# Patient Record
Sex: FEMALE | Race: NATIVE HAWAIIAN OR OTHER PACIFIC ISLANDER | NOT HISPANIC OR LATINO | Employment: UNEMPLOYED | URBAN - METROPOLITAN AREA
[De-identification: names, ages, dates, MRNs, and addresses within clinical notes are randomized per-mention and may not be internally consistent; named-entity substitution may affect disease eponyms.]

---
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
## 2021-01-04 ENCOUNTER — MYC MEDICAL ADVICE (OUTPATIENT)
Dept: RHEUMATOLOGY | Facility: CLINIC | Age: 21
End: 2021-01-04

## 2021-01-04 DIAGNOSIS — M32.19 SYSTEMIC LUPUS ERYTHEMATOSUS WITH OTHER ORGAN INVOLVEMENT, UNSPECIFIED SLE TYPE (H): Primary | ICD-10-CM

## 2021-01-05 RX ORDER — MYCOPHENOLATE MOFETIL 500 MG/1
1500 TABLET ORAL 2 TIMES DAILY
Qty: 180 TABLET | Refills: 11 | Status: SHIPPED | OUTPATIENT
Start: 2021-01-05 | End: 2021-08-11

## 2021-01-06 ENCOUNTER — TELEPHONE (OUTPATIENT)
Dept: RHEUMATOLOGY | Facility: CLINIC | Age: 21
End: 2021-01-06

## 2021-01-06 NOTE — TELEPHONE ENCOUNTER
PA Initiation    Medication: Benlysta- PENDING   Insurance Company: [employer] ([facility]) - Phone [phone] Fax [phone]  Pharmacy Filling the Rx:    Filling Pharmacy Phone:    Filling Pharmacy Fax:    Start Date: 1/6/2021

## 2021-01-07 NOTE — TELEPHONE ENCOUNTER
Prior Authorization Approval    Authorization Effective Date: 1/6/2021  Authorization Expiration Date: 7/6/2021  Medication: Benlysta- Approved  Approved Dose/Quantity: 200mg  Reference #: [id number]   Insurance Company: [employer] ([facility]) - Phone [phone] Fax [phone]  Expected CoPay:       CoPay Card Available: Yes    Foundation Assistance Needed: N/A  Which Pharmacy is filling the prescription (Not needed for infusion/clinic administered): [facility] - [facility] [address]   Pharmacy Notified: Yes  Patient Notified: No

## 2021-01-12 RX ORDER — ACETAMINOPHEN 325 MG/1
650 TABLET ORAL ONCE
Status: CANCELLED
Start: 2021-01-13

## 2021-01-12 RX ORDER — DIPHENHYDRAMINE HCL 50 MG
50 CAPSULE ORAL ONCE
Status: CANCELLED
Start: 2021-01-13

## 2021-01-12 RX ORDER — METHYLPREDNISOLONE SODIUM SUCCINATE 125 MG/2ML
1000 INJECTION, POWDER, LYOPHILIZED, FOR SOLUTION INTRAMUSCULAR; INTRAVENOUS ONCE
Status: CANCELLED | OUTPATIENT
Start: 2021-01-13

## 2021-01-13 ENCOUNTER — ANCILLARY PROCEDURE (OUTPATIENT)
Dept: ULTRASOUND IMAGING | Facility: CLINIC | Age: 21
End: 2021-01-13
Attending: PEDIATRICS
Payer: COMMERCIAL

## 2021-01-13 ENCOUNTER — HOSPITAL ENCOUNTER (INPATIENT)
Facility: CLINIC | Age: 21
LOS: 2 days | Discharge: HOME HEALTH | End: 2021-01-15
Attending: PEDIATRICS | Admitting: PEDIATRICS
Payer: COMMERCIAL

## 2021-01-13 ENCOUNTER — INFUSION THERAPY VISIT (OUTPATIENT)
Dept: INFUSION THERAPY | Facility: CLINIC | Age: 21
End: 2021-01-13
Attending: PEDIATRICS
Payer: COMMERCIAL

## 2021-01-13 ENCOUNTER — TELEPHONE (OUTPATIENT)
Dept: RHEUMATOLOGY | Facility: CLINIC | Age: 21
End: 2021-01-13

## 2021-01-13 DIAGNOSIS — L03.116 CELLULITIS OF LEFT LOWER EXTREMITY: ICD-10-CM

## 2021-01-13 DIAGNOSIS — Z53.9 ERRONEOUS ENCOUNTER--DISREGARD: ICD-10-CM

## 2021-01-13 DIAGNOSIS — U07.1 2019 NOVEL CORONAVIRUS DISEASE (COVID-19): ICD-10-CM

## 2021-01-13 DIAGNOSIS — M32.19 OTHER SYSTEMIC LUPUS ERYTHEMATOSUS WITH OTHER ORGAN INVOLVEMENT (H): Primary | ICD-10-CM

## 2021-01-13 DIAGNOSIS — M32.0 DRUG-INDUCED SYSTEMIC LUPUS ERYTHEMATOSUS, UNSPECIFIED ORGAN INVOLVEMENT STATUS (H): Chronic | ICD-10-CM

## 2021-01-13 LAB
ALBUMIN SERPL-MCNC: 3.1 G/DL (ref 3.4–5)
ALBUMIN UR-MCNC: 10 MG/DL
ALP SERPL-CCNC: 141 U/L (ref 40–150)
ALT SERPL W P-5'-P-CCNC: 17 U/L (ref 0–50)
ANION GAP SERPL CALCULATED.3IONS-SCNC: 7 MMOL/L (ref 3–14)
APPEARANCE UR: CLEAR
AST SERPL W P-5'-P-CCNC: 32 U/L (ref 0–35)
BACTERIA #/AREA URNS HPF: ABNORMAL /HPF
BASOPHILS # BLD AUTO: 0 10E9/L (ref 0–0.2)
BASOPHILS NFR BLD AUTO: 0 %
BILIRUB SERPL-MCNC: 0.4 MG/DL (ref 0.2–1.3)
BILIRUB UR QL STRIP: NEGATIVE
BUN SERPL-MCNC: 8 MG/DL (ref 7–30)
CA-I BLD-SCNC: 4.6 MG/DL (ref 4.4–5.2)
CALCIUM SERPL-MCNC: 8 MG/DL (ref 8.5–10.1)
CHLORIDE SERPL-SCNC: 110 MMOL/L (ref 96–110)
CO2 BLDCOV-SCNC: 19 MMOL/L (ref 21–28)
CO2 BLDCOV-SCNC: 20 MMOL/L (ref 21–28)
CO2 SERPL-SCNC: 22 MMOL/L (ref 20–32)
COLOR UR AUTO: YELLOW
CREAT SERPL-MCNC: 0.54 MG/DL (ref 0.5–1)
CRP SERPL-MCNC: 3.5 MG/L (ref 0–8)
DIFFERENTIAL METHOD BLD: ABNORMAL
EOSINOPHIL # BLD AUTO: 0 10E9/L (ref 0–0.7)
EOSINOPHIL NFR BLD AUTO: 0.3 %
ERYTHROCYTE [DISTWIDTH] IN BLOOD BY AUTOMATED COUNT: 17.5 % (ref 10–15)
FLUAV RNA RESP QL NAA+PROBE: NEGATIVE
FLUBV RNA RESP QL NAA+PROBE: NEGATIVE
GFR SERPL CREATININE-BSD FRML MDRD: >90 ML/MIN/{1.73_M2}
GLUCOSE BLD-MCNC: 83 MG/DL (ref 70–99)
GLUCOSE SERPL-MCNC: 77 MG/DL (ref 70–99)
GLUCOSE UR STRIP-MCNC: NEGATIVE MG/DL
GRAM STN SPEC: ABNORMAL
GRAM STN SPEC: ABNORMAL
HCT VFR BLD AUTO: 30.4 % (ref 35–47)
HCT VFR BLD CALC: 31 %PCV (ref 35–47)
HGB BLD CALC-MCNC: 10.5 G/DL (ref 11.7–15.7)
HGB BLD-MCNC: 9.1 G/DL (ref 11.7–15.7)
HGB UR QL STRIP: ABNORMAL
HYALINE CASTS #/AREA URNS LPF: 1 /LPF (ref 0–2)
IMM GRANULOCYTES # BLD: 0 10E9/L (ref 0–0.4)
IMM GRANULOCYTES NFR BLD: 0.5 %
KETONES UR STRIP-MCNC: NEGATIVE MG/DL
LABORATORY COMMENT REPORT: ABNORMAL
LACTATE BLD-SCNC: 0.6 MMOL/L (ref 0.7–2.1)
LEUKOCYTE ESTERASE UR QL STRIP: ABNORMAL
LIPASE SERPL-CCNC: 582 U/L (ref 73–393)
LYMPHOCYTES # BLD AUTO: 1.3 10E9/L (ref 0.8–5.3)
LYMPHOCYTES NFR BLD AUTO: 33.9 %
Lab: ABNORMAL
MCH RBC QN AUTO: 22.6 PG (ref 26.5–33)
MCHC RBC AUTO-ENTMCNC: 29.9 G/DL (ref 31.5–36.5)
MCV RBC AUTO: 75 FL (ref 78–100)
MONOCYTES # BLD AUTO: 0.4 10E9/L (ref 0–1.3)
MONOCYTES NFR BLD AUTO: 11.2 %
MUCOUS THREADS #/AREA URNS LPF: PRESENT /LPF
NEUTROPHILS # BLD AUTO: 2.1 10E9/L (ref 1.6–8.3)
NEUTROPHILS NFR BLD AUTO: 54.1 %
NITRATE UR QL: NEGATIVE
NRBC # BLD AUTO: 0 10*3/UL
NRBC BLD AUTO-RTO: 0 /100
PCO2 BLDV: 29 MM HG (ref 40–50)
PCO2 BLDV: 29 MM HG (ref 40–50)
PH BLDV: 7.43 PH (ref 7.32–7.43)
PH BLDV: 7.44 PH (ref 7.32–7.43)
PH UR STRIP: 6.5 PH (ref 5–7)
PLATELET # BLD AUTO: 149 10E9/L (ref 150–450)
PO2 BLDV: 44 MM HG (ref 25–47)
PO2 BLDV: 47 MM HG (ref 25–47)
POTASSIUM BLD-SCNC: 3.6 MMOL/L (ref 3.4–5.3)
POTASSIUM SERPL-SCNC: 3.6 MMOL/L (ref 3.4–5.3)
PROT SERPL-MCNC: 7.4 G/DL (ref 6.8–8.8)
RBC # BLD AUTO: 4.03 10E12/L (ref 3.8–5.2)
RBC #/AREA URNS AUTO: 5 /HPF (ref 0–2)
RSV RNA SPEC QL NAA+PROBE: ABNORMAL
SAO2 % BLDV FROM PO2: 82 %
SAO2 % BLDV FROM PO2: 85 %
SARS-COV-2 RNA RESP QL NAA+PROBE: POSITIVE
SODIUM BLD-SCNC: 140 MMOL/L (ref 133–144)
SODIUM SERPL-SCNC: 139 MMOL/L (ref 133–144)
SOURCE: ABNORMAL
SP GR UR STRIP: 1.01 (ref 1–1.03)
SPECIMEN SOURCE: ABNORMAL
SPECIMEN SOURCE: ABNORMAL
SQUAMOUS #/AREA URNS AUTO: 2 /HPF (ref 0–1)
UROBILINOGEN UR STRIP-MCNC: NORMAL MG/DL (ref 0–2)
WBC # BLD AUTO: 3.8 10E9/L (ref 4–11)
WBC #/AREA URNS AUTO: 7 /HPF (ref 0–5)

## 2021-01-13 PROCEDURE — 96365 THER/PROPH/DIAG IV INF INIT: CPT | Performed by: PEDIATRICS

## 2021-01-13 PROCEDURE — 250N000013 HC RX MED GY IP 250 OP 250 PS 637

## 2021-01-13 PROCEDURE — 82803 BLOOD GASES ANY COMBINATION: CPT

## 2021-01-13 PROCEDURE — 99285 EMERGENCY DEPT VISIT HI MDM: CPT | Mod: 25 | Performed by: PEDIATRICS

## 2021-01-13 PROCEDURE — 86140 C-REACTIVE PROTEIN: CPT | Performed by: PEDIATRICS

## 2021-01-13 PROCEDURE — 250N000009 HC RX 250: Performed by: PEDIATRICS

## 2021-01-13 PROCEDURE — 258N000003 HC RX IP 258 OP 636

## 2021-01-13 PROCEDURE — 87636 SARSCOV2 & INF A&B AMP PRB: CPT | Performed by: PEDIATRICS

## 2021-01-13 PROCEDURE — 250N000009 HC RX 250

## 2021-01-13 PROCEDURE — 76882 US LMTD JT/FCL EVL NVASC XTR: CPT | Mod: 26 | Performed by: PEDIATRICS

## 2021-01-13 PROCEDURE — 250N000012 HC RX MED GY IP 250 OP 636 PS 637

## 2021-01-13 PROCEDURE — 76882 US LMTD JT/FCL EVL NVASC XTR: CPT | Mod: LT | Performed by: PEDIATRICS

## 2021-01-13 PROCEDURE — 83690 ASSAY OF LIPASE: CPT | Performed by: PEDIATRICS

## 2021-01-13 PROCEDURE — 99223 1ST HOSP IP/OBS HIGH 75: CPT | Mod: AI | Performed by: PEDIATRICS

## 2021-01-13 PROCEDURE — 87070 CULTURE OTHR SPECIMN AEROBIC: CPT | Performed by: PEDIATRICS

## 2021-01-13 PROCEDURE — 82784 ASSAY IGA/IGD/IGG/IGM EACH: CPT | Performed by: PEDIATRICS

## 2021-01-13 PROCEDURE — 86160 COMPLEMENT ANTIGEN: CPT | Performed by: PEDIATRICS

## 2021-01-13 PROCEDURE — 999N001077 HC STATISTIC POTASSIUM ED POCT

## 2021-01-13 PROCEDURE — 999N001080 HC STATISTIC GLUCOSE ED POCT

## 2021-01-13 PROCEDURE — 87040 BLOOD CULTURE FOR BACTERIA: CPT | Performed by: PEDIATRICS

## 2021-01-13 PROCEDURE — 87186 SC STD MICRODIL/AGAR DIL: CPT | Performed by: PEDIATRICS

## 2021-01-13 PROCEDURE — 250N000013 HC RX MED GY IP 250 OP 250 PS 637: Performed by: PEDIATRICS

## 2021-01-13 PROCEDURE — 87205 SMEAR GRAM STAIN: CPT | Performed by: PEDIATRICS

## 2021-01-13 PROCEDURE — 83605 ASSAY OF LACTIC ACID: CPT

## 2021-01-13 PROCEDURE — C9803 HOPD COVID-19 SPEC COLLECT: HCPCS | Performed by: PEDIATRICS

## 2021-01-13 PROCEDURE — 120N000007 HC R&B PEDS UMMC

## 2021-01-13 PROCEDURE — 87077 CULTURE AEROBIC IDENTIFY: CPT | Performed by: PEDIATRICS

## 2021-01-13 PROCEDURE — 999N001079 HC STATISTIC HEMATOCRIT ED POCT

## 2021-01-13 PROCEDURE — 80053 COMPREHEN METABOLIC PANEL: CPT | Performed by: PEDIATRICS

## 2021-01-13 PROCEDURE — 82330 ASSAY OF CALCIUM: CPT

## 2021-01-13 PROCEDURE — 85025 COMPLETE CBC W/AUTO DIFF WBC: CPT | Performed by: PEDIATRICS

## 2021-01-13 PROCEDURE — 81001 URINALYSIS AUTO W/SCOPE: CPT | Performed by: PEDIATRICS

## 2021-01-13 PROCEDURE — 999N001076 HC STATISTIC SODIUM ED POCT

## 2021-01-13 RX ORDER — CEFTRIAXONE 2 G/1
2000 INJECTION, POWDER, FOR SOLUTION INTRAMUSCULAR; INTRAVENOUS ONCE
Status: DISCONTINUED | OUTPATIENT
Start: 2021-01-13 | End: 2021-01-13

## 2021-01-13 RX ORDER — CLINDAMYCIN PHOSPHATE 600 MG/50ML
600 INJECTION, SOLUTION INTRAVENOUS ONCE
Status: COMPLETED | OUTPATIENT
Start: 2021-01-13 | End: 2021-01-13

## 2021-01-13 RX ORDER — MYCOPHENOLATE MOFETIL 500 MG/1
1500 TABLET ORAL 2 TIMES DAILY
Status: DISCONTINUED | OUTPATIENT
Start: 2021-01-13 | End: 2021-01-15 | Stop reason: HOSPADM

## 2021-01-13 RX ORDER — IBUPROFEN 600 MG/1
600 TABLET, FILM COATED ORAL ONCE
Status: COMPLETED | OUTPATIENT
Start: 2021-01-13 | End: 2021-01-13

## 2021-01-13 RX ORDER — PREDNISONE 5 MG/1
5 TABLET ORAL DAILY
Status: DISCONTINUED | OUTPATIENT
Start: 2021-01-14 | End: 2021-01-15 | Stop reason: HOSPADM

## 2021-01-13 RX ORDER — IBUPROFEN 200 MG
200 TABLET ORAL EVERY 6 HOURS PRN
Status: DISCONTINUED | OUTPATIENT
Start: 2021-01-13 | End: 2021-01-15 | Stop reason: HOSPADM

## 2021-01-13 RX ORDER — HYDROXYCHLOROQUINE SULFATE 200 MG/1
400 TABLET, FILM COATED ORAL DAILY
Status: DISCONTINUED | OUTPATIENT
Start: 2021-01-14 | End: 2021-01-15 | Stop reason: HOSPADM

## 2021-01-13 RX ORDER — SODIUM CHLORIDE 9 MG/ML
INJECTION, SOLUTION INTRAVENOUS
Status: DISCONTINUED
Start: 2021-01-13 | End: 2021-01-13 | Stop reason: HOSPADM

## 2021-01-13 RX ORDER — CLINDAMYCIN PHOSPHATE 600 MG/50ML
600 INJECTION, SOLUTION INTRAVENOUS EVERY 8 HOURS
Status: DISCONTINUED | OUTPATIENT
Start: 2021-01-13 | End: 2021-01-14

## 2021-01-13 RX ORDER — SODIUM CHLORIDE 9 MG/ML
INJECTION, SOLUTION INTRAVENOUS
Status: COMPLETED
Start: 2021-01-13 | End: 2021-01-13

## 2021-01-13 RX ORDER — ACETAMINOPHEN 500 MG
500 TABLET ORAL EVERY 4 HOURS PRN
Status: DISCONTINUED | OUTPATIENT
Start: 2021-01-13 | End: 2021-01-15 | Stop reason: HOSPADM

## 2021-01-13 RX ADMIN — Medication 1000 ML: at 11:28

## 2021-01-13 RX ADMIN — CLINDAMYCIN IN 5 PERCENT DEXTROSE 600 MG: 12 INJECTION, SOLUTION INTRAVENOUS at 10:53

## 2021-01-13 RX ADMIN — MYCOPHENOLATE MOFETIL 1500 MG: 500 TABLET, FILM COATED ORAL at 20:16

## 2021-01-13 RX ADMIN — PANCRELIPASE 2 CAPSULE: 24000; 76000; 120000 CAPSULE, DELAYED RELEASE PELLETS ORAL at 16:08

## 2021-01-13 RX ADMIN — Medication: at 10:18

## 2021-01-13 RX ADMIN — SODIUM CHLORIDE 1000 ML: 9 INJECTION, SOLUTION INTRAVENOUS at 11:28

## 2021-01-13 RX ADMIN — IBUPROFEN 600 MG: 600 TABLET, FILM COATED ORAL at 09:46

## 2021-01-13 RX ADMIN — Medication: at 10:48

## 2021-01-13 RX ADMIN — SODIUM CHLORIDE 1000 ML: 9 INJECTION, SOLUTION INTRAVENOUS at 19:18

## 2021-01-13 RX ADMIN — CLINDAMYCIN IN 5 PERCENT DEXTROSE 600 MG: 12 INJECTION, SOLUTION INTRAVENOUS at 19:18

## 2021-01-13 ASSESSMENT — ENCOUNTER SYMPTOMS
COLOR CHANGE: 1
ABDOMINAL PAIN: 0
FEVER: 1

## 2021-01-13 NOTE — ED TRIAGE NOTES
Pt started with lower L leg pain 2 days ago. Calf is red, swollen and warm to the touch. Febrile in triage.

## 2021-01-13 NOTE — ED PROVIDER NOTES
History     Chief Complaint   Patient presents with     Leg Pain     HPI    History obtained from patient    Milly is a 20 year old female with SLE who presents at  9:33 AM for L leg pain.    Milly reports that she first noticed her left calf starting to hurt and turn red about 2 days ago. It has progressively gotten more painful and started to ooze/bleed from the center. Due to the pain, she has had a difficult time bearing weight on that leg. She was due for a belimumab infusion today; because of her pain, she came directly to the ED for evaluation.    Milly reports that she has not noticed any fever, nor has she had any cough, congestion, urinary symptoms, vomiting/diarrhea, or rash elsewhere.     PMHx:  Past Medical History:   Diagnosis Date     Hepatitis      Lupus (systemic lupus erythematosus) (H)      Lupus cerebritis (H)      Pancreatitis 08/2017     Pyelonephritis 08/2017     Seizures (H)      Status epilepticus (H)      Past Surgical History:   Procedure Laterality Date     NO HISTORY OF SURGERY       ORTHOPEDIC SURGERY       These were reviewed with the patient/family.    MEDICATIONS were reviewed and are as follows:   No current facility-administered medications for this encounter.        ALLERGIES:  Rituximab    IMMUNIZATIONS:  UTD per [facility], except for adolescent vaccinations    SOCIAL HISTORY: Milly lives with Mom and siblings.  She works at [employer]. She denies any alcohol/drug use or sexual activity.      I have reviewed the Medications, Allergies, Past Medical and Surgical History, and Social History in the Epic system.    Review of Systems  Please see HPI for pertinent positives and negatives.  All other systems reviewed and found to be negative.        Physical Exam   BP: 117/83  Pulse: 113  Temp: 103.6  F (39.8  C)  Resp: 20  Weight: 60.8 kg (134 lb 0.6 oz)  SpO2: 100 %      Physical Exam   Appearance: Alert and appropriate, well developed, nontoxic, with moist mucous membranes. Pleasant  and friendly. Answers questions appropriately.   HEENT: Head: Normocephalic and atraumatic. Eyes: PERRL, EOM grossly intact, conjunctivae and sclerae clear. Ears: Tympanic membranes clear bilaterally, without inflammation or effusion. Nose: Nares clear with no active discharge.  Mouth/Throat: No oral lesions, pharynx clear with no erythema or exudate.  Neck: Supple, no masses, no meningismus. No significant cervical lymphadenopathy.  Pulmonary: No grunting, flaring, retractions or stridor. Good air entry, clear to auscultation bilaterally, with no rales, rhonchi, or wheezing.  Cardiovascular: Regular rate and rhythm, normal S1 and S2, with no murmurs.  Normal symmetric peripheral pulses and brisk cap refill.  Abdominal: Normal bowel sounds, soft, nontender, nondistended, with no masses and no hepatosplenomegaly.  Neurologic: Alert and oriented, cranial nerves II-XII grossly intact, moving all extremities equally with grossly normal coordination and normal gait.  Extremities/Back: No deformity, no CVA tenderness.  Skin: ~15cm x ~20cm area of erythema on left calf with induration. Significant bloody/purulent drainage upon initial exam and removal of home bandage.      ED Course      Procedures    Results for orders placed or performed during the hospital encounter of 01/13/21 (from the past 24 hour(s))   POC US SOFT TISSUE    Impression    Limited Soft Tissue Ultrasound, performed and interpreted by me.    Indication:  Skin redness warmth pain. Evaluate for cellulitis vs abscess.     Body location: left lower extremity    Findings:  There is cobblestoning suggestive of cellulitis in the evaluated area. There is no fluid collection to suggest abscess. No foreign body identified    IMPRESSION: Cellulitis       ISTAT gases elec ica gluc justin POCT   Result Value Ref Range    Ph Venous 7.43 7.32 - 7.43 pH    PCO2 Venous 29 (L) 40 - 50 mm Hg    PO2 Venous 44 25 - 47 mm Hg    Bicarbonate Venous 19 (L) 21 - 28 mmol/L    O2 Sat  Venous 82 %    Sodium 140 133 - 144 mmol/L    Potassium 3.6 3.4 - 5.3 mmol/L    Glucose 83 70 - 99 mg/dL    Calcium Ionized 4.6 4.4 - 5.2 mg/dL    Hemoglobin 10.5 (L) 11.7 - 15.7 g/dL    Hematocrit - POCT 31 (L) 35.0 - 47.0 %PCV   CBC with platelets differential   Result Value Ref Range    WBC 3.8 (L) 4.0 - 11.0 10e9/L    RBC Count 4.03 3.8 - 5.2 10e12/L    Hemoglobin 9.1 (L) 11.7 - 15.7 g/dL    Hematocrit 30.4 (L) 35.0 - 47.0 %    MCV 75 (L) 78 - 100 fl    MCH 22.6 (L) 26.5 - 33.0 pg    MCHC 29.9 (L) 31.5 - 36.5 g/dL    RDW 17.5 (H) 10.0 - 15.0 %    Platelet Count 149 (L) 150 - 450 10e9/L    Diff Method Automated Method     % Neutrophils 54.1 %    % Lymphocytes 33.9 %    % Monocytes 11.2 %    % Eosinophils 0.3 %    % Basophils 0.0 %    % Immature Granulocytes 0.5 %    Nucleated RBCs 0 0 /100    Absolute Neutrophil 2.1 1.6 - 8.3 10e9/L    Absolute Lymphocytes 1.3 0.8 - 5.3 10e9/L    Absolute Monocytes 0.4 0.0 - 1.3 10e9/L    Absolute Eosinophils 0.0 0.0 - 0.7 10e9/L    Absolute Basophils 0.0 0.0 - 0.2 10e9/L    Abs Immature Granulocytes 0.0 0 - 0.4 10e9/L    Absolute Nucleated RBC 0.0    CRP inflammation   Result Value Ref Range    CRP Inflammation 3.5 0.0 - 8.0 mg/L   Comprehensive metabolic panel   Result Value Ref Range    Sodium 139 133 - 144 mmol/L    Potassium 3.6 3.4 - 5.3 mmol/L    Chloride 110 96 - 110 mmol/L    Carbon Dioxide 22 20 - 32 mmol/L    Anion Gap 7 3 - 14 mmol/L    Glucose 77 70 - 99 mg/dL    Urea Nitrogen 8 7 - 30 mg/dL    Creatinine 0.54 0.50 - 1.00 mg/dL    GFR Estimate >90 >60 mL/min/[1.73_m2]    GFR Estimate If Black >90 >60 mL/min/[1.73_m2]    Calcium 8.0 (L) 8.5 - 10.1 mg/dL    Bilirubin Total 0.4 0.2 - 1.3 mg/dL    Albumin 3.1 (L) 3.4 - 5.0 g/dL    Protein Total 7.4 6.8 - 8.8 g/dL    Alkaline Phosphatase 141 40 - 150 U/L    ALT 17 0 - 50 U/L    AST 32 0 - 35 U/L   Symptomatic Influenza A/B & SARS-CoV2 (COVID-19) Virus PCR Multiplex    Specimen: Nasopharyngeal   Result Value Ref Range     Flu A/B & SARS-COV-2 PCR Source Nasopharyngeal     SARS-CoV-2 PCR Result POSITIVE (AA)     Influenza A PCR Negative NEG^Negative    Influenza B PCR Negative NEG^Negative    Respiratory Syncytial Virus PCR (Note)     Flu A/B & SARS-CoV-2 PCR Comment (Note)    Lipase   Result Value Ref Range    Lipase 582 (H) 73 - 393 U/L   ISTAT gases lactate justin POCT   Result Value Ref Range    Ph Venous 7.44 (H) 7.32 - 7.43 pH    PCO2 Venous 29 (L) 40 - 50 mm Hg    PO2 Venous 47 25 - 47 mm Hg    Bicarbonate Venous 20 (L) 21 - 28 mmol/L    O2 Sat Venous 85 %    Lactic Acid 0.6 (L) 0.7 - 2.1 mmol/L       Medications   ibuprofen (ADVIL/MOTRIN) tablet 600 mg (600 mg Oral Given 1/13/21 0946)   lidocaine 1 % (  Given 1/13/21 1048)   clindamycin (CLEOCIN) infusion 600 mg (0 mg Intravenous Stopped 1/13/21 1128)   lidocaine 1 % (  Given 1/13/21 1018)   0.9% sodium chloride BOLUS (0 mLs Intravenous ED Infusing on Admission/transfer 1/13/21 1130)       Old [facility] from [facility] reviewed, supported history as above.  Imaging reviewed and revealed cobblestoning, consistent with cellulitis.  Patient was attended to immediately upon arrival and assessed for immediate life-threatening conditions.  Discussed with the admitting physician, Dr. Archana Cavanaugh.  A consult was requested and obtained from peds rheum, who will advise admitting team on need for belimumab infusion.  Patient signed out to [facility] (gen peds).    Culture of oozing drainage sent upon initial removal of home bandage.  Bedside US revealed cobblestoning, but no fluid collection to warrant drainage.  Patient received 1L NS and clindamycin x1.   Labwork revealed Covid+, leukopenia, anemia  Routine rheum labs (C3, C4, IgG, UA, timed protein) drawn in ED      Critical care time:  none       Assessments & Plan (with Medical Decision Making)     I have reviewed the nursing notes.  I have reviewed the findings, diagnosis, plan and need for follow up with the patient.  Milly is a 20yoF  "with SLE who presents with 2 days of worsening L calf redness, pain, and drainage. Her exam and ultrasound are consistent with cellulitis. There is no drainable collection at this time; it is likely that it was drained upon initial removal of home bandage. She meets criteria for sepsis, but is mentating well. She is Covid+, but she does not have any respiratory or GI symptoms. There are no other localizing symptoms/exam findings to suggest an alternate source of infection.  - Admit to gen peds  - Follow cultures  - Milly requests that Mom (Shari, listed under \"Mobile\" number) is kept up to date. Shari was informed of Milly's presence in the ED and need for admission by ED team.   Mustapha Carroll MD  PGY-3 Pediatrics  Current Discharge Medication List          Final diagnoses:   Cellulitis of left lower extremity   Drug-induced systemic lupus erythematosus, unspecified organ involvement status (H)   2019 novel coronavirus disease (COVID-19)       1/13/2021   [facility] EMERGENCY DEPARTMENT  This data collected with the Resident working in the Emergency Department.  Patient was seen and evaluated by myself and I repeated the history and physical exam with the patient.  The plan of care was discussed with them.  The key portions of the note including the entire assessment and plan reflect my documentation.           Peter Mai MD  01/13/21 1303    "

## 2021-01-13 NOTE — ED NOTES
Septic Shock Triggers were based on the following clinical parameters (see Table):    Hypothermia (< 96.8)  Febrile (>101.3) if older than 3 months; >100.3 if younger than 3 months    Temperature was above normal  Heart Rate was above normal  Respiratory Rate was within normal  Clinical appearance was a well patient    Based on findings, BRET Bloom RN, Did notify the Staff MD* - Dr. Mai       *Trigger to notify MD =  Any child who meets criteria for SIRS (High Risk Patient with 2 or more findings) and has presumptive infection meets definition of sepsis or any ill-appearing patient (Triage Level 1 or 2)      Septic Shock is Sepsis + Cardiovascular organ dysfunction   Decreased or altered mental status  Prolonged cap refill (cold shock)  Diminished pulses (cold shock)  Mottled skin (cold shock)  Flash capillary refill (warm shock)  Bounding pulses (warm shock)  Wide pulse pressure (warm shock)  Decreased urine output < 1 ml/kg/hour    Hypotension is not necessary for the clinical diagnosis of septic shock, however, its presence in a child with clinical suspicion of infection is confirmatory

## 2021-01-13 NOTE — H&P
[facility]     History and Physical - Pediatric Purple Service        Date of Admission:  1/13/2021    Assessment & Plan   Milly Carroll is a 20 year old female admitted on 1/13/2021. She has a history of SLE and is admitted for cellulitis of the left lower extremity. Additionally, she was found to be COVID positive. Although she does not report any subjective fevers, she was febrile to 103 in the ED- unclear whether this is secondary to COVID infection or cellulitis. She currently requires admission for IV antibiotics and monitoring of her fever curve.      #Cellulitis   #Sepsis   Ultrasound most consistent with cellulitis. Erythema appears to have lessened from border drawn in ED and appears to be responding to clindamycin.   - Continue Clindamycin 600mg IV q8h  - F/u wound and blood cultures (starting to grow GPCs)  - Tylenol/ibuprofen PRN for pain     #SLE  - Continue PTA plaquenil, mycophenolate.   - Unclear whether patient is taking prednisone at this time, as she did report taking it, however, note from primary rheumatologist in December reads that she discontinued use of prednisone in November.   - Per conversation with rheumatology, will hold off on weekly infusion for now and follow up with recs tomorrow     #EPI  - Continue Creon with meals and PRN with snacks     #COVID infection  - her fever may be a manifestation of COVID rather than the cellulitis.  - continue monitoring, especially given her immunosuppression  - Will need 10 days of quarantine         Diet: Peds Diet Age 9-18 yrs  Fluids: PO  DVT Prophylaxis: Encourage ambulation   Leung Catheter: not present  Code Status:  Full    Disposition Plan   Expected discharge: Tomorrow, recommended to prior living arrangement once antibiotic plan established.    Will continue to update mom, Shari Carroll, over the phone.     Entered: Blue Foster MD 01/13/2021, 5:07 PM       The patient's care was discussed  with the Fellow, Dr. Fregoso.    Blue Foster MD  PGY-1, Pediatrics  Pager: [phone]    Attestation:  This patient has been seen and evaluated by me today, and management was discussed with the resident physicians and nurses.  I have reviewed today's vital signs, medications, labs and imaging (as pertinent).  I agree with all the findings and plan in this note.    Attila Nicole MD, Pediatric Hospitalist, Pager: [phone]     ______________________________________________________________________    Chief Complaint   Cellulitis of L leg     History is obtained from the patient    History of Present Illness   Milly Carroll is a 20 year old female with a history of SLE and pancreatic insufficiency who presented to the ED on 1/13/21 with L leg pain and fever and is admitted for cellulitis and sepsis.     Milly first noticed symptoms on Monday.  She reports that she was walking when she noticed a pain in her leg.  Later that day, she was taking a shower and her mom noticed that her leg was bleeding and red.  Her mom told her to cover it up with a bandage and ask about it at her appointment for her infusion scheduled for Wednesday, 1/13.  Milly feels that the redness and swelling have not changed in severity since Monday.  The pain is approximately a 5 out of 10.  She denies any subjective fevers, chills, nausea, vomiting, diarrhea, constipation, shortness of breath, abdominal pain, or rash anywhere else on her body. She has not had any change in appetite and has been drinking a normal amount of fluid for her.     Of note, Milly does not know where she may have been exposed to Covid.  Nobody in her family has displayed any signs or symptoms of Covid.  At this time, Milly believes that she is asymptomatic.  She denies any cough, loss of taste or smell, or difficulty breathing.    She follows with pediatric rheumatology for her SLE and was due for her weekly Belimumab injection today.  Because of her symptoms,  she elected to come to the ED.     In the ED, she was febrile to 103F. Labs were remarkable for a WBC of 3.8, Hb of 9.1 (consistent with her baseline), and positive SARS CoV-2 PCR. Purulent drainage from the wound was sent for culture, now growing gram positive cocci in clusters. Ultrasound was consistent with cellulitis and did not demonstrate abscess. She was given IV clindamycin and admitted to the floor for further care.      Review of Systems    Review of Systems   Constitutional: Positive for fever.   Gastrointestinal: Negative for abdominal pain.   Skin: Positive for color change.         Past Medical History    I have reviewed this patient's medical history and updated it with pertinent information if needed.   Past Medical History:   Diagnosis Date     Hepatitis      Lupus (systemic lupus erythematosus) (H)      Lupus cerebritis (H)      Pancreatitis 08/2017     Pyelonephritis 08/2017     Seizures (H)      Status epilepticus (H)         Past Surgical History   I have reviewed this patient's surgical history and updated it with pertinent information if needed.  Past Surgical History:   Procedure Laterality Date     NO HISTORY OF SURGERY       ORTHOPEDIC SURGERY     Patient unsure of surgery on her R knee. Due to some kind of tear.     Social History   Works at [employer], although per mom, she has not worked recently. No tobacco or alcohol use. Not sexually active. Lives with mom, 3 siblings (2 younger, 1 older), no pets at home or recent travel.     Family History   I have reviewed this patient's family history and updated it with pertinent information if needed.  Family History   Problem Relation Age of Onset     Other - See Comments Father         Question of lupus in father and paternal grandfather     Lupus Sister         older sister     Gastrointestinal Disease No family hx of         No known history of IBD, hepatitis, pancreatitis, celiac disease, or GI cancers before age 50     Glaucoma No family hx  of      Macular Degeneration No family hx of        Prior to Admission Medications   Prior to Admission Medications   Prescriptions Last Dose Informant Patient Reported? Taking?   Belimumab 200 MG/ML SOAJ   No No   Sig: Inject 200 mg Subcutaneous every 7 days   acetaminophen (TYLENOL) 500 MG tablet   No No   Sig: Take 1 tablet (500 mg) by mouth every 4 hours as needed for mild pain   amylase-lipase-protease (CREON 24) 18122-98652 units CPEP per EC capsule   No No   Sig: Take 1 capsule (24,000 Units) by mouth Take with snacks or supplements (with snacks)   amylase-lipase-protease (CREON 24) 36731-71042 units CPEP per EC capsule   No No   Sig: Take 2 capsules (48,000 Units) by mouth 3 times daily (with meals)   calcium carbonate (TUMS) 500 MG chewable tablet  Self No No   Sig: Take 1 tablet (500 mg) by mouth daily   clobetasol (TEMOVATE) 0.05 % external ointment   No No   Sig: Apply topically 2 times daily   famotidine (PEPCID) 20 MG tablet   No No   Sig: Take 2 tablets (40 mg) by mouth daily   ferrous sulfate 325 (65 Fe) MG PO tablet   No No   Sig: Take 1 tablet (325 mg) by mouth daily (with breakfast)   hydroxychloroquine (PLAQUENIL) 200 MG tablet 1/13/2021 at 0730  No Yes   Sig: Take 2 tablets (400 mg) by mouth daily   ketoconazole (NIZORAL) 2 % external shampoo  Self No No   Sig: Apply topically three times a week ;Suds up and leave on scalp for 5 minutes before rinsing.   multivitamin, therapeutic (THERA-VIT) TABS tablet  Self No No   Sig: Take 1 tablet by mouth daily   mvw complete formulation (SOFTGELS) capsule   No No   Sig: Take 2 capsules by mouth daily   mycophenolate (GENERIC EQUIVALENT) 500 MG tablet 1/13/2021 at 0730  No Yes   Sig: Take 3 tablets (1,500 mg) by mouth 2 times daily   ondansetron (ZOFRAN ODT) 4 MG ODT tab   No No   Sig: Take 1 tablet (4 mg) by mouth every 8 hours as needed for nausea   oxyCODONE (ROXICODONE) 5 MG tablet   No No   Sig: Take 1 tablet (5 mg) by mouth every 6 hours as needed  for moderate to severe pain   polyethylene glycol (MIRALAX/GLYCOLAX) packet  Self No No   Sig: Take 17 g by mouth 2 times daily   predniSONE (DELTASONE) 5 MG tablet 1/13/2021 at 0730  Yes Yes   Sig: Take 1 tablet (5 mg) by mouth daily   sulfamethoxazole-trimethoprim (BACTRIM DS) 800-160 MG tablet   No No   Sig: Take 1 tablet by mouth three times a week   vitamin D3 (CHOLECALCIFEROL) 1.25 MG (52660 UT) capsule   No No   Sig: Take 1 capsule (50,000 Units) by mouth every 7 days   vitamin D3 (CHOLECALCIFEROL) 10 MCG (400 UNIT) capsule   No No   Sig: Take 1 capsule (400 Units) by mouth daily      Facility-Administered Medications: None     Allergies   Allergies   Allergen Reactions     Rituximab Anaphylaxis       Physical Exam   Vital Signs: Temp: 98.1  F (36.7  C) Temp src: Oral BP: 96/61 Pulse: 79   Resp: 14 SpO2: 100 % O2 Device: None (Room air)    Weight: 134 lbs .63 oz     GENERAL: Active, alert, in no acute distress.  SKIN: Scattered acne over face. Area of erythema and induration on left calf measuring approximately 7cm x 15cm. Scant bloody drainage upon removal of dressing.         HEAD: Normocephalic  EYES: Pupils equal, round, reactive, Extraocular muscles intact. Normal conjunctivae.  EARS: Normal canals.  NOSE: Normal without discharge.  MOUTH/THROAT: Clear. No oral lesions. Teeth without obvious abnormalities.  NECK: Supple, no masses.  No thyromegaly.  LYMPH NODES: No adenopathy  LUNGS: Clear. No rales, rhonchi, wheezing or retractions  HEART: Regular rhythm. Normal S1/S2. No murmurs. Normal pulses.  ABDOMEN: Soft, non-tender, not distended, no masses or hepatosplenomegaly. Bowel sounds normal.   NEUROLOGIC: No focal findings. Cranial nerves grossly intact. Sensation intact in both lower extremities.   EXTREMITIES: Full range of motion, no deformities.      Data   Data reviewed today: I reviewed all medications, new labs and imaging results over the last 24 hours.     Recent Labs   Lab 01/13/21  7179  01/13/21  1042   WBC 3.8*  --    HGB 9.1* 10.5*   MCV 75*  --    *  --     140   POTASSIUM 3.6 3.6   CHLORIDE 110  --    CO2 22  --    BUN 8  --    CR 0.54  --    ANIONGAP 7  --    BISI 8.0*  --    GLC 77 83   ALBUMIN 3.1*  --    PROTTOTAL 7.4  --    BILITOTAL 0.4  --    ALKPHOS 141  --    ALT 17  --    AST 32  --    LIPASE 582*  --      Recent Results (from the past 24 hour(s))   POC US SOFT TISSUE    Impression    Limited Soft Tissue Ultrasound, performed and interpreted by me.    Indication:  Skin redness warmth pain. Evaluate for cellulitis vs abscess.     Body location: left lower extremity    Findings:  There is cobblestoning suggestive of cellulitis in the evaluated area. There is no fluid collection to suggest abscess. No foreign body identified    IMPRESSION: Cellulitis

## 2021-01-13 NOTE — PROGRESS NOTES
This encounter was opened in error. Please disregard.    Pt did not come to appointment due to being admitted to unit 6 through the ED.

## 2021-01-13 NOTE — PLAN OF CARE
Admitted to 61 at 1200. Discovered to be covid + shortly after arrival to unit. Afebrile. Rates pain in left leg site 4-5. Declined tylenol. Dressing changed to left leg site. Erythema improving (decreased within outlined border). Continues on IV clindamycin. Home SLE meds restarted. Mom updated (ok with patient) over phone. Continuing to monitor.

## 2021-01-14 LAB
C3 SERPL-MCNC: 30 MG/DL (ref 81–157)
C4 SERPL-MCNC: 10 MG/DL (ref 13–39)
IGG SERPL-MCNC: 1711 MG/DL (ref 610–1616)

## 2021-01-14 PROCEDURE — 99233 SBSQ HOSP IP/OBS HIGH 50: CPT | Mod: GC | Performed by: PEDIATRICS

## 2021-01-14 PROCEDURE — 120N000007 HC R&B PEDS UMMC

## 2021-01-14 PROCEDURE — 250N000013 HC RX MED GY IP 250 OP 250 PS 637

## 2021-01-14 PROCEDURE — 250N000012 HC RX MED GY IP 250 OP 636 PS 637

## 2021-01-14 PROCEDURE — 250N000009 HC RX 250

## 2021-01-14 PROCEDURE — 250N000011 HC RX IP 250 OP 636: Performed by: PEDIATRICS

## 2021-01-14 RX ORDER — VANCOMYCIN HYDROCHLORIDE 1 G/200ML
1000 INJECTION, SOLUTION INTRAVENOUS EVERY 12 HOURS
Status: DISCONTINUED | OUTPATIENT
Start: 2021-01-14 | End: 2021-01-15

## 2021-01-14 RX ADMIN — PANCRELIPASE 2 CAPSULE: 24000; 76000; 120000 CAPSULE, DELAYED RELEASE PELLETS ORAL at 07:57

## 2021-01-14 RX ADMIN — HYDROXYCHLOROQUINE SULFATE 400 MG: 200 TABLET, FILM COATED ORAL at 07:57

## 2021-01-14 RX ADMIN — VANCOMYCIN HYDROCHLORIDE 1000 MG: 1 INJECTION, SOLUTION INTRAVENOUS at 23:58

## 2021-01-14 RX ADMIN — MYCOPHENOLATE MOFETIL 1500 MG: 500 TABLET, FILM COATED ORAL at 07:57

## 2021-01-14 RX ADMIN — VANCOMYCIN HYDROCHLORIDE 1000 MG: 1 INJECTION, SOLUTION INTRAVENOUS at 12:57

## 2021-01-14 RX ADMIN — PANCRELIPASE 2 CAPSULE: 24000; 76000; 120000 CAPSULE, DELAYED RELEASE PELLETS ORAL at 15:34

## 2021-01-14 RX ADMIN — MYCOPHENOLATE MOFETIL 1500 MG: 500 TABLET, FILM COATED ORAL at 19:40

## 2021-01-14 RX ADMIN — CLINDAMYCIN IN 5 PERCENT DEXTROSE 600 MG: 12 INJECTION, SOLUTION INTRAVENOUS at 04:28

## 2021-01-14 RX ADMIN — PREDNISONE 5 MG: 5 TABLET ORAL at 07:57

## 2021-01-14 NOTE — PLAN OF CARE
Tmax 99.4F. Pain 2-4. Denied pain medication. Slept well throughout the night. Breathing well on RA. VSS. Voiding well. No changes to cellulite wound on left calf. Continues on antibiotics. Updated on POC. Will continue to monitor and assess.

## 2021-01-14 NOTE — PHARMACY-VANCOMYCIN DOSING SERVICE
Pharmacy Vancomycin Initial Note  Date of Service 2021  Patient's  2000  20 year old, female    Indication: Skin and Soft Tissue Infection    Current estimated CrCl = Estimated Creatinine Clearance: 143.4 mL/min (based on SCr of 0.54 mg/dL).    Creatinine for last 3 days  2021: 10:43 AM Creatinine 0.54 mg/dL    Recent Vancomycin Level(s) for last 3 days  No results found for requested labs within last 72 hours.      Vancomycin IV Administrations (past 72 hours)      No vancomycin orders with administrations in past 72 hours.                Nephrotoxins and other renal medications (From now, onward)    Start     Dose/Rate Route Frequency Ordered Stop    21 1200  vancomycin (VANCOCIN) 1000 mg in dextrose 5% 200 mL PREMIX      1,000 mg  200 mL/hr over 1 Hours Intravenous EVERY 12 HOURS 21 1146      21 1702  ibuprofen (ADVIL/MOTRIN) tablet 200 mg      200 mg Oral EVERY 6 HOURS PRN 21 1702            Contrast Orders - past 72 hours (72h ago, onward)    None                Plan:  1.  Start vancomycin  1000 mg IV q12h.   2.  Goal Trough Level: 10-15 mg/L   3.  Pharmacy will check trough levels as appropriate in 1-3 Days.    4. Serum creatinine levels will be ordered a minimum of twice weekly.    5. [place] method utilized to dose vancomycin therapy: peds dosing chart    Mario Cardoso [facility]

## 2021-01-14 NOTE — PROGRESS NOTES
Attestation:  This patient has been seen and evaluated by me today, and management was discussed with the resident physicians and nurses.  I have reviewed today's vital signs, medications, labs and imaging (as pertinent).  I agree with all the findings and plan in this note.    Amira Fregoso MD  Pediatric Hospital Medicine Fellow  Pager: [phone]      Attestation:  This patient has been seen and evaluated by me today, and management was discussed with the resident physicians and nurses.  I have reviewed today's vital signs, medications, labs and imaging (as pertinent).  I agree with all the findings and plan in this note.    Attila Nicole MD, Pediatric Hospitalist, Pager: [phone]            [facility]     Progress Note - General Pediatrics Service        Date of Admission:  1/13/2021    Assessment & Plan    Milly Carroll is a 20 year old female admitted on 1/13/2021. She has a history of SLE and is admitted for cellulitis of the left lower extremity. Additionally, she was found to be COVID positive. Although she does not report any subjective fevers, she was febrile to 103 in the ED- unclear whether this is secondary to COVID infection or cellulitis. She currently requires admission for IV antibiotics and monitoring of her fever curve.      #Cellulitis   #Sepsis   Ultrasound most consistent with cellulitis. This morning, cellulitis was warmer, more indurated, duskier appearing, with less distinct borders. Milly does endorse clinical improvement and was afebrile overnight. However, given local susceptibility patterns in addition to extension and worsening of her cellulitis, elected to broaden her antibiotic coverage to Vancomycin for optimal coverage of MRSA  - Discontinue clindamycin, begin Vancomycin  - F/u wound and blood cultures. Gram stain positive for GPCs  - Tylenol/ibuprofen PRN for pain        #SLE  - Continue PTA plaquenil, mycophenolate, prednisone.  "  - Per rheumatology, she will switch to taking her Belimumab at home. She needs to call pharmacy personally in order to facilitate this. Per primary rheumatology, she could benefit from a short, weekly visit with a home nurse to ensure that she is getting and taking her medications appropriately. Will try to facilitate this while she is an inpatient      #EPI  - Continue PTA Creon with meals and PRN with snacks      #COVID infection  - Her fever may be a manifestation of COVID rather than the cellulitis.  - Continue monitoring, especially given her immunosuppression  - Per CDC guidelines, patients who are \"severely immunocompromised\" should isolate for up to 20 days after the infection has passed. A daily dose of >20mg of prednisone would qualify someone as \"severely immunocompromised.\" Per discussion with rheumatology, Milly's prednisone dose, Belimumab, and MMF do immunosuppress her to an equivalent degree, and she should self isolate for 20 days. She does not require a repeat negative test.   Source: https://www.cdc.gov/coronavirus/2019-ncov/hcp/disposition-hospitalized-patients.html       Diet: Peds Diet Age 9-18 yrs    Fluids: PO  Lines: PIV  DVT Prophylaxis: Encourage ambulation         Disposition Plan   Expected discharge: Tomorrow, recommended to prior living arrangement once antibiotic plan established.  Entered: Blue Foster MD 01/14/2021, 2:29 PM       The patient's care was discussed with the Fellow, Dr. Fregoso, and the Attending Physician, Dr. Nicole. .    Blue Foster MD  General Pediatrics Service  [facility]     ______________________________________________________________________    Interval History   NAEON. No cough, SOB, fevers, loss of taste/small. Did not use any PRNs. Believes the pain in her leg may be a little bit better. Slept well. POCUS did not demonstrate abscess.     Data reviewed today: I reviewed all medications, new labs and " imaging results over the last 24 hours. I personally reviewed no images or EKG's today.    Physical Exam   Vital Signs: Temp: 99.5  F (37.5  C) Temp src: Oral BP: 92/62 Pulse: 91   Resp: 16 SpO2: 100 % O2 Device: None (Room air)    Weight: 134 lbs .63 oz  GENERAL: Active, alert, in no acute distress.  SKIN: Scattered acne with PIH over face, malar rash. Area of erythema and induration on left calf measuring approximately 7cm x 15cm. Warmer, firmer, duskier than day prior. Borders less discrete, occasionally extending beyond outline drawn yesterday. Able to express purulent drainage.         HEAD: Normocephalic  EYES: Extraocular muscles intact. Normal conjunctivae.  EARS: Normal canals.  NOSE: Normal without discharge.  MOUTH/THROAT: Clear. No oral lesions.  NECK: Supple, no masses.  No thyromegaly.  LYMPH NODES: No adenopathy  LUNGS: Clear. No rales, rhonchi, wheezing or retractions  HEART: Regular rhythm. Normal S1/S2. No murmurs. Normal pulses.  ABDOMEN: Soft, non-tender, not distended, no masses or hepatosplenomegaly. Bowel sounds normal.   NEUROLOGIC: No focal findings. Sensation intact in both lower extremities.   EXTREMITIES: Full range of motion, no deformities.       Data   Recent Labs   Lab 01/13/21  1043 01/13/21  1042   WBC 3.8*  --    HGB 9.1* 10.5*   MCV 75*  --    *  --     140   POTASSIUM 3.6 3.6   CHLORIDE 110  --    CO2 22  --    BUN 8  --    CR 0.54  --    ANIONGAP 7  --    BISI 8.0*  --    GLC 77 83   ALBUMIN 3.1*  --    PROTTOTAL 7.4  --    BILITOTAL 0.4  --    ALKPHOS 141  --    ALT 17  --    AST 32  --    LIPASE 582*  --      No results found for this or any previous visit (from the past 24 hour(s)).

## 2021-01-14 NOTE — PROGRESS NOTES
Care Coordinator Progress Note    Admission Date/Time:  1/13/2021  Attending MD:  Attila Nicole MD    Data  Chart reviewed, discussed with interdisciplinary team.   Patient was admitted for:    Cellulitis of left lower extremity  Drug-induced systemic lupus erythematosus, unspecified organ involvement status (H)  2019 novel coronavirus disease (COVID-19).    Concerns with insurance coverage for discharge needs: None.  Current Living Situation: Patient lives with family.  Support System: Supportive  Services Involved: Skilled Nursing Visit  Transportation at Discharge: Family or friends to provide  Transportation to Medical Appointments:   - Family or Friend to Provide  Barriers to Discharge: None assessed at this time    Coordination of Care and Referrals: Provided patient/family with options for Skilled Nursing Visits.        Assessment  Contacted by [facility] team requesting referral for skilled nursing visits to help with patient's medication management at home. Care Coordination spoke with patient and offered choice to have skilled nursing visits in the home, as well as choice of agency. Patient agreeable to skilled nursing visits and chose to have referral made to [facility].    Referral sent to [facility] for weekly skilled nursing visits.      Plan  Anticipated Discharge Date:  TBD  Anticipated Discharge Plan:  TBD-awaiting referral acceptance from [facility] for weekly skilled nursing visits.    Rosa Henning RN

## 2021-01-14 NOTE — PHARMACY-ADMISSION MEDICATION HISTORY
Admission Medication History Completed by Pharmacy    See [facility] Admission Navigator for allergy information, preferred outpatient pharmacy, prior to admission medications and immunization status.     Medication History Sources:     Patient, [facility], [place]    Changes made to PTA medication list (reason):    Added: None    Deleted:   o Ketoconazole 2% shampoo: Apply topically three times a week (per patient)  o Polyethylene glycol powder: take 17 g PO BID (per patient)    Changed: None    Additional Information:    Prednisone: The patient reports taking 5 mg this morning and stated that never stopped taking it. The patient stated that she fills her prescription at [facility]. RPh at [facility] reports that the patient has not filled prednisone since January 2020.     Hydroxychloroquine: The patient reported to the RN that she took this medication this morning. When interviewing the patient, she reported that she does not take this medication anymore. The h at [facility] stated that this medication has not been filled since February 2020.    Prior to Admission medications    Medication Sig Last Dose Taking? Auth Provider   acetaminophen (TYLENOL) 500 MG tablet Take 1 tablet (500 mg) by mouth every 4 hours as needed for mild pain Past Month Yes Fanny Watts   amylase-lipase-protease (CREON 24) 07192-20734 units CPEP per EC capsule Take 1 capsule (24,000 Units) by mouth Take with snacks or supplements (with snacks) 1/13/2021 Yes Marcia Schmitt MD   amylase-lipase-protease (CREON 24) 26932-55564 units CPEP per EC capsule Take 2 capsules (48,000 Units) by mouth 3 times daily (with meals) 1/13/2021 Yes Marcia Schmitt MD   calcium carbonate (TUMS) 500 MG chewable tablet Take 1 tablet (500 mg) by mouth daily Past Week Yes Malachi Messina MD   famotidine (PEPCID) 20 MG tablet Take 2 tablets (40 mg) by mouth daily 1/13/2021 Yes Jonh Anders MD PhD   ferrous sulfate 325 (65 Fe) MG PO  tablet Take 1 tablet (325 mg) by mouth daily (with breakfast) 1/13/2021 Yes Jonh Anders MD PhD   hydroxychloroquine (PLAQUENIL) 200 MG tablet Take 2 tablets (400 mg) by mouth daily 1/13/2021 at 0730 Yes Jonh Anders MD PhD   multivitamin, therapeutic (THERA-VIT) TABS tablet Take 1 tablet by mouth daily 1/13/2021 Yes Jonh Anders MD PhD   mycophenolate (GENERIC EQUIVALENT) 500 MG tablet Take 3 tablets (1,500 mg) by mouth 2 times daily 1/13/2021 at 0730 Yes Jonh Anders MD PhD   oxyCODONE (ROXICODONE) 5 MG tablet Take 1 tablet (5 mg) by mouth every 6 hours as needed for moderate to severe pain 1/13/2021 Yes Kalpana Rice MD   predniSONE (DELTASONE) 5 MG tablet Take 1 tablet (5 mg) by mouth daily 1/13/2021 at 0730 Yes Jonh Anders MD PhD   vitamin D3 (CHOLECALCIFEROL) 10 MCG (400 UNIT) capsule Take 1 capsule (400 Units) by mouth daily 1/13/2021 Yes Edith Stokes MD   Belimumab 200 MG/ML SOAJ Inject 200 mg Subcutaneous every 7 days More than a month  Jonh Anders MD PhD   mvw complete formulation (SOFTGELS) capsule Take 2 capsules by mouth daily Unknown  Bertha Aggarwal MD   sulfamethoxazole-trimethoprim (BACTRIM DS) 800-160 MG tablet Take 1 tablet by mouth three times a week Unknown  Fanny Watts S   vitamin D3 (CHOLECALCIFEROL) 1.25 MG (72146 UT) capsule Take 1 capsule (50,000 Units) by mouth every 7 days 1/10/2021  Bertha Aggarwal MD       Date completed: 01/13/21    Medication history completed by: Neli Bob, Pharmacist Intern

## 2021-01-15 VITALS
OXYGEN SATURATION: 100 % | TEMPERATURE: 98.1 F | WEIGHT: 134.26 LBS | SYSTOLIC BLOOD PRESSURE: 98 MMHG | RESPIRATION RATE: 16 BRPM | BODY MASS INDEX: 24.64 KG/M2 | HEART RATE: 88 BPM | DIASTOLIC BLOOD PRESSURE: 67 MMHG

## 2021-01-15 LAB
BACTERIA SPEC CULT: ABNORMAL
Lab: ABNORMAL
SPECIMEN SOURCE: ABNORMAL

## 2021-01-15 PROCEDURE — 99239 HOSP IP/OBS DSCHRG MGMT >30: CPT | Mod: GC | Performed by: PEDIATRICS

## 2021-01-15 PROCEDURE — 250N000013 HC RX MED GY IP 250 OP 250 PS 637

## 2021-01-15 PROCEDURE — 250N000012 HC RX MED GY IP 250 OP 636 PS 637

## 2021-01-15 RX ORDER — CLINDAMYCIN HCL 150 MG
450 CAPSULE ORAL 3 TIMES DAILY
Qty: 63 CAPSULE | Refills: 0 | Status: SHIPPED | OUTPATIENT
Start: 2021-01-15 | End: 2021-01-22

## 2021-01-15 RX ADMIN — PREDNISONE 5 MG: 5 TABLET ORAL at 08:28

## 2021-01-15 RX ADMIN — MYCOPHENOLATE MOFETIL 1500 MG: 500 TABLET, FILM COATED ORAL at 08:28

## 2021-01-15 RX ADMIN — HYDROXYCHLOROQUINE SULFATE 400 MG: 200 TABLET, FILM COATED ORAL at 08:28

## 2021-01-15 RX ADMIN — PANCRELIPASE 2 CAPSULE: 24000; 76000; 120000 CAPSULE, DELAYED RELEASE PELLETS ORAL at 08:38

## 2021-01-15 NOTE — PROVIDER NOTIFICATION
This RN came onto shift at 1900 and was told by the previous RN that pt was previously doing 24hr urine collection but there was no order in Epic to refer back to when it started and ended. Pt was still collecting urine but it was not on ice. This RN called the Purple Team and MD stated that we no longer need to collect urine.

## 2021-01-15 NOTE — PHARMACY - DISCHARGE MEDICATION RECONCILIATION AND EDUCATION
Discharge medication review for this patient completed.  Pharmacist provided medication teaching for discharge with a focus on new medications/dose changes.  The discharge medication list was reviewed with Milly and the following points were discussed, as applicable: Name, description, purpose, dose/strength, duration of medications, strategies for giving medications to children, common side effects, action to be taken if dose is missed and when to call MD.    She was engaged during teaching and verbalized understanding.    Did not have medications in hand during teach due to filling in pharmacy.    The following medications were discussed:  Current Discharge Medication List      START taking these medications    Details   clindamycin (CLEOCIN) 150 MG capsule Take 3 capsules (450 mg) by mouth 3 times daily for 7 days  Qty: 63 capsule, Refills: 0    Associated Diagnoses: Cellulitis of left lower extremity         CONTINUE these medications which have NOT CHANGED    Details   acetaminophen (TYLENOL) 500 MG tablet Take 1 tablet (500 mg) by mouth every 4 hours as needed for mild pain  Qty: 1 Bottle, Refills: 1    Associated Diagnoses: Other acute pancreatitis, unspecified complication status      !! amylase-lipase-protease (CREON 24) 07906-63347 units CPEP per EC capsule Take 1 capsule (24,000 Units) by mouth Take with snacks or supplements (with snacks)  Qty: 112 capsule, Refills: 1    Associated Diagnoses: Pancreatic insufficiency      !! amylase-lipase-protease (CREON 24) 89906-18786 units CPEP per EC capsule Take 2 capsules (48,000 Units) by mouth 3 times daily (with meals)  Qty: 336 capsule, Refills: 1    Associated Diagnoses: Pancreatic insufficiency      calcium carbonate (TUMS) 500 MG chewable tablet Take 1 tablet (500 mg) by mouth daily  Qty: 30 tablet, Refills: 0    Associated Diagnoses: Systemic lupus erythematosus with other organ involvement, unspecified SLE type (H)      famotidine (PEPCID) 20 MG tablet  Take 2 tablets (40 mg) by mouth daily  Qty: 30 tablet, Refills: 11    Associated Diagnoses: Other forms of systemic lupus erythematosus, unspecified organ involvement status (H)      ferrous sulfate 325 (65 Fe) MG PO tablet Take 1 tablet (325 mg) by mouth daily (with breakfast)  Qty: 100 tablet, Refills: 3    Associated Diagnoses: Systemic lupus erythematosus with other organ involvement, unspecified SLE type (H)      hydroxychloroquine (PLAQUENIL) 200 MG tablet Take 2 tablets (400 mg) by mouth daily  Qty: 60 tablet, Refills: 11    Associated Diagnoses: Systemic lupus erythematosus with other organ involvement, unspecified SLE type (H)      multivitamin, therapeutic (THERA-VIT) TABS tablet Take 1 tablet by mouth daily  Qty: 30 tablet, Refills: 11    Associated Diagnoses: Other systemic lupus erythematosus with other organ involvement (H)      mycophenolate (GENERIC EQUIVALENT) 500 MG tablet Take 3 tablets (1,500 mg) by mouth 2 times daily  Qty: 180 tablet, Refills: 11    Associated Diagnoses: Systemic lupus erythematosus with other organ involvement, unspecified SLE type (H)      oxyCODONE (ROXICODONE) 5 MG tablet Take 1 tablet (5 mg) by mouth every 6 hours as needed for moderate to severe pain  Qty: 7 tablet, Refills: 0    Associated Diagnoses: Other acute pancreatitis, unspecified complication status      predniSONE (DELTASONE) 5 MG tablet Take 1 tablet (5 mg) by mouth daily  Qty: 150 tablet, Refills: 1    Associated Diagnoses: Systemic lupus erythematosus with other organ involvement, unspecified SLE type (H)      !! vitamin D3 (CHOLECALCIFEROL) 10 MCG (400 UNIT) capsule Take 1 capsule (400 Units) by mouth daily  Qty: 30 capsule, Refills: 3    Associated Diagnoses: Systemic lupus erythematosus with other organ involvement, unspecified SLE type (H); Systemic lupus erythematosus, unspecified SLE type, unspecified organ involvement status (H)      Belimumab 200 MG/ML SOAJ Inject 200 mg Subcutaneous every 7  days  Qty: 4 Syringe, Refills: 11    Associated Diagnoses: Systemic lupus erythematosus with other organ involvement, unspecified SLE type (H)      mvw complete formulation (SOFTGELS) capsule Take 2 capsules by mouth daily  Qty: 180 capsule, Refills: 1    Comments: NDC: [address]-76  Associated Diagnoses: Exocrine pancreatic insufficiency      sulfamethoxazole-trimethoprim (BACTRIM DS) 800-160 MG tablet Take 1 tablet by mouth three times a week  Qty: 36 tablet, Refills: 1    Associated Diagnoses: Other forms of systemic lupus erythematosus, unspecified organ involvement status (H)      !! vitamin D3 (CHOLECALCIFEROL) 1.25 MG (97957 UT) capsule Take 1 capsule (50,000 Units) by mouth every 7 days  Qty: 12 capsule, Refills: 0    Associated Diagnoses: Vitamin D deficiency; Exocrine pancreatic insufficiency       !! - Potential duplicate medications found. Please discuss with provider.

## 2021-01-15 NOTE — PLAN OF CARE
VSS. Tmax 99.9, no complaints of pain. Leg cellulitis site red/purple, warm, but had not extended the borders. Serosanguinous discharge minimal. Started vanco today, plan to switch to oral antibiotics once cultures result. Plan to discharge tomorrow. COVID+ quarantine for 10 days. Pt updated on POC.

## 2021-01-15 NOTE — PLAN OF CARE
AVSS. Lungs are clear. No c/o leg pain. Ambulation and fluid intake encouraged. Pt sleeping but complaint with cares. Hourly rounding complete. Update MD with changes.

## 2021-01-15 NOTE — PLAN OF CARE
Afebrile, VSS. HR 70-80s, BP 90-100s/50-60s, RR teens, Sats %. No pain noted, no prn's given. Lungs clear on RA, no COVID symptoms. Voiding, no stool, no PO intake. Pt slept well overnight, received a dose of IV vancomycin, both PIVs saline locked, plan to discharge today on oral antibiotics.

## 2021-01-15 NOTE — DISCHARGE SUMMARY
[facility]   Discharge Summary - Medicine & Pediatrics       Date of Admission:  1/13/2021  Date of Discharge:  1/15/2021  Discharging Provider: Dr. Nicole and Dr. Fregoso   Discharge Service: General Pediatrics    Discharge Diagnoses   MSSA cellulitis of the left lower extremity   SARS-CoV-2 Infection in an immunocompromised patient  Lupus  Fever, SIRS, Sepsis    Follow-ups Needed After Discharge   Follow up with primary care provider, Estefany Bell, within 7 days for hospital follow- up; if leg skin infection is not improving;     Follow up with Dr. Anders in Rheumatology clinic on 02/10 as previously scheduled           Discharge Disposition   Discharged to home  Condition at discharge: Stable    Hospital Course   Milly Carroll was admitted on 1/13/2021 for left leg cellulitis.  The following problems were addressed during her hospitalization:    1.Cellulitis  In the ED, Milly met SIRS criteria. Clinical findings of L leg were consistent with cellulitis (erythema, induration, drainage) and bedside US did not demonstrate abscess. She was given IV clindamycin and she was transferred to the floor for further care. On the floor, she was initially continued on IV clindamycin.  Wound culture demonstrated gram-positive cocci in clusters.  On the second day of admission, Milly appeared clinically improved, but the infection site appeared more indurated, duskier in appearance, and was warmer to palpation. An increased amount of purulent fluid was expressed.  The decision was made to switch her to IV vancomycin while awaiting susceptibilities, which subsequently returned on January 15 and demonstrated pansensitive MSSA.  At that point her leg cellulitis was also demonstrating clinical improvement with less erythema, warmth and induration. She was switched to oral clindamycin and discharged home in stable condition.    2.SLE  Milly was continued on her prior to admission  Plaquenil, mycophenolate, and prednisone.  She was due for her weekly belimumab infection on the day of admission, but this was held due to active infection.  Because Milly is planning to switch to subcutaneous injections, rheumatology believed it would be helpful to coordinate weekly home nursing.  This was facilitated while Milly was an inpatient.  Her belimumab will be held while she is undergoing treatment for active infection.Milly's C3 and C4 labs were low, but pretty consistent with her prior lab tests in November and December 2020.     3. COVID infection  Milly tested positive for SARS-CoV-2 while in the emergency department.  While she was febrile on presentation, she did not demonstrate any other signs or symptoms of COVID-19.  She remained stable from a respiratory standpoint.  Due to her degree of immunosuppression, per CDC guidelines, she should self isolate for 20 days since the date of the positive test (last of isolation would be 2/1/2021).  Milly demonstrated understanding of this prior to discharge.    4. Fever, SIRS, Sepsis   Milly was admitted to the hospital with a fever to 103.6F. Along with the fever, she was tachycardic up to 110. Her BPs remained stable within her own typical range. She was not ill appearing. Her lactate was normal at 0.6, her CRP normal at 3.5, and her WBC count was low at 3.8 (with 54% PMNs for an ANC of 2.1). Also of note, her Hgb was 9.1 and platelet count 149k. These CBC numbers were consistent with her lab values over the last few months. With the above, she met the definitions of SIRS and Sepsis on presentation. It's impossible to say whether her fever and tachycardia were initially related to her body fighting the cellulitis or COVID infection. With appropriate antibiotics, Milly defervesced, and her vital signs normalized within her first 24 hours. No additional blood tests were checked, as her cellulitis was monitored clinically.       Consultations This  Hospital Stay   MEDICATION HISTORY IP PHARMACY CONSULT  PHARMACY TO DOSE VANCO    Code Status   Full Code       The patient was discussed with the fellow, Dr. Fregoso, and the attending physician, Dr. Nicole.     Blue Foster MD  General Pediatrics Service  [facility] [address]  [address] [place] [address]  Phone: [phone]      Attestation:  This patient has been seen and evaluated by me today, and management was discussed with the resident physicians and nurses.  I have reviewed today's vital signs, medications, labs and imaging (as pertinent).  I agree with all the findings and plan in this note.    Total time: >30 minutes; More than 50% of my time was spent in direct, face-to-face counseling with this patient/parent on the issues listed in the assessment/plan section above.    Attila Nicole MD, Pediatric Hospitalist, Pager: [phone]       ______________________________________________________________________    Physical Exam   Vital Signs: Temp: 98.1  F (36.7  C) Temp src: Oral BP: 98/67 Pulse: 88   Resp: 16 SpO2: 100 % O2 Device: None (Room air)    Weight: 134 lbs 4.16 oz    GENERAL: Active, alert, in no acute distress.  SKIN: Scattered acne with PIH over face, malar rash. Area of erythema and induration on left calf, poorly demarcated. Decreased duskiness from the day prior. Scant bloody discharge.     HEAD: Normocephalic  EYES: Extraocular muscles intact. Normal conjunctivae.  EARS: Normal canals.  NOSE: Normal without discharge.  MOUTH/THROAT: Clear. No oral lesions.  NECK: Supple, no masses.  No thyromegaly.  LYMPH NODES: No adenopathy  LUNGS: Clear. No rales, rhonchi, wheezing or retractions  HEART: Regular rhythm. Normal S1/S2. No murmurs. Normal pulses.  ABDOMEN: Soft, non-tender, not distended, no masses or hepatosplenomegaly. Bowel sounds normal.   NEUROLOGIC: No focal findings. Sensation intact in both lower extremities.   EXTREMITIES: Full  range of motion, no deformities.      Primary Care Physician   Estefany Bell at [facility]    Discharge Orders      Home care nursing referral      Reason for your hospital stay    Left leg skin infection     Follow Up and recommended labs and tests    Follow up with primary care provider, Estefany Bell, within 7 days for hospital follow- up; if leg skin infection is not improving;     Follow up with Dr. Anders in Rheumatology clinic on 02/10 as previously scheduled     Activity    Your activity upon discharge: activity as tolerated      Instructions for Patients   Many people get a mild case and get better on their own. Some people can get very sick.     Not all patients are tested for COVID-19. If you need to be tested, your care team will let you know.    How can I protect others?    Without a test, we can t know for sure that you have COVID-19. For safety, it s very important to follow these rules.    Stay home and away from others (self-isolate) until:  You ve had no fever--and no medicine that reduces fever--for 1 full day (24 hours), And   Your other symptoms have resolved (gotten better). For example, your cough or breathing has improved, And   At least 20 days have passed since your symptoms started.    During this time:  Stay in your own room (and use your own bathroom), if you can.  Stay away from others in your home. No hugging, kissing or shaking hands.  No visitors.  Don t go to work, school or anywhere else.   Clean  high touch  surfaces often (doorknobs, counters, handles, etc.). Use a household cleaning spray or wipes.  Cover your mouth and nose with a mask, tissue or washcloth to avoid spreading germs.  Wash your hands and face often. Use soap and water.  For more tips, go to https://www.cdc.gov/coronavirus/2019-ncov/downloads/10Things.pdf.    How can I take care of myself?    Get lots of rest. Drink extra fluids (unless a doctor has told you not to).     Take Tylenol (acetaminophen) for  fever or pain. If you have liver or kidney problems, ask your family doctor if it's okay to take Tylenol.     Adults can take either:   650 mg (two 325 mg pills) every 4 to 6 hours, or   1,000 mg (two 500 mg pills) every 8 hours as needed.   Note: Don't take more than 3,000 mg in one day.   Acetaminophen is found in many medicines (both prescribed and over-the-counter medicines). Read all labels to be sure you don't take too much.   For children, check the Tylenol bottle for the right dose. The dose is based on  the child's age or weight.    If you have other health problems (like cancer, heart failure, an organ transplant or severe kidney disease): Call your specialty clinic if you don't feel better in the next 2 days.    Know when to call 911: If your breathing is so bad that it keeps you from doing normal activities, call 911 or go to the emergency room. Tell them that you've been staying home and may have COVID-19.    Thank you for taking steps to prevent the spread of this virus.  Limit your contact with others.  Wear a simple mask to cover your cough.  Wash your hands well and often.  If you need medical care, go to [email or web address] or contact your health care provider.      For more about COVID-19 and caring for yourself at home, visit the CDC website at https://www.cdc.gov/coronavirus/2019-ncov/about/steps-when-sick.html.     To learn about care at [facility], please go to [email or web address].     [facility] clinical trials (COVID-19 research studies): [email or web address].    Below are the COVID-19 hotlines at the [facility] ([facility]). Interpreters are available.   For health questions: Call 179-669-6579 or 1-269.233.4308 (7 a.m. to 7 p.m.)  For questions about schools and childcare: Call 710-182-7869 or 1-209.798.6160 (7 a.m. to 7 p.m.)     Discharge Instructions    Isolate away from as many contacts as possible for 20 days  from your positive COVID test (17 days from now).     Diet    Follow this diet upon discharge: Regular diet       Significant Results and Procedures   Most Recent 3 CBC's:  Recent Labs   Lab Test 01/13/21  1043 01/13/21  1042 12/16/20  1208 11/18/20  1056   WBC 3.8*  --  4.2 5.2   HGB 9.1* 10.5* 9.4* 9.9*   MCV 75*  --  77* 75*   *  --  240 195   ,   Results for orders placed or performed during the hospital encounter of 01/13/21   POC US SOFT TISSUE    Impression    Limited Soft Tissue Ultrasound, performed and interpreted by me.    Indication:  Skin redness warmth pain. Evaluate for cellulitis vs abscess.     Body location: left lower extremity    Findings:  There is cobblestoning suggestive of cellulitis in the evaluated area. There is no fluid collection to suggest abscess. No foreign body identified    IMPRESSION: Cellulitis             Discharge Medications   Current Discharge Medication List      START taking these medications    Details   clindamycin (CLEOCIN) 150 MG capsule Take 3 capsules (450 mg) by mouth 3 times daily for 7 days  Qty: 63 capsule, Refills: 0    Associated Diagnoses: Cellulitis of left lower extremity         CONTINUE these medications which have NOT CHANGED    Details   acetaminophen (TYLENOL) 500 MG tablet Take 1 tablet (500 mg) by mouth every 4 hours as needed for mild pain  Qty: 1 Bottle, Refills: 1    Associated Diagnoses: Other acute pancreatitis, unspecified complication status      !! amylase-lipase-protease (CREON 24) 32811-24685 units CPEP per EC capsule Take 1 capsule (24,000 Units) by mouth Take with snacks or supplements (with snacks)  Qty: 112 capsule, Refills: 1    Associated Diagnoses: Pancreatic insufficiency      !! amylase-lipase-protease (CREON 24) 47663-29240 units CPEP per EC capsule Take 2 capsules (48,000 Units) by mouth 3 times daily (with meals)  Qty: 336 capsule, Refills: 1    Associated Diagnoses: Pancreatic insufficiency      calcium carbonate (TUMS) 500  MG chewable tablet Take 1 tablet (500 mg) by mouth daily  Qty: 30 tablet, Refills: 0    Associated Diagnoses: Systemic lupus erythematosus with other organ involvement, unspecified SLE type (H)      famotidine (PEPCID) 20 MG tablet Take 2 tablets (40 mg) by mouth daily  Qty: 30 tablet, Refills: 11    Associated Diagnoses: Other forms of systemic lupus erythematosus, unspecified organ involvement status (H)      ferrous sulfate 325 (65 Fe) MG PO tablet Take 1 tablet (325 mg) by mouth daily (with breakfast)  Qty: 100 tablet, Refills: 3    Associated Diagnoses: Systemic lupus erythematosus with other organ involvement, unspecified SLE type (H)      hydroxychloroquine (PLAQUENIL) 200 MG tablet Take 2 tablets (400 mg) by mouth daily  Qty: 60 tablet, Refills: 11    Associated Diagnoses: Systemic lupus erythematosus with other organ involvement, unspecified SLE type (H)      multivitamin, therapeutic (THERA-VIT) TABS tablet Take 1 tablet by mouth daily  Qty: 30 tablet, Refills: 11    Associated Diagnoses: Other systemic lupus erythematosus with other organ involvement (H)      mycophenolate (GENERIC EQUIVALENT) 500 MG tablet Take 3 tablets (1,500 mg) by mouth 2 times daily  Qty: 180 tablet, Refills: 11    Associated Diagnoses: Systemic lupus erythematosus with other organ involvement, unspecified SLE type (H)      oxyCODONE (ROXICODONE) 5 MG tablet Take 1 tablet (5 mg) by mouth every 6 hours as needed for moderate to severe pain  Qty: 7 tablet, Refills: 0    Associated Diagnoses: Other acute pancreatitis, unspecified complication status      predniSONE (DELTASONE) 5 MG tablet Take 1 tablet (5 mg) by mouth daily  Qty: 150 tablet, Refills: 1    Associated Diagnoses: Systemic lupus erythematosus with other organ involvement, unspecified SLE type (H)      !! vitamin D3 (CHOLECALCIFEROL) 10 MCG (400 UNIT) capsule Take 1 capsule (400 Units) by mouth daily  Qty: 30 capsule, Refills: 3    Associated Diagnoses: Systemic lupus  erythematosus with other organ involvement, unspecified SLE type (H); Systemic lupus erythematosus, unspecified SLE type, unspecified organ involvement status (H)      Belimumab 200 MG/ML SOAJ Inject 200 mg Subcutaneous every 7 days  Qty: 4 Syringe, Refills: 11    Associated Diagnoses: Systemic lupus erythematosus with other organ involvement, unspecified SLE type (H)      mvw complete formulation (SOFTGELS) capsule Take 2 capsules by mouth daily  Qty: 180 capsule, Refills: 1    Comments: NDC: [address]-85  Associated Diagnoses: Exocrine pancreatic insufficiency      sulfamethoxazole-trimethoprim (BACTRIM DS) 800-160 MG tablet Take 1 tablet by mouth three times a week  Qty: 36 tablet, Refills: 1    Associated Diagnoses: Other forms of systemic lupus erythematosus, unspecified organ involvement status (H)      !! vitamin D3 (CHOLECALCIFEROL) 1.25 MG (76662 UT) capsule Take 1 capsule (50,000 Units) by mouth every 7 days  Qty: 12 capsule, Refills: 0    Associated Diagnoses: Vitamin D deficiency; Exocrine pancreatic insufficiency       !! - Potential duplicate medications found. Please discuss with provider.        Allergies   Allergies   Allergen Reactions     Rituximab Anaphylaxis     Attestation:  This patient has been seen and evaluated by me today, and management was discussed with the resident physicians and nurses.  I have reviewed today's vital signs, medications, labs and imaging (as pertinent).  I agree with all the findings and plan in this note.    Amira Fregoso MD  Pediatric Hospital Medicine Fellow  Pager: [phone]

## 2021-01-15 NOTE — PROGRESS NOTES
VSS, afebrile. Lungs clear and satting well on room air. Cellulitis site looking much better per pt and MD. Patient is ready for discharge, educated completed, all questions answered.

## 2021-01-15 NOTE — PROGRESS NOTES
Care Coordinator - Discharge Planning    Admission Date/Time:  1/13/2021  Attending MD:  Attila Nicole MD     Data  Date of initial CC assessment:  1/14/2021  Chart reviewed, discussed with interdisciplinary team.   Patient was admitted for:   1. Cellulitis of left lower extremity    2. Drug-induced systemic lupus erythematosus, unspecified organ involvement status (H)    3. 2019 novel coronavirus disease (COVID-19)         Assessment   Notified by Maryan NEUMANN from [facility] that patient's home address is out of [place]'s service area for home care. Care Coordination spoke with patient to verify patient would be agreeable to referrals to other agencies. Patient agreeable to referral to agency closer to her home.    Care Coordination contacted [facility] and spoke with Brenda, who said they could accept this referral. [facility] team notified.     Clinical information faxed to [facility] per request.    Coordination of Care and Referrals: Provided patient/family with options for Skilled Nursing Visits.       Plan  Anticipated Discharge Date:  01/15/21  Anticipated Discharge Plan:  [facility] will contact patient to set-up skilled nursing visits upon discharge.      Rosa Henning RN

## 2021-01-19 LAB
BACTERIA SPEC CULT: NO GROWTH
Lab: NORMAL
SPECIMEN SOURCE: NORMAL

## 2021-02-10 ENCOUNTER — OFFICE VISIT (OUTPATIENT)
Dept: RHEUMATOLOGY | Facility: CLINIC | Age: 21
End: 2021-02-10
Attending: PEDIATRICS
Payer: COMMERCIAL

## 2021-02-10 ENCOUNTER — HOSPITAL ENCOUNTER (OUTPATIENT)
Facility: CLINIC | Age: 21
Setting detail: OBSERVATION
Discharge: HOME | End: 2021-02-12
Attending: PEDIATRICS | Admitting: STUDENT IN AN ORGANIZED HEALTH CARE EDUCATION/TRAINING PROGRAM
Payer: COMMERCIAL

## 2021-02-10 ENCOUNTER — TELEPHONE (OUTPATIENT)
Dept: RHEUMATOLOGY | Facility: CLINIC | Age: 21
End: 2021-02-10

## 2021-02-10 VITALS
SYSTOLIC BLOOD PRESSURE: 106 MMHG | TEMPERATURE: 101.6 F | BODY MASS INDEX: 23.17 KG/M2 | WEIGHT: 125.88 LBS | HEART RATE: 117 BPM | HEIGHT: 62 IN | DIASTOLIC BLOOD PRESSURE: 80 MMHG

## 2021-02-10 DIAGNOSIS — R50.9 FEVER, UNSPECIFIED FEVER CAUSE: ICD-10-CM

## 2021-02-10 DIAGNOSIS — M32.19 OTHER SYSTEMIC LUPUS ERYTHEMATOSUS WITH OTHER ORGAN INVOLVEMENT (H): Primary | ICD-10-CM

## 2021-02-10 DIAGNOSIS — N10 ACUTE PYELONEPHRITIS: ICD-10-CM

## 2021-02-10 DIAGNOSIS — D84.9 IMMUNOSUPPRESSION (H): ICD-10-CM

## 2021-02-10 DIAGNOSIS — Z11.52 ENCOUNTER FOR SCREENING LABORATORY TESTING FOR SEVERE ACUTE RESPIRATORY SYNDROME CORONAVIRUS 2 (SARS-COV-2): ICD-10-CM

## 2021-02-10 DIAGNOSIS — M32.8 OTHER FORMS OF SYSTEMIC LUPUS ERYTHEMATOSUS, UNSPECIFIED ORGAN INVOLVEMENT STATUS (H): ICD-10-CM

## 2021-02-10 DIAGNOSIS — M32.19 SYSTEMIC LUPUS ERYTHEMATOSUS WITH OTHER ORGAN INVOLVEMENT, UNSPECIFIED SLE TYPE (H): Primary | ICD-10-CM

## 2021-02-10 LAB
ALBUMIN SERPL-MCNC: 3.3 G/DL (ref 3.4–5)
ALBUMIN UR-MCNC: 100 MG/DL
ALP SERPL-CCNC: 173 U/L (ref 40–150)
ALT SERPL W P-5'-P-CCNC: 70 U/L (ref 0–50)
ANION GAP SERPL CALCULATED.3IONS-SCNC: 8 MMOL/L (ref 3–14)
APPEARANCE UR: ABNORMAL
APTT PPP: 33 SEC (ref 22–37)
AST SERPL W P-5'-P-CCNC: ABNORMAL U/L (ref 0–35)
BACTERIA #/AREA URNS HPF: ABNORMAL /HPF
BILIRUB SERPL-MCNC: 0.7 MG/DL (ref 0.2–1.3)
BILIRUB UR QL STRIP: ABNORMAL
BUN SERPL-MCNC: 13 MG/DL (ref 7–30)
C PNEUM DNA SPEC QL NAA+PROBE: NOT DETECTED
CALCIUM SERPL-MCNC: 8.3 MG/DL (ref 8.5–10.1)
CHLORIDE SERPL-SCNC: 109 MMOL/L (ref 96–110)
CO2 SERPL-SCNC: 19 MMOL/L (ref 20–32)
COLOR UR AUTO: YELLOW
CREAT SERPL-MCNC: 0.48 MG/DL (ref 0.5–1)
DEPRECATED S PYO AG THROAT QL EIA: NEGATIVE
FLUAV H1 2009 PAND RNA SPEC QL NAA+PROBE: NOT DETECTED
FLUAV H1 RNA SPEC QL NAA+PROBE: NOT DETECTED
FLUAV H3 RNA SPEC QL NAA+PROBE: NOT DETECTED
FLUAV RNA SPEC QL NAA+PROBE: NOT DETECTED
FLUBV RNA SPEC QL NAA+PROBE: NOT DETECTED
GFR SERPL CREATININE-BSD FRML MDRD: >90 ML/MIN/{1.73_M2}
GLUCOSE SERPL-MCNC: 64 MG/DL (ref 70–99)
GLUCOSE UR STRIP-MCNC: NEGATIVE MG/DL
HADV DNA SPEC QL NAA+PROBE: NOT DETECTED
HCOV PNL SPEC NAA+PROBE: NOT DETECTED
HGB UR QL STRIP: ABNORMAL
HMPV RNA SPEC QL NAA+PROBE: NOT DETECTED
HPIV1 RNA SPEC QL NAA+PROBE: NOT DETECTED
HPIV2 RNA SPEC QL NAA+PROBE: NOT DETECTED
HPIV3 RNA SPEC QL NAA+PROBE: NOT DETECTED
HPIV4 RNA SPEC QL NAA+PROBE: NOT DETECTED
INR PPP: 1.08 (ref 0.86–1.14)
KETONES UR STRIP-MCNC: 10 MG/DL
LEUKOCYTE ESTERASE UR QL STRIP: ABNORMAL
LIPASE SERPL-CCNC: 670 U/L (ref 73–393)
M PNEUMO DNA SPEC QL NAA+PROBE: NOT DETECTED
MICROBIOLOGIST REVIEW: NORMAL
MUCOUS THREADS #/AREA URNS LPF: PRESENT /LPF
NITRATE UR QL: NEGATIVE
PH UR STRIP: 6.5 PH (ref 5–7)
POTASSIUM SERPL-SCNC: 5.3 MMOL/L (ref 3.4–5.3)
PROT SERPL-MCNC: 7.6 G/DL (ref 6.8–8.8)
RBC #/AREA URNS AUTO: 38 /HPF (ref 0–2)
RSV RNA SPEC QL NAA+PROBE: NOT DETECTED
RSV RNA SPEC QL NAA+PROBE: NOT DETECTED
RV+EV RNA SPEC QL NAA+PROBE: NOT DETECTED
SODIUM SERPL-SCNC: 136 MMOL/L (ref 133–144)
SOURCE: ABNORMAL
SP GR UR STRIP: 1.03 (ref 1–1.03)
SPECIMEN SOURCE: NORMAL
SPECIMEN SOURCE: NORMAL
SQUAMOUS #/AREA URNS AUTO: 23 /HPF (ref 0–1)
STREP GROUP A PCR: NOT DETECTED
TRANS CELLS #/AREA URNS HPF: 2 /HPF (ref 0–1)
UROBILINOGEN UR STRIP-MCNC: 2 MG/DL (ref 0–2)
WBC #/AREA URNS AUTO: 59 /HPF (ref 0–5)
YEAST #/AREA URNS HPF: ABNORMAL /HPF

## 2021-02-10 PROCEDURE — 99285 EMERGENCY DEPT VISIT HI MDM: CPT | Performed by: PEDIATRICS

## 2021-02-10 PROCEDURE — 999N001174 HC STATISTIC STREP A RAPID: Performed by: PEDIATRICS

## 2021-02-10 PROCEDURE — 87088 URINE BACTERIA CULTURE: CPT | Performed by: STUDENT IN AN ORGANIZED HEALTH CARE EDUCATION/TRAINING PROGRAM

## 2021-02-10 PROCEDURE — 96365 THER/PROPH/DIAG IV INF INIT: CPT | Performed by: PEDIATRICS

## 2021-02-10 PROCEDURE — 999N000044 HC STATISTIC CVC DRESSING CHANGE

## 2021-02-10 PROCEDURE — G0463 HOSPITAL OUTPT CLINIC VISIT: HCPCS | Mod: 25,27

## 2021-02-10 PROCEDURE — 85610 PROTHROMBIN TIME: CPT | Performed by: PEDIATRICS

## 2021-02-10 PROCEDURE — 96366 THER/PROPH/DIAG IV INF ADDON: CPT

## 2021-02-10 PROCEDURE — 87633 RESP VIRUS 12-25 TARGETS: CPT | Performed by: PEDIATRICS

## 2021-02-10 PROCEDURE — 999N000040 HC STATISTIC CONSULT NO CHARGE VASC ACCESS

## 2021-02-10 PROCEDURE — 999N000127 HC STATISTIC PERIPHERAL IV START W US GUIDANCE

## 2021-02-10 PROCEDURE — 87186 SC STD MICRODIL/AGAR DIL: CPT | Performed by: STUDENT IN AN ORGANIZED HEALTH CARE EDUCATION/TRAINING PROGRAM

## 2021-02-10 PROCEDURE — C9803 HOPD COVID-19 SPEC COLLECT: HCPCS | Performed by: PEDIATRICS

## 2021-02-10 PROCEDURE — 82784 ASSAY IGA/IGD/IGG/IGM EACH: CPT | Performed by: PEDIATRICS

## 2021-02-10 PROCEDURE — 99215 OFFICE O/P EST HI 40 MIN: CPT | Performed by: PEDIATRICS

## 2021-02-10 PROCEDURE — 80053 COMPREHEN METABOLIC PANEL: CPT | Performed by: PEDIATRICS

## 2021-02-10 PROCEDURE — 87486 CHLMYD PNEUM DNA AMP PROBE: CPT | Performed by: PEDIATRICS

## 2021-02-10 PROCEDURE — 85730 THROMBOPLASTIN TIME PARTIAL: CPT | Performed by: PEDIATRICS

## 2021-02-10 PROCEDURE — 87581 M.PNEUMON DNA AMP PROBE: CPT | Performed by: PEDIATRICS

## 2021-02-10 PROCEDURE — 87106 FUNGI IDENTIFICATION YEAST: CPT | Performed by: STUDENT IN AN ORGANIZED HEALTH CARE EDUCATION/TRAINING PROGRAM

## 2021-02-10 PROCEDURE — 250N000013 HC RX MED GY IP 250 OP 250 PS 637: Performed by: STUDENT IN AN ORGANIZED HEALTH CARE EDUCATION/TRAINING PROGRAM

## 2021-02-10 PROCEDURE — 250N000013 HC RX MED GY IP 250 OP 250 PS 637: Performed by: PEDIATRICS

## 2021-02-10 PROCEDURE — 250N000012 HC RX MED GY IP 250 OP 636 PS 637: Performed by: STUDENT IN AN ORGANIZED HEALTH CARE EDUCATION/TRAINING PROGRAM

## 2021-02-10 PROCEDURE — 87040 BLOOD CULTURE FOR BACTERIA: CPT | Performed by: PEDIATRICS

## 2021-02-10 PROCEDURE — 250N000009 HC RX 250

## 2021-02-10 PROCEDURE — 250N000011 HC RX IP 250 OP 636: Performed by: PEDIATRICS

## 2021-02-10 PROCEDURE — 81001 URINALYSIS AUTO W/SCOPE: CPT | Performed by: PEDIATRICS

## 2021-02-10 PROCEDURE — 87086 URINE CULTURE/COLONY COUNT: CPT | Performed by: STUDENT IN AN ORGANIZED HEALTH CARE EDUCATION/TRAINING PROGRAM

## 2021-02-10 PROCEDURE — 86140 C-REACTIVE PROTEIN: CPT | Performed by: PEDIATRICS

## 2021-02-10 PROCEDURE — 99285 EMERGENCY DEPT VISIT HI MDM: CPT | Mod: 25 | Performed by: PEDIATRICS

## 2021-02-10 PROCEDURE — 99220 PR INITIAL OBSERVATION CARE,LEVEL III: CPT | Mod: GC | Performed by: STUDENT IN AN ORGANIZED HEALTH CARE EDUCATION/TRAINING PROGRAM

## 2021-02-10 PROCEDURE — G0378 HOSPITAL OBSERVATION PER HR: HCPCS

## 2021-02-10 PROCEDURE — 83690 ASSAY OF LIPASE: CPT | Performed by: PEDIATRICS

## 2021-02-10 PROCEDURE — 258N000003 HC RX IP 258 OP 636: Performed by: PEDIATRICS

## 2021-02-10 PROCEDURE — 87651 STREP A DNA AMP PROBE: CPT | Performed by: PEDIATRICS

## 2021-02-10 RX ORDER — MYCOPHENOLATE MOFETIL 500 MG/1
1500 TABLET ORAL 2 TIMES DAILY
Status: DISCONTINUED | OUTPATIENT
Start: 2021-02-10 | End: 2021-02-12 | Stop reason: HOSPADM

## 2021-02-10 RX ORDER — PREDNISONE 5 MG/1
5 TABLET ORAL DAILY
Status: DISCONTINUED | OUTPATIENT
Start: 2021-02-11 | End: 2021-02-12 | Stop reason: HOSPADM

## 2021-02-10 RX ORDER — CEFTRIAXONE 2 G/1
2000 INJECTION, POWDER, FOR SOLUTION INTRAMUSCULAR; INTRAVENOUS EVERY 24 HOURS
Status: DISCONTINUED | OUTPATIENT
Start: 2021-02-11 | End: 2021-02-12 | Stop reason: HOSPADM

## 2021-02-10 RX ORDER — HYDROXYCHLOROQUINE SULFATE 200 MG/1
400 TABLET, FILM COATED ORAL DAILY
Status: DISCONTINUED | OUTPATIENT
Start: 2021-02-11 | End: 2021-02-12 | Stop reason: HOSPADM

## 2021-02-10 RX ORDER — LIDOCAINE 40 MG/G
CREAM TOPICAL
Status: DISCONTINUED | OUTPATIENT
Start: 2021-02-10 | End: 2021-02-12 | Stop reason: HOSPADM

## 2021-02-10 RX ORDER — CEFTRIAXONE 2 G/1
2000 INJECTION, POWDER, FOR SOLUTION INTRAMUSCULAR; INTRAVENOUS ONCE
Status: COMPLETED | OUTPATIENT
Start: 2021-02-10 | End: 2021-02-10

## 2021-02-10 RX ORDER — IBUPROFEN 600 MG/1
600 TABLET, FILM COATED ORAL ONCE
Status: COMPLETED | OUTPATIENT
Start: 2021-02-10 | End: 2021-02-10

## 2021-02-10 RX ORDER — FERROUS SULFATE 325(65) MG
325 TABLET ORAL
Status: DISCONTINUED | OUTPATIENT
Start: 2021-02-11 | End: 2021-02-12 | Stop reason: HOSPADM

## 2021-02-10 RX ADMIN — LIDOCAINE HYDROCHLORIDE 0.2 ML: 10 INJECTION, SOLUTION EPIDURAL; INFILTRATION; INTRACAUDAL; PERINEURAL at 12:14

## 2021-02-10 RX ADMIN — PANCRELIPASE 2 CAPSULE: 24000; 76000; 120000 CAPSULE, DELAYED RELEASE PELLETS ORAL at 17:25

## 2021-02-10 RX ADMIN — CEFTRIAXONE SODIUM 2000 MG: 2 INJECTION, POWDER, FOR SOLUTION INTRAMUSCULAR; INTRAVENOUS at 14:51

## 2021-02-10 RX ADMIN — SODIUM CHLORIDE 500 ML: 9 INJECTION, SOLUTION INTRAVENOUS at 14:51

## 2021-02-10 RX ADMIN — MYCOPHENOLATE MOFETIL 1500 MG: 500 TABLET, FILM COATED ORAL at 20:38

## 2021-02-10 RX ADMIN — IBUPROFEN 600 MG: 600 TABLET ORAL at 11:30

## 2021-02-10 ASSESSMENT — MIFFLIN-ST. JEOR
SCORE: 1302.5
SCORE: 1294.25

## 2021-02-10 ASSESSMENT — PAIN SCALES - GENERAL: PAINLEVEL: NO PAIN (0)

## 2021-02-10 NOTE — PATIENT INSTRUCTIONS
For Patient Education Materials:  z.[facility]/ryan       [facility] Pediatric Rheumatology    For Help:  The Pediatric Call Center at [phone] can help with scheduling of routine follow up visits.  Kaur Olson and Merline Butler are the Nurse Coordinators for the Division of Pediatric Rheumatology and can be reached by phone at [phone] or through REPP ([email or web address]). They can help with questions about your child s rheumatic condition, medications, and test results.  For emergencies after hours or on the weekends, please call the page  at [phone] and ask to speak to the physician on-call for Pediatric Rheumatology. Please do not use REPP for urgent requests.  Main  Services:  [phone]  o Hmong/Bulgarian/Leonel: [phone]  o New Zealander: [phone]  o Syriac: [phone]    Internal Referrals: If we refer your child to another physician/team within [facility], you should receive a call to set this up. If you do not hear anything within a week, please call the Call Center at [phone].    External Referrals: If we refer your child to a physician/team outside of [facility], our team will send the referral order and relevant records to them. We ask that you call the place where your child is being referred to ensure they received the needed information and notify our team coordinators if not.    Imaging: If your child needs an imaging study that is not being performed the day of your clinic appointment, please call to set this up. For xrays, ultrasounds, and echocardiogram call [phone]. For CT or MRI call [phone].     MyChart: We encourage you to sign up for [email or web address] at [email or web address]. For assistance or questions, call [phone]. If your child is 12 years or older, a consent for proxy/parent access needs to be signed so please discuss this with your physician at the next visit.

## 2021-02-10 NOTE — LETTER
2/10/2021      RE: Milly Carroll  [address] [place] [address]       Milly is a 20 year old woman who was seen in follow-up in Pediatric Rheumatology clinic today.    The primary encounter diagnosis was Systemic lupus erythematosus with other organ involvement, unspecified SLE type (H). A diagnosis of Immunosuppression (H) was also pertinent to this visit.    She is currently taking the following medications and the doses as documented.          Medications:     Current Outpatient Medications   Medication Sig Dispense Refill     acetaminophen (TYLENOL) 500 MG tablet Take 1 tablet (500 mg) by mouth every 4 hours as needed for mild pain 1 Bottle 1     amylase-lipase-protease (CREON 24) 28860-46093 units CPEP per EC capsule Take 1 capsule (24,000 Units) by mouth Take with snacks or supplements (with snacks) 112 capsule 1     amylase-lipase-protease (CREON 24) 59139-38767 units CPEP per EC capsule Take 2 capsules (48,000 Units) by mouth 3 times daily (with meals) 336 capsule 1     Belimumab 200 MG/ML SOAJ Inject 200 mg Subcutaneous every 7 days 4 Syringe 11     calcium carbonate (TUMS) 500 MG chewable tablet Take 1 tablet (500 mg) by mouth daily 30 tablet 0     famotidine (PEPCID) 20 MG tablet Take 2 tablets (40 mg) by mouth daily 30 tablet 11     ferrous sulfate 325 (65 Fe) MG PO tablet Take 1 tablet (325 mg) by mouth daily (with breakfast) 100 tablet 3     hydroxychloroquine (PLAQUENIL) 200 MG tablet Take 2 tablets (400 mg) by mouth daily 60 tablet 11     multivitamin, therapeutic (THERA-VIT) TABS tablet Take 1 tablet by mouth daily 30 tablet 11     mvw complete formulation (SOFTGELS) capsule Take 2 capsules by mouth daily 180 capsule 1     mycophenolate (GENERIC EQUIVALENT) 500 MG tablet Take 3 tablets (1,500 mg) by mouth 2 times daily 180 tablet 11     oxyCODONE (ROXICODONE) 5 MG tablet Take 1 tablet (5 mg) by mouth every 6 hours as needed for moderate to severe pain 7 tablet 0     predniSONE (DELTASONE) 5 MG  "tablet Take 1 tablet (5 mg) by mouth daily 150 tablet 1     sulfamethoxazole-trimethoprim (BACTRIM DS) 800-160 MG tablet Take 1 tablet by mouth three times a week 36 tablet 1     vitamin D3 (CHOLECALCIFEROL) 1.25 MG (27508 UT) capsule Take 1 capsule (50,000 Units) by mouth every 7 days 12 capsule 0     vitamin D3 (CHOLECALCIFEROL) 10 MCG (400 UNIT) capsule Take 1 capsule (400 Units) by mouth daily 30 capsule 3     She also get monthly Benlysta (belimumab) infusions.    Milly is tolerating the medication(s) well.          Interval History:     Milly returns for scheduled follow-up.  She had been scheduled to have an infusion of belimumab today.    She was hospitalized one month ago with cellulitis of the left calf as well as being COVID positive.  She was discharged and was doing relatively well until 2 days ago.  Over the past two days she has felt tired and nauseated.  She vomited once yesterday -- green, non-bloody.  She has not been febrile at home, but does have a fever here in clinic today to 101.6F.  She does not report nasal congestion, sore throat, cough, chest pain, abdominal pain, or diarrhea.  The lupus rash on her face and scalp has worsened a bit. She has also developed some small red spots on her skin.  She denied having dysuria.    Updated family & social history:  Her father passed away in early December, unexpectedly.    She is living at home with her mother and other family members.         Review of Systems:     See HPI.  A comprehensive review of systems was performed and was negative apart from that listed above.         Examination:     Blood pressure 106/80, pulse 117, temperature 101.6  F (38.7  C), temperature source Tympanic, height 1.58 m (5' 2.21\"), weight 57.1 kg (125 lb 14.1 oz), not currently breastfeeding.     47 %ile (Z= -0.08) based on CDC (Girls, 2-20 Years) weight-for-age data using vitals from 2/10/2021.    Blood pressure percentiles are not available for patients who are 18 " years or older.    In general Milly appeared tired and was soft spoken.  HEENT:  Pupils were equal, round and reactive to light.  Nose normal.  Oropharynx was tacky, with with mild erythema in the posterior pharynx, no obvious exudate. Right TM could not be visualized due to cerumen, left TM was normal.  NECK:  Supple, no lymphadenopathy.  CHEST:  Clear to auscultation.  HEART:  Tachycardic.  No murmur or rub.  ABDOMEN:  Soft, non-tender, no hepatosplenomegaly.  JOINTS:  Normal.  She has a well-healed scar on the left knee.  SKIN:  She has erythema and scaling on her face and scalp.  This is slightly more pronounced than usual.  The skin of her fingers is erythematous, but at baseline.  She has scattered petechiae on her forearms and legs.  The area of prior cellulitis of the posterior left calf is not erythematous or warm, but does feel firm/indurated.         Laboratory Investigations:   Will be done in ED.  See other records from this date.         Impression:     Milly is a 20 year old  with   1. Systemic lupus erythematosus with other organ involvement, unspecified SLE type (H)    2. Immunosuppression (H)      She has an acute febrile illness with no localizing symptoms apart from one episode of vomiting.  She has a new petechial rash.  Given her degree of immunosuppression and underlying disease, she is at risk for a host of infections.   She needs emergency evaluation and empiric treatment of potential serious bacterial infection, including possible sepsis.  She has also had pancreatitis in the past, which should be evaluated.         Plan:     1. I called our ED and notified them that I would transfer Zabrina to their care.  2. In addition to routine labs (CBC/diff, CMP, blood and urine cultures), I would recommend Strep swab, viral respiratory panel, lipase, complement C3 and C4, total IgG.    3. I would favor admission to the hospital and treatment with empiric antibiotics.  I will discuss this further  "with the ED attending physician.  4. Continue home SLE medications for now.  If she worsens clinically, she may need \"stress-dose\" corticosteroids.  5. I canceled her planned belimumab infusion. This will need to be rescheduled once this acute episode is managed.  6. Follow up in 1 month, in person.      Jonh Anders MD, PhD  , Pediatric Rheumatology      40 min spent on the date of the encounter in chart review, patient visit, review of tests, documentation and/or discussion with other providers about the issues documented above.     CC  Patient Care Team:  Estefany Bell MD as PCP - General (Pediatrics)  Domingo Thomas MD as MD (Ophthalmology)  Padmini Garza MD as MD (Pediatric Nephrology)  Marcia Schmitt MD as MD (Pediatric Gastroenterology)  Ernie Swift, PhD LP (Neuropsychology)  Seun Kent MD as Assigned PCP    Copy to patient  Sahri Carroll Ramsey  [address] [place] [id number]    "

## 2021-02-10 NOTE — ED PROVIDER NOTES
History     Chief Complaint   Patient presents with     Fever     HPI    History obtained from patient    Milly is a 20 year old female, pmh/o Lupus who presents at 11:30 AM with concern for fever.  She has been feeling ill for about 3 days with a fever and tiredness.  She also reports a new onset rash that has been all over her body with red dots.  Review of systems otherwise reveals no URI symptoms, sore throat, nausea or vomiting or changes in appetite.  She has been having normal bowel movements, no abdominal pain no changes in urination.    PMHx:  Past Medical History:   Diagnosis Date     Hepatitis      Lupus (systemic lupus erythematosus) (H)      Lupus cerebritis (H)      Pancreatitis 08/2017     Pyelonephritis 08/2017     Seizures (H)      Status epilepticus (H)      Past Surgical History:   Procedure Laterality Date     NO HISTORY OF SURGERY       ORTHOPEDIC SURGERY       These were reviewed with the patient/family.    MEDICATIONS were reviewed and are as follows:   No current facility-administered medications for this encounter.      Current Outpatient Medications   Medication     acetaminophen (TYLENOL) 500 MG tablet     amylase-lipase-protease (CREON 24) 01925-92003 units CPEP per EC capsule     amylase-lipase-protease (CREON 24) 60992-00822 units CPEP per EC capsule     Belimumab 200 MG/ML SOAJ     calcium carbonate (TUMS) 500 MG chewable tablet     famotidine (PEPCID) 20 MG tablet     ferrous sulfate 325 (65 Fe) MG PO tablet     hydroxychloroquine (PLAQUENIL) 200 MG tablet     multivitamin, therapeutic (THERA-VIT) TABS tablet     mvw complete formulation (SOFTGELS) capsule     mycophenolate (GENERIC EQUIVALENT) 500 MG tablet     oxyCODONE (ROXICODONE) 5 MG tablet     predniSONE (DELTASONE) 5 MG tablet     sulfamethoxazole-trimethoprim (BACTRIM DS) 800-160 MG tablet     vitamin D3 (CHOLECALCIFEROL) 1.25 MG (87821 UT) capsule     vitamin D3 (CHOLECALCIFEROL) 10 MCG (400 UNIT) capsule        ALLERGIES:  Rituximab    IMMUNIZATIONS:  UTD by report.    SOCIAL HISTORY: Milly lives with her mother.  She does work at a restaurant.      I have reviewed the Medications, Allergies, Past Medical and Surgical History, and Social History in the Epic system.    Review of Systems  Please see HPI for pertinent positives and negatives.  All other systems reviewed and found to be negative.        Physical Exam   BP: 104/73  Pulse: 110  Temp: 101.6  F (38.7  C)  Resp: 20  Weight: 57.2 kg (126 lb 1.7 oz)  SpO2: 100 %      Physical Exam  Appearance: Alert and appropriate, well developed, nontoxic, with moist mucous membranes.  HEENT: Head: Normocephalic and atraumatic. Eyes: PERRL, EOM grossly intact, conjunctivae and sclerae clear. Ears: Tympanic membranes clear bilaterally, without inflammation or effusion. Nose: Nares clear with no active discharge.  Mouth/Throat: No oral lesions, pharynx clear with no erythema or exudate.  Neck: Supple, no masses, no meningismus. No significant cervical lymphadenopathy.  Pulmonary: No grunting, flaring, retractions or stridor. Good air entry, clear to auscultation bilaterally, with no rales, rhonchi, or wheezing.  Cardiovascular: Regular rate and rhythm, normal S1 and S2, with no murmurs.  Normal symmetric peripheral pulses and brisk cap refill.  Abdominal: Normal bowel sounds, soft, nontender, nondistended, with no masses and no hepatosplenomegaly.  Neurologic: Alert and oriented, cranial nerves II-XII grossly intact, moving all extremities equally with grossly normal coordination and normal gait.  Extremities/Back: No deformity, no CVA tenderness.  Skin: Petechiae over both upper and lower extremities, red plaque-like lesions over arms, abdomen, legs and hands. Peeling of fingers, malar lupus rash on face, lupus alopecia.   Genitourinary:  Deferred   Rectal:  Deferred      ED Course      Procedures    No results found for this or any previous visit (from the past 24  hour(s)).    Medications   ibuprofen (ADVIL/MOTRIN) tablet 600 mg (600 mg Oral Given 2/10/21 1130)   lidocaine 1 % (0.2 mLs  Given 2/10/21 1214)   lidocaine 1 % (0.2 mLs  Given 2/10/21 1214)       Old chart from [facility] reviewed, supported history as above.  Labs reviewed and revealed low white count, Hb and platelets but normal CRP. Rapid strep negative, UA consistent with increased proteinuria and a urinary tract infection.  Patient was attended to immediately upon arrival and assessed for immediate life-threatening conditions.  The patient was rechecked before leaving the Emergency Department.  Her symptoms were unchanged after 2 hours of observation.  Discussed with the admitting physician, Dr. Brito.  A consult was requested and obtained from pediatric rheumatology, who agreed with the assessment and plan as documented.    Critical care time:  none       Assessments & Plan (with Medical Decision Making)   Milly is a 20-year-old female who presented emergency department with a previous medical history of active lupus systemic lupus and concern for fever for 3 days as well as a new onset rash.  Work-up revealed a urinary tract infection which certainly could explain her fever, however she did not have any significant increase in inflammatory markers on blood work.  At this point she is stable although still febrile and slightly tachycardic.  She will be given a normal saline bolus and admitted to general pediatrics for further management and observation.    I have reviewed the nursing notes.    I have reviewed the findings, diagnosis, plan and need for follow up with the patient.  New Prescriptions    No medications on file       Final diagnoses:   Other forms of systemic lupus erythematosus, unspecified organ involvement status (H)   Fever, unspecified fever cause       2/10/2021   [facility] EMERGENCY DEPARTMENT    Zuly Tyler MD  Pediatric Emergency Medicine Attending Physician        Zuly Tyler MD  02/10/21 7726

## 2021-02-10 NOTE — TELEPHONE ENCOUNTER
Pediatric Rheumatology Informal Telephone Consult    I was called by Dr. Tyler in the [facility] ED on 2/10/21 regarding Milly and was provided with the following information:   Milly was sent to the ED from Rheumatology clinic today for evaluation due to illness. In the ED, she continues to feel unwell. Her strep test is negative. Many of her lab values are consistent with prior values. Notably, her CRP is <2.9, WBC is 3.9, platelets are 118 and she has a petechial rash. She has a transaminitis and elevated lipase. Her coags are normal. Complements, IgG, RVP are drawn and pending. Blood and urine cultures are drawn and pending. Milly does not feel well enough to go home and will be admitted for observation and empiric antibiotics.     Dr. Tyler would like to know if there are additional lab tests to obtain at this time and whether or not to start empiric antibiotics.     Based on the limited information provided, I recommended the following:  -Since Milly is immunocompromised and at risk for decompensating quickly, admission is appropriate. If she starts showing signs/symptoms of sepsis or septic shock, consider stress dose steroids per Dr. Anders's clinic note.    -Ceftriaxone is a good initial empiric antibiotic choice and we defer to the primary hospital team if they think additional coverage is warranted     Dr. Tyler did not request that I personally see or examine the patient at this time.   My recommendations are not intended to take the place of Dr. Tyler's clinical judgment, which should always be utilized to provide the most appropriate care to meet the unique needs of each patient.    Patient and plan discussed with Dr. Anders, attending rheumatologist.     Nehal Chahal MD MPH  Rheumatology fellow, PGY-4  Pager: [phone]    Agree with above.  Please refer to my outpatient clinic note from today.  Jonh Anders MD, PhD  , Pediatric Rheumatology

## 2021-02-10 NOTE — PROGRESS NOTES
Milly is a 20 year old woman who was seen in follow-up in Pediatric Rheumatology clinic today.    The primary encounter diagnosis was Systemic lupus erythematosus with other organ involvement, unspecified SLE type (H). A diagnosis of Immunosuppression (H) was also pertinent to this visit.    She is currently taking the following medications and the doses as documented.          Medications:     Current Outpatient Medications   Medication Sig Dispense Refill     acetaminophen (TYLENOL) 500 MG tablet Take 1 tablet (500 mg) by mouth every 4 hours as needed for mild pain 1 Bottle 1     amylase-lipase-protease (CREON 24) 55690-11759 units CPEP per EC capsule Take 1 capsule (24,000 Units) by mouth Take with snacks or supplements (with snacks) 112 capsule 1     amylase-lipase-protease (CREON 24) 08615-23869 units CPEP per EC capsule Take 2 capsules (48,000 Units) by mouth 3 times daily (with meals) 336 capsule 1     Belimumab 200 MG/ML SOAJ Inject 200 mg Subcutaneous every 7 days 4 Syringe 11     calcium carbonate (TUMS) 500 MG chewable tablet Take 1 tablet (500 mg) by mouth daily 30 tablet 0     famotidine (PEPCID) 20 MG tablet Take 2 tablets (40 mg) by mouth daily 30 tablet 11     ferrous sulfate 325 (65 Fe) MG PO tablet Take 1 tablet (325 mg) by mouth daily (with breakfast) 100 tablet 3     hydroxychloroquine (PLAQUENIL) 200 MG tablet Take 2 tablets (400 mg) by mouth daily 60 tablet 11     multivitamin, therapeutic (THERA-VIT) TABS tablet Take 1 tablet by mouth daily 30 tablet 11     mvw complete formulation (SOFTGELS) capsule Take 2 capsules by mouth daily 180 capsule 1     mycophenolate (GENERIC EQUIVALENT) 500 MG tablet Take 3 tablets (1,500 mg) by mouth 2 times daily 180 tablet 11     oxyCODONE (ROXICODONE) 5 MG tablet Take 1 tablet (5 mg) by mouth every 6 hours as needed for moderate to severe pain 7 tablet 0     predniSONE (DELTASONE) 5 MG tablet Take 1 tablet (5 mg) by mouth daily 150 tablet 1      "sulfamethoxazole-trimethoprim (BACTRIM DS) 800-160 MG tablet Take 1 tablet by mouth three times a week 36 tablet 1     vitamin D3 (CHOLECALCIFEROL) 1.25 MG (88568 UT) capsule Take 1 capsule (50,000 Units) by mouth every 7 days 12 capsule 0     vitamin D3 (CHOLECALCIFEROL) 10 MCG (400 UNIT) capsule Take 1 capsule (400 Units) by mouth daily 30 capsule 3     She also get monthly Benlysta (belimumab) infusions.    Milly is tolerating the medication(s) well.          Interval History:     Milly returns for scheduled follow-up.  She had been scheduled to have an infusion of belimumab today.    She was hospitalized one month ago with cellulitis of the left calf as well as being COVID positive.  She was discharged and was doing relatively well until 2 days ago.  Over the past two days she has felt tired and nauseated.  She vomited once yesterday -- green, non-bloody.  She has not been febrile at home, but does have a fever here in clinic today to 101.6F.  She does not report nasal congestion, sore throat, cough, chest pain, abdominal pain, or diarrhea.  The lupus rash on her face and scalp has worsened a bit. She has also developed some small red spots on her skin.  She denied having dysuria.    Updated family & social history:  Her father passed away in early December, unexpectedly.    She is living at home with her mother and other family members.         Review of Systems:     See HPI.  A comprehensive review of systems was performed and was negative apart from that listed above.         Examination:     Blood pressure 106/80, pulse 117, temperature 101.6  F (38.7  C), temperature source Tympanic, height 1.58 m (5' 2.21\"), weight 57.1 kg (125 lb 14.1 oz), not currently breastfeeding.     47 %ile (Z= -0.08) based on CDC (Girls, 2-20 Years) weight-for-age data using vitals from 2/10/2021.    Blood pressure percentiles are not available for patients who are 18 years or older.    In general Milly appeared tired and was " soft spoken.  HEENT:  Pupils were equal, round and reactive to light.  Nose normal.  Oropharynx was tacky, with with mild erythema in the posterior pharynx, no obvious exudate. Right TM could not be visualized due to cerumen, left TM was normal.  NECK:  Supple, no lymphadenopathy.  CHEST:  Clear to auscultation.  HEART:  Tachycardic.  No murmur or rub.  ABDOMEN:  Soft, non-tender, no hepatosplenomegaly.  JOINTS:  Normal.  She has a well-healed scar on the left knee.  SKIN:  She has erythema and scaling on her face and scalp.  This is slightly more pronounced than usual.  The skin of her fingers is erythematous, but at baseline.  She has scattered petechiae on her forearms and legs.  The area of prior cellulitis of the posterior left calf is not erythematous or warm, but does feel firm/indurated.         Laboratory Investigations:   Will be done in ED.  See other records from this date.         Impression:     Milly is a 20 year old  with   1. Systemic lupus erythematosus with other organ involvement, unspecified SLE type (H)    2. Immunosuppression (H)      She has an acute febrile illness with no localizing symptoms apart from one episode of vomiting.  She has a new petechial rash.  Given her degree of immunosuppression and underlying disease, she is at risk for a host of infections.   She needs emergency evaluation and empiric treatment of potential serious bacterial infection, including possible sepsis.  She has also had pancreatitis in the past, which should be evaluated.         Plan:     1. I called our ED and notified them that I would transfer Zabrina to their care.  2. In addition to routine labs (CBC/diff, CMP, blood and urine cultures), I would recommend Strep swab, viral respiratory panel, lipase, complement C3 and C4, total IgG.    3. I would favor admission to the hospital and treatment with empiric antibiotics.  I will discuss this further with the ED attending physician.  4. Continue home SLE  "medications for now.  If she worsens clinically, she may need \"stress-dose\" corticosteroids.  5. I canceled her planned belimumab infusion. This will need to be rescheduled once this acute episode is managed.  6. Follow up in 1 month, in person.      Jonh Anders MD, PhD  , Pediatric Rheumatology      40 min spent on the date of the encounter in chart review, patient visit, review of tests, documentation and/or discussion with other providers about the issues documented above.         CC  Patient Care Team:  Estefany Bell MD as PCP - General (Pediatrics)  Jonh Anders MD PhD as MD (Pediatric Rheumatology)  Estefany Bell MD as MD (Pediatrics)  Domingo Thomas MD as MD (Ophthalmology)  Padmini Garza MD as MD (Pediatric Nephrology)  Marcia Schmitt MD as MD (Pediatric Gastroenterology)  Ernie Swift, PhD LP (Neuropsychology)  Seun Kent MD as Assigned PCP  Jonh Anders MD PhD as Assigned Pediatric Specialist Provider  SELF, REFERRED    Copy to patient  Shari Carroll Ramsey  [address] [place] [id number]      "

## 2021-02-10 NOTE — H&P
Resident/Fellow Attestation   I, Valentino Ray, was present with the medical/PUNE[employer] student who participated in the service and in the documentation of the note.  I have verified the history and personally performed the physical exam and medical decision making.  I agree with the assessment and plan of care as documented in the note.      Valentino Ray MD  PGY2  Date of Service (when I saw the patient): 02/10/21    [facility]    History and Physical - General Pediatrics Service        Date of Admission:  2/10/2021    Assessment & Plan   Milly Carroll is a 20 year old female admitted on 2/10/2021. She has a history of SLE and is currently receiving plaquenil, prednisone and mycophenolate for immunosuppression and follows regularly with Rheumatology. She presents with a two day history of fatigue and chills and found to have a fever when she presented for her Rheumatology clinic appointment today and was sent to the ED, where findings were significant for an abnormal UA, and pancytopenia. Suspect UTI with sepsis and will continue Ceftriaxone and follow cultures. Low threshold to broaden coverage if she clinically deteriorates, but at this time is hemodynamically stable.      ID  #Fever/Chills  #Sepsis, likely secondary to UTI   Assessment: Presenting with a two day hx of fevers/chills. Differential diagnosis for fever in this SLE patient receiving immunosuppression therapy includes UTI,  abdominal infection, pneumonia, meningitis, cellulitis, etc. Abnormal UA in the ED points towards a UTI. Patient does not have dysuria, but does endorse increased urinary frequency. There are no clinical signs of symptoms to suggest an infection from another source- she has no cough, SOB, abdominal pain, headache, neck pain. She states that she has a relatively new petechial rash on her legs and more recently her arms which is likely due to her chronic  pancytopenia/thrombocytopenia. She was recently positive for COVID on 1/13/2021 when she was treated for a lower extremity cellulitis. The petechial rash came on prior to COVID diagnosis. She was slightly tachycardic on arrival to the ED but this has since normalized with a bolus of IVF.   Plan:  - Continue Ceftriaxone, low threshold to broaden if becomes clinically unstable  - Follow blood and urine cultures  - RVP Pending  - Monitor fever curve  - Repeat labs in AM   - CBC   - CMP    # Prior COVID infection   Assessment: Patient diagnosed with COVID during recent hospitalization. She denies any SOB, chest pain or cough.  Inflammatory markers here are wnl, less likely MISC.  Plan:  - Continue to monitor symptoms, she is past her quarantine window and does not require precautions at this time      Rheum  #SLE  Assessment: Milly is currently receiving plaquenil, prednisone and mycophenolate. She is pancytopenic here. Malar rash slightly worse. We will continue her PTA medications. Rheumatology will see her in the morning.   Plan:   - Continue home med's   - Plaquenil 400 mg daily   - Mycophenolate 1500 mg BID   - Prednisone 5 mg daily  - Rheumatology consulting, appreciate recs   - Pending IgG, C3/C4 levels    HEME  # Pancytopenia  Most likely secondary to immunosuppression and infection.  - Repeat CBC in the am  - Normal coags on admission      FEN/GI  # Elevated transaminases  # Elevated Lipase  - Repeat CMP in the morning  - Lipase is less than 3 times the upper limit of normal, no abdominal tenderness, low concern for pancreatitis. Consider repeating lab in the future if there are clinical concerns for pancreatitis.   - Regular diet  - No IVF at this time as long as continues to take good PO. PIV saline locked.       Diet:   Regular diet  Fluids: None  DVT Prophylaxis: Low Risk/Ambulatory with no VTE prophylaxis indicated  Leung Catheter: not present  Code Status:   Full code         Disposition Plan   Expected  discharge: 1-2 days, recommended, to prior living arrangement, once SIRS/Sepsis treated.   Entered: Yunior Kendrick 02/10/2021, 3:05 PM       The patient's care was discussed with the Attending Physician, Dr. Jj Brito.    Yunior Kendrick  Medical Student  General Pediatrics Service  [facility]  Contact information available via [facility] Paging/Directory    ______________________________________________________________________    Chief Complaint   Fever    History is obtained from the patient    History of Present Illness   Milly Carroll is a 20 year old female with history of SLE currently receiving plaquenil, prednisone and mycophenolate who presents with a two day history of fever/chills and fatigue.      She was hospitalized one month ago with cellulitis of the left calf as well as being COVID positive. She was discharged and was doing relatively well until 2 days ago. Over the past two days she has felt increasingly tired with chills and a fever up to 101. She presented to her rheumatology appointment today where she was found to be febrile to 101.6F. She was sent to [facility] for further evaluation of a fever in context of her chronic immunosuppression 2/2 to SLE.    On further questioning, patient denies really any other symptoms except for urinary frequency in the past couple days. She has been urinating roughly 4x per day which is abnormal for her. No dysuria or foul smelling urine. No cough, SOB or chest pain. Denies any abdominal pain, diarrhea, constipation or bloody/tarry bowel movements.  No recent congestion, sore throat.  She denies any nausea or vomiting although Dr. Anders's note says that she has vomited in the past day- green, no blood. No headache, dizziness, double vision or blurry vision or neck pain. She does report that the lupus rash on her face and scalp has worsened a bit- unclear of timeline. She has also developed some small red spots on her  skin- on lower extremities which appeared about a month ago, and more recently on her hands, arms, chest and back.       In the ED, patient underwent laboratory workup which demonstrated pancytopenia and UA which was suggestive of urinary tract infection. Cultures sent and pending. Her Cr was nl. Mildly elevated alk phos and transaminases.  Normal coags.     Per chart review, patient has had UTIs in the past. Cultures grew pan-sensitive E coli (12/10/2019 most recent)    Review of Systems    The 10 point Review of Systems is negative other than noted in the HPI or here.     Past Medical History    I have reviewed this patient's medical history and updated it with pertinent information if needed.   Past Medical History:   Diagnosis Date     Hepatitis      Lupus (systemic lupus erythematosus) (H)      Lupus cerebritis (H)      Pancreatitis 08/2017     Pyelonephritis 08/2017     Seizures (H)      Status epilepticus (H)         Past Surgical History   I have reviewed this patient's surgical history and updated it with pertinent information if needed.  Past Surgical History:   Procedure Laterality Date     NO HISTORY OF SURGERY       ORTHOPEDIC SURGERY         Social History   I have reviewed this patient's social history and updated it with pertinent information if needed. Milly BRAN Glen  reports that she has never smoked. She has never used smokeless tobacco.    Family History   I have reviewed this patient's family history and updated it with pertinent information if needed.  Family History   Problem Relation Age of Onset     Other - See Comments Father         Question of lupus in father and paternal grandfather     Lupus Sister         older sister     Gastrointestinal Disease No family hx of         No known history of IBD, hepatitis, pancreatitis, celiac disease, or GI cancers before age 50     Glaucoma No family hx of      Macular Degeneration No family hx of      Patient lives in [place], [place]  Works at  [employer]  Lives at home with parag.     Prior to Admission Medications   Prior to Admission Medications   Prescriptions Last Dose Informant Patient Reported? Taking?   Belimumab 200 MG/ML SOAJ   No No   Sig: Inject 200 mg Subcutaneous every 7 days   acetaminophen (TYLENOL) 500 MG tablet   No No   Sig: Take 1 tablet (500 mg) by mouth every 4 hours as needed for mild pain   amylase-lipase-protease (CREON 24) 45834-46291 units CPEP per EC capsule   No No   Sig: Take 1 capsule (24,000 Units) by mouth Take with snacks or supplements (with snacks)   amylase-lipase-protease (CREON 24) 27290-43168 units CPEP per EC capsule   No No   Sig: Take 2 capsules (48,000 Units) by mouth 3 times daily (with meals)   calcium carbonate (TUMS) 500 MG chewable tablet  Self No No   Sig: Take 1 tablet (500 mg) by mouth daily   famotidine (PEPCID) 20 MG tablet   No No   Sig: Take 2 tablets (40 mg) by mouth daily   ferrous sulfate 325 (65 Fe) MG PO tablet   No No   Sig: Take 1 tablet (325 mg) by mouth daily (with breakfast)   hydroxychloroquine (PLAQUENIL) 200 MG tablet   No No   Sig: Take 2 tablets (400 mg) by mouth daily   multivitamin, therapeutic (THERA-VIT) TABS tablet  Self No No   Sig: Take 1 tablet by mouth daily   mvw complete formulation (SOFTGELS) capsule   No No   Sig: Take 2 capsules by mouth daily   mycophenolate (GENERIC EQUIVALENT) 500 MG tablet   No No   Sig: Take 3 tablets (1,500 mg) by mouth 2 times daily   oxyCODONE (ROXICODONE) 5 MG tablet   No No   Sig: Take 1 tablet (5 mg) by mouth every 6 hours as needed for moderate to severe pain   predniSONE (DELTASONE) 5 MG tablet   Yes No   Sig: Take 1 tablet (5 mg) by mouth daily   sulfamethoxazole-trimethoprim (BACTRIM DS) 800-160 MG tablet   No No   Sig: Take 1 tablet by mouth three times a week   vitamin D3 (CHOLECALCIFEROL) 1.25 MG (35433 UT) capsule   No No   Sig: Take 1 capsule (50,000 Units) by mouth every 7 days   vitamin D3 (CHOLECALCIFEROL) 10 MCG (400 UNIT) capsule   No  No   Sig: Take 1 capsule (400 Units) by mouth daily      Facility-Administered Medications: None     Allergies   Allergies   Allergen Reactions     Rituximab Anaphylaxis       Physical Exam   Vital Signs: Temp: 99  F (37.2  C) Temp src: Tympanic BP: 104/73 Pulse: 110   Resp: 20 SpO2: 100 % O2 Device: None (Room air)    Weight: 126 lbs 1.65 oz     Appearance: Alert and appropriate, well developed, nontoxic, with moist mucous membranes.  HEENT: Head: Normocephalic and atraumatic. Eyes: PERRL, EOM grossly intact, conjunctivae and sclerae clear. Ears: Tympanic membranes clear bilaterally, without inflammation or effusion. Nose: Nares clear with no active discharge.  Mouth/Throat: No oral lesions, pharynx clear with no erythema or exudate.  Neck: Supple, no masses, no meningismus. No significant cervical lymphadenopathy.  Pulmonary: Good air entry, clear to auscultation bilaterally, with no rales, rhonchi, or wheezing.  Cardiovascular: Regular rate and rhythm, normal S1 and S2, with no murmurs.  Normal symmetric peripheral pulses and brisk cap refill.  Abdominal: No suprapubic tenderness. Normal bowel sounds, soft, nontender, nondistended, with no masses and no hepatosplenomegaly.  Neurologic: Alert and oriented, cranial nerves II-XII grossly intact, moving all extremities equally with grossly normal coordination   Extremities/Back: No deformity, no CVA tenderness.  Skin: Petechiae over both upper and lower extremities, red plaque-like lesions over arms, abdomen, legs and hands. Peeling of fingers, malar lupus rash on face, lupus alopecia.     Data   Data reviewed today: I reviewed all medications, new labs and imaging results over the last 24 hours. I personally reviewed no images or EKG's today.    Recent Labs   Lab 02/10/21  1305 02/10/21  1216   WBC 3.9*  --    HGB 9.9*  --    MCV 76*  --    *  --    INR 1.08  --     136   POTASSIUM 3.6 5.3   CHLORIDE 110 109   CO2 20 19*   BUN 14 13   CR 0.62 0.48*    ANIONGAP 9 8   BISI 8.0* 8.3*   GLC 69* 64*   ALBUMIN 3.4 3.3*   PROTTOTAL 7.3 7.6   BILITOTAL 0.6 0.7   ALKPHOS 169* 173*   ALT 62* 70*   * Unsatisfactory specimen - hemolyzed   LIPASE 684* 670*

## 2021-02-10 NOTE — NURSING NOTE
"Chief Complaint   Patient presents with     Follow Up     sle     Vitals:    02/10/21 1030   BP: 106/80   BP Location: Right arm   Patient Position: Sitting   Cuff Size: Adult Regular   Pulse: 117   Temp: 101.6  F (38.7  C)   TempSrc: Tympanic   Weight: 125 lb 14.1 oz (57.1 kg)   Height: 5' 2.21\" (158 cm)     Peds Outpatient BP  1) Rested for 5 minutes, BP taken on bare arm, patient sitting (or supine for infants) w/ legs uncrossed?   Yes  2) Right arm used?  Right arm   Yes  3) Arm circumference of largest part of upper arm (in cm): 26  4) BP cuff sized used: Adult (25-32cm)   If used different size cuff then what was recommended why? N/A  5) First BP reading:machine   BP Readings from Last 1 Encounters:   02/10/21 106/80      Is reading >90%?No   (90% for <1 years is 90/50)  (90% for >18 years is 140/90)  *If a machine BP is at or above 90% take manual BP  6) Manual BP reading: N/A  7) Other comments: None      Wilma Ortiz LPN  February 10, 2021  "

## 2021-02-10 NOTE — ED TRIAGE NOTES
Pt here from clinic due to fever.  Pt rheumatology pt.  Was positive for CoVid a few weeks ago, asymptomatic.

## 2021-02-11 LAB
ALBUMIN SERPL-MCNC: 2.6 G/DL (ref 3.4–5)
ALP SERPL-CCNC: 140 U/L (ref 40–150)
ALT SERPL W P-5'-P-CCNC: 55 U/L (ref 0–50)
ANION GAP SERPL CALCULATED.3IONS-SCNC: 8 MMOL/L (ref 3–14)
AST SERPL W P-5'-P-CCNC: 117 U/L (ref 0–35)
BASOPHILS # BLD AUTO: 0 10E9/L (ref 0–0.2)
BASOPHILS NFR BLD AUTO: 0.2 %
BILIRUB SERPL-MCNC: 0.3 MG/DL (ref 0.2–1.3)
BUN SERPL-MCNC: 10 MG/DL (ref 7–30)
CALCIUM SERPL-MCNC: 7.4 MG/DL (ref 8.5–10.1)
CHLORIDE SERPL-SCNC: 113 MMOL/L (ref 96–110)
CO2 SERPL-SCNC: 18 MMOL/L (ref 20–32)
CREAT SERPL-MCNC: 0.58 MG/DL (ref 0.5–1)
DIFFERENTIAL METHOD BLD: ABNORMAL
EOSINOPHIL # BLD AUTO: 0.2 10E9/L (ref 0–0.7)
EOSINOPHIL NFR BLD AUTO: 4.2 %
ERYTHROCYTE [DISTWIDTH] IN BLOOD BY AUTOMATED COUNT: 20.4 % (ref 10–15)
GFR SERPL CREATININE-BSD FRML MDRD: >90 ML/MIN/{1.73_M2}
GLUCOSE SERPL-MCNC: 88 MG/DL (ref 70–99)
HCT VFR BLD AUTO: 29.6 % (ref 35–47)
HGB BLD-MCNC: 9.1 G/DL (ref 11.7–15.7)
IMM GRANULOCYTES # BLD: 0 10E9/L (ref 0–0.4)
IMM GRANULOCYTES NFR BLD: 0.9 %
LYMPHOCYTES # BLD AUTO: 0.7 10E9/L (ref 0.8–5.3)
LYMPHOCYTES NFR BLD AUTO: 14.9 %
MCH RBC QN AUTO: 23.2 PG (ref 26.5–33)
MCHC RBC AUTO-ENTMCNC: 30.7 G/DL (ref 31.5–36.5)
MCV RBC AUTO: 75 FL (ref 78–100)
MONOCYTES # BLD AUTO: 0.4 10E9/L (ref 0–1.3)
MONOCYTES NFR BLD AUTO: 8.2 %
NEUTROPHILS # BLD AUTO: 3.2 10E9/L (ref 1.6–8.3)
NEUTROPHILS NFR BLD AUTO: 71.6 %
NRBC # BLD AUTO: 0 10*3/UL
NRBC BLD AUTO-RTO: 0 /100
PLATELET # BLD AUTO: 104 10E9/L (ref 150–450)
POTASSIUM SERPL-SCNC: 3.7 MMOL/L (ref 3.4–5.3)
PROT SERPL-MCNC: 6.4 G/DL (ref 6.8–8.8)
RBC # BLD AUTO: 3.93 10E12/L (ref 3.8–5.2)
SODIUM SERPL-SCNC: 139 MMOL/L (ref 133–144)
WBC # BLD AUTO: 4.5 10E9/L (ref 4–11)

## 2021-02-11 PROCEDURE — 99215 OFFICE O/P EST HI 40 MIN: CPT | Mod: GC | Performed by: PEDIATRICS

## 2021-02-11 PROCEDURE — 250N000012 HC RX MED GY IP 250 OP 636 PS 637: Performed by: STUDENT IN AN ORGANIZED HEALTH CARE EDUCATION/TRAINING PROGRAM

## 2021-02-11 PROCEDURE — 250N000011 HC RX IP 250 OP 636: Performed by: STUDENT IN AN ORGANIZED HEALTH CARE EDUCATION/TRAINING PROGRAM

## 2021-02-11 PROCEDURE — 258N000003 HC RX IP 258 OP 636

## 2021-02-11 PROCEDURE — 80053 COMPREHEN METABOLIC PANEL: CPT | Performed by: STUDENT IN AN ORGANIZED HEALTH CARE EDUCATION/TRAINING PROGRAM

## 2021-02-11 PROCEDURE — 250N000013 HC RX MED GY IP 250 OP 250 PS 637: Performed by: STUDENT IN AN ORGANIZED HEALTH CARE EDUCATION/TRAINING PROGRAM

## 2021-02-11 PROCEDURE — G0378 HOSPITAL OBSERVATION PER HR: HCPCS

## 2021-02-11 PROCEDURE — 96376 TX/PRO/DX INJ SAME DRUG ADON: CPT

## 2021-02-11 PROCEDURE — 85025 COMPLETE CBC W/AUTO DIFF WBC: CPT | Performed by: STUDENT IN AN ORGANIZED HEALTH CARE EDUCATION/TRAINING PROGRAM

## 2021-02-11 PROCEDURE — 999N000007 HC SITE CHECK

## 2021-02-11 RX ORDER — SODIUM CHLORIDE 9 MG/ML
INJECTION, SOLUTION INTRAVENOUS
Status: DISPENSED
Start: 2021-02-11 | End: 2021-02-12

## 2021-02-11 RX ADMIN — CEFTRIAXONE SODIUM 2000 MG: 2 INJECTION, POWDER, FOR SOLUTION INTRAMUSCULAR; INTRAVENOUS at 16:05

## 2021-02-11 RX ADMIN — CHOLECALCIFEROL TAB 10 MCG (400 UNIT) 400 UNITS: 10 TAB at 08:59

## 2021-02-11 RX ADMIN — SODIUM CHLORIDE, POTASSIUM CHLORIDE, SODIUM LACTATE AND CALCIUM CHLORIDE 500 ML: 600; 310; 30; 20 INJECTION, SOLUTION INTRAVENOUS at 10:38

## 2021-02-11 RX ADMIN — MYCOPHENOLATE MOFETIL 1500 MG: 500 TABLET, FILM COATED ORAL at 09:00

## 2021-02-11 RX ADMIN — PREDNISONE 5 MG: 5 TABLET ORAL at 09:00

## 2021-02-11 RX ADMIN — PANCRELIPASE 2 CAPSULE: 24000; 76000; 120000 CAPSULE, DELAYED RELEASE PELLETS ORAL at 12:52

## 2021-02-11 RX ADMIN — FERROUS SULFATE TAB 325 MG (65 MG ELEMENTAL FE) 325 MG: 325 (65 FE) TAB at 09:00

## 2021-02-11 RX ADMIN — PANCRELIPASE 2 CAPSULE: 24000; 76000; 120000 CAPSULE, DELAYED RELEASE PELLETS ORAL at 18:46

## 2021-02-11 RX ADMIN — HYDROXYCHLOROQUINE SULFATE 400 MG: 200 TABLET, FILM COATED ORAL at 08:59

## 2021-02-11 RX ADMIN — MYCOPHENOLATE MOFETIL 1500 MG: 500 TABLET, FILM COATED ORAL at 20:02

## 2021-02-11 ASSESSMENT — MIFFLIN-ST. JEOR: SCORE: 1296.25

## 2021-02-11 NOTE — CONSULTS
[facility]    Pediatric Rheumatology Consultation     Date of Admission:  2/10/2021    Assessment & Plan   Milly Carroll is a 20 year old female with SLE on chronic immunosuppression who presents with an acute febrile episode likely secondary to a urinary tract infection. Her UA shows moderate bacteria, large leukocyte esterase, and 59 WBC; culture is pending. She is not endorsing pain with urination, but was endorsing some urinary frequency per the primary team. Due to her immunosuppression and her underlying SLE, Milly is at high risk of serious infections and empiric antibiotic treatment and close monitoring is warranted until blood and urine cultures result. Her condition has improved since receiving antibiotics with no further fevers and resolution of her tachycardia.     Her initial differential includes viral illnesses; her RVP is normal and she's not having signs/symptoms of viral infection aside from fever. She is at risk for atypical infections and if she doesn't continue to improve on antibiotics, further investigation should be undertaken. Her presentation could also be due to breakthrough SLE symptoms, however a SLE flare wouldn't be expected to improve with antibiotics. She has physical evidence of active SLE with malar rash, petechial rash, and alopecia. She is also pancytopenic, which can be seen in viral suppression as well as active SLE. Her SLE markers of activity are drawn and pending. Of note, a UA with WBCs and bacteria can reflect an infectious etiology besides UTI and we have not assessed Milly for potential STI exposure. This is also to be considered on the differential and should be investigated, particularly if she's at high risk. She has transaminitis and elevated lipase, but given the lack of abdominal symptoms, benign abdominal exam, and the chronic elevation in those enzymes, this is a less likely source of her fevers. Her development of  petechial rash is concerning given her thrombocytopenia, but she does not have additional symptoms of easy bleeding/bruising and her coags are normal. The rash is also already improving.     She was COVID-19 positive approximately a month ago, which places her at risk for MIS-C. Her current presentation does not fit with MIS-C with improvement after antibiotics and normal inflammatory markers.     Recommendations:  Evaluation- We will continue to follow the results of her labs drawn yesterday including her C3, C4, and IgG.   -We will await results of blood and urine cultures  -There is no need to continue to check her lipase as it's chronically elevated   -Primary team to inquire regarding potential STI exposure    Management- Continue all home SLE medications: Plaquenil 400mg daily, mycophenolate 1500mg BID, prednisone 5mg daily  -If unstable vital signs, consider stress dose steroids    Follow-up- Rheumatology will continue to follow while Milly is inpatient.   -We will work to reschedule her Benlysta infusion, which was due on 2/10/21  -She will follow-up in Rheumatology clinic in one month, in person    This patient was seen and discussed with my staff, Dr. Anders, attending rheumatologist.    Nehal Chahal MD, MPH  Rheumatology Fellow, PGY4  Pager number: [phone]    I supervised the Fellow's interaction with the patient.  I obtained a relevant history and performed a complete physical exam. I discussed my impression and recommendations with the patient and primary team.  I edited the above note, created originally by the Fellow.  I agree with the trainee's findings and plan of care as documented in the trainee s note.  We communicated in person with the primary team and appreciate their care of the patient.    Jonh Anders MD, PhD  , Pediatric Rheumatology        Reason for Consult   Reason for consult: I was asked by Dr. Ray to provide recommendations regarding workup  and management of Milly's SLE meds while she's in the hospital.     Primary Care Physician   Estefany Bell    Chief Complaint   Fever and not feeling well    History is obtained from the patient, electronic health record, patient's family, and the primary team.     History of Present Illness   Milly Carroll is a 20 year old female with SLE who presented for her scheduled Rheumatology appointment and Benlysta infusion on 2/10 and was found to be febrile to 101.6F. She was sent to the ED for further evaluation.     Of note, she was hospitalized one month ago with cellulitis of the left calf as well as being COVID positive. Over the past two days she has felt tired and nauseated. She vomited once two days ago -- green, non-bloody.  She was not been febrile at home. She does not report nasal congestion, sore throat, cough, chest pain, abdominal pain, or diarrhea. The lupus rash on her face and scalp has worsened a bit. She has also developed some small red spots on her skin. She denied having dysuria.    Milly was evaluated in the ED and was found to be febrile, tachycardic, w/ a petechial rash on her arms and legs. She was pancytopenic w/ elevated transaminases and a lipase. Her strep swab was negative. She had a UA concerning for a UTI and was admitted due to concerns for sepsis and potential for rapid decompensation. See below for specific lab results.     This morning, Milly feels improved after receiving fluids and antibiotics. She still reports no pain and the petechial rash has improved.     Past Medical History    I have reviewed this patient's medical history and updated it with pertinent information if needed.   Past Medical History:   Diagnosis Date     Hepatitis      Lupus (systemic lupus erythematosus) (H)      Lupus cerebritis (H)      Pancreatitis 08/2017     Pyelonephritis 08/2017     Seizures (H)      Status epilepticus (H)        Past Surgical History   I have reviewed this patient's surgical  history and updated it with pertinent information if needed.  Past Surgical History:   Procedure Laterality Date     NO HISTORY OF SURGERY       ORTHOPEDIC SURGERY         Prior to Admission Medications   Prior to Admission Medications   Prescriptions Last Dose Informant Patient Reported? Taking?   Belimumab 200 MG/ML SOAJ   No No   Sig: Inject 200 mg Subcutaneous every 7 days   acetaminophen (TYLENOL) 500 MG tablet   No No   Sig: Take 1 tablet (500 mg) by mouth every 4 hours as needed for mild pain   amylase-lipase-protease (CREON 24) 54070-18319 units CPEP per EC capsule   No No   Sig: Take 1 capsule (24,000 Units) by mouth Take with snacks or supplements (with snacks)   amylase-lipase-protease (CREON 24) 41949-95123 units CPEP per EC capsule   No No   Sig: Take 2 capsules (48,000 Units) by mouth 3 times daily (with meals)   calcium carbonate (TUMS) 500 MG chewable tablet  Self No No   Sig: Take 1 tablet (500 mg) by mouth daily   famotidine (PEPCID) 20 MG tablet   No No   Sig: Take 2 tablets (40 mg) by mouth daily   ferrous sulfate 325 (65 Fe) MG PO tablet   No No   Sig: Take 1 tablet (325 mg) by mouth daily (with breakfast)   hydroxychloroquine (PLAQUENIL) 200 MG tablet   No No   Sig: Take 2 tablets (400 mg) by mouth daily   multivitamin, therapeutic (THERA-VIT) TABS tablet  Self No No   Sig: Take 1 tablet by mouth daily   mvw complete formulation (SOFTGELS) capsule   No No   Sig: Take 2 capsules by mouth daily   mycophenolate (GENERIC EQUIVALENT) 500 MG tablet   No No   Sig: Take 3 tablets (1,500 mg) by mouth 2 times daily   oxyCODONE (ROXICODONE) 5 MG tablet   No No   Sig: Take 1 tablet (5 mg) by mouth every 6 hours as needed for moderate to severe pain   predniSONE (DELTASONE) 5 MG tablet   Yes No   Sig: Take 1 tablet (5 mg) by mouth daily   sulfamethoxazole-trimethoprim (BACTRIM DS) 800-160 MG tablet   No No   Sig: Take 1 tablet by mouth three times a week   vitamin D3 (CHOLECALCIFEROL) 1.25 MG (95703 UT)  capsule   No No   Sig: Take 1 capsule (50,000 Units) by mouth every 7 days   vitamin D3 (CHOLECALCIFEROL) 10 MCG (400 UNIT) capsule   No No   Sig: Take 1 capsule (400 Units) by mouth daily      Facility-Administered Medications: None     Allergies   Allergies   Allergen Reactions     Rituximab Anaphylaxis     Social History   I have updated and reviewed the following Social History Narrative:   Her father passed away in early December, unexpectedly.    She is living at home with her mother and other family members. She works at [employer]    Family History   I have reviewed this patient's family history and updated it with pertinent information if needed.   Family History   Problem Relation Age of Onset     Other - See Comments Father         Question of lupus in father and paternal grandfather     Lupus Sister         older sister     Gastrointestinal Disease No family hx of         No known history of IBD, hepatitis, pancreatitis, celiac disease, or GI cancers before age 50     Glaucoma No family hx of      Macular Degeneration No family hx of        Review of Systems   The 10 point Review of Systems is negative other than noted in the HPI or here.    Physical Exam   Temp: 99.8  F (37.7  C) Temp src: Oral BP: 113/84 Pulse: 90   Resp: 16 SpO2: 99 % O2 Device: None (Room air)    Vital Signs with Ranges  Temp:  [98.1  F (36.7  C)-101.6  F (38.7  C)] 99.8  F (37.7  C)  Pulse:  [] 90  Resp:  [16-20] 16  BP: ()/(56-84) 113/84  SpO2:  [99 %-100 %] 99 %  124 lbs 12.49 oz    Gen: Well appearing; cooperative. No acute distress. Asleep and arousable  Head: Alopecia noted  Eyes: No scleral injection, pupils normal.  Nose: No deformity, no rhinorrhea or congestion. No sores.  Mouth: Normal teeth and gums. Moist mucus membranes.   Neck: Normal, no lymphadenopathy.  Lungs: No increased work of breathing. Lungs clear to auscultation bilaterally.  Heart: Regular rate and rhythm. No murmurs. Normal S1/S2. Normal  peripheral perfusion.  Abdomen: Soft, non-tender, non-distended. No hepatosplenomegaly.  Skin: Malar rash on bilateral cheeks. Non-blanching petechial rash on right arm and right leg.   Neuro: Alert, interactive. Answers questions appropriately. Normal strength and tone.   MSK: No evidence of current synovitis/arthritis of the elbow, wrists, finger, sacroiliac, hip, knee, ankle, or toe joints.    Data   Results for orders placed or performed during the hospital encounter of 02/10/21 (from the past 24 hour(s))   Respiratory Panel PCR - NP Swab    Specimen: Nasopharyngeal swab   Result Value Ref Range    Adenovirus Not Detected NDET^Not Detected    Coronavirus Not Detected NDET^Not Detected    Human Metapneumovirus Not Detected NDET^Not Detected    Human Rhinovirus/Enterovirus Not Detected NDET^Not Detected    Influenza A Not Detected NDET^Not Detected    Influenza A, H1 Not Detected NDET^Not Detected    Influenza A 2009 H1N1 Not Detected NDET^Not Detected    Influenza A, H3 Not Detected NDET^Not Detected    Influenza B Not Detected NDET^Not Detected    Parainfluenza Virus 1 Not Detected NDET^Not Detected    Parainfluenza Virus 2 Not Detected NDET^Not Detected    Parainfluenza Virus 3 Not Detected NDET^Not Detected    Parainfluenza Virus 4 Not Detected NDET^Not Detected    Respiratory Syncytial Virus A Not Detected NDET^Not Detected    Respiratory Syncytial Virus B Not Detected NDET^Not Detected    Chlamydia pneumoniae Not Detected NDET^Not Detected    Mycoplasma pneumoniae Not Detected NDET^Not Detected    Respiratory Virus Comment See comment below    Comprehensive metabolic panel   Result Value Ref Range    Sodium 136 133 - 144 mmol/L    Potassium 5.3 3.4 - 5.3 mmol/L    Chloride 109 96 - 110 mmol/L    Carbon Dioxide 19 (L) 20 - 32 mmol/L    Anion Gap 8 3 - 14 mmol/L    Glucose 64 (L) 70 - 99 mg/dL    Urea Nitrogen 13 7 - 30 mg/dL    Creatinine 0.48 (L) 0.50 - 1.00 mg/dL    GFR Estimate >90 >60 mL/min/[1.73_m2]     GFR Estimate If Black >90 >60 mL/min/[1.73_m2]    Calcium 8.3 (L) 8.5 - 10.1 mg/dL    Bilirubin Total 0.7 0.2 - 1.3 mg/dL    Albumin 3.3 (L) 3.4 - 5.0 g/dL    Protein Total 7.6 6.8 - 8.8 g/dL    Alkaline Phosphatase 173 (H) 40 - 150 U/L    ALT 70 (H) 0 - 50 U/L    AST Unsatisfactory specimen - hemolyzed 0 - 35 U/L   Lipase   Result Value Ref Range    Lipase 670 (H) 73 - 393 U/L   Blood culture, one site    Specimen: Throat; Blood    Unspecified Site   Result Value Ref Range    Specimen Description Blood Unspecified Site     Special Requests Received in aerobic bottle only     Culture Micro No growth after 18 hours    Streptococcus A Rapid Scr w Reflx to PCR    Specimen: Throat   Result Value Ref Range    Strep Specimen Description Throat     Streptococcus Group A Rapid Screen Negative NEG^Negative   Group A Streptococcus PCR Throat Swab    Specimen: Throat   Result Value Ref Range    Specimen Description Throat     Strep Group A PCR Not Detected NDET^Not Detected   PTT   Result Value Ref Range    PTT 33 22 - 37 sec   INR   Result Value Ref Range    INR 1.08 0.86 - 1.14   UA reflex to Microscopic and Culture    Specimen: Urine clean catch; Midstream Urine   Result Value Ref Range    Color Urine Yellow     Appearance Urine Slightly Cloudy     Glucose Urine Negative NEG^Negative mg/dL    Bilirubin Urine Small (A) NEG^Negative    Ketones Urine 10 (A) NEG^Negative mg/dL    Specific Gravity Urine 1.026 1.003 - 1.035    Blood Urine Trace (A) NEG^Negative    pH Urine 6.5 5.0 - 7.0 pH    Protein Albumin Urine 100 (A) NEG^Negative mg/dL    Urobilinogen mg/dL 2.0 0.0 - 2.0 mg/dL    Nitrite Urine Negative NEG^Negative    Leukocyte Esterase Urine Large (A) NEG^Negative    Source Midstream Urine     RBC Urine 38 (H) 0 - 2 /HPF    WBC Urine 59 (H) 0 - 5 /HPF    Bacteria Urine Moderate (A) NEG^Negative /HPF    Yeast Urine Few (A) NEG^Negative /HPF    Squamous Epithelial /HPF Urine 23 (H) 0 - 1 /HPF    Transitional Epi 2 (H) 0 - 1  /HPF    Mucous Urine Present (A) NEG^Negative /LPF   Urine Culture Aerobic Bacterial    Specimen: Unspecified Urine   Result Value Ref Range    Specimen Description Unspecified Urine     Culture Micro PENDING    CBC with platelets differential   Result Value Ref Range    WBC 4.5 4.0 - 11.0 10e9/L    RBC Count 3.93 3.8 - 5.2 10e12/L    Hemoglobin 9.1 (L) 11.7 - 15.7 g/dL    Hematocrit 29.6 (L) 35.0 - 47.0 %    MCV 75 (L) 78 - 100 fl    MCH 23.2 (L) 26.5 - 33.0 pg    MCHC 30.7 (L) 31.5 - 36.5 g/dL    RDW 20.4 (H) 10.0 - 15.0 %    Platelet Count 104 (L) 150 - 450 10e9/L    Diff Method Automated Method     % Neutrophils 71.6 %    % Lymphocytes 14.9 %    % Monocytes 8.2 %    % Eosinophils 4.2 %    % Basophils 0.2 %    % Immature Granulocytes 0.9 %    Nucleated RBCs 0 0 /100    Absolute Neutrophil 3.2 1.6 - 8.3 10e9/L    Absolute Lymphocytes 0.7 (L) 0.8 - 5.3 10e9/L    Absolute Monocytes 0.4 0.0 - 1.3 10e9/L    Absolute Eosinophils 0.2 0.0 - 0.7 10e9/L    Absolute Basophils 0.0 0.0 - 0.2 10e9/L    Abs Immature Granulocytes 0.0 0 - 0.4 10e9/L    Absolute Nucleated RBC 0.0    Comprehensive metabolic panel   Result Value Ref Range    Sodium 139 133 - 144 mmol/L    Potassium 3.7 3.4 - 5.3 mmol/L    Chloride 113 (H) 96 - 110 mmol/L    Carbon Dioxide 18 (L) 20 - 32 mmol/L    Anion Gap 8 3 - 14 mmol/L    Glucose 88 70 - 99 mg/dL    Urea Nitrogen 10 7 - 30 mg/dL    Creatinine 0.58 0.50 - 1.00 mg/dL    GFR Estimate >90 >60 mL/min/[1.73_m2]    GFR Estimate If Black >90 >60 mL/min/[1.73_m2]    Calcium 7.4 (L) 8.5 - 10.1 mg/dL    Bilirubin Total 0.3 0.2 - 1.3 mg/dL    Albumin 2.6 (L) 3.4 - 5.0 g/dL    Protein Total 6.4 (L) 6.8 - 8.8 g/dL    Alkaline Phosphatase 140 40 - 150 U/L    ALT 55 (H) 0 - 50 U/L     (H) 0 - 35 U/L

## 2021-02-11 NOTE — PROGRESS NOTES
"   02/11/21 1525   Child Life   Location Med/Surg  (Unit 6)   Intervention Initial Assessment;Therapeutic Intervention;Supportive Check In   Preparation Comment CFL introduced self and services to pt. Pt was very quiet and did not seem eager to engage in conversation. Provided pt with more adult coloring as requested per RN. Pt stated feeling \"OK\" with admission and did not have any questions. Pt had a flat affect and did not appear to have any anxieties with medical condition or hospital environment.   Anxiety Low Anxiety   Techniques to [place] with Loss/Stress/Change diversional activity   Outcomes/Follow Up Continue to Follow/Support     "

## 2021-02-11 NOTE — PLAN OF CARE
VSS, afebrile. Denied any pain. Lung sounds clear and equal.  Sating well on RA. Slept throughout night. No family at bedside.

## 2021-02-11 NOTE — PLAN OF CARE
Admitted to Unit 6 for observation at 1517. Afebrile, VSS. First dose of abx completed. Fluid bolus stopped early after 500 mL per orders. Blotchy red/pink rash on back, chest, abdomen, face. Disperse petechiae on lower legs & lower arms. Providers visualized upon admission.     Discharge Criteria - Outpatient/Observation goals to be met before discharge home:   1. NO supplemental oxygen. Maintains sats on RA, complete.   2. PO intake to maintain hydration status. Eating & drinking well, complete.  3. Pain controlled on PO Pain medications. No pain, complete.  4. Negative cultures x48 hours. Awaiting results.

## 2021-02-11 NOTE — PLAN OF CARE
TMAX 100.7 this shift. Did not want any PRNs. Slightly tachy (HR 100s) with low UOP at beginning of shift, MD ordered 500cc bolus. Denies pain. LS clear on RA. Very pleasant and calm at bedside. UOP improving, 1 BM. Eating and drinking ok at bedside. Mother updated via phone. Petechiae and rashes present throughout arms/face at baseline. Midline extended dwell PIV SLD.

## 2021-02-11 NOTE — PROGRESS NOTES
Resident/Fellow Attestation   I, Coty Waddell, was present with the medical/PUNEET student who participated in the service and in the documentation of the note.  I have verified the history and personally performed the physical exam and medical decision making.  I agree with the assessment and plan of care as documented in the note.        Coty Waddell MD  PGY1  Date of Service (when I saw the patient): 02/11/21    [facility]    Progress Note - General Pediatrics Service        Date of Admission:  2/10/2021    Assessment & Plan          Milly Carroll is a 20 year old female admitted on 2/10/2021, with a two day history of fatigue, chills, fever, and bladder fullness. She has a history of SLE and is currently receiving plaquenil, prednisone and mycophenolate for immunosuppression and follows regularly with Rheumatology. In the ED findings were significant for an abnormal UA, and pancytopenia. Suspect UTI with sepsis and will continue Ceftriaxone and follow cultures. Low threshold to broaden coverage if she clinically deteriorates, but at this time is hemodynamically stable.      ID  #Fever/Chills  #Sepsis, likely secondary to UTI   Assessment: Presenting with a two day hx of fevers/chills. Differential diagnosis for fever in this SLE patient receiving immunosuppression therapy includes UTI,  abdominal infection, pneumonia, meningitis, cellulitis, etc. Abnormal UA in the ED points towards a UTI. Patient does not have dysuria, but does endorse increased urinary frequency. There are no clinical signs of symptoms to suggest an infection from another source- she has no cough, SOB, abdominal pain, headache, neck pain. She states that she has a  petechial rash on her legs and more recently her arms which is likely due to her chronic pancytopenia/thrombocytopenia. She was recently positive for COVID on 1/13/2021 when she was treated for a lower extremity cellulitis. The  petechial rash came on prior to COVID diagnosis. On admission, was tachycardic which resolved with IV bolus. Tachycardia has returned when sitting up.  C4, C3, IGg levels are low, however, they have historically been trending low in previous visits. Patient denies any history of sexual activity, making STI unlikely.    Plan:  - Continue Ceftriaxone, low threshold to broaden if becomes clinically unstable  - Follow blood and urine cultures from 2/10   - RVP Pending  - Monitor fever curve  -IV bolus added given orthostatic related tachycardia     # Prior COVID infection          Assessment: Patient diagnosed with COVID during recent hospitalization. She denies any SOB, chest pain or cough.  Inflammatory markers here are wnl, less likely MISC.   Plan:  - Continue to monitor symptoms, she is past her quarantine window and does not require precautions at this time      Rheum  #SLE  Assessment: Milly is currently receiving plaquenil, prednisone and mycophenolate. She is pancytopenic here. Malar rash slightly worse. We will continue her PTA medications. In past, has had difficulty with sub cutaneous injection, possibly follow-up with /pharm for medication education  Plan:   - Continue home med's              - Plaquenil 400 mg daily              - Mycophenolate 1500 mg BID              - Prednisone 5 mg daily    HEME  # Pancytopenia  Likely secondary to immunosuppression medication and likely infection  - Normal coags on admission     FEN/GI  # Elevated transaminases  # Elevated Lipase  - Lipase is less than 3 times the upper limit of normal, no abdominal tenderness, low concern for pancreatitis.   Per GI, Lipase level chronically elevated, no need to repeat labs unless indicated per.  - Regular diet  - No IVF at this time as long as continues to take good PO. PIV saline locked.     Diet:   Regular diet  Fluids: None  DVT Prophylaxis: Low Risk/Ambulatory with no VTE prophylaxis indicated  Leung Catheter:  not present  Code Status:   Full code        Disposition Plan   Expected discharge: Likely 1-2 days, recommended to prior living arrangement once antibiotic plan established, adequate PO and UO.  Entered: Wicho Valles 02/11/2021, 8:49 AM       The patient's care was discussed with the Patient and family via phone by request of patient.    Wicho Valles  Medical Student  General Pediatrics Service  [facility]    ______________________________________________________________________    Interval History   No acute events overnight, patient has adequate UO, is not in pain, sat on RA, clear lungs, and is feeling well overall.  Per nursing patient had an elevated temperature ~100.7 around 0800, possibly due to warm room and under the covers.     HEADSS-Not completed on admission  Home: Patient lives with mother and younger brother. Has 2 two older sisters and 3 older brothers.    Education and Employment: Completed High school, does not have a favorite subject and was not involved in any sports, activities, or work  Activities: Enjoys drawing  Drugs: Reports friends in high school utilizing drugs including alcohol, vaping, and marijuana. Has never tried any drugs or alcohol  Sexuality: Denies any history of sexual activity  Safety: Feels safe and happy at home no SI.    Data reviewed today: I reviewed all medications, new labs and imaging results over the last 24 hours. I personally reviewed no images or EKG's today.    Physical Exam   Vital Signs: Temp: 100.7  F (38.2  C) Temp src: Oral BP: 95/53 Pulse: 102   Resp: 18 SpO2: 99 % O2 Device: None (Room air)    Weight: 124 lbs 12.49 oz    Appearance: Alert and appropriate, well developed, nontoxic, with moist mucous membranes.  HEENT: Head: Normocephalic and atraumatic. Eyes:conjunctivae and sclerae clear.. Nose: Nares clear with no active discharge.  Pulmonary: Good air entry, clear to auscultation bilaterally, with no rales,  rhonchi, or wheezing.  Cardiovascular: Regular rate and rhythm, normal S1 and S2, with no murmurs.  Normal symmetric peripheral pulses and brisk cap refill.  Abdominal: No suprapubic tenderness. Normal bowel sounds, soft, nontender, nondistended, with no masses and no hepatosplenomegaly.  Neurologic: Alert and oriented, moving all extremities equally with grossly normal coordination   Extremities/Back: No deformity, no CVA tenderness.  Skin: Petechiae over both upper and lower extremities, red plaque-like lesions over arms, abdomen, legs and hands. Peeling of fingers, malar lupus rash on face, lupus alopecia.       Data   Recent Labs   Lab 02/11/21  0615 02/10/21  1305 02/10/21  1216   WBC 4.5 3.9*  --    HGB 9.1* 9.9*  --    MCV 75* 76*  --    * 118*  --    INR  --  1.08  --     139 136   POTASSIUM 3.7 3.6 5.3   CHLORIDE 113* 110 109   CO2 18* 20 19*   BUN 10 14 13   CR 0.58 0.62 0.48*   ANIONGAP 8 9 8   BISI 7.4* 8.0* 8.3*   GLC 88 69* 64*   ALBUMIN 2.6* 3.4 3.3*   PROTTOTAL 6.4* 7.3 7.6   BILITOTAL 0.3 0.6 0.7   ALKPHOS 140 169* 173*   ALT 55* 62* 70*   * 129* Unsatisfactory specimen - hemolyzed   LIPASE  --  684* 670*

## 2021-02-12 VITALS
SYSTOLIC BLOOD PRESSURE: 95 MMHG | TEMPERATURE: 97.7 F | WEIGHT: 125.22 LBS | RESPIRATION RATE: 18 BRPM | HEIGHT: 62 IN | OXYGEN SATURATION: 100 % | HEART RATE: 70 BPM | DIASTOLIC BLOOD PRESSURE: 68 MMHG | BODY MASS INDEX: 23.04 KG/M2

## 2021-02-12 PROCEDURE — 250N000012 HC RX MED GY IP 250 OP 636 PS 637: Performed by: STUDENT IN AN ORGANIZED HEALTH CARE EDUCATION/TRAINING PROGRAM

## 2021-02-12 PROCEDURE — 99217 PR OBSERVATION CARE DISCHARGE: CPT | Mod: GC | Performed by: STUDENT IN AN ORGANIZED HEALTH CARE EDUCATION/TRAINING PROGRAM

## 2021-02-12 PROCEDURE — 999N000007 HC SITE CHECK

## 2021-02-12 PROCEDURE — 99214 OFFICE O/P EST MOD 30 MIN: CPT | Mod: GC | Performed by: PEDIATRICS

## 2021-02-12 PROCEDURE — 258N000003 HC RX IP 258 OP 636

## 2021-02-12 PROCEDURE — G0378 HOSPITAL OBSERVATION PER HR: HCPCS

## 2021-02-12 PROCEDURE — 250N000013 HC RX MED GY IP 250 OP 250 PS 637: Performed by: STUDENT IN AN ORGANIZED HEALTH CARE EDUCATION/TRAINING PROGRAM

## 2021-02-12 RX ORDER — CEPHALEXIN 500 MG/1
500 CAPSULE ORAL 2 TIMES DAILY
Qty: 16 CAPSULE | Refills: 0 | Status: SHIPPED | OUTPATIENT
Start: 2021-02-12 | End: 2021-02-20

## 2021-02-12 RX ORDER — PREDNISONE 5 MG/1
5 TABLET ORAL 4 TIMES DAILY
Status: DISCONTINUED | OUTPATIENT
Start: 2021-02-12 | End: 2021-02-12

## 2021-02-12 RX ORDER — FLUCONAZOLE 150 MG/1
150 TABLET ORAL
Qty: 1 TABLET | Refills: 0 | Status: SHIPPED | OUTPATIENT
Start: 2021-02-12 | End: 2021-08-11

## 2021-02-12 RX ORDER — SULFAMETHOXAZOLE/TRIMETHOPRIM 800-160 MG
1 TABLET ORAL
Qty: 12 TABLET | Refills: 0 | Status: SHIPPED | OUTPATIENT
Start: 2021-02-13 | End: 2021-03-15

## 2021-02-12 RX ADMIN — PANCRELIPASE 2 CAPSULE: 24000; 76000; 120000 CAPSULE, DELAYED RELEASE PELLETS ORAL at 09:44

## 2021-02-12 RX ADMIN — PANCRELIPASE 2 CAPSULE: 24000; 76000; 120000 CAPSULE, DELAYED RELEASE PELLETS ORAL at 13:22

## 2021-02-12 RX ADMIN — CHOLECALCIFEROL TAB 10 MCG (400 UNIT) 400 UNITS: 10 TAB at 09:44

## 2021-02-12 RX ADMIN — SODIUM CHLORIDE, POTASSIUM CHLORIDE, SODIUM LACTATE AND CALCIUM CHLORIDE 500 ML: 600; 310; 30; 20 INJECTION, SOLUTION INTRAVENOUS at 08:03

## 2021-02-12 RX ADMIN — FERROUS SULFATE TAB 325 MG (65 MG ELEMENTAL FE) 325 MG: 325 (65 FE) TAB at 09:44

## 2021-02-12 RX ADMIN — HYDROXYCHLOROQUINE SULFATE 400 MG: 200 TABLET, FILM COATED ORAL at 09:43

## 2021-02-12 RX ADMIN — MYCOPHENOLATE MOFETIL 1500 MG: 500 TABLET, FILM COATED ORAL at 09:43

## 2021-02-12 RX ADMIN — PREDNISONE 5 MG: 5 TABLET ORAL at 09:43

## 2021-02-12 NOTE — PROVIDER NOTIFICATION
02/12/21 0900 02/12/21 0910   Vitals   BP (!) 81/55 (!) 89/58   MD Jami team called d/t low BPs post 500 ml LR bolus. Unable to reach via phone, page sent, awaiting plan from MD team.

## 2021-02-12 NOTE — PLAN OF CARE
Pt is afebrile, VSS. Denies pain. Low BP (80s/50s) this morning. No change with LR bolus. MDs notified throughout (see note). Pt BPs improving as more awake and alert. Intervention held off per Rheumatology. Pt eating and drinking. Voiding okay. Stable walking with nurses. Plan for discharge this afternoon. Discharge medications and instructions reviewed with Rn's and all questions answered. Plan to discharge with ride at 1500.

## 2021-02-12 NOTE — UTILIZATION REVIEW
"  Concurrent stay review; Secondary Review Determination       Under the authority of the Utilization Management Committee, the utilization review process indicated a secondary review on the above patient.  The review outcome is based on review of the medical records, discussions with staff, and applying clinical experience noted on the date of the review.          (x) Observation Status Appropriate - Concurrent stay review    RATIONALE FOR DETERMINATION   (x) Observation Status Appropriate - This patient does not meet hospital inpatient criteria and is placed in observation status. If this patient's primary payer is Medicare and was admitted as an inpatient, Condition Code 44 should be used and patient status changed to \"observation\".      RATIONALE FOR DETERMINATION   Milly Carroll is a 20 year old female admitted on 2/10/2021. She has a history of SLE and is currently receiving plaquenil, prednisone and mycophenolate for immunosuppression and follows regularly with Rheumatology. She presents with a two day history of fatigue and chills and found to have a fever when she presented for her Rheumatology clinic appointment today and was sent to the ED, where findings were significant for an abnormal UA, and pancytopenia. Suspect UTI with sepsis and will continue Ceftriaxone and follow cultures.    Pt is immunocompromised and with suspected sepsis but at the time of admission, was unclear about LOS and need for continued IV interventions. At the time of this review, patient already discharged.  Will leave status as observation given discharge to home.     The severity of illness, intensity of service provided, expected LOS and risk for adverse outcome make the care appropriate for observation, no change in status at this time.     The information on this document is developed by the utilization review team in order for the business office to ensure compliance.  This only denotes the appropriateness of proper admission " status and does not reflect the quality of care rendered.         The definitions of Inpatient Status and Observation Status used in making the determination above are those provided in the CMS Coverage Manual, Chapter 1 and Chapter 6, section 70.4.      Sincerely,     Kate Rivera MD  Utilization Review  Physician Advisor  [facility]

## 2021-02-12 NOTE — PROGRESS NOTES
[facility]    Pediatric Rheumatology Progress Note    Date of Service (when I saw the patient): 02/12/2021     Assessment & Plan   Milly Carroll is a 20 year old female with SLE on chronic immunosuppression who presents with an E. Coli UTI and is improving on antibiotics. There is concern this morning regarding her blood pressures being low, however she is sitting up in bed and is well-appearing on exam. Her vital signs are otherwise normal and blood pressures are improving upon waking, so we do not think she warrants stress-dose steroids at this time. Milly has been prescribed Bactrim for PJP prophylaxis, which would make an E. Coli UTI less likely. However, per the patient, she stopped taking the Bactrim because it is difficult to obtain.     Milly's C3 and C4 have resulted, and both are low. This is likely indicative of active SLE, and she will need to continue all her current medications. Rheumatology will coordinate to get Milly's belimumab infusion rescheduled and follow up with her outpatient.     Recommendations:  Evaluation: No further evaluation needed at this time    Management: Continue all current medications: Plaquenil 400mg daily, mycophenolate 1500mg BID, prednisone 5mg daily. Restart Bactrim prophylaxis.   Primary team and Rheumatology to continue to work towards understanding and eliminating barriers with regards to Milly obtaining Bactrim.     Follow-up: Milly's Benlysta infusion appointment has been rescheduled to 2/17/21 at 11am    Patient seen virtually and plan discussed with Dr. Anders, attending rheumatologist     Nehal Chahal MD, MPH  Rheumatology Fellow, PGY4  Pager: [phone]    Type of service:  Video Visit  Video Start Time (time video started): 9:50 AM  Video End Time (time video stopped): 10:00 AM    Agree with documentation provided by Dr. Chahal.  Thank you for consulting us regarding Zabrina's care.    Jonh Anders MD,  PhD  , Pediatric Rheumatology      Interval History     Milly feels well this morning. She has been afebrile and feels well enough to go home. She has not been taking her Bactrim because it's difficult to obtain. When asked exactly what is difficult, she is unsure, but plans to check with her mother who usually picks up her Bactrim.     Physical Exam   Temp: 96.6  F (35.9  C) Temp src: Axillary BP: (!) (P) 82/54(manual check done per MD request) Pulse: 67   Resp: 16 SpO2: 99 % O2 Device: None (Room air)    Vitals:    02/10/21 1127 02/10/21 1517 02/11/21 1417   Weight: 57.2 kg (126 lb 1.7 oz) 56.6 kg (124 lb 12.5 oz) 56.8 kg (125 lb 3.5 oz)     Vital Signs with Ranges  Temp:  [96.6  F (35.9  C)-99.9  F (37.7  C)] 96.6  F (35.9  C)  Pulse:  [66-90] 67  Resp:  [16-18] 16  BP: ()/(54-67) (P) 82/54  SpO2:  [98 %-100 %] 99 %  I/O last 3 completed shifts:  In: 2206 [P.O.:1700; I.V.:6; IV Piggyback:500]  Out: 1425 [Urine:1425]    Exam via iPad  Gen: Well appearing; cooperative. No acute distress.  Eyes: No scleral injection, pupils normal.  Nose/Mouth: Mask in place over nose and mouth  Lungs: No increased work of breathing.   Neuro: Interactive and answering questions appropriately    Medications       amylase-lipase-protease  2 capsule Oral TID w/meals     cefTRIAXone  2,000 mg Intravenous Q24H     cholecalciferol  400 Units Oral Daily     ferrous sulfate  325 mg Oral Daily with breakfast     hydroxychloroquine  400 mg Oral Daily     mycophenolate  1,500 mg Oral BID     predniSONE  5 mg Oral Daily     sodium chloride (PF)  3 mL Intravenous Q8H       LABS  Admission on 02/10/2021, Discharged on 02/12/2021   Component Date Value Ref Range Status     Sodium 02/10/2021 136  133 - 144 mmol/L Final     Potassium 02/10/2021 5.3  3.4 - 5.3 mmol/L Final    Comment: Specimen moderately hemolyzed, Potassium may be falsely elevated  NOTIFIED ANSON BENJAMIN AT 1342 ON 2.10.21        Chloride 02/10/2021 109   96 - 110 mmol/L Final     Carbon Dioxide 02/10/2021 19* 20 - 32 mmol/L Final     Anion Gap 02/10/2021 8  3 - 14 mmol/L Final     Glucose 02/10/2021 64* 70 - 99 mg/dL Final     Urea Nitrogen 02/10/2021 13  7 - 30 mg/dL Final     Creatinine 02/10/2021 0.48* 0.50 - 1.00 mg/dL Final     GFR Estimate 02/10/2021 >90  >60 mL/min/[1.73_m2] Final    Comment: Non  GFR Calc  Starting 12/18/2018, serum creatinine based estimated GFR (eGFR) will be   calculated using the Chronic Kidney Disease Epidemiology Collaboration   (CKD-EPI) equation.       GFR Estimate If Black 02/10/2021 >90  >60 mL/min/[1.73_m2] Final    Comment:  GFR Calc  Starting 12/18/2018, serum creatinine based estimated GFR (eGFR) will be   calculated using the Chronic Kidney Disease Epidemiology Collaboration   (CKD-EPI) equation.       Calcium 02/10/2021 8.3* 8.5 - 10.1 mg/dL Final     Bilirubin Total 02/10/2021 0.7  0.2 - 1.3 mg/dL Final     Albumin 02/10/2021 3.3* 3.4 - 5.0 g/dL Final     Protein Total 02/10/2021 7.6  6.8 - 8.8 g/dL Final     Alkaline Phosphatase 02/10/2021 173* 40 - 150 U/L Final     ALT 02/10/2021 70* 0 - 50 U/L Final     AST 02/10/2021 Unsatisfactory specimen - hemolyzed  0 - 35 U/L Final    Comment: Specimen is hemolyzed which can falsely elevate AST. Analysis of a   non-hemolyzed specimen may result in a lower value.  NOTIFIED ANSON BENJAMIN AT 1342 ON 2.10.21 MF       Lipase 02/10/2021 670* 73 - 393 U/L Final     Specimen Description 02/10/2021 Blood Unspecified Site   Final     Special Requests 02/10/2021 Received in aerobic bottle only   Preliminary     Culture Micro 02/10/2021 No growth after 2 days   Preliminary     Adenovirus 02/10/2021 Not Detected  NDET^Not Detected Final     Coronavirus 02/10/2021 Not Detected  NDET^Not Detected Final    Comment: This test detects Coronavirus 229E, HKU1, NL63, and OC43 but does not   distinguish between them. It does not detect MERS ( Respiratory    Syndrome), SARS (Severe Acute Respiratory Syndrome) or 2019-nCoV (2019 Novel)   Coronavirus.       Human Metapneumovirus 02/10/2021 Not Detected  NDET^Not Detected Final     Human Rhinovirus/Enterovirus 02/10/2021 Not Detected  NDET^Not Detected Final     Influenza A 02/10/2021 Not Detected  NDET^Not Detected Final     Influenza A, H1 02/10/2021 Not Detected  NDET^Not Detected Final     Influenza A 2009 H1N1 02/10/2021 Not Detected  NDET^Not Detected Final     Influenza A, H3 02/10/2021 Not Detected  NDET^Not Detected Final     Influenza B 02/10/2021 Not Detected  NDET^Not Detected Final     Parainfluenza Virus 1 02/10/2021 Not Detected  NDET^Not Detected Final     Parainfluenza Virus 2 02/10/2021 Not Detected  NDET^Not Detected Final     Parainfluenza Virus 3 02/10/2021 Not Detected  NDET^Not Detected Final     Parainfluenza Virus 4 02/10/2021 Not Detected  NDET^Not Detected Final     Respiratory Syncytial Virus A 02/10/2021 Not Detected  NDET^Not Detected Final     Respiratory Syncytial Virus B 02/10/2021 Not Detected  NDET^Not Detected Final     Chlamydia pneumoniae 02/10/2021 Not Detected  NDET^Not Detected Final     Mycoplasma pneumoniae 02/10/2021 Not Detected  NDET^Not Detected Final     Respiratory Virus Comment 02/10/2021 See comment below   Final    Comment:    The [facility] Respiratory Viral Panel is a qualitative nucleic acid, multiplex, in   vitro diagnostic test for the simultaneous detection and identification of   multiple respiratory viral and bacterial nucleic acids in nasopharyngeal swabs   collected in viral transport media from individuals exhibiting signs and   symptoms of respiratory infection.  The test detects Adenovirus, Coronavirus, Human Metapneumovirus, Human   Rhinovirus/Enterovirus, Influenza A (H1, H3, 2009 H1N1), Influenza B,   Respiratory Syncytial Virus A, Respiratory Syncytial Virus B, Parainfluenza   Virus (1, 2, 3, 4), Chlamydia pneumoniae and Mycoplasma pneumoniae.  The assay has  received FDA approval for the testing of nasopharyngeal (NP)   swabs only. This test has been verified and is performed by the [facility]   [facility]ise[facility] at [facility]. This test is used for   clinical purposes and should not be regarded as investigational or for   research.  This laboratory is certified under the C                           linical Laboratory Improvement   Amendments of 1988 (CLIA-88) as qualified to perform high complexity clinical   laboratory testing.       Strep Specimen Description 02/10/2021 Throat   Final     Streptococcus Group A Rapid Screen 02/10/2021 Negative  NEG^Negative Final    Comment: No Group A streptococcal antigen detected by immunoassay. Confirmatory testing   in progress.       Color Urine 02/10/2021 Yellow   Final     Appearance Urine 02/10/2021 Slightly Cloudy   Final     Glucose Urine 02/10/2021 Negative  NEG^Negative mg/dL Final     Bilirubin Urine 02/10/2021 Small* NEG^Negative Final    This is an unconfirmed screening test result. A positive result may be false.     Ketones Urine 02/10/2021 10* NEG^Negative mg/dL Final     Specific Gravity Urine 02/10/2021 1.026  1.003 - 1.035 Final     Blood Urine 02/10/2021 Trace* NEG^Negative Final     pH Urine 02/10/2021 6.5  5.0 - 7.0 pH Final     Protein Albumin Urine 02/10/2021 100* NEG^Negative mg/dL Final     Urobilinogen mg/dL 02/10/2021 2.0  0.0 - 2.0 mg/dL Final     Nitrite Urine 02/10/2021 Negative  NEG^Negative Final     Leukocyte Esterase Urine 02/10/2021 Large* NEG^Negative Final     Source 02/10/2021 Midstream Urine   Final     RBC Urine 02/10/2021 38* 0 - 2 /HPF Final     WBC Urine 02/10/2021 59* 0 - 5 /HPF Final     Bacteria Urine 02/10/2021 Moderate* NEG^Negative /HPF Final     Yeast Urine 02/10/2021 Few* NEG^Negative /HPF Final     Squamous Epithelial /HPF Urine 02/10/2021 23* 0 - 1 /HPF Final     Transitional Epi 02/10/2021 2* 0 - 1 /HPF Final     Mucous Urine 02/10/2021 Present* NEG^Negative  /LPF Final     PTT 02/10/2021 33  22 - 37 sec Final     INR 02/10/2021 1.08  0.86 - 1.14 Final     Specimen Description 02/10/2021 Throat   Final     Strep Group A PCR 02/10/2021 Not Detected  NDET^Not Detected Final    Comment: Group A Streptococcus DNA is not detected.  FDA approved assay performed using Hydrocapsule GeneXpert real-time PCR.       Specimen Description 02/10/2021 Unspecified Urine   Final     Culture Micro 02/10/2021 *  Preliminary                    Value:50,000 to 100,000 colonies/mL  Escherichia coli  Susceptibility testing in progress       Culture Micro 02/10/2021 *  Preliminary                    Value:10,000 to 50,000 colonies/mL  Candida albicans  Susceptibility testing not routinely done       WBC 02/11/2021 4.5  4.0 - 11.0 10e9/L Final     RBC Count 02/11/2021 3.93  3.8 - 5.2 10e12/L Final     Hemoglobin 02/11/2021 9.1* 11.7 - 15.7 g/dL Final     Hematocrit 02/11/2021 29.6* 35.0 - 47.0 % Final     MCV 02/11/2021 75* 78 - 100 fl Final     MCH 02/11/2021 23.2* 26.5 - 33.0 pg Final     MCHC 02/11/2021 30.7* 31.5 - 36.5 g/dL Final     RDW 02/11/2021 20.4* 10.0 - 15.0 % Final     Platelet Count 02/11/2021 104* 150 - 450 10e9/L Final     Diff Method 02/11/2021 Automated Method   Final     % Neutrophils 02/11/2021 71.6  % Final     % Lymphocytes 02/11/2021 14.9  % Final     % Monocytes 02/11/2021 8.2  % Final     % Eosinophils 02/11/2021 4.2  % Final     % Basophils 02/11/2021 0.2  % Final     % Immature Granulocytes 02/11/2021 0.9  % Final     Nucleated RBCs 02/11/2021 0  0 /100 Final     Absolute Neutrophil 02/11/2021 3.2  1.6 - 8.3 10e9/L Final     Absolute Lymphocytes 02/11/2021 0.7* 0.8 - 5.3 10e9/L Final     Absolute Monocytes 02/11/2021 0.4  0.0 - 1.3 10e9/L Final     Absolute Eosinophils 02/11/2021 0.2  0.0 - 0.7 10e9/L Final     Absolute Basophils 02/11/2021 0.0  0.0 - 0.2 10e9/L Final     Abs Immature Granulocytes 02/11/2021 0.0  0 - 0.4 10e9/L Final     Absolute Nucleated RBC 02/11/2021  0.0   Final     Sodium 02/11/2021 139  133 - 144 mmol/L Final     Potassium 02/11/2021 3.7  3.4 - 5.3 mmol/L Final     Chloride 02/11/2021 113* 96 - 110 mmol/L Final     Carbon Dioxide 02/11/2021 18* 20 - 32 mmol/L Final     Anion Gap 02/11/2021 8  3 - 14 mmol/L Final     Glucose 02/11/2021 88  70 - 99 mg/dL Final     Urea Nitrogen 02/11/2021 10  7 - 30 mg/dL Final     Creatinine 02/11/2021 0.58  0.50 - 1.00 mg/dL Final     GFR Estimate 02/11/2021 >90  >60 mL/min/[1.73_m2] Final    Comment: Non  GFR Calc  Starting 12/18/2018, serum creatinine based estimated GFR (eGFR) will be   calculated using the Chronic Kidney Disease Epidemiology Collaboration   (CKD-EPI) equation.       GFR Estimate If Black 02/11/2021 >90  >60 mL/min/[1.73_m2] Final    Comment:  GFR Calc  Starting 12/18/2018, serum creatinine based estimated GFR (eGFR) will be   calculated using the Chronic Kidney Disease Epidemiology Collaboration   (CKD-EPI) equation.       Calcium 02/11/2021 7.4* 8.5 - 10.1 mg/dL Final     Bilirubin Total 02/11/2021 0.3  0.2 - 1.3 mg/dL Final     Albumin 02/11/2021 2.6* 3.4 - 5.0 g/dL Final     Protein Total 02/11/2021 6.4* 6.8 - 8.8 g/dL Final     Alkaline Phosphatase 02/11/2021 140  40 - 150 U/L Final     ALT 02/11/2021 55* 0 - 50 U/L Final     AST 02/11/2021 117* 0 - 35 U/L Final

## 2021-02-12 NOTE — PROVIDER NOTIFICATION
02/12/21 0922   Vitals   BP (!) 82/54  (manual check done per MD request)   MD violet team notified of continued low BP with manual check. Awaiting plan from MD

## 2021-02-12 NOTE — PHARMACY - DISCHARGE MEDICATION RECONCILIATION AND EDUCATION
Discharge medication review for this patient completed.  Pharmacist provided medication teaching for discharge with a focus on new medications/dose changes.      The discharge medication list was reviewed with patient. The following points were discussed, as applicable: Name, description, purpose, dose/strength, duration of medications, special storage requirements, action to be taken if dose is missed and when to call MD.    Patient was engaged during teaching and verbalized understanding. Questions and concerns were addressed.    Did not have medications in hand during teach as medication was being filled in pharmacy.    The following medications were discussed:  Current Discharge Medication List      START taking these medications    Details   cephALEXin (KEFLEX) 500 MG capsule Take 1 capsule (500 mg) by mouth 2 times daily for 8 days  Qty: 16 capsule, Refills: 0    Associated Diagnoses: Fever, unspecified fever cause         CONTINUE these medications which have NOT CHANGED    Details   acetaminophen (TYLENOL) 500 MG tablet Take 1 tablet (500 mg) by mouth every 4 hours as needed for mild pain  Qty: 1 Bottle, Refills: 1    Associated Diagnoses: Other acute pancreatitis, unspecified complication status      !! amylase-lipase-protease (CREON 24) 44800-71191 units CPEP per EC capsule Take 1 capsule (24,000 Units) by mouth Take with snacks or supplements (with snacks)  Qty: 112 capsule, Refills: 1    Associated Diagnoses: Pancreatic insufficiency      !! amylase-lipase-protease (CREON 24) 36057-41005 units CPEP per EC capsule Take 2 capsules (48,000 Units) by mouth 3 times daily (with meals)  Qty: 336 capsule, Refills: 1    Associated Diagnoses: Pancreatic insufficiency      Belimumab 200 MG/ML SOAJ Inject 200 mg Subcutaneous every 7 days  Qty: 4 Syringe, Refills: 11    Associated Diagnoses: Systemic lupus erythematosus with other organ involvement, unspecified SLE type (H)      calcium carbonate (TUMS) 500 MG  chewable tablet Take 1 tablet (500 mg) by mouth daily  Qty: 30 tablet, Refills: 0    Associated Diagnoses: Systemic lupus erythematosus with other organ involvement, unspecified SLE type (H)      famotidine (PEPCID) 20 MG tablet Take 2 tablets (40 mg) by mouth daily  Qty: 30 tablet, Refills: 11    Associated Diagnoses: Other forms of systemic lupus erythematosus, unspecified organ involvement status (H)      ferrous sulfate 325 (65 Fe) MG PO tablet Take 1 tablet (325 mg) by mouth daily (with breakfast)  Qty: 100 tablet, Refills: 3    Associated Diagnoses: Systemic lupus erythematosus with other organ involvement, unspecified SLE type (H)      hydroxychloroquine (PLAQUENIL) 200 MG tablet Take 2 tablets (400 mg) by mouth daily  Qty: 60 tablet, Refills: 11    Associated Diagnoses: Systemic lupus erythematosus with other organ involvement, unspecified SLE type (H)      multivitamin, therapeutic (THERA-VIT) TABS tablet Take 1 tablet by mouth daily  Qty: 30 tablet, Refills: 11    Associated Diagnoses: Other systemic lupus erythematosus with other organ involvement (H)      mvw complete formulation (SOFTGELS) capsule Take 2 capsules by mouth daily  Qty: 180 capsule, Refills: 1    Comments: NDC: [address]-31  Associated Diagnoses: Exocrine pancreatic insufficiency      mycophenolate (GENERIC EQUIVALENT) 500 MG tablet Take 3 tablets (1,500 mg) by mouth 2 times daily  Qty: 180 tablet, Refills: 11    Associated Diagnoses: Systemic lupus erythematosus with other organ involvement, unspecified SLE type (H)      oxyCODONE (ROXICODONE) 5 MG tablet Take 1 tablet (5 mg) by mouth every 6 hours as needed for moderate to severe pain  Qty: 7 tablet, Refills: 0    Associated Diagnoses: Other acute pancreatitis, unspecified complication status      predniSONE (DELTASONE) 5 MG tablet Take 1 tablet (5 mg) by mouth daily  Qty: 150 tablet, Refills: 1    Associated Diagnoses: Systemic lupus erythematosus with other organ involvement,  unspecified SLE type (H)      !! vitamin D3 (CHOLECALCIFEROL) 1.25 MG (74592 UT) capsule Take 1 capsule (50,000 Units) by mouth every 7 days  Qty: 12 capsule, Refills: 0    Associated Diagnoses: Vitamin D deficiency; Exocrine pancreatic insufficiency      !! vitamin D3 (CHOLECALCIFEROL) 10 MCG (400 UNIT) capsule Take 1 capsule (400 Units) by mouth daily  Qty: 30 capsule, Refills: 3    Associated Diagnoses: Systemic lupus erythematosus with other organ involvement, unspecified SLE type (H); Systemic lupus erythematosus, unspecified SLE type, unspecified organ involvement status (H)       !! - Potential duplicate medications found. Please discuss with provider.      STOP taking these medications       sulfamethoxazole-trimethoprim (BACTRIM DS) 800-160 MG tablet Comments:   Reason for Stopping:             Macarena Wing, Pharmacy Intern

## 2021-02-12 NOTE — PROGRESS NOTES
Care Coordinator - Discharge Planning    Admission Date/Time:  2/10/2021  Attending MD:  Jj Brito,*     Data  Date of initial CC assessment:  2/11/2021  Chart reviewed, discussed with interdisciplinary team.   Patient was admitted for:   1. Other forms of systemic lupus erythematosus, unspecified organ involvement status (H)    2. Fever, unspecified fever cause    3. Acute pyelonephritis    4. Encounter for screening laboratory testing for severe acute respiratory syndrome coronavirus 2 (SARS-CoV-2)         Assessment   RNCC met with Milly on 2/11 to follow-up with patient on referral made to [facility] for skilled nursing visits from previous hospitalization. Milly confirmed she did not establish care for skilled nursing visits with [facility] and felt as though she has been managing her medications and care at home without concern. Milly noted her sister would be able to provide her with transportation upon discharge when she is medically ready and she had no other discharge concerns.     RNCC updated violet team upon rounds on today that patient is not established with [facility] and is independently managing her care at home. [facility] resident acknowledged this;  no further care coordination needs assessed at this time.       Plan  Anticipated Discharge Date:  TBD  Anticipated Discharge Plan:  Patient will discharge home when medically stable with family.    Rosa Henning RN

## 2021-02-12 NOTE — DISCHARGE SUMMARY
[facility]  Discharge Summary - Medicine & Pediatrics       Date of Admission:  2/10/2021  Date of Discharge:  2/12/2021  Discharging Provider: Dr. Brito   Discharge Service: General Pediatrics    Discharge Diagnoses   Febrile UTI    Follow-ups Needed After Discharge   -continue with routine follow-up with PCP and rheumatologist for SLE    Unresulted Labs Ordered in the Past 30 Days of this Admission     Date and Time Order Name Status Description    2/10/2021 1405 Urine Culture Aerobic Bacterial Preliminary     2/10/2021 1130 Blood culture, one site Preliminary           Discharge Disposition   Discharged to home  Condition at discharge: Good       Hospital Course   Milly Carroll was admitted on 2/10/2021 for febrile UTI, with a two day history of fatigue, chills, fever, and bladder fullness. She has a history of SLE and is currently receiving plaquenil, prednisone and mycophenolate for immunosuppression and follows regularly with Rheumatology. In the ED findings were significant for an abnormal UA, and pancytopenia. Patient was hypotensive and had tachycardia associated with orthostasis. Bolus LR 2x was given. Pressures historically within ~low 90s. Blood culture had no growth after 48 hours, urine culture grew yeast and echoli. IV ceftriaxone was given for 2 days, discharge with an additional 8 days PO keflex, and 1 dose PO fluconazole. Per rheumatology recommendations Milly should have prophylactic Bactrim for UTI, 3x weekly.     Consultations This Hospital Stay   PEDS RHEUMATOLOGY IP CONSULT    Code Status   Full Code       The patient was discussed with Dr. Jj Waddell MD  General Pediatrics Service  [facility] [address]  [address] [place] [id number]  Phone: [phone]    I was present with the medical student for physical exam and cares, and participated in the assessment and plan for the  patient.     Coty Waddell   PGY-1, pediatric resident  [facility] Team, General Pediatrics   ______________________________________________________________________    Physical Exam   Vital Signs: Temp: 96.6  F (35.9  C) Temp src: Axillary BP: (!) 82/54(manual check done per MD request) Pulse: 67   Resp: 16 SpO2: 99 % O2 Device: None (Room air)    Weight: 125 lbs 3.54 oz  Appearance: Alert and appropriate, well developed, nontoxic, with moist mucous membranes.  HEENT: Head: Normocephalic and atraumatic. Eyes:conjunctivae and sclerae clear.. Nose: Nares clear with no active discharge.  Pulmonary: Good air entry, clear to auscultation bilaterally, with no rales, rhonchi, or wheezing.  Cardiovascular: Regular rate and rhythm, normal S1 and S2, with no murmurs.  Normal symmetric peripheral pulses and brisk cap refill.  Abdominal: No suprapubic tenderness. Normal bowel sounds, soft, nontender, nondistended, with no masses and no hepatosplenomegaly.  Neurologic: Alert and oriented, moving all extremities equally with grossly normal coordination   Extremities/Back: No deformity, no CVA tenderness.  Skin: Petechiae over both upper and lower extremities, red plaque-like lesions over arms, abdomen, legs and hands. Peeling of fingers, malar lupus rash on face, lupus alopecia.     Primary Care Physician   Estefany Bell    Discharge Orders      Reason for your hospital stay    You were hospitalized with febrile Urinary Tract Infection. Urine culture grew E. Coli. You were treated with two doses of IV antibiotics, and sent home with oral Keflex (antibiotic).     Follow Up and recommended labs and tests    Follow up with primary care provider, Estefany Bell, within 7 days for hospital follow- up.  No follow up labs or test are needed.     Follow up with Rheumatology, Dr. Anders, as scheduled.     Activity    Your activity upon discharge: activity as tolerated     When to contact your care team    Call your primary doctor if  you have any of the following: temperature greater than 100.4, shortness of breath, blood in urine, new onset fatigue/malaise.     Discharge Instructions    Take Keflex (antibiotic) twice daily from 2/12 until 2/19.     On 2/20 resume Bactrim prophylaxis three times weekly. This will help prevent future urinary tract infections.     If starting to have any symptoms of vaginal itching, pain with urination, vaginal discharge, can take 1 dose of Fluconazole (150 mg tablet) for vaginal yeast infection.     Diet    Follow this diet upon discharge: Regular       Significant Results and Procedures   Most Recent Urinalysis:  Recent Labs   Lab Test 02/10/21  1405 08/23/13  1104 08/23/13  1104   COLOR Yellow   < > Yellow   APPEARANCE Slightly Cloudy   < > Slightly Cloudy   URINEGLC Negative   < > Negative   URINEBILI Small*   < > Negative   URINEKETONE 10*   < > Negative   SG 1.026   < > 1.017   UBLD Trace*   < > Small*   URINEPH 6.5   < > 6.0   PROTEIN 100*   < > 30*   UROBILINOGEN  --   --  Canceled, Test credited  Duplicate request   NITRITE Negative   < > Negative   LEUKEST Large*   < > Moderate*   RBCU 38*   < > 24*   WBCU 59*   < > 13*    < > = values in this interval not displayed.       Discharge Medications   Current Discharge Medication List      START taking these medications    Details   cephALEXin (KEFLEX) 500 MG capsule Take 1 capsule (500 mg) by mouth 2 times daily for 8 days  Qty: 16 capsule, Refills: 0    Associated Diagnoses: Fever, unspecified fever cause      fluconazole (DIFLUCAN) 150 MG tablet Take 1 tablet (150 mg) by mouth once as needed (Vaginal discharge, Vaginal itching, Pain with urination)  Qty: 1 tablet, Refills: 0    Associated Diagnoses: Other systemic lupus erythematosus with other organ involvement (H)         CONTINUE these medications which have CHANGED    Details   sulfamethoxazole-trimethoprim (BACTRIM DS) 800-160 MG tablet Take 1 tablet by mouth three times a week  Qty: 12 tablet,  Refills: 0    Associated Diagnoses: Other systemic lupus erythematosus with other organ involvement (H)         CONTINUE these medications which have NOT CHANGED    Details   acetaminophen (TYLENOL) 500 MG tablet Take 1 tablet (500 mg) by mouth every 4 hours as needed for mild pain  Qty: 1 Bottle, Refills: 1    Associated Diagnoses: Other acute pancreatitis, unspecified complication status      !! amylase-lipase-protease (CREON 24) 66979-29520 units CPEP per EC capsule Take 1 capsule (24,000 Units) by mouth Take with snacks or supplements (with snacks)  Qty: 112 capsule, Refills: 1    Associated Diagnoses: Pancreatic insufficiency      !! amylase-lipase-protease (CREON 24) 49880-47274 units CPEP per EC capsule Take 2 capsules (48,000 Units) by mouth 3 times daily (with meals)  Qty: 336 capsule, Refills: 1    Associated Diagnoses: Pancreatic insufficiency      Belimumab 200 MG/ML SOAJ Inject 200 mg Subcutaneous every 7 days  Qty: 4 Syringe, Refills: 11    Associated Diagnoses: Systemic lupus erythematosus with other organ involvement, unspecified SLE type (H)      calcium carbonate (TUMS) 500 MG chewable tablet Take 1 tablet (500 mg) by mouth daily  Qty: 30 tablet, Refills: 0    Associated Diagnoses: Systemic lupus erythematosus with other organ involvement, unspecified SLE type (H)      famotidine (PEPCID) 20 MG tablet Take 2 tablets (40 mg) by mouth daily  Qty: 30 tablet, Refills: 11    Associated Diagnoses: Other forms of systemic lupus erythematosus, unspecified organ involvement status (H)      ferrous sulfate 325 (65 Fe) MG PO tablet Take 1 tablet (325 mg) by mouth daily (with breakfast)  Qty: 100 tablet, Refills: 3    Associated Diagnoses: Systemic lupus erythematosus with other organ involvement, unspecified SLE type (H)      hydroxychloroquine (PLAQUENIL) 200 MG tablet Take 2 tablets (400 mg) by mouth daily  Qty: 60 tablet, Refills: 11    Associated Diagnoses: Systemic lupus erythematosus with other organ  involvement, unspecified SLE type (H)      multivitamin, therapeutic (THERA-VIT) TABS tablet Take 1 tablet by mouth daily  Qty: 30 tablet, Refills: 11    Associated Diagnoses: Other systemic lupus erythematosus with other organ involvement (H)      mvw complete formulation (SOFTGELS) capsule Take 2 capsules by mouth daily  Qty: 180 capsule, Refills: 1    Comments: NDC: [address]-53  Associated Diagnoses: Exocrine pancreatic insufficiency      mycophenolate (GENERIC EQUIVALENT) 500 MG tablet Take 3 tablets (1,500 mg) by mouth 2 times daily  Qty: 180 tablet, Refills: 11    Associated Diagnoses: Systemic lupus erythematosus with other organ involvement, unspecified SLE type (H)      oxyCODONE (ROXICODONE) 5 MG tablet Take 1 tablet (5 mg) by mouth every 6 hours as needed for moderate to severe pain  Qty: 7 tablet, Refills: 0    Associated Diagnoses: Other acute pancreatitis, unspecified complication status      predniSONE (DELTASONE) 5 MG tablet Take 1 tablet (5 mg) by mouth daily  Qty: 150 tablet, Refills: 1    Associated Diagnoses: Systemic lupus erythematosus with other organ involvement, unspecified SLE type (H)      !! vitamin D3 (CHOLECALCIFEROL) 1.25 MG (94948 UT) capsule Take 1 capsule (50,000 Units) by mouth every 7 days  Qty: 12 capsule, Refills: 0    Associated Diagnoses: Vitamin D deficiency; Exocrine pancreatic insufficiency      !! vitamin D3 (CHOLECALCIFEROL) 10 MCG (400 UNIT) capsule Take 1 capsule (400 Units) by mouth daily  Qty: 30 capsule, Refills: 3    Associated Diagnoses: Systemic lupus erythematosus with other organ involvement, unspecified SLE type (H); Systemic lupus erythematosus, unspecified SLE type, unspecified organ involvement status (H)       !! - Potential duplicate medications found. Please discuss with provider.        Allergies   Allergies   Allergen Reactions     Rituximab Anaphylaxis

## 2021-02-12 NOTE — PLAN OF CARE
Pt appeared to sleep well through the night.  No pain observed or reported.  BPs below parameter 80s/50s while asleep.  No other changes.  No family present at bedside.  Plan to continue monitoring.

## 2021-02-12 NOTE — PLAN OF CARE
Afebrile, VSS on RA. Denies pain. Good PO intake. UOP improving. Continue IV antibiotics. Will continue to monitor.

## 2021-02-14 LAB
BACTERIA SPEC CULT: ABNORMAL
BACTERIA SPEC CULT: ABNORMAL
SPECIMEN SOURCE: ABNORMAL

## 2021-02-16 LAB
BACTERIA SPEC CULT: NO GROWTH
Lab: NORMAL
SPECIMEN SOURCE: NORMAL

## 2021-02-22 ENCOUNTER — TELEPHONE (OUTPATIENT)
Dept: RHEUMATOLOGY | Facility: CLINIC | Age: 21
End: 2021-02-22

## 2021-02-22 NOTE — TELEPHONE ENCOUNTER
Left Zabrina a voicemail on her mobile. I also left a MyChart message. Requesting a call or Mychart back to discuss how she would like to proceed with infusions vs subcutaneous injections of Benlysta. Subcutaneous injections of Benlysta have approved by insurance but needs set up.

## 2021-02-24 NOTE — TELEPHONE ENCOUNTER
Left another message for Milly to call back to discuss. I will attempt another [employer] message also.

## 2021-04-29 ENCOUNTER — TELEPHONE (OUTPATIENT)
Dept: RHEUMATOLOGY | Facility: CLINIC | Age: 21
End: 2021-04-29

## 2021-04-29 NOTE — TELEPHONE ENCOUNTER
I left a message for Milly to please call our office or Mychart back on how she is doing. I left the phone number to call and schedule a delivery of Benlysta from [facility]. I also requested that she call to schedule a follow up appointment with .

## 2021-05-18 ENCOUNTER — TELEPHONE (OUTPATIENT)
Dept: RHEUMATOLOGY | Facility: CLINIC | Age: 21
End: 2021-05-18

## 2021-06-07 ENCOUNTER — TELEPHONE (OUTPATIENT)
Dept: RHEUMATOLOGY | Facility: CLINIC | Age: 21
End: 2021-06-07

## 2021-06-07 NOTE — TELEPHONE ENCOUNTER
Talked to mom briefly, she will get a message to Zabrina to give us a call and schedule a follow up appt.

## 2021-07-21 RX ORDER — METHYLPREDNISOLONE SODIUM SUCCINATE 125 MG/2ML
1000 INJECTION, POWDER, LYOPHILIZED, FOR SOLUTION INTRAMUSCULAR; INTRAVENOUS ONCE
Status: CANCELLED | OUTPATIENT
Start: 2021-07-21

## 2021-07-21 RX ORDER — ACETAMINOPHEN 325 MG/1
650 TABLET ORAL ONCE
Status: CANCELLED
Start: 2021-07-21

## 2021-07-21 RX ORDER — DIPHENHYDRAMINE HCL 25 MG
50 CAPSULE ORAL ONCE
Status: CANCELLED
Start: 2021-07-21

## 2021-08-11 ENCOUNTER — HOSPITAL ENCOUNTER (OUTPATIENT)
Dept: GENERAL RADIOLOGY | Facility: CLINIC | Age: 21
End: 2021-08-11
Attending: PEDIATRICS
Payer: COMMERCIAL

## 2021-08-11 ENCOUNTER — DOCUMENTATION ONLY (OUTPATIENT)
Dept: CARE COORDINATION | Facility: CLINIC | Age: 21
End: 2021-08-11

## 2021-08-11 ENCOUNTER — OFFICE VISIT (OUTPATIENT)
Dept: RHEUMATOLOGY | Facility: CLINIC | Age: 21
End: 2021-08-11
Attending: PEDIATRICS
Payer: COMMERCIAL

## 2021-08-11 ENCOUNTER — MYC MEDICAL ADVICE (OUTPATIENT)
Dept: PEDIATRICS | Age: 21
End: 2021-08-11

## 2021-08-11 VITALS
TEMPERATURE: 97.5 F | HEART RATE: 100 BPM | BODY MASS INDEX: 21.95 KG/M2 | DIASTOLIC BLOOD PRESSURE: 76 MMHG | WEIGHT: 119.27 LBS | HEIGHT: 62 IN | SYSTOLIC BLOOD PRESSURE: 111 MMHG

## 2021-08-11 DIAGNOSIS — K86.89 PANCREATIC INSUFFICIENCY: ICD-10-CM

## 2021-08-11 DIAGNOSIS — M32.19 SYSTEMIC LUPUS ERYTHEMATOSUS WITH OTHER ORGAN INVOLVEMENT, UNSPECIFIED SLE TYPE (H): ICD-10-CM

## 2021-08-11 DIAGNOSIS — M32.8 OTHER FORMS OF SYSTEMIC LUPUS ERYTHEMATOSUS, UNSPECIFIED ORGAN INVOLVEMENT STATUS (H): Chronic | ICD-10-CM

## 2021-08-11 DIAGNOSIS — M32.9 SYSTEMIC LUPUS ERYTHEMATOSUS, UNSPECIFIED SLE TYPE, UNSPECIFIED ORGAN INVOLVEMENT STATUS (H): Chronic | ICD-10-CM

## 2021-08-11 DIAGNOSIS — M32.19 SYSTEMIC LUPUS ERYTHEMATOSUS WITH OTHER ORGAN INVOLVEMENT, UNSPECIFIED SLE TYPE (H): Primary | ICD-10-CM

## 2021-08-11 DIAGNOSIS — K86.81 EXOCRINE PANCREATIC INSUFFICIENCY: ICD-10-CM

## 2021-08-11 DIAGNOSIS — M32.19 OTHER SYSTEMIC LUPUS ERYTHEMATOSUS WITH OTHER ORGAN INVOLVEMENT (H): ICD-10-CM

## 2021-08-11 DIAGNOSIS — E55.9 VITAMIN D DEFICIENCY: ICD-10-CM

## 2021-08-11 LAB
ALBUMIN SERPL-MCNC: 2.6 G/DL (ref 3.4–5)
ALP SERPL-CCNC: 244 U/L (ref 40–150)
ALT SERPL W P-5'-P-CCNC: 13 U/L (ref 0–50)
AST SERPL W P-5'-P-CCNC: 32 U/L (ref 0–45)
BASOPHILS # BLD AUTO: 0 10E3/UL (ref 0–0.2)
BASOPHILS NFR BLD AUTO: 0 %
BILIRUB DIRECT SERPL-MCNC: 0.1 MG/DL (ref 0–0.2)
BILIRUB SERPL-MCNC: 0.3 MG/DL (ref 0.2–1.3)
CREAT SERPL-MCNC: 0.51 MG/DL (ref 0.52–1.04)
DACRYOCYTES BLD QL SMEAR: SLIGHT
ELLIPTOCYTES BLD QL SMEAR: SLIGHT
EOSINOPHIL # BLD AUTO: 0.1 10E3/UL (ref 0–0.7)
EOSINOPHIL NFR BLD AUTO: 2 %
ERYTHROCYTE [DISTWIDTH] IN BLOOD BY AUTOMATED COUNT: 16.1 % (ref 10–15)
GFR SERPL CREATININE-BSD FRML MDRD: >90 ML/MIN/1.73M2
HCT VFR BLD AUTO: 29.1 % (ref 35–47)
HGB BLD-MCNC: 8.8 G/DL (ref 11.7–15.7)
IMM GRANULOCYTES # BLD: 0 10E3/UL
IMM GRANULOCYTES NFR BLD: 0 %
LIPASE SERPL-CCNC: 432 U/L (ref 73–393)
LYMPHOCYTES # BLD AUTO: 1.3 10E3/UL (ref 0.8–5.3)
LYMPHOCYTES NFR BLD AUTO: 25 %
MCH RBC QN AUTO: 24.3 PG (ref 26.5–33)
MCHC RBC AUTO-ENTMCNC: 30.2 G/DL (ref 31.5–36.5)
MCV RBC AUTO: 80 FL (ref 78–100)
MONOCYTES # BLD AUTO: 0.5 10E3/UL (ref 0–1.3)
MONOCYTES NFR BLD AUTO: 10 %
NEUTROPHILS # BLD AUTO: 3.2 10E3/UL (ref 1.6–8.3)
NEUTROPHILS NFR BLD AUTO: 63 %
NRBC # BLD AUTO: 0 10E3/UL
NRBC BLD AUTO-RTO: 0 /100
PLAT MORPH BLD: ABNORMAL
PLATELET # BLD AUTO: 251 10E3/UL (ref 150–450)
PROT SERPL-MCNC: 9.1 G/DL (ref 6.8–8.8)
RBC # BLD AUTO: 3.62 10E6/UL (ref 3.8–5.2)
RBC MORPH BLD: ABNORMAL
T4 FREE SERPL-MCNC: 1.16 NG/DL (ref 0.76–1.46)
TSH SERPL DL<=0.005 MIU/L-ACNC: 5.57 MU/L (ref 0.4–4)
WBC # BLD AUTO: 5.1 10E3/UL (ref 4–11)

## 2021-08-11 PROCEDURE — 36415 COLL VENOUS BLD VENIPUNCTURE: CPT | Performed by: PEDIATRICS

## 2021-08-11 PROCEDURE — 99215 OFFICE O/P EST HI 40 MIN: CPT | Performed by: PEDIATRICS

## 2021-08-11 PROCEDURE — 86160 COMPLEMENT ANTIGEN: CPT | Performed by: PEDIATRICS

## 2021-08-11 PROCEDURE — 80076 HEPATIC FUNCTION PANEL: CPT | Performed by: PEDIATRICS

## 2021-08-11 PROCEDURE — 83690 ASSAY OF LIPASE: CPT | Performed by: PEDIATRICS

## 2021-08-11 PROCEDURE — 84439 ASSAY OF FREE THYROXINE: CPT | Performed by: PEDIATRICS

## 2021-08-11 PROCEDURE — 85025 COMPLETE CBC W/AUTO DIFF WBC: CPT | Performed by: PEDIATRICS

## 2021-08-11 PROCEDURE — 73523 X-RAY EXAM HIPS BI 5/> VIEWS: CPT | Mod: 26 | Performed by: RADIOLOGY

## 2021-08-11 PROCEDURE — 86225 DNA ANTIBODY NATIVE: CPT | Performed by: PEDIATRICS

## 2021-08-11 PROCEDURE — 73523 X-RAY EXAM HIPS BI 5/> VIEWS: CPT

## 2021-08-11 PROCEDURE — 82565 ASSAY OF CREATININE: CPT | Performed by: PEDIATRICS

## 2021-08-11 PROCEDURE — 82784 ASSAY IGA/IGD/IGG/IGM EACH: CPT | Performed by: PEDIATRICS

## 2021-08-11 PROCEDURE — 84443 ASSAY THYROID STIM HORMONE: CPT | Performed by: PEDIATRICS

## 2021-08-11 PROCEDURE — 82040 ASSAY OF SERUM ALBUMIN: CPT | Performed by: PEDIATRICS

## 2021-08-11 PROCEDURE — G0463 HOSPITAL OUTPT CLINIC VISIT: HCPCS | Mod: 25

## 2021-08-11 RX ORDER — MYCOPHENOLATE MOFETIL 500 MG/1
1500 TABLET ORAL 2 TIMES DAILY
Qty: 180 TABLET | Refills: 11 | Status: ON HOLD | OUTPATIENT
Start: 2021-08-11 | End: 2022-06-13

## 2021-08-11 RX ORDER — SWAB
400 SWAB, NON-MEDICATED MISCELLANEOUS DAILY
Qty: 30 CAPSULE | Refills: 3 | Status: SHIPPED | OUTPATIENT
Start: 2021-08-11 | End: 2022-06-16

## 2021-08-11 RX ORDER — FERROUS SULFATE 325(65) MG
325 TABLET ORAL
Qty: 100 TABLET | Refills: 3 | Status: ON HOLD | OUTPATIENT
Start: 2021-08-11 | End: 2022-12-16

## 2021-08-11 RX ORDER — FAMOTIDINE 20 MG/1
40 TABLET, FILM COATED ORAL DAILY
Qty: 30 TABLET | Refills: 11 | Status: SHIPPED | OUTPATIENT
Start: 2021-08-11 | End: 2021-12-15

## 2021-08-11 RX ORDER — CALCIUM CARBONATE 500 MG/1
1 TABLET, CHEWABLE ORAL DAILY
Qty: 30 TABLET | Refills: 0 | Status: ON HOLD | OUTPATIENT
Start: 2021-08-11 | End: 2022-12-16

## 2021-08-11 RX ORDER — PEDIATRIC MULTIVIT 61/D3/VIT K 1500-800
2 CAPSULE ORAL DAILY
Qty: 180 CAPSULE | Refills: 1 | Status: SHIPPED | OUTPATIENT
Start: 2021-08-11 | End: 2022-06-16

## 2021-08-11 RX ORDER — HYDROXYCHLOROQUINE SULFATE 200 MG/1
400 TABLET, FILM COATED ORAL DAILY
Qty: 60 TABLET | Refills: 11 | Status: ON HOLD | OUTPATIENT
Start: 2021-08-11 | End: 2022-06-13

## 2021-08-11 RX ORDER — PREDNISONE 5 MG/1
5 TABLET ORAL DAILY
Qty: 150 TABLET | Refills: 1 | Status: ON HOLD | OUTPATIENT
Start: 2021-08-11 | End: 2022-06-13

## 2021-08-11 RX ORDER — FLUCONAZOLE 150 MG/1
150 TABLET ORAL
Qty: 1 TABLET | Refills: 0 | Status: SHIPPED | OUTPATIENT
Start: 2021-08-11 | End: 2021-12-15

## 2021-08-11 ASSESSMENT — PAIN SCALES - GENERAL: PAINLEVEL: SEVERE PAIN (6)

## 2021-08-11 ASSESSMENT — MIFFLIN-ST. JEOR: SCORE: 1265

## 2021-08-11 NOTE — PROGRESS NOTES
SOCIAL WORK PROGRESS NOTE      DATA:     CARMEL answered page by FERNANDEZ Austin in [facility]. Geovanna tried reaching both clinic SWs Sydney Maxwell and Tricia Joe, but both are currently out of the office. Geovanna requested assistance with patient, concerns regarding lapse in health insurance, patient unable to fill medications. CARMEL provided Geovanna with phone number to financial counseling [phone] for patient to connect with and receive help with re-establishing health insurance.     INTERVENTION:      1. Facilitate service linkage with hospital and community resources as needed.   2. Collaborate with healthcare team and professional in community to meet patient and family's needs as needed.     PLAN:     SW sent message to clinic SW Sydney Maxwell requesting that they follow-up with patient on connecting with financial counseling. Social work will continue to assess needs and provide ongoing psychosocial support and access to resources.     KEYLA Butler, [facility]  Pediatric Nephrology Social Worker  Phone: [phone]  Pager: [phone]     *No Letter

## 2021-08-11 NOTE — PROGRESS NOTES
Milly is a 21 year old woman who was seen in follow-up in Pediatric Rheumatology clinic today.    The encounter diagnosis was Systemic lupus erythematosus with other organ involvement, unspecified SLE type (H).    She is currently taking the following medications and the doses as documented.          Medications:     Current Outpatient Medications   Medication Sig Dispense Refill     acetaminophen (TYLENOL) 500 MG tablet Take 1 tablet (500 mg) by mouth every 4 hours as needed for mild pain 1 Bottle 1     amylase-lipase-protease (CREON 24) 53266-69712 units CPEP per EC capsule Take 1 capsule (24,000 Units) by mouth Take with snacks or supplements (with snacks) 112 capsule 1     amylase-lipase-protease (CREON 24) 25192-93701 units CPEP per EC capsule Take 2 capsules (48,000 Units) by mouth 3 times daily (with meals) 336 capsule 1     Belimumab 200 MG/ML SOAJ Inject 200 mg Subcutaneous every 7 days 4 Syringe 11     calcium carbonate (TUMS) 500 MG chewable tablet Take 1 tablet (500 mg) by mouth daily 30 tablet 0     famotidine (PEPCID) 20 MG tablet Take 2 tablets (40 mg) by mouth daily 30 tablet 11     ferrous sulfate 325 (65 Fe) MG PO tablet Take 1 tablet (325 mg) by mouth daily (with breakfast) 100 tablet 3     fluconazole (DIFLUCAN) 150 MG tablet Take 1 tablet (150 mg) by mouth once as needed (Vaginal discharge, Vaginal itching, Pain with urination) 1 tablet 0     hydroxychloroquine (PLAQUENIL) 200 MG tablet Take 2 tablets (400 mg) by mouth daily 60 tablet 11     multivitamin, therapeutic (THERA-VIT) TABS tablet Take 1 tablet by mouth daily 30 tablet 11     mycophenolate (GENERIC EQUIVALENT) 500 MG tablet Take 3 tablets (1,500 mg) by mouth 2 times daily 180 tablet 11     oxyCODONE (ROXICODONE) 5 MG tablet Take 1 tablet (5 mg) by mouth every 6 hours as needed for moderate to severe pain 7 tablet 0     predniSONE (DELTASONE) 5 MG tablet Take 1 tablet (5 mg) by mouth daily 150 tablet 1     vitamin D3 (CHOLECALCIFEROL)  "1.25 MG (88050 UT) capsule Take 1 capsule (50,000 Units) by mouth every 7 days 12 capsule 0     vitamin D3 (CHOLECALCIFEROL) 10 MCG (400 UNIT) capsule Take 1 capsule (400 Units) by mouth daily 30 capsule 3     mvw complete formulation (SOFTGELS) capsule Take 2 capsules by mouth daily 180 capsule 1       Unfortunately she has run out of all her medications, due to insurance problems.  She has been off of her medications for about one month.       Interval History:     Milly returns for scheduled follow-up.  I last saw her when she was hospitalized with UTI and fever 6 months ago.      She reports that despite running out of her medications, she is doing well. She has some mild back pain and left elbow pain, but this is recent and not severe.  She reports no problems with her skin, no oral ulcers, no dyspnea, no chest pain, no abdominal pain.  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    She is not working.  She is living at home.  She states she has enough food and money.  Her brother-in-law has been helping her figure out health insurance issues.           Review of Systems:     A comprehensive review of systems was performed and was negative apart from that listed above.    She has lost about 7 pounds over the past 6 months.       Examination:     Blood pressure 111/76, pulse 100, temperature 97.5  F (36.4  C), temperature source Tympanic, height 1.576 m (5' 2.05\"), weight 54.1 kg (119 lb 4.3 oz), not currently breastfeeding.     Facility age limit for growth percentiles is 20 years.    Growth percentile SmartLinks can only be used for patients less than 20 years old.    In general Milly was well appearing and in good spirits.   HEENT:  Pupils were equal, round and reactive to light.  Nose normal.  Oropharynx moist and pink with no intraoral lesions.  NECK:  Supple, no lymphadenopathy.  CHEST:  Clear to auscultation.  HEART:  Regular rate and rhythm. "  No murmur.  ABDOMEN:  Soft, non-tender, no hepatosplenomegaly.  JOINTS:  She has mild pain to palpation in the musculature about the right hip, but normal range of motion of the hip.  SKIN:  Normal.       Laboratory Investigations:     Office Visit on 08/11/2021   Component Date Value Ref Range Status     Lipase 08/11/2021 432* 73 - 393 U/L Final     C3 Complement 08/11/2021 50* 81 - 157 mg/dL Final     C4 Complement 08/11/2021 8* 13 - 39 mg/dL Final     Creatinine 08/11/2021 0.51* 0.52 - 1.04 mg/dL Final     GFR Estimate 08/11/2021 >90  >60 mL/min/1.73m2 Final    As of July 11, 2021, eGFR is calculated by the CKD-EPI creatinine equation, without race adjustment. eGFR can be influenced by muscle mass, exercise, and diet. The reported eGFR is an estimation only and is only applicable if the renal function is stable.     DNA (ds) Antibody 08/11/2021 88.0* <10.0 IU/mL Final    Positive     Immunoglobulin G 08/11/2021 3,178* 610-1,616 mg/dL Final     Bilirubin Total 08/11/2021 0.3  0.2 - 1.3 mg/dL Final     Bilirubin Direct 08/11/2021 0.1  0.0 - 0.2 mg/dL Final     Protein Total 08/11/2021 9.1* 6.8 - 8.8 g/dL Final     Albumin 08/11/2021 2.6* 3.4 - 5.0 g/dL Final     Alkaline Phosphatase 08/11/2021 244* 40 - 150 U/L Final     AST 08/11/2021 32  0 - 45 U/L Final     ALT 08/11/2021 13  0 - 50 U/L Final     TSH 08/11/2021 5.57* 0.40 - 4.00 mU/L Final     WBC Count 08/11/2021 5.1  4.0 - 11.0 10e3/uL Final     RBC Count 08/11/2021 3.62* 3.80 - 5.20 10e6/uL Final     Hemoglobin 08/11/2021 8.8* 11.7 - 15.7 g/dL Final     Hematocrit 08/11/2021 29.1* 35.0 - 47.0 % Final     MCV 08/11/2021 80  78 - 100 fL Final     MCH 08/11/2021 24.3* 26.5 - 33.0 pg Final     MCHC 08/11/2021 30.2* 31.5 - 36.5 g/dL Final     RDW 08/11/2021 16.1* 10.0 - 15.0 % Final     Platelet Count 08/11/2021 251  150 - 450 10e3/uL Final     % Neutrophils 08/11/2021 63  % Final     % Lymphocytes 08/11/2021 25  % Final     % Monocytes 08/11/2021 10  % Final      "% Eosinophils 08/11/2021 2  % Final     % Basophils 08/11/2021 0  % Final     % Immature Granulocytes 08/11/2021 0  % Final     NRBCs per 100 WBC 08/11/2021 0  <1 /100 Final     Absolute Neutrophils 08/11/2021 3.2  1.6 - 8.3 10e3/uL Final     Absolute Lymphocytes 08/11/2021 1.3  0.8 - 5.3 10e3/uL Final     Absolute Monocytes 08/11/2021 0.5  0.0 - 1.3 10e3/uL Final     Absolute Eosinophils 08/11/2021 0.1  0.0 - 0.7 10e3/uL Final     Absolute Basophils 08/11/2021 0.0  0.0 - 0.2 10e3/uL Final     Absolute Immature Granulocytes 08/11/2021 0.0  <=0.0 10e3/uL Final     Absolute NRBCs 08/11/2021 0.0  10e3/uL Final     Free T4 08/11/2021 1.16  0.76 - 1.46 ng/dL Final     Platelet Assessment 08/11/2021 Automated Count Confirmed. Platelet morphology is normal.  Automated Count Confirmed. Platelet morphology is normal. Final     Elliptocytes 08/11/2021 Slight* None Seen Final     Teardrop Cells 08/11/2021 Slight* None Seen Final     RBC Morphology 08/11/2021 Confirmed RBC Indices   Final                Imaging:       XR PELVIS AND HIP BILATERAL 2 VIEWS  8/11/2021 12:54 PM       HISTORY: SLE, prior AVN of right knee, now with right hip pain.;  Systemic lupus erythematosus with other organ involvement, unspecified  SLE type (H)     COMPARISON: 4/17/2019     FINDINGS:   AP and frog-leg views of the pelvis. There is a cup like radiodense  foreign body projecting at the low left pelvis. No fracture or other  osseous abnormality is visualized. Alignment is normal. The soft  tissues appear radiographically normal.                                                                      IMPRESSION:   Cup like radiodense foreign body projecting at the low left pelvis. No  osseous abnormality visualized.     QUINCY LUZ MD          Impression:     Milly is a 21 year old  with   1. Systemic lupus erythematosus with other organ involvement, unspecified SLE type (H)        Clinically she seems to be doing pretty well.  Her \"lupus labs\" are a " mixed picture today.  Since the last set of labs 6 months ago, her IgG has climbed, she is more anemic, and her anti-dsDNA antibody titer has increased, all suggesting increased lupus activity.  Surprisingly, her complement C3 has risen a bit and C4 remains low. Overall, I think her lupus is becoming more active because she has not been taking her medications.    Obviously we need to help get her back on her immunomodulatory medications.  I tried to have a  meet with her in clinic today to help with health insurance but this was not possible.  They did provide contact information to help with health insurance.    Her TSH is slightly elevated today, but T4 is normal.  I will plan to repeat these at the next visit.    I was a bit concerned about the right hip, so obtained xrays to be sure she does not have avascular necrosis (AVN).  She had AVN previously in the right knee.  The xrays did not reveal any abnormalities of the right hip (full report above -- I do not know if the foreign body is internal or external).            Plan:     1. We gave her the phone number for financial advisors to help with her health insurance coverage.  2. Repeat TSH and T4 at next visit, along with routine lupus monitoring labs.  3. She should refill and restart her medications.  4. Follow up in 3 months.      It is a pleasure to continue to participate in Milly's care.  Please feel free to contact me with any questions or concerns you have regarding Milly's care. If there are any new questions or concerns, I would be glad to help and can be reached through our main office at [phone] or our paging  at [phone].    Jonh Anders MD, PhD  , Pediatric Rheumatology    40 min spent on the date of the encounter in chart review, patient visit, review of tests, documentation and/or discussion with other providers about the issues documented above.         CC  Patient Care Team:  Ray  Estefany Tucker MD as PCP - General (Pediatrics)  Jonh Anders MD PhD as MD (Pediatric Rheumatology)  Estefany Bell MD as MD (Pediatrics)  Domingo Thomas MD as MD (Ophthalmology)  Padmini Garza MD as MD (Pediatric Nephrology)  Marcia Schmitt MD as MD (Pediatric Gastroenterology)  Ernie Swift, PhD  (Neuropsychology)  Seun Kent MD as Assigned PCP  Jonh Anders MD PhD as Assigned Pediatric Specialist Provider  SELF, REFERRED    Copy to patient  Shari Carroll   [address] [place] [address]

## 2021-08-11 NOTE — NURSING NOTE
"Chief Complaint   Patient presents with     Follow Up     SLE (Lupus)     /76 (BP Location: Right arm, Patient Position: Sitting, Cuff Size: Adult Regular)   Pulse 100   Temp 97.5  F (36.4  C) (Tympanic)   Ht 5' 2.05\" (157.6 cm)   Wt 119 lb 4.3 oz (54.1 kg)   BMI 21.78 kg/m    Geovanna Soler CMA    "

## 2021-08-11 NOTE — LETTER
8/11/2021      RE: Milly Carroll  [address] [place] [address]       Milly is a 21 year old woman who was seen in follow-up in Pediatric Rheumatology clinic today.    The encounter diagnosis was Systemic lupus erythematosus with other organ involvement, unspecified SLE type (H).    She is currently taking the following medications and the doses as documented.          Medications:     Current Outpatient Medications   Medication Sig Dispense Refill     acetaminophen (TYLENOL) 500 MG tablet Take 1 tablet (500 mg) by mouth every 4 hours as needed for mild pain 1 Bottle 1     amylase-lipase-protease (CREON 24) 28341-71100 units CPEP per EC capsule Take 1 capsule (24,000 Units) by mouth Take with snacks or supplements (with snacks) 112 capsule 1     amylase-lipase-protease (CREON 24) 18770-22657 units CPEP per EC capsule Take 2 capsules (48,000 Units) by mouth 3 times daily (with meals) 336 capsule 1     Belimumab 200 MG/ML SOAJ Inject 200 mg Subcutaneous every 7 days 4 Syringe 11     calcium carbonate (TUMS) 500 MG chewable tablet Take 1 tablet (500 mg) by mouth daily 30 tablet 0     famotidine (PEPCID) 20 MG tablet Take 2 tablets (40 mg) by mouth daily 30 tablet 11     ferrous sulfate 325 (65 Fe) MG PO tablet Take 1 tablet (325 mg) by mouth daily (with breakfast) 100 tablet 3     fluconazole (DIFLUCAN) 150 MG tablet Take 1 tablet (150 mg) by mouth once as needed (Vaginal discharge, Vaginal itching, Pain with urination) 1 tablet 0     hydroxychloroquine (PLAQUENIL) 200 MG tablet Take 2 tablets (400 mg) by mouth daily 60 tablet 11     multivitamin, therapeutic (THERA-VIT) TABS tablet Take 1 tablet by mouth daily 30 tablet 11     mycophenolate (GENERIC EQUIVALENT) 500 MG tablet Take 3 tablets (1,500 mg) by mouth 2 times daily 180 tablet 11     oxyCODONE (ROXICODONE) 5 MG tablet Take 1 tablet (5 mg) by mouth every 6 hours as needed for moderate to severe pain 7 tablet 0     predniSONE (DELTASONE) 5 MG tablet Take 1  "tablet (5 mg) by mouth daily 150 tablet 1     vitamin D3 (CHOLECALCIFEROL) 1.25 MG (41528 UT) capsule Take 1 capsule (50,000 Units) by mouth every 7 days 12 capsule 0     vitamin D3 (CHOLECALCIFEROL) 10 MCG (400 UNIT) capsule Take 1 capsule (400 Units) by mouth daily 30 capsule 3     mvw complete formulation (SOFTGELS) capsule Take 2 capsules by mouth daily 180 capsule 1       Unfortunately she has run out of all her medications, due to insurance problems.  She has been off of her medications for about one month.       Interval History:     Milly returns for scheduled follow-up.  I last saw her when she was hospitalized with UTI and fever 6 months ago.      She reports that despite running out of her medications, she is doing well. She has some mild back pain and left elbow pain, but this is recent and not severe.  She reports no problems with her skin, no oral ulcers, no dyspnea, no chest pain, no abdominal pain.  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    She is not working.  She is living at home.  She states she has enough food and money.  Her brother-in-law has been helping her figure out health insurance issues.           Review of Systems:     A comprehensive review of systems was performed and was negative apart from that listed above.    She has lost about 7 pounds over the past 6 months.       Examination:     Blood pressure 111/76, pulse 100, temperature 97.5  F (36.4  C), temperature source Tympanic, height 1.576 m (5' 2.05\"), weight 54.1 kg (119 lb 4.3 oz), not currently breastfeeding.     Facility age limit for growth percentiles is 20 years.    Growth percentile SmartLinks can only be used for patients less than 20 years old.    In general Milly was well appearing and in good spirits.   HEENT:  Pupils were equal, round and reactive to light.  Nose normal.  Oropharynx moist and pink with no intraoral lesions.  NECK:  Supple, no " lymphadenopathy.  CHEST:  Clear to auscultation.  HEART:  Regular rate and rhythm.  No murmur.  ABDOMEN:  Soft, non-tender, no hepatosplenomegaly.  JOINTS:  She has mild pain to palpation in the musculature about the right hip, but normal range of motion of the hip.  SKIN:  Normal.       Laboratory Investigations:     Office Visit on 08/11/2021   Component Date Value Ref Range Status     Lipase 08/11/2021 432* 73 - 393 U/L Final     C3 Complement 08/11/2021 50* 81 - 157 mg/dL Final     C4 Complement 08/11/2021 8* 13 - 39 mg/dL Final     Creatinine 08/11/2021 0.51* 0.52 - 1.04 mg/dL Final     GFR Estimate 08/11/2021 >90  >60 mL/min/1.73m2 Final    As of July 11, 2021, eGFR is calculated by the CKD-EPI creatinine equation, without race adjustment. eGFR can be influenced by muscle mass, exercise, and diet. The reported eGFR is an estimation only and is only applicable if the renal function is stable.     DNA (ds) Antibody 08/11/2021 88.0* <10.0 IU/mL Final    Positive     Immunoglobulin G 08/11/2021 3,178* 610-1,616 mg/dL Final     Bilirubin Total 08/11/2021 0.3  0.2 - 1.3 mg/dL Final     Bilirubin Direct 08/11/2021 0.1  0.0 - 0.2 mg/dL Final     Protein Total 08/11/2021 9.1* 6.8 - 8.8 g/dL Final     Albumin 08/11/2021 2.6* 3.4 - 5.0 g/dL Final     Alkaline Phosphatase 08/11/2021 244* 40 - 150 U/L Final     AST 08/11/2021 32  0 - 45 U/L Final     ALT 08/11/2021 13  0 - 50 U/L Final     TSH 08/11/2021 5.57* 0.40 - 4.00 mU/L Final     WBC Count 08/11/2021 5.1  4.0 - 11.0 10e3/uL Final     RBC Count 08/11/2021 3.62* 3.80 - 5.20 10e6/uL Final     Hemoglobin 08/11/2021 8.8* 11.7 - 15.7 g/dL Final     Hematocrit 08/11/2021 29.1* 35.0 - 47.0 % Final     MCV 08/11/2021 80  78 - 100 fL Final     MCH 08/11/2021 24.3* 26.5 - 33.0 pg Final     MCHC 08/11/2021 30.2* 31.5 - 36.5 g/dL Final     RDW 08/11/2021 16.1* 10.0 - 15.0 % Final     Platelet Count 08/11/2021 251  150 - 450 10e3/uL Final     % Neutrophils 08/11/2021 63  % Final      % Lymphocytes 08/11/2021 25  % Final     % Monocytes 08/11/2021 10  % Final     % Eosinophils 08/11/2021 2  % Final     % Basophils 08/11/2021 0  % Final     % Immature Granulocytes 08/11/2021 0  % Final     NRBCs per 100 WBC 08/11/2021 0  <1 /100 Final     Absolute Neutrophils 08/11/2021 3.2  1.6 - 8.3 10e3/uL Final     Absolute Lymphocytes 08/11/2021 1.3  0.8 - 5.3 10e3/uL Final     Absolute Monocytes 08/11/2021 0.5  0.0 - 1.3 10e3/uL Final     Absolute Eosinophils 08/11/2021 0.1  0.0 - 0.7 10e3/uL Final     Absolute Basophils 08/11/2021 0.0  0.0 - 0.2 10e3/uL Final     Absolute Immature Granulocytes 08/11/2021 0.0  <=0.0 10e3/uL Final     Absolute NRBCs 08/11/2021 0.0  10e3/uL Final     Free T4 08/11/2021 1.16  0.76 - 1.46 ng/dL Final     Platelet Assessment 08/11/2021 Automated Count Confirmed. Platelet morphology is normal.  Automated Count Confirmed. Platelet morphology is normal. Final     Elliptocytes 08/11/2021 Slight* None Seen Final     Teardrop Cells 08/11/2021 Slight* None Seen Final     RBC Morphology 08/11/2021 Confirmed RBC Indices   Final                Imaging:       XR PELVIS AND HIP BILATERAL 2 VIEWS  8/11/2021 12:54 PM       HISTORY: SLE, prior AVN of right knee, now with right hip pain.;  Systemic lupus erythematosus with other organ involvement, unspecified  SLE type (H)     COMPARISON: 4/17/2019     FINDINGS:   AP and frog-leg views of the pelvis. There is a cup like radiodense  foreign body projecting at the low left pelvis. No fracture or other  osseous abnormality is visualized. Alignment is normal. The soft  tissues appear radiographically normal.                                                                      IMPRESSION:   Cup like radiodense foreign body projecting at the low left pelvis. No  osseous abnormality visualized.     QUINCY LUZ MD          Impression:     Milly is a 21 year old  with   1. Systemic lupus erythematosus with other organ involvement, unspecified SLE  "type (H)        Clinically she seems to be doing pretty well.  Her \"lupus labs\" are a mixed picture today.  Since the last set of labs 6 months ago, her IgG has climbed, she is more anemic, and her anti-dsDNA antibody titer has increased, all suggesting increased lupus activity.  Surprisingly, her complement C3 has risen a bit and C4 remains low. Overall, I think her lupus is becoming more active because she has not been taking her medications.    Obviously we need to help get her back on her immunomodulatory medications.  I tried to have a  meet with her in clinic today to help with health insurance but this was not possible.  They did provide contact information to help with health insurance.    Her TSH is slightly elevated today, but T4 is normal.  I will plan to repeat these at the next visit.    I was a bit concerned about the right hip, so obtained xrays to be sure she does not have avascular necrosis (AVN).  She had AVN previously in the right knee.  The xrays did not reveal any abnormalities of the right hip (full report above -- I do not know if the foreign body is internal or external).            Plan:     1. We gave her the phone number for financial advisors to help with her health insurance coverage.  2. Repeat TSH and T4 at next visit, along with routine lupus monitoring labs.  3. She should refill and restart her medications.  4. Follow up in 3 months.      It is a pleasure to continue to participate in Milyl's care.  Please feel free to contact me with any questions or concerns you have regarding Milly's care. If there are any new questions or concerns, I would be glad to help and can be reached through our main office at [phone] or our paging  at [phone].    Jonh Anders MD, PhD  , Pediatric Rheumatology    40 min spent on the date of the encounter in chart review, patient visit, review of tests, documentation and/or discussion with other " providers about the issues documented above.         CC  Patient Care Team:  Estefany Bell MD as PCP - General (Pediatrics)  Domingo Thomas MD as MD (Ophthalmology)  Padmini Garza MD as MD (Pediatric Nephrology)  Marcia Schmitt MD as MD (Pediatric Gastroenterology)  Ernie Swift, PhD LP (Neuropsychology)  Seun Kent MD as Assigned PCP    Copy to patient  Milly Carroll  [address] [place] [address]

## 2021-08-11 NOTE — PATIENT INSTRUCTIONS
For Patient Education Materials:  z.[facility]/ryan       [facility] Pediatric Rheumatology    For Help:  The Pediatric Call Center at [phone] can help with scheduling of routine follow up visits.  Kaur Olson and Merline Butler are the Nurse Coordinators for the Division of Pediatric Rheumatology and can be reached by phone at [phone] or through [employer] ([email or web address]). They can help with questions about your child s rheumatic condition, medications, and test results.  For emergencies after hours or on the weekends, please call the page  at [phone] and ask to speak to the physician on-call for Pediatric Rheumatology. Please do not use [employer] for urgent requests.  Main  Services:  [phone]  o Hmong/Uzbek/Leonel: [phone]  o Russian: [phone]  o Frisian: [phone]    Internal Referrals: If we refer your child to another physician/team within [facility]/[place], you should receive a call to set this up. If you do not hear anything within a week, please call the Call Center at [phone].    External Referrals: If we refer your child to a physician/team outside of [facility]/[place], our team will send the referral order and relevant records to them. We ask that you call the place where your child is being referred to ensure they received the needed information and notify our team coordinators if not.    Imaging: If your child needs an imaging study that is not being performed the day of your clinic appointment, please call to set this up. For xrays, ultrasounds, and echocardiogram call [phone]. For CT or MRI call [phone].     MyChart: We encourage you to sign up for [employer] at [email or web address]. For assistance or questions, call [phone]. If your child is 12 years or older, a consent for proxy/parent access needs to be signed so please discuss this with your physician at the next visit.

## 2021-08-12 ENCOUNTER — TELEPHONE (OUTPATIENT)
Dept: RHEUMATOLOGY | Facility: CLINIC | Age: 21
End: 2021-08-12

## 2021-08-12 LAB
C3 SERPL-MCNC: 50 MG/DL (ref 81–157)
C4 SERPL-MCNC: 8 MG/DL (ref 13–39)
DSDNA AB SER-ACNC: 88 IU/ML
IGG SERPL-MCNC: 3178 MG/DL (ref 610–1616)

## 2021-08-12 NOTE — TELEPHONE ENCOUNTER
PA Initiation- I called Milly and left a message about her insurance coverage. When I try to submit her PA, it tells me to submit to her Primary first. Need to confirm if she has primary coverage. If not she may need to call her  as they may have something in her file wrong.    Medication: Benlysta -PA initiated  [facility] Phone [phone] Fax [phone]  Pharmacy Filling the Rx:    Filling Pharmacy Phone:    Filling Pharmacy Fax:    Start Date: 8/12/2021

## 2021-08-16 ENCOUNTER — PATIENT OUTREACH (OUTPATIENT)
Dept: CARE COORDINATION | Facility: CLINIC | Age: 21
End: 2021-08-16

## 2021-08-16 DIAGNOSIS — Z65.9 PSYCHOSOCIAL PROBLEM: ICD-10-CM

## 2021-08-16 DIAGNOSIS — Z65.8 PSYCHOSOCIAL DISTRESS: Primary | ICD-10-CM

## 2021-08-16 NOTE — PROGRESS NOTES
Clinic Care Coordination Contact    Situation: Patient chart reviewed by care coordinator.    Background: Milly was seen on 8/11/21 for follow-up in the Pediatric Rheumatology clinic ([facility]) with Dr. Anders. Milly reported not being able to gain her medication due to health insurance coverage issues/lapse in coverage. IP , Molly Rowan, was consulted. Patient was provided financial counseling contact information for support.     Assessment: Based on chart review, Milly had [employer] MA. [facility]EL CC to determine what issues are occurring with patient and provide support.    Plan/Recommendations: [facility]EL CC to outreach to patient.     EULALIA Lopez  , Care Coordination  [facility] Pediatric Specialty Clinics  [facility] Children's [facility] and [facility]  [facility]  [phone]

## 2021-08-16 NOTE — PROGRESS NOTES
Clinic Care Coordination Contact  [facility]/Voicemail     Clinical Data:  CC Outreach  Outreach attempted on 08/16/21; total outreach attempts x 1:   Left message on patient's voicemail with call back information and requested return call.   Status: Patient is on  CC panel, status as potential.   Plan: [facility] to continue outreach attempts.      EULALIA Lopez  , Care Coordination  [facility] Pediatric Specialty Clinics  [facility] Children's Eye and E[facility]  [facility] Women[facility]  [phone]

## 2021-08-16 NOTE — LETTER
[facility] CARE COORDINATION  [address] [address]    August 19, 2021    Milly Carroll  [address] [place] [id number]      Dear Milly,    I am a clinic care coordinator who works with the Dr. Jonh Anders  at [facility]. I have been trying to reach you recently to introduce Clinic Care Coordination and to see if there was anything I could assist you with.  Below is a description of clinic care coordination and how I can further assist you.      The goal of clinic care coordination is to help you manage your health and improve access to the health care system in the most efficient manner. The team can assist you in meeting your health care goals by providing education, coordinating services, strengthening the communication among your providers and supporting you with any resource needs.    Please feel free to contact me at [phone] with any questions or concerns. We are focused on providing you with the highest-quality healthcare experience possible and that all starts with you.     Sincerely,   EULALIA Lopez  , Care Coordination  [facility] Pediatric Specialty Clinics  [facility]  [facility]ist [facility]  [phone]

## 2021-08-19 NOTE — PROGRESS NOTES
Clinic Care Coordination Contact  [facility]/Voicemail     Clinical Data: SW CC Outreach  Outreach attempted on 08/19/21; total outreach attempts x 2:   Left message on patient's voicemail with call back information and requested return call.  Status: Patient is on SW CC panel, status as potential.   Plan:  CC to send an introduction letter identifying their role.  CC will wait for contact back.     EULALIA Lopez  , Care Coordination  [facility] Pediatric Specialty Clinics  [facility]  [facility]ist [facility]  [phone]

## 2021-08-24 NOTE — TELEPHONE ENCOUNTER
Called [facility] to see if they have Milly's primary billing information. They do not and are also looking for it. They have also made a couple attempts to get a hold of Milly. I left another message on Milly's phone.

## 2021-08-27 NOTE — TELEPHONE ENCOUNTER
Left message for patient to verify her her insurance coverage Mariola has on file a primary through [employer] employer but the coverage details on the express scripts site is showing this terminated in 2019.  Patient will need to notify her  if this coverage is no longer active.  If coverage should still be active they will need to contact their HR dept to find out what is going on with her eligibility.  Once this problem is resolved we will submit a new PA request to the appropriate plan.

## 2021-09-10 NOTE — TELEPHONE ENCOUNTER
I was able to obtain Primary insurance information from the family for Milly but unfortunately it is no longer active as of 3/26/2021. Milly needs to contact [facility] and update them that she no longer has primary coverage because when we try to submit the PA request [facility] blocks it saying that we must submit to primary.   Will message Milly and let her know. Submit PA once this has been taken care of.   69.4

## 2021-09-20 NOTE — TELEPHONE ENCOUNTER
Prior Authorization Approval    Authorization Effective Date: 8/21/2021  Authorization Expiration Date: 9/20/2022  Medication: Benlysta -PA approved  Approved Dose/Quantity: 4ml/28 days  Reference #: Key: [id number]   Insurance Company: [employer] - Phone [phone] Fax [phone]  Expected CoPay: zero     CoPay Card Available: No    Foundation Assistance Needed:    Which Pharmacy is filling the prescription (Not needed for infusion/clinic administered): [facility] - [facility] [address]  Pharmacy Notified: Yes  Patient Notified: Yes

## 2021-09-25 ENCOUNTER — HEALTH MAINTENANCE LETTER (OUTPATIENT)
Age: 21
End: 2021-09-25

## 2021-09-28 ENCOUNTER — PATIENT OUTREACH (OUTPATIENT)
Dept: CARE COORDINATION | Facility: CLINIC | Age: 21
End: 2021-09-28

## 2021-09-28 NOTE — LETTER
[facility] CARE COORDINATION  [address] [address]    September 28, 2021    Milly Carroll  [address] [place] [id number]      Dear Milly,    I am a {clinic role:[id number]}

## 2021-09-28 NOTE — PROGRESS NOTES
Clinic Care Coordination Contact    Situation: Patient chart reviewed by care coordinator.    Background: Patient status was potential for care coordination episode.     Assessment: [facility] CC attempted to reach patient on two occasions with no success. Patient is unable to reach.     Based on chart review, patient navigated insurance coverage issues and is able gain her medication (see notes on 9/10/21 and 9/20/21 by Abigail Mccarthy CP, Pediatric Specialty Pharmacy Clinic Liaison). Patient has active [email or web address] insurance. Patient was also provided [facility] cisimple Financial Counseling phone number for support at appointment on 8/11/21.     Plan/Recommendations: No further outreaches will be made at this time unless a new referral is made or a change in the pt's status occurs.    EULALIA Lopez  , Care Coordination  [facility] Pediatric Specialty Clinics  [facility] Children's Eye and ENT Clinic  [facility] Specialist [facility]  [phone]

## 2021-10-02 ENCOUNTER — MYC MEDICAL ADVICE (OUTPATIENT)
Dept: RHEUMATOLOGY | Facility: CLINIC | Age: 21
End: 2021-10-02

## 2021-10-02 DIAGNOSIS — M32.19 SYSTEMIC LUPUS ERYTHEMATOSUS WITH OTHER ORGAN INVOLVEMENT, UNSPECIFIED SLE TYPE (H): Primary | ICD-10-CM

## 2021-10-12 NOTE — TELEPHONE ENCOUNTER
Thanks.  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  It more commonly affects the hips.  Would be a good idea to get plain films of both hips.  I'll put in an order, in case she can come in for xrays.    Thanks,  Jonh

## 2021-10-26 NOTE — PROGRESS NOTES
Care Coordinator- Discharge Planning     Admission Date/Time:  8/17/2017  Attending MD:  Hansel Bañuelos MD     Data  Date of initial CC assessment:  08/30/17   Chart reviewed, discussed with interdisciplinary team.   Patient was admitted for:   1. Exacerbation of systemic lupus erythematosus    2. Pyelonephritis, acute    3. Other acute pancreatitis, unspecified complication status    4. Idiopathic acute pancreatitis without infection or necrosis    5. Systemic lupus erythematosus, unspecified SLE type, unspecified organ involvement status (H)         Assessment  Concerns with insurance coverage for discharge needs: None.  Current Living Situation: Patient lives with family.  Support System: Supportive  Services Involved: Skilled Nursing Visits   Transportation: Family or Friend will provide  Barriers to Discharge: none        Coordination of Care and Referrals: Provided patient/family with options for Skilled Nursing Visits .Plan for patient to go home with skilled nursing visits for weekly weights, blood pressure checks, and medication compliance checks. [facility] Orders entered and faxed. RNCC to continue to follow.       Plan  Anticipated Discharge Date:  8/31/17  Anticipated Discharge Plan:  Skilled Nursing Visits.     CTS Handoff completed:  NAMRATA Sheth RN   Care Coordinator Unit [address]  [phone]  *[phone]    Smoking even a single puff increases the likelihood of a full relapse, withdrawal symptoms peak within 1-2 weeks, but can persist for months

## 2021-11-10 ENCOUNTER — TELEPHONE (OUTPATIENT)
Dept: RHEUMATOLOGY | Facility: CLINIC | Age: 21
End: 2021-11-10
Payer: COMMERCIAL

## 2021-11-10 NOTE — TELEPHONE ENCOUNTER
Patient canceled her appointment this morning with Dr. Anders due to lack of transportation. I reached out to her to offer a virtual appointment but she says she has bad internet and cell services on her phone so she prefer to come in. We rescheduled this appointment to soonest late morning (she prefers the 11:45am time).     Appointment set for Wed 12/15 at 11:45am for in-person follow up. I provided patient with the [facility] Customer Service number and let her know that if she cannot find a ride to appointment, she can call [facility] and arrange for transportation. She took down the phone #. I asked her to call if she needs help arranging transportation through [facility] and I can help.

## 2021-11-19 ENCOUNTER — TELEPHONE (OUTPATIENT)
Dept: RHEUMATOLOGY | Facility: CLINIC | Age: 21
End: 2021-11-19
Payer: COMMERCIAL

## 2021-11-19 NOTE — TELEPHONE ENCOUNTER
Pediatric Rheumatology Phone Consult    Caller: DAVID Espinoza  Location: [facility] Department  Date: 11/19/21  Time: 5:13 PM  Callback number: [phone]; [phone]    Question/Discussion: concern for lupus flare - start steroids?     Milly is a 21 y.o. with history of systemic lupus erythematosus, followed by Dr. Anders. During their last appointment, he was concerned for her lupus becoming more active, and they were not on medications at the time due to insurance issues. Her hydroxychloroquine and Cellcept were restarted, and per chart review a 5 mg prednisone daily dose was prescribed as well; she has taken all of these. Belimumab weekly injections were also ordered and authorization approved, though Milly reports today that this medication was never started.    In recent days, she feels like she is having a lupus flare. Her joints are throbbing, even at rest, and worst with walking, and this affects a number of small and large joints. She is also fatigued, and these are typical symptoms of her lupus flares at the beginning. She has not had fevers, rashes, and today in the ED her vitals were normal, along with CBC showing WBC of 4.8, Hgb of 8.3 (was 8.8 last August), and platelets of 281; normal CMP with a notably normal creatinine of 0.47, AST of 43, and ALT of 23, slightly elevated CRP of 0.83, and normal urinalysis without evidence of infection, blood, or protein in the urine. Mynor does not observe any rashes, abdominal tenderness or symptoms, or other localizing symptoms. They are unable to run complements or dsDNA at their emergency room.     They initially discussed a short course of steroids with Mynor Crissgenevieve but family noted that they were told to avoid steroids by our Rheumatology team, so they are calling us for further recommendations.     She has scheduled follow-up with Dr. Anders on 12/15/21.     Recommendations: Based on the limited information provided on the phone we  advised the following recommendations:    We recommend symptomatic care for her joint symptoms over the weekend    We recommended against an additional steroid burst; continue the 5 mg prednisone that she's been on    I note that the belimumab was approved; since she has yet to start this, we can ask our team to follow-up on Monday to ensure that this can be filled by a pharmacy and started by Milly.     Discussed with Dr. Edith Stokes.    Darryn Delatorre MD/PhD  PGY4, Pediatric Rheumatology Fellow  [facility]

## 2021-11-20 ENCOUNTER — HEALTH MAINTENANCE LETTER (OUTPATIENT)
Age: 21
End: 2021-11-20

## 2021-12-02 NOTE — TELEPHONE ENCOUNTER
I spoke to dad and was inquiring about Milly. We are still in need of stool tests that were ordered by . Dad was unaware of the testing but would check with Zabrina on this. He is not sure if the diarrhea is continuing. He or Zabrina will call or Mychart with an update.  
details…

## 2021-12-15 ENCOUNTER — OFFICE VISIT (OUTPATIENT)
Dept: RHEUMATOLOGY | Facility: CLINIC | Age: 21
End: 2021-12-15
Attending: PEDIATRICS
Payer: COMMERCIAL

## 2021-12-15 VITALS
HEART RATE: 79 BPM | DIASTOLIC BLOOD PRESSURE: 74 MMHG | BODY MASS INDEX: 20.93 KG/M2 | TEMPERATURE: 98.8 F | SYSTOLIC BLOOD PRESSURE: 105 MMHG | WEIGHT: 113.76 LBS | HEIGHT: 62 IN

## 2021-12-15 DIAGNOSIS — M32.19 SYSTEMIC LUPUS ERYTHEMATOSUS WITH OTHER ORGAN INVOLVEMENT, UNSPECIFIED SLE TYPE (H): Primary | ICD-10-CM

## 2021-12-15 LAB
ALBUMIN SERPL-MCNC: 2.3 G/DL (ref 3.4–5)
ALBUMIN UR-MCNC: 20 MG/DL
ALP SERPL-CCNC: 262 U/L (ref 40–150)
ALT SERPL W P-5'-P-CCNC: 32 U/L (ref 0–50)
APPEARANCE UR: ABNORMAL
AST SERPL W P-5'-P-CCNC: 72 U/L (ref 0–45)
BASOPHILS # BLD AUTO: 0 10E3/UL (ref 0–0.2)
BASOPHILS NFR BLD AUTO: 0 %
BILIRUB DIRECT SERPL-MCNC: 0.1 MG/DL (ref 0–0.2)
BILIRUB SERPL-MCNC: 0.3 MG/DL (ref 0.2–1.3)
BILIRUB UR QL STRIP: NEGATIVE
COLOR UR AUTO: ABNORMAL
CREAT SERPL-MCNC: 0.47 MG/DL (ref 0.52–1.04)
CREAT UR-MCNC: 103 MG/DL
EOSINOPHIL # BLD AUTO: 0.1 10E3/UL (ref 0–0.7)
EOSINOPHIL NFR BLD AUTO: 2 %
ERYTHROCYTE [DISTWIDTH] IN BLOOD BY AUTOMATED COUNT: 16.4 % (ref 10–15)
GFR SERPL CREATININE-BSD FRML MDRD: >90 ML/MIN/1.73M2
GLUCOSE UR STRIP-MCNC: NEGATIVE MG/DL
HCT VFR BLD AUTO: 28.8 % (ref 35–47)
HGB BLD-MCNC: 8.8 G/DL (ref 11.7–15.7)
HGB UR QL STRIP: NEGATIVE
IMM GRANULOCYTES # BLD: 0 10E3/UL
IMM GRANULOCYTES NFR BLD: 0 %
KETONES UR STRIP-MCNC: NEGATIVE MG/DL
LEUKOCYTE ESTERASE UR QL STRIP: NEGATIVE
LIPASE SERPL-CCNC: 445 U/L (ref 73–393)
LYMPHOCYTES # BLD AUTO: 1.5 10E3/UL (ref 0.8–5.3)
LYMPHOCYTES NFR BLD AUTO: 30 %
MCH RBC QN AUTO: 26 PG (ref 26.5–33)
MCHC RBC AUTO-ENTMCNC: 30.6 G/DL (ref 31.5–36.5)
MCV RBC AUTO: 85 FL (ref 78–100)
MONOCYTES # BLD AUTO: 0.4 10E3/UL (ref 0–1.3)
MONOCYTES NFR BLD AUTO: 8 %
MUCOUS THREADS #/AREA URNS LPF: PRESENT /LPF
NEUTROPHILS # BLD AUTO: 3 10E3/UL (ref 1.6–8.3)
NEUTROPHILS NFR BLD AUTO: 60 %
NITRATE UR QL: NEGATIVE
NRBC # BLD AUTO: 0 10E3/UL
NRBC BLD AUTO-RTO: 0 /100
PH UR STRIP: 7 [PH] (ref 5–7)
PLAT MORPH BLD: NORMAL
PLATELET # BLD AUTO: 214 10E3/UL (ref 150–450)
PROT SERPL-MCNC: 9.4 G/DL (ref 6.8–8.8)
PROT UR-MCNC: 0.55 G/L
PROT/CREAT 24H UR: 0.53 G/G CR (ref 0–0.2)
RBC # BLD AUTO: 3.38 10E6/UL (ref 3.8–5.2)
RBC MORPH BLD: NORMAL
RBC URINE: 3 /HPF
SP GR UR STRIP: 1.01 (ref 1–1.03)
SQUAMOUS EPITHELIAL: 13 /HPF
UROBILINOGEN UR STRIP-MCNC: 2 MG/DL
WBC # BLD AUTO: 5.1 10E3/UL (ref 4–11)
WBC URINE: 2 /HPF

## 2021-12-15 PROCEDURE — 90686 IIV4 VACC NO PRSV 0.5 ML IM: CPT

## 2021-12-15 PROCEDURE — 86160 COMPLEMENT ANTIGEN: CPT | Performed by: PEDIATRICS

## 2021-12-15 PROCEDURE — 81001 URINALYSIS AUTO W/SCOPE: CPT | Performed by: PEDIATRICS

## 2021-12-15 PROCEDURE — 83690 ASSAY OF LIPASE: CPT | Performed by: PEDIATRICS

## 2021-12-15 PROCEDURE — 82784 ASSAY IGA/IGD/IGG/IGM EACH: CPT | Performed by: PEDIATRICS

## 2021-12-15 PROCEDURE — 85025 COMPLETE CBC W/AUTO DIFF WBC: CPT | Performed by: PEDIATRICS

## 2021-12-15 PROCEDURE — G0463 HOSPITAL OUTPT CLINIC VISIT: HCPCS | Mod: 25

## 2021-12-15 PROCEDURE — 250N000011 HC RX IP 250 OP 636

## 2021-12-15 PROCEDURE — 36415 COLL VENOUS BLD VENIPUNCTURE: CPT | Performed by: PEDIATRICS

## 2021-12-15 PROCEDURE — 86225 DNA ANTIBODY NATIVE: CPT | Performed by: PEDIATRICS

## 2021-12-15 PROCEDURE — 80076 HEPATIC FUNCTION PANEL: CPT | Performed by: PEDIATRICS

## 2021-12-15 PROCEDURE — 84156 ASSAY OF PROTEIN URINE: CPT | Performed by: PEDIATRICS

## 2021-12-15 PROCEDURE — 82565 ASSAY OF CREATININE: CPT | Performed by: PEDIATRICS

## 2021-12-15 PROCEDURE — G0008 ADMIN INFLUENZA VIRUS VAC: HCPCS

## 2021-12-15 PROCEDURE — 82040 ASSAY OF SERUM ALBUMIN: CPT | Performed by: PEDIATRICS

## 2021-12-15 PROCEDURE — 99215 OFFICE O/P EST HI 40 MIN: CPT | Performed by: PEDIATRICS

## 2021-12-15 ASSESSMENT — PAIN SCALES - GENERAL: PAINLEVEL: NO PAIN (0)

## 2021-12-15 ASSESSMENT — MIFFLIN-ST. JEOR: SCORE: 1240.63

## 2021-12-15 NOTE — PATIENT INSTRUCTIONS
For Patient Education Materials:  z.[facility]/ryan       [facility] Pediatric Rheumatology    For Help:  The Pediatric Call Center at [phone] can help with scheduling of routine follow up visits.  Kaur Olson and Merline Butler are the Nurse Coordinators for the Division of Pediatric Rheumatology and can be reached by phone at [phone] or through [employer] ([email or web address]). They can help with questions about your child s rheumatic condition, medications, and test results.  For emergencies after hours or on the weekends, please call the page  at [phone] and ask to speak to the physician on-call for Pediatric Rheumatology. Please do not use [employer] for urgent requests.  Main  Services:  [phone]  o Hmong/Israeli/Leonel: [phone]  o South Korean: [phone]  o Malay: [phone]    Internal Referrals: If we refer your child to another physician/team within [facility], you should receive a call to set this up. If you do not hear anything within a week, please call the Call Center at [phone].    External Referrals: If we refer your child to a physician/team outside of [facility], our team will send the referral order and relevant records to them. We ask that you call the place where your child is being referred to ensure they received the needed information and notify our team coordinators if not.    Imaging: If your child needs an imaging study that is not being performed the day of your clinic appointment, please call to set this up. For xrays, ultrasounds, and echocardiogram call [phone]. For CT or MRI call [phone].     MyChart: We encourage you to sign up for [email or web address] at [email or web address]. For assistance or questions, call [phone]. If your child is 12 years or older, a consent for proxy/parent access needs to be signed so please discuss this with your physician at the next visit.

## 2021-12-15 NOTE — NURSING NOTE
"Chief Complaint   Patient presents with     RECHECK     Follow up       /74 (BP Location: Right arm, Patient Position: Sitting, Cuff Size: Adult Regular)   Pulse 79   Temp 98.8  F (37.1  C) (Tympanic)   Ht 5' 2.09\" (157.7 cm)   Wt 113 lb 12.1 oz (51.6 kg)   BMI 20.75 kg/m      [place]  December 15, 2021  "

## 2021-12-15 NOTE — NURSING NOTE
"FLU VACCINE QUESTIONNAIRE:  Ask the following questions of all parties who want influenza vaccination:     CONTRAINDICATIONS  1.  Is the patient age less than 6 months?  NO  2.  Has the person to be vaccinated ever had Guillain-[place] syndrome? NO  3.  Has the person to be vaccinated had the vaccine this year? NO  4.  Is the person to be vaccinated sick today? NO  5.  Does the person to be vaccinated have an allergy to eggs or a component of the vaccine? NO  6.  Has the person to vaccinated ever had a serious reaction to an influenza vaccination in the past? NO    If the answer to ALL of the above questions is \"No\", then please administer the influenza vaccine per the standard protocol.  If the patient answered \"Yes\" to questions 1 or 2, do not administer the vaccine. If the patient answered \"Yes\" to question 3, do not administer the vaccine unless the patient is a child receiving the vaccine in two doses. If the patient answered \"Yes\" to questions 4, 5, and/or 6, get additional details on sickness and/or reaction and refer to provider. If you have any questions regarding contraindications, please refer to the provider.                                                         INFLUENZA VACCINATION NOTE      Information sheet given to patient and questions answered.     Patient or representative refused vaccination.   Reason:     ORDERS: Give influenza Vaccine   Ordered by Dr. Anders on December 15, 2021    [ Do not give Influenza Vaccine due to contraindication or refusal ]    Candidate for Pneumovax? No    INDICATION FOR VACCINATION:  Anyone from 6 months of age or older.        Christina Hardin M.A.    "

## 2021-12-15 NOTE — LETTER
12/15/2021      RE: Milly Carroll  [address] [place] [id number]       Milly is a 21 year old woman who was seen in follow-up in Pediatric Rheumatology clinic today.    The encounter diagnosis was Systemic lupus erythematosus with other organ involvement, unspecified SLE type (H).    She is currently taking the following medications and the doses as documented.          Medications:     Current Outpatient Medications   Medication Sig Dispense Refill     acetaminophen (TYLENOL) 500 MG tablet Take 1 tablet (500 mg) by mouth every 4 hours as needed for mild pain 1 Bottle 1     amylase-lipase-protease (CREON 24) 90595-70285 units CPEP per EC capsule Take 1 capsule by mouth Take with snacks or supplements (with snacks) 112 capsule 1     amylase-lipase-protease (CREON 24) 00411-41689 units CPEP per EC capsule Take 2 capsules by mouth 3 times daily (with meals) 336 capsule 1     Belimumab 200 MG/ML SOAJ Inject 200 mg Subcutaneous every 7 days 4 mL 11     calcium carbonate (TUMS) 500 MG chewable tablet Take 1 tablet (500 mg) by mouth daily 30 tablet 0     ferrous sulfate (FEROSUL) 325 (65 Fe) MG tablet Take 1 tablet (325 mg) by mouth daily (with breakfast) 100 tablet 3     hydroxychloroquine (PLAQUENIL) 200 MG tablet Take 2 tablets (400 mg) by mouth daily 60 tablet 11     multivitamin, therapeutic (THERA-VIT) TABS tablet Take 1 tablet by mouth daily 30 tablet 11     mvw complete formulation (SOFTGELS) capsule Take 2 capsules by mouth daily 180 capsule 1     mycophenolate (GENERIC EQUIVALENT) 500 MG tablet Take 3 tablets (1,500 mg) by mouth 2 times daily 180 tablet 11     predniSONE (DELTASONE) 5 MG tablet Take 1 tablet (5 mg) by mouth daily 150 tablet 1     vitamin D3 (CHOLECALCIFEROL) 1.25 MG (64134 UT) capsule Take 1 capsule (50,000 Units) by mouth every 7 days 12 capsule 0     vitamin D3 (CHOLECALCIFEROL) 10 MCG (400 UNIT) capsule Take 1 capsule (400 Units) by mouth daily 30 capsule 3       Milly is tolerating the  "medication(s) well.          Interval History:     Milly returns for scheduled follow-up.  I last saw her 4 months ago.  She has been doing relatively well in the interim.  She has been taking her medications more regularly and has figured out how to get them from the pharmacy more reliably.  She feels well.      She has some left TMJ pain when she yawns.  She has had a mild non-productive cough since getting her 2nd COVID vaccine.  No fever.  She has occasional night sweats, but relates this to the room being warm.    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  She is living with her sister, brother-in-law, niece, cousin, and younger brother.  She told me that she plans to move next month to [place] in [place], where her 30-year-old brother is in the [employer].  This was a surprise to me.           Review of Systems:     A comprehensive review of systems was performed and was negative apart from that listed above.  She has lost 2.5 kg over the past 4 months, per our documentation.         Examination:     Blood pressure 105/74, pulse 79, temperature 98.8  F (37.1  C), temperature source Tympanic, height 1.577 m (5' 2.09\"), weight 51.6 kg (113 lb 12.1 oz), not currently breastfeeding.       In general Milly was well appearing and in good spirits.   HEENT:  Pupils were equal, round and reactive to light.  Nose normal.  Oropharynx moist and pink with no intraoral lesions.  NECK:  Supple, no lymphadenopathy.  CHEST:  Clear to auscultation.  HEART:  Regular rate and rhythm.  No murmur.  ABDOMEN:  Soft, non-tender, no hepatosplenomegaly.  JOINTS:  Normal.  She has a well-healed scar on the right knee (prior site of surgery for avascular necrosis).  SKIN:  She has scarring alopecia on her scalp with some new hair growth.  She has post-inflammatory hyperpigmentation on her fingers, with some more prominent erythema on the pad of the right index finger.       Laboratory " Investigations:     Office Visit on 12/15/2021   Component Date Value Ref Range Status     Lipase 12/15/2021 445* 73 - 393 U/L Final     Bilirubin Total 12/15/2021 0.3  0.2 - 1.3 mg/dL Final     Bilirubin Direct 12/15/2021 0.1  0.0 - 0.2 mg/dL Final     Protein Total 12/15/2021 9.4* 6.8 - 8.8 g/dL Final     Albumin 12/15/2021 2.3* 3.4 - 5.0 g/dL Final     Alkaline Phosphatase 12/15/2021 262* 40 - 150 U/L Final     AST 12/15/2021 72* 0 - 45 U/L Final     ALT 12/15/2021 32  0 - 50 U/L Final     Immunoglobulin G 12/15/2021 3,918* 610-1,616 mg/dL Final     C3 Complement 12/15/2021 21* 81 - 157 mg/dL Final     C4 Complement 12/15/2021 5* 13 - 39 mg/dL Final     DNA (ds) Antibody 12/15/2021 97.0* <10.0 IU/mL Final    Positive     Creatinine 12/15/2021 0.47* 0.52 - 1.04 mg/dL Final     GFR Estimate 12/15/2021 >90  >60 mL/min/1.73m2 Final    As of July 11, 2021, eGFR is calculated by the CKD-EPI creatinine equation, without race adjustment. eGFR can be influenced by muscle mass, exercise, and diet. The reported eGFR is an estimation only and is only applicable if the renal function is stable.     WBC Count 12/15/2021 5.1  4.0 - 11.0 10e3/uL Final     RBC Count 12/15/2021 3.38* 3.80 - 5.20 10e6/uL Final     Hemoglobin 12/15/2021 8.8* 11.7 - 15.7 g/dL Final     Hematocrit 12/15/2021 28.8* 35.0 - 47.0 % Final     MCV 12/15/2021 85  78 - 100 fL Final     MCH 12/15/2021 26.0* 26.5 - 33.0 pg Final     MCHC 12/15/2021 30.6* 31.5 - 36.5 g/dL Final     RDW 12/15/2021 16.4* 10.0 - 15.0 % Final     Platelet Count 12/15/2021 214  150 - 450 10e3/uL Final     % Neutrophils 12/15/2021 60  % Final     % Lymphocytes 12/15/2021 30  % Final     % Monocytes 12/15/2021 8  % Final     % Eosinophils 12/15/2021 2  % Final     % Basophils 12/15/2021 0  % Final     % Immature Granulocytes 12/15/2021 0  % Final     NRBCs per 100 WBC 12/15/2021 0  <1 /100 Final     Absolute Neutrophils 12/15/2021 3.0  1.6 - 8.3 10e3/uL Final     Absolute Lymphocytes  12/15/2021 1.5  0.8 - 5.3 10e3/uL Final     Absolute Monocytes 12/15/2021 0.4  0.0 - 1.3 10e3/uL Final     Absolute Eosinophils 12/15/2021 0.1  0.0 - 0.7 10e3/uL Final     Absolute Basophils 12/15/2021 0.0  0.0 - 0.2 10e3/uL Final     Absolute Immature Granulocytes 12/15/2021 0.0  <=0.4 10e3/uL Final     Absolute NRBCs 12/15/2021 0.0  10e3/uL Final     Color Urine 12/15/2021 Light Yellow  Colorless, Straw, Light Yellow, Yellow Final     Appearance Urine 12/15/2021 Slightly Cloudy* Clear Final     Glucose Urine 12/15/2021 Negative  Negative mg/dL Final     Bilirubin Urine 12/15/2021 Negative  Negative Final     Ketones Urine 12/15/2021 Negative  Negative mg/dL Final     Specific Gravity Urine 12/15/2021 1.015  1.003 - 1.035 Final     Blood Urine 12/15/2021 Negative  Negative Final     pH Urine 12/15/2021 7.0  5.0 - 7.0 Final     Protein Albumin Urine 12/15/2021 20 * Negative mg/dL Final     Urobilinogen Urine 12/15/2021 2.0  Normal, 2.0 mg/dL Final     Nitrite Urine 12/15/2021 Negative  Negative Final     Leukocyte Esterase Urine 12/15/2021 Negative  Negative Final     Mucus Urine 12/15/2021 Present* None Seen /LPF Final     RBC Urine 12/15/2021 3* <=2 /HPF Final     WBC Urine 12/15/2021 2  <=5 /HPF Final     Squamous Epithelials Urine 12/15/2021 13* <=1 /HPF Final     Total Protein Random Urine g/L 12/15/2021 0.55  g/L Final    The reference range has not been established for total protein in random urine samples.  The result should be integrated into the clinical context for interpretation.     Total Protein Urine g/gr Creatinine 12/15/2021 0.53* 0.00 - 0.20 g/g Cr Final     Creatinine Urine mg/dL 12/15/2021 103  mg/dL Final     Platelet Assessment 12/15/2021 Automated Count Confirmed. Platelet morphology is normal.  Automated Count Confirmed. Platelet morphology is normal. Final     RBC Morphology 12/15/2021 Confirmed RBC Indices   Final              Impression:     Milly is a 21 year old  with   1. Systemic  lupus erythematosus with other organ involvement, unspecified SLE type (H)      Overall, she looks better today than I have seen her look in several years.  Her mood also seemed improved.  Her SLE obviously remains active with hypocomplementemia, hypergammaglobulinemia, and rising dsDNA antibody titers.  She also remains anemic and has persistent mild proteinuria.  Her lipase is typically elevated and does tend to track with her overall SLE activity. The value today is elevated, but on the low side for her and similar to the value 4 months ago.    Because she plans to move soon, I am reluctant to make any changes in her medication regimen - she is doing well clinically and I do not want to complicate her life by making medication changes.  I would like her to establish care with an adult rheumatology provider in [place].  I urged Zabrina to be sure she has enough medication with her when she moves, so that she will have time to establish care and get her prescriptions transferred.    I would note that she has had severe allergic responses to rituximab, otherwise this would be a reasonable option for her.  One could consider switching her from belimumab to anifrolumab (anti-IFNAR).           Plan:     1. Continue current medication regimen.  2. I provided the names of two adult rheumatologists near [place] in [place].  Once she has an appointment, I will send medical records to that new provider.  3. Continue annual eye exams while on hydroxychloroquine.      It is a pleasure to have participated in Milly's care.  If she ends up not moving to [place], I will help her establish care with an adult rheumatology provider here in the [facility].  Please feel free to contact me with any questions or concerns you have regarding Alessandros care. If there are any new questions or concerns, I would be glad to help and can be reached through our main office at [phone] or our paging  at [phone].    Jonh ZELAYA  MD Chago, PhD  , Pediatric Rheumatology    40 min spent on the date of the encounter in chart review, patient visit, review of tests, documentation and/or discussion with other providers about the issues documented above.     CC  Patient Care Team:  Estefany Bell MD as PCP - General (Pediatrics)  Domingo Thomas MD as MD (Ophthalmology)  Padmini Garza MD as MD (Pediatric Nephrology)  Marcia Schmitt MD as MD (Pediatric Gastroenterology)  Ernie Swift, PhD LP (Neuropsychology)  Seun Kent MD as Assigned PCP    Copy to patient  Shari Carroll   [address] [place] [id number]

## 2021-12-16 LAB
C3 SERPL-MCNC: 21 MG/DL (ref 81–157)
C4 SERPL-MCNC: 5 MG/DL (ref 13–39)
DSDNA AB SER-ACNC: 97 IU/ML
IGG SERPL-MCNC: 3918 MG/DL (ref 610–1616)

## 2021-12-16 NOTE — PROGRESS NOTES
Milly is a 21 year old woman who was seen in follow-up in Pediatric Rheumatology clinic today.    The encounter diagnosis was Systemic lupus erythematosus with other organ involvement, unspecified SLE type (H).    She is currently taking the following medications and the doses as documented.          Medications:     Current Outpatient Medications   Medication Sig Dispense Refill     acetaminophen (TYLENOL) 500 MG tablet Take 1 tablet (500 mg) by mouth every 4 hours as needed for mild pain 1 Bottle 1     amylase-lipase-protease (CREON 24) 82019-09617 units CPEP per EC capsule Take 1 capsule by mouth Take with snacks or supplements (with snacks) 112 capsule 1     amylase-lipase-protease (CREON 24) 92110-88479 units CPEP per EC capsule Take 2 capsules by mouth 3 times daily (with meals) 336 capsule 1     Belimumab 200 MG/ML SOAJ Inject 200 mg Subcutaneous every 7 days 4 mL 11     calcium carbonate (TUMS) 500 MG chewable tablet Take 1 tablet (500 mg) by mouth daily 30 tablet 0     ferrous sulfate (FEROSUL) 325 (65 Fe) MG tablet Take 1 tablet (325 mg) by mouth daily (with breakfast) 100 tablet 3     hydroxychloroquine (PLAQUENIL) 200 MG tablet Take 2 tablets (400 mg) by mouth daily 60 tablet 11     multivitamin, therapeutic (THERA-VIT) TABS tablet Take 1 tablet by mouth daily 30 tablet 11     mvw complete formulation (SOFTGELS) capsule Take 2 capsules by mouth daily 180 capsule 1     mycophenolate (GENERIC EQUIVALENT) 500 MG tablet Take 3 tablets (1,500 mg) by mouth 2 times daily 180 tablet 11     predniSONE (DELTASONE) 5 MG tablet Take 1 tablet (5 mg) by mouth daily 150 tablet 1     vitamin D3 (CHOLECALCIFEROL) 1.25 MG (29769 UT) capsule Take 1 capsule (50,000 Units) by mouth every 7 days 12 capsule 0     vitamin D3 (CHOLECALCIFEROL) 10 MCG (400 UNIT) capsule Take 1 capsule (400 Units) by mouth daily 30 capsule 3       Milly is tolerating the medication(s) well.          Interval History:     Milly returns for  "scheduled follow-up.  I last saw her 4 months ago.  She has been doing relatively well in the interim.  She has been taking her medications more regularly and has figured out how to get them from the pharmacy more reliably.  She feels well.      She has some left TMJ pain when she yawns.  She has had a mild non-productive cough since getting her 2nd COVID vaccine.  No fever.  She has occasional night sweats, but relates this to the room being warm.    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  She is living with her sister, brother-in-law, niece, cousin, and younger brother.  She told me that she plans to move next month to [place] in [place], where her 30-year-old brother is in the [employer].  This was a surprise to me.           Review of Systems:     A comprehensive review of systems was performed and was negative apart from that listed above.  She has lost 2.5 kg over the past 4 months, per our documentation.         Examination:     Blood pressure 105/74, pulse 79, temperature 98.8  F (37.1  C), temperature source Tympanic, height 1.577 m (5' 2.09\"), weight 51.6 kg (113 lb 12.1 oz), not currently breastfeeding.       In general Milly was well appearing and in good spirits.   HEENT:  Pupils were equal, round and reactive to light.  Nose normal.  Oropharynx moist and pink with no intraoral lesions.  NECK:  Supple, no lymphadenopathy.  CHEST:  Clear to auscultation.  HEART:  Regular rate and rhythm.  No murmur.  ABDOMEN:  Soft, non-tender, no hepatosplenomegaly.  JOINTS:  Normal.  She has a well-healed scar on the right knee (prior site of surgery for avascular necrosis).  SKIN:  She has scarring alopecia on her scalp with some new hair growth.  She has post-inflammatory hyperpigmentation on her fingers, with some more prominent erythema on the pad of the right index finger.       Laboratory Investigations:     Office Visit on 12/15/2021   Component Date Value Ref Range " Status     Lipase 12/15/2021 445* 73 - 393 U/L Final     Bilirubin Total 12/15/2021 0.3  0.2 - 1.3 mg/dL Final     Bilirubin Direct 12/15/2021 0.1  0.0 - 0.2 mg/dL Final     Protein Total 12/15/2021 9.4* 6.8 - 8.8 g/dL Final     Albumin 12/15/2021 2.3* 3.4 - 5.0 g/dL Final     Alkaline Phosphatase 12/15/2021 262* 40 - 150 U/L Final     AST 12/15/2021 72* 0 - 45 U/L Final     ALT 12/15/2021 32  0 - 50 U/L Final     Immunoglobulin G 12/15/2021 3,918* 610-1,616 mg/dL Final     C3 Complement 12/15/2021 21* 81 - 157 mg/dL Final     C4 Complement 12/15/2021 5* 13 - 39 mg/dL Final     DNA (ds) Antibody 12/15/2021 97.0* <10.0 IU/mL Final    Positive     Creatinine 12/15/2021 0.47* 0.52 - 1.04 mg/dL Final     GFR Estimate 12/15/2021 >90  >60 mL/min/1.73m2 Final    As of July 11, 2021, eGFR is calculated by the CKD-EPI creatinine equation, without race adjustment. eGFR can be influenced by muscle mass, exercise, and diet. The reported eGFR is an estimation only and is only applicable if the renal function is stable.     WBC Count 12/15/2021 5.1  4.0 - 11.0 10e3/uL Final     RBC Count 12/15/2021 3.38* 3.80 - 5.20 10e6/uL Final     Hemoglobin 12/15/2021 8.8* 11.7 - 15.7 g/dL Final     Hematocrit 12/15/2021 28.8* 35.0 - 47.0 % Final     MCV 12/15/2021 85  78 - 100 fL Final     MCH 12/15/2021 26.0* 26.5 - 33.0 pg Final     MCHC 12/15/2021 30.6* 31.5 - 36.5 g/dL Final     RDW 12/15/2021 16.4* 10.0 - 15.0 % Final     Platelet Count 12/15/2021 214  150 - 450 10e3/uL Final     % Neutrophils 12/15/2021 60  % Final     % Lymphocytes 12/15/2021 30  % Final     % Monocytes 12/15/2021 8  % Final     % Eosinophils 12/15/2021 2  % Final     % Basophils 12/15/2021 0  % Final     % Immature Granulocytes 12/15/2021 0  % Final     NRBCs per 100 WBC 12/15/2021 0  <1 /100 Final     Absolute Neutrophils 12/15/2021 3.0  1.6 - 8.3 10e3/uL Final     Absolute Lymphocytes 12/15/2021 1.5  0.8 - 5.3 10e3/uL Final     Absolute Monocytes 12/15/2021 0.4   0.0 - 1.3 10e3/uL Final     Absolute Eosinophils 12/15/2021 0.1  0.0 - 0.7 10e3/uL Final     Absolute Basophils 12/15/2021 0.0  0.0 - 0.2 10e3/uL Final     Absolute Immature Granulocytes 12/15/2021 0.0  <=0.4 10e3/uL Final     Absolute NRBCs 12/15/2021 0.0  10e3/uL Final     Color Urine 12/15/2021 Light Yellow  Colorless, Straw, Light Yellow, Yellow Final     Appearance Urine 12/15/2021 Slightly Cloudy* Clear Final     Glucose Urine 12/15/2021 Negative  Negative mg/dL Final     Bilirubin Urine 12/15/2021 Negative  Negative Final     Ketones Urine 12/15/2021 Negative  Negative mg/dL Final     Specific Gravity Urine 12/15/2021 1.015  1.003 - 1.035 Final     Blood Urine 12/15/2021 Negative  Negative Final     pH Urine 12/15/2021 7.0  5.0 - 7.0 Final     Protein Albumin Urine 12/15/2021 20 * Negative mg/dL Final     Urobilinogen Urine 12/15/2021 2.0  Normal, 2.0 mg/dL Final     Nitrite Urine 12/15/2021 Negative  Negative Final     Leukocyte Esterase Urine 12/15/2021 Negative  Negative Final     Mucus Urine 12/15/2021 Present* None Seen /LPF Final     RBC Urine 12/15/2021 3* <=2 /HPF Final     WBC Urine 12/15/2021 2  <=5 /HPF Final     Squamous Epithelials Urine 12/15/2021 13* <=1 /HPF Final     Total Protein Random Urine g/L 12/15/2021 0.55  g/L Final    The reference range has not been established for total protein in random urine samples.  The result should be integrated into the clinical context for interpretation.     Total Protein Urine g/gr Creatinine 12/15/2021 0.53* 0.00 - 0.20 g/g Cr Final     Creatinine Urine mg/dL 12/15/2021 103  mg/dL Final     Platelet Assessment 12/15/2021 Automated Count Confirmed. Platelet morphology is normal.  Automated Count Confirmed. Platelet morphology is normal. Final     RBC Morphology 12/15/2021 Confirmed RBC Indices   Final              Impression:     Milly is a 21 year old  with   1. Systemic lupus erythematosus with other organ involvement, unspecified SLE type (H)       Overall, she looks better today than I have seen her look in several years.  Her mood also seemed improved.  Her SLE obviously remains active with hypocomplementemia, hypergammaglobulinemia, and rising dsDNA antibody titers.  She also remains anemic and has persistent mild proteinuria.  Her lipase is typically elevated and does tend to track with her overall SLE activity. The value today is elevated, but on the low side for her and similar to the value 4 months ago.    Because she plans to move soon, I am reluctant to make any changes in her medication regimen - she is doing well clinically and I do not want to complicate her life by making medication changes.  I would like her to establish care with an adult rheumatology provider in [place].  I urged Zabrina to be sure she has enough medication with her when she moves, so that she will have time to establish care and get her prescriptions transferred.    I would note that she has had severe allergic responses to rituximab, otherwise this would be a reasonable option for her.  One could consider switching her from belimumab to anifrolumab (anti-IFNAR).           Plan:     1. Continue current medication regimen.  2. I provided the names of two adult rheumatologists near [place] in [place].  Once she has an appointment, I will send medical records to that new provider.  3. Continue annual eye exams while on hydroxychloroquine.      It is a pleasure to have participated in Milly's care.  If she ends up not moving to [place], I will help her establish care with an adult rheumatology provider here in the [facility].  Please feel free to contact me with any questions or concerns you have regarding Alessandros care. If there are any new questions or concerns, I would be glad to help and can be reached through our main office at [phone] or our paging  at [phone].    Jonh Anders MD, PhD  , Pediatric Rheumatology    40 min spent on  the date of the encounter in chart review, patient visit, review of tests, documentation and/or discussion with other providers about the issues documented above.         CC  Patient Care Team:  Estefany Bell MD as PCP - General (Pediatrics)  Jonh Anders MD PhD as MD (Pediatric Rheumatology)  Estefany Bell MD as MD (Pediatrics)  Domingo Thomas MD as MD (Ophthalmology)  Padmini Garza MD as MD (Pediatric Nephrology)  Marcia Schmitt MD as MD (Pediatric Gastroenterology)  Ernie Swift, PhD LP (Neuropsychology)  Seun Kent MD as Assigned PCP  Jonh Anders MD PhD as Assigned Pediatric Specialist Provider  SELF, REFERRED    Copy to patient  Shari Carroll   [address] [place] [id number]

## 2022-01-15 ENCOUNTER — HEALTH MAINTENANCE LETTER (OUTPATIENT)
Age: 22
End: 2022-01-15

## 2022-03-08 DIAGNOSIS — M32.19 SYSTEMIC LUPUS ERYTHEMATOSUS WITH OTHER ORGAN INVOLVEMENT, UNSPECIFIED SLE TYPE (H): Primary | ICD-10-CM

## 2022-04-24 ENCOUNTER — HOSPITAL ENCOUNTER (EMERGENCY)
Facility: CLINIC | Age: 22
End: 2022-04-24

## 2022-04-24 ENCOUNTER — TELEPHONE (OUTPATIENT)
Dept: RHEUMATOLOGY | Facility: CLINIC | Age: 22
End: 2022-04-24
Payer: COMMERCIAL

## 2022-04-24 NOTE — TELEPHONE ENCOUNTER
"Pediatric Rheumatology Phone Consult    Caller: Dr. Franklin  [facility]  Date: 04/24/22  Time: 2:09 PM  Callback number: [phone]    Question/Discussion: Plan for admission. Wondering where patient can be admitted and if unable to admit what recommendations we would have for her \"SLE flare\"    Milly is a 22 yo F who is well known to pediatric rheumatology. Milly has systemic lupus erythematosus with a challenging course related to medication compliance issues and infections. Milly was last seen by Dr. Anders on 12/15/2021 at which time she reported a plan to move to [place]. She was on Belimumab 200 mg weekly, hydroxychloroquine 400 mg daily, MMF 1,500 mg twice daily, and prednisone 5 mg daily. Milly's SLE was active at her last visit with notably low C3 21, low C4 5, elevated dsDNA 97, elevated urine pr/Cr (0.5 g/g), but minimal urine RBC 3. She was also anemic to 8.8.     Yesterday Milly developed a fever (102), generalized malaise,and  joint pains. Today, her fever persisted and she was feeling lightheaded. Milly presented to the ED with concerns for an SLE flare. She reported to the ED that she yvan as if she were going to pass out upon arrival. Milly said she has been medication compliant. She was noted to be mildly hypotensive and febrile. She was given tylenol, ibuprofen and an IV saline bolus. Hypotension resolved. Labs were obtained as follows:    Complete blood count (WBC 5.9 with ANC 4.4 and ALC 1.3, Hgb 9.3, Plt 196)    CMP with low K 2.3, low Ca 7.5, elevated Alk Phos 189, low albumin 2.5, elevated total protein 9.2, normal AST 37, ALT 12, and Cr 0.79    Troponin negative,     Lipase was normal    UA with +1 protein, 0-5 WBC, 0-2 RBC, no nitrites or LE    Elevated CRP 2.65 mg/dL (nml < 0.3)    Lactate normal    Negative Influenza/COVID    Urine and blood culture     Chest xray showing right mid-lung opacification.    Dr. Franklin would like to admit Milly and had planned to " send her to [facility] for admission. However, there are limited beds available, so he was wondering if Milly should go to [facility] or an adult facility. I recommended either here or adult  Hospital so that Dr. Anders could have access to review her evaluation. Additionally, Dr. Franklin plans to give broad spectrum antibiotics.     Assessment:  Milly sound ill, so we agree with admission and empiric antibiotics. Her symptoms may be related to an infection, SLE flare, or a combination of both. Typical and atypical infections should be considered given her immunosuppressed state. Specifically bacteremia, pneumonia, endocarditis, and PID should be considered. In the past, Milly has had UTI/pyelonephritis and cellulitis. Urine and blood cultures are pending.     To further evaluate for an SLE flare, we would like labs obtained as shown in the plan.     Milly has been on chronic steroids and was last on 5 mg daily or oral prednisone. At this low dose, it is not likely that she will need stress dosing for an infection; however, she may need steroids to treat a SLE flare.     Recommendations: Based on the limited information provided on the phone we advised the following recommendations:     Dr. Franklin is coordinating a transfer to [facility]'s ED which appears to be accepted.    Obtain the following labs at [facility]:  o ESR  o C3 and C4  o dsDNA    Consider addition evaluations while inpatient as follows:  o Chest CT to further assess the opacity   o STI testing  o ECHO to assess for endocarditis (would especially recommend this if she is bacteremic)    May consider stress dosing prednisone.    Discussed with Dr. Edith Stokes.    Lalitha Roberts DO   Pediatric Rheumatology Fellow, PGY6  Pager [phone]

## 2022-04-24 NOTE — PROGRESS NOTES
[facility]  Transfer Triage Note    Date of call: 22  Time of call: 3:39 PM    Is pandemic COVID-19 a concern? NO    Reason for transfer: Further diagnostic work up, management, and consultation for specialized care   Diagnosis: Acute SLE flare with hypokalemia, hypocalcemia    Outside Records: Not available. Additional records requested to be faxed to [phone].    Stability of Patient: Patient is vitally stable, with no critical labs, and will likely remain stable throughout the transfer process  ICU: No    We received a phone call through our Physician Access line from DAVID Espinoza at [facility] Emergency Department.  My understanding from this phone call is that Milly Carroll with  2000 is a 21 year old female with h/o SLE- follows with Peds Rheumatology still. She presented with significant malaise, weakness, felt like she was going to pass out. Temp of 102 at home. Presented to [facility] ED and BP was 70s systolic. Febrile to 102. Got 1 L of fluids and systolics better in the 100s. Her labs reviewed mildly elevated CRP, normal UA, Hgb near baseline (was 8, now 9.3). Of not her potassium was low at 2.3 and calcium was low at 2.5. Mg normal. ALT up. Peds rheum was called and recommended transfer up here but to hold off on steroids. They requested multiple labs be drawn and those are pending. Her potassium was replaced with 40 mEq of PO and 10 mEq of IV. Patient did not want broad spectrum antibiotics at this time as she felt it was more consistent with lupus flare.  Potassium to be rechecked prior to transfer. No beds currently available so will await bed availability. Recommended tele bed due to electrolyte derangements/needing replacement.    Transfer Accepted? Yes    Recommendations for Management and Stabilization: Not needed  Expected Time of Arrival for Transfer: TBD  Arrival Location:  [facility]. Unit TBD       Sumi Munoz  MD

## 2022-04-25 ENCOUNTER — HOSPITAL ENCOUNTER (EMERGENCY)
Facility: CLINIC | Age: 22
Discharge: STILL ADMITTED OR ADMINISTRATIVE | End: 2022-04-25
Attending: PEDIATRICS | Admitting: PEDIATRICS
Payer: COMMERCIAL

## 2022-04-25 ENCOUNTER — HOSPITAL ENCOUNTER (INPATIENT)
Facility: CLINIC | Age: 22
LOS: 1 days | Discharge: HOME | End: 2022-04-26
Attending: PEDIATRICS | Admitting: INTERNAL MEDICINE
Payer: COMMERCIAL

## 2022-04-25 DIAGNOSIS — D84.9 IMMUNOSUPPRESSION (H): Primary | ICD-10-CM

## 2022-04-25 PROBLEM — M32.9 LUPUS (SYSTEMIC LUPUS ERYTHEMATOSUS) (H): Status: ACTIVE | Noted: 2022-04-25

## 2022-04-25 LAB
ALBUMIN SERPL-MCNC: 2 G/DL (ref 3.4–5)
ALP SERPL-CCNC: 150 U/L (ref 40–150)
ALT SERPL W P-5'-P-CCNC: 10 U/L (ref 0–50)
ANION GAP SERPL CALCULATED.3IONS-SCNC: 4 MMOL/L (ref 3–14)
AST SERPL W P-5'-P-CCNC: 33 U/L (ref 0–45)
BILIRUB SERPL-MCNC: 0.4 MG/DL (ref 0.2–1.3)
BUN SERPL-MCNC: 7 MG/DL (ref 7–30)
C3 SERPL-MCNC: 11 MG/DL (ref 81–157)
C4 SERPL-MCNC: 3 MG/DL (ref 13–39)
CALCIUM SERPL-MCNC: 7.8 MG/DL (ref 8.5–10.1)
CHLORIDE BLD-SCNC: 119 MMOL/L (ref 94–109)
CO2 SERPL-SCNC: 20 MMOL/L (ref 20–32)
CREAT SERPL-MCNC: 0.66 MG/DL (ref 0.52–1.04)
CREAT UR-MCNC: 46 MG/DL
ERYTHROCYTE [SEDIMENTATION RATE] IN BLOOD BY WESTERGREN METHOD: 128 MM/HR (ref 0–20)
GFR SERPL CREATININE-BSD FRML MDRD: >90 ML/MIN/1.73M2
GLUCOSE BLD-MCNC: 80 MG/DL (ref 70–99)
POTASSIUM BLD-SCNC: 3.5 MMOL/L (ref 3.4–5.3)
PROT SERPL-MCNC: 7.5 G/DL (ref 6.8–8.8)
PROT UR-MCNC: 0.38 G/L
PROT/CREAT 24H UR: 0.83 G/G CR (ref 0–0.2)
SODIUM SERPL-SCNC: 143 MMOL/L (ref 133–144)

## 2022-04-25 PROCEDURE — 999N000104 HC STATISTIC NO CHARGE

## 2022-04-25 PROCEDURE — 86160 COMPLEMENT ANTIGEN: CPT

## 2022-04-25 PROCEDURE — 250N000012 HC RX MED GY IP 250 OP 636 PS 637

## 2022-04-25 PROCEDURE — 85652 RBC SED RATE AUTOMATED: CPT

## 2022-04-25 PROCEDURE — 99255 IP/OBS CONSLTJ NEW/EST HI 80: CPT | Mod: GC | Performed by: PEDIATRICS

## 2022-04-25 PROCEDURE — 250N000011 HC RX IP 250 OP 636: Performed by: INTERNAL MEDICINE

## 2022-04-25 PROCEDURE — 87086 URINE CULTURE/COLONY COUNT: CPT | Performed by: INTERNAL MEDICINE

## 2022-04-25 PROCEDURE — 82040 ASSAY OF SERUM ALBUMIN: CPT

## 2022-04-25 PROCEDURE — 99223 1ST HOSP IP/OBS HIGH 75: CPT | Mod: AI | Performed by: INTERNAL MEDICINE

## 2022-04-25 PROCEDURE — 87040 BLOOD CULTURE FOR BACTERIA: CPT | Performed by: INTERNAL MEDICINE

## 2022-04-25 PROCEDURE — 84156 ASSAY OF PROTEIN URINE: CPT

## 2022-04-25 PROCEDURE — 120N000007 HC R&B PEDS UMMC

## 2022-04-25 PROCEDURE — 80053 COMPREHEN METABOLIC PANEL: CPT

## 2022-04-25 PROCEDURE — 36415 COLL VENOUS BLD VENIPUNCTURE: CPT

## 2022-04-25 PROCEDURE — 36415 COLL VENOUS BLD VENIPUNCTURE: CPT | Performed by: INTERNAL MEDICINE

## 2022-04-25 PROCEDURE — 86225 DNA ANTIBODY NATIVE: CPT

## 2022-04-25 PROCEDURE — 258N000003 HC RX IP 258 OP 636

## 2022-04-25 PROCEDURE — 250N000013 HC RX MED GY IP 250 OP 250 PS 637

## 2022-04-25 RX ORDER — CALCIUM CARBONATE 500 MG/1
500 TABLET, CHEWABLE ORAL DAILY
Status: DISCONTINUED | OUTPATIENT
Start: 2022-04-25 | End: 2022-04-26 | Stop reason: HOSPADM

## 2022-04-25 RX ORDER — PEDIATRIC MULTIVIT 61/D3/VIT K 1500-800
2 CAPSULE ORAL DAILY
Status: DISCONTINUED | OUTPATIENT
Start: 2022-04-25 | End: 2022-04-26 | Stop reason: HOSPADM

## 2022-04-25 RX ORDER — MYCOPHENOLATE MOFETIL 500 MG/1
1500 TABLET ORAL 2 TIMES DAILY
Status: DISCONTINUED | OUTPATIENT
Start: 2022-04-25 | End: 2022-04-26 | Stop reason: HOSPADM

## 2022-04-25 RX ORDER — PREDNISONE 5 MG/1
5 TABLET ORAL DAILY
Status: DISCONTINUED | OUTPATIENT
Start: 2022-04-25 | End: 2022-04-26 | Stop reason: HOSPADM

## 2022-04-25 RX ORDER — LIDOCAINE 40 MG/G
CREAM TOPICAL
Status: DISCONTINUED | OUTPATIENT
Start: 2022-04-25 | End: 2022-04-26 | Stop reason: HOSPADM

## 2022-04-25 RX ORDER — ACETAMINOPHEN 325 MG/1
650 TABLET ORAL EVERY 6 HOURS PRN
Status: CANCELLED | OUTPATIENT
Start: 2022-04-25

## 2022-04-25 RX ORDER — CEFTRIAXONE 1 G/1
1 INJECTION, POWDER, FOR SOLUTION INTRAMUSCULAR; INTRAVENOUS EVERY 24 HOURS
Status: DISCONTINUED | OUTPATIENT
Start: 2022-04-25 | End: 2022-04-26

## 2022-04-25 RX ORDER — FERROUS SULFATE 325(65) MG
325 TABLET ORAL
Status: DISCONTINUED | OUTPATIENT
Start: 2022-04-25 | End: 2022-04-26 | Stop reason: HOSPADM

## 2022-04-25 RX ORDER — ACETAMINOPHEN 325 MG/1
650 TABLET ORAL EVERY 6 HOURS PRN
Status: DISCONTINUED | OUTPATIENT
Start: 2022-04-25 | End: 2022-04-26 | Stop reason: HOSPADM

## 2022-04-25 RX ORDER — SODIUM CHLORIDE, SODIUM LACTATE, POTASSIUM CHLORIDE, CALCIUM CHLORIDE 600; 310; 30; 20 MG/100ML; MG/100ML; MG/100ML; MG/100ML
INJECTION, SOLUTION INTRAVENOUS CONTINUOUS
Status: DISCONTINUED | OUTPATIENT
Start: 2022-04-25 | End: 2022-04-26 | Stop reason: HOSPADM

## 2022-04-25 RX ORDER — HYDROXYCHLOROQUINE SULFATE 200 MG/1
400 TABLET, FILM COATED ORAL DAILY
Status: DISCONTINUED | OUTPATIENT
Start: 2022-04-25 | End: 2022-04-26 | Stop reason: HOSPADM

## 2022-04-25 RX ADMIN — MYCOPHENOLATE MOFETIL 1500 MG: 500 TABLET, FILM COATED ORAL at 20:18

## 2022-04-25 RX ADMIN — PANCRELIPASE 2 CAPSULE: 24000; 76000; 120000 CAPSULE, DELAYED RELEASE PELLETS ORAL at 09:51

## 2022-04-25 RX ADMIN — CHOLECALCIFEROL TAB 10 MCG (400 UNIT) 400 UNITS: 10 TAB at 09:57

## 2022-04-25 RX ADMIN — MYCOPHENOLATE MOFETIL 1500 MG: 500 TABLET, FILM COATED ORAL at 09:48

## 2022-04-25 RX ADMIN — SODIUM CHLORIDE, POTASSIUM CHLORIDE, SODIUM LACTATE AND CALCIUM CHLORIDE: 600; 310; 30; 20 INJECTION, SOLUTION INTRAVENOUS at 05:26

## 2022-04-25 RX ADMIN — PREDNISONE 5 MG: 5 TABLET ORAL at 09:49

## 2022-04-25 RX ADMIN — Medication 2 CAPSULE: at 09:48

## 2022-04-25 RX ADMIN — CALCIUM CARBONATE 500 MG: 500 TABLET, CHEWABLE ORAL at 09:49

## 2022-04-25 RX ADMIN — HYDROXYCHLOROQUINE SULFATE 400 MG: 200 TABLET, FILM COATED ORAL at 09:48

## 2022-04-25 RX ADMIN — CEFTRIAXONE SODIUM 1 G: 1 INJECTION, POWDER, FOR SOLUTION INTRAMUSCULAR; INTRAVENOUS at 11:50

## 2022-04-25 RX ADMIN — SODIUM CHLORIDE, POTASSIUM CHLORIDE, SODIUM LACTATE AND CALCIUM CHLORIDE: 600; 310; 30; 20 INJECTION, SOLUTION INTRAVENOUS at 16:38

## 2022-04-25 RX ADMIN — PANCRELIPASE 2 CAPSULE: 24000; 76000; 120000 CAPSULE, DELAYED RELEASE PELLETS ORAL at 13:44

## 2022-04-25 RX ADMIN — FERROUS SULFATE TAB 325 MG (65 MG ELEMENTAL FE) 325 MG: 325 (65 FE) TAB at 09:49

## 2022-04-25 ASSESSMENT — ACTIVITIES OF DAILY LIVING (ADL)
TRANSFERRING: 0-->INDEPENDENT
DOING_ERRANDS_INDEPENDENTLY_DIFFICULTY: NO
ADLS_ACUITY_SCORE: 4
CHANGE_IN_FUNCTIONAL_STATUS_SINCE_ONSET_OF_CURRENT_ILLNESS/INJURY: NO
ADLS_ACUITY_SCORE: 4
VISION_MANAGEMENT: GLASSES
WEAR_GLASSES_OR_BLIND: YES
ADLS_ACUITY_SCORE: 4
DIFFICULTY_EATING/SWALLOWING: NO
CONCENTRATING,_REMEMBERING_OR_MAKING_DECISIONS_DIFFICULTY: NO
ADLS_ACUITY_SCORE: 4
TRANSFERRING: 0-->INDEPENDENT
FALL_HISTORY_WITHIN_LAST_SIX_MONTHS: NO
DRESSING/BATHING_DIFFICULTY: NO
ADLS_ACUITY_SCORE: 4
ADLS_ACUITY_SCORE: 4
TOILETING_ISSUES: NO
ADLS_ACUITY_SCORE: 4
WALKING_OR_CLIMBING_STAIRS_DIFFICULTY: NO

## 2022-04-25 NOTE — PROGRESS NOTES
04/25/22 1532   Child Life   Location Med/Surg  (Unit 6, Admission for Lupus Flare)   Intervention Supportive Check In  (This writer introduced self and services to patient. Patient familiar with [facility] and unit. Engaged in conversation to assess patient's coping and comfort needs. Introduced hospital resources patient can utilize throughout admission. Patient declined having any questions regarding admission. In addition, patient declined having and coping/comfort needs. )   Outcomes/Follow Up Continue to Follow/Support

## 2022-04-25 NOTE — PLAN OF CARE
Goal Outcome Evaluation:    Plan of Care Reviewed With: patient     Overall Patient Progress: improving     VSS, afebrile. Denies pain. Adequate PO intake and UOP. Fair appetite. Started IV antibiotics for positive blood culture. Hourly rounding completed. Continue to monitor.

## 2022-04-25 NOTE — H&P
Resident/Fellow Attestation   I, Rach Celaya, was present with the medical/PUNE[employer] student who participated in the service and in the documentation of the note.  I have verified the history and personally performed the physical exam and medical decision making.  I agree with the assessment and plan of care as documented in the note.        Rach Celaya MD  PGY1  Date of Service (when I saw the patient): 04/25/22    [facility]    History and Physical - Hospitalist Service     Date of Admission:  4/25/2022    Assessment & Plan      Milly Carroll is a 21 year old female admitted on 4/25/2022 due to Lupus flare and electrolyte derangements.      Malaise, weakness, and fever suspected systemic lupus erythematous flare up  Patient presents with malaise, weakness, and fever since Saturday consistent with previous flare up of SLE. CRP 2.65. Hgb 9.3 (baseline 8-9), no leukocytosis. Hypotensive in outside ED with systolic BP in 70s, improved s/p fluid resuscitation. Differential also includes infection, especially given baseline immunosuppression, but there is no obvious source and no sick contacts. Follows with Peds Rheum, Dr. Anders. Primary manifestation at baseline is skin involvement. PTA regimen is belimumab, hydroxychloroquine, mycophenolate, prednisone, which she has been taking regularly. Patient currently comfortable with reassuring vitals and benign physical exam.   - Rheumatology consult  - S/p fluid bolus in outside ED  - On maintenance fluids with LR  - Tylenol for pain, would avoid NSAID given CHRISTA  - PTA belimumab SOAJ 200mg QFriday  - PTA hydroxychloroquine 400mg QD  - PTA Mycophenolate 1500mg BID  - PTA prednisone 5mg QD  - AM labs: C3, C4, ESR, anti-dsDNA Ab, urine protein/Cr  - Deferring initiation of additional steroids pending lab draw, Rheum input     CHRISTA  Cr 0.79, baseline ~0.5. BUN:Cr ~10:1, most consistent with intrinsic renal etiology. Likely secondary to  underlying SLE.    - Continue to monitor  - Fluids as above     Hypokalemia  Hypocalcemia  K 2.3, Ca 7.5 on CMP at outside ED.   - S/p K repletion with 40 mEq of PO and 10 mEq of IV  - F/u CMP re-check  - PTA calcium carbonate  - Further replacement as indicated pending re-check     Diet: Peds Diet Age 9-18 yrs    DVT Prophylaxis: Low Risk/Ambulatory with no VTE prophylaxis indicated  Leung Catheter: Not present  Fluids: LR  Central Lines: None  Cardiac Monitoring: ACTIVE order. Indication: electrolyte derangements  Code Status:   Full    Disposition Plan   Expected Discharge: >2 days  Anticipated discharge location:  Awaiting care coordination huddle   Delay: Potential for IV steroids       The patient's care was discussed with the Bedside Nurse, Patient and Primary team.    Ximena Matute, MS3  Medical Student  Pediatric Service   [facility]    ______________________________________________________________________    Chief Complaint      SLE flare up       History is obtained from the patient and the electronic medical record    History of Present Illness   Milly Carroll is a 21 year old female with SLE who presented to an outside ED with suspected lupus flare up.      Presents as a direct transfer from [facility] ED on 4/25/22. Pt has been experiencing significant malaise, weakness, and feeling like she was going to pass out. Had a temp of 102 at home. Per patients, symptoms started on Saturday. Has had decreased appetite and intake of fluids since symptoms started. No rash. No joint pain. Denies chest pain. Denies sore throat, cough, runny nose, nausea/vomiting, diarrhea. No sick contacts. Has been taking PTA meds regularly, as prescribed. Has had 1 lupus flare up before, about 3 years ago. Feels like this episode is the same as that flare up.. Her primary manifestation of lupus at baseline is skin involvement. .      In [facility], BP was 70s systolic. Febrile to 102. Got 1  L of fluids and systolics better in the 100s. Her labs reviewed mildly elevated CRP, normal UA, Hgb near baseline (was 8, now 9.3). Of note, her potassium was low at 2.3 and calcium was low at 2.5. Mg normal. Alk phos up. Potassium was replaced with 40 mEq of PO and 10 mEq of IV.     Peds rheum was called and recommended transfer to [facility] but to hold off on steroids. They requested multiple labs be drawn which are pending. Patient did not want broad spectrum antibiotics at this time as she felt it was more consistent with lupus flare.    Review of Systems    The 10 point Review of Systems is negative other than noted in the HPI or here.     Past Medical History    I have reviewed this patient's medical history and updated it with pertinent information if needed.   Past Medical History:   Diagnosis Date     Hepatitis      Lupus (systemic lupus erythematosus) (H)      Lupus cerebritis (H)      Pancreatitis 08/2017     Pyelonephritis 08/2017     Seizures (H)      Status epilepticus (H)        Past Surgical History   I have reviewed this patient's surgical history and updated it with pertinent information if needed.  Past Surgical History:   Procedure Laterality Date     NO HISTORY OF SURGERY       ORTHOPEDIC SURGERY         Social History   Lives in [place].     Family History   I have reviewed this patient's family history and updated it with pertinent information if needed.  Family History   Problem Relation Age of Onset     Other - See Comments Father         Question of lupus in father and paternal grandfather     Lupus Sister         older sister     Gastrointestinal Disease No family hx of         No known history of IBD, hepatitis, pancreatitis, celiac disease, or GI cancers before age 50     Glaucoma No family hx of      Macular Degeneration No family hx of        Prior to Admission Medications   Prior to Admission Medications   Prescriptions Last Dose Informant Patient Reported? Taking?   Belimumab 200  MG/ML SOAJ   No No   Sig: Inject 200 mg Subcutaneous every 7 days   acetaminophen (TYLENOL) 500 MG tablet   No No   Sig: Take 1 tablet (500 mg) by mouth every 4 hours as needed for mild pain   amylase-lipase-protease (CREON 24) 71476-03625 units CPEP per EC capsule   No No   Sig: Take 1 capsule by mouth Take with snacks or supplements (with snacks)   amylase-lipase-protease (CREON 24) 63357-70968 units CPEP per EC capsule   No No   Sig: Take 2 capsules by mouth 3 times daily (with meals)   calcium carbonate (TUMS) 500 MG chewable tablet   No No   Sig: Take 1 tablet (500 mg) by mouth daily   ferrous sulfate (FEROSUL) 325 (65 Fe) MG tablet   No No   Sig: Take 1 tablet (325 mg) by mouth daily (with breakfast)   hydroxychloroquine (PLAQUENIL) 200 MG tablet   No No   Sig: Take 2 tablets (400 mg) by mouth daily   multivitamin, therapeutic (THERA-VIT) TABS tablet  Self No No   Sig: Take 1 tablet by mouth daily   mvw complete formulation (SOFTGELS) capsule   No No   Sig: Take 2 capsules by mouth daily   mycophenolate (GENERIC EQUIVALENT) 500 MG tablet   No No   Sig: Take 3 tablets (1,500 mg) by mouth 2 times daily   predniSONE (DELTASONE) 5 MG tablet   No No   Sig: Take 1 tablet (5 mg) by mouth daily   vitamin D3 (CHOLECALCIFEROL) 1.25 MG (49711 UT) capsule   No No   Sig: Take 1 capsule (50,000 Units) by mouth every 7 days   vitamin D3 (CHOLECALCIFEROL) 10 MCG (400 UNIT) capsule   No No   Sig: Take 1 capsule (400 Units) by mouth daily      Facility-Administered Medications: None     Allergies   Allergies   Allergen Reactions     Rituximab Anaphylaxis       Physical Exam   Vital Signs: Temp: 98.7  F (37.1  C) Temp src: Axillary BP: 104/74 Pulse: 79   Resp: 10 SpO2: 100 % O2 Device: None (Room air)    Weight: 111 lbs 1.79 oz    Constitutional: Awake. Tired appearing, but comfortable in bed and pleasant.   Eyes: Lids and lashes normal, extra ocular muscles intact, sclera clear, conjunctiva normal  ENT: Normocephalic, without  obvious abnormality, atraumatic, external ears without lesions, oral pharynx with moist mucous membranes, gums normal and good dentition.  Respiratory: No increased work of breathing, good air exchange, clear to auscultation bilaterally, no crackles or wheezing  Cardiovascular: Normal apical impulse, regular rate and rhythm, normal S1 and S2, no S3 or S4, and no murmur noted. No rubs.   GI: Normal bowel sounds, soft, non-distended, non-tender, no masses palpated, no hepatosplenomegally  Genitounirinary: Deferred  Skin: Normal skin color, texture, turgor, no redness, warmth, or swelling, no rashes and no lesions. Thinning hair.   Musculoskeletal: There is no redness, warmth, or swelling of the joints.  Full range of motion noted.  Motor strength is 5 out of 5 all extremities bilaterally.  Tone is normal.  Neurologic: Awake, alert, oriented to name, place and time. Cranial nerves II-XII are grossly intact.   Neuropsychiatric: mentation appropriate for age, calm and normal eye contact    Data   Data reviewed today: I reviewed all medications, new labs and imaging results over the last 24 hours. I personally reviewed no images or EKG's today.    Yellow  Result   Urine Appearance Clear    Urine Specific Gravity 1.015    Urine pH 7.0    Urine Glucose Negative    Urine Ketones Negative    Urine Protein 1+ Abnormal     Urine Bilirubin Negative    Urine Urobilinogen 4.0 Abnormal     Urine Blood 1+ Abnormal     Urine WBC's 0-5    Urine RBC's 0-2    Urine Epithelial Cells Negative    Urine Bacteria Negative    Urine Nitrates Negative    Urine Leukocyte Esterase Negative      Not Detected     Influenza B Not Detected    Comment: EUA/IVD   SARS-CoV-2 RNA (COVID-19) Not Detected    Comment: EUA/IVD     Lactic Acid, Venous 0.4 - 2.0 mmol/L 1.8      5.9  Result   RBC 3.59 Low     HGB 9.3 Low     HCT 28.8 Low     MCV 80    MCH 25.9 Low     MCHC 32.3        MPV 8.5    RDW-CV 17.2    Granulocytes Absolute 4.40    Lymphocytes  Absolute  1.30    Monocytes Absolute 0.20      C-Reactive Protein <=0.33 mg/dL 2.65 High       136  Result   Potassium 2.3 Low Panic     Chloride 100    Carbon Dioxide 23    Anion Gap 13    Calcium 7.5 Low     Alkaline Phosphatase 189 High     Aspartate Aminotransferase 37    Alanine Aminotransferase 12 Low     Glucose 97    Blood Urea nitrogen 9    Creatinine 0.79    Protein, Total 9.2 High     Albumin 2.5 Low     Bilirubin, Total 0.6    Globulin 6.7 High     Glomerular Filtration Rate >=60.0      High Sensitivity Troponin I 0.00 - 51.40 pg/mL 7.50      Lipase 73 - 393 U/L 286      Magnesium 1.8 - 2.4 mg/dL 2.3

## 2022-04-25 NOTE — PLAN OF CARE
Goal Outcome Evaluation:  Pt admitted this am.  No complaints of pain.  IVF started.  Labs this am.  Plan to continue to monitor.       Plan of Care Reviewed With: patient

## 2022-04-25 NOTE — CONSULTS
[facility]    Pediatric Rheumatology Consultation     Date of Admission:  4/25/2022    Assessment & Plan   Milly Carroll is a 21 year old female with systemic lupus erythematosus (SLE) initially diagnosed with discoid lupus in 2005 at age 5 and systemic lupus in 2013 (malar rash, vasculitic extremity rash, alopecia, cerebritis with seizures, pancreatitis, hepatitis; serologies showing cytopenias, hypocomplementemia, AIMEE+, dsDNA+, ribosomal P+, RNP+) who presents as a transfer for further management of fever, mild hypotension, lightheadedness, and electrolyte derangements.     Problem List:   Fever  Hypotension, resolved  Anemia  Elevated inflammatory markers  Proteinuria  Hypoalbuminemia  Electrolyte derangements (hypokalemia, hypocalcemia)  Muscle twitching, resolved  Right middle lobe opacification on CXR    Her presentation is concerning for SLE flare versus infection versus a combination of those two things. Blood cultures from the outside hospital now growing Gram positive rods, which may be a contaminant, but given her immunosuppressed status empiric antibiotics are recommended until speciation is available. No clear signs of urinary tract infection on outside urinalysis. She is currently afebrile and otherwise vitally stable.    Clinically she appears dehydrated, likely due to poor PO intake related to nausea over recent days. Electrolyte derangements noted at outside hospital are also likely as a result of this. Unsure if nausea is related to lupus activity or to infection or other issue. She has had pancreatitis in association with her SLE in the past but her lipase is currently normal.    Milly reports good compliance with her SLE medication regimen of mycophenolate, hydroxychloroquine, low dose prednisone, and weekly Benlysta. More specific markers of SLE disease activity are pending at this time. Her anemia is stable. She does have proteinuria which is  slightly more than last check during 12/2021, however no signs of nephritis.    A right middle lung opacity was noted on the outside chest x-ray. This has not been noted on prior imaging. Pending clinical course, advanced imaging of this region may be needed for further clarification.    Recommendations:   - Would recommend empiric antibiotics given febrile presentation, positive blood culture, and immunosuppressed status  - Continue outpatient SLE medications as written for now   - Agree with repeat blood cultures  - Please obtain repeat urinalysis and urine culture if not pending at outside hospital  - Ok to restart Bactrim prophylaxis at this time given immunosuppressive regimen.  - Will consider if additional chest imaging is needed this admission    We have asked our Rheumatology , Rosie Kate, to visit with Milly.    Patient seen and discussed with Dr. Anders.     Recommendations conveyed to the patient and the primary team.     Alison M. Lerman, MD  Adult and Pediatric Rheumatology Fellow    I supervised the Fellow's interaction with the family.  I obtained a relevant history and performed a complete physical exam.  I reviewed the patient's outside records.  I discussed my impression and recommendations with the patient and family.  I edited the above note, created originally by the Fellow.  I agree with the trainee's findings and plan of care as documented in the trainee s note.  We discussed our impression and recommendations in person with the primary team resident.     Jonh Anders MD, PhD  , Pediatric Rheumatology      Reason for Consult   Reason for consult: I was asked by Dr. Celaya to evaluate this patient for concern for SLE flare.    Primary Care Physician   Estefany Bell    Chief Complaint   Fever, lightheadedness    History is obtained from the patient    History of Present Illness   Milly Carroll is a 21 year old female who presented to an outside ER  "on 4/24 with fever for three days, lightheadedness, and muscle jerking. Milly had been feeling feverish since 4/21 with nausea and poor PO intake for a few days. She decided to come to the ER on 4/24 due to feeling like \"her body wasn't hers to control\" because her muscles were twitching and jerking without her meaning to.    She thinks her lupus has overall been doing ok lately. She thinks the rash on her face and head are stable. She has not had worsening hair loss. She has not had any new rashes on her hands or feet. She does not have any sores in her mouth or any joint pain. She reports that she is doing well with being able to fill her medications and is taking them regularly.      She has not had any cough or difficulty breathing. She has not had any sensation of heart racing or chest pain. She denies dysuria, urinary frequency, or back pain. She denies any sexual activity. She has not had any diarrhea.     She was last seen by Dr. Anders in 12/2021. At that time there were plans to perhaps move out of [facility], but she is currently still in [place] without concrete plans for a move at this time.       Past Medical History    I have reviewed this patient's medical history and updated it with pertinent information if needed.   Past Medical History:   Diagnosis Date     Hepatitis      Lupus (systemic lupus erythematosus) (H)      Lupus cerebritis (H)      Pancreatitis 08/2017     Pyelonephritis 08/2017     Seizures (H)      Status epilepticus (H)        Past Surgical History   I have reviewed this patient's surgical history and updated it with pertinent information if needed.  Past Surgical History:   Procedure Laterality Date     NO HISTORY OF SURGERY       ORTHOPEDIC SURGERY         Immunization History   Immunization Status: Per [facility] is missing COVID, HPV, Men B, and MenACWY vaccines.     Prior to Admission Medications   Prior to Admission Medications   Prescriptions Last Dose Informant Patient Reported? " Taking?   Belimumab 200 MG/ML SOAJ   No No   Sig: Inject 200 mg Subcutaneous every 7 days   acetaminophen (TYLENOL) 500 MG tablet   No No   Sig: Take 1 tablet (500 mg) by mouth every 4 hours as needed for mild pain   amylase-lipase-protease (CREON 24) 84306-18948 units CPEP per EC capsule   No No   Sig: Take 1 capsule by mouth Take with snacks or supplements (with snacks)   amylase-lipase-protease (CREON 24) 80343-10018 units CPEP per EC capsule   No No   Sig: Take 2 capsules by mouth 3 times daily (with meals)   calcium carbonate (TUMS) 500 MG chewable tablet   No No   Sig: Take 1 tablet (500 mg) by mouth daily   ferrous sulfate (FEROSUL) 325 (65 Fe) MG tablet   No No   Sig: Take 1 tablet (325 mg) by mouth daily (with breakfast)   hydroxychloroquine (PLAQUENIL) 200 MG tablet   No No   Sig: Take 2 tablets (400 mg) by mouth daily   multivitamin, therapeutic (THERA-VIT) TABS tablet  Self No No   Sig: Take 1 tablet by mouth daily   mvw complete formulation (SOFTGELS) capsule   No No   Sig: Take 2 capsules by mouth daily   mycophenolate (GENERIC EQUIVALENT) 500 MG tablet   No No   Sig: Take 3 tablets (1,500 mg) by mouth 2 times daily   predniSONE (DELTASONE) 5 MG tablet   No No   Sig: Take 1 tablet (5 mg) by mouth daily   vitamin D3 (CHOLECALCIFEROL) 1.25 MG (85705 UT) capsule   No No   Sig: Take 1 capsule (50,000 Units) by mouth every 7 days   vitamin D3 (CHOLECALCIFEROL) 10 MCG (400 UNIT) capsule   No No   Sig: Take 1 capsule (400 Units) by mouth daily      Facility-Administered Medications: None     Allergies   Allergies   Allergen Reactions     Rituximab Anaphylaxis       Social History    She is currently living with her sister and other family members in [facility].      Family History   I have reviewed this patient's family history and updated it with pertinent information if needed.   Family History   Problem Relation Age of Onset     Other - See Comments Father         Question of lupus in father and paternal  grandfather     Lupus Sister         older sister     Gastrointestinal Disease No family hx of         No known history of IBD, hepatitis, pancreatitis, celiac disease, or GI cancers before age 50     Glaucoma No family hx of      Macular Degeneration No family hx of        Review of Systems   The 10 point Review of Systems is negative other than noted in the HPI or here.     Physical Exam   Temp: 99.3  F (37.4  C) Temp src: Oral BP: 96/71 Pulse: 77   Resp: 18 SpO2: 100 % O2 Device: None (Room air)    Vital Signs with Ranges  Temp:  [98  F (36.7  C)-99.3  F (37.4  C)] 99.3  F (37.4  C)  Pulse:  [75-79] 77  Resp:  [10-18] 18  BP: ()/(71-74) 96/71  SpO2:  [100 %] 100 %  111 lbs 1.79 oz    Constitutional: awake, alert, cooperative, no apparent distress, and appears stated age.  Head: Hair thinning noted with mildly raised erythematous plaques on anterior scalp  Eyes: Lids and lashes normal, extra ocular muscles intact, sclera clear, conjunctiva normal.  Ears: External ears without lesions  ENT: Oroharynx with moist mucous membranes, no sores, tonsils without erythema or exudates, gums normal and good dentition, normal salivary pool.  Hematologic / Lymphatic: no cervical or supraclavicular lymphadenopathy.  Respiratory: No increased work of breathing, good air exchange, clear to auscultation bilaterally without crackles or wheezing.  Cardiovascular: Regular rate and rhythm, normal S1 and S2, no S3 or S4, and no murmur noted.  GI: Normal bowel sounds, soft, non-distended, non-tender, no masses palpated, no hepatosplenomegaly.  Genitounirinary: Deferred  Skin: Erythematous plaques overlying skin above eyebrows bilaterally, some more mild erythema overlying nose/nasal bridge and cheeks and chin, no bruising or bleeding, normal skin color, texture, turgor, no other rashes and no lesions.  Musculoskeletal: No evidence of current synovitis/arthritis of the TMJ, sternoclavicular, acromioclavicular, glenohumeral, elbow,  wrists, finger, knee, ankle, or toe joints. No tendonitis or bursitis. No enthesitis.  Neurologic: Awake, alert, oriented to name, place and time. Cranial nerves II-XII are grossly intact.  Motor and sensation is grossly intact. Tone appears normal.  Neuropsychiatric: General: normal, calm and normal eye contact.    Data   Results for orders placed or performed during the hospital encounter of 04/25/22 (from the past 24 hour(s))   Protein  random urine   Result Value Ref Range    Total Protein Random Urine g/L 0.38 g/L    Total Protein Urine g/gr Creatinine 0.83 (H) 0.00 - 0.20 g/g Cr    Creatinine Urine mg/dL 46 mg/dL   Erythrocyte sedimentation rate auto   Result Value Ref Range    Erythrocyte Sedimentation Rate 128 (H) 0 - 20 mm/hr   Comprehensive metabolic panel   Result Value Ref Range    Sodium 143 133 - 144 mmol/L    Potassium 3.5 3.4 - 5.3 mmol/L    Chloride 119 (H) 94 - 109 mmol/L    Carbon Dioxide (CO2) 20 20 - 32 mmol/L    Anion Gap 4 3 - 14 mmol/L    Urea Nitrogen 7 7 - 30 mg/dL    Creatinine 0.66 0.52 - 1.04 mg/dL    Calcium 7.8 (L) 8.5 - 10.1 mg/dL    Glucose 80 70 - 99 mg/dL    Alkaline Phosphatase 150 40 - 150 U/L    AST 33 0 - 45 U/L    ALT 10 0 - 50 U/L    Protein Total 7.5 6.8 - 8.8 g/dL    Albumin 2.0 (L) 3.4 - 5.0 g/dL    Bilirubin Total 0.4 0.2 - 1.3 mg/dL    GFR Estimate >90 >60 mL/min/1.73m2

## 2022-04-25 NOTE — ED TRIAGE NOTES
Emergency Department    BP 97/74   Pulse 75   Temp 98  F (36.7  C) (Tympanic)   Resp 16   SpO2 100%     Milly Carroll presents to the [facility] esp[facility] as a direct admission through the Emergency Department. Refer to vital signs flow sheet. Based upon a brief MD clinical assessment, Milly is stable and will be admitted to the inpatient floor.  Myrna Santacruz RN  April 25, 2022  4:38 AM

## 2022-04-26 ENCOUNTER — DOCUMENTATION ONLY (OUTPATIENT)
Dept: RHEUMATOLOGY | Facility: CLINIC | Age: 22
End: 2022-04-26
Payer: COMMERCIAL

## 2022-04-26 VITALS
WEIGHT: 111.77 LBS | OXYGEN SATURATION: 100 % | BODY MASS INDEX: 19.8 KG/M2 | DIASTOLIC BLOOD PRESSURE: 63 MMHG | SYSTOLIC BLOOD PRESSURE: 97 MMHG | HEIGHT: 63 IN | RESPIRATION RATE: 16 BRPM | TEMPERATURE: 98.1 F | HEART RATE: 57 BPM

## 2022-04-26 LAB
ANION GAP SERPL CALCULATED.3IONS-SCNC: 6 MMOL/L (ref 3–14)
BACTERIA UR CULT: NORMAL
BUN SERPL-MCNC: 8 MG/DL (ref 7–30)
CALCIUM SERPL-MCNC: 7.5 MG/DL (ref 8.5–10.1)
CHLORIDE BLD-SCNC: 116 MMOL/L (ref 94–109)
CO2 SERPL-SCNC: 21 MMOL/L (ref 20–32)
CREAT SERPL-MCNC: 0.6 MG/DL (ref 0.52–1.04)
DSDNA AB SER-ACNC: 890 IU/ML
GFR SERPL CREATININE-BSD FRML MDRD: >90 ML/MIN/1.73M2
GLUCOSE BLD-MCNC: 93 MG/DL (ref 70–99)
HOLD SPECIMEN: NORMAL
POTASSIUM BLD-SCNC: 3 MMOL/L (ref 3.4–5.3)
SODIUM SERPL-SCNC: 143 MMOL/L (ref 133–144)

## 2022-04-26 PROCEDURE — 258N000003 HC RX IP 258 OP 636

## 2022-04-26 PROCEDURE — 36415 COLL VENOUS BLD VENIPUNCTURE: CPT | Performed by: STUDENT IN AN ORGANIZED HEALTH CARE EDUCATION/TRAINING PROGRAM

## 2022-04-26 PROCEDURE — 99232 SBSQ HOSP IP/OBS MODERATE 35: CPT | Performed by: PEDIATRICS

## 2022-04-26 PROCEDURE — 99239 HOSP IP/OBS DSCHRG MGMT >30: CPT | Mod: GC | Performed by: STUDENT IN AN ORGANIZED HEALTH CARE EDUCATION/TRAINING PROGRAM

## 2022-04-26 PROCEDURE — 250N000012 HC RX MED GY IP 250 OP 636 PS 637

## 2022-04-26 PROCEDURE — 250N000013 HC RX MED GY IP 250 OP 250 PS 637

## 2022-04-26 PROCEDURE — 250N000011 HC RX IP 250 OP 636: Performed by: INTERNAL MEDICINE

## 2022-04-26 PROCEDURE — 80048 BASIC METABOLIC PNL TOTAL CA: CPT | Performed by: STUDENT IN AN ORGANIZED HEALTH CARE EDUCATION/TRAINING PROGRAM

## 2022-04-26 RX ORDER — SULFAMETHOXAZOLE/TRIMETHOPRIM 800-160 MG
1 TABLET ORAL
Qty: 18 TABLET | Refills: 3 | Status: SHIPPED | OUTPATIENT
Start: 2022-04-27 | End: 2022-09-07

## 2022-04-26 RX ORDER — SULFAMETHOXAZOLE/TRIMETHOPRIM 800-160 MG
1.5 TABLET ORAL
Qty: 18 TABLET | Refills: 3 | Status: SHIPPED | OUTPATIENT
Start: 2022-04-27 | End: 2022-04-26

## 2022-04-26 RX ADMIN — CALCIUM CARBONATE 500 MG: 500 TABLET, CHEWABLE ORAL at 09:15

## 2022-04-26 RX ADMIN — FERROUS SULFATE TAB 325 MG (65 MG ELEMENTAL FE) 325 MG: 325 (65 FE) TAB at 09:15

## 2022-04-26 RX ADMIN — Medication 2 CAPSULE: at 09:15

## 2022-04-26 RX ADMIN — PANCRELIPASE 2 CAPSULE: 24000; 76000; 120000 CAPSULE, DELAYED RELEASE PELLETS ORAL at 14:41

## 2022-04-26 RX ADMIN — HYDROXYCHLOROQUINE SULFATE 400 MG: 200 TABLET, FILM COATED ORAL at 09:15

## 2022-04-26 RX ADMIN — PANCRELIPASE 2 CAPSULE: 24000; 76000; 120000 CAPSULE, DELAYED RELEASE PELLETS ORAL at 09:14

## 2022-04-26 RX ADMIN — MYCOPHENOLATE MOFETIL 1500 MG: 500 TABLET, FILM COATED ORAL at 09:15

## 2022-04-26 RX ADMIN — PREDNISONE 5 MG: 5 TABLET ORAL at 09:15

## 2022-04-26 RX ADMIN — SODIUM CHLORIDE, POTASSIUM CHLORIDE, SODIUM LACTATE AND CALCIUM CHLORIDE: 600; 310; 30; 20 INJECTION, SOLUTION INTRAVENOUS at 03:06

## 2022-04-26 RX ADMIN — CEFTRIAXONE SODIUM 1 G: 1 INJECTION, POWDER, FOR SOLUTION INTRAMUSCULAR; INTRAVENOUS at 11:11

## 2022-04-26 RX ADMIN — CHOLECALCIFEROL TAB 10 MCG (400 UNIT) 400 UNITS: 10 TAB at 11:11

## 2022-04-26 ASSESSMENT — ACTIVITIES OF DAILY LIVING (ADL)
ADLS_ACUITY_SCORE: 4

## 2022-04-26 NOTE — DISCHARGE SUMMARY
[facility]  Discharge Summary - Medicine & Pediatrics       Date of Admission:  4/25/2022  Date of Discharge:  4/26/2022  Discharging Provider: Dr. Clemente  Discharge Service: Pediatric Service VIOLET Team    Discharge Diagnoses   Malaise, weakness, fever, improving  Systemic lupus erythematous flare, improving    Follow-ups Needed After Discharge   Follow-up Appointments     Follow Up and recommended labs and tests      Follow up with primary care provider, Estefany Bell, within 7 days for   hospital follow- up.  No follow up labs or test are needed.    Work on scheduling an adult rheumatology follow up through [facility].           Unresulted Labs Ordered in the Past 30 Days of this Admission     Date and Time Order Name Status Description    4/25/2022  8:36 AM Blood Culture Peripheral Blood Preliminary         Discharge Disposition   Discharged to home  Condition at discharge: Stable      Hospital Course   Milly Carroll was admitted on 4/25/2022 for presumed acute lupus flare and infection rule out.  The following problems were addressed during her hospitalization:    Malaise, weakness, fever, improving  Systemic lupus erythematous flare, improving  Infectious rule out  Patient presented with malaise, weakness, and fever since 04/23 consistent with previous flare up of SLE. CRP 2.65. Hgb 9.3 (baseline 8-9), no leukocytosis. Hypotensive in outside ED with systolic BP in 70s, improved s/p fluid resuscitation. Differential also includes infection, especially given baseline immunosuppression, but there is no obvious source and no sick contacts. Blood culture form [facility] hospital returned positive showing gram positive rods. Patient was started on IV ceftriaxone however remained afebrile and vitally stable during this time. On 04/26, bacterial speciation resulted in likely contaminant so ceftriaxone was discontinued.     Labs showed C3 low at 11, C4 low at 3, ESR  elevated at 128, DsDNA elevated at 890, urine culture unremarkable, and repeat blood culture collected here with no growth after 1 day.     Rheumatology was involved in her care. She continued her PTA medication regimen of belimumab, hydroxychloroquine, mycophenolate, and prednisone while inpatient and was discharged with these medications. She was started on bactrim for PJP prophylaxis.      On 04/26 patient was feeling subjectively better, remained vitally stable, and after speciation of blood culture which resulted in likely contaminant, rheumatology cleared patient for discharge. Patient plans on following up with adult rheumatology and is working on setting this up through [facility].     Consultations This Hospital Stay   PEDS RHEUMATOLOGY IP CONSULT    Code Status   Full Code       I have seen and discussed the patient and medical management with Dr. Chyna Antonio MD  Pediatric Resident, PGY-1  [facility]  Date of Service: 04/26/2022  ______________________________________________________________________    Physical Exam   Vital Signs: Temp: 98.1  F (36.7  C) Temp src: Oral BP: 97/63 Pulse: 57   Resp: 16 SpO2: 100 % O2 Device: None (Room air)    Weight: 111 lbs 12.37 oz  Constitutional: awake, alert, cooperative, no apparent distress, and appears stated age  Respiratory: No increased work of breathing, good air exchange, clear to auscultation bilaterally, no crackles or wheezing  Cardiovascular: Normal apical impulse, regular rate and rhythm, normal S1 and S2, no S3 or S4, and no murmur noted  GI: No scars, normal bowel sounds, soft, non-distended, non-tender, no masses palpated, no hepatosplenomegally  Skin: no bruising or bleeding, normal skin color, texture, turgor and no redness, warmth, or swelling  Musculoskeletal: full range of motion noted  motor strength is 5 out of 5 all extremities bilaterally  tone is normal  Neurologic: Awake, alert, oriented to name, place and time.          Primary Care Physician   Estefany Bell    Discharge Orders      Reason for your hospital stay    Milly was hospitalized  for fever and weakness which was found to be secondary to acute lupus flare. She as put in IV antibiotics following a positive blood culture from an outside hospital, however these were discontinued on 04/26 due to the positive culture likely being a contaminant. She was seen by rheumatology during her stay who recommended going home on her regular medication regimen and adding in Bactrim, an oral antibiotics in order to prevent further infections due to immunosuppression. Milly will work on getting follow up at [facility] for adult rheumatology. On 04/26, Milly's symptoms had improved, she was vitally stable, and rheumatology cleared her for discharge.     Activity    Your activity upon discharge: activity as tolerated     Follow Up and recommended labs and tests    Follow up with primary care provider, Estefany Bell, within 7 days for hospital follow- up.  No follow up labs or test are needed.    Work on scheduling an adult rheumatology follow up through [facility].     Diet    Follow this diet upon discharge: Regular       Significant Results and Procedures    Results for orders placed or performed during the hospital encounter of 08/11/21   XR Pelvis and Hip Bilateral 2 Views    Narrative    XR PELVIS AND HIP BILATERAL 2 VIEWS  8/11/2021 12:54 PM      HISTORY: SLE, prior AVN of right knee, now with right hip pain.;  Systemic lupus erythematosus with other organ involvement, unspecified  SLE type (H)    COMPARISON: 4/17/2019    FINDINGS:   AP and frog-leg views of the pelvis. There is a cup like radiodense  foreign body projecting at the low left pelvis. No fracture or other  osseous abnormality is visualized. Alignment is normal. The soft  tissues appear radiographically normal.      Impression    IMPRESSION:   Cup like radiodense foreign body projecting at the low left pelvis.  No  osseous abnormality visualized.    QUINCY LUZ MD         SYSTEM ID:  [id number]       Discharge Medications   Current Discharge Medication List      START taking these medications    Details   sulfamethoxazole-trimethoprim (BACTRIM DS) 800-160 MG tablet Take 1 tablet by mouth Every Mon, Wed, Fri Morning  Qty: 18 tablet, Refills: 3    Associated Diagnoses: Immunosuppression (H)         CONTINUE these medications which have NOT CHANGED    Details   acetaminophen (TYLENOL) 500 MG tablet Take 1 tablet (500 mg) by mouth every 4 hours as needed for mild pain  Qty: 1 Bottle, Refills: 1    Associated Diagnoses: Other acute pancreatitis, unspecified complication status      !! amylase-lipase-protease (CREON 24) 17880-49563 units CPEP per EC capsule Take 1 capsule by mouth Take with snacks or supplements (with snacks)  Qty: 112 capsule, Refills: 1    Associated Diagnoses: Pancreatic insufficiency      !! amylase-lipase-protease (CREON 24) 61689-78689 units CPEP per EC capsule Take 2 capsules by mouth 3 times daily (with meals)  Qty: 336 capsule, Refills: 1    Associated Diagnoses: Pancreatic insufficiency      Belimumab 200 MG/ML SOAJ Inject 200 mg Subcutaneous every 7 days  Qty: 4 mL, Refills: 11    Associated Diagnoses: Systemic lupus erythematosus with other organ involvement, unspecified SLE type (H)      calcium carbonate (TUMS) 500 MG chewable tablet Take 1 tablet (500 mg) by mouth daily  Qty: 30 tablet, Refills: 0    Associated Diagnoses: Systemic lupus erythematosus with other organ involvement, unspecified SLE type (H)      ferrous sulfate (FEROSUL) 325 (65 Fe) MG tablet Take 1 tablet (325 mg) by mouth daily (with breakfast)  Qty: 100 tablet, Refills: 3    Associated Diagnoses: Systemic lupus erythematosus with other organ involvement, unspecified SLE type (H)      hydroxychloroquine (PLAQUENIL) 200 MG tablet Take 2 tablets (400 mg) by mouth daily  Qty: 60 tablet, Refills: 11    Associated Diagnoses: Systemic  lupus erythematosus with other organ involvement, unspecified SLE type (H)      multivitamin, therapeutic (THERA-VIT) TABS tablet Take 1 tablet by mouth daily  Qty: 30 tablet, Refills: 11    Associated Diagnoses: Other systemic lupus erythematosus with other organ involvement (H)      mvw complete formulation (SOFTGELS) capsule Take 2 capsules by mouth daily  Qty: 180 capsule, Refills: 1    Comments: NDC: [address]-63  Associated Diagnoses: Exocrine pancreatic insufficiency      mycophenolate (GENERIC EQUIVALENT) 500 MG tablet Take 3 tablets (1,500 mg) by mouth 2 times daily  Qty: 180 tablet, Refills: 11    Associated Diagnoses: Systemic lupus erythematosus with other organ involvement, unspecified SLE type (H)      predniSONE (DELTASONE) 5 MG tablet Take 1 tablet (5 mg) by mouth daily  Qty: 150 tablet, Refills: 1    Associated Diagnoses: Systemic lupus erythematosus with other organ involvement, unspecified SLE type (H)      !! vitamin D3 (CHOLECALCIFEROL) 1.25 MG (61979 UT) capsule Take 1 capsule (50,000 Units) by mouth every 7 days  Qty: 12 capsule, Refills: 0    Associated Diagnoses: Vitamin D deficiency; Exocrine pancreatic insufficiency      !! vitamin D3 (CHOLECALCIFEROL) 10 MCG (400 UNIT) capsule Take 1 capsule (400 Units) by mouth daily  Qty: 30 capsule, Refills: 3    Associated Diagnoses: Systemic lupus erythematosus with other organ involvement, unspecified SLE type (H); Systemic lupus erythematosus, unspecified SLE type, unspecified organ involvement status (H)       !! - Potential duplicate medications found. Please discuss with provider.        Allergies   Allergies   Allergen Reactions     Rituximab Anaphylaxis

## 2022-04-26 NOTE — PROGRESS NOTES
04/26/22 1050   [facility]   Location Med/Surg   Intervention Supportive Check In  No family present  ([facility] provided a supportive check in.  Pt was laying in bed upon arrival.  Writer made introduction and provided [employer] flyers about events/shows happening today.  Writer offered to provide activities for pt.  Pt declined having any needs at this time.     Outcomes/Follow Up Continue to Follow/Support

## 2022-04-26 NOTE — PHARMACY - DISCHARGE MEDICATION RECONCILIATION AND EDUCATION
Discharge medication review for this patient completed.  Pharmacist provided medication teaching for discharge with a focus on new medications/dose changes.  The discharge medication list was reviewed with Milly and the following points were discussed, as applicable: Name, description, purpose, dose/strength, strategies for giving medications to children, common side effects and when to call MD.    She was engaged during teaching and verbalized understanding.    Did not have medications in hand during teach due to filling in pharmacy.    The following medications were discussed:  Current Discharge Medication List      START taking these medications    Details   sulfamethoxazole-trimethoprim (BACTRIM DS) 800-160 MG tablet Take 1 tablet by mouth Every Mon, Wed, Fri Morning  Qty: 18 tablet, Refills: 3    Associated Diagnoses: Immunosuppression (H)         CONTINUE these medications which have NOT CHANGED    Details   acetaminophen (TYLENOL) 500 MG tablet Take 1 tablet (500 mg) by mouth every 4 hours as needed for mild pain  Qty: 1 Bottle, Refills: 1    Associated Diagnoses: Other acute pancreatitis, unspecified complication status      !! amylase-lipase-protease (CREON 24) 21896-17896 units CPEP per EC capsule Take 1 capsule by mouth Take with snacks or supplements (with snacks)  Qty: 112 capsule, Refills: 1    Associated Diagnoses: Pancreatic insufficiency      !! amylase-lipase-protease (CREON 24) 55082-79724 units CPEP per EC capsule Take 2 capsules by mouth 3 times daily (with meals)  Qty: 336 capsule, Refills: 1    Associated Diagnoses: Pancreatic insufficiency      Belimumab 200 MG/ML SOAJ Inject 200 mg Subcutaneous every 7 days  Qty: 4 mL, Refills: 11    Associated Diagnoses: Systemic lupus erythematosus with other organ involvement, unspecified SLE type (H)      calcium carbonate (TUMS) 500 MG chewable tablet Take 1 tablet (500 mg) by mouth daily  Qty: 30 tablet, Refills: 0    Associated Diagnoses: Systemic  lupus erythematosus with other organ involvement, unspecified SLE type (H)      ferrous sulfate (FEROSUL) 325 (65 Fe) MG tablet Take 1 tablet (325 mg) by mouth daily (with breakfast)  Qty: 100 tablet, Refills: 3    Associated Diagnoses: Systemic lupus erythematosus with other organ involvement, unspecified SLE type (H)      hydroxychloroquine (PLAQUENIL) 200 MG tablet Take 2 tablets (400 mg) by mouth daily  Qty: 60 tablet, Refills: 11    Associated Diagnoses: Systemic lupus erythematosus with other organ involvement, unspecified SLE type (H)      multivitamin, therapeutic (THERA-VIT) TABS tablet Take 1 tablet by mouth daily  Qty: 30 tablet, Refills: 11    Associated Diagnoses: Other systemic lupus erythematosus with other organ involvement (H)      mvw complete formulation (SOFTGELS) capsule Take 2 capsules by mouth daily  Qty: 180 capsule, Refills: 1    Comments: NDC: [address]-89  Associated Diagnoses: Exocrine pancreatic insufficiency      mycophenolate (GENERIC EQUIVALENT) 500 MG tablet Take 3 tablets (1,500 mg) by mouth 2 times daily  Qty: 180 tablet, Refills: 11    Associated Diagnoses: Systemic lupus erythematosus with other organ involvement, unspecified SLE type (H)      predniSONE (DELTASONE) 5 MG tablet Take 1 tablet (5 mg) by mouth daily  Qty: 150 tablet, Refills: 1    Associated Diagnoses: Systemic lupus erythematosus with other organ involvement, unspecified SLE type (H)      !! vitamin D3 (CHOLECALCIFEROL) 1.25 MG (31442 UT) capsule Take 1 capsule (50,000 Units) by mouth every 7 days  Qty: 12 capsule, Refills: 0    Associated Diagnoses: Vitamin D deficiency; Exocrine pancreatic insufficiency      !! vitamin D3 (CHOLECALCIFEROL) 10 MCG (400 UNIT) capsule Take 1 capsule (400 Units) by mouth daily  Qty: 30 capsule, Refills: 3    Associated Diagnoses: Systemic lupus erythematosus with other organ involvement, unspecified SLE type (H); Systemic lupus erythematosus, unspecified SLE type, unspecified organ  involvement status (H)       !! - Potential duplicate medications found. Please discuss with provider.

## 2022-04-26 NOTE — PLAN OF CARE
VSS and afebrile. Denying pain. Pt awake most of the night watching movies. Ate some mac and cheese. No further concerns at this time.

## 2022-04-26 NOTE — PLAN OF CARE
Goal Outcome Evaluation:    Plan of Care Reviewed With: patient     Overall Patient Progress: improving     VSS, afebrile. Denies pain. Adequate PO intake and UOP. Will discharge this evening once discharge medications arrive from pharmacy. Hourly rounding completed. Continue to monitor.

## 2022-04-26 NOTE — PROGRESS NOTES
"PEDIATRIC RHEUMATOLOGY CONSULT DAILY PROGRESS NOTE    Milly is a 20 yo woman with systemic lupus erythematosus (SLE) admitted yesterday early AM with fever, hypotension, dehydration.      Impression:  20 yo woman with longstanding SLE.  She is clinically stable now.  Need to determine what the species of bacteria is that grew from outside hospital blood culture.  If it is a likely contaminant, she could be discharged home.    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  She attributes the SLE flare to a walk in the sun. She states she has been taking her medications as prescribed.    Recommendations:  1. Await speciation of blood culture from outside hospital.  If likely contaminant, and cultures here remain sterile, it would be fine from my perspective to discharge her home.  2. Continue home medications for SLE.  3. She has arrangements to see an adult rheumatologist at [facility], and is waiting for confirmation of an appointment date.  If this could be clarified while she is in house, that would be helpful to her.  4. If the visit with [facility] cannot occur in the next 2-3 months, she should see me in outpatient follow-up.    Discussed in person with resident on primary team.    Jonh Anders MD, PhD  , Pediatric Rheumatology      Interim history:  Zabrina has been afebrile with normal vitals signs.  She states she feels fine and is ready to go home.    Blood culture from outside hospital preliminarily grew Gram+ rods, awaiting speciation.  She has blood and urine cultures from yesterday which remain sterile in our lab.  She received ceftriaxone here.    Exam:  BP 97/63   Pulse 57   Temp 98.1  F (36.7  C) (Oral)   Resp 16   Ht 1.6 m (5' 2.99\")   Wt 50.7 kg (111 lb 12.4 oz)   SpO2 100%   BMI 19.80 kg/m    Alert, awake, ordering breakfast.  Rash on scalp and face is unchanged.  Hyperpigmented lesions on palms are stable (from " old vasculitic lesions).  Mucous membranes are moist.  Neck supple, no lymph node enlargement  Chest clear.  Cor: S1, S2, no murmur or rub  Abd: Soft.  Ext: warm, well-perfused.  No edema.    Labs:  Admission on 04/25/2022   Component Date Value Ref Range Status     C3 Complement 04/25/2022 11 (A) 81 - 157 mg/dL Final     C4 Complement 04/25/2022 3 (A) 13 - 39 mg/dL Final     Erythrocyte Sedimentation Rate 04/25/2022 128 (A) 0 - 20 mm/hr Final     Total Protein Random Urine g/L 04/25/2022 0.38  g/L Final    The reference range has not been established for total protein in random urine samples.  The result should be integrated into the clinical context for interpretation.     Total Protein Urine g/gr Creatinine 04/25/2022 0.83 (A) 0.00 - 0.20 g/g Cr Final     Creatinine Urine mg/dL 04/25/2022 46  mg/dL Final     Sodium 04/25/2022 143  133 - 144 mmol/L Final     Potassium 04/25/2022 3.5  3.4 - 5.3 mmol/L Final     Chloride 04/25/2022 119 (A) 94 - 109 mmol/L Final     Carbon Dioxide (CO2) 04/25/2022 20  20 - 32 mmol/L Final     Anion Gap 04/25/2022 4  3 - 14 mmol/L Final     Urea Nitrogen 04/25/2022 7  7 - 30 mg/dL Final     Creatinine 04/25/2022 0.66  0.52 - 1.04 mg/dL Final     Calcium 04/25/2022 7.8 (A) 8.5 - 10.1 mg/dL Final     Glucose 04/25/2022 80  70 - 99 mg/dL Final     Alkaline Phosphatase 04/25/2022 150  40 - 150 U/L Final     AST 04/25/2022 33  0 - 45 U/L Final     ALT 04/25/2022 10  0 - 50 U/L Final     Protein Total 04/25/2022 7.5  6.8 - 8.8 g/dL Final     Albumin 04/25/2022 2.0 (A) 3.4 - 5.0 g/dL Final     Bilirubin Total 04/25/2022 0.4  0.2 - 1.3 mg/dL Final     GFR Estimate 04/25/2022 >90  >60 mL/min/1.73m2 Final    Effective December 21, 2021 eGFRcr in adults is calculated using the 2021 CKD-EPI creatinine equation which includes age and gender (Franchesca et al., NEJ, DOI: 10.1056/MPPRfb4284004)     Culture 04/25/2022 No growth after 12 hours   Preliminary     Sodium 04/26/2022 143  133 - 144 mmol/L  Final     Potassium 04/26/2022 3.0 (A) 3.4 - 5.3 mmol/L Final     Chloride 04/26/2022 116 (A) 94 - 109 mmol/L Final     Carbon Dioxide (CO2) 04/26/2022 21  20 - 32 mmol/L Final     Anion Gap 04/26/2022 6  3 - 14 mmol/L Final     Urea Nitrogen 04/26/2022 8  7 - 30 mg/dL Final     Creatinine 04/26/2022 0.60  0.52 - 1.04 mg/dL Final     Calcium 04/26/2022 7.5 (A) 8.5 - 10.1 mg/dL Final     Glucose 04/26/2022 93  70 - 99 mg/dL Final     GFR Estimate 04/26/2022 >90  >60 mL/min/1.73m2 Final    Effective December 21, 2021 eGFRcr in adults is calculated using the 2021 CKD-EPI creatinine equation which includes age and gender (Franchesca et al., NEJM, DOI: 10.1056/QIPZfz6688166)     Hold Specimen 04/26/2022 [facility]   Final

## 2022-04-26 NOTE — PROGRESS NOTES
Data:  Pt was admitted to the hospital recently related to a flare of her SLE.  The team requested that I provide a supportive check in in regards to social situation and mental health.  They also requested that I connect pt with intake for Adult Rheumatology at [facility], as she hasn't followed through on this referral to date.  This impacts the trajectory of pt's disease post-hospitalization, and is an important step.      Assessment:  SW met with pt bedside.  Pt appears tired, but is relaxed and demonstrates a bright affect.  Pt is agreeable to meet with SW.      Pt reports feeling safe at home and in all relationships.  Pt denies any homicidal or suicidal ideation.  Pt notes having a strong support system within her 6 siblings and their spouses, though none have been able to be present during this hospitalization.  It appears that they live in [place], not nearby.  Pt is currently residing in [place] with her older sister.  We were previously under the impression that pt was moving to [place] and would transfer care there.  Pt denies this, stating that it was the plan, but then it didn't work out.  Since pt is not moving and is at the age of transition into adult care, the team has provided a direct referral to [facility] in her area.  In order to ensure that pt will follow-through on the referral, SW provided the contact information and had pt call from her cell phone in the room, emphasizing that this is vital to pt's follow-up care post-hospitalization.  Pt was agreeable to this, as we noted that she will be busier upon discharge home, and it would be worthwhile to take care of this intake in advance.      Plan/Recommendations:  Pt denies a need for mental health services or other resources at this time.  Pt was given SW card/ contact information should needs arise.  Pt directly called [facility] intake at the end of our time together.        Rosie Kate, MSW/LICSW  Clinical Social  Worker/Lupus Mental Health Navigator   Pediatric Rheumatology

## 2022-04-26 NOTE — PLAN OF CARE
VSS on room air, denies pain. Sleeping majority of shift. No appetite, continuous MIVF running at 90mL/hr. Voiding. Plan for labs in AM. Continue to monitor and follow POC.

## 2022-04-27 ENCOUNTER — PATIENT OUTREACH (OUTPATIENT)
Dept: CARE COORDINATION | Facility: CLINIC | Age: 22
End: 2022-04-27
Payer: COMMERCIAL

## 2022-04-27 DIAGNOSIS — Z71.89 OTHER SPECIFIED COUNSELING: ICD-10-CM

## 2022-04-27 NOTE — PROGRESS NOTES
Clinic Care Coordination Contact  [facility]: Post-Discharge Note  SITUATION                                                      Admission:    Admission Date: 04/25/22   Reason for Admission: Malaise, weakness, fever, improving  Systemic lupus erythematous flare, improving  Discharge:   Discharge Date: 04/26/22  Discharge Diagnosis: Malaise, weakness, fever, improving  Systemic lupus erythematous flare, improving    BACKGROUND                                                      Per hospital discharge summary and inpatient provider notes:    Milly Carroll was admitted on 4/25/2022 for presumed acute lupus flare and infection rule out.  The following problems were addressed during her hospitalization:     Malaise, weakness, fever, improving  Systemic lupus erythematous flare, improving  Infectious rule out  Patient presented with malaise, weakness, and fever since 04/23 consistent with previous flare up of SLE. CRP 2.65. Hgb 9.3 (baseline 8-9), no leukocytosis. Hypotensive in outside ED with systolic BP in 70s, improved s/p fluid resuscitation. Differential also includes infection, especially given baseline immunosuppression, but there is no obvious source and no sick contacts. Blood culture form [facility] returned positive showing gram positive rods. Patient was started on IV ceftriaxone however remained afebrile and vitally stable during this time. On 04/26, bacterial speciation resulted in likely contaminant so ceftriaxone was discontinued.      Labs showed C3 low at 11, C4 low at 3, ESR elevated at 128, DsDNA elevated at 890, urine culture unremarkable, and repeat blood culture collected here with no growth after 1 day.      Rheumatology was involved in her care. She continued her PTA medication regimen of belimumab, hydroxychloroquine, mycophenolate, and prednisone while inpatient and was discharged with these medications. She was started on bactrim for PJP prophylaxis.       On 04/26 patient was  feeling subjectively better, remained vitally stable, and after speciation of blood culture which resulted in likely contaminant, rheumatology cleared patient for discharge. Patient plans on following up with adult rheumatology and is working on setting this up through [facility].        ASSESSMENT      Enrollment  Primary Care Care Coordination Status: Not a Candidate    Discharge Assessment  How are you doing now that you are home?: Patient reports she is doing good, no questions  How are your symptoms? (Red Flag symptoms escalate to triage hotline per guidelines): Improved  Do you feel your condition is stable enough to be safe at home until your provider visit?: Yes  Does the patient have their discharge instructions? : Yes  Does the patient have questions regarding their discharge instructions? : No  Were you started on any new medications or were there changes to any of your previous medications? : Yes  Does the patient have all of their medications?: Yes  Do you have questions regarding any of your medications? : No  Discharge follow-up appointment scheduled within 14 calendar days? : No  Is patient agreeable to assistance with scheduling? : No (Patient will make f/u appointments)                  PLAN                                                      Outpatient Plan:      Follow-up Appointments     Follow Up and recommended labs and tests      Follow up with primary care provider, Estefany Bell, within 7 days for   hospital follow- up.  No follow up labs or test are needed.     Work on scheduling an adult rheumatology follow up through [facility].            No future appointments.      For any urgent concerns, please contact our 24 hour nurse triage line: [phone] ([phone])         THAIS Shrestha  [phone]  [facility]

## 2022-04-30 LAB — BACTERIA BLD CULT: NO GROWTH

## 2022-06-11 ENCOUNTER — HOSPITAL ENCOUNTER (INPATIENT)
Facility: CLINIC | Age: 22
LOS: 1 days | Discharge: HOME | End: 2022-06-14
Attending: EMERGENCY MEDICINE | Admitting: PEDIATRICS
Payer: COMMERCIAL

## 2022-06-11 ENCOUNTER — APPOINTMENT (OUTPATIENT)
Dept: GENERAL RADIOLOGY | Facility: CLINIC | Age: 22
End: 2022-06-11
Attending: STUDENT IN AN ORGANIZED HEALTH CARE EDUCATION/TRAINING PROGRAM
Payer: COMMERCIAL

## 2022-06-11 ENCOUNTER — APPOINTMENT (OUTPATIENT)
Dept: ULTRASOUND IMAGING | Facility: CLINIC | Age: 22
End: 2022-06-11
Attending: STUDENT IN AN ORGANIZED HEALTH CARE EDUCATION/TRAINING PROGRAM
Payer: COMMERCIAL

## 2022-06-11 ENCOUNTER — TELEPHONE (OUTPATIENT)
Dept: RHEUMATOLOGY | Facility: CLINIC | Age: 22
End: 2022-06-11

## 2022-06-11 DIAGNOSIS — R50.9 FEVER, UNSPECIFIED: ICD-10-CM

## 2022-06-11 DIAGNOSIS — D69.6 THROMBOCYTOPENIA, UNSPECIFIED (H): ICD-10-CM

## 2022-06-11 DIAGNOSIS — D69.6 THROMBOCYTOPENIA (H): ICD-10-CM

## 2022-06-11 DIAGNOSIS — Z20.822 CONTACT WITH AND (SUSPECTED) EXPOSURE TO COVID-19: ICD-10-CM

## 2022-06-11 DIAGNOSIS — D64.9 ANEMIA, UNSPECIFIED TYPE: ICD-10-CM

## 2022-06-11 DIAGNOSIS — R16.1 SPLENOMEGALY: ICD-10-CM

## 2022-06-11 DIAGNOSIS — R50.9 FEVER IN PEDIATRIC PATIENT: ICD-10-CM

## 2022-06-11 DIAGNOSIS — E87.6 HYPOKALEMIA: ICD-10-CM

## 2022-06-11 DIAGNOSIS — M32.19 OTHER SYSTEMIC LUPUS ERYTHEMATOSUS WITH OTHER ORGAN INVOLVEMENT (H): Primary | ICD-10-CM

## 2022-06-11 DIAGNOSIS — M32.9 SYSTEMIC LUPUS ERYTHEMATOSUS, UNSPECIFIED SLE TYPE, UNSPECIFIED ORGAN INVOLVEMENT STATUS (H): ICD-10-CM

## 2022-06-11 LAB
ALBUMIN SERPL-MCNC: 2 G/DL (ref 3.4–5)
ALBUMIN SERPL-MCNC: 2.2 G/DL (ref 3.4–5)
ALBUMIN UR-MCNC: 50 MG/DL
ALP SERPL-CCNC: 164 U/L (ref 40–150)
ALT SERPL W P-5'-P-CCNC: 12 U/L (ref 0–50)
AMORPH CRY #/AREA URNS HPF: ABNORMAL /HPF
AMYLASE SERPL-CCNC: 119 U/L (ref 30–110)
ANION GAP SERPL CALCULATED.3IONS-SCNC: 8 MMOL/L (ref 3–14)
ANION GAP SERPL CALCULATED.3IONS-SCNC: 9 MMOL/L (ref 3–14)
APPEARANCE UR: ABNORMAL
APPEARANCE UR: CLEAR
AST SERPL W P-5'-P-CCNC: 34 U/L (ref 0–45)
BACTERIA #/AREA URNS HPF: ABNORMAL /HPF
BASOPHILS # BLD AUTO: 0 10E3/UL (ref 0–0.2)
BASOPHILS NFR BLD AUTO: 0 %
BILIRUB SERPL-MCNC: 0.7 MG/DL (ref 0.2–1.3)
BILIRUB UR QL STRIP: NEGATIVE
BILIRUB UR QL STRIP: NEGATIVE
BUN SERPL-MCNC: 18 MG/DL (ref 7–30)
BUN SERPL-MCNC: 21 MG/DL (ref 7–30)
BURR CELLS BLD QL SMEAR: SLIGHT
CALCIUM SERPL-MCNC: 7.7 MG/DL (ref 8.5–10.1)
CALCIUM SERPL-MCNC: 8.1 MG/DL (ref 8.5–10.1)
CHLORIDE BLD-SCNC: 109 MMOL/L (ref 94–109)
CHLORIDE BLD-SCNC: 113 MMOL/L (ref 94–109)
CK SERPL-CCNC: 24 U/L (ref 30–225)
CO2 SERPL-SCNC: 19 MMOL/L (ref 20–32)
CO2 SERPL-SCNC: 21 MMOL/L (ref 20–32)
COLOR UR AUTO: YELLOW
COLOR UR AUTO: YELLOW
CREAT SERPL-MCNC: 0.92 MG/DL (ref 0.52–1.04)
CREAT SERPL-MCNC: 0.98 MG/DL (ref 0.52–1.04)
CRP SERPL-MCNC: 99 MG/L (ref 0–8)
D DIMER PPP FEU-MCNC: 1.58 UG/ML FEU (ref 0–0.5)
DACRYOCYTES BLD QL SMEAR: SLIGHT
DAT POLY: NORMAL
EOSINOPHIL # BLD AUTO: 0.1 10E3/UL (ref 0–0.7)
EOSINOPHIL NFR BLD AUTO: 1 %
ERYTHROCYTE [DISTWIDTH] IN BLOOD BY AUTOMATED COUNT: 22 % (ref 10–15)
ERYTHROCYTE [SEDIMENTATION RATE] IN BLOOD BY WESTERGREN METHOD: >140 MM/HR (ref 0–20)
FASTING STATUS PATIENT QL REPORTED: NORMAL
FERRITIN SERPL-MCNC: 123 NG/ML (ref 12–150)
FIBRINOGEN PPP-MCNC: 331 MG/DL (ref 170–490)
FLUAV RNA SPEC QL NAA+PROBE: NEGATIVE
FLUBV RNA RESP QL NAA+PROBE: NEGATIVE
GFR SERPL CREATININE-BSD FRML MDRD: 84 ML/MIN/1.73M2
GFR SERPL CREATININE-BSD FRML MDRD: >90 ML/MIN/1.73M2
GLUCOSE BLD-MCNC: 75 MG/DL (ref 70–99)
GLUCOSE BLD-MCNC: 85 MG/DL (ref 70–99)
GLUCOSE UR STRIP-MCNC: NEGATIVE MG/DL
GLUCOSE UR STRIP-MCNC: NEGATIVE MG/DL
HCG UR QL: NEGATIVE
HCT VFR BLD AUTO: 21.7 % (ref 35–47)
HGB BLD-MCNC: 7.8 G/DL (ref 11.7–15.7)
HGB UR QL STRIP: ABNORMAL
HGB UR QL STRIP: ABNORMAL
HOLD SPECIMEN: NORMAL
HYALINE CASTS: ABNORMAL
IMM GRANULOCYTES # BLD: 0.1 10E3/UL
IMM GRANULOCYTES NFR BLD: 1 %
INTERNAL QC OK POCT: NORMAL
KETONES UR STRIP-MCNC: NEGATIVE MG/DL
KETONES UR STRIP-MCNC: NEGATIVE MG/DL
LDH SERPL L TO P-CCNC: 294 U/L (ref 81–234)
LEUKOCYTE ESTERASE UR QL STRIP: ABNORMAL
LEUKOCYTE ESTERASE UR QL STRIP: ABNORMAL
LIPASE SERPL-CCNC: 363 U/L (ref 73–393)
LYMPHOCYTES # BLD AUTO: 1.3 10E3/UL (ref 0.8–5.3)
LYMPHOCYTES NFR BLD AUTO: 12 %
MCH RBC QN AUTO: 32.8 PG (ref 26.5–33)
MCHC RBC AUTO-ENTMCNC: 35.9 G/DL (ref 31.5–36.5)
MCV RBC AUTO: 91 FL (ref 78–100)
MONOCYTES # BLD AUTO: 0.3 10E3/UL (ref 0–1.3)
MONOCYTES NFR BLD AUTO: 3 %
MUCOUS THREADS #/AREA URNS LPF: PRESENT /LPF
NEUTROPHILS # BLD AUTO: 8.9 10E3/UL (ref 1.6–8.3)
NEUTROPHILS NFR BLD AUTO: 83 %
NITRATE UR QL: NEGATIVE
NITRATE UR QL: NEGATIVE
NRBC # BLD AUTO: 0 10E3/UL
NRBC BLD AUTO-RTO: 0 /100
PH UR STRIP: 6.5 [PH] (ref 5–7)
PH UR STRIP: 6.5 [PH] (ref 5–7)
PHOSPHATE SERPL-MCNC: 3.8 MG/DL (ref 2.5–4.5)
PLAT MORPH BLD: ABNORMAL
PLATELET # BLD AUTO: 107 10E3/UL (ref 150–450)
POCT KIT EXPIRATION DATE: 0
POCT KIT LOT NUMBER: 0
POTASSIUM BLD-SCNC: 2.8 MMOL/L (ref 3.4–5.3)
POTASSIUM BLD-SCNC: 3.5 MMOL/L (ref 3.4–5.3)
PROT SERPL-MCNC: 8.2 G/DL (ref 6.8–8.8)
RBC # BLD AUTO: 2.38 10E6/UL (ref 3.8–5.2)
RBC MORPH BLD: ABNORMAL
RBC URINE: 44 /HPF
ROULEAUX BLD QL SMEAR: PRESENT
SARS-COV-2 RNA RESP QL NAA+PROBE: NEGATIVE
SODIUM SERPL-SCNC: 138 MMOL/L (ref 133–144)
SODIUM SERPL-SCNC: 141 MMOL/L (ref 133–144)
SP GR UR STRIP: 1.01 (ref 1–1.03)
SP GR UR STRIP: 1.01 (ref 1–1.03)
SPECIMEN EXPIRATION DATE: NORMAL
SQUAMOUS EPITHELIAL: 2 /HPF
TRANSITIONAL EPI: 1 /HPF
TRIGL SERPL-MCNC: 100 MG/DL
UROBILINOGEN UR STRIP-MCNC: 2 MG/DL
UROBILINOGEN UR STRIP-MCNC: 2 MG/DL
WBC # BLD AUTO: 10.7 10E3/UL (ref 4–11)
WBC CLUMPS #/AREA URNS HPF: PRESENT /HPF
WBC URINE: 27 /HPF

## 2022-06-11 PROCEDURE — 250N000013 HC RX MED GY IP 250 OP 250 PS 637: Performed by: STUDENT IN AN ORGANIZED HEALTH CARE EDUCATION/TRAINING PROGRAM

## 2022-06-11 PROCEDURE — 82150 ASSAY OF AMYLASE: CPT | Performed by: EMERGENCY MEDICINE

## 2022-06-11 PROCEDURE — G0378 HOSPITAL OBSERVATION PER HR: HCPCS

## 2022-06-11 PROCEDURE — 83615 LACTATE (LD) (LDH) ENZYME: CPT | Performed by: EMERGENCY MEDICINE

## 2022-06-11 PROCEDURE — 36415 COLL VENOUS BLD VENIPUNCTURE: CPT | Performed by: EMERGENCY MEDICINE

## 2022-06-11 PROCEDURE — 87799 DETECT AGENT NOS DNA QUANT: CPT | Performed by: STUDENT IN AN ORGANIZED HEALTH CARE EDUCATION/TRAINING PROGRAM

## 2022-06-11 PROCEDURE — 99223 1ST HOSP IP/OBS HIGH 75: CPT | Mod: AI | Performed by: PEDIATRICS

## 2022-06-11 PROCEDURE — 99285 EMERGENCY DEPT VISIT HI MDM: CPT | Mod: GC | Performed by: EMERGENCY MEDICINE

## 2022-06-11 PROCEDURE — 87040 BLOOD CULTURE FOR BACTERIA: CPT | Performed by: EMERGENCY MEDICINE

## 2022-06-11 PROCEDURE — 250N000012 HC RX MED GY IP 250 OP 636 PS 637: Performed by: STUDENT IN AN ORGANIZED HEALTH CARE EDUCATION/TRAINING PROGRAM

## 2022-06-11 PROCEDURE — 71046 X-RAY EXAM CHEST 2 VIEWS: CPT | Mod: 26 | Performed by: RADIOLOGY

## 2022-06-11 PROCEDURE — 87506 IADNA-DNA/RNA PROBE TQ 6-11: CPT | Performed by: STUDENT IN AN ORGANIZED HEALTH CARE EDUCATION/TRAINING PROGRAM

## 2022-06-11 PROCEDURE — 99253 IP/OBS CNSLTJ NEW/EST LOW 45: CPT | Mod: GC | Performed by: PEDIATRICS

## 2022-06-11 PROCEDURE — 76700 US EXAM ABDOM COMPLETE: CPT | Mod: 26 | Performed by: RADIOLOGY

## 2022-06-11 PROCEDURE — 84156 ASSAY OF PROTEIN URINE: CPT | Performed by: STUDENT IN AN ORGANIZED HEALTH CARE EDUCATION/TRAINING PROGRAM

## 2022-06-11 PROCEDURE — 86645 CMV ANTIBODY IGM: CPT | Performed by: STUDENT IN AN ORGANIZED HEALTH CARE EDUCATION/TRAINING PROGRAM

## 2022-06-11 PROCEDURE — 84478 ASSAY OF TRIGLYCERIDES: CPT | Performed by: EMERGENCY MEDICINE

## 2022-06-11 PROCEDURE — 86160 COMPLEMENT ANTIGEN: CPT | Performed by: EMERGENCY MEDICINE

## 2022-06-11 PROCEDURE — 71046 X-RAY EXAM CHEST 2 VIEWS: CPT

## 2022-06-11 PROCEDURE — 87328 CRYPTOSPORIDIUM AG IA: CPT | Performed by: STUDENT IN AN ORGANIZED HEALTH CARE EDUCATION/TRAINING PROGRAM

## 2022-06-11 PROCEDURE — 85384 FIBRINOGEN ACTIVITY: CPT | Performed by: EMERGENCY MEDICINE

## 2022-06-11 PROCEDURE — 83690 ASSAY OF LIPASE: CPT | Performed by: EMERGENCY MEDICINE

## 2022-06-11 PROCEDURE — 86664 EPSTEIN-BARR NUCLEAR ANTIGEN: CPT | Performed by: STUDENT IN AN ORGANIZED HEALTH CARE EDUCATION/TRAINING PROGRAM

## 2022-06-11 PROCEDURE — 36415 COLL VENOUS BLD VENIPUNCTURE: CPT | Performed by: STUDENT IN AN ORGANIZED HEALTH CARE EDUCATION/TRAINING PROGRAM

## 2022-06-11 PROCEDURE — 85025 COMPLETE CBC W/AUTO DIFF WBC: CPT | Performed by: EMERGENCY MEDICINE

## 2022-06-11 PROCEDURE — 96361 HYDRATE IV INFUSION ADD-ON: CPT

## 2022-06-11 PROCEDURE — 96374 THER/PROPH/DIAG INJ IV PUSH: CPT

## 2022-06-11 PROCEDURE — 81003 URINALYSIS AUTO W/O SCOPE: CPT | Performed by: EMERGENCY MEDICINE

## 2022-06-11 PROCEDURE — 85652 RBC SED RATE AUTOMATED: CPT | Performed by: EMERGENCY MEDICINE

## 2022-06-11 PROCEDURE — 76700 US EXAM ABDOM COMPLETE: CPT

## 2022-06-11 PROCEDURE — 86140 C-REACTIVE PROTEIN: CPT | Performed by: EMERGENCY MEDICINE

## 2022-06-11 PROCEDURE — 250N000013 HC RX MED GY IP 250 OP 250 PS 637: Performed by: PEDIATRICS

## 2022-06-11 PROCEDURE — 87636 SARSCOV2 & INF A&B AMP PRB: CPT | Performed by: EMERGENCY MEDICINE

## 2022-06-11 PROCEDURE — 82550 ASSAY OF CK (CPK): CPT | Performed by: EMERGENCY MEDICINE

## 2022-06-11 PROCEDURE — 86644 CMV ANTIBODY: CPT | Performed by: STUDENT IN AN ORGANIZED HEALTH CARE EDUCATION/TRAINING PROGRAM

## 2022-06-11 PROCEDURE — 82784 ASSAY IGA/IGD/IGG/IGM EACH: CPT | Performed by: EMERGENCY MEDICINE

## 2022-06-11 PROCEDURE — 87086 URINE CULTURE/COLONY COUNT: CPT | Performed by: EMERGENCY MEDICINE

## 2022-06-11 PROCEDURE — 86162 COMPLEMENT TOTAL (CH50): CPT | Performed by: EMERGENCY MEDICINE

## 2022-06-11 PROCEDURE — 82728 ASSAY OF FERRITIN: CPT | Performed by: EMERGENCY MEDICINE

## 2022-06-11 PROCEDURE — 258N000003 HC RX IP 258 OP 636: Performed by: STUDENT IN AN ORGANIZED HEALTH CARE EDUCATION/TRAINING PROGRAM

## 2022-06-11 PROCEDURE — 86225 DNA ANTIBODY NATIVE: CPT | Performed by: EMERGENCY MEDICINE

## 2022-06-11 PROCEDURE — 96375 TX/PRO/DX INJ NEW DRUG ADDON: CPT

## 2022-06-11 PROCEDURE — 81025 URINE PREGNANCY TEST: CPT | Performed by: EMERGENCY MEDICINE

## 2022-06-11 PROCEDURE — 80053 COMPREHEN METABOLIC PANEL: CPT | Performed by: EMERGENCY MEDICINE

## 2022-06-11 PROCEDURE — 250N000011 HC RX IP 250 OP 636: Performed by: STUDENT IN AN ORGANIZED HEALTH CARE EDUCATION/TRAINING PROGRAM

## 2022-06-11 PROCEDURE — 84100 ASSAY OF PHOSPHORUS: CPT | Performed by: STUDENT IN AN ORGANIZED HEALTH CARE EDUCATION/TRAINING PROGRAM

## 2022-06-11 PROCEDURE — 87329 GIARDIA AG IA: CPT | Performed by: STUDENT IN AN ORGANIZED HEALTH CARE EDUCATION/TRAINING PROGRAM

## 2022-06-11 PROCEDURE — 85379 FIBRIN DEGRADATION QUANT: CPT | Performed by: EMERGENCY MEDICINE

## 2022-06-11 PROCEDURE — 99285 EMERGENCY DEPT VISIT HI MDM: CPT | Mod: 25 | Performed by: EMERGENCY MEDICINE

## 2022-06-11 PROCEDURE — 81001 URINALYSIS AUTO W/SCOPE: CPT | Performed by: STUDENT IN AN ORGANIZED HEALTH CARE EDUCATION/TRAINING PROGRAM

## 2022-06-11 PROCEDURE — C9803 HOPD COVID-19 SPEC COLLECT: HCPCS | Performed by: EMERGENCY MEDICINE

## 2022-06-11 PROCEDURE — 86665 EPSTEIN-BARR CAPSID VCA: CPT | Performed by: STUDENT IN AN ORGANIZED HEALTH CARE EDUCATION/TRAINING PROGRAM

## 2022-06-11 PROCEDURE — 86880 COOMBS TEST DIRECT: CPT | Performed by: STUDENT IN AN ORGANIZED HEALTH CARE EDUCATION/TRAINING PROGRAM

## 2022-06-11 RX ORDER — HYDROXYCHLOROQUINE SULFATE 200 MG/1
200 TABLET, FILM COATED ORAL DAILY
Status: DISCONTINUED | OUTPATIENT
Start: 2022-06-12 | End: 2022-06-14 | Stop reason: HOSPADM

## 2022-06-11 RX ORDER — MULTIVITAMIN,THERAPEUTIC
1 TABLET ORAL DAILY
Status: DISCONTINUED | OUTPATIENT
Start: 2022-06-11 | End: 2022-06-14 | Stop reason: HOSPADM

## 2022-06-11 RX ORDER — FAMOTIDINE 10 MG
10 TABLET ORAL 2 TIMES DAILY
Status: DISCONTINUED | OUTPATIENT
Start: 2022-06-11 | End: 2022-06-14 | Stop reason: HOSPADM

## 2022-06-11 RX ORDER — PREDNISONE 5 MG/1
5 TABLET ORAL DAILY
Status: DISCONTINUED | OUTPATIENT
Start: 2022-06-11 | End: 2022-06-11

## 2022-06-11 RX ORDER — SODIUM CHLORIDE AND POTASSIUM CHLORIDE 150; 900 MG/100ML; MG/100ML
INJECTION, SOLUTION INTRAVENOUS CONTINUOUS
Status: DISCONTINUED | OUTPATIENT
Start: 2022-06-11 | End: 2022-06-12

## 2022-06-11 RX ORDER — FERROUS SULFATE 325(65) MG
325 TABLET ORAL
Status: DISCONTINUED | OUTPATIENT
Start: 2022-06-11 | End: 2022-06-14 | Stop reason: HOSPADM

## 2022-06-11 RX ORDER — CALCIUM CARBONATE 500 MG/1
500 TABLET, CHEWABLE ORAL DAILY
Status: DISCONTINUED | OUTPATIENT
Start: 2022-06-11 | End: 2022-06-14 | Stop reason: HOSPADM

## 2022-06-11 RX ORDER — DEXTROSE MONOHYDRATE, SODIUM CHLORIDE, AND POTASSIUM CHLORIDE 50; 1.49; 9 G/1000ML; G/1000ML; G/1000ML
INJECTION, SOLUTION INTRAVENOUS CONTINUOUS
Status: DISCONTINUED | OUTPATIENT
Start: 2022-06-11 | End: 2022-06-11

## 2022-06-11 RX ORDER — IBUPROFEN 400 MG/1
400 TABLET, FILM COATED ORAL ONCE
Status: COMPLETED | OUTPATIENT
Start: 2022-06-11 | End: 2022-06-11

## 2022-06-11 RX ORDER — SODIUM CHLORIDE 9 MG/ML
INJECTION, SOLUTION INTRAVENOUS ONCE
Status: DISCONTINUED | OUTPATIENT
Start: 2022-06-11 | End: 2022-06-11

## 2022-06-11 RX ORDER — CHOLECALCIFEROL (VITAMIN D3) 1250 MCG
50000 CAPSULE ORAL
Status: DISCONTINUED | OUTPATIENT
Start: 2022-06-17 | End: 2022-06-14 | Stop reason: HOSPADM

## 2022-06-11 RX ORDER — SULFAMETHOXAZOLE/TRIMETHOPRIM 800-160 MG
1 TABLET ORAL
Status: DISCONTINUED | OUTPATIENT
Start: 2022-06-13 | End: 2022-06-14 | Stop reason: HOSPADM

## 2022-06-11 RX ORDER — SODIUM CHLORIDE 9 MG/ML
INJECTION, SOLUTION INTRAVENOUS CONTINUOUS
Status: DISCONTINUED | OUTPATIENT
Start: 2022-06-11 | End: 2022-06-11

## 2022-06-11 RX ORDER — HYDROXYCHLOROQUINE SULFATE 200 MG/1
400 TABLET, FILM COATED ORAL DAILY
Status: DISCONTINUED | OUTPATIENT
Start: 2022-06-11 | End: 2022-06-11

## 2022-06-11 RX ORDER — POTASSIUM CHLORIDE 1500 MG/1
40 TABLET, EXTENDED RELEASE ORAL ONCE
Status: COMPLETED | OUTPATIENT
Start: 2022-06-11 | End: 2022-06-11

## 2022-06-11 RX ORDER — CEFTRIAXONE 2 G/1
2000 INJECTION, POWDER, FOR SOLUTION INTRAMUSCULAR; INTRAVENOUS EVERY 24 HOURS
Status: DISCONTINUED | OUTPATIENT
Start: 2022-06-11 | End: 2022-06-12

## 2022-06-11 RX ORDER — ONDANSETRON 4 MG/1
0.1 TABLET, ORALLY DISINTEGRATING ORAL EVERY 6 HOURS PRN
Status: DISCONTINUED | OUTPATIENT
Start: 2022-06-11 | End: 2022-06-14 | Stop reason: HOSPADM

## 2022-06-11 RX ORDER — MYCOPHENOLATE MOFETIL 500 MG/1
1500 TABLET ORAL 2 TIMES DAILY
Status: DISCONTINUED | OUTPATIENT
Start: 2022-06-11 | End: 2022-06-11

## 2022-06-11 RX ORDER — POTASSIUM CHLORIDE 1500 MG/1
20 TABLET, EXTENDED RELEASE ORAL ONCE
Status: DISCONTINUED | OUTPATIENT
Start: 2022-06-11 | End: 2022-06-11

## 2022-06-11 RX ORDER — MYCOPHENOLATE MOFETIL 500 MG/1
1000 TABLET ORAL 2 TIMES DAILY
Status: DISCONTINUED | OUTPATIENT
Start: 2022-06-11 | End: 2022-06-14 | Stop reason: HOSPADM

## 2022-06-11 RX ORDER — ACETAMINOPHEN 325 MG/1
650 TABLET ORAL EVERY 6 HOURS PRN
Status: DISCONTINUED | OUTPATIENT
Start: 2022-06-11 | End: 2022-06-14 | Stop reason: HOSPADM

## 2022-06-11 RX ADMIN — PREDNISONE 5 MG: 5 TABLET ORAL at 08:55

## 2022-06-11 RX ADMIN — HYDROXYCHLOROQUINE SULFATE 400 MG: 200 TABLET, FILM COATED ORAL at 08:55

## 2022-06-11 RX ADMIN — IBUPROFEN 400 MG: 400 TABLET, FILM COATED ORAL at 00:22

## 2022-06-11 RX ADMIN — CEFTRIAXONE SODIUM 2000 MG: 2 INJECTION, POWDER, FOR SOLUTION INTRAMUSCULAR; INTRAVENOUS at 17:40

## 2022-06-11 RX ADMIN — POTASSIUM CHLORIDE AND SODIUM CHLORIDE: 900; 150 INJECTION, SOLUTION INTRAVENOUS at 13:54

## 2022-06-11 RX ADMIN — METHYLPREDNISOLONE SODIUM SUCCINATE 1000 MG: 1 INJECTION, POWDER, LYOPHILIZED, FOR SOLUTION INTRAMUSCULAR; INTRAVENOUS at 18:16

## 2022-06-11 RX ADMIN — POTASSIUM CHLORIDE AND SODIUM CHLORIDE: 900; 150 INJECTION, SOLUTION INTRAVENOUS at 04:31

## 2022-06-11 RX ADMIN — FERROUS SULFATE TAB 325 MG (65 MG ELEMENTAL FE) 325 MG: 325 (65 FE) TAB at 08:07

## 2022-06-11 RX ADMIN — THERA TABS 1 TABLET: TAB at 08:07

## 2022-06-11 RX ADMIN — FAMOTIDINE 10 MG: 10 TABLET ORAL at 20:30

## 2022-06-11 RX ADMIN — MYCOPHENOLATE MOFETIL 1000 MG: 500 TABLET, FILM COATED ORAL at 20:30

## 2022-06-11 RX ADMIN — CALCIUM CARBONATE (ANTACID) CHEW TAB 500 MG 500 MG: 500 CHEW TAB at 08:07

## 2022-06-11 RX ADMIN — MYCOPHENOLATE MOFETIL 1500 MG: 500 TABLET, FILM COATED ORAL at 08:07

## 2022-06-11 RX ADMIN — FAMOTIDINE 10 MG: 10 TABLET ORAL at 12:38

## 2022-06-11 RX ADMIN — POTASSIUM CHLORIDE 40 MEQ: 1500 TABLET, EXTENDED RELEASE ORAL at 04:30

## 2022-06-11 ASSESSMENT — ACTIVITIES OF DAILY LIVING (ADL)
HEARING_DIFFICULTY_OR_DEAF: NO
DOING_ERRANDS_INDEPENDENTLY_DIFFICULTY: NO
FALL_HISTORY_WITHIN_LAST_SIX_MONTHS: NO
WEAR_GLASSES_OR_BLIND: YES
TOILETING_ISSUES: NO
WALKING_OR_CLIMBING_STAIRS_DIFFICULTY: NO
CHANGE_IN_FUNCTIONAL_STATUS_SINCE_ONSET_OF_CURRENT_ILLNESS/INJURY: YES
VISION_MANAGEMENT: GLASSES
DIFFICULTY_COMMUNICATING: NO
DIFFICULTY_EATING/SWALLOWING: NO
DRESSING/BATHING_DIFFICULTY: NO
CONCENTRATING,_REMEMBERING_OR_MAKING_DECISIONS_DIFFICULTY: NO

## 2022-06-11 NOTE — PLAN OF CARE
Afebrile, HR 60s-70s, B/p's 80s-90s/50s. MD notified of lower b/ps. Sating well on RA. LS clear. Denies pain. Emesis x1. Stooling and voiding. Continues on IV fluids at 100 ml/hr. No family present at bedside. Hourly rounding completed. Continue to monitor and notify MD of changes.

## 2022-06-11 NOTE — ED NOTES
ED PEDS HANDOFF      PATIENT NAME: Milly Carroll   MRN: [id number]   YOB: 2000   AGE: 21 year old       S (Situation)     ED Chief Complaint: Generalized Weakness and Fever     ED Final Diagnosis: Final diagnoses:   None      Isolation Precautions: None   Suspected Infection: Not Applicable   Patient tested for COVID 19 prior to admission: YES    Needed?: No     B (Background)    Pertinent Past Medical History: Past Medical History:   Diagnosis Date     Hepatitis      Lupus (systemic lupus erythematosus) (H)      Lupus cerebritis (H)      Pancreatitis 08/2017     Pyelonephritis 08/2017     Seizures (H)      Status epilepticus (H)       Allergies: Allergies   Allergen Reactions     Rituximab Anaphylaxis        A (Assessment)    Vital Signs: Vitals:    06/11/22 0018 06/11/22 0207   BP: 118/75    Pulse: 119 91   Resp: 16 16   Temp: (!) 100.6  F (38.1  C) 98.5  F (36.9  C)   TempSrc: Tympanic Oral   SpO2: 99% 100%   Weight: 49.2 kg (108 lb 7.5 oz)        Current Pain Level:     Medication Administration: ED Medication Administration from 06/11/2022 0013 to 06/11/2022 0245     Date/Time Order Dose Route Action Action by    06/11/2022 0022 ibuprofen (ADVIL/MOTRIN) tablet 400 mg 400 mg Oral Given Aracelis Bloom RN         Interventions:        PIV:  R PIV       Drains:  none       Oxygen Needs: none             Respiratory Settings: O2 Device: None (Room air)   Falls risk: No   Skin Integrity: Rash on head   Tasks Pending: Signed and Held Orders     None               R (Recommendations)    Family Present:  No   Other Considerations:   none   Questions Please Call: Treatment Team: Registered Nurse: Rosa Boateng   Ready for Conference Call:   Yes

## 2022-06-11 NOTE — TELEPHONE ENCOUNTER
Pediatric Rheumatology Phone Consult    Caller: Dr. Abigail Shah  Location: [facility] emergency department  Date: 06/11/22  Time: 1:06 AM    Question/Discussion: discuss work-up and management for a patient with a suspected lupus flare    Milly is a 21 y.o. with history of systemic lupus erythematosus (SLE), followed by Dr. Anders but will be soon transitioning to adult care. For her disease, she is on daily mycophenolate, prednisone 5 mg, and hydroxychloroquine. She was previously on a weekly Belimumab infusion but has not received this since 2020 (weekly injections were re-authorized last year, though Milly reports today that this medication was never started).    She was hospitalized ~6 weeks ago (on 04/25/22) for suspected lupus flare with symptoms of fever, weakness, and general malaise. She was also hypotensive on admission but responsive to fluid bolus, and outside blood culture prior to admission at our hospital was positive for gram positive rods, suspected to be contamination. Repeat blood cultures did not grow any organisms. Additional labs showed C3 low at 11, C4 low at 3, ESR elevated at 128, and dsDNA elevated at 890.    Since discharge, she has had a progressing weakness/fatigue of 1 month and 2 weeks fever as high as 105 F, daily. She feels like her body is very sore. She has joint pain in her hands, tingling in hands and feet. She has a flaky rash on her scalp. She also has general abdominal pain of 2 days duration with decreased appetite and nonbilious non bloody vomiting. This feels like a lupus flare to Milly.     She was febrile to 100.6 F on arrival, with stable vitals. There was no arthritis observed by Dr. Shah on exam, nor were other rashes except scalp location visualized. Her described weakness seemed more likely to be pain related than true muscle weakness, per Dr. Shah.     They have not ordered labs yet, but planned on at least the following: CBC w/diff, CMP, urinalysis  microscopic, CRP, ESR, COVID19 swab, lipase, amylase, urine pregnancy test. Dr. Shah also plans to go back into the room for a brief HEADSS assessment.     Recommendations: Based on the limited information provided on the phone we advised the following recommendations:    Based on these progressing symptoms over the last month, and her significantly elevated dsDNA and low complements 6 weeks ago, we are also suspicious of a lupus flare. In this context, we would recommend additional labs to help delineate her lupus activity (complements and dsDNA), along with additional testing to clarify end-organ involvement. She has a history of pancreatitis, hepatitis, and cytopenias associated with her lupus, which we agree should be screened for again.     Given her high fevers of multiple weeks duration, we would also recommend basic screening for a secondary hyperinflammatory state (macrophage activation syndrome / secondary HLH) that can sometimes occur from uncontrolled, active SLE. Dramatically elevated CRP, ferritin, transaminases, LDH, and triglycerides are suggestive of this, and cytopenias (which occur in active lupus) can be worsened along with false improvement of ESR due to drops/consumption of fibrinogen.     Infectious etiology should also be explored in the context of her high fever, other symptoms, and immunosuppressed status that may explain some of her symptoms and may be an environmental trigger of her lupus flare; specific work-up including bacterial and viral studies and/or empiric antibiotic initiation would be deferred to the emergency department and/or hospitalist team based on their ability to directly evaluate Milly.       In addition to the labs that Dr. Shah suggested above (which we agree with obtaining), we would also obtain the following studies:     C3, C4, dsDNA, IgG, CK, urine protein/creatinine ratio    Ferritin, fibrinogen, LDH, D-dimer, triglycerides    blood and urine  cultures    Consider advanced imaging intra-abdominally given localizing symptoms and high fever for concern of infectious etiology    Per ED's discretion, consider additional infectious work-up to evaluate for infection that either may be attributable to her current symptoms or concomitantly present & flaring her underlying SLE    Should she be admitted, please continue her home SLE medications (MMF, prednisone, and hydroxychloroquine)    If she is admitted, we will follow up with the hospital team in the morning    Once discharged (from either ED or hospital), we should make sure to have an adult Rheumatology visit scheduled for Milly to facilitate transfer of care on an outpatient setting.     Discussed with Dr. Elin Lind.    Darryn Delatorre MD/PhD  PGY4, Pediatric Rheumatology Fellow  [facility]    Agree.    Edna Lind M.D.   of Pediatrics  Pediatric Rheumatology

## 2022-06-11 NOTE — PLAN OF CARE
Goal Outcome Evaluation:    Plan of Care Reviewed With: patient     Overall Patient Progress: no change    Outcome Evaluation: VSS, soft BP but fluids runnig. K+ 2.8 in ED, replaced on unit, MIVF w/ KCl. Covid negative. Observation paperwork given. Advanced Directives consult placed per pt request to learn more. Pt able to sleep after interventions. Will continue to monitor and update team as necessary. Rheumatology to round today.

## 2022-06-11 NOTE — PROGRESS NOTES
06/11/22 1154   Vitals   BP (!) 86/54   MD notified of B/p. Pt denies any symptoms. AOVSS. Will continue to monitor.

## 2022-06-11 NOTE — CONSULTS
[facility]    Pediatric Rheumatology Consultation     Date of Admission:  6/11/2022    Assessment & Plan   Milly Carroll is a 21 year old female with systemic lupus erythematosus (SLE) who presents with multiple worsening systemic symptoms concerning for SLE flare and potentially underlying infection. Her problem list:   1. 1+ month of progressive fatigue and weakness, without abnormalities in muscle strength or enzymes or deep tendon reflexes  2. 2 weeks of daily fevers, reportedly as high as 105 F, 100.6 F T max since admission; with exceptionally high CRP & ESR  3. Recent history of dramatically elevated dsDNA and low complement levels (last checked 4/2022)  4. 1 week of progressive diarrhea, non-bloody, non-bilious vomiting, and LUQ abdominal pain  5. Splenomegaly and left-sided CVA tenderness  6. Hematuria and proteinuria, concerning for emerging lupus nephritis and/or infection  7. Lupus-associated vasculopathic rash of the hard palate and couple of finger tips  8. 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 Notably, she has not had lupus nephritis to date.  There is clear evidence that her lupus is flaring again, as evidenced by her cytopenias (while we would expect lymphopenia, this may be falsely normal in the setting of an active response to concomitant infection), classic vasculopathic rash, hair loss with discoid lupus scalp lesions, and important, he doubling of Cr (without obvious pre-renal azotemia), hematuria and proteinuria that is concerning for an emerging/new-onset lupus nephritis. While the high fevers, uncontrolled lupus, and high CRP was concerning for  possible emerging MAS (macrophage activation syndrome) importantly, her labs are very reassuring against this, given the normal ferritin and fibrinogen, normal transaminases, and near-normal LDH.     Notably, the magnitude and duration of her reported fevers, significant CRP elevation, and neutrophilia are all atypical/less typical of even uncontrolled, active SLE. For these reasons, as well as her clear localizing signs (intra-abdominal and retroperitoneal) does make us concerned for a simultaneous infection that is stimulating her lupus flare. Her CVA tenderness is unilateral, concerning for a bacterial pyelonephritis, and she also has left-sided abdominal pain with splenomegaly as well as acute onset diarrhea and vomiting. These symptoms could simply be due to her active lupus (lupus can cause an immune-mediated nephritis, hepatitis, splenic enlargement, and intestinal vasculopathy), but other etiologies including abdominal abscess, an infectious enteritis, or active gamma herpes virus family (EBV or CMV, for which she has a distant history of CMV activation) should also be explicitly evaluated, especially in the context of her immune suppressed status.     Milly will need additional immunosuppressive treatments to help rapidly gain control of her underlying disease. For this, we expect to give pulse-dose steroid infusions, though we first need to urgently exclude significant bacterial infections as above. We will also determine her best long-term immunomodulatory plan, though this will be contingent on whether there is a confirmed emerging lupus nephritis, which we suspect is likely, in which case we would likely pivot to cyclophosphamide infusion for induction therapy per the Euro-Lupus Nephritis Trials. While not relevant to her lupus flare, she is on a higher dose of Cellcept that may be to a degree contributing to chronic cytopenias, and her hydroxychloroquine dose is ~8 mg/kg, which increases the risk of  retinal toxicity. Given that we will be adding additional layers of immunosuppressive therapy, we recommend decreasing both her Cellcept and hydroxychloroquine now to minimize toxicity risk associated with these medications.     Recommendations    Additional evaluation  1. Agree with repeating the renal panel today, but also include hepatic panel to trend total bili and AST/ALT. We would additionally request a urinalysis with microscopic, and a urine pr/cr ratio. If these continue being abnormal, Nephrology will need to be consulted for evaluation and for question of biopsy.  2. Please obtain an ultrasound of the abdomen given focality of symptoms and a CXR as basic lung/chest screen and history of not normal chest Xray and CT in 4/2022 (in [facility]).  3. Please obtain EBV and CMV PCRs; can consider titers as well  4. Please obtain stool studies including stool O&P, crypto/giardia, and stool enteric pathogen NAAT panel.   5. Please obtain a haptoglobin and MELISSA  6. Can trend CBC w/diff, CMP, CRP daily    Treatments  1. Increase her prednisone to 7.5 mg twice daily for stress-dose considerations (we refer to this pathway for helpful reference: [email or web address]-stress-dosing-and-weaning-clinical-pathway)  2. We will not do pulse-dose (high dose) steroid infusions until we have the ultrasound returned and are reassured against a systemic bacterial infection.    3. Please decrease her hydroxychloroquine to 200 mg daily  4. Please decrease her Cellcept dose (mycophenolate) to 1000 mg twice daily  5. Please add an acid suppressing medication    Once closer to discharge, we will help facilitate scheduling adult Rheumatology outpatient consult and should review and home and social factors that may be addressed to help support best support Milly.     -----------------------------     Addendum, ~4pm: we reviewed her labs. Given the numerous WBC's and bacteria in the microscopic urine  specimen today, along with the reported dramatic fever, CRP, neutrophilia, and left CVA tenderness and left echogenicities on renal ultrasound, we are concerned about an evolving pyleonephritis in addition to her current lupus flare. For these reasons, we recommended initiation of IV ceftriaxone (total course length to be determined), and would also now begin a high dose steroid regimen (1 g IV methylprednisolone this afternooon) for her worsening lupus. Tomorrow morning she can resume her twice daily oral steroid, and we will reassess her for likely need of another pulse steroid infusion.     We discussed this plan with Dr. Nehal Bell and Dr. Carlitos Joy of the Nephrology and primary hospitalist teams, respectively, and both their teams agreed with this plan.     -----------------------------     Thank you for this interesting consultation. If there are any new questions or concerns, I would be glad to help and can be reached through our main office at [phone] or our paging  at [phone]. We will continue to follow while she is admitted. Starting Monday, it will be Dr. Anders and Dr. Delatorre on call.       This plan of care was discussed with attending, Dr. Elin Lind.       Darryn Delatorre MD/PhD  [id number], Pediatric Rheumatology Fellow  [facility]    Physician Attestation   I, Edna Lind MD, saw this patient with the resident and agree with the resident/fellow's findings and plan of care as documented in the note.      I personally reviewed vital signs, medications, labs, imaging and history and physical exam..    Key findings: 21 year old female with longstanding, poorly controlled SLE much in part to poor adherence who has clear active flare of her systemic lupus and potential concomitant infection.  Particularly worrisome that she has findings suggestive of lupus nephritis.  NO pancreatitis by labs today (has history of this).  Acute on chronic anemia that needs further  investigation.  Does not have neurologic findings today on exam nor clear myositis.      Edna Lind MD  Date of Service (when I saw the patient): 06/11/22  Edna Lind M.D.   of Pediatrics  Pediatric Rheumatology  Time Spent on this Encounter   I spent 60 minutes on the unit/floor managing the care of Milly Carroll. Over 50% of my time was spent on the following:   - Counseling the patient and/or family regarding: diagnostic results and medical compliance  - Coordination of care with the: [facility] team senior resident and attending          Reason for Consult   Reason for consult: I was asked by Dr. Sekou Liu to evaluate this patient for concern of SLE flare.    Primary Care Physician   Estefany Bell    Chief Complaint   Fever, fatigue    History is obtained from the patient and EMR    History of Present Illness   Milly Carroll is a 21 year old female who presents with the following symptoms:       progressive weakness/fatigue of 1 month. This does associated with joint or muscle pain, and she does not think her strength is less, rather the feeling of weakness is due to her fatigue and general sick feeling. She has tingling in hands and feet but without loss of sensation and no soft tissue swelling notable.     She has had 2 weeks of daily measured fevers as high as 105 F. She does have days where her max temperatures are still fevers but lower (~100-102 F).     She has a flaky rash on her scalp and hair loss that is worsening but she doesn't think much more so than before in the past 6 weeks. There is no rash anywhere else on her body that she is aware of.    For the last week, she has had diarrhea that has been worsening in recent days, which is non-bloody. No stool accidents.  She also has abdominal pain of 2 days duration, located on her left upper quadrant without radiation, with decreased appetite and nonbilious non-bloody vomiting. It is uncomfortable to eat  foods, mostly due to the abdominal pain but with some stated trouble swallowing foods and sore throat, but without choking, coughing, or congestion noted. She is breathing fine.      She was hospitalized ~6 weeks ago (on 04/25/22) for suspected lupus flare with symptoms of fever, weakness, and general malaise. She was also hypotensive on admission but responsive to fluid bolus, and outside blood culture prior to admission at our hospital was positive for gram positive rods, suspected to be contamination. Repeat blood cultures did not grow any organisms. Additional labs showed C3 low at 11, C4 low at 3, ESR elevated at 128, and dsDNA elevated at 890.    Following discharge, she was going to follow-up with adult Rheumatology to transfer care, but this has not been scheduled yet. She continues to take her daily medications (hydroxychloroquine, cellcept, and steroids) as recommended and has tolerated these medications without missed doses. She has not taken belimumuab since 2020.     Past Medical History    I have reviewed this patient's medical history and updated it with pertinent information if needed.   Past Medical History:   Diagnosis Date     Hepatitis      Lupus (systemic lupus erythematosus) (H)      Lupus cerebritis (H)      Pancreatitis 08/2017     Pyelonephritis 08/2017     Seizures (H)      Status epilepticus (H)        Past Surgical History   I have reviewed this patient's surgical history and updated it with pertinent information if needed.  Past Surgical History:   Procedure Laterality Date     NO HISTORY OF SURGERY       ORTHOPEDIC SURGERY         Immunization History   Immunization Status:  up to date and documented except for HPV and meningococcal per [facility]    Prior to Admission Medications   Prior to Admission Medications   Prescriptions Last Dose Informant Patient Reported? Taking?   Belimumab 200 MG/ML SOAJ More than a month at Unknown time  No Yes   Sig: Inject 200 mg Subcutaneous every 7 days    acetaminophen (TYLENOL) 500 MG tablet 6/11/2022 at Unknown time  No Yes   Sig: Take 1 tablet (500 mg) by mouth every 4 hours as needed for mild pain   amylase-lipase-protease (CREON 24) 09293-50627 units CPEP per EC capsule   No No   Sig: Take 1 capsule by mouth Take with snacks or supplements (with snacks)   amylase-lipase-protease (CREON 24) 51967-73654 units CPEP per EC capsule   No No   Sig: Take 2 capsules by mouth 3 times daily (with meals)   calcium carbonate (TUMS) 500 MG chewable tablet 6/10/2022 at 0800  No Yes   Sig: Take 1 tablet (500 mg) by mouth daily   ferrous sulfate (FEROSUL) 325 (65 Fe) MG tablet 6/10/2022 at 1200  No Yes   Sig: Take 1 tablet (325 mg) by mouth daily (with breakfast)   hydroxychloroquine (PLAQUENIL) 200 MG tablet 6/10/2022 at 0800  No Yes   Sig: Take 2 tablets (400 mg) by mouth daily   multivitamin, therapeutic (THERA-VIT) TABS tablet 6/10/2022 at 0800 Self No Yes   Sig: Take 1 tablet by mouth daily   mvw complete formulation (SOFTGELS) capsule   No No   Sig: Take 2 capsules by mouth daily   mycophenolate (GENERIC EQUIVALENT) 500 MG tablet   No No   Sig: Take 3 tablets (1,500 mg) by mouth 2 times daily   predniSONE (DELTASONE) 5 MG tablet 6/10/2022 at 1900  No Yes   Sig: Take 1 tablet (5 mg) by mouth daily   sulfamethoxazole-trimethoprim (BACTRIM DS) 800-160 MG tablet 6/10/2022 at 0800  No Yes   Sig: Take 1 tablet by mouth Every Mon, Wed, Fri Morning   vitamin D3 (CHOLECALCIFEROL) 1.25 MG (95292 UT) capsule 6/10/2022 at 0800  No Yes   Sig: Take 1 capsule (50,000 Units) by mouth every 7 days   vitamin D3 (CHOLECALCIFEROL) 10 MCG (400 UNIT) capsule   No No   Sig: Take 1 capsule (400 Units) by mouth daily      Facility-Administered Medications: None     Allergies   Allergies   Allergen Reactions     Rituximab Anaphylaxis     Social History   I have updated and reviewed the following Social History Narrative:   Pediatric History   Patient Parents     Not on file     Other Topics  Concern     Not on file   Social History Narrative    6/20/2013     Milly is the 2nd youngest of 7 children.  She is in the 10th grade and lives with her parents and siblings.  Her family is originally from [place] [place], but Milly was born in the .        06/2022: lives in [place] with older sister, brother-in-law, niece, and brother.         Family History   I have reviewed this patient's family history and updated it with pertinent information if needed.   Family History   Problem Relation Age of Onset     Other - See Comments Father         Question of lupus in father and paternal grandfather     Lupus Sister         older sister     Gastrointestinal Disease No family hx of         No known history of IBD, hepatitis, pancreatitis, celiac disease, or GI cancers before age 50     Glaucoma No family hx of      Macular Degeneration No family hx of      Review of Systems   The 10 point Review of Systems is negative other than noted in the HPI or here.     Physical Exam   Temp: 97.6  F (36.4  C) Temp src: Oral BP: 92/59 Pulse: 69   Resp: 14 SpO2: 100 % O2 Device: None (Room air)    Vital Signs with Ranges  Temp:  [97.6  F (36.4  C)-100.6  F (38.1  C)] 97.6  F (36.4  C)  Pulse:  [] 69  Resp:  [14-16] 14  BP: ()/(57-75) 92/59  SpO2:  [99 %-100 %] 100 %  108 lbs 7.46 oz    Gen: Well appearing, cooperative. No acute distress.  Head: short hair, with hair shafts friable and broken. 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   Eyes: No scleral injection, pupils normal.  Nose: No deformity, no rhinorrhea or congestion. No sores.  Ears: normal external canals  Mouth: Normal teeth and gums. Moist mucus membranes. Nonpainful erythematous circular erosions on the hard palate  Neck: no thyromegaly  Lymph: no cervical, supraclavicular lymphadenopathy.  Lungs: No increased work of breathing or retractions noted. CTAB.  No wheezing, rales, rhonchi.  CV: RRR. No m/r/g. Normal S1/S2. Normal peripheral perfusion, pulses 2+, brisk cap refill <2 sec. Lower extremity pulses with 3 vessel run-off palpable  Abdomen: Soft, non-distended. Mild tenderness to left side. No hepatomegaly per palpation or percussion. Splenomegaly palpable ~2 cm below the inferior edge of the ribs per percussion. prominent left (but not right) sided CVA tenderness  Neuro: Alert, interactive. Answers questions appropriately. CN intact. Grossly normal tone. Lower extremities with full intact sensation  Skin/Nails: erythematous petechiae on the distal fingertips of the 2nd and 3rd digits bilaterally. No other rashes or lesions on the face, neck, chest or trunk, arms or legs.   MSK: Symmetric extremities, no deformities. extremities warm, well perfused. active and passive range of motion without limitations. Limited joint evaluation including glenohumeral, elbow, wrist, knees, ankles, fingers, and toes, and all were normal without swelling, warmth, or erythema along any joints. No point tenderness over muscles or typical sites of enthesitis.   Muscle strength with hip flexion, knee flexion/extension, and hand  all 5/5. Able to sit up from laying without difficulty or assistance.       Data     SLE (systemic lupus erythematosus) disease monitoring:  Recent Labs   Lab Test 04/25/22  0736 12/15/21  1238 08/11/21  1227 02/10/21  1305 01/13/21  1043 12/16/20  1208 11/18/20  1056 10/21/20  1225 10/09/20  1749 07/29/20  1325 07/01/20  1300 06/26/20  0632 06/23/20  1826 05/27/20  1218 04/16/20  1020 12/13/19  0543 12/10/19  1524 05/01/19  0955 04/24/19  1040 04/10/19  1123   .0* 97.0* 88.0*  --   --   --   --   --  46*  --   --  114* 220*  --  57*  --  18*  --  152* 277*   [id number] 11* 21* 50* 38* 30* 39* 44* 28* 31* 42*   < > 10* 18*   < > 47*   < > 13*   < > 39* 12*   [id number] 3* 5* 8* 9* 10* 7* 8* 4* 5* 10*   < > 2* 7*   < > 10*   < > 4*   < > 4* <2*    < > =  values in this interval not displayed.       Hematology:  Recent Labs   Lab Test 06/11/22  0146 12/15/21  1238 08/11/21  1227 02/11/21  0615 02/10/21  1305 01/13/21  1043 01/13/21  1042 12/16/20  1208 11/18/20  1056 10/21/20  1225 10/09/20  1749 07/29/20  1325 07/15/20  1340 07/08/20  1340   WBC 10.7 5.1 5.1 4.5 3.9* 3.8*  --  4.2 5.2 3.8* 5.2 3.4* 4.1 4.8   ANEU  --   --   --  3.2 2.6 2.1  --  2.3 3.9 2.2 2.9 2.0 2.7 3.5   ALYM  --   --   --  0.7* 0.7* 1.3  --  1.1 0.9 1.1 1.5 0.8 0.7* 0.5*   AEOS  --   --   --  0.2 0.2 0.0  --  0.3 0.1 0.1 0.0 0.1 0.1 0.1   HGB 7.8* 8.8* 8.8* 9.1* 9.9* 9.1* 10.5* 9.4* 9.9* 9.1* 9.7* 9.3* 8.6* 7.8*   MCV 91 85 80 75* 76* 75*  --  77* 75* 80 81 93 93 91   * 214 251 104* 118* 149*  --  240 195 188 213 243 285 266       Inflammatory Markers:  Recent Labs   Lab Test 06/11/22  0146 04/25/22  0736 02/10/21  1305 01/13/21  1043 10/09/20  1749 06/27/20  0651 06/26/20  0632 06/25/20  0656 06/23/20  1826 12/13/19  0543 12/12/19  0453 12/11/19  0536 12/10/19  1524 04/11/19  0601 08/25/17  0500 08/24/17  0530 08/22/17  0520 08/16/17  1024 12/04/15  0908 11/20/15  0950   CRP 99.0*  --  <2.9 3.5 <2.9 <2.9 <2.9 <2.9 <2.9 3.7 10.1*   < > 30.1* <2.9  --    < > 22.0*   < >  --   --    SED >140* 128*  --   --  24*  --   --   --  59*  --   --   --  113* 90* 111*  --  139*  --  113* 113*   PCAL  --   --   --   --   --   --   --   --   --   --   --   --  1.81  --   --   --   --   --   --   --    GABRIEL 123  --   --   --   --   --   --  318*  --   --   --   --   --   --   --   --  135  --   --   --     < > = values in this interval not displayed.       Renal studies:  Recent Labs   Lab Test 06/11/22  0151 06/11/22  0146 04/26/22  0621 04/25/22  0736 12/15/21  1241 12/15/21  1238 08/11/21  1227 02/11/21  0615 02/10/21  1405 02/10/21  1305 02/10/21  1216 01/13/21  1400 01/13/21  1043 12/16/20  1426 12/16/20  1208 11/18/20  1343 11/18/20  1056 10/21/20  1455 10/21/20  1225 10/09/20  1751 10/09/20  1749  07/29/20  1430 07/15/20  1340 07/15/20  1313 07/08/20  1340 07/01/20  1315   BUN  --  18 8 7  --   --   --  10  --  14 13  --  8  --  12  --  12  --  11  --    < >  --    < >  --    < >  --    CR  --  0.92 0.60 0.66  --  0.47* 0.51* 0.58  --  0.62 0.48*  --  0.54  --  0.52  --  0.71  --  0.61  --    < >  --    < >  --    < >  --    UBLD Moderate*  --   --   --  Negative  --   --   --  Trace*  --   --  Moderate*  --  Negative  --  Negative  --  Negative  --  Large*  --  Negative  --  Negative  --  Moderate*   PROTEIN 50 *  50 *  --   --   --  20 *  --   --   --  100*  --   --  10*  --  10*  --  10*  --  Negative  --  30*  --  30*  --  10*  --  10*   RBCU  --   --   --   --  3*  --   --   --  38*  --   --  5*  --  2  --  2  --  2  --  >182*  --  1  --  1  --  132*   WBCU  --   --   --   --  2  --   --   --  59*  --   --  7*  --  1  --  3  --  1  --  2  --  3  --  <1  --  3    < > = values in this interval not displayed.       Liver studies:  Recent Labs   Lab Test 06/11/22  0146 04/25/22  0736 12/15/21  1238 08/11/21  1227 02/11/21  0615 02/10/21  1305 02/10/21  1216 01/13/21  1043 12/16/20  1208 11/18/20  1056   AST 34 33 72* 32 117* 129* Unsatisfactory specimen - hemolyzed 32 25 30   ALT 12 10 32 13 55* 62* 70* 17 20 23   BILITOTAL 0.7 0.4 0.3 0.3 0.3 0.6 0.7 0.4 0.3 0.7   ALKPHOS 164* 150 262* 244* 140 169* 173* 141 148 154*   ALBUMIN 2.2* 2.0* 2.3* 2.6* 2.6* 3.4 3.3* 3.1* 3.3* 3.1*       Muscle enzyme studies:   Recent Labs   Lab Test 06/11/22  0146 04/25/22  0736 12/15/21  1238 08/11/21  1227 02/11/21  0615 02/10/21  1305 02/10/21  1216 01/13/21  1043 12/16/20  1208 11/18/20  1056 06/27/20  0651 06/26/20  0632 06/25/20  0656 06/24/20  1131 06/23/20  1826 12/20/19  1035 12/13/19  0543 12/12/19  0453 12/11/19  0536 04/11/19  0601 04/10/19  1123 08/28/17  0540 08/25/17  0500   CKT 24*  --   --   --   --   --   --   --   --   --   --  52  --   --  75  --  103  --  332*  --   --   --   --    *  --   --   --    --   --   --   --   --   --   --   --  349*  --   --   --   --   --   --   --  414*  --  256   AST 34 33 72* 32 117* 129* Unsatisfactory specimen - hemolyzed 32 25 30   < > 34 50*   < > 115*   < >  --    < > 46*   < > 123*   < > 57*   ALT 12 10 32 13 55* 62* 70* 17 20 23   < > 19 24   < > 37   < >  --    < > 12   < > 54*   < > 82*    < > = values in this interval not displayed.

## 2022-06-11 NOTE — ED PROVIDER NOTES
History     Chief Complaint   Patient presents with     Generalized Weakness     Fever     HPI    History obtained from patient    Milly is a 21 year old female with history of SLE with recent admission for lupus flare (4/25-4/26/22) who presents at 12:23 AM with fever, abdominal pain, weakness/fatigue, and decreased appetite.    Milly says she has had progressive weakness for 1 month. Started having fevers as high as 105F about 2 weeks ago. Daily fevers have continued. No sick contacts. Not in school and has not been working; just staying at home.    She feels like her entire body is sore and that she can't do anything. She notices tingling and numbness in both legs when she stands up. Has joint pain in her fingers that worsens by the end of the day. She has also noticed a rash on her scalp. It is not painful or pruritic. No one sided weakness or difficulty speaking.    She has had LUQ abdominal pain for 2 days and NBNB vomiting yesterday and today. Decreased appetite but has been drinking water and voiding adequately. She endorsed constipation last week and looser stools this week. Last BM was this morning and soft. No home bowel regimen. She denies dysuria. No STI concerns or recent sexual activity. She has monthly menses (LMP early May). Patient denies any bleeding complaints.    She has been taking her lupus medications as prescribed: mycophenolate, prednisone 5 mg daily, hydroxychloroquine, and Vitamin D. She was previously on a weekly Belimumab injection but has not received this since March 2020 due to insurance coverage issues. Was told that she would likely transition to a  different monoclonal antibody that was covered, but this has not happened yet. Milly previously followed with Dr. Anders, but she is currently in the process of transitioning to care with adult rheumatology.       PMHx:  Past Medical History:   Diagnosis Date     Hepatitis      Lupus (systemic lupus erythematosus) (H)      Lupus  cerebritis (H)      Pancreatitis 08/2017     Pyelonephritis 08/2017     Seizures (H)      Status epilepticus (H)      Past Surgical History:   Procedure Laterality Date     NO HISTORY OF SURGERY       ORTHOPEDIC SURGERY       These were reviewed with the patient/family.    MEDICATIONS were reviewed and are as follows:   Current Facility-Administered Medications   Medication     0.9% sodium chloride + KCl 20 mEq/L infusion     acetaminophen (TYLENOL) tablet 650 mg     ondansetron (ZOFRAN ODT) ODT tab 4 mg     potassium chloride ER (KLOR-CON M) CR tablet 40 mEq       ALLERGIES:  Rituximab    IMMUNIZATIONS:  Up to date except incomplete HPV and Menveo by report.    SOCIAL HISTORY: Milly lives with brother, sister, brother-in-law, and niece.  She has completed high school and is not currently working.     I have reviewed the Medications, Allergies, Past Medical and Surgical History, and Social History in the Epic system.    Review of Systems  Please see HPI for pertinent positives and negatives.  All other systems reviewed and found to be negative.        Physical Exam   BP: 118/75  Pulse: 119  Temp: (!) 100.6  F (38.1  C)  Resp: 16  Weight: 49.2 kg (108 lb 7.5 oz)  SpO2: 99 %    GENERAL: Awake, laying in bed, interactive, no distress  SKIN: Dry and patchy erythema along central and left scalp. No other rashes, lesions, or abnormal pigmentation.  HEAD: Normocephalic and atraumatic  EYES: Pupils equally round and reactive to light, EOM grossly intact, sclera clear, conjunctivae normal  EARS: Normal TMs bilaterally  NOSE: Nares patent  MOUTH: Moist mucosa, clear oropharynx, normal dentition, no gingivitis, no oral lesions  NECK: Supple, no adenopathy  LUNGS: Breathing comfortably on room air, lung sounds clear bilaterally, symmetric air entry, no wheezing or crackles  HEART: Regular rate and rhythm. Questionable split S1/S2 vs murmur. Full radial pulses.  ABDOMEN: Soft, non-distended, tenderness in LUQ over spleen, no  lower abdominal pain with palpation. palpable spleen tip, bowel sounds present.    : deferred  EXT: No edema. WWP. Tenderness to palpation of left ankle. Decreased strength in ankle/feet. No appreciable arthritis of hands. Full strength of upper extremities.  NEURO: No focal deficits. Alert and oriented x4.       ED Course   Patient was evaluated and history obtained.  Ibuprofen given for fever, with improvement (100.6 --> 98.5)  Consulted Rheumatology and obtained labs:  - CBC, CMP, CRP, ESR, BCx, Amylase, Lipase, LDH, CK, Triglyc, IgG, DS DNA, C3, C4, CH50, D Dimer, Ferritin, Fibrinogen, UA, UCx, UPT, Urine Protein, Flu/Covid  Fluids started  Patient signed out to Gen Peds team        Procedures    Results for orders placed or performed during the hospital encounter of 06/11/22 (from the past 24 hour(s))   CBC with platelets differential    Narrative    The following orders were created for panel order CBC with platelets differential.  Procedure                               Abnormality         Status                     ---------                               -----------         ------                     CBC with platelets and d...[[id number]]  Abnormal            Final result               RBC and Platelet Morphology[[id number]]  Abnormal            Final result                 Please view results for these tests on the individual orders.   Comprehensive metabolic panel   Result Value Ref Range    Sodium 138 133 - 144 mmol/L    Potassium 2.8 (L) 3.4 - 5.3 mmol/L    Chloride 109 94 - 109 mmol/L    Carbon Dioxide (CO2) 21 20 - 32 mmol/L    Anion Gap 8 3 - 14 mmol/L    Urea Nitrogen 18 7 - 30 mg/dL    Creatinine 0.92 0.52 - 1.04 mg/dL    Calcium 7.7 (L) 8.5 - 10.1 mg/dL    Glucose 85 70 - 99 mg/dL    Alkaline Phosphatase 164 (H) 40 - 150 U/L    AST 34 0 - 45 U/L    ALT 12 0 - 50 U/L    Protein Total 8.2 6.8 - 8.8 g/dL    Albumin 2.2 (L) 3.4 - 5.0 g/dL    Bilirubin Total 0.7 0.2 - 1.3 mg/dL    GFR Estimate >90 >60  mL/min/1.73m2   Lipase   Result Value Ref Range    Lipase 363 73 - 393 U/L   Amylase   Result Value Ref Range    Amylase 119 (H) 30 - 110 U/L   CRP inflammation   Result Value Ref Range    CRP Inflammation 99.0 (H) 0.0 - 8.0 mg/L   Erythrocyte sedimentation rate auto   Result Value Ref Range    Erythrocyte Sedimentation Rate >140 (H) 0 - 20 mm/hr   Ferritin   Result Value Ref Range    Ferritin 123 12 - 150 ng/mL   Lactate Dehydrogenase   Result Value Ref Range    Lactate Dehydrogenase 294 (H) 81 - 234 U/L   CBC with platelets and differential   Result Value Ref Range    WBC Count 10.7 4.0 - 11.0 10e3/uL    RBC Count 2.38 (L) 3.80 - 5.20 10e6/uL    Hemoglobin 7.8 (L) 11.7 - 15.7 g/dL    Hematocrit 21.7 (L) 35.0 - 47.0 %    MCV 91 78 - 100 fL    MCH 32.8 26.5 - 33.0 pg    MCHC 35.9 31.5 - 36.5 g/dL    RDW 22.0 (H) 10.0 - 15.0 %    Platelet Count 107 (L) 150 - 450 10e3/uL    % Neutrophils 83 %    % Lymphocytes 12 %    % Monocytes 3 %    % Eosinophils 1 %    % Basophils 0 %    % Immature Granulocytes 1 %    NRBCs per 100 WBC 0 <1 /100    Absolute Neutrophils 8.9 (H) 1.6 - 8.3 10e3/uL    Absolute Lymphocytes 1.3 0.8 - 5.3 10e3/uL    Absolute Monocytes 0.3 0.0 - 1.3 10e3/uL    Absolute Eosinophils 0.1 0.0 - 0.7 10e3/uL    Absolute Basophils 0.0 0.0 - 0.2 10e3/uL    Absolute Immature Granulocytes 0.1 <=0.4 10e3/uL    Absolute NRBCs 0.0 10e3/uL   Triglycerides   Result Value Ref Range    Triglycerides 100 <150 mg/dL    Patient Fasting > 8hrs? Unknown    CK total   Result Value Ref Range    CK 24 (L) 30 - 225 U/L   RBC and Platelet Morphology   Result Value Ref Range    Platelet Assessment  Automated Count Confirmed. Platelet morphology is normal.     Automated Count Confirmed. Platelet morphology is normal.    Parker Cells Slight (A) None Seen    Rouleaux Present (A) None Seen    Teardrop Cells Slight (A) None Seen    RBC Morphology Confirmed RBC Indices    Fibrinogen activity   Result Value Ref Range    Fibrinogen Activity  331 170 - 490 mg/dL   D dimer quantitative   Result Value Ref Range    D-Dimer Quantitative 1.58 (H) 0.00 - 0.50 ug/mL FEU    Narrative    This D-dimer assay is intended for use in conjunction with a clinical pretest probability assessment model to exclude pulmonary embolism (PE) and deep venous thrombosis (DVT) in outpatients suspected of PE or DVT. The cut-off value is 0.50 ug/mL FEU.   [place] Draw    Narrative    The following orders were created for panel order [place] Draw.  Procedure                               Abnormality         Status                     ---------                               -----------         ------                     Extra Green Top (Lithium...[[id number]]                      Final result                 Please view results for these tests on the individual orders.   Extra Green Top (Lithium Heparin) ON ICE   Result Value Ref Range    Hold Specimen JIC    UA without Microscopic   Result Value Ref Range    Color Urine Yellow Colorless, Straw, Light Yellow, Yellow    Appearance Urine Clear Clear    Glucose Urine Negative Negative mg/dL    Bilirubin Urine Negative Negative    Ketones Urine Negative Negative mg/dL    Specific Gravity Urine 1.013 1.003 - 1.035    Blood Urine Moderate (A) Negative    pH Urine 6.5 5.0 - 7.0    Protein Albumin Urine 50  (A) Negative mg/dL    Urobilinogen Urine 2.0 Normal, 2.0 mg/dL    Nitrite Urine Negative Negative    Leukocyte Esterase Urine Trace (A) Negative   Protein qualitative urine   Result Value Ref Range    Protein Albumin Urine 50  (A) Negative mg/dL   hCG qual urine POCT   Result Value Ref Range    HCG Qual Urine Negative Negative    Internal QC Check POCT Valid Valid    POCT Kit Lot Number 0     POCT Kit Expiration Date 0        Medications   acetaminophen (TYLENOL) tablet 650 mg (has no administration in time range)   ondansetron (ZOFRAN ODT) ODT tab 4 mg (has no administration in time range)   0.9% sodium chloride + KCl 20 mEq/L infusion  (has no administration in time range)   potassium chloride ER (KLOR-CON M) CR tablet 40 mEq (has no administration in time range)   ibuprofen (ADVIL/MOTRIN) tablet 400 mg (400 mg Oral Given 6/11/22 0022)       Old chart from [facility] Epic reviewed, supported history as above.  Patient was attended to immediately upon arrival and assessed for immediate life-threatening conditions.    Critical care time:  none    Assessments & Plan (with Medical Decision Making)   Milly is a 21 year old female with history of SLE and recurrent flares (most recently 4/25/22-4/26/22) who presents to ED with fever, malaise, weakness, fatigue, and abdominal pain. Exam notable for splenomegaly, abnormal heart sounds, and rash on scalp. Differential includes lupus flare, pancreatitis, cholecystitis, macrophage activation syndrome, UTI, Covid, EBV, or other viral illness, bacterial etiology (abdominal infection, endocarditis). At this point patient appears non-toxic with age appropriate hemodynamics. Cultures pending. Will admit and hold off on antibiotics in ER.     Given her immunosuppression and duration of symptoms, ruling out infectious causes was pertinent. Labs notable for elevated CRP, ESR, Lipase, and Amylase, along with decreased Hgb and Plt. No lower abdominal tenderness to suggest appendicitis or ovarian torsion.    She warranted admission for rheumatologic evaluation and close monitoring while obtaining further work-up. Sign out completed via conference call with accepting attending Dr. Espinal. Patient stable at time of transfer to medical floor.     I have reviewed the nursing notes.    I have reviewed the findings, diagnosis, plan and need for follow up with the patient.  Current Discharge Medication List          Final diagnoses:   Splenomegaly   Systemic lupus erythematosus, unspecified SLE type, unspecified organ involvement status (H)   Fever in pediatric patient   Anemia, unspecified type   Thrombocytopenia (H)    Hypokalemia     This patient was discussed with Dr. Araceli Joy.    Caroline Shah MD   PGY-1 [facility]    Patient was seen and discussed with resident Dr. Shah. I supervised all aspects of this patient's evaluation, treatment and care plan.  I confirmed key components of the history and physical exam myself. I agree with the history, physical exam, assessment and plan as noted above.     MD Rufino Fiore Callie R, MD  06/11/22 0608

## 2022-06-11 NOTE — H&P
[facility]    History and Physical - Pediatric Service        Date of Admission:  6/11/2022    Assessment & Plan      Milly Carroll is a 21 year old female admitted on 6/11/2022. She has a history of systemic lupus erythematosus, and is admitted for weeks of fever and progressive weakness concerning for acute flare of her lupus.  Given that she is on multiple immunosuppressive medications, also concern for bacterial infection, however she has no localizing symptoms at this time and is overall nontoxic in appearance.  Laboratory evaluation showed significantly elevated inflammatory markers as well as an CHRISTA, hypokalemia, hypoalbuminemia, anemia, and thrombocytopenia.  Exam is notable for splenomegaly, rash on her left scalp, no visible arthritis on exam.  Patient was discussed with rheumatology who recommended admission for ongoing work-up and evaluation.  She is admitted for observation with formal rheumatology consult in the morning.  Per rheumatology recommendations, will defer any additional steroid treatment at this time as infectious work-up is still being completed.    Fever  Weakness  Systemic lupus erythematosus, acute flare  - Rheumatology consulted, will see in AM.  No additional steroids at this time  - Blood and urine cultures pending. No focal sign of infection at this time, will defer antibiotics  - PTA hydroxychloroquine 400mg QD  - PTA Mycophenolate 1500mg BID  - PTA prednisone 5mg every day  - C3, C4, CH50, IgG, anti-dsDNA Ab pending    Acute kidney injury  Baseline creatinine around 0.5, creatinine on admission of 0.92. Concern for lupus nephritis vs dehydration  - Repeat renal panel at noon, then daily  - NS + 20 KCl @ 100 mL/hr    Hypokalemia  Potassium of 2.8 on admission.  - 40 mEq Potassium Chloride PO   - Repeat K with renal panel at noon    Anemia  Thrombocytopenia  Hemoglobin of 7.8, platelets of 107 on admission. Likely related to acute  lupus flare.  Ferritin was normal, which is unexpected given the degree of inflammation of her other inflammatory markers, potentially representing some degree of iron deficiency as well.  - Daily CBC    Splenomegaly  Possibly related to lupus flare.   - May consider abdominal imaging (US), Rheumatology to see in AM         Diet: Peds Diet Age 9-18 yrs  DVT Prophylaxis: Low Risk/Ambulatory with no VTE prophylaxis indicated  Leung Catheter: Not present  Fluids: NS + 20 KCl @ 100 mL/hr  Central Lines: None  Cardiac Monitoring: None  Code Status:   Full      Disposition Plan   Recommended to home once treatment plan established with rheumatology, infectious workup complete.      Plan was briefly discussed with overnight attending, Dr. Espinal. Patient will be formally staffed in the AM.     Jolie Deng MD  Pediatric Service   [facility]  Securely message with the Vocera Web Console (learn more here)  Text page via [facility] Paging/Directory     Attestation:  This patient has been seen and evaluated by me today, and management was discussed with the patient, the resident physicians and nurses.  I have reviewed today's vital signs, medications, labs and imaging (as pertinent).  I agree with the findings and plan in this note with the following additions: On my exam:  Patient has hyperpigmented irregular lesions (purpuric?) on hard palate, Left> right.  She has no cardiac rub, no pleural rub, no joint changes in elbows, hands, knees, ankles (I did not examine hips). On abdominal exam, she has mild tenderness to palpation in LUQ without rebound, ND, normal BS and for me spleen tip palpable about 3 cm bcm.    This most likely represents SLE exacerbation +/- an intercurrent infection. Most likely source would be Urine (as we now have a negative abdominal U/S and a U/A with large bacteria (though less impressive nitrite and LE). Viral gastroenteritis also possible given  symptoms.      Cecilio Liu MD MPH  Pediatric Hospitalist  Pager: [phone]             ______________________________________________________________________    Chief Complaint   Weakness, fever    History is obtained from the patient    History of Present Illness   Milly Carroll is a 21 year old female who has a history of lupus and is admitted for fever and weakness    Said for the past 2 weeks she has been having daily fevers up to 105  F.  Fevers do improve with Tylenol and ibuprofen.  Over the past 2 weeks she has also had worsening weakness and fatigue.  She recently noticed a rash on the left side of her scalp, after looking at it in the mirror, the rash has not been itchy or painful.  She notices the weakness mostly in her lower extremities.  She is still able to ambulate independently, but feels more weak when doing so.  She denies any joint pain, joint swelling or redness.  2 days ago she developed left upper quadrant abdominal pain and diarrhea, with a couple loose stools per day.  Yesterday she also had 2-3 episodes of nonbloody, nonbilious emesis.  She has also had some lower back pain.  She has been voiding normally, no dysuria or blood in her urine.  She has been taking mycophenolate, prednisone, and hydroxychloroquine as prescribed.  She previously has received belimumab injections, however she reports that she has not received these in at least 2 years.  She currently follows with pediatric rheumatology, but is in the process of transitioning to an adult rheumatologist.    She denies any headaches, changes to her hearing or vision, chest pain or shortness of breath, heart palpitations, no sores, sore throat.  She does report an intermittent runny nose and cough for the past several weeks.  She has no known sick contacts. Menses are regular, last 2 weeks PTA. Does not have a sexual partner and does not endorse a history of sexual activity. No ill contacts.    Review of Systems    The 10  point Review of Systems is negative other than noted in the HPI or here.     Past Medical History    I have reviewed this patient's medical history and updated it with pertinent information if needed.   Past Medical History:   Diagnosis Date     Hepatitis      Lupus (systemic lupus erythematosus) (H)      Lupus cerebritis (H)      Pancreatitis 08/2017     Pyelonephritis 08/2017     Seizures (H)      Status epilepticus (H)         Past Surgical History   I have reviewed this patient's surgical history and updated it with pertinent information if needed.  Past Surgical History:   Procedure Laterality Date     NO HISTORY OF SURGERY       ORTHOPEDIC SURGERY          Social History   Currently lives with sister. She is not currently employed or in school. She completed [employer]. Enjoys playing video games. Does not use tobacco, ethanol, or recreational drugs. Has note negaged in sexual activity. States mood is good and denies depressive symptoms or suicidal thoughts.  Family History   I have reviewed this patient's family history and updated it with pertinent information if needed.  Family History   Problem Relation Age of Onset     Other - See Comments Father         Question of lupus in father and paternal grandfather     Lupus Sister         older sister     Gastrointestinal Disease No family hx of         No known history of IBD, hepatitis, pancreatitis, celiac disease, or GI cancers before age 50     Glaucoma No family hx of      Macular Degeneration No family hx of        Prior to Admission Medications   Prior to Admission Medications   Prescriptions Last Dose Informant Patient Reported? Taking?   Belimumab 200 MG/ML SOAJ More than a month at Unknown time  No Yes   Sig: Inject 200 mg Subcutaneous every 7 days   acetaminophen (TYLENOL) 500 MG tablet 6/11/2022 at Unknown time  No Yes   Sig: Take 1 tablet (500 mg) by mouth every 4 hours as needed for mild pain   amylase-lipase-protease (CREON 24) 16371-08489 units CPEP per  EC capsule   No No   Sig: Take 1 capsule by mouth Take with snacks or supplements (with snacks)   amylase-lipase-protease (CREON 24) 38939-82388 units CPEP per EC capsule   No No   Sig: Take 2 capsules by mouth 3 times daily (with meals)   calcium carbonate (TUMS) 500 MG chewable tablet 6/10/2022 at 0800  No Yes   Sig: Take 1 tablet (500 mg) by mouth daily   ferrous sulfate (FEROSUL) 325 (65 Fe) MG tablet 6/10/2022 at 1200  No Yes   Sig: Take 1 tablet (325 mg) by mouth daily (with breakfast)   hydroxychloroquine (PLAQUENIL) 200 MG tablet 6/10/2022 at 0800  No Yes   Sig: Take 2 tablets (400 mg) by mouth daily   multivitamin, therapeutic (THERA-VIT) TABS tablet 6/10/2022 at 0800 Self No Yes   Sig: Take 1 tablet by mouth daily   mvw complete formulation (SOFTGELS) capsule   No No   Sig: Take 2 capsules by mouth daily   mycophenolate (GENERIC EQUIVALENT) 500 MG tablet   No No   Sig: Take 3 tablets (1,500 mg) by mouth 2 times daily   predniSONE (DELTASONE) 5 MG tablet 6/10/2022 at 1900  No Yes   Sig: Take 1 tablet (5 mg) by mouth daily   sulfamethoxazole-trimethoprim (BACTRIM DS) 800-160 MG tablet 6/10/2022 at 0800  No Yes   Sig: Take 1 tablet by mouth Every Mon, Wed, Fri Morning   vitamin D3 (CHOLECALCIFEROL) 1.25 MG (72968 UT) capsule 6/10/2022 at 0800  No Yes   Sig: Take 1 capsule (50,000 Units) by mouth every 7 days   vitamin D3 (CHOLECALCIFEROL) 10 MCG (400 UNIT) capsule   No No   Sig: Take 1 capsule (400 Units) by mouth daily      Facility-Administered Medications: None     Allergies   Allergies   Allergen Reactions     Rituximab Anaphylaxis       Physical Exam   Vital Signs: Temp: 98.6  F (37  C) Temp src: Oral BP: 92/57 Pulse: 88   Resp: 15 SpO2: 100 % O2 Device: None (Room air)    Weight: 108 lbs 7.46 oz    GENERAL: Alert, awake, appears tired, but in no acute distress  SKIN: Area of scaly patches of erythema on left scalp  HEAD: Normocephalic  EYES: PERRL, Extraocular muscles intact. Normal conjunctivae.  NOSE:  Normal without discharge.  MOUTH: Moist mucous membranes, no oral lesions, normal pharynx  LUNGS: Clear. No rales, rhonchi, wheezing or retractions  HEART: Regular rhythm. Fixed split S2. No murmurs. Normal pulses.  ABDOMEN: LUQ tenderness with palpation. Palpable spleen just below the left ribs. Abdomen is soft and not distended. Bowel sounds normal.   NEUROLOGIC: No focal findings. Cranial nerves grossly intact. Normal gait  EXTREMITIES No deformities, erythema, or swelling. Full active ROM of wrist, elbows, ankles, knees, and hips. No tenderness to palpation.       Data   Data reviewed today: I reviewed all medications, new labs and imaging results over the last 24 hours. I personally reviewed no images or EKG's today.    Recent Labs   Lab 06/11/22  0146   WBC 10.7   HGB 7.8*   MCV 91   *      POTASSIUM 2.8*   CHLORIDE 109   CO2 21   BUN 18   CR 0.92   ANIONGAP 8   BISI 7.7*   GLC 85   ALBUMIN 2.2*   PROTTOTAL 8.2   BILITOTAL 0.7   ALKPHOS 164*   ALT 12   AST 34   LIPASE 363

## 2022-06-11 NOTE — PROGRESS NOTES
[facility]    Progress Note - Pediatric Service PURPLE Team       Date of Admission:  6/11/2022    Assessment & Plan        Milly Carroll is a 21 year old female admitted on 6/11/2022. She has a history of systemic lupus erythematosus, and is admitted for weeks of fever and progressive weakness consistent with acute SLE flare and concern for concomitant infection, with highest concern for pyelonephritis given CRP elevation out of proportion to SLE flare (per rheumatology), elevated ANC & increased echogenicity in unilateral renal parenchyma. CMV/EBV remain on the differential as well.  Additionally, it appears that Milly may be developing lupus nephritis, which has not been a previous problem for her. She is hemodynamically stable but may also be at increased risk for fluid overload should her kidney disease progress rapidly.  She remains admitted for treatment with IV steroids and IV antibiotics.    Fever, Weakness  Systemic lupus erythematosus, acute flare  - Rheumatology consulted  - 1g methylprednisolone once tonight, discontinue PTA steroids  - PTA hydroxychloroquine decreased to 200 mg QDay  - PTA Mycophenolate decreased to 1000mg BID  - C3, C4, CH50, IgG, anti-dsDNA Ab pending, likely return on Monday  - Daily CMP, CRP  - Follow EBV/CMV titers & PCR    Concern for UTI  -CTX 2g Q24h, first dose 6/11 PM    Acute kidney injury  Hypokalemia, Improved  Concern for lupuus nephritis  CHRISTA has not improved with fluid resuscitation. UA with both blood and protein.  Likely will need kidney biopsy  - Repeat renal panel at noon, then daily  - NS + 20 KCl @ 100 mL/hr  -Will continue K-containing fluids overnight and reassess on AM labs  -Nephrology consulted, appreciate recs.    Diarrhea, vomiting  Possibly secondary to UTI vs enteric infection  -Follow enteric panel, crypto & giardia antigens  -Ova & parasite test had lab error so unable to run - will not re-order due to  low suspicion of O&P infection given mild diarrheal symptoms (1 day), high fevers - can reassess if ongoing suspicion for parasitic infection     Normocytic Anemia, Thrombocytopenia  Hemoglobin of 7.8, platelets of 107 on admission. Likely related to acute lupus flare vs anemia of chronic disease (or both).  Ferritin was normal, which is unexpected given the degree of inflammation of her other inflammatory markers, potentially representing some degree of iron deficiency as well although already taking iron and anemia is normocytic.  Initially was some concern for hemolytic anemia but MELISSA negative and LDH not terribly elevated.  - Daily CBC     Splenomegaly  Possibly related to lupus flare vs sequestration       Diet: Peds Diet Age 9-18 yrs    DVT Prophylaxis: Low Risk/Ambulatory with no VTE prophylaxis indicated  Leung Catheter: Not present  Fluids: as above  Central Lines: None  Cardiac Monitoring: None  Code Status: Full Code      Disposition Plan   Expected Discharge:  Once no longer requiring IV steroids or IV antibiotics, safe home & follow up plans in place, likely 3-7 days  Anticipated discharge location:  Awaiting care coordination huddle  Delays:    home        The patient's care was discussed with the Attending Physician, Dr. Liu, Rheumatology fellow & attending, Nephrology fellow.    Cecilio Joy MD  Pediatric Service   [facility]  Securely message with the Vocera Web Console (learn more here)  Text page via [facility] Paging/Directory   Please see signed in provider for up to date coverage information      Clinically Significant Risk Factors Present on Admission             # Hypoalbuminemia: Albumin = 2.0 g/dL (Ref range: 3.4 - 5.0 g/dL) on admission, will monitor as appropriate    # Thrombocytopenia: Plts = 107 10e3/uL (Ref range: 150 - 450 10e3/uL) on admission, will monitor for bleeding       Attestation:  This patient has been seen and  evaluated by me today, and management was discussed with the resident physicians, peds Rheumatology, patient and her nurses. Please see my attestation on the admission H&P. Since admission, we have met with Pediatric Rheumatology and seen repeat labs noted below. K+ improved, U/A moderately suspicious, Cr remains significantly elevated compared to baseline.  Additional plans discussed with rheumatology include:  1) Methylprednisolone  I have reviewed today's vital signs, medications, labs and imaging (as pertinent).   2) Begin empiric therapy with Ceftriaxone pending Urine and Blood cultures  3) Consult nephrology to evaluate if this may be onset of lupus nephritis and guide further evaluation of the kidneys (eg biopsy)      Cecilio Liu MD MPH  Pediatric Hospitalist  Pager: [phone]           ______________________________________________________________________    Interval History   Symptoms include continued fatigue, mild diarrhea. No new concerns.    Data reviewed today: I reviewed all medications, new labs and imaging results over the last 24 hours.     Physical Exam   Vital Signs: Temp: 97.8  F (36.6  C) Temp src: Oral BP: 93/57 Pulse: 76   Resp: 18 SpO2: 100 % O2 Device: None (Room air)    Weight: 108 lbs 7.46 oz  GENERAL: Alert, awake, appears tired, but in no acute distress  SKIN: Various patches of hypopigmentation on face as well as hyperpigmented spots on palmar aspect of fingers  HEAD: Normocephalic  EYES: Extraocular muscles intact. Normal conjunctivae.  NOSE: Normal without discharge.  MOUTH: Moist mucous membranes, purple-red spots on hard palate bilaterally  LUNGS: Clear. No rales, rhonchi, wheezing or retractions  HEART: Regular rhythm. Split S2, exaggerated but varies with inspiration. No murmurs. Normal pulses.  ABDOMEN: LUQ tenderness with palpation. Palpable spleen just below the left ribs. Abdomen is soft and not distended. Bowel sounds normal. No hepatomegaly  NEUROLOGIC: No focal  findings. Cranial nerves grossly intact. Normal gait  EXTREMITIES No deformities, erythema, or swelling. Full active ROM of wrist, elbows, ankles, knees, and hips. No tenderness to palpation.    Data   Results for orders placed or performed during the hospital encounter of 06/11/22 (from the past 24 hour(s))   CBC with platelets differential    Narrative    The following orders were created for panel order CBC with platelets differential.  Procedure                               Abnormality         Status                     ---------                               -----------         ------                     CBC with platelets and d...[[id number]]  Abnormal            Final result               RBC and Platelet Morphology[[id number]]  Abnormal            Final result                 Please view results for these tests on the individual orders.   Comprehensive metabolic panel   Result Value Ref Range    Sodium 138 133 - 144 mmol/L    Potassium 2.8 (L) 3.4 - 5.3 mmol/L    Chloride 109 94 - 109 mmol/L    Carbon Dioxide (CO2) 21 20 - 32 mmol/L    Anion Gap 8 3 - 14 mmol/L    Urea Nitrogen 18 7 - 30 mg/dL    Creatinine 0.92 0.52 - 1.04 mg/dL    Calcium 7.7 (L) 8.5 - 10.1 mg/dL    Glucose 85 70 - 99 mg/dL    Alkaline Phosphatase 164 (H) 40 - 150 U/L    AST 34 0 - 45 U/L    ALT 12 0 - 50 U/L    Protein Total 8.2 6.8 - 8.8 g/dL    Albumin 2.2 (L) 3.4 - 5.0 g/dL    Bilirubin Total 0.7 0.2 - 1.3 mg/dL    GFR Estimate >90 >60 mL/min/1.73m2   Lipase   Result Value Ref Range    Lipase 363 73 - 393 U/L   Amylase   Result Value Ref Range    Amylase 119 (H) 30 - 110 U/L   Blood Culture Peripheral Blood    Specimen: Peripheral Blood   Result Value Ref Range    Culture No growth after 12 hours     Narrative    Only an Aerobic Blood Culture Bottle was collected, interpret results with caution.     Blood Culture Peripheral Blood    Specimen: Peripheral Blood   Result Value Ref Range    Culture No growth after 12 hours     Narrative     Only an Aerobic Blood Culture Bottle was collected, interpret results with caution.     CRP inflammation   Result Value Ref Range    CRP Inflammation 99.0 (H) 0.0 - 8.0 mg/L   Erythrocyte sedimentation rate auto   Result Value Ref Range    Erythrocyte Sedimentation Rate >140 (H) 0 - 20 mm/hr   Ferritin   Result Value Ref Range    Ferritin 123 12 - 150 ng/mL   Lactate Dehydrogenase   Result Value Ref Range    Lactate Dehydrogenase 294 (H) 81 - 234 U/L   CBC with platelets and differential   Result Value Ref Range    WBC Count 10.7 4.0 - 11.0 10e3/uL    RBC Count 2.38 (L) 3.80 - 5.20 10e6/uL    Hemoglobin 7.8 (L) 11.7 - 15.7 g/dL    Hematocrit 21.7 (L) 35.0 - 47.0 %    MCV 91 78 - 100 fL    MCH 32.8 26.5 - 33.0 pg    MCHC 35.9 31.5 - 36.5 g/dL    RDW 22.0 (H) 10.0 - 15.0 %    Platelet Count 107 (L) 150 - 450 10e3/uL    % Neutrophils 83 %    % Lymphocytes 12 %    % Monocytes 3 %    % Eosinophils 1 %    % Basophils 0 %    % Immature Granulocytes 1 %    NRBCs per 100 WBC 0 <1 /100    Absolute Neutrophils 8.9 (H) 1.6 - 8.3 10e3/uL    Absolute Lymphocytes 1.3 0.8 - 5.3 10e3/uL    Absolute Monocytes 0.3 0.0 - 1.3 10e3/uL    Absolute Eosinophils 0.1 0.0 - 0.7 10e3/uL    Absolute Basophils 0.0 0.0 - 0.2 10e3/uL    Absolute Immature Granulocytes 0.1 <=0.4 10e3/uL    Absolute NRBCs 0.0 10e3/uL   Triglycerides   Result Value Ref Range    Triglycerides 100 <150 mg/dL    Patient Fasting > 8hrs? Unknown    CK total   Result Value Ref Range    CK 24 (L) 30 - 225 U/L   RBC and Platelet Morphology   Result Value Ref Range    Platelet Assessment  Automated Count Confirmed. Platelet morphology is normal.     Automated Count Confirmed. Platelet morphology is normal.    Schroeder Cells Slight (A) None Seen    Rouleaux Present (A) None Seen    Teardrop Cells Slight (A) None Seen    RBC Morphology Confirmed RBC Indices    Direct antiglobulin test *Canceled*    Narrative    The following orders were created for panel order Direct antiglobulin  test.  Procedure                               Abnormality         Status                     ---------                               -----------         ------                     Direct antiglobulin test...[[id number]]                                                   Please view results for these tests on the individual orders.   Fibrinogen activity   Result Value Ref Range    Fibrinogen Activity 331 170 - 490 mg/dL   D dimer quantitative   Result Value Ref Range    D-Dimer Quantitative 1.58 (H) 0.00 - 0.50 ug/mL FEU    Narrative    This D-dimer assay is intended for use in conjunction with a clinical pretest probability assessment model to exclude pulmonary embolism (PE) and deep venous thrombosis (DVT) in outpatients suspected of PE or DVT. The cut-off value is 0.50 ug/mL FEU.   Arnold Draw    Narrative    The following orders were created for panel order Arnold Draw.  Procedure                               Abnormality         Status                     ---------                               -----------         ------                     Extra Green Top (Lithium...[[id number]]                      Final result                 Please view results for these tests on the individual orders.   Extra Green Top (Lithium Heparin) ON ICE   Result Value Ref Range    Hold Specimen JIC    UA without Microscopic   Result Value Ref Range    Color Urine Yellow Colorless, Straw, Light Yellow, Yellow    Appearance Urine Clear Clear    Glucose Urine Negative Negative mg/dL    Bilirubin Urine Negative Negative    Ketones Urine Negative Negative mg/dL    Specific Gravity Urine 1.013 1.003 - 1.035    Blood Urine Moderate (A) Negative    pH Urine 6.5 5.0 - 7.0    Protein Albumin Urine 50  (A) Negative mg/dL    Urobilinogen Urine 2.0 Normal, 2.0 mg/dL    Nitrite Urine Negative Negative    Leukocyte Esterase Urine Trace (A) Negative   Protein qualitative urine   Result Value Ref Range    Protein Albumin Urine 50  (A) Negative mg/dL    hCG qual urine POCT   Result Value Ref Range    HCG Qual Urine Negative Negative    Internal QC Check POCT Valid Valid    POCT Kit Lot Number 0     POCT Kit Expiration Date 0    Symptomatic; Unknown Influenza A/B & SARS-CoV2 (COVID-19) Virus PCR Multiplex Nasopharyngeal    Specimen: Nasopharyngeal; Swab   Result Value Ref Range    Influenza A PCR Negative Negative    Influenza B PCR Negative Negative    SARS CoV2 PCR Negative Negative    Narrative    Testing was performed using the maxime SARS-CoV-2 & Influenza A/B Assay on the maxime Na System. This test should be ordered for the detection of SARS-CoV-2 and influenza viruses in individuals who meet clinical and/or epidemiological criteria. Test performance is unknown in asymptomatic patients. This test is for in vitro diagnostic use under the FDA EUA for laboratories certified under CLIA to perform moderate and/or high complexity testing. This test has not been FDA cleared or approved. A negative result does not rule out the presence of PCR inhibitors in the specimen or target RNA in concentration below the limit of detection for the assay. If only one viral target is positive but coinfection with multiple targets is suspected, the sample should be re-tested with another FDA cleared, approved or authorized test, if coinfection would change clinical management. [facility] are certified under the Clinical Laboratory Improvement Amendments of 1988 (CLIA-88) as  qualified to perform moderate and/or high complexity laboratory testing.   Direct antiglobulin test *Canceled*    Narrative    The following orders were created for panel order Direct antiglobulin test.  Procedure                               Abnormality         Status                     ---------                               -----------         ------                       Please view results for these tests on the individual orders.   XR Chest 2 Views    Narrative    HISTORY: Fever  immunosuppression    COMPARISON: 12/10/2018    FINDINGS: 2 views of the chest at 11:37 AM. Heart size is within  normal limits. Central pulmonary vasculature is mildly prominent.  There are mild interstitial markings similar to prior. There are faint  curly B lines present. No region of consolidation. [place] density  projecting over the breast tissues at the midline in the frontal view,  uncertain etiology and may be within clothing.      Impression    IMPRESSION:  1. No focal pneumonia.  2. Interstitial markings and question of curly B lines, may represent  interstitial edema.  3. [place] density projecting over the breast tissue on the lateral  view is seen near the midline on the frontal view, etiology uncertain.    ASHISH JAMES MD         SYSTEM ID:  [id number]   Renal panel   Result Value Ref Range    Sodium 141 133 - 144 mmol/L    Potassium 3.5 3.4 - 5.3 mmol/L    Chloride 113 (H) 94 - 109 mmol/L    Carbon Dioxide (CO2) 19 (L) 20 - 32 mmol/L    Anion Gap 9 3 - 14 mmol/L    Urea Nitrogen 21 7 - 30 mg/dL    Creatinine 0.98 0.52 - 1.04 mg/dL    Calcium 8.1 (L) 8.5 - 10.1 mg/dL    Glucose 75 70 - 99 mg/dL    Albumin 2.0 (L) 3.4 - 5.0 g/dL    Phosphorus 3.8 2.5 - 4.5 mg/dL    GFR Estimate 84 >60 mL/min/1.73m2   Direct antiglobulin test    Narrative    The following orders were created for panel order Direct antiglobulin test.  Procedure                               Abnormality         Status                     ---------                               -----------         ------                     Direct antiglobulin test...[[id number]]                                                 MELISSA Poly Tube[[id number]]                                    Final result                 Please view results for these tests on the individual orders.   MELISSA Poly Tube   Result Value Ref Range    MELISSA Anti-IgG,-C3d, Tube NEG     SPECIMEN EXPIRATION DATE [id number]    US Abdomen Complete    Narrative    EXAMINATION: US ABDOMEN COMPLETE   6/11/2022 1:12 PM      CLINICAL HISTORY: fevers, immunosuppression, SLE - c/f vasculopathy,  indolent infection, peritonitis    COMPARISON: Ultrasound 6/27/2020        FINDINGS:  The liver is normal in contour and echogenicity. There is no  intrahepatic or extrahepatic biliary ductal dilatation. The common  bile duct measures 5 mm. The gallbladder is normal, without  gallstones, wall thickening, or pericholecystic fluid.    The spleen measures maximally 12.6 cm and is normal in appearance. The  visualized portions of the pancreas are normal in echogenicity.  Multiple peripancreatic lymph nodes. The largest measures 2.5 x 1.9 x  1.4 cm.    The visualized upper abdominal aorta and inferior vena cava are  normal.      The kidneys are normal in position and echogenicity. The right kidney  measures 11.2 cm and the left kidney measures 12.3 cm. Questionable  mildly increased echogenicity of the renal parenchyma on the left.  There is no significant urinary tract dilation. The urinary bladder is  well distended and normal in morphology. The bladder wall is normal.      Impression    IMPRESSION:     1. Multiple peripancreatic lymph nodes measuring up to 2.5 x 1.9 x 1.4  cm, nonspecific.  2. Mildly increased echogenicity of the renal parenchyma left kidney.  3. Borderline splenomegaly    I have personally reviewed the examination and initial interpretation  and I agree with the findings.    ASHISH JAMES MD         SYSTEM ID:  [id number]   UA with Microscopic   Result Value Ref Range    Color Urine Yellow Colorless, Straw, Light Yellow, Yellow    Appearance Urine Cloudy (A) Clear    Glucose Urine Negative Negative mg/dL    Bilirubin Urine Negative Negative    Ketones Urine Negative Negative mg/dL    Specific Gravity Urine 1.014 1.003 - 1.035    Blood Urine Moderate (A) Negative    pH Urine 6.5 5.0 - 7.0    Protein Albumin Urine 50  (A) Negative mg/dL    Urobilinogen Urine 2.0 Normal, 2.0 mg/dL    Nitrite Urine Negative Negative     Leukocyte Esterase Urine Small (A) Negative    Bacteria Urine Many (A) None Seen /HPF    WBC Clumps Urine Present (A) None Seen /HPF    Mucus Urine Present (A) None Seen /LPF    Amorphous Crystals Urine Few (A) None Seen /HPF    RBC Urine 44 (H) <=2 /HPF    WBC Urine 27 (H) <=5 /HPF    Squamous Epithelials Urine 2 (H) <=1 /HPF    Transitional Epithelials Urine 1 <=1 /HPF    Hyaline Casts Urine

## 2022-06-11 NOTE — ED TRIAGE NOTES
Pt states she has had generalized weakness for the past month that is worsening. Fevers also for the past 2 weeks on a daily basis. Generalized pain in back/abdominal area.      Triage Assessment     Row Name 06/11/22 0019       Triage Assessment (Adult)    Airway WDL WDL       Respiratory WDL    Respiratory WDL WDL       Skin Circulation/Temperature WDL    Skin Circulation/Temperature WDL WDL       Cardiac WDL    Cardiac WDL WDL       Peripheral/Neurovascular WDL    Peripheral Neurovascular WDL WDL       Cognitive/Neuro/Behavioral WDL    Cognitive/Neuro/Behavioral WDL WDL

## 2022-06-12 LAB
ALBUMIN SERPL-MCNC: 1.8 G/DL (ref 3.4–5)
ALP SERPL-CCNC: 156 U/L (ref 40–150)
ALT SERPL W P-5'-P-CCNC: 11 U/L (ref 0–50)
ANION GAP SERPL CALCULATED.3IONS-SCNC: 6 MMOL/L (ref 3–14)
AST SERPL W P-5'-P-CCNC: 27 U/L (ref 0–45)
BACTERIA UR CULT: NO GROWTH
BASOPHILS # BLD AUTO: 0 10E3/UL (ref 0–0.2)
BASOPHILS NFR BLD AUTO: 0 %
BILIRUB SERPL-MCNC: 0.1 MG/DL (ref 0.2–1.3)
BUN SERPL-MCNC: 18 MG/DL (ref 7–30)
BURR CELLS BLD QL SMEAR: ABNORMAL
C COLI+JEJUNI+LARI FUSA STL QL NAA+PROBE: NOT DETECTED
CALCIUM SERPL-MCNC: 8.2 MG/DL (ref 8.5–10.1)
CH50 SERPL-ACNC: <12.8 U/ML
CHLORIDE BLD-SCNC: 118 MMOL/L (ref 94–109)
CMV DNA SPEC NAA+PROBE-ACNC: NOT DETECTED IU/ML
CO2 SERPL-SCNC: 16 MMOL/L (ref 20–32)
CREAT SERPL-MCNC: 0.72 MG/DL (ref 0.52–1.04)
CREAT UR-MCNC: 97 MG/DL
CRP SERPL-MCNC: 91 MG/L (ref 0–8)
EC STX1 GENE STL QL NAA+PROBE: NOT DETECTED
EC STX2 GENE STL QL NAA+PROBE: NOT DETECTED
EOSINOPHIL # BLD AUTO: 0 10E3/UL (ref 0–0.7)
EOSINOPHIL NFR BLD AUTO: 0 %
ERYTHROCYTE [DISTWIDTH] IN BLOOD BY AUTOMATED COUNT: 21.3 % (ref 10–15)
GFR SERPL CREATININE-BSD FRML MDRD: >90 ML/MIN/1.73M2
GLUCOSE BLD-MCNC: 159 MG/DL (ref 70–99)
HCT VFR BLD AUTO: 24.6 % (ref 35–47)
HGB BLD-MCNC: 8.3 G/DL (ref 11.7–15.7)
IMM GRANULOCYTES # BLD: 0.1 10E3/UL
IMM GRANULOCYTES NFR BLD: 1 %
LYMPHOCYTES # BLD AUTO: 1.4 10E3/UL (ref 0.8–5.3)
LYMPHOCYTES NFR BLD AUTO: 35 %
MCH RBC QN AUTO: 33.5 PG (ref 26.5–33)
MCHC RBC AUTO-ENTMCNC: 36.5 G/DL (ref 31.5–36.5)
MCV RBC AUTO: 92 FL (ref 78–100)
MONOCYTES # BLD AUTO: 0.3 10E3/UL (ref 0–1.3)
MONOCYTES NFR BLD AUTO: 8 %
NEUTROPHILS # BLD AUTO: 2.3 10E3/UL (ref 1.6–8.3)
NEUTROPHILS NFR BLD AUTO: 56 %
NOROV GI+II ORF1-ORF2 JNC STL QL NAA+PR: NOT DETECTED
NRBC # BLD AUTO: 0.1 10E3/UL
NRBC BLD AUTO-RTO: 1 /100
PLAT MORPH BLD: ABNORMAL
PLATELET # BLD AUTO: 74 10E3/UL (ref 150–450)
POTASSIUM BLD-SCNC: 4.7 MMOL/L (ref 3.4–5.3)
PROT SERPL-MCNC: 7.3 G/DL (ref 6.8–8.8)
PROT UR-MCNC: 1.26 G/L
PROT/CREAT 24H UR: 1.3 G/G CR (ref 0–0.2)
RBC # BLD AUTO: 2.74 10E6/UL (ref 3.8–5.2)
RBC MORPH BLD: ABNORMAL
RVA NSP5 STL QL NAA+PROBE: NOT DETECTED
SALMONELLA SP RPOD STL QL NAA+PROBE: NOT DETECTED
SHIGELLA SP+EIEC IPAH STL QL NAA+PROBE: NOT DETECTED
SODIUM SERPL-SCNC: 140 MMOL/L (ref 133–144)
V CHOL+PARA RFBL+TRKH+TNAA STL QL NAA+PR: NOT DETECTED
WBC # BLD AUTO: 4.5 10E3/UL (ref 4–11)
Y ENTERO RECN STL QL NAA+PROBE: NOT DETECTED

## 2022-06-12 PROCEDURE — 36415 COLL VENOUS BLD VENIPUNCTURE: CPT | Performed by: STUDENT IN AN ORGANIZED HEALTH CARE EDUCATION/TRAINING PROGRAM

## 2022-06-12 PROCEDURE — 258N000003 HC RX IP 258 OP 636: Performed by: STUDENT IN AN ORGANIZED HEALTH CARE EDUCATION/TRAINING PROGRAM

## 2022-06-12 PROCEDURE — 250N000012 HC RX MED GY IP 250 OP 636 PS 637: Performed by: STUDENT IN AN ORGANIZED HEALTH CARE EDUCATION/TRAINING PROGRAM

## 2022-06-12 PROCEDURE — 96361 HYDRATE IV INFUSION ADD-ON: CPT

## 2022-06-12 PROCEDURE — 99255 IP/OBS CONSLTJ NEW/EST HI 80: CPT | Mod: GC | Performed by: PEDIATRICS

## 2022-06-12 PROCEDURE — 80053 COMPREHEN METABOLIC PANEL: CPT | Performed by: STUDENT IN AN ORGANIZED HEALTH CARE EDUCATION/TRAINING PROGRAM

## 2022-06-12 PROCEDURE — 250N000013 HC RX MED GY IP 250 OP 250 PS 637: Performed by: STUDENT IN AN ORGANIZED HEALTH CARE EDUCATION/TRAINING PROGRAM

## 2022-06-12 PROCEDURE — 86140 C-REACTIVE PROTEIN: CPT | Performed by: STUDENT IN AN ORGANIZED HEALTH CARE EDUCATION/TRAINING PROGRAM

## 2022-06-12 PROCEDURE — 99233 SBSQ HOSP IP/OBS HIGH 50: CPT | Mod: GC | Performed by: PEDIATRICS

## 2022-06-12 PROCEDURE — 250N000011 HC RX IP 250 OP 636: Performed by: STUDENT IN AN ORGANIZED HEALTH CARE EDUCATION/TRAINING PROGRAM

## 2022-06-12 PROCEDURE — 85025 COMPLETE CBC W/AUTO DIFF WBC: CPT | Performed by: STUDENT IN AN ORGANIZED HEALTH CARE EDUCATION/TRAINING PROGRAM

## 2022-06-12 PROCEDURE — 86481 TB AG RESPONSE T-CELL SUSP: CPT | Performed by: STUDENT IN AN ORGANIZED HEALTH CARE EDUCATION/TRAINING PROGRAM

## 2022-06-12 PROCEDURE — G0378 HOSPITAL OBSERVATION PER HR: HCPCS

## 2022-06-12 PROCEDURE — 99207 PR NON-BILLABLE SERV PER CHARTING: CPT | Mod: GC | Performed by: PEDIATRICS

## 2022-06-12 RX ORDER — SODIUM CHLORIDE 9 MG/ML
INJECTION, SOLUTION INTRAVENOUS CONTINUOUS
Status: DISCONTINUED | OUTPATIENT
Start: 2022-06-12 | End: 2022-06-14 | Stop reason: HOSPADM

## 2022-06-12 RX ADMIN — PREDNISONE 7.5 MG: 5 TABLET ORAL at 09:03

## 2022-06-12 RX ADMIN — MYCOPHENOLATE MOFETIL 1000 MG: 500 TABLET, FILM COATED ORAL at 19:46

## 2022-06-12 RX ADMIN — THERA TABS 1 TABLET: TAB at 07:43

## 2022-06-12 RX ADMIN — POTASSIUM CHLORIDE AND SODIUM CHLORIDE: 900; 150 INJECTION, SOLUTION INTRAVENOUS at 01:10

## 2022-06-12 RX ADMIN — FAMOTIDINE 10 MG: 10 TABLET ORAL at 07:44

## 2022-06-12 RX ADMIN — PREDNISONE 7.5 MG: 5 TABLET ORAL at 19:46

## 2022-06-12 RX ADMIN — HYDROXYCHLOROQUINE SULFATE 200 MG: 200 TABLET, FILM COATED ORAL at 07:43

## 2022-06-12 RX ADMIN — CALCIUM CARBONATE (ANTACID) CHEW TAB 500 MG 500 MG: 500 CHEW TAB at 07:43

## 2022-06-12 RX ADMIN — SODIUM CHLORIDE 1000 ML: 9 INJECTION, SOLUTION INTRAVENOUS at 10:01

## 2022-06-12 RX ADMIN — FERROUS SULFATE TAB 325 MG (65 MG ELEMENTAL FE) 325 MG: 325 (65 FE) TAB at 07:43

## 2022-06-12 RX ADMIN — FAMOTIDINE 10 MG: 10 TABLET ORAL at 19:46

## 2022-06-12 RX ADMIN — MYCOPHENOLATE MOFETIL 1000 MG: 500 TABLET, FILM COATED ORAL at 07:43

## 2022-06-12 NOTE — PLAN OF CARE
[id number]: Afebrile. VSS. Denies pain, but still using heating pad for possible abdominal discomfort. Lungs clear. No s/s of N/V. Eating & drinking well. MIVF running at 10mL/hr, titrating throughout shift with PO intake. Good UOP. No stool. Added oral prednisone today. Stopped ceftriaxone. No family at bedside. Hourly rounding completed.

## 2022-06-12 NOTE — PROGRESS NOTES
[facility]    Progress Note - Pediatric Service PURPLE Team       Date of Admission:  6/11/2022    Assessment & Plan        Milly Carroll is a 21 year old female admitted on 6/11/2022. She has a history of systemic lupus erythematosus, and is admitted for weeks of fever and progressive weakness consistent with acute SLE flare and concern for concomitant infection, with highest concern for pyelonephritis given CRP elevation out of proportion to SLE flare (per rheumatology), elevated ANC & increased echogenicity in unilateral renal parenchyma. CMV/EBV remain on the differential as well, currently pending.    Additionally, it appears that Milly may be developing lupus nephritis, which has not been a previous problem for her. She is hemodynamically stable but may also be at increased risk for fluid overload should her kidney disease progress rapidly. Her Cr is improving today. She remains admitted for treatment with IV antibiotics.    Changes Today  - Stress dose oral prednisone 7.5mg BID  - Urine culture negative, will further discuss with Nephrology regarding pyelonephritis vs lupus nephritis  - Labs tomorrow: CBC, CMP, renal panel, CRP. Add haptoglobin and EBV capsid IgG since these could not be added on previously    Fever, Weakness  Systemic lupus erythematosus, acute flare  - Rheumatology consulted  - 1g methylprednisolone given 6/11   - Stress dose prednisone 7.5mg BID (6/12 - present)  - PTA hydroxychloroquine decreased to 200 mg QDay  - PTA Mycophenolate decreased to 1000mg BID  - C3, C4, CH50, IgG, anti-dsDNA Ab pending, likely return on Monday  - Follow EBV/CMV titers & PCR  - Daily CMP, CRP, CBC. Renal panel tomorrow morning    Concern for Pyelonephritis  - Ceftriaxone 2g Q24h, first dose 6/11 PM  - Urine culture returned negative, which is unusual for pyelonephritis given that she did not receive any antibiotics prior to the culture    Acute kidney  injury  Hypokalemia, Improved  Concern for lupuus nephritis  Likely will need kidney biopsy for formal diagnosis   Cr downtrending  - Repeat renal panel daily  - NS @ IV/PO titrate to 100 mL/hr  - Nephrology consulted, appreciate recs.    Diarrhea, vomiting   Possibly secondary to UTI vs enteric infection  -Follow enteric panel, crypto & giardia antigens  -Ova & parasite test had lab error so unable to run - will not re-order due to low suspicion of O&P infection given mild diarrheal symptoms (1 day), high fevers - can reassess if ongoing suspicion for parasitic infection     Normocytic Anemia, Thrombocytopenia  Hemoglobin of 7.8, platelets of 107 on admission and now 74. Likely related to acute lupus flare vs anemia of chronic disease (or both).  Ferritin was normal, which is unexpected given the degree of inflammation of her other inflammatory markers, potentially representing some degree of iron deficiency as well although already taking iron and anemia is normocytic.  Initially was some concern for hemolytic anemia but MELISSA negative and LDH not terribly elevated.  - Daily CBC     Splenomegaly  Possibly related to lupus flare vs sequestration       Diet: Peds Diet Age 9-18 yrs    DVT Prophylaxis: Low Risk/Ambulatory with no VTE prophylaxis indicated  Leung Catheter: Not present  Fluids: as above  Central Lines: None  Cardiac Monitoring: None  Code Status: Full Code      Disposition Plan   Expected Discharge to home: Once no longer requiring IV steroids or IV antibiotics, safe home & follow up plans in place, likely 2-3 days  Anticipated discharge location:  Awaiting care coordination huddle       The patient's care was discussed with the Attending Physician, Dr. Liu.    Virginia Recinos MD  Pediatric Service   [facility]  Securely message with the Vocera Web Console (learn more here)  Text page via [employer] Paging/Directory   Please see signed in provider  for up to date coverage information      Attestation:  This patient has been seen and evaluated by me today, and management was discussed with the resident physicians and nurses.  I have reviewed today's vital signs, medications, labs and imaging (as pertinent).  I agree with the findings and plan in this note.    Cecilio Liu MD MPH  Pediatric Hospitalist  Pager: [phone]             ______________________________________________________________________    Interval History   VSS, nursing notes reviewed. Afebrile and stable on room air. Feeling well today. Tolerating some PO.     Data reviewed today: I reviewed all medications, new labs and imaging results over the last 24 hours.     Physical Exam   Vital Signs: Temp: 97.9  F (36.6  C) Temp src: Oral BP: 102/75 Pulse: 58   Resp: 20 SpO2: 99 % O2 Device: None (Room air)    Weight: 108 lbs 3.93 oz     Exam per attending:  GENERAL: Alert, awake, appears tired, but in no acute distress  SKIN: Various patches of hypopigmentation on face as well as hyperpigmented spots on palmar aspect of fingers  HEAD: Normocephalic  EYES: Extraocular muscles intact. Normal conjunctivae.  NOSE: Normal without discharge.  MOUTH: Moist mucous membranes, purple-red spots on hard palate bilaterally, but improved since yesterday with resolution on the right palate and fading on the left  LUNGS: Clear. No rales, rhonchi, wheezing or retractions  HEART: Regular rhythm. Split S2, exaggerated but varies with inspiration. No murmurs. Normal pulses.  ABDOMEN: LUQ tenderness with palpation, no rebound or guarding. Palpable spleen 2-3 cm below the left ribs. Abdomen is soft and not distended. Bowel sounds normal. No hepatomegaly  NEUROLOGIC: No focal findings. Cranial nerves grossly intact. Normal gait  EXTREMITIES No deformities, erythema, or swelling. Full active ROM of wrist, elbows, ankles, knees, and hips. No tenderness to palpation.    Data   Results for orders placed or performed  during the hospital encounter of 06/11/22 (from the past 24 hour(s))   Enteric Bacteria and Virus Panel by [facility] Stool    Specimen: Per Rectum; Stool   Result Value Ref Range    Campylobacter group Not Detected Not Detected    Salmonella species Not Detected Not Detected    Shigella species Not Detected Not Detected    Vibrio group Not Detected Not Detected    Rotavirus Not Detected Not Detected    Shiga toxin 1 gene Not Detected Not Detected    Shiga toxin 2 gene Not Detected Not Detected    Norovirus I and II Not Detected Not Detected    Yersinia enterocolitica Not Detected Not Detected    Narrative    Testing performed by multiplexed, qualitative PCR using the [employer] Enteric Pathogens Nucleic Acid Test. Results should not be used as the sole basis for diagnosis, treatment or other patient management decisions. Positive results do not rule out co-infection with other organisms that are not detected by this test and may not be the sole or definitive cause of patient illness. Negative results in the setting of clinical illness compatible with gastroenteritis may be due to infection by pathogens that are not detected by this test or non-infectious causes such as ulcerative colitis, irritable bowel syndrome or Crohn's disease. Note: Shiga toxin producing E. coli (STEC) typically harbor one or both genes that encode for Shiga toxins 1 and 2.   UA with Microscopic   Result Value Ref Range    Color Urine Yellow Colorless, Straw, Light Yellow, Yellow    Appearance Urine Cloudy (A) Clear    Glucose Urine Negative Negative mg/dL    Bilirubin Urine Negative Negative    Ketones Urine Negative Negative mg/dL    Specific Gravity Urine 1.014 1.003 - 1.035    Blood Urine Moderate (A) Negative    pH Urine 6.5 5.0 - 7.0    Protein Albumin Urine 50  (A) Negative mg/dL    Urobilinogen Urine 2.0 Normal, 2.0 mg/dL    Nitrite Urine Negative Negative    Leukocyte Esterase Urine Small (A) Negative    Bacteria Urine Many (A) None  Seen /HPF    WBC Clumps Urine Present (A) None Seen /HPF    Mucus Urine Present (A) None Seen /LPF    Amorphous Crystals Urine Few (A) None Seen /HPF    RBC Urine 44 (H) <=2 /HPF    WBC Urine 27 (H) <=5 /HPF    Squamous Epithelials Urine 2 (H) <=1 /HPF    Transitional Epithelials Urine 1 <=1 /HPF    Hyaline Casts Urine     Protein  random urine   Result Value Ref Range    Total Protein Random Urine g/L 1.26 g/L    Total Protein Urine g/gr Creatinine 1.30 (H) 0.00 - 0.20 g/g Cr    Creatinine Urine mg/dL 97 mg/dL   CBC with Platelets & Differential    Narrative    The following orders were created for panel order CBC with Platelets & Differential.  Procedure                               Abnormality         Status                     ---------                               -----------         ------                     CBC with platelets and d...[[id number]]  Abnormal            Final result               RBC and Platelet Morphology[[id number]]  Abnormal            Final result                 Please view results for these tests on the individual orders.   Quantiferon-TB Gold Plus    Specimen: Peripheral Blood    Narrative    The following orders were created for panel order Quantiferon-TB Gold Plus.  Procedure                               Abnormality         Status                     ---------                               -----------         ------                     Quantiferon TB Gold Plus...[[id number]]                      In process                 Quantiferon TB Gold Plus...[[id number]]                      In process                 Quantiferon TB Gold Plus...[[id number]]                      In process                 Quantiferon TB Gold Plus...[[id number]]                      In process                   Please view results for these tests on the individual orders.   Comprehensive metabolic panel   Result Value Ref Range    Sodium 140 133 - 144 mmol/L    Potassium 4.7 3.4 - 5.3 mmol/L    Chloride 118 (H) 94 -  109 mmol/L    Carbon Dioxide (CO2) 16 (L) 20 - 32 mmol/L    Anion Gap 6 3 - 14 mmol/L    Urea Nitrogen 18 7 - 30 mg/dL    Creatinine 0.72 0.52 - 1.04 mg/dL    Calcium 8.2 (L) 8.5 - 10.1 mg/dL    Glucose 159 (H) 70 - 99 mg/dL    Alkaline Phosphatase 156 (H) 40 - 150 U/L    AST 27 0 - 45 U/L    ALT 11 0 - 50 U/L    Protein Total 7.3 6.8 - 8.8 g/dL    Albumin 1.8 (L) 3.4 - 5.0 g/dL    Bilirubin Total 0.1 (L) 0.2 - 1.3 mg/dL    GFR Estimate >90 >60 mL/min/1.73m2   CRP inflammation   Result Value Ref Range    CRP Inflammation 91.0 (H) 0.0 - 8.0 mg/L   CBC with platelets and differential   Result Value Ref Range    WBC Count 4.5 4.0 - 11.0 10e3/uL    RBC Count 2.74 (L) 3.80 - 5.20 10e6/uL    Hemoglobin 8.3 (L) 11.7 - 15.7 g/dL    Hematocrit 24.6 (L) 35.0 - 47.0 %    MCV 92 78 - 100 fL    MCH 33.5 (H) 26.5 - 33.0 pg    MCHC 36.5 31.5 - 36.5 g/dL    RDW 21.3 (H) 10.0 - 15.0 %    Platelet Count 74 (L) 150 - 450 10e3/uL    % Neutrophils 56 %    % Lymphocytes 35 %    % Monocytes 8 %    % Eosinophils 0 %    % Basophils 0 %    % Immature Granulocytes 1 %    NRBCs per 100 WBC 1 (H) <1 /100    Absolute Neutrophils 2.3 1.6 - 8.3 10e3/uL    Absolute Lymphocytes 1.4 0.8 - 5.3 10e3/uL    Absolute Monocytes 0.3 0.0 - 1.3 10e3/uL    Absolute Eosinophils 0.0 0.0 - 0.7 10e3/uL    Absolute Basophils 0.0 0.0 - 0.2 10e3/uL    Absolute Immature Granulocytes 0.1 <=0.4 10e3/uL    Absolute NRBCs 0.1 10e3/uL   RBC and Platelet Morphology   Result Value Ref Range    Platelet Assessment  Automated Count Confirmed. Platelet morphology is normal.     Automated Count Confirmed. Platelet morphology is normal.    [place] Cells Moderate (A) None Seen    RBC Morphology Confirmed RBC Indices

## 2022-06-12 NOTE — PLAN OF CARE
Goal Outcome Evaluation:    Plan of Care Reviewed With: patient     Overall Patient Progress: improving    Outcome Evaluation: VSS. Pt states she is beginning to feel better and stronger. Awake most of the night watching TV/movies. MIVF w/ KCL running continuously overnight at 100 mL/hr. Pt continues to promote abdominal pain, however declined medication. Heating pad utilized with moderate relief. Pt stated no concerns at this time. Will continue to monitor and update team as necessary.    [id number]  Observation goals  PRIOR TO DISCHARGE        Comments: Discharge Criteria - Outpatient/Observation goals to be met before discharge home:   1. NO supplemental oxygen. - Goal met. [facility] on RA.   2. PO intake to maintain hydration status. - Goal ongoing. Patient denied any nausea during this shift. IVF running as ordered.  3. Pain controlled on PO Pain medications. Goal met. Patient c/o abdominal pain, but denied medical interventions offered. Aqua-K heating pad helped  4. Cleared by rheumatology - Goal not met. Patient to be reassessed by Rheumatology today (6/12/22).                                      [id number]  Observation goals  PRIOR TO DISCHARGE        Comments: Discharge Criteria - Outpatient/Observation goals to be met before discharge home:   1. NO supplemental oxygen. - Goal met. [facility] on RA.   2. PO intake to maintain hydration status. - Goal ongoing. Patient denied any nausea during this shift. IVF running as ordered.  3. Pain controlled on PO Pain medications. Goal met. Patient c/o abdominal pain, but denied medical interventions offered. Aqua-K heating pad helped  4. Cleared by rheumatology - Goal not met. Patient to be reassessed by Rheumatology today (6/12/22).

## 2022-06-12 NOTE — PLAN OF CARE
Observation goals  PRIOR TO DISCHARGE        Comments: Discharge Criteria - Outpatient/Observation goals to be met before discharge home:   1. NO supplemental oxygen. - Goal met. [facility] on RA.   2. PO intake to maintain hydration status. - Goal not met. Patient declined dinner. Patient did eat some chips, oranges, and goldfish. Fluids encouraged. IVF running as ordered.  3. Pain controlled on PO Pain medications. Goal met. Patient c/o abdominal pain 6/10. Hot pack given, but patient declined any further interventions at this time.   4. Cleared by rheumatology - Goal not met. Patient received IV abx and IV steriod dose this evening. Patient to be reassessed by Rheumatology tomorrow.

## 2022-06-12 NOTE — PLAN OF CARE
Observation goals  PRIOR TO DISCHARGE        Comments: Discharge Criteria - Outpatient/Observation goals to be met before discharge home:   1. NO supplemental oxygen. - Goal met. C on RA.   2. PO intake to maintain hydration status. - Goal ongoing. Patient denied any nausea during this shift. Some po intake noted. IVF running as ordered.  3. Pain controlled on PO Pain medications. Goal met. Patient c/o abdominal pain 4/10. Patient declined any further interventions at this time.   4. Cleared by rheumatology - Goal not met. Patient to be reassessed by Rheumatology tomorrow.

## 2022-06-13 ENCOUNTER — TELEPHONE (OUTPATIENT)
Dept: NEPHROLOGY | Facility: CLINIC | Age: 22
End: 2022-06-13

## 2022-06-13 PROBLEM — D69.6 THROMBOCYTOPENIA (H): Status: ACTIVE | Noted: 2022-06-13

## 2022-06-13 PROBLEM — R50.9 FEVER IN PEDIATRIC PATIENT: Status: ACTIVE | Noted: 2022-06-13

## 2022-06-13 PROBLEM — M32.9 SYSTEMIC LUPUS ERYTHEMATOSUS, UNSPECIFIED SLE TYPE, UNSPECIFIED ORGAN INVOLVEMENT STATUS (H): Status: ACTIVE | Noted: 2022-06-13

## 2022-06-13 PROBLEM — D69.6 THROMBOCYTOPENIA, UNSPECIFIED (H): Status: ACTIVE | Noted: 2022-06-13

## 2022-06-13 PROBLEM — M32.19 OTHER SYSTEMIC LUPUS ERYTHEMATOSUS WITH OTHER ORGAN INVOLVEMENT (H): Status: ACTIVE | Noted: 2022-06-13

## 2022-06-13 PROBLEM — Z20.822 CONTACT WITH AND (SUSPECTED) EXPOSURE TO COVID-19: Status: ACTIVE | Noted: 2022-06-13

## 2022-06-13 PROBLEM — D64.9 ANEMIA, UNSPECIFIED TYPE: Status: ACTIVE | Noted: 2022-06-13

## 2022-06-13 PROBLEM — R16.1 SPLENOMEGALY: Status: ACTIVE | Noted: 2022-06-13

## 2022-06-13 PROBLEM — R50.9 FEVER, UNSPECIFIED: Status: ACTIVE | Noted: 2022-06-13

## 2022-06-13 LAB
ALBUMIN SERPL-MCNC: 1.8 G/DL (ref 3.4–5)
ALP SERPL-CCNC: 136 U/L (ref 40–150)
ALT SERPL W P-5'-P-CCNC: 10 U/L (ref 0–50)
ANION GAP SERPL CALCULATED.3IONS-SCNC: 5 MMOL/L (ref 3–14)
AST SERPL W P-5'-P-CCNC: 19 U/L (ref 0–45)
BASOPHILS # BLD AUTO: 0 10E3/UL (ref 0–0.2)
BASOPHILS NFR BLD AUTO: 0 %
BILIRUB SERPL-MCNC: 0.1 MG/DL (ref 0.2–1.3)
BUN SERPL-MCNC: 16 MG/DL (ref 7–30)
C PARVUM AG STL QL IA: NEGATIVE
C3 SERPL-MCNC: 11 MG/DL (ref 81–157)
C4 SERPL-MCNC: 3 MG/DL (ref 13–39)
CALCIUM SERPL-MCNC: 8.2 MG/DL (ref 8.5–10.1)
CHLORIDE BLD-SCNC: 119 MMOL/L (ref 94–109)
CMV IGG SERPL IA-ACNC: >10 U/ML
CMV IGG SERPL IA-ACNC: ABNORMAL
CMV IGM SERPL IA-ACNC: 170 AU/ML
CMV IGM SERPL IA-ACNC: POSITIVE
CO2 SERPL-SCNC: 19 MMOL/L (ref 20–32)
CREAT SERPL-MCNC: 0.7 MG/DL (ref 0.52–1.04)
CRP SERPL-MCNC: 40 MG/L (ref 0–8)
DSDNA AB SER-ACNC: 1527 IU/ML
EBV NA IGG SER IA-ACNC: 110 U/ML
EBV NA IGG SER IA-ACNC: POSITIVE [IU]/ML
EBV VCA IGG SER IA-ACNC: >750 U/ML
EBV VCA IGG SER IA-ACNC: POSITIVE
EBV VCA IGM SER IA-ACNC: 29.7 U/ML
EBV VCA IGM SER IA-ACNC: NORMAL
ELLIPTOCYTES BLD QL SMEAR: SLIGHT
EOSINOPHIL # BLD AUTO: 0 10E3/UL (ref 0–0.7)
EOSINOPHIL NFR BLD AUTO: 0 %
ERYTHROCYTE [DISTWIDTH] IN BLOOD BY AUTOMATED COUNT: 18.8 % (ref 10–15)
FRAGMENTS BLD QL SMEAR: SLIGHT
G LAMBLIA AG STL QL IA: NEGATIVE
GAMMA INTERFERON BACKGROUND BLD IA-ACNC: 3.82 IU/ML
GFR SERPL CREATININE-BSD FRML MDRD: >90 ML/MIN/1.73M2
GLUCOSE BLD-MCNC: 162 MG/DL (ref 70–99)
HAPTOGLOB SERPL-MCNC: 156 MG/DL (ref 32–197)
HCT VFR BLD AUTO: 22.6 % (ref 35–47)
HGB BLD-MCNC: 7.2 G/DL (ref 11.7–15.7)
IGG SERPL-MCNC: 3311 MG/DL (ref 610–1616)
IMM GRANULOCYTES # BLD: 0.1 10E3/UL
IMM GRANULOCYTES NFR BLD: 1 %
LYMPHOCYTES # BLD AUTO: 0.7 10E3/UL (ref 0.8–5.3)
LYMPHOCYTES NFR BLD AUTO: 8 %
M TB IFN-G BLD-IMP: ABNORMAL
M TB IFN-G CD4+ BCKGRND COR BLD-ACNC: -1.23 IU/ML
MCH RBC QN AUTO: 28.6 PG (ref 26.5–33)
MCHC RBC AUTO-ENTMCNC: 31.9 G/DL (ref 31.5–36.5)
MCV RBC AUTO: 90 FL (ref 78–100)
MITOGEN IGNF BCKGRD COR BLD-ACNC: 0.17 IU/ML
MITOGEN IGNF BCKGRD COR BLD-ACNC: 0.91 IU/ML
MONOCYTES # BLD AUTO: 0.4 10E3/UL (ref 0–1.3)
MONOCYTES NFR BLD AUTO: 4 %
NEUTROPHILS # BLD AUTO: 7.7 10E3/UL (ref 1.6–8.3)
NEUTROPHILS NFR BLD AUTO: 87 %
NRBC # BLD AUTO: 0 10E3/UL
NRBC BLD AUTO-RTO: 0 /100
PHOSPHATE SERPL-MCNC: 2.8 MG/DL (ref 2.5–4.5)
PLAT MORPH BLD: ABNORMAL
PLATELET # BLD AUTO: 92 10E3/UL (ref 150–450)
POTASSIUM BLD-SCNC: 3.6 MMOL/L (ref 3.4–5.3)
PROT SERPL-MCNC: 6.9 G/DL (ref 6.8–8.8)
QUANTIFERON MITOGEN: 2.59 IU/ML
QUANTIFERON NIL TUBE: 3.82 IU/ML
QUANTIFERON TB1 TUBE: 3.99 IU/ML
QUANTIFERON TB2 TUBE: 4.73
RBC # BLD AUTO: 2.52 10E6/UL (ref 3.8–5.2)
RBC MORPH BLD: ABNORMAL
SODIUM SERPL-SCNC: 143 MMOL/L (ref 133–144)
WBC # BLD AUTO: 8.8 10E3/UL (ref 4–11)

## 2022-06-13 PROCEDURE — 120N000007 HC R&B PEDS UMMC

## 2022-06-13 PROCEDURE — 80053 COMPREHEN METABOLIC PANEL: CPT | Performed by: STUDENT IN AN ORGANIZED HEALTH CARE EDUCATION/TRAINING PROGRAM

## 2022-06-13 PROCEDURE — 86140 C-REACTIVE PROTEIN: CPT | Performed by: STUDENT IN AN ORGANIZED HEALTH CARE EDUCATION/TRAINING PROGRAM

## 2022-06-13 PROCEDURE — 250N000013 HC RX MED GY IP 250 OP 250 PS 637: Performed by: STUDENT IN AN ORGANIZED HEALTH CARE EDUCATION/TRAINING PROGRAM

## 2022-06-13 PROCEDURE — 85025 COMPLETE CBC W/AUTO DIFF WBC: CPT | Performed by: STUDENT IN AN ORGANIZED HEALTH CARE EDUCATION/TRAINING PROGRAM

## 2022-06-13 PROCEDURE — 99233 SBSQ HOSP IP/OBS HIGH 50: CPT | Mod: GC | Performed by: PEDIATRICS

## 2022-06-13 PROCEDURE — G0378 HOSPITAL OBSERVATION PER HR: HCPCS

## 2022-06-13 PROCEDURE — 96376 TX/PRO/DX INJ SAME DRUG ADON: CPT

## 2022-06-13 PROCEDURE — 84100 ASSAY OF PHOSPHORUS: CPT | Performed by: STUDENT IN AN ORGANIZED HEALTH CARE EDUCATION/TRAINING PROGRAM

## 2022-06-13 PROCEDURE — 250N000012 HC RX MED GY IP 250 OP 636 PS 637: Performed by: STUDENT IN AN ORGANIZED HEALTH CARE EDUCATION/TRAINING PROGRAM

## 2022-06-13 PROCEDURE — 36415 COLL VENOUS BLD VENIPUNCTURE: CPT | Performed by: STUDENT IN AN ORGANIZED HEALTH CARE EDUCATION/TRAINING PROGRAM

## 2022-06-13 PROCEDURE — 83010 ASSAY OF HAPTOGLOBIN QUANT: CPT | Performed by: STUDENT IN AN ORGANIZED HEALTH CARE EDUCATION/TRAINING PROGRAM

## 2022-06-13 PROCEDURE — 86665 EPSTEIN-BARR CAPSID VCA: CPT | Performed by: STUDENT IN AN ORGANIZED HEALTH CARE EDUCATION/TRAINING PROGRAM

## 2022-06-13 PROCEDURE — 96361 HYDRATE IV INFUSION ADD-ON: CPT

## 2022-06-13 PROCEDURE — 250N000011 HC RX IP 250 OP 636: Performed by: STUDENT IN AN ORGANIZED HEALTH CARE EDUCATION/TRAINING PROGRAM

## 2022-06-13 PROCEDURE — 99232 SBSQ HOSP IP/OBS MODERATE 35: CPT | Mod: GC | Performed by: PEDIATRICS

## 2022-06-13 RX ORDER — HYDROXYCHLOROQUINE SULFATE 200 MG/1
200 TABLET, FILM COATED ORAL DAILY
Qty: 21 TABLET | Refills: 0
Start: 2022-06-14 | End: 2022-07-05

## 2022-06-13 RX ORDER — MYCOPHENOLATE MOFETIL 500 MG/1
1000 TABLET ORAL 2 TIMES DAILY
Qty: 84 TABLET | Refills: 0
Start: 2022-06-13 | End: 2022-09-07

## 2022-06-13 RX ORDER — PREDNISONE 20 MG/1
60 TABLET ORAL DAILY
Qty: 63 TABLET | Refills: 0 | Status: SHIPPED | OUTPATIENT
Start: 2022-06-13 | End: 2022-06-13

## 2022-06-13 RX ORDER — PREDNISONE 20 MG/1
60 TABLET ORAL DAILY
Qty: 63 TABLET | Refills: 0 | Status: SHIPPED | OUTPATIENT
Start: 2022-06-13 | End: 2022-07-04

## 2022-06-13 RX ORDER — FAMOTIDINE 10 MG
10 TABLET ORAL 2 TIMES DAILY
Qty: 42 TABLET | Refills: 0 | Status: SHIPPED | OUTPATIENT
Start: 2022-06-13 | End: 2022-07-04

## 2022-06-13 RX ADMIN — FAMOTIDINE 10 MG: 10 TABLET ORAL at 19:52

## 2022-06-13 RX ADMIN — MYCOPHENOLATE MOFETIL 1000 MG: 500 TABLET, FILM COATED ORAL at 19:52

## 2022-06-13 RX ADMIN — FAMOTIDINE 10 MG: 10 TABLET ORAL at 08:23

## 2022-06-13 RX ADMIN — FERROUS SULFATE TAB 325 MG (65 MG ELEMENTAL FE) 325 MG: 325 (65 FE) TAB at 08:23

## 2022-06-13 RX ADMIN — PREDNISONE 7.5 MG: 5 TABLET ORAL at 08:23

## 2022-06-13 RX ADMIN — MYCOPHENOLATE MOFETIL 1000 MG: 500 TABLET, FILM COATED ORAL at 08:24

## 2022-06-13 RX ADMIN — CALCIUM CARBONATE (ANTACID) CHEW TAB 500 MG 500 MG: 500 CHEW TAB at 08:23

## 2022-06-13 RX ADMIN — THERA TABS 1 TABLET: TAB at 08:24

## 2022-06-13 RX ADMIN — HYDROXYCHLOROQUINE SULFATE 200 MG: 200 TABLET, FILM COATED ORAL at 08:23

## 2022-06-13 RX ADMIN — METHYLPREDNISOLONE SODIUM SUCCINATE 1000 MG: 40 INJECTION, POWDER, LYOPHILIZED, FOR SOLUTION INTRAMUSCULAR; INTRAVENOUS at 13:08

## 2022-06-13 RX ADMIN — SULFAMETHOXAZOLE AND TRIMETHOPRIM 1 TABLET: 800; 160 TABLET ORAL at 08:24

## 2022-06-13 ASSESSMENT — ACTIVITIES OF DAILY LIVING (ADL)
ADLS_ACUITY_SCORE: 22

## 2022-06-13 NOTE — PLAN OF CARE
Afebrile. VSS. Denies pain and nausea. IV steroid given. Good appetite. Drinking fluids well. Good UOP. No stool. Hourly rounding completed.

## 2022-06-13 NOTE — PLAN OF CARE
Goal Outcome Evaluation:    Plan of Care Reviewed With: mother, patient     Overall Patient Progress: improving    Outcome Evaluation: VSS. Pt sleeping well overnight. Ate 3 mac n cheeses and drank a fair amount of water overnight. Heating pad utilized with complete relief. Pt states she has no concerns at this time. Will conitnue to monitor and update team as necessary.

## 2022-06-13 NOTE — TELEPHONE ENCOUNTER
[facility] Call Center    Phone Message    May a detailed message be left on voicemail: yes     Reason for Call: Other: Rosa, nurse care coordinator called to schedule ED follow up appt for pt.  Pt currently at [facility] and will need a follow up within 2 weeks.   Please give Milly a call on cell [phone]. If unable to reach pt please give Rosa a call.  Thank you    Action Taken: Message routed to:  [facility] NEPH    Travel Screening: Not Applicable

## 2022-06-13 NOTE — PROGRESS NOTES
[facility]    Progress Note - Pediatric Service PURPLE Team       Date of Admission:  6/11/2022    Assessment & Plan        Milly Carroll is a 21 year old female admitted on 6/11/2022. She has a history of systemic lupus erythematosus, and is admitted for weeks of fever and progressive weakness consistent with acute SLE flare and concern for concomitant infection, with highest initial concern for pyelonephritis given CRP elevation out of proportion to SLE flare (per rheumatology),  elevated ANC & increased echogenicity in unilateral renal parenchyma, although currently clinically stable and has not had costo-vertebral angle tenderness/pain, UCx came back negative.Additionally, it appears that Milly may be developing lupus nephritis, which has not been a previous problem for her, priority for Follow-up upon discharge will be for adult Nephrology for biopsy. She is hemodynamically stable but may also be at increased risk for fluid overload should her kidney disease progress rapidly. Her Cr is improving today. She remains admitted for treatment with IV methylprednisolone.    Changes Today  - Cont Stress dose oral prednisone 7.5mg BID and increase upon discharge to 60 mg PO once daily for 2-4 weeks    - Urine culture negative, will further discuss with Nephrology regarding pyelonephritis vs lupus nephritis   - Labs reviewed today: CBC, CMP, renal panel, CRP. Add haptoglobin and EBV capsid IgG    Systemic lupus erythematosus, acute flare  - Rheumatology consulted, appreciate recommendations, including:   While inpatient  1. Continue her home medications at doses of 200 mg hydroxychloroquine daily and mycophenolate 1,000 mg twice daily; we expect these to be her doses at discharge, too  2. She will receive a second dose of IV methylprednisolone today and her third dose tomorrow morning; 1,000 mg each.   3. We will ask our , Rosie Kate, if she may be able to  meet with Milly while inpatient.      Discharge coordination:  1. She should have both adult Nephrology and Rheumatology consultations scheduled prior to discharge (priority for first visit should be Nephrology). If the primary team is able to work with their coordinators to schedule these; we will reach out to the adult Rheumatology team this AM to discuss expedited clinic evaluation.     Adult Rheumatology appointment available this Wednesday with Dr. Jaden Meredith.   2. Nephrology is determining optimal timing of renal biopsy, which will be helpful to guide long-term lupus management presuming that there is likely lupus nephritis.  3. Her last documented eye examination was on 03/14/18; she should have this scheduled annually as well  4. Please increase her daily home steroid dose to 60 mg prednisone upon discharge. This should not exceed one month in length (just ensure there is enough to get her to her Nephrology and Rheumatology appointments)  5. While waiting for kidney biopsy, we would consider weekly pulse dose IV methylprednisolone (1,000 mg) as bridging therapy; our adult Rheumatology colleagues can coordinate this or alternative plans at their Wednesday appointment.    Concern for Pyelonephritis  Less likely given clinically stable and urine culture returned negative   - Ceftriaxone 2g Q24h, first dose 6/11 PM ad discontinued on 6/12.     Acute kidney injury  Hypokalemia, Improved  Concern for lupus nephritis  Likely will need kidney biopsy for formal diagnosis   Cr downtrending  - Repeat renal panel daily  - NS @ IV/PO titrate to 100 mL/hr  - Nephrology consulted, appreciate recs.    Diarrhea, vomiting   Possibly secondary to UTI vs enteric infection  -Follow enteric panel, crypto & giardia antigens  -Ova & parasite test had lab error so unable to run - will not re-order due to low suspicion of O&P infection given mild diarrheal symptoms (1 day), high fevers - can reassess if ongoing suspicion for  parasitic infection     Normocytic Anemia, Thrombocytopenia  Hemoglobin of 7.8, platelets of 107 on admission and dropped to 74 and now at 92. Likely related to acute lupus flare vs anemia of chronic disease (or both).  Ferritin was normal, which is unexpected given the degree of inflammation of her other inflammatory markers, potentially representing some degree of iron deficiency as well although already taking iron and anemia is normocytic.  - Daily CBC     Splenomegaly  Possibly related to lupus flare vs sequestration       Diet: Peds Diet Age 9-18 yrs    DVT Prophylaxis: Low Risk/Ambulatory with no VTE prophylaxis indicated  Leung Catheter: Not present  Fluids: as above  Central Lines: None  Cardiac Monitoring: None  Code Status: Full Code      Disposition Plan   Expected Discharge to home: Once no longer requiring IV steroids or IV antibiotics, safe home & follow up plans in place, likely 2-3 days  Anticipated discharge location:  Awaiting care coordination huddle       The patient's care was discussed with the Attending Physician, Dr. Liu.    Justo Young MD  Pediatric Service   [facility]  Securely message with the Vocera Web Console (learn more here)  Text page via [facility] Paging/Directory   Please see signed in provider for up to date coverage information    Attestation:  This patient has been seen and evaluated by me today, and management was discussed with the resident physicians, patient, pediatric rheumatology, and her nurses.  I have reviewed today's vital signs, medications, labs and imaging (as pertinent).  I agree with the findings and plan in this note.      Cecilio Liu MD MPH  Pediatric Hospitalist  Pager: [phone]             ______________________________________________________________________    Interval History   VSS, nursing notes reviewed. Afebrile and stable on room air. Feeling well today, somewhat tired but better compared to  yesterday. Tolerating PO. Alert and oriented in no apparent acute distress, denies tachypnea, abdominal pain, flank pain or lower back pain. NO N/V, diarrhea.  Patient updated regarding further plan including cont IV methylprednisolone and referrals for adult Nephrology upon discharge.   Patient has no further questions or concerns today.      Data reviewed today: I reviewed all medications, new labs and imaging results over the last 24 hours.     Physical Exam   Vital Signs: Temp: 97.6  F (36.4  C) Temp src: Axillary BP: 109/76 Pulse: 56   Resp: 16 SpO2: 99 % O2 Device: None (Room air)    Weight: 113 lbs 5.06 oz     Exam per attending:  GENERAL: Alert, awake in no apparent acute distress, feeling better today   SKIN: Various patches of hypopigmentation and scaling on face as well as hyperpigmented spots on palmar aspect of fingers, improving, healed ulcers/calluses on fingertips.   HEAD: Normocephalic  EYES: Extraocular muscles intact. Normal conjunctivae.  NOSE: Normal without discharge.  MOUTH: Moist mucous membranes, fading purple-red spots on hard palate   LUNGS: Clear. No rales, rhonchi, wheezing or retractions  HEART: Regular rhythm. appreciate normal S1/S2, no murmurs. Normal pulses.  ABDOMEN: Normal peristaltic sounds, abdomen soft and not distended, no abdominal tenderness on palpation, no rebound or guarding. Palpable spleen below the left ribs  NEUROLOGIC: No focal findings. Gait deferred   EXTREMITIES No deformities, erythema, or swelling.    Data   Results for orders placed or performed during the hospital encounter of 06/11/22 (from the past 24 hour(s))   CBC with Platelets & Differential    Narrative    The following orders were created for panel order CBC with Platelets & Differential.  Procedure                               Abnormality         Status                     ---------                               -----------         ------                     CBC with platelets and d...[[id number]]   Abnormal            Final result               RBC and Platelet Morphology[[id number]]  Abnormal            Final result                 Please view results for these tests on the individual orders.   Comprehensive metabolic panel   Result Value Ref Range    Sodium 143 133 - 144 mmol/L    Potassium 3.6 3.4 - 5.3 mmol/L    Chloride 119 (H) 94 - 109 mmol/L    Carbon Dioxide (CO2) 19 (L) 20 - 32 mmol/L    Anion Gap 5 3 - 14 mmol/L    Urea Nitrogen 16 7 - 30 mg/dL    Creatinine 0.70 0.52 - 1.04 mg/dL    Calcium 8.2 (L) 8.5 - 10.1 mg/dL    Glucose 162 (H) 70 - 99 mg/dL    Alkaline Phosphatase 136 40 - 150 U/L    AST 19 0 - 45 U/L    ALT 10 0 - 50 U/L    Protein Total 6.9 6.8 - 8.8 g/dL    Albumin 1.8 (L) 3.4 - 5.0 g/dL    Bilirubin Total 0.1 (L) 0.2 - 1.3 mg/dL    GFR Estimate >90 >60 mL/min/1.73m2   CRP inflammation   Result Value Ref Range    CRP Inflammation 40.0 (H) 0.0 - 8.0 mg/L   EBV Capsid Antibody IgM   Result Value Ref Range    EBV Capsid Heidi IgM Instrument Value 29.7 <36.0 U/mL    EBV Capsid Antibody IgM No detectable antibody. No detectable antibody.   Haptoglobin   Result Value Ref Range    Haptoglobin 156 32 - 197 mg/dL   Phosphorus   Result Value Ref Range    Phosphorus 2.8 2.5 - 4.5 mg/dL   CBC with platelets and differential   Result Value Ref Range    WBC Count 8.8 4.0 - 11.0 10e3/uL    RBC Count 2.52 (L) 3.80 - 5.20 10e6/uL    Hemoglobin 7.2 (L) 11.7 - 15.7 g/dL    Hematocrit 22.6 (L) 35.0 - 47.0 %    MCV 90 78 - 100 fL    MCH 28.6 26.5 - 33.0 pg    MCHC 31.9 31.5 - 36.5 g/dL    RDW 18.8 (H) 10.0 - 15.0 %    Platelet Count 92 (L) 150 - 450 10e3/uL    % Neutrophils 87 %    % Lymphocytes 8 %    % Monocytes 4 %    % Eosinophils 0 %    % Basophils 0 %    % Immature Granulocytes 1 %    NRBCs per 100 WBC 0 <1 /100    Absolute Neutrophils 7.7 1.6 - 8.3 10e3/uL    Absolute Lymphocytes 0.7 (L) 0.8 - 5.3 10e3/uL    Absolute Monocytes 0.4 0.0 - 1.3 10e3/uL    Absolute Eosinophils 0.0 0.0 - 0.7 10e3/uL    Absolute  Basophils 0.0 0.0 - 0.2 10e3/uL    Absolute Immature Granulocytes 0.1 <=0.4 10e3/uL    Absolute NRBCs 0.0 10e3/uL   RBC and Platelet Morphology   Result Value Ref Range    Platelet Assessment  Automated Count Confirmed. Platelet morphology is normal.     Automated Count Confirmed. Platelet morphology is normal.    Elliptocytes Slight (A) None Seen    RBC Fragments Slight (A) None Seen    RBC Morphology Confirmed RBC Indices

## 2022-06-13 NOTE — PHARMACY - DISCHARGE MEDICATION RECONCILIATION
Pharmacy discharge medication teaching offered but denied.  Milly has no questions    The following medications were reviewed for discharge:  Current Discharge Medication List      START taking these medications    Details   famotidine (PEPCID) 10 MG tablet Take 1 tablet (10 mg) by mouth 2 times daily for 21 days  Qty: 42 tablet, Refills: 0    Associated Diagnoses: Other systemic lupus erythematosus with other organ involvement (H)         CONTINUE these medications which have CHANGED    Details   hydroxychloroquine (PLAQUENIL) 200 MG tablet Take 1 tablet (200 mg) by mouth daily for 21 days  Qty: 21 tablet, Refills: 0    Associated Diagnoses: Other systemic lupus erythematosus with other organ involvement (H)      mycophenolate (GENERIC EQUIVALENT) 500 MG tablet Take 2 tablets (1,000 mg) by mouth 2 times daily for 21 days  Qty: 84 tablet, Refills: 0    Associated Diagnoses: Other systemic lupus erythematosus with other organ involvement (H)      predniSONE (DELTASONE) 20 MG tablet Take 3 tablets (60 mg) by mouth daily for 21 days  Qty: 63 tablet, Refills: 0    Associated Diagnoses: Other systemic lupus erythematosus with other organ involvement (H)         CONTINUE these medications which have NOT CHANGED    Details   acetaminophen (TYLENOL) 500 MG tablet Take 1 tablet (500 mg) by mouth every 4 hours as needed for mild pain  Qty: 1 Bottle, Refills: 1    Associated Diagnoses: Other acute pancreatitis, unspecified complication status      Belimumab 200 MG/ML SOAJ Inject 200 mg Subcutaneous every 7 days  Qty: 4 mL, Refills: 11    Associated Diagnoses: Systemic lupus erythematosus with other organ involvement, unspecified SLE type (H)      calcium carbonate (TUMS) 500 MG chewable tablet Take 1 tablet (500 mg) by mouth daily  Qty: 30 tablet, Refills: 0    Associated Diagnoses: Systemic lupus erythematosus with other organ involvement, unspecified SLE type (H)      ferrous sulfate (FEROSUL) 325 (65 Fe) MG tablet Take 1  tablet (325 mg) by mouth daily (with breakfast)  Qty: 100 tablet, Refills: 3    Associated Diagnoses: Systemic lupus erythematosus with other organ involvement, unspecified SLE type (H)      multivitamin, therapeutic (THERA-VIT) TABS tablet Take 1 tablet by mouth daily  Qty: 30 tablet, Refills: 11    Associated Diagnoses: Other systemic lupus erythematosus with other organ involvement (H)      sulfamethoxazole-trimethoprim (BACTRIM DS) 800-160 MG tablet Take 1 tablet by mouth Every Mon, Wed, Fri Morning  Qty: 18 tablet, Refills: 3    Associated Diagnoses: Immunosuppression (H)      !! vitamin D3 (CHOLECALCIFEROL) 1.25 MG (96452 UT) capsule Take 1 capsule (50,000 Units) by mouth every 7 days  Qty: 12 capsule, Refills: 0    Associated Diagnoses: Vitamin D deficiency; Exocrine pancreatic insufficiency      !! amylase-lipase-protease (CREON 24) 47400-86774 units CPEP per EC capsule Take 1 capsule by mouth Take with snacks or supplements (with snacks)  Qty: 112 capsule, Refills: 1    Associated Diagnoses: Pancreatic insufficiency      !! amylase-lipase-protease (CREON 24) 65005-01829 units CPEP per EC capsule Take 2 capsules by mouth 3 times daily (with meals)  Qty: 336 capsule, Refills: 1    Associated Diagnoses: Pancreatic insufficiency      mvw complete formulation (SOFTGELS) capsule Take 2 capsules by mouth daily  Qty: 180 capsule, Refills: 1    Comments: NDC: [address]-41  Associated Diagnoses: Exocrine pancreatic insufficiency      !! vitamin D3 (CHOLECALCIFEROL) 10 MCG (400 UNIT) capsule Take 1 capsule (400 Units) by mouth daily  Qty: 30 capsule, Refills: 3    Associated Diagnoses: Systemic lupus erythematosus with other organ involvement, unspecified SLE type (H); Systemic lupus erythematosus, unspecified SLE type, unspecified organ involvement status (H)       !! - Potential duplicate medications found. Please discuss with provider.

## 2022-06-13 NOTE — PROGRESS NOTES
[facility]    Pediatric Rheumatology Progress Note    Date of Service (when I saw the patient): 06/13/2022     Assessment & Plan   Milly Carroll is a 21 year old female with history of systemic lupus erythematosus (SLE) who was admitted on 6/11/2022 with multiple worsening systemic symptoms concerning for an SLE flare and potentially underlying infection, characterized by fevers, fatigue, diarrhea and vomiting, with persistent cytopenias, elevated inflammatory markers, and hematuria and proteinuria.     Since admission, she is symptomatically doing better on all accounts, afebrile and with downtrending CRP, and this trajectory seems to have started prior to the single ceftriaxone and methylprednisolone pulse given thus fare. Given that she has remained afebrile, with urine cultures remaining negative, and the CVA tenderness we observed last morning not recurring for any other team since, we agree that pyelonephritis seems less likely, though we remain concerned that there was some other unidentified infection that was contributing to the severity of her recent symptoms and worsening her lupus flare. While source remains unclear, we are reassured against intra-abdominal etiology or systemic infection based on normal urine, blood, and stool cultures and otherwise normal abdominal ultrasound.     While she is still inpatient, we will use this opportunity to make adjustments to her immunosuppressive regimen, including decreasing doses of her prior medications (to minimize toxicity) and adding a steroid bridge therapy, while working to facilitate an expedited transfer to both outpatient Rheumatology and Nephrology. She likely has lupus nephritis as evidenced by chronic history hematuria and proteinuria, with acutely worsened creatinine, and we support Nephrology's pursuit of a biopsy to help best guide long-term, steroid sparing immune suppression agents to add.      Recommendations:     While inpatient  1. Continue her home medications at doses of 200 mg hydroxychloroquine daily and mycophenolate 1,000 mg twice daily; we expect these to be her doses at discharge, too  2. She may receive a second dose of IV methylprednisolone today and her third dose tomorrow morning; 1,000 mg each.   3. We will ask our , Rosieronel Maloneughn, if she may be able to meet with Milly while inpatient.     Discharge coordination:  1. She should have both adult Nephrology and Rheumatology consultations scheduled prior to discharge (priority for first visit should be Nephrology). If the primary team is able to work with their coordinators to schedule these; we will reach out to the adult Rheumatology team this AM to discuss expedited clinic evaluation.     Adult Rheumatology appointment available this Wednesday with Dr. Jaden Meredith.   2. Nephrology is determining optimal timing of renal biopsy, which will be helpful to guide long-term lupus management presuming that there is likely lupus nephritis.  3. Her last documented eye examination was on 03/14/18; she should have this scheduled annually as well  4. Please increase her daily home steroid dose to 60 mg prednisone upon discharge. This should not exceed one month in length (just ensure there is enough to get her to her Nephrology and Rheumatology appointments)  5. While waiting for kidney biopsy, we would consider weekly pulse dose IV methylprednisolone (1,000 mg) as bridging therapy; our adult Rheumatology colleagues can coordinate this or alternative plans at their Wednesday appointment.    If there are any new questions or concerns, I would be glad to help and can be reached through our main office at [phone] or our paging  at [phone].     This plan of care was discussed with attending, Dr. Jonh Anders.       Darryn Delatorre MD/PhD  PGY4, Pediatric Rheumatology Fellow  [facility]    Agree with above.   I supervised Dr. Delatorre's interaction with the patient and performed a complete physical exam.  I reviewed new data in the electronic medical record.  I discussed our recommendations with the patient and was present when Dr. Delatorre discussed them with the primary team.    Jonh Anders MD, PhD  , Pediatric Rheumatology      Interval History   Milly has been afebrile since arrival <1 am on 06/11. No acute events, and she has remained vitally stable without hypertension. She has not had diarrhea, and only 1 episode of emesis since admission on 06/11. She is feeling much better, without any abdominal pain and improved energy.     She received one dose of ceftriaxone on 06/11; cultures returned negative yesterday and she did not have any re-demonstrated CVA tenderness.     Physical Exam   Temp: 96.9  F (36.1  C) Temp src: Axillary BP: 108/75 Pulse: 52   Resp: 14 SpO2: 100 % O2 Device: None (Room air)    Vitals:    06/11/22 0018 06/11/22 2022   Weight: 49.2 kg (108 lb 7.5 oz) 49.1 kg (108 lb 3.9 oz)     Vital Signs with Ranges  Temp:  [96.9  F (36.1  C)-98.6  F (37  C)] 96.9  F (36.1  C)  Pulse:  [52-64] 52  Resp:  [14-20] 14  BP: ()/(74-79) 108/75  Cuff Mean (mmHg):  [88] 88  SpO2:  [99 %-100 %] 100 %  I/O last 3 completed shifts:  In: 2561.67 [P.O.:2180; I.V.:381.67]  Out: 1750 [Urine:1750]    Gen: Well appearing, cooperative. No acute distress.  Head: short hair, with hair shafts friable and broken. 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   Eyes: No scleral injection, pupils normal.  Nose: No deformity, no rhinorrhea or congestion. No sores.  Ears: normal external canals  Mouth: Normal teeth and gums. Moist mucus membranes. Nonpainful erythematous circular erosions on the hard palate  Lungs: No increased work of breathing or retractions noted. CTAB. No wheezing, rales, rhonchi.  CV:  RRR. No m/r/g. Normal S1/S2. Normal peripheral perfusion, pulses 2+, brisk cap refill <2 sec.   Abdomen: Soft, non-distended. no longer tender and no CVA tenderness. No hepatomegaly per palpation or percussion. Splenomegaly palpable ~2 cm below the inferior edge of the ribs per percussion.   Neuro: Alert, interactive. Answers questions appropriately. CN intact. Grossly normal tone. Lower extremities with full intact sensation  Skin/Nails: erythematous petechiae on the distal fingertips of the 2nd and 3rd digits bilaterally. No other rashes or lesions on the face, neck, chest or trunk, arms or legs.   MSK: Symmetric extremities, no deformities. extremities warm, well perfused. active and passive range of motion without limitations. Limited joint evaluation including glenohumeral, elbow, wrist, knees, ankles, fingers, and toes, and all were normal without swelling, warmth, or erythema along any joints. No point tenderness over muscles or typical sites of enthesitis.        Medications      1. Hydroxychloroquine 200 mg daily (was 400 mg on admission  - decreased to minimize retinal toxicity risk)  2. Mycophenolate 1,000 mg twice daily (was 1,500 mg twice daily on admission - decreased to minimize cytopenia toxicity risk)  3. Prednisone 7.5 mg twice daily (was 5 mg daily on admission - increased for stress dosing)  4. 1,000 mg IV methyprednisolone given on 06/11  5. Vitamin D  6. bactrim MWF    Data     SLE (systemic lupus erythematosus) disease monitoring:  Recent Labs   Lab Test 06/11/22  0146 04/25/22  0736 12/15/21  1238 08/11/21  1227 02/10/21  1305 01/13/21  1043 12/16/20  1208 11/18/20  1056 10/21/20  1225 10/09/20  1749 07/01/20  1300 06/26/20  0632 06/23/20  1826 05/27/20  1218 04/16/20  1020 12/13/19  0543 12/10/19  1524 05/01/19  0955 04/24/19  1040 04/10/19  1123   DNA  --  890.0* 97.0* 88.0*  --   --   --   --   --  46*  --  114* 220*  --  57*  --  18*  --  152* 277*   [id number] 11* 11* 21* 50* 38* 30* 39*  44* 28* 31*   < > 10* 18*   < > 47*   < > 13*   < > 39* 12*   [id number] 3* 3* 5* 8* 9* 10* 7* 8* 4* 5*   < > 2* 7*   < > 10*   < > 4*   < > 4* <2*    < > = values in this interval not displayed.       Hematology:  Recent Labs   Lab Test 06/13/22  0558 06/12/22  0641 06/11/22  0146 12/15/21  1238 08/11/21  1227 02/11/21  0615 02/10/21  1305 01/13/21  1043 01/13/21  1042 12/16/20  1208 11/18/20  1056 10/21/20  1225 10/09/20  1749 07/29/20  1325 07/15/20  1340 07/08/20  1340   WBC 8.8 4.5 10.7 5.1 5.1 4.5 3.9* 3.8*  --  4.2 5.2 3.8* 5.2 3.4* 4.1 4.8   ANEU  --   --   --   --   --  3.2 2.6 2.1  --  2.3 3.9 2.2 2.9 2.0 2.7 3.5   ALYM  --   --   --   --   --  0.7* 0.7* 1.3  --  1.1 0.9 1.1 1.5 0.8 0.7* 0.5*   AEOS  --   --   --   --   --  0.2 0.2 0.0  --  0.3 0.1 0.1 0.0 0.1 0.1 0.1   HGB 7.2* 8.3* 7.8* 8.8* 8.8* 9.1* 9.9* 9.1* 10.5* 9.4* 9.9* 9.1* 9.7* 9.3* 8.6* 7.8*   MCV 90 92 91 85 80 75* 76* 75*  --  77* 75* 80 81 93 93 91   PLT 92* 74* 107* 214 251 104* 118* 149*  --  240 195 188 213 243 285 266       Inflammatory Markers:  Recent Labs   Lab Test 06/13/22  0558 06/12/22  0641 06/11/22  0146 04/25/22  0736 02/10/21  1305 01/13/21  1043 10/09/20  1749 06/27/20  0651 06/26/20  0632 06/25/20  0656 06/23/20  1826 12/11/19  0536 12/10/19  1524 04/11/19  0601 08/25/17  0500 08/24/17  0530 08/22/17  0520 08/16/17  1024 12/04/15  0908 11/20/15  0950   CRP 40.0* 91.0* 99.0*  --  <2.9 3.5 <2.9 <2.9 <2.9 <2.9 <2.9   < > 30.1* <2.9  --    < > 22.0*   < >  --   --    SED  --   --  >140* 128*  --   --  24*  --   --   --  59*  --  113* 90* 111*  --  139*  --  113* 113*   PCAL  --   --   --   --   --   --   --   --   --   --   --   --  1.81  --   --   --   --   --   --   --    GABRIEL  --   --  123  --   --   --   --   --   --  318*  --   --   --   --   --   --  135  --   --   --     < > = values in this interval not displayed.       Renal studies:  Recent Labs   Lab Test 06/13/22  0558 06/12/22  0641 06/11/22  1527 06/11/22  1229  06/11/22  0151 06/11/22  0146 04/26/22  0621 04/25/22  0736 12/15/21  1241 12/15/21  1238 08/11/21  1227 02/11/21  0615 02/10/21  1405 02/10/21  1305 02/10/21  1216 01/13/21  1400 01/13/21  1043 12/16/20  1426 12/16/20  1208 11/18/20  1343 11/18/20  1056 10/21/20  1455 10/21/20  1225 10/09/20  1751 10/09/20  1749 07/29/20  1430 07/15/20  1340 07/15/20  1313   [facility] 16 18  --  21  --  18 8 7  --   --   --  10  --  14 13  --  8  --    < >  --    < >  --    < >  --    < >  --    < >  --    CR 0.70 0.72  --  0.98  --  0.92 0.60 0.66  --  0.47* 0.51* 0.58  --  0.62 0.48*  --  0.54  --    < >  --    < >  --    < >  --    < >  --    < >  --    UBLD  --   --  Moderate*  --  Moderate*  --   --   --  Negative  --   --   --  Trace*  --   --  Moderate*  --  Negative  --  Negative  --  Negative  --  Large*  --  Negative  --  Negative   PROTEIN  --   --  50 *  --  50 *  50 *  --   --   --  20 *  --   --   --  100*  --   --  10*  --  10*  --  10*  --  Negative  --  30*  --  30*  --  10*   RBCU  --   --  44*  --   --   --   --   --  3*  --   --   --  38*  --   --  5*  --  2  --  2  --  2  --  >182*  --  1  --  1   WBCU  --   --  27*  --   --   --   --   --  2  --   --   --  59*  --   --  7*  --  1  --  3  --  1  --  2  --  3  --  <1    < > = values in this interval not displayed.       Liver studies:  Recent Labs   Lab Test 06/13/22  0558 06/12/22  0641 06/11/22  1229 06/11/22  0146 04/25/22  0736 12/15/21  1238 08/11/21  1227 02/11/21  0615 02/10/21  1305 02/10/21  1216 01/13/21  1043   AST 19 27  --  34 33 72* 32 117* 129* Unsatisfactory specimen - hemolyzed 32   ALT 10 11  --  12 10 32 13 55* 62* 70* 17   BILITOTAL 0.1* 0.1*  --  0.7 0.4 0.3 0.3 0.3 0.6 0.7 0.4   ALKPHOS 136 156*  --  164* 150 262* 244* 140 169* 173* 141   ALBUMIN 1.8* 1.8* 2.0* 2.2* 2.0* 2.3* 2.6* 2.6* 3.4 3.3* 3.1*       Muscle enzyme studies:   Recent Labs   Lab Test 06/13/22  0558 06/12/22  0641 06/11/22  0146 04/25/22  0736 12/15/21  1238 08/11/21  1227  02/11/21  0615 02/10/21  1305 02/10/21  1216 01/13/21  1043 06/27/20  0651 06/26/20  0632 06/25/20  0656 06/24/20  1131 06/23/20  1826 12/20/19  1035 12/13/19  0543 12/12/19  0453 12/11/19  0536 04/11/19  0601 04/10/19  1123 08/28/17  0540 08/25/17  0500   CKT  --   --  24*  --   --   --   --   --   --   --   --  52  --   --  75  --  103  --  332*  --   --   --   --    LDH  --   --  294*  --   --   --   --   --   --   --   --   --  349*  --   --   --   --   --   --   --  414*  --  256   AST 19 27 34 33 72* 32 117* 129* Unsatisfactory specimen - hemolyzed 32   < > 34 50*   < > 115*   < >  --    < > 46*   < > 123*   < > 57*   ALT 10 11 12 10 32 13 55* 62* 70* 17   < > 19 24   < > 37   < >  --    < > 12   < > 54*   < > 82*    < > = values in this interval not displayed.

## 2022-06-13 NOTE — PROGRESS NOTES
We reviewed all interval notes, vitals, and laboratory testing over the last day. We did not round in person today, but did discuss Milly's care with both Nephrology and with the general hospital team.     Given that she has remained afebrile, with urine cultures remaining negative, and the CVA tenderness we observed last morning not recurring for any other team since, we agree that pyelonephritis seems less likely. Primary team plans on stopping the ceftriaxone, which we support. We will also be able to monitor her closely over the next days, in the event she develops new symptoms or fever recurs. We still suspect that there is some acute infection in addition to her lupus flare (see discussion from our consult note yesterday), though source remains unclear (importantly, intra-abdominal etiology or severe systemic infection seems far less likely now).    We will plan on a 3 day course of 1,000 mg IV methylprednisolone each day. She received the first dose last night, and resumed her twice daily prednisone today, which should be continued. Our team will round in person tomorrow morning (Dr. Delatorre and Dr. Anders are on service through this next week) and discuss with the primary team then about next IV methylprednisolone dosing.     We will also use the time that Milly remains inpatient tomorrow and Tuesday to help coordinate further outpatient diagnostic evaluation, referral, and treatment:   1. She should have both adult Nephrology and Rheumatology outpatient consultations scheduled prior to discharge.   2. Nephrology is determining optimal timing of renal biopsy, which will be helpful to guide long-term lupus management presuming that there is likely lupus nephritis.   3. Prior to discharge, we will determine whether additional layers of immunomodulatory medications (such as weekly pulse-dose steroids) will be added to her current regimen in the interim.      This plan of care was discussed with attending,  Dr. Elin Lind.       aDrryn Delatorre MD/PhD  PGY4, Pediatric Rheumatology Fellow  [facility]    Physician Attestation   I, Edna Lind MD, reviewed this patient and agree with the findings and plan of care as documented in the note.      Items personally reviewed/procedural attestation: Labs, vitals, imaging results, interval notes, discussions with pediatric renal and gen peds team and agree with the interpretation documented in the note.    Edna Lind M.D.   of Pediatrics  Pediatric Rheumatology

## 2022-06-13 NOTE — CONSULTS
Pediatric Nephrology Consultation    Milly Carroll MRN# [id number]   YOB: 2000 Age: 21 year old   Date of Admission: 6/11/2022     Reason for consult: Hematuria, pyuria           Assessment and Plan:   Milly is a 21 year old with poorly controlled SLE diagnosed in 2013 with a history of intermittent proteinuria and hematuria since diagnosis, in addition to 4 bacterial UTIs presenting with fevers and fatigue for 2 weeks with emesis and LLQ abdominal pain, now resolved, in a lupus flare with an CHRISTA, hematuria, proteinuria and sterile pyuria, with renal U/S showing an echogenic and enlarged left kidney.     Cause of CHRISTA is likely from pre-renal injury given volume depleted status on admission (weight down 1.5kg from April in context of emesis) and is now improving to her baseline of 0.4-0.6mg/dL but also from lupus flare causing nephritis. Given her history of proteinuria and hematuria in the past even when urine cultures were negative, she probably has chronic nephritis. Pyuria could also be from the lupus nephritis or could be from urethritis/STIs, appendicitis (unlikely given no pain today on my exam) or gall bladder pathology (again unlikely given no pain and no findings on U/S). She does not have a bacterial UTI because culture was negative and has no dysuria or hesitancy or urgency, as she says occurred with prior culture positive UTIs. The kidney U/S findings are non specific- could be from CHRISTA or from nephritis. It is reassuring that she has no hypertension or oliguria or azotemia, which are classic acute glomerulonephritis signs. The only way to know the extent and classification of nephritis is via renal biopsy.     However, given that her inflammatory markers are very elevated, she has thrombocytopenia and she was febrile on admission with unclear etiology for acute infection at this time and the fact that nephritic symptoms have been ongoing intermittently since diagnosis,  pursuing a biopsy at this time could be more harmful than beneficial. She definitely deserves a biopsy when her systemic inflammation is improved in order to determine best course of chronic therapy for her nephritis.      Avoid nephrotoxins and keep her fluid status balanced to allow for renal recovery of CHRISTA    Ensure no PID/STI's given her abdominal pain on admission and her pyuria    Defer immunosuppression to rheumatology as she has extensive organ involvement with her lupus and getting her overall disease under control will improve her lupus nephritis    Please have her schedule adult nephrology appointment as an outpatient before discharge. Biopsy can be pursued at that time.     Patient discussed and examined with attending, Dr. Lobo. I discussed my recommendations with the pediatric and rheumatology teams. We will sign off at this time but feel free to reach out if further questions arise.    Nehal Bell DO  Pediatric Nephrology Fellow    Attending Note: I have seen and examined the patient, reviewed the EMR, medications, laboratory and imaging results. I have discussed the assessment and plan with the resident. I agree with the note, assessment and plan as outlined above. 21 year old female with long standing SLE that has been poorly controlled. I reviewed her labs going back to 2013 along with some of the notes in her chart. She has had intermittent hematuria and an elevated urine protein:creatinine essentially since she was diagnosed with SLE. She was seen by our service in August 2017 while she was in the hospital with a severe SLE flare. She was scheduled for outpatient follow up with Peds Nephrology but unfortunately, only came to one appointment in Oct 2017 and did not keep a follow up appointment nor did she reschedule. While she should have close renal follow up and needs a biopsy, given her age (she will be 22 years in 2 months) she should be scheduled with an Adult Nephrology service [facility] who  will be able to perform a renal biopsy and follow her long term.   Nicole Lobo MD         Chief Complaint:   Milly is a 21 year old who was diagnosed with lupus in 2013. She has had poorly controlled lupus with high DNA-ds, ESR since diagnosis, low C3 and C4 with high ESR. She has been admitted multiple times for flares and non-compliance, last admitted in April for infectious rule out. She was on belimumab, hydroxychloroquine, MMF and prednisone 5mg before admission. For the past 2 weeks she has been complaining of night time fevers, fatigue, and emesis. She says left-sided abdominal pain occurred while admitted but is now gone. Heat packs have helped. Says she is not sure about a rash. Denies diarrhea, dysuria or hematuria. She has lost 1.5kg from her last admission in May. No new medications. No periorbital or leg edema.    She has had 4 UTI's before and says she knows when she has a UTI- dysuria is her main symptoms and she has not had that in the past 2 weeks.     Since diagnosis, she has had intermittent hematuria and proteinuria on UA's, even when urine cultures were negative. Her last abdominal U/S before this admission was in 2020 and showed normal sized kidneys (11.3cm right and 11.4cm left) with normal echogenicity, no stones or hydronephrosis with a normal bladder. She does not have a history of hypertension.     Her brother has proteinuria found in 2010 without a known cause and has not followed up with a nephrologist. No other known family history of kidney disease. She lives with her sister and does not work or go to school right now.    Evaluation during this admission- LDH elevated to ~260, platelets low ~70, anemic to 7.8, normal LFTs/lipase, bicarb 19, albumin 2, abdominal U/S with left kidney echogenicity and increased to 12.4cm with left kidney stable at 11.3cm. UA with WBCs, RBCs, negative nitrites and urine protein/cr 1.3. Blood and urine culture negative. Pending C3, C4, haptoglobin,  DNA-ds, peripheral blood smear          Past Medical History:     Past Medical History:   Diagnosis Date     Hepatitis      Lupus (systemic lupus erythematosus) (H)      Lupus cerebritis (H)      Pancreatitis 08/2017     Pyelonephritis 08/2017     Seizures (H)      Status epilepticus (H)              Past Surgical History:     Past Surgical History:   Procedure Laterality Date     NO HISTORY OF SURGERY       ORTHOPEDIC SURGERY                 Social History:     Social History     Tobacco Use     Smoking status: Never Smoker     Smokeless tobacco: Never Used   Substance Use Topics     Alcohol use: Not on file             Family History:     Family History   Problem Relation Age of Onset     Other - See Comments Father         Question of lupus in father and paternal grandfather     Lupus Sister         older sister     Gastrointestinal Disease No family hx of         No known history of IBD, hepatitis, pancreatitis, celiac disease, or GI cancers before age 50     Glaucoma No family hx of      Macular Degeneration No family hx of              Immunizations:     Immunization History   Administered Date(s) Administered     COVID-19,PF,Pfizer (12+ Yrs) 10/08/2021, 10/30/2021     Comvax (HIB/HepB) 02/22/2002     DTAP (<7y) 02/22/2002, 03/12/2008     DTaP / Hep B / IPV 04/20/2007, 09/07/2007     HPV9 01/27/2017     HepA-ped 2 Dose 04/20/2007, 11/02/2007     Influenza (IIV3) PF 10/03/2014     Influenza Vaccine IM > 6 months Valent IIV4 (Alfuria,Fluzone) 11/01/2013, 10/23/2015, 10/03/2017, 10/02/2019, 10/21/2020, 12/15/2021     MMR 09/07/2007     MMR/V 04/20/2007     Meningococcal (Menactra ) 05/03/2014     Pneumococcal (PCV 7) 02/22/2002     Pneumococcal 23 valent 09/19/2017     Polio, Unspecified  02/22/2002     Poliovirus, inactivated (IPV) 11/02/2007     TDAP Vaccine (Boostrix) 05/03/2014     Varicella 11/02/2007             Allergies:     Allergies   Allergen Reactions     Rituximab Anaphylaxis             Medications:      Current Facility-Administered Medications   Medication     acetaminophen (TYLENOL) tablet 650 mg     calcium carbonate (TUMS) chewable tablet 500 mg     [START ON 6/17/2022] cholecalciferol (VITAMIN D3) 1250 mcg (57867 units) capsule 50,000 Units     famotidine (PEPCID) tablet 10 mg     ferrous sulfate (FEROSUL) tablet 325 mg     hydroxychloroquine (PLAQUENIL) tablet 200 mg     multivitamin, therapeutic (THERA-VIT) tablet 1 tablet     mycophenolate (GENERIC EQUIVALENT) tablet 1,000 mg     ondansetron (ZOFRAN ODT) ODT tab 4 mg     predniSONE (DELTASONE) tablet 7.5 mg     sodium chloride 0.9% infusion     [START ON 6/13/2022] sulfamethoxazole-trimethoprim (BACTRIM DS) 800-160 MG per tablet 1 tablet             Review of Systems:   The comprehensive 10 point Review of Systems is negative other than noted in the HPI    Temp: 97.8  F (36.6  C) Temp src: Oral BP: 97/74 Pulse: 64   Resp: 20 SpO2: 100 % O2 Device: None (Room air)     General: Awake, alert, non-toxic appearing  HEENT: EOM in tact, nares patent without secretions, moist mucosa  Neck: No lymphadenopathy  Cardiac: Regular rate and rhythm, no murmur  Pulm: Lungs clear to auscultation bilaterally  Abdomen: Nondistended, nontender on palpation in all 4 quadrants, no hepatomegaly appreciated   Extremities: Warm, non-edematous, cap refill <2 seconds  Neuro: Interactive, moving extremities appropriately  Skin: Erythematous patches and xerosis on the face and scalp          Data:     Last Renal Panel:  Sodium   Date Value Ref Range Status   06/12/2022 140 133 - 144 mmol/L Final   02/11/2021 139 133 - 144 mmol/L Final     Potassium   Date Value Ref Range Status   06/12/2022 4.7 3.4 - 5.3 mmol/L Final   02/11/2021 3.7 3.4 - 5.3 mmol/L Final     Chloride   Date Value Ref Range Status   06/12/2022 118 (H) 94 - 109 mmol/L Final   02/11/2021 113 (H) 96 - 110 mmol/L Final     Carbon Dioxide   Date Value Ref Range Status   02/11/2021 18 (L) 20 - 32 mmol/L Final     Carbon  Dioxide (CO2)   Date Value Ref Range Status   06/12/2022 16 (L) 20 - 32 mmol/L Final     Anion Gap   Date Value Ref Range Status   06/12/2022 6 3 - 14 mmol/L Final   02/11/2021 8 3 - 14 mmol/L Final     Glucose   Date Value Ref Range Status   06/12/2022 159 (H) 70 - 99 mg/dL Final   02/11/2021 88 70 - 99 mg/dL Final     Urea Nitrogen   Date Value Ref Range Status   06/12/2022 18 7 - 30 mg/dL Final   02/11/2021 10 7 - 30 mg/dL Final     Creatinine   Date Value Ref Range Status   06/12/2022 0.72 0.52 - 1.04 mg/dL Final   02/11/2021 0.58 0.50 - 1.00 mg/dL Final     GFR Estimate   Date Value Ref Range Status   06/12/2022 >90 >60 mL/min/1.73m2 Final     Comment:     Effective December 21, 2021 eGFRcr in adults is calculated using the 2021 CKD-EPI creatinine equation which includes age and gender (Franchesca et al., NEJ, DOI: 10.1056/AWUZhc2645350)   02/11/2021 >90 >60 mL/min/[1.73_m2] Final     Comment:     Non  GFR Calc  Starting 12/18/2018, serum creatinine based estimated GFR (eGFR) will be   calculated using the Chronic Kidney Disease Epidemiology Collaboration   (CKD-EPI) equation.       Calcium   Date Value Ref Range Status   06/12/2022 8.2 (L) 8.5 - 10.1 mg/dL Final   02/11/2021 7.4 (L) 8.5 - 10.1 mg/dL Final     Phosphorus   Date Value Ref Range Status   06/11/2022 3.8 2.5 - 4.5 mg/dL Final   06/28/2020 3.3 2.8 - 4.6 mg/dL Final     Albumin   Date Value Ref Range Status   06/12/2022 1.8 (L) 3.4 - 5.0 g/dL Final   02/11/2021 2.6 (L) 3.4 - 5.0 g/dL Final

## 2022-06-13 NOTE — UTILIZATION REVIEW
"Admission Status; Secondary Review Determination     Under the authority of the Utilization Management Committee, the utilization review process indicated a secondary review on the above patient.  The review outcome is based on review of the medical records, discussions with staff, and applying clinical experience noted on the date of the review.        (X)      Inpatient Status Appropriate - This patient's medical care is consistent with medical management for inpatient care and reasonable inpatient medical practice.      () Observation Status Appropriate - This patient does not meet hospital inpatient criteria and is placed in observation status. If this patient's primary payer is Medicare and was admitted as an inpatient, Condition Code 44 should be used and patient status changed to \"observation\".   () Admission Status Not Appropriate - This patient's medical care is not consistent with medical management for Inpatient or Observation Status.          RATIONALE FOR DETERMINATION ???  ?? Milly Carroll is a 21 year old female admitted on 6/11/2022. She has a history of systemic lupus erythematosus, and is admitted for weeks of fever and progressive weakness concerning for acute flare of her lupus.  Given that she is on multiple immunosuppressive medications, also concern for bacterial infection, however she has no localizing symptoms at this time and is overall nontoxic in appearance.  Laboratory evaluation showed significantly elevated inflammatory markers as well as an CHRISTA, hypokalemia, hypoalbuminemia, anemia, and thrombocytopenia.  Exam is notable for splenomegaly, rash on her left scalp, no visible arthritis on exam.  Patient was discussed with rheumatology who recommended admission for ongoing work-up and evaluation.  She is admitted for observation with formal rheumatology consult in the morning.  Per rheumatology recommendations, will defer any additional steroid treatment at this time as infectious work-up " is still being completed.        Patient with complicated PMH, immunosuppression and now with SLE flare versus UTI versus +/1 CHRISTA. Given combination of issues requiring IVF, IV antibiotics and IV steroids in a patient who is significantly medically complex  requiring multiple days of care, severity of illness and planned level of care consistent with inpatient status         The information on this document is developed by the utilization review team in order for the business office to ensure compliance.  This only denotes the appropriateness of proper admission status and does not reflect the quality of care rendered.         The definitions of Inpatient Status and Observation Status used in making the determination above are those provided in the CMS Coverage Manual, Chapter 1 and Chapter 6, section 70.4.      Sincerely,     Kate Rivera MD  Utilization Review  Physician Advisor  [facility]

## 2022-06-13 NOTE — PROGRESS NOTES
CLINICAL NUTRITION SERVICES - PEDIATRIC ASSESSMENT NOTE    REASON FOR ASSESSMENT  Milly Carroll is a 21 year old female seen by the dietitian for positive nutrition screen (poor PO intake and weight loss).     ANTHROPOMETRICS (no growth charts due to age)  Admission (6/11/22)  Height/Length: 160 cm -- from 4/25   Weight: 49.1 kg  BMI for Age: 19.2 kg/m2 (Classification: WNL)  Ideal Body Weight (using [employer]): 52.3 kg +/- 10%    Current Assessment (6/13/22)  Weight: 51.4 kg    Weight History  Wt Readings from Last 10 Encounters:   06/11/22 49.1 kg (108 lb 3.9 oz)   04/26/22 50.7 kg (111 lb 12.4 oz)   12/15/21 51.6 kg (113 lb 12.1 oz)   08/11/21 54.1 kg (119 lb 4.3 oz)   02/11/21 56.8 kg (125 lb 3.5 oz) (45 %, Z= -0.11)*   02/10/21 57.1 kg (125 lb 14.1 oz) (47 %, Z= -0.08)*   01/14/21 60.9 kg (134 lb 4.2 oz) (62 %, Z= 0.30)*   12/16/20 59.6 kg (131 lb 6.3 oz) (58 %, Z= 0.19)*   11/18/20 58.4 kg (128 lb 12 oz) (53 %, Z= 0.08)*   10/21/20 59.8 kg (131 lb 13.4 oz) (59 %, Z= 0.23)*     * Growth percentiles are based on CDC (Girls, 2-20 Years) data.     Dosing Weight: 51 kg (~6/13/22)    Growth Comments: Admission weight suggestive of dehydration based on labs and CHRISTA on admission. Weight up 0.7 kg since previous admission on 4/26. Noted, overall, loss of 9.5 kg (15.5%) since 1/14/21. Difficult to assess weight changes given weight fluctuations with fluid shifts and steroid adjustments.      NUTRITION HISTORY  Intake: Milly reports a regular diet at home. She reports her appetite is intermittent -- some days she does feel hungry, other days she doesn't. She endorses abdominal pain and fatigue as factors hindering PO. Prior to admission, had some emesis. Conversation kept brief as patient was tired. Per I/O's eating 100% of meals.     Food Allergies: NKFA    Factors affecting nutrition intake prior to admission include: poor PO intake, weight loss     CURRENT NUTRITION ORDERS  Diet Order: Pediatric Diet Age 9-18 years      IVF: Sodium Chloride 0.9% at 10 mL/hr continuous provides 120 mL/day     CURRENT NUTRITION SUPPORT   No nutrition support at this time    PHYSICAL FINDINGS  Observed  Not physically assessed on encounter     Obtained from Chart/Interdisciplinary Team  Milly Carroll is a 21 year old female with past medical history of systemic lupus and recurrent UTIs who was admitted on 6/11/22 for fever and progressive weaknesses 2/2 acute SLE flare and possible infection. Pending workup for possible lupus nephritis.     LABS  Labs reviewed (6/13/2022)     MEDICATIONS  Reviewed and significant for calcium carbonate (500 mg once daily = 200 mg elemental calcium), famotidine, ferrous sulfate (325 mg daily), prednisolone and multivitamin with minerals (1 tablet daily)     ASSESSED NUTRITION NEEDS: based on 51 kg   Energy: 2180-5065 kcal/day (30-40 kcal/kg/day)   Protein: 0.8-1.2 g/kg/day  Fluid: 1 mL/kcal/day (maintenance via Holiday-Segar)   Micronutrient: RDA for age    ADULT NUTRITION STATUS VALIDATION  This patient is at risk for malnutrition due to weight loss. However, difficult to assess accuracy of degree of loss.     NUTRITION DIAGNOSIS  Predicted suboptimal energy intake related to current medical status as evidenced by reliance on PO with reports poor PO prior to admission likely not meeting nutrition needs.     INTERVENTIONS  Nutrition Prescription  To meet 100% estimated nutrition needs via oral intake    Nutrition Education  Provided education on RDN role. Briefly discussed higher calorie foods.     Implementation  Meals/Snacks   Collaboration and Referral of Nutrition care via team rounds     Goals  1. Weight maintenance or gain during hospital admission  2. Patient to eat > 75% of meals and snacks    FOLLOW UP/MONITORING  Food and Beverage intake   Anthropometric measurements     RECOMMENDATIONS  1. Continue regular diet as tolerated    Encourage higher calorie foods as able   2. Monitor PO intake. As needed, start  oral nutrition supplement such as [employer] Standard 1.4 prn for supplemental nutrition provision.   3. Weights twice weekly to trend.     Radha Mercedes MS, RDN, LDN, [facility]  Pediatric Clinical Dietitian  Pager: [phone]

## 2022-06-14 ENCOUNTER — MYC MEDICAL ADVICE (OUTPATIENT)
Dept: RHEUMATOLOGY | Facility: CLINIC | Age: 22
End: 2022-06-14

## 2022-06-14 ENCOUNTER — DOCUMENTATION ONLY (OUTPATIENT)
Dept: RHEUMATOLOGY | Facility: CLINIC | Age: 22
End: 2022-06-14

## 2022-06-14 VITALS
WEIGHT: 113.32 LBS | OXYGEN SATURATION: 100 % | HEART RATE: 55 BPM | TEMPERATURE: 97.6 F | SYSTOLIC BLOOD PRESSURE: 129 MMHG | DIASTOLIC BLOOD PRESSURE: 86 MMHG | RESPIRATION RATE: 18 BRPM | BODY MASS INDEX: 20.08 KG/M2

## 2022-06-14 LAB
EBV DNA # SPEC NAA+PROBE: <500 COPIES/ML
EBV DNA SPEC NAA+PROBE-LOG#: <2.7 {LOG_COPIES}/ML

## 2022-06-14 PROCEDURE — 250N000013 HC RX MED GY IP 250 OP 250 PS 637: Performed by: STUDENT IN AN ORGANIZED HEALTH CARE EDUCATION/TRAINING PROGRAM

## 2022-06-14 PROCEDURE — 250N000012 HC RX MED GY IP 250 OP 636 PS 637: Performed by: STUDENT IN AN ORGANIZED HEALTH CARE EDUCATION/TRAINING PROGRAM

## 2022-06-14 PROCEDURE — 99232 SBSQ HOSP IP/OBS MODERATE 35: CPT | Mod: GC | Performed by: PEDIATRICS

## 2022-06-14 PROCEDURE — 250N000011 HC RX IP 250 OP 636: Performed by: STUDENT IN AN ORGANIZED HEALTH CARE EDUCATION/TRAINING PROGRAM

## 2022-06-14 RX ADMIN — FAMOTIDINE 10 MG: 10 TABLET ORAL at 08:06

## 2022-06-14 RX ADMIN — CALCIUM CARBONATE (ANTACID) CHEW TAB 500 MG 500 MG: 500 CHEW TAB at 08:06

## 2022-06-14 RX ADMIN — FERROUS SULFATE TAB 325 MG (65 MG ELEMENTAL FE) 325 MG: 325 (65 FE) TAB at 08:06

## 2022-06-14 RX ADMIN — HYDROXYCHLOROQUINE SULFATE 200 MG: 200 TABLET, FILM COATED ORAL at 08:06

## 2022-06-14 RX ADMIN — METHYLPREDNISOLONE SODIUM SUCCINATE 1000 MG: 40 INJECTION, POWDER, LYOPHILIZED, FOR SOLUTION INTRAMUSCULAR; INTRAVENOUS at 11:55

## 2022-06-14 RX ADMIN — MYCOPHENOLATE MOFETIL 1000 MG: 500 TABLET, FILM COATED ORAL at 08:06

## 2022-06-14 RX ADMIN — THERA TABS 1 TABLET: TAB at 08:06

## 2022-06-14 ASSESSMENT — ACTIVITIES OF DAILY LIVING (ADL)
ADLS_ACUITY_SCORE: 22

## 2022-06-14 NOTE — PLAN OF CARE
VSS, afebrile. Sleeping throughout the night, appears comfortable.  Mild abdominal pain, hot pack helpful.  No family at bedside overnight.  Will continue plan of care.

## 2022-06-14 NOTE — PROGRESS NOTES
Care Coordinator Progress Note    Admission Date/Time:  6/11/2022  Attending MD:  Cecilio Liu MD    Data  Chart reviewed, discussed with interdisciplinary team.   Patient was admitted for:    Splenomegaly  Systemic lupus erythematosus, unspecified SLE type, unspecified organ involvement status (H)  Fever in pediatric patient  Anemia, unspecified type  Thrombocytopenia (H)  Hypokalemia  Fever, unspecified  Thrombocytopenia, unspecified (H)  Contact with and (suspected) exposure to covid-19  Other systemic lupus erythematosus with other organ involvement (H).    Concerns with insurance coverage for discharge needs: None.  Current Living Situation: Patient not assessed in this visit.  Support System: Involved  Services Involved: outpatient specialty follow-up  Transportation at Discharge: patient to coordinate  Transportation to Medical Appointments:   - patient to coordinate  Barriers to Discharge: none assessed at this time    Assessment  RNCC was updated by Dr. Recinos that Milly will need adult nephrology follow-up within 2 weeks of discharge and requested this RNCC assist Milly in scheduling appointment.     Coordination of Care and Referrals: Provided patient/family with options for outpatient specialty follow-up.   RNCC met with Milly at bedside to introduce RNCC role and discuss her follow-up care. RNCC provided Adult Nephrology Clinic Phone Number (ph: [phone]). Milly called and spoke with the  who noted that due to the nature of needing to be seen within 1-2 weeks of discharge, a clinic nurse would call her directly to schedule later today. Milly was agreeable. No other care coordination needs identified at this time. Milly's AVS to be routed to her PCP upon discharge.     Plan  Anticipated Discharge Date:  06/14/22  Anticipated Discharge Plan:  Milly will discharge home and follow-up with Rheumatology and Nephrology as outlined on her AVS.    Rosa Mills RN  Care Coordination    Pager: [phone]  Ascom: [phone]

## 2022-06-14 NOTE — PROGRESS NOTES
[facility]    Pediatric Rheumatology Progress Note    Date of Service (when I saw the patient): 2022     Assessment & Plan   Milly Carroll is a 21 year old female with history of systemic lupus erythematosus (SLE) who was admitted on 2022 with multiple worsening systemic symptoms concerning for an SLE flare and potentially underlying infection, characterized by fevers, fatigue, diarrhea and vomiting, with persistent cytopenias, elevated inflammatory markers, dramatically elevated dsDNA, and hematuria and proteinuria.     Since admission, she is symptomatically doing better on all accounts, afebrile and with downtrending CRP, and this trajectory seems to have started prior to the single ceftriaxone and methylprednisolone pulse. Given that she has remained afebrile, with urine cultures remaining negative, and the CVA tenderness we observed once not recurring for any other team since, we agree that pyelonephritis seems less likely, though we remain concerned that there was some other unidentified infection that was contributing to the severity of her recent symptoms and worsening her lupus flare. While source remains unclear, we are reassured against intra-abdominal etiology or systemic infection based on normal urine, blood, and stool cultures and otherwise normal abdominal ultrasound.     From our standpoint, she is fine to discharge per the primary team's discretion. We recommend the followin. She may receive a third dose of IV methylprednisolone today prior to discharge; 1,000 mg.   2. Discharge her on the reduced doses of her prior home medications: 200 mg hydroxychloroquine daily and mycophenolate 1,000 mg twice daily; these adjustments are meant to minimize toxicity risk  3. You may discharge her on 60 mg of daily prednisone; prescribe a short ~2 week course. Adult Rheumatology will determine the optimal length of daily steroid & taper.   4. She is  scheduled to see Dr. Jaden Meredith tomorrow (Wednesday) at 10 am with adult Rheumatology  5. If an adult Nephrology appointment and/or renal biopsy can be scheduled prior to discharge, this would be helpful to guide optimal long-term, steroid sparing immune suppression agents to add.   6. Her last documented eye examination was on 03/14/18; she should have this scheduled annually as well (to be done by the outpatient adult Rheumatology team)    If there are any new questions or concerns, I would be glad to help and can be reached through our main office at [phone] or our paging  at [phone].     This plan of care was discussed with attending, Dr. Jonh Anders.       Darryn Delatorre MD/PhD  PGY4, Pediatric Rheumatology Fellow  [facility]    Agree with above.  I spoke with the patient and examined her personally.  Recommendations discussed by Dr. Delatorre with primary team.  Appreciate primary team's care of Milly.    Jonh Anders MD, PhD  , Pediatric Rheumatology      Interval History   Milly has been afebrile since arrival <1 am on 06/11. No acute events, and she has remained vitally stable without hypertension. She has not had diarrhea, and only 1 episode of emesis since admission on 06/11. She had mild abdominal pain overnight.    Physical Exam   Temp: 98.7  F (37.1  C) Temp src: Oral BP: 108/73 Pulse: 52   Resp: 16 SpO2: 100 % O2 Device: None (Room air)    Vitals:    06/11/22 0018 06/11/22 2022 06/13/22 1110   Weight: 49.2 kg (108 lb 7.5 oz) 49.1 kg (108 lb 3.9 oz) 51.4 kg (113 lb 5.1 oz)     Vital Signs with Ranges  Temp:  [96.9  F (36.1  C)-98.7  F (37.1  C)] 98.7  F (37.1  C)  Pulse:  [52-58] 52  Resp:  [14-18] 16  BP: (101-118)/(73-82) 108/73  Cuff Mean (mmHg):  [87-88] 88  SpO2:  [99 %-100 %] 100 %  I/O last 3 completed shifts:  In: 962.67 [P.O.:900; I.V.:62.67]  Out: 800 [Urine:800]    Milly's chart was reviewed remotely by Dr. Delatorre today. Exam  per Dr. Anders.     Alert, awake.  Talkative today.  Palate has numerous small red macules, no ulceration.  No other intraoral lesions.  Neck supple.  Chest clear.  Bradycardic, regular, no murmur or rub.  Abdomen soft.  Did not appreciate hepatosplenomegaly today.  Extremities with no arthritis or edema.  Hyperpigmentation of palmar aspect of fingers and fingertips, no active ulceration.    Medications      Hydroxychloroquine 200 mg daily (was 400 mg on admission  - decreased to minimize retinal toxicity risk)    Mycophenolate 1,000 mg twice daily (was 1,500 mg twice daily on admission - decreased to minimize cytopenia toxicity risk)    Prednisone 7.5 mg twice daily (was 5 mg daily on admission - increased for stress dosing)    1,000 mg IV methyprednisolone given on 06/11 and 06/13    Vitamin D    bactrim [facility]    Data   SLE (systemic lupus erythematosus) disease monitoring:  Recent Labs   Lab Test 06/11/22  0146 04/25/22  0736 12/15/21  1238 08/11/21  1227 02/10/21  1305 01/13/21  1043 12/16/20  1208 11/18/20  1056 10/21/20  1225 10/09/20  1749 07/01/20  1300 06/26/20  0632 06/23/20  1826 05/27/20  1218 04/16/20  1020 12/13/19  0543 12/10/19  1524 05/01/19  0955 04/24/19  1040   DNA 1,527.0* 890.0* 97.0* 88.0*  --   --   --   --   --  46*  --  114* 220*  --  57*  --  18*  --  152*   Q7JPYNC 11* 11* 21* 50* 38* 30* 39* 44* 28* 31*   < > 10* 18*   < > 47*   < > 13*   < > 39*   Q6XRYDA 3* 3* 5* 8* 9* 10* 7* 8* 4* 5*   < > 2* 7*   < > 10*   < > 4*   < > 4*    < > = values in this interval not displayed.       Hematology:  Recent Labs   Lab Test 06/13/22  0558 06/12/22  0641 06/11/22  0146 12/15/21  1238 08/11/21  1227 02/11/21  0615 02/10/21  1305 01/13/21  1043 01/13/21  1042 12/16/20  1208 11/18/20  1056 10/21/20  1225 10/09/20  1749 07/29/20  1325 07/15/20  1340 07/08/20  1340   WBC 8.8 4.5 10.7 5.1 5.1 4.5 3.9* 3.8*  --  4.2 5.2 3.8* 5.2 3.4* 4.1 4.8   ANEU  --   --   --   --   --  3.2 2.6 2.1  --  2.3 3.9 2.2 2.9  2.0 2.7 3.5   ALYM  --   --   --   --   --  0.7* 0.7* 1.3  --  1.1 0.9 1.1 1.5 0.8 0.7* 0.5*   AEOS  --   --   --   --   --  0.2 0.2 0.0  --  0.3 0.1 0.1 0.0 0.1 0.1 0.1   HGB 7.2* 8.3* 7.8* 8.8* 8.8* 9.1* 9.9* 9.1* 10.5* 9.4* 9.9* 9.1* 9.7* 9.3* 8.6* 7.8*   MCV 90 92 91 85 80 75* 76* 75*  --  77* 75* 80 81 93 93 91   PLT 92* 74* 107* 214 251 104* 118* 149*  --  240 195 188 213 243 285 266       Inflammatory Markers:  Recent Labs   Lab Test 06/13/22  0558 06/12/22  0641 06/11/22  0146 04/25/22  0736 02/10/21  1305 01/13/21  1043 10/09/20  1749 06/27/20  0651 06/26/20  0632 06/25/20  0656 06/23/20  1826 12/11/19  0536 12/10/19  1524 04/11/19  0601 08/25/17  0500 08/24/17  0530 08/22/17  0520 08/16/17  1024 12/04/15  0908 11/20/15  0950   CRP 40.0* 91.0* 99.0*  --  <2.9 3.5 <2.9 <2.9 <2.9 <2.9 <2.9   < > 30.1* <2.9  --    < > 22.0*   < >  --   --    SED  --   --  >140* 128*  --   --  24*  --   --   --  59*  --  113* 90* 111*  --  139*  --  113* 113*   PCAL  --   --   --   --   --   --   --   --   --   --   --   --  1.81  --   --   --   --   --   --   --    GABRIEL  --   --  123  --   --   --   --   --   --  318*  --   --   --   --   --   --  135  --   --   --     < > = values in this interval not displayed.       Renal studies:  Recent Labs   Lab Test 06/13/22  0558 06/12/22  0641 06/11/22  1527 06/11/22  1229 06/11/22  0151 06/11/22  0146 04/26/22  0621 04/25/22  0736 12/15/21  1241 12/15/21  1238 08/11/21  1227 02/11/21  0615 02/10/21  1405 02/10/21  1305 02/10/21  1216 01/13/21  1400 01/13/21  1043 12/16/20  1426 12/16/20  1208 11/18/20  1343 11/18/20  1056 10/21/20  1455 10/21/20  1225 10/09/20  1751 10/09/20  1749 07/29/20  1430 07/15/20  1340 07/15/20  1313   BUN 16 18  --  21  --  18 8 7  --   --   --  10  --  14 13  --  8  --    < >  --    < >  --    < >  --    < >  --    < >  --    CR 0.70 0.72  --  0.98  --  0.92 0.60 0.66  --  0.47* 0.51* 0.58  --  0.62 0.48*  --  0.54  --    < >  --    < >  --    < >  --    <  >  --    < >  --    UBLD  --   --  Moderate*  --  Moderate*  --   --   --  Negative  --   --   --  Trace*  --   --  Moderate*  --  Negative  --  Negative  --  Negative  --  Large*  --  Negative  --  Negative   PROTEIN  --   --  50 *  --  50 *  50 *  --   --   --  20 *  --   --   --  100*  --   --  10*  --  10*  --  10*  --  Negative  --  30*  --  30*  --  10*   RBCU  --   --  44*  --   --   --   --   --  3*  --   --   --  38*  --   --  5*  --  2  --  2  --  2  --  >182*  --  1  --  1   WBCU  --   --  27*  --   --   --   --   --  2  --   --   --  59*  --   --  7*  --  1  --  3  --  1  --  2  --  3  --  <1    < > = values in this interval not displayed.       Liver studies:  Recent Labs   Lab Test 06/13/22 0558 06/12/22 0641 06/11/22  1229 06/11/22 0146 04/25/22  0736 12/15/21  1238 08/11/21  1227 02/11/21  0615 02/10/21  1305 02/10/21  1216 01/13/21  1043   AST 19 27  --  34 33 72* 32 117* 129* Unsatisfactory specimen - hemolyzed 32   ALT 10 11  --  12 10 32 13 55* 62* 70* 17   BILITOTAL 0.1* 0.1*  --  0.7 0.4 0.3 0.3 0.3 0.6 0.7 0.4   ALKPHOS 136 156*  --  164* 150 262* 244* 140 169* 173* 141   ALBUMIN 1.8* 1.8* 2.0* 2.2* 2.0* 2.3* 2.6* 2.6* 3.4 3.3* 3.1*       Muscle enzyme studies:   Recent Labs   Lab Test 06/13/22 0558 06/12/22  0641 06/11/22  0146 04/25/22  0736 12/15/21  1238 08/11/21  1227 02/11/21  0615 02/10/21  1305 02/10/21  1216 01/13/21  1043 06/27/20  0651 06/26/20  0632 06/25/20  0656 06/24/20  1131 06/23/20  1826 12/20/19  1035 12/13/19  0543 12/12/19  0453 12/11/19  0536 04/11/19  0601 04/10/19  1123 08/28/17  0540 08/25/17  0500   CKT  --   --  24*  --   --   --   --   --   --   --   --  52  --   --  75  --  103  --  332*  --   --   --   --    LDH  --   --  294*  --   --   --   --   --   --   --   --   --  349*  --   --   --   --   --   --   --  414*  --  256   AST 19 27 34 33 72* 32 117* 129* Unsatisfactory specimen - hemolyzed 32   < > 34 50*   < > 115*   < >  --    < > 46*   < > 123*   < >  57*   ALT 10 11 12 10 32 13 55* 62* 70* 17   < > 19 24   < > 37   < >  --    < > 12   < > 54*   < > 82*    < > = values in this interval not displayed.

## 2022-06-14 NOTE — DISCHARGE SUMMARY
"[facility]  Discharge Summary - Medicine & Pediatrics       Date of Admission:  6/11/2022  Date of Discharge:  6/14/2022  2:30 PM  Discharging Provider: Dr. Primo Mendieta M.D  Discharge Service: Pediatric Service PURPLE Team    Discharge Diagnoses   #Systemic Lupus Erythematosus, acute flare     Follow-ups Needed After Discharge   Follow-up Appointments     Adult [facility] Follow-up and recommended labs and tests      Follow up with Adult Rheumatology and Adult Nephrology within 2 weeks.     Appointments on [place] and/or [facility] (with [facility] or [facility]   provider or service). Call [phone] if you haven't heard regarding   these appointments within 7 days of discharge.         Follow Up ([facility])      Follow up with the Ophthalmology clinic within one month to evaluate your   retinas (the back of your eyes). This is because one of your medications,   hydroxychloroquine, can cause toxic effects to the retinas.     Follow up with the Adult Rheumatology clinic as currently scheduled on   6/15/22 at 10am.    You will be called by the Adult Nephrology Clinic (Kidney doctors) for a   follow up appointment. It is very important to go to this appointment to   discuss the need for a kidney biopsy.    Appointments on [place] and/or [facility] (with [facility] or [facility]   provider or service). Call [phone] if you haven't heard regarding   these appointments within 7 days of discharge.         Primary Care Follow Up      Please follow up with your primary care provider, Estefany Bell, in   one month for post hospital follow up.          Coordination of Care and Referrals, Per RNCC:   \"Provided patient/family with options for outpatient specialty follow-up.   RNCC met with Milly at bedside to introduce RNCC role and discuss her follow-up care. RNCC provided Adult Nephrology Clinic Phone Number (ph: [phone]). Milly called and spoke with the  who noted " "that due to the nature of needing to be seen within 1-2 weeks of discharge, a clinic nurse would call her directly to schedule (appointment).\"    Unresulted Labs Ordered in the Past 30 Days of this Admission     Date and Time Order Name Status Description    6/11/2022 10:40 AM EBV DNA PCR Quantitative Whole Blood In process     6/11/2022  1:18 AM Blood Culture Peripheral Blood Preliminary     6/11/2022  1:18 AM Blood Culture Peripheral Blood Preliminary       These results will be followed up by PCP/Rheumatology/Nephrology/PCP     Discharge Disposition   Discharged to home  Condition at discharge: Stable    Hospital Course   Milly Carroll was admitted on 6/11/2022 for weeks of fever and progressive weakness consistent with acute SLE flare and concern for concomitant infection, with highest initial concern for pyelonephritis given CRP elevation out of proportion to SLE flare (per rheumatology),  elevated ANC & increased echogenicity in unilateral renal parenchyma, although currently clinically stable and had not had costo-vertebral angle tenderness/pain, UCx and blood culture came back negative. Additionally, it appears that Milly may be developing lupus nephritis, which has not been a previous problem for her, priority for Follow-up will be for adult Nephrology for biopsy. She is hemodynamically stable  Her Cr is improved. She required treatment with IV methylprednisolone and     The following problems were addressed during her hospitalization:     Systemic lupus erythematosus, acute flare  - Rheumatology consulted and followed patient during hospitalization, recs included the following:    While inpatient  1. Her home medications were continued at decreased doses of 200 mg hydroxychloroquine daily and mycophenolate 1,000 mg twice daily; she was discharged on these doses  2. She may received 3 doses of IV methylprednisolone  1,000 mg each. On 6/11, 6/14 and 6/15  3. Daily Prednisone at discharge was increased to 60 " "mg PO daily.      Discharge coordination:  1. She should have both adult Nephrology and Rheumatology consultations scheduled prior to discharge (priority for first visit should be Nephrology). If the primary team is able to work with their coordinators to schedule these; we will reach out to the adult Rheumatology team this AM to discuss expedited clinic evaluation.     Adult Rheumatology appointment scheduled with Dr. Jaden Meredith. on 6/15 at 10 AM  2. Nephrology is determining optimal timing of renal biopsy, which will be helpful to guide long-term lupus management presuming that there is likely lupus nephritis.  3. Her last documented eye examination was on 03/14/18; she should have this scheduled annually as well, will be coordinated through Rheumatology outpatient.  4. Her daily home steroid dose was increased to 60 mg prednisone on discharge. This should not exceed one month in length (just ensure there is enough to get her to her Nephrology and Rheumatology appointments)  5. While waiting for kidney biopsy, we would consider weekly pulse dose IV methylprednisolone (1,000 mg) as bridging therapy; this will be deferred to adult Rheumatology outpatient team.    Concern for Pyelonephritis  Less likely given clinically stable and urine culture returned negative   - Ceftriaxone 2g Q24h, first dose 6/11 PM ad discontinued on 6/12.      Acute kidney injury  Hypokalemia, Improved  Concern for lupus nephritis  Likely will need kidney biopsy for formal diagnosis   Cr downtrending  Coordination of Care and Referrals, Per RNCC:   \"Provided patient/family with options for outpatient specialty follow-up.   RNCC met with Milly at bedside to introduce RNCC role and discuss her follow-up care. RNCC provided Adult Nephrology Clinic Phone Number (ph: [phone]). Milly called and spoke with the  who noted that due to the nature of needing to be seen within 1-2 weeks of discharge, a clinic nurse would call her " "directly to schedule (appointment).\"     Normocytic Anemia, Thrombocytopenia  Hemoglobin of 7.8, platelets of 107 on admission and dropped to 74 and now at 92. Likely related to acute lupus flare vs anemia of chronic disease (or both).  Ferritin was normal, which is unexpected given the degree of inflammation of her other inflammatory markers, potentially representing some degree of iron deficiency as well although already taking iron and anemia is normocytic.  -F/u CBC as needed      Splenomegaly  Possibly related to lupus flare vs sequestration    Consultations This Hospital Stay   ADVANCE DIRECTIVE IP CONSULT  PEDS RHEUMATOLOGY IP CONSULT  PEDS NEPHROLOGY IP CONSULT    Code Status   Full Code       The patient was discussed with Dr. Primo Mendieta M.D.    Justo Young MD  [facility] Team Service  [facility] PEDIATRIC MEDICAL SURGICAL UNIT [address]  [facility] [address]  Phone: [phone]  ______________________________________________________________________    Physical Exam   Vital Signs: Temp: 98.7  F (37.1  C) Temp src: Oral BP: 108/73 Pulse: 52   Resp: 16 SpO2: 100 %      Weight: 113 lbs 5.06 oz     GENERAL: Alert, awake in no apparent acute distress, overall feeling better   SKIN: Various patches of hypopigmentation and scaling on face as well as hyperpigmented spots on palmar aspect of fingers, improving, healed ulcers/calluses on fingertips.   HEAD: Normocephalic  EYES: Extraocular muscles intact. Normal conjunctivae.  NOSE: Normal without discharge.  MOUTH: Moist mucous membranes, fading purple-red spots on hard palate   LUNGS: Clear. No rales, rhonchi, wheezing or retractions  HEART: Regular rhythm. appreciate normal S1/S2, no murmurs. Normal pulses.  ABDOMEN: Normal peristaltic sounds, abdomen soft and not distended, no abdominal tenderness on palpation, no rebound or guarding. Palpable spleen below the left ribs  NEUROLOGIC: No focal findings. Gait deferred   EXTREMITIES No " deformities, erythema, or swelling.      Primary Care Physician   Estefany Bell    Discharge Orders      Adult [facility] Follow-up and recommended labs and tests    Follow up with Adult Rheumatology and Adult Nephrology within 2 weeks.     Appointments on [place] and/or [facility] (with [facility] or [facility] provider or service). Call [phone] if you haven't heard regarding these appointments within 7 days of discharge.     Reason for your hospital stay    You were hospitalized for a Lupus flare. You did receive some antibiotics at the beginning of admission due to concern for infection. The antibiotics were stopped prior to discharge.     Activity    Your activity upon discharge: activity as tolerated     Follow Up ([facility])    Follow up with the Ophthalmology clinic within one month to evaluate your retinas (the back of your eyes). This is because one of your medications, hydroxychloroquine, can cause toxic effects to the retinas.     Follow up with the Adult Rheumatology clinic as currently scheduled on 6/15/22 at 10am.    You will be called by the Adult Nephrology Clinic (Kidney doctors) for a follow up appointment. It is very important to go to this appointment to discuss the need for a kidney biopsy.    Appointments on [place] and/or [facility] (with [facility] or [facility] provider or service). Call [phone] if you haven't heard regarding these appointments within 7 days of discharge.     Primary Care Follow Up    Please follow up with your primary care provider, Estefany Bell, in one month for post hospital follow up.     Diet    Follow this diet upon discharge: Regular diet       Significant Results and Procedures   Results for orders placed or performed during the hospital encounter of 06/11/22   XR Chest 2 Views    Narrative    HISTORY: Fever immunosuppression    COMPARISON: 12/10/2018    FINDINGS: 2 views of the chest at 11:37 AM. Heart size is within  normal limits. Central pulmonary  vasculature is mildly prominent.  There are mild interstitial markings similar to prior. There are faint  curly B lines present. No region of consolidation. [place] density  projecting over the breast tissues at the midline in the frontal view,  uncertain etiology and may be within clothing.      Impression    IMPRESSION:  1. No focal pneumonia.  2. Interstitial markings and question of curly B lines, may represent  interstitial edema.  3. [place] density projecting over the breast tissue on the lateral  view is seen near the midline on the frontal view, etiology uncertain.    ASHISH JAMES MD         SYSTEM ID:  [id number]   US Abdomen Complete    Narrative    EXAMINATION: US ABDOMEN COMPLETE  6/11/2022 1:12 PM      CLINICAL HISTORY: fevers, immunosuppression, SLE - c/f vasculopathy,  indolent infection, peritonitis    COMPARISON: Ultrasound 6/27/2020        FINDINGS:  The liver is normal in contour and echogenicity. There is no  intrahepatic or extrahepatic biliary ductal dilatation. The common  bile duct measures 5 mm. The gallbladder is normal, without  gallstones, wall thickening, or pericholecystic fluid.    The spleen measures maximally 12.6 cm and is normal in appearance. The  visualized portions of the pancreas are normal in echogenicity.  Multiple peripancreatic lymph nodes. The largest measures 2.5 x 1.9 x  1.4 cm.    The visualized upper abdominal aorta and inferior vena cava are  normal.      The kidneys are normal in position and echogenicity. The right kidney  measures 11.2 cm and the left kidney measures 12.3 cm. Questionable  mildly increased echogenicity of the renal parenchyma on the left.  There is no significant urinary tract dilation. The urinary bladder is  well distended and normal in morphology. The bladder wall is normal.      Impression    IMPRESSION:     1. Multiple peripancreatic lymph nodes measuring up to 2.5 x 1.9 x 1.4  cm, nonspecific.  2. Mildly increased echogenicity of the renal  isrrael galindo left kidney.  3. Borderline splenomegaly    I have personally reviewed the examination and initial interpretation  and I agree with the findings.    ASHISH JAMES MD         SYSTEM ID:  [id number]     Most Recent 3 CBC's:Recent Labs   Lab Test 06/13/22  0558 06/12/22  0641 06/11/22  0146   WBC 8.8 4.5 10.7   HGB 7.2* 8.3* 7.8*   MCV 90 92 91   PLT 92* 74* 107*      Most Recent 3 BMP's:  Recent Labs   Lab Test 06/13/22  0558 06/12/22  0641 06/11/22  1229    140 141   POTASSIUM 3.6 4.7 3.5   CHLORIDE 119* 118* 113*   CO2 19* 16* 19*   BUN 16 18 21   CR 0.70 0.72 0.98   ANIONGAP 5 6 9   BISI 8.2* 8.2* 8.1*   * 159* 75     Most Recent 2 LFT's:  Recent Labs   Lab Test 06/13/22  0558 06/12/22  0641   AST 19 27   ALT 10 11   ALKPHOS 136 156*   BILITOTAL 0.1* 0.1*     Most Recent INR's and Anticoagulation Dosing History:  Anticoagulation Dose History     Recent Dosing and Labs Latest Ref Rng & Units 12/12/2019 12/13/2019 6/24/2020 6/25/2020 6/26/2020 6/27/2020 2/10/2021    INR 0.86 - 1.14 1.06 0.97 1.13 1.25(H) 1.30(H) 1.29(H) 1.08        Most Recent 3 Troponin's:  Recent Labs   Lab Test 08/20/17  1159 10/15/15  0236   TROPI <0.015 <0.015  The 99th percentile for upper reference range is 0.045 ug/L.  Troponin values in   the range of 0.045 - 0.120 ug/L may be associated with risks of adverse   clinical events.       Most Recent Cholesterol Panel:  Recent Labs   Lab Test 06/11/22  0146 08/19/17  0652   CHOL  --  102   LDL  --  39   HDL  --  17*   TRIG 100 231*     Most Recent 6 Bacteria Isolates From Any Culture (See EPIC Reports for Culture Details):  Recent Labs   Lab Test 02/10/21  1405 02/10/21  1216 01/13/21  1043 10/09/20  1751 06/25/20  1835 12/10/19  1729   CULT 50,000 to 100,000 colonies/mL  Escherichia coli  *  10,000 to 50,000 colonies/mL  Candida albicans  Susceptibility testing not routinely done  * No growth No growth  Moderate growth  Staphylococcus aureus  * <10,000 colonies/mL  mixed  urogenital fred   <10,000 colonies/mL  mixed urogenital fred   50,000 to 100,000 colonies/mL  Escherichia coli  *  <10,000 colonies/mL  mixed urogenital fred  Susceptibility testing not routinely done       Most Recent TSH, T4 and A1c Labs:  Recent Labs   Lab Test 08/11/21  1227 06/26/20  0632   TSH 5.57*  --    T4 1.16  --    A1C  --  5.3         Discharge Medications   Current Discharge Medication List      START taking these medications    Details   famotidine (PEPCID) 10 MG tablet Take 1 tablet (10 mg) by mouth 2 times daily for 21 days  Qty: 42 tablet, Refills: 0    Associated Diagnoses: Other systemic lupus erythematosus with other organ involvement (H)         CONTINUE these medications which have CHANGED    Details   hydroxychloroquine (PLAQUENIL) 200 MG tablet Take 1 tablet (200 mg) by mouth daily for 21 days  Qty: 21 tablet, Refills: 0    Associated Diagnoses: Other systemic lupus erythematosus with other organ involvement (H)      mycophenolate (GENERIC EQUIVALENT) 500 MG tablet Take 2 tablets (1,000 mg) by mouth 2 times daily for 21 days  Qty: 84 tablet, Refills: 0    Associated Diagnoses: Other systemic lupus erythematosus with other organ involvement (H)      predniSONE (DELTASONE) 20 MG tablet Take 3 tablets (60 mg) by mouth daily for 21 days  Qty: 63 tablet, Refills: 0    Associated Diagnoses: Other systemic lupus erythematosus with other organ involvement (H)         CONTINUE these medications which have NOT CHANGED    Details   acetaminophen (TYLENOL) 500 MG tablet Take 1 tablet (500 mg) by mouth every 4 hours as needed for mild pain  Qty: 1 Bottle, Refills: 1    Associated Diagnoses: Other acute pancreatitis, unspecified complication status      Belimumab 200 MG/ML SOAJ Inject 200 mg Subcutaneous every 7 days  Qty: 4 mL, Refills: 11    Associated Diagnoses: Systemic lupus erythematosus with other organ involvement, unspecified SLE type (H)      calcium carbonate (TUMS) 500 MG chewable tablet  Take 1 tablet (500 mg) by mouth daily  Qty: 30 tablet, Refills: 0    Associated Diagnoses: Systemic lupus erythematosus with other organ involvement, unspecified SLE type (H)      ferrous sulfate (FEROSUL) 325 (65 Fe) MG tablet Take 1 tablet (325 mg) by mouth daily (with breakfast)  Qty: 100 tablet, Refills: 3    Associated Diagnoses: Systemic lupus erythematosus with other organ involvement, unspecified SLE type (H)      multivitamin, therapeutic (THERA-VIT) TABS tablet Take 1 tablet by mouth daily  Qty: 30 tablet, Refills: 11    Associated Diagnoses: Other systemic lupus erythematosus with other organ involvement (H)      sulfamethoxazole-trimethoprim (BACTRIM DS) 800-160 MG tablet Take 1 tablet by mouth Every Mon, Wed, Fri Morning  Qty: 18 tablet, Refills: 3    Associated Diagnoses: Immunosuppression (H)      !! vitamin D3 (CHOLECALCIFEROL) 1.25 MG (56461 UT) capsule Take 1 capsule (50,000 Units) by mouth every 7 days  Qty: 12 capsule, Refills: 0    Associated Diagnoses: Vitamin D deficiency; Exocrine pancreatic insufficiency      !! amylase-lipase-protease (CREON 24) 47525-22696 units CPEP per EC capsule Take 1 capsule by mouth Take with snacks or supplements (with snacks)  Qty: 112 capsule, Refills: 1    Associated Diagnoses: Pancreatic insufficiency      !! amylase-lipase-protease (CREON 24) 52002-35693 units CPEP per EC capsule Take 2 capsules by mouth 3 times daily (with meals)  Qty: 336 capsule, Refills: 1    Associated Diagnoses: Pancreatic insufficiency      mvw complete formulation (SOFTGELS) capsule Take 2 capsules by mouth daily  Qty: 180 capsule, Refills: 1    Comments: NDC: [address]-74  Associated Diagnoses: Exocrine pancreatic insufficiency      !! vitamin D3 (CHOLECALCIFEROL) 10 MCG (400 UNIT) capsule Take 1 capsule (400 Units) by mouth daily  Qty: 30 capsule, Refills: 3    Associated Diagnoses: Systemic lupus erythematosus with other organ involvement, unspecified SLE type (H); Systemic lupus  erythematosus, unspecified SLE type, unspecified organ involvement status (H)       !! - Potential duplicate medications found. Please discuss with provider.        Allergies   Allergies   Allergen Reactions     Rituximab Anaphylaxis       Attending Physician Attestation:    The patient was discharged from the hospitalist service at the [facility] with the final diagnosis of: Lupus flare.    I've examined Milly today and she is ready for discharge. I've reviewed the resident note and agree with it. Please do not hesitate to contact me directly with any questions.    I've spent 40min coordinating for Milly discharge.    Primo Mendieta MD    Pager: [phone]

## 2022-06-14 NOTE — PLAN OF CARE
Goal Outcome Evaluation:  Avss. No c/o pain, nausea or vomiting noted. Tolerated Steroid infusion. Discharge instructions reviewed with pt. She stated that she understood the plan and had no further questions. PIV removed. Discharge meds given to pt. Pt plans  to leave shortly when mom arrives to take her home.PT ready for discharge.

## 2022-06-14 NOTE — PROGRESS NOTES
Data:  Pt has been inpatient due to a Lupus flare, and Dr Delatorre requested that I visit the pt to briefly assess needs and barriers prior to discharge.  Pt will be transitioned to adult Rheumatology and Nephrology's service as of tomorrow.     Assessment:  SW met with pt briefly.  Pt denies any current mental health or safety needs, and does not need any resources.  Pt states that she continues to live with her older sister.  As noted in previous documentation, pt's 6 siblings and their spouses are her primary support system.  Pt does not work, and heavily relies on family for financial, social, and emotional support.      Pt had previously considered transitioning to [place] for adult Rheumatology care, but as of this admission, it appears that pt will be following with adult Rheumatology and Nephrology at the [facility].  Pt has a preset appointment with adult Rheumatology tomorrow, and as I was leaving the room, the hospital nurse coordinator was actively working with Milly on calling adult Nephrology to make arrangements.      Pt notes that she is able to access her pill medications via her local pharmacy, and endorses regular adherence.  However, the pt notes difficulty in receiving the mailed injections, and is hence having adherence difficulty with those medications.  Milly requests assistance in figuring this issue out.     Plan/Recommendations:  Pt is transitioning to adult care and may have her medications adjusted outpatient because of new symptomology.  Due to this, SW messaged the Nurse Care Coordinator that covers both adult Rheumatology and Nephrology (Barbara Ontiveros, FERNANDEZ), making her aware of these concerns, in order to provide continuity of care and encourage future adherence.      No other needs at this time.        Rosie Kate, KELYA/TYLER  Clinical /Lupus Mental Health Navigator   Pediatric Rheumatology     *no letter

## 2022-06-15 ENCOUNTER — PRE VISIT (OUTPATIENT)
Dept: RHEUMATOLOGY | Facility: CLINIC | Age: 22
End: 2022-06-15

## 2022-06-15 ENCOUNTER — LAB (OUTPATIENT)
Dept: LAB | Facility: CLINIC | Age: 22
End: 2022-06-15
Payer: COMMERCIAL

## 2022-06-15 ENCOUNTER — PATIENT OUTREACH (OUTPATIENT)
Dept: CARE COORDINATION | Facility: CLINIC | Age: 22
End: 2022-06-15

## 2022-06-15 ENCOUNTER — OFFICE VISIT (OUTPATIENT)
Dept: NEPHROLOGY | Facility: CLINIC | Age: 22
End: 2022-06-15
Attending: INTERNAL MEDICINE
Payer: COMMERCIAL

## 2022-06-15 ENCOUNTER — OFFICE VISIT (OUTPATIENT)
Dept: RHEUMATOLOGY | Facility: CLINIC | Age: 22
End: 2022-06-15
Attending: INTERNAL MEDICINE
Payer: COMMERCIAL

## 2022-06-15 VITALS
TEMPERATURE: 97.5 F | DIASTOLIC BLOOD PRESSURE: 69 MMHG | WEIGHT: 117.5 LBS | OXYGEN SATURATION: 100 % | BODY MASS INDEX: 20.82 KG/M2 | HEART RATE: 54 BPM | SYSTOLIC BLOOD PRESSURE: 113 MMHG

## 2022-06-15 DIAGNOSIS — M32.19 SYSTEMIC LUPUS ERYTHEMATOSUS WITH OTHER ORGAN INVOLVEMENT, UNSPECIFIED SLE TYPE (H): Primary | ICD-10-CM

## 2022-06-15 DIAGNOSIS — Z92.25 PERSONAL HISTORY OF IMMUNOSUPPRESSIVE THERAPY: ICD-10-CM

## 2022-06-15 DIAGNOSIS — M32.8 OTHER FORMS OF SYSTEMIC LUPUS ERYTHEMATOSUS, UNSPECIFIED ORGAN INVOLVEMENT STATUS (H): Primary | ICD-10-CM

## 2022-06-15 DIAGNOSIS — M32.19 SYSTEMIC LUPUS ERYTHEMATOSUS WITH OTHER ORGAN INVOLVEMENT, UNSPECIFIED SLE TYPE (H): ICD-10-CM

## 2022-06-15 DIAGNOSIS — Z71.89 OTHER SPECIFIED COUNSELING: ICD-10-CM

## 2022-06-15 LAB
ALBUMIN SERPL-MCNC: 2.2 G/DL (ref 3.4–5)
ALBUMIN UR-MCNC: NEGATIVE MG/DL
ALT SERPL W P-5'-P-CCNC: 86 U/L (ref 0–50)
APPEARANCE UR: ABNORMAL
AST SERPL W P-5'-P-CCNC: 136 U/L (ref 0–45)
BACTERIA #/AREA URNS HPF: ABNORMAL /HPF
BASOPHILS # BLD AUTO: 0 10E3/UL (ref 0–0.2)
BASOPHILS NFR BLD AUTO: 0 %
BILIRUB UR QL STRIP: NEGATIVE
COLOR UR AUTO: YELLOW
CREAT SERPL-MCNC: 0.64 MG/DL (ref 0.52–1.04)
CREAT UR-MCNC: 81 MG/DL
CRP SERPL-MCNC: 7.9 MG/L (ref 0–8)
EOSINOPHIL # BLD AUTO: 0 10E3/UL (ref 0–0.7)
EOSINOPHIL NFR BLD AUTO: 0 %
ERYTHROCYTE [DISTWIDTH] IN BLOOD BY AUTOMATED COUNT: 18.3 % (ref 10–15)
ERYTHROCYTE [SEDIMENTATION RATE] IN BLOOD BY WESTERGREN METHOD: 118 MM/HR (ref 0–20)
GFR SERPL CREATININE-BSD FRML MDRD: >90 ML/MIN/1.73M2
GLUCOSE UR STRIP-MCNC: NEGATIVE MG/DL
HBV CORE AB SERPL QL IA: NONREACTIVE
HBV SURFACE AG SERPL QL IA: NONREACTIVE
HCT VFR BLD AUTO: 25.6 % (ref 35–47)
HCV AB SERPL QL IA: NONREACTIVE
HGB BLD-MCNC: 7.6 G/DL (ref 11.7–15.7)
HGB UR QL STRIP: ABNORMAL
HIV 1+2 AB+HIV1 P24 AG SERPL QL IA: NONREACTIVE
IMM GRANULOCYTES # BLD: 0.1 10E3/UL
IMM GRANULOCYTES NFR BLD: 1 %
KETONES UR STRIP-MCNC: NEGATIVE MG/DL
LEUKOCYTE ESTERASE UR QL STRIP: ABNORMAL
LYMPHOCYTES # BLD AUTO: 0.7 10E3/UL (ref 0.8–5.3)
LYMPHOCYTES NFR BLD AUTO: 4 %
MCH RBC QN AUTO: 25 PG (ref 26.5–33)
MCHC RBC AUTO-ENTMCNC: 29.7 G/DL (ref 31.5–36.5)
MCV RBC AUTO: 84 FL (ref 78–100)
MONOCYTES # BLD AUTO: 0.9 10E3/UL (ref 0–1.3)
MONOCYTES NFR BLD AUTO: 6 %
MUCOUS THREADS #/AREA URNS LPF: PRESENT /LPF
NEUTROPHILS # BLD AUTO: 14.7 10E3/UL (ref 1.6–8.3)
NEUTROPHILS NFR BLD AUTO: 89 %
NITRATE UR QL: NEGATIVE
NRBC # BLD AUTO: 0 10E3/UL
NRBC BLD AUTO-RTO: 0 /100
PH UR STRIP: 6 [PH] (ref 5–7)
PLATELET # BLD AUTO: 142 10E3/UL (ref 150–450)
RBC # BLD AUTO: 3.04 10E6/UL (ref 3.8–5.2)
RBC URINE: 22 /HPF
SP GR UR STRIP: 1.01 (ref 1–1.03)
SQUAMOUS EPITHELIAL: 3 /HPF
UROBILINOGEN UR STRIP-MCNC: NORMAL MG/DL
WBC # BLD AUTO: 16.4 10E3/UL (ref 4–11)
WBC URINE: 11 /HPF

## 2022-06-15 PROCEDURE — 99000 SPECIMEN HANDLING OFFICE-LAB: CPT | Performed by: PATHOLOGY

## 2022-06-15 PROCEDURE — 99204 OFFICE O/P NEW MOD 45 MIN: CPT | Performed by: INTERNAL MEDICINE

## 2022-06-15 PROCEDURE — 81001 URINALYSIS AUTO W/SCOPE: CPT | Performed by: PATHOLOGY

## 2022-06-15 PROCEDURE — G0463 HOSPITAL OUTPT CLINIC VISIT: HCPCS

## 2022-06-15 PROCEDURE — 87340 HEPATITIS B SURFACE AG IA: CPT | Mod: 90 | Performed by: PATHOLOGY

## 2022-06-15 PROCEDURE — 84450 TRANSFERASE (AST) (SGOT): CPT | Performed by: PATHOLOGY

## 2022-06-15 PROCEDURE — 86803 HEPATITIS C AB TEST: CPT | Mod: 90 | Performed by: PATHOLOGY

## 2022-06-15 PROCEDURE — 86704 HEP B CORE ANTIBODY TOTAL: CPT | Mod: 90 | Performed by: PATHOLOGY

## 2022-06-15 PROCEDURE — 86140 C-REACTIVE PROTEIN: CPT | Performed by: PATHOLOGY

## 2022-06-15 PROCEDURE — 86160 COMPLEMENT ANTIGEN: CPT | Mod: 90 | Performed by: PATHOLOGY

## 2022-06-15 PROCEDURE — 84460 ALANINE AMINO (ALT) (SGPT): CPT | Performed by: PATHOLOGY

## 2022-06-15 PROCEDURE — 85652 RBC SED RATE AUTOMATED: CPT | Performed by: PATHOLOGY

## 2022-06-15 PROCEDURE — 82570 ASSAY OF URINE CREATININE: CPT | Performed by: PATHOLOGY

## 2022-06-15 PROCEDURE — 85025 COMPLETE CBC W/AUTO DIFF WBC: CPT | Performed by: PATHOLOGY

## 2022-06-15 PROCEDURE — 87086 URINE CULTURE/COLONY COUNT: CPT | Mod: 90 | Performed by: PATHOLOGY

## 2022-06-15 PROCEDURE — 82040 ASSAY OF SERUM ALBUMIN: CPT | Performed by: PATHOLOGY

## 2022-06-15 PROCEDURE — 86225 DNA ANTIBODY NATIVE: CPT | Mod: 90 | Performed by: PATHOLOGY

## 2022-06-15 PROCEDURE — 82565 ASSAY OF CREATININE: CPT | Performed by: PATHOLOGY

## 2022-06-15 PROCEDURE — 87389 HIV-1 AG W/HIV-1&-2 AB AG IA: CPT | Mod: 90 | Performed by: PATHOLOGY

## 2022-06-15 PROCEDURE — 36415 COLL VENOUS BLD VENIPUNCTURE: CPT | Performed by: PATHOLOGY

## 2022-06-15 PROCEDURE — 99215 OFFICE O/P EST HI 40 MIN: CPT | Mod: GC | Performed by: STUDENT IN AN ORGANIZED HEALTH CARE EDUCATION/TRAINING PROGRAM

## 2022-06-15 ASSESSMENT — PAIN SCALES - GENERAL: PAINLEVEL: NO PAIN (0)

## 2022-06-15 NOTE — TELEPHONE ENCOUNTER
NOTES Status Details   OFFICE NOTE from referring provider Internal 05.24.2022 Jonh Anders MD PhD   OFFICE NOTE from other specialist Internal  12.15.2021 Jonh Anders MD PhD     DISCHARGE SUMMARY from hospital Internal  06.11.2022 Cecilio Liu MD   DISCHARGE REPORT from the ER Care Everywhere 04.24.2022 [facility]    MEDICATION LIST Internal    LABS (Any and all labs)      Internal    Biopsy/pathology (Anything related to diagnoses I.e. fluid aspirations, lip biopsy, muscle biopsy)               Imaging (All imaging related to diagnoses)     Echo     HRCT     CXR     EMG                    Scleroderma/Dermatomyositis diagnoses     Previous Cardiology notes      Previous Pulmonary notes     Previous Dermatology notes     Previous GI notes     Lupus diagnoses     Previous Nephrology notes     Previous Dermatology notes     Previous Cardiology notes

## 2022-06-15 NOTE — PROGRESS NOTES
Clinic Care Coordination Contact    Background: Care Coordination referral placed from [facility] discharge report for reason of patient meeting criteria for a TCM outreach call by [facility] team.    Assessment: Upon chart review, [facility] Team member will cancel/close the referral for TCM outreach due to reason below:    Patient has a follow up appointment with an appropriate provider today for hospital discharge    Plan: Care Coordination referral for TCM outreach canceled.        CLARISSE Mendoza  [facility] Care Resource [facility]

## 2022-06-15 NOTE — LETTER
6/15/2022       RE: Milly Carroll  [address] [place] [id number]     Dear Colleague,    Thank you for referring your patient, Milly Carroll, to the [facility] NEPHROLOGY CLINIC [place] at [facility]. Please see a copy of my visit note below.      Nephrology Initial Consult  6/15/22    Milly Carroll MRN:[id number] YOB: 2000  Date of Admission:(Not on file)  Primary care provider: Estefany Bell  Requesting physician: April Purvis, *      REASON FOR CONSULT:       HISTORY OF PRESENT ILLNESS:    Milly was initially diagnosed with lupus when she was 7 years old. 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 She has had varying course of disease since her diagnosis. She has most recently being treated with Cellcept 1500 mg BID, Plaquenil 200 mg BID and prednisone 5 mg daily. She was on Benlysta infusion in 2019 that was later switched to Benlysta subcutaneous in 2021. She is not receiving Benlysta for last many months. She was treated with Rituximab since 2013 but she developed anaphylactic reaction during Rituximab infusion in 12/2019.    She has had persistent hematuria and proteinuria since 2021 however a lot of these samples were contaminated. She has had on and off documented proteinuria which is 0.3-0.7 g/g since 2013. She has always maintained her creatinine at baseline of 0.5-0.6 mg/dl however during most recent admission in 6/22, she had an elevation in her creatinine to 0.9 mg/dl. At the time, she was treated with IV solumedrol 1 gm x 3 and discharged home on prednisone 60 mg daily. Her cellcept was decreased down to 1000 mg BID during that admission for concerns of toxicity.   Today she denies any particular symptoms but continues to have  persistently elevated dsDNA and significantly low complements.     Patient denies any LE edema, exertional dyspnea/orthopnea/PND, dizziness, lightheadedness, nausea, vomiting or diarrhea.   Denies use of OTC NSAIDS or other non prescribed meds, recent exposure to abx or contrast, obstructive urinary symptoms, skin rashes, joint pains, fevers, passing kidney stones, recurrent sinus infections or UTI's       PAST MEDICAL HISTORY:  Reviewed with patient on 07/14/2022   As per HPI    MEDICATIONS:  Reviewed with the patient in detail    ALLERGIES:    Reviewed with the patient in detail    REVIEW OF SYSTEMS:  A comprehensive of systems was negative except as noted above.    SOCIAL HISTORY:   Reviewed with patient, no smoking and no alcohol use     FAMILY MEDICAL HISTORY:   Reviewed, no family history of need for dialysis, transplant or CKD    PHYSICAL EXAM:   Vital signs:There were no vitals taken for this visit.    Physical Exam       Gen: Appears well  Neck: No JVD  Lungs: CTA  CVS: S1S2 normal, no murmurs heard  LE: no edema  Skin: no rashes  CNS: Grossly normal       ASSESSMENT AND RECOMMENDATIONS:     # Hematuria and proteinuria   # SLE (+DsDNA, +RNP, +ribosomal P ab)  # h/o lupus cerebritis, seizures   # h/o rt knee osteonecrosis     Her hematuria and proteinuria along with elevated creatinine during most recent hospitalization is an indication of active LN. She has never had a kidney biopsy and was on Cellcept 1500 mg BID prior to this admission. She has now received pulse dose steroids. Her creatinine has since improved from 0.9 mg/dl --> 0.6 mg/dl. Her UPCR is most recently at 1.3 g/g.    She was seen by Rheum post hospital admission, today and her cellcept was increased again to 1500 mg BID and she is now on prednisone 60 mg daily with a taper planned per rheumatology.   Her blood pressures are currently at goal of < 140/90 mm of Hg and she is euvolemic on exam.   Given that she has never had a kidney bx, it  would be prudent to perform one to understand the extent of her nephritis which could aid in intensifying her treatment. Addition of B cell depleting agent would be reasonable if she has had persistently active nephritis despite being on Cellcept 1500 mg BID although the adherence is in question.     Will schedule a kidney bx and f/up after. She has agreed to be contacted by our  to be a part of clinic trial.     April Purvis MD

## 2022-06-15 NOTE — TELEPHONE ENCOUNTER
Pt is here in clinic today.  Discussed with Dr. Purvis who is on call, she is coming to clinic to see pt.  Pt is aware.    Barbara Otniveros RN  Rheumatology Clinic

## 2022-06-15 NOTE — LETTER
6/15/2022       RE: Milly Carroll  [address] [place] [id number]     Dear Colleague,    Thank you for referring your patient, Milly Carroll, to the [facility] RHEUMATOLOGY CLINIC [place] at [facility]. Please see a copy of my visit note below.    Rheumatology Clinic Initial Consult  06/15/22    Reason for visit:  SLE (+DsDNA, +RNP, +ribosomal P Ab and low complements)    HPI:  Milly Carroll is a 21 year old female with a history of of lupus, seizures, lupus cerebritis, and R knee osteonecrosis who is transition of care from [facility] rheumatology. 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 She later developed symptoms consistent with systemic lupus (Rash, photosensitivity, alopecia, Arthritis, cytopenias, hypocomplementemia and positive serology (+DsDNA, +RNP, +ribosomal P ab). She has had varying course of disease since her diagnosis. She has most recently being treated with Cellcept 1500 mg BID, Plaquenil 200 mg BID and prednisone 5 mg daily. She was on Benlysta infusion in 2019 that was later switched to Benlysta subcutaneous in 2021. She is not receiving Benlysta for last many month for unclear reasons. I couldn't find why it was stopped. She was treated with Rituximab since 2013 but she developed anaphylactic reaction during Rituximab infusion in 12/2019.     Patient was recently hospitalized for ongoing fevers x 2 weeks, fatigue and loss appetite. Her sx were thought to be related to lupus flares given pancytopenias, elevated Dsdna and hypocomplementemia. Blood and urine Cx came back negative. She was noted to have hematuria and proteinuria on her urine sediment that raised the concern of lupus nephritis.  She required IV solumedrol 1 gm x 3 and discharged home on prednisone 60 mg oral daily. Currently she is taking Cellcept 1000 mg BID and Plaquenil 200 mg daily. Cellcept was decreased to 1000 mg BID during  hospitalization to minimize the toxicity risk. She is Bactrim double strength x 3/week for PCP prophylaxis.     Today she is feeling better, denies pain in her joints, fatigue has improved. She denies any further episode of fever since discharge from hospital. She feels tired on exposure to sunlight. She denies recent hx of malar rash. She gets raynaud's on cold exposure. It usually gets better in 5-10 minutes. She usually get pain in her MCP joints of hands during the flare. She denies any hx of blood clots or pleurisy. She has patchy hair loss with some discoid lesions. She has ulcer scars on hard palate but no new active oral ulcers. She reports being compliant with her medications. She has no smoking or drinking history. Her older sister has hx of lupus. She is not working currently, just helps her sister in taking care of her nephew at home.       She has received 2 doses of COVID vaccine 10/8/2021 and 10/30/2021    Rest of ROS is negative except mentioned in HPI    Past Medical History  Past Medical History:   Diagnosis Date     Hepatitis      Lupus (systemic lupus erythematosus) (H)      Lupus cerebritis (H)      Pancreatitis 08/2017     Pyelonephritis 08/2017     Seizures (H)      Status epilepticus (H)      Past Surgical History:   Procedure Laterality Date     NO HISTORY OF SURGERY       ORTHOPEDIC SURGERY       Social History     Socioeconomic History     Marital status: Single     Spouse name: Not on file     Number of children: Not on file     Years of education: Not on file     Highest education level: Not on file   Occupational History     Not on file   Tobacco Use     Smoking status: Never Smoker     Smokeless tobacco: Never Used   Substance and Sexual Activity     Alcohol use: Not on file     Drug use: Not on file     Sexual activity: Not on file   Other Topics Concern     Not on file   Social History Narrative    6/20/2013     Milly is the 2nd youngest of 7 children.  She is in the 10th grade and  lives with her parents and siblings.  Her family is originally from [facility], but Milly was born in the [place].        06/2022: lives in [place] with older sister, brother-in-law, niece, and brother.         Social Determinants of Health     Financial Resource Strain: Not on file   Food Insecurity: Not on file   Transportation Needs: Not on file   Physical Activity: Not on file   Stress: Not on file   Social Connections: Not on file   Intimate Partner Violence: Not on file   Housing Stability: Not on file     Family History   Problem Relation Age of Onset     Other - See Comments Father         Question of lupus in father and paternal grandfather     Lupus Sister         older sister     Gastrointestinal Disease No family hx of         No known history of IBD, hepatitis, pancreatitis, celiac disease, or GI cancers before age 50     Glaucoma No family hx of      Macular Degeneration No family hx of        Medications:  Current Outpatient Medications   Medication     acetaminophen (TYLENOL) 500 MG tablet     Belimumab 200 MG/ML SOAJ     calcium carbonate (TUMS) 500 MG chewable tablet     famotidine (PEPCID) 10 MG tablet     ferrous sulfate (FEROSUL) 325 (65 Fe) MG tablet     hydroxychloroquine (PLAQUENIL) 200 MG tablet     multivitamin, therapeutic (THERA-VIT) TABS tablet     mycophenolate (GENERIC EQUIVALENT) 500 MG tablet     predniSONE (DELTASONE) 20 MG tablet     sulfamethoxazole-trimethoprim (BACTRIM DS) 800-160 MG tablet     vitamin D3 (CHOLECALCIFEROL) 1.25 MG (60255 UT) capsule     amylase-lipase-protease (CREON 24) 24431-41714 units CPEP per EC capsule     amylase-lipase-protease (CREON 24) 49307-51843 units CPEP per EC capsule     mvw complete formulation (SOFTGELS) capsule     vitamin D3 (CHOLECALCIFEROL) 10 MCG (400 UNIT) capsule     No current facility-administered medications for this visit.       Allergies:     Allergies   Allergen Reactions     Rituximab Anaphylaxis       /69   Pulse 54   Temp  97.5  F (36.4  C)   Wt 53.3 kg (117 lb 8 oz)   SpO2 100%   BMI 20.82 kg/m       Physical Exam:  Constitutional: well-developed, appearing stated age; cooperative,  Skin: Healing scalp lesions with patchy hair loss, no nail pitting, alopecia, rash, nodules or lesions, tiny healing ulcerations at finger tips, fingers with loss of muscle mass   Eyes: nl EOM, vision, conjunctiva, sclera,   Mouth: numerous small red macules on palate, no active ulceration, normal salivary pool  Neck: no mass, non-tender thyroid  Resp: lungs clear to auscultation, breathing comfortably on room air  CV: RRR, no murmurs, rubs or gallops, no edema  GI: soft, non-tender, non-distended  Lymph: no cervical, supraclavicular, or epitrochlear nodes  Neuro: nl cranial nerves, strength, sensation,   Psych: nl judgement, orientation, memory, affect.  MS: The TMJ, neck, shoulder, elbow, wrist, MCP/PIP/DIP, spine, hip, knee, ankle, and MTP/IP joints were examined and found normal. No active synovitis or altered joint anatomy. Full joint ROM. Normal  strength. No dactylitis,  tenosynovitis, enthesopathy.    Lab/Imaging: Reviewed recent labs and imaging.     Assessment/Plan:     #. Severe lupus (+DsDNA, +RNP, +ribosomal P Ab and hypocomplementemia)  #. Lupus cerebritis ?  #. R knee osteonecrosis  #. Seizures    Milly Carroll is a 21 year old female with long standing hx of lupus (+DsDNA, +RNP, +ribosomal P Ab and hypocomplementemia).Her disease has remained suboptimal control since her diagnosis. She had recent lupus flare given pancytopenias, markedly elevated Dsdna and hypocomplementemia found on work up during her hospitalization last week. Hematuria and proteinuria on urine sediment has raised the concern of lupus nephritis. She received Solumedrol 1 gm x 3 days and currently on Prednisone 60 mg daily. Given her serology and urine analysis, we agree with plan to consider renal biopsy to better assess the severity of her disease. If she is  found to have lupus nephritis class lll/IV then we may need to treat aggressively. At this time, We agree with plan to continue prednisone 60 mg oral daily until biopsy result is available. We recommend to increase her Cellcept to 1500 mg BID and continue Plaquenil 200 mg daily. I encourage her to continue Bactrim and Ca/Vit D while on high dose of steroids she will need follow up with ophthalmology to evaluate for plaquenil related retinal toxicity. She will follow up back in 3 months.     Plan:  Increase Cellcept 1500 mg BID  Continue Plaquenil 200 mg daily   Continue Prednisone 60 mg oral daily   Bactrim DS 3 times/week  Continue Ca and Vit D supplement   Follow up with nephrology to discuss renal biopsy   Labs every 3 months to monitor for disease activity and medication side effects   Repeat Hep B, Hep C, TB gold and HIV     Follow up: 3 months       The patient was seen and staffed with Dr. Peewee MD.       Jaden Meredith MD  Rheumatology Fellow  Pager [phone]    I saw this patient with the Rheumatology Fellow. I agree with the findings and recommendations. Patient has longstanding systemic lupus erythematosus with mucocutaneous, musculoskeletal, and neurologic involvement, now has findings suggestive of nephritis with proteinuria, and urinary sediment containing WBC and RBC, hypocomplementemia and rising anti-DNA titers. Agree with treatment for presumed nephritis with pulse methylprednisolone followed by prednisone 1 mg/kg/day while awaiting Nephrology input regarding timing of renal biopsy. If a proliferative renal lesion is present, long-term steroid sparing therapy (mycophenolate vs cyclophosphamide, or these medications given in series) will be warranted. Hydroxychloroquine should continue, and long-delayed retinal monitoring exam is recommended as soon as possible for patient.    I spent >50% of this 66 minute encounter in chart review, and in counseling and coordination of care regarding the  patient's complex medical problem of systemic lupus erythematosus on the day of the encounter.    Thee Vides MD  Staff RheumatologistJESSE

## 2022-06-15 NOTE — NURSING NOTE
Chief Complaint   Patient presents with     Consult     Lupus new pt.     Blood pressure 113/69, pulse 54, temperature 97.5  F (36.4  C), weight 53.3 kg (117 lb 8 oz), SpO2 100 %, not currently breastfeeding.    LELE GARCIA

## 2022-06-15 NOTE — PROGRESS NOTES
Rheumatology Clinic Initial Consult  06/15/22    Reason for visit:  SLE (+DsDNA, +RNP, +ribosomal P Ab and low complements)    HPI:  Milly Carroll is a 21 year old female with a history of of lupus, seizures, lupus cerebritis, and R knee osteonecrosis who is transition of care from [facility] rheumatology. 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 She later developed symptoms consistent with systemic lupus (Rash, photosensitivity, alopecia, Arthritis, cytopenias, hypocomplementemia and positive serology (+DsDNA, +RNP, +ribosomal P ab). She has had varying course of disease since her diagnosis. She has most recently being treated with Cellcept 1500 mg BID, Plaquenil 200 mg BID and prednisone 5 mg daily. She was on Benlysta infusion in 2019 that was later switched to Benlysta subcutaneous in 2021. She is not receiving Benlysta for last many month for unclear reasons. I couldn't find why it was stopped. She was treated with Rituximab since 2013 but she developed anaphylactic reaction during Rituximab infusion in 12/2019.     Patient was recently hospitalized for ongoing fevers x 2 weeks, fatigue and loss appetite. Her sx were thought to be related to lupus flares given pancytopenias, elevated Dsdna and hypocomplementemia. Blood and urine Cx came back negative. She was noted to have hematuria and proteinuria on her urine sediment that raised the concern of lupus nephritis.  She required IV solumedrol 1 gm x 3 and discharged home on prednisone 60 mg oral daily. Currently she is taking Cellcept 1000 mg BID and Plaquenil 200 mg daily. Cellcept was decreased to 1000 mg BID during hospitalization to minimize the toxicity risk. She is Bactrim double strength x 3/week for PCP prophylaxis.     Today she is feeling better, denies pain in her joints, fatigue has improved. She denies any further episode of fever since discharge from hospital. She feels tired on exposure to sunlight. She denies recent  hx of malar rash. She gets raynaud's on cold exposure. It usually gets better in 5-10 minutes. She usually get pain in her MCP joints of hands during the flare. She denies any hx of blood clots or pleurisy. She has patchy hair loss with some discoid lesions. She has ulcer scars on hard palate but no new active oral ulcers. She reports being compliant with her medications. She has no smoking or drinking history. Her older sister has hx of lupus. She is not working currently, just helps her sister in taking care of her nephew at home.       She has received 2 doses of COVID vaccine 10/8/2021 and 10/30/2021    Rest of ROS is negative except mentioned in HPI    Past Medical History  Past Medical History:   Diagnosis Date     Hepatitis      Lupus (systemic lupus erythematosus) (H)      Lupus cerebritis (H)      Pancreatitis 08/2017     Pyelonephritis 08/2017     Seizures (H)      Status epilepticus (H)      Past Surgical History:   Procedure Laterality Date     NO HISTORY OF SURGERY       ORTHOPEDIC SURGERY       Social History     Socioeconomic History     Marital status: Single     Spouse name: Not on file     Number of children: Not on file     Years of education: Not on file     Highest education level: Not on file   Occupational History     Not on file   Tobacco Use     Smoking status: Never Smoker     Smokeless tobacco: Never Used   Substance and Sexual Activity     Alcohol use: Not on file     Drug use: Not on file     Sexual activity: Not on file   Other Topics Concern     Not on file   Social History Narrative    6/20/2013     Milly is the 2nd youngest of 7 children.  She is in the 10th grade and lives with her parents and siblings.  Her family is originally from [place] [facility], but Milly was born in the .        06/2022: lives in [place] with older sister, brother-in-law, niece, and brother.         Social Determinants of Health     Financial Resource Strain: Not on file   Food Insecurity: Not on file    Transportation Needs: Not on file   Physical Activity: Not on file   Stress: Not on file   Social Connections: Not on file   Intimate Partner Violence: Not on file   Housing Stability: Not on file     Family History   Problem Relation Age of Onset     Other - See Comments Father         Question of lupus in father and paternal grandfather     Lupus Sister         older sister     Gastrointestinal Disease No family hx of         No known history of IBD, hepatitis, pancreatitis, celiac disease, or GI cancers before age 50     Glaucoma No family hx of      Macular Degeneration No family hx of        Medications:  Current Outpatient Medications   Medication     acetaminophen (TYLENOL) 500 MG tablet     Belimumab 200 MG/ML SOAJ     calcium carbonate (TUMS) 500 MG chewable tablet     famotidine (PEPCID) 10 MG tablet     ferrous sulfate (FEROSUL) 325 (65 Fe) MG tablet     hydroxychloroquine (PLAQUENIL) 200 MG tablet     multivitamin, therapeutic (THERA-VIT) TABS tablet     mycophenolate (GENERIC EQUIVALENT) 500 MG tablet     predniSONE (DELTASONE) 20 MG tablet     sulfamethoxazole-trimethoprim (BACTRIM DS) 800-160 MG tablet     vitamin D3 (CHOLECALCIFEROL) 1.25 MG (19564 UT) capsule     amylase-lipase-protease (CREON 24) 31168-14872 units CPEP per EC capsule     amylase-lipase-protease (CREON 24) 49349-17395 units CPEP per EC capsule     mvw complete formulation (SOFTGELS) capsule     vitamin D3 (CHOLECALCIFEROL) 10 MCG (400 UNIT) capsule     No current facility-administered medications for this visit.       Allergies:     Allergies   Allergen Reactions     Rituximab Anaphylaxis       /69   Pulse 54   Temp 97.5  F (36.4  C)   Wt 53.3 kg (117 lb 8 oz)   SpO2 100%   BMI 20.82 kg/m       Physical Exam:  Constitutional: well-developed, appearing stated age; cooperative,  Skin: Healing scalp lesions with patchy hair loss, no nail pitting, alopecia, rash, nodules or lesions, tiny healing ulcerations at finger tips,  fingers with loss of muscle mass   Eyes: nl EOM, vision, conjunctiva, sclera,   Mouth: numerous small red macules on palate, no active ulceration, normal salivary pool  Neck: no mass, non-tender thyroid  Resp: lungs clear to auscultation, breathing comfortably on room air  CV: RRR, no murmurs, rubs or gallops, no edema  GI: soft, non-tender, non-distended  Lymph: no cervical, supraclavicular, or epitrochlear nodes  Neuro: nl cranial nerves, strength, sensation,   Psych: nl judgement, orientation, memory, affect.  MS: The TMJ, neck, shoulder, elbow, wrist, MCP/PIP/DIP, spine, hip, knee, ankle, and MTP/IP joints were examined and found normal. No active synovitis or altered joint anatomy. Full joint ROM. Normal  strength. No dactylitis,  tenosynovitis, enthesopathy.    Lab/Imaging: Reviewed recent labs and imaging.     Assessment/Plan:     #. Severe lupus (+DsDNA, +RNP, +ribosomal P Ab and hypocomplementemia)  #. Lupus cerebritis ?  #. R knee osteonecrosis  #. Seizures    Milly Carroll is a 21 year old female with long standing hx of lupus (+DsDNA, +RNP, +ribosomal P Ab and hypocomplementemia).Her disease has remained suboptimal control since her diagnosis. She had recent lupus flare given pancytopenias, markedly elevated Dsdna and hypocomplementemia found on work up during her hospitalization last week. Hematuria and proteinuria on urine sediment has raised the concern of lupus nephritis. She received Solumedrol 1 gm x 3 days and currently on Prednisone 60 mg daily. Given her serology and urine analysis, we agree with plan to consider renal biopsy to better assess the severity of her disease. If she is found to have lupus nephritis class lll/IV then we may need to treat aggressively. At this time, We agree with plan to continue prednisone 60 mg oral daily until biopsy result is available. We recommend to increase her Cellcept to 1500 mg BID and continue Plaquenil 200 mg daily. I encourage her to continue Bactrim  and Ca/Vit D while on high dose of steroids she will need follow up with ophthalmology to evaluate for plaquenil related retinal toxicity. She will follow up back in 3 months.     Plan:  Increase Cellcept 1500 mg BID  Continue Plaquenil 200 mg daily   Continue Prednisone 60 mg oral daily   Bactrim DS 3 times/week  Continue Ca and Vit D supplement   Follow up with nephrology to discuss renal biopsy   Labs every 3 months to monitor for disease activity and medication side effects   Repeat Hep B, Hep C, TB gold and HIV     Follow up: 3 months       The patient was seen and staffed with Dr. Peewee MD.       Jaden Meredith MD  Rheumatology Fellow  Pager [phone]    I saw this patient with the Rheumatology Fellow. I agree with the findings and recommendations. Patient has longstanding systemic lupus erythematosus with mucocutaneous, musculoskeletal, and neurologic involvement, now has findings suggestive of nephritis with proteinuria, and urinary sediment containing WBC and RBC, hypocomplementemia and rising anti-DNA titers. Agree with treatment for presumed nephritis with pulse methylprednisolone followed by prednisone 1 mg/kg/day while awaiting Nephrology input regarding timing of renal biopsy. If a proliferative renal lesion is present, long-term steroid sparing therapy (mycophenolate vs cyclophosphamide, or these medications given in series) will be warranted. Hydroxychloroquine should continue, and long-delayed retinal monitoring exam is recommended as soon as possible for patient.    I spent >50% of this 66 minute encounter in chart review, and in counseling and coordination of care regarding the patient's complex medical problem of systemic lupus erythematosus on the day of the encounter.    Thee Vides MD  Staff Rheumatologist, [facility]

## 2022-06-16 DIAGNOSIS — R80.9 PROTEINURIA, UNSPECIFIED TYPE: Primary | ICD-10-CM

## 2022-06-16 DIAGNOSIS — Z11.52 ENCOUNTER FOR PREPROCEDURE SCREENING LABORATORY TESTING FOR COVID-19: ICD-10-CM

## 2022-06-16 DIAGNOSIS — Z01.812 ENCOUNTER FOR PREPROCEDURE SCREENING LABORATORY TESTING FOR COVID-19: ICD-10-CM

## 2022-06-16 DIAGNOSIS — M32.19 OTHER SYSTEMIC LUPUS ERYTHEMATOSUS WITH OTHER ORGAN INVOLVEMENT (H): ICD-10-CM

## 2022-06-16 LAB
BACTERIA BLD CULT: NO GROWTH
BACTERIA BLD CULT: NO GROWTH
C3 SERPL-MCNC: 26 MG/DL (ref 81–157)
C4 SERPL-MCNC: 4 MG/DL (ref 13–39)
DSDNA AB SER-ACNC: 637 IU/ML

## 2022-06-17 LAB — BACTERIA UR CULT: NO GROWTH

## 2022-06-20 ENCOUNTER — TELEPHONE (OUTPATIENT)
Dept: RHEUMATOLOGY | Facility: CLINIC | Age: 22
End: 2022-06-20

## 2022-06-20 DIAGNOSIS — Z92.25 PERSONAL HISTORY OF IMMUNOSUPPRESSIVE THERAPY: Primary | ICD-10-CM

## 2022-06-20 NOTE — TELEPHONE ENCOUNTER
Received Smart Balloon message from patient regarding new onset left ankle pain and swelling. I reached out patient on phone. She has developed acute onset of left ankle pain associated with swelling and redness. She is unable to bear the weight on left foot. She has intermittent chest pain on lying on her left side. She denies fever, chills or any other significant sx.   Recent labs showed leukocytosis with neutrophilic predominance, and elevated AST and ALT  DsDNA level is trending down and there is slight improvement in C3.   Given fairly acute pain, redness and swelling in her left ankle over the last few days, I am concerned about possibility of septic arthritis in setting of immunosuppression. I recommended her to visit to ER where her joint can be tapped for synovial analysis. I would suggest to send for gram stain, culture, cell count and crystal. I would be reasonable to get imaging of her joint to rule out other possibilities at the same time.    I think transaminitis is likely related to Cellcept, I have suggested her to come down to 1000 mg BID and plan to repeat AST/ALT within 2 weeks.    I encouraged her to update me with the  hospital name so that I can communicate with ER physician directly. However, she depends on her brother in law to take her to ER so she is not sure which nearby hospital they would be able to go.    Jaden Meredith,  rheumatology fellow

## 2022-06-23 ENCOUNTER — TELEPHONE (OUTPATIENT)
Dept: RHEUMATOLOGY | Facility: CLINIC | Age: 22
End: 2022-06-23

## 2022-06-23 NOTE — TELEPHONE ENCOUNTER
Health Call Center    Phone Message    May a detailed message be left on voicemail: yes     Reason for Call: Other: The RN Care coordinator from [facility] is calling to set up a visit for the pt with Dr Meredith as pt was just discharged from the hospital. Pt does have an appointment on 9/7. Does Dr Meredith want to see pt sooner?     Action Taken: Message routed to:  [facility]    Travel Screening: Not Applicable

## 2022-06-28 ENCOUNTER — APPOINTMENT (OUTPATIENT)
Dept: MEDSURG UNIT | Facility: CLINIC | Age: 22
End: 2022-06-28
Attending: INTERNAL MEDICINE
Payer: COMMERCIAL

## 2022-06-28 ENCOUNTER — APPOINTMENT (OUTPATIENT)
Dept: INTERVENTIONAL RADIOLOGY/VASCULAR | Facility: CLINIC | Age: 22
End: 2022-06-28
Attending: INTERNAL MEDICINE
Payer: COMMERCIAL

## 2022-06-28 ENCOUNTER — HOSPITAL ENCOUNTER (OUTPATIENT)
Facility: CLINIC | Age: 22
Discharge: HOME | End: 2022-06-28
Attending: INTERNAL MEDICINE | Admitting: PHYSICIAN ASSISTANT
Payer: COMMERCIAL

## 2022-06-28 VITALS
DIASTOLIC BLOOD PRESSURE: 79 MMHG | SYSTOLIC BLOOD PRESSURE: 115 MMHG | TEMPERATURE: 98.2 F | RESPIRATION RATE: 18 BRPM | OXYGEN SATURATION: 100 % | HEIGHT: 62 IN | HEART RATE: 77 BPM | BODY MASS INDEX: 22.08 KG/M2 | WEIGHT: 120 LBS

## 2022-06-28 DIAGNOSIS — M32.19 OTHER SYSTEMIC LUPUS ERYTHEMATOSUS WITH OTHER ORGAN INVOLVEMENT (H): ICD-10-CM

## 2022-06-28 DIAGNOSIS — R80.9 PROTEINURIA, UNSPECIFIED TYPE: ICD-10-CM

## 2022-06-28 LAB
CREAT SERPL-MCNC: 0.56 MG/DL (ref 0.51–0.95)
GFR SERPL CREATININE-BSD FRML MDRD: >90 ML/MIN/1.73M2
INR PPP: 1.02 (ref 0.85–1.15)
PLATELET # BLD AUTO: 465 10E3/UL (ref 150–450)

## 2022-06-28 PROCEDURE — 88346 IMFLUOR 1ST 1ANTB STAIN PX: CPT | Performed by: INTERNAL MEDICINE

## 2022-06-28 PROCEDURE — 258N000003 HC RX IP 258 OP 636: Performed by: NURSE PRACTITIONER

## 2022-06-28 PROCEDURE — 76942 ECHO GUIDE FOR BIOPSY: CPT | Mod: 26 | Performed by: PHYSICIAN ASSISTANT

## 2022-06-28 PROCEDURE — 85610 PROTHROMBIN TIME: CPT | Performed by: NURSE PRACTITIONER

## 2022-06-28 PROCEDURE — 999N000248 HC STATISTIC IV INSERT WITH US BY RN

## 2022-06-28 PROCEDURE — 999N000142 HC STATISTIC PROCEDURE PREP ONLY

## 2022-06-28 PROCEDURE — 99153 MOD SED SAME PHYS/QHP EA: CPT

## 2022-06-28 PROCEDURE — 250N000011 HC RX IP 250 OP 636: Performed by: STUDENT IN AN ORGANIZED HEALTH CARE EDUCATION/TRAINING PROGRAM

## 2022-06-28 PROCEDURE — 88348 ELECTRON MICROSCOPY DX: CPT | Performed by: INTERNAL MEDICINE

## 2022-06-28 PROCEDURE — 50200 RENAL BIOPSY PERQ: CPT | Mod: LT | Performed by: PHYSICIAN ASSISTANT

## 2022-06-28 PROCEDURE — 250N000009 HC RX 250: Performed by: STUDENT IN AN ORGANIZED HEALTH CARE EDUCATION/TRAINING PROGRAM

## 2022-06-28 PROCEDURE — 36415 COLL VENOUS BLD VENIPUNCTURE: CPT | Performed by: NURSE PRACTITIONER

## 2022-06-28 PROCEDURE — 999N000285 HC STATISTIC VASC ACCESS LAB DRAW WITH PIV START

## 2022-06-28 PROCEDURE — 999N000134 HC STATISTIC PP CARE STAGE 3

## 2022-06-28 PROCEDURE — 88313 SPECIAL STAINS GROUP 2: CPT | Performed by: INTERNAL MEDICINE

## 2022-06-28 PROCEDURE — 99152 MOD SED SAME PHYS/QHP 5/>YRS: CPT | Performed by: PHYSICIAN ASSISTANT

## 2022-06-28 PROCEDURE — 84999 UNLISTED CHEMISTRY PROCEDURE: CPT | Performed by: INTERNAL MEDICINE

## 2022-06-28 PROCEDURE — 88350 IMFLUOR EA ADDL 1ANTB STN PX: CPT | Performed by: INTERNAL MEDICINE

## 2022-06-28 PROCEDURE — 99152 MOD SED SAME PHYS/QHP 5/>YRS: CPT

## 2022-06-28 PROCEDURE — 85049 AUTOMATED PLATELET COUNT: CPT | Performed by: NURSE PRACTITIONER

## 2022-06-28 PROCEDURE — 88305 TISSUE EXAM BY PATHOLOGIST: CPT | Performed by: INTERNAL MEDICINE

## 2022-06-28 PROCEDURE — 272N000505 IR RENAL BIOPSY LEFT

## 2022-06-28 PROCEDURE — 82565 ASSAY OF CREATININE: CPT | Performed by: NURSE PRACTITIONER

## 2022-06-28 RX ORDER — FLUMAZENIL 0.1 MG/ML
0.2 INJECTION, SOLUTION INTRAVENOUS
Status: DISCONTINUED | OUTPATIENT
Start: 2022-06-28 | End: 2022-06-28 | Stop reason: HOSPADM

## 2022-06-28 RX ORDER — NALOXONE HYDROCHLORIDE 0.4 MG/ML
0.2 INJECTION, SOLUTION INTRAMUSCULAR; INTRAVENOUS; SUBCUTANEOUS
Status: DISCONTINUED | OUTPATIENT
Start: 2022-06-28 | End: 2022-06-28 | Stop reason: HOSPADM

## 2022-06-28 RX ORDER — FENTANYL CITRATE 50 UG/ML
25-50 INJECTION, SOLUTION INTRAMUSCULAR; INTRAVENOUS EVERY 5 MIN PRN
Status: DISCONTINUED | OUTPATIENT
Start: 2022-06-28 | End: 2022-06-28 | Stop reason: HOSPADM

## 2022-06-28 RX ORDER — NALOXONE HYDROCHLORIDE 0.4 MG/ML
0.4 INJECTION, SOLUTION INTRAMUSCULAR; INTRAVENOUS; SUBCUTANEOUS
Status: DISCONTINUED | OUTPATIENT
Start: 2022-06-28 | End: 2022-06-28 | Stop reason: HOSPADM

## 2022-06-28 RX ORDER — LIDOCAINE 40 MG/G
CREAM TOPICAL
Status: DISCONTINUED | OUTPATIENT
Start: 2022-06-28 | End: 2022-06-28 | Stop reason: HOSPADM

## 2022-06-28 RX ORDER — SODIUM CHLORIDE 9 MG/ML
INJECTION, SOLUTION INTRAVENOUS CONTINUOUS
Status: DISCONTINUED | OUTPATIENT
Start: 2022-06-28 | End: 2022-06-28 | Stop reason: HOSPADM

## 2022-06-28 RX ADMIN — LIDOCAINE HYDROCHLORIDE 8 ML: 10 INJECTION, SOLUTION EPIDURAL; INFILTRATION; INTRACAUDAL; PERINEURAL at 10:16

## 2022-06-28 RX ADMIN — MIDAZOLAM HYDROCHLORIDE 0.5 MG: 1 INJECTION, SOLUTION INTRAMUSCULAR; INTRAVENOUS at 09:54

## 2022-06-28 RX ADMIN — SODIUM CHLORIDE: 9 INJECTION, SOLUTION INTRAVENOUS at 08:48

## 2022-06-28 RX ADMIN — FENTANYL CITRATE 25 MCG: 50 INJECTION, SOLUTION INTRAMUSCULAR; INTRAVENOUS at 09:54

## 2022-06-28 NOTE — PROCEDURES
[facility]    Procedure: IR Procedure Note    Date/Time: 6/28/2022 10:18 AM  Performed by: Ernie Warren PA-C  Authorized by: Ernie Warren PA-C       UNIVERSAL PROTOCOL   Site Marked: NA  Prior Images Obtained and Reviewed:  Yes  Required items: Required blood products, implants, devices and special equipment available    Patient identity confirmed:  Verbally with patient, arm band, provided demographic data and hospital-assigned identification number  Patient was reevaluated immediately before administering moderate or deep sedation or anesthesia  Confirmation Checklist:  Patient's identity using two indicators, relevant allergies, procedure was appropriate and matched the consent or emergent situation and correct equipment/implants were available  Time out: Immediately prior to the procedure a time out was called    Universal Protocol: the Joint Commission Universal Protocol was followed    Preparation: Patient was prepped and draped in usual sterile fashion       ANESTHESIA    Anesthesia: Local infiltration  Local Anesthetic:  Lidocaine 1% without epinephrine      SEDATION  Patient Sedated: Yes    Vital signs: Vital signs monitored during sedation    See dictated procedure note for full details.  Findings: Sedation medications administered: Fentanyl 25mcg and Versed 0.5mg   Sedation time: 25 minutes     Specimens: core needle biopsy specimens sent for pathological analysis    Complications: None    Condition: Stable      PROCEDURE  Describe Procedure: Milly Carroll  [id number]    Completed ultrasound guided native LEFT kidney biopsy.  A total of five passes yielded tissue cores collected for further pathological evaluation.  Microscopy support present.  No immediate complications.  Dx: proteinuria.  Briana.    Sedation medications administered: Fentanyl 25mcg and Versed 0.5mg   Sedation time: 25 minutes   Patient Tolerance:  Patient  tolerated the procedure well with no immediate complications  Length of time physician/provider present for 1:1 monitoring during sedation: 25

## 2022-06-28 NOTE — PROGRESS NOTES
Pt ended bed rest.  Ambulated. Tolerated diet and urinated. Kidney bx site cdi with old bloody drainage.  Reviewed discharge instruction with pt. Pt verbalized understanding.  Awaiting ride to drive pt to home.

## 2022-06-28 NOTE — PROGRESS NOTES
Patient Name: Milly Carroll  Medical Record Number: [id number]  Today's Date: 6/28/2022    Procedure: Left Renal Biopsy  Proceduralist: RUBINA Warren  Pathology present: Yes, 4 cores obtained. Collected by Pathology and sent to lab.     Procedure Start: 0955  Procedure end: 1017  Sedation medications administered: Fentanyl 25mcg and Versed 0.5mg     Report given to:  RN  : NA    Other Notes: Pt arrived to IR room 06 from . Consent reviewed. Pt denies any questions or concerns regarding procedure. Pt positioned Prone and monitored per protocol. Pt tolerated procedure without any noted complications. Pt transferred back to .    Marcia Brambila RN

## 2022-06-28 NOTE — PRE-PROCEDURE
GENERAL PRE-PROCEDURE:   Procedure:  Image guided renal biopsy    Verbal consent obtained?: Yes    Written consent obtained?: Yes    Risks and benefits: Risks, benefits and alternatives were discussed    Consent given by:  Patient  Patient states understanding of procedure being performed: Yes    Patient's understanding of procedure matches consent: Yes    Procedure consent matches procedure scheduled: Yes    Expected level of sedation:  Moderate  Appropriately NPO:  Yes  ASA Class:  1  Mallampati  :  Grade 2- soft palate, base of uvula, tonsillar pillars, and portion of posterior pharyngeal wall visible  Lungs:  Lungs clear with good breath sounds bilaterally  Heart:  Normal heart sounds and rate  History & Physical reviewed:  History and physical reviewed and no updates needed  Statement of review:  I have reviewed the lab findings, diagnostic data, medications, and the plan for sedation

## 2022-06-28 NOTE — PROGRESS NOTES
Pt is here for renal bipsy.  Consent signed. IV placed per vascular access and IVF infusing. All lab values are resulted (ulen=285, Creat=0.56, INR=1.02). SB (HR=47-50's)/Dr. Childs was notified.

## 2022-06-28 NOTE — DISCHARGE INSTRUCTIONS
[facility]    Interventional Radiology  Patient Instructions Following Kidney Biopsy    AFTER YOU GO HOME  If you were given sedation DO NOT drive or operate machinery at home or at work for at least 24 hours  DO relax and take it easy for 48 hours, no strenuous activity for 24 hours  DO drink plenty of fluids  DO resume your regular diet, unless otherwise instructed by your Primary Physician  Keep the dressing dry and in place for 24 hours.  DO NOT SMOKE FOR AT LEAST 24 HOURS, if you have been given any medications that were to help you relax or sedate you during your procedure  DO NOT drink alcoholic beverages the day of your procedure  DO NOT do any strenuous exercise or lifting (> 10 lbs) for at least 7 days following your procedure  DO NOT take a bath or shower for at least 12 hours following your procedure  Remove dressing after shower the next day. Replace with Band aid for 2 days.  Never leave a wet dressing in place.  DO NOT make any important or legal decisions for 24 hours following your procedure  There should be minimum drainage from the biopsy site    CALL THE PHYSICIAN IF:  You start bleeding from the procedure site.  If you do start to bleed from that site, lie down flat and hold pressure on the site for a minimum of 10 minutes.  Your physician will tell you if you need to return to the hospital  You develop nausea or vomiting  You have excessive swelling, redness, or tenderness at the site  You have drainage that looks like it is infected.  You experience severe pain  You develop hives or a rash or unexplained itching  You develop shortness of breath  You develop a temperature of 101 degrees F or greater  You develop bloody clots or red urine after you are discharged      ADDITIONAL INSTRUCTIONS  If you are taking Coumadin, restart tonight.  Follow up with your Coumadin Clinic or Primary Care MD to have your INR rechecked.    [facility] INTERVENTIONAL RADIOLOGY DEPARTMENT  Procedure  Physician: DAVID Chand                                 Date of procedure: June 28, 2022  Telephone Numbers: [phone] Monday-Friday 8:00 am to 4:30 pm  [phone] After 4:30 pm Monday-Friday, Weekends & Holidays.   Ask for the Interventional Radiologist on call.  Someone is on call 24 hrs/day  [facility] toll free number: [phone] Monday-Friday 8:00 am to 4:30 pm  [facility] Emergency Dept: [phone]

## 2022-06-28 NOTE — PROVIDER NOTIFICATION
Spoke to Jaylene ELAM, regarding needing HCG test pre procedure.    Per Jaylene pt's does not need HCG test for US guided kidney biopsy.

## 2022-06-28 NOTE — PROGRESS NOTES
Pt received back from IR.  S/p L kirstie bx.  Up on arrival to 2A: pt Alox4, pain free,and aVSS.  L flank site/bx site covered with drsg.  It has a scant bloody drainage (marked)  noted with out hematoma.  On bed rest/Left side for an 1 hen bedrest x1 with BRP.

## 2022-06-30 LAB — MAYO MISC RESULT: NORMAL

## 2022-07-05 LAB
PATH REPORT.COMMENTS IMP SPEC: NORMAL
PATH REPORT.COMMENTS IMP SPEC: NORMAL
PATH REPORT.FINAL DX SPEC: NORMAL
PATH REPORT.RELEVANT HX SPEC: NORMAL
PHOTO IMAGE: NORMAL

## 2022-07-07 ENCOUNTER — TELEPHONE (OUTPATIENT)
Dept: RHEUMATOLOGY | Facility: CLINIC | Age: 22
End: 2022-07-07

## 2022-07-08 NOTE — TELEPHONE ENCOUNTER
Left message requesting pt return call, also sent my chart message asking pt return call to discuss appt for 7/13 with Dr. Meredith.    Barbara Ontiveros RN  Rheumatology Clinic

## 2022-07-11 NOTE — TELEPHONE ENCOUNTER
Left message requesting pt call me back by tomorrow to discuss an appt with Dr. Meredith.    Barbara Ontiveros RN  Rheumatology Clinic

## 2022-07-14 NOTE — PROGRESS NOTES
Nephrology Initial Consult  6/15/22    Milly Carroll MRN:[id number] YOB: 2000  Date of Admission:(Not on file)  Primary care provider: Estefany Bell  Requesting physician: April Purvis, *      REASON FOR CONSULT:       HISTORY OF PRESENT ILLNESS:    Milly was initially diagnosed with lupus when she was 7 years old. 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 She has had varying course of disease since her diagnosis. She has most recently being treated with Cellcept 1500 mg BID, Plaquenil 200 mg BID and prednisone 5 mg daily. She was on Benlysta infusion in 2019 that was later switched to Benlysta subcutaneous in 2021. She is not receiving Benlysta for last many months. She was treated with Rituximab since 2013 but she developed anaphylactic reaction during Rituximab infusion in 12/2019.    She has had persistent hematuria and proteinuria since 2021 however a lot of these samples were contaminated. She has had on and off documented proteinuria which is 0.3-0.7 g/g since 2013. She has always maintained her creatinine at baseline of 0.5-0.6 mg/dl however during most recent admission in 6/22, she had an elevation in her creatinine to 0.9 mg/dl. At the time, she was treated with IV solumedrol 1 gm x 3 and discharged home on prednisone 60 mg daily. Her cellcept was decreased down to 1000 mg BID during that admission for concerns of toxicity.   Today she denies any particular symptoms but continues to have persistently elevated dsDNA and significantly low complements.     Patient denies any LE edema, exertional dyspnea/orthopnea/PND, dizziness, lightheadedness, nausea, vomiting or diarrhea.   Denies use of OTC NSAIDS or other non prescribed meds, recent exposure to abx or contrast, obstructive urinary  symptoms, skin rashes, joint pains, fevers, passing kidney stones, recurrent sinus infections or UTI's       PAST MEDICAL HISTORY:  Reviewed with patient on 07/14/2022   As per HPI    MEDICATIONS:  Reviewed with the patient in detail    ALLERGIES:    Reviewed with the patient in detail    REVIEW OF SYSTEMS:  A comprehensive of systems was negative except as noted above.    SOCIAL HISTORY:   Reviewed with patient, no smoking and no alcohol use     FAMILY MEDICAL HISTORY:   Reviewed, no family history of need for dialysis, transplant or CKD    PHYSICAL EXAM:   Vital signs:There were no vitals taken for this visit.    Physical Exam       Gen: Appears well  Neck: No JVD  Lungs: CTA  CVS: S1S2 normal, no murmurs heard  LE: no edema  Skin: no rashes  CNS: Grossly normal       ASSESSMENT AND RECOMMENDATIONS:     # Hematuria and proteinuria   # SLE (+DsDNA, +RNP, +ribosomal P ab)  # h/o lupus cerebritis, seizures   # h/o rt knee osteonecrosis     Her hematuria and proteinuria along with elevated creatinine during most recent hospitalization is an indication of active LN. She has never had a kidney biopsy and was on Cellcept 1500 mg BID prior to this admission. She has now received pulse dose steroids. Her creatinine has since improved from 0.9 mg/dl --> 0.6 mg/dl. Her UPCR is most recently at 1.3 g/g.    She was seen by Rheum post hospital admission, today and her cellcept was increased again to 1500 mg BID and she is now on prednisone 60 mg daily with a taper planned per rheumatology.   Her blood pressures are currently at goal of < 140/90 mm of Hg and she is euvolemic on exam.   Given that she has never had a kidney bx, it would be prudent to perform one to understand the extent of her nephritis which could aid in intensifying her treatment. Addition of B cell depleting agent would be reasonable if she has had persistently active nephritis despite being on Cellcept 1500 mg BID although the adherence is in question.      Will schedule a kidney bx and f/up after. She has agreed to be contacted by our  to be a part of clinic trial.     April Purvis MD

## 2022-08-03 NOTE — TELEPHONE ENCOUNTER
Left message requesting return call.    Have left 4 phone messages and sent a my chart message asking her to call me.  Will close encounter for now.    Barbara Ontiveros RN  Rheumatology Clinic

## 2022-09-04 ENCOUNTER — MYC MEDICAL ADVICE (OUTPATIENT)
Dept: RHEUMATOLOGY | Facility: CLINIC | Age: 22
End: 2022-09-04

## 2022-09-07 ENCOUNTER — VIRTUAL VISIT (OUTPATIENT)
Dept: RHEUMATOLOGY | Facility: CLINIC | Age: 22
End: 2022-09-07
Attending: INTERNAL MEDICINE
Payer: COMMERCIAL

## 2022-09-07 DIAGNOSIS — D84.9 IMMUNOSUPPRESSION (H): ICD-10-CM

## 2022-09-07 DIAGNOSIS — M32.19 OTHER SYSTEMIC LUPUS ERYTHEMATOSUS WITH OTHER ORGAN INVOLVEMENT (H): ICD-10-CM

## 2022-09-07 PROCEDURE — 99214 OFFICE O/P EST MOD 30 MIN: CPT | Mod: 95 | Performed by: STUDENT IN AN ORGANIZED HEALTH CARE EDUCATION/TRAINING PROGRAM

## 2022-09-07 PROCEDURE — G0463 HOSPITAL OUTPT CLINIC VISIT: HCPCS | Mod: PN,RTG | Performed by: STUDENT IN AN ORGANIZED HEALTH CARE EDUCATION/TRAINING PROGRAM

## 2022-09-07 RX ORDER — SULFAMETHOXAZOLE/TRIMETHOPRIM 800-160 MG
1 TABLET ORAL
Qty: 18 TABLET | Refills: 3 | Status: ON HOLD | OUTPATIENT
Start: 2022-09-07 | End: 2022-12-16

## 2022-09-07 RX ORDER — PREDNISONE 10 MG/1
TABLET ORAL
Qty: 260 TABLET | Refills: 1 | Status: ON HOLD | OUTPATIENT
Start: 2022-09-07 | End: 2022-12-16

## 2022-09-07 RX ORDER — MYCOPHENOLATE MOFETIL 500 MG/1
1500 TABLET ORAL 2 TIMES DAILY
Qty: 84 TABLET | Refills: 0 | Status: ON HOLD | OUTPATIENT
Start: 2022-09-07 | End: 2022-12-16

## 2022-09-07 RX ORDER — HYDROXYCHLOROQUINE SULFATE 200 MG/1
200 TABLET, FILM COATED ORAL DAILY
Qty: 90 TABLET | Refills: 0 | Status: SHIPPED | OUTPATIENT
Start: 2022-09-07 | End: 2022-12-06

## 2022-09-07 NOTE — PATIENT INSTRUCTIONS
Prednisone taper by 10 mg every 2 weeks then stay on prednisone 20 mg once daily until follow up  Continue Cellcept 1000 mg twice daily- plan to increase at 1500 mg BID if liver enzymes have improved  Continue plaquenil 200 mg once daily  Continue Bactrim DS for prophylaxis 3 times/week  Labs today   Follow up with nephrology and ophthalmology

## 2022-09-07 NOTE — TELEPHONE ENCOUNTER
Call out to reach patient at [phone], using  services #[id number].  Call placed, no answer.  Voice Mail was not available.  MyC message sent requesting confirmation of pt ability to keep the 10 am appt w/ Dr. Meredith, if not in person then virtual for this time and in person next time.    Also, to see if patient would be open to exploring additional transportation support options with referral to clinic .    Oralia Johnson RN  Rheumatology Clinic

## 2022-09-07 NOTE — LETTER
"9/7/2022       RE: Milly Carroll  [address] [address]     Dear Colleague,    Thank you for referring your patient, Milly Carroll, to the [facility] RHEUMATOLOGY CLINIC [place] at [facility]. Please see a copy of my visit note below.      22 year old Milly Carroll who is being evaluated via a billable video visit.    How would you like to obtain your AVS? MyChart  Will anyone else be joining your video visit? No        Video-Visit Details    Video Start Time: 10:15 AM    Type of service:  Video Visit    Video End Time:11:59 AM    Originating Location (pt. Location): Home    Distant Location (provider location):  [facility] RHEUMATOLOGY CLINIC [place]     Platform used for Video Visit: {Virtual Visit Platforms:[id number]::\"Discretix\"    Rheumatology Clinic    Visit date: 9/7/2022  Last visit: 06/15/22    Reason for visit:  SLE (+DsDNA, +RNP, +ribosomal P Ab and low complements)    Assessment/Plan:     #. Severe lupus (+DsDNA, +RNP, +ribosomal P Ab and hypocomplementemia)  #. Lupus cerebritis ?  #. R knee osteonecrosis  #. Seizures    Milly Carroll is a 22 year old female with long standing hx of lupus (+DsDNA, +RNP, +ribosomal P Ab and hypocomplementemia). Her disease overall has remained suboptimally controlled due to compliance with her care most probably.  She was found to have lupus flare with proteinuria during her last hospitalization in 06/2022.  Significant proteinuria and hematuria on urine sediment raised the concern of lupus nephritis. She underwent renal biopsy (6/28/222) which showed focal proliferative lupus nephritis, ISN/RPS class III with minimal activity and no significant chronicity. She received Solumedrol 1 gm x 3 days and currently on Prednisone 60 mg daily. Cellcept was decreased form 1500 mg BID  To 1gm BID due to elevation in liver enzymes during her previous visit. Unfortunately, she hasn't followed up closely with " rheumatology or nephrology. I don't have her recent lab work up available. I think it would be reasonable to taper prednisone as outlined below. I will repeat lab work up today - If transaminitis has resolved then Cellcept can be increased back to 1500 mg BID. She will continue plaquenil 200 mg daily, Bactrim DS for PJP prophylaxis, Ca and vitamin D supplements while on high dose steroids. I have encouraged her to establish care with Nephrology for management of lupus nephritis. She will need follow up with ophthalmology for HCQ related screening. We discussed risks and benefits of medications- agrees with our current plan.       Plan:  Increase Cellcept 1000 mg  mg BID - plan to increase to 1500 mg BID once transaminitis resolves  Prednisone taper by 10 mg every 2 weeks then stay on prednisone 20 mg once daily until follow up  Continue Plaquenil 200 mg daily   Bactrim DS 3 times/week for prophylaxis   Continue Ca  and Vit D supplement   Follow up with nephrology   Follow up with ophthalmology   Labs today and every 3 months to monitor for disease activity and medication side effects     Follow up: 3 months     The patient was seen and staffed with Dr. Peewee MD.     Jaden Meredith MD  Rheumatology Fellow  Pager [phone]    I saw this patient with the Rheumatology Fellow. I agree with the findings and recommendations.    Thee Vides M.D.  Staff Rheumatologist, [facility]  Pager [phone]        Interim history: 9/7/2022  Patient reports feeling well. She couldn't show up in person on follow today due to transportation issues.  Apparently, she has missed previous appointments and lab visit for similar reasons. She denies any joint pain, stiffness or skin rash. Fatigue has improved. No fever, chills or weight changes. She is tolerating medications well.  She has gained some weight. She hasn't seen Nephrology after her renal biopsy in 06/2022.      HPI:  Milly Carroll is a 21 year old female with a history of  of lupus, seizures, lupus cerebritis, and R knee osteonecrosis who is transition of care from [facility] rheumatology. 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 She later developed symptoms consistent with systemic lupus (Rash, photosensitivity, alopecia, Arthritis, cytopenias, hypocomplementemia and positive serology (+DsDNA, +RNP, +ribosomal P ab). She has had varying course of disease since her diagnosis. She has most recently being treated with Cellcept 1500 mg BID, Plaquenil 200 mg BID and prednisone 5 mg daily. She was on Benlysta infusion in 2019 that was later switched to Benlysta subcutaneous in 2021. She is not receiving Benlysta for last many month for unclear reasons. I couldn't find why it was stopped. She was treated with Rituximab since 2013 but she developed anaphylactic reaction during Rituximab infusion in 12/2019.     Patient was recently hospitalized for ongoing fevers x 2 weeks, fatigue and loss appetite. Her sx were thought to be related to lupus flares given pancytopenias, elevated Dsdna and hypocomplementemia. Blood and urine Cx came back negative. She was noted to have hematuria and proteinuria on her urine sediment that raised the concern of lupus nephritis.  She required IV solumedrol 1 gm x 3 and discharged home on prednisone 60 mg oral daily. Currently she is taking Cellcept 1000 mg BID and Plaquenil 200 mg daily. Cellcept was decreased to 1000 mg BID during hospitalization to minimize the toxicity risk. She is Bactrim double strength x 3/week for PCP prophylaxis.     Today she is feeling better, denies pain in her joints, fatigue has improved. She denies any further episode of fever since discharge from hospital. She feels tired on exposure to sunlight. She denies recent hx of malar rash. She gets raynaud's on cold exposure. It usually gets better in 5-10 minutes. She usually get pain in her MCP joints of hands during the flare. She denies any hx of blood  clots or pleurisy. She has patchy hair loss with some discoid lesions. She has ulcer scars on hard palate but no new active oral ulcers. She reports being compliant with her medications. She has no smoking or drinking history. Her older sister has hx of lupus. She is not working currently, just helps her sister in taking care of her nephew at home.     She has received 2 doses of COVID vaccine 10/8/2021 and 10/30/2021    Rest of ROS is negative except mentioned in HPI       Past Medical History  Past Medical History:   Diagnosis Date     Hepatitis      Lupus (systemic lupus erythematosus) (H)      Lupus cerebritis (H)      Pancreatitis 08/2017     Pyelonephritis 08/2017     Seizures (H)      Status epilepticus (H)      Past Surgical History:   Procedure Laterality Date     IR RENAL BIOPSY LEFT  6/28/2022     NO HISTORY OF SURGERY       ORTHOPEDIC SURGERY       Social History     Socioeconomic History     Marital status: Single     Spouse name: Not on file     Number of children: Not on file     Years of education: Not on file     Highest education level: Not on file   Occupational History     Not on file   Tobacco Use     Smoking status: Never Smoker     Smokeless tobacco: Never Used   Substance and Sexual Activity     Alcohol use: Not on file     Drug use: Not on file     Sexual activity: Not on file   Other Topics Concern     Not on file   Social History Narrative    6/20/2013     Milly is the 2nd youngest of 7 children.  She is in the 10th grade and lives with her parents and siblings.  Her family is originally from [place], but Milly was born in the .        06/2022: lives in [place] with older sister, brother-in-law, niece, and brother.         Social Determinants of Health     Financial Resource Strain: Not on file   Food Insecurity: Not on file   Transportation Needs: Not on file   Physical Activity: Not on file   Stress: Not on file   Social Connections: Not on file   Intimate Partner Violence: Not  on file   Housing Stability: Not on file     Family History   Problem Relation Age of Onset     Other - See Comments Father         Question of lupus in father and paternal grandfather     Lupus Sister         older sister     Gastrointestinal Disease No family hx of         No known history of IBD, hepatitis, pancreatitis, celiac disease, or GI cancers before age 50     Glaucoma No family hx of      Macular Degeneration No family hx of        Medications:  Current Outpatient Medications   Medication     acetaminophen (TYLENOL) 500 MG tablet     Belimumab 200 MG/ML SOAJ     calcium carbonate (TUMS) 500 MG chewable tablet     ferrous sulfate (FEROSUL) 325 (65 Fe) MG tablet     multivitamin, therapeutic (THERA-VIT) TABS tablet     mycophenolate (GENERIC EQUIVALENT) 500 MG tablet     sulfamethoxazole-trimethoprim (BACTRIM DS) 800-160 MG tablet     vitamin D3 (CHOLECALCIFEROL) 1.25 MG (42030 UT) capsule     No current facility-administered medications for this visit.       Allergies:     Allergies   Allergen Reactions     Rituximab Anaphylaxis       There were no vitals taken for this visit.  Video visit was done     Physical assessment during video encounter.     GENERAL: Healthy, alert and no distress,   EYES: Eyes grossly normal to inspection.  No discharge or erythema, or obvious scleral/conjunctival abnormalities.  RESP: No audible wheeze, cough, or visible cyanosis.  No visible retractions or increased work of breathing.    SKIN: Visible skin clear. No significant rash, abnormal pigmentation or lesions.  NEURO: Cranial nerves grossly intact.  Mentation and speech appropriate for age.  PSYCH: Mentation appears normal, affect normal/bright, judgement and insight intact, normal speech and appearance well-groomed.  MSK: joints visibly look normal without any obvious redness       Lab/Imaging: Reviewed recent labs and imaging.       22 year old Milly Carroll who is being evaluated via a billable video visit.    How would  you like to obtain your AVS? MyChart  Will anyone else be joining your video visit? No      Video-Visit Details    Video Start Time: 10:15 AM    Type of service:  Video Visit    Video End Time:11:59 AM    Originating Location (pt. Location): Home    Distant Location (provider location):  [facility] RHEUMATOLOGY CLINIC [place]     Platform used for Video Visit:

## 2022-09-07 NOTE — PROGRESS NOTES
"  22 year old Milly Carroll who is being evaluated via a billable video visit.    How would you like to obtain your AVS? MyChart  Will anyone else be joining your video visit? No        Video-Visit Details    Video Start Time: 10:15 AM    Type of service:  Video Visit    Video End Time:11:59 AM    Originating Location (pt. Location): Home    Distant Location (provider location):  [facility] RHEUMATOLOGY CLINIC [place]     Platform used for Video Visit: {Virtual Visit Platforms:[id number]::\"AmWell\"    Rheumatology Clinic    Visit date: 9/7/2022  Last visit: 06/15/22    Reason for visit:  SLE (+DsDNA, +RNP, +ribosomal P Ab and low complements)    Assessment/Plan:     #. Severe lupus (+DsDNA, +RNP, +ribosomal P Ab and hypocomplementemia)  #. Lupus cerebritis ?  #. R knee osteonecrosis  #. Seizures    Milly Carroll is a 22 year old female with long standing hx of lupus (+DsDNA, +RNP, +ribosomal P Ab and hypocomplementemia). Her disease overall has remained suboptimally controlled due to compliance with her care most probably.  She was found to have lupus flare with proteinuria during her last hospitalization in 06/2022.  Significant proteinuria and hematuria on urine sediment raised the concern of lupus nephritis. She underwent renal biopsy (6/28/222) which showed focal proliferative lupus nephritis, ISN/RPS class III with minimal activity and no significant chronicity. She received Solumedrol 1 gm x 3 days and currently on Prednisone 60 mg daily. Cellcept was decreased form 1500 mg BID  To 1gm BID due to elevation in liver enzymes during her previous visit. Unfortunately, she hasn't followed up closely with rheumatology or nephrology. I don't have her recent lab work up available. I think it would be reasonable to taper prednisone as outlined below. I will repeat lab work up today - If transaminitis has resolved then Cellcept can be increased back to 1500 mg BID. She will continue plaquenil 200 mg daily, Bactrim " DS for PJP prophylaxis, Ca and vitamin D supplements while on high dose steroids. I have encouraged her to establish care with Nephrology for management of lupus nephritis. She will need follow up with ophthalmology for HCQ related screening. We discussed risks and benefits of medications- agrees with our current plan.       Plan:  Increase Cellcept 1000 mg  mg BID - plan to increase to 1500 mg BID once transaminitis resolves  Prednisone taper by 10 mg every 2 weeks then stay on prednisone 20 mg once daily until follow up  Continue Plaquenil 200 mg daily   Bactrim DS 3 times/week for prophylaxis   Continue Ca  and Vit D supplement   Follow up with nephrology   Follow up with ophthalmology   Labs today and every 3 months to monitor for disease activity and medication side effects     Follow up: 3 months     The patient was seen and staffed with Dr. Peewee MD.     Jaden Meredith MD  Rheumatology Fellow  Pager [phone]    I saw this patient with the Rheumatology Fellow. I agree with the findings and recommendations.    Thee Vides M.D.  Staff Rheumatologist, [facility]  Pager [phone]        Interim history: 9/7/2022  Patient reports feeling well. She couldn't show up in person on follow today due to transportation issues.  Apparently, she has missed previous appointments and lab visit for similar reasons. She denies any joint pain, stiffness or skin rash. Fatigue has improved. No fever, chills or weight changes. She is tolerating medications well.  She has gained some weight. She hasn't seen Nephrology after her renal biopsy in 06/2022.      HPI:  Milly Carroll is a 21 year old female with a history of of lupus, seizures, lupus cerebritis, and R knee osteonecrosis who is transition of care from [facility] rheumatology. 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 She later developed symptoms consistent with systemic lupus (Rash, photosensitivity, alopecia, Arthritis, cytopenias,  hypocomplementemia and positive serology (+DsDNA, +RNP, +ribosomal P ab). She has had varying course of disease since her diagnosis. She has most recently being treated with Cellcept 1500 mg BID, Plaquenil 200 mg BID and prednisone 5 mg daily. She was on Benlysta infusion in 2019 that was later switched to Benlysta subcutaneous in 2021. She is not receiving Benlysta for last many month for unclear reasons. I couldn't find why it was stopped. She was treated with Rituximab since 2013 but she developed anaphylactic reaction during Rituximab infusion in 12/2019.     Patient was recently hospitalized for ongoing fevers x 2 weeks, fatigue and loss appetite. Her sx were thought to be related to lupus flares given pancytopenias, elevated Dsdna and hypocomplementemia. Blood and urine Cx came back negative. She was noted to have hematuria and proteinuria on her urine sediment that raised the concern of lupus nephritis.  She required IV solumedrol 1 gm x 3 and discharged home on prednisone 60 mg oral daily. Currently she is taking Cellcept 1000 mg BID and Plaquenil 200 mg daily. Cellcept was decreased to 1000 mg BID during hospitalization to minimize the toxicity risk. She is Bactrim double strength x 3/week for PCP prophylaxis.     Today she is feeling better, denies pain in her joints, fatigue has improved. She denies any further episode of fever since discharge from hospital. She feels tired on exposure to sunlight. She denies recent hx of malar rash. She gets raynaud's on cold exposure. It usually gets better in 5-10 minutes. She usually get pain in her MCP joints of hands during the flare. She denies any hx of blood clots or pleurisy. She has patchy hair loss with some discoid lesions. She has ulcer scars on hard palate but no new active oral ulcers. She reports being compliant with her medications. She has no smoking or drinking history. Her older sister has hx of lupus. She is not working currently, just helps her  sister in taking care of her nephew at home.     She has received 2 doses of COVID vaccine 10/8/2021 and 10/30/2021    Rest of ROS is negative except mentioned in HPI       Past Medical History  Past Medical History:   Diagnosis Date     Hepatitis      Lupus (systemic lupus erythematosus) (H)      Lupus cerebritis (H)      Pancreatitis 08/2017     Pyelonephritis 08/2017     Seizures (H)      Status epilepticus (H)      Past Surgical History:   Procedure Laterality Date     IR RENAL BIOPSY LEFT  6/28/2022     NO HISTORY OF SURGERY       ORTHOPEDIC SURGERY       Social History     Socioeconomic History     Marital status: Single     Spouse name: Not on file     Number of children: Not on file     Years of education: Not on file     Highest education level: Not on file   Occupational History     Not on file   Tobacco Use     Smoking status: Never Smoker     Smokeless tobacco: Never Used   Substance and Sexual Activity     Alcohol use: Not on file     Drug use: Not on file     Sexual activity: Not on file   Other Topics Concern     Not on file   Social History Narrative    6/20/2013     Milly is the 2nd youngest of 7 children.  She is in the 10th grade and lives with her parents and siblings.  Her family is originally from [place], but Milly was born in the .        06/2022: lives in [place] with older sister, brother-in-law, niece, and brother.         Social Determinants of Health     Financial Resource Strain: Not on file   Food Insecurity: Not on file   Transportation Needs: Not on file   Physical Activity: Not on file   Stress: Not on file   Social Connections: Not on file   Intimate Partner Violence: Not on file   Housing Stability: Not on file     Family History   Problem Relation Age of Onset     Other - See Comments Father         Question of lupus in father and paternal grandfather     Lupus Sister         older sister     Gastrointestinal Disease No family hx of         No known history of IBD,  hepatitis, pancreatitis, celiac disease, or GI cancers before age 50     Glaucoma No family hx of      Macular Degeneration No family hx of        Medications:  Current Outpatient Medications   Medication     acetaminophen (TYLENOL) 500 MG tablet     Belimumab 200 MG/ML SOAJ     calcium carbonate (TUMS) 500 MG chewable tablet     ferrous sulfate (FEROSUL) 325 (65 Fe) MG tablet     multivitamin, therapeutic (THERA-VIT) TABS tablet     mycophenolate (GENERIC EQUIVALENT) 500 MG tablet     sulfamethoxazole-trimethoprim (BACTRIM DS) 800-160 MG tablet     vitamin D3 (CHOLECALCIFEROL) 1.25 MG (04064 UT) capsule     No current facility-administered medications for this visit.       Allergies:     Allergies   Allergen Reactions     Rituximab Anaphylaxis       There were no vitals taken for this visit.  Video visit was done     Physical assessment during video encounter.     GENERAL: Healthy, alert and no distress,   EYES: Eyes grossly normal to inspection.  No discharge or erythema, or obvious scleral/conjunctival abnormalities.  RESP: No audible wheeze, cough, or visible cyanosis.  No visible retractions or increased work of breathing.    SKIN: Visible skin clear. No significant rash, abnormal pigmentation or lesions.  NEURO: Cranial nerves grossly intact.  Mentation and speech appropriate for age.  PSYCH: Mentation appears normal, affect normal/bright, judgement and insight intact, normal speech and appearance well-groomed.  MSK: joints visibly look normal without any obvious redness       Lab/Imaging: Reviewed recent labs and imaging.

## 2022-09-07 NOTE — TELEPHONE ENCOUNTER
Patient has responded via MyC and accepted the a virtual visit w/ Dr. Meredith at 10a today.  She has declined the support from our clinic  to look at alternative transportation options if/when needed.    Messaging to Ivanna for template changes to today's clinic schedule.    Oralia Johnson RN  Rheumatology Clinic

## 2022-09-07 NOTE — PROGRESS NOTES
22 year old Milly Carroll who is being evaluated via a billable video visit.    How would you like to obtain your AVS? MyChart  Will anyone else be joining your video visit? No        Video-Visit Details    Video Start Time: 10:15 AM    Type of service:  Video Visit    Video End Time:11:59 AM    Originating Location (pt. Location): Home    Distant Location (provider location):  [facility] RHEUMATOLOGY [facility]     Platform used for Video Visit:

## 2022-09-08 ENCOUNTER — TELEPHONE (OUTPATIENT)
Dept: RHEUMATOLOGY | Facility: CLINIC | Age: 22
End: 2022-09-08

## 2022-09-08 NOTE — TELEPHONE ENCOUNTER
Left message for patient to schedule follow up per check out notes below. If patient calls back, please assist with scheduling follow up per note below.    Provider: Jaden Meredith MD Department:  ADULT RHEUMATOLOGY       Visit Disposition    Dispositions   0 Return in about 3 months (around 12/7/2022).         Mare SWARTZ Virtual Visit Facilitator 8:51 AM September 8, 2022

## 2022-09-13 ENCOUNTER — LAB (OUTPATIENT)
Dept: LAB | Facility: CLINIC | Age: 22
End: 2022-09-13
Payer: COMMERCIAL

## 2022-09-13 DIAGNOSIS — Z92.25 PERSONAL HISTORY OF IMMUNOSUPPRESSIVE THERAPY: ICD-10-CM

## 2022-09-13 PROCEDURE — 36415 COLL VENOUS BLD VENIPUNCTURE: CPT

## 2022-09-13 PROCEDURE — 84460 ALANINE AMINO (ALT) (SGPT): CPT

## 2022-09-13 PROCEDURE — 84450 TRANSFERASE (AST) (SGOT): CPT

## 2022-09-14 LAB
ALT SERPL W P-5'-P-CCNC: 13 U/L (ref 0–50)
AST SERPL W P-5'-P-CCNC: 26 U/L (ref 0–45)

## 2022-10-01 ENCOUNTER — MYC MEDICAL ADVICE (OUTPATIENT)
Dept: RHEUMATOLOGY | Facility: CLINIC | Age: 22
End: 2022-10-01

## 2022-10-03 ENCOUNTER — MYC MEDICAL ADVICE (OUTPATIENT)
Dept: RHEUMATOLOGY | Facility: CLINIC | Age: 22
End: 2022-10-03

## 2022-10-03 ENCOUNTER — TELEPHONE (OUTPATIENT)
Dept: RHEUMATOLOGY | Facility: CLINIC | Age: 22
End: 2022-10-03

## 2022-10-03 NOTE — TELEPHONE ENCOUNTER
New message received from patient via [employer] on Saturday.    Milly Carroll Suresh, MD 2 days ago   So everytime I use the bathroom I wipe and there s blood. I thought I was on my period but when I wiped again the blood was so little the second time. I check the toilet bowl and my urine was a regular color but I saw little chunks of blood. I don t know what to do or which ER to go to cause I know my ride won t make it to [place].     Call out to patient, voice mail left. [employer] message sent requesting further information about patient's current symptom.    Oralia Johnson RN  Rheumatology Clinic

## 2022-10-04 NOTE — CONFIDENTIAL NOTE
Left another message requesting patient to call back about the message left over the weekend about the blood in urine.  Patient has not returned the call or read the MyC message sent yesterday.    Oralia Johnson RN  Rheumatology Clinic

## 2022-10-11 ENCOUNTER — TELEPHONE (OUTPATIENT)
Dept: RHEUMATOLOGY | Facility: CLINIC | Age: 22
End: 2022-10-11

## 2022-10-11 NOTE — TELEPHONE ENCOUNTER
Patient has not responded to Poll Everywhere messages, or calls regarding her initial report to us of noting blood in her urine.    Patient has read her MyC messages; Dr. Meredith noting to patient needs labs as well. He had  recommend some follow up labs on last video visit that hasn't been done and noting she has appointment with nephrology as well in next few days that she should attend for further recommendation.     Closing this encounter d/t no response from patient after several calls and messages sent.    Oralia Johnson RN  Rheumatology Clinic

## 2022-11-10 ENCOUNTER — MYC MEDICAL ADVICE (OUTPATIENT)
Dept: RHEUMATOLOGY | Facility: CLINIC | Age: 22
End: 2022-11-10

## 2022-11-11 NOTE — TELEPHONE ENCOUNTER
Call out to patient regarding the MyC message received and reports of blood in her stool.  Left voice mail requesting she call my direct extension and also recommending she contact her PCP clinic to report these symptoms as well.  MyC message response sent to patient with same information.    Oralia Johnson RN  Rheumatology Clinic

## 2022-12-07 DIAGNOSIS — M25.551 ACUTE HIP PAIN, BILATERAL: Primary | ICD-10-CM

## 2022-12-07 DIAGNOSIS — M25.552 ACUTE HIP PAIN, BILATERAL: Primary | ICD-10-CM

## 2022-12-07 NOTE — PROGRESS NOTES
Patient on high dose steroids for lupus nephritis. Non compliant with follow ups  Acute onset of hip pain -plan to get xray both hips    Arrange follow up as outpatient    Jaden hernandez MD  Rheumatology fellow

## 2022-12-13 ENCOUNTER — TELEPHONE (OUTPATIENT)
Dept: RHEUMATOLOGY | Facility: CLINIC | Age: 22
End: 2022-12-13

## 2022-12-13 NOTE — TELEPHONE ENCOUNTER
Called to remind patient of appointment. Patient asked for a different time if possible, but accepted original appointment time when there were no alternatives available.

## 2022-12-14 ENCOUNTER — APPOINTMENT (OUTPATIENT)
Dept: GENERAL RADIOLOGY | Facility: CLINIC | Age: 22
End: 2022-12-14
Attending: PEDIATRICS
Payer: COMMERCIAL

## 2022-12-14 ENCOUNTER — HOSPITAL ENCOUNTER (INPATIENT)
Facility: CLINIC | Age: 22
LOS: 3 days | Discharge: HOME | End: 2022-12-17
Attending: EMERGENCY MEDICINE | Admitting: INTERNAL MEDICINE
Payer: COMMERCIAL

## 2022-12-14 ENCOUNTER — OFFICE VISIT (OUTPATIENT)
Dept: RHEUMATOLOGY | Facility: CLINIC | Age: 22
End: 2022-12-14
Attending: PEDIATRICS
Payer: COMMERCIAL

## 2022-12-14 VITALS
OXYGEN SATURATION: 100 % | HEART RATE: 123 BPM | SYSTOLIC BLOOD PRESSURE: 108 MMHG | WEIGHT: 108 LBS | DIASTOLIC BLOOD PRESSURE: 72 MMHG | TEMPERATURE: 100.5 F | BODY MASS INDEX: 19.75 KG/M2

## 2022-12-14 DIAGNOSIS — K85.80 OTHER ACUTE PANCREATITIS, UNSPECIFIED COMPLICATION STATUS: ICD-10-CM

## 2022-12-14 DIAGNOSIS — M25.562 LEFT KNEE PAIN, UNSPECIFIED CHRONICITY: ICD-10-CM

## 2022-12-14 DIAGNOSIS — Z20.822 CONTACT WITH AND (SUSPECTED) EXPOSURE TO COVID-19: ICD-10-CM

## 2022-12-14 DIAGNOSIS — M32.9 SYSTEMIC LUPUS ERYTHEMATOSUS, UNSPECIFIED SLE TYPE, UNSPECIFIED ORGAN INVOLVEMENT STATUS (H): ICD-10-CM

## 2022-12-14 DIAGNOSIS — M32.14 LUPUS NEPHRITIS (H): ICD-10-CM

## 2022-12-14 DIAGNOSIS — M32.19 SYSTEMIC LUPUS ERYTHEMATOSUS WITH OTHER ORGAN INVOLVEMENT, UNSPECIFIED SLE TYPE (H): Primary | ICD-10-CM

## 2022-12-14 DIAGNOSIS — D64.9 ANEMIA, UNSPECIFIED TYPE: ICD-10-CM

## 2022-12-14 DIAGNOSIS — R06.00 DYSPNEA, UNSPECIFIED TYPE: ICD-10-CM

## 2022-12-14 DIAGNOSIS — M70.62 TROCHANTERIC BURSITIS OF LEFT HIP: ICD-10-CM

## 2022-12-14 DIAGNOSIS — M32.14 OTHER SYSTEMIC LUPUS ERYTHEMATOSUS WITH GLOMERULAR DISEASE (H): ICD-10-CM

## 2022-12-14 DIAGNOSIS — K86.81 EXOCRINE PANCREATIC INSUFFICIENCY: ICD-10-CM

## 2022-12-14 DIAGNOSIS — D84.9 IMMUNOSUPPRESSION (H): ICD-10-CM

## 2022-12-14 DIAGNOSIS — E55.9 VITAMIN D DEFICIENCY: ICD-10-CM

## 2022-12-14 DIAGNOSIS — M32.19 LUPUS CEREBRITIS (H): ICD-10-CM

## 2022-12-14 DIAGNOSIS — E88.09 HYPOALBUMINEMIA: ICD-10-CM

## 2022-12-14 DIAGNOSIS — M32.19 OTHER SYSTEMIC LUPUS ERYTHEMATOSUS WITH OTHER ORGAN INVOLVEMENT (H): ICD-10-CM

## 2022-12-14 DIAGNOSIS — M32.19 SYSTEMIC LUPUS ERYTHEMATOSUS WITH OTHER ORGAN INVOLVEMENT, UNSPECIFIED SLE TYPE (H): ICD-10-CM

## 2022-12-14 DIAGNOSIS — G05.3 LUPUS CEREBRITIS (H): ICD-10-CM

## 2022-12-14 DIAGNOSIS — D69.6 THROMBOCYTOPENIA, UNSPECIFIED (H): ICD-10-CM

## 2022-12-14 DIAGNOSIS — E87.6 HYPOKALEMIA: Primary | ICD-10-CM

## 2022-12-14 LAB
ABO/RH(D): NORMAL
ALBUMIN SERPL BCG-MCNC: 1.8 G/DL (ref 3.5–5.2)
ALBUMIN UR-MCNC: 300 MG/DL
ALBUMIN UR-MCNC: 300 MG/DL
ALP SERPL-CCNC: 183 U/L (ref 35–104)
ALT SERPL W P-5'-P-CCNC: 15 U/L (ref 10–35)
ANION GAP SERPL CALCULATED.3IONS-SCNC: 8 MMOL/L (ref 7–15)
ANION GAP SERPL CALCULATED.3IONS-SCNC: 9 MMOL/L (ref 7–15)
ANTIBODY SCREEN: NEGATIVE
APPEARANCE UR: ABNORMAL
APPEARANCE UR: ABNORMAL
AST SERPL W P-5'-P-CCNC: ABNORMAL U/L
ATRIAL RATE - MUSE: 102 BPM
BASOPHILS # BLD MANUAL: 0 10E3/UL (ref 0–0.2)
BASOPHILS NFR BLD MANUAL: 0 %
BILIRUB DIRECT SERPL-MCNC: <0.2 MG/DL (ref 0–0.3)
BILIRUB SERPL-MCNC: 0.3 MG/DL
BILIRUB UR QL STRIP: NEGATIVE
BILIRUB UR QL STRIP: NEGATIVE
BLD PROD TYP BPU: NORMAL
BLD PROD TYP BPU: NORMAL
BLOOD COMPONENT TYPE: NORMAL
BLOOD COMPONENT TYPE: NORMAL
BUN SERPL-MCNC: 17.2 MG/DL (ref 6–20)
BUN SERPL-MCNC: 18.8 MG/DL (ref 6–20)
CALCIUM SERPL-MCNC: 7 MG/DL (ref 8.6–10)
CALCIUM SERPL-MCNC: 7.4 MG/DL (ref 8.6–10)
CHLORIDE SERPL-SCNC: 105 MMOL/L (ref 98–107)
CHLORIDE SERPL-SCNC: 107 MMOL/L (ref 98–107)
CK SERPL-CCNC: 28 U/L (ref 26–192)
CODING SYSTEM: NORMAL
CODING SYSTEM: NORMAL
COLOR UR AUTO: YELLOW
COLOR UR AUTO: YELLOW
CREAT SERPL-MCNC: 0.73 MG/DL (ref 0.51–0.95)
CREAT SERPL-MCNC: 0.83 MG/DL (ref 0.51–0.95)
CROSSMATCH: NORMAL
CROSSMATCH: NORMAL
CRP SERPL-MCNC: 37.5 MG/L
DACRYOCYTES BLD QL SMEAR: SLIGHT
DEPRECATED HCO3 PLAS-SCNC: 19 MMOL/L (ref 22–29)
DEPRECATED HCO3 PLAS-SCNC: 21 MMOL/L (ref 22–29)
DIASTOLIC BLOOD PRESSURE - MUSE: NORMAL MMHG
ELLIPTOCYTES BLD QL SMEAR: SLIGHT
EOSINOPHIL # BLD MANUAL: 0 10E3/UL (ref 0–0.7)
EOSINOPHIL NFR BLD MANUAL: 0 %
ERYTHROCYTE [DISTWIDTH] IN BLOOD BY AUTOMATED COUNT: 19.9 % (ref 10–15)
FLUAV RNA SPEC QL NAA+PROBE: NEGATIVE
FLUBV RNA RESP QL NAA+PROBE: NEGATIVE
GFR SERPL CREATININE-BSD FRML MDRD: >90 ML/MIN/1.73M2
GFR SERPL CREATININE-BSD FRML MDRD: >90 ML/MIN/1.73M2
GLUCOSE SERPL-MCNC: 85 MG/DL (ref 70–99)
GLUCOSE SERPL-MCNC: 94 MG/DL (ref 70–99)
GLUCOSE UR STRIP-MCNC: NEGATIVE MG/DL
GLUCOSE UR STRIP-MCNC: NEGATIVE MG/DL
HCG UR QL: NEGATIVE
HCT VFR BLD AUTO: 18.2 % (ref 35–47)
HGB BLD-MCNC: 5.6 G/DL (ref 11.7–15.7)
HGB UR QL STRIP: ABNORMAL
HGB UR QL STRIP: ABNORMAL
INTERPRETATION ECG - MUSE: NORMAL
ISSUE DATE AND TIME: NORMAL
ISSUE DATE AND TIME: NORMAL
KETONES UR STRIP-MCNC: NEGATIVE MG/DL
KETONES UR STRIP-MCNC: NEGATIVE MG/DL
LACTATE SERPL-SCNC: 1.3 MMOL/L (ref 0.7–2)
LDH SERPL L TO P-CCNC: 351 U/L (ref 0–250)
LEUKOCYTE ESTERASE UR QL STRIP: ABNORMAL
LEUKOCYTE ESTERASE UR QL STRIP: ABNORMAL
LYMPHOCYTES # BLD MANUAL: 0.2 10E3/UL (ref 0.8–5.3)
LYMPHOCYTES NFR BLD MANUAL: 3 %
MAGNESIUM SERPL-MCNC: 1.7 MG/DL (ref 1.7–2.3)
MCH RBC QN AUTO: 26.3 PG (ref 26.5–33)
MCHC RBC AUTO-ENTMCNC: 30.8 G/DL (ref 31.5–36.5)
MCV RBC AUTO: 85 FL (ref 78–100)
MONOCYTES # BLD MANUAL: 0.2 10E3/UL (ref 0–1.3)
MONOCYTES NFR BLD MANUAL: 3 %
MRSA DNA SPEC QL NAA+PROBE: NEGATIVE
NEUTROPHILS # BLD MANUAL: 4.7 10E3/UL (ref 1.6–8.3)
NEUTROPHILS NFR BLD MANUAL: 94 %
NITRATE UR QL: NEGATIVE
NITRATE UR QL: NEGATIVE
P AXIS - MUSE: 18 DEGREES
PH UR STRIP: 6.5 [PH] (ref 5–7)
PH UR STRIP: 6.5 [PH] (ref 5–7)
PLAT MORPH BLD: ABNORMAL
PLATELET # BLD AUTO: 147 10E3/UL (ref 150–450)
POTASSIUM SERPL-SCNC: 3.2 MMOL/L (ref 3.4–5.3)
POTASSIUM SERPL-SCNC: 3.7 MMOL/L (ref 3.4–5.3)
PR INTERVAL - MUSE: 130 MS
PROT SERPL-MCNC: 7.1 G/DL (ref 6.4–8.3)
QRS DURATION - MUSE: 72 MS
QT - MUSE: 394 MS
QTC - MUSE: 513 MS
R AXIS - MUSE: -34 DEGREES
RBC # BLD AUTO: 2.13 10E6/UL (ref 3.8–5.2)
RBC MORPH BLD: ABNORMAL
RBC URINE: 18 /HPF
RBC URINE: 25 /HPF
RETICS # AUTO: 0.01 10E6/UL (ref 0.03–0.1)
RETICS/RBC NFR AUTO: 0.6 % (ref 0.5–2)
RSV RNA SPEC NAA+PROBE: NEGATIVE
SA TARGET DNA: POSITIVE
SARS-COV-2 RNA RESP QL NAA+PROBE: NEGATIVE
SODIUM SERPL-SCNC: 134 MMOL/L (ref 136–145)
SODIUM SERPL-SCNC: 135 MMOL/L (ref 136–145)
SP GR UR STRIP: 1.02 (ref 1–1.03)
SP GR UR STRIP: 1.02 (ref 1–1.03)
SPECIMEN EXPIRATION DATE: NORMAL
SQUAMOUS EPITHELIAL: 11 /HPF
SQUAMOUS EPITHELIAL: 4 /HPF
SYSTOLIC BLOOD PRESSURE - MUSE: NORMAL MMHG
T AXIS - MUSE: 13 DEGREES
TRANSITIONAL EPI: 1 /HPF
TRANSITIONAL EPI: 1 /HPF
UNIT ABO/RH: NORMAL
UNIT ABO/RH: NORMAL
UNIT NUMBER: NORMAL
UNIT NUMBER: NORMAL
UNIT STATUS: NORMAL
UNIT STATUS: NORMAL
UNIT TYPE ISBT: 5100
UNIT TYPE ISBT: 5100
UROBILINOGEN UR STRIP-MCNC: NORMAL MG/DL
UROBILINOGEN UR STRIP-MCNC: NORMAL MG/DL
VENTRICULAR RATE- MUSE: 102 BPM
WBC # BLD AUTO: 5 10E3/UL (ref 4–11)
WBC URINE: 60 /HPF
WBC URINE: 76 /HPF

## 2022-12-14 PROCEDURE — 250N000011 HC RX IP 250 OP 636: Performed by: STUDENT IN AN ORGANIZED HEALTH CARE EDUCATION/TRAINING PROGRAM

## 2022-12-14 PROCEDURE — 36415 COLL VENOUS BLD VENIPUNCTURE: CPT | Performed by: STUDENT IN AN ORGANIZED HEALTH CARE EDUCATION/TRAINING PROGRAM

## 2022-12-14 PROCEDURE — 86923 COMPATIBILITY TEST ELECTRIC: CPT

## 2022-12-14 PROCEDURE — 250N000011 HC RX IP 250 OP 636

## 2022-12-14 PROCEDURE — 82310 ASSAY OF CALCIUM: CPT

## 2022-12-14 PROCEDURE — 250N000013 HC RX MED GY IP 250 OP 250 PS 637

## 2022-12-14 PROCEDURE — 83010 ASSAY OF HAPTOGLOBIN QUANT: CPT

## 2022-12-14 PROCEDURE — 86140 C-REACTIVE PROTEIN: CPT

## 2022-12-14 PROCEDURE — 80048 BASIC METABOLIC PNL TOTAL CA: CPT

## 2022-12-14 PROCEDURE — 99223 1ST HOSP IP/OBS HIGH 75: CPT | Mod: AI | Performed by: INTERNAL MEDICINE

## 2022-12-14 PROCEDURE — 80180 DRUG SCRN QUAN MYCOPHENOLATE: CPT | Performed by: STUDENT IN AN ORGANIZED HEALTH CARE EDUCATION/TRAINING PROGRAM

## 2022-12-14 PROCEDURE — 84156 ASSAY OF PROTEIN URINE: CPT | Performed by: STUDENT IN AN ORGANIZED HEALTH CARE EDUCATION/TRAINING PROGRAM

## 2022-12-14 PROCEDURE — 85007 BL SMEAR W/DIFF WBC COUNT: CPT

## 2022-12-14 PROCEDURE — 87641 MR-STAPH DNA AMP PROBE: CPT

## 2022-12-14 PROCEDURE — 86850 RBC ANTIBODY SCREEN: CPT

## 2022-12-14 PROCEDURE — 73502 X-RAY EXAM HIP UNI 2-3 VIEWS: CPT

## 2022-12-14 PROCEDURE — P9016 RBC LEUKOCYTES REDUCED: HCPCS

## 2022-12-14 PROCEDURE — 71046 X-RAY EXAM CHEST 2 VIEWS: CPT | Mod: 26 | Performed by: RADIOLOGY

## 2022-12-14 PROCEDURE — 93005 ELECTROCARDIOGRAM TRACING: CPT | Performed by: EMERGENCY MEDICINE

## 2022-12-14 PROCEDURE — 84155 ASSAY OF PROTEIN SERUM: CPT

## 2022-12-14 PROCEDURE — 85018 HEMOGLOBIN: CPT

## 2022-12-14 PROCEDURE — 73502 X-RAY EXAM HIP UNI 2-3 VIEWS: CPT | Mod: 26 | Performed by: RADIOLOGY

## 2022-12-14 PROCEDURE — 83735 ASSAY OF MAGNESIUM: CPT

## 2022-12-14 PROCEDURE — 99285 EMERGENCY DEPT VISIT HI MDM: CPT | Performed by: EMERGENCY MEDICINE

## 2022-12-14 PROCEDURE — 73560 X-RAY EXAM OF KNEE 1 OR 2: CPT | Mod: LT

## 2022-12-14 PROCEDURE — 83615 LACTATE (LD) (LDH) ENZYME: CPT

## 2022-12-14 PROCEDURE — 120N000002 HC R&B MED SURG/OB UMMC

## 2022-12-14 PROCEDURE — 82550 ASSAY OF CK (CPK): CPT

## 2022-12-14 PROCEDURE — 73560 X-RAY EXAM OF KNEE 1 OR 2: CPT | Mod: 26 | Performed by: RADIOLOGY

## 2022-12-14 PROCEDURE — 82248 BILIRUBIN DIRECT: CPT

## 2022-12-14 PROCEDURE — G0463 HOSPITAL OUTPT CLINIC VISIT: HCPCS | Performed by: STUDENT IN AN ORGANIZED HEALTH CARE EDUCATION/TRAINING PROGRAM

## 2022-12-14 PROCEDURE — 86901 BLOOD TYPING SEROLOGIC RH(D): CPT

## 2022-12-14 PROCEDURE — 73610 X-RAY EXAM OF ANKLE: CPT | Mod: LT

## 2022-12-14 PROCEDURE — G0463 HOSPITAL OUTPT CLINIC VISIT: HCPCS

## 2022-12-14 PROCEDURE — 36415 COLL VENOUS BLD VENIPUNCTURE: CPT

## 2022-12-14 PROCEDURE — 83605 ASSAY OF LACTIC ACID: CPT

## 2022-12-14 PROCEDURE — 71046 X-RAY EXAM CHEST 2 VIEWS: CPT

## 2022-12-14 PROCEDURE — 99223 1ST HOSP IP/OBS HIGH 75: CPT | Mod: GC | Performed by: INTERNAL MEDICINE

## 2022-12-14 PROCEDURE — 81001 URINALYSIS AUTO W/SCOPE: CPT

## 2022-12-14 PROCEDURE — 87040 BLOOD CULTURE FOR BACTERIA: CPT | Performed by: EMERGENCY MEDICINE

## 2022-12-14 PROCEDURE — 85027 COMPLETE CBC AUTOMATED: CPT

## 2022-12-14 PROCEDURE — 87086 URINE CULTURE/COLONY COUNT: CPT

## 2022-12-14 PROCEDURE — 99207 PR NO BILLABLE SERVICE THIS VISIT: CPT | Mod: GC | Performed by: STUDENT IN AN ORGANIZED HEALTH CARE EDUCATION/TRAINING PROGRAM

## 2022-12-14 PROCEDURE — 81025 URINE PREGNANCY TEST: CPT

## 2022-12-14 PROCEDURE — 99285 EMERGENCY DEPT VISIT HI MDM: CPT | Mod: GC | Performed by: EMERGENCY MEDICINE

## 2022-12-14 PROCEDURE — 73610 X-RAY EXAM OF ANKLE: CPT | Mod: 26 | Performed by: RADIOLOGY

## 2022-12-14 PROCEDURE — 85045 AUTOMATED RETICULOCYTE COUNT: CPT

## 2022-12-14 PROCEDURE — 87637 SARSCOV2&INF A&B&RSV AMP PRB: CPT

## 2022-12-14 PROCEDURE — C9803 HOPD COVID-19 SPEC COLLECT: HCPCS | Performed by: EMERGENCY MEDICINE

## 2022-12-14 RX ORDER — CHOLECALCIFEROL (VITAMIN D3) 1250 MCG
50000 CAPSULE ORAL
Status: DISCONTINUED | OUTPATIENT
Start: 2022-12-16 | End: 2022-12-17 | Stop reason: HOSPADM

## 2022-12-14 RX ORDER — FERROUS SULFATE 325(65) MG
325 TABLET ORAL
Status: DISCONTINUED | OUTPATIENT
Start: 2022-12-15 | End: 2022-12-17 | Stop reason: HOSPADM

## 2022-12-14 RX ORDER — ENOXAPARIN SODIUM 100 MG/ML
40 INJECTION SUBCUTANEOUS EVERY 24 HOURS
Status: DISCONTINUED | OUTPATIENT
Start: 2022-12-14 | End: 2022-12-17 | Stop reason: HOSPADM

## 2022-12-14 RX ORDER — ACETAMINOPHEN 325 MG/1
650 TABLET ORAL EVERY 4 HOURS PRN
Status: DISCONTINUED | OUTPATIENT
Start: 2022-12-14 | End: 2022-12-17 | Stop reason: HOSPADM

## 2022-12-14 RX ORDER — PIPERACILLIN SODIUM, TAZOBACTAM SODIUM 3; .375 G/15ML; G/15ML
3.38 INJECTION, POWDER, LYOPHILIZED, FOR SOLUTION INTRAVENOUS EVERY 6 HOURS
Status: CANCELLED | OUTPATIENT
Start: 2022-12-14

## 2022-12-14 RX ORDER — CALCIUM CARBONATE 500 MG/1
500 TABLET, CHEWABLE ORAL DAILY
Status: DISCONTINUED | OUTPATIENT
Start: 2022-12-15 | End: 2022-12-17 | Stop reason: HOSPADM

## 2022-12-14 RX ORDER — POTASSIUM CHLORIDE 20MEQ/15ML
40 LIQUID (ML) ORAL ONCE
Status: COMPLETED | OUTPATIENT
Start: 2022-12-14 | End: 2022-12-14

## 2022-12-14 RX ORDER — HYDROXYCHLOROQUINE SULFATE 200 MG/1
200 TABLET, FILM COATED ORAL DAILY
Qty: 90 TABLET | Refills: 3 | Status: ON HOLD | OUTPATIENT
Start: 2022-12-14 | End: 2022-12-16

## 2022-12-14 RX ORDER — PREDNISONE 5 MG/1
10 TABLET ORAL DAILY
Status: DISCONTINUED | OUTPATIENT
Start: 2022-12-15 | End: 2022-12-15

## 2022-12-14 RX ORDER — METHYLPREDNISOLONE SODIUM SUCCINATE 125 MG/2ML
125 INJECTION, POWDER, LYOPHILIZED, FOR SOLUTION INTRAMUSCULAR; INTRAVENOUS ONCE
Status: COMPLETED | OUTPATIENT
Start: 2022-12-14 | End: 2022-12-14

## 2022-12-14 RX ORDER — HYDROXYCHLOROQUINE SULFATE 200 MG/1
200 TABLET, FILM COATED ORAL DAILY
Status: DISCONTINUED | OUTPATIENT
Start: 2022-12-14 | End: 2022-12-17 | Stop reason: HOSPADM

## 2022-12-14 RX ORDER — LIDOCAINE 40 MG/G
CREAM TOPICAL
Status: DISCONTINUED | OUTPATIENT
Start: 2022-12-14 | End: 2022-12-17 | Stop reason: HOSPADM

## 2022-12-14 RX ORDER — CEFTRIAXONE 1 G/1
1 INJECTION, POWDER, FOR SOLUTION INTRAMUSCULAR; INTRAVENOUS EVERY 24 HOURS
Status: DISCONTINUED | OUTPATIENT
Start: 2022-12-14 | End: 2022-12-16

## 2022-12-14 RX ORDER — SULFAMETHOXAZOLE/TRIMETHOPRIM 800-160 MG
1 TABLET ORAL
Status: DISCONTINUED | OUTPATIENT
Start: 2022-12-16 | End: 2022-12-17 | Stop reason: HOSPADM

## 2022-12-14 RX ORDER — POTASSIUM CHLORIDE 20MEQ/15ML
20 LIQUID (ML) ORAL ONCE
Status: COMPLETED | OUTPATIENT
Start: 2022-12-14 | End: 2022-12-14

## 2022-12-14 RX ADMIN — ENOXAPARIN SODIUM 40 MG: 40 INJECTION SUBCUTANEOUS at 20:21

## 2022-12-14 RX ADMIN — ACETAMINOPHEN 650 MG: 325 TABLET, FILM COATED ORAL at 20:32

## 2022-12-14 RX ADMIN — ACETAMINOPHEN 650 MG: 325 TABLET, FILM COATED ORAL at 14:25

## 2022-12-14 RX ADMIN — POTASSIUM CHLORIDE 20 MEQ: 20 SOLUTION ORAL at 14:19

## 2022-12-14 RX ADMIN — METHYLPREDNISOLONE SODIUM SUCCINATE 125 MG: 125 INJECTION, POWDER, FOR SOLUTION INTRAMUSCULAR; INTRAVENOUS at 14:19

## 2022-12-14 RX ADMIN — CEFTRIAXONE SODIUM 1 G: 1 INJECTION, POWDER, FOR SOLUTION INTRAMUSCULAR; INTRAVENOUS at 17:52

## 2022-12-14 RX ADMIN — POTASSIUM CHLORIDE 40 MEQ: 20 SOLUTION ORAL at 16:28

## 2022-12-14 RX ADMIN — HYDROXYCHLOROQUINE SULFATE 200 MG: 200 TABLET, FILM COATED ORAL at 20:21

## 2022-12-14 ASSESSMENT — ENCOUNTER SYMPTOMS
CONSTIPATION: 0
FEVER: 1
DIARRHEA: 1
NAUSEA: 0
COUGH: 1
SHORTNESS OF BREATH: 1
ABDOMINAL PAIN: 0
FATIGUE: 1
VOMITING: 0
DYSURIA: 0
CHILLS: 1

## 2022-12-14 ASSESSMENT — ACTIVITIES OF DAILY LIVING (ADL)
ADLS_ACUITY_SCORE: 35
ADLS_ACUITY_SCORE: 33
ADLS_ACUITY_SCORE: 35
ADLS_ACUITY_SCORE: 35

## 2022-12-14 NOTE — CONSULTS
[facility] Rheumatology IP Consult    Consult for: SLE flare vs AVN vs Septic arthritis  Consult by: Dr. Mary    ASSESSMENT AND RECOMMENDATIONS:  Milly Carroll is a 22 year old female admitted on 12/14/2022. 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    DIAGNOSIS:  SLE (+dsDNA, +RNP, +Ribosomal P, low C3/C4, lupus nephritis), active disease  Lupus nephritis (class III), with nephrotic range proteinuria  Hx of AVN, right knee   Acute L hip, L knee arthralgias  Normocytic anemia, thrombocytopenia, severe leukopenia    Mrs Carroll has an exceptionally complicated lupus history, which is unfortunately characterized by suboptimal control and medication compliance concerns.  She presented from rheumatology clinic to [facility] ED in the context of newly developed left hip and knee pain alongside worrisome labs from her recent hospitalization related to lupus flare.  Altogether her clinical picture was worrying for lupus  flare and the additional possibilities of septic arthritis or avascular necrosis (which she has had before in the right knee).  Her immunosuppressants were stopped aside from hydroxychloroquine while she could be evaluated for infection.  Her blood cultures have not grown any pathogen, and overall she has improved significantly after transfusion and one-time methylprednisolone 125 mg.      Given her significant improvement on serial exams following yesterday's corticosteroid burst, concerns for sepsis/septic arthritis or AVN are sufficiently abrogated to change the previous recommendation for MRI L knee and hip. Instead, the leading diagnosis is now presumed lupus flare, with particular concern for medication non-adherence: her MPA level, the active metabolite of MMF, was  "undetectable on her blood sample from admission.  This is particularly important, as it underscores she is not failing to control her lupus on MMF, rather she is failing to take MMF.  Furthermore, it implies her cytopenias are not a consequence of MMF toxicity.  My interaction with her in the emergency department where she could not name any of her medications she takes for lupus, nor recall that her dosages when prompted with their names is entirely concerning and unexpected.  I do wonder if she may require a switch to Benlysta infusions in the future, as she cannot reliably report taking this medication subcutaneously at home.      Inpatient, she should resume her MMF and increase her prednisone into the 0.5mg/kg range (both ordered) for the time being.  This is especially important in the context of likely active, worsening lupus nephritis, evidenced by rising creatinine and recent urinalysis demonstrating hematuria and pyuria.          RECOMMENDATIONS:  -- Prednisone 20mg qday for now (extra 10mg this afternoon, daily order changed)  -- Resumed MMF 1500mg BID  -- Repeat UPCR (ordered)  -- Continue to monitor inflammatory markers  -- We will continue to follow    I discussed the findings and recommendations with the patient.  I communicated the assessment and plan to the consulting team.    Case seen and discussed with Dr. Thee Vides.     Main \"BJ\" MD Hardeep, PhD  PGY-3 Rheumatology Fellow  [phone] [text page]    I saw this patient with the Rheumatology Fellow. I agree with the findings and recommendations.    Thee Vides M.D.  Staff Rheumatologist, [facility]  Pager [phone]      History of Present Illness   Milly Carroll is a 22 year old female admitted on 12/14/2022. She has a history of SLE and is admitted for concern for lupus flare, septic arthritis, or AVN    From Dr. Jaden Meredith's Rheumatology clinic note 12/14: \"  Milly Carroll is a 22 year old female with long standing hx of lupus " "(+DsDNA, +RNP, +ribosomal P Ab and hypocomplementemia). Her disease overall has remained suboptimally controlled due to compliance issues.  She was found to have lupus flare with proteinuria during her last hospitalization in 06/2022.  Significant proteinuria and hematuria on urine sediment raised the concern of lupus nephritis. She underwent renal biopsy (6/28/222) which showed focal proliferative lupus nephritis, ISN/RPS class III with minimal activity and no significant chronicity.  She received Solumedrol 1 gm x 3 days and discharged on prednisone 60 mg. Unfortunately, [atient hasn't followed up with nephrology yet.  She has successfully tapered prednisone down to 10 mg daily. Tolerating Cellcept 1500 mg BID, plaquenil 200 mg once daily and Belimumab sub q weekly. Tolerating medications well.      Presented with subacute onset of myalgia, severe fatigue, night sweats, low grade fevers and new onset of mouth sores. Recent onset of left hip pain over last 2 weeks with limited weight bearing and ambulation. Tender on palpation with limited range of motion on assessment - raise the concern of osteonecrosis vs septic joint given hx of long term steroid use and immunosuppression.  Looks ill with ongoing low grade fever (Temp 100.5 in clinic) and tachycardiac.  Lab from her recent ED visit 12/12 show drop in complements and and marked elevation in DsDNA, Hgb drop and proteinuria with low serum albumin. Her presentation raises concern for viral/bacterial infection vs lupus flare. I think patient will need hospitalization for urgent evaluation and further work up.  We would suggest to hold Cellcept and Benlysta until infectious etiology is ruled out. Meantime consider solumedrol 125 mg x 1 for lupus flare. We will follow up during her inpatient stay and provide further recommendations based on her further work up. We discussed our plan- she agree to visit ER and potential hospitalization for further work up.\"    On " "arrival to [facility] ED, she was found to be tachycardic into the 120s.  Initial lab results were notable for: Hgb 5.6, , lymphocytes 0.2, K3.2, albumin 1.8, alk phos 183.  She was transfused 2 units PRBCs with correction to Hgb 10.6 on recheck.  She was given one-time dose methylprednisolone 125 mg IV.  Blood cultures were obtained prior to initiation of ceftriaxone.  Initial x-rays of the left hip, knee, and ankle were unremarkable.  To the admitting team, in addition to the narrative above, also described intermittent \"spells \"where she would lose consciousness.  MRI brain was obtained 12/15 with no concerning acute findings.    By the afternoon of 12/15 she had noticed significant improvement to the pain in her left hip and knee.  She was able to bear weight, and had gone for a walk.  Her vital signs had stabilized.  She had little appetite, and admits that her appetite has been poor for the past month.  She and her sister, who also has lupus, both developed a diarrheal illness around that time, which in the case of our patient, has not completely resolved.    Review of Systems    Associated symptoms:alopecia, arthralgia, fatigue, fevers, joint pain, morning stiffness, nausea, oral ulcers, palpitations, Raynaud's and seizures.   Denies: bleeding/clotting problems, depression, memory loss, polydypsia and polyuria.      Unless stated above, a 14-point review of systems was otherwise normal.     HISTORY REVIEW:  Past Medical History   Past Medical History:   Diagnosis Date    Hepatitis     Lupus (systemic lupus erythematosus) (H)     Lupus cerebritis (H)     Pancreatitis 08/2017    Pyelonephritis 08/2017    Seizures (H)     Status epilepticus (H)      Past Surgical History:   Procedure Laterality Date    IR RENAL BIOPSY LEFT  6/28/2022    NO HISTORY OF SURGERY      ORTHOPEDIC SURGERY       Family History   Problem Relation Age of Onset    Other - See Comments Father         Question of lupus in father and " paternal grandfather    Lupus Sister         older sister    Gastrointestinal Disease No family hx of         No known history of IBD, hepatitis, pancreatitis, celiac disease, or GI cancers before age 50    Glaucoma No family hx of     Macular Degeneration No family hx of      Social History     Socioeconomic History    Marital status: Single     Spouse name: Not on file    Number of children: Not on file    Years of education: Not on file    Highest education level: Not on file   Occupational History    Not on file   Tobacco Use    Smoking status: Never    Smokeless tobacco: Never   Substance and Sexual Activity    Alcohol use: Not Currently    Drug use: Never    Sexual activity: Not on file   Other Topics Concern    Not on file   Social History Narrative    6/20/2013     Milly is the 2nd youngest of 7 children.  She is in the 10th grade and lives with her parents and siblings.  Her family is originally from [facility] [place] [facility], but Milly was born in the .        06/2022: lives in [place] with older sister, brother-in-law, niece, and brother.         Social Determinants of Health     Financial Resource Strain: Not on file   Food Insecurity: Not on file   Transportation Needs: Not on file   Physical Activity: Not on file   Stress: Not on file   Social Connections: Not on file   Intimate Partner Violence: Not on file   Housing Stability: Not on file     Patient Active Problem List   Diagnosis    Systemic lupus erythematosus (H)    Rash    Acute hepatitis    Exacerbation of systemic lupus erythematosus    Generalized weakness    Hypocomplementemia (H)    Hypoalbuminemia    Seizure (H)    Coagulopathy (H)    Hypokalemia    Functional visual loss    Posterior subcapsular cataract, both eyes    Encounter for therapeutic drug monitoring    Knee osteonecrosis, right (H)    Pyelonephritis    Proteinuria    Other forms of systemic lupus erythematosus (H)    Abnormal echocardiogram - repeat in 2021    Immunosuppression (H)     "Anaphylaxis    Sepsis (H)    Pancreatitis, acute    Cellulitis of left lower extremity    Drug-induced systemic lupus erythematosus, unspecified organ involvement status (H)    Fever, unspecified fever cause    Lupus (systemic lupus erythematosus) (H)    Thrombocytopenia, unspecified (H)    Splenomegaly    Thrombocytopenia (H)    Fever, unspecified    Fever in pediatric patient    Anemia, unspecified type    Other systemic lupus erythematosus with other organ involvement (H)    Systemic lupus erythematosus, unspecified SLE type, unspecified organ involvement status (H)    Contact with and (suspected) exposure to covid-19     Allergies   Allergen Reactions    Rituximab Anaphylaxis       OBJECTIVE:  Physical Exam   PHYSICAL EXAM  BP 92/59   Pulse 98   Temp 99.4  F (37.4  C) (Oral)   Resp 16   Ht 1.575 m (5' 2\")   Wt 49 kg (108 lb)   SpO2 99%   BMI 19.75 kg/m    Wt Readings from Last 4 Encounters:   12/14/22 49 kg (108 lb)   12/14/22 49 kg (108 lb)   06/28/22 54.4 kg (120 lb)   06/15/22 53.3 kg (117 lb 8 oz)     Physical Exam  Constitutional:       Comments: Chronically ill appearing young woman   HENT:      Head: Normocephalic.      Nose: Nose normal.      Mouth/Throat:      Pharynx: Oropharynx is clear.      Comments: Ulcers along the inferior lips, none in the oral cavity  Eyes:      Extraocular Movements: Extraocular movements intact.      Conjunctiva/sclera: Conjunctivae normal.   Cardiovascular:      Rate and Rhythm: Normal rate and regular rhythm.      Pulses: Normal pulses.      Heart sounds: Normal heart sounds. No murmur heard.  Pulmonary:      Effort: Pulmonary effort is normal.      Breath sounds: Normal breath sounds.   Abdominal:      General: Abdomen is flat.      Palpations: Abdomen is soft.   Musculoskeletal:         General: Tenderness (Mild, left knee) present. No swelling. Normal range of motion.      Cervical back: Neck supple.   Lymphadenopathy:      Cervical: No cervical adenopathy. "   Skin:     General: Skin is warm and dry.      Findings: No rash.      Comments: Postinflammatory hyperpigmentation on the face.   Neurological:      Mental Status: She is alert.      Motor: No weakness.   Psychiatric:      Comments: Withdrawn, answers in single word responses.  Cannot name her medications.  Cannot say the dose of her medications when prompted with their names.       Data   Outside Records: NO  Outside Xrays: NO  CBC:  Recent Labs   Lab Test 12/14/22  1257 06/28/22  0840 06/15/22  1347 06/13/22  0558   WBC 5.0  --  16.4* 8.8   RBC 2.13*  --  3.04* 2.52*   HGB 5.6*  --  7.6* 7.2*   HCT 18.2*  --  25.6* 22.6*   MCV 85  --  84 90   MCH 26.3*  --  25.0* 28.6   MCHC 30.8*  --  29.7* 31.9   RDW 19.9*  --  18.3* 18.8*   * 465* 142* 92*       BMP:  Recent Labs   Lab Test 12/14/22 1257 06/28/22  0840 06/15/22  1347 06/13/22  0558 06/12/22  0641   *  --   --  143 140   POTASSIUM 3.2*  --   --  3.6 4.7   CHLORIDE 107  --   --  119* 118*   CO2 19*  --   --  19* 16*   ANIONGAP 9  --   --  5 6   GLC 85  --   --  162* 159*   BUN 17.2  --   --  16 18   CR 0.73 0.56 0.64 0.70 0.72   GFRESTIMATED >90 >90 >90 >90 >90   BISI 7.0*  --   --  8.2* 8.2*       LFT:  Recent Labs   Lab Test 09/13/22  1429 06/15/22  1347 06/13/22  0558 06/12/22  0641 06/11/22  1229 06/11/22  0146   PROTTOTAL  --   --  6.9 7.3  --  8.2   ALBUMIN  --  2.2* 1.8* 1.8*   < > 2.2*   BILITOTAL  --   --  0.1* 0.1*  --  0.7   ALKPHOS  --   --  136 156*  --  164*   AST 26 136* 19 27  --  34   ALT 13 86* 10 11  --  12    < > = values in this interval not displayed.       No results found for: CKTOTAL  TSH   Date Value Ref Range Status   08/11/2021 5.57 (H) 0.40 - 4.00 mU/L Final   04/10/2019 6.74 (H) 0.40 - 4.00 mU/L Final       Inflammatory markers  Lab Results   Component Value Date    CRP 37.50 12/14/2022    CRP 7.9 06/15/2022    CRP 40.0 06/13/2022    CRP 91.0 06/12/2022    CRP <2.9 02/10/2021    CRP 3.5 01/13/2021    CRP <2.9  10/09/2020     Lab Results   Component Value Date     06/15/2022    SED >140 06/11/2022     04/25/2022    SED 24 10/09/2020    SED 59 06/23/2020     12/10/2019     Ferritin   Date Value Ref Range Status   06/11/2022 123 12 - 150 ng/mL Final   06/25/2020 318 (H) 12 - 150 ng/mL Final   08/22/2017 135 12 - 150 ng/mL Final   05/21/2013 264 (H) 7 - 142 ng/mL Final       UA RESULTS:  Recent Labs   Lab Test 12/14/22  1456 12/14/22  1308 06/15/22  1353   COLOR Yellow Yellow Yellow   APPEARANCE Slightly Cloudy* Slightly Cloudy* Slightly Cloudy*   URINEGLC Negative Negative Negative   URINEBILI Negative Negative Negative   URINEKETONE Negative Negative Negative   SG 1.018 1.018 1.015   UBLD Large* Moderate* Moderate*   URINEPH 6.5 6.5 6.0   PROTEIN 300* 300* Negative   NITRITE Negative Negative Negative   LEUKEST Moderate* Moderate* Trace*   RBCU 18* 25* 22*   WBCU 60* 76* 11*       Autoimmunity labs:  Lab Results   Component Value Date    RHF 13 05/10/2013     No results found for: CCPIGG  Lab Results   Component Value Date    ANCA  05/11/2013     <1:20  Reference range: <1:20  (Note)  The ANCA IFA is <1:20; therefore, no further testing will  be performed.  INTERPRETIVE INFORMATION: Anti-Neutrophil Cyto Ab, IgG    Neutrophil Cytoplasmic Antibodies (C-ANCA = granular  cytoplasmic staining, P-ANCA = perinuclear staining) are  found in the serum of over 90 percent of patients with  certain necrotizing systemic vasculitides, and usually in  less than 5 percent of patients with collagen vascular  disease or arthritis.  Performed by [facility],  [address] [place] [id number] [phone]  [email or web address], Gabriella Quesada MD, Lab. Director     Lab Results   Component Value Date    D9SFJTB 26 (L) 06/15/2022    L3TTGSH 11 (L) 06/11/2022    I5EZGBG 11 (L) 04/25/2022     Lab Results   Component Value Date    K8MFIOT 4 (L) 06/15/2022    J7YJGPC 3 (L) 06/11/2022    S7TJHCP 3 (L) 04/25/2022     Lab Results    Component Value Date    SUDHA 11.9 (H) 05/10/2013     Lab Results   Component Value Date    .0 (H) 06/15/2022    DNA 1,527.0 (H) 06/11/2022    .0 (H) 04/25/2022     Lab Results   Component Value Date    RNPIGG 2.4 (H) 08/25/2017    SMIGG 0.3 08/25/2017    SSAIGG 0.2 08/25/2017    SSBIGG <0.2 08/25/2017    SCLIGG <0.2 08/25/2017     Neutrophil Cytoplasmic IgG Antibody   Date Value Ref Range Status   05/11/2013   Final    <1:20  Reference range: <1:20  (Note)  The ANCA IFA is <1:20; therefore, no further testing will  be performed.  INTERPRETIVE INFORMATION: Anti-Neutrophil Cyto Ab, IgG    Neutrophil Cytoplasmic Antibodies (C-ANCA = granular  cytoplasmic staining, P-ANCA = perinuclear staining) are  found in the serum of over 90 percent of patients with  certain necrotizing systemic vasculitides, and usually in  less than 5 percent of patients with collagen vascular  disease or arthritis.  Performed by [facility],  [address] [place] [id number] [phone]  [email or web address], Gabriella Quesada MD, Lab. Director           IMAGING:    XR Chest 2 Views  Narrative: XR CHEST 2 VIEWS  12/14/2022 2:13 PM     HISTORY:  dyspnea w/ cough       COMPARISON:  Chest radiograph 6/11/2022, chest CT 5/12/2013    FINDINGS:   Frontal and lateral views of the chest. Trachea is midline. Cardiac  silhouette is within normal limits. Nodular opacity over left lower  lung field, which may correspond with the nodular density on chest CT  dated 5/12/2013. Minimal bibasilar interstitial opacities, likely  atelectasis. No pleural effusion or appreciable pneumothorax. No acute  osseous abnormality. Visualized upper abdomen is unremarkable.    Impression: IMPRESSION: Nodular opacity over left lower lung field, which may  correspond with the nodular density on chest CT dated 5/12/2013.  Minimal bibasilar opacities, likely atelectasis.    I have personally reviewed the examination and initial interpretation  and I agree with  the findings.    ELAINE FIORE MD         SYSTEM ID:  [id number]

## 2022-12-14 NOTE — LETTER
12/14/2022       RE: Milly Carroll  [address] [facility] [address]     Dear Colleague,    Thank you for referring your patient, Milly Carroll, to the [facility] RHEUMATOLOGY [facility] [place] at [facility]. Please see a copy of my visit note below.        Rheumatology Clinic    Visit date: 12/14/2022  Last visit: 9/7/2022    Reason for visit:  SLE (+DsDNA, +RNP, +ribosomal P Ab and low complements)    Assessment/Plan:     #. Severe lupus (+DsDNA, +RNP, +ribosomal P Ab and hypocomplementemia)  # Lupus nephritis class lll  #. Lupus cerebritis ?  #. R knee osteonecrosis  #. Seizures  # left hip and knee pain   # fever   # fatigue   # Tachycardiac     Milly Carroll is a 22 year old female with long standing hx of lupus (+DsDNA, +RNP, +ribosomal P Ab and hypocomplementemia). Her disease overall has remained suboptimally controlled due to compliance issues.  She was found to have lupus flare with proteinuria during her last hospitalization in 06/2022.  Significant proteinuria and hematuria on urine sediment raised the concern of lupus nephritis. She underwent renal biopsy (6/28/222) which showed focal proliferative lupus nephritis, ISN/RPS class III with minimal activity and no significant chronicity.  She received Solumedrol 1 gm x 3 days and discharged on prednisone 60 mg. Unfortunately, [atient hasn't followed up with nephrology yet.  She has successfully tapered prednisone down to 10 mg daily. Tolerating Cellcept 1500 mg BID, plaquenil 200 mg once daily and Belimumab sub q weekly. Tolerating medications well.     Presented with subacute onset of myalgia, severe fatigue, night sweats, low grade fevers and new onset of mouth sores. Recent onset of left hip pain over last 2 weeks with limited weight bearing and ambulation. Tender on palpation with limited range of motion on assessment - raise the concern of osteonecrosis vs septic joint given hx of long term steroid  use and immunosuppression.  Looks ill with ongoing low grade fever (Temp 100.5 in clinic) and tachycardiac.  Lab from her recent ED visit 12/12 show drop in complements and and marked elevation in DsDNA, Hgb drop and proteinuria with low serum albumin. Her presentation raises concern for viral/bacterial infection vs lupus flare. I think patient will need hospitalization for urgent evaluation and further work up.  We would suggest to hold Cellcept and Benlysta until infectious etiology is ruled out. Meantime consider solumedrol 125 mg x 1 for lupus flare. We will follow up during her inpatient stay and provide further recommendations based on her further work up. We discussed our plan- she agree to visit ER and potential hospitalization for further work up.     Plan:  --  Patient will need work up for infection/sepsis during hospital stay  -- Hold Cellcept and Benlysta until infectious etiology is ruled out  -- MRI/Xray to evaluate left hip pain issues  -- Xray left knee  -- Solumedrol 125 mg IV for SLE flare  -- Continue Plaquenil 200 mg once daily   -- Prednisone 10 mg once daily   -- Continue Ca and Vit D  -- Ophthalmology follow up as outpatient to evaluate for HCQ related toxicity monitoring  -- Encouraged for follow up with nephrology for lupus nephritis   -- repeat labs on follow up     Follow up: 3 months     The patient was seen and staffed with Dr. Peewee MD.     Jaden Meredith MD  Rheumatology Fellow  Pager [phone]    I saw this patient with the Rheumatology Fellow. I agree with the findings and recommendations. Patient with SLE and Class III nephritis presents with subacute onset myalgia/arthralgia, worsening L hip and knee pain, fevers/chills, and mouth sores. She reports recent compliance with MMF 1500 bid, Hydroxychloroquine 400 bid, prednisone 10 mg daily, and benlysta weekly. Exam shows febrile (38.1) ill-appearing woman with tachycardia. There is painful L hip ROM with limited  weight-bearing/walking, several lip ulcers, L knee effusion. Labwork from 12-12 includes depressed complement C3 and C4, marked elevation in anti-dsDNA, worse anemia, and proteinuria with albumin 1.4.  Impression:    1. Febrile illness with vital instability in woman with SLE and nephritis, immunosuppressed and at risk for infection.  2. Subacute L hip pain in a woman with known avascular necrosis.    Concern is for infection vs avascular necrosis, L hip and SLE flare vs viral or bacterial septic illness. Patient is failing to thrive at home. She needs urgent evaluation with a view toward hospitalization, and treatment for SLE flare. [facility] admitting hospitalist service was contacted; no beds in the hospital are available now, and none are expected for at least 24 hours. Recommend ER evaluation.    Suggested plan:  1. Hold MMF, benlysta  2. Work up for sepsis/infection  3. Plain xray/MRI L hip  4. Consider solumedrol 125 mg IV for flaring SLE.    Thee Vides M.D.  Staff Rheumatologist,  Health  Pager [phone]      Interval history  12/14/2022    Patient was seen in ER for fever like illness, body aches, night sweats and left hip pain.    Left pain has been there for last few weeks -getting progressively worse and having difficulty in ambulation and weight bearing,    Left and ankle pain also started in same time duration but less severe compared to left hip pain     Low grade fevers at home - 100.5 her in clinic today. Remains tachycardiac 126    Mouth sores for last 1-2 weeks    Recent labs from emergency visit 12/12 - consistent with lupus flare. (drop in complements, elevated Dsdna, urine proteins +ve and hypoalbuminemia)    Reports staying compliant with her lupus meds. Cellcept 1500 mg BID, Plaquenil once daily and Balimumab weekly       Interim history: 9/7/2022  Patient reports feeling well. She couldn't show up in person on follow today due to transportation issues.  Apparently, she has missed previous  appointments and lab visit for similar reasons. She denies any joint pain, stiffness or skin rash. Fatigue has improved. No fever, chills or weight changes. She is tolerating medications well.  She has gained some weight. She hasn't seen Nephrology after her renal biopsy in 06/2022.      HPI:  Milly Carroll is a 21 year old female with a history of of lupus, seizures, lupus cerebritis, and R knee osteonecrosis who is transition of care from [facility] rheumatology. 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 She later developed symptoms consistent with systemic lupus (Rash, photosensitivity, alopecia, Arthritis, cytopenias, hypocomplementemia and positive serology (+DsDNA, +RNP, +ribosomal P ab). She has had varying course of disease since her diagnosis. She has most recently being treated with Cellcept 1500 mg BID, Plaquenil 200 mg BID and prednisone 5 mg daily. She was on Benlysta infusion in 2019 that was later switched to Benlysta subcutaneous in 2021. She is not receiving Benlysta for last many month for unclear reasons. I couldn't find why it was stopped. She was treated with Rituximab since 2013 but she developed anaphylactic reaction during Rituximab infusion in 12/2019.     Patient was recently hospitalized for ongoing fevers x 2 weeks, fatigue and loss appetite. Her sx were thought to be related to lupus flares given pancytopenias, elevated Dsdna and hypocomplementemia. Blood and urine Cx came back negative. She was noted to have hematuria and proteinuria on her urine sediment that raised the concern of lupus nephritis.  She required IV solumedrol 1 gm x 3 and discharged home on prednisone 60 mg oral daily. Currently she is taking Cellcept 1000 mg BID and Plaquenil 200 mg daily. Cellcept was decreased to 1000 mg BID during hospitalization to minimize the toxicity risk. She is Bactrim double strength x 3/week for PCP prophylaxis.     Today she is feeling better, denies pain in  her joints, fatigue has improved. She denies any further episode of fever since discharge from hospital. She feels tired on exposure to sunlight. She denies recent hx of malar rash. She gets raynaud's on cold exposure. It usually gets better in 5-10 minutes. She usually get pain in her MCP joints of hands during the flare. She denies any hx of blood clots or pleurisy. She has patchy hair loss with some discoid lesions. She has ulcer scars on hard palate but no new active oral ulcers. She reports being compliant with her medications. She has no smoking or drinking history. Her older sister has hx of lupus. She is not working currently, just helps her sister in taking care of her nephew at home.     She has received 2 doses of COVID vaccine 10/8/2021 and 10/30/2021    Rest of ROS is negative except mentioned in HPI    =     Past Medical History  Past Medical History:   Diagnosis Date     Hepatitis      Lupus (systemic lupus erythematosus) (H)      Lupus cerebritis (H)      Pancreatitis 08/2017     Pyelonephritis 08/2017     Seizures (H)      Status epilepticus (H)      Past Surgical History:   Procedure Laterality Date     IR RENAL BIOPSY LEFT  6/28/2022     NO HISTORY OF SURGERY       ORTHOPEDIC SURGERY       Social History     Socioeconomic History     Marital status: Single     Spouse name: Not on file     Number of children: Not on file     Years of education: Not on file     Highest education level: Not on file   Occupational History     Not on file   Tobacco Use     Smoking status: Never     Smokeless tobacco: Never   Substance and Sexual Activity     Alcohol use: Not on file     Drug use: Not on file     Sexual activity: Not on file   Other Topics Concern     Not on file   Social History Narrative    6/20/2013     Milly is the 2nd youngest of 7 children.  She is in the 10th grade and lives with her parents and siblings.  Her family is originally from [place] [facility], but Milly was born in the .        06/2022:  lives in [place] with older sister, brother-in-law, niece, and brother.         Social Determinants of Health     Financial Resource Strain: Not on file   Food Insecurity: Not on file   Transportation Needs: Not on file   Physical Activity: Not on file   Stress: Not on file   Social Connections: Not on file   Intimate Partner Violence: Not on file   Housing Stability: Not on file     Family History   Problem Relation Age of Onset     Other - See Comments Father         Question of lupus in father and paternal grandfather     Lupus Sister         older sister     Gastrointestinal Disease No family hx of         No known history of IBD, hepatitis, pancreatitis, celiac disease, or GI cancers before age 50     Glaucoma No family hx of      Macular Degeneration No family hx of        Medications:  Current Outpatient Medications   Medication     acetaminophen (TYLENOL) 500 MG tablet     Belimumab 200 MG/ML SOAJ     calcium carbonate (TUMS) 500 MG chewable tablet     ferrous sulfate (FEROSUL) 325 (65 Fe) MG tablet     multivitamin, therapeutic (THERA-VIT) TABS tablet     mycophenolate (GENERIC EQUIVALENT) 500 MG tablet     predniSONE (DELTASONE) 10 MG tablet     sulfamethoxazole-trimethoprim (BACTRIM DS) 800-160 MG tablet     vitamin D3 (CHOLECALCIFEROL) 1.25 MG (45044 UT) capsule     No current facility-administered medications for this visit.       Allergies:     Allergies   Allergen Reactions     Rituximab Anaphylaxis       /72 (BP Location: Right arm, Patient Position: Sitting, Cuff Size: Adult Small)   Pulse (!) 123   Temp (!) 100.5  F (38.1  C) (Oral)   Wt 49 kg (108 lb)   SpO2 100%   BMI 19.75 kg/m         Physical assessment during video encounter.     GENERAL: Healthy, alert and no distress, febrile- ill appearing with tachycardia   EYES: Eyes grossly normal to inspection.  No discharge or erythema, or obvious scleral/conjunctival abnormalities.  Mouth: mouth sores on inner lip and left angular  cheilosis   RESP: No audible wheeze, cough, or visible cyanosis.  No visible retractions or increased work of breathing.    SKIN: Visible skin clear. No significant rash, abnormal pigmentation or lesions.   NEURO: Cranial nerves grossly intact.  Mentation and speech appropriate for age.  PSYCH: Mentation appears normal, affect normal/bright, judgement and insight intact, normal speech and appearance well-groomed.  MSK: Painful L hip ROM with limited weight bearing/walking, left knee mild tenderness along medial joint line with some effusion,  Pain on palpation at left tibiotalar joint without any obvious swelling. Rest of joints look normal with good range of motion. No signs of tenosynovitis or dactyltis      Lab/Imaging: Reviewed recent labs and imaging.

## 2022-12-14 NOTE — H&P
[facility]    History and Physical - Medicine Service, MARO[facility] TEAM 3       Date of Admission:  12/14/2022    Assessment & Plan      Milly Carroll is a 22 yoF w/ PMH of severe lupus, lupus nephritis, and seizure disorder who presented with myalgias, fatigue, night sweats, low-grade fevers, mouth sores, and L hip pain to rheumatology clinic. Referred for admission given concern for infection vs lupus flare.     # Unilateral polyarthritis  # Fevers, fatigue  # Severe lupus  Patient presented with 2 week onset of general malaise, fevers, fatigue, and left-sided hip, knee, and ankle pain. Initially concerning for a possible lupus flare. Labs from 2 days prior to admission at outside ED notable for significantly elevated dsDNA ab and low complements. However, upon further chart review and discussion with the patient, her clinical picture is less consistent with a lupus flare. Her ds-DNA ab has been very high for the past year, and patient endorsed that she usually had finger joint pains bilaterally during her past lupus flares, which she does not currently have. Her abnormal labs are likely due to her chronic poorly controlled lupus rather than an acute flare. She was given 1 dose of IV solumedrol in ED per rheum recs. Septic arthritis is on the differential. She does not have any joint swelling, erythema, or warmth on exam, and the polyarticular nature of her pain would be somewhat unusual for a bacterial arthritis. Nonetheless, given her high risk with immunosuppression, we have initiated empiric coverage with ceftriaxone. Blood cx and ucx are pending. We have also obtained x-rays of the left hip, knee, and ankle to rule out anatomical abnormalities, fracture, and also osteonecrosis (which she is at risk for given her chronic steroid therapy). Viral infection is also a possible etiology of her symptoms. She is Influenza negative and COVID negative, but we will check for  parvovirus as well, which could present with poly-arthralgias.   - bcx, ucx pending  - XR hip, knee, ankle pending  - parvovirus screen pending  - MRSA nares pending  - Ceftriaxone (12/14-TBD)  - tylenol for pain  - Rheumatology following, appreciate recs  - Holding PTA cellcept and benlysta  - s/p solumedrol 125 mg IV x1 in ED (per rheum), additional doses per rheum recs  - continue PTA laquenil 200 mg daily  - continue prednisone 10 mg daily  - bactrim held while holding immunosuppression    # Acute on chronic anemia  Hemoglobin on arrival at 5.6, with baseline close to 7-8. No overt bleeding observed on exam. She is at risk of hemolysis given her lupus history. Will further work up for hemolysis; 2U pRBC ordered in ED. Since her lupus history also puts her at risk of clotting (though lupus anticoagulant and anti-cardiolipin were negative in the past), we will keep patient on lovenox while inpatient.   - LDH, hapto, retic count, liver panel pending  - s/p 2U pRBC in ED, will re-check Hgb 12/14 PM  - continue PTA ferrous sulfate  - Lovenox while inpatient    # Concern for seizures  # History of lupus cerebritis  Has a documented history of seizures, but not currently on any anti-seizure medication. Per discussion with patient, she has monthly episodes of shaking and losing consciousness where she occasionally bites her tongue. Suspect these are seizures secondary to her history of lupus cerebritis. Neurology consulted to discuss whether patient should be on anti-seizure medication or if additional work-up should be pursued.   - Neurology consulted, appreciate recs    # Hypokalemia  Potassium of 3.2 on arrival, but sample hemolyzed so suspect true value may be lower. Repeat has been ordered and is pending.   - replace prn  - trend BMP    # Hypocalcemia, chronic  Calcium at 7.0 on admit. Albumin pending.   - Continue PTA calcium carbonate 500mg daily  - Follow up liver panel    # Lupus nephritis  Diagnosed in June  2022 per renal biopsy. Creatinine currently at baseline. UA showing blood and WBC, largely unchanged from prior. No acute interventions at this time.         Diet: Regular Diet Adult  DVT Prophylaxis: Enoxaparin (Lovenox) SQ  Leung Catheter: Not present  Fluids: none  Central Lines: None  Cardiac Monitoring: None  Code Status: Full Code    Clinically Significant Risk Factors Present on Admission        # Hypokalemia: Lowest K = 3.2 mmol/L in last 2 days, will replace as needed  # Hyponatremia: Lowest Na = 134 mmol/L in last 2 days, will monitor as appropriate                       Disposition Plan      Expected Discharge Date: 12/16/2022                The patient's care was discussed with the Attending Physician, Dr. Mary.    Clint Gu MD   PGY1, Internal Medicine  Medicine Service, [address]  Securely message with the Vocera Web Console (learn more here)  Text page via [facility] Paging/Directory   Please see signed in provider for up to date coverage information    ______________________________________________________________________    Chief Complaint   Hip pain, fevers    History is obtained from the patient    History of Present Illness   Milly Carroll is a 22 yoF w/ PMH of severe lupus, lupus nephritis, and seizure disorder who presented with myalgias, fatigue, night sweats, low-grade fevers, mouth sores, and L hip pain to rheumatology clinic.     Patient reported her symptoms began about 2 weeks ago. She was seen at an outside ED on 12/12, where work-up showed significantly elevated ds-DNA antibodies and low complement. Strep and flu were negative. ED discussed with patient's outpatient rheumatologist, with the recommendation for patient to follow up in clinic on 12/14. When seen in clinic on morning of admission, patient was continued to be febrile and tachycardic. She was referred for admission given concern for sepsis vs lupus flare.      Upon further discussion with patient, it appears she also has had monthly episodes of passing out where she will notice her body shaking with occasional tongue biting. She is unaware of any history of seizure disorder, but she has not discussed this issue with any of her doctors recently. Denied any chest pain, SOB, urinary symptoms, changes in BMs, or changes in diet/activity. She denies any tobacco, alcohol, or other drug use.     Review of Systems    The 10 point Review of Systems is negative other than noted in the HPI or here.     Past Medical History    I have reviewed this patient's medical history and updated it with pertinent information if needed.   Past Medical History:   Diagnosis Date     Hepatitis      Lupus (systemic lupus erythematosus) (H)      Lupus cerebritis (H)      Pancreatitis 08/2017     Pyelonephritis 08/2017     Seizures (H)      Status epilepticus (H)        Past Surgical History   I have reviewed this patient's surgical history and updated it with pertinent information if needed.  Past Surgical History:   Procedure Laterality Date     IR RENAL BIOPSY LEFT  6/28/2022     NO HISTORY OF SURGERY       ORTHOPEDIC SURGERY          Social History   I have reviewed this patient's social history and updated it with pertinent information if needed. Milly Carroll  reports that she has never smoked. She has never used smokeless tobacco. She reports that she does not currently use alcohol. She reports that she does not use drugs.    Family History   I have reviewed this patient's family history and updated it with pertinent information if needed.  Family History   Problem Relation Age of Onset     Other - See Comments Father         Question of lupus in father and paternal grandfather     Lupus Sister         older sister     Gastrointestinal Disease No family hx of         No known history of IBD, hepatitis, pancreatitis, celiac disease, or GI cancers before age 50     Glaucoma No family hx of       Macular Degeneration No family hx of        Prior to Admission Medications   Prior to Admission Medications   Prescriptions Last Dose Informant Patient Reported? Taking?   Belimumab 200 MG/ML SOAJ   No No   Sig: Inject 200 mg Subcutaneous every 7 days   acetaminophen (TYLENOL) 500 MG tablet   No No   Sig: Take 1 tablet (500 mg) by mouth every 4 hours as needed for mild pain   calcium carbonate (TUMS) 500 MG chewable tablet   No No   Sig: Take 1 tablet (500 mg) by mouth daily   ferrous sulfate (FEROSUL) 325 (65 Fe) MG tablet   No No   Sig: Take 1 tablet (325 mg) by mouth daily (with breakfast)   hydroxychloroquine (PLAQUENIL) 200 MG tablet   No No   Sig: Take 1 tablet (200 mg) by mouth daily Annual Plaquenil toxicity eye screening required.   multivitamin, therapeutic (THERA-VIT) TABS tablet  Self No No   Sig: Take 1 tablet by mouth daily   mycophenolate (GENERIC EQUIVALENT) 500 MG tablet   No No   Sig: Take 3 tablets (1,500 mg) by mouth 2 times daily   predniSONE (DELTASONE) 10 MG tablet   No No   Sig: Prednisone 50 mg once daily x 2 weeks then 40 mg once daily x 2 weeks then 30 mg once daily x 2 weeks then stay on 20 mg once daily   sulfamethoxazole-trimethoprim (BACTRIM DS) 800-160 MG tablet   No No   Sig: Take 1 tablet by mouth Every Mon, Wed, Fri Morning   vitamin D3 (CHOLECALCIFEROL) 1.25 MG (80328 UT) capsule   No No   Sig: Take 1 capsule (50,000 Units) by mouth every 7 days      Facility-Administered Medications: None     Allergies   Allergies   Allergen Reactions     Rituximab Anaphylaxis       Physical Exam   Vital Signs: Temp: 98.2  F (36.8  C) Temp src: Oral BP: 95/66 Pulse: 80   Resp: 16 SpO2: 100 % O2 Device: None (Room air)    Weight: 108 lbs 0 oz    Gen: No acute distress, appears tired.   HEENT: Normocephalic, atraumatic. EOMI, PERRL. Neck ROM normal. Slight tenderness to palpation of left neck. No palpable lymph nodes.   CV: Borderline tachycardia with regular rhythm. No extra heart sounds.    Pulm: CTAB, non-labored breathing.   Abd: Soft, non-tender. Normoactive bowel sounds.  MSK: Pain with L shoulder flexion. ROM normal in bilateral upper extremities without gross joint abnormalities, erythema, or swelling. L hip tender to palpation over anterior joint aspect. L hip pain with passive movement. L knee tender to palpation and pain with active movement. L ankle tender to palpation. ROM normal in bilateral lower extremities without gross joint abnormalities, erythema, or swelling. Scar overlying R knee.   Skin: Warm and dry. No apparent rash.   Neuro: A&O x3. No focal neurological deficits. Cranial nerves II-XII intact.   Psych: Mood is normal.     Data   Data reviewed today: I reviewed all medications, new labs and imaging results over the last 24 hours. I personally reviewed the chest x-ray image(s) showing nodular opacity over left lower lung, otherwise no acute abnormalities.    Recent Labs   Lab 12/14/22  1556 12/14/22  1257   WBC  --  5.0   HGB  --  5.6*   MCV  --  85   PLT  --  147*   * 135*   POTASSIUM 3.7 3.2*   CHLORIDE 105 107   CO2 21* 19*   BUN 18.8 17.2   CR 0.83 0.73   ANIONGAP 8 9   BISI 7.4* 7.0*   GLC 94 85

## 2022-12-14 NOTE — ED TRIAGE NOTES
Pt comes in as a referral from doctor for lupus flare and possible septic arthritis. Pt is having left hip pain. Pt stated doctor wanted MRI and x-ray.     Triage Assessment     Row Name 12/14/22 1216       Triage Assessment (Adult)    Airway WDL WDL       Respiratory WDL    Respiratory WDL X;all    Rhythm/Pattern, Respiratory shortness of breath       Skin Circulation/Temperature WDL    Skin Circulation/Temperature WDL WDL       Cardiac WDL    Cardiac WDL X;all    Pulse Rate & Regularity tachycardic       Cognitive/Neuro/Behavioral WDL    Cognitive/Neuro/Behavioral WDL WDL

## 2022-12-14 NOTE — ED PROVIDER NOTES
ED Provider Note  [facility]      History     Chief Complaint   Patient presents with     Hip Pain     HPI  Milly Carroll is a 22 year old female w/ PMH significant for severe lupus, lupus nephritis, seizures, hypokalemia, anemia, thrombocytopenia, and hypocalcemia who presents to the ED for concern for septic arthritis in setting of lupus flare.     1 week of flu like symptoms that includes dyspnea, cough, myalgias and subjective fevers. Seen at [facility] ED on 12/12/22. Febrile to 100.7 and tachycardic to 140. Met SIRS criteria but was not presumed to have sepsis and blood cultures not obtained. COVID and influenza were negative. CXR, UA, and labs unremarkable for infection. Presumed to have viral infection. Additionally, noted to have protein and RBCs in urine with Hgb of 6.6. Rheum and Nephrology (from the ) were consulted who recommended she follow up w/ her Rheumatologist on 12/14 for outpatient anemia workup. Autoimmune labs sent out (which have now resulted w/ low C3 and C4 and >1000 dsDNA). Pt's sx improved w/ fluids and tylenol and she was discharged home.     At her Rheumatology appointment today, febrile to 100.5. In addition to her flu like symptoms, she also reported L hip and knee pain that has been there a week. Labs were forgone and pt was sent to ED to be admitted for concern of septic arthritis in the setting of lupus flare.     On discussion w/ patient, she reports 1 week of fatigue, shortness of breath, dry cough, subjective fevers, chills, night sweats, watery diarrhea, and left hip and knee pain. Pain located on the lateral aspect of hip and knee. No pain in her anterior hip or groin. Pain is worse w/ weight bearing, getting in and out of a chair, and better w/ lying flat. Denies any trauma or injury. Denies any swelling or redness of her joints. No new rashes. Denies any hematemesis, hemoptysis, bloody stools or melena.     Says that everyone in her house  has been coughing for a couple of weeks and her niece (who lives with her) tested positive for influenza last week. No recent travel.     Past Medical History  Past Medical History:   Diagnosis Date     Hepatitis      Lupus (systemic lupus erythematosus) (H)      Lupus cerebritis (H)      Pancreatitis 08/2017     Pyelonephritis 08/2017     Seizures (H)      Status epilepticus (H)      Past Surgical History:   Procedure Laterality Date     IR RENAL BIOPSY LEFT  6/28/2022     NO HISTORY OF SURGERY       ORTHOPEDIC SURGERY       acetaminophen (TYLENOL) 500 MG tablet  Belimumab 200 MG/ML SOAJ  calcium carbonate (TUMS) 500 MG chewable tablet  ferrous sulfate (FEROSUL) 325 (65 Fe) MG tablet  hydroxychloroquine (PLAQUENIL) 200 MG tablet  multivitamin, therapeutic (THERA-VIT) TABS tablet  mycophenolate (GENERIC EQUIVALENT) 500 MG tablet  [START ON 12/18/2022] predniSONE (DELTASONE) 5 MG tablet  sulfamethoxazole-trimethoprim (BACTRIM DS) 800-160 MG tablet  vitamin D3 (CHOLECALCIFEROL) 1.25 MG (11760 UT) capsule      Allergies   Allergen Reactions     Rituximab Anaphylaxis     Family History  Family History   Problem Relation Age of Onset     Other - See Comments Father         Question of lupus in father and paternal grandfather     Lupus Sister         older sister     Gastrointestinal Disease No family hx of         No known history of IBD, hepatitis, pancreatitis, celiac disease, or GI cancers before age 50     Glaucoma No family hx of      Macular Degeneration No family hx of      Social History   Social History     Tobacco Use     Smoking status: Never     Smokeless tobacco: Never   Substance Use Topics     Alcohol use: Not Currently     Drug use: Never      Past medical history, past surgical history, medications, allergies, family history, and social history were reviewed with the patient. No additional pertinent items.       Review of Systems   Constitutional: Positive for chills, fatigue and fever.   Respiratory:  "Positive for cough and shortness of breath.    Cardiovascular: Negative for chest pain.   Gastrointestinal: Positive for diarrhea. Negative for abdominal pain, constipation, nausea and vomiting.   Genitourinary: Negative for dysuria.   All other systems reviewed and are negative.    A complete review of systems was performed with pertinent positives and negatives noted in the HPI, and all other systems negative.    Physical Exam   BP: 112/76  Pulse: (!) 122  Temp: 99.3  F (37.4  C)  Resp: 17  Height: 157.5 cm (5' 2\")  Weight: 49 kg (108 lb)  SpO2: 100 %  Physical Exam  Vitals and nursing note reviewed.   Constitutional:       General: She is not in acute distress.  HENT:      Head: Normocephalic and atraumatic.   Eyes:      Conjunctiva/sclera: Conjunctivae normal.   Cardiovascular:      Rate and Rhythm: Regular rhythm. Tachycardia present.      Heart sounds: Normal heart sounds. No murmur heard.  Pulmonary:      Effort: Pulmonary effort is normal. No respiratory distress.      Breath sounds: Normal breath sounds.   Abdominal:      General: Abdomen is flat. There is no distension.      Palpations: Abdomen is soft.   Musculoskeletal:      Left hip: Tenderness (over lateral hip) present. No deformity (no erythema ). Normal range of motion. Normal strength.      Left knee: No swelling, deformity, effusion, erythema, ecchymosis or lacerations. Normal range of motion. Tenderness present over the lateral joint line, LCL and patellar tendon. No LCL laxity or MCL laxity.  Skin:     General: Skin is warm and dry.   Neurological:      Mental Status: She is alert.   Psychiatric:         Mood and Affect: Mood normal.       ED Course     ED Course as of 12/17/22 1741   Wed Dec 14, 2022   1329 Pulse(!): 122   1330 Hemoglobin(!!): 5.6   1330 Platelet Count(!): 147   1514 XR Chest 2 Views  Unremarkable for acute infection    1515 Squamous Epithelial /HPF Urine(!): 11   1516 WBC Urine(!): 76   1516 Leukocyte Esterase Urine(!): " Moderate  Possible UTI but dirty catch. Will repeat UA   1516 HCG Qual Urine: Negative     Procedures               Results for orders placed or performed during the hospital encounter of 12/14/22   XR Chest 2 Views     Status: None    Narrative    XR CHEST 2 VIEWS  12/14/2022 2:13 PM     HISTORY:  dyspnea w/ cough       COMPARISON:  Chest radiograph 6/11/2022, chest CT 5/12/2013    FINDINGS:   Frontal and lateral views of the chest. Trachea is midline. Cardiac  silhouette is within normal limits. Nodular opacity over left lower  lung field, which may correspond with the nodular density on chest CT  dated 5/12/2013. Minimal bibasilar interstitial opacities, likely  atelectasis. No pleural effusion or appreciable pneumothorax. No acute  osseous abnormality. Visualized upper abdomen is unremarkable.        Impression    IMPRESSION: Nodular opacity over left lower lung field, which may  correspond with the nodular density on chest CT dated 5/12/2013.  Minimal bibasilar opacities, likely atelectasis.    I have personally reviewed the examination and initial interpretation  and I agree with the findings.    ELAINE FIORE MD         SYSTEM ID:  [id number]   XR Knee Left 1/2 Views     Status: None    Narrative    EXAM: XR KNEE LEFT 1/2 VIEWS  LOCATION: [facility]  DATE/TIME: 12/14/2022 5:15 PM    INDICATION: Left knee pain.  COMPARISON: None.      Impression    IMPRESSION: Anatomic alignment left knee. No acute displaced left knee fracture. Normal joint spaces. No left knee joint effusion.   XR Pelvis w Hip Left 1 View     Status: None    Narrative    EXAM: XR PELVIS AND HIP LEFT 1 VIEW  LOCATION: [facility]  DATE/TIME: 12/14/2022 5:14 PM    INDICATION: Hip pain.  COMPARISON: None.      Impression    IMPRESSION: Anatomic alignment left hip. No acute displaced left hip fracture. Normal and symmetric hip joint spaces. No acute  displaced pelvic fracture. Both obturator rings are intact. Symmetric SI joints.   XR Ankle Left G/E 3 Views     Status: None    Narrative    EXAM: XR ANKLE LEFT G/E 3 VIEWS  LOCATION: [facility]  DATE/TIME: 12/14/2022 5:15 PM    INDICATION: Ankle pain.  COMPARISON: None.      Impression    IMPRESSION: Normal joint spaces and alignment. No fracture. 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 Medial greater than lateral hindfoot soft tissue swelling.   MR Brain w/o & w Contrast     Status: None    Narrative    EXAM: MR BRAIN W/O & W CONTRAST  12/15/2022 1:15 PM     HISTORY:  Lupus, history of seizures once/month. Evaluate for evidence  of lupus-related brain changes       COMPARISON:  5/15/2013 MRI, 5/15/2013 CT    TECHNIQUE: Axial FLAIR,  T1-weighted, and susceptibility images were  obtained without intravenous contrast. Following intravenous  gadolinium-based contrast administration, axial T2-weighted,  diffusion, FLAIR, and axial and coronal T1-weighted images were  obtained    CONTRAST: 5mL Gadavist.    FINDINGS:    There is no mass effect, midline shift, or acute intracranial  hemorrhage. Chronic T1 hyperintensity and calcification in the basal  ganglia bilaterally which was seen on prior MRI, although has  progressed in the interval. Calcifications are also seen on prior CT.  In addition, there is T1 hyperintense susceptibility creating the  appearance in the dentate nuclei, could be secondary to either are in  the position or calcium deposition. No acute intracranial hemorrhage  is suspected. No abnormal restricted diffusion. Major intracranial  vascular flow voids appear patent.  No abnormal intracranial enhancement.    Paranasal sinuses and mastoid air cells are clear.      Impression    IMPRESSION:    1. No acute intracranial pathology suspected.  2. Bilateral calcifications in the basal ganglia which has increased  since 2013. In addition,  there is increased mineral deposition in the  basal ganglia either secondary to calcium or iron. These be seen as a  consequence of lupus (either from medication or renal issues or  calcium metabolism related).   3. No abnormal intracranial enhancement.    I have personally reviewed the examination and initial interpretation  and I agree with the findings.    KE PERALTA MD         SYSTEM ID:  [id number]   Basic metabolic panel     Status: Abnormal   Result Value Ref Range    Sodium 135 (L) 136 - 145 mmol/L    Potassium 3.2 (L) 3.4 - 5.3 mmol/L    Chloride 107 98 - 107 mmol/L    Carbon Dioxide (CO2) 19 (L) 22 - 29 mmol/L    Anion Gap 9 7 - 15 mmol/L    Urea Nitrogen 17.2 6.0 - 20.0 mg/dL    Creatinine 0.73 0.51 - 0.95 mg/dL    Calcium 7.0 (L) 8.6 - 10.0 mg/dL    Glucose 85 70 - 99 mg/dL    GFR Estimate >90 >60 mL/min/1.73m2   Lactic acid whole blood     Status: Normal   Result Value Ref Range    Lactic Acid 1.3 0.7 - 2.0 mmol/L   CRP inflammation     Status: Abnormal   Result Value Ref Range    CRP Inflammation 37.50 (H) <5.00 mg/L   Symptomatic Influenza A/B & SARS-CoV2 (COVID-19) Virus PCR Multiplex Nasopharyngeal     Status: Normal    Specimen: Nasopharyngeal; Swab   Result Value Ref Range    Influenza A PCR Negative Negative    Influenza B PCR Negative Negative    RSV PCR Negative Negative    SARS CoV2 PCR Negative Negative    Narrative    Testing was performed using the Xpert Xpress CoV2/Flu/RSV Assay on the [employer] GeneXpert Instrument. This test should be ordered for the detection of SARS-CoV-2 and influenza viruses in individuals who meet clinical and/or epidemiological criteria. Test performance is unknown in asymptomatic patients. This test is for in vitro diagnostic use under the FDA EUA for laboratories certified under CLIA to perform high or moderate complexity testing. This test has not been FDA cleared or approved. A negative result does not rule out the presence of PCR inhibitors in the specimen or  target RNA in concentration below the limit of detection for the assay. If only one viral target is positive but coinfection with multiple targets is suspected, the sample should be re-tested with another FDA cleared, approved, or authorized test, if coinfection would change clinical management. This test was validated by the [facility] [address]. These laboratories are certified under the Clinical Laboratory Improvement Amendments of 1988 (CLIA-88) as qualified to perform high complexity laboratory testing.   UA with Microscopic reflex to Culture     Status: Abnormal    Specimen: Urine, Clean Catch   Result Value Ref Range    Color Urine Yellow Colorless, Straw, Light Yellow, Yellow    Appearance Urine Slightly Cloudy (A) Clear    Glucose Urine Negative Negative mg/dL    Bilirubin Urine Negative Negative    Ketones Urine Negative Negative mg/dL    Specific Gravity Urine 1.018 1.003 - 1.035    Blood Urine Moderate (A) Negative    pH Urine 6.5 5.0 - 7.0    Protein Albumin Urine 300 (A) Negative mg/dL    Urobilinogen Urine Normal Normal, 2.0 mg/dL    Nitrite Urine Negative Negative    Leukocyte Esterase Urine Moderate (A) Negative    RBC Urine 25 (H) <=2 /HPF    WBC Urine 76 (H) <=5 /HPF    Squamous Epithelials Urine 11 (H) <=1 /HPF    Transitional Epithelials Urine 1 <=1 /HPF    Narrative    Urine Culture ordered based on laboratory criteria   HCG qualitative urine (UPT)     Status: Normal   Result Value Ref Range    hCG Urine Qualitative Negative Negative   CBC with platelets and differential     Status: Abnormal   Result Value Ref Range    WBC Count 5.0 4.0 - 11.0 10e3/uL    RBC Count 2.13 (L) 3.80 - 5.20 10e6/uL    Hemoglobin 5.6 (LL) 11.7 - 15.7 g/dL    Hematocrit 18.2 (L) 35.0 - 47.0 %    MCV 85 78 - 100 fL    MCH 26.3 (L) 26.5 - 33.0 pg    MCHC 30.8 (L) 31.5 - 36.5 g/dL    RDW 19.9 (H) 10.0 - 15.0 %    Platelet Count 147 (L) 150 - 450 10e3/uL    Narrative    Previously reported component [ NRBCs ] is  "no longer reported.\"  Previously reported component [ NRBCs Absolute ] is no longer reported.\"   Manual Differential     Status: Abnormal   Result Value Ref Range    % Neutrophils 94 %    % Lymphocytes 3 %    % Monocytes 3 %    % Eosinophils 0 %    % Basophils 0 %    Absolute Neutrophils 4.7 1.6 - 8.3 10e3/uL    Absolute Lymphocytes 0.2 (L) 0.8 - 5.3 10e3/uL    Absolute Monocytes 0.2 0.0 - 1.3 10e3/uL    Absolute Eosinophils 0.0 0.0 - 0.7 10e3/uL    Absolute Basophils 0.0 0.0 - 0.2 10e3/uL    RBC Morphology Confirmed RBC Indices     Platelet Assessment  Automated Count Confirmed. Platelet morphology is normal.     Automated Count Confirmed. Platelet morphology is normal.    Elliptocytes Slight (A) None Seen    Teardrop Cells Slight (A) None Seen   UA reflex to Microscopic and Culture     Status: Abnormal    Specimen: Urine, Clean Catch   Result Value Ref Range    Color Urine Yellow Colorless, Straw, Light Yellow, Yellow    Appearance Urine Slightly Cloudy (A) Clear    Glucose Urine Negative Negative mg/dL    Bilirubin Urine Negative Negative    Ketones Urine Negative Negative mg/dL    Specific Gravity Urine 1.018 1.003 - 1.035    Blood Urine Large (A) Negative    pH Urine 6.5 5.0 - 7.0    Protein Albumin Urine 300 (A) Negative mg/dL    Urobilinogen Urine Normal Normal, 2.0 mg/dL    Nitrite Urine Negative Negative    Leukocyte Esterase Urine Moderate (A) Negative    RBC Urine 18 (H) <=2 /HPF    WBC Urine 60 (H) <=5 /HPF    Squamous Epithelials Urine 4 (H) <=1 /HPF    Transitional Epithelials Urine 1 <=1 /HPF    Narrative    Urine Culture ordered based on laboratory criteria   Basic metabolic panel     Status: Abnormal   Result Value Ref Range    Sodium 134 (L) 136 - 145 mmol/L    Potassium 3.7 3.4 - 5.3 mmol/L    Chloride 105 98 - 107 mmol/L    Carbon Dioxide (CO2) 21 (L) 22 - 29 mmol/L    Anion Gap 8 7 - 15 mmol/L    Urea Nitrogen 18.8 6.0 - 20.0 mg/dL    Creatinine 0.83 0.51 - 0.95 mg/dL    Calcium 7.4 (L) 8.6 - " 10.0 mg/dL    Glucose 94 70 - 99 mg/dL    GFR Estimate >90 >60 mL/min/1.73m2   Mycophenolic Acid by Tandem Mass Spectrometry     Status: Abnormal   Result Value Ref Range    Mycophenolic Acid by Tandem Mass Spectrometry <0.25 (L) 1.00 - 3.50 mg/L    MPA Glucuronide by Tandem Mass Spectrometry <6.5 (L) 30.0 - 95.0 mg/L    Mycophenolic Acid Last Dose Date 12/14/2022     Mycopheolic Acid Last Dose Time  7:00 AM     Narrative    This test was developed and its performance characteristics determined by the [facility]  [facility]. It has not been cleared or approved by the FDA. The laboratory is regulated under CLIA as qualified to perform high-complexity testing. This test is used for clinical purposes. It should not be regarded as investigational or for research.   Haptoglobin     Status: Normal   Result Value Ref Range    Haptoglobin 197 32 - 197 mg/dL   Lactate Dehydrogenase     Status: Abnormal   Result Value Ref Range    Lactate Dehydrogenase 351 (H) 0 - 250 U/L   Hepatic panel     Status: Abnormal   Result Value Ref Range    Protein Total 7.1 6.4 - 8.3 g/dL    Albumin 1.8 (L) 3.5 - 5.2 g/dL    Bilirubin Total 0.3 <=1.2 mg/dL    Alkaline Phosphatase 183 (H) 35 - 104 U/L    AST      ALT 15 10 - 35 U/L    Bilirubin Direct <0.20 0.00 - 0.30 mg/dL   Reticulocyte count     Status: Abnormal   Result Value Ref Range    % Reticulocyte 0.6 0.5 - 2.0 %    Absolute Reticulocyte 0.014 (L) 0.025 - 0.095 10e6/uL   Magnesium     Status: Normal   Result Value Ref Range    Magnesium 1.7 1.7 - 2.3 mg/dL   CK total     Status: Normal   Result Value Ref Range    CK 28 26 - 192 U/L   Hemoglobin     Status: Abnormal   Result Value Ref Range    Hemoglobin 10.6 (L) 11.7 - 15.7 g/dL   Basic metabolic panel     Status: Abnormal   Result Value Ref Range    Sodium 138 136 - 145 mmol/L    Potassium 4.8 3.4 - 5.3 mmol/L    Chloride 110 (H) 98 - 107 mmol/L    Carbon Dioxide (CO2) 17 (L) 22 - 29  mmol/L    Anion Gap 11 7 - 15 mmol/L    Urea Nitrogen 24.6 (H) 6.0 - 20.0 mg/dL    Creatinine 0.99 (H) 0.51 - 0.95 mg/dL    Calcium 7.9 (L) 8.6 - 10.0 mg/dL    Glucose 143 (H) 70 - 99 mg/dL    GFR Estimate 83 >60 mL/min/1.73m2   CRP inflammation     Status: Abnormal   Result Value Ref Range    CRP Inflammation 28.20 (H) <5.00 mg/L   CBC with platelets and differential     Status: Abnormal   Result Value Ref Range    WBC Count 4.0 4.0 - 11.0 10e3/uL    RBC Count 4.10 3.80 - 5.20 10e6/uL    Hemoglobin 11.2 (L) 11.7 - 15.7 g/dL    Hematocrit 35.6 35.0 - 47.0 %    MCV 87 78 - 100 fL    MCH 27.3 26.5 - 33.0 pg    MCHC 31.5 31.5 - 36.5 g/dL    RDW 17.2 (H) 10.0 - 15.0 %    Platelet Count 127 (L) 150 - 450 10e3/uL   Reticulocyte count     Status: Normal   Result Value Ref Range    % Reticulocyte 0.7 0.5 - 2.0 %    Absolute Reticulocyte 0.029 0.025 - 0.095 10e6/uL   Manual Differential     Status: Abnormal   Result Value Ref Range    % Neutrophils 61 %    % Lymphocytes 35 %    % Monocytes 4 %    % Eosinophils 0 %    % Basophils 0 %    Absolute Neutrophils 2.4 1.6 - 8.3 10e3/uL    Absolute Lymphocytes 1.4 0.8 - 5.3 10e3/uL    Absolute Monocytes 0.2 0.0 - 1.3 10e3/uL    Absolute Eosinophils 0.0 0.0 - 0.7 10e3/uL    Absolute Basophils 0.0 0.0 - 0.2 10e3/uL    RBC Morphology Confirmed RBC Indices     Platelet Assessment  Automated Count Confirmed. Platelet morphology is normal.     Automated Count Confirmed. Platelet morphology is normal.    [place] Cells Moderate (A) None Seen    Elliptocytes Slight (A) None Seen   Protein  random urine     Status: Abnormal   Result Value Ref Range    Total Protein Urine mg/dL 507.0 mg/dL    Total Protein UR MG/MG CR 2.26 (H) 0.00 - 0.20 mg/mg Cr    Creatinine Urine mg/dL 224.0 mg/dL   CBC with platelets     Status: Abnormal   Result Value Ref Range    WBC Count 6.9 4.0 - 11.0 10e3/uL    RBC Count 3.79 (L) 3.80 - 5.20 10e6/uL    Hemoglobin 10.4 (L) 11.7 - 15.7 g/dL    Hematocrit 33.9 (L) 35.0 -  47.0 %    MCV 89 78 - 100 fL    MCH 27.4 26.5 - 33.0 pg    MCHC 30.7 (L) 31.5 - 36.5 g/dL    RDW 17.6 (H) 10.0 - 15.0 %    Platelet Count 139 (L) 150 - 450 10e3/uL   Basic metabolic panel     Status: Abnormal   Result Value Ref Range    Sodium 141 136 - 145 mmol/L    Potassium 4.4 3.4 - 5.3 mmol/L    Chloride 113 (H) 98 - 107 mmol/L    Carbon Dioxide (CO2) 20 (L) 22 - 29 mmol/L    Anion Gap 8 7 - 15 mmol/L    Urea Nitrogen 33.6 (H) 6.0 - 20.0 mg/dL    Creatinine 0.80 0.51 - 0.95 mg/dL    Calcium 7.6 (L) 8.6 - 10.0 mg/dL    Glucose 111 (H) 70 - 99 mg/dL    GFR Estimate >90 >60 mL/min/1.73m2   CRP inflammation     Status: Abnormal   Result Value Ref Range    CRP Inflammation 14.10 (H) <5.00 mg/L   EKG 12-lead, tracing only     Status: None   Result Value Ref Range    Systolic Blood Pressure  mmHg    Diastolic Blood Pressure  mmHg    Ventricular Rate 102 BPM    Atrial Rate 102 BPM    OK Interval 130 ms    QRS Duration 72 ms     ms    QTc 513 ms    P Axis 18 degrees    R AXIS -34 degrees    T Axis 13 degrees    Interpretation ECG       Sinus tachycardia with Premature atrial complexes with Aberrant conduction  Left axis deviation  Abnormal ECG     EKG 12-lead, complete     Status: None   Result Value Ref Range    Systolic Blood Pressure  mmHg    Diastolic Blood Pressure  mmHg    Ventricular Rate 53 BPM    Atrial Rate 53 BPM    OK Interval 148 ms    QRS Duration 70 ms     ms    QTc 471 ms    P Axis 47 degrees    R AXIS -30 degrees    T Axis -1 degrees    Interpretation ECG       Sinus bradycardia  Left axis deviation  Abnormal ECG     Adult Type and Screen     Status: None   Result Value Ref Range    ABO/RH(D) O POS     Antibody Screen Negative Negative    SPECIMEN EXPIRATION DATE [id number]    Prepare red blood cells (unit)     Status: None   Result Value Ref Range    Blood Component Type Red Blood Cells     Product Code [id number]     Unit Status Transfused     Unit Number [id number]     CROSSMATCH  Compatible     CODING SYSTEM [id number]     ISSUE DATE AND TIME [id number]     UNIT ABO/RH O+     UNIT TYPE ISBT 5100    Prepare red blood cells (unit)     Status: None   Result Value Ref Range    Blood Component Type Red Blood Cells     Product Code [id number]     Unit Status Transfused     Unit Number [id number]     CROSSMATCH Compatible     CODING SYSTEM [id number]     ISSUE DATE AND TIME [id number]     UNIT ABO/RH O+     UNIT TYPE ISBT 5100    Bld morphology pathology review     Status: None   Result Value Ref Range    Final Diagnosis       Peripheral Blood Smear:  -Slight normochromic, normocytic anemia; rouleaux formation appears to be slightly increased; occasional echinocytes (see comment)  -Slight thrombocytopenia  -No morphologic evidence of hemolysis      Comment       The red blood cell morphology may not be representative for this patient due to recent red blood cell transfusion on 12/14/2022.    The overall findings are insufficient for a morphologic diagnosis of hemolysis.   If clinical suspicion is high for hemolysis, serial determinations of D-dimer, fibrinogen, LDH, and haptoglobin may be helpful.      Clinical Information       From Epic electronic medical record; 22-year-old female has a history of systemic lupus erythematosus, pancreatitis, pyelonephritis and seizures. Peripheral smear review requested for anemia, concern for hemolysis.      Peripheral Smear       The red blood cells appear normochromic.  Poikilocytosis includes occasional echinocytes, rare elliptocytes, and very rare fragments. Polychromasia is not increased.  Rouleaux formation appears to be slightly increased.   The morphology of the platelets is normal.        Peripheral Hematologic Data       CBC WITH MANUAL DIFFERENTIAL (12/15/2022 09:00 AM CST):     RESULT VALUE REF. RANGE UNITS   WBC Count   Hemoglobin   Hematocrit   Platelet Count   RBC Count  MCV  MCH  MCHC   RDW  4.0 (NORMAL)     11.2  ( L )     35.6 (NORMAL)   127   ( L )  4.10 (NORMAL)       87 (NORMAL)     27.3 (NORMAL)     31.5 (NORMAL)      17.2  ( H ) 4.0-11.0  11.7-15.7  35.0-47.0  150-450  3.80-5.20    26.5-33.0  31.5-36.5  10.0-15.0 10e3/uL  g/dL  %  10e3/uL  10e6/uL  fL  pg  g/dL  %   % Neutrophils  % Lymphocytes  % Monocytes  % Eosinophils  % Basophils  % Metamyelocytes  % Myelocytes  % Promyelocytes  % Blasts  % Plasma Cells  % Other Cells  Absolute Neutrophils  Absolute Lymphocytes  Absolute Monocytes  Absolute Eosinophils  Absolute Basophils  Absolute Metamyelocytes  Absolute Myelocytes  Absolute Promyelocytes  Absolute Blasts  Absolute Plasma Cells  Absolute Other Cells  NRBCs per 100 WBC  Absolute NRBCs 61  36  4  0  0               2.4 (NORMAL)     1.4 (NORMAL)     0.2 (NORMAL)     0.0 (NORMAL)     0.0 (NORMAL)   ()   ()   ()       ()   ()   ()   ()      () N/A  N/A  N/A  N/A  N/A  N/A  N/A  N/A  N/A  N/A  N/A  1.6-8.3  0.8-5.3  0.0-1.3  0.0-0.7  0.0-0.2  <=0.0  <=0.0  <=0.0  <=0.0  <=0.0  <=0.0  <=0  <=0.0 %  %  %  %  %  %  %  %  %  %  %  10e3/uL  10e3/uL  10e3/uL  10e3/uL  10e3/uL  10e3/uL  10e3/uL  10e3/uL  10e3/uL  10e3/uL  10e3/uL  %  10e3/uL     RETICULOCYTE COUNT (12/15/2022 09:05 AM CST):  RESULT VALUE REF. RANGE UNITS   % Reticulocyte  Absolute Reticulocyte 0.7 (NORMAL)  0.029 (NORMAL) 0.5-2.0  0.025-0.095 %  10e6/uL          Performing Labs       The technical component of this testing was completed at [facility]     Urine Culture     Status: None    Specimen: Urine, Clean Catch   Result Value Ref Range    Culture <10,000 CFU/mL Urogenital fred    MRSA MSSA PCR, Nasal Swab     Status: None    Specimen: Nares, Bilateral; Swab   Result Value Ref Range    MRSA Target DNA Negative Negative    SA Target DNA Positive     Narrative    The V[employer]imax  Xpert SA Nasal Complete assay performed in the [employer]  Dx System is a qualitative in vitro diagnostic test designed for rapid  detection of Staphylococcus aureus (SA) and methicillin-resistant Staphylococcus aureus (MRSA) from nasal swabs in patients at risk for nasal colonization. The test utilizes automated real-time polymerase chain reaction (PCR) to detect MRSA/SA DNA. The Xpert SA Nasal Complete assay is intended to aid in the prevention and control of MRSA/SA infections in healthcare settings. The assay is not intended to diagnose, guide or monitor treatment for MRSA/SA infections, or provide results of susceptibility to methicillin. A negative result does not preclude MRSA/SA nasal colonization.    Blood Culture Hand, Right     Status: Normal (Preliminary result)    Specimen: Hand, Right; Blood   Result Value Ref Range    Culture No growth after 2 days    Blood Culture Arm, Left     Status: Normal (Preliminary result)    Specimen: Arm, Left; Blood   Result Value Ref Range    Culture No growth after 2 days    Urine Culture     Status: None    Specimen: Urine, Clean Catch   Result Value Ref Range    Culture <10,000 CFU/mL Urogenital fred    CBC with platelets differential     Status: Abnormal    Narrative    The following orders were created for panel order CBC with platelets differential.  Procedure                               Abnormality         Status                     ---------                               -----------         ------                     CBC with platelets and d...[[id number]]  Abnormal            Final result               Manual Differential[[id number]]          Abnormal            Final result                 Please view results for these tests on the individual orders.   ABO/Rh type and screen     Status: None    Narrative    The following orders were created for panel order ABO/Rh type and screen.  Procedure                               Abnormality         Status                     ---------                               -----------         ------                     Adult Type and Screen[[id number]]                             Final result                 Please view results for these tests on the individual orders.   Lab Blood Morphology Pathologist Review     Status: Abnormal    Narrative    The following orders were created for panel order Lab Blood Morphology Pathologist Review.  Procedure                               Abnormality         Status                     ---------                               -----------         ------                     Bld morphology pathology...[[id number]]                      Final result               CBC with platelets and d...[[id number]]  Abnormal            Final result               Manual Differential[[id number]]          Abnormal            Final result               Reticulocyte count[[id number]]           Normal              Final result               Morphology Tracking[[id number]]                              Final result                 Please view results for these tests on the individual orders.   WBC and differential *Canceled*     Status: None ()    Narrative    The following orders were created for panel order WBC and differential.  Procedure                               Abnormality         Status                     ---------                               -----------         ------                       Please view results for these tests on the individual orders.     Medications   methylPREDNISolone sodium succinate (solu-MEDROL) injection 125 mg (125 mg Intravenous Given 12/14/22 1419)   potassium chloride (KAYCIEL) solution 20 mEq (20 mEq Oral Given 12/14/22 1419)   potassium chloride (KAYCIEL) solution 40 mEq (40 mEq Oral Given 12/14/22 1628)   gadobutrol (GADAVIST) injection 7.5 mL (5 mLs Intravenous Given 12/15/22 1316)   predniSONE (DELTASONE) tablet 10 mg (10 mg Oral Given 12/15/22 1708)        Assessments & Plan (with Medical Decision Making)   22 year old female w/ PMH significant for severe lupus, lupus nephritis, seizures, hypokalemia, anemia,  thrombocytopenia, and hypocalcemia who presents to the ED for concern for septic arthritis in setting of lupus flare.     ? Sepsis   Fevers, myalgias, flu like symptoms   1 week of subjective fevers, myalgias, cough, dyspnea, and diarrhea. Tachycardic in ED but otherwise hemodynamically stable and afebrile, although on review it appears she was febrile to 100.5 in clinic this AM. Normal white count. Lactate normal. COVID/influenza/RSV negative. CXR negative. UA w/ possible UTI however was dirty catch. Suspect viral illness vs sxs related to lupus flare.   - repeat UA   - blood cultures   - MRSA nares    Severe SLE w/ concern for active flare   Lupus nephritis   Follows w/ Dr. Vides in clinic and on Cellcept, Plaquenil, and prednisone 20 mg daily. Sent to ED from Rheumatology clinic today for concern of lupus flare. Low C3 and C4 levels w/ elevated dsDNA on recent workup 12/12/22. CRP here elevated to 37.50. UA revealed 300 protein and 25 RBCs concerning for nephritic syndrome. Cr normal.   - IV solumedrol 125 mg once   - Admit to medicine for further workup    Acute on chronic anemia   Baseline Hgb ~7.1. Here w/ Hgb of 5.6, down from 6.6. Per pt, never been told she has anemia before and never had blood transfusions before. Hemodynamically stable w/o sxs concerning for active bleed. Concern this is related to her lupus.   - Type and screen  - 2 unit pRBCs    Acute on chronic thrombocytopenia   Platelets 147 here. Appears to have had thrombocytopenia in the past as well. Unclear etiology, perhaps lupus-induced.      Hypokalemia  On supplementation at home. K 3.2 here.   - Given 20 meq PO potassium in ED    Trochanteric Bursitis  Sent here for 1 week of L hip pain concerning for septic arthritis. No pain to palpation of joint or w/ full passive ROM of hip to indicate intraarticular pain that would indicate septic arthritis. Pain more indicative of trochanteric bursitis.  - Tylenol in ED     L knee pain  Tenderness  to palpation over patellar tendon and lateral joint line and LCL. No joint effusion, laxity, erythema. Suspect musculoskeletal in nature.       Admitted to medicine for further evaluation and treatment.     I have reviewed the nursing notes. I have reviewed the findings, diagnosis, plan and need for follow up with the patient.  --    ED Attending Physician Attestation    I Cecilio Woodruff MD, cared for this patient with the Resident. I have performed a history and physical examination of the patient and discussed management with the resident. I reviewed the resident's documentation above and agree with the documented findings and plan of care.    Summary of HPI, PE, ED Course   Patient is a 22 year old female evaluated in the emergency department for complications of severe lupus identified during rheumatology visit today.  It was felt that she required hospitalization but he was unable to do a direct admit.  She has multiple issues including anemia requiring transfusion and complaint of nontraumatic left hip pain.. Exam notable for she is alert in no distress she has tenderness to palpation over the greater trochanter on the left no pain with hip flexion extension or internal and external rotation.. ED course notable for confirmation of anemia and ordering of 2 units of packed cells.. After the completion of care in the emergency department, the patient was admitted to inpatient.    Critical Care & Procedures  Not applicable.    Medical Decision Making  The medical record was reviewed and interpreted.  Current labs reviewed and interpreted.  Previous labs reviewed and interpreted.      Cecilio Woodruff MD  Emergency Medicine       Discharge Medication List as of 12/17/2022  9:26 AM          Final diagnoses:   Anemia, unspecified type   Systemic lupus erythematosus, unspecified SLE type, unspecified organ involvement status (H)       Maria Luisa Clinton DO  Internal Medicine PGY-1  [facility]  EMERGENCY DEPARTMENT  12/14/2022    --       Maria Luisa Clinton MD  12/14/22 1519       Maria Luisa Clinton MD  12/14/22 1520       Cecilio Woodruff MD  12/17/22 0026

## 2022-12-14 NOTE — PROGRESS NOTES
Rheumatology Clinic    Visit date: 12/14/2022  Last visit: 9/7/2022    Reason for visit:  SLE (+DsDNA, +RNP, +ribosomal P Ab and low complements)    Assessment/Plan:     #. Severe lupus (+DsDNA, +RNP, +ribosomal P Ab and hypocomplementemia)  # Lupus nephritis class lll  #. Lupus cerebritis ?  #. R knee osteonecrosis  #. Seizures  # left hip and knee pain   # fever   # fatigue   # Tachycardiac     Milly Carroll is a 22 year old female with long standing hx of lupus (+DsDNA, +RNP, +ribosomal P Ab and hypocomplementemia). Her disease overall has remained suboptimally controlled due to compliance issues.  She was found to have lupus flare with proteinuria during her last hospitalization in 06/2022.  Significant proteinuria and hematuria on urine sediment raised the concern of lupus nephritis. She underwent renal biopsy (6/28/222) which showed focal proliferative lupus nephritis, ISN/RPS class III with minimal activity and no significant chronicity.  She received Solumedrol 1 gm x 3 days and discharged on prednisone 60 mg. Unfortunately, [atient hasn't followed up with nephrology yet.  She has successfully tapered prednisone down to 10 mg daily. Tolerating Cellcept 1500 mg BID, plaquenil 200 mg once daily and Belimumab sub q weekly. Tolerating medications well.     Presented with subacute onset of myalgia, severe fatigue, night sweats, low grade fevers and new onset of mouth sores. Recent onset of left hip pain over last 2 weeks with limited weight bearing and ambulation. Tender on palpation with limited range of motion on assessment - raise the concern of osteonecrosis vs septic joint given hx of long term steroid use and immunosuppression.  Looks ill with ongoing low grade fever (Temp 100.5 in clinic) and tachycardiac.  Lab from her recent ED visit 12/12 show drop in complements and and marked elevation in DsDNA, Hgb drop and proteinuria with low serum albumin. Her presentation raises concern for viral/bacterial  infection vs lupus flare. I think patient will need hospitalization for urgent evaluation and further work up.  We would suggest to hold Cellcept and Benlysta until infectious etiology is ruled out. Meantime consider solumedrol 125 mg x 1 for lupus flare. We will follow up during her inpatient stay and provide further recommendations based on her further work up. We discussed our plan- she agree to visit ER and potential hospitalization for further work up.     Plan:  --  Patient will need work up for infection/sepsis during hospital stay  -- Hold Cellcept and Benlysta until infectious etiology is ruled out  -- MRI/Xray to evaluate left hip pain issues  -- Xray left knee  -- Solumedrol 125 mg IV for SLE flare  -- Continue Plaquenil 200 mg once daily   -- Prednisone 10 mg once daily   -- Continue Ca and Vit D  -- Ophthalmology follow up as outpatient to evaluate for HCQ related toxicity monitoring  -- Encouraged for follow up with nephrology for lupus nephritis   -- repeat labs on follow up     Follow up: 3 months     The patient was seen and staffed with Dr. Peewee MD.     Jaden Meredith MD  Rheumatology Fellow  Pager [phone]    I saw this patient with the Rheumatology Fellow. I agree with the findings and recommendations. Patient with SLE and Class III nephritis presents with subacute onset myalgia/arthralgia, worsening L hip and knee pain, fevers/chills, and mouth sores. She reports recent compliance with MMF 1500 bid, Hydroxychloroquine 400 bid, prednisone 10 mg daily, and benlysta weekly. Exam shows febrile (38.1) ill-appearing woman with tachycardia. There is painful L hip ROM with limited weight-bearing/walking, several lip ulcers, L knee effusion. Labwork from 12-12 includes depressed complement C3 and C4, marked elevation in anti-dsDNA, worse anemia, and proteinuria with albumin 1.4.  Impression:    1. Febrile illness with vital instability in woman with SLE and nephritis, immunosuppressed and at risk  for infection.  2. Subacute L hip pain in a woman with known avascular necrosis.    Concern is for infection vs avascular necrosis, L hip and SLE flare vs viral or bacterial septic illness. Patient is failing to thrive at home. She needs urgent evaluation with a view toward hospitalization, and treatment for SLE flare. [facility] admitting hospitalist service was contacted; no beds in the hospital are available now, and none are expected for at least 24 hours. Recommend ER evaluation.    Suggested plan:  1. Hold MMF, benlysta  2. Work up for sepsis/infection  3. Plain xray/MRI L hip  4. Consider solumedrol 125 mg IV for flaring SLE.    Thee Vides M.D.  Staff Rheumatologist, [facility]  Pager [phone]      Interval history  12/14/2022    Patient was seen in ER for fever like illness, body aches, night sweats and left hip pain.    Left pain has been there for last few weeks -getting progressively worse and having difficulty in ambulation and weight bearing,    Left and ankle pain also started in same time duration but less severe compared to left hip pain     Low grade fevers at home - 100.5 her in clinic today. Remains tachycardiac 126    Mouth sores for last 1-2 weeks    Recent labs from emergency visit 12/12 - consistent with lupus flare. (drop in complements, elevated Dsdna, urine proteins +ve and hypoalbuminemia)    Reports staying compliant with her lupus meds. Cellcept 1500 mg BID, Plaquenil once daily and Balimumab weekly       Interim history: 9/7/2022  Patient reports feeling well. She couldn't show up in person on follow today due to transportation issues.  Apparently, she has missed previous appointments and lab visit for similar reasons. She denies any joint pain, stiffness or skin rash. Fatigue has improved. No fever, chills or weight changes. She is tolerating medications well.  She has gained some weight. She hasn't seen Nephrology after her renal biopsy in 06/2022.      HPI:  Milly Carroll is a 21  year old female with a history of of lupus, seizures, lupus cerebritis, and R knee osteonecrosis who is transition of care from [facility] rheumatology. 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 She later developed symptoms consistent with systemic lupus (Rash, photosensitivity, alopecia, Arthritis, cytopenias, hypocomplementemia and positive serology (+DsDNA, +RNP, +ribosomal P ab). She has had varying course of disease since her diagnosis. She has most recently being treated with Cellcept 1500 mg BID, Plaquenil 200 mg BID and prednisone 5 mg daily. She was on Benlysta infusion in 2019 that was later switched to Benlysta subcutaneous in 2021. She is not receiving Benlysta for last many month for unclear reasons. I couldn't find why it was stopped. She was treated with Rituximab since 2013 but she developed anaphylactic reaction during Rituximab infusion in 12/2019.     Patient was recently hospitalized for ongoing fevers x 2 weeks, fatigue and loss appetite. Her sx were thought to be related to lupus flares given pancytopenias, elevated Dsdna and hypocomplementemia. Blood and urine Cx came back negative. She was noted to have hematuria and proteinuria on her urine sediment that raised the concern of lupus nephritis.  She required IV solumedrol 1 gm x 3 and discharged home on prednisone 60 mg oral daily. Currently she is taking Cellcept 1000 mg BID and Plaquenil 200 mg daily. Cellcept was decreased to 1000 mg BID during hospitalization to minimize the toxicity risk. She is Bactrim double strength x 3/week for PCP prophylaxis.     Today she is feeling better, denies pain in her joints, fatigue has improved. She denies any further episode of fever since discharge from hospital. She feels tired on exposure to sunlight. She denies recent hx of malar rash. She gets raynaud's on cold exposure. It usually gets better in 5-10 minutes. She usually get pain in her MCP joints of hands during the  flare. She denies any hx of blood clots or pleurisy. She has patchy hair loss with some discoid lesions. She has ulcer scars on hard palate but no new active oral ulcers. She reports being compliant with her medications. She has no smoking or drinking history. Her older sister has hx of lupus. She is not working currently, just helps her sister in taking care of her nephew at home.     She has received 2 doses of COVID vaccine 10/8/2021 and 10/30/2021    Rest of ROS is negative except mentioned in HPI    =     Past Medical History  Past Medical History:   Diagnosis Date     Hepatitis      Lupus (systemic lupus erythematosus) (H)      Lupus cerebritis (H)      Pancreatitis 08/2017     Pyelonephritis 08/2017     Seizures (H)      Status epilepticus (H)      Past Surgical History:   Procedure Laterality Date     IR RENAL BIOPSY LEFT  6/28/2022     NO HISTORY OF SURGERY       ORTHOPEDIC SURGERY       Social History     Socioeconomic History     Marital status: Single     Spouse name: Not on file     Number of children: Not on file     Years of education: Not on file     Highest education level: Not on file   Occupational History     Not on file   Tobacco Use     Smoking status: Never     Smokeless tobacco: Never   Substance and Sexual Activity     Alcohol use: Not on file     Drug use: Not on file     Sexual activity: Not on file   Other Topics Concern     Not on file   Social History Narrative    6/20/2013     Milly is the 2nd youngest of 7 children.  She is in the 10th grade and lives with her parents and siblings.  Her family is originally from [facility] [place] [facility], but Milly was born in the .        06/2022: lives in [place] with older sister, brother-in-law, niece, and brother.         Social Determinants of Health     Financial Resource Strain: Not on file   Food Insecurity: Not on file   Transportation Needs: Not on file   Physical Activity: Not on file   Stress: Not on file   Social Connections: Not on file    Intimate Partner Violence: Not on file   Housing Stability: Not on file     Family History   Problem Relation Age of Onset     Other - See Comments Father         Question of lupus in father and paternal grandfather     Lupus Sister         older sister     Gastrointestinal Disease No family hx of         No known history of IBD, hepatitis, pancreatitis, celiac disease, or GI cancers before age 50     Glaucoma No family hx of      Macular Degeneration No family hx of        Medications:  Current Outpatient Medications   Medication     acetaminophen (TYLENOL) 500 MG tablet     Belimumab 200 MG/ML SOAJ     calcium carbonate (TUMS) 500 MG chewable tablet     ferrous sulfate (FEROSUL) 325 (65 Fe) MG tablet     multivitamin, therapeutic (THERA-VIT) TABS tablet     mycophenolate (GENERIC EQUIVALENT) 500 MG tablet     predniSONE (DELTASONE) 10 MG tablet     sulfamethoxazole-trimethoprim (BACTRIM DS) 800-160 MG tablet     vitamin D3 (CHOLECALCIFEROL) 1.25 MG (03951 UT) capsule     No current facility-administered medications for this visit.       Allergies:     Allergies   Allergen Reactions     Rituximab Anaphylaxis       /72 (BP Location: Right arm, Patient Position: Sitting, Cuff Size: Adult Small)   Pulse (!) 123   Temp (!) 100.5  F (38.1  C) (Oral)   Wt 49 kg (108 lb)   SpO2 100%   BMI 19.75 kg/m         Physical assessment during video encounter.     GENERAL: Healthy, alert and no distress, febrile- ill appearing with tachycardia   EYES: Eyes grossly normal to inspection.  No discharge or erythema, or obvious scleral/conjunctival abnormalities.  Mouth: mouth sores on inner lip and left angular cheilosis   RESP: No audible wheeze, cough, or visible cyanosis.  No visible retractions or increased work of breathing.    SKIN: Visible skin clear. No significant rash, abnormal pigmentation or lesions.   NEURO: Cranial nerves grossly intact.  Mentation and speech appropriate for age.  PSYCH: Mentation appears  normal, affect normal/bright, judgement and insight intact, normal speech and appearance well-groomed.  MSK: Painful L hip ROM with limited weight bearing/walking, left knee mild tenderness along medial joint line with some effusion,  Pain on palpation at left tibiotalar joint without any obvious swelling. Rest of joints look normal with good range of motion. No signs of tenosynovitis or dactyltis      Lab/Imaging: Reviewed recent labs and imaging.

## 2022-12-14 NOTE — NURSING NOTE
Chief Complaint   Patient presents with     RECHECK     Lupus     Blood pressure 103/71, pulse (!) 126, weight 49 kg (108 lb), SpO2 100 %, not currently breastfeeding.    Glen Rainey on 12/14/2022 at 9:03 AM

## 2022-12-15 ENCOUNTER — APPOINTMENT (OUTPATIENT)
Dept: MRI IMAGING | Facility: CLINIC | Age: 22
End: 2022-12-15
Attending: INTERNAL MEDICINE
Payer: COMMERCIAL

## 2022-12-15 LAB
ANION GAP SERPL CALCULATED.3IONS-SCNC: 11 MMOL/L (ref 7–15)
ATRIAL RATE - MUSE: 53 BPM
BACTERIA UR CULT: NORMAL
BASOPHILS # BLD MANUAL: 0 10E3/UL (ref 0–0.2)
BASOPHILS NFR BLD MANUAL: 0 %
BUN SERPL-MCNC: 24.6 MG/DL (ref 6–20)
BURR CELLS BLD QL SMEAR: ABNORMAL
CALCIUM SERPL-MCNC: 7.9 MG/DL (ref 8.6–10)
CHLORIDE SERPL-SCNC: 110 MMOL/L (ref 98–107)
CREAT SERPL-MCNC: 0.99 MG/DL (ref 0.51–0.95)
CRP SERPL-MCNC: 28.2 MG/L
DEPRECATED HCO3 PLAS-SCNC: 17 MMOL/L (ref 22–29)
DIASTOLIC BLOOD PRESSURE - MUSE: NORMAL MMHG
ELLIPTOCYTES BLD QL SMEAR: SLIGHT
EOSINOPHIL # BLD MANUAL: 0 10E3/UL (ref 0–0.7)
EOSINOPHIL NFR BLD MANUAL: 0 %
ERYTHROCYTE [DISTWIDTH] IN BLOOD BY AUTOMATED COUNT: 17.2 % (ref 10–15)
GFR SERPL CREATININE-BSD FRML MDRD: 83 ML/MIN/1.73M2
GLUCOSE SERPL-MCNC: 143 MG/DL (ref 70–99)
HAPTOGLOB SERPL-MCNC: 197 MG/DL (ref 32–197)
HCT VFR BLD AUTO: 35.6 % (ref 35–47)
HGB BLD-MCNC: 10.6 G/DL (ref 11.7–15.7)
HGB BLD-MCNC: 11.2 G/DL (ref 11.7–15.7)
INTERPRETATION ECG - MUSE: NORMAL
LYMPHOCYTES # BLD MANUAL: 1.4 10E3/UL (ref 0.8–5.3)
LYMPHOCYTES NFR BLD MANUAL: 35 %
MCH RBC QN AUTO: 27.3 PG (ref 26.5–33)
MCHC RBC AUTO-ENTMCNC: 31.5 G/DL (ref 31.5–36.5)
MCV RBC AUTO: 87 FL (ref 78–100)
MONOCYTES # BLD MANUAL: 0.2 10E3/UL (ref 0–1.3)
MONOCYTES NFR BLD MANUAL: 4 %
MYCOPHENOLATE SERPL LC/MS/MS-MCNC: <0.25 MG/L (ref 1–3.5)
MYCOPHENOLATE-G SERPL LC/MS/MS-MCNC: <6.5 MG/L (ref 30–95)
NEUTROPHILS # BLD MANUAL: 2.4 10E3/UL (ref 1.6–8.3)
NEUTROPHILS NFR BLD MANUAL: 61 %
P AXIS - MUSE: 47 DEGREES
PATH REPORT.COMMENTS IMP SPEC: NORMAL
PATH REPORT.COMMENTS IMP SPEC: NORMAL
PATH REPORT.FINAL DX SPEC: NORMAL
PATH REPORT.MICROSCOPIC SPEC OTHER STN: NORMAL
PATH REPORT.MICROSCOPIC SPEC OTHER STN: NORMAL
PATH REPORT.RELEVANT HX SPEC: NORMAL
PLAT MORPH BLD: ABNORMAL
PLATELET # BLD AUTO: 127 10E3/UL (ref 150–450)
POTASSIUM SERPL-SCNC: 4.8 MMOL/L (ref 3.4–5.3)
PR INTERVAL - MUSE: 148 MS
QRS DURATION - MUSE: 70 MS
QT - MUSE: 502 MS
QTC - MUSE: 471 MS
R AXIS - MUSE: -30 DEGREES
RBC # BLD AUTO: 4.1 10E6/UL (ref 3.8–5.2)
RBC MORPH BLD: ABNORMAL
RETICS # AUTO: 0.03 10E6/UL (ref 0.03–0.1)
RETICS/RBC NFR AUTO: 0.7 % (ref 0.5–2)
SODIUM SERPL-SCNC: 138 MMOL/L (ref 136–145)
SYSTOLIC BLOOD PRESSURE - MUSE: NORMAL MMHG
T AXIS - MUSE: -1 DEGREES
TME LAST DOSE: ABNORMAL H
TME LAST DOSE: ABNORMAL H
VENTRICULAR RATE- MUSE: 53 BPM
WBC # BLD AUTO: 4 10E3/UL (ref 4–11)

## 2022-12-15 PROCEDURE — 85007 BL SMEAR W/DIFF WBC COUNT: CPT

## 2022-12-15 PROCEDURE — 255N000002 HC RX 255 OP 636: Performed by: INTERNAL MEDICINE

## 2022-12-15 PROCEDURE — 250N000013 HC RX MED GY IP 250 OP 250 PS 637

## 2022-12-15 PROCEDURE — 99233 SBSQ HOSP IP/OBS HIGH 50: CPT | Mod: GC | Performed by: INTERNAL MEDICINE

## 2022-12-15 PROCEDURE — 99207 PR SC NO CHARGE VISIT/PATIENT NOT SEEN: CPT | Mod: GC | Performed by: INTERNAL MEDICINE

## 2022-12-15 PROCEDURE — 85045 AUTOMATED RETICULOCYTE COUNT: CPT

## 2022-12-15 PROCEDURE — 85041 AUTOMATED RBC COUNT: CPT

## 2022-12-15 PROCEDURE — 36415 COLL VENOUS BLD VENIPUNCTURE: CPT

## 2022-12-15 PROCEDURE — 250N000012 HC RX MED GY IP 250 OP 636 PS 637

## 2022-12-15 PROCEDURE — 250N000012 HC RX MED GY IP 250 OP 636 PS 637: Performed by: STUDENT IN AN ORGANIZED HEALTH CARE EDUCATION/TRAINING PROGRAM

## 2022-12-15 PROCEDURE — 85060 BLOOD SMEAR INTERPRETATION: CPT | Performed by: PATHOLOGY

## 2022-12-15 PROCEDURE — 87798 DETECT AGENT NOS DNA AMP: CPT

## 2022-12-15 PROCEDURE — 70553 MRI BRAIN STEM W/O & W/DYE: CPT

## 2022-12-15 PROCEDURE — 82310 ASSAY OF CALCIUM: CPT

## 2022-12-15 PROCEDURE — 85018 HEMOGLOBIN: CPT

## 2022-12-15 PROCEDURE — 250N000011 HC RX IP 250 OP 636: Performed by: STUDENT IN AN ORGANIZED HEALTH CARE EDUCATION/TRAINING PROGRAM

## 2022-12-15 PROCEDURE — 70553 MRI BRAIN STEM W/O & W/DYE: CPT | Mod: 26 | Performed by: STUDENT IN AN ORGANIZED HEALTH CARE EDUCATION/TRAINING PROGRAM

## 2022-12-15 PROCEDURE — A9585 GADOBUTROL INJECTION: HCPCS | Performed by: INTERNAL MEDICINE

## 2022-12-15 PROCEDURE — 99222 1ST HOSP IP/OBS MODERATE 55: CPT | Mod: GC | Performed by: STUDENT IN AN ORGANIZED HEALTH CARE EDUCATION/TRAINING PROGRAM

## 2022-12-15 PROCEDURE — 86140 C-REACTIVE PROTEIN: CPT | Performed by: STUDENT IN AN ORGANIZED HEALTH CARE EDUCATION/TRAINING PROGRAM

## 2022-12-15 PROCEDURE — 120N000002 HC R&B MED SURG/OB UMMC

## 2022-12-15 RX ORDER — MYCOPHENOLATE MOFETIL 500 MG/1
1500 TABLET ORAL 2 TIMES DAILY
Status: DISCONTINUED | OUTPATIENT
Start: 2022-12-15 | End: 2022-12-17 | Stop reason: HOSPADM

## 2022-12-15 RX ORDER — PREDNISONE 20 MG/1
20 TABLET ORAL DAILY
Status: DISCONTINUED | OUTPATIENT
Start: 2022-12-16 | End: 2022-12-17 | Stop reason: HOSPADM

## 2022-12-15 RX ORDER — GADOBUTROL 604.72 MG/ML
7.5 INJECTION INTRAVENOUS ONCE
Status: COMPLETED | OUTPATIENT
Start: 2022-12-15 | End: 2022-12-15

## 2022-12-15 RX ORDER — PREDNISONE 5 MG/1
10 TABLET ORAL ONCE
Status: COMPLETED | OUTPATIENT
Start: 2022-12-15 | End: 2022-12-15

## 2022-12-15 RX ADMIN — CEFTRIAXONE SODIUM 1 G: 1 INJECTION, POWDER, FOR SOLUTION INTRAMUSCULAR; INTRAVENOUS at 16:03

## 2022-12-15 RX ADMIN — FERROUS SULFATE TAB 325 MG (65 MG ELEMENTAL FE) 325 MG: 325 (65 FE) TAB at 08:28

## 2022-12-15 RX ADMIN — GADOBUTROL 5 ML: 604.72 INJECTION INTRAVENOUS at 13:16

## 2022-12-15 RX ADMIN — CALCIUM CARBONATE (ANTACID) CHEW TAB 500 MG 500 MG: 500 CHEW TAB at 08:37

## 2022-12-15 RX ADMIN — PREDNISONE 10 MG: 5 TABLET ORAL at 08:27

## 2022-12-15 RX ADMIN — HYDROXYCHLOROQUINE SULFATE 200 MG: 200 TABLET, FILM COATED ORAL at 08:27

## 2022-12-15 RX ADMIN — MYCOPHENOLATE MOFETIL 1500 MG: 500 TABLET, FILM COATED ORAL at 20:07

## 2022-12-15 RX ADMIN — PREDNISONE 10 MG: 5 TABLET ORAL at 17:08

## 2022-12-15 ASSESSMENT — ACTIVITIES OF DAILY LIVING (ADL)
ADLS_ACUITY_SCORE: 44
ADLS_ACUITY_SCORE: 44
ADLS_ACUITY_SCORE: 36
ADLS_ACUITY_SCORE: 36
ADLS_ACUITY_SCORE: 44
ADLS_ACUITY_SCORE: 36
ADLS_ACUITY_SCORE: 36
ADLS_ACUITY_SCORE: 44
ADLS_ACUITY_SCORE: 29
ADLS_ACUITY_SCORE: 44
ADLS_ACUITY_SCORE: 36
ADLS_ACUITY_SCORE: 44

## 2022-12-15 NOTE — ED NOTES
Patient diaphoretic, see VS flowsheet for full set of VS. Patient alert and interactive, drowsy. Second unit of blood initiated as ordered. Patient tolerating well. States she has been experiencing fatigue and diaphoresis at night at home as well.

## 2022-12-15 NOTE — CONSULTS
[facility]  Neurology Consultation  Patient Name:  Milly Carroll  MRN:  [id number]    :  2000  Date of Service:  December 15, 2022  Primary care provider:  Estefany Bell    Neurology consultation service was asked to see Milly Carroll by Dr. 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     She is currently admitted for 2 weeks of left knee and left hip pain, myalgia/arthralgia, fever/chills, night sweats, and mouth sores. Initial temperature of 100.5, HR 120s. Labs notable for: Hgb (5.6), proteinuria/hematuria, dsDNA >1000; Complement C3 17 (low); Complement C4 4 (low)    Additionally, she has had 1 month of bilateral upper and lower extremity movement which would be characterized as tremors. Episodes occur about twice/daily and last up to one minute. She has no aura prior to episodes. She is fully awake and alert during these episodes. She does not have decreased sensation or decreased strength. No paresthesias. She has had no witnesses of these episodes.     Per chart review:    She has had multiple episodes of encephalopathy and concern for seizures. She was admitted in 2013 for acute encephalopathy and seizures. She had an MRI with increased T2 leptomeningeal enhancement compatible with lupus or also suggestive of possible PRES. She was started on Keppra at that time but was tapered off in  and has not been on antiepileptics since then.     She had subsequent EEGs in  and  which were without epileptiform changes.     ROS  A comprehensive ROS was performed and pertinent findings were included in HPI.     PMH  Past Medical History:   Diagnosis Date  "   Hepatitis     Lupus (systemic lupus erythematosus) (H)     Lupus cerebritis (H)     Pancreatitis 08/2017    Pyelonephritis 08/2017    Seizures (H)     Status epilepticus (H)      Past Surgical History:   Procedure Laterality Date    IR RENAL BIOPSY LEFT  6/28/2022    NO HISTORY OF SURGERY      ORTHOPEDIC SURGERY       Medications   I have personally reviewed the patient's medication list.     Allergies  I have personally reviewed the patient's allergy list.     Social History  Lives with sister, does not smoke cigarettes, does not drink alcohol, no substance use     Family History    Lupus     Physical Examination   Vitals: /86   Pulse (!) 49   Temp 97  F (36.1  C) (Temporal)   Resp 13   Ht 1.575 m (5' 2\")   Wt 49 kg (108 lb)   SpO2 100%   BMI 19.75 kg/m      Mental status: Awake, alert, attentive, oriented to self, time, place, and circumstance. Language is fluent and coherent with intact comprehension of complex commands, naming and repetition  Cranial nerves: VFF, PERRL, conjugate gaze, EOMI, facial sensation intact, face symmetric, shoulder shrug strong, tongue/uvula midline, no dysarthria, mouth with sores on inner lip and bleeding   Motor: left knee with tenderness to palpation and effusion, pain with left knee flexion/extension with limited ROM . Left lateral hip with tenderness to palpation and pain with flexion/extension, limited ROM   All extremities are at least antigravity, no weakness proximally or distally, even with some musculoskeletal pain taken into account.  Sensory: Intact to light touch, pin, vibration, and proprioception in proximal and distal aspects of all 4 extremities   Coordination: FNF without dysmetria    Imaging :  EEG 05/13/2013:  IMPRESSION:This is an abnormal EEG due to the presence of diffuse moderate nonspecific slowing consistent with diffuse encephalopathy. There was a single generalized epileptiform discharge with needs to be evaluated further by longer " monitoring.     EEG 05/16/2013:   IMPRESSION:Normal sleep EEG.  No  epileptiform discharges were present.  No electrographic on clinical seizures were recorded.  Clinical correlation is recommended    EEG 08/24/2017:  IMPRESSION:This is a mildly abnormal EEG during awake and drowsiness due to mild diffuse nonspecific encephalopathy.  No epileptiform discharges were present.  No electrographic or clinical seizures were recorded.    EEG 04/11/2019:  IMPRESSION:Normal awake, drowsy, sleep video EEG recording.  No epileptiform discharges or electrographic seizure activity was present.  No asymmetries or slowing was present.  No signs of encephalopathy were present.  The diagnosis of epilepsy is a clinical diagnosis.  Please correlate clinical findings.     EEG 04/12/2019:  IMPRESSION:Normal video EEG recording.  No epileptiform discharges were present.  No asymmetries or slowing were present.  The diagnosis of epilepsy is a clinical diagnosis.  There is no evidence of focal slowing or diffuse slowing that would indicate encephalopathy at this point in time. Please correlate with clinical findings.     MR Brain w/o & w contrast (05/16/2013)  Impression:   1. Diffuse T2 hyperintensity and leptomeningeal enhancement involving   the frontoparietal lobes bilaterally. The differential diagnosis   includes posterior reversible encephalopathy syndrome, secondary to   seizure activity or aseptic meningitis or meningeal inflammation from   the patient's known lupus.   2. No intracranial aneurysm or stenosis.     MR Brain w/o & w contrast (12/15/2022)  IMPRESSION:   1. No acute intracranial pathology suspected.  2. Bilateral calcifications in the basal ganglia which has increased  since 2013. In addition, there is increased mineral deposition in the  basal ganglia either secondary to calcium or iron. These be seen as a  consequence of lupus (either from medication or renal issues or  calcium metabolism related).   3. No abnormal  intracranial enhancement.    Impression  Bilateral Upper and Lower Extremity Movement   Tremors  Movement of bilateral upper and lower extremities as described and examined are more consistent with tremors with low suspicion for seizures.  She demonstrated the targeted movements she had experience, with her arms both outstretched showing a slight trembling in both hands, and the same can happen with the feet.  This can happen when she is exerting any limb, or if they are at rest.  It quickly resolves and she maintains consciousness throughout these events.  Thankfully, this semiology is inconsistent with a seizure disorder.    MRI in 2013 with leptomeningeal enhancement which could have been related to her lupus. MRI during this admission with increased mineral deposition in the basal ganglia which can be seen in lupus but no leptomeningeal enhancement seen. No acute intracranial pathology suspected at this time, specifically, there is no radiographic evidence of lupus cerebritis.    Recommendations  -Low suspicion for seizures, no antiepileptics indicated  -No further neurological follow-up indicated at this time  -Neurology will sign off at this time, please call if any new questions arise.     Thank you for involving Neurology in the care of Milly Carroll.  Please do not hesitate to call with questions/concerns (consult pager [phone]).      Patient was seen and discussed with the attending, Dr. Annabella Dotson MD  Internal Medicine-Pediatrics, PGY-2

## 2022-12-15 NOTE — PROGRESS NOTES
[facility]    Progress Note - Medicine Service, MARO[facility] TEAM 3       Date of Admission:  12/14/2022    Assessment & Plan    Milly Carroll is a 22 yoF w/ PMH of severe lupus, lupus nephritis, and seizure disorder who presented with myalgias, fatigue, night sweats, low-grade fevers, mouth sores, and L hip pain to rheumatology clinic. Referred for admission given concern for infection vs lupus flare.     Updates today (12/15):  - continue ceftriaxone (12/14-TBD)  - steroids per rheumatology  - MRI brain per neurology  - parvovirus pending    # Unilateral polyarthritis  # Fevers, fatigue   # Concern for septic arthritis  # Severe lupus  Patient presented with 2 week onset of general malaise, fevers, fatigue, and left-sided hip, knee, and ankle pain. Initially concerning for a possible lupus flare. Labs from 2 days prior to admission at outside ED notable for significantly elevated dsDNA ab and low complements. However, upon further chart review and discussion with the patient, her clinical picture is less consistent with a lupus flare. Her ds-DNA ab has been very high for the past year, and patient endorsed that she usually had finger joint pains bilaterally during her past lupus flares, which she does not currently have. Her abnormal labs are likely due to her chronic poorly controlled lupus rather than an acute flare. She was given 1 dose of IV solumedrol in ED per rheum recs. Septic arthritis is on the differential. Met SIRS criteria on admission with tachycardia and fever. She does not have any joint swelling, erythema, or warmth on exam, and the polyarticular nature of her pain would be somewhat unusual for a bacterial arthritis. Nonetheless, given her high risk with immunosuppression, we have initiated empiric coverage with ceftriaxone. Blood cx NGTD, and ucx showing urogenital fred. X-ray of left hip, knee, and ankle were obtained which did not show any gross  abnormalities. Viral infection is also a possible etiology of her symptoms. She is Influenza negative and COVID negative, but we will check for parvovirus as well, which could present with poly-arthralgias. Hip and ankle pain mostly resolved as of 12/15 AM, but patient persistently with L knee pain. Per discussion with rheumatology, she is at risk of avascular necrosis of the knee given her chronic steroid therapy, and she has had it previously in her R knee (now s/p surgery). Since her symptom onset is only 2 weeks ago, x-ray may not show any findings this early in the process. Will consider MRI of L knee pending rheumatology recs.   - f/u bcx, ucx  - parvovirus screen pending  - Ceftriaxone (12/14-TBD)  - tylenol for pain  - Rheumatology following, appreciate recs  - Holding PTA cellcept and benlysta  - s/p solumedrol 125 mg IV x1 in ED (per rheum),  additional doses per rheum recs   - continue PTA laquenil 200 mg daily  - continue prednisone 10 mg daily  - bactrim held while holding immunosuppression    # Acute on chronic anemia  Hemoglobin on arrival at 5.6, with baseline close to 7-8. No overt bleeding observed on exam. She is at risk of hemolysis given her lupus history, but labs not suggestive of hemolysis. Iron panel most consistent with iron deficiency. 2U pRBC ordered in ED. Since her lupus history also puts her at risk of clotting (though lupus anticoagulant and anti-cardiolipin were negative in the past), we will keep patient on lovenox while inpatient.   - s/p 2U pRBC in ED, will continue to trend CBC  - continue PTA ferrous sulfate  - Lovenox while inpatient    # Concern for seizures  # History of lupus cerebritis  Has a documented history of seizures, but not currently on any anti-seizure medication. Per discussion with patient, she has monthly episodes of shaking and losing consciousness where she occasionally bites her tongue. Suspect these are seizures secondary to her history of lupus cerebritis.  Neurology consulted, will obtain MRI brain as patient has not had any brain imaging visible in chart.   - Neurology consulted, appreciate recs  - f/u MRI brain    # Lupus nephritis  Diagnosed in June 2022 per renal biopsy. Creatinine currently at baseline. UA showing blood and WBC, largely unchanged from prior. No acute interventions at this time. Uptrending on 12/15 AM.  - Will continue to trend BMP    # Metabolic acidosis  BMP on 12/15 showing slightly worsening bicarb overall suggestive of non-anion frank metabolic acidosis. Unclear etiology at this time, but may be related to increasing creatinine.   - Continue to trend BMP    # Hypokalemia  Potassium of 3.2 on arrival, but sample hemolyzed so suspect true value may be lower. Normalized to 4.8 on 12/15 AM s/p replacement.   - trend BMP, replace PRN    # Hypocalcemia, chronic  Calcium at 7.0 on admit. Corrected calcium in setting of hypoalbuminemia is 8.8, within normal range.   - Continue PTA calcium carbonate 500mg daily       Diet: Regular Diet Adult    DVT Prophylaxis: Enoxaparin (Lovenox) SQ  Leung Catheter: Not present  Fluids: none  Central Lines: None  Cardiac Monitoring: None  Code Status: Full Code      Disposition Plan   Pending further evaluation        The patient's care was discussed with the Attending Physician, Dr. Mary.    Clint Gu MD   PGY1, Internal Medicine  Medicine Service, [address]  Securely message with the Vocera Web Console (learn more here)  Text page via [facility] Paging/Directory   Please see signed in provider for up to date coverage information      Clinically Significant Risk Factors Present on Admission        # Hypokalemia: Lowest K = 3.2 mmol/L in last 2 days, will replace as needed  # Hyponatremia: Lowest Na = 134 mmol/L in last 2 days, will monitor as appropriate      # Hypoalbuminemia: Lowest albumin = 1.8 g/dL at 12/14/2022 12:57 PM, will monitor as  appropriate                  ______________________________________________________________________    Interval History   No acute events overnight. Patient feeling improved this AM. States her left hip and left ankle pain are essentially resolved, but her left knee pain continues to bother her. Denies any chest pain, shortness of breath, urinary symptoms. Feels some chills.     Data reviewed today: I reviewed all medications, new labs and imaging results over the last 24 hours. I personally reviewed no images or EKG's today.    Physical Exam   Vital Signs: Temp: 97  F (36.1  C) Temp src: Temporal BP: 115/86 Pulse: (!) 49   Resp: 13 SpO2: 100 % O2 Device: None (Room air)    Weight: 108 lbs 0 oz    Physical Exam  Constitutional:       General: She is not in acute distress.     Appearance: Normal appearance.   HENT:      Head: Normocephalic and atraumatic.   Eyes:      Conjunctiva/sclera: Conjunctivae normal.   Cardiovascular:      Rate and Rhythm: Normal rate and regular rhythm.      Heart sounds: Normal heart sounds.   Pulmonary:      Effort: Pulmonary effort is normal. No respiratory distress.      Breath sounds: Normal breath sounds.   Abdominal:      Palpations: Abdomen is soft.      Tenderness: There is no abdominal tenderness.   Musculoskeletal:      Comments: Left knee with significant tenderness to palpation over anterior, lateral and medial aspects. Pain worsened with motion. ROM and strength also limited by pain. RLE with normal ROM and strength. No tenderness at left hip or ankle joints.    Skin:     General: Skin is warm and dry.      Capillary Refill: Capillary refill takes less than 2 seconds.   Neurological:      Mental Status: She is alert and oriented to person, place, and time.   Psychiatric:         Mood and Affect: Mood normal.           Data   Recent Labs   Lab 12/15/22  0808 12/14/22  2330 12/14/22  1556 12/14/22  1257   WBC 4.0  --   --  5.0   HGB 11.2* 10.6*  --  5.6*   MCV 87  --   --  85    *  --   --  147*     --  134* 135*   POTASSIUM 4.8  --  3.7 3.2*   CHLORIDE 110*  --  105 107   CO2 17*  --  21* 19*   BUN 24.6*  --  18.8 17.2   CR 0.99*  --  0.83 0.73   ANIONGAP 11  --  8 9   BISI 7.9*  --  7.4* 7.0*   *  --  94 85   ALBUMIN  --   --   --  1.8*   PROTTOTAL  --   --   --  7.1   BILITOTAL  --   --   --  0.3   ALKPHOS  --   --   --  183*   ALT  --   --   --  15     Recent Results (from the past 24 hour(s))   XR Pelvis w Hip Left 1 View    Narrative    EXAM: XR PELVIS AND HIP LEFT 1 VIEW  LOCATION: [facility]  DATE/TIME: 12/14/2022 5:14 PM    INDICATION: Hip pain.  COMPARISON: None.      Impression    IMPRESSION: Anatomic alignment left hip. No acute displaced left hip fracture. Normal and symmetric hip joint spaces. No acute displaced pelvic fracture. Both obturator rings are intact. Symmetric SI joints.   XR Knee Left 1/2 Views    Narrative    EXAM: XR KNEE LEFT 1/2 VIEWS  LOCATION: [facility]  DATE/TIME: 12/14/2022 5:15 PM    INDICATION: Left knee pain.  COMPARISON: None.      Impression    IMPRESSION: Anatomic alignment left knee. No acute displaced left knee fracture. Normal joint spaces. No left knee joint effusion.   XR Ankle Left G/E 3 Views    Narrative    EXAM: XR ANKLE LEFT G/E 3 VIEWS  LOCATION: [facility]  DATE/TIME: 12/14/2022 5:15 PM    INDICATION: Ankle pain.  COMPARISON: None.      Impression    IMPRESSION: Normal joint spaces and alignment. No fracture. 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 Medial greater than lateral hindfoot soft tissue swelling.   MR Brain w/o & w Contrast    Narrative    EXAM: MR BRAIN W/O & W CONTRAST  12/15/2022 1:15 PM     HISTORY:  Lupus, history of seizures once/month. Evaluate for evidence  of lupus-related brain changes       COMPARISON:  5/15/2013 MRI, 5/15/2013  CT    TECHNIQUE: Axial FLAIR,  T1-weighted, and susceptibility images were  obtained without intravenous contrast. Following intravenous  gadolinium-based contrast administration, axial T2-weighted,  diffusion, FLAIR, and axial and coronal T1-weighted images were  obtained    CONTRAST: 5mL Gadavist.    FINDINGS:    There is no mass effect, midline shift, or acute intracranial  hemorrhage. Chronic T1 hyperintensity and calcification in the basal  ganglia bilaterally which was seen on prior MRI, although has  progressed in the interval. Calcifications are also seen on prior CT.  In addition, there is T1 hyperintense susceptibility creating the  appearance in the dentate nuclei, could be secondary to either are in  the position or calcium deposition. No acute intracranial hemorrhage  is suspected. No abnormal restricted diffusion. Major intracranial  vascular flow voids appear patent.  No abnormal intracranial enhancement.    Paranasal sinuses and mastoid air cells are clear.      Impression    IMPRESSION:    1. No acute intracranial pathology suspected.  2. Bilateral calcifications in the basal ganglia which has increased  since 2013. In addition, there is increased mineral deposition in the  basal ganglia either secondary to calcium or iron. These be seen as a  consequence of lupus (either from medication or renal issues or  calcium metabolism related).   3. No abnormal intracranial enhancement.    I have personally reviewed the examination and initial interpretation  and I agree with the findings.    KE PERALTA MD         SYSTEM ID:  [id number]

## 2022-12-15 NOTE — PROGRESS NOTES
AVSS on R.A.A&O x4 denies numbness and tingling.C/o pain 4/10 on left knee declines tylenol.Patient is SBA to pivot.+bs +flatus no BM,voiding spont.      Plan of clan; MRI continue with ABX.

## 2022-12-15 NOTE — PROGRESS NOTES
Neuro: A&Ox4.   Cardiac: SR. VSS.   Respiratory: Sating 98% on RA.  GI/: Adequate urine output, no BM noted.  Diet/appetite: no appetite  Activity:  Up indepenently  Pain: At acceptable level on current regimen, pt educated on pain medication and to inform if pain increases.   Skin: No new deficits noted.  LDA's: 2 PIV infusing.     Plan: 1 out of 2 units PRBC given.   Continue with POC. Notify primary team with changes.

## 2022-12-15 NOTE — ED NOTES
Briana CASTILLO returned call. Verbalized to continue blood transfusion, as symptoms are likely related to sepsis. Caity azul applied and warm blankets. Will notify MD with change in VS and/or other new and concerning symptoms.

## 2022-12-15 NOTE — UTILIZATION REVIEW
Admission Status; Secondary Review Determination     Under the authority of the Utilization Management Committee, the utilization review process indicated a secondary review on the above patient. The review outcome is based on review of the medical records, discussions with staff, and applying clinical experience noted on the date of the review.     (x) Inpatient Status Appropriate - This patient's medical care is consistent with medical management for inpatient care and reasonable inpatient medical practice.     RATIONALE FOR DETERMINATION      Patient requires inpatient admission versus short stay observation or outpatient treatment for the following reasons:    22 yoF w/ PMH of severe lupus, lupus nephritis, and seizure disorder who presented with myalgias, fatigue, night sweats, low-grade fevers, mouth sores, and L hip pain to rheumatology clinic was sent to the hospital with concern for sepsis and lupus flareup.   The patient was found with anemia with hemoglobin of 5.6 and she required 2 units of red blood cells transfusion.  Patient has left hip pain with decreased range of motion and tenderness on palpation raising the concern for septic joint and osteonecrosis.  Patient was started on IV Rocephin.     22-year-old woman with severe systemic lupus erythematosus complicated by lupus nephritis and history of lupus cerebritis, immunosuppressed secondary lupus medications, is admitted with left hip pain, myalgia fatigue night sweats low-grade fever concerns for infection or/and lupus flareup.  The immunosuppressive medications: CellCept and Benlysta were held because of the infection concern. The patient requires close monitoring of the hemoglobin, kidney function/lupus nephritis, vital signs, lupus, infection, while she is receiving IV antibiotics and SLE treatment      The expected length of stay at the time of admission was more than 2 nights because of the severity of illness, intensity of service provided,  and risk for adverse outcome. Inpatient admission is appropriate.         This document was produced using voice recognition software       The information on this document is developed by the utilization review team in order for the business office to ensure compliance. This only denotes the appropriateness of proper admission status and does not reflect the quality of care rendered.   The definitions of Inpatient Status and Observation Status used in making the determination above are those provided in the CMS Coverage Manual, Chapter 1 and Chapter 6, section 70.4.   Sincerely,   ALLISON RIDER MD   Utilization Review  Physician Advisor  [facility]

## 2022-12-15 NOTE — ED NOTES
Writer entered patient's room to obtain VS and reassess s/s and blood transfusion. Patient found to be diaphoretic. Writer unable to obtain oral, temporal, or axillary temp. Rectal temp obtained, 94F. Patient drowsy, but oriented. Interacts appropriately, awakens to voice. Denies shortness of breath, hives, itching. Remainder of vital signs stable. Sinus bradycardia on telemetry. Briana CASTILLO paged. Awaiting response.

## 2022-12-16 LAB
ALBUMIN MFR UR ELPH: 507 MG/DL
ANION GAP SERPL CALCULATED.3IONS-SCNC: 8 MMOL/L (ref 7–15)
BACTERIA UR CULT: NORMAL
BUN SERPL-MCNC: 33.6 MG/DL (ref 6–20)
CALCIUM SERPL-MCNC: 7.6 MG/DL (ref 8.6–10)
CHLORIDE SERPL-SCNC: 113 MMOL/L (ref 98–107)
CREAT SERPL-MCNC: 0.8 MG/DL (ref 0.51–0.95)
CREAT UR-MCNC: 224 MG/DL
CRP SERPL-MCNC: 14.1 MG/L
DEPRECATED HCO3 PLAS-SCNC: 20 MMOL/L (ref 22–29)
ERYTHROCYTE [DISTWIDTH] IN BLOOD BY AUTOMATED COUNT: 17.6 % (ref 10–15)
GFR SERPL CREATININE-BSD FRML MDRD: >90 ML/MIN/1.73M2
GLUCOSE SERPL-MCNC: 111 MG/DL (ref 70–99)
HCT VFR BLD AUTO: 33.9 % (ref 35–47)
HGB BLD-MCNC: 10.4 G/DL (ref 11.7–15.7)
MCH RBC QN AUTO: 27.4 PG (ref 26.5–33)
MCHC RBC AUTO-ENTMCNC: 30.7 G/DL (ref 31.5–36.5)
MCV RBC AUTO: 89 FL (ref 78–100)
PLATELET # BLD AUTO: 139 10E3/UL (ref 150–450)
POTASSIUM SERPL-SCNC: 4.4 MMOL/L (ref 3.4–5.3)
PROT/CREAT 24H UR: 2.26 MG/MG CR (ref 0–0.2)
RBC # BLD AUTO: 3.79 10E6/UL (ref 3.8–5.2)
SODIUM SERPL-SCNC: 141 MMOL/L (ref 136–145)
WBC # BLD AUTO: 6.9 10E3/UL (ref 4–11)

## 2022-12-16 PROCEDURE — 99232 SBSQ HOSP IP/OBS MODERATE 35: CPT | Mod: GC | Performed by: INTERNAL MEDICINE

## 2022-12-16 PROCEDURE — 86140 C-REACTIVE PROTEIN: CPT | Performed by: STUDENT IN AN ORGANIZED HEALTH CARE EDUCATION/TRAINING PROGRAM

## 2022-12-16 PROCEDURE — 85027 COMPLETE CBC AUTOMATED: CPT

## 2022-12-16 PROCEDURE — 80048 BASIC METABOLIC PNL TOTAL CA: CPT

## 2022-12-16 PROCEDURE — 120N000002 HC R&B MED SURG/OB UMMC

## 2022-12-16 PROCEDURE — 250N000012 HC RX MED GY IP 250 OP 636 PS 637: Performed by: STUDENT IN AN ORGANIZED HEALTH CARE EDUCATION/TRAINING PROGRAM

## 2022-12-16 PROCEDURE — 250N000013 HC RX MED GY IP 250 OP 250 PS 637

## 2022-12-16 PROCEDURE — 36415 COLL VENOUS BLD VENIPUNCTURE: CPT

## 2022-12-16 RX ORDER — SULFAMETHOXAZOLE/TRIMETHOPRIM 800-160 MG
1 TABLET ORAL
Qty: 30 TABLET | Refills: 1 | Status: SHIPPED | OUTPATIENT
Start: 2022-12-16 | End: 2023-01-01

## 2022-12-16 RX ORDER — ACETAMINOPHEN 500 MG
500 TABLET ORAL EVERY 4 HOURS PRN
Qty: 30 TABLET | Refills: 1 | Status: ON HOLD | OUTPATIENT
Start: 2022-12-16 | End: 2023-01-01

## 2022-12-16 RX ORDER — FERROUS SULFATE 325(65) MG
325 TABLET ORAL
Qty: 100 TABLET | Refills: 3 | Status: ON HOLD | OUTPATIENT
Start: 2022-12-16 | End: 2023-01-01

## 2022-12-16 RX ORDER — CALCIUM CARBONATE 500 MG/1
1 TABLET, CHEWABLE ORAL DAILY
Qty: 30 TABLET | Refills: 1 | Status: ON HOLD | OUTPATIENT
Start: 2022-12-16 | End: 2023-01-01

## 2022-12-16 RX ORDER — HYDROXYCHLOROQUINE SULFATE 200 MG/1
200 TABLET, FILM COATED ORAL DAILY
Qty: 30 TABLET | Refills: 1 | Status: ON HOLD | OUTPATIENT
Start: 2022-12-17 | End: 2023-01-01

## 2022-12-16 RX ORDER — PREDNISONE 5 MG/1
TABLET ORAL
Qty: 101 TABLET | Refills: 0 | Status: ON HOLD | OUTPATIENT
Start: 2022-12-18 | End: 2023-01-29

## 2022-12-16 RX ORDER — MYCOPHENOLATE MOFETIL 500 MG/1
1500 TABLET ORAL 2 TIMES DAILY
Qty: 180 TABLET | Refills: 0 | Status: ON HOLD | OUTPATIENT
Start: 2022-12-16 | End: 2023-01-29

## 2022-12-16 RX ADMIN — PREDNISONE 20 MG: 20 TABLET ORAL at 10:13

## 2022-12-16 RX ADMIN — CALCIUM CARBONATE (ANTACID) CHEW TAB 500 MG 500 MG: 500 CHEW TAB at 10:13

## 2022-12-16 RX ADMIN — HYDROXYCHLOROQUINE SULFATE 200 MG: 200 TABLET, FILM COATED ORAL at 10:12

## 2022-12-16 RX ADMIN — MYCOPHENOLATE MOFETIL 1500 MG: 500 TABLET, FILM COATED ORAL at 19:51

## 2022-12-16 RX ADMIN — MYCOPHENOLATE MOFETIL 1500 MG: 500 TABLET, FILM COATED ORAL at 10:13

## 2022-12-16 RX ADMIN — Medication 50000 UNITS: at 10:12

## 2022-12-16 RX ADMIN — FERROUS SULFATE TAB 325 MG (65 MG ELEMENTAL FE) 325 MG: 325 (65 FE) TAB at 10:13

## 2022-12-16 ASSESSMENT — ACTIVITIES OF DAILY LIVING (ADL)
ADLS_ACUITY_SCORE: 29
ADLS_ACUITY_SCORE: 22
ADLS_ACUITY_SCORE: 29
ADLS_ACUITY_SCORE: 22
ADLS_ACUITY_SCORE: 29
ADLS_ACUITY_SCORE: 22

## 2022-12-16 NOTE — PROGRESS NOTES
Text paged team: MRI of knee and hip cannot be done tonight d/t MRI with contrast being done today. Will plan on getting the MRI tomorrow.

## 2022-12-16 NOTE — PROGRESS NOTES
[facility]    Progress Note - Medicine Service, MAROON TEAM 3       Date of Admission:  12/14/2022    Assessment & Plan    Milly Carroll is a 22 yoF w/ PMH of SLE with lupus nephritis and cerebritis presented with myalgias, fatigue, night sweats, low-grade fevers, mouth sores, and L hip pain with admission for SLE flare. Inpatient work-up to date including LLE radiographs and infectious work-up has been unremarkable. Because of previous history of seizures associated with cerebritis, patient did complete MR Brain with no acute pathology but increased mineral deposition possibly from SLE.    Clinically and vitally stable when seen today. Patient appears to be near symptom baseline and appropriate for discharge home tomorrow.     Updates  - Restarted PTA bactrim  - Plan for discharge tomorrow AM    # Acute on Chronic SLE  Patient presented with 2 week onset of general malaise, fevers, fatigue, and left-sided hip, knee, and ankle pain. Recent outpatient labs consistent with poorly controlled lupus with current symptoms consistent with lupus flare likely due to medication non-compliance. Imaging and infectious work-up to date have been otherwise unremarkable for acute issues. Patient is s/p solumedrol 125 mg IV x1 on admission. No concern for septic joint or polyarticular infection per exam but patient was given IV ceftriaxone empirically.   - Rheumatology following, plan for close outpatient follow-up  - Continue PTA Cellcept, Hydroxychloroquine and MMF  - Continue prednisone 20 daily  - Continue PTA bactrim for PJP prophylaxis  - Holding PTA Benlysta    # Lupus Nephritis Class III  Patient with UA consistent with lupus nephritis. Because of pyuria, patient was also covered with empiric IV ceftriaxone (12/14-12/16)  - SLE management as above    # History of Lupus Cerebritis  Neurology was consulted regarding lupus cerebritis and recent seizures with SLE flares and  advised against anti-epileptics - only SLE management.   - SLE management as above    # Acute on chronic anemia  # Iron Deficiency Anemia  Hemoglobin on arrival at 5.6, with baseline close to 7-8. S/p 2u pRBC in ED with improvement to 10. No overt bleeding observed on exam, and labs not suggestive of acute hemolysis. Anemia may have been lab error or from marrow suppression for poorly controlled lupus. Negative lupus anticoagulant labs previously; however, will plan to continue DVT prophylaxis due to SLE hypercoagulability.   - Continue PTA ferrous sulfate, repeat iron labs as outpatient  - Lovenox DVT prophylaxis    # Hypokalemia  Potassium of 3.2 on arrival, but sample hemolyzed so suspect true value may be lower. Normalized while inpatient.     # Hypocalcemia, chronic  Calcium at 7.0 on admit. Corrected calcium in setting of hypoalbuminemia is 8.8, within normal range.   - Continue PTA calcium carbonate 500mg daily     Diet: Regular Diet Adult    DVT Prophylaxis: Enoxaparin (Lovenox) SQ  Leung Catheter: Not present  Fluids: none  Central Lines: None  Cardiac Monitoring: None  Code Status: Full Code      Disposition Plan   Discharge home on 12/17    The patient's care was discussed with the Attending Physician, Dr. Mary .    Wilmer Baumann MD   PGY2, Internal Medicine  Medicine Service, [address]  Securely message with the Vocera Web Console (learn more here)  Text page via [employer] Paging/Directory   Please see signed in provider for up to date coverage information    ______________________________________________________________________    Interval History   NAEON. NNR. Patient without acute complaints when seen this morning. Discussed plan of care, and patient questions answered at bedside with staff present.     4 point review of systems negative.     Data reviewed today: I reviewed all medications, new labs and imaging results over the last 24  hours.     Physical Exam   Vital Signs: Temp: (!) 96.7  F (35.9  C) Temp src: Oral BP: 130/87 Pulse: 52   Resp: 14 SpO2: 100 % O2 Device: None (Room air)    Weight: 108 lbs 0 oz    Constitutional: cooperative, no apparent distress  HENT: anicteric sclera, conjugate gaze   Respiratory: non-labored respirations on room air, CTAB, no crackles or wheezing, no cough  Cardiovascular: RRR, no murmur, no edema noted  GI: soft, non-distended, non-tender  Joint: No limitations in ROM of bilateral lower extremities, no pain on palpation of bilateral knee or ankles  Skin: warm and dry, no rashes or lesions noted  Neurologic: Cranial nerves II-XII are grossly intact, moving all extremities equally and independently    Data   Recent Labs   Lab 12/16/22  0908 12/15/22  0808 12/14/22  2330 12/14/22  1556 12/14/22  1257   WBC 6.9 4.0  --   --  5.0   HGB 10.4* 11.2* 10.6*  --  5.6*   MCV 89 87  --   --  85   * 127*  --   --  147*    138  --  134* 135*   POTASSIUM 4.4 4.8  --  3.7 3.2*   CHLORIDE 113* 110*  --  105 107   CO2 20* 17*  --  21* 19*   BUN 33.6* 24.6*  --  18.8 17.2   CR 0.80 0.99*  --  0.83 0.73   ANIONGAP 8 11  --  8 9   BISI 7.6* 7.9*  --  7.4* 7.0*   * 143*  --  94 85   ALBUMIN  --   --   --   --  1.8*   PROTTOTAL  --   --   --   --  7.1   BILITOTAL  --   --   --   --  0.3   ALKPHOS  --   --   --   --  183*   ALT  --   --   --   --  15     Recent Results (from the past 24 hour(s))   MR Brain w/o & w Contrast    Narrative    EXAM: MR BRAIN W/O & W CONTRAST  12/15/2022 1:15 PM     HISTORY:  Lupus, history of seizures once/month. Evaluate for evidence  of lupus-related brain changes       COMPARISON:  5/15/2013 MRI, 5/15/2013 CT    TECHNIQUE: Axial FLAIR,  T1-weighted, and susceptibility images were  obtained without intravenous contrast. Following intravenous  gadolinium-based contrast administration, axial T2-weighted,  diffusion, FLAIR, and axial and coronal T1-weighted images  were  obtained    CONTRAST: 5mL Gadavist.    FINDINGS:    There is no mass effect, midline shift, or acute intracranial  hemorrhage. Chronic T1 hyperintensity and calcification in the basal  ganglia bilaterally which was seen on prior MRI, although has  progressed in the interval. Calcifications are also seen on prior CT.  In addition, there is T1 hyperintense susceptibility creating the  appearance in the dentate nuclei, could be secondary to either are in  the position or calcium deposition. No acute intracranial hemorrhage  is suspected. No abnormal restricted diffusion. Major intracranial  vascular flow voids appear patent.  No abnormal intracranial enhancement.    Paranasal sinuses and mastoid air cells are clear.      Impression    IMPRESSION:    1. No acute intracranial pathology suspected.  2. Bilateral calcifications in the basal ganglia which has increased  since 2013. In addition, there is increased mineral deposition in the  basal ganglia either secondary to calcium or iron. These be seen as a  consequence of lupus (either from medication or renal issues or  calcium metabolism related).   3. No abnormal intracranial enhancement.    I have personally reviewed the examination and initial interpretation  and I agree with the findings.    KE PERALTA MD         SYSTEM ID:  [id number]

## 2022-12-16 NOTE — PLAN OF CARE
"Blood pressure 130/87, pulse 52, temperature (!) 96.7  F (35.9  C), temperature source Oral, resp. rate 14, height 1.575 m (5' 2\"), weight 49 kg (108 lb), SpO2 100 %, not currently breastfeeding.    ORIENTATION: Alert and oriented by 4, able to make needs known    PAIN: No current complaints of pain    SKIN: WDL    LINES/DRAINS: R PIV SL    TRANSFERS: up ad [employer]od[employer]    CARDIAC: WDL    RESPIRATORY: WDL, RA    LABS/IMAGING: MRI cancelled for today, no longer seen as necessary.      SUMMARY OF SHIFT: Pt extremely flat and withdrawn. Does not give answers more than one word. Pt medically ready to go home today, but pt stated she would rather stay in hospital for another day. Slept majority of the day-- writer voiced concerns to team that pt may be withholding information regarding home situation/medication regimen.     Pt confided in writer that she did not want to go home because \"I am left alone a lot\" and \"I stay in my room a lot because there are always a lot of people in the living room coughing\"-- Team notified of information.       "

## 2022-12-16 NOTE — PROGRESS NOTES
"Brief Rheumatology Progress Note    ASSESSMENT AND RECOMMENDATIONS:  Milly Carroll is a 22 year old female admitted on 12/14/2022. 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     DIAGNOSIS:  SLE (+dsDNA, +RNP, +Ribosomal P, low C3/C4, lupus nephritis), active disease  Lupus nephritis (class III), with proteinuria    Seen today, she has complete resolution of her initial hip and knee pains.  Her cytopenias have improved.  However, her kidney function remains particularly concerning for active, smoldering lupus nephritis.  I am glad she will be meeting with nephrology outpatient 12/29.  Her current dose of prednisone, 20 mg, is not the dose level we would imagine for active nephritis, but hopefully resumption of MMF, Benlysta, and hydroxychloroquine can be effective as well.  In addition to nephrology follow-up, she will require rheumatology posthospitalization follow-up.  I will arrange for this to happen in proximately 2 to 3 weeks with Dr. Vides.    RECOMMENDATIONS:  -- Prednisone 20mg qday x7d, 15mg x7d, return to PTA 10mg qday  -- Resumed MMF 1500mg BID  -- She may resume Benlysta on discharge  --Outpatient rheumatology follow-up in 2 to 3 weeks, timing TBD.      I discussed the findings and recommendations with the patient.  I communicated the assessment and plan to the consulting team.     Case seen and discussed with Dr. Thee Vides.      Main \"BJ\" MD Hardeep, PhD  PGY-3 Rheumatology Fellow  [phone] [text page]    I saw this patient with the Rheumatology Fellow. I agree with the findings and recommendations.    Thee Vides M.D.  Staff Rheumatologist, [facility]  Pager [phone]    "

## 2022-12-16 NOTE — PLAN OF CARE
Pt transferred from ED at about 2100.  VSS on RA. Denies pain or nausea. Up with SBA, Voiding spontaneously. No BM overnight. PIV SL. Seizure precautions in place. Plan for MRI today.

## 2022-12-16 NOTE — PROGRESS NOTES
Admitted/transferred from: ED  2 RN  skin assessment completed by Nafisa DONOVAN RN and Tara DIETZ RN  Skin assessment finding: skin intact   Interventions/actions: none needed at this time.     Will continue to monitor.

## 2022-12-17 VITALS
OXYGEN SATURATION: 100 % | BODY MASS INDEX: 19.88 KG/M2 | DIASTOLIC BLOOD PRESSURE: 87 MMHG | HEIGHT: 62 IN | TEMPERATURE: 95.7 F | HEART RATE: 50 BPM | WEIGHT: 108 LBS | RESPIRATION RATE: 16 BRPM | SYSTOLIC BLOOD PRESSURE: 120 MMHG

## 2022-12-17 PROCEDURE — 250N000012 HC RX MED GY IP 250 OP 636 PS 637: Performed by: STUDENT IN AN ORGANIZED HEALTH CARE EDUCATION/TRAINING PROGRAM

## 2022-12-17 PROCEDURE — 99239 HOSP IP/OBS DSCHRG MGMT >30: CPT | Mod: GC | Performed by: INTERNAL MEDICINE

## 2022-12-17 PROCEDURE — 250N000013 HC RX MED GY IP 250 OP 250 PS 637

## 2022-12-17 RX ADMIN — PREDNISONE 20 MG: 20 TABLET ORAL at 08:34

## 2022-12-17 RX ADMIN — HYDROXYCHLOROQUINE SULFATE 200 MG: 200 TABLET, FILM COATED ORAL at 08:34

## 2022-12-17 RX ADMIN — FERROUS SULFATE TAB 325 MG (65 MG ELEMENTAL FE) 325 MG: 325 (65 FE) TAB at 08:34

## 2022-12-17 RX ADMIN — CALCIUM CARBONATE (ANTACID) CHEW TAB 500 MG 500 MG: 500 CHEW TAB at 08:34

## 2022-12-17 RX ADMIN — MYCOPHENOLATE MOFETIL 1500 MG: 500 TABLET, FILM COATED ORAL at 08:34

## 2022-12-17 ASSESSMENT — ACTIVITIES OF DAILY LIVING (ADL)
ADLS_ACUITY_SCORE: 22

## 2022-12-17 NOTE — DISCHARGE SUMMARY
[facility]  Discharge Summary - Medicine & Pediatrics       Date of Admission:  12/14/2022  Date of Discharge:  12/17/2022  Discharging Provider: Wilmer Baumann  Discharge Service: Medicine Service, KORE[place] TEAM 3    Discharge Diagnoses   # Acute on Chronic SLE  # Lupus Nephritis Class III  # History of Lupus Cerebritis  # History of Seizure  # Physiologic Tremor  # Acute on chronic anemia  # Iron Deficiency Anemia  # Hypokalemia  # Hypocalcemia  # Vitamin D Deficiency  # Hypoalbuminemia    Follow-ups Needed After Discharge   Follow-up Appointments     Adult [facility] Follow-up and recommended labs and tests      Follow up with rheumatology in clinic as scheduled.     Appointments on [place] and/or [facility] (with [facility] or [facility]   provider or service). Call [phone] if you haven't heard regarding   these appointments within 7 days of discharge.             Unresulted Labs Ordered in the Past 30 Days of this Admission     Date and Time Order Name Status Description    12/15/2022  2:53 PM Parvovirus B19 DNA PCR In process     12/14/2022  2:57 PM Blood Culture Arm, Left Preliminary     12/14/2022  2:57 PM Blood Culture Hand, Right Preliminary       These results will be followed up by Rheumatology and Primary Care    Discharge Disposition   Discharged to home  Condition at discharge: Stable    Hospital Course     Milly Carroll is a 21 yo F w/ PMH of SLE with lupus nephritis and cerebritis presented with myalgias, fatigue, night sweats, low-grade fevers, mouth sores, and L hip pain with admission for SLE flare secondary to medication non-compliance. Inpatient work-up to date including LLE radiographs and infectious work-up has been unremarkable. Because of previous history of seizures associated with cerebritis, patient did complete MR Brain with no acute pathology but increased mineral deposition possibly from SLE. Patient symptoms improved following initiation of  prescribed home SLE therapies.     Of note, patient home environment was discussed at length, and patient confirms that she is safe with family at home though does not feel well-supported regarding her lupus management.     # Acute on Chronic SLE  Patient presented with 2 week onset of general malaise, fevers, fatigue, and left-sided hip, knee, and ankle pain. Recent outpatient labs consistent with poorly controlled lupus with current symptoms consistent with lupus flare due to medication non-compliance. Medicine team confirmed with all listed pharmacies in patient chart that patient has not filled HCQ and MMF since 2021. Imaging and infectious work-up to date have been otherwise unremarkable for acute issues. Patient is s/p solumedrol 125 mg IV x1 on admission. No concern for septic joint or polyarticular infection per exam but patient was given IV ceftriaxone empirically.   - Rheumatology follow-up on discharge  - Continue PTA Hydroxychloroquine and MMF  - Continue prednisone taper: 20mg qday x7d, 15mg x7d, return to PTA 10mg qday  - Continue PTA bactrim for PJP prophylaxis  - Continue PTA Benlysta     # Lupus Nephritis Class III  Patient with UA consistent with lupus nephritis. Because of pyuria, patient was also covered with empiric IV ceftriaxone (12/14-12/16) for possible UTI. Of note, patient has had multiple admission for similar UA and has no charted history of UTI in past 3 years.   - SLE management as above     # History of Lupus Cerebritis  # Physiologic Tremor  Neurology was consulted regarding history of lupus cerebritis and reported monthly seizures with SLE flares. Of note, patient was previously on Keppra but tapered off in 2014 and has not been on antiepileptics since. Inpatient neurology completed EEG and ultimately advised against anti-epileptics - only SLE management.   - SLE management as above  - No outpatient neurology follow-up needed     # Acute on chronic anemia  # Iron Deficiency  Anemia  Hemoglobin on arrival at 5.6, with baseline close to 7-8. S/p 2u pRBC in ED with improvement to 10. No overt bleeding observed on exam, and labs not suggestive of acute hemolysis. Anemia may have been lab error or from marrow suppression for poorly controlled lupus. Negative lupus anticoagulant labs previously; however, continued DVT prophylaxis due to SLE hypercoagulability.   - Continue PTA ferrous sulfate, repeat iron labs as outpatient     # Hypokalemia  Potassium of 3.2 on arrival, but sample hemolyzed so suspect true value may be lower. Normalized while inpatient.      # Hypocalcemia, chronic  Calcium at 7.0 on admit. Corrected calcium in setting of hypoalbuminemia is 8.8, within normal range.   - Continue PTA calcium carbonate 500mg daily    Consultations This Hospital Stay   RHEUMATOLOGY IP CONSULT  NEUROLOGY GENERAL ADULT IP CONSULT    Code Status   Prior     Patient care discussed with Briana Dixon Staff.     MD Briana Cox [address] [place] [address] [address] [address]  Phone: [phone]  ______________________________________________________________________    Physical Exam   Vital Signs: Temp: (!) 95.7  F (35.4  C) Temp src: Oral BP: 120/87 Pulse: 50   Resp: 16 SpO2: 100 % O2 Device: None (Room air)    Weight: 108 lbs 0 oz     Constitutional: awake, alert, cooperative, no apparent distress, and appears stated age  Eyes: Lids and lashes normal, pupils equal, round and reactive to light, extra ocular muscles intact, sclera clear, conjunctiva normal   ENT: Normocephalic, without obvious abnormality, atraumatic, oral pharynx with moist mucous membranes and without ulceration, tonsils without erythema or exudates, gums normal and good dentition.  Hematologic / Lymphatic: no cervical lymphadenopathy  Respiratory: No increased work of breathing, good air exchange, clear to auscultation bilaterally, no crackles or wheezing  Cardiovascular: Normal apical  impulse, regular rate and rhythm, normal S1 and S2, no murmur noted  GI: No scars, normal bowel sounds, soft, non-distended, non-tender, no masses palpated, no hepatosplenomegally  Skin: no bruising or bleeding and normal skin color, texture, turgor  Musculoskeletal: no lower extremity pitting edema present  there is no redness, warmth, or swelling of the joints,  motor strength is 5 out of 5 all extremities bilaterally, full ROM of bilateral lower extremities  Neurologic: Awake, alert, oriented to name, place and time.  Cranial nerves II-XII are grossly intact.  Motor is 5 out of 5 bilaterally. Neuropsychiatric: Affect: flat      Primary Care Physician   Estefany Bell    Discharge Orders      Med Therapy Management Referral      Nutrition Referral      Reason for your hospital stay    You were admitted for an acute flare of your lupus. You have improved and are safe to discharge home on your home lupus medications. Please follow up with rheumatology in clinic.     Activity    Your activity upon discharge: activity as tolerated     Adult [facility] Follow-up and recommended labs and tests    Follow up with rheumatology in clinic as scheduled.     Appointments on [place] and/or [facility] (with [facility] or [facility] provider or service). Call [phone] if you haven't heard regarding these appointments within 7 days of discharge.     Diet    Follow this diet upon discharge: Orders Placed This Encounter      Regular Diet Adult       Significant Results and Procedures   Results for orders placed or performed during the hospital encounter of 12/14/22   XR Chest 2 Views    Narrative    XR CHEST 2 VIEWS  12/14/2022 2:13 PM     HISTORY:  dyspnea w/ cough       COMPARISON:  Chest radiograph 6/11/2022, chest CT 5/12/2013    FINDINGS:   Frontal and lateral views of the chest. Trachea is midline. Cardiac  silhouette is within normal limits. Nodular opacity over left lower  lung field, which may correspond with the nodular  density on chest CT  dated 5/12/2013. Minimal bibasilar interstitial opacities, likely  atelectasis. No pleural effusion or appreciable pneumothorax. No acute  osseous abnormality. Visualized upper abdomen is unremarkable.        Impression    IMPRESSION: Nodular opacity over left lower lung field, which may  correspond with the nodular density on chest CT dated 5/12/2013.  Minimal bibasilar opacities, likely atelectasis.    I have personally reviewed the examination and initial interpretation  and I agree with the findings.    ELAINE FIORE MD         SYSTEM ID:  [id number]   XR Knee Left 1/2 Views    Narrative    EXAM: XR KNEE LEFT 1/2 VIEWS  LOCATION: [facility]  DATE/TIME: 12/14/2022 5:15 PM    INDICATION: Left knee pain.  COMPARISON: None.      Impression    IMPRESSION: Anatomic alignment left knee. No acute displaced left knee fracture. Normal joint spaces. No left knee joint effusion.   XR Pelvis w Hip Left 1 View    Narrative    EXAM: XR PELVIS AND HIP LEFT 1 VIEW  LOCATION: [facility]  DATE/TIME: 12/14/2022 5:14 PM    INDICATION: Hip pain.  COMPARISON: None.      Impression    IMPRESSION: Anatomic alignment left hip. No acute displaced left hip fracture. Normal and symmetric hip joint spaces. No acute displaced pelvic fracture. Both obturator rings are intact. Symmetric SI joints.   XR Ankle Left G/E 3 Views    Narrative    EXAM: XR ANKLE LEFT G/E 3 VIEWS  LOCATION: [facility]  DATE/TIME: 12/14/2022 5:15 PM    INDICATION: Ankle pain.  COMPARISON: None.      Impression    IMPRESSION: Normal joint spaces and alignment. No fracture. 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 Medial greater than lateral hindfoot soft tissue swelling.   MR Brain w/o & w Contrast    Narrative    EXAM: MR BRAIN W/O & W CONTRAST  12/15/2022 1:15 PM     HISTORY:  Lupus,  history of seizures once/month. Evaluate for evidence  of lupus-related brain changes       COMPARISON:  5/15/2013 MRI, 5/15/2013 CT    TECHNIQUE: Axial FLAIR,  T1-weighted, and susceptibility images were  obtained without intravenous contrast. Following intravenous  gadolinium-based contrast administration, axial T2-weighted,  diffusion, FLAIR, and axial and coronal T1-weighted images were  obtained    CONTRAST: 5mL Gadavist.    FINDINGS:    There is no mass effect, midline shift, or acute intracranial  hemorrhage. Chronic T1 hyperintensity and calcification in the basal  ganglia bilaterally which was seen on prior MRI, although has  progressed in the interval. Calcifications are also seen on prior CT.  In addition, there is T1 hyperintense susceptibility creating the  appearance in the dentate nuclei, could be secondary to either are in  the position or calcium deposition. No acute intracranial hemorrhage  is suspected. No abnormal restricted diffusion. Major intracranial  vascular flow voids appear patent.  No abnormal intracranial enhancement.    Paranasal sinuses and mastoid air cells are clear.      Impression    IMPRESSION:    1. No acute intracranial pathology suspected.  2. Bilateral calcifications in the basal ganglia which has increased  since 2013. In addition, there is increased mineral deposition in the  basal ganglia either secondary to calcium or iron. These be seen as a  consequence of lupus (either from medication or renal issues or  calcium metabolism related).   3. No abnormal intracranial enhancement.    I have personally reviewed the examination and initial interpretation  and I agree with the findings.    KE PERALTA MD         SYSTEM ID:  [id number]       Discharge Medications   Discharge Medication List as of 12/17/2022  9:26 AM      CONTINUE these medications which have CHANGED    Details   acetaminophen (TYLENOL) 500 MG tablet Take 1 tablet (500 mg) by mouth every 4 hours as needed for  mild pain, Disp-30 tablet, R-1, E-PrescribePlease fill here      calcium carbonate (TUMS) 500 MG chewable tablet Take 1 tablet (500 mg) by mouth daily, Disp-30 tablet, R-1, E-PrescribePlease fill here      ferrous sulfate (FEROSUL) 325 (65 Fe) MG tablet Take 1 tablet (325 mg) by mouth daily (with breakfast), Disp-100 tablet, R-3, E-PrescribePlease fill here      hydroxychloroquine (PLAQUENIL) 200 MG tablet Take 1 tablet (200 mg) by mouth daily, Disp-30 tablet, R-1, E-Prescribe      mycophenolate (GENERIC EQUIVALENT) 500 MG tablet Take 3 tablets (1,500 mg) by mouth 2 times daily, Disp-180 tablet, R-0, E-Prescribe      predniSONE (DELTASONE) 5 MG tablet Take 4 tablets (20 mg) by mouth daily for 5 days, THEN 3 tablets (15 mg) daily for 7 days, THEN 2 tablets (10 mg) daily for 30 days., Disp-101 tablet, R-0, E-Prescribe      sulfamethoxazole-trimethoprim (BACTRIM DS) 800-160 MG tablet Take 1 tablet by mouth Every Mon, Wed, Fri Morning, Disp-30 tablet, R-1, E-Prescribe      vitamin D3 (CHOLECALCIFEROL) 1.25 MG (38174 UT) capsule Take 1 capsule (50,000 Units) by mouth every 7 days, Disp-12 capsule, R-0, E-PrescribePlease fillhere         CONTINUE these medications which have NOT CHANGED    Details   Belimumab 200 MG/ML SOAJ Inject 200 mg Subcutaneous every 7 days, Disp-4 mL, R-11, E-Prescribe      multivitamin, therapeutic (THERA-VIT) TABS tablet Take 1 tablet by mouth daily, Disp-30 tablet, R-11, E-Prescribe           Allergies   Allergies   Allergen Reactions     Rituximab Anaphylaxis

## 2022-12-17 NOTE — PLAN OF CARE
Cared for pt from [id number].  Pt had no complaints overnight.  She likes to keep a light on in her room at night.  Slept well w/o issue.  Continue POC

## 2022-12-17 NOTE — PROGRESS NOTES
"Blood pressure 120/87, pulse 50, temperature (!) 95.7  F (35.4  C), temperature source Oral, resp. rate 16, height 1.575 m (5' 2\"), weight 49 kg (108 lb), SpO2 100 %, not currently breastfeeding.    Discharge time/Plan: Pt leaving hospital around 1100 with ride from brother    NANDO discussion: Discussed discharge education-- verbalized understanding and had no further questions    Acess/Lines: L PIV removed    Supplies: No supplies required    Medication: All medications given to pt at the bedside and writer clarified that she understands the instructions on how take all medications    Pt Transfers: [facility] No medical changes. Verbalized she had no further questions regarding care and the plan. Adequate for discharge.          "

## 2022-12-17 NOTE — PLAN OF CARE
15:00-23:30    Goal Outcome Evaluation:    Plan of Care Reviewed With: patient    Overall Patient Progress: improving    Temp: 96.9  F (36.1  C) Temp src: Oral BP: (!) 136/94 Pulse: 50   Resp: 16 SpO2: 100 % O2 Device: None (Room air)        Status:  admitted on 12/14 for concerns related to possible lupus flare in the context of marked dsDNA elevations, anemia, leukopenia, and fevers ( per MD note )    -denies pain  -Regular diet, ate late lunch with fair appetite, did not order supper but had 2 cups of ice-cream  -denies nausea  -voiding good amount in urine hat  -stated that she had a BM today  -mood flat and withdrawn  -up ad kodak in room  -PIV saline locked  -CRP trending down    Plan possible discharge to home tomorrow. Continue with plan of care.

## 2022-12-19 ENCOUNTER — PATIENT OUTREACH (OUTPATIENT)
Dept: CARE COORDINATION | Facility: CLINIC | Age: 22
End: 2022-12-19

## 2022-12-19 LAB
BACTERIA BLD CULT: NO GROWTH
BACTERIA BLD CULT: NO GROWTH

## 2022-12-19 NOTE — PROGRESS NOTES
[facility]    Background: Transitional Care Management program identified per system criteria and reviewed by [facility] team for possible outreach.    Assessment: Upon chart review, [facility] Team member will not proceed with patient outreach related to this episode of Transitional Care Management program due to reason below:    Patient has active communication with a nurse, provider or care team for reason of post-hospital follow up plan.  Outreach call by [facility] team not indicated to minimize duplicative efforts.     Plan: Transitional Care Management episode addressed appropriately per reason noted above.      CLARISSE Ramos  [phone]  [facility]    *Connected Care Resource Team does NOT follow patient ongoing. Referrals are identified based on internal discharge reports and the outreach is to ensure patient has an understanding of their discharge instructions.

## 2022-12-20 ENCOUNTER — TELEPHONE (OUTPATIENT)
Dept: PHARMACY | Facility: OTHER | Age: 22
End: 2022-12-20

## 2022-12-20 LAB — B19V DNA SER QL NAA+PROBE: NOT DETECTED

## 2022-12-20 NOTE — TELEPHONE ENCOUNTER
MTM referral from: Transitions of Care (recent hospital discharge or ED visit)    MTM referral outreach attempt #2 on December 20, 2022 at 9:06 AM      Outcome: Patient not reachable after several attempts, will route to MTM Pharmacist/Provider as an FYI.  [facility] scheduling number is [phone].  Thank you for the referral.    Patrick Mendoza, MT coordinator

## 2022-12-22 DIAGNOSIS — R80.9 PROTEINURIA, UNSPECIFIED TYPE: Primary | ICD-10-CM

## 2022-12-26 ENCOUNTER — HEALTH MAINTENANCE LETTER (OUTPATIENT)
Age: 22
End: 2022-12-26

## 2022-12-29 ENCOUNTER — VIRTUAL VISIT (OUTPATIENT)
Dept: NEPHROLOGY | Facility: CLINIC | Age: 22
End: 2022-12-29
Attending: INTERNAL MEDICINE
Payer: COMMERCIAL

## 2022-12-29 DIAGNOSIS — M32.14 LUPUS NEPHRITIS (H): Primary | ICD-10-CM

## 2022-12-29 PROCEDURE — 99214 OFFICE O/P EST MOD 30 MIN: CPT | Mod: 95 | Performed by: INTERNAL MEDICINE

## 2022-12-29 PROCEDURE — G0463 HOSPITAL OUTPT CLINIC VISIT: HCPCS | Mod: PN,GT | Performed by: INTERNAL MEDICINE

## 2022-12-29 NOTE — PROGRESS NOTES
Milly is a 22 year old who is being evaluated via a billable video visit.      How would you like to obtain your AVS? MyChart  If the video visit is dropped, the invitation should be resent by: Text to cell phone: [phone]  Will anyone else be joining your video visit? No        Video-Visit Details    Type of service:  Video Visit   Video Start Time:11.34 am  Video End Time:11.40 am    Originating Location (pt. Location): Home    Distant Location (provider location):  On-site  Platform used for Video Visit: [facility]       Nephrology clinic follow up  12/29/22    Milly Carroll MRN:[id number] YOB: 2000  Date of Admission:(Not on file)  Primary care provider: Estefany Bell  Requesting physician: April Purvis, *      REASON FOR CONSULT:       HISTORY OF PRESENT ILLNESS:    Milly was initially diagnosed with lupus when she was 7 years old. 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 She has had varying course of disease since her diagnosis. She has most recently being treated with Cellcept 1500 mg BID, Plaquenil 200 mg BID and prednisone 5 mg daily. She was on Benlysta infusion in 2019 that was later switched to Benlysta subcutaneous in 2021. She is not receiving Benlysta for last many months. She was treated with Rituximab since 2013 but she developed anaphylactic reaction during Rituximab infusion in 12/2019.    She has had persistent hematuria and proteinuria since 2021 however a lot of these samples were contaminated. She has had on and off documented proteinuria which is 0.3-0.7 g/g since 2013. She has always maintained her creatinine at baseline of 0.5-0.6 mg/dl however during most recent admission in 6/22, she had an elevation in her creatinine to 0.9 mg/dl. At the time, she was treated with IV  solumedrol 1 gm x 3 and discharged home on prednisone 60 mg daily. Her cellcept was decreased down to 1000 mg BID during that admission for concerns of toxicity.   Today she denies any particular symptoms but continues to have persistently elevated dsDNA and significantly low complements.     Patient denies any LE edema, exertional dyspnea/orthopnea/PND, dizziness, lightheadedness, nausea, vomiting or diarrhea.   Denies use of OTC NSAIDS or other non prescribed meds, recent exposure to abx or contrast, obstructive urinary symptoms, skin rashes, joint pains, fevers, passing kidney stones, recurrent sinus infections or UTI's       PAST MEDICAL HISTORY:  Reviewed with patient on 12/29/2022   As per HPI    MEDICATIONS:  Reviewed with the patient in detail    ALLERGIES:    Reviewed with the patient in detail    REVIEW OF SYSTEMS:  A comprehensive of systems was negative except as noted above.    SOCIAL HISTORY:   Reviewed with patient, no smoking and no alcohol use     FAMILY MEDICAL HISTORY:   Reviewed, no family history of need for dialysis, transplant or CKD    PHYSICAL EXAM:   Vital signs:There were no vitals taken for this visit.    Physical Exam       Gen: Appears well  Neck: No JVD  Lungs: CTA  CVS: S1S2 normal, no murmurs heard  LE: no edema  Skin: no rashes  CNS: Grossly normal     Interval history: Since we last spoke, she did have a kidney bx which went well. Unfortunately, it was hard to get in touch with her after that. Currently she reports of taking her cellcept 1500 mg BID as prescribed. She was admitted to the hospital 12/22 with cytopenias and undetectable level of MMF, concern for lupus flare and medication non adherence. Her dsDNA was >1000 and she was started back on cellcept 1500 mg BID along with a prednisone taper. Her creatinine peaked at 0.99 mg/dl from baseline of ~ 0.6 and her urine protein was at 2.26 g/g along with an active urinary sediment. She reports she is doing well after the  hospitalization and she denies any symptoms at this time. Her aches and pains are better and she only has back pain at this time. She remains on prednisone taper.     ASSESSMENT AND RECOMMENDATIONS:     # Lupus nephritis - class III  # SLE (+DsDNA, +RNP, +ribosomal P ab)  # h/o lupus cerebritis, seizures   # h/o rt knee osteonecrosis     Her hematuria and proteinuria along with elevated creatinine during most recent hospitalization 6/22 is an indication of active LN. She has never had a kidney biopsy and was on Cellcept 1500 mg BID prior to this admission. She has now received pulse dose steroids. Her creatinine has since improved from 0.9 mg/dl --> 0.6 mg/dl. Her UPCR was at 1.3 g/g.   I did perform a kidney bx which showed class III lupus nephritis with minimal activity, 1/10 gloms showing endocapillary proliferation while others have mesangial hypercellularity. There was some concern of her being adherent to cellcept. It was difficult to get in touch with her since scott had another admission 12/22 with a lupus flare and undetectable cellcept levels with peak in creatinine to 0.99 and UPCR increased to 2.2 g.g. She was restarted on cellcept 1500 mg BID and prednisone taper which she reports she is taking consistently but has not had repeat labs done.     Her blood pressures are currently at goal of < 140/90 mm of Hg and she reports of being euvolemic on exam.   I discussed with her being a part of clinical trial for Obinutuzumab which she is somewhat reluctant to but would like more information about it. In the meantime, I will start her on lisinopril 5 mg daily and repeat her labs in 2 weeks. Ideally, if she takes her cellcept right, her lupus activity in the kidney should be contained. At the luís of 2 weeks labs from now, I will Craig back to if she would need any additional treatment, would consider voclosporine or benlysta depending on her preference so that she can be adherent with the  treatment.    Check labs in 2 weeks  F/up in clinic in 4 weeks     April Purvis MD

## 2022-12-29 NOTE — LETTER
12/29/2022       RE: Milly Carroll  [address] [place] [id number]     Dear Colleague,    Thank you for referring your patient, Milly Carroll, to the [facility] [place] [facility]. Please see a copy of my visit note below.    Milly is a 22 year old who is being evaluated via a billable video visit.      How would you like to obtain your AVS? MyChart  If the video visit is dropped, the invitation should be resent by: Text to cell phone: [phone]  Will anyone else be joining your video visit? No        Video-Visit Details    Type of service:  Video Visit   Video Start Time:11.34 am  Video End Time:11.40 am    Originating Location (pt. Location): Home    Distant Location (provider location):  On-site  Platform used for Video Visit: [facility]       Nephrology clinic follow up  12/29/22    Milly Carroll MRN:[id number] YOB: 2000  Date of Admission:(Not on file)  Primary care provider: Estefany Bell  Requesting physician: Aprli Purvis, *      REASON FOR CONSULT:       HISTORY OF PRESENT ILLNESS:    Milly was initially diagnosed with lupus when she was 7 years old. 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 She has had varying course of disease since her diagnosis. She has most recently being treated with Cellcept 1500 mg BID, Plaquenil 200 mg BID and prednisone 5 mg daily. She was on Benlysta infusion in 2019 that was later switched to Benlysta subcutaneous in 2021. She is not receiving Benlysta for last many months. She was treated with Rituximab since 2013 but she developed anaphylactic reaction during Rituximab infusion in 12/2019.    She has had persistent hematuria and proteinuria since 2021 however a lot of these samples  were contaminated. She has had on and off documented proteinuria which is 0.3-0.7 g/g since 2013. She has always maintained her creatinine at baseline of 0.5-0.6 mg/dl however during most recent admission in 6/22, she had an elevation in her creatinine to 0.9 mg/dl. At the time, she was treated with IV solumedrol 1 gm x 3 and discharged home on prednisone 60 mg daily. Her cellcept was decreased down to 1000 mg BID during that admission for concerns of toxicity.   Today she denies any particular symptoms but continues to have persistently elevated dsDNA and significantly low complements.     Patient denies any LE edema, exertional dyspnea/orthopnea/PND, dizziness, lightheadedness, nausea, vomiting or diarrhea.   Denies use of OTC NSAIDS or other non prescribed meds, recent exposure to abx or contrast, obstructive urinary symptoms, skin rashes, joint pains, fevers, passing kidney stones, recurrent sinus infections or UTI's       PAST MEDICAL HISTORY:  Reviewed with patient on 12/29/2022   As per HPI    MEDICATIONS:  Reviewed with the patient in detail    ALLERGIES:    Reviewed with the patient in detail    REVIEW OF SYSTEMS:  A comprehensive of systems was negative except as noted above.    SOCIAL HISTORY:   Reviewed with patient, no smoking and no alcohol use     FAMILY MEDICAL HISTORY:   Reviewed, no family history of need for dialysis, transplant or CKD    PHYSICAL EXAM:   Vital signs:There were no vitals taken for this visit.    Physical Exam       Gen: Appears well  Neck: No JVD  Lungs: CTA  CVS: S1S2 normal, no murmurs heard  LE: no edema  Skin: no rashes  CNS: Grossly normal     Interval history: Since we last spoke, she did have a kidney bx which went well. Unfortunately, it was hard to get in touch with her after that. Currently she reports of taking her cellcept 1500 mg BID as prescribed. She was admitted to the hospital 12/22 with cytopenias and undetectable level of MMF, concern for lupus flare and  medication non adherence. Her dsDNA was >1000 and she was started back on cellcept 1500 mg BID along with a prednisone taper. Her creatinine peaked at 0.99 mg/dl from baseline of ~ 0.6 and her urine protein was at 2.26 g/g along with an active urinary sediment. She reports she is doing well after the hospitalization and she denies any symptoms at this time. Her aches and pains are better and she only has back pain at this time. She remains on prednisone taper.     ASSESSMENT AND RECOMMENDATIONS:     # Lupus nephritis - class III  # SLE (+DsDNA, +RNP, +ribosomal P ab)  # h/o lupus cerebritis, seizures   # h/o rt knee osteonecrosis     Her hematuria and proteinuria along with elevated creatinine during most recent hospitalization 6/22 is an indication of active LN. She has never had a kidney biopsy and was on Cellcept 1500 mg BID prior to this admission. She has now received pulse dose steroids. Her creatinine has since improved from 0.9 mg/dl --> 0.6 mg/dl. Her UPCR was at 1.3 g/g.   I did perform a kidney bx which showed class III lupus nephritis with minimal activity, 1/10 gloms showing endocapillary proliferation while others have mesangial hypercellularity. There was some concern of her being adherent to cellcept. It was difficult to get in touch with her since scott had another admission 12/22 with a lupus flare and undetectable cellcept levels with peak in creatinine to 0.99 and UPCR increased to 2.2 g.g. She was restarted on cellcept 1500 mg BID and prednisone taper which she reports she is taking consistently but has not had repeat labs done.     Her blood pressures are currently at goal of < 140/90 mm of Hg and she reports of being euvolemic on exam.   I discussed with her being a part of clinical trial for Obinutuzumab which she is somewhat reluctant to but would like more information about it. In the meantime, I will start her on lisinopril 5 mg daily and repeat her labs in 2 weeks. Ideally, if she  takes her cellcept right, her lupus activity in the kidney should be contained. At the luís of 2 weeks labs from now, I will Eek back to if she would need any additional treatment, would consider voclosporine or benlysta depending on her preference so that she can be adherent with the treatment.    Check labs in 2 weeks  F/up in clinic in 4 weeks         Again, thank you for allowing me to participate in the care of your patient.      Sincerely,    April Purvis MD

## 2023-01-01 ENCOUNTER — APPOINTMENT (OUTPATIENT)
Dept: MRI IMAGING | Facility: CLINIC | Age: 23
End: 2023-01-01
Attending: STUDENT IN AN ORGANIZED HEALTH CARE EDUCATION/TRAINING PROGRAM
Payer: COMMERCIAL

## 2023-01-01 ENCOUNTER — APPOINTMENT (OUTPATIENT)
Dept: CARDIOLOGY | Facility: CLINIC | Age: 23
End: 2023-01-01
Attending: INTERNAL MEDICINE
Payer: COMMERCIAL

## 2023-01-01 ENCOUNTER — ANESTHESIA (OUTPATIENT)
Dept: MEDSURG UNIT | Facility: CLINIC | Age: 23
End: 2023-01-01
Payer: COMMERCIAL

## 2023-01-01 ENCOUNTER — HOSPITAL ENCOUNTER (INPATIENT)
Facility: CLINIC | Age: 23
LOS: 8 days | Discharge: HOME | End: 2023-08-25
Attending: INTERNAL MEDICINE | Admitting: INTERNAL MEDICINE
Payer: COMMERCIAL

## 2023-01-01 ENCOUNTER — TELEPHONE (OUTPATIENT)
Dept: RHEUMATOLOGY | Facility: CLINIC | Age: 23
End: 2023-01-01
Payer: COMMERCIAL

## 2023-01-01 ENCOUNTER — APPOINTMENT (OUTPATIENT)
Dept: INTERVENTIONAL RADIOLOGY/VASCULAR | Facility: CLINIC | Age: 23
End: 2023-01-01
Attending: INTERNAL MEDICINE
Payer: COMMERCIAL

## 2023-01-01 ENCOUNTER — TELEPHONE (OUTPATIENT)
Dept: RHEUMATOLOGY | Facility: CLINIC | Age: 23
End: 2023-01-01

## 2023-01-01 ENCOUNTER — APPOINTMENT (OUTPATIENT)
Dept: NUCLEAR MEDICINE | Facility: CLINIC | Age: 23
End: 2023-01-01
Attending: STUDENT IN AN ORGANIZED HEALTH CARE EDUCATION/TRAINING PROGRAM
Payer: COMMERCIAL

## 2023-01-01 ENCOUNTER — PATIENT OUTREACH (OUTPATIENT)
Dept: NEPHROLOGY | Facility: CLINIC | Age: 23
End: 2023-01-01
Payer: COMMERCIAL

## 2023-01-01 ENCOUNTER — VIRTUAL VISIT (OUTPATIENT)
Dept: FAMILY MEDICINE | Facility: CLINIC | Age: 23
End: 2023-01-01
Payer: COMMERCIAL

## 2023-01-01 ENCOUNTER — HEALTH MAINTENANCE LETTER (OUTPATIENT)
Age: 23
End: 2023-01-01

## 2023-01-01 ENCOUNTER — TELEPHONE (OUTPATIENT)
Dept: NEPHROLOGY | Facility: CLINIC | Age: 23
End: 2023-01-01

## 2023-01-01 ENCOUNTER — ANESTHESIA EVENT (OUTPATIENT)
Dept: MEDSURG UNIT | Facility: CLINIC | Age: 23
End: 2023-01-01
Payer: COMMERCIAL

## 2023-01-01 ENCOUNTER — APPOINTMENT (OUTPATIENT)
Dept: GENERAL RADIOLOGY | Facility: OTHER | Age: 23
End: 2023-01-01
Attending: STUDENT IN AN ORGANIZED HEALTH CARE EDUCATION/TRAINING PROGRAM
Payer: COMMERCIAL

## 2023-01-01 ENCOUNTER — TELEPHONE (OUTPATIENT)
Dept: NEPHROLOGY | Facility: CLINIC | Age: 23
End: 2023-01-01
Payer: COMMERCIAL

## 2023-01-01 ENCOUNTER — ANESTHESIA EVENT (OUTPATIENT)
Dept: EMERGENCY MEDICINE | Facility: OTHER | Age: 23
End: 2023-01-01
Payer: COMMERCIAL

## 2023-01-01 ENCOUNTER — OFFICE VISIT (OUTPATIENT)
Dept: NEPHROLOGY | Facility: CLINIC | Age: 23
End: 2023-01-01
Attending: INTERNAL MEDICINE
Payer: COMMERCIAL

## 2023-01-01 ENCOUNTER — PATIENT OUTREACH (OUTPATIENT)
Dept: NEPHROLOGY | Facility: CLINIC | Age: 23
End: 2023-01-01

## 2023-01-01 ENCOUNTER — VIRTUAL VISIT (OUTPATIENT)
Dept: RHEUMATOLOGY | Facility: CLINIC | Age: 23
End: 2023-01-01
Attending: STUDENT IN AN ORGANIZED HEALTH CARE EDUCATION/TRAINING PROGRAM
Payer: COMMERCIAL

## 2023-01-01 ENCOUNTER — APPOINTMENT (OUTPATIENT)
Dept: CARDIOLOGY | Facility: CLINIC | Age: 23
End: 2023-01-01
Attending: STUDENT IN AN ORGANIZED HEALTH CARE EDUCATION/TRAINING PROGRAM
Payer: COMMERCIAL

## 2023-01-01 ENCOUNTER — HOSPITAL ENCOUNTER (EMERGENCY)
Facility: OTHER | Age: 23
Discharge: ANOTHER HOSPITAL | End: 2023-12-17
Attending: EMERGENCY MEDICINE | Admitting: EMERGENCY MEDICINE
Payer: COMMERCIAL

## 2023-01-01 ENCOUNTER — HOSPITAL ENCOUNTER (OUTPATIENT)
Age: 23
End: 2023-01-01
Attending: HOSPITALIST
Payer: COMMERCIAL

## 2023-01-01 ENCOUNTER — APPOINTMENT (OUTPATIENT)
Dept: CT IMAGING | Facility: OTHER | Age: 23
End: 2023-01-01
Attending: EMERGENCY MEDICINE
Payer: COMMERCIAL

## 2023-01-01 ENCOUNTER — HOSPITAL ENCOUNTER (INPATIENT)
Facility: CLINIC | Age: 23
LOS: 6 days | Discharge: HOME | End: 2023-12-25
Attending: STUDENT IN AN ORGANIZED HEALTH CARE EDUCATION/TRAINING PROGRAM | Admitting: INTERNAL MEDICINE
Payer: COMMERCIAL

## 2023-01-01 ENCOUNTER — APPOINTMENT (OUTPATIENT)
Dept: GENERAL RADIOLOGY | Facility: CLINIC | Age: 23
End: 2023-01-01
Attending: PHYSICIAN ASSISTANT
Payer: COMMERCIAL

## 2023-01-01 ENCOUNTER — MYC MEDICAL ADVICE (OUTPATIENT)
Dept: RHEUMATOLOGY | Facility: CLINIC | Age: 23
End: 2023-01-01
Payer: COMMERCIAL

## 2023-01-01 ENCOUNTER — APPOINTMENT (OUTPATIENT)
Dept: ULTRASOUND IMAGING | Facility: CLINIC | Age: 23
End: 2023-01-01
Attending: PHYSICIAN ASSISTANT
Payer: COMMERCIAL

## 2023-01-01 ENCOUNTER — INFUSION THERAPY VISIT (OUTPATIENT)
Dept: INFUSION THERAPY | Facility: CLINIC | Age: 23
End: 2023-01-01
Attending: INTERNAL MEDICINE
Payer: COMMERCIAL

## 2023-01-01 ENCOUNTER — VIRTUAL VISIT (OUTPATIENT)
Dept: RHEUMATOLOGY | Facility: CLINIC | Age: 23
End: 2023-01-01
Attending: INTERNAL MEDICINE
Payer: COMMERCIAL

## 2023-01-01 ENCOUNTER — APPOINTMENT (OUTPATIENT)
Dept: CT IMAGING | Facility: CLINIC | Age: 23
End: 2023-01-01
Attending: STUDENT IN AN ORGANIZED HEALTH CARE EDUCATION/TRAINING PROGRAM
Payer: COMMERCIAL

## 2023-01-01 ENCOUNTER — OFFICE VISIT (OUTPATIENT)
Dept: FAMILY MEDICINE | Facility: OTHER | Age: 23
End: 2023-01-01
Attending: FAMILY MEDICINE
Payer: COMMERCIAL

## 2023-01-01 ENCOUNTER — APPOINTMENT (OUTPATIENT)
Dept: ULTRASOUND IMAGING | Facility: CLINIC | Age: 23
End: 2023-01-01
Attending: RADIOLOGY
Payer: COMMERCIAL

## 2023-01-01 ENCOUNTER — HOSPITAL ENCOUNTER (EMERGENCY)
Facility: OTHER | Age: 23
Discharge: LEFT BEFORE DISCHARGE | End: 2023-12-30
Attending: STUDENT IN AN ORGANIZED HEALTH CARE EDUCATION/TRAINING PROGRAM | Admitting: STUDENT IN AN ORGANIZED HEALTH CARE EDUCATION/TRAINING PROGRAM
Payer: COMMERCIAL

## 2023-01-01 ENCOUNTER — PATIENT OUTREACH (OUTPATIENT)
Dept: CARE COORDINATION | Facility: CLINIC | Age: 23
End: 2023-01-01
Payer: COMMERCIAL

## 2023-01-01 ENCOUNTER — LAB (OUTPATIENT)
Dept: LAB | Facility: CLINIC | Age: 23
End: 2023-01-01
Payer: COMMERCIAL

## 2023-01-01 ENCOUNTER — TELEPHONE (OUTPATIENT)
Dept: INTERVENTIONAL RADIOLOGY/VASCULAR | Facility: CLINIC | Age: 23
End: 2023-01-01
Payer: COMMERCIAL

## 2023-01-01 ENCOUNTER — HOSPITAL ENCOUNTER (OUTPATIENT)
Age: 23
End: 2023-01-01
Attending: STUDENT IN AN ORGANIZED HEALTH CARE EDUCATION/TRAINING PROGRAM | Admitting: STUDENT IN AN ORGANIZED HEALTH CARE EDUCATION/TRAINING PROGRAM
Payer: COMMERCIAL

## 2023-01-01 ENCOUNTER — TELEPHONE (OUTPATIENT)
Dept: MULTI SPECIALTY CLINIC | Facility: CLINIC | Age: 23
End: 2023-01-01
Payer: COMMERCIAL

## 2023-01-01 ENCOUNTER — APPOINTMENT (OUTPATIENT)
Dept: CARDIOLOGY | Facility: OTHER | Age: 23
End: 2023-01-01
Attending: STUDENT IN AN ORGANIZED HEALTH CARE EDUCATION/TRAINING PROGRAM
Payer: COMMERCIAL

## 2023-01-01 ENCOUNTER — HOSPITAL ENCOUNTER (INPATIENT)
Facility: CLINIC | Age: 23
LOS: 6 days | Discharge: ANOTHER HOSPITAL | End: 2023-08-17
Attending: HOSPITALIST | Admitting: INTERNAL MEDICINE
Payer: COMMERCIAL

## 2023-01-01 ENCOUNTER — HOSPITAL ENCOUNTER (EMERGENCY)
Facility: CLINIC | Age: 23
Discharge: ANOTHER HOSPITAL | End: 2023-08-11
Attending: STUDENT IN AN ORGANIZED HEALTH CARE EDUCATION/TRAINING PROGRAM | Admitting: STUDENT IN AN ORGANIZED HEALTH CARE EDUCATION/TRAINING PROGRAM
Payer: COMMERCIAL

## 2023-01-01 ENCOUNTER — CARE COORDINATION (OUTPATIENT)
Dept: CARDIOLOGY | Facility: CLINIC | Age: 23
End: 2023-01-01

## 2023-01-01 ENCOUNTER — TELEPHONE (OUTPATIENT)
Dept: NEUROPSYCHOLOGY | Facility: CLINIC | Age: 23
End: 2023-01-01
Payer: COMMERCIAL

## 2023-01-01 ENCOUNTER — HOSPITAL ENCOUNTER (OUTPATIENT)
Age: 23
End: 2023-01-01
Attending: INTERNAL MEDICINE | Admitting: INTERNAL MEDICINE

## 2023-01-01 ENCOUNTER — ANESTHESIA (OUTPATIENT)
Dept: EMERGENCY MEDICINE | Facility: OTHER | Age: 23
End: 2023-01-01
Payer: COMMERCIAL

## 2023-01-01 ENCOUNTER — APPOINTMENT (OUTPATIENT)
Dept: GENERAL RADIOLOGY | Facility: CLINIC | Age: 23
End: 2023-01-01
Attending: INTERNAL MEDICINE
Payer: COMMERCIAL

## 2023-01-01 VITALS
BODY MASS INDEX: 18.85 KG/M2 | HEART RATE: 78 BPM | DIASTOLIC BLOOD PRESSURE: 121 MMHG | OXYGEN SATURATION: 100 % | TEMPERATURE: 98.3 F | SYSTOLIC BLOOD PRESSURE: 171 MMHG | WEIGHT: 101.4 LBS

## 2023-01-01 VITALS
RESPIRATION RATE: 16 BRPM | OXYGEN SATURATION: 99 % | HEART RATE: 83 BPM | SYSTOLIC BLOOD PRESSURE: 125 MMHG | TEMPERATURE: 98.6 F | WEIGHT: 124.78 LBS | BODY MASS INDEX: 22.96 KG/M2 | HEIGHT: 62 IN | DIASTOLIC BLOOD PRESSURE: 90 MMHG

## 2023-01-01 VITALS
HEIGHT: 63 IN | TEMPERATURE: 97.2 F | HEART RATE: 96 BPM | WEIGHT: 132 LBS | SYSTOLIC BLOOD PRESSURE: 160 MMHG | RESPIRATION RATE: 21 BRPM | OXYGEN SATURATION: 96 % | BODY MASS INDEX: 23.39 KG/M2 | DIASTOLIC BLOOD PRESSURE: 110 MMHG

## 2023-01-01 VITALS
DIASTOLIC BLOOD PRESSURE: 129 MMHG | TEMPERATURE: 98.1 F | WEIGHT: 105 LBS | BODY MASS INDEX: 19.2 KG/M2 | HEART RATE: 85 BPM | SYSTOLIC BLOOD PRESSURE: 168 MMHG | OXYGEN SATURATION: 100 %

## 2023-01-01 VITALS
DIASTOLIC BLOOD PRESSURE: 105 MMHG | HEART RATE: 99 BPM | BODY MASS INDEX: 19.88 KG/M2 | WEIGHT: 108 LBS | OXYGEN SATURATION: 100 % | SYSTOLIC BLOOD PRESSURE: 146 MMHG | HEIGHT: 62 IN

## 2023-01-01 VITALS
SYSTOLIC BLOOD PRESSURE: 147 MMHG | DIASTOLIC BLOOD PRESSURE: 118 MMHG | HEIGHT: 62 IN | BODY MASS INDEX: 18.97 KG/M2 | HEART RATE: 94 BPM | OXYGEN SATURATION: 100 % | WEIGHT: 103.1 LBS | TEMPERATURE: 98.2 F | RESPIRATION RATE: 16 BRPM

## 2023-01-01 VITALS
HEIGHT: 62 IN | OXYGEN SATURATION: 100 % | WEIGHT: 111.6 LBS | BODY MASS INDEX: 20.54 KG/M2 | SYSTOLIC BLOOD PRESSURE: 136 MMHG | DIASTOLIC BLOOD PRESSURE: 96 MMHG | HEART RATE: 97 BPM

## 2023-01-01 VITALS
BODY MASS INDEX: 19.02 KG/M2 | WEIGHT: 104.7 LBS | TEMPERATURE: 97.6 F | DIASTOLIC BLOOD PRESSURE: 96 MMHG | RESPIRATION RATE: 16 BRPM | OXYGEN SATURATION: 99 % | HEART RATE: 87 BPM | SYSTOLIC BLOOD PRESSURE: 132 MMHG

## 2023-01-01 VITALS
DIASTOLIC BLOOD PRESSURE: 109 MMHG | OXYGEN SATURATION: 99 % | SYSTOLIC BLOOD PRESSURE: 154 MMHG | HEART RATE: 68 BPM | HEIGHT: 63 IN | BODY MASS INDEX: 23.39 KG/M2 | WEIGHT: 132 LBS | RESPIRATION RATE: 9 BRPM | TEMPERATURE: 99.2 F

## 2023-01-01 VITALS
OXYGEN SATURATION: 95 % | DIASTOLIC BLOOD PRESSURE: 99 MMHG | WEIGHT: 108 LBS | SYSTOLIC BLOOD PRESSURE: 137 MMHG | BODY MASS INDEX: 19.75 KG/M2 | HEART RATE: 96 BPM | RESPIRATION RATE: 12 BRPM

## 2023-01-01 VITALS
DIASTOLIC BLOOD PRESSURE: 126 MMHG | HEART RATE: 104 BPM | WEIGHT: 106.7 LBS | TEMPERATURE: 98.6 F | SYSTOLIC BLOOD PRESSURE: 172 MMHG | BODY MASS INDEX: 19.84 KG/M2 | OXYGEN SATURATION: 100 %

## 2023-01-01 VITALS
DIASTOLIC BLOOD PRESSURE: 114 MMHG | HEIGHT: 62 IN | RESPIRATION RATE: 18 BRPM | HEART RATE: 102 BPM | WEIGHT: 125.7 LBS | TEMPERATURE: 98.4 F | OXYGEN SATURATION: 100 % | SYSTOLIC BLOOD PRESSURE: 141 MMHG | BODY MASS INDEX: 23.13 KG/M2

## 2023-01-01 VITALS
TEMPERATURE: 97.9 F | HEART RATE: 98 BPM | OXYGEN SATURATION: 99 % | SYSTOLIC BLOOD PRESSURE: 190 MMHG | DIASTOLIC BLOOD PRESSURE: 135 MMHG | RESPIRATION RATE: 24 BRPM

## 2023-01-01 VITALS
BODY MASS INDEX: 19.12 KG/M2 | SYSTOLIC BLOOD PRESSURE: 132 MMHG | TEMPERATURE: 98 F | OXYGEN SATURATION: 99 % | RESPIRATION RATE: 17 BRPM | HEIGHT: 62 IN | DIASTOLIC BLOOD PRESSURE: 81 MMHG | HEART RATE: 90 BPM | WEIGHT: 103.9 LBS

## 2023-01-01 VITALS
OXYGEN SATURATION: 100 % | SYSTOLIC BLOOD PRESSURE: 196 MMHG | HEART RATE: 101 BPM | TEMPERATURE: 99 F | WEIGHT: 115.2 LBS | BODY MASS INDEX: 21.42 KG/M2 | DIASTOLIC BLOOD PRESSURE: 130 MMHG

## 2023-01-01 VITALS
WEIGHT: 110.5 LBS | OXYGEN SATURATION: 100 % | SYSTOLIC BLOOD PRESSURE: 139 MMHG | HEART RATE: 98 BPM | RESPIRATION RATE: 16 BRPM | TEMPERATURE: 98.7 F | BODY MASS INDEX: 20.21 KG/M2 | DIASTOLIC BLOOD PRESSURE: 107 MMHG

## 2023-01-01 VITALS
OXYGEN SATURATION: 99 % | RESPIRATION RATE: 18 BRPM | HEART RATE: 72 BPM | SYSTOLIC BLOOD PRESSURE: 174 MMHG | DIASTOLIC BLOOD PRESSURE: 124 MMHG | TEMPERATURE: 97.8 F

## 2023-01-01 DIAGNOSIS — M32.14 LUPUS NEPHRITIS (H): ICD-10-CM

## 2023-01-01 DIAGNOSIS — M32.19 SYSTEMIC LUPUS ERYTHEMATOSUS WITH OTHER ORGAN INVOLVEMENT, UNSPECIFIED SLE TYPE (H): ICD-10-CM

## 2023-01-01 DIAGNOSIS — I10 ESSENTIAL HYPERTENSION: ICD-10-CM

## 2023-01-01 DIAGNOSIS — E83.42 HYPOMAGNESEMIA: ICD-10-CM

## 2023-01-01 DIAGNOSIS — D69.6 THROMBOCYTOPENIA, UNSPECIFIED (H): ICD-10-CM

## 2023-01-01 DIAGNOSIS — S37.019A PERINEPHRIC HEMATOMA: ICD-10-CM

## 2023-01-01 DIAGNOSIS — E87.6 HYPOKALEMIA: ICD-10-CM

## 2023-01-01 DIAGNOSIS — M32.14 OTHER SYSTEMIC LUPUS ERYTHEMATOSUS WITH GLOMERULAR DISEASE (H): Primary | ICD-10-CM

## 2023-01-01 DIAGNOSIS — D64.9 ANEMIA, UNSPECIFIED TYPE: ICD-10-CM

## 2023-01-01 DIAGNOSIS — M32.9 EXACERBATION OF SYSTEMIC LUPUS ERYTHEMATOSUS (H): ICD-10-CM

## 2023-01-01 DIAGNOSIS — M32.14 OTHER SYSTEMIC LUPUS ERYTHEMATOSUS WITH GLOMERULAR DISEASE (H): ICD-10-CM

## 2023-01-01 DIAGNOSIS — M32.19 OTHER SYSTEMIC LUPUS ERYTHEMATOSUS WITH OTHER ORGAN INVOLVEMENT (H): ICD-10-CM

## 2023-01-01 DIAGNOSIS — N18.5 CKD (CHRONIC KIDNEY DISEASE) STAGE 5, GFR LESS THAN 15 ML/MIN (H): ICD-10-CM

## 2023-01-01 DIAGNOSIS — I10 HYPERTENSION, UNSPECIFIED TYPE: ICD-10-CM

## 2023-01-01 DIAGNOSIS — M32.9 HX OF SYSTEMIC LUPUS ERYTHEMATOSUS (SLE) (H): Primary | ICD-10-CM

## 2023-01-01 DIAGNOSIS — E87.6 HYPOKALEMIA: Primary | ICD-10-CM

## 2023-01-01 DIAGNOSIS — Z79.899 HIGH RISK MEDICATION USE: ICD-10-CM

## 2023-01-01 DIAGNOSIS — I21.4 NSTEMI (NON-ST ELEVATED MYOCARDIAL INFARCTION) (H): Primary | ICD-10-CM

## 2023-01-01 DIAGNOSIS — M32.14 LUPUS NEPHRITIS (H): Primary | ICD-10-CM

## 2023-01-01 DIAGNOSIS — M32.9 HX OF SYSTEMIC LUPUS ERYTHEMATOSUS (SLE) (H): ICD-10-CM

## 2023-01-01 DIAGNOSIS — J18.9 PNEUMONIA: ICD-10-CM

## 2023-01-01 DIAGNOSIS — I51.81 TAKOTSUBO CARDIOMYOPATHY: ICD-10-CM

## 2023-01-01 DIAGNOSIS — I51.81 TAKOTSUBO CARDIOMYOPATHY: Primary | ICD-10-CM

## 2023-01-01 DIAGNOSIS — K21.9 GERD (GASTROESOPHAGEAL REFLUX DISEASE): ICD-10-CM

## 2023-01-01 DIAGNOSIS — N17.9 AKI (ACUTE KIDNEY INJURY) (H): ICD-10-CM

## 2023-01-01 DIAGNOSIS — M32.9 SYSTEMIC LUPUS ERYTHEMATOSUS, UNSPECIFIED SLE TYPE, UNSPECIFIED ORGAN INVOLVEMENT STATUS (H): ICD-10-CM

## 2023-01-01 DIAGNOSIS — M32.19 SYSTEMIC LUPUS ERYTHEMATOSUS WITH OTHER ORGAN INVOLVEMENT, UNSPECIFIED SLE TYPE (H): Primary | ICD-10-CM

## 2023-01-01 DIAGNOSIS — J18.9 PNEUMONIA OF LEFT LOWER LOBE DUE TO INFECTIOUS ORGANISM: ICD-10-CM

## 2023-01-01 DIAGNOSIS — M32.9 EXACERBATION OF SYSTEMIC LUPUS ERYTHEMATOSUS (H): Primary | ICD-10-CM

## 2023-01-01 DIAGNOSIS — I12.9 HYPERTENSION, RENAL: Primary | ICD-10-CM

## 2023-01-01 DIAGNOSIS — R11.0 NAUSEA: ICD-10-CM

## 2023-01-01 DIAGNOSIS — R07.9 LEFT-SIDED CHEST PAIN: ICD-10-CM

## 2023-01-01 DIAGNOSIS — S37.019A PERINEPHRIC HEMATOMA: Primary | ICD-10-CM

## 2023-01-01 DIAGNOSIS — R10.84 GENERALIZED ABDOMINAL PAIN: ICD-10-CM

## 2023-01-01 DIAGNOSIS — Z78.9 TAKES DIETARY SUPPLEMENTS: ICD-10-CM

## 2023-01-01 DIAGNOSIS — R53.1 GENERALIZED WEAKNESS: ICD-10-CM

## 2023-01-01 DIAGNOSIS — U07.1 COVID-19: ICD-10-CM

## 2023-01-01 DIAGNOSIS — M32.15 OTHER SYSTEMIC LUPUS ERYTHEMATOSUS WITH TUBULO-INTERSTITIAL NEPHROPATHY (H): Primary | ICD-10-CM

## 2023-01-01 DIAGNOSIS — M32.19 LUPUS CEREBRITIS (H): ICD-10-CM

## 2023-01-01 DIAGNOSIS — M32.9 SYSTEMIC LUPUS ERYTHEMATOSUS (H): Chronic | ICD-10-CM

## 2023-01-01 DIAGNOSIS — R80.9 PROTEINURIA, UNSPECIFIED TYPE: ICD-10-CM

## 2023-01-01 DIAGNOSIS — D69.6 THROMBOCYTOPENIA, UNSPECIFIED (H): Primary | ICD-10-CM

## 2023-01-01 DIAGNOSIS — I50.23 ACUTE ON CHRONIC SYSTOLIC HEART FAILURE (H): ICD-10-CM

## 2023-01-01 DIAGNOSIS — Z09 HOSPITAL DISCHARGE FOLLOW-UP: ICD-10-CM

## 2023-01-01 DIAGNOSIS — I10 HYPERTENSION, ESSENTIAL: ICD-10-CM

## 2023-01-01 DIAGNOSIS — I12.9 HYPERTENSION, RENAL: ICD-10-CM

## 2023-01-01 DIAGNOSIS — M32.19 OTHER SYSTEMIC LUPUS ERYTHEMATOSUS WITH OTHER ORGAN INVOLVEMENT (H): Primary | ICD-10-CM

## 2023-01-01 DIAGNOSIS — K62.5 BRIGHT RED BLOOD PER RECTUM: ICD-10-CM

## 2023-01-01 DIAGNOSIS — I50.814 RIGHT-SIDED CONGESTIVE HEART FAILURE SECONDARY TO LEFT-SIDED CONGESTIVE HEART FAILURE (H): ICD-10-CM

## 2023-01-01 DIAGNOSIS — R16.1 SPLENOMEGALY: ICD-10-CM

## 2023-01-01 DIAGNOSIS — I51.81 STRESS-INDUCED CARDIOMYOPATHY: ICD-10-CM

## 2023-01-01 DIAGNOSIS — R56.9 SEIZURE (H): ICD-10-CM

## 2023-01-01 DIAGNOSIS — R06.02 SHORTNESS OF BREATH: ICD-10-CM

## 2023-01-01 DIAGNOSIS — G05.3 LUPUS CEREBRITIS (H): ICD-10-CM

## 2023-01-01 DIAGNOSIS — I07.1 TRICUSPID VALVE INSUFFICIENCY, UNSPECIFIED ETIOLOGY: ICD-10-CM

## 2023-01-01 DIAGNOSIS — A04.72 C. DIFFICILE ENTERITIS: ICD-10-CM

## 2023-01-01 DIAGNOSIS — D69.6 THROMBOCYTOPENIA (H): ICD-10-CM

## 2023-01-01 DIAGNOSIS — R10.84 ABDOMINAL PAIN, GENERALIZED: ICD-10-CM

## 2023-01-01 DIAGNOSIS — M32.8 OTHER FORMS OF SYSTEMIC LUPUS ERYTHEMATOSUS, UNSPECIFIED ORGAN INVOLVEMENT STATUS (H): Chronic | ICD-10-CM

## 2023-01-01 DIAGNOSIS — S37.012A RENAL HEMATOMA, LEFT, INITIAL ENCOUNTER: ICD-10-CM

## 2023-01-01 LAB
ABO/RH(D): NORMAL
ABO/RH(D): NORMAL
ACANTHOCYTES BLD QL SMEAR: ABNORMAL
ACANTHOCYTES BLD QL SMEAR: SLIGHT
ALBUMIN MFR UR ELPH: 1166 MG/DL
ALBUMIN MFR UR ELPH: 2034 MG/DL
ALBUMIN MFR UR ELPH: 233.2 MG/DL
ALBUMIN MFR UR ELPH: 42.3 MG/DL
ALBUMIN MFR UR ELPH: 547.4 MG/DL
ALBUMIN MFR UR ELPH: 62.9 MG/DL
ALBUMIN MFR UR ELPH: 754 MG/DL
ALBUMIN MFR UR ELPH: 765 MG/DL
ALBUMIN SERPL BCG-MCNC: 2.1 G/DL (ref 3.5–5.2)
ALBUMIN SERPL BCG-MCNC: 2.5 G/DL (ref 3.5–5.2)
ALBUMIN SERPL BCG-MCNC: 2.7 G/DL (ref 3.5–5.2)
ALBUMIN SERPL BCG-MCNC: 2.8 G/DL (ref 3.5–5.2)
ALBUMIN SERPL BCG-MCNC: 2.9 G/DL (ref 3.5–5.2)
ALBUMIN SERPL BCG-MCNC: 2.9 G/DL (ref 3.5–5.2)
ALBUMIN SERPL BCG-MCNC: 3 G/DL (ref 3.5–5.2)
ALBUMIN SERPL BCG-MCNC: 3.3 G/DL (ref 3.5–5.2)
ALBUMIN SERPL BCG-MCNC: 3.4 G/DL (ref 3.5–5.2)
ALBUMIN SERPL BCG-MCNC: 3.6 G/DL (ref 3.5–5.2)
ALBUMIN SERPL BCG-MCNC: 3.9 G/DL (ref 3.5–5.2)
ALBUMIN UR-MCNC: 100 MG/DL
ALBUMIN UR-MCNC: 200 MG/DL
ALBUMIN UR-MCNC: 200 MG/DL
ALBUMIN UR-MCNC: 30 MG/DL
ALBUMIN UR-MCNC: 300 MG/DL
ALBUMIN UR-MCNC: 50 MG/DL
ALBUMIN UR-MCNC: 600 MG/DL
ALP SERPL-CCNC: 116 U/L (ref 35–104)
ALP SERPL-CCNC: 122 U/L (ref 35–104)
ALP SERPL-CCNC: 122 U/L (ref 35–104)
ALP SERPL-CCNC: 132 U/L (ref 40–150)
ALP SERPL-CCNC: 68 U/L (ref 40–150)
ALP SERPL-CCNC: 71 U/L (ref 40–150)
ALP SERPL-CCNC: 93 U/L (ref 40–150)
ALP SERPL-CCNC: 94 U/L (ref 35–104)
ALT SERPL W P-5'-P-CCNC: 11 U/L (ref 0–50)
ALT SERPL W P-5'-P-CCNC: 12 U/L (ref 0–50)
ALT SERPL W P-5'-P-CCNC: 12 U/L (ref 0–50)
ALT SERPL W P-5'-P-CCNC: 15 U/L (ref 0–50)
ALT SERPL W P-5'-P-CCNC: 17 U/L (ref 0–50)
ALT SERPL W P-5'-P-CCNC: 23 U/L (ref 0–50)
ALT SERPL W P-5'-P-CCNC: 25 U/L (ref 0–50)
ALT SERPL W P-5'-P-CCNC: 25 U/L (ref 0–50)
ALT SERPL W P-5'-P-CCNC: 65 U/L (ref 0–50)
ALT SERPL W P-5'-P-CCNC: <5 U/L (ref 0–50)
ANION GAP SERPL CALCULATED.3IONS-SCNC: 10 MMOL/L (ref 7–15)
ANION GAP SERPL CALCULATED.3IONS-SCNC: 11 MMOL/L (ref 7–15)
ANION GAP SERPL CALCULATED.3IONS-SCNC: 12 MMOL/L (ref 7–15)
ANION GAP SERPL CALCULATED.3IONS-SCNC: 13 MMOL/L (ref 7–15)
ANION GAP SERPL CALCULATED.3IONS-SCNC: 13 MMOL/L (ref 7–15)
ANION GAP SERPL CALCULATED.3IONS-SCNC: 14 MMOL/L (ref 7–15)
ANION GAP SERPL CALCULATED.3IONS-SCNC: 15 MMOL/L (ref 7–15)
ANION GAP SERPL CALCULATED.3IONS-SCNC: 15 MMOL/L (ref 7–15)
ANION GAP SERPL CALCULATED.3IONS-SCNC: 16 MMOL/L (ref 7–15)
ANION GAP SERPL CALCULATED.3IONS-SCNC: 18 MMOL/L (ref 7–15)
ANION GAP SERPL CALCULATED.3IONS-SCNC: 19 MMOL/L (ref 7–15)
ANION GAP SERPL CALCULATED.3IONS-SCNC: 8 MMOL/L (ref 7–15)
ANION GAP SERPL CALCULATED.3IONS-SCNC: 8 MMOL/L (ref 7–15)
ANION GAP SERPL CALCULATED.3IONS-SCNC: 9 MMOL/L (ref 7–15)
ANTIBODY SCREEN: NEGATIVE
ANTIBODY SCREEN: NEGATIVE
APPEARANCE UR: ABNORMAL
APPEARANCE UR: CLEAR
APTT PPP: 30 SECONDS (ref 22–38)
APTT PPP: 35 SECONDS (ref 22–38)
APTT PPP: 42 SECONDS (ref 22–38)
AST SERPL W P-5'-P-CCNC: 24 U/L (ref 0–45)
AST SERPL W P-5'-P-CCNC: 26 U/L (ref 0–45)
AST SERPL W P-5'-P-CCNC: 27 U/L (ref 0–45)
AST SERPL W P-5'-P-CCNC: 276 U/L (ref 0–45)
AST SERPL W P-5'-P-CCNC: 28 U/L (ref 0–45)
AST SERPL W P-5'-P-CCNC: 33 U/L (ref 0–45)
AST SERPL W P-5'-P-CCNC: 35 U/L (ref 0–45)
AST SERPL W P-5'-P-CCNC: 37 U/L (ref 0–45)
AST SERPL W P-5'-P-CCNC: 40 U/L (ref 0–45)
AST SERPL W P-5'-P-CCNC: 79 U/L (ref 0–45)
ATRIAL RATE - MUSE: 106 BPM
ATRIAL RATE - MUSE: 108 BPM
ATRIAL RATE - MUSE: 115 BPM
ATRIAL RATE - MUSE: 53 BPM
ATRIAL RATE - MUSE: 68 BPM
ATRIAL RATE - MUSE: 69 BPM
ATRIAL RATE - MUSE: 71 BPM
ATRIAL RATE - MUSE: 88 BPM
ATRIAL RATE - MUSE: 89 BPM
ATRIAL RATE - MUSE: 91 BPM
ATRIAL RATE - MUSE: 91 BPM
AUER BODIES BLD QL SMEAR: ABNORMAL
BACTERIA #/AREA URNS HPF: ABNORMAL /HPF
BACTERIA BLD CULT: NO GROWTH
BACTERIA BLD CULT: NO GROWTH
BACTERIA SPEC CULT: NORMAL
BACTERIA UR CULT: ABNORMAL
BASE EXCESS BLDV CALC-SCNC: -2.7 MMOL/L (ref -7.7–1.9)
BASO STIPL BLD QL SMEAR: ABNORMAL
BASO+EOS+MONOS # BLD AUTO: ABNORMAL 10*3/UL
BASO+EOS+MONOS # BLD AUTO: ABNORMAL 10*3/UL
BASO+EOS+MONOS NFR BLD AUTO: ABNORMAL %
BASO+EOS+MONOS NFR BLD AUTO: ABNORMAL %
BASOPHILS # BLD AUTO: 0 10E3/UL (ref 0–0.2)
BASOPHILS # BLD AUTO: ABNORMAL 10*3/UL
BASOPHILS # BLD MANUAL: 0 10E3/UL (ref 0–0.2)
BASOPHILS NFR BLD AUTO: 0 %
BASOPHILS NFR BLD AUTO: ABNORMAL %
BASOPHILS NFR BLD MANUAL: 1 %
BI-PLANE LVEF ECHO: NORMAL
BI-PLANE LVEF ECHO: NORMAL
BILIRUB DIRECT SERPL-MCNC: 0.23 MG/DL (ref 0–0.3)
BILIRUB DIRECT SERPL-MCNC: <0.2 MG/DL (ref 0–0.3)
BILIRUB SERPL-MCNC: 0.3 MG/DL
BILIRUB SERPL-MCNC: 0.3 MG/DL
BILIRUB SERPL-MCNC: 0.4 MG/DL
BILIRUB SERPL-MCNC: 0.5 MG/DL
BILIRUB SERPL-MCNC: 0.7 MG/DL
BILIRUB UR QL STRIP: NEGATIVE
BITE CELLS BLD QL SMEAR: ABNORMAL
BLD PROD TYP BPU: NORMAL
BLD PROD TYP BPU: NORMAL
BLISTER CELLS BLD QL SMEAR: ABNORMAL
BLOOD COMPONENT TYPE: NORMAL
BLOOD COMPONENT TYPE: NORMAL
BUN SERPL-MCNC: 11.5 MG/DL (ref 6–20)
BUN SERPL-MCNC: 11.5 MG/DL (ref 6–20)
BUN SERPL-MCNC: 12.9 MG/DL (ref 6–20)
BUN SERPL-MCNC: 17.5 MG/DL (ref 6–20)
BUN SERPL-MCNC: 19.1 MG/DL (ref 6–20)
BUN SERPL-MCNC: 19.4 MG/DL (ref 6–20)
BUN SERPL-MCNC: 20.7 MG/DL (ref 6–20)
BUN SERPL-MCNC: 23.2 MG/DL (ref 6–20)
BUN SERPL-MCNC: 25.6 MG/DL (ref 6–20)
BUN SERPL-MCNC: 26.8 MG/DL (ref 6–20)
BUN SERPL-MCNC: 27 MG/DL (ref 6–20)
BUN SERPL-MCNC: 29.5 MG/DL (ref 6–20)
BUN SERPL-MCNC: 30.9 MG/DL (ref 6–20)
BUN SERPL-MCNC: 31.6 MG/DL (ref 6–20)
BUN SERPL-MCNC: 32.3 MG/DL (ref 6–20)
BUN SERPL-MCNC: 32.8 MG/DL (ref 6–20)
BUN SERPL-MCNC: 33 MG/DL (ref 6–20)
BUN SERPL-MCNC: 34 MG/DL (ref 6–20)
BUN SERPL-MCNC: 35.2 MG/DL (ref 6–20)
BUN SERPL-MCNC: 35.9 MG/DL (ref 6–20)
BUN SERPL-MCNC: 36.7 MG/DL (ref 6–20)
BUN SERPL-MCNC: 37 MG/DL (ref 6–20)
BUN SERPL-MCNC: 37.3 MG/DL (ref 6–20)
BUN SERPL-MCNC: 52.1 MG/DL (ref 6–20)
BUN SERPL-MCNC: 54.1 MG/DL (ref 6–20)
BUN SERPL-MCNC: 58.9 MG/DL (ref 6–20)
BUN SERPL-MCNC: 60.5 MG/DL (ref 6–20)
BUN SERPL-MCNC: 62 MG/DL (ref 6–20)
BUN SERPL-MCNC: 63.6 MG/DL (ref 6–20)
BUN SERPL-MCNC: 64.4 MG/DL (ref 6–20)
BUN SERPL-MCNC: 75.1 MG/DL (ref 6–20)
BUN SERPL-MCNC: 84.8 MG/DL (ref 6–20)
BUN SERPL-MCNC: 87.2 MG/DL (ref 6–20)
BURR CELLS BLD QL SMEAR: ABNORMAL
BURR CELLS BLD QL SMEAR: SLIGHT
C DIFF TOX B STL QL: NEGATIVE
C3 SERPL-MCNC: 15 MG/DL (ref 81–157)
C3 SERPL-MCNC: 29 MG/DL (ref 81–157)
C3 SERPL-MCNC: 32 MG/DL (ref 81–157)
C3 SERPL-MCNC: 38 MG/DL (ref 81–157)
C3 SERPL-MCNC: 41 MG/DL (ref 81–157)
C3 SERPL-MCNC: 44 MG/DL (ref 81–157)
C3 SERPL-MCNC: 54 MG/DL (ref 81–157)
C3 SERPL-MCNC: 55 MG/DL (ref 81–157)
C4 SERPL-MCNC: 10 MG/DL (ref 13–39)
C4 SERPL-MCNC: 2 MG/DL (ref 13–39)
C4 SERPL-MCNC: 3 MG/DL (ref 13–39)
C4 SERPL-MCNC: 4 MG/DL (ref 13–39)
C4 SERPL-MCNC: 7 MG/DL (ref 13–39)
C4 SERPL-MCNC: 9 MG/DL (ref 13–39)
CALCIUM SERPL-MCNC: 7.4 MG/DL (ref 8.6–10)
CALCIUM SERPL-MCNC: 7.6 MG/DL (ref 8.6–10)
CALCIUM SERPL-MCNC: 7.7 MG/DL (ref 8.6–10)
CALCIUM SERPL-MCNC: 7.8 MG/DL (ref 8.6–10)
CALCIUM SERPL-MCNC: 8 MG/DL (ref 8.6–10)
CALCIUM SERPL-MCNC: 8.1 MG/DL (ref 8.6–10)
CALCIUM SERPL-MCNC: 8.2 MG/DL (ref 8.6–10)
CALCIUM SERPL-MCNC: 8.2 MG/DL (ref 8.6–10)
CALCIUM SERPL-MCNC: 8.3 MG/DL (ref 8.6–10)
CALCIUM SERPL-MCNC: 8.5 MG/DL (ref 8.6–10)
CALCIUM SERPL-MCNC: 8.5 MG/DL (ref 8.6–10)
CALCIUM SERPL-MCNC: 8.6 MG/DL (ref 8.6–10)
CALCIUM SERPL-MCNC: 8.7 MG/DL (ref 8.6–10)
CALCIUM SERPL-MCNC: 8.8 MG/DL (ref 8.6–10)
CALCIUM SERPL-MCNC: 8.8 MG/DL (ref 8.6–10)
CALCIUM SERPL-MCNC: 9.1 MG/DL (ref 8.6–10)
CALCIUM SERPL-MCNC: 9.2 MG/DL (ref 8.6–10)
CALCIUM SERPL-MCNC: 9.4 MG/DL (ref 8.6–10)
CAOX CRY #/AREA URNS HPF: ABNORMAL /HPF
CHLORIDE SERPL-SCNC: 101 MMOL/L (ref 98–107)
CHLORIDE SERPL-SCNC: 103 MMOL/L (ref 98–107)
CHLORIDE SERPL-SCNC: 104 MMOL/L (ref 98–107)
CHLORIDE SERPL-SCNC: 105 MMOL/L (ref 98–107)
CHLORIDE SERPL-SCNC: 106 MMOL/L (ref 98–107)
CHLORIDE SERPL-SCNC: 106 MMOL/L (ref 98–107)
CHLORIDE SERPL-SCNC: 108 MMOL/L (ref 98–107)
CHLORIDE SERPL-SCNC: 109 MMOL/L (ref 98–107)
CHLORIDE SERPL-SCNC: 110 MMOL/L (ref 98–107)
CHLORIDE SERPL-SCNC: 111 MMOL/L (ref 98–107)
CHLORIDE SERPL-SCNC: 112 MMOL/L (ref 98–107)
CHLORIDE SERPL-SCNC: 113 MMOL/L (ref 98–107)
CHLORIDE SERPL-SCNC: 113 MMOL/L (ref 98–107)
CHLORIDE SERPL-SCNC: 114 MMOL/L (ref 98–107)
CHLORIDE SERPL-SCNC: 115 MMOL/L (ref 98–107)
CHLORIDE SERPL-SCNC: 116 MMOL/L (ref 98–107)
CHLORIDE SERPL-SCNC: 117 MMOL/L (ref 98–107)
CK SERPL-CCNC: 27 U/L (ref 26–192)
CODING SYSTEM: NORMAL
CODING SYSTEM: NORMAL
COLOR UR AUTO: ABNORMAL
COLOR UR AUTO: YELLOW
CREAT BLD-MCNC: 0.5 MG/DL (ref 0.5–1)
CREAT SERPL-MCNC: 0.74 MG/DL (ref 0.51–0.95)
CREAT SERPL-MCNC: 0.76 MG/DL (ref 0.51–0.95)
CREAT SERPL-MCNC: 0.86 MG/DL (ref 0.51–0.95)
CREAT SERPL-MCNC: 0.88 MG/DL (ref 0.51–0.95)
CREAT SERPL-MCNC: 0.88 MG/DL (ref 0.51–0.95)
CREAT SERPL-MCNC: 0.94 MG/DL (ref 0.51–0.95)
CREAT SERPL-MCNC: 0.95 MG/DL (ref 0.51–0.95)
CREAT SERPL-MCNC: 0.99 MG/DL (ref 0.51–0.95)
CREAT SERPL-MCNC: 1.03 MG/DL (ref 0.51–0.95)
CREAT SERPL-MCNC: 1.04 MG/DL (ref 0.51–0.95)
CREAT SERPL-MCNC: 1.08 MG/DL (ref 0.51–0.95)
CREAT SERPL-MCNC: 1.09 MG/DL (ref 0.51–0.95)
CREAT SERPL-MCNC: 1.17 MG/DL (ref 0.51–0.95)
CREAT SERPL-MCNC: 1.19 MG/DL (ref 0.51–0.95)
CREAT SERPL-MCNC: 1.22 MG/DL (ref 0.51–0.95)
CREAT SERPL-MCNC: 1.22 MG/DL (ref 0.51–0.95)
CREAT SERPL-MCNC: 1.28 MG/DL (ref 0.51–0.95)
CREAT SERPL-MCNC: 1.29 MG/DL (ref 0.51–0.95)
CREAT SERPL-MCNC: 1.35 MG/DL (ref 0.51–0.95)
CREAT SERPL-MCNC: 1.57 MG/DL (ref 0.51–0.95)
CREAT SERPL-MCNC: 1.69 MG/DL (ref 0.51–0.95)
CREAT SERPL-MCNC: 1.76 MG/DL (ref 0.51–0.95)
CREAT SERPL-MCNC: 2.19 MG/DL (ref 0.51–0.95)
CREAT SERPL-MCNC: 2.42 MG/DL (ref 0.51–0.95)
CREAT SERPL-MCNC: 2.57 MG/DL (ref 0.51–0.95)
CREAT SERPL-MCNC: 2.62 MG/DL (ref 0.51–0.95)
CREAT SERPL-MCNC: 2.86 MG/DL (ref 0.51–0.95)
CREAT SERPL-MCNC: 2.87 MG/DL (ref 0.51–0.95)
CREAT SERPL-MCNC: 2.98 MG/DL (ref 0.51–0.95)
CREAT SERPL-MCNC: 3 MG/DL (ref 0.51–0.95)
CREAT SERPL-MCNC: 3.06 MG/DL (ref 0.51–0.95)
CREAT SERPL-MCNC: 3.07 MG/DL (ref 0.51–0.95)
CREAT SERPL-MCNC: 3.11 MG/DL (ref 0.51–0.95)
CREAT SERPL-MCNC: 3.36 MG/DL (ref 0.51–0.95)
CREAT SERPL-MCNC: 3.48 MG/DL (ref 0.51–0.95)
CREAT UR-MCNC: 110.2 MG/DL
CREAT UR-MCNC: 130.9 MG/DL
CREAT UR-MCNC: 133.4 MG/DL
CREAT UR-MCNC: 47.1 MG/DL
CREAT UR-MCNC: 57.1 MG/DL
CREAT UR-MCNC: 58.4 MG/DL
CREAT UR-MCNC: 6.1 MG/DL
CREAT UR-MCNC: 76.9 MG/DL
CROSSMATCH: NORMAL
CROSSMATCH: NORMAL
CRP SERPL-MCNC: 10.6 MG/L
CRP SERPL-MCNC: 13.97 MG/L
CRP SERPL-MCNC: 18.53 MG/L
CRP SERPL-MCNC: <3 MG/L
CRP SERPL-MCNC: <3 MG/L
CYSTATIN C (ROCHE): 3.2 MG/L (ref 0.6–1)
CYSTATIN C (ROCHE): 5.1 MG/L (ref 0.6–1)
CYSTATIN C (ROCHE): 5.6 MG/L (ref 0.6–1)
D DIMER PPP FEU-MCNC: 1.85 UG/ML FEU (ref 0–0.5)
DACRYOCYTES BLD QL SMEAR: SLIGHT
DAT POLY: NEGATIVE
DEPRECATED CALCIDIOL+CALCIFEROL SERPL-MC: 18 UG/L (ref 20–75)
DEPRECATED HCO3 PLAS-SCNC: 14 MMOL/L (ref 22–29)
DEPRECATED HCO3 PLAS-SCNC: 14 MMOL/L (ref 22–29)
DEPRECATED HCO3 PLAS-SCNC: 15 MMOL/L (ref 22–29)
DEPRECATED HCO3 PLAS-SCNC: 16 MMOL/L (ref 22–29)
DEPRECATED HCO3 PLAS-SCNC: 17 MMOL/L (ref 22–29)
DEPRECATED HCO3 PLAS-SCNC: 18 MMOL/L (ref 22–29)
DEPRECATED HCO3 PLAS-SCNC: 19 MMOL/L (ref 22–29)
DEPRECATED HCO3 PLAS-SCNC: 20 MMOL/L (ref 22–29)
DEPRECATED HCO3 PLAS-SCNC: 21 MMOL/L (ref 22–29)
DEPRECATED HCO3 PLAS-SCNC: 22 MMOL/L (ref 22–29)
DEPRECATED HCO3 PLAS-SCNC: 23 MMOL/L (ref 22–29)
DIASTOLIC BLOOD PRESSURE - MUSE: NORMAL MMHG
DSDNA AB SER-ACNC: 104 IU/ML
DSDNA AB SER-ACNC: 203 IU/ML
DSDNA AB SER-ACNC: 30 IU/ML
DSDNA AB SER-ACNC: 32 IU/ML
DSDNA AB SER-ACNC: 34 IU/ML
DSDNA AB SER-ACNC: 38 IU/ML
DSDNA AB SER-ACNC: 43 IU/ML
DSDNA AB SER-ACNC: >365 IU/ML
EGFRCR SERPLBLD CKD-EPI 2021: 18 ML/MIN/1.73M2
EGFRCR SERPLBLD CKD-EPI 2021: 19 ML/MIN/1.73M2
EGFRCR SERPLBLD CKD-EPI 2021: 21 ML/MIN/1.73M2
EGFRCR SERPLBLD CKD-EPI 2021: 22 ML/MIN/1.73M2
EGFRCR SERPLBLD CKD-EPI 2021: 22 ML/MIN/1.73M2
EGFRCR SERPLBLD CKD-EPI 2021: 23 ML/MIN/1.73M2
EGFRCR SERPLBLD CKD-EPI 2021: 23 ML/MIN/1.73M2
EGFRCR SERPLBLD CKD-EPI 2021: 26 ML/MIN/1.73M2
EGFRCR SERPLBLD CKD-EPI 2021: 26 ML/MIN/1.73M2
EGFRCR SERPLBLD CKD-EPI 2021: 28 ML/MIN/1.73M2
EGFRCR SERPLBLD CKD-EPI 2021: 32 ML/MIN/1.73M2
EGFRCR SERPLBLD CKD-EPI 2021: 64 ML/MIN/1.73M2
EGFRCR SERPLBLD CKD-EPI 2021: 78 ML/MIN/1.73M2
EGFRCR SERPLBLD CKD-EPI 2021: 86 ML/MIN/1.73M2
EGFRCR SERPLBLD CKD-EPI 2021: >90 ML/MIN/1.73M2
ELLIPTOCYTES BLD QL SMEAR: SLIGHT
EOSINOPHIL # BLD AUTO: 0 10E3/UL (ref 0–0.7)
EOSINOPHIL # BLD AUTO: 0.1 10E3/UL (ref 0–0.7)
EOSINOPHIL # BLD AUTO: ABNORMAL 10*3/UL
EOSINOPHIL # BLD MANUAL: 0.1 10E3/UL (ref 0–0.7)
EOSINOPHIL NFR BLD AUTO: 0 %
EOSINOPHIL NFR BLD AUTO: 1 %
EOSINOPHIL NFR BLD AUTO: 2 %
EOSINOPHIL NFR BLD AUTO: 3 %
EOSINOPHIL NFR BLD AUTO: ABNORMAL %
EOSINOPHIL NFR BLD MANUAL: 2 %
ERYTHROCYTE [DISTWIDTH] IN BLOOD BY AUTOMATED COUNT: 14.9 % (ref 10–15)
ERYTHROCYTE [DISTWIDTH] IN BLOOD BY AUTOMATED COUNT: 15.5 % (ref 10–15)
ERYTHROCYTE [DISTWIDTH] IN BLOOD BY AUTOMATED COUNT: 16.2 % (ref 10–15)
ERYTHROCYTE [DISTWIDTH] IN BLOOD BY AUTOMATED COUNT: 16.3 % (ref 10–15)
ERYTHROCYTE [DISTWIDTH] IN BLOOD BY AUTOMATED COUNT: 16.6 % (ref 10–15)
ERYTHROCYTE [DISTWIDTH] IN BLOOD BY AUTOMATED COUNT: 16.8 % (ref 10–15)
ERYTHROCYTE [DISTWIDTH] IN BLOOD BY AUTOMATED COUNT: 17.5 % (ref 10–15)
ERYTHROCYTE [DISTWIDTH] IN BLOOD BY AUTOMATED COUNT: 17.6 % (ref 10–15)
ERYTHROCYTE [DISTWIDTH] IN BLOOD BY AUTOMATED COUNT: 17.8 % (ref 10–15)
ERYTHROCYTE [DISTWIDTH] IN BLOOD BY AUTOMATED COUNT: 17.8 % (ref 10–15)
ERYTHROCYTE [DISTWIDTH] IN BLOOD BY AUTOMATED COUNT: 17.9 % (ref 10–15)
ERYTHROCYTE [DISTWIDTH] IN BLOOD BY AUTOMATED COUNT: 18 % (ref 10–15)
ERYTHROCYTE [DISTWIDTH] IN BLOOD BY AUTOMATED COUNT: 18.1 % (ref 10–15)
ERYTHROCYTE [DISTWIDTH] IN BLOOD BY AUTOMATED COUNT: 18.2 % (ref 10–15)
ERYTHROCYTE [DISTWIDTH] IN BLOOD BY AUTOMATED COUNT: 18.5 % (ref 10–15)
ERYTHROCYTE [DISTWIDTH] IN BLOOD BY AUTOMATED COUNT: 18.6 % (ref 10–15)
ERYTHROCYTE [DISTWIDTH] IN BLOOD BY AUTOMATED COUNT: 18.7 % (ref 10–15)
ERYTHROCYTE [DISTWIDTH] IN BLOOD BY AUTOMATED COUNT: 18.8 % (ref 10–15)
ERYTHROCYTE [DISTWIDTH] IN BLOOD BY AUTOMATED COUNT: 18.9 % (ref 10–15)
ERYTHROCYTE [DISTWIDTH] IN BLOOD BY AUTOMATED COUNT: 19 % (ref 10–15)
ERYTHROCYTE [DISTWIDTH] IN BLOOD BY AUTOMATED COUNT: 19.1 % (ref 10–15)
ERYTHROCYTE [DISTWIDTH] IN BLOOD BY AUTOMATED COUNT: 19.2 % (ref 10–15)
ERYTHROCYTE [DISTWIDTH] IN BLOOD BY AUTOMATED COUNT: 19.4 % (ref 10–15)
ERYTHROCYTE [DISTWIDTH] IN BLOOD BY AUTOMATED COUNT: 19.7 % (ref 10–15)
ERYTHROCYTE [SEDIMENTATION RATE] IN BLOOD BY WESTERGREN METHOD: 1 MM/HR (ref 0–20)
ERYTHROCYTE [SEDIMENTATION RATE] IN BLOOD BY WESTERGREN METHOD: 27 MM/HR (ref 0–20)
ERYTHROCYTE [SEDIMENTATION RATE] IN BLOOD BY WESTERGREN METHOD: 38 MM/HR (ref 0–20)
ERYTHROCYTE [SEDIMENTATION RATE] IN BLOOD BY WESTERGREN METHOD: 54 MM/HR (ref 0–20)
ERYTHROCYTE [SEDIMENTATION RATE] IN BLOOD BY WESTERGREN METHOD: 84 MM/HR (ref 0–20)
FERRITIN SERPL-MCNC: 1079 NG/ML (ref 6–175)
FERRITIN SERPL-MCNC: 1267 NG/ML (ref 6–175)
FERRITIN SERPL-MCNC: ABNORMAL NG/ML (ref 6–175)
FLUAV RNA SPEC QL NAA+PROBE: NEGATIVE
FLUBV RNA RESP QL NAA+PROBE: NEGATIVE
FOLATE SERPL-MCNC: 12.6 NG/ML (ref 4.6–34.8)
FRAGMENTS BLD QL SMEAR: SLIGHT
FRAGMENTS BLD QL SMEAR: SLIGHT
GAMMA INTERFERON BACKGROUND BLD IA-ACNC: 0.11 IU/ML
GFR SERPL CREATININE-BSD FRML MDRD: 10 ML/MIN/1.73M2
GFR SERPL CREATININE-BSD FRML MDRD: 18 ML/MIN/1.73M2
GFR SERPL CREATININE-BSD FRML MDRD: 41 ML/MIN/1.73M2
GFR SERPL CREATININE-BSD FRML MDRD: 43 ML/MIN/1.73M2
GFR SERPL CREATININE-BSD FRML MDRD: 47 ML/MIN/1.73M2
GFR SERPL CREATININE-BSD FRML MDRD: 57 ML/MIN/1.73M2
GFR SERPL CREATININE-BSD FRML MDRD: 60 ML/MIN/1.73M2
GFR SERPL CREATININE-BSD FRML MDRD: 60 ML/MIN/1.73M2
GFR SERPL CREATININE-BSD FRML MDRD: 64 ML/MIN/1.73M2
GFR SERPL CREATININE-BSD FRML MDRD: 66 ML/MIN/1.73M2
GFR SERPL CREATININE-BSD FRML MDRD: 67 ML/MIN/1.73M2
GFR SERPL CREATININE-BSD FRML MDRD: 73 ML/MIN/1.73M2
GFR SERPL CREATININE-BSD FRML MDRD: 74 ML/MIN/1.73M2
GFR SERPL CREATININE-BSD FRML MDRD: 78 ML/MIN/1.73M2
GFR SERPL CREATININE-BSD FRML MDRD: 82 ML/MIN/1.73M2
GFR SERPL CREATININE-BSD FRML MDRD: 88 ML/MIN/1.73M2
GFR SERPL CREATININE-BSD FRML MDRD: 9 ML/MIN/1.73M2
GFR SERPL CREATININE-BSD FRML MDRD: >60 ML/MIN/1.73M2
GFR SERPL CREATININE-BSD FRML MDRD: >90 ML/MIN/1.73M2
GLUCOSE BLDC GLUCOMTR-MCNC: 83 MG/DL (ref 70–99)
GLUCOSE SERPL-MCNC: 100 MG/DL (ref 70–99)
GLUCOSE SERPL-MCNC: 105 MG/DL (ref 70–99)
GLUCOSE SERPL-MCNC: 108 MG/DL (ref 70–99)
GLUCOSE SERPL-MCNC: 120 MG/DL (ref 70–99)
GLUCOSE SERPL-MCNC: 123 MG/DL (ref 70–99)
GLUCOSE SERPL-MCNC: 126 MG/DL (ref 70–99)
GLUCOSE SERPL-MCNC: 127 MG/DL (ref 70–99)
GLUCOSE SERPL-MCNC: 130 MG/DL (ref 70–99)
GLUCOSE SERPL-MCNC: 133 MG/DL (ref 70–99)
GLUCOSE SERPL-MCNC: 138 MG/DL (ref 70–99)
GLUCOSE SERPL-MCNC: 141 MG/DL (ref 70–99)
GLUCOSE SERPL-MCNC: 144 MG/DL (ref 70–99)
GLUCOSE SERPL-MCNC: 147 MG/DL (ref 70–99)
GLUCOSE SERPL-MCNC: 150 MG/DL (ref 70–99)
GLUCOSE SERPL-MCNC: 161 MG/DL (ref 70–99)
GLUCOSE SERPL-MCNC: 163 MG/DL (ref 70–99)
GLUCOSE SERPL-MCNC: 179 MG/DL (ref 70–99)
GLUCOSE SERPL-MCNC: 199 MG/DL (ref 70–99)
GLUCOSE SERPL-MCNC: 77 MG/DL (ref 70–99)
GLUCOSE SERPL-MCNC: 79 MG/DL (ref 70–99)
GLUCOSE SERPL-MCNC: 84 MG/DL (ref 70–99)
GLUCOSE SERPL-MCNC: 86 MG/DL (ref 70–99)
GLUCOSE SERPL-MCNC: 89 MG/DL (ref 70–99)
GLUCOSE SERPL-MCNC: 90 MG/DL (ref 70–99)
GLUCOSE SERPL-MCNC: 92 MG/DL (ref 70–99)
GLUCOSE SERPL-MCNC: 92 MG/DL (ref 70–99)
GLUCOSE SERPL-MCNC: 93 MG/DL (ref 70–99)
GLUCOSE SERPL-MCNC: 95 MG/DL (ref 70–99)
GLUCOSE SERPL-MCNC: 96 MG/DL (ref 70–99)
GLUCOSE SERPL-MCNC: 96 MG/DL (ref 70–99)
GLUCOSE SERPL-MCNC: 98 MG/DL (ref 70–99)
GLUCOSE SERPL-MCNC: 98 MG/DL (ref 70–99)
GLUCOSE SERPL-MCNC: 99 MG/DL (ref 70–99)
GLUCOSE UR STRIP-MCNC: 100 MG/DL
GLUCOSE UR STRIP-MCNC: 50 MG/DL
GLUCOSE UR STRIP-MCNC: 70 MG/DL
GLUCOSE UR STRIP-MCNC: NEGATIVE MG/DL
HAPTOGLOB SERPL-MCNC: 47 MG/DL (ref 30–200)
HAPTOGLOB SERPL-MCNC: <10 MG/DL (ref 30–200)
HCG SER QL IA.RAPID: NEGATIVE
HCG SERPL QL: NEGATIVE
HCG SERPL QL: NEGATIVE
HCG UR QL: NEGATIVE
HCO3 BLDV-SCNC: 21 MMOL/L (ref 21–28)
HCT VFR BLD AUTO: 20.2 % (ref 35–47)
HCT VFR BLD AUTO: 21.1 % (ref 35–47)
HCT VFR BLD AUTO: 21.2 % (ref 35–47)
HCT VFR BLD AUTO: 22.8 % (ref 35–47)
HCT VFR BLD AUTO: 22.8 % (ref 35–47)
HCT VFR BLD AUTO: 23.1 % (ref 35–47)
HCT VFR BLD AUTO: 23.3 % (ref 35–47)
HCT VFR BLD AUTO: 24.6 % (ref 35–47)
HCT VFR BLD AUTO: 24.8 % (ref 35–47)
HCT VFR BLD AUTO: 24.9 % (ref 35–47)
HCT VFR BLD AUTO: 25 % (ref 35–47)
HCT VFR BLD AUTO: 25.1 % (ref 35–47)
HCT VFR BLD AUTO: 26.2 % (ref 35–47)
HCT VFR BLD AUTO: 26.5 % (ref 35–47)
HCT VFR BLD AUTO: 26.5 % (ref 35–47)
HCT VFR BLD AUTO: 26.6 % (ref 35–47)
HCT VFR BLD AUTO: 26.6 % (ref 35–47)
HCT VFR BLD AUTO: 26.7 % (ref 35–47)
HCT VFR BLD AUTO: 26.8 % (ref 35–47)
HCT VFR BLD AUTO: 27 % (ref 35–47)
HCT VFR BLD AUTO: 28.1 % (ref 35–47)
HCT VFR BLD AUTO: 28.5 % (ref 35–47)
HCT VFR BLD AUTO: 28.6 % (ref 35–47)
HCT VFR BLD AUTO: 28.6 % (ref 35–47)
HCT VFR BLD AUTO: 29.1 % (ref 35–47)
HCT VFR BLD AUTO: 29.4 % (ref 35–47)
HCT VFR BLD AUTO: 30.1 % (ref 35–47)
HCT VFR BLD AUTO: 30.3 % (ref 35–47)
HCT VFR BLD AUTO: 30.8 % (ref 35–47)
HCT VFR BLD AUTO: 31.2 % (ref 35–47)
HCT VFR BLD AUTO: 31.4 % (ref 35–47)
HCT VFR BLD AUTO: 33.1 % (ref 35–47)
HCT VFR BLD AUTO: 33.2 % (ref 35–47)
HCT VFR BLD AUTO: 34.1 % (ref 35–47)
HCT VFR BLD AUTO: 35.1 % (ref 35–47)
HGB BLD-MCNC: 10.2 G/DL (ref 11.7–15.7)
HGB BLD-MCNC: 10.4 G/DL (ref 11.7–15.7)
HGB BLD-MCNC: 10.8 G/DL (ref 11.7–15.7)
HGB BLD-MCNC: 11.1 G/DL (ref 11.7–15.7)
HGB BLD-MCNC: 6.1 G/DL (ref 11.7–15.7)
HGB BLD-MCNC: 6.2 G/DL (ref 11.7–15.7)
HGB BLD-MCNC: 6.3 G/DL (ref 11.7–15.7)
HGB BLD-MCNC: 7 G/DL (ref 11.7–15.7)
HGB BLD-MCNC: 7 G/DL (ref 11.7–15.7)
HGB BLD-MCNC: 7.1 G/DL (ref 11.7–15.7)
HGB BLD-MCNC: 7.3 G/DL (ref 11.7–15.7)
HGB BLD-MCNC: 7.6 G/DL (ref 11.7–15.7)
HGB BLD-MCNC: 7.6 G/DL (ref 11.7–15.7)
HGB BLD-MCNC: 7.7 G/DL (ref 11.7–15.7)
HGB BLD-MCNC: 7.8 G/DL (ref 11.7–15.7)
HGB BLD-MCNC: 8 G/DL (ref 11.7–15.7)
HGB BLD-MCNC: 8 G/DL (ref 11.7–15.7)
HGB BLD-MCNC: 8.1 G/DL (ref 11.7–15.7)
HGB BLD-MCNC: 8.1 G/DL (ref 11.7–15.7)
HGB BLD-MCNC: 8.2 G/DL (ref 11.7–15.7)
HGB BLD-MCNC: 8.6 G/DL (ref 11.7–15.7)
HGB BLD-MCNC: 8.7 G/DL (ref 11.7–15.7)
HGB BLD-MCNC: 8.7 G/DL (ref 11.7–15.7)
HGB BLD-MCNC: 8.8 G/DL (ref 11.7–15.7)
HGB BLD-MCNC: 8.8 G/DL (ref 11.7–15.7)
HGB BLD-MCNC: 8.9 G/DL (ref 11.7–15.7)
HGB BLD-MCNC: 9 G/DL (ref 11.7–15.7)
HGB BLD-MCNC: 9.3 G/DL (ref 11.7–15.7)
HGB BLD-MCNC: 9.5 G/DL (ref 11.7–15.7)
HGB BLD-MCNC: 9.6 G/DL (ref 11.7–15.7)
HGB BLD-MCNC: 9.8 G/DL (ref 11.7–15.7)
HGB BLD-MCNC: 9.9 G/DL (ref 11.7–15.7)
HGB C CRYSTALS: ABNORMAL
HGB UR QL STRIP: ABNORMAL
HOLD SPECIMEN: NORMAL
HOWELL-JOLLY BOD BLD QL SMEAR: ABNORMAL
HYALINE CASTS: 1 /LPF
HYALINE CASTS: 1 /LPF
HYALINE CASTS: 22 /LPF
HYALINE CASTS: 23 /LPF
HYALINE CASTS: 27 /LPF
HYALINE CASTS: 3 /LPF
HYALINE CASTS: 34 /LPF
IMM GRANULOCYTES # BLD: 0 10E3/UL
IMM GRANULOCYTES # BLD: 0.1 10E3/UL
IMM GRANULOCYTES # BLD: 0.2 10E3/UL
IMM GRANULOCYTES # BLD: ABNORMAL 10*3/UL
IMM GRANULOCYTES NFR BLD: 0 %
IMM GRANULOCYTES NFR BLD: 1 %
IMM GRANULOCYTES NFR BLD: ABNORMAL %
INR PPP: 1.1 (ref 0.85–1.15)
INR PPP: 1.2 (ref 0.85–1.15)
INTERPRETATION ECG - MUSE: NORMAL
IRON BINDING CAPACITY (ROCHE): 164 UG/DL (ref 240–430)
IRON BINDING CAPACITY (ROCHE): 175 UG/DL (ref 240–430)
IRON SATN MFR SERPL: 74 % (ref 15–46)
IRON SATN MFR SERPL: 9 % (ref 15–46)
IRON SERPL-MCNC: 130 UG/DL (ref 37–145)
IRON SERPL-MCNC: 15 UG/DL (ref 37–145)
ISSUE DATE AND TIME: NORMAL
ISSUE DATE AND TIME: NORMAL
KETONES UR STRIP-MCNC: NEGATIVE MG/DL
LACTATE SERPL-SCNC: 1.5 MMOL/L (ref 0.7–2)
LACTATE SERPL-SCNC: 2.3 MMOL/L (ref 0.7–2)
LACTATE SERPL-SCNC: 2.4 MMOL/L (ref 0.7–2)
LACTATE SERPL-SCNC: 2.6 MMOL/L (ref 0.7–2)
LACTATE SERPL-SCNC: 6.6 MMOL/L (ref 0.7–2)
LDH SERPL L TO P-CCNC: 320 U/L (ref 0–250)
LDH SERPL L TO P-CCNC: NORMAL U/L
LEUKOCYTE ESTERASE UR QL STRIP: ABNORMAL
LEUKOCYTE ESTERASE UR QL STRIP: NEGATIVE
LIPASE SERPL-CCNC: 52 U/L (ref 13–60)
LVEF ECHO: NORMAL
LVEF ECHO: NORMAL
LYMPHOCYTES # BLD AUTO: 0.2 10E3/UL (ref 0.8–5.3)
LYMPHOCYTES # BLD AUTO: 0.4 10E3/UL (ref 0.8–5.3)
LYMPHOCYTES # BLD AUTO: 0.5 10E3/UL (ref 0.8–5.3)
LYMPHOCYTES # BLD AUTO: 0.9 10E3/UL (ref 0.8–5.3)
LYMPHOCYTES # BLD AUTO: 1 10E3/UL (ref 0.8–5.3)
LYMPHOCYTES # BLD AUTO: 1.2 10E3/UL (ref 0.8–5.3)
LYMPHOCYTES # BLD AUTO: 1.3 10E3/UL (ref 0.8–5.3)
LYMPHOCYTES # BLD AUTO: 1.4 10E3/UL (ref 0.8–5.3)
LYMPHOCYTES # BLD AUTO: 1.4 10E3/UL (ref 0.8–5.3)
LYMPHOCYTES # BLD AUTO: 1.7 10E3/UL (ref 0.8–5.3)
LYMPHOCYTES # BLD AUTO: ABNORMAL 10*3/UL
LYMPHOCYTES # BLD MANUAL: 0.6 10E3/UL (ref 0.8–5.3)
LYMPHOCYTES NFR BLD AUTO: 1 %
LYMPHOCYTES NFR BLD AUTO: 11 %
LYMPHOCYTES NFR BLD AUTO: 17 %
LYMPHOCYTES NFR BLD AUTO: 23 %
LYMPHOCYTES NFR BLD AUTO: 25 %
LYMPHOCYTES NFR BLD AUTO: 28 %
LYMPHOCYTES NFR BLD AUTO: 31 %
LYMPHOCYTES NFR BLD AUTO: 42 %
LYMPHOCYTES NFR BLD AUTO: 46 %
LYMPHOCYTES NFR BLD AUTO: 5 %
LYMPHOCYTES NFR BLD AUTO: 6 %
LYMPHOCYTES NFR BLD AUTO: 8 %
LYMPHOCYTES NFR BLD AUTO: 8 %
LYMPHOCYTES NFR BLD AUTO: ABNORMAL %
LYMPHOCYTES NFR BLD MANUAL: 21 %
M TB IFN-G BLD-IMP: NEGATIVE
M TB IFN-G CD4+ BCKGRND COR BLD-ACNC: 5.22 IU/ML
MAGNESIUM SERPL-MCNC: 1.6 MG/DL (ref 1.7–2.3)
MAGNESIUM SERPL-MCNC: 1.7 MG/DL (ref 1.7–2.3)
MAGNESIUM SERPL-MCNC: 1.8 MG/DL (ref 1.7–2.3)
MAGNESIUM SERPL-MCNC: 1.9 MG/DL (ref 1.7–2.3)
MAGNESIUM SERPL-MCNC: 2 MG/DL (ref 1.7–2.3)
MAGNESIUM SERPL-MCNC: 2.1 MG/DL (ref 1.7–2.3)
MAGNESIUM SERPL-MCNC: 2.3 MG/DL (ref 1.7–2.3)
MCH RBC QN AUTO: 26.4 PG (ref 26.5–33)
MCH RBC QN AUTO: 27.1 PG (ref 26.5–33)
MCH RBC QN AUTO: 27.2 PG (ref 26.5–33)
MCH RBC QN AUTO: 27.3 PG (ref 26.5–33)
MCH RBC QN AUTO: 27.4 PG (ref 26.5–33)
MCH RBC QN AUTO: 27.4 PG (ref 26.5–33)
MCH RBC QN AUTO: 27.5 PG (ref 26.5–33)
MCH RBC QN AUTO: 27.5 PG (ref 26.5–33)
MCH RBC QN AUTO: 27.8 PG (ref 26.5–33)
MCH RBC QN AUTO: 27.9 PG (ref 26.5–33)
MCH RBC QN AUTO: 27.9 PG (ref 26.5–33)
MCH RBC QN AUTO: 28 PG (ref 26.5–33)
MCH RBC QN AUTO: 28.1 PG (ref 26.5–33)
MCH RBC QN AUTO: 28.1 PG (ref 26.5–33)
MCH RBC QN AUTO: 28.4 PG (ref 26.5–33)
MCH RBC QN AUTO: 28.4 PG (ref 26.5–33)
MCH RBC QN AUTO: 28.5 PG (ref 26.5–33)
MCH RBC QN AUTO: 28.6 PG (ref 26.5–33)
MCH RBC QN AUTO: 29.2 PG (ref 26.5–33)
MCH RBC QN AUTO: 29.3 PG (ref 26.5–33)
MCH RBC QN AUTO: 29.3 PG (ref 26.5–33)
MCH RBC QN AUTO: 30.2 PG (ref 26.5–33)
MCH RBC QN AUTO: 30.5 PG (ref 26.5–33)
MCH RBC QN AUTO: 30.6 PG (ref 26.5–33)
MCH RBC QN AUTO: 30.7 PG (ref 26.5–33)
MCH RBC QN AUTO: 30.7 PG (ref 26.5–33)
MCH RBC QN AUTO: 30.9 PG (ref 26.5–33)
MCH RBC QN AUTO: 31.1 PG (ref 26.5–33)
MCH RBC QN AUTO: 31.2 PG (ref 26.5–33)
MCH RBC QN AUTO: 31.3 PG (ref 26.5–33)
MCH RBC QN AUTO: 31.3 PG (ref 26.5–33)
MCH RBC QN AUTO: 31.5 PG (ref 26.5–33)
MCH RBC QN AUTO: 31.5 PG (ref 26.5–33)
MCH RBC QN AUTO: 32 PG (ref 26.5–33)
MCH RBC QN AUTO: 32.2 PG (ref 26.5–33)
MCHC RBC AUTO-ENTMCNC: 29.2 G/DL (ref 31.5–36.5)
MCHC RBC AUTO-ENTMCNC: 29.9 G/DL (ref 31.5–36.5)
MCHC RBC AUTO-ENTMCNC: 29.9 G/DL (ref 31.5–36.5)
MCHC RBC AUTO-ENTMCNC: 30.2 G/DL (ref 31.5–36.5)
MCHC RBC AUTO-ENTMCNC: 30.4 G/DL (ref 31.5–36.5)
MCHC RBC AUTO-ENTMCNC: 30.4 G/DL (ref 31.5–36.5)
MCHC RBC AUTO-ENTMCNC: 30.5 G/DL (ref 31.5–36.5)
MCHC RBC AUTO-ENTMCNC: 30.6 G/DL (ref 31.5–36.5)
MCHC RBC AUTO-ENTMCNC: 30.7 G/DL (ref 31.5–36.5)
MCHC RBC AUTO-ENTMCNC: 30.8 G/DL (ref 31.5–36.5)
MCHC RBC AUTO-ENTMCNC: 30.8 G/DL (ref 31.5–36.5)
MCHC RBC AUTO-ENTMCNC: 30.9 G/DL (ref 31.5–36.5)
MCHC RBC AUTO-ENTMCNC: 31.1 G/DL (ref 31.5–36.5)
MCHC RBC AUTO-ENTMCNC: 31.3 G/DL (ref 31.5–36.5)
MCHC RBC AUTO-ENTMCNC: 31.5 G/DL (ref 31.5–36.5)
MCHC RBC AUTO-ENTMCNC: 31.6 G/DL (ref 31.5–36.5)
MCHC RBC AUTO-ENTMCNC: 31.7 G/DL (ref 31.5–36.5)
MCHC RBC AUTO-ENTMCNC: 31.7 G/DL (ref 31.5–36.5)
MCHC RBC AUTO-ENTMCNC: 32 G/DL (ref 31.5–36.5)
MCHC RBC AUTO-ENTMCNC: 32.5 G/DL (ref 31.5–36.5)
MCHC RBC AUTO-ENTMCNC: 32.6 G/DL (ref 31.5–36.5)
MCHC RBC AUTO-ENTMCNC: 32.6 G/DL (ref 31.5–36.5)
MCV RBC AUTO: 100 FL (ref 78–100)
MCV RBC AUTO: 101 FL (ref 78–100)
MCV RBC AUTO: 102 FL (ref 78–100)
MCV RBC AUTO: 103 FL (ref 78–100)
MCV RBC AUTO: 103 FL (ref 78–100)
MCV RBC AUTO: 86 FL (ref 78–100)
MCV RBC AUTO: 88 FL (ref 78–100)
MCV RBC AUTO: 89 FL (ref 78–100)
MCV RBC AUTO: 90 FL (ref 78–100)
MCV RBC AUTO: 90 FL (ref 78–100)
MCV RBC AUTO: 91 FL (ref 78–100)
MCV RBC AUTO: 92 FL (ref 78–100)
MCV RBC AUTO: 93 FL (ref 78–100)
MCV RBC AUTO: 93 FL (ref 78–100)
MCV RBC AUTO: 94 FL (ref 78–100)
MCV RBC AUTO: 95 FL (ref 78–100)
MCV RBC AUTO: 96 FL (ref 78–100)
MCV RBC AUTO: 99 FL (ref 78–100)
METAMYELOCYTES # BLD MANUAL: 0 10E3/UL
METAMYELOCYTES NFR BLD MANUAL: 1 %
MITOGEN IGNF BCKGRD COR BLD-ACNC: -0.03 IU/ML
MITOGEN IGNF BCKGRD COR BLD-ACNC: -0.03 IU/ML
MONOCYTES # BLD AUTO: 0.1 10E3/UL (ref 0–1.3)
MONOCYTES # BLD AUTO: 0.1 10E3/UL (ref 0–1.3)
MONOCYTES # BLD AUTO: 0.2 10E3/UL (ref 0–1.3)
MONOCYTES # BLD AUTO: 0.3 10E3/UL (ref 0–1.3)
MONOCYTES # BLD AUTO: 0.4 10E3/UL (ref 0–1.3)
MONOCYTES # BLD AUTO: 0.4 10E3/UL (ref 0–1.3)
MONOCYTES # BLD AUTO: 0.5 10E3/UL (ref 0–1.3)
MONOCYTES # BLD AUTO: 0.5 10E3/UL (ref 0–1.3)
MONOCYTES # BLD AUTO: ABNORMAL 10*3/UL
MONOCYTES # BLD MANUAL: 0.2 10E3/UL (ref 0–1.3)
MONOCYTES NFR BLD AUTO: 1 %
MONOCYTES NFR BLD AUTO: 10 %
MONOCYTES NFR BLD AUTO: 10 %
MONOCYTES NFR BLD AUTO: 12 %
MONOCYTES NFR BLD AUTO: 15 %
MONOCYTES NFR BLD AUTO: 2 %
MONOCYTES NFR BLD AUTO: 3 %
MONOCYTES NFR BLD AUTO: 4 %
MONOCYTES NFR BLD AUTO: 5 %
MONOCYTES NFR BLD AUTO: 5 %
MONOCYTES NFR BLD AUTO: 6 %
MONOCYTES NFR BLD AUTO: 6 %
MONOCYTES NFR BLD AUTO: 9 %
MONOCYTES NFR BLD AUTO: ABNORMAL %
MONOCYTES NFR BLD MANUAL: 7 %
MUCOUS THREADS #/AREA URNS LPF: PRESENT /LPF
NEUTROPHILS # BLD AUTO: 1.2 10E3/UL (ref 1.6–8.3)
NEUTROPHILS # BLD AUTO: 1.8 10E3/UL (ref 1.6–8.3)
NEUTROPHILS # BLD AUTO: 1.8 10E3/UL (ref 1.6–8.3)
NEUTROPHILS # BLD AUTO: 19.5 10E3/UL (ref 1.6–8.3)
NEUTROPHILS # BLD AUTO: 2.3 10E3/UL (ref 1.6–8.3)
NEUTROPHILS # BLD AUTO: 2.3 10E3/UL (ref 1.6–8.3)
NEUTROPHILS # BLD AUTO: 3.9 10E3/UL (ref 1.6–8.3)
NEUTROPHILS # BLD AUTO: 4.2 10E3/UL (ref 1.6–8.3)
NEUTROPHILS # BLD AUTO: 4.2 10E3/UL (ref 1.6–8.3)
NEUTROPHILS # BLD AUTO: 5 10E3/UL (ref 1.6–8.3)
NEUTROPHILS # BLD AUTO: 5.2 10E3/UL (ref 1.6–8.3)
NEUTROPHILS # BLD AUTO: 7.4 10E3/UL (ref 1.6–8.3)
NEUTROPHILS # BLD AUTO: 7.5 10E3/UL (ref 1.6–8.3)
NEUTROPHILS # BLD AUTO: ABNORMAL 10*3/UL
NEUTROPHILS # BLD MANUAL: 2 10E3/UL (ref 1.6–8.3)
NEUTROPHILS NFR BLD AUTO: 37 %
NEUTROPHILS NFR BLD AUTO: 44 %
NEUTROPHILS NFR BLD AUTO: 57 %
NEUTROPHILS NFR BLD AUTO: 59 %
NEUTROPHILS NFR BLD AUTO: 68 %
NEUTROPHILS NFR BLD AUTO: 69 %
NEUTROPHILS NFR BLD AUTO: 70 %
NEUTROPHILS NFR BLD AUTO: 86 %
NEUTROPHILS NFR BLD AUTO: 86 %
NEUTROPHILS NFR BLD AUTO: 88 %
NEUTROPHILS NFR BLD AUTO: 89 %
NEUTROPHILS NFR BLD AUTO: 89 %
NEUTROPHILS NFR BLD AUTO: 97 %
NEUTROPHILS NFR BLD AUTO: ABNORMAL %
NEUTROPHILS NFR BLD MANUAL: 68 %
NEUTS HYPERSEG BLD QL SMEAR: ABNORMAL
NITRATE UR QL: NEGATIVE
NRBC # BLD AUTO: 0 10E3/UL
NRBC # BLD AUTO: 0.1 10E3/UL
NRBC BLD AUTO-RTO: 0 /100
NRBC BLD AUTO-RTO: 1 /100
NRBC BLD AUTO-RTO: 2 /100
NT-PROBNP SERPL-MCNC: ABNORMAL PG/ML (ref 0–450)
O2/TOTAL GAS SETTING VFR VENT: 21 %
P AXIS - MUSE: 27 DEGREES
P AXIS - MUSE: 28 DEGREES
P AXIS - MUSE: 31 DEGREES
P AXIS - MUSE: 33 DEGREES
P AXIS - MUSE: 34 DEGREES
P AXIS - MUSE: 37 DEGREES
P AXIS - MUSE: 38 DEGREES
P AXIS - MUSE: 39 DEGREES
P AXIS - MUSE: 41 DEGREES
P AXIS - MUSE: 46 DEGREES
P AXIS - MUSE: 58 DEGREES
PATH REPORT.COMMENTS IMP SPEC: NORMAL
PATH REPORT.COMMENTS IMP SPEC: NORMAL
PATH REPORT.FINAL DX SPEC: NORMAL
PATH REPORT.FINAL DX SPEC: NORMAL
PATH REPORT.MICROSCOPIC SPEC OTHER STN: NORMAL
PATH REPORT.MICROSCOPIC SPEC OTHER STN: NORMAL
PATH REPORT.RELEVANT HX SPEC: NORMAL
PCO2 BLDV: 30 MM HG (ref 40–50)
PH BLDV: 7.45 [PH] (ref 7.32–7.43)
PH UR STRIP: 5 [PH] (ref 5–7)
PH UR STRIP: 5.5 [PH] (ref 5–7)
PH UR STRIP: 6 [PH] (ref 5–7)
PH UR STRIP: 6.5 [PH] (ref 5–7)
PH UR STRIP: 6.5 [PH] (ref 5–9)
PH UR STRIP: 7 [PH] (ref 5–7)
PH UR STRIP: 7.5 [PH] (ref 5–7)
PHOSPHATE SERPL-MCNC: 3.1 MG/DL (ref 2.5–4.5)
PHOSPHATE SERPL-MCNC: 3.1 MG/DL (ref 2.5–4.5)
PHOSPHATE SERPL-MCNC: 3.3 MG/DL (ref 2.5–4.5)
PHOSPHATE SERPL-MCNC: 3.4 MG/DL (ref 2.5–4.5)
PHOSPHATE SERPL-MCNC: 3.5 MG/DL (ref 2.5–4.5)
PHOSPHATE SERPL-MCNC: 3.5 MG/DL (ref 2.5–4.5)
PHOSPHATE SERPL-MCNC: 3.7 MG/DL (ref 2.5–4.5)
PHOSPHATE SERPL-MCNC: 3.7 MG/DL (ref 2.5–4.5)
PHOSPHATE SERPL-MCNC: 3.8 MG/DL (ref 2.5–4.5)
PHOSPHATE SERPL-MCNC: 4 MG/DL (ref 2.5–4.5)
PHOSPHATE SERPL-MCNC: 4.6 MG/DL (ref 2.5–4.5)
PHOSPHATE SERPL-MCNC: 5 MG/DL (ref 2.5–4.5)
PHOSPHATE SERPL-MCNC: 5.1 MG/DL (ref 2.5–4.5)
PHOSPHATE SERPL-MCNC: 5.2 MG/DL (ref 2.5–4.5)
PHOSPHATE SERPL-MCNC: 5.8 MG/DL (ref 2.5–4.5)
PHOSPHATE SERPL-MCNC: 6.3 MG/DL (ref 2.5–4.5)
PLAT MORPH BLD: ABNORMAL
PLATELET # BLD AUTO: 101 10E3/UL (ref 150–450)
PLATELET # BLD AUTO: 105 10E3/UL (ref 150–450)
PLATELET # BLD AUTO: 107 10E3/UL (ref 150–450)
PLATELET # BLD AUTO: 108 10E3/UL (ref 150–450)
PLATELET # BLD AUTO: 110 10E3/UL (ref 150–450)
PLATELET # BLD AUTO: 113 10E3/UL (ref 150–450)
PLATELET # BLD AUTO: 115 10E3/UL (ref 150–450)
PLATELET # BLD AUTO: 116 10E3/UL (ref 150–450)
PLATELET # BLD AUTO: 124 10E3/UL (ref 150–450)
PLATELET # BLD AUTO: 125 10E3/UL (ref 150–450)
PLATELET # BLD AUTO: 127 10E3/UL (ref 150–450)
PLATELET # BLD AUTO: 127 10E3/UL (ref 150–450)
PLATELET # BLD AUTO: 129 10E3/UL (ref 150–450)
PLATELET # BLD AUTO: 140 10E3/UL (ref 150–450)
PLATELET # BLD AUTO: 143 10E3/UL (ref 150–450)
PLATELET # BLD AUTO: 147 10E3/UL (ref 150–450)
PLATELET # BLD AUTO: 149 10E3/UL (ref 150–450)
PLATELET # BLD AUTO: 182 10E3/UL (ref 150–450)
PLATELET # BLD AUTO: 40 10E3/UL (ref 150–450)
PLATELET # BLD AUTO: 49 10E3/UL (ref 150–450)
PLATELET # BLD AUTO: 51 10E3/UL (ref 150–450)
PLATELET # BLD AUTO: 55 10E3/UL (ref 150–450)
PLATELET # BLD AUTO: 59 10E3/UL (ref 150–450)
PLATELET # BLD AUTO: 63 10E3/UL (ref 150–450)
PLATELET # BLD AUTO: 67 10E3/UL (ref 150–450)
PLATELET # BLD AUTO: 68 10E3/UL (ref 150–450)
PLATELET # BLD AUTO: 68 10E3/UL (ref 150–450)
PLATELET # BLD AUTO: 69 10E3/UL (ref 150–450)
PLATELET # BLD AUTO: 71 10E3/UL (ref 150–450)
PLATELET # BLD AUTO: 73 10E3/UL (ref 150–450)
PLATELET # BLD AUTO: 91 10E3/UL (ref 150–450)
PLATELET # BLD AUTO: 93 10E3/UL (ref 150–450)
PLATELET # BLD AUTO: 98 10E3/UL (ref 150–450)
PO2 BLDV: 36 MM HG (ref 25–47)
POLYCHROMASIA BLD QL SMEAR: SLIGHT
POTASSIUM SERPL-SCNC: 2.6 MMOL/L (ref 3.4–5.3)
POTASSIUM SERPL-SCNC: 2.8 MMOL/L (ref 3.4–5.3)
POTASSIUM SERPL-SCNC: 3 MMOL/L (ref 3.4–5.3)
POTASSIUM SERPL-SCNC: 3.1 MMOL/L (ref 3.4–5.3)
POTASSIUM SERPL-SCNC: 3.2 MMOL/L (ref 3.4–5.3)
POTASSIUM SERPL-SCNC: 3.3 MMOL/L (ref 3.4–5.3)
POTASSIUM SERPL-SCNC: 3.3 MMOL/L (ref 3.4–5.3)
POTASSIUM SERPL-SCNC: 3.4 MMOL/L (ref 3.4–5.3)
POTASSIUM SERPL-SCNC: 3.5 MMOL/L (ref 3.4–5.3)
POTASSIUM SERPL-SCNC: 3.5 MMOL/L (ref 3.4–5.3)
POTASSIUM SERPL-SCNC: 3.6 MMOL/L (ref 3.4–5.3)
POTASSIUM SERPL-SCNC: 3.7 MMOL/L (ref 3.4–5.3)
POTASSIUM SERPL-SCNC: 3.8 MMOL/L (ref 3.4–5.3)
POTASSIUM SERPL-SCNC: 3.9 MMOL/L (ref 3.4–5.3)
POTASSIUM SERPL-SCNC: 4 MMOL/L (ref 3.4–5.3)
POTASSIUM SERPL-SCNC: 4.1 MMOL/L (ref 3.4–5.3)
POTASSIUM SERPL-SCNC: 4.1 MMOL/L (ref 3.4–5.3)
POTASSIUM SERPL-SCNC: 4.2 MMOL/L (ref 3.4–5.3)
POTASSIUM SERPL-SCNC: 4.4 MMOL/L (ref 3.4–5.3)
POTASSIUM SERPL-SCNC: 4.5 MMOL/L (ref 3.4–5.3)
POTASSIUM SERPL-SCNC: 4.6 MMOL/L (ref 3.4–5.3)
POTASSIUM SERPL-SCNC: 4.6 MMOL/L (ref 3.4–5.3)
POTASSIUM SERPL-SCNC: 4.7 MMOL/L (ref 3.4–5.3)
POTASSIUM SERPL-SCNC: 4.9 MMOL/L (ref 3.4–5.3)
POTASSIUM SERPL-SCNC: 4.9 MMOL/L (ref 3.4–5.3)
POTASSIUM SERPL-SCNC: 5.1 MMOL/L (ref 3.4–5.3)
POTASSIUM SERPL-SCNC: 5.2 MMOL/L (ref 3.4–5.3)
POTASSIUM SERPL-SCNC: 5.3 MMOL/L (ref 3.4–5.3)
POTASSIUM SERPL-SCNC: 5.6 MMOL/L (ref 3.4–5.3)
POTASSIUM SERPL-SCNC: 6.3 MMOL/L (ref 3.4–5.3)
POTASSIUM SERPL-SCNC: 6.3 MMOL/L (ref 3.4–5.3)
PR INTERVAL - MUSE: 126 MS
PR INTERVAL - MUSE: 130 MS
PR INTERVAL - MUSE: 136 MS
PR INTERVAL - MUSE: 136 MS
PR INTERVAL - MUSE: 146 MS
PR INTERVAL - MUSE: 148 MS
PR INTERVAL - MUSE: 150 MS
PR INTERVAL - MUSE: 154 MS
PR INTERVAL - MUSE: 160 MS
PR INTERVAL - MUSE: 162 MS
PR INTERVAL - MUSE: 164 MS
PROCALCITONIN SERPL IA-MCNC: 0.15 NG/ML
PROCALCITONIN SERPL IA-MCNC: 0.3 NG/ML
PROT SERPL-MCNC: 5.3 G/DL (ref 6.4–8.3)
PROT SERPL-MCNC: 5.4 G/DL (ref 6.4–8.3)
PROT SERPL-MCNC: 5.5 G/DL (ref 6.4–8.3)
PROT SERPL-MCNC: 5.7 G/DL (ref 6.4–8.3)
PROT SERPL-MCNC: 6.3 G/DL (ref 6.4–8.3)
PROT SERPL-MCNC: 6.7 G/DL (ref 6.4–8.3)
PROT SERPL-MCNC: 7 G/DL (ref 6.4–8.3)
PROT SERPL-MCNC: 7.2 G/DL (ref 6.4–8.3)
PROT/CREAT 24H UR: 0.55 MG/MG CR (ref 0–0.2)
PROT/CREAT 24H UR: 10.31 MG/MG CR (ref 0–0.2)
PROT/CREAT 24H UR: 12.91 MG/MG CR (ref 0–0.2)
PROT/CREAT 24H UR: 13.4 MG/MG CR (ref 0–0.2)
PROT/CREAT 24H UR: 15.54 MG/MG CR (ref 0–0.2)
PROT/CREAT 24H UR: 4.95 MG/MG CR (ref 0–0.2)
PROT/CREAT 24H UR: 4.97 MG/MG CR (ref 0–0.2)
PROT/CREAT 24H UR: 8.74 MG/MG CR (ref 0–0.2)
PTH-INTACT SERPL-MCNC: 129 PG/ML (ref 15–65)
QRS DURATION - MUSE: 66 MS
QRS DURATION - MUSE: 70 MS
QRS DURATION - MUSE: 70 MS
QRS DURATION - MUSE: 72 MS
QRS DURATION - MUSE: 74 MS
QRS DURATION - MUSE: 74 MS
QRS DURATION - MUSE: 78 MS
QRS DURATION - MUSE: 80 MS
QRS DURATION - MUSE: 80 MS
QT - MUSE: 358 MS
QT - MUSE: 360 MS
QT - MUSE: 360 MS
QT - MUSE: 366 MS
QT - MUSE: 370 MS
QT - MUSE: 372 MS
QT - MUSE: 404 MS
QT - MUSE: 478 MS
QT - MUSE: 482 MS
QT - MUSE: 528 MS
QT - MUSE: 612 MS
QTC - MUSE: 442 MS
QTC - MUSE: 450 MS
QTC - MUSE: 457 MS
QTC - MUSE: 482 MS
QTC - MUSE: 486 MS
QTC - MUSE: 488 MS
QTC - MUSE: 495 MS
QTC - MUSE: 508 MS
QTC - MUSE: 516 MS
QTC - MUSE: 573 MS
QTC - MUSE: 574 MS
QUANTIFERON MITOGEN: 5.33 IU/ML
QUANTIFERON NIL TUBE: 0.11 IU/ML
QUANTIFERON TB1 TUBE: 0.08 IU/ML
QUANTIFERON TB2 TUBE: 0.08
R AXIS - MUSE: -11 DEGREES
R AXIS - MUSE: -20 DEGREES
R AXIS - MUSE: -35 DEGREES
R AXIS - MUSE: -46 DEGREES
R AXIS - MUSE: -49 DEGREES
R AXIS - MUSE: -52 DEGREES
R AXIS - MUSE: -53 DEGREES
R AXIS - MUSE: -54 DEGREES
R AXIS - MUSE: -60 DEGREES
R AXIS - MUSE: -60 DEGREES
R AXIS - MUSE: -66 DEGREES
RADIOLOGIST FLAGS: ABNORMAL
RBC # BLD AUTO: 2.05 10E6/UL (ref 3.8–5.2)
RBC # BLD AUTO: 2.17 10E6/UL (ref 3.8–5.2)
RBC # BLD AUTO: 2.21 10E6/UL (ref 3.8–5.2)
RBC # BLD AUTO: 2.22 10E6/UL (ref 3.8–5.2)
RBC # BLD AUTO: 2.33 10E6/UL (ref 3.8–5.2)
RBC # BLD AUTO: 2.33 10E6/UL (ref 3.8–5.2)
RBC # BLD AUTO: 2.39 10E6/UL (ref 3.8–5.2)
RBC # BLD AUTO: 2.44 10E6/UL (ref 3.8–5.2)
RBC # BLD AUTO: 2.46 10E6/UL (ref 3.8–5.2)
RBC # BLD AUTO: 2.47 10E6/UL (ref 3.8–5.2)
RBC # BLD AUTO: 2.48 10E6/UL (ref 3.8–5.2)
RBC # BLD AUTO: 2.61 10E6/UL (ref 3.8–5.2)
RBC # BLD AUTO: 2.62 10E6/UL (ref 3.8–5.2)
RBC # BLD AUTO: 2.64 10E6/UL (ref 3.8–5.2)
RBC # BLD AUTO: 2.68 10E6/UL (ref 3.8–5.2)
RBC # BLD AUTO: 2.74 10E6/UL (ref 3.8–5.2)
RBC # BLD AUTO: 2.96 10E6/UL (ref 3.8–5.2)
RBC # BLD AUTO: 2.98 10E6/UL (ref 3.8–5.2)
RBC # BLD AUTO: 3 10E6/UL (ref 3.8–5.2)
RBC # BLD AUTO: 3.02 10E6/UL (ref 3.8–5.2)
RBC # BLD AUTO: 3.04 10E6/UL (ref 3.8–5.2)
RBC # BLD AUTO: 3.12 10E6/UL (ref 3.8–5.2)
RBC # BLD AUTO: 3.13 10E6/UL (ref 3.8–5.2)
RBC # BLD AUTO: 3.13 10E6/UL (ref 3.8–5.2)
RBC # BLD AUTO: 3.21 10E6/UL (ref 3.8–5.2)
RBC # BLD AUTO: 3.26 10E6/UL (ref 3.8–5.2)
RBC # BLD AUTO: 3.27 10E6/UL (ref 3.8–5.2)
RBC # BLD AUTO: 3.29 10E6/UL (ref 3.8–5.2)
RBC # BLD AUTO: 3.32 10E6/UL (ref 3.8–5.2)
RBC # BLD AUTO: 3.4 10E6/UL (ref 3.8–5.2)
RBC # BLD AUTO: 3.46 10E6/UL (ref 3.8–5.2)
RBC # BLD AUTO: 3.66 10E6/UL (ref 3.8–5.2)
RBC # BLD AUTO: 3.67 10E6/UL (ref 3.8–5.2)
RBC # BLD AUTO: 3.68 10E6/UL (ref 3.8–5.2)
RBC # BLD AUTO: 3.97 10E6/UL (ref 3.8–5.2)
RBC AGGLUT BLD QL: ABNORMAL
RBC MORPH BLD: ABNORMAL
RBC URINE: 121 /HPF
RBC URINE: 15 /HPF
RBC URINE: 23 /HPF
RBC URINE: 29 /HPF
RBC URINE: 33 /HPF
RBC URINE: 4 /HPF
RBC URINE: 4 /HPF
RBC URINE: 54 /HPF
RBC URINE: 68 /HPF
RBC URINE: 93 /HPF
RBC URINE: 97 /HPF
RETICS # AUTO: 0.11 10E6/UL (ref 0.03–0.1)
RETICS # AUTO: 0.16 10E6/UL (ref 0.03–0.1)
RETICS/RBC NFR AUTO: 4.6 % (ref 0.5–2)
RETICS/RBC NFR AUTO: 6.1 % (ref 0.5–2)
ROULEAUX BLD QL SMEAR: ABNORMAL
RSV RNA SPEC NAA+PROBE: NEGATIVE
SARS-COV-2 RNA RESP QL NAA+PROBE: POSITIVE
SICKLE CELLS BLD QL SMEAR: ABNORMAL
SMUDGE CELLS BLD QL SMEAR: ABNORMAL
SODIUM SERPL-SCNC: 136 MMOL/L (ref 135–145)
SODIUM SERPL-SCNC: 136 MMOL/L (ref 136–145)
SODIUM SERPL-SCNC: 136 MMOL/L (ref 136–145)
SODIUM SERPL-SCNC: 137 MMOL/L (ref 136–145)
SODIUM SERPL-SCNC: 138 MMOL/L (ref 135–145)
SODIUM SERPL-SCNC: 138 MMOL/L (ref 135–145)
SODIUM SERPL-SCNC: 138 MMOL/L (ref 136–145)
SODIUM SERPL-SCNC: 139 MMOL/L (ref 135–145)
SODIUM SERPL-SCNC: 139 MMOL/L (ref 135–145)
SODIUM SERPL-SCNC: 139 MMOL/L (ref 136–145)
SODIUM SERPL-SCNC: 140 MMOL/L (ref 135–145)
SODIUM SERPL-SCNC: 140 MMOL/L (ref 136–145)
SODIUM SERPL-SCNC: 141 MMOL/L (ref 135–145)
SODIUM SERPL-SCNC: 141 MMOL/L (ref 136–145)
SODIUM SERPL-SCNC: 142 MMOL/L (ref 136–145)
SODIUM SERPL-SCNC: 143 MMOL/L (ref 135–145)
SODIUM SERPL-SCNC: 143 MMOL/L (ref 136–145)
SODIUM SERPL-SCNC: 144 MMOL/L (ref 135–145)
SODIUM SERPL-SCNC: 144 MMOL/L (ref 135–145)
SODIUM SERPL-SCNC: 146 MMOL/L (ref 135–145)
SP GR UR STRIP: 1.01 (ref 1–1.03)
SP GR UR STRIP: 1.02 (ref 1–1.03)
SPECIMEN EXPIRATION DATE: NORMAL
SPHEROCYTES BLD QL SMEAR: ABNORMAL
SQUAMOUS EPITHELIAL: 1 /HPF
SQUAMOUS EPITHELIAL: 15 /HPF
SQUAMOUS EPITHELIAL: 18 /HPF
SQUAMOUS EPITHELIAL: 2 /HPF
SQUAMOUS EPITHELIAL: 3 /HPF
SQUAMOUS EPITHELIAL: 3 /HPF
SQUAMOUS EPITHELIAL: 4 /HPF
SQUAMOUS EPITHELIAL: 7 /HPF
SQUAMOUS EPITHELIAL: 8 /HPF
SQUAMOUS EPITHELIAL: <1 /HPF
STOMATOCYTES BLD QL SMEAR: ABNORMAL
STRESS ECHO TARGET HR: 198
SYSTOLIC BLOOD PRESSURE - MUSE: NORMAL MMHG
T AXIS - MUSE: -33 DEGREES
T AXIS - MUSE: -40 DEGREES
T AXIS - MUSE: 204 DEGREES
T AXIS - MUSE: 244 DEGREES
T AXIS - MUSE: 248 DEGREES
T AXIS - MUSE: 39 DEGREES
T AXIS - MUSE: 40 DEGREES
T AXIS - MUSE: 42 DEGREES
T AXIS - MUSE: 55 DEGREES
T AXIS - MUSE: 58 DEGREES
T AXIS - MUSE: 81 DEGREES
T4 FREE SERPL-MCNC: 1.06 NG/DL (ref 0.9–1.7)
TARGETS BLD QL SMEAR: ABNORMAL
TOXIC GRANULES BLD QL SMEAR: ABNORMAL
TRANSITIONAL EPI: <1 /HPF
TRANSITIONAL EPI: <1 /HPF
TRIGL SERPL-MCNC: 171 MG/DL
TROPONIN T SERPL HS-MCNC: 118 NG/L
TROPONIN T SERPL HS-MCNC: 143 NG/L
TROPONIN T SERPL HS-MCNC: 26 NG/L
TROPONIN T SERPL HS-MCNC: 290 NG/L
TROPONIN T SERPL HS-MCNC: 322 NG/L
TROPONIN T SERPL HS-MCNC: 348 NG/L
TROPONIN T SERPL HS-MCNC: 35 NG/L
TROPONIN T SERPL HS-MCNC: 494 NG/L
TROPONIN T SERPL HS-MCNC: 735 NG/L
TSH SERPL DL<=0.005 MIU/L-ACNC: 9.9 UIU/ML (ref 0.3–4.2)
UNIT ABO/RH: NORMAL
UNIT ABO/RH: NORMAL
UNIT NUMBER: NORMAL
UNIT NUMBER: NORMAL
UNIT STATUS: NORMAL
UNIT STATUS: NORMAL
UNIT TYPE ISBT: 5100
UNIT TYPE ISBT: 5100
UROBILINOGEN UR STRIP-MCNC: NORMAL MG/DL
VANCOMYCIN SERPL-MCNC: 21.8 UG/ML
VANCOMYCIN SERPL-MCNC: 27.9 UG/ML
VANCOMYCIN SERPL-MCNC: 36.4 UG/ML
VARIANT LYMPHS BLD QL SMEAR: ABNORMAL
VENTRICULAR RATE- MUSE: 106 BPM
VENTRICULAR RATE- MUSE: 108 BPM
VENTRICULAR RATE- MUSE: 115 BPM
VENTRICULAR RATE- MUSE: 53 BPM
VENTRICULAR RATE- MUSE: 68 BPM
VENTRICULAR RATE- MUSE: 69 BPM
VENTRICULAR RATE- MUSE: 71 BPM
VENTRICULAR RATE- MUSE: 88 BPM
VENTRICULAR RATE- MUSE: 89 BPM
VENTRICULAR RATE- MUSE: 91 BPM
VENTRICULAR RATE- MUSE: 91 BPM
VIT B12 SERPL-MCNC: 338 PG/ML (ref 232–1245)
WBC # BLD AUTO: 10.1 10E3/UL (ref 4–11)
WBC # BLD AUTO: 11.3 10E3/UL (ref 4–11)
WBC # BLD AUTO: 12.5 10E3/UL (ref 4–11)
WBC # BLD AUTO: 19.9 10E3/UL (ref 4–11)
WBC # BLD AUTO: 21.2 10E3/UL (ref 4–11)
WBC # BLD AUTO: 3 10E3/UL (ref 4–11)
WBC # BLD AUTO: 3.1 10E3/UL (ref 4–11)
WBC # BLD AUTO: 3.2 10E3/UL (ref 4–11)
WBC # BLD AUTO: 3.3 10E3/UL (ref 4–11)
WBC # BLD AUTO: 4 10E3/UL (ref 4–11)
WBC # BLD AUTO: 4 10E3/UL (ref 4–11)
WBC # BLD AUTO: 4.1 10E3/UL (ref 4–11)
WBC # BLD AUTO: 4.1 10E3/UL (ref 4–11)
WBC # BLD AUTO: 4.3 10E3/UL (ref 4–11)
WBC # BLD AUTO: 4.4 10E3/UL (ref 4–11)
WBC # BLD AUTO: 4.4 10E3/UL (ref 4–11)
WBC # BLD AUTO: 4.6 10E3/UL (ref 4–11)
WBC # BLD AUTO: 4.7 10E3/UL (ref 4–11)
WBC # BLD AUTO: 4.9 10E3/UL (ref 4–11)
WBC # BLD AUTO: 5 10E3/UL (ref 4–11)
WBC # BLD AUTO: 5.3 10E3/UL (ref 4–11)
WBC # BLD AUTO: 5.5 10E3/UL (ref 4–11)
WBC # BLD AUTO: 5.7 10E3/UL (ref 4–11)
WBC # BLD AUTO: 5.8 10E3/UL (ref 4–11)
WBC # BLD AUTO: 5.9 10E3/UL (ref 4–11)
WBC # BLD AUTO: 6.3 10E3/UL (ref 4–11)
WBC # BLD AUTO: 6.4 10E3/UL (ref 4–11)
WBC # BLD AUTO: 7.5 10E3/UL (ref 4–11)
WBC # BLD AUTO: 7.5 10E3/UL (ref 4–11)
WBC # BLD AUTO: 8.4 10E3/UL (ref 4–11)
WBC # BLD AUTO: 8.6 10E3/UL (ref 4–11)
WBC CAST: 4 /LPF
WBC URINE: 11 /HPF
WBC URINE: 2 /HPF
WBC URINE: 23 /HPF
WBC URINE: 3 /HPF
WBC URINE: 3 /HPF
WBC URINE: 4 /HPF
WBC URINE: 4 /HPF
WBC URINE: 40 /HPF
WBC URINE: 47 /HPF
WBC URINE: 5 /HPF
WBC URINE: <1 /HPF

## 2023-01-01 PROCEDURE — 250N000011 HC RX IP 250 OP 636: Performed by: INTERNAL MEDICINE

## 2023-01-01 PROCEDURE — 82310 ASSAY OF CALCIUM: CPT | Performed by: PHYSICIAN ASSISTANT

## 2023-01-01 PROCEDURE — 84100 ASSAY OF PHOSPHORUS: CPT | Performed by: PHYSICIAN ASSISTANT

## 2023-01-01 PROCEDURE — 250N000013 HC RX MED GY IP 250 OP 250 PS 637: Performed by: STUDENT IN AN ORGANIZED HEALTH CARE EDUCATION/TRAINING PROGRAM

## 2023-01-01 PROCEDURE — 250N000011 HC RX IP 250 OP 636: Mod: JZ | Performed by: INTERNAL MEDICINE

## 2023-01-01 PROCEDURE — 99233 SBSQ HOSP IP/OBS HIGH 50: CPT | Performed by: STUDENT IN AN ORGANIZED HEALTH CARE EDUCATION/TRAINING PROGRAM

## 2023-01-01 PROCEDURE — 85027 COMPLETE CBC AUTOMATED: CPT | Performed by: INTERNAL MEDICINE

## 2023-01-01 PROCEDURE — C9803 HOPD COVID-19 SPEC COLLECT: HCPCS | Performed by: STUDENT IN AN ORGANIZED HEALTH CARE EDUCATION/TRAINING PROGRAM

## 2023-01-01 PROCEDURE — 250N000013 HC RX MED GY IP 250 OP 250 PS 637: Performed by: INTERNAL MEDICINE

## 2023-01-01 PROCEDURE — 250N000013 HC RX MED GY IP 250 OP 250 PS 637: Performed by: PHYSICIAN ASSISTANT

## 2023-01-01 PROCEDURE — 258N000003 HC RX IP 258 OP 636: Performed by: INTERNAL MEDICINE

## 2023-01-01 PROCEDURE — 250N000012 HC RX MED GY IP 250 OP 636 PS 637: Performed by: PHYSICIAN ASSISTANT

## 2023-01-01 PROCEDURE — 99239 HOSP IP/OBS DSCHRG MGMT >30: CPT | Performed by: INTERNAL MEDICINE

## 2023-01-01 PROCEDURE — 83735 ASSAY OF MAGNESIUM: CPT | Performed by: INTERNAL MEDICINE

## 2023-01-01 PROCEDURE — 84484 ASSAY OF TROPONIN QUANT: CPT | Performed by: STUDENT IN AN ORGANIZED HEALTH CARE EDUCATION/TRAINING PROGRAM

## 2023-01-01 PROCEDURE — 120N000002 HC R&B MED SURG/OB UMMC

## 2023-01-01 PROCEDURE — 258N000003 HC RX IP 258 OP 636: Performed by: EMERGENCY MEDICINE

## 2023-01-01 PROCEDURE — 258N000003 HC RX IP 258 OP 636: Performed by: STUDENT IN AN ORGANIZED HEALTH CARE EDUCATION/TRAINING PROGRAM

## 2023-01-01 PROCEDURE — 120N000005 HC R&B MS OVERFLOW UMMC

## 2023-01-01 PROCEDURE — 85004 AUTOMATED DIFF WBC COUNT: CPT | Performed by: STUDENT IN AN ORGANIZED HEALTH CARE EDUCATION/TRAINING PROGRAM

## 2023-01-01 PROCEDURE — 85027 COMPLETE CBC AUTOMATED: CPT | Performed by: PHYSICIAN ASSISTANT

## 2023-01-01 PROCEDURE — 86160 COMPLEMENT ANTIGEN: CPT | Performed by: INTERNAL MEDICINE

## 2023-01-01 PROCEDURE — 120N000001 HC R&B MED SURG/OB

## 2023-01-01 PROCEDURE — 96417 CHEMO IV INFUS EACH ADDL SEQ: CPT

## 2023-01-01 PROCEDURE — 250N000013 HC RX MED GY IP 250 OP 250 PS 637: Performed by: HOSPITALIST

## 2023-01-01 PROCEDURE — 272N000127 HC CATH CR16

## 2023-01-01 PROCEDURE — 99214 OFFICE O/P EST MOD 30 MIN: CPT | Performed by: STUDENT IN AN ORGANIZED HEALTH CARE EDUCATION/TRAINING PROGRAM

## 2023-01-01 PROCEDURE — 36415 COLL VENOUS BLD VENIPUNCTURE: CPT | Performed by: PHYSICIAN ASSISTANT

## 2023-01-01 PROCEDURE — 86160 COMPLEMENT ANTIGEN: CPT | Performed by: STUDENT IN AN ORGANIZED HEALTH CARE EDUCATION/TRAINING PROGRAM

## 2023-01-01 PROCEDURE — 82306 VITAMIN D 25 HYDROXY: CPT | Performed by: INTERNAL MEDICINE

## 2023-01-01 PROCEDURE — C1760 CLOSURE DEV, VASC: HCPCS

## 2023-01-01 PROCEDURE — 250N000011 HC RX IP 250 OP 636: Mod: JZ | Performed by: STUDENT IN AN ORGANIZED HEALTH CARE EDUCATION/TRAINING PROGRAM

## 2023-01-01 PROCEDURE — 85045 AUTOMATED RETICULOCYTE COUNT: CPT | Performed by: INTERNAL MEDICINE

## 2023-01-01 PROCEDURE — 96375 TX/PRO/DX INJ NEW DRUG ADDON: CPT

## 2023-01-01 PROCEDURE — 87637 SARSCOV2&INF A&B&RSV AMP PRB: CPT | Performed by: FAMILY MEDICINE

## 2023-01-01 PROCEDURE — 96361 HYDRATE IV INFUSION ADD-ON: CPT | Performed by: STUDENT IN AN ORGANIZED HEALTH CARE EDUCATION/TRAINING PROGRAM

## 2023-01-01 PROCEDURE — 999N000128 HC STATISTIC PERIPHERAL IV START W/O US GUIDANCE

## 2023-01-01 PROCEDURE — 83735 ASSAY OF MAGNESIUM: CPT | Performed by: PHYSICIAN ASSISTANT

## 2023-01-01 PROCEDURE — 99233 SBSQ HOSP IP/OBS HIGH 50: CPT | Performed by: INTERNAL MEDICINE

## 2023-01-01 PROCEDURE — 86923 COMPATIBILITY TEST ELECTRIC: CPT | Performed by: STUDENT IN AN ORGANIZED HEALTH CARE EDUCATION/TRAINING PROGRAM

## 2023-01-01 PROCEDURE — 99215 OFFICE O/P EST HI 40 MIN: CPT | Mod: GC | Performed by: STUDENT IN AN ORGANIZED HEALTH CARE EDUCATION/TRAINING PROGRAM

## 2023-01-01 PROCEDURE — 99223 1ST HOSP IP/OBS HIGH 75: CPT | Mod: GC | Performed by: INTERNAL MEDICINE

## 2023-01-01 PROCEDURE — 250N000012 HC RX MED GY IP 250 OP 636 PS 637: Performed by: HOSPITALIST

## 2023-01-01 PROCEDURE — 82728 ASSAY OF FERRITIN: CPT | Performed by: PATHOLOGY

## 2023-01-01 PROCEDURE — G0463 HOSPITAL OUTPT CLINIC VISIT: HCPCS | Performed by: INTERNAL MEDICINE

## 2023-01-01 PROCEDURE — 93005 ELECTROCARDIOGRAM TRACING: CPT | Performed by: STUDENT IN AN ORGANIZED HEALTH CARE EDUCATION/TRAINING PROGRAM

## 2023-01-01 PROCEDURE — 99233 SBSQ HOSP IP/OBS HIGH 50: CPT | Mod: GC | Performed by: INTERNAL MEDICINE

## 2023-01-01 PROCEDURE — 93005 ELECTROCARDIOGRAM TRACING: CPT

## 2023-01-01 PROCEDURE — 81001 URINALYSIS AUTO W/SCOPE: CPT | Performed by: INTERNAL MEDICINE

## 2023-01-01 PROCEDURE — 99207 PR APP CREDIT; MD BILLING SHARED VISIT: CPT | Performed by: INTERNAL MEDICINE

## 2023-01-01 PROCEDURE — 250N000011 HC RX IP 250 OP 636: Mod: JZ | Performed by: PHYSICIAN ASSISTANT

## 2023-01-01 PROCEDURE — 85027 COMPLETE CBC AUTOMATED: CPT | Performed by: STUDENT IN AN ORGANIZED HEALTH CARE EDUCATION/TRAINING PROGRAM

## 2023-01-01 PROCEDURE — 99418 PROLNG IP/OBS E/M EA 15 MIN: CPT | Performed by: STUDENT IN AN ORGANIZED HEALTH CARE EDUCATION/TRAINING PROGRAM

## 2023-01-01 PROCEDURE — 36415 COLL VENOUS BLD VENIPUNCTURE: CPT | Performed by: INTERNAL MEDICINE

## 2023-01-01 PROCEDURE — 96413 CHEMO IV INFUSION 1 HR: CPT

## 2023-01-01 PROCEDURE — 96376 TX/PRO/DX INJ SAME DRUG ADON: CPT | Performed by: STUDENT IN AN ORGANIZED HEALTH CARE EDUCATION/TRAINING PROGRAM

## 2023-01-01 PROCEDURE — 250N000011 HC RX IP 250 OP 636: Performed by: STUDENT IN AN ORGANIZED HEALTH CARE EDUCATION/TRAINING PROGRAM

## 2023-01-01 PROCEDURE — 86225 DNA ANTIBODY NATIVE: CPT | Performed by: INTERNAL MEDICINE

## 2023-01-01 PROCEDURE — 99232 SBSQ HOSP IP/OBS MODERATE 35: CPT | Performed by: STUDENT IN AN ORGANIZED HEALTH CARE EDUCATION/TRAINING PROGRAM

## 2023-01-01 PROCEDURE — 84156 ASSAY OF PROTEIN URINE: CPT | Performed by: PATHOLOGY

## 2023-01-01 PROCEDURE — 74174 CTA ABD&PLVS W/CONTRAST: CPT

## 2023-01-01 PROCEDURE — 99000 SPECIMEN HANDLING OFFICE-LAB: CPT | Performed by: PATHOLOGY

## 2023-01-01 PROCEDURE — 84156 ASSAY OF PROTEIN URINE: CPT

## 2023-01-01 PROCEDURE — 83605 ASSAY OF LACTIC ACID: CPT | Performed by: FAMILY MEDICINE

## 2023-01-01 PROCEDURE — 250N000013 HC RX MED GY IP 250 OP 250 PS 637: Performed by: NURSE PRACTITIONER

## 2023-01-01 PROCEDURE — 93017 CV STRESS TEST TRACING ONLY: CPT

## 2023-01-01 PROCEDURE — 36416 COLLJ CAPILLARY BLOOD SPEC: CPT | Performed by: STUDENT IN AN ORGANIZED HEALTH CARE EDUCATION/TRAINING PROGRAM

## 2023-01-01 PROCEDURE — 71046 X-RAY EXAM CHEST 2 VIEWS: CPT | Mod: 26 | Performed by: RADIOLOGY

## 2023-01-01 PROCEDURE — 85652 RBC SED RATE AUTOMATED: CPT | Performed by: PHYSICIAN ASSISTANT

## 2023-01-01 PROCEDURE — 36415 COLL VENOUS BLD VENIPUNCTURE: CPT | Performed by: STUDENT IN AN ORGANIZED HEALTH CARE EDUCATION/TRAINING PROGRAM

## 2023-01-01 PROCEDURE — P9040 RBC LEUKOREDUCED IRRADIATED: HCPCS | Performed by: STUDENT IN AN ORGANIZED HEALTH CARE EDUCATION/TRAINING PROGRAM

## 2023-01-01 PROCEDURE — 250N000011 HC RX IP 250 OP 636: Performed by: PHYSICIAN ASSISTANT

## 2023-01-01 PROCEDURE — 99204 OFFICE O/P NEW MOD 45 MIN: CPT | Mod: VID | Performed by: FAMILY MEDICINE

## 2023-01-01 PROCEDURE — 250N000011 HC RX IP 250 OP 636: Performed by: HOSPITALIST

## 2023-01-01 PROCEDURE — 84156 ASSAY OF PROTEIN URINE: CPT | Performed by: INTERNAL MEDICINE

## 2023-01-01 PROCEDURE — 36415 COLL VENOUS BLD VENIPUNCTURE: CPT | Performed by: PATHOLOGY

## 2023-01-01 PROCEDURE — 85025 COMPLETE CBC W/AUTO DIFF WBC: CPT | Performed by: STUDENT IN AN ORGANIZED HEALTH CARE EDUCATION/TRAINING PROGRAM

## 2023-01-01 PROCEDURE — 999N000157 HC STATISTIC RCP TIME EA 10 MIN

## 2023-01-01 PROCEDURE — 85652 RBC SED RATE AUTOMATED: CPT | Performed by: STUDENT IN AN ORGANIZED HEALTH CARE EDUCATION/TRAINING PROGRAM

## 2023-01-01 PROCEDURE — 81025 URINE PREGNANCY TEST: CPT | Performed by: INTERNAL MEDICINE

## 2023-01-01 PROCEDURE — 83735 ASSAY OF MAGNESIUM: CPT | Performed by: HOSPITALIST

## 2023-01-01 PROCEDURE — 99232 SBSQ HOSP IP/OBS MODERATE 35: CPT | Mod: FS | Performed by: INTERNAL MEDICINE

## 2023-01-01 PROCEDURE — 75561 CARDIAC MRI FOR MORPH W/DYE: CPT | Mod: 26 | Performed by: INTERNAL MEDICINE

## 2023-01-01 PROCEDURE — 93010 ELECTROCARDIOGRAM REPORT: CPT | Performed by: INTERNAL MEDICINE

## 2023-01-01 PROCEDURE — 343N000001 HC RX 343: Performed by: STUDENT IN AN ORGANIZED HEALTH CARE EDUCATION/TRAINING PROGRAM

## 2023-01-01 PROCEDURE — 93018 CV STRESS TEST I&R ONLY: CPT | Performed by: INTERNAL MEDICINE

## 2023-01-01 PROCEDURE — 80053 COMPREHEN METABOLIC PANEL: CPT | Performed by: INTERNAL MEDICINE

## 2023-01-01 PROCEDURE — 81001 URINALYSIS AUTO W/SCOPE: CPT | Performed by: PATHOLOGY

## 2023-01-01 PROCEDURE — 86923 COMPATIBILITY TEST ELECTRIC: CPT | Performed by: INTERNAL MEDICINE

## 2023-01-01 PROCEDURE — 84703 CHORIONIC GONADOTROPIN ASSAY: CPT | Performed by: STUDENT IN AN ORGANIZED HEALTH CARE EDUCATION/TRAINING PROGRAM

## 2023-01-01 PROCEDURE — 85060 BLOOD SMEAR INTERPRETATION: CPT | Performed by: PATHOLOGY

## 2023-01-01 PROCEDURE — 80202 ASSAY OF VANCOMYCIN: CPT | Performed by: FAMILY MEDICINE

## 2023-01-01 PROCEDURE — 258N000003 HC RX IP 258 OP 636: Performed by: FAMILY MEDICINE

## 2023-01-01 PROCEDURE — 82550 ASSAY OF CK (CPK): CPT | Performed by: PHYSICIAN ASSISTANT

## 2023-01-01 PROCEDURE — 999N000208 ECHOCARDIOGRAM COMPLETE

## 2023-01-01 PROCEDURE — 82565 ASSAY OF CREATININE: CPT

## 2023-01-01 PROCEDURE — 99291 CRITICAL CARE FIRST HOUR: CPT | Performed by: INTERNAL MEDICINE

## 2023-01-01 PROCEDURE — 78452 HT MUSCLE IMAGE SPECT MULT: CPT | Mod: 26 | Performed by: INTERNAL MEDICINE

## 2023-01-01 PROCEDURE — 80053 COMPREHEN METABOLIC PANEL: CPT | Performed by: EMERGENCY MEDICINE

## 2023-01-01 PROCEDURE — 99207 PR NO BILLABLE SERVICE THIS VISIT: CPT | Performed by: PHYSICIAN ASSISTANT

## 2023-01-01 PROCEDURE — 84132 ASSAY OF SERUM POTASSIUM: CPT

## 2023-01-01 PROCEDURE — 84484 ASSAY OF TROPONIN QUANT: CPT | Mod: 91 | Performed by: STUDENT IN AN ORGANIZED HEALTH CARE EDUCATION/TRAINING PROGRAM

## 2023-01-01 PROCEDURE — 93325 DOPPLER ECHO COLOR FLOW MAPG: CPT | Mod: 26 | Performed by: INTERNAL MEDICINE

## 2023-01-01 PROCEDURE — C1889 IMPLANT/INSERT DEVICE, NOC: HCPCS

## 2023-01-01 PROCEDURE — 36415 COLL VENOUS BLD VENIPUNCTURE: CPT

## 2023-01-01 PROCEDURE — 99291 CRITICAL CARE FIRST HOUR: CPT | Mod: 25 | Performed by: STUDENT IN AN ORGANIZED HEALTH CARE EDUCATION/TRAINING PROGRAM

## 2023-01-01 PROCEDURE — 250N000009 HC RX 250: Performed by: PHYSICIAN ASSISTANT

## 2023-01-01 PROCEDURE — 94640 AIRWAY INHALATION TREATMENT: CPT

## 2023-01-01 PROCEDURE — 87493 C DIFF AMPLIFIED PROBE: CPT | Performed by: INTERNAL MEDICINE

## 2023-01-01 PROCEDURE — 86225 DNA ANTIBODY NATIVE: CPT | Performed by: STUDENT IN AN ORGANIZED HEALTH CARE EDUCATION/TRAINING PROGRAM

## 2023-01-01 PROCEDURE — 99232 SBSQ HOSP IP/OBS MODERATE 35: CPT | Mod: GC | Performed by: INTERNAL MEDICINE

## 2023-01-01 PROCEDURE — 96365 THER/PROPH/DIAG IV INF INIT: CPT | Mod: XU | Performed by: STUDENT IN AN ORGANIZED HEALTH CARE EDUCATION/TRAINING PROGRAM

## 2023-01-01 PROCEDURE — 99231 SBSQ HOSP IP/OBS SF/LOW 25: CPT | Mod: GC | Performed by: INTERNAL MEDICINE

## 2023-01-01 PROCEDURE — 96375 TX/PRO/DX INJ NEW DRUG ADDON: CPT | Mod: XU | Performed by: STUDENT IN AN ORGANIZED HEALTH CARE EDUCATION/TRAINING PROGRAM

## 2023-01-01 PROCEDURE — 85730 THROMBOPLASTIN TIME PARTIAL: CPT | Performed by: INTERNAL MEDICINE

## 2023-01-01 PROCEDURE — 85027 COMPLETE CBC AUTOMATED: CPT | Performed by: HOSPITALIST

## 2023-01-01 PROCEDURE — C1769 GUIDE WIRE: HCPCS

## 2023-01-01 PROCEDURE — 85730 THROMBOPLASTIN TIME PARTIAL: CPT | Performed by: EMERGENCY MEDICINE

## 2023-01-01 PROCEDURE — 86160 COMPLEMENT ANTIGEN: CPT | Performed by: EMERGENCY MEDICINE

## 2023-01-01 PROCEDURE — 99233 SBSQ HOSP IP/OBS HIGH 50: CPT | Mod: FS | Performed by: CLINICAL NURSE SPECIALIST

## 2023-01-01 PROCEDURE — 99232 SBSQ HOSP IP/OBS MODERATE 35: CPT | Performed by: INTERNAL MEDICINE

## 2023-01-01 PROCEDURE — 99207 PR NO BILLABLE SERVICE THIS VISIT: CPT | Performed by: STUDENT IN AN ORGANIZED HEALTH CARE EDUCATION/TRAINING PROGRAM

## 2023-01-01 PROCEDURE — 250N000012 HC RX MED GY IP 250 OP 636 PS 637: Performed by: NURSE PRACTITIONER

## 2023-01-01 PROCEDURE — 250N000011 HC RX IP 250 OP 636: Mod: JZ | Performed by: FAMILY MEDICINE

## 2023-01-01 PROCEDURE — 84439 ASSAY OF FREE THYROXINE: CPT | Performed by: STUDENT IN AN ORGANIZED HEALTH CARE EDUCATION/TRAINING PROGRAM

## 2023-01-01 PROCEDURE — A9585 GADOBUTROL INJECTION: HCPCS | Performed by: HOSPITALIST

## 2023-01-01 PROCEDURE — A9502 TC99M TETROFOSMIN: HCPCS | Performed by: STUDENT IN AN ORGANIZED HEALTH CARE EDUCATION/TRAINING PROGRAM

## 2023-01-01 PROCEDURE — 85379 FIBRIN DEGRADATION QUANT: CPT | Performed by: EMERGENCY MEDICINE

## 2023-01-01 PROCEDURE — 80069 RENAL FUNCTION PANEL: CPT | Performed by: PHYSICIAN ASSISTANT

## 2023-01-01 PROCEDURE — 250N000012 HC RX MED GY IP 250 OP 636 PS 637: Performed by: STUDENT IN AN ORGANIZED HEALTH CARE EDUCATION/TRAINING PROGRAM

## 2023-01-01 PROCEDURE — 83010 ASSAY OF HAPTOGLOBIN QUANT: CPT | Performed by: INTERNAL MEDICINE

## 2023-01-01 PROCEDURE — 85610 PROTHROMBIN TIME: CPT | Performed by: EMERGENCY MEDICINE

## 2023-01-01 PROCEDURE — 99418 PROLNG IP/OBS E/M EA 15 MIN: CPT | Mod: FS | Performed by: PHYSICIAN ASSISTANT

## 2023-01-01 PROCEDURE — 80202 ASSAY OF VANCOMYCIN: CPT | Performed by: STUDENT IN AN ORGANIZED HEALTH CARE EDUCATION/TRAINING PROGRAM

## 2023-01-01 PROCEDURE — 84132 ASSAY OF SERUM POTASSIUM: CPT | Performed by: INTERNAL MEDICINE

## 2023-01-01 PROCEDURE — 99214 OFFICE O/P EST MOD 30 MIN: CPT | Mod: GC | Performed by: STUDENT IN AN ORGANIZED HEALTH CARE EDUCATION/TRAINING PROGRAM

## 2023-01-01 PROCEDURE — 71275 CT ANGIOGRAPHY CHEST: CPT | Mod: TC

## 2023-01-01 PROCEDURE — 96366 THER/PROPH/DIAG IV INF ADDON: CPT | Performed by: STUDENT IN AN ORGANIZED HEALTH CARE EDUCATION/TRAINING PROGRAM

## 2023-01-01 PROCEDURE — 04LA3DZ OCCLUSION OF LEFT RENAL ARTERY WITH INTRALUMINAL DEVICE, PERCUTANEOUS APPROACH: ICD-10-PCS | Performed by: PHYSICIAN ASSISTANT

## 2023-01-01 PROCEDURE — 36415 COLL VENOUS BLD VENIPUNCTURE: CPT | Performed by: EMERGENCY MEDICINE

## 2023-01-01 PROCEDURE — 82728 ASSAY OF FERRITIN: CPT | Performed by: EMERGENCY MEDICINE

## 2023-01-01 PROCEDURE — 250N000009 HC RX 250: Performed by: INTERNAL MEDICINE

## 2023-01-01 PROCEDURE — 87040 BLOOD CULTURE FOR BACTERIA: CPT | Performed by: INTERNAL MEDICINE

## 2023-01-01 PROCEDURE — G0463 HOSPITAL OUTPT CLINIC VISIT: HCPCS | Performed by: STUDENT IN AN ORGANIZED HEALTH CARE EDUCATION/TRAINING PROGRAM

## 2023-01-01 PROCEDURE — 83550 IRON BINDING TEST: CPT | Performed by: PATHOLOGY

## 2023-01-01 PROCEDURE — 96411 CHEMO IV PUSH ADDL DRUG: CPT

## 2023-01-01 PROCEDURE — 99292 CRITICAL CARE ADDL 30 MIN: CPT

## 2023-01-01 PROCEDURE — 84484 ASSAY OF TROPONIN QUANT: CPT | Performed by: FAMILY MEDICINE

## 2023-01-01 PROCEDURE — 82728 ASSAY OF FERRITIN: CPT | Performed by: STUDENT IN AN ORGANIZED HEALTH CARE EDUCATION/TRAINING PROGRAM

## 2023-01-01 PROCEDURE — 73030 X-RAY EXAM OF SHOULDER: CPT | Mod: LT

## 2023-01-01 PROCEDURE — 85652 RBC SED RATE AUTOMATED: CPT | Performed by: INTERNAL MEDICINE

## 2023-01-01 PROCEDURE — 99285 EMERGENCY DEPT VISIT HI MDM: CPT | Mod: 25 | Performed by: STUDENT IN AN ORGANIZED HEALTH CARE EDUCATION/TRAINING PROGRAM

## 2023-01-01 PROCEDURE — C9113 INJ PANTOPRAZOLE SODIUM, VIA: HCPCS | Mod: JZ | Performed by: INTERNAL MEDICINE

## 2023-01-01 PROCEDURE — 85027 COMPLETE CBC AUTOMATED: CPT | Performed by: PATHOLOGY

## 2023-01-01 PROCEDURE — 99285 EMERGENCY DEPT VISIT HI MDM: CPT | Performed by: STUDENT IN AN ORGANIZED HEALTH CARE EDUCATION/TRAINING PROGRAM

## 2023-01-01 PROCEDURE — 99223 1ST HOSP IP/OBS HIGH 75: CPT | Mod: FS | Performed by: PHYSICIAN ASSISTANT

## 2023-01-01 PROCEDURE — 71046 X-RAY EXAM CHEST 2 VIEWS: CPT

## 2023-01-01 PROCEDURE — A9585 GADOBUTROL INJECTION: HCPCS | Mod: JZ | Performed by: STUDENT IN AN ORGANIZED HEALTH CARE EDUCATION/TRAINING PROGRAM

## 2023-01-01 PROCEDURE — 84460 ALANINE AMINO (ALT) (SGPT): CPT | Performed by: INTERNAL MEDICINE

## 2023-01-01 PROCEDURE — 86481 TB AG RESPONSE T-CELL SUSP: CPT | Performed by: INTERNAL MEDICINE

## 2023-01-01 PROCEDURE — 82962 GLUCOSE BLOOD TEST: CPT

## 2023-01-01 PROCEDURE — 80069 RENAL FUNCTION PANEL: CPT | Performed by: INTERNAL MEDICINE

## 2023-01-01 PROCEDURE — 82607 VITAMIN B-12: CPT | Performed by: STUDENT IN AN ORGANIZED HEALTH CARE EDUCATION/TRAINING PROGRAM

## 2023-01-01 PROCEDURE — 99255 IP/OBS CONSLTJ NEW/EST HI 80: CPT | Mod: GC | Performed by: STUDENT IN AN ORGANIZED HEALTH CARE EDUCATION/TRAINING PROGRAM

## 2023-01-01 PROCEDURE — 80048 BASIC METABOLIC PNL TOTAL CA: CPT | Performed by: PHYSICIAN ASSISTANT

## 2023-01-01 PROCEDURE — 93321 DOPPLER ECHO F-UP/LMTD STD: CPT | Mod: 26 | Performed by: INTERNAL MEDICINE

## 2023-01-01 PROCEDURE — 83735 ASSAY OF MAGNESIUM: CPT | Performed by: STUDENT IN AN ORGANIZED HEALTH CARE EDUCATION/TRAINING PROGRAM

## 2023-01-01 PROCEDURE — 93971 EXTREMITY STUDY: CPT | Mod: 26 | Performed by: STUDENT IN AN ORGANIZED HEALTH CARE EDUCATION/TRAINING PROGRAM

## 2023-01-01 PROCEDURE — 83615 LACTATE (LD) (LDH) ENZYME: CPT | Performed by: INTERNAL MEDICINE

## 2023-01-01 PROCEDURE — 84450 TRANSFERASE (AST) (SGOT): CPT | Performed by: INTERNAL MEDICINE

## 2023-01-01 PROCEDURE — 82746 ASSAY OF FOLIC ACID SERUM: CPT | Performed by: STUDENT IN AN ORGANIZED HEALTH CARE EDUCATION/TRAINING PROGRAM

## 2023-01-01 PROCEDURE — 96375 TX/PRO/DX INJ NEW DRUG ADDON: CPT | Mod: 59

## 2023-01-01 PROCEDURE — 85025 COMPLETE CBC W/AUTO DIFF WBC: CPT | Performed by: INTERNAL MEDICINE

## 2023-01-01 PROCEDURE — 99232 SBSQ HOSP IP/OBS MODERATE 35: CPT | Mod: 25 | Performed by: INTERNAL MEDICINE

## 2023-01-01 PROCEDURE — 93926 LOWER EXTREMITY STUDY: CPT

## 2023-01-01 PROCEDURE — 99233 SBSQ HOSP IP/OBS HIGH 50: CPT | Mod: FS | Performed by: PHYSICIAN ASSISTANT

## 2023-01-01 PROCEDURE — 71045 X-RAY EXAM CHEST 1 VIEW: CPT | Mod: TC

## 2023-01-01 PROCEDURE — 84703 CHORIONIC GONADOTROPIN ASSAY: CPT | Performed by: INTERNAL MEDICINE

## 2023-01-01 PROCEDURE — 99255 IP/OBS CONSLTJ NEW/EST HI 80: CPT | Mod: GC | Performed by: INTERNAL MEDICINE

## 2023-01-01 PROCEDURE — 82610 CYSTATIN C: CPT | Performed by: PHYSICIAN ASSISTANT

## 2023-01-01 PROCEDURE — 75561 CARDIAC MRI FOR MORPH W/DYE: CPT

## 2023-01-01 PROCEDURE — 85610 PROTHROMBIN TIME: CPT | Performed by: INTERNAL MEDICINE

## 2023-01-01 PROCEDURE — 82248 BILIRUBIN DIRECT: CPT | Performed by: EMERGENCY MEDICINE

## 2023-01-01 PROCEDURE — 93016 CV STRESS TEST SUPVJ ONLY: CPT | Performed by: INTERNAL MEDICINE

## 2023-01-01 PROCEDURE — 99291 CRITICAL CARE FIRST HOUR: CPT | Performed by: STUDENT IN AN ORGANIZED HEALTH CARE EDUCATION/TRAINING PROGRAM

## 2023-01-01 PROCEDURE — 258N000002 HC RX IP 258 OP 250: Performed by: INTERNAL MEDICINE

## 2023-01-01 PROCEDURE — 86140 C-REACTIVE PROTEIN: CPT | Performed by: PHYSICIAN ASSISTANT

## 2023-01-01 PROCEDURE — 250N000009 HC RX 250: Performed by: NURSE ANESTHETIST, CERTIFIED REGISTERED

## 2023-01-01 PROCEDURE — 96375 TX/PRO/DX INJ NEW DRUG ADDON: CPT | Performed by: STUDENT IN AN ORGANIZED HEALTH CARE EDUCATION/TRAINING PROGRAM

## 2023-01-01 PROCEDURE — 83605 ASSAY OF LACTIC ACID: CPT | Performed by: STUDENT IN AN ORGANIZED HEALTH CARE EDUCATION/TRAINING PROGRAM

## 2023-01-01 PROCEDURE — 85014 HEMATOCRIT: CPT | Performed by: EMERGENCY MEDICINE

## 2023-01-01 PROCEDURE — G0463 HOSPITAL OUTPT CLINIC VISIT: HCPCS

## 2023-01-01 PROCEDURE — 85025 COMPLETE CBC W/AUTO DIFF WBC: CPT | Performed by: NURSE PRACTITIONER

## 2023-01-01 PROCEDURE — 84145 PROCALCITONIN (PCT): CPT | Performed by: STUDENT IN AN ORGANIZED HEALTH CARE EDUCATION/TRAINING PROGRAM

## 2023-01-01 PROCEDURE — 99255 IP/OBS CONSLTJ NEW/EST HI 80: CPT | Mod: FS | Performed by: PHYSICIAN ASSISTANT

## 2023-01-01 PROCEDURE — 370N000003 HC ANESTHESIA WARD SERVICE: Performed by: NURSE ANESTHETIST, CERTIFIED REGISTERED

## 2023-01-01 PROCEDURE — 999N000067 HC STATISTIC FAILED PERIPHERAL IV START

## 2023-01-01 PROCEDURE — 84478 ASSAY OF TRIGLYCERIDES: CPT | Performed by: STUDENT IN AN ORGANIZED HEALTH CARE EDUCATION/TRAINING PROGRAM

## 2023-01-01 PROCEDURE — 84460 ALANINE AMINO (ALT) (SGPT): CPT

## 2023-01-01 PROCEDURE — 250N000009 HC RX 250: Performed by: STUDENT IN AN ORGANIZED HEALTH CARE EDUCATION/TRAINING PROGRAM

## 2023-01-01 PROCEDURE — 99223 1ST HOSP IP/OBS HIGH 75: CPT | Performed by: INTERNAL MEDICINE

## 2023-01-01 PROCEDURE — 96366 THER/PROPH/DIAG IV INF ADDON: CPT

## 2023-01-01 PROCEDURE — 83540 ASSAY OF IRON: CPT | Performed by: PATHOLOGY

## 2023-01-01 PROCEDURE — 99222 1ST HOSP IP/OBS MODERATE 55: CPT | Mod: FS | Performed by: INTERNAL MEDICINE

## 2023-01-01 PROCEDURE — 255N000002 HC RX 255 OP 636: Mod: JZ | Performed by: RADIOLOGY

## 2023-01-01 PROCEDURE — 80069 RENAL FUNCTION PANEL: CPT | Performed by: PATHOLOGY

## 2023-01-01 PROCEDURE — 96367 TX/PROPH/DG ADDL SEQ IV INF: CPT | Performed by: STUDENT IN AN ORGANIZED HEALTH CARE EDUCATION/TRAINING PROGRAM

## 2023-01-01 PROCEDURE — 78452 HT MUSCLE IMAGE SPECT MULT: CPT

## 2023-01-01 PROCEDURE — 84132 ASSAY OF SERUM POTASSIUM: CPT | Performed by: STUDENT IN AN ORGANIZED HEALTH CARE EDUCATION/TRAINING PROGRAM

## 2023-01-01 PROCEDURE — 93306 TTE W/DOPPLER COMPLETE: CPT | Mod: 26 | Performed by: INTERNAL MEDICINE

## 2023-01-01 PROCEDURE — 99207 PR NO BILLABLE SERVICE THIS VISIT: CPT | Performed by: INTERNAL MEDICINE

## 2023-01-01 PROCEDURE — 99253 IP/OBS CNSLTJ NEW/EST LOW 45: CPT | Mod: GC | Performed by: INTERNAL MEDICINE

## 2023-01-01 PROCEDURE — 84132 ASSAY OF SERUM POTASSIUM: CPT | Performed by: HOSPITALIST

## 2023-01-01 PROCEDURE — 84145 PROCALCITONIN (PCT): CPT | Performed by: INTERNAL MEDICINE

## 2023-01-01 PROCEDURE — 93325 DOPPLER ECHO COLOR FLOW MAPG: CPT

## 2023-01-01 PROCEDURE — 85730 THROMBOPLASTIN TIME PARTIAL: CPT | Performed by: STUDENT IN AN ORGANIZED HEALTH CARE EDUCATION/TRAINING PROGRAM

## 2023-01-01 PROCEDURE — 84100 ASSAY OF PHOSPHORUS: CPT | Performed by: INTERNAL MEDICINE

## 2023-01-01 PROCEDURE — 36415 COLL VENOUS BLD VENIPUNCTURE: CPT | Performed by: HOSPITALIST

## 2023-01-01 PROCEDURE — 99214 OFFICE O/P EST MOD 30 MIN: CPT | Performed by: FAMILY MEDICINE

## 2023-01-01 PROCEDURE — 99233 SBSQ HOSP IP/OBS HIGH 50: CPT | Mod: GC | Performed by: STUDENT IN AN ORGANIZED HEALTH CARE EDUCATION/TRAINING PROGRAM

## 2023-01-01 PROCEDURE — 86140 C-REACTIVE PROTEIN: CPT | Performed by: INTERNAL MEDICINE

## 2023-01-01 PROCEDURE — 84443 ASSAY THYROID STIM HORMONE: CPT | Performed by: STUDENT IN AN ORGANIZED HEALTH CARE EDUCATION/TRAINING PROGRAM

## 2023-01-01 PROCEDURE — 99222 1ST HOSP IP/OBS MODERATE 55: CPT | Performed by: INTERNAL MEDICINE

## 2023-01-01 PROCEDURE — 85007 BL SMEAR W/DIFF WBC COUNT: CPT | Performed by: INTERNAL MEDICINE

## 2023-01-01 PROCEDURE — 83880 ASSAY OF NATRIURETIC PEPTIDE: CPT | Performed by: STUDENT IN AN ORGANIZED HEALTH CARE EDUCATION/TRAINING PROGRAM

## 2023-01-01 PROCEDURE — 99254 IP/OBS CNSLTJ NEW/EST MOD 60: CPT | Performed by: INTERNAL MEDICINE

## 2023-01-01 PROCEDURE — 86225 DNA ANTIBODY NATIVE: CPT | Performed by: EMERGENCY MEDICINE

## 2023-01-01 PROCEDURE — 255N000002 HC RX 255 OP 636: Performed by: HOSPITALIST

## 2023-01-01 PROCEDURE — 84450 TRANSFERASE (AST) (SGOT): CPT

## 2023-01-01 PROCEDURE — 99223 1ST HOSP IP/OBS HIGH 75: CPT | Performed by: STUDENT IN AN ORGANIZED HEALTH CARE EDUCATION/TRAINING PROGRAM

## 2023-01-01 PROCEDURE — 999N000147 HC STATISTIC PT IP EVAL DEFER

## 2023-01-01 PROCEDURE — 85027 COMPLETE CBC AUTOMATED: CPT | Performed by: FAMILY MEDICINE

## 2023-01-01 PROCEDURE — 82610 CYSTATIN C: CPT | Performed by: INTERNAL MEDICINE

## 2023-01-01 PROCEDURE — 86140 C-REACTIVE PROTEIN: CPT | Performed by: STUDENT IN AN ORGANIZED HEALTH CARE EDUCATION/TRAINING PROGRAM

## 2023-01-01 PROCEDURE — 93356 MYOCRD STRAIN IMG SPCKL TRCK: CPT | Performed by: INTERNAL MEDICINE

## 2023-01-01 PROCEDURE — 81001 URINALYSIS AUTO W/SCOPE: CPT | Performed by: EMERGENCY MEDICINE

## 2023-01-01 PROCEDURE — 96368 THER/DIAG CONCURRENT INF: CPT | Performed by: STUDENT IN AN ORGANIZED HEALTH CARE EDUCATION/TRAINING PROGRAM

## 2023-01-01 PROCEDURE — 83970 ASSAY OF PARATHORMONE: CPT | Performed by: PATHOLOGY

## 2023-01-01 PROCEDURE — 82565 ASSAY OF CREATININE: CPT | Performed by: STUDENT IN AN ORGANIZED HEALTH CARE EDUCATION/TRAINING PROGRAM

## 2023-01-01 PROCEDURE — 272N000116 HC CATH CR1

## 2023-01-01 PROCEDURE — 83605 ASSAY OF LACTIC ACID: CPT | Performed by: HOSPITALIST

## 2023-01-01 PROCEDURE — 70553 MRI BRAIN STEM W/O & W/DYE: CPT

## 2023-01-01 PROCEDURE — 96374 THER/PROPH/DIAG INJ IV PUSH: CPT

## 2023-01-01 PROCEDURE — 87040 BLOOD CULTURE FOR BACTERIA: CPT | Performed by: STUDENT IN AN ORGANIZED HEALTH CARE EDUCATION/TRAINING PROGRAM

## 2023-01-01 PROCEDURE — 99214 OFFICE O/P EST MOD 30 MIN: CPT | Mod: 95 | Performed by: INTERNAL MEDICINE

## 2023-01-01 PROCEDURE — 82803 BLOOD GASES ANY COMBINATION: CPT | Performed by: STUDENT IN AN ORGANIZED HEALTH CARE EDUCATION/TRAINING PROGRAM

## 2023-01-01 PROCEDURE — 99291 CRITICAL CARE FIRST HOUR: CPT | Mod: 25

## 2023-01-01 PROCEDURE — 272N000196 HC ACCESSORY CR5

## 2023-01-01 PROCEDURE — 93971 EXTREMITY STUDY: CPT | Mod: RT,XS

## 2023-01-01 PROCEDURE — G0463 HOSPITAL OUTPT CLINIC VISIT: HCPCS | Mod: 25,27

## 2023-01-01 PROCEDURE — 80048 BASIC METABOLIC PNL TOTAL CA: CPT | Mod: 91 | Performed by: FAMILY MEDICINE

## 2023-01-01 PROCEDURE — 82310 ASSAY OF CALCIUM: CPT | Performed by: INTERNAL MEDICINE

## 2023-01-01 PROCEDURE — 86850 RBC ANTIBODY SCREEN: CPT | Performed by: INTERNAL MEDICINE

## 2023-01-01 PROCEDURE — 36253 INS CATH REN ART 2ND+ UNILAT: CPT

## 2023-01-01 PROCEDURE — 96365 THER/PROPH/DIAG IV INF INIT: CPT | Mod: 59

## 2023-01-01 PROCEDURE — 86880 COOMBS TEST DIRECT: CPT | Performed by: STUDENT IN AN ORGANIZED HEALTH CARE EDUCATION/TRAINING PROGRAM

## 2023-01-01 PROCEDURE — 36410 VNPNXR 3YR/> PHY/QHP DX/THER: CPT | Performed by: NURSE ANESTHETIST, CERTIFIED REGISTERED

## 2023-01-01 PROCEDURE — 80053 COMPREHEN METABOLIC PANEL: CPT | Performed by: STUDENT IN AN ORGANIZED HEALTH CARE EDUCATION/TRAINING PROGRAM

## 2023-01-01 PROCEDURE — 99204 OFFICE O/P NEW MOD 45 MIN: CPT | Performed by: INTERNAL MEDICINE

## 2023-01-01 PROCEDURE — 87086 URINE CULTURE/COLONY COUNT: CPT | Performed by: EMERGENCY MEDICINE

## 2023-01-01 PROCEDURE — 250N000011 HC RX IP 250 OP 636: Performed by: EMERGENCY MEDICINE

## 2023-01-01 PROCEDURE — 93308 TTE F-UP OR LMTD: CPT | Mod: 26 | Performed by: INTERNAL MEDICINE

## 2023-01-01 PROCEDURE — 86140 C-REACTIVE PROTEIN: CPT | Performed by: EMERGENCY MEDICINE

## 2023-01-01 PROCEDURE — 272N000567 HC SHEATH CR4

## 2023-01-01 PROCEDURE — 99214 OFFICE O/P EST MOD 30 MIN: CPT | Performed by: INTERNAL MEDICINE

## 2023-01-01 PROCEDURE — 99152 MOD SED SAME PHYS/QHP 5/>YRS: CPT

## 2023-01-01 PROCEDURE — 83690 ASSAY OF LIPASE: CPT | Performed by: EMERGENCY MEDICINE

## 2023-01-01 PROCEDURE — 3E03305 INTRODUCTION OF OTHER ANTINEOPLASTIC INTO PERIPHERAL VEIN, PERCUTANEOUS APPROACH: ICD-10-PCS | Performed by: STUDENT IN AN ORGANIZED HEALTH CARE EDUCATION/TRAINING PROGRAM

## 2023-01-01 PROCEDURE — 85652 RBC SED RATE AUTOMATED: CPT | Performed by: EMERGENCY MEDICINE

## 2023-01-01 PROCEDURE — 81001 URINALYSIS AUTO W/SCOPE: CPT

## 2023-01-01 PROCEDURE — 80048 BASIC METABOLIC PNL TOTAL CA: CPT | Performed by: STUDENT IN AN ORGANIZED HEALTH CARE EDUCATION/TRAINING PROGRAM

## 2023-01-01 PROCEDURE — 85025 COMPLETE CBC W/AUTO DIFF WBC: CPT | Performed by: EMERGENCY MEDICINE

## 2023-01-01 PROCEDURE — 80053 COMPREHEN METABOLIC PANEL: CPT | Performed by: NURSE PRACTITIONER

## 2023-01-01 PROCEDURE — 85041 AUTOMATED RBC COUNT: CPT | Performed by: INTERNAL MEDICINE

## 2023-01-01 PROCEDURE — 83550 IRON BINDING TEST: CPT | Performed by: EMERGENCY MEDICINE

## 2023-01-01 PROCEDURE — 93971 EXTREMITY STUDY: CPT | Mod: 26 | Performed by: RADIOLOGY

## 2023-01-01 PROCEDURE — 85610 PROTHROMBIN TIME: CPT | Performed by: STUDENT IN AN ORGANIZED HEALTH CARE EDUCATION/TRAINING PROGRAM

## 2023-01-01 PROCEDURE — 87081 CULTURE SCREEN ONLY: CPT | Performed by: INTERNAL MEDICINE

## 2023-01-01 PROCEDURE — 80048 BASIC METABOLIC PNL TOTAL CA: CPT | Performed by: INTERNAL MEDICINE

## 2023-01-01 PROCEDURE — 85045 AUTOMATED RETICULOCYTE COUNT: CPT | Performed by: EMERGENCY MEDICINE

## 2023-01-01 PROCEDURE — 250N000013 HC RX MED GY IP 250 OP 250 PS 637: Performed by: FAMILY MEDICINE

## 2023-01-01 PROCEDURE — 99207 PR APP CREDIT; MD BILLING SHARED VISIT: CPT | Performed by: HOSPITALIST

## 2023-01-01 PROCEDURE — 99222 1ST HOSP IP/OBS MODERATE 55: CPT | Mod: GC | Performed by: INTERNAL MEDICINE

## 2023-01-01 PROCEDURE — 93971 EXTREMITY STUDY: CPT | Mod: RT

## 2023-01-01 PROCEDURE — 74177 CT ABD & PELVIS W/CONTRAST: CPT | Mod: TC

## 2023-01-01 PROCEDURE — 36415 COLL VENOUS BLD VENIPUNCTURE: CPT | Performed by: FAMILY MEDICINE

## 2023-01-01 PROCEDURE — 85025 COMPLETE CBC W/AUTO DIFF WBC: CPT

## 2023-01-01 PROCEDURE — 250N000011 HC RX IP 250 OP 636: Mod: JZ | Performed by: CLINICAL NURSE SPECIALIST

## 2023-01-01 PROCEDURE — 84703 CHORIONIC GONADOTROPIN ASSAY: CPT

## 2023-01-01 PROCEDURE — 250N000011 HC RX IP 250 OP 636: Performed by: FAMILY MEDICINE

## 2023-01-01 PROCEDURE — L1930 AFO PLASTIC: HCPCS

## 2023-01-01 PROCEDURE — 84156 ASSAY OF PROTEIN URINE: CPT | Performed by: EMERGENCY MEDICINE

## 2023-01-01 PROCEDURE — 80202 ASSAY OF VANCOMYCIN: CPT | Performed by: INTERNAL MEDICINE

## 2023-01-01 PROCEDURE — 83615 LACTATE (LD) (LDH) ENZYME: CPT | Performed by: STUDENT IN AN ORGANIZED HEALTH CARE EDUCATION/TRAINING PROGRAM

## 2023-01-01 PROCEDURE — 80069 RENAL FUNCTION PANEL: CPT

## 2023-01-01 PROCEDURE — 70553 MRI BRAIN STEM W/O & W/DYE: CPT | Mod: 26 | Performed by: RADIOLOGY

## 2023-01-01 PROCEDURE — 94640 AIRWAY INHALATION TREATMENT: CPT | Mod: 76

## 2023-01-01 PROCEDURE — 36430 TRANSFUSION BLD/BLD COMPNT: CPT

## 2023-01-01 PROCEDURE — 999N000285 HC STATISTIC VASC ACCESS LAB DRAW WITH PIV START

## 2023-01-01 PROCEDURE — 255N000002 HC RX 255 OP 636: Mod: JZ | Performed by: STUDENT IN AN ORGANIZED HEALTH CARE EDUCATION/TRAINING PROGRAM

## 2023-01-01 PROCEDURE — 82248 BILIRUBIN DIRECT: CPT | Performed by: INTERNAL MEDICINE

## 2023-01-01 PROCEDURE — 85025 COMPLETE CBC W/AUTO DIFF WBC: CPT | Performed by: PATHOLOGY

## 2023-01-01 PROCEDURE — 250N000011 HC RX IP 250 OP 636: Mod: JZ | Performed by: EMERGENCY MEDICINE

## 2023-01-01 PROCEDURE — 93356 MYOCRD STRAIN IMG SPCKL TRCK: CPT

## 2023-01-01 RX ORDER — POTASSIUM CHLORIDE 750 MG/1
40 TABLET, EXTENDED RELEASE ORAL ONCE
Status: COMPLETED | OUTPATIENT
Start: 2023-01-01 | End: 2023-01-01

## 2023-01-01 RX ORDER — FUROSEMIDE 10 MG/ML
40 INJECTION INTRAMUSCULAR; INTRAVENOUS DAILY
Status: DISCONTINUED | OUTPATIENT
Start: 2023-01-01 | End: 2023-01-01

## 2023-01-01 RX ORDER — HYDROMORPHONE HYDROCHLORIDE 1 MG/ML
0.3 INJECTION, SOLUTION INTRAMUSCULAR; INTRAVENOUS; SUBCUTANEOUS EVERY 4 HOURS PRN
Status: DISCONTINUED | OUTPATIENT
Start: 2023-01-01 | End: 2023-01-01 | Stop reason: HOSPADM

## 2023-01-01 RX ORDER — MORPHINE SULFATE 2 MG/ML
2 INJECTION, SOLUTION INTRAMUSCULAR; INTRAVENOUS ONCE
Status: COMPLETED | OUTPATIENT
Start: 2023-01-01 | End: 2023-01-01

## 2023-01-01 RX ORDER — ONDANSETRON 4 MG/1
4 TABLET, ORALLY DISINTEGRATING ORAL EVERY 6 HOURS PRN
Status: DISCONTINUED | OUTPATIENT
Start: 2023-01-01 | End: 2023-01-01

## 2023-01-01 RX ORDER — NALOXONE HYDROCHLORIDE 0.4 MG/ML
0.2 INJECTION, SOLUTION INTRAMUSCULAR; INTRAVENOUS; SUBCUTANEOUS
Status: DISCONTINUED | OUTPATIENT
Start: 2023-01-01 | End: 2023-01-01 | Stop reason: HOSPADM

## 2023-01-01 RX ORDER — PROCHLORPERAZINE MALEATE 10 MG
10 TABLET ORAL EVERY 6 HOURS PRN
Status: DISCONTINUED | OUTPATIENT
Start: 2023-01-01 | End: 2023-01-01 | Stop reason: HOSPADM

## 2023-01-01 RX ORDER — MYCOPHENOLATE MOFETIL 500 MG/1
1500 TABLET ORAL 2 TIMES DAILY
Status: DISCONTINUED | OUTPATIENT
Start: 2023-01-01 | End: 2023-01-01

## 2023-01-01 RX ORDER — VANCOMYCIN/0.9 % SOD CHLORIDE 750MG/.15L
750 PLASTIC BAG, INJECTION (ML) INTRAVENOUS EVERY 24 HOURS
Status: DISCONTINUED | OUTPATIENT
Start: 2023-01-01 | End: 2023-01-01

## 2023-01-01 RX ORDER — REGADENOSON 0.08 MG/ML
0.4 INJECTION, SOLUTION INTRAVENOUS ONCE
Status: DISCONTINUED | OUTPATIENT
Start: 2023-01-01 | End: 2023-01-01

## 2023-01-01 RX ORDER — FOLIC ACID 1 MG/1
1 TABLET ORAL DAILY
Status: DISCONTINUED | OUTPATIENT
Start: 2023-01-01 | End: 2023-01-01 | Stop reason: HOSPADM

## 2023-01-01 RX ORDER — LISINOPRIL 20 MG/1
20 TABLET ORAL DAILY
Status: ON HOLD | COMMUNITY
End: 2023-01-01

## 2023-01-01 RX ORDER — SULFAMETHOXAZOLE AND TRIMETHOPRIM 400; 80 MG/1; MG/1
1 TABLET ORAL DAILY
Status: ON HOLD | DISCHARGE
Start: 2023-01-01 | End: 2023-01-01

## 2023-01-01 RX ORDER — AMOXICILLIN 250 MG
1 CAPSULE ORAL 2 TIMES DAILY PRN
Status: DISCONTINUED | OUTPATIENT
Start: 2023-01-01 | End: 2023-01-01 | Stop reason: HOSPADM

## 2023-01-01 RX ORDER — ONDANSETRON 2 MG/ML
8 INJECTION INTRAMUSCULAR; INTRAVENOUS ONCE
Status: COMPLETED | OUTPATIENT
Start: 2023-01-01 | End: 2023-01-01

## 2023-01-01 RX ORDER — SALIVA STIMULANT COMB. NO.3
2 SPRAY, NON-AEROSOL (ML) MUCOUS MEMBRANE 4 TIMES DAILY
Status: ON HOLD | DISCHARGE
Start: 2023-01-01 | End: 2023-01-01

## 2023-01-01 RX ORDER — NALOXONE HYDROCHLORIDE 0.4 MG/ML
0.4 INJECTION, SOLUTION INTRAMUSCULAR; INTRAVENOUS; SUBCUTANEOUS
Status: DISCONTINUED | OUTPATIENT
Start: 2023-01-01 | End: 2023-01-01 | Stop reason: HOSPADM

## 2023-01-01 RX ORDER — SPIRONOLACTONE 25 MG/1
25 TABLET ORAL DAILY
Status: DISCONTINUED | OUTPATIENT
Start: 2023-01-01 | End: 2023-01-01 | Stop reason: HOSPADM

## 2023-01-01 RX ORDER — POTASSIUM CHLORIDE 1500 MG/1
20 TABLET, EXTENDED RELEASE ORAL 3 TIMES DAILY
Status: DISCONTINUED | OUTPATIENT
Start: 2023-01-01 | End: 2023-01-01

## 2023-01-01 RX ORDER — HEPARIN SODIUM (PORCINE) LOCK FLUSH IV SOLN 100 UNIT/ML 100 UNIT/ML
5 SOLUTION INTRAVENOUS
Status: CANCELLED | OUTPATIENT
Start: 2023-01-01

## 2023-01-01 RX ORDER — EPINEPHRINE 1 MG/ML
0.3 INJECTION, SOLUTION INTRAMUSCULAR; SUBCUTANEOUS EVERY 5 MIN PRN
Status: CANCELLED | OUTPATIENT
Start: 2023-01-01

## 2023-01-01 RX ORDER — POTASSIUM CHLORIDE 750 MG/1
20 TABLET, EXTENDED RELEASE ORAL 2 TIMES DAILY
Status: DISCONTINUED | OUTPATIENT
Start: 2023-01-01 | End: 2023-01-01 | Stop reason: HOSPADM

## 2023-01-01 RX ORDER — ISOSORBIDE MONONITRATE 60 MG/1
60 TABLET, EXTENDED RELEASE ORAL DAILY
Status: DISCONTINUED | OUTPATIENT
Start: 2023-01-01 | End: 2023-01-01 | Stop reason: HOSPADM

## 2023-01-01 RX ORDER — BISACODYL 10 MG
10 SUPPOSITORY, RECTAL RECTAL DAILY PRN
Status: DISCONTINUED | OUTPATIENT
Start: 2023-01-01 | End: 2023-01-01 | Stop reason: HOSPADM

## 2023-01-01 RX ORDER — METOPROLOL SUCCINATE 50 MG/1
50 TABLET, EXTENDED RELEASE ORAL 2 TIMES DAILY
Status: DISCONTINUED | OUTPATIENT
Start: 2023-01-01 | End: 2023-01-01

## 2023-01-01 RX ORDER — ALBUTEROL SULFATE 0.83 MG/ML
2.5 SOLUTION RESPIRATORY (INHALATION)
Status: CANCELLED | OUTPATIENT
Start: 2023-01-01

## 2023-01-01 RX ORDER — FENTANYL CITRATE 50 UG/ML
25-50 INJECTION, SOLUTION INTRAMUSCULAR; INTRAVENOUS EVERY 5 MIN PRN
Status: DISCONTINUED | OUTPATIENT
Start: 2023-01-01 | End: 2023-01-01 | Stop reason: HOSPADM

## 2023-01-01 RX ORDER — GADOBUTROL 604.72 MG/ML
7.5 INJECTION INTRAVENOUS ONCE
Status: COMPLETED | OUTPATIENT
Start: 2023-01-01 | End: 2023-01-01

## 2023-01-01 RX ORDER — MEPERIDINE HYDROCHLORIDE 25 MG/ML
25 INJECTION INTRAMUSCULAR; INTRAVENOUS; SUBCUTANEOUS EVERY 30 MIN PRN
Status: CANCELLED | OUTPATIENT
Start: 2023-01-01

## 2023-01-01 RX ORDER — SALIVA STIMULANT COMB. NO.3
2 SPRAY, NON-AEROSOL (ML) MUCOUS MEMBRANE 4 TIMES DAILY
Status: DISCONTINUED | OUTPATIENT
Start: 2023-01-01 | End: 2023-01-01 | Stop reason: HOSPADM

## 2023-01-01 RX ORDER — AMLODIPINE BESYLATE 10 MG/1
10 TABLET ORAL DAILY
Status: DISCONTINUED | OUTPATIENT
Start: 2023-01-01 | End: 2023-01-01 | Stop reason: HOSPADM

## 2023-01-01 RX ORDER — SPIRONOLACTONE 25 MG/1
25 TABLET ORAL DAILY
Status: ON HOLD | DISCHARGE
Start: 2023-01-01 | End: 2023-01-01

## 2023-01-01 RX ORDER — TORSEMIDE 20 MG/1
20 TABLET ORAL DAILY
Qty: 30 TABLET | Refills: 0 | Status: CANCELLED | OUTPATIENT
Start: 2023-01-01

## 2023-01-01 RX ORDER — PANTOPRAZOLE SODIUM 40 MG/1
40 TABLET, DELAYED RELEASE ORAL
Qty: 30 TABLET | Refills: 3 | Status: ON HOLD | OUTPATIENT
Start: 2023-01-01 | End: 2024-01-01

## 2023-01-01 RX ORDER — POLYETHYLENE GLYCOL 3350 17 G/17G
17 POWDER, FOR SOLUTION ORAL DAILY PRN
Status: DISCONTINUED | OUTPATIENT
Start: 2023-01-01 | End: 2023-01-01 | Stop reason: HOSPADM

## 2023-01-01 RX ORDER — ISOSORBIDE MONONITRATE 60 MG/1
60 TABLET, EXTENDED RELEASE ORAL DAILY
Status: ON HOLD | DISCHARGE
Start: 2023-01-01 | End: 2023-01-01

## 2023-01-01 RX ORDER — SULFAMETHOXAZOLE AND TRIMETHOPRIM 400; 80 MG/1; MG/1
1 TABLET ORAL
Status: DISCONTINUED | OUTPATIENT
Start: 2023-01-01 | End: 2023-01-01 | Stop reason: HOSPADM

## 2023-01-01 RX ORDER — DIPHENHYDRAMINE HYDROCHLORIDE 50 MG/ML
50 INJECTION INTRAMUSCULAR; INTRAVENOUS
Status: CANCELLED
Start: 2023-01-01

## 2023-01-01 RX ORDER — SODIUM BICARBONATE 650 MG/1
650 TABLET ORAL 3 TIMES DAILY
Qty: 90 TABLET | Refills: 3 | Status: ON HOLD | OUTPATIENT
Start: 2023-01-01 | End: 2024-01-01

## 2023-01-01 RX ORDER — ONDANSETRON 2 MG/ML
8 INJECTION INTRAMUSCULAR; INTRAVENOUS ONCE
Status: CANCELLED | OUTPATIENT
Start: 2023-01-01

## 2023-01-01 RX ORDER — ALBUTEROL SULFATE 90 UG/1
1-2 AEROSOL, METERED RESPIRATORY (INHALATION)
Status: CANCELLED
Start: 2023-01-01

## 2023-01-01 RX ORDER — HYDROMORPHONE HYDROCHLORIDE 1 MG/ML
0.5 INJECTION, SOLUTION INTRAMUSCULAR; INTRAVENOUS; SUBCUTANEOUS ONCE
Status: COMPLETED | OUTPATIENT
Start: 2023-01-01 | End: 2023-01-01

## 2023-01-01 RX ORDER — PROCHLORPERAZINE 25 MG
25 SUPPOSITORY, RECTAL RECTAL EVERY 12 HOURS PRN
Status: DISCONTINUED | OUTPATIENT
Start: 2023-01-01 | End: 2023-01-01 | Stop reason: HOSPADM

## 2023-01-01 RX ORDER — FENTANYL CITRATE 50 UG/ML
50 INJECTION, SOLUTION INTRAMUSCULAR; INTRAVENOUS ONCE
Status: COMPLETED | OUTPATIENT
Start: 2023-01-01 | End: 2023-01-01

## 2023-01-01 RX ORDER — FLUMAZENIL 0.1 MG/ML
0.2 INJECTION, SOLUTION INTRAVENOUS
Status: DISCONTINUED | OUTPATIENT
Start: 2023-01-01 | End: 2023-01-01 | Stop reason: HOSPADM

## 2023-01-01 RX ORDER — HEPARIN SODIUM,PORCINE 10 UNIT/ML
5-20 VIAL (ML) INTRAVENOUS DAILY PRN
Status: CANCELLED | OUTPATIENT
Start: 2023-01-01

## 2023-01-01 RX ORDER — IOPAMIDOL 755 MG/ML
500 INJECTION, SOLUTION INTRAVASCULAR ONCE
Status: COMPLETED | OUTPATIENT
Start: 2023-01-01 | End: 2023-01-01

## 2023-01-01 RX ORDER — MYCOPHENOLATE MOFETIL 500 MG/1
1500 TABLET ORAL 2 TIMES DAILY
Status: ON HOLD | DISCHARGE
Start: 2023-01-01 | End: 2023-01-01

## 2023-01-01 RX ORDER — POTASSIUM CHLORIDE 1500 MG/1
20 TABLET, EXTENDED RELEASE ORAL 2 TIMES DAILY
Status: ON HOLD | COMMUNITY
End: 2023-01-01

## 2023-01-01 RX ORDER — FOLIC ACID 1 MG/1
1 TABLET ORAL DAILY
Qty: 30 TABLET | Refills: 3 | Status: ON HOLD | OUTPATIENT
Start: 2023-01-01 | End: 2024-01-01

## 2023-01-01 RX ORDER — HYDROXYCHLOROQUINE SULFATE 200 MG/1
200 TABLET, FILM COATED ORAL DAILY
Qty: 90 TABLET | Refills: 3 | Status: ON HOLD | OUTPATIENT
Start: 2023-01-01 | End: 2023-01-01

## 2023-01-01 RX ORDER — HYDRALAZINE HYDROCHLORIDE 50 MG/1
50 TABLET, FILM COATED ORAL EVERY 8 HOURS
Status: ON HOLD | DISCHARGE
Start: 2023-01-01 | End: 2023-01-01

## 2023-01-01 RX ORDER — DEXTROSE MONOHYDRATE 25 G/50ML
25-50 INJECTION, SOLUTION INTRAVENOUS
Status: DISCONTINUED | OUTPATIENT
Start: 2023-01-01 | End: 2023-01-01 | Stop reason: HOSPADM

## 2023-01-01 RX ORDER — SODIUM BICARBONATE 650 MG/1
650 TABLET ORAL 2 TIMES DAILY
Status: DISCONTINUED | OUTPATIENT
Start: 2023-01-01 | End: 2023-01-01 | Stop reason: HOSPADM

## 2023-01-01 RX ORDER — FUROSEMIDE 10 MG/ML
20 INJECTION INTRAMUSCULAR; INTRAVENOUS ONCE
Status: DISCONTINUED | OUTPATIENT
Start: 2023-01-01 | End: 2023-01-01

## 2023-01-01 RX ORDER — PANTOPRAZOLE SODIUM 40 MG/1
40 TABLET, DELAYED RELEASE ORAL
Status: ON HOLD | DISCHARGE
Start: 2023-01-01 | End: 2023-01-01

## 2023-01-01 RX ORDER — ACETAMINOPHEN 325 MG/1
650 TABLET ORAL EVERY 4 HOURS PRN
DISCHARGE
Start: 2023-01-01

## 2023-01-01 RX ORDER — MYCOPHENOLATE MOFETIL 500 MG/1
1500 TABLET ORAL 2 TIMES DAILY
Qty: 180 TABLET | Refills: 0 | Status: ON HOLD | OUTPATIENT
Start: 2023-01-01 | End: 2023-01-01

## 2023-01-01 RX ORDER — POTASSIUM CHLORIDE 1500 MG/1
20 TABLET, EXTENDED RELEASE ORAL 3 TIMES DAILY
Qty: 90 TABLET | Refills: 1 | Status: ON HOLD | OUTPATIENT
Start: 2023-01-01 | End: 2023-01-01

## 2023-01-01 RX ORDER — PIPERACILLIN SODIUM, TAZOBACTAM SODIUM 3; .375 G/15ML; G/15ML
3.38 INJECTION, POWDER, LYOPHILIZED, FOR SOLUTION INTRAVENOUS EVERY 6 HOURS
Status: DISCONTINUED | OUTPATIENT
Start: 2023-01-01 | End: 2023-01-01 | Stop reason: HOSPADM

## 2023-01-01 RX ORDER — LIDOCAINE HYDROCHLORIDE 10 MG/ML
INJECTION, SOLUTION EPIDURAL; INFILTRATION; INTRACAUDAL; PERINEURAL PRN
Status: DISCONTINUED | OUTPATIENT
Start: 2023-01-01 | End: 2023-01-01

## 2023-01-01 RX ORDER — FERROUS SULFATE 325(65) MG
325 TABLET ORAL
Status: DISCONTINUED | OUTPATIENT
Start: 2023-01-01 | End: 2023-01-01 | Stop reason: HOSPADM

## 2023-01-01 RX ORDER — POTASSIUM CHLORIDE 1500 MG/1
40 TABLET, EXTENDED RELEASE ORAL ONCE
Status: COMPLETED | OUTPATIENT
Start: 2023-01-01 | End: 2023-01-01

## 2023-01-01 RX ORDER — ISOSORBIDE MONONITRATE 60 MG/1
60 TABLET, EXTENDED RELEASE ORAL DAILY
Status: DISCONTINUED | OUTPATIENT
Start: 2023-01-01 | End: 2023-01-01

## 2023-01-01 RX ORDER — HEPARIN SODIUM 200 [USP'U]/100ML
1 INJECTION, SOLUTION INTRAVENOUS CONTINUOUS PRN
Status: DISCONTINUED | OUTPATIENT
Start: 2023-01-01 | End: 2023-01-01 | Stop reason: HOSPADM

## 2023-01-01 RX ORDER — ONDANSETRON 2 MG/ML
4 INJECTION INTRAMUSCULAR; INTRAVENOUS EVERY 30 MIN PRN
Status: DISCONTINUED | OUTPATIENT
Start: 2023-01-01 | End: 2023-01-01 | Stop reason: HOSPADM

## 2023-01-01 RX ORDER — CARVEDILOL 25 MG/1
25 TABLET ORAL 2 TIMES DAILY WITH MEALS
Status: DISCONTINUED | OUTPATIENT
Start: 2023-01-01 | End: 2023-01-01

## 2023-01-01 RX ORDER — HYDRALAZINE HYDROCHLORIDE 25 MG/1
50 TABLET, FILM COATED ORAL EVERY 8 HOURS
Status: DISCONTINUED | OUTPATIENT
Start: 2023-01-01 | End: 2023-01-01 | Stop reason: HOSPADM

## 2023-01-01 RX ORDER — CLONIDINE HYDROCHLORIDE 0.1 MG/1
0.1 TABLET ORAL 2 TIMES DAILY
Qty: 60 TABLET | Refills: 0 | Status: ON HOLD | OUTPATIENT
Start: 2023-01-01 | End: 2023-01-01

## 2023-01-01 RX ORDER — PREDNISONE 10 MG/1
30 TABLET ORAL DAILY
Status: DISCONTINUED | OUTPATIENT
Start: 2023-01-01 | End: 2023-01-01 | Stop reason: HOSPADM

## 2023-01-01 RX ORDER — ONDANSETRON 2 MG/ML
4 INJECTION INTRAMUSCULAR; INTRAVENOUS ONCE
Status: COMPLETED | OUTPATIENT
Start: 2023-01-01 | End: 2023-01-01

## 2023-01-01 RX ORDER — CLONIDINE HYDROCHLORIDE 0.1 MG/1
0.1 TABLET ORAL ONCE
Status: COMPLETED | OUTPATIENT
Start: 2023-01-01 | End: 2023-01-01

## 2023-01-01 RX ORDER — PANTOPRAZOLE SODIUM 40 MG/1
40 TABLET, DELAYED RELEASE ORAL
Status: DISCONTINUED | OUTPATIENT
Start: 2023-01-01 | End: 2023-01-01 | Stop reason: HOSPADM

## 2023-01-01 RX ORDER — NITROGLYCERIN 20 MG/100ML
10-200 INJECTION INTRAVENOUS CONTINUOUS
Status: DISCONTINUED | OUTPATIENT
Start: 2023-01-01 | End: 2023-01-01 | Stop reason: HOSPADM

## 2023-01-01 RX ORDER — VANCOMYCIN HYDROCHLORIDE 125 MG/1
125 CAPSULE ORAL 2 TIMES DAILY
Qty: 60 CAPSULE | Refills: 0 | Status: SHIPPED | OUTPATIENT
Start: 2023-01-01 | End: 2023-01-01

## 2023-01-01 RX ORDER — MYCOPHENOLATE MOFETIL 500 MG/1
1500 TABLET ORAL 2 TIMES DAILY
Status: DISCONTINUED | OUTPATIENT
Start: 2023-01-01 | End: 2023-01-01 | Stop reason: HOSPADM

## 2023-01-01 RX ORDER — CLONIDINE HYDROCHLORIDE 0.1 MG/1
0.1 TABLET ORAL EVERY 4 HOURS PRN
Status: DISCONTINUED | OUTPATIENT
Start: 2023-01-01 | End: 2023-01-01

## 2023-01-01 RX ORDER — OXYCODONE HYDROCHLORIDE 5 MG/1
5 TABLET ORAL EVERY 4 HOURS PRN
Status: DISCONTINUED | OUTPATIENT
Start: 2023-01-01 | End: 2023-01-01 | Stop reason: HOSPADM

## 2023-01-01 RX ORDER — METHYLPREDNISOLONE SODIUM SUCCINATE 125 MG/2ML
125 INJECTION, POWDER, LYOPHILIZED, FOR SOLUTION INTRAMUSCULAR; INTRAVENOUS
Status: CANCELLED
Start: 2023-01-01

## 2023-01-01 RX ORDER — AMINOPHYLLINE 25 MG/ML
50-100 INJECTION, SOLUTION INTRAVENOUS
Status: DISCONTINUED | OUTPATIENT
Start: 2023-01-01 | End: 2023-01-01

## 2023-01-01 RX ORDER — HYDROMORPHONE HCL IN WATER/PF 6 MG/30 ML
0.2 PATIENT CONTROLLED ANALGESIA SYRINGE INTRAVENOUS
Status: DISCONTINUED | OUTPATIENT
Start: 2023-01-01 | End: 2023-01-01 | Stop reason: HOSPADM

## 2023-01-01 RX ORDER — IOPAMIDOL 612 MG/ML
100 INJECTION, SOLUTION INTRAVASCULAR ONCE
Status: COMPLETED | OUTPATIENT
Start: 2023-01-01 | End: 2023-01-01

## 2023-01-01 RX ORDER — ONDANSETRON 4 MG/1
4 TABLET, ORALLY DISINTEGRATING ORAL EVERY 8 HOURS PRN
Status: DISCONTINUED | OUTPATIENT
Start: 2023-01-01 | End: 2023-01-01

## 2023-01-01 RX ORDER — IOPAMIDOL 755 MG/ML
62 INJECTION, SOLUTION INTRAVASCULAR ONCE
Status: COMPLETED | OUTPATIENT
Start: 2023-01-01 | End: 2023-01-01

## 2023-01-01 RX ORDER — PIPERACILLIN SODIUM, TAZOBACTAM SODIUM 3; .375 G/15ML; G/15ML
3.38 INJECTION, POWDER, LYOPHILIZED, FOR SOLUTION INTRAVENOUS EVERY 6 HOURS
Status: DISCONTINUED | OUTPATIENT
Start: 2023-01-01 | End: 2023-01-01

## 2023-01-01 RX ORDER — CARVEDILOL 25 MG/1
25 TABLET ORAL 2 TIMES DAILY WITH MEALS
Status: ON HOLD | DISCHARGE
Start: 2023-01-01 | End: 2023-01-01

## 2023-01-01 RX ORDER — HYDRALAZINE HYDROCHLORIDE 25 MG/1
25 TABLET, FILM COATED ORAL EVERY 8 HOURS SCHEDULED
Status: DISCONTINUED | OUTPATIENT
Start: 2023-01-01 | End: 2023-01-01

## 2023-01-01 RX ORDER — ALBUTEROL SULFATE 90 UG/1
2 AEROSOL, METERED RESPIRATORY (INHALATION) EVERY 5 MIN PRN
Status: DISCONTINUED | OUTPATIENT
Start: 2023-01-01 | End: 2023-01-01

## 2023-01-01 RX ORDER — CEFEPIME HYDROCHLORIDE 2 G/1
2 INJECTION, POWDER, FOR SOLUTION INTRAVENOUS ONCE
Status: COMPLETED | OUTPATIENT
Start: 2023-01-01 | End: 2023-01-01

## 2023-01-01 RX ORDER — FUROSEMIDE 10 MG/ML
40 INJECTION INTRAMUSCULAR; INTRAVENOUS ONCE
Qty: 4 ML | Refills: 0 | Status: COMPLETED | OUTPATIENT
Start: 2023-01-01 | End: 2023-01-01

## 2023-01-01 RX ORDER — HEPARIN SODIUM 5000 [USP'U]/.5ML
5000 INJECTION, SOLUTION INTRAVENOUS; SUBCUTANEOUS EVERY 8 HOURS
Status: DISCONTINUED | OUTPATIENT
Start: 2023-01-01 | End: 2023-01-01 | Stop reason: HOSPADM

## 2023-01-01 RX ORDER — LISINOPRIL 10 MG/1
10 TABLET ORAL ONCE
Status: COMPLETED | OUTPATIENT
Start: 2023-01-01 | End: 2023-01-01

## 2023-01-01 RX ORDER — METOPROLOL SUCCINATE 50 MG/1
50 TABLET, EXTENDED RELEASE ORAL DAILY
Status: DISCONTINUED | OUTPATIENT
Start: 2023-01-01 | End: 2023-01-01

## 2023-01-01 RX ORDER — VANCOMYCIN HYDROCHLORIDE 125 MG/1
125 CAPSULE ORAL 2 TIMES DAILY
Qty: 6 CAPSULE | Refills: 0 | Status: SHIPPED | OUTPATIENT
Start: 2023-01-01 | End: 2023-01-01

## 2023-01-01 RX ORDER — FUROSEMIDE 10 MG/ML
40 INJECTION INTRAMUSCULAR; INTRAVENOUS ONCE
Status: COMPLETED | OUTPATIENT
Start: 2023-01-01 | End: 2023-01-01

## 2023-01-01 RX ORDER — LISINOPRIL 10 MG/1
10 TABLET ORAL DAILY
Qty: 30 TABLET | Refills: 0 | Status: SHIPPED | OUTPATIENT
Start: 2023-01-01 | End: 2023-01-01

## 2023-01-01 RX ORDER — LISINOPRIL 40 MG/1
40 TABLET ORAL DAILY
Status: ON HOLD | DISCHARGE
Start: 2023-01-01 | End: 2023-01-01

## 2023-01-01 RX ORDER — FUROSEMIDE 10 MG/ML
160 INJECTION INTRAMUSCULAR; INTRAVENOUS ONCE
Status: DISCONTINUED | OUTPATIENT
Start: 2023-01-01 | End: 2023-01-01

## 2023-01-01 RX ORDER — SULFAMETHOXAZOLE AND TRIMETHOPRIM 400; 80 MG/1; MG/1
1 TABLET ORAL DAILY
Qty: 30 TABLET | Refills: 0 | Status: ON HOLD | OUTPATIENT
Start: 2023-01-01 | End: 2023-01-01

## 2023-01-01 RX ORDER — SULFAMETHOXAZOLE AND TRIMETHOPRIM 400; 80 MG/1; MG/1
1 TABLET ORAL DAILY
Status: DISCONTINUED | OUTPATIENT
Start: 2023-01-01 | End: 2023-01-01 | Stop reason: HOSPADM

## 2023-01-01 RX ORDER — ACETAMINOPHEN 325 MG/1
650 TABLET ORAL EVERY 4 HOURS PRN
Status: DISCONTINUED | OUTPATIENT
Start: 2023-01-01 | End: 2023-01-01

## 2023-01-01 RX ORDER — CAFFEINE CITRATE 20 MG/ML
60 SOLUTION INTRAVENOUS
Status: DISCONTINUED | OUTPATIENT
Start: 2023-01-01 | End: 2023-01-01

## 2023-01-01 RX ORDER — EPINEPHRINE 1 MG/ML
0.3 INJECTION, SOLUTION, CONCENTRATE INTRAVENOUS EVERY 5 MIN PRN
Status: CANCELLED | OUTPATIENT
Start: 2023-01-01

## 2023-01-01 RX ORDER — AMOXICILLIN 250 MG
2 CAPSULE ORAL 2 TIMES DAILY PRN
Status: DISCONTINUED | OUTPATIENT
Start: 2023-01-01 | End: 2023-01-01 | Stop reason: HOSPADM

## 2023-01-01 RX ORDER — FERROUS SULFATE 325(65) MG
325 TABLET ORAL
Qty: 100 TABLET | Refills: 3 | Status: ON HOLD | OUTPATIENT
Start: 2023-01-01 | End: 2024-01-01

## 2023-01-01 RX ORDER — PIPERACILLIN SODIUM, TAZOBACTAM SODIUM 3; .375 G/15ML; G/15ML
3.38 INJECTION, POWDER, LYOPHILIZED, FOR SOLUTION INTRAVENOUS EVERY 6 HOURS
Status: ON HOLD | DISCHARGE
Start: 2023-01-01 | End: 2023-01-01

## 2023-01-01 RX ORDER — TORSEMIDE 20 MG/1
20 TABLET ORAL DAILY
Status: DISCONTINUED | OUTPATIENT
Start: 2023-01-01 | End: 2023-01-01

## 2023-01-01 RX ORDER — IPRATROPIUM BROMIDE AND ALBUTEROL SULFATE 2.5; .5 MG/3ML; MG/3ML
3 SOLUTION RESPIRATORY (INHALATION) ONCE
Status: COMPLETED | OUTPATIENT
Start: 2023-01-01 | End: 2023-01-01

## 2023-01-01 RX ORDER — VANCOMYCIN HYDROCHLORIDE 125 MG/1
125 CAPSULE ORAL 2 TIMES DAILY
Status: DISCONTINUED | OUTPATIENT
Start: 2023-01-01 | End: 2023-01-01 | Stop reason: HOSPADM

## 2023-01-01 RX ORDER — MAGNESIUM SULFATE HEPTAHYDRATE 40 MG/ML
2 INJECTION, SOLUTION INTRAVENOUS ONCE
Status: COMPLETED | OUTPATIENT
Start: 2023-01-01 | End: 2023-01-01

## 2023-01-01 RX ORDER — CEFEPIME HYDROCHLORIDE 2 G/1
2 INJECTION, POWDER, FOR SOLUTION INTRAVENOUS ONCE
Status: DISCONTINUED | OUTPATIENT
Start: 2023-01-01 | End: 2023-01-01

## 2023-01-01 RX ORDER — HYDRALAZINE HYDROCHLORIDE 25 MG/1
50 TABLET, FILM COATED ORAL EVERY 8 HOURS SCHEDULED
Status: DISCONTINUED | OUTPATIENT
Start: 2023-01-01 | End: 2023-01-01 | Stop reason: HOSPADM

## 2023-01-01 RX ORDER — CARVEDILOL 25 MG/1
25 TABLET ORAL 2 TIMES DAILY WITH MEALS
Status: DISCONTINUED | OUTPATIENT
Start: 2023-01-01 | End: 2023-01-01 | Stop reason: HOSPADM

## 2023-01-01 RX ORDER — POTASSIUM CHLORIDE 1500 MG/1
20 TABLET, EXTENDED RELEASE ORAL 3 TIMES DAILY
Status: COMPLETED | OUTPATIENT
Start: 2023-01-01 | End: 2023-01-01

## 2023-01-01 RX ORDER — LABETALOL HYDROCHLORIDE 5 MG/ML
10 INJECTION, SOLUTION INTRAVENOUS
Status: DISCONTINUED | OUTPATIENT
Start: 2023-01-01 | End: 2023-01-01

## 2023-01-01 RX ORDER — UBIDECARENONE 75 MG
100 CAPSULE ORAL DAILY
Status: DISCONTINUED | OUTPATIENT
Start: 2023-01-01 | End: 2023-01-01 | Stop reason: HOSPADM

## 2023-01-01 RX ORDER — CARVEDILOL 12.5 MG/1
12.5 TABLET ORAL 2 TIMES DAILY WITH MEALS
Status: DISCONTINUED | OUTPATIENT
Start: 2023-01-01 | End: 2023-01-01

## 2023-01-01 RX ORDER — ERGOCALCIFEROL (VITAMIN D2) 10 MCG
10 TABLET ORAL DAILY
Status: DISCONTINUED | OUTPATIENT
Start: 2023-01-01 | End: 2023-01-01

## 2023-01-01 RX ORDER — SODIUM BICARBONATE 650 MG/1
1300 TABLET ORAL 3 TIMES DAILY
Qty: 180 TABLET | Refills: 0 | Status: ON HOLD | OUTPATIENT
Start: 2023-01-01 | End: 2023-01-01

## 2023-01-01 RX ORDER — HYDRALAZINE HYDROCHLORIDE 10 MG/1
10 TABLET, FILM COATED ORAL EVERY 8 HOURS SCHEDULED
Status: DISCONTINUED | OUTPATIENT
Start: 2023-01-01 | End: 2023-01-01

## 2023-01-01 RX ORDER — ACETAMINOPHEN 325 MG/1
650 TABLET ORAL EVERY 6 HOURS PRN
Status: DISCONTINUED | OUTPATIENT
Start: 2023-01-01 | End: 2023-01-01 | Stop reason: HOSPADM

## 2023-01-01 RX ORDER — ACETAMINOPHEN 325 MG/1
975 TABLET ORAL EVERY 6 HOURS PRN
Status: DISCONTINUED | OUTPATIENT
Start: 2023-01-01 | End: 2023-01-01 | Stop reason: HOSPADM

## 2023-01-01 RX ORDER — LIDOCAINE 40 MG/G
CREAM TOPICAL
Status: DISCONTINUED | OUTPATIENT
Start: 2023-01-01 | End: 2023-01-01 | Stop reason: HOSPADM

## 2023-01-01 RX ORDER — NITROGLYCERIN 0.4 MG/1
0.4 TABLET SUBLINGUAL ONCE
Status: COMPLETED | OUTPATIENT
Start: 2023-01-01 | End: 2023-01-01

## 2023-01-01 RX ORDER — CARVEDILOL 12.5 MG/1
12.5 TABLET ORAL ONCE
Status: COMPLETED | OUTPATIENT
Start: 2023-01-01 | End: 2023-01-01

## 2023-01-01 RX ORDER — VANCOMYCIN HYDROCHLORIDE 125 MG/1
125 CAPSULE ORAL 2 TIMES DAILY
Status: ON HOLD | DISCHARGE
Start: 2023-01-01 | End: 2023-01-01

## 2023-01-01 RX ORDER — ONDANSETRON 4 MG/1
4 TABLET, ORALLY DISINTEGRATING ORAL EVERY 8 HOURS PRN
Qty: 20 TABLET | Refills: 3 | Status: ON HOLD | OUTPATIENT
Start: 2023-01-01 | End: 2024-01-01

## 2023-01-01 RX ORDER — LIDOCAINE HYDROCHLORIDE 20 MG/ML
5 SOLUTION OROPHARYNGEAL
Status: DISCONTINUED | OUTPATIENT
Start: 2023-01-01 | End: 2023-01-01 | Stop reason: HOSPADM

## 2023-01-01 RX ORDER — FOLIC ACID 1 MG/1
1 TABLET ORAL DAILY
Status: ON HOLD | COMMUNITY
End: 2023-01-01

## 2023-01-01 RX ORDER — TORSEMIDE 20 MG/1
20 TABLET ORAL
Status: DISCONTINUED | OUTPATIENT
Start: 2023-01-01 | End: 2023-01-01 | Stop reason: HOSPADM

## 2023-01-01 RX ORDER — ASPIRIN 81 MG/1
324 TABLET, CHEWABLE ORAL ONCE
Status: COMPLETED | OUTPATIENT
Start: 2023-01-01 | End: 2023-01-01

## 2023-01-01 RX ORDER — PIPERACILLIN SODIUM, TAZOBACTAM SODIUM 2; .25 G/10ML; G/10ML
2.25 INJECTION, POWDER, LYOPHILIZED, FOR SOLUTION INTRAVENOUS EVERY 6 HOURS
Status: DISCONTINUED | OUTPATIENT
Start: 2023-01-01 | End: 2023-01-01

## 2023-01-01 RX ORDER — POTASSIUM CHLORIDE 1.5 G/1.58G
40 POWDER, FOR SOLUTION ORAL ONCE
Status: COMPLETED | OUTPATIENT
Start: 2023-01-01 | End: 2023-01-01

## 2023-01-01 RX ORDER — CARVEDILOL 25 MG/1
25 TABLET ORAL 2 TIMES DAILY WITH MEALS
Qty: 60 TABLET | Refills: 0 | Status: ON HOLD | OUTPATIENT
Start: 2023-01-01 | End: 2023-01-01

## 2023-01-01 RX ORDER — SALIVA STIMULANT COMB. NO.3
2 SPRAY, NON-AEROSOL (ML) MUCOUS MEMBRANE 4 TIMES DAILY
Status: DISCONTINUED | OUTPATIENT
Start: 2023-01-01 | End: 2023-01-01

## 2023-01-01 RX ORDER — FUROSEMIDE 10 MG/ML
20 INJECTION INTRAMUSCULAR; INTRAVENOUS 3 TIMES DAILY
Status: DISCONTINUED | OUTPATIENT
Start: 2023-01-01 | End: 2023-01-01

## 2023-01-01 RX ORDER — ACETAMINOPHEN 325 MG/1
650 TABLET ORAL EVERY 4 HOURS PRN
Status: DISCONTINUED | OUTPATIENT
Start: 2023-01-01 | End: 2023-01-01 | Stop reason: HOSPADM

## 2023-01-01 RX ORDER — HYDROMORPHONE HYDROCHLORIDE 2 MG/1
1 TABLET ORAL EVERY 4 HOURS PRN
Refills: 0 | Status: ON HOLD | DISCHARGE
Start: 2023-01-01 | End: 2023-01-01

## 2023-01-01 RX ORDER — ACETAMINOPHEN 500 MG
1000 TABLET ORAL EVERY 6 HOURS PRN
Status: DISCONTINUED | OUTPATIENT
Start: 2023-01-01 | End: 2023-01-01 | Stop reason: HOSPADM

## 2023-01-01 RX ORDER — SODIUM CHLORIDE 9 MG/ML
INJECTION, SOLUTION INTRAVENOUS CONTINUOUS
Status: DISCONTINUED | OUTPATIENT
Start: 2023-01-01 | End: 2023-01-01 | Stop reason: HOSPADM

## 2023-01-01 RX ORDER — ONDANSETRON 2 MG/ML
4 INJECTION INTRAMUSCULAR; INTRAVENOUS EVERY 6 HOURS PRN
Status: DISCONTINUED | OUTPATIENT
Start: 2023-01-01 | End: 2023-01-01 | Stop reason: HOSPADM

## 2023-01-01 RX ORDER — LABETALOL HYDROCHLORIDE 5 MG/ML
20 INJECTION, SOLUTION INTRAVENOUS
Status: DISCONTINUED | OUTPATIENT
Start: 2023-01-01 | End: 2023-01-01 | Stop reason: HOSPADM

## 2023-01-01 RX ORDER — HYDRALAZINE HYDROCHLORIDE 50 MG/1
50 TABLET, FILM COATED ORAL 3 TIMES DAILY
Qty: 90 TABLET | Refills: 0 | Status: SHIPPED | OUTPATIENT
Start: 2023-01-01 | End: 2023-01-01

## 2023-01-01 RX ORDER — LISINOPRIL 5 MG/1
TABLET ORAL
COMMUNITY
Start: 2023-01-01 | End: 2023-01-01

## 2023-01-01 RX ORDER — CEFEPIME HYDROCHLORIDE 2 G/1
2 INJECTION, POWDER, FOR SOLUTION INTRAVENOUS EVERY 24 HOURS
Status: DISCONTINUED | OUTPATIENT
Start: 2023-01-01 | End: 2023-01-01 | Stop reason: HOSPADM

## 2023-01-01 RX ORDER — PREDNISONE 20 MG/1
TABLET ORAL
Qty: 140 TABLET | Refills: 0 | Status: ON HOLD | OUTPATIENT
Start: 2023-01-01 | End: 2024-01-01

## 2023-01-01 RX ORDER — SULFAMETHOXAZOLE AND TRIMETHOPRIM 400; 80 MG/1; MG/1
1 TABLET ORAL DAILY
Qty: 30 TABLET | Refills: 3 | Status: ON HOLD | OUTPATIENT
Start: 2023-01-01 | End: 2024-01-01

## 2023-01-01 RX ORDER — CALCIUM GLUCONATE 20 MG/ML
1 INJECTION, SOLUTION INTRAVENOUS ONCE
Status: COMPLETED | OUTPATIENT
Start: 2023-01-01 | End: 2023-01-01

## 2023-01-01 RX ORDER — HYDROMORPHONE HCL IN WATER/PF 6 MG/30 ML
0.2 PATIENT CONTROLLED ANALGESIA SYRINGE INTRAVENOUS
Status: DISCONTINUED | OUTPATIENT
Start: 2023-01-01 | End: 2023-01-01

## 2023-01-01 RX ORDER — METHYLPREDNISOLONE SODIUM SUCCINATE 125 MG/2ML
60 INJECTION, POWDER, LYOPHILIZED, FOR SOLUTION INTRAMUSCULAR; INTRAVENOUS EVERY 24 HOURS
Status: DISCONTINUED | OUTPATIENT
Start: 2023-01-01 | End: 2023-01-01 | Stop reason: HOSPADM

## 2023-01-01 RX ORDER — HYDROXYCHLOROQUINE SULFATE 200 MG/1
200 TABLET, FILM COATED ORAL DAILY
Status: DISCONTINUED | OUTPATIENT
Start: 2023-01-01 | End: 2023-01-01 | Stop reason: HOSPADM

## 2023-01-01 RX ORDER — PREDNISONE 10 MG/1
30 TABLET ORAL DAILY
Status: ON HOLD | DISCHARGE
Start: 2023-01-01 | End: 2023-01-01

## 2023-01-01 RX ORDER — ONDANSETRON 2 MG/ML
8 INJECTION INTRAMUSCULAR; INTRAVENOUS ONCE
Qty: 4 ML | Refills: 0 | Status: COMPLETED | OUTPATIENT
Start: 2023-01-01 | End: 2023-01-01

## 2023-01-01 RX ORDER — SODIUM BICARBONATE 650 MG/1
650 TABLET ORAL 3 TIMES DAILY
Status: DISCONTINUED | OUTPATIENT
Start: 2023-01-01 | End: 2023-01-01 | Stop reason: HOSPADM

## 2023-01-01 RX ORDER — LISINOPRIL 20 MG/1
20 TABLET ORAL DAILY
Status: DISCONTINUED | OUTPATIENT
Start: 2023-01-01 | End: 2023-01-01

## 2023-01-01 RX ORDER — CALCIUM GLUCONATE 94 MG/ML
1 INJECTION, SOLUTION INTRAVENOUS ONCE
Status: DISCONTINUED | OUTPATIENT
Start: 2023-01-01 | End: 2023-01-01

## 2023-01-01 RX ORDER — ERGOCALCIFEROL (VITAMIN D2) 10 MCG
10 TABLET ORAL DAILY
Qty: 30 TABLET | Refills: 3 | Status: ON HOLD | OUTPATIENT
Start: 2023-01-01 | End: 2024-01-01

## 2023-01-01 RX ORDER — AMOXICILLIN AND CLAVULANATE POTASSIUM 500; 125 MG/1; MG/1
1 TABLET, FILM COATED ORAL EVERY 12 HOURS
Qty: 2 TABLET | Refills: 0 | Status: SHIPPED | OUTPATIENT
Start: 2023-01-01 | End: 2023-01-01

## 2023-01-01 RX ORDER — ONDANSETRON 4 MG/1
4 TABLET, ORALLY DISINTEGRATING ORAL EVERY 8 HOURS PRN
Status: ON HOLD | COMMUNITY
End: 2023-01-01

## 2023-01-01 RX ORDER — ONDANSETRON 4 MG/1
4 TABLET, ORALLY DISINTEGRATING ORAL EVERY 8 HOURS PRN
Qty: 12 TABLET | Refills: 0 | Status: ON HOLD | OUTPATIENT
Start: 2023-01-01 | End: 2023-01-01

## 2023-01-01 RX ORDER — LABETALOL HYDROCHLORIDE 5 MG/ML
20 INJECTION, SOLUTION INTRAVENOUS
Status: ON HOLD | DISCHARGE
Start: 2023-01-01 | End: 2023-01-01

## 2023-01-01 RX ORDER — ISOSORBIDE MONONITRATE 30 MG/1
60 TABLET, EXTENDED RELEASE ORAL DAILY
Status: DISCONTINUED | OUTPATIENT
Start: 2023-01-01 | End: 2023-01-01 | Stop reason: HOSPADM

## 2023-01-01 RX ORDER — ACETAMINOPHEN 650 MG/1
650 SUPPOSITORY RECTAL EVERY 4 HOURS PRN
Status: DISCONTINUED | OUTPATIENT
Start: 2023-01-01 | End: 2023-01-01 | Stop reason: HOSPADM

## 2023-01-01 RX ORDER — HYDRALAZINE HYDROCHLORIDE 100 MG/1
100 TABLET, FILM COATED ORAL 3 TIMES DAILY
Qty: 90 TABLET | Refills: 1 | Status: SHIPPED | OUTPATIENT
Start: 2023-01-01 | End: 2023-01-01 | Stop reason: ALTCHOICE

## 2023-01-01 RX ORDER — PREDNISONE 20 MG/1
60 TABLET ORAL DAILY
Status: DISCONTINUED | OUTPATIENT
Start: 2023-01-01 | End: 2023-01-01

## 2023-01-01 RX ORDER — NITROGLYCERIN 0.4 MG/1
0.4 TABLET SUBLINGUAL EVERY 5 MIN PRN
Status: DISCONTINUED | OUTPATIENT
Start: 2023-01-01 | End: 2023-01-01 | Stop reason: HOSPADM

## 2023-01-01 RX ORDER — VANCOMYCIN HYDROCHLORIDE 125 MG/1
125 CAPSULE ORAL 2 TIMES DAILY
Status: ON HOLD | COMMUNITY
End: 2023-01-01

## 2023-01-01 RX ORDER — ERGOCALCIFEROL (VITAMIN D2) 10 MCG
10 TABLET ORAL DAILY
Status: ON HOLD | COMMUNITY
End: 2023-01-01

## 2023-01-01 RX ORDER — ONDANSETRON 4 MG/1
4 TABLET, ORALLY DISINTEGRATING ORAL EVERY 6 HOURS PRN
Status: DISCONTINUED | OUTPATIENT
Start: 2023-01-01 | End: 2023-01-01 | Stop reason: HOSPADM

## 2023-01-01 RX ORDER — AMLODIPINE BESYLATE 10 MG/1
10 TABLET ORAL DAILY
Status: ON HOLD | DISCHARGE
Start: 2023-01-01 | End: 2023-01-01

## 2023-01-01 RX ORDER — HYDROMORPHONE HCL IN WATER/PF 6 MG/30 ML
0.2 PATIENT CONTROLLED ANALGESIA SYRINGE INTRAVENOUS
Refills: 0 | Status: ON HOLD | DISCHARGE
Start: 2023-01-01 | End: 2023-01-01

## 2023-01-01 RX ORDER — LISINOPRIL 40 MG/1
40 TABLET ORAL DAILY
Status: DISCONTINUED | OUTPATIENT
Start: 2023-01-01 | End: 2023-01-01 | Stop reason: HOSPADM

## 2023-01-01 RX ORDER — CARVEDILOL 25 MG/1
25 TABLET ORAL 2 TIMES DAILY WITH MEALS
Qty: 60 TABLET | Refills: 3 | Status: ON HOLD | OUTPATIENT
Start: 2023-01-01 | End: 2024-01-01

## 2023-01-01 RX ORDER — POTASSIUM CHLORIDE 750 MG/1
20 TABLET, EXTENDED RELEASE ORAL ONCE
Status: COMPLETED | OUTPATIENT
Start: 2023-01-01 | End: 2023-01-01

## 2023-01-01 RX ORDER — LISINOPRIL 20 MG/1
40 TABLET ORAL DAILY
Status: DISCONTINUED | OUTPATIENT
Start: 2023-01-01 | End: 2023-01-01

## 2023-01-01 RX ORDER — CALCIUM GLUCONATE 94 MG/ML
1 INJECTION, SOLUTION INTRAVENOUS ONCE
Status: COMPLETED | OUTPATIENT
Start: 2023-01-01 | End: 2023-01-01

## 2023-01-01 RX ORDER — HEPARIN SODIUM 10000 [USP'U]/100ML
0-5000 INJECTION, SOLUTION INTRAVENOUS CONTINUOUS
Status: DISCONTINUED | OUTPATIENT
Start: 2023-01-01 | End: 2023-01-01 | Stop reason: HOSPADM

## 2023-01-01 RX ORDER — CARVEDILOL 12.5 MG/1
25 TABLET ORAL 2 TIMES DAILY WITH MEALS
Status: DISCONTINUED | OUTPATIENT
Start: 2023-01-01 | End: 2023-01-01 | Stop reason: HOSPADM

## 2023-01-01 RX ORDER — POTASSIUM CHLORIDE 1500 MG/1
20 TABLET, EXTENDED RELEASE ORAL 2 TIMES DAILY
Qty: 30 TABLET | Refills: 3 | Status: ON HOLD | OUTPATIENT
Start: 2023-01-01 | End: 2024-01-01

## 2023-01-01 RX ORDER — AMOXICILLIN AND CLAVULANATE POTASSIUM 500; 125 MG/1; MG/1
1 TABLET, FILM COATED ORAL EVERY 12 HOURS SCHEDULED
Status: DISCONTINUED | OUTPATIENT
Start: 2023-01-01 | End: 2023-01-01 | Stop reason: HOSPADM

## 2023-01-01 RX ORDER — METOCLOPRAMIDE HYDROCHLORIDE 5 MG/ML
5 INJECTION INTRAMUSCULAR; INTRAVENOUS ONCE
Status: COMPLETED | OUTPATIENT
Start: 2023-01-01 | End: 2023-01-01

## 2023-01-01 RX ORDER — AMOXICILLIN AND CLAVULANATE POTASSIUM 500; 125 MG/1; MG/1
1 TABLET, FILM COATED ORAL EVERY 12 HOURS SCHEDULED
Status: DISCONTINUED | OUTPATIENT
Start: 2023-01-01 | End: 2023-01-01

## 2023-01-01 RX ORDER — AMLODIPINE BESYLATE 10 MG/1
10 TABLET ORAL DAILY
Status: DISCONTINUED | OUTPATIENT
Start: 2023-01-01 | End: 2023-01-01

## 2023-01-01 RX ORDER — METOPROLOL SUCCINATE 50 MG/1
50 TABLET, EXTENDED RELEASE ORAL DAILY
Status: ON HOLD | COMMUNITY
End: 2023-01-01

## 2023-01-01 RX ORDER — PREDNISONE 20 MG/1
40 TABLET ORAL DAILY
Status: DISCONTINUED | OUTPATIENT
Start: 2023-01-01 | End: 2023-01-01

## 2023-01-01 RX ORDER — PREDNISONE 10 MG/1
20 TABLET ORAL DAILY
Qty: 60 TABLET | Refills: 0 | Status: ON HOLD | OUTPATIENT
Start: 2023-01-01 | End: 2023-01-01

## 2023-01-01 RX ORDER — ACETAMINOPHEN 10 MG/ML
1000 INJECTION, SOLUTION INTRAVENOUS EVERY 6 HOURS PRN
Status: DISCONTINUED | OUTPATIENT
Start: 2023-01-01 | End: 2023-01-01 | Stop reason: HOSPADM

## 2023-01-01 RX ORDER — ONDANSETRON 2 MG/ML
4 INJECTION INTRAMUSCULAR; INTRAVENOUS EVERY 6 HOURS PRN
Status: DISCONTINUED | OUTPATIENT
Start: 2023-01-01 | End: 2023-01-01

## 2023-01-01 RX ORDER — FOLIC ACID 1 MG/1
1 TABLET ORAL DAILY
Status: DISCONTINUED | OUTPATIENT
Start: 2023-01-01 | End: 2023-01-01

## 2023-01-01 RX ORDER — CLONIDINE HYDROCHLORIDE 0.1 MG/1
0.1 TABLET ORAL 2 TIMES DAILY
Status: DISCONTINUED | OUTPATIENT
Start: 2023-01-01 | End: 2023-01-01

## 2023-01-01 RX ORDER — CALCIUM CARBONATE 500 MG/1
1000 TABLET, CHEWABLE ORAL 4 TIMES DAILY PRN
Status: DISCONTINUED | OUTPATIENT
Start: 2023-01-01 | End: 2023-01-01 | Stop reason: HOSPADM

## 2023-01-01 RX ORDER — MORPHINE SULFATE 2 MG/ML
2 INJECTION, SOLUTION INTRAMUSCULAR; INTRAVENOUS
Status: DISCONTINUED | OUTPATIENT
Start: 2023-01-01 | End: 2023-01-01

## 2023-01-01 RX ORDER — NICOTINE POLACRILEX 4 MG
15-30 LOZENGE BUCCAL
Status: DISCONTINUED | OUTPATIENT
Start: 2023-01-01 | End: 2023-01-01 | Stop reason: HOSPADM

## 2023-01-01 RX ORDER — ACETAMINOPHEN 325 MG/1
650 TABLET ORAL ONCE
Status: COMPLETED | OUTPATIENT
Start: 2023-01-01 | End: 2023-01-01

## 2023-01-01 RX ORDER — LOSARTAN POTASSIUM 50 MG/1
50 TABLET ORAL DAILY
Status: DISCONTINUED | OUTPATIENT
Start: 2023-01-01 | End: 2023-01-01

## 2023-01-01 RX ORDER — POTASSIUM CHLORIDE 1500 MG/1
20 TABLET, EXTENDED RELEASE ORAL ONCE
Status: COMPLETED | OUTPATIENT
Start: 2023-01-01 | End: 2023-01-01

## 2023-01-01 RX ORDER — ACYCLOVIR 200 MG/1
0-1 CAPSULE ORAL
Status: DISCONTINUED | OUTPATIENT
Start: 2023-01-01 | End: 2023-01-01 | Stop reason: HOSPADM

## 2023-01-01 RX ORDER — ACETAMINOPHEN 650 MG/1
650 SUPPOSITORY RECTAL EVERY 6 HOURS PRN
Status: DISCONTINUED | OUTPATIENT
Start: 2023-01-01 | End: 2023-01-01 | Stop reason: HOSPADM

## 2023-01-01 RX ORDER — ISOSORBIDE MONONITRATE 60 MG/1
60 TABLET, EXTENDED RELEASE ORAL DAILY
Qty: 30 TABLET | Refills: 0 | Status: ON HOLD | OUTPATIENT
Start: 2023-01-01 | End: 2023-01-01

## 2023-01-01 RX ORDER — SODIUM BICARBONATE 650 MG/1
1300 TABLET ORAL 3 TIMES DAILY
Status: DISCONTINUED | OUTPATIENT
Start: 2023-01-01 | End: 2023-01-01

## 2023-01-01 RX ORDER — SODIUM BICARBONATE 650 MG/1
1300 TABLET ORAL 3 TIMES DAILY
Status: DISCONTINUED | OUTPATIENT
Start: 2023-01-01 | End: 2023-01-01 | Stop reason: HOSPADM

## 2023-01-01 RX ADMIN — VANCOMYCIN HYDROCHLORIDE 1500 MG: 10 INJECTION, POWDER, LYOPHILIZED, FOR SOLUTION INTRAVENOUS at 08:17

## 2023-01-01 RX ADMIN — ISOSORBIDE MONONITRATE 60 MG: 60 TABLET, EXTENDED RELEASE ORAL at 10:14

## 2023-01-01 RX ADMIN — AMOXICILLIN AND CLAVULANATE POTASSIUM 1 TABLET: 500; 125 TABLET, FILM COATED ORAL at 20:23

## 2023-01-01 RX ADMIN — HYDROMORPHONE HYDROCHLORIDE 0.3 MG: 1 INJECTION, SOLUTION INTRAMUSCULAR; INTRAVENOUS; SUBCUTANEOUS at 04:39

## 2023-01-01 RX ADMIN — MORPHINE SULFATE 2 MG: 2 INJECTION, SOLUTION INTRAMUSCULAR; INTRAVENOUS at 11:32

## 2023-01-01 RX ADMIN — FUROSEMIDE 40 MG: 10 INJECTION, SOLUTION INTRAVENOUS at 15:35

## 2023-01-01 RX ADMIN — SACUBITRIL AND VALSARTAN 1 TABLET: 24; 26 TABLET, FILM COATED ORAL at 10:15

## 2023-01-01 RX ADMIN — AMLODIPINE BESYLATE 10 MG: 10 TABLET ORAL at 09:52

## 2023-01-01 RX ADMIN — POTASSIUM CHLORIDE 20 MEQ: 750 TABLET, EXTENDED RELEASE ORAL at 20:26

## 2023-01-01 RX ADMIN — ACETAMINOPHEN 650 MG: 325 TABLET, FILM COATED ORAL at 22:29

## 2023-01-01 RX ADMIN — HYDROMORPHONE HYDROCHLORIDE 0.2 MG: 0.2 INJECTION, SOLUTION INTRAMUSCULAR; INTRAVENOUS; SUBCUTANEOUS at 04:35

## 2023-01-01 RX ADMIN — PIPERACILLIN AND TAZOBACTAM 3.38 G: 3; .375 INJECTION, POWDER, FOR SOLUTION INTRAVENOUS at 09:32

## 2023-01-01 RX ADMIN — REMDESIVIR 100 MG: 100 INJECTION, POWDER, LYOPHILIZED, FOR SOLUTION INTRAVENOUS at 10:13

## 2023-01-01 RX ADMIN — FOLIC ACID 1 MG: 1 TABLET ORAL at 10:04

## 2023-01-01 RX ADMIN — CARVEDILOL 25 MG: 25 TABLET, FILM COATED ORAL at 17:18

## 2023-01-01 RX ADMIN — PANTOPRAZOLE SODIUM 40 MG: 40 TABLET, DELAYED RELEASE ORAL at 10:12

## 2023-01-01 RX ADMIN — CARVEDILOL 12.5 MG: 12.5 TABLET, FILM COATED ORAL at 13:42

## 2023-01-01 RX ADMIN — FERROUS SULFATE TAB 325 MG (65 MG ELEMENTAL FE) 325 MG: 325 (65 FE) TAB at 10:31

## 2023-01-01 RX ADMIN — FOLIC ACID 1 MG: 1 TABLET ORAL at 09:02

## 2023-01-01 RX ADMIN — VANCOMYCIN HYDROCHLORIDE 125 MG: 125 CAPSULE ORAL at 10:15

## 2023-01-01 RX ADMIN — CLONIDINE HYDROCHLORIDE 0.1 MG: 0.1 TABLET ORAL at 14:22

## 2023-01-01 RX ADMIN — FUROSEMIDE 40 MG: 10 INJECTION, SOLUTION INTRAMUSCULAR; INTRAVENOUS at 19:29

## 2023-01-01 RX ADMIN — HYDRALAZINE HYDROCHLORIDE 10 MG: 10 TABLET ORAL at 22:31

## 2023-01-01 RX ADMIN — FERROUS SULFATE TAB 325 MG (65 MG ELEMENTAL FE) 325 MG: 325 (65 FE) TAB at 09:52

## 2023-01-01 RX ADMIN — SODIUM CHLORIDE: 9 INJECTION, SOLUTION INTRAVENOUS at 07:26

## 2023-01-01 RX ADMIN — POTASSIUM CHLORIDE 40 MEQ: 1500 TABLET, EXTENDED RELEASE ORAL at 08:50

## 2023-01-01 RX ADMIN — SPIRONOLACTONE 25 MG: 25 TABLET ORAL at 08:58

## 2023-01-01 RX ADMIN — SODIUM CHLORIDE 250 ML: 9 INJECTION, SOLUTION INTRAVENOUS at 19:45

## 2023-01-01 RX ADMIN — MESNA 290 MG: 100 INJECTION, SOLUTION INTRAVENOUS at 09:52

## 2023-01-01 RX ADMIN — SODIUM BICARBONATE 650 MG TABLET 1300 MG: at 08:21

## 2023-01-01 RX ADMIN — HYDRALAZINE HYDROCHLORIDE 10 MG: 10 TABLET ORAL at 05:02

## 2023-01-01 RX ADMIN — SODIUM BICARBONATE 650 MG TABLET 1300 MG: at 10:04

## 2023-01-01 RX ADMIN — SODIUM BICARBONATE 650 MG TABLET 1300 MG: at 08:59

## 2023-01-01 RX ADMIN — SULFAMETHOXAZOLE AND TRIMETHOPRIM 1 TABLET: 400; 80 TABLET ORAL at 10:49

## 2023-01-01 RX ADMIN — SODIUM BICARBONATE 650 MG TABLET 1300 MG: at 12:31

## 2023-01-01 RX ADMIN — MYCOPHENOLATE MOFETIL 1500 MG: 500 TABLET ORAL at 08:58

## 2023-01-01 RX ADMIN — POTASSIUM CHLORIDE 20 MEQ: 1500 TABLET, EXTENDED RELEASE ORAL at 16:27

## 2023-01-01 RX ADMIN — VANCOMYCIN HYDROCHLORIDE 125 MG: 125 CAPSULE ORAL at 16:27

## 2023-01-01 RX ADMIN — PANTOPRAZOLE SODIUM 40 MG: 40 TABLET, DELAYED RELEASE ORAL at 06:04

## 2023-01-01 RX ADMIN — IOPAMIDOL 42 ML: 612 INJECTION, SOLUTION INTRAVENOUS at 11:52

## 2023-01-01 RX ADMIN — PIPERACILLIN AND TAZOBACTAM 3.38 G: 3; .375 INJECTION, POWDER, FOR SOLUTION INTRAVENOUS at 02:03

## 2023-01-01 RX ADMIN — EMPAGLIFLOZIN 10 MG: 10 TABLET, FILM COATED ORAL at 08:28

## 2023-01-01 RX ADMIN — NIFEDIPINE 60 MG: 60 TABLET, EXTENDED RELEASE ORAL at 09:02

## 2023-01-01 RX ADMIN — PIPERACILLIN AND TAZOBACTAM 3.38 G: 3; .375 INJECTION, POWDER, FOR SOLUTION INTRAVENOUS at 13:23

## 2023-01-01 RX ADMIN — ONDANSETRON 4 MG: 2 INJECTION INTRAMUSCULAR; INTRAVENOUS at 15:50

## 2023-01-01 RX ADMIN — MESNA 290 MG: 100 INJECTION, SOLUTION INTRAVENOUS at 14:51

## 2023-01-01 RX ADMIN — PANTOPRAZOLE SODIUM 40 MG: 40 TABLET, DELAYED RELEASE ORAL at 10:43

## 2023-01-01 RX ADMIN — NITROGLYCERIN 0.4 MG: 0.4 TABLET SUBLINGUAL at 11:57

## 2023-01-01 RX ADMIN — PIPERACILLIN AND TAZOBACTAM 3.38 G: 3; .375 INJECTION, POWDER, LYOPHILIZED, FOR SOLUTION INTRAVENOUS at 22:07

## 2023-01-01 RX ADMIN — AMOXICILLIN AND CLAVULANATE POTASSIUM 1 TABLET: 500; 125 TABLET, FILM COATED ORAL at 10:33

## 2023-01-01 RX ADMIN — SODIUM CHLORIDE 500 MG: 9 INJECTION, SOLUTION INTRAVENOUS at 13:52

## 2023-01-01 RX ADMIN — IPRATROPIUM BROMIDE AND ALBUTEROL SULFATE 3 ML: .5; 3 SOLUTION RESPIRATORY (INHALATION) at 00:28

## 2023-01-01 RX ADMIN — FERROUS SULFATE TAB 325 MG (65 MG ELEMENTAL FE) 325 MG: 325 (65 FE) TAB at 14:22

## 2023-01-01 RX ADMIN — HYDRALAZINE HYDROCHLORIDE 50 MG: 25 TABLET, FILM COATED ORAL at 05:03

## 2023-01-01 RX ADMIN — FENTANYL CITRATE 25 MCG: 50 INJECTION, SOLUTION INTRAMUSCULAR; INTRAVENOUS at 11:34

## 2023-01-01 RX ADMIN — SULFAMETHOXAZOLE AND TRIMETHOPRIM 1 TABLET: 400; 80 TABLET ORAL at 08:58

## 2023-01-01 RX ADMIN — PANTOPRAZOLE SODIUM 40 MG: 40 TABLET, DELAYED RELEASE ORAL at 09:52

## 2023-01-01 RX ADMIN — POTASSIUM CHLORIDE 20 MEQ: 750 TABLET, EXTENDED RELEASE ORAL at 20:23

## 2023-01-01 RX ADMIN — SULFAMETHOXAZOLE AND TRIMETHOPRIM 1 TABLET: 400; 80 TABLET ORAL at 10:14

## 2023-01-01 RX ADMIN — FUROSEMIDE 20 MG: 10 INJECTION, SOLUTION INTRAMUSCULAR; INTRAVENOUS at 05:52

## 2023-01-01 RX ADMIN — CLONIDINE HYDROCHLORIDE 0.1 MG: 0.1 TABLET ORAL at 06:12

## 2023-01-01 RX ADMIN — CHOLECALCIFEROL (VITAMIN D3) 10 MCG (400 UNIT) TABLET 10 MCG: at 09:52

## 2023-01-01 RX ADMIN — ACETAMINOPHEN 650 MG: 325 TABLET, FILM COATED ORAL at 04:03

## 2023-01-01 RX ADMIN — PREDNISONE 30 MG: 20 TABLET ORAL at 10:04

## 2023-01-01 RX ADMIN — CARVEDILOL 25 MG: 25 TABLET, FILM COATED ORAL at 18:55

## 2023-01-01 RX ADMIN — FERROUS SULFATE TAB 325 MG (65 MG ELEMENTAL FE) 325 MG: 325 (65 FE) TAB at 10:04

## 2023-01-01 RX ADMIN — AMLODIPINE BESYLATE 10 MG: 10 TABLET ORAL at 08:28

## 2023-01-01 RX ADMIN — MESNA 290 MG: 100 INJECTION, SOLUTION INTRAVENOUS at 10:58

## 2023-01-01 RX ADMIN — PANTOPRAZOLE SODIUM 40 MG: 40 TABLET, DELAYED RELEASE ORAL at 09:14

## 2023-01-01 RX ADMIN — SODIUM BICARBONATE 650 MG TABLET 650 MG: at 10:48

## 2023-01-01 RX ADMIN — HYDROMORPHONE HYDROCHLORIDE 1 MG: 2 TABLET ORAL at 05:39

## 2023-01-01 RX ADMIN — GADOBUTROL 7.5 ML: 604.72 INJECTION INTRAVENOUS at 18:48

## 2023-01-01 RX ADMIN — CARVEDILOL 25 MG: 25 TABLET, FILM COATED ORAL at 18:44

## 2023-01-01 RX ADMIN — REGADENOSON 0.4 MG: 0.08 INJECTION, SOLUTION INTRAVENOUS at 11:59

## 2023-01-01 RX ADMIN — AMOXICILLIN AND CLAVULANATE POTASSIUM 1 TABLET: 500; 125 TABLET, FILM COATED ORAL at 20:46

## 2023-01-01 RX ADMIN — VANCOMYCIN HYDROCHLORIDE 125 MG: 125 CAPSULE ORAL at 08:58

## 2023-01-01 RX ADMIN — CYCLOPHOSPHAMIDE 500 MG: 1 INJECTION, POWDER, FOR SOLUTION INTRAVENOUS; ORAL at 17:10

## 2023-01-01 RX ADMIN — POTASSIUM CHLORIDE 20 MEQ: 750 TABLET, EXTENDED RELEASE ORAL at 08:27

## 2023-01-01 RX ADMIN — MAGNESIUM SULFATE HEPTAHYDRATE 2 G: 40 INJECTION, SOLUTION INTRAVENOUS at 17:11

## 2023-01-01 RX ADMIN — VANCOMYCIN HYDROCHLORIDE 125 MG: 125 CAPSULE ORAL at 20:55

## 2023-01-01 RX ADMIN — CARVEDILOL 25 MG: 25 TABLET, FILM COATED ORAL at 09:15

## 2023-01-01 RX ADMIN — FOLIC ACID 1 MG: 1 TABLET ORAL at 10:43

## 2023-01-01 RX ADMIN — ONDANSETRON 4 MG: 2 INJECTION INTRAMUSCULAR; INTRAVENOUS at 04:26

## 2023-01-01 RX ADMIN — HYDROMORPHONE HYDROCHLORIDE 0.3 MG: 1 INJECTION, SOLUTION INTRAMUSCULAR; INTRAVENOUS; SUBCUTANEOUS at 06:35

## 2023-01-01 RX ADMIN — ISOSORBIDE MONONITRATE 60 MG: 60 TABLET, EXTENDED RELEASE ORAL at 08:28

## 2023-01-01 RX ADMIN — CHOLECALCIFEROL (VITAMIN D3) 10 MCG (400 UNIT) TABLET 10 MCG: at 08:50

## 2023-01-01 RX ADMIN — FENTANYL CITRATE 25 MCG: 50 INJECTION, SOLUTION INTRAMUSCULAR; INTRAVENOUS at 11:47

## 2023-01-01 RX ADMIN — Medication 2 SPRAY: at 17:11

## 2023-01-01 RX ADMIN — SODIUM BICARBONATE 650 MG TABLET 1300 MG: at 19:50

## 2023-01-01 RX ADMIN — ACETAMINOPHEN 975 MG: 325 TABLET, FILM COATED ORAL at 05:39

## 2023-01-01 RX ADMIN — HYDRALAZINE HYDROCHLORIDE 50 MG: 25 TABLET, FILM COATED ORAL at 13:25

## 2023-01-01 RX ADMIN — CLONIDINE HYDROCHLORIDE 0.1 MG: 0.1 TABLET ORAL at 20:12

## 2023-01-01 RX ADMIN — SODIUM CHLORIDE 500 MG: 9 INJECTION, SOLUTION INTRAVENOUS at 14:05

## 2023-01-01 RX ADMIN — NITROGLYCERIN 0.4 MG: 0.4 TABLET SUBLINGUAL at 10:28

## 2023-01-01 RX ADMIN — AMLODIPINE BESYLATE 10 MG: 10 TABLET ORAL at 10:14

## 2023-01-01 RX ADMIN — PIPERACILLIN AND TAZOBACTAM 3.38 G: 3; .375 INJECTION, POWDER, FOR SOLUTION INTRAVENOUS at 02:55

## 2023-01-01 RX ADMIN — ISOSORBIDE MONONITRATE 60 MG: 60 TABLET, EXTENDED RELEASE ORAL at 08:59

## 2023-01-01 RX ADMIN — AMOXICILLIN AND CLAVULANATE POTASSIUM 1 TABLET: 875; 125 TABLET, FILM COATED ORAL at 08:28

## 2023-01-01 RX ADMIN — PIPERACILLIN AND TAZOBACTAM 3.38 G: 3; .375 INJECTION, POWDER, FOR SOLUTION INTRAVENOUS at 21:05

## 2023-01-01 RX ADMIN — PIPERACILLIN AND TAZOBACTAM 2.25 G: 2; .25 INJECTION, POWDER, FOR SOLUTION INTRAVENOUS at 09:43

## 2023-01-01 RX ADMIN — SODIUM CHLORIDE 500 ML: 9 INJECTION, SOLUTION INTRAVENOUS at 09:33

## 2023-01-01 RX ADMIN — CARVEDILOL 25 MG: 25 TABLET, FILM COATED ORAL at 18:03

## 2023-01-01 RX ADMIN — HYDRALAZINE HYDROCHLORIDE 50 MG: 25 TABLET ORAL at 13:23

## 2023-01-01 RX ADMIN — LIDOCAINE HYDROCHLORIDE 9 ML: 10 INJECTION, SOLUTION INFILTRATION; PERINEURAL at 11:53

## 2023-01-01 RX ADMIN — PIPERACILLIN AND TAZOBACTAM 3.38 G: 3; .375 INJECTION, POWDER, FOR SOLUTION INTRAVENOUS at 14:05

## 2023-01-01 RX ADMIN — HYDRALAZINE HYDROCHLORIDE 50 MG: 25 TABLET, FILM COATED ORAL at 20:55

## 2023-01-01 RX ADMIN — VANCOMYCIN HYDROCHLORIDE 125 MG: 125 CAPSULE ORAL at 19:47

## 2023-01-01 RX ADMIN — PANTOPRAZOLE SODIUM 40 MG: 40 TABLET, DELAYED RELEASE ORAL at 10:04

## 2023-01-01 RX ADMIN — HYDRALAZINE HYDROCHLORIDE 25 MG: 25 TABLET ORAL at 14:14

## 2023-01-01 RX ADMIN — MYCOPHENOLATE MOFETIL 1500 MG: 500 TABLET ORAL at 20:12

## 2023-01-01 RX ADMIN — POTASSIUM CHLORIDE 20 MEQ: 750 TABLET, EXTENDED RELEASE ORAL at 08:58

## 2023-01-01 RX ADMIN — TORSEMIDE 20 MG: 20 TABLET ORAL at 08:58

## 2023-01-01 RX ADMIN — FERROUS SULFATE TAB 325 MG (65 MG ELEMENTAL FE) 325 MG: 325 (65 FE) TAB at 08:50

## 2023-01-01 RX ADMIN — MORPHINE SULFATE 2 MG: 2 INJECTION, SOLUTION INTRAMUSCULAR; INTRAVENOUS at 13:07

## 2023-01-01 RX ADMIN — HYDROMORPHONE HYDROCHLORIDE 1 MG: 2 TABLET ORAL at 03:48

## 2023-01-01 RX ADMIN — HYDRALAZINE HYDROCHLORIDE 25 MG: 25 TABLET ORAL at 06:03

## 2023-01-01 RX ADMIN — VANCOMYCIN HYDROCHLORIDE 125 MG: 125 CAPSULE ORAL at 10:32

## 2023-01-01 RX ADMIN — HYDROMORPHONE HYDROCHLORIDE 0.2 MG: 0.2 INJECTION, SOLUTION INTRAMUSCULAR; INTRAVENOUS; SUBCUTANEOUS at 15:39

## 2023-01-01 RX ADMIN — SULFAMETHOXAZOLE AND TRIMETHOPRIM 1 TABLET: 400; 80 TABLET ORAL at 08:55

## 2023-01-01 RX ADMIN — SULFAMETHOXAZOLE AND TRIMETHOPRIM 1 TABLET: 400; 80 TABLET ORAL at 08:52

## 2023-01-01 RX ADMIN — MIDAZOLAM 0.5 MG: 1 INJECTION INTRAMUSCULAR; INTRAVENOUS at 11:30

## 2023-01-01 RX ADMIN — LABETALOL HYDROCHLORIDE 20 MG: 5 INJECTION, SOLUTION INTRAVENOUS at 11:34

## 2023-01-01 RX ADMIN — POTASSIUM CHLORIDE 20 MEQ: 750 TABLET, EXTENDED RELEASE ORAL at 10:43

## 2023-01-01 RX ADMIN — ACETAMINOPHEN 650 MG: 325 TABLET, FILM COATED ORAL at 08:40

## 2023-01-01 RX ADMIN — POTASSIUM CHLORIDE 20 MEQ: 1500 TABLET, EXTENDED RELEASE ORAL at 16:37

## 2023-01-01 RX ADMIN — HYDRALAZINE HYDROCHLORIDE 50 MG: 25 TABLET, FILM COATED ORAL at 20:46

## 2023-01-01 RX ADMIN — MYCOPHENOLATE MOFETIL 1500 MG: 500 TABLET, FILM COATED ORAL at 20:55

## 2023-01-01 RX ADMIN — SODIUM CHLORIDE 500 ML: 9 INJECTION, SOLUTION INTRAVENOUS at 04:11

## 2023-01-01 RX ADMIN — ONDANSETRON 4 MG: 2 INJECTION INTRAMUSCULAR; INTRAVENOUS at 08:16

## 2023-01-01 RX ADMIN — TETROFOSMIN 39 MILLICURIE: 1.38 INJECTION, POWDER, LYOPHILIZED, FOR SOLUTION INTRAVENOUS at 12:02

## 2023-01-01 RX ADMIN — VITAM B12 100 MCG: 100 TAB at 09:02

## 2023-01-01 RX ADMIN — PREDNISONE 60 MG: 10 TABLET ORAL at 09:15

## 2023-01-01 RX ADMIN — FENTANYL CITRATE 50 MCG: 50 INJECTION, SOLUTION INTRAMUSCULAR; INTRAVENOUS at 21:05

## 2023-01-01 RX ADMIN — MYCOPHENOLATE MOFETIL 1500 MG: 500 TABLET ORAL at 10:04

## 2023-01-01 RX ADMIN — PANTOPRAZOLE SODIUM 40 MG: 40 TABLET, DELAYED RELEASE ORAL at 06:03

## 2023-01-01 RX ADMIN — PIPERACILLIN AND TAZOBACTAM 3.38 G: 3; .375 INJECTION, POWDER, LYOPHILIZED, FOR SOLUTION INTRAVENOUS at 10:44

## 2023-01-01 RX ADMIN — VANCOMYCIN HYDROCHLORIDE 125 MG: 125 CAPSULE ORAL at 08:28

## 2023-01-01 RX ADMIN — ONDANSETRON 8 MG: 2 INJECTION INTRAMUSCULAR; INTRAVENOUS at 15:39

## 2023-01-01 RX ADMIN — MESNA 290 MG: 100 INJECTION, SOLUTION INTRAVENOUS at 15:43

## 2023-01-01 RX ADMIN — SULFAMETHOXAZOLE AND TRIMETHOPRIM 1 TABLET: 400; 80 TABLET ORAL at 09:04

## 2023-01-01 RX ADMIN — Medication 2 SPRAY: at 10:04

## 2023-01-01 RX ADMIN — CARVEDILOL 25 MG: 25 TABLET, FILM COATED ORAL at 09:02

## 2023-01-01 RX ADMIN — FOLIC ACID 1 MG: 1 TABLET ORAL at 08:58

## 2023-01-01 RX ADMIN — MYCOPHENOLATE MOFETIL 1500 MG: 500 TABLET, FILM COATED ORAL at 08:27

## 2023-01-01 RX ADMIN — METOCLOPRAMIDE 5 MG: 5 INJECTION, SOLUTION INTRAMUSCULAR; INTRAVENOUS at 19:47

## 2023-01-01 RX ADMIN — ONDANSETRON 4 MG: 2 INJECTION INTRAMUSCULAR; INTRAVENOUS at 20:22

## 2023-01-01 RX ADMIN — PREDNISONE 30 MG: 10 TABLET ORAL at 08:55

## 2023-01-01 RX ADMIN — FUROSEMIDE 40 MG: 10 INJECTION, SOLUTION INTRAMUSCULAR; INTRAVENOUS at 08:21

## 2023-01-01 RX ADMIN — HUMAN ALBUMIN MICROSPHERES AND PERFLUTREN 3 ML: 10; .22 INJECTION, SOLUTION INTRAVENOUS at 12:10

## 2023-01-01 RX ADMIN — POTASSIUM CHLORIDE 40 MEQ: 750 TABLET, EXTENDED RELEASE ORAL at 12:23

## 2023-01-01 RX ADMIN — PANTOPRAZOLE SODIUM 40 MG: 40 TABLET, DELAYED RELEASE ORAL at 06:46

## 2023-01-01 RX ADMIN — MYCOPHENOLATE MOFETIL 1500 MG: 500 TABLET ORAL at 20:26

## 2023-01-01 RX ADMIN — CARVEDILOL 25 MG: 25 TABLET, FILM COATED ORAL at 18:04

## 2023-01-01 RX ADMIN — FERROUS SULFATE TAB 325 MG (65 MG ELEMENTAL FE) 325 MG: 325 (65 FE) TAB at 08:49

## 2023-01-01 RX ADMIN — CARVEDILOL 25 MG: 25 TABLET, FILM COATED ORAL at 18:22

## 2023-01-01 RX ADMIN — LISINOPRIL 20 MG: 20 TABLET ORAL at 09:19

## 2023-01-01 RX ADMIN — SACUBITRIL AND VALSARTAN 1 TABLET: 24; 26 TABLET, FILM COATED ORAL at 19:46

## 2023-01-01 RX ADMIN — VITAM B12 100 MCG: 100 TAB at 08:03

## 2023-01-01 RX ADMIN — PROCHLORPERAZINE EDISYLATE 10 MG: 5 INJECTION INTRAMUSCULAR; INTRAVENOUS at 16:18

## 2023-01-01 RX ADMIN — PANTOPRAZOLE SODIUM 40 MG: 40 TABLET, DELAYED RELEASE ORAL at 08:04

## 2023-01-01 RX ADMIN — TORSEMIDE 20 MG: 20 TABLET ORAL at 17:18

## 2023-01-01 RX ADMIN — CALCIUM GLUCONATE 1 G: 20 INJECTION, SOLUTION INTRAVENOUS at 10:30

## 2023-01-01 RX ADMIN — CARVEDILOL 25 MG: 25 TABLET, FILM COATED ORAL at 08:45

## 2023-01-01 RX ADMIN — VANCOMYCIN HYDROCHLORIDE 750 MG: 750 INJECTION, SOLUTION INTRAVENOUS at 08:39

## 2023-01-01 RX ADMIN — PIPERACILLIN AND TAZOBACTAM 3.38 G: 3; .375 INJECTION, POWDER, FOR SOLUTION INTRAVENOUS at 20:26

## 2023-01-01 RX ADMIN — CALCIUM GLUCONATE 1 G: 98 INJECTION, SOLUTION INTRAVENOUS at 17:43

## 2023-01-01 RX ADMIN — ONDANSETRON 4 MG: 2 INJECTION INTRAMUSCULAR; INTRAVENOUS at 08:28

## 2023-01-01 RX ADMIN — PANTOPRAZOLE SODIUM 40 MG: 40 TABLET, DELAYED RELEASE ORAL at 10:14

## 2023-01-01 RX ADMIN — ACETAMINOPHEN 650 MG: 325 TABLET, FILM COATED ORAL at 09:56

## 2023-01-01 RX ADMIN — HYDRALAZINE HYDROCHLORIDE 50 MG: 25 TABLET, FILM COATED ORAL at 13:22

## 2023-01-01 RX ADMIN — ACETAMINOPHEN 650 MG: 325 TABLET, FILM COATED ORAL at 17:53

## 2023-01-01 RX ADMIN — SODIUM CHLORIDE 50 ML: 900 INJECTION INTRAVENOUS at 10:13

## 2023-01-01 RX ADMIN — FOLIC ACID 1 MG: 1 TABLET ORAL at 08:04

## 2023-01-01 RX ADMIN — VANCOMYCIN HYDROCHLORIDE 125 MG: 125 CAPSULE ORAL at 21:57

## 2023-01-01 RX ADMIN — TRANEXAMIC ACID 500 MG: 100 INJECTION INTRAVENOUS at 17:47

## 2023-01-01 RX ADMIN — LISINOPRIL 30 MG: 20 TABLET ORAL at 08:57

## 2023-01-01 RX ADMIN — HYDRALAZINE HYDROCHLORIDE 25 MG: 25 TABLET ORAL at 06:46

## 2023-01-01 RX ADMIN — METOPROLOL SUCCINATE 50 MG: 50 TABLET, EXTENDED RELEASE ORAL at 21:57

## 2023-01-01 RX ADMIN — VANCOMYCIN HYDROCHLORIDE 125 MG: 125 CAPSULE ORAL at 20:35

## 2023-01-01 RX ADMIN — VANCOMYCIN HYDROCHLORIDE 125 MG: 125 CAPSULE ORAL at 08:44

## 2023-01-01 RX ADMIN — CARVEDILOL 12.5 MG: 12.5 TABLET, FILM COATED ORAL at 18:18

## 2023-01-01 RX ADMIN — ACETAMINOPHEN 650 MG: 325 TABLET, FILM COATED ORAL at 20:07

## 2023-01-01 RX ADMIN — FERROUS SULFATE TAB 325 MG (65 MG ELEMENTAL FE) 325 MG: 325 (65 FE) TAB at 09:19

## 2023-01-01 RX ADMIN — PIPERACILLIN AND TAZOBACTAM 3.38 G: 3; .375 INJECTION, POWDER, FOR SOLUTION INTRAVENOUS at 09:03

## 2023-01-01 RX ADMIN — MYCOPHENOLATE MOFETIL 1500 MG: 500 TABLET, FILM COATED ORAL at 19:50

## 2023-01-01 RX ADMIN — HYDRALAZINE HYDROCHLORIDE 50 MG: 25 TABLET, FILM COATED ORAL at 05:21

## 2023-01-01 RX ADMIN — CARVEDILOL 25 MG: 25 TABLET, FILM COATED ORAL at 09:52

## 2023-01-01 RX ADMIN — PANTOPRAZOLE SODIUM 40 MG: 40 TABLET, DELAYED RELEASE ORAL at 08:49

## 2023-01-01 RX ADMIN — HYDRALAZINE HYDROCHLORIDE 50 MG: 25 TABLET, FILM COATED ORAL at 05:29

## 2023-01-01 RX ADMIN — SULFAMETHOXAZOLE AND TRIMETHOPRIM 1 TABLET: 400; 80 TABLET ORAL at 09:19

## 2023-01-01 RX ADMIN — HEPARIN SODIUM 5000 UNITS: 5000 INJECTION, SOLUTION INTRAVENOUS; SUBCUTANEOUS at 15:34

## 2023-01-01 RX ADMIN — VANCOMYCIN HYDROCHLORIDE 125 MG: 125 CAPSULE ORAL at 10:04

## 2023-01-01 RX ADMIN — MESNA 620 MG: 100 INJECTION, SOLUTION INTRAVENOUS at 21:02

## 2023-01-01 RX ADMIN — MESNA 620 MG: 100 INJECTION, SOLUTION INTRAVENOUS at 16:46

## 2023-01-01 RX ADMIN — CEFEPIME 2 G: 2 INJECTION, POWDER, FOR SOLUTION INTRAVENOUS at 08:29

## 2023-01-01 RX ADMIN — PANTOPRAZOLE SODIUM 40 MG: 40 TABLET, DELAYED RELEASE ORAL at 09:00

## 2023-01-01 RX ADMIN — LABETALOL HYDROCHLORIDE 10 MG: 5 INJECTION INTRAVENOUS at 03:06

## 2023-01-01 RX ADMIN — HYDRALAZINE HYDROCHLORIDE 50 MG: 25 TABLET, FILM COATED ORAL at 04:43

## 2023-01-01 RX ADMIN — ISOSORBIDE MONONITRATE 60 MG: 30 TABLET, EXTENDED RELEASE ORAL at 08:56

## 2023-01-01 RX ADMIN — CARVEDILOL 12.5 MG: 12.5 TABLET, FILM COATED ORAL at 10:21

## 2023-01-01 RX ADMIN — HYDRALAZINE HYDROCHLORIDE 50 MG: 25 TABLET, FILM COATED ORAL at 04:57

## 2023-01-01 RX ADMIN — AMLODIPINE BESYLATE 10 MG: 10 TABLET ORAL at 08:55

## 2023-01-01 RX ADMIN — HYDROMORPHONE HYDROCHLORIDE 0.2 MG: 0.2 INJECTION, SOLUTION INTRAMUSCULAR; INTRAVENOUS; SUBCUTANEOUS at 06:57

## 2023-01-01 RX ADMIN — PIPERACILLIN AND TAZOBACTAM 3.38 G: 3; .375 INJECTION, POWDER, FOR SOLUTION INTRAVENOUS at 14:19

## 2023-01-01 RX ADMIN — SODIUM BICARBONATE 650 MG TABLET 1300 MG: at 15:34

## 2023-01-01 RX ADMIN — MORPHINE SULFATE 2 MG: 2 INJECTION, SOLUTION INTRAMUSCULAR; INTRAVENOUS at 14:33

## 2023-01-01 RX ADMIN — FOLIC ACID 1 MG: 1 TABLET ORAL at 08:55

## 2023-01-01 RX ADMIN — PANTOPRAZOLE SODIUM 40 MG: 40 TABLET, DELAYED RELEASE ORAL at 08:27

## 2023-01-01 RX ADMIN — METHYLPREDNISOLONE SODIUM SUCCINATE 62.5 MG: 125 INJECTION, POWDER, FOR SOLUTION INTRAMUSCULAR; INTRAVENOUS at 08:27

## 2023-01-01 RX ADMIN — PIPERACILLIN AND TAZOBACTAM 3.38 G: 3; .375 INJECTION, POWDER, FOR SOLUTION INTRAVENOUS at 20:00

## 2023-01-01 RX ADMIN — CEFEPIME 2 G: 2 INJECTION, POWDER, FOR SOLUTION INTRAVENOUS at 07:32

## 2023-01-01 RX ADMIN — ONDANSETRON 4 MG: 4 TABLET, ORALLY DISINTEGRATING ORAL at 10:04

## 2023-01-01 RX ADMIN — FUROSEMIDE 40 MG: 10 INJECTION, SOLUTION INTRAMUSCULAR; INTRAVENOUS at 08:03

## 2023-01-01 RX ADMIN — HYDROMORPHONE HYDROCHLORIDE 1 MG: 2 TABLET ORAL at 22:43

## 2023-01-01 RX ADMIN — SULFAMETHOXAZOLE AND TRIMETHOPRIM 1 TABLET: 400; 80 TABLET ORAL at 10:04

## 2023-01-01 RX ADMIN — ONDANSETRON 4 MG: 2 INJECTION INTRAMUSCULAR; INTRAVENOUS at 00:52

## 2023-01-01 RX ADMIN — PIPERACILLIN AND TAZOBACTAM 3.38 G: 3; .375 INJECTION, POWDER, FOR SOLUTION INTRAVENOUS at 14:40

## 2023-01-01 RX ADMIN — SODIUM BICARBONATE 650 MG TABLET 1300 MG: at 08:03

## 2023-01-01 RX ADMIN — HYDROXYCHLOROQUINE SULFATE 200 MG: 200 TABLET ORAL at 09:19

## 2023-01-01 RX ADMIN — HYDROMORPHONE HYDROCHLORIDE 0.3 MG: 1 INJECTION, SOLUTION INTRAMUSCULAR; INTRAVENOUS; SUBCUTANEOUS at 19:29

## 2023-01-01 RX ADMIN — POTASSIUM CHLORIDE 20 MEQ: 750 TABLET, EXTENDED RELEASE ORAL at 19:51

## 2023-01-01 RX ADMIN — ONDANSETRON 8 MG: 2 INJECTION INTRAMUSCULAR; INTRAVENOUS at 14:35

## 2023-01-01 RX ADMIN — PANTOPRAZOLE SODIUM 40 MG: 40 TABLET, DELAYED RELEASE ORAL at 10:33

## 2023-01-01 RX ADMIN — POTASSIUM CHLORIDE 20 MEQ: 750 TABLET, EXTENDED RELEASE ORAL at 20:46

## 2023-01-01 RX ADMIN — PREDNISONE 30 MG: 20 TABLET ORAL at 10:43

## 2023-01-01 RX ADMIN — SODIUM CHLORIDE 1000 ML: 9 INJECTION, SOLUTION INTRAVENOUS at 06:28

## 2023-01-01 RX ADMIN — SODIUM BICARBONATE 650 MG TABLET 1300 MG: at 08:49

## 2023-01-01 RX ADMIN — ISOSORBIDE MONONITRATE 60 MG: 30 TABLET, EXTENDED RELEASE ORAL at 10:04

## 2023-01-01 RX ADMIN — POTASSIUM CHLORIDE 20 MEQ: 750 TABLET, EXTENDED RELEASE ORAL at 10:04

## 2023-01-01 RX ADMIN — LISINOPRIL 30 MG: 20 TABLET ORAL at 11:26

## 2023-01-01 RX ADMIN — METHYLPREDNISOLONE SODIUM SUCCINATE 62.5 MG: 125 INJECTION, POWDER, FOR SOLUTION INTRAMUSCULAR; INTRAVENOUS at 09:05

## 2023-01-01 RX ADMIN — PIPERACILLIN AND TAZOBACTAM 3.38 G: 3; .375 INJECTION, POWDER, FOR SOLUTION INTRAVENOUS at 03:05

## 2023-01-01 RX ADMIN — LISINOPRIL 10 MG: 10 TABLET ORAL at 10:21

## 2023-01-01 RX ADMIN — ACETAMINOPHEN 650 MG: 325 TABLET, FILM COATED ORAL at 12:49

## 2023-01-01 RX ADMIN — AMLODIPINE BESYLATE 10 MG: 10 TABLET ORAL at 10:04

## 2023-01-01 RX ADMIN — POTASSIUM CHLORIDE 40 MEQ: 1500 TABLET, EXTENDED RELEASE ORAL at 08:57

## 2023-01-01 RX ADMIN — CARVEDILOL 25 MG: 25 TABLET, FILM COATED ORAL at 09:27

## 2023-01-01 RX ADMIN — IOPAMIDOL 62 ML: 755 INJECTION, SOLUTION INTRAVENOUS at 09:38

## 2023-01-01 RX ADMIN — SODIUM BICARBONATE 650 MG TABLET 1300 MG: at 19:47

## 2023-01-01 RX ADMIN — SODIUM BICARBONATE 650 MG TABLET 1300 MG: at 15:47

## 2023-01-01 RX ADMIN — SODIUM BICARBONATE 650 MG TABLET 1300 MG: at 20:44

## 2023-01-01 RX ADMIN — FOLIC ACID 1 MG: 1 TABLET ORAL at 08:45

## 2023-01-01 RX ADMIN — HYDROMORPHONE HYDROCHLORIDE 0.2 MG: 0.2 INJECTION, SOLUTION INTRAMUSCULAR; INTRAVENOUS; SUBCUTANEOUS at 23:31

## 2023-01-01 RX ADMIN — PIPERACILLIN AND TAZOBACTAM 3.38 G: 3; .375 INJECTION, POWDER, LYOPHILIZED, FOR SOLUTION INTRAVENOUS at 15:49

## 2023-01-01 RX ADMIN — LOSARTAN POTASSIUM 50 MG: 50 TABLET, FILM COATED ORAL at 08:58

## 2023-01-01 RX ADMIN — SODIUM BICARBONATE 650 MG TABLET 650 MG: at 08:45

## 2023-01-01 RX ADMIN — HYDROMORPHONE HYDROCHLORIDE 0.2 MG: 0.2 INJECTION, SOLUTION INTRAMUSCULAR; INTRAVENOUS; SUBCUTANEOUS at 02:07

## 2023-01-01 RX ADMIN — ISOSORBIDE MONONITRATE 60 MG: 30 TABLET, EXTENDED RELEASE ORAL at 10:25

## 2023-01-01 RX ADMIN — AMLODIPINE BESYLATE 10 MG: 10 TABLET ORAL at 08:58

## 2023-01-01 RX ADMIN — LOSARTAN POTASSIUM 50 MG: 50 TABLET, FILM COATED ORAL at 08:28

## 2023-01-01 RX ADMIN — ACETAMINOPHEN 650 MG: 325 TABLET, FILM COATED ORAL at 01:51

## 2023-01-01 RX ADMIN — SODIUM BICARBONATE 650 MG TABLET 1300 MG: at 08:58

## 2023-01-01 RX ADMIN — PIPERACILLIN AND TAZOBACTAM 3.38 G: 3; .375 INJECTION, POWDER, LYOPHILIZED, FOR SOLUTION INTRAVENOUS at 03:05

## 2023-01-01 RX ADMIN — HYDRALAZINE HYDROCHLORIDE 25 MG: 25 TABLET ORAL at 05:17

## 2023-01-01 RX ADMIN — HYDRALAZINE HYDROCHLORIDE 50 MG: 25 TABLET, FILM COATED ORAL at 20:51

## 2023-01-01 RX ADMIN — PREDNISONE 30 MG: 10 TABLET ORAL at 08:49

## 2023-01-01 RX ADMIN — EMPAGLIFLOZIN 10 MG: 10 TABLET, FILM COATED ORAL at 08:49

## 2023-01-01 RX ADMIN — PREDNISONE 60 MG: 10 TABLET ORAL at 09:02

## 2023-01-01 RX ADMIN — ISOSORBIDE MONONITRATE 60 MG: 60 TABLET, EXTENDED RELEASE ORAL at 09:52

## 2023-01-01 RX ADMIN — CARVEDILOL 25 MG: 25 TABLET, FILM COATED ORAL at 08:57

## 2023-01-01 RX ADMIN — CARVEDILOL 25 MG: 25 TABLET, FILM COATED ORAL at 08:21

## 2023-01-01 RX ADMIN — CARVEDILOL 25 MG: 25 TABLET, FILM COATED ORAL at 17:05

## 2023-01-01 RX ADMIN — HEPARIN SODIUM 600 UNITS/HR: 10000 INJECTION, SOLUTION INTRAVENOUS at 10:48

## 2023-01-01 RX ADMIN — Medication 2 SPRAY: at 13:37

## 2023-01-01 RX ADMIN — PIPERACILLIN AND TAZOBACTAM 3.38 G: 3; .375 INJECTION, POWDER, FOR SOLUTION INTRAVENOUS at 09:01

## 2023-01-01 RX ADMIN — HYDRALAZINE HYDROCHLORIDE 50 MG: 25 TABLET, FILM COATED ORAL at 20:45

## 2023-01-01 RX ADMIN — ASPIRIN 81 MG CHEWABLE TABLET 324 MG: 81 TABLET CHEWABLE at 10:36

## 2023-01-01 RX ADMIN — FOLIC ACID 1 MG: 1 TABLET ORAL at 09:15

## 2023-01-01 RX ADMIN — PIPERACILLIN AND TAZOBACTAM 3.38 G: 3; .375 INJECTION, POWDER, FOR SOLUTION INTRAVENOUS at 08:40

## 2023-01-01 RX ADMIN — AMLODIPINE BESYLATE 10 MG: 10 TABLET ORAL at 08:47

## 2023-01-01 RX ADMIN — CARVEDILOL 25 MG: 12.5 TABLET, FILM COATED ORAL at 19:30

## 2023-01-01 RX ADMIN — TRANEXAMIC ACID 500 MG: 100 INJECTION INTRAVENOUS at 21:29

## 2023-01-01 RX ADMIN — POTASSIUM CHLORIDE 20 MEQ: 750 TABLET, EXTENDED RELEASE ORAL at 23:06

## 2023-01-01 RX ADMIN — CHOLECALCIFEROL (VITAMIN D3) 10 MCG (400 UNIT) TABLET 10 MCG: at 09:04

## 2023-01-01 RX ADMIN — CARVEDILOL 12.5 MG: 12.5 TABLET, FILM COATED ORAL at 17:53

## 2023-01-01 RX ADMIN — ISOSORBIDE MONONITRATE 60 MG: 60 TABLET, EXTENDED RELEASE ORAL at 08:49

## 2023-01-01 RX ADMIN — MESNA 620 MG: 100 INJECTION, SOLUTION INTRAVENOUS at 00:35

## 2023-01-01 RX ADMIN — ACETAMINOPHEN 650 MG: 325 TABLET, FILM COATED ORAL at 06:03

## 2023-01-01 RX ADMIN — PANTOPRAZOLE SODIUM 40 MG: 40 INJECTION, POWDER, FOR SOLUTION INTRAVENOUS at 09:03

## 2023-01-01 RX ADMIN — CARVEDILOL 12.5 MG: 12.5 TABLET, FILM COATED ORAL at 17:15

## 2023-01-01 RX ADMIN — ACETAMINOPHEN 650 MG: 325 TABLET, FILM COATED ORAL at 05:17

## 2023-01-01 RX ADMIN — HUMAN ALBUMIN MICROSPHERES AND PERFLUTREN 3 ML: 10; .22 INJECTION, SOLUTION INTRAVENOUS at 15:35

## 2023-01-01 RX ADMIN — FERROUS SULFATE TAB 325 MG (65 MG ELEMENTAL FE) 325 MG: 325 (65 FE) TAB at 09:04

## 2023-01-01 RX ADMIN — VANCOMYCIN HYDROCHLORIDE 125 MG: 125 CAPSULE ORAL at 20:26

## 2023-01-01 RX ADMIN — FUROSEMIDE 40 MG: 10 INJECTION, SOLUTION INTRAMUSCULAR; INTRAVENOUS at 16:16

## 2023-01-01 RX ADMIN — HYDRALAZINE HYDROCHLORIDE 25 MG: 25 TABLET ORAL at 21:00

## 2023-01-01 RX ADMIN — PIPERACILLIN AND TAZOBACTAM 3.38 G: 3; .375 INJECTION, POWDER, FOR SOLUTION INTRAVENOUS at 02:14

## 2023-01-01 RX ADMIN — GADOBUTROL 7.5 ML: 604.72 INJECTION INTRAVENOUS at 14:47

## 2023-01-01 RX ADMIN — PIPERACILLIN AND TAZOBACTAM 2.25 G: 2; .25 INJECTION, POWDER, FOR SOLUTION INTRAVENOUS at 16:55

## 2023-01-01 RX ADMIN — CARVEDILOL 25 MG: 25 TABLET, FILM COATED ORAL at 08:49

## 2023-01-01 RX ADMIN — HYDRALAZINE HYDROCHLORIDE 50 MG: 25 TABLET, FILM COATED ORAL at 05:16

## 2023-01-01 RX ADMIN — PIPERACILLIN AND TAZOBACTAM 2.25 G: 2; .25 INJECTION, POWDER, FOR SOLUTION INTRAVENOUS at 04:40

## 2023-01-01 RX ADMIN — PIPERACILLIN AND TAZOBACTAM 3.38 G: 3; .375 INJECTION, POWDER, FOR SOLUTION INTRAVENOUS at 05:20

## 2023-01-01 RX ADMIN — MORPHINE SULFATE 2 MG: 2 INJECTION, SOLUTION INTRAMUSCULAR; INTRAVENOUS at 07:29

## 2023-01-01 RX ADMIN — SODIUM BICARBONATE 650 MG TABLET 1300 MG: at 13:49

## 2023-01-01 RX ADMIN — PIPERACILLIN AND TAZOBACTAM 3.38 G: 3; .375 INJECTION, POWDER, FOR SOLUTION INTRAVENOUS at 20:13

## 2023-01-01 RX ADMIN — PREDNISONE 30 MG: 20 TABLET ORAL at 10:15

## 2023-01-01 RX ADMIN — AMOXICILLIN AND CLAVULANATE POTASSIUM 1 TABLET: 500; 125 TABLET, FILM COATED ORAL at 08:49

## 2023-01-01 RX ADMIN — METOPROLOL SUCCINATE 50 MG: 50 TABLET, EXTENDED RELEASE ORAL at 16:26

## 2023-01-01 RX ADMIN — HYDRALAZINE HYDROCHLORIDE 50 MG: 25 TABLET, FILM COATED ORAL at 05:27

## 2023-01-01 RX ADMIN — SODIUM BICARBONATE 650 MG TABLET 650 MG: at 13:54

## 2023-01-01 RX ADMIN — VANCOMYCIN HYDROCHLORIDE 125 MG: 125 CAPSULE ORAL at 09:52

## 2023-01-01 RX ADMIN — ISOSORBIDE MONONITRATE 60 MG: 60 TABLET, EXTENDED RELEASE ORAL at 10:43

## 2023-01-01 RX ADMIN — SODIUM BICARBONATE 650 MG TABLET 1300 MG: at 21:03

## 2023-01-01 RX ADMIN — MESNA 100 MG: 100 INJECTION, SOLUTION INTRAVENOUS at 20:39

## 2023-01-01 RX ADMIN — CHOLECALCIFEROL (VITAMIN D3) 10 MCG (400 UNIT) TABLET 10 MCG: at 08:27

## 2023-01-01 RX ADMIN — EMPAGLIFLOZIN 10 MG: 10 TABLET, FILM COATED ORAL at 10:16

## 2023-01-01 RX ADMIN — HYDROMORPHONE HYDROCHLORIDE 0.3 MG: 1 INJECTION, SOLUTION INTRAMUSCULAR; INTRAVENOUS; SUBCUTANEOUS at 19:26

## 2023-01-01 RX ADMIN — SODIUM BICARBONATE 650 MG TABLET 1300 MG: at 10:31

## 2023-01-01 RX ADMIN — HYDROMORPHONE HYDROCHLORIDE 0.3 MG: 1 INJECTION, SOLUTION INTRAMUSCULAR; INTRAVENOUS; SUBCUTANEOUS at 10:37

## 2023-01-01 RX ADMIN — PANTOPRAZOLE SODIUM 40 MG: 40 INJECTION, POWDER, FOR SOLUTION INTRAVENOUS at 10:25

## 2023-01-01 RX ADMIN — CHOLECALCIFEROL (VITAMIN D3) 10 MCG (400 UNIT) TABLET 10 MCG: at 10:43

## 2023-01-01 RX ADMIN — SULFAMETHOXAZOLE AND TRIMETHOPRIM 1 TABLET: 400; 80 TABLET ORAL at 14:21

## 2023-01-01 RX ADMIN — SODIUM BICARBONATE 650 MG TABLET 1300 MG: at 13:20

## 2023-01-01 RX ADMIN — PREDNISONE 60 MG: 10 TABLET ORAL at 08:21

## 2023-01-01 RX ADMIN — ONDANSETRON 4 MG: 2 INJECTION INTRAMUSCULAR; INTRAVENOUS at 05:27

## 2023-01-01 RX ADMIN — MYCOPHENOLATE MOFETIL 1500 MG: 500 TABLET ORAL at 20:34

## 2023-01-01 RX ADMIN — SULFAMETHOXAZOLE AND TRIMETHOPRIM 1 TABLET: 400; 80 TABLET ORAL at 08:28

## 2023-01-01 RX ADMIN — PIPERACILLIN AND TAZOBACTAM 3.38 G: 3; .375 INJECTION, POWDER, LYOPHILIZED, FOR SOLUTION INTRAVENOUS at 04:13

## 2023-01-01 RX ADMIN — ISOSORBIDE MONONITRATE 60 MG: 60 TABLET, EXTENDED RELEASE ORAL at 10:35

## 2023-01-01 RX ADMIN — HYDROMORPHONE HYDROCHLORIDE 0.2 MG: 0.2 INJECTION, SOLUTION INTRAMUSCULAR; INTRAVENOUS; SUBCUTANEOUS at 15:57

## 2023-01-01 RX ADMIN — FUROSEMIDE 40 MG: 10 INJECTION, SOLUTION INTRAMUSCULAR; INTRAVENOUS at 08:15

## 2023-01-01 RX ADMIN — SODIUM CHLORIDE 500 ML: 9 INJECTION, SOLUTION INTRAVENOUS at 14:35

## 2023-01-01 RX ADMIN — MYCOPHENOLATE MOFETIL 1500 MG: 500 TABLET, FILM COATED ORAL at 08:57

## 2023-01-01 RX ADMIN — HYDROMORPHONE HYDROCHLORIDE 0.2 MG: 0.2 INJECTION, SOLUTION INTRAMUSCULAR; INTRAVENOUS; SUBCUTANEOUS at 04:49

## 2023-01-01 RX ADMIN — SODIUM BICARBONATE 650 MG TABLET 1300 MG: at 13:21

## 2023-01-01 RX ADMIN — HYDROMORPHONE HYDROCHLORIDE 0.3 MG: 1 INJECTION, SOLUTION INTRAMUSCULAR; INTRAVENOUS; SUBCUTANEOUS at 23:32

## 2023-01-01 RX ADMIN — FOLIC ACID 1 MG: 1 TABLET ORAL at 09:52

## 2023-01-01 RX ADMIN — CARVEDILOL 25 MG: 25 TABLET, FILM COATED ORAL at 19:18

## 2023-01-01 RX ADMIN — HYDROMORPHONE HYDROCHLORIDE 1 MG: 2 TABLET ORAL at 05:17

## 2023-01-01 RX ADMIN — LISINOPRIL 40 MG: 20 TABLET ORAL at 09:27

## 2023-01-01 RX ADMIN — SODIUM CHLORIDE 500 MG: 9 INJECTION, SOLUTION INTRAVENOUS at 15:08

## 2023-01-01 RX ADMIN — HYDROMORPHONE HYDROCHLORIDE 0.2 MG: 0.2 INJECTION, SOLUTION INTRAMUSCULAR; INTRAVENOUS; SUBCUTANEOUS at 06:39

## 2023-01-01 RX ADMIN — CARVEDILOL 25 MG: 25 TABLET, FILM COATED ORAL at 19:50

## 2023-01-01 RX ADMIN — VANCOMYCIN HYDROCHLORIDE 125 MG: 125 CAPSULE ORAL at 08:55

## 2023-01-01 RX ADMIN — MYCOPHENOLATE MOFETIL 1500 MG: 500 TABLET ORAL at 09:03

## 2023-01-01 RX ADMIN — CYCLOPHOSPHAMIDE 500 MG: 500 INJECTION, POWDER, FOR SOLUTION INTRAVENOUS; ORAL at 15:08

## 2023-01-01 RX ADMIN — CARVEDILOL 25 MG: 25 TABLET, FILM COATED ORAL at 18:17

## 2023-01-01 RX ADMIN — TORSEMIDE 20 MG: 20 TABLET ORAL at 10:15

## 2023-01-01 RX ADMIN — PREDNISONE 30 MG: 20 TABLET ORAL at 09:52

## 2023-01-01 RX ADMIN — PREDNISONE 30 MG: 20 TABLET ORAL at 10:34

## 2023-01-01 RX ADMIN — HYDROMORPHONE HYDROCHLORIDE 0.2 MG: 0.2 INJECTION, SOLUTION INTRAMUSCULAR; INTRAVENOUS; SUBCUTANEOUS at 22:02

## 2023-01-01 RX ADMIN — ACETAMINOPHEN 650 MG: 325 TABLET, FILM COATED ORAL at 17:11

## 2023-01-01 RX ADMIN — Medication 2 SPRAY: at 21:06

## 2023-01-01 RX ADMIN — MESNA 100 MG: 100 INJECTION, SOLUTION INTRAVENOUS at 00:19

## 2023-01-01 RX ADMIN — SPIRONOLACTONE 25 MG: 25 TABLET ORAL at 10:35

## 2023-01-01 RX ADMIN — SODIUM BICARBONATE 650 MG TABLET 1300 MG: at 19:18

## 2023-01-01 RX ADMIN — MAGNESIUM SULFATE HEPTAHYDRATE 2 G: 2 INJECTION, SOLUTION INTRAVENOUS at 21:04

## 2023-01-01 RX ADMIN — POTASSIUM CHLORIDE 20 MEQ: 750 TABLET, EXTENDED RELEASE ORAL at 10:34

## 2023-01-01 RX ADMIN — IOPAMIDOL 80 ML: 755 INJECTION, SOLUTION INTRAVENOUS at 21:54

## 2023-01-01 RX ADMIN — HYDRALAZINE HYDROCHLORIDE 50 MG: 25 TABLET, FILM COATED ORAL at 13:06

## 2023-01-01 RX ADMIN — FENTANYL CITRATE 25 MCG: 50 INJECTION, SOLUTION INTRAMUSCULAR; INTRAVENOUS at 11:12

## 2023-01-01 RX ADMIN — CARVEDILOL 12.5 MG: 12.5 TABLET, FILM COATED ORAL at 08:58

## 2023-01-01 RX ADMIN — PIPERACILLIN AND TAZOBACTAM 3.38 G: 3; .375 INJECTION, POWDER, FOR SOLUTION INTRAVENOUS at 14:09

## 2023-01-01 RX ADMIN — HYDROMORPHONE HYDROCHLORIDE 0.5 MG: 1 INJECTION, SOLUTION INTRAMUSCULAR; INTRAVENOUS; SUBCUTANEOUS at 07:29

## 2023-01-01 RX ADMIN — FUROSEMIDE 40 MG: 10 INJECTION, SOLUTION INTRAMUSCULAR; INTRAVENOUS at 09:19

## 2023-01-01 RX ADMIN — ACETAMINOPHEN 650 MG: 325 TABLET, FILM COATED ORAL at 02:00

## 2023-01-01 RX ADMIN — VANCOMYCIN HYDROCHLORIDE 125 MG: 125 CAPSULE ORAL at 08:49

## 2023-01-01 RX ADMIN — CYCLOPHOSPHAMIDE 500 MG: 1 INJECTION, POWDER, FOR SOLUTION INTRAVENOUS; ORAL at 20:42

## 2023-01-01 RX ADMIN — HYDROMORPHONE HYDROCHLORIDE 0.3 MG: 1 INJECTION, SOLUTION INTRAMUSCULAR; INTRAVENOUS; SUBCUTANEOUS at 02:37

## 2023-01-01 RX ADMIN — NITROGLYCERIN 80 MCG/MIN: 20 INJECTION INTRAVENOUS at 01:05

## 2023-01-01 RX ADMIN — LIDOCAINE HYDROCHLORIDE 0.1 ML: 10 INJECTION, SOLUTION EPIDURAL; INFILTRATION; INTRACAUDAL; PERINEURAL at 23:50

## 2023-01-01 RX ADMIN — CARVEDILOL 25 MG: 25 TABLET, FILM COATED ORAL at 10:14

## 2023-01-01 RX ADMIN — SODIUM BICARBONATE 650 MG TABLET 650 MG: at 09:02

## 2023-01-01 RX ADMIN — VANCOMYCIN HYDROCHLORIDE 125 MG: 125 CAPSULE ORAL at 19:50

## 2023-01-01 RX ADMIN — VANCOMYCIN HYDROCHLORIDE 125 MG: 125 CAPSULE ORAL at 21:07

## 2023-01-01 RX ADMIN — LISINOPRIL 40 MG: 20 TABLET ORAL at 09:52

## 2023-01-01 RX ADMIN — REMDESIVIR 200 MG: 100 INJECTION, POWDER, LYOPHILIZED, FOR SOLUTION INTRAVENOUS at 10:39

## 2023-01-01 RX ADMIN — HYDRALAZINE HYDROCHLORIDE 10 MG: 10 TABLET ORAL at 14:02

## 2023-01-01 RX ADMIN — SULFAMETHOXAZOLE AND TRIMETHOPRIM 1 TABLET: 400; 80 TABLET ORAL at 09:06

## 2023-01-01 RX ADMIN — CLONIDINE HYDROCHLORIDE 0.1 MG: 0.1 TABLET ORAL at 13:00

## 2023-01-01 RX ADMIN — SODIUM BICARBONATE 650 MG TABLET 1300 MG: at 09:14

## 2023-01-01 RX ADMIN — POTASSIUM CHLORIDE 40 MEQ: 750 TABLET, EXTENDED RELEASE ORAL at 15:34

## 2023-01-01 RX ADMIN — VANCOMYCIN HYDROCHLORIDE 125 MG: 125 CAPSULE ORAL at 21:05

## 2023-01-01 RX ADMIN — Medication 2 SPRAY: at 14:20

## 2023-01-01 RX ADMIN — CEFEPIME 2 G: 2 INJECTION, POWDER, FOR SOLUTION INTRAVENOUS at 07:58

## 2023-01-01 RX ADMIN — MYCOPHENOLATE MOFETIL 1500 MG: 500 TABLET, FILM COATED ORAL at 09:54

## 2023-01-01 RX ADMIN — SACUBITRIL AND VALSARTAN 1 TABLET: 24; 26 TABLET, FILM COATED ORAL at 20:45

## 2023-01-01 RX ADMIN — VANCOMYCIN HYDROCHLORIDE 125 MG: 125 CAPSULE ORAL at 20:44

## 2023-01-01 RX ADMIN — POTASSIUM CHLORIDE 20 MEQ: 1500 TABLET, EXTENDED RELEASE ORAL at 09:18

## 2023-01-01 RX ADMIN — MYCOPHENOLATE MOFETIL 1500 MG: 500 TABLET ORAL at 21:05

## 2023-01-01 RX ADMIN — MORPHINE SULFATE 2 MG: 2 INJECTION, SOLUTION INTRAMUSCULAR; INTRAVENOUS at 00:43

## 2023-01-01 RX ADMIN — PREDNISONE 30 MG: 20 TABLET ORAL at 08:57

## 2023-01-01 RX ADMIN — SODIUM CHLORIDE 50 ML: 900 INJECTION INTRAVENOUS at 11:35

## 2023-01-01 RX ADMIN — HYDROMORPHONE HYDROCHLORIDE 0.5 MG: 1 INJECTION, SOLUTION INTRAMUSCULAR; INTRAVENOUS; SUBCUTANEOUS at 09:55

## 2023-01-01 RX ADMIN — SULFAMETHOXAZOLE AND TRIMETHOPRIM 1 TABLET: 400; 80 TABLET ORAL at 10:34

## 2023-01-01 RX ADMIN — CARVEDILOL 25 MG: 25 TABLET, FILM COATED ORAL at 10:12

## 2023-01-01 RX ADMIN — FUROSEMIDE 40 MG: 10 INJECTION, SOLUTION INTRAMUSCULAR; INTRAVENOUS at 01:09

## 2023-01-01 RX ADMIN — CARVEDILOL 25 MG: 25 TABLET, FILM COATED ORAL at 08:59

## 2023-01-01 RX ADMIN — FERROUS SULFATE TAB 325 MG (65 MG ELEMENTAL FE) 325 MG: 325 (65 FE) TAB at 08:55

## 2023-01-01 RX ADMIN — MESNA 290 MG: 100 INJECTION, SOLUTION INTRAVENOUS at 14:17

## 2023-01-01 RX ADMIN — SODIUM CHLORIDE 500 ML: 9 INJECTION, SOLUTION INTRAVENOUS at 15:30

## 2023-01-01 RX ADMIN — PIPERACILLIN AND TAZOBACTAM 3.38 G: 3; .375 INJECTION, POWDER, LYOPHILIZED, FOR SOLUTION INTRAVENOUS at 09:51

## 2023-01-01 RX ADMIN — ISOSORBIDE MONONITRATE 60 MG: 60 TABLET, EXTENDED RELEASE ORAL at 10:12

## 2023-01-01 RX ADMIN — FOLIC ACID 1 MG: 1 TABLET ORAL at 14:21

## 2023-01-01 RX ADMIN — PIPERACILLIN AND TAZOBACTAM 3.38 G: 3; .375 INJECTION, POWDER, FOR SOLUTION INTRAVENOUS at 02:07

## 2023-01-01 RX ADMIN — ONDANSETRON 4 MG: 4 TABLET, ORALLY DISINTEGRATING ORAL at 14:19

## 2023-01-01 RX ADMIN — CHOLECALCIFEROL (VITAMIN D3) 10 MCG (400 UNIT) TABLET 10 MCG: at 10:16

## 2023-01-01 RX ADMIN — MESNA 100 MG: 100 INJECTION, SOLUTION INTRAVENOUS at 04:38

## 2023-01-01 RX ADMIN — VANCOMYCIN HYDROCHLORIDE 125 MG: 125 CAPSULE ORAL at 09:19

## 2023-01-01 RX ADMIN — CEFEPIME 2 G: 2 INJECTION, POWDER, FOR SOLUTION INTRAVENOUS at 09:05

## 2023-01-01 RX ADMIN — PREDNISONE 30 MG: 20 TABLET ORAL at 08:28

## 2023-01-01 RX ADMIN — CARVEDILOL 25 MG: 12.5 TABLET, FILM COATED ORAL at 17:41

## 2023-01-01 RX ADMIN — VANCOMYCIN HYDROCHLORIDE 125 MG: 125 CAPSULE ORAL at 20:02

## 2023-01-01 RX ADMIN — SPIRONOLACTONE 25 MG: 25 TABLET ORAL at 10:15

## 2023-01-01 RX ADMIN — FUROSEMIDE: 10 INJECTION, SOLUTION INTRAVENOUS at 06:21

## 2023-01-01 RX ADMIN — HYDROMORPHONE HYDROCHLORIDE 1 MG: 2 TABLET ORAL at 04:22

## 2023-01-01 RX ADMIN — TRANEXAMIC ACID 500 MG: 100 INJECTION INTRAVENOUS at 08:31

## 2023-01-01 RX ADMIN — AMLODIPINE BESYLATE 10 MG: 10 TABLET ORAL at 08:57

## 2023-01-01 RX ADMIN — LABETALOL HYDROCHLORIDE 20 MG: 5 INJECTION, SOLUTION INTRAVENOUS at 01:47

## 2023-01-01 RX ADMIN — POTASSIUM CHLORIDE 20 MEQ: 750 TABLET, EXTENDED RELEASE ORAL at 20:55

## 2023-01-01 RX ADMIN — SPIRONOLACTONE 25 MG: 25 TABLET ORAL at 10:44

## 2023-01-01 RX ADMIN — CHOLECALCIFEROL (VITAMIN D3) 10 MCG (400 UNIT) TABLET 10 MCG: at 10:33

## 2023-01-01 RX ADMIN — PIPERACILLIN AND TAZOBACTAM 3.38 G: 3; .375 INJECTION, POWDER, FOR SOLUTION INTRAVENOUS at 20:17

## 2023-01-01 RX ADMIN — SODIUM BICARBONATE: 84 INJECTION, SOLUTION INTRAVENOUS at 03:15

## 2023-01-01 RX ADMIN — HYDROMORPHONE HYDROCHLORIDE 0.3 MG: 1 INJECTION, SOLUTION INTRAMUSCULAR; INTRAVENOUS; SUBCUTANEOUS at 15:15

## 2023-01-01 RX ADMIN — MORPHINE SULFATE 2 MG: 2 INJECTION, SOLUTION INTRAMUSCULAR; INTRAVENOUS at 18:45

## 2023-01-01 RX ADMIN — HYDRALAZINE HYDROCHLORIDE 25 MG: 25 TABLET ORAL at 23:20

## 2023-01-01 RX ADMIN — MORPHINE SULFATE 2 MG: 2 INJECTION, SOLUTION INTRAMUSCULAR; INTRAVENOUS at 11:15

## 2023-01-01 RX ADMIN — ACETAMINOPHEN 650 MG: 325 TABLET, FILM COATED ORAL at 06:25

## 2023-01-01 RX ADMIN — CARVEDILOL 12.5 MG: 12.5 TABLET, FILM COATED ORAL at 08:55

## 2023-01-01 RX ADMIN — IPRATROPIUM BROMIDE AND ALBUTEROL SULFATE 3 ML: .5; 3 SOLUTION RESPIRATORY (INHALATION) at 00:26

## 2023-01-01 RX ADMIN — TORSEMIDE 20 MG: 20 TABLET ORAL at 16:29

## 2023-01-01 RX ADMIN — HYDROMORPHONE HYDROCHLORIDE 1 MG: 2 TABLET ORAL at 20:07

## 2023-01-01 RX ADMIN — SODIUM BICARBONATE: 84 INJECTION, SOLUTION INTRAVENOUS at 15:50

## 2023-01-01 RX ADMIN — ISOSORBIDE MONONITRATE 60 MG: 30 TABLET, EXTENDED RELEASE ORAL at 08:55

## 2023-01-01 RX ADMIN — FOLIC ACID 1 MG: 1 TABLET ORAL at 09:03

## 2023-01-01 RX ADMIN — SODIUM BICARBONATE 650 MG TABLET 1300 MG: at 14:10

## 2023-01-01 RX ADMIN — NITROGLYCERIN 10 MCG/MIN: 20 INJECTION INTRAVENOUS at 12:08

## 2023-01-01 RX ADMIN — FOLIC ACID 1 MG: 1 TABLET ORAL at 08:28

## 2023-01-01 RX ADMIN — MYCOPHENOLATE MOFETIL 1500 MG: 500 TABLET, FILM COATED ORAL at 20:45

## 2023-01-01 RX ADMIN — ISOSORBIDE MONONITRATE 60 MG: 60 TABLET, EXTENDED RELEASE ORAL at 08:58

## 2023-01-01 RX ADMIN — PIPERACILLIN AND TAZOBACTAM 3.38 G: 3; .375 INJECTION, POWDER, FOR SOLUTION INTRAVENOUS at 20:33

## 2023-01-01 RX ADMIN — POTASSIUM CHLORIDE 20 MEQ: 1500 TABLET, EXTENDED RELEASE ORAL at 13:42

## 2023-01-01 RX ADMIN — ACETAMINOPHEN 650 MG: 325 TABLET, FILM COATED ORAL at 18:55

## 2023-01-01 RX ADMIN — ISOSORBIDE MONONITRATE 60 MG: 60 TABLET, EXTENDED RELEASE ORAL at 08:21

## 2023-01-01 RX ADMIN — SODIUM BICARBONATE 650 MG TABLET 1300 MG: at 10:15

## 2023-01-01 RX ADMIN — MYCOPHENOLATE MOFETIL 1500 MG: 500 TABLET, FILM COATED ORAL at 10:44

## 2023-01-01 RX ADMIN — HYDRALAZINE HYDROCHLORIDE 50 MG: 25 TABLET, FILM COATED ORAL at 12:31

## 2023-01-01 RX ADMIN — FOLIC ACID 1 MG: 1 TABLET ORAL at 08:21

## 2023-01-01 RX ADMIN — POTASSIUM CHLORIDE 20 MEQ: 750 TABLET, EXTENDED RELEASE ORAL at 10:15

## 2023-01-01 RX ADMIN — ACETAMINOPHEN 650 MG: 325 TABLET, FILM COATED ORAL at 04:11

## 2023-01-01 RX ADMIN — HYDRALAZINE HYDROCHLORIDE 25 MG: 25 TABLET ORAL at 15:07

## 2023-01-01 RX ADMIN — MYCOPHENOLATE MOFETIL 1500 MG: 500 TABLET ORAL at 08:47

## 2023-01-01 RX ADMIN — POTASSIUM CHLORIDE 40 MEQ: 750 TABLET, EXTENDED RELEASE ORAL at 09:06

## 2023-01-01 RX ADMIN — HEPARIN SODIUM 4 BAG: 200 INJECTION, SOLUTION INTRAVENOUS at 11:54

## 2023-01-01 RX ADMIN — AMLODIPINE BESYLATE 10 MG: 10 TABLET ORAL at 09:27

## 2023-01-01 RX ADMIN — FERROUS SULFATE TAB 325 MG (65 MG ELEMENTAL FE) 325 MG: 325 (65 FE) TAB at 08:58

## 2023-01-01 RX ADMIN — HYDROMORPHONE HYDROCHLORIDE 0.2 MG: 0.2 INJECTION, SOLUTION INTRAMUSCULAR; INTRAVENOUS; SUBCUTANEOUS at 09:22

## 2023-01-01 RX ADMIN — FOLIC ACID 1 MG: 1 TABLET ORAL at 09:00

## 2023-01-01 RX ADMIN — FOLIC ACID 1 MG: 1 TABLET ORAL at 08:50

## 2023-01-01 RX ADMIN — HYDRALAZINE HYDROCHLORIDE 50 MG: 25 TABLET, FILM COATED ORAL at 20:23

## 2023-01-01 RX ADMIN — VITAM B12 100 MCG: 100 TAB at 11:47

## 2023-01-01 RX ADMIN — FOLIC ACID 1 MG: 1 TABLET ORAL at 10:12

## 2023-01-01 RX ADMIN — SODIUM CHLORIDE 1000 ML: 9 INJECTION, SOLUTION INTRAVENOUS at 04:39

## 2023-01-01 RX ADMIN — MYCOPHENOLATE MOFETIL 1500 MG: 500 TABLET, FILM COATED ORAL at 20:26

## 2023-01-01 RX ADMIN — PIPERACILLIN AND TAZOBACTAM 3.38 G: 3; .375 INJECTION, POWDER, FOR SOLUTION INTRAVENOUS at 08:41

## 2023-01-01 RX ADMIN — MYCOPHENOLATE MOFETIL 1500 MG: 500 TABLET, FILM COATED ORAL at 20:50

## 2023-01-01 RX ADMIN — PREDNISONE 30 MG: 20 TABLET ORAL at 08:50

## 2023-01-01 RX ADMIN — SODIUM BICARBONATE 650 MG TABLET 650 MG: at 19:46

## 2023-01-01 RX ADMIN — LISINOPRIL 40 MG: 40 TABLET ORAL at 10:35

## 2023-01-01 RX ADMIN — TETROFOSMIN 12 MILLICURIE: 1.38 INJECTION, POWDER, LYOPHILIZED, FOR SOLUTION INTRAVENOUS at 10:55

## 2023-01-01 RX ADMIN — SODIUM BICARBONATE 650 MG TABLET 1300 MG: at 14:32

## 2023-01-01 RX ADMIN — VITAM B12 100 MCG: 100 TAB at 08:59

## 2023-01-01 RX ADMIN — AMOXICILLIN AND CLAVULANATE POTASSIUM 1 TABLET: 500; 125 TABLET, FILM COATED ORAL at 09:04

## 2023-01-01 RX ADMIN — SPIRONOLACTONE 25 MG: 25 TABLET ORAL at 08:28

## 2023-01-01 RX ADMIN — ACETAMINOPHEN 1000 MG: 500 TABLET, FILM COATED ORAL at 17:40

## 2023-01-01 RX ADMIN — ONDANSETRON 8 MG: 2 INJECTION INTRAMUSCULAR; INTRAVENOUS at 12:48

## 2023-01-01 RX ADMIN — VITAM B12 100 MCG: 100 TAB at 16:47

## 2023-01-01 RX ADMIN — PREDNISONE 60 MG: 10 TABLET ORAL at 10:12

## 2023-01-01 RX ADMIN — FOLIC ACID 1 MG: 1 TABLET ORAL at 09:19

## 2023-01-01 RX ADMIN — ISOSORBIDE MONONITRATE 60 MG: 30 TABLET, EXTENDED RELEASE ORAL at 09:04

## 2023-01-01 RX ADMIN — TORSEMIDE 20 MG: 20 TABLET ORAL at 08:28

## 2023-01-01 RX ADMIN — SACUBITRIL AND VALSARTAN 1 TABLET: 24; 26 TABLET, FILM COATED ORAL at 13:20

## 2023-01-01 RX ADMIN — MYCOPHENOLATE MOFETIL 1500 MG: 500 TABLET ORAL at 21:07

## 2023-01-01 RX ADMIN — SODIUM BICARBONATE 650 MG TABLET 650 MG: at 21:07

## 2023-01-01 RX ADMIN — CARVEDILOL 25 MG: 25 TABLET, FILM COATED ORAL at 08:28

## 2023-01-01 RX ADMIN — CARVEDILOL 25 MG: 25 TABLET, FILM COATED ORAL at 18:18

## 2023-01-01 RX ADMIN — PIPERACILLIN AND TAZOBACTAM 3.38 G: 3; .375 INJECTION, POWDER, LYOPHILIZED, FOR SOLUTION INTRAVENOUS at 17:07

## 2023-01-01 RX ADMIN — POTASSIUM CHLORIDE 40 MEQ: 750 TABLET, EXTENDED RELEASE ORAL at 10:01

## 2023-01-01 RX ADMIN — HYDRALAZINE HYDROCHLORIDE 25 MG: 25 TABLET ORAL at 13:37

## 2023-01-01 RX ADMIN — LABETALOL HYDROCHLORIDE 20 MG: 5 INJECTION, SOLUTION INTRAVENOUS at 06:46

## 2023-01-01 RX ADMIN — PANTOPRAZOLE SODIUM 40 MG: 40 TABLET, DELAYED RELEASE ORAL at 08:50

## 2023-01-01 RX ADMIN — CARVEDILOL 12.5 MG: 12.5 TABLET, FILM COATED ORAL at 09:04

## 2023-01-01 RX ADMIN — MYCOPHENOLATE MOFETIL 1500 MG: 500 TABLET, FILM COATED ORAL at 10:15

## 2023-01-01 RX ADMIN — HYDRALAZINE HYDROCHLORIDE 50 MG: 25 TABLET, FILM COATED ORAL at 13:20

## 2023-01-01 RX ADMIN — FOLIC ACID 1 MG: 1 TABLET ORAL at 10:15

## 2023-01-01 RX ADMIN — POTASSIUM CHLORIDE 20 MEQ: 750 TABLET, EXTENDED RELEASE ORAL at 09:52

## 2023-01-01 RX ADMIN — SODIUM BICARBONATE 650 MG TABLET 1300 MG: at 20:22

## 2023-01-01 RX ADMIN — PREDNISONE 60 MG: 10 TABLET ORAL at 08:59

## 2023-01-01 RX ADMIN — FERROUS SULFATE TAB 325 MG (65 MG ELEMENTAL FE) 325 MG: 325 (65 FE) TAB at 10:15

## 2023-01-01 RX ADMIN — FENTANYL CITRATE 25 MCG: 50 INJECTION, SOLUTION INTRAMUSCULAR; INTRAVENOUS at 11:18

## 2023-01-01 RX ADMIN — AMOXICILLIN AND CLAVULANATE POTASSIUM 1 TABLET: 875; 125 TABLET, FILM COATED ORAL at 20:50

## 2023-01-01 RX ADMIN — SULFAMETHOXAZOLE AND TRIMETHOPRIM 1 TABLET: 400; 80 TABLET ORAL at 08:50

## 2023-01-01 RX ADMIN — AMOXICILLIN AND CLAVULANATE POTASSIUM 1 TABLET: 500; 125 TABLET, FILM COATED ORAL at 10:14

## 2023-01-01 RX ADMIN — POTASSIUM CHLORIDE 20 MEQ: 1500 TABLET, EXTENDED RELEASE ORAL at 22:02

## 2023-01-01 RX ADMIN — ACETAMINOPHEN 650 MG: 325 TABLET, FILM COATED ORAL at 02:27

## 2023-01-01 RX ADMIN — SODIUM CHLORIDE 500 ML: 9 INJECTION, SOLUTION INTRAVENOUS at 15:43

## 2023-01-01 RX ADMIN — HYDROMORPHONE HYDROCHLORIDE 1 MG: 2 TABLET ORAL at 18:55

## 2023-01-01 RX ADMIN — HYDRALAZINE HYDROCHLORIDE 25 MG: 25 TABLET ORAL at 21:07

## 2023-01-01 RX ADMIN — NITROGLYCERIN 80 MCG/MIN: 20 INJECTION INTRAVENOUS at 14:27

## 2023-01-01 RX ADMIN — EMPAGLIFLOZIN 10 MG: 10 TABLET, FILM COATED ORAL at 09:04

## 2023-01-01 RX ADMIN — FOLIC ACID 1 MG: 1 TABLET ORAL at 10:34

## 2023-01-01 RX ADMIN — PIPERACILLIN AND TAZOBACTAM 3.38 G: 3; .375 INJECTION, POWDER, FOR SOLUTION INTRAVENOUS at 01:58

## 2023-01-01 RX ADMIN — SODIUM BICARBONATE 650 MG TABLET 1300 MG: at 10:12

## 2023-01-01 RX ADMIN — SODIUM BICARBONATE 650 MG TABLET 1300 MG: at 20:23

## 2023-01-01 RX ADMIN — AMOXICILLIN AND CLAVULANATE POTASSIUM 1 TABLET: 500; 125 TABLET, FILM COATED ORAL at 19:47

## 2023-01-01 RX ADMIN — PREDNISONE 30 MG: 10 TABLET ORAL at 08:58

## 2023-01-01 RX ADMIN — CHOLECALCIFEROL (VITAMIN D3) 10 MCG (400 UNIT) TABLET 10 MCG: at 10:04

## 2023-01-01 RX ADMIN — ACETAMINOPHEN 650 MG: 325 TABLET, FILM COATED ORAL at 23:45

## 2023-01-01 RX ADMIN — SODIUM CHLORIDE 500 MG: 9 INJECTION, SOLUTION INTRAVENOUS at 13:36

## 2023-01-01 RX ADMIN — ONDANSETRON 4 MG: 2 INJECTION INTRAMUSCULAR; INTRAVENOUS at 21:20

## 2023-01-01 RX ADMIN — HYDRALAZINE HYDROCHLORIDE 50 MG: 25 TABLET, FILM COATED ORAL at 05:17

## 2023-01-01 RX ADMIN — PIPERACILLIN AND TAZOBACTAM 3.38 G: 3; .375 INJECTION, POWDER, FOR SOLUTION INTRAVENOUS at 15:07

## 2023-01-01 RX ADMIN — ISOSORBIDE MONONITRATE 60 MG: 60 TABLET, EXTENDED RELEASE ORAL at 10:04

## 2023-01-01 RX ADMIN — HYDROMORPHONE HYDROCHLORIDE 1 MG: 2 TABLET ORAL at 23:45

## 2023-01-01 RX ADMIN — SPIRONOLACTONE 25 MG: 25 TABLET ORAL at 09:53

## 2023-01-01 RX ADMIN — MYCOPHENOLATE MOFETIL 1500 MG: 500 TABLET ORAL at 09:18

## 2023-01-01 RX ADMIN — AMLODIPINE BESYLATE 10 MG: 10 TABLET ORAL at 14:02

## 2023-01-01 RX ADMIN — PIPERACILLIN AND TAZOBACTAM 3.38 G: 3; .375 INJECTION, POWDER, FOR SOLUTION INTRAVENOUS at 08:55

## 2023-01-01 RX ADMIN — MYCOPHENOLATE MOFETIL 1500 MG: 500 TABLET ORAL at 14:22

## 2023-01-01 RX ADMIN — HYDRALAZINE HYDROCHLORIDE 10 MG: 10 TABLET ORAL at 10:05

## 2023-01-01 RX ADMIN — VANCOMYCIN HYDROCHLORIDE 125 MG: 125 CAPSULE ORAL at 20:51

## 2023-01-01 RX ADMIN — HYDRALAZINE HYDROCHLORIDE 50 MG: 25 TABLET, FILM COATED ORAL at 20:04

## 2023-01-01 RX ADMIN — PANTOPRAZOLE SODIUM 40 MG: 40 TABLET, DELAYED RELEASE ORAL at 08:57

## 2023-01-01 RX ADMIN — CARVEDILOL 12.5 MG: 12.5 TABLET, FILM COATED ORAL at 18:16

## 2023-01-01 RX ADMIN — VANCOMYCIN HYDROCHLORIDE 125 MG: 125 CAPSULE ORAL at 09:04

## 2023-01-01 RX ADMIN — SULFAMETHOXAZOLE AND TRIMETHOPRIM 1 TABLET: 400; 80 TABLET ORAL at 10:45

## 2023-01-01 RX ADMIN — VANCOMYCIN HYDROCHLORIDE 125 MG: 125 CAPSULE ORAL at 20:23

## 2023-01-01 RX ADMIN — SULFAMETHOXAZOLE AND TRIMETHOPRIM 1 TABLET: 400; 80 TABLET ORAL at 09:52

## 2023-01-01 RX ADMIN — MORPHINE SULFATE 2 MG: 2 INJECTION, SOLUTION INTRAMUSCULAR; INTRAVENOUS at 06:14

## 2023-01-01 RX ADMIN — MESNA 290 MG: 100 INJECTION, SOLUTION INTRAVENOUS at 12:50

## 2023-01-01 RX ADMIN — POTASSIUM CHLORIDE 20 MEQ: 750 TABLET, EXTENDED RELEASE ORAL at 20:44

## 2023-01-01 RX ADMIN — ISOSORBIDE MONONITRATE 60 MG: 60 TABLET, EXTENDED RELEASE ORAL at 08:04

## 2023-01-01 RX ADMIN — CARVEDILOL 25 MG: 25 TABLET, FILM COATED ORAL at 19:01

## 2023-01-01 RX ADMIN — CARVEDILOL 25 MG: 12.5 TABLET, FILM COATED ORAL at 09:05

## 2023-01-01 RX ADMIN — LABETALOL HYDROCHLORIDE 10 MG: 5 INJECTION INTRAVENOUS at 09:22

## 2023-01-01 RX ADMIN — VANCOMYCIN HYDROCHLORIDE 125 MG: 125 CAPSULE ORAL at 20:46

## 2023-01-01 RX ADMIN — AMOXICILLIN AND CLAVULANATE POTASSIUM 1 TABLET: 500; 125 TABLET, FILM COATED ORAL at 20:44

## 2023-01-01 RX ADMIN — MYCOPHENOLATE MOFETIL 1500 MG: 500 TABLET ORAL at 08:55

## 2023-01-01 RX ADMIN — HYDRALAZINE HYDROCHLORIDE 50 MG: 25 TABLET, FILM COATED ORAL at 21:20

## 2023-01-01 RX ADMIN — PANTOPRAZOLE SODIUM 40 MG: 40 TABLET, DELAYED RELEASE ORAL at 09:02

## 2023-01-01 RX ADMIN — CARVEDILOL 25 MG: 25 TABLET, FILM COATED ORAL at 17:11

## 2023-01-01 RX ADMIN — ONDANSETRON 4 MG: 4 TABLET, ORALLY DISINTEGRATING ORAL at 12:28

## 2023-01-01 RX ADMIN — CARVEDILOL 25 MG: 25 TABLET, FILM COATED ORAL at 10:33

## 2023-01-01 RX ADMIN — CARVEDILOL 25 MG: 12.5 TABLET, FILM COATED ORAL at 09:09

## 2023-01-01 RX ADMIN — SODIUM CHLORIDE: 9 INJECTION, SOLUTION INTRAVENOUS at 05:41

## 2023-01-01 RX ADMIN — TORSEMIDE 20 MG: 20 TABLET ORAL at 10:43

## 2023-01-01 RX ADMIN — EMPAGLIFLOZIN 10 MG: 10 TABLET, FILM COATED ORAL at 10:43

## 2023-01-01 RX ADMIN — NIFEDIPINE 60 MG: 60 TABLET, EXTENDED RELEASE ORAL at 13:54

## 2023-01-01 RX ADMIN — IBUPROFEN 600 MG: 200 TABLET, FILM COATED ORAL at 17:19

## 2023-01-01 RX ADMIN — CYCLOPHOSPHAMIDE 500 MG: 500 INJECTION, POWDER, FOR SOLUTION INTRAVENOUS; ORAL at 13:36

## 2023-01-01 RX ADMIN — CARVEDILOL 25 MG: 25 TABLET, FILM COATED ORAL at 08:04

## 2023-01-01 RX ADMIN — POTASSIUM CHLORIDE 20 MEQ: 750 TABLET, EXTENDED RELEASE ORAL at 19:46

## 2023-01-01 RX ADMIN — EMPAGLIFLOZIN 10 MG: 10 TABLET, FILM COATED ORAL at 10:04

## 2023-01-01 RX ADMIN — LISINOPRIL 20 MG: 20 TABLET ORAL at 14:22

## 2023-01-01 RX ADMIN — FERROUS SULFATE TAB 325 MG (65 MG ELEMENTAL FE) 325 MG: 325 (65 FE) TAB at 10:43

## 2023-01-01 RX ADMIN — EMPAGLIFLOZIN 10 MG: 10 TABLET, FILM COATED ORAL at 10:33

## 2023-01-01 RX ADMIN — CYCLOPHOSPHAMIDE 500 MG: 500 INJECTION, POWDER, FOR SOLUTION INTRAVENOUS; ORAL at 10:22

## 2023-01-01 RX ADMIN — POTASSIUM CHLORIDE 40 MEQ: 750 TABLET, EXTENDED RELEASE ORAL at 06:03

## 2023-01-01 RX ADMIN — ISOSORBIDE MONONITRATE 60 MG: 60 TABLET, EXTENDED RELEASE ORAL at 09:14

## 2023-01-01 RX ADMIN — HYDROMORPHONE HYDROCHLORIDE 1 MG: 2 TABLET ORAL at 20:59

## 2023-01-01 RX ADMIN — CARVEDILOL 25 MG: 25 TABLET, FILM COATED ORAL at 10:03

## 2023-01-01 RX ADMIN — PIPERACILLIN AND TAZOBACTAM 3.38 G: 3; .375 INJECTION, POWDER, FOR SOLUTION INTRAVENOUS at 13:52

## 2023-01-01 RX ADMIN — PREDNISONE 60 MG: 10 TABLET ORAL at 08:03

## 2023-01-01 RX ADMIN — NITROGLYCERIN 0.4 MG: 0.4 TABLET SUBLINGUAL at 10:49

## 2023-01-01 RX ADMIN — ONDANSETRON 8 MG: 2 INJECTION INTRAMUSCULAR; INTRAVENOUS at 09:32

## 2023-01-01 RX ADMIN — SODIUM BICARBONATE 650 MG TABLET 1300 MG: at 20:30

## 2023-01-01 RX ADMIN — HYDROMORPHONE HYDROCHLORIDE 0.3 MG: 1 INJECTION, SOLUTION INTRAMUSCULAR; INTRAVENOUS; SUBCUTANEOUS at 09:50

## 2023-01-01 RX ADMIN — AMOXICILLIN AND CLAVULANATE POTASSIUM 1 TABLET: 500; 125 TABLET, FILM COATED ORAL at 10:04

## 2023-01-01 RX ADMIN — SODIUM BICARBONATE 650 MG TABLET 1300 MG: at 13:32

## 2023-01-01 RX ADMIN — AMOXICILLIN AND CLAVULANATE POTASSIUM 1 TABLET: 500; 125 TABLET, FILM COATED ORAL at 19:55

## 2023-01-01 RX ADMIN — MYCOPHENOLATE MOFETIL 1500 MG: 500 TABLET ORAL at 20:02

## 2023-01-01 RX ADMIN — MIDAZOLAM 1 MG: 1 INJECTION INTRAMUSCULAR; INTRAVENOUS at 11:02

## 2023-01-01 RX ADMIN — ISOSORBIDE MONONITRATE 60 MG: 30 TABLET, EXTENDED RELEASE ORAL at 08:43

## 2023-01-01 RX ADMIN — FENTANYL CITRATE 50 MCG: 50 INJECTION INTRAMUSCULAR; INTRAVENOUS at 04:26

## 2023-01-01 RX ADMIN — SODIUM CHLORIDE 500 ML: 9 INJECTION, SOLUTION INTRAVENOUS at 11:17

## 2023-01-01 RX ADMIN — SODIUM BICARBONATE 650 MG TABLET 1300 MG: at 20:46

## 2023-01-01 RX ADMIN — ONDANSETRON 4 MG: 2 INJECTION INTRAMUSCULAR; INTRAVENOUS at 21:54

## 2023-01-01 RX ADMIN — TORSEMIDE 20 MG: 20 TABLET ORAL at 16:08

## 2023-01-01 RX ADMIN — PIPERACILLIN AND TAZOBACTAM 3.38 G: 3; .375 INJECTION, POWDER, LYOPHILIZED, FOR SOLUTION INTRAVENOUS at 21:50

## 2023-01-01 RX ADMIN — VANCOMYCIN HYDROCHLORIDE 125 MG: 125 CAPSULE ORAL at 10:43

## 2023-01-01 RX ADMIN — POTASSIUM CHLORIDE 20 MEQ: 750 TABLET, EXTENDED RELEASE ORAL at 20:50

## 2023-01-01 RX ADMIN — HYDROXYCHLOROQUINE SULFATE 200 MG: 200 TABLET ORAL at 14:22

## 2023-01-01 RX ADMIN — FERROUS SULFATE TAB 325 MG (65 MG ELEMENTAL FE) 325 MG: 325 (65 FE) TAB at 08:27

## 2023-01-01 ASSESSMENT — ACTIVITIES OF DAILY LIVING (ADL)
ADLS_ACUITY_SCORE: 43
ADLS_ACUITY_SCORE: 17
ADLS_ACUITY_SCORE: 17
ADLS_ACUITY_SCORE: 27
ADLS_ACUITY_SCORE: 43
ADLS_ACUITY_SCORE: 27
ADLS_ACUITY_SCORE: 17
ADLS_ACUITY_SCORE: 29
ADLS_ACUITY_SCORE: 17
ADLS_ACUITY_SCORE: 25
ADLS_ACUITY_SCORE: 43
ADLS_ACUITY_SCORE: 35
ADLS_ACUITY_SCORE: 33
ADLS_ACUITY_SCORE: 17
ADLS_ACUITY_SCORE: 35
ADLS_ACUITY_SCORE: 35
ADLS_ACUITY_SCORE: 17
ADLS_ACUITY_SCORE: 43
ADLS_ACUITY_SCORE: 17
ADLS_ACUITY_SCORE: 37
ADLS_ACUITY_SCORE: 17
ADLS_ACUITY_SCORE: 37
ADLS_ACUITY_SCORE: 17
ADLS_ACUITY_SCORE: 43
ADLS_ACUITY_SCORE: 43
ADLS_ACUITY_SCORE: 41
ADLS_ACUITY_SCORE: 17
ADLS_ACUITY_SCORE: 35
ADLS_ACUITY_SCORE: 30
ADLS_ACUITY_SCORE: 32
ADLS_ACUITY_SCORE: 35
ADLS_ACUITY_SCORE: 39
ADLS_ACUITY_SCORE: 27
ADLS_ACUITY_SCORE: 22
ADLS_ACUITY_SCORE: 23
ADLS_ACUITY_SCORE: 43
ADLS_ACUITY_SCORE: 39
ADLS_ACUITY_SCORE: 43
ADLS_ACUITY_SCORE: 17
ADLS_ACUITY_SCORE: 17
ADLS_ACUITY_SCORE: 20
DEPENDENT_IADLS:: INDEPENDENT
ADLS_ACUITY_SCORE: 35
CHANGE_IN_FUNCTIONAL_STATUS_SINCE_ONSET_OF_CURRENT_ILLNESS/INJURY: NO
ADLS_ACUITY_SCORE: 23
ADLS_ACUITY_SCORE: 17
ADLS_ACUITY_SCORE: 35
ADLS_ACUITY_SCORE: 17
ADLS_ACUITY_SCORE: 17
ADLS_ACUITY_SCORE: 29
ADLS_ACUITY_SCORE: 35
ADLS_ACUITY_SCORE: 37
ADLS_ACUITY_SCORE: 29
ADLS_ACUITY_SCORE: 35
ADLS_ACUITY_SCORE: 17
ADLS_ACUITY_SCORE: 39
ADLS_ACUITY_SCORE: 43
ADLS_ACUITY_SCORE: 17
ADLS_ACUITY_SCORE: 17
ADLS_ACUITY_SCORE: 29
ADLS_ACUITY_SCORE: 17
ADLS_ACUITY_SCORE: 27
ADLS_ACUITY_SCORE: 29
ADLS_ACUITY_SCORE: 17
DIFFICULTY_EATING/SWALLOWING: NO
ADLS_ACUITY_SCORE: 25
ADLS_ACUITY_SCORE: 35
ADLS_ACUITY_SCORE: 17
ADLS_ACUITY_SCORE: 29
ADLS_ACUITY_SCORE: 35
ADLS_ACUITY_SCORE: 37
ADLS_ACUITY_SCORE: 17
ADLS_ACUITY_SCORE: 41
ADLS_ACUITY_SCORE: 43
ADLS_ACUITY_SCORE: 17
ADLS_ACUITY_SCORE: 35
ADLS_ACUITY_SCORE: 43
ADLS_ACUITY_SCORE: 37
ADLS_ACUITY_SCORE: 17
ADLS_ACUITY_SCORE: 35
ADLS_ACUITY_SCORE: 43
ADLS_ACUITY_SCORE: 17
ADLS_ACUITY_SCORE: 35
DEPENDENT_IADLS:: INDEPENDENT
ADLS_ACUITY_SCORE: 43
ADLS_ACUITY_SCORE: 17
ADLS_ACUITY_SCORE: 43
ADLS_ACUITY_SCORE: 17
ADLS_ACUITY_SCORE: 43
ADLS_ACUITY_SCORE: 37
ADLS_ACUITY_SCORE: 29
ADLS_ACUITY_SCORE: 37
ADLS_ACUITY_SCORE: 25
ADLS_ACUITY_SCORE: 29
ADLS_ACUITY_SCORE: 27
ADLS_ACUITY_SCORE: 29
ADLS_ACUITY_SCORE: 17
ADLS_ACUITY_SCORE: 17
ADLS_ACUITY_SCORE: 43
ADLS_ACUITY_SCORE: 37
ADLS_ACUITY_SCORE: 32
ADLS_ACUITY_SCORE: 35
ADLS_ACUITY_SCORE: 35
ADLS_ACUITY_SCORE: 29
ADLS_ACUITY_SCORE: 29
ADLS_ACUITY_SCORE: 35
ADLS_ACUITY_SCORE: 23
ADLS_ACUITY_SCORE: 17
ADLS_ACUITY_SCORE: 37
CONCENTRATING,_REMEMBERING_OR_MAKING_DECISIONS_DIFFICULTY: NO
ADLS_ACUITY_SCORE: 41
ADLS_ACUITY_SCORE: 27
ADLS_ACUITY_SCORE: 39
ADLS_ACUITY_SCORE: 17
DEPENDENT_IADLS:: INDEPENDENT
ADLS_ACUITY_SCORE: 17
ADLS_ACUITY_SCORE: 17
FALL_HISTORY_WITHIN_LAST_SIX_MONTHS: NO
ADLS_ACUITY_SCORE: 43
ADLS_ACUITY_SCORE: 22
ADLS_ACUITY_SCORE: 17
ADLS_ACUITY_SCORE: 43
ADLS_ACUITY_SCORE: 23
ADLS_ACUITY_SCORE: 35
ADLS_ACUITY_SCORE: 25
ADLS_ACUITY_SCORE: 41
ADLS_ACUITY_SCORE: 30
ADLS_ACUITY_SCORE: 43
DRESS: 0-->INDEPENDENT
ADLS_ACUITY_SCORE: 17
ADLS_ACUITY_SCORE: 37
ADLS_ACUITY_SCORE: 17
ADLS_ACUITY_SCORE: 29
ADLS_ACUITY_SCORE: 37
ADLS_ACUITY_SCORE: 33
ADLS_ACUITY_SCORE: 39
ADLS_ACUITY_SCORE: 39
ADLS_ACUITY_SCORE: 17
ADLS_ACUITY_SCORE: 30
WEAR_GLASSES_OR_BLIND: NO
ADLS_ACUITY_SCORE: 27
ADLS_ACUITY_SCORE: 17
ADLS_ACUITY_SCORE: 43
ADLS_ACUITY_SCORE: 17
ADLS_ACUITY_SCORE: 22
ADLS_ACUITY_SCORE: 43
ADLS_ACUITY_SCORE: 35
ADLS_ACUITY_SCORE: 43
ADLS_ACUITY_SCORE: 35
ADLS_ACUITY_SCORE: 37
ADLS_ACUITY_SCORE: 25
ADLS_ACUITY_SCORE: 17
ADLS_ACUITY_SCORE: 17
ADLS_ACUITY_SCORE: 37
ADLS_ACUITY_SCORE: 35
ADLS_ACUITY_SCORE: 39
ADLS_ACUITY_SCORE: 35
ADLS_ACUITY_SCORE: 30
ADLS_ACUITY_SCORE: 17
ADLS_ACUITY_SCORE: 43
ADLS_ACUITY_SCORE: 17
ADLS_ACUITY_SCORE: 37
ADLS_ACUITY_SCORE: 35
ADLS_ACUITY_SCORE: 17
ADLS_ACUITY_SCORE: 27
ADLS_ACUITY_SCORE: 22
ADLS_ACUITY_SCORE: 43
ADLS_ACUITY_SCORE: 17
ADLS_ACUITY_SCORE: 22
ADLS_ACUITY_SCORE: 22
ADLS_ACUITY_SCORE: 43
ADLS_ACUITY_SCORE: 35
ADLS_ACUITY_SCORE: 30
ADLS_ACUITY_SCORE: 35
ADLS_ACUITY_SCORE: 17
ADLS_ACUITY_SCORE: 17
DOING_ERRANDS_INDEPENDENTLY_DIFFICULTY: NO
ADLS_ACUITY_SCORE: 17
ADLS_ACUITY_SCORE: 27
ADLS_ACUITY_SCORE: 43
ADLS_ACUITY_SCORE: 17
ADLS_ACUITY_SCORE: 37
ADLS_ACUITY_SCORE: 43
ADLS_ACUITY_SCORE: 25
ADLS_ACUITY_SCORE: 37
ADLS_ACUITY_SCORE: 43
ADLS_ACUITY_SCORE: 43
ADLS_ACUITY_SCORE: 31
DRESSING/BATHING_DIFFICULTY: NO
ADLS_ACUITY_SCORE: 27
ADLS_ACUITY_SCORE: 17
ADLS_ACUITY_SCORE: 43
ADLS_ACUITY_SCORE: 29
ADLS_ACUITY_SCORE: 35
TOILETING_ISSUES: NO
ADLS_ACUITY_SCORE: 17
ADLS_ACUITY_SCORE: 29
ADLS_ACUITY_SCORE: 35
ADLS_ACUITY_SCORE: 17
ADLS_ACUITY_SCORE: 17
ADLS_ACUITY_SCORE: 43
ADLS_ACUITY_SCORE: 35
ADLS_ACUITY_SCORE: 29
ADLS_ACUITY_SCORE: 29
ADLS_ACUITY_SCORE: 43
ADLS_ACUITY_SCORE: 37
ADLS_ACUITY_SCORE: 39
ADLS_ACUITY_SCORE: 17
ADLS_ACUITY_SCORE: 17
ADLS_ACUITY_SCORE: 29
ADLS_ACUITY_SCORE: 17
ADLS_ACUITY_SCORE: 27
ADLS_ACUITY_SCORE: 17
ADLS_ACUITY_SCORE: 17
ADLS_ACUITY_SCORE: 25
WALKING_OR_CLIMBING_STAIRS_DIFFICULTY: NO
ADLS_ACUITY_SCORE: 17
ADLS_ACUITY_SCORE: 29
ADLS_ACUITY_SCORE: 30
ADLS_ACUITY_SCORE: 17
ADLS_ACUITY_SCORE: 17
ADLS_ACUITY_SCORE: 35
ADLS_ACUITY_SCORE: 25
ADLS_ACUITY_SCORE: 43
ADLS_ACUITY_SCORE: 35
ADLS_ACUITY_SCORE: 35
ADLS_ACUITY_SCORE: 25
ADLS_ACUITY_SCORE: 35
ADLS_ACUITY_SCORE: 17
ADLS_ACUITY_SCORE: 39
ADLS_ACUITY_SCORE: 37
ADLS_ACUITY_SCORE: 27
BATHING: 0-->INDEPENDENT
ADLS_ACUITY_SCORE: 43
ADLS_ACUITY_SCORE: 43
ADLS_ACUITY_SCORE: 30
ADLS_ACUITY_SCORE: 37
ADLS_ACUITY_SCORE: 17
ADLS_ACUITY_SCORE: 43
ADLS_ACUITY_SCORE: 17
ADLS_ACUITY_SCORE: 39
ADLS_ACUITY_SCORE: 43
ADLS_ACUITY_SCORE: 17
ADLS_ACUITY_SCORE: 29
ADLS_ACUITY_SCORE: 17
ADLS_ACUITY_SCORE: 17
DRESS: 0-->INDEPENDENT
ADLS_ACUITY_SCORE: 17
ADLS_ACUITY_SCORE: 17
ADLS_ACUITY_SCORE: 43
ADLS_ACUITY_SCORE: 17
ADLS_ACUITY_SCORE: 37
ADLS_ACUITY_SCORE: 43
ADLS_ACUITY_SCORE: 35
ADLS_ACUITY_SCORE: 37
ADLS_ACUITY_SCORE: 22
ADLS_ACUITY_SCORE: 35
ADLS_ACUITY_SCORE: 27
ADLS_ACUITY_SCORE: 41
ADLS_ACUITY_SCORE: 43
ADLS_ACUITY_SCORE: 37
ADLS_ACUITY_SCORE: 35
ADLS_ACUITY_SCORE: 17
ADLS_ACUITY_SCORE: 35
ADLS_ACUITY_SCORE: 30
ADLS_ACUITY_SCORE: 17
ADLS_ACUITY_SCORE: 41
ADLS_ACUITY_SCORE: 39
ADLS_ACUITY_SCORE: 22
ADLS_ACUITY_SCORE: 27
ADLS_ACUITY_SCORE: 35
ADLS_ACUITY_SCORE: 43
ADLS_ACUITY_SCORE: 17
ADLS_ACUITY_SCORE: 39
ADLS_ACUITY_SCORE: 29
ADLS_ACUITY_SCORE: 43
ADLS_ACUITY_SCORE: 43
ADLS_ACUITY_SCORE: 35
ADLS_ACUITY_SCORE: 37
ADLS_ACUITY_SCORE: 43
ADLS_ACUITY_SCORE: 43
ADLS_ACUITY_SCORE: 17
ADLS_ACUITY_SCORE: 27
ADLS_ACUITY_SCORE: 17
ADLS_ACUITY_SCORE: 25
ADLS_ACUITY_SCORE: 35
ADLS_ACUITY_SCORE: 17
ADLS_ACUITY_SCORE: 17
ADLS_ACUITY_SCORE: 43
ADLS_ACUITY_SCORE: 35
ADLS_ACUITY_SCORE: 43
ADLS_ACUITY_SCORE: 17

## 2023-01-01 ASSESSMENT — ENCOUNTER SYMPTOMS
DIAPHORESIS: 1
CHEST TIGHTNESS: 0
NAUSEA: 1
ARTHRALGIAS: 0
ABDOMINAL PAIN: 1
VOMITING: 1
EYE REDNESS: 0
COUGH: 0
CHILLS: 1
SHORTNESS OF BREATH: 1
BLOOD IN STOOL: 0
DYSURIA: 0
AGITATION: 0
LIGHT-HEADEDNESS: 0
SHORTNESS OF BREATH: 1
DIARRHEA: 1

## 2023-01-01 ASSESSMENT — PAIN SCALES - GENERAL
PAINLEVEL: NO PAIN (0)

## 2023-01-01 ASSESSMENT — COLUMBIA-SUICIDE SEVERITY RATING SCALE - C-SSRS
4. HAVE YOU HAD THESE THOUGHTS AND HAD SOME INTENTION OF ACTING ON THEM?: NO
5. HAVE YOU STARTED TO WORK OUT OR WORKED OUT THE DETAILS OF HOW TO KILL YOURSELF? DO YOU INTEND TO CARRY OUT THIS PLAN?: NO
2. HAVE YOU ACTUALLY HAD ANY THOUGHTS OF KILLING YOURSELF IN THE PAST MONTH?: NO
6. HAVE YOU EVER DONE ANYTHING, STARTED TO DO ANYTHING, OR PREPARED TO DO ANYTHING TO END YOUR LIFE?: NO
1. IN THE PAST MONTH, HAVE YOU WISHED YOU WERE DEAD OR WISHED YOU COULD GO TO SLEEP AND NOT WAKE UP?: NO
3. HAVE YOU BEEN THINKING ABOUT HOW YOU MIGHT KILL YOURSELF?: NO

## 2023-01-22 ENCOUNTER — HOSPITAL ENCOUNTER (INPATIENT)
Facility: CLINIC | Age: 23
LOS: 6 days | Discharge: HOME | End: 2023-01-29
Attending: EMERGENCY MEDICINE | Admitting: STUDENT IN AN ORGANIZED HEALTH CARE EDUCATION/TRAINING PROGRAM
Payer: COMMERCIAL

## 2023-01-22 DIAGNOSIS — M32.14 OTHER SYSTEMIC LUPUS ERYTHEMATOSUS WITH GLOMERULAR DISEASE (H): ICD-10-CM

## 2023-01-22 DIAGNOSIS — R59.1 GENERALIZED LYMPHADENOPATHY: ICD-10-CM

## 2023-01-22 DIAGNOSIS — M32.10 DRUG-INDUCED SYSTEMIC LUPUS ERYTHEMATOSUS WITH OTHER ORGAN INVOLVEMENT (H): Primary | ICD-10-CM

## 2023-01-22 DIAGNOSIS — R00.0 TACHYCARDIA, UNSPECIFIED: ICD-10-CM

## 2023-01-22 DIAGNOSIS — R00.0 TACHYCARDIA: ICD-10-CM

## 2023-01-22 DIAGNOSIS — M32.9 SYSTEMIC LUPUS ERYTHEMATOSUS, UNSPECIFIED SLE TYPE, UNSPECIFIED ORGAN INVOLVEMENT STATUS (H): ICD-10-CM

## 2023-01-22 DIAGNOSIS — M79.652 LEFT THIGH PAIN: ICD-10-CM

## 2023-01-22 DIAGNOSIS — M79.605 PAIN OF LEFT LOWER EXTREMITY: ICD-10-CM

## 2023-01-22 DIAGNOSIS — R20.2 PARESTHESIA: ICD-10-CM

## 2023-01-22 DIAGNOSIS — M79.662 PAIN OF LEFT LOWER LEG: ICD-10-CM

## 2023-01-22 DIAGNOSIS — M54.50 LOW BACK PAIN, UNSPECIFIED BACK PAIN LATERALITY, UNSPECIFIED CHRONICITY, UNSPECIFIED WHETHER SCIATICA PRESENT: ICD-10-CM

## 2023-01-22 DIAGNOSIS — M32.9 HX OF SYSTEMIC LUPUS ERYTHEMATOSUS (SLE) (H): ICD-10-CM

## 2023-01-22 DIAGNOSIS — M32.0 DRUG-INDUCED SYSTEMIC LUPUS ERYTHEMATOSUS WITH OTHER ORGAN INVOLVEMENT (H): Primary | ICD-10-CM

## 2023-01-22 PROCEDURE — 85025 COMPLETE CBC W/AUTO DIFF WBC: CPT | Performed by: EMERGENCY MEDICINE

## 2023-01-22 PROCEDURE — 85652 RBC SED RATE AUTOMATED: CPT | Performed by: EMERGENCY MEDICINE

## 2023-01-22 PROCEDURE — 84484 ASSAY OF TROPONIN QUANT: CPT | Performed by: EMERGENCY MEDICINE

## 2023-01-22 PROCEDURE — 83880 ASSAY OF NATRIURETIC PEPTIDE: CPT | Performed by: EMERGENCY MEDICINE

## 2023-01-22 PROCEDURE — 99285 EMERGENCY DEPT VISIT HI MDM: CPT | Mod: 25 | Performed by: EMERGENCY MEDICINE

## 2023-01-22 PROCEDURE — 250N000013 HC RX MED GY IP 250 OP 250 PS 637: Performed by: EMERGENCY MEDICINE

## 2023-01-22 PROCEDURE — 93010 ELECTROCARDIOGRAM REPORT: CPT | Performed by: EMERGENCY MEDICINE

## 2023-01-22 PROCEDURE — 80053 COMPREHEN METABOLIC PANEL: CPT | Performed by: EMERGENCY MEDICINE

## 2023-01-22 PROCEDURE — 84703 CHORIONIC GONADOTROPIN ASSAY: CPT | Performed by: EMERGENCY MEDICINE

## 2023-01-22 PROCEDURE — 258N000003 HC RX IP 258 OP 636: Performed by: EMERGENCY MEDICINE

## 2023-01-22 PROCEDURE — 99284 EMERGENCY DEPT VISIT MOD MDM: CPT | Mod: 25 | Performed by: EMERGENCY MEDICINE

## 2023-01-22 PROCEDURE — 36415 COLL VENOUS BLD VENIPUNCTURE: CPT | Performed by: EMERGENCY MEDICINE

## 2023-01-22 PROCEDURE — 86140 C-REACTIVE PROTEIN: CPT | Performed by: EMERGENCY MEDICINE

## 2023-01-22 PROCEDURE — 96360 HYDRATION IV INFUSION INIT: CPT | Mod: 59 | Performed by: EMERGENCY MEDICINE

## 2023-01-22 PROCEDURE — 93005 ELECTROCARDIOGRAM TRACING: CPT | Performed by: EMERGENCY MEDICINE

## 2023-01-22 RX ORDER — ACETAMINOPHEN 500 MG
1000 TABLET ORAL ONCE
Status: COMPLETED | OUTPATIENT
Start: 2023-01-22 | End: 2023-01-22

## 2023-01-22 RX ADMIN — SODIUM CHLORIDE 1000 ML: 9 INJECTION, SOLUTION INTRAVENOUS at 23:52

## 2023-01-22 RX ADMIN — ACETAMINOPHEN 1000 MG: 500 TABLET ORAL at 23:49

## 2023-01-23 ENCOUNTER — APPOINTMENT (OUTPATIENT)
Dept: CT IMAGING | Facility: CLINIC | Age: 23
End: 2023-01-23
Attending: EMERGENCY MEDICINE
Payer: COMMERCIAL

## 2023-01-23 ENCOUNTER — APPOINTMENT (OUTPATIENT)
Dept: GENERAL RADIOLOGY | Facility: CLINIC | Age: 23
End: 2023-01-23
Payer: COMMERCIAL

## 2023-01-23 ENCOUNTER — APPOINTMENT (OUTPATIENT)
Dept: ULTRASOUND IMAGING | Facility: CLINIC | Age: 23
End: 2023-01-23
Attending: EMERGENCY MEDICINE
Payer: COMMERCIAL

## 2023-01-23 ENCOUNTER — APPOINTMENT (OUTPATIENT)
Dept: MRI IMAGING | Facility: CLINIC | Age: 23
End: 2023-01-23
Attending: EMERGENCY MEDICINE
Payer: COMMERCIAL

## 2023-01-23 PROBLEM — R00.0 TACHYCARDIA: Status: ACTIVE | Noted: 2023-01-23

## 2023-01-23 PROBLEM — M79.605 PAIN OF LEFT LOWER EXTREMITY: Status: ACTIVE | Noted: 2023-01-23

## 2023-01-23 PROBLEM — M32.9: Status: ACTIVE | Noted: 2023-01-23

## 2023-01-23 LAB
ALBUMIN SERPL BCG-MCNC: 2 G/DL (ref 3.5–5.2)
ALP SERPL-CCNC: 331 U/L (ref 35–104)
ALT SERPL W P-5'-P-CCNC: 9 U/L (ref 10–35)
ANION GAP SERPL CALCULATED.3IONS-SCNC: 10 MMOL/L (ref 7–15)
AST SERPL W P-5'-P-CCNC: 48 U/L (ref 10–35)
ATRIAL RATE - MUSE: 111 BPM
BASOPHILS # BLD AUTO: 0 10E3/UL (ref 0–0.2)
BASOPHILS NFR BLD AUTO: 0 %
BILIRUB SERPL-MCNC: 0.4 MG/DL
BUN SERPL-MCNC: 13.5 MG/DL (ref 6–20)
CALCIUM SERPL-MCNC: 7.4 MG/DL (ref 8.6–10)
CHLORIDE SERPL-SCNC: 107 MMOL/L (ref 98–107)
CREAT SERPL-MCNC: 0.55 MG/DL (ref 0.51–0.95)
CRP SERPL-MCNC: 72.5 MG/L
D DIMER PPP FEU-MCNC: 5.1 UG/ML FEU (ref 0–0.5)
DEPRECATED HCO3 PLAS-SCNC: 16 MMOL/L (ref 22–29)
DIASTOLIC BLOOD PRESSURE - MUSE: NORMAL MMHG
ELLIPTOCYTES BLD QL SMEAR: SLIGHT
EOSINOPHIL # BLD AUTO: 0.1 10E3/UL (ref 0–0.7)
EOSINOPHIL NFR BLD AUTO: 1 %
ERYTHROCYTE [DISTWIDTH] IN BLOOD BY AUTOMATED COUNT: 18.5 % (ref 10–15)
ERYTHROCYTE [SEDIMENTATION RATE] IN BLOOD BY WESTERGREN METHOD: 110 MM/HR (ref 0–20)
FRAGMENTS BLD QL SMEAR: ABNORMAL
GFR SERPL CREATININE-BSD FRML MDRD: >90 ML/MIN/1.73M2
GLUCOSE SERPL-MCNC: 91 MG/DL (ref 70–99)
HCG SERPL QL: NEGATIVE
HCT VFR BLD AUTO: 27.1 % (ref 35–47)
HGB BLD-MCNC: 8.2 G/DL (ref 11.7–15.7)
HOLD SPECIMEN: NORMAL
HOLD SPECIMEN: NORMAL
IMM GRANULOCYTES # BLD: 0 10E3/UL
IMM GRANULOCYTES NFR BLD: 1 %
INR PPP: 1.1 (ref 0.85–1.15)
INTERPRETATION ECG - MUSE: NORMAL
LYMPHOCYTES # BLD AUTO: 1.9 10E3/UL (ref 0.8–5.3)
LYMPHOCYTES NFR BLD AUTO: 30 %
MCH RBC QN AUTO: 27.3 PG (ref 26.5–33)
MCHC RBC AUTO-ENTMCNC: 30.3 G/DL (ref 31.5–36.5)
MCV RBC AUTO: 90 FL (ref 78–100)
MONOCYTES # BLD AUTO: 0.3 10E3/UL (ref 0–1.3)
MONOCYTES NFR BLD AUTO: 5 %
NEUTROPHILS # BLD AUTO: 3.9 10E3/UL (ref 1.6–8.3)
NEUTROPHILS NFR BLD AUTO: 63 %
NRBC # BLD AUTO: 0 10E3/UL
NRBC BLD AUTO-RTO: 0 /100
NT-PROBNP SERPL-MCNC: 912 PG/ML (ref 0–450)
P AXIS - MUSE: 32 DEGREES
PLAT MORPH BLD: ABNORMAL
PLATELET # BLD AUTO: 120 10E3/UL (ref 150–450)
POTASSIUM SERPL-SCNC: 3.6 MMOL/L (ref 3.4–5.3)
PR INTERVAL - MUSE: 138 MS
PROT SERPL-MCNC: 7.1 G/DL (ref 6.4–8.3)
QRS DURATION - MUSE: 72 MS
QT - MUSE: 354 MS
QTC - MUSE: 481 MS
R AXIS - MUSE: -47 DEGREES
RADIOLOGIST FLAGS: NORMAL
RBC # BLD AUTO: 3 10E6/UL (ref 3.8–5.2)
RBC MORPH BLD: ABNORMAL
SARS-COV-2 RNA RESP QL NAA+PROBE: POSITIVE
SODIUM SERPL-SCNC: 133 MMOL/L (ref 136–145)
SYSTOLIC BLOOD PRESSURE - MUSE: NORMAL MMHG
T AXIS - MUSE: 10 DEGREES
TROPONIN T SERPL HS-MCNC: 26 NG/L
TROPONIN T SERPL HS-MCNC: 27 NG/L
TROPONIN T SERPL HS-MCNC: 31 NG/L
VENTRICULAR RATE- MUSE: 111 BPM
WBC # BLD AUTO: 6.2 10E3/UL (ref 4–11)

## 2023-01-23 PROCEDURE — 93971 EXTREMITY STUDY: CPT | Mod: 26 | Performed by: RADIOLOGY

## 2023-01-23 PROCEDURE — 250N000011 HC RX IP 250 OP 636: Performed by: EMERGENCY MEDICINE

## 2023-01-23 PROCEDURE — 86160 COMPLEMENT ANTIGEN: CPT | Performed by: STUDENT IN AN ORGANIZED HEALTH CARE EDUCATION/TRAINING PROGRAM

## 2023-01-23 PROCEDURE — 99222 1ST HOSP IP/OBS MODERATE 55: CPT | Mod: GC | Performed by: STUDENT IN AN ORGANIZED HEALTH CARE EDUCATION/TRAINING PROGRAM

## 2023-01-23 PROCEDURE — 84484 ASSAY OF TROPONIN QUANT: CPT | Performed by: EMERGENCY MEDICINE

## 2023-01-23 PROCEDURE — 96361 HYDRATE IV INFUSION ADD-ON: CPT | Performed by: EMERGENCY MEDICINE

## 2023-01-23 PROCEDURE — U0003 INFECTIOUS AGENT DETECTION BY NUCLEIC ACID (DNA OR RNA); SEVERE ACUTE RESPIRATORY SYNDROME CORONAVIRUS 2 (SARS-COV-2) (CORONAVIRUS DISEASE [COVID-19]), AMPLIFIED PROBE TECHNIQUE, MAKING USE OF HIGH THROUGHPUT TECHNOLOGIES AS DESCRIBED BY CMS-2020-01-R: HCPCS

## 2023-01-23 PROCEDURE — 80180 DRUG SCRN QUAN MYCOPHENOLATE: CPT

## 2023-01-23 PROCEDURE — 86225 DNA ANTIBODY NATIVE: CPT | Performed by: STUDENT IN AN ORGANIZED HEALTH CARE EDUCATION/TRAINING PROGRAM

## 2023-01-23 PROCEDURE — 36415 COLL VENOUS BLD VENIPUNCTURE: CPT | Performed by: STUDENT IN AN ORGANIZED HEALTH CARE EDUCATION/TRAINING PROGRAM

## 2023-01-23 PROCEDURE — 36415 COLL VENOUS BLD VENIPUNCTURE: CPT | Performed by: EMERGENCY MEDICINE

## 2023-01-23 PROCEDURE — G0378 HOSPITAL OBSERVATION PER HR: HCPCS

## 2023-01-23 PROCEDURE — 36415 COLL VENOUS BLD VENIPUNCTURE: CPT

## 2023-01-23 PROCEDURE — A9585 GADOBUTROL INJECTION: HCPCS | Performed by: EMERGENCY MEDICINE

## 2023-01-23 PROCEDURE — 120N000002 HC R&B MED SURG/OB UMMC

## 2023-01-23 PROCEDURE — 85379 FIBRIN DEGRADATION QUANT: CPT

## 2023-01-23 PROCEDURE — 93971 EXTREMITY STUDY: CPT | Mod: LT

## 2023-01-23 PROCEDURE — 85610 PROTHROMBIN TIME: CPT

## 2023-01-23 PROCEDURE — XW033E5 INTRODUCTION OF REMDESIVIR ANTI-INFECTIVE INTO PERIPHERAL VEIN, PERCUTANEOUS APPROACH, NEW TECHNOLOGY GROUP 5: ICD-10-PCS | Performed by: STUDENT IN AN ORGANIZED HEALTH CARE EDUCATION/TRAINING PROGRAM

## 2023-01-23 PROCEDURE — 74177 CT ABD & PELVIS W/CONTRAST: CPT | Mod: 26 | Performed by: STUDENT IN AN ORGANIZED HEALTH CARE EDUCATION/TRAINING PROGRAM

## 2023-01-23 PROCEDURE — 73560 X-RAY EXAM OF KNEE 1 OR 2: CPT | Mod: LT

## 2023-01-23 PROCEDURE — 72158 MRI LUMBAR SPINE W/O & W/DYE: CPT | Mod: 26 | Performed by: RADIOLOGY

## 2023-01-23 PROCEDURE — 83010 ASSAY OF HAPTOGLOBIN QUANT: CPT

## 2023-01-23 PROCEDURE — 250N000013 HC RX MED GY IP 250 OP 250 PS 637

## 2023-01-23 PROCEDURE — 72158 MRI LUMBAR SPINE W/O & W/DYE: CPT

## 2023-01-23 PROCEDURE — 250N000012 HC RX MED GY IP 250 OP 636 PS 637

## 2023-01-23 PROCEDURE — 84484 ASSAY OF TROPONIN QUANT: CPT

## 2023-01-23 PROCEDURE — 258N000003 HC RX IP 258 OP 636

## 2023-01-23 PROCEDURE — 74177 CT ABD & PELVIS W/CONTRAST: CPT

## 2023-01-23 PROCEDURE — 73560 X-RAY EXAM OF KNEE 1 OR 2: CPT | Mod: 26 | Performed by: RADIOLOGY

## 2023-01-23 PROCEDURE — 87040 BLOOD CULTURE FOR BACTERIA: CPT

## 2023-01-23 PROCEDURE — 255N000002 HC RX 255 OP 636: Performed by: EMERGENCY MEDICINE

## 2023-01-23 PROCEDURE — 250N000011 HC RX IP 250 OP 636

## 2023-01-23 RX ORDER — AMOXICILLIN 250 MG
1 CAPSULE ORAL 2 TIMES DAILY PRN
Status: DISCONTINUED | OUTPATIENT
Start: 2023-01-23 | End: 2023-01-29 | Stop reason: HOSPADM

## 2023-01-23 RX ORDER — ENOXAPARIN SODIUM 100 MG/ML
40 INJECTION SUBCUTANEOUS EVERY 24 HOURS
Status: DISCONTINUED | OUTPATIENT
Start: 2023-01-23 | End: 2023-01-23

## 2023-01-23 RX ORDER — PREDNISONE 20 MG/1
20 TABLET ORAL DAILY
Status: DISCONTINUED | OUTPATIENT
Start: 2023-01-23 | End: 2023-01-29 | Stop reason: HOSPADM

## 2023-01-23 RX ORDER — SODIUM CHLORIDE, SODIUM LACTATE, POTASSIUM CHLORIDE, CALCIUM CHLORIDE 600; 310; 30; 20 MG/100ML; MG/100ML; MG/100ML; MG/100ML
INJECTION, SOLUTION INTRAVENOUS CONTINUOUS
Status: DISCONTINUED | OUTPATIENT
Start: 2023-01-23 | End: 2023-01-23

## 2023-01-23 RX ORDER — CALCIUM CARBONATE 500 MG/1
500 TABLET, CHEWABLE ORAL DAILY
Status: DISCONTINUED | OUTPATIENT
Start: 2023-01-23 | End: 2023-01-23

## 2023-01-23 RX ORDER — SULFAMETHOXAZOLE/TRIMETHOPRIM 800-160 MG
1 TABLET ORAL
Status: DISCONTINUED | OUTPATIENT
Start: 2023-01-25 | End: 2023-01-29 | Stop reason: HOSPADM

## 2023-01-23 RX ORDER — MYCOPHENOLATE MOFETIL 500 MG/1
1500 TABLET ORAL 2 TIMES DAILY
Status: DISCONTINUED | OUTPATIENT
Start: 2023-01-23 | End: 2023-01-29 | Stop reason: HOSPADM

## 2023-01-23 RX ORDER — ENOXAPARIN SODIUM 300 MG/3ML
0.5 INJECTION INTRAVENOUS; SUBCUTANEOUS EVERY 12 HOURS
Status: DISCONTINUED | OUTPATIENT
Start: 2023-01-24 | End: 2023-01-23

## 2023-01-23 RX ORDER — ENOXAPARIN SODIUM 100 MG/ML
40 INJECTION SUBCUTANEOUS EVERY 24 HOURS
Status: DISCONTINUED | OUTPATIENT
Start: 2023-01-24 | End: 2023-01-29 | Stop reason: HOSPADM

## 2023-01-23 RX ORDER — IOPAMIDOL 755 MG/ML
66 INJECTION, SOLUTION INTRAVASCULAR ONCE
Status: COMPLETED | OUTPATIENT
Start: 2023-01-23 | End: 2023-01-23

## 2023-01-23 RX ORDER — ACETAMINOPHEN 325 MG/1
650 TABLET ORAL EVERY 4 HOURS PRN
Status: DISCONTINUED | OUTPATIENT
Start: 2023-01-23 | End: 2023-01-29 | Stop reason: HOSPADM

## 2023-01-23 RX ORDER — GADOBUTROL 604.72 MG/ML
7.5 INJECTION INTRAVENOUS ONCE
Status: COMPLETED | OUTPATIENT
Start: 2023-01-23 | End: 2023-01-23

## 2023-01-23 RX ORDER — FERROUS SULFATE 325(65) MG
325 TABLET ORAL
Status: DISCONTINUED | OUTPATIENT
Start: 2023-01-24 | End: 2023-01-29 | Stop reason: HOSPADM

## 2023-01-23 RX ORDER — SODIUM CHLORIDE, SODIUM LACTATE, POTASSIUM CHLORIDE, CALCIUM CHLORIDE 600; 310; 30; 20 MG/100ML; MG/100ML; MG/100ML; MG/100ML
INJECTION, SOLUTION INTRAVENOUS ONCE
Status: DISCONTINUED | OUTPATIENT
Start: 2023-01-23 | End: 2023-01-23

## 2023-01-23 RX ORDER — SODIUM CHLORIDE, SODIUM LACTATE, POTASSIUM CHLORIDE, CALCIUM CHLORIDE 600; 310; 30; 20 MG/100ML; MG/100ML; MG/100ML; MG/100ML
INJECTION, SOLUTION INTRAVENOUS CONTINUOUS
Status: ACTIVE | OUTPATIENT
Start: 2023-01-23 | End: 2023-01-24

## 2023-01-23 RX ORDER — HYDROXYCHLOROQUINE SULFATE 200 MG/1
200 TABLET, FILM COATED ORAL DAILY
Status: DISCONTINUED | OUTPATIENT
Start: 2023-01-23 | End: 2023-01-29 | Stop reason: HOSPADM

## 2023-01-23 RX ORDER — LIDOCAINE 40 MG/G
CREAM TOPICAL
Status: DISCONTINUED | OUTPATIENT
Start: 2023-01-23 | End: 2023-01-27

## 2023-01-23 RX ORDER — AMOXICILLIN 250 MG
2 CAPSULE ORAL 2 TIMES DAILY PRN
Status: DISCONTINUED | OUTPATIENT
Start: 2023-01-23 | End: 2023-01-29 | Stop reason: HOSPADM

## 2023-01-23 RX ORDER — MULTIVITAMIN,THERAPEUTIC
1 TABLET ORAL DAILY
Status: DISCONTINUED | OUTPATIENT
Start: 2023-01-23 | End: 2023-01-29 | Stop reason: HOSPADM

## 2023-01-23 RX ADMIN — SODIUM CHLORIDE 50 ML: 9 INJECTION, SOLUTION INTRAVENOUS at 21:51

## 2023-01-23 RX ADMIN — HYDROXYCHLOROQUINE SULFATE 200 MG: 200 TABLET, FILM COATED ORAL at 15:24

## 2023-01-23 RX ADMIN — SODIUM CHLORIDE, POTASSIUM CHLORIDE, SODIUM LACTATE AND CALCIUM CHLORIDE: 600; 310; 30; 20 INJECTION, SOLUTION INTRAVENOUS at 21:50

## 2023-01-23 RX ADMIN — SODIUM CHLORIDE, POTASSIUM CHLORIDE, SODIUM LACTATE AND CALCIUM CHLORIDE 250 ML: 600; 310; 30; 20 INJECTION, SOLUTION INTRAVENOUS at 20:33

## 2023-01-23 RX ADMIN — IOPAMIDOL 66 ML: 755 INJECTION, SOLUTION INTRAVENOUS at 06:54

## 2023-01-23 RX ADMIN — REMDESIVIR 200 MG: 100 INJECTION, POWDER, LYOPHILIZED, FOR SOLUTION INTRAVENOUS at 17:48

## 2023-01-23 RX ADMIN — SODIUM CHLORIDE, POTASSIUM CHLORIDE, SODIUM LACTATE AND CALCIUM CHLORIDE: 600; 310; 30; 20 INJECTION, SOLUTION INTRAVENOUS at 14:37

## 2023-01-23 RX ADMIN — GADOBUTROL 5 ML: 604.72 INJECTION INTRAVENOUS at 02:47

## 2023-01-23 RX ADMIN — PREDNISONE 20 MG: 20 TABLET ORAL at 17:39

## 2023-01-23 RX ADMIN — THERA TABS 1 TABLET: TAB at 15:24

## 2023-01-23 ASSESSMENT — ACTIVITIES OF DAILY LIVING (ADL)
ADLS_ACUITY_SCORE: 35

## 2023-01-23 NOTE — ED PROVIDER NOTES
"     Emergency Department Patient Sign-out       Brief HPI:  This is a 22 year old female signed out to me by Dr. Lott .  See initial ED Provider note for details of the presentation.            Significant Events prior to my assuming care: Patient had extensive work up for lower extremity pain. Incidentally had retroperitoneal stranding on MRI of L spine. CT abd/pelv ordered for further eval.       Exam:   Patient Vitals for the past 24 hrs:   BP Temp Temp src Pulse Resp SpO2 Height   01/23/23 1530 -- -- -- -- -- 100 % --   01/23/23 1515 -- -- -- -- -- 100 % --   01/23/23 1215 -- -- -- -- -- 100 % --   01/23/23 1154 -- -- -- -- -- 100 % --   01/23/23 1145 -- -- -- -- -- 100 % --   01/23/23 0736 108/75 -- -- 89 -- -- --   01/23/23 0608 -- -- -- -- -- 100 % --   01/23/23 0557 110/71 -- -- -- -- 100 % --   01/22/23 2302 133/85 98.4  F (36.9  C) Oral (!) 129 18 100 % 1.562 m (5' 1.5\")           ED RESULTS:   Results for orders placed or performed during the hospital encounter of 01/22/23 (from the past 24 hour(s))   CBC with platelets differential     Status: Abnormal    Collection Time: 01/22/23 11:34 PM    Narrative    The following orders were created for panel order CBC with platelets differential.  Procedure                               Abnormality         Status                     ---------                               -----------         ------                     CBC with platelets and d...[[id number]]  Abnormal            Final result               RBC and Platelet Morphology[[id number]]  Abnormal            Final result                 Please view results for these tests on the individual orders.   Comprehensive metabolic panel     Status: Abnormal    Collection Time: 01/22/23 11:34 PM   Result Value Ref Range    Sodium 133 (L) 136 - 145 mmol/L    Potassium 3.6 3.4 - 5.3 mmol/L    Chloride 107 98 - 107 mmol/L    Carbon Dioxide (CO2) 16 (L) 22 - 29 mmol/L    Anion Gap 10 7 - 15 mmol/L    Urea Nitrogen 13.5 " 6.0 - 20.0 mg/dL    Creatinine 0.55 0.51 - 0.95 mg/dL    Calcium 7.4 (L) 8.6 - 10.0 mg/dL    Glucose 91 70 - 99 mg/dL    Alkaline Phosphatase 331 (H) 35 - 104 U/L    AST 48 (H) 10 - 35 U/L    ALT 9 (L) 10 - 35 U/L    Protein Total 7.1 6.4 - 8.3 g/dL    Albumin 2.0 (L) 3.5 - 5.2 g/dL    Bilirubin Total 0.4 <=1.2 mg/dL    GFR Estimate >90 >60 mL/min/1.73m2   CBC with platelets and differential     Status: Abnormal    Collection Time: 01/22/23 11:34 PM   Result Value Ref Range    WBC Count 6.2 4.0 - 11.0 10e3/uL    RBC Count 3.00 (L) 3.80 - 5.20 10e6/uL    Hemoglobin 8.2 (L) 11.7 - 15.7 g/dL    Hematocrit 27.1 (L) 35.0 - 47.0 %    MCV 90 78 - 100 fL    MCH 27.3 26.5 - 33.0 pg    MCHC 30.3 (L) 31.5 - 36.5 g/dL    RDW 18.5 (H) 10.0 - 15.0 %    Platelet Count 120 (L) 150 - 450 10e3/uL    % Neutrophils 63 %    % Lymphocytes 30 %    % Monocytes 5 %    % Eosinophils 1 %    % Basophils 0 %    % Immature Granulocytes 1 %    NRBCs per 100 WBC 0 <1 /100    Absolute Neutrophils 3.9 1.6 - 8.3 10e3/uL    Absolute Lymphocytes 1.9 0.8 - 5.3 10e3/uL    Absolute Monocytes 0.3 0.0 - 1.3 10e3/uL    Absolute Eosinophils 0.1 0.0 - 0.7 10e3/uL    Absolute Basophils 0.0 0.0 - 0.2 10e3/uL    Absolute Immature Granulocytes 0.0 <=0.4 10e3/uL    Absolute NRBCs 0.0 10e3/uL   RBC and Platelet Morphology     Status: Abnormal    Collection Time: 01/22/23 11:34 PM   Result Value Ref Range    Platelet Assessment  Automated Count Confirmed. Platelet morphology is normal.     Automated Count Confirmed. Platelet morphology is normal.    Elliptocytes Slight (A) None Seen    RBC Fragments Moderate (A) None Seen    RBC Morphology Confirmed RBC Indices    Erythrocyte sedimentation rate auto     Status: Abnormal    Collection Time: 01/22/23 11:41 PM   Result Value Ref Range    Erythrocyte Sedimentation Rate 110 (H) 0 - 20 mm/hr   CRP inflammation     Status: Abnormal    Collection Time: 01/22/23 11:41 PM   Result Value Ref Range    CRP Inflammation 72.50 (H)  <5.00 mg/L   [place] Draw     Status: None    Collection Time: 01/22/23 11:41 PM    Narrative    The following orders were created for panel order [place] Draw.  Procedure                               Abnormality         Status                     ---------                               -----------         ------                     Extra Blue Top Tube[[id number]]                              Final result                 Please view results for these tests on the individual orders.   Extra Tube ([place] Draw)     Status: None    Collection Time: 01/22/23 11:41 PM    Narrative    The following orders were created for panel order Extra Tube ([place] Draw).  Procedure                               Abnormality         Status                     ---------                               -----------         ------                     Extra Red Top Tube[[id number]]                               Final result                 Please view results for these tests on the individual orders.   Extra Blue Top Tube     Status: None    Collection Time: 01/22/23 11:41 PM   Result Value Ref Range    Hold Specimen JIC    Extra Red Top Tube     Status: None    Collection Time: 01/22/23 11:41 PM   Result Value Ref Range    Hold Specimen JIC    Troponin T, High Sensitivity     Status: Abnormal    Collection Time: 01/22/23 11:41 PM   Result Value Ref Range    Troponin T, High Sensitivity 27 (H) <=14 ng/L   BNP     Status: Abnormal    Collection Time: 01/22/23 11:41 PM   Result Value Ref Range    N terminal Pro BNP Inpatient 912 (H) 0 - 450 pg/mL   HCG qualitative pregnancy (blood)     Status: Normal    Collection Time: 01/22/23 11:41 PM   Result Value Ref Range    hCG Serum Qualitative Negative Negative   EKG 12-lead, tracing only     Status: None    Collection Time: 01/22/23 11:53 PM   Result Value Ref Range    Systolic Blood Pressure  mmHg    Diastolic Blood Pressure  mmHg    Ventricular Rate 111 BPM    Atrial Rate 111 BPM    OH Interval 138  ms    QRS Duration 72 ms     ms    QTc 481 ms    P Axis 32 degrees    R AXIS -47 degrees    T Axis 10 degrees    Interpretation ECG       Sinus tachycardia  Possible Left atrial enlargement  Left axis deviation  Low voltage QRS  Abnormal ECG    Unconfirmed report - interpretation of this ECG is computer generated - see medical record for final interpretation  Confirmed by - EMERGENCY ROOM, PHYSICIAN ([id number]),  EUSEBIO RIZZO ([id number]) on 1/23/2023 6:44:09 AM     US Lower Extremity Venous Duplex Left     Status: None    Collection Time: 01/23/23  1:44 AM    Narrative    EXAMINATION: US LOWER EXTREMITY VENOUS DUPLEX LEFT  1/23/2023 1:44 AM       CLINICAL HISTORY:   left posterior thigh radiating to calf pain     COMPARISON: None available        PROCEDURE COMMENTS: Ultrasound was performed of the deep venous system  of the left lower extremity using grayscale, color, and spectral  Doppler.    FINDINGS:  The common femoral, greater saphenous origin, femoral, popliteal, and  deep calf veins are visualized and are patent. Venous waveforms are  normal. There is normal response to compression.      Impression    IMPRESSION:.  No deep vein thrombosis in the left lower extremity.    I have personally reviewed the examination and initial interpretation  and I agree with the findings.    LIZZY LUZ MD         SYSTEM ID:  [id number]   MR Lumbar Spine w/o & w Contrast     Status: None    Collection Time: 01/23/23  2:45 AM    [facility]  MR LUMBAR SPINE W/O AND W CONTRAST  1/23/2023 2:45 AM     INDICATION: Immunocompromised, lupus on chronic prednisone, pain and tender from L1-L5 and left musculature radiating down to left calf with new paresthesias.  TECHNIQUE: Routine.  CONTRAST: 5 mL Gadavist.  COMPARISON: None.    FINDINGS: Nomenclature is based on 5 lumbar type vertebral bodies. Alignment is normal. No marrow edema or abnormal enhancement within the  spine. Normal distal spinal cord and cauda equina with conus medullaris at L1. Mild diffuse retroperitoneal   stranding is partially evaluated. Unremarkable visualized bony pelvis.    T12-L1: Normal disc height and signal. No herniation. No facet arthropathy. No spinal canal stenosis. No right neural foraminal stenosis. No left neural foraminal stenosis.    L1-L2: Normal disc height and signal. No herniation. No facet arthropathy. No spinal canal stenosis. No right neural foraminal stenosis. No left neural foraminal stenosis.    L2-L3: Minimal loss of disc space height and signal. Tiny left paracentral protrusion without neural impingement. No facet arthropathy. No spinal canal stenosis. No right neural foraminal stenosis. No left neural foraminal stenosis.    L3-L4: Normal disc height and signal. No herniation. No facet arthropathy. No spinal canal stenosis. No right neural foraminal stenosis. No left neural foraminal stenosis.    L4-L5: Normal disc height and signal. No herniation. No facet arthropathy. No spinal canal stenosis. No right neural foraminal stenosis. No left neural foraminal stenosis.    L5-S1: Normal disc height and signal. No herniation. No facet arthropathy. No spinal canal stenosis. No right neural foraminal stenosis. No left neural foraminal stenosis.      Impression    CONCLUSION:  1.  Minimal lumbar spondylosis. No canal or foraminal narrowing.    2.  No abnormal enhancement within the spine.    3.  Mild diffuse retroperitoneal stranding is partially evaluated. Consider abdominal imaging.   Troponin T, High Sensitivity     Status: Abnormal    Collection Time: 01/23/23  3:18 AM   Result Value Ref Range    Troponin T, High Sensitivity 31 (H) <=14 ng/L   CT Abdomen Pelvis w Contrast     Status: None    Collection Time: 01/23/23  7:18 AM   Result Value Ref Range    Radiologist flags Lymphadenopathy     Narrative    EXAMINATION: CT ABDOMEN PELVIS W CONTRAST, 1/23/2023 7:18 AM    INDICATION:  Retroperitoneal stranding seen on MRI.  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    COMPARISON STUDY: MR abdomen 6/24/2020, MR L-spine 1/23/2023    TECHNIQUE: CT scan of the abdomen and pelvis was performed on  multidetector CT scanner using volumetric acquisition technique and  images were reconstructed in multiple planes with variable thickness  and reviewed on dedicated workstations.     CONTRAST: iopamidol (ISOVUE-370) solution 66 mL injected IV with oral  contrast    CT scan radiation dose is optimized to minimum requisite dose using  automated dose modulation techniques.    FINDINGS:    Lower thorax: Mild bibasilar atelectasis    Liver: No mass. No intrahepatic biliary ductal dilation.    Biliary System: Distended gallbladder similar to prior MRI exam from  2020.  Trace pericholecystic fluid.  No abnormal gallbladder wall  thickening.  No extrahepatic biliary ductal dilation.    Pancreas: No mass or pancreatic ductal dilation.    Adrenal glands: No mass or nodules    Spleen: Enlarged measuring up to 13.5 cm.    Kidneys: No suspicious mass, obstructing calculus or hydronephrosis.   4 mm left midpole nonobstructing calculus.    Gastrointestinal tract:  Appendix is not definitively visualized.  No  abnormal secondary signs to suggest acute appendicitis.  Normal  caliber small and large bowel.    Mesentery/peritoneum/retroperitoneum: No air.    Lymph nodes: Numerous pelvic, retroperitoneal, mesenteric, and portal  caval enlarged lymph nodes. There is a 1.7 x 2.3 cm splenic lymph node  is seen on series 3 image 46. There is a 2.3 x 1.5 cystic node is seen  on series 3 image 70. There is a precaval node measuring up to 11 mm  in the short axis is seen on series 3 image 82, there is a left para  aortic node measuring up to 13 mm in short axis seen on series 3 image  82.    Vasculature: Patent major abdominal vasculature.    Pelvis: High density  material within the bladder favored to represent  gadolinium contrast from prior study.  Small amount of pelvic free  fluid, likely physiologic.  Nonspecific presacral fat stranding.   Uterus and adnexa within normal limits.    Osseous structures: Bilateral femoral head avascular necrosis without  subchondral collapse.  Bilateral sacral iliac joint bony erosion  changes noted as well.      Soft tissues: Within normal limits.      Impression    IMPRESSION:   1. Numerous enlarged pelvic, retroperitoneal, mesenteric, splenic, and  portal caval lymph nodes and mild retroperitoneal stranding which are  nonspecific but may represent sequela of infection/inflammation or  underlying clinical history of known systemic lupus erythematosus.   Clinical follow-up is advised to guide further management.  Short  interval 3-6 month contrast enhanced CT could be considered for  follow-up.  2. Mild splenomegaly  3. 4 mm nonobstructing left renal stone.  4. Incidental bilateral femoral head avascular necrosis without  subchondral collapse.  Bilateral sacral iliac joint bony erosion  changes noted as well.  5. Distended bladder with heterogeneous material, favor excreted  contrast from MRI exam earlier on same date.  5. Large gallbladder with trace pericholecystic fluid.  No abnormal  gallbladder wall thickening.    [Recommend Follow Up: Lymphadenopathy]    This report will be copied to the [facility] to ensure a  provider acknowledges the finding. Access Center is available Monday  through Friday 8am-3:30 pm.    I have personally reviewed the examination and initial interpretation  and I agree with the findings.    WHIT IVEY MD         SYSTEM ID:  [id number]       ED MEDICATIONS:   Medications   lidocaine 1 % 0.1-1 mL (has no administration in time range)   lidocaine (LMX4) cream (has no administration in time range)   sodium chloride (PF) 0.9% PF flush 3 mL (3 mLs Intracatheter Given 1/23/23 6816)   sodium chloride (PF)  0.9% PF flush 3 mL (has no administration in time range)   melatonin tablet 1 mg (has no administration in time range)   enoxaparin ANTICOAGULANT (LOVENOX) injection 40 mg (40 mg Subcutaneous Not Given 1/23/23 1520)   senna-docusate (SENOKOT-S/PERICOLACE) 8.6-50 MG per tablet 1 tablet (has no administration in time range)     Or   senna-docusate (SENOKOT-S/PERICOLACE) 8.6-50 MG per tablet 2 tablet (has no administration in time range)   lactated ringers infusion ( Intravenous New Bag 1/23/23 1437)   ferrous sulfate (FEROSUL) tablet 325 mg (has no administration in time range)   hydroxychloroquine (PLAQUENIL) tablet 200 mg (200 mg Oral Given 1/23/23 1524)   multivitamin, therapeutic (THERA-VIT) tablet 1 tablet (1 tablet Oral Given 1/23/23 1524)   mycophenolate (GENERIC EQUIVALENT) tablet 1,500 mg (has no administration in time range)   acetaminophen (TYLENOL) tablet 1,000 mg (1,000 mg Oral Given 1/22/23 2349)   0.9% sodium chloride BOLUS (0 mLs Intravenous Stopped 1/23/23 0316)   gadobutrol (GADAVIST) injection 7.5 mL (5 mLs Intravenous Given 1/23/23 0247)   iopamidol (ISOVUE-370) solution 66 mL (66 mLs Intravenous Given 1/23/23 0654)   sodium chloride (PF) 0.9% PF flush 67 mL (67 mLs Intravenous Given 1/23/23 0653)         Impression:    ICD-10-CM    1. Pain of left lower extremity  M79.605       2. Hx of systemic lupus erythematosus (SLE) (H)  M32.9       3. Tachycardia  R00.0           Plan:    Pending studies include CT abd.    CT with significant retroperitoneal, mesenteric, and pelvic lymphadenopathydiffuse lymphadenopathy possibly sequla of infection/inflammation or SLE. Pericholecystic fluid w/o GB wall thickening.     Patient reassessed and denies abd pain. No RUQ tenderness. C/o ongoing leg pain and does not feel safe discharging home. States she does not have anyone to help her at home. Has trouble walking due to pain. States she has not been in contact with her Rheumatologist but she is currently on  steroids for her Lupus.     Admitted to medicine obs for pain management and PT/OT, Rheum consult for likely SLE flare.            MD Toi Hayes Jill C, MD  01/23/23 0571

## 2023-01-23 NOTE — DISCHARGE INSTRUCTIONS
TODAY'S VISIT:  You were seen today for leg pain   -   - If you had any labs or imaging/radiology tests performed today, you should also discuss these tests with your usual provider.     FOLLOW-UP:  Please make an appointment to follow up with:  - Your Primary Care Provider. If you do not have a PCP, please call the Primary Care Center (phone: [phone] for an appointment  - your rheumatologist    - Have your provider review the results from today's visit with you again to make sure no further follow-up or additional testing is needed based on those results.     RETURN TO THE EMERGENCY DEPARTMENT  Return to the Emergency Department at any time for any new or worsening symptoms or any concerns.

## 2023-01-23 NOTE — ED PROVIDER NOTES
[place] EMERGENCY DEPARTMENT ([facility])  ED PROVIDER NOTE  January 22, 2023  History   No chief complaint on file.    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  She noticed that she was experiencing left leg pain for the past 2 days.  It began gradually.  She first noticed it when she was walking up the stairs and she felt pain in her lower back and radiates down across the lateral left hip, back of thigh down to the back of the calf.  The pain felt heavy, numb and painful to walk.  She says she has decreased sensation of the left side of her thigh.  She called her rheumatologist, who prescribed her Voltaren cream which did not help.  She has been massaging it which also does not help.  She states this is similar to pain in her left hip that she experienced during her recent admission, however this is different because the numbness is new.  She denies any bowel or bladder incontinence, perineal paresthesias.  She is able to walk however it is painful.    Patient notes that she takes prednisone 10 mg chronically for lupus, she has not missed any doses recently.  She has never had a history of rheumatic arthritis, there has been no trauma, rash, bruising, lumps.  She has never had surgery to the back hip or leg.  She denies any fevers, chest pain, shortness of breath, abdominal pain, vomiting, diarrhea, dysuria.  She denies any lightheadedness, syncope, palpitations.  She denies any anticoagulation use, history of IV drug use.  She does not know why her heart is racing so fast today.    Patient had recent hospitalization for evaluation of possible lupus flare in setting of medication nonadherence and concern for septic arthritis. She had presented to the Emergency Department on 12/14/22 complaining of fatigue, shortness of breath, cough, fevers, chills, sweats, left hip and knee pain. Her  workup was unremarkable. She was instructed to continue her outpatient medications including HCQ, MMF, prednisone, Bactrim and Benlysta.       EXAM: XR KNEE LEFT 1/2 VIEWS  LOCATION: [facility]  DATE/TIME: 12/14/2022 5:15 PM     INDICATION: Left knee pain.  COMPARISON: None.                                                                      IMPRESSION: Anatomic alignment left knee. No acute displaced left knee fracture. Normal joint spaces. No left knee joint effusion.    EXAM: XR PELVIS AND HIP LEFT 1 VIEW  LOCATION: [facility]  DATE/TIME: 12/14/2022 5:14 PM     INDICATION: Hip pain.  COMPARISON: None.                                                                      IMPRESSION: Anatomic alignment left hip. No acute displaced left hip fracture. Normal and symmetric hip joint spaces. No acute displaced pelvic fracture. Both obturator rings are intact. Symmetric SI joints.        Past Medical History  Past Medical History:   Diagnosis Date     Hepatitis      Lupus (systemic lupus erythematosus) (H)      Lupus cerebritis (H)      Pancreatitis 08/2017     Pyelonephritis 08/2017     Seizures (H)      Status epilepticus (H)      Past Surgical History:   Procedure Laterality Date     IR RENAL BIOPSY LEFT  6/28/2022     NO HISTORY OF SURGERY       ORTHOPEDIC SURGERY       acetaminophen (TYLENOL) 500 MG tablet  Belimumab 200 MG/ML SOAJ  calcium carbonate (TUMS) 500 MG chewable tablet  ferrous sulfate (FEROSUL) 325 (65 Fe) MG tablet  hydroxychloroquine (PLAQUENIL) 200 MG tablet  multivitamin, therapeutic (THERA-VIT) TABS tablet  mycophenolate (GENERIC EQUIVALENT) 500 MG tablet  predniSONE (DELTASONE) 5 MG tablet  sulfamethoxazole-trimethoprim (BACTRIM DS) 800-160 MG tablet  vitamin D3 (CHOLECALCIFEROL) 1.25 MG (75950 UT) capsule      Allergies   Allergen Reactions     Rituximab Anaphylaxis     Family History  Family History    Problem Relation Age of Onset     Other - See Comments Father         Question of lupus in father and paternal grandfather     Lupus Sister         older sister     Gastrointestinal Disease No family hx of         No known history of IBD, hepatitis, pancreatitis, celiac disease, or GI cancers before age 50     Glaucoma No family hx of      Macular Degeneration No family hx of      Social History   Social History     Tobacco Use     Smoking status: Never     Smokeless tobacco: Never   Substance Use Topics     Alcohol use: Not Currently     Drug use: Never         A medically appropriate review of systems was performed with pertinent positives and negatives noted in the HPI, and all other systems negative.     Physical Exam          Physical Exam  General: no acute distress.  HENT: Normocephalic and atraumatic.  No oropharyngeal exudate.   Eyes: EOMI, conjunctivae normal.   Cardiovascular: Tachycardic, regular rhythm.   No murmur heard.  Pulmonary:  No respiratory distress. Normal breath sounds.   Abdominal: no distension.  Abdomen is soft. There is no mass. There is no abdominal tenderness.   Musculoskeletal:    No swelling.  2+ DP and PT pulses.  Tender to palpation along midline spine L1-L5 without any bony step-off or deformity.  Tender to left paraspinal lumbar musculature, posterior super iliac spine, left lateral hip and femur, posterior knee, left calf.  Subjective decreased sensation along L4-S1.  Grossly intact strength.  Positive Homans' sign.  Normal gluteal squeeze  Skin: warm and dry  Neurological:  No focal deficit present.    Psychiatric:    normal affect      ED Course        Procedures            EKG Interpretation:      Interpreted by Ca Hardin MD  Time reviewed: 12 AM  Symptoms at time of EKG: Tachycardic  Rhythm: Sinus tachycardia  Rate: 111  Axis: Left axis  Ectopy: none  Conduction: normal  ST Segments/ T Waves: No ST-T wave changes  Q Waves: none  Comparison to prior: When compared to EKG  from 12/15/2022, sinus tachycardia is new    Clinical Impression: Sinus tachycardia    No results found for this or any previous visit (from the past 24 hour(s)).  Medications - No data to display          Medical Decision Making  The patient presented with a problem that is a chronic illness mild to moderate exacerbation, progression, or side effect of treatment.    The patient's evaluation involved:  review of 3+ prior external note(s) (see separate area of note for details)  ordering and review of 3+ test(s) (see separate area of note for details)  review of 3+ test result(s) ordered prior to this encounter (see separate area of note for details)    The patient's management involved a decision regarding hospitalization.     Assessments & Plan (with Medical Decision Making)   Patient arrives to the emergency department for chief complaint of 2 days of worsening left lower back radiating to left thigh and lower leg pain.  Of note she was recently hospitalized and also noted left hip pain at that time, however has worsening pain radiating down from her low back down to her left calf as well as new paresthesias.     On exam, patient is able to walk however difficult due to pain.  she does have tenderness along the midline spine along L1-L5 and subjective decrease sensation along L4-S1 dermatome.  She does not have any red flag signs of cauda equina.  Patient was initially tachycardic to 129 on arrival however notes that she is just in pain.  Differential diagnosis includes but is not limited to discitis, epidural abscess, sliipped disc, lumbar stenosis, rheumatologic back pain.  I do not have concern for septic joint based on diffuse pain that is not localized to one joint.      Since she is immunocompromise, with risk for epidural abscess, MRI with and without contrast was ordered.  Previous imaging of left knee and left hip and pelvis reviewed.  I provided 1 g of Tylenol and 1 L normal saline bolus, as tachycardia  may be due to dehydration.  Hemoglobin 8.2. However on chart review, on her most recent discharge, they note that her baseline is close to 7-8.  Admission during that time her hemoglobin was 5.6.  Which improved to 10 After 2 units RBC.  She is mildly acidotic however this is unchanged from previous lab work.  Initial troponin is 27.  This is potentially demand ischemia from tachycardia or due to pain.   CRP elevated at 72.50.  BNP ordered initially in case LLE ultrasound was positive for DVT.  US is negative for DVT.    Currently pending second troponin, ESR.  If MRI negative, patient can follow up with your rheumatologist.  They had recommended increasing her prednisone to 20 mg for 5 days then 50 mg for 5 days then going back to 10 mg in the last phone call.  Patient care was signed out to Dr. Humphreys physician pending imaging and rest of labs.      I have reviewed the nursing notes.    I have reviewed the findings, diagnosis, plan and need for follow up with the patient.    New Prescriptions    No medications on file       Final diagnoses:   None       Ca Hardin DO  1/22/2023   [facility] EMERGENCY DEPARTMENT     Ca Hardin MD  01/23/23 0258

## 2023-01-23 NOTE — ED TRIAGE NOTES
Pt ambulatory to triage with c/o left leg pain. Pt states that she has muscle tightness in her thigh that is painful and that in her calf it feels numb and burning. VSS on RA x heart rate (120-130bpm). Pt states this has been going on for 2 days and that there was no obvious cause.     Triage Assessment     Row Name 01/22/23 3604       Triage Assessment (Adult)    Airway WDL WDL       Respiratory WDL    Respiratory WDL WDL       Skin Circulation/Temperature WDL    Skin Circulation/Temperature WDL WDL       Cardiac WDL    Cardiac WDL WDL;X;rhythm    Pulse Rate & Regularity tachycardic       Peripheral/Neurovascular WDL    Peripheral Neurovascular WDL WDL       Cognitive/Neuro/Behavioral WDL    Cognitive/Neuro/Behavioral WDL WDL

## 2023-01-23 NOTE — ED NOTES
"     Emergency Department Patient Sign-out       Brief HPI:  This is a 22 year old female signed out to me by Dr. Hardin .  See initial ED Provider note for details of the presentation.            Significant Events prior to my assuming care: The patient is a 22-year-old female with a past medical history of lupus who presents to the emergency department left leg pain.  MRI of the lumbar spine and repeat troponin are pending at this time.  If no concerning acute findings, patient to be discharged home to follow-up with her rheumatologist.      Exam:   Patient Vitals for the past 24 hrs:   BP Temp Temp src Pulse Resp SpO2 Height   01/22/23 2302 133/85 98.4  F (36.9  C) Oral (!) 129 18 100 % 1.562 m (5' 1.5\")           ED RESULTS:   Results for orders placed or performed during the hospital encounter of 01/22/23 (from the past 24 hour(s))   CBC with platelets differential     Status: Abnormal    Collection Time: 01/22/23 11:34 PM    Narrative    The following orders were created for panel order CBC with platelets differential.  Procedure                               Abnormality         Status                     ---------                               -----------         ------                     CBC with platelets and d...[[id number]]  Abnormal            Final result               RBC and Platelet Morphology[[id number]]  Abnormal            Final result                 Please view results for these tests on the individual orders.   Comprehensive metabolic panel     Status: Abnormal    Collection Time: 01/22/23 11:34 PM   Result Value Ref Range    Sodium 133 (L) 136 - 145 mmol/L    Potassium 3.6 3.4 - 5.3 mmol/L    Chloride 107 98 - 107 mmol/L    Carbon Dioxide (CO2) 16 (L) 22 - 29 mmol/L    Anion Gap 10 7 - 15 mmol/L    Urea Nitrogen 13.5 6.0 - 20.0 mg/dL    Creatinine 0.55 0.51 - 0.95 mg/dL    Calcium 7.4 (L) 8.6 - 10.0 mg/dL    Glucose 91 70 - 99 mg/dL    Alkaline Phosphatase 331 (H) 35 - 104 U/L    AST 48 (H) 10 - " 35 U/L    ALT 9 (L) 10 - 35 U/L    Protein Total 7.1 6.4 - 8.3 g/dL    Albumin 2.0 (L) 3.5 - 5.2 g/dL    Bilirubin Total 0.4 <=1.2 mg/dL    GFR Estimate >90 >60 mL/min/1.73m2   CBC with platelets and differential     Status: Abnormal    Collection Time: 01/22/23 11:34 PM   Result Value Ref Range    WBC Count 6.2 4.0 - 11.0 10e3/uL    RBC Count 3.00 (L) 3.80 - 5.20 10e6/uL    Hemoglobin 8.2 (L) 11.7 - 15.7 g/dL    Hematocrit 27.1 (L) 35.0 - 47.0 %    MCV 90 78 - 100 fL    MCH 27.3 26.5 - 33.0 pg    MCHC 30.3 (L) 31.5 - 36.5 g/dL    RDW 18.5 (H) 10.0 - 15.0 %    Platelet Count 120 (L) 150 - 450 10e3/uL    % Neutrophils 63 %    % Lymphocytes 30 %    % Monocytes 5 %    % Eosinophils 1 %    % Basophils 0 %    % Immature Granulocytes 1 %    NRBCs per 100 WBC 0 <1 /100    Absolute Neutrophils 3.9 1.6 - 8.3 10e3/uL    Absolute Lymphocytes 1.9 0.8 - 5.3 10e3/uL    Absolute Monocytes 0.3 0.0 - 1.3 10e3/uL    Absolute Eosinophils 0.1 0.0 - 0.7 10e3/uL    Absolute Basophils 0.0 0.0 - 0.2 10e3/uL    Absolute Immature Granulocytes 0.0 <=0.4 10e3/uL    Absolute NRBCs 0.0 10e3/uL   RBC and Platelet Morphology     Status: Abnormal    Collection Time: 01/22/23 11:34 PM   Result Value Ref Range    Platelet Assessment  Automated Count Confirmed. Platelet morphology is normal.     Automated Count Confirmed. Platelet morphology is normal.    Elliptocytes Slight (A) None Seen    RBC Fragments Moderate (A) None Seen    RBC Morphology Confirmed RBC Indices    Erythrocyte sedimentation rate auto     Status: Abnormal    Collection Time: 01/22/23 11:41 PM   Result Value Ref Range    Erythrocyte Sedimentation Rate 110 (H) 0 - 20 mm/hr   CRP inflammation     Status: Abnormal    Collection Time: 01/22/23 11:41 PM   Result Value Ref Range    CRP Inflammation 72.50 (H) <5.00 mg/L   [place] Draw     Status: None    Collection Time: 01/22/23 11:41 PM    Narrative    The following orders were created for panel order [place] Draw.  Procedure                                Abnormality         Status                     ---------                               -----------         ------                     Extra Blue Top Tube[[id number]]                              Final result                 Please view results for these tests on the individual orders.   Extra Tube ([place] Draw)     Status: None ()    Collection Time: 01/22/23 11:41 PM    Narrative    The following orders were created for panel order Extra Tube ([place] Draw).  Procedure                               Abnormality         Status                     ---------                               -----------         ------                     Extra Red Top Tube[[id number]]                                                            Please view results for these tests on the individual orders.   Extra Blue Top Tube     Status: None    Collection Time: 01/22/23 11:41 PM   Result Value Ref Range    Hold Specimen JIC    Troponin T, High Sensitivity     Status: Abnormal    Collection Time: 01/22/23 11:41 PM   Result Value Ref Range    Troponin T, High Sensitivity 27 (H) <=14 ng/L   BNP     Status: Abnormal    Collection Time: 01/22/23 11:41 PM   Result Value Ref Range    N terminal Pro BNP Inpatient 912 (H) 0 - 450 pg/mL   US Lower Extremity Venous Duplex Left     Status: None (Preliminary result)    Collection Time: 01/23/23  1:44 AM    Impression    RESIDENT PRELIMINARY INTERPRETATION  IMPRESSION:.  No deep vein thrombosis in the left lower extremity.   MR Lumbar Spine w/o & w Contrast     Status: None    Collection Time: 01/23/23  2:45 AM    [facility]  MR LUMBAR SPINE W/O AND W CONTRAST  1/23/2023 2:45 AM     INDICATION: Immunocompromised, lupus on chronic prednisone, pain and tender from L1-L5 and left musculature radiating down to left calf with new paresthesias.  TECHNIQUE: Routine.  CONTRAST: 5 mL Gadavist.  COMPARISON: None.    FINDINGS:  Nomenclature is based on 5 lumbar type vertebral bodies. Alignment is normal. No marrow edema or abnormal enhancement within the spine. Normal distal spinal cord and cauda equina with conus medullaris at L1. Mild diffuse retroperitoneal   stranding is partially evaluated. Unremarkable visualized bony pelvis.    T12-L1: Normal disc height and signal. No herniation. No facet arthropathy. No spinal canal stenosis. No right neural foraminal stenosis. No left neural foraminal stenosis.    L1-L2: Normal disc height and signal. No herniation. No facet arthropathy. No spinal canal stenosis. No right neural foraminal stenosis. No left neural foraminal stenosis.    L2-L3: Minimal loss of disc space height and signal. Tiny left paracentral protrusion without neural impingement. No facet arthropathy. No spinal canal stenosis. No right neural foraminal stenosis. No left neural foraminal stenosis.    L3-L4: Normal disc height and signal. No herniation. No facet arthropathy. No spinal canal stenosis. No right neural foraminal stenosis. No left neural foraminal stenosis.    L4-L5: Normal disc height and signal. No herniation. No facet arthropathy. No spinal canal stenosis. No right neural foraminal stenosis. No left neural foraminal stenosis.    L5-S1: Normal disc height and signal. No herniation. No facet arthropathy. No spinal canal stenosis. No right neural foraminal stenosis. No left neural foraminal stenosis.      Impression    CONCLUSION:  1.  Minimal lumbar spondylosis. No canal or foraminal narrowing.    2.  No abnormal enhancement within the spine.    3.  Mild diffuse retroperitoneal stranding is partially evaluated. Consider abdominal imaging.   Troponin T, High Sensitivity     Status: Abnormal    Collection Time: 01/23/23  3:18 AM   Result Value Ref Range    Troponin T, High Sensitivity 31 (H) <=14 ng/L       ED MEDICATIONS:   Medications   gadobutrol (GADAVIST) injection 7.5 mL (has no administration in time range)    acetaminophen (TYLENOL) tablet 1,000 mg (1,000 mg Oral Given 1/22/23 7888)   0.9% sodium chloride BOLUS (1,000 mLs Intravenous New Bag 1/22/23 1626)         Impression:    ICD-10-CM    1. Pain of left lower extremity  M79.605       2. Hx of systemic lupus erythematosus (SLE) (H)  M32.9       3. Tachycardia  R00.0           Plan:    Pending studies include MRI and second troponin.      MRI shows minimal lumbar spondylosis without canal or foraminal narrowing, no abnormal enhancement within the spine.  However, there is mild diffuse retroperitoneal stranding.  Abdominal imaging is recommended..    Second troponin stable.    Patient be signed out to oncoming provider.  CT abdomen/pelvis is pending at this time.  If negative, plan for patient to be discharged home and advised to follow-up with her PCP and rheumatologist with instructions to return to the emergency department immediately with any new or worsening symptoms.    MD Oren Jimenez Michelle C, MD  01/23/23 7697

## 2023-01-23 NOTE — PHARMACY-ADMISSION MEDICATION HISTORY
Admission Medication History Completed by Pharmacy    See [facility] Admission Navigator for allergy information, preferred outpatient pharmacy, prior to admission medications and immunization status.     Medication History Sources:     Patient, 12/16 discharge med list, [facility]    Changes made to PTA medication list (reason):    Added: None    Deleted: None    Changed: None    Additional Information:    Patient reports that her most recent dose of prednisone has been 5 mg daily and that it was adjusted by her outpt provider.     Patient reports that she took her hydroxychloroquine and bactrim today (1/23/23), but not her mycophenolate.    Prior to Admission medications    Medication Sig Last Dose Taking? Auth Provider Long Term End Date   acetaminophen (TYLENOL) 500 MG tablet Take 1 tablet (500 mg) by mouth every 4 hours as needed for mild pain Past Month Yes Clint Gu MD     Belimumab 200 MG/ML SOAJ Inject 200 mg Subcutaneous every 7 days 12/30/2022 Yes Jonh Anders MD PhD Yes    calcium carbonate (TUMS) 500 MG chewable tablet Take 1 tablet (500 mg) by mouth daily 1/22/2023 Yes Clint Gu MD     ferrous sulfate (FEROSUL) 325 (65 Fe) MG tablet Take 1 tablet (325 mg) by mouth daily (with breakfast) 1/22/2023 Yes Clint Gu MD     hydroxychloroquine (PLAQUENIL) 200 MG tablet Take 1 tablet (200 mg) by mouth daily 1/23/2023 Yes Clint Gu MD     multivitamin, therapeutic (THERA-VIT) TABS tablet Take 1 tablet by mouth daily 1/22/2023 Yes Jonh Anders MD PhD     mycophenolate (GENERIC EQUIVALENT) 500 MG tablet Take 3 tablets (1,500 mg) by mouth 2 times daily 1/22/2023 at HS Yes Clint Gu MD Yes    predniSONE (DELTASONE) 5 MG tablet Take 4 tablets (20 mg) by mouth daily for 5 days, THEN 3 tablets (15 mg) daily for 7 days, THEN 2 tablets (10 mg) daily for 30 days.  Patient taking differently: Take 5 mg by mouth daily. 1/22/2023 at HS Yes Clint Gu MD  1/29/23    sulfamethoxazole-trimethoprim (BACTRIM DS) 800-160 MG tablet Take 1 tablet by mouth Every Mon, Wed, Fri Morning 1/23/2023 Yes Clint Gu MD No    vitamin D3 (CHOLECALCIFEROL) 1.25 MG (20134 UT) capsule Take 1 capsule (50,000 Units) by mouth every 7 days 1/22/2023 Yes Clint Gu MD No        Date completed: 01/23/23    Medication history completed by: Nury Nowak [facility]

## 2023-01-23 NOTE — PHARMACY
"The following home medications were NOT continued on inpatient admission per \"Discontinuation of nonessential home medications during hospitalization\" policy: TUMS (can order if requested PRN), vitamin D3 weekly    If a therapeutic holiday is deemed inappropriate per the prescriber, please notify the pharmacist regarding the medication order.    The pharmacist is available to answer any questions and/or concerns the patient may have regarding discontinuation of non-essential medications.    Please ensure that these medications are restarted as needed upon discharge via the medication reconciliation discharge process and included on the discharge medication reconciliation report.    Thank you,  Elham Smith, [facility]    "

## 2023-01-23 NOTE — UTILIZATION REVIEW
"    Admission Status; Secondary Review Determination         Under the authority of the Utilization Management Committee, the utilization review process indicated a secondary review on the above patient.  The review outcome is based on review of the medical records, discussions with staff, and applying clinical experience noted on the date of the review.          (x) Observation Status Appropriate - This patient does not meet hospital inpatient criteria and is placed in observation status. If this patient's primary payer is Medicare and was admitted as an inpatient, Condition Code 44 should be used and patient status changed to \"observation\".     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  She noticed that she was experiencing left leg pain for the past 2 days.   She called her rheumatologist, who prescribed her Voltaren cream which did not help.  She denies any bowel or bladder incontinence, perineal paresthesias.  She is able to walk however it is painful.    She has never had surgery to the back hip or leg.  She denies any fevers, chest pain, shortness of breath, abdominal pain, vomiting, diarrhea, dysuria.  She denies any lightheadedness, syncope, palpitations.      Ultrasound was negative for DVT.  Abdomen CT showed enlarged lymph nodes, consistent with SLE, bilateral femoral head vascular necrosis without subchondral collapse.  No acute pathology though.  Lumbar spine MRI without acute pathology.       The severity of illness, intensity of service provided, expected LOS and risk for adverse outcome make the care appropriate for further observation; however, doesn't meet criteria for hospital inpatient admission. Kirsten Bailey MD ( resident)  And Pati Ceron MD (intern) notified of this determination.    This document was produced using " voice recognition software.      The information on this document is developed by the utilization review team in order for the business office to ensure compliance.  This only denotes the appropriateness of proper admission status and does not reflect the quality of care rendered.         The definitions of Inpatient Status and Observation Status used in making the determination above are those provided in the CMS Coverage Manual, Chapter 1 and Chapter 6, section 70.4.      Sincerely,     ALLISON RIDER MD   Utilization Review  Physician Advisor  [facility]

## 2023-01-24 ENCOUNTER — APPOINTMENT (OUTPATIENT)
Dept: PHYSICAL THERAPY | Facility: CLINIC | Age: 23
End: 2023-01-24
Payer: COMMERCIAL

## 2023-01-24 ENCOUNTER — APPOINTMENT (OUTPATIENT)
Dept: CARDIOLOGY | Facility: CLINIC | Age: 23
End: 2023-01-24
Payer: COMMERCIAL

## 2023-01-24 LAB
ABO/RH(D): NORMAL
ALBUMIN SERPL BCG-MCNC: 1.7 G/DL (ref 3.5–5.2)
ALP SERPL-CCNC: 262 U/L (ref 35–104)
ALT SERPL W P-5'-P-CCNC: 6 U/L (ref 10–35)
ANION GAP SERPL CALCULATED.3IONS-SCNC: 10 MMOL/L (ref 7–15)
ANTIBODY SCREEN: NEGATIVE
AST SERPL W P-5'-P-CCNC: 41 U/L (ref 10–35)
BILIRUB DIRECT SERPL-MCNC: <0.2 MG/DL (ref 0–0.3)
BILIRUB DIRECT SERPL-MCNC: <0.2 MG/DL (ref 0–0.3)
BILIRUB SERPL-MCNC: 0.2 MG/DL
BILIRUB SERPL-MCNC: 0.3 MG/DL
BUN SERPL-MCNC: 13.9 MG/DL (ref 6–20)
C3 SERPL-MCNC: 17 MG/DL (ref 81–157)
C4 SERPL-MCNC: 7 MG/DL (ref 13–39)
CALCIUM SERPL-MCNC: 7.5 MG/DL (ref 8.6–10)
CHLORIDE SERPL-SCNC: 114 MMOL/L (ref 98–107)
CK SERPL-CCNC: 27 U/L (ref 26–192)
CREAT SERPL-MCNC: 0.52 MG/DL (ref 0.51–0.95)
DAT POLY: NEGATIVE
DEPRECATED HCO3 PLAS-SCNC: 14 MMOL/L (ref 22–29)
DSDNA AB SER-ACNC: >365 IU/ML
ERYTHROCYTE [DISTWIDTH] IN BLOOD BY AUTOMATED COUNT: 18.3 % (ref 10–15)
ERYTHROCYTE [DISTWIDTH] IN BLOOD BY AUTOMATED COUNT: 18.4 % (ref 10–15)
GFR SERPL CREATININE-BSD FRML MDRD: >90 ML/MIN/1.73M2
GGT SERPL-CCNC: 82 U/L (ref 5–36)
GLUCOSE SERPL-MCNC: 117 MG/DL (ref 70–99)
HAPTOGLOB SERPL-MCNC: 23 MG/DL (ref 32–197)
HCT VFR BLD AUTO: 24.3 % (ref 35–47)
HCT VFR BLD AUTO: 26.3 % (ref 35–47)
HGB BLD-MCNC: 7 G/DL (ref 11.7–15.7)
HGB BLD-MCNC: 7.6 G/DL (ref 11.7–15.7)
HOLD SPECIMEN: NORMAL
LDH SERPL L TO P-CCNC: 423 U/L (ref 0–250)
LVEF ECHO: NORMAL
MCH RBC QN AUTO: 26.3 PG (ref 26.5–33)
MCH RBC QN AUTO: 26.9 PG (ref 26.5–33)
MCHC RBC AUTO-ENTMCNC: 28.8 G/DL (ref 31.5–36.5)
MCHC RBC AUTO-ENTMCNC: 28.9 G/DL (ref 31.5–36.5)
MCV RBC AUTO: 91 FL (ref 78–100)
MCV RBC AUTO: 93 FL (ref 78–100)
MYCOPHENOLATE SERPL LC/MS/MS-MCNC: <0.25 MG/L (ref 1–3.5)
MYCOPHENOLATE-G SERPL LC/MS/MS-MCNC: <6.5 MG/L (ref 30–95)
PLATELET # BLD AUTO: 85 10E3/UL (ref 150–450)
PLATELET # BLD AUTO: 96 10E3/UL (ref 150–450)
POTASSIUM SERPL-SCNC: 3.6 MMOL/L (ref 3.4–5.3)
PROT SERPL-MCNC: 6.2 G/DL (ref 6.4–8.3)
RBC # BLD AUTO: 2.66 10E6/UL (ref 3.8–5.2)
RBC # BLD AUTO: 2.83 10E6/UL (ref 3.8–5.2)
RETICS # AUTO: 0.05 10E6/UL (ref 0.03–0.1)
RETICS/RBC NFR AUTO: 2 % (ref 0.5–2)
SODIUM SERPL-SCNC: 138 MMOL/L (ref 136–145)
SPECIMEN EXPIRATION DATE: NORMAL
SPECIMEN EXPIRATION DATE: NORMAL
TME LAST DOSE: ABNORMAL H
TME LAST DOSE: ABNORMAL H
WBC # BLD AUTO: 3.7 10E3/UL (ref 4–11)
WBC # BLD AUTO: 4.3 10E3/UL (ref 4–11)

## 2023-01-24 PROCEDURE — 85060 BLOOD SMEAR INTERPRETATION: CPT | Performed by: PATHOLOGY

## 2023-01-24 PROCEDURE — 82248 BILIRUBIN DIRECT: CPT

## 2023-01-24 PROCEDURE — 86880 COOMBS TEST DIRECT: CPT | Performed by: STUDENT IN AN ORGANIZED HEALTH CARE EDUCATION/TRAINING PROGRAM

## 2023-01-24 PROCEDURE — 250N000012 HC RX MED GY IP 250 OP 636 PS 637

## 2023-01-24 PROCEDURE — 85045 AUTOMATED RETICULOCYTE COUNT: CPT

## 2023-01-24 PROCEDURE — 83615 LACTATE (LD) (LDH) ENZYME: CPT

## 2023-01-24 PROCEDURE — 250N000011 HC RX IP 250 OP 636

## 2023-01-24 PROCEDURE — 82550 ASSAY OF CK (CPK): CPT

## 2023-01-24 PROCEDURE — 97161 PT EVAL LOW COMPLEX 20 MIN: CPT | Mod: GP

## 2023-01-24 PROCEDURE — 255N000002 HC RX 255 OP 636: Performed by: INTERNAL MEDICINE

## 2023-01-24 PROCEDURE — 93306 TTE W/DOPPLER COMPLETE: CPT | Mod: 26 | Performed by: INTERNAL MEDICINE

## 2023-01-24 PROCEDURE — 999N000111 HC STATISTIC OT IP EVAL DEFER: Performed by: OCCUPATIONAL THERAPIST

## 2023-01-24 PROCEDURE — 999N000208 ECHOCARDIOGRAM COMPLETE

## 2023-01-24 PROCEDURE — 36415 COLL VENOUS BLD VENIPUNCTURE: CPT

## 2023-01-24 PROCEDURE — G0378 HOSPITAL OBSERVATION PER HR: HCPCS

## 2023-01-24 PROCEDURE — 258N000003 HC RX IP 258 OP 636

## 2023-01-24 PROCEDURE — 85027 COMPLETE CBC AUTOMATED: CPT

## 2023-01-24 PROCEDURE — 82977 ASSAY OF GGT: CPT

## 2023-01-24 PROCEDURE — 86901 BLOOD TYPING SEROLOGIC RH(D): CPT

## 2023-01-24 PROCEDURE — 85007 BL SMEAR W/DIFF WBC COUNT: CPT

## 2023-01-24 PROCEDURE — 99233 SBSQ HOSP IP/OBS HIGH 50: CPT | Mod: GC | Performed by: INTERNAL MEDICINE

## 2023-01-24 PROCEDURE — 97530 THERAPEUTIC ACTIVITIES: CPT | Mod: GP

## 2023-01-24 PROCEDURE — 120N000002 HC R&B MED SURG/OB UMMC

## 2023-01-24 PROCEDURE — 99207 PR NO CHARGE LOS: CPT | Performed by: PHYSICIAN ASSISTANT

## 2023-01-24 PROCEDURE — 97110 THERAPEUTIC EXERCISES: CPT | Mod: GP

## 2023-01-24 PROCEDURE — 99223 1ST HOSP IP/OBS HIGH 75: CPT | Mod: GC | Performed by: STUDENT IN AN ORGANIZED HEALTH CARE EDUCATION/TRAINING PROGRAM

## 2023-01-24 PROCEDURE — 250N000013 HC RX MED GY IP 250 OP 250 PS 637

## 2023-01-24 PROCEDURE — 80053 COMPREHEN METABOLIC PANEL: CPT

## 2023-01-24 PROCEDURE — 86923 COMPATIBILITY TEST ELECTRIC: CPT

## 2023-01-24 RX ADMIN — PREDNISONE 20 MG: 20 TABLET ORAL at 09:08

## 2023-01-24 RX ADMIN — HUMAN ALBUMIN MICROSPHERES AND PERFLUTREN 5 ML: 10; .22 INJECTION, SOLUTION INTRAVENOUS at 07:20

## 2023-01-24 RX ADMIN — REMDESIVIR 100 MG: 100 INJECTION, POWDER, LYOPHILIZED, FOR SOLUTION INTRAVENOUS at 18:09

## 2023-01-24 RX ADMIN — HYDROXYCHLOROQUINE SULFATE 200 MG: 200 TABLET, FILM COATED ORAL at 09:08

## 2023-01-24 RX ADMIN — THERA TABS 1 TABLET: TAB at 09:08

## 2023-01-24 RX ADMIN — SODIUM CHLORIDE 50 ML: 9 INJECTION, SOLUTION INTRAVENOUS at 18:11

## 2023-01-24 RX ADMIN — FERROUS SULFATE TAB 325 MG (65 MG ELEMENTAL FE) 325 MG: 325 (65 FE) TAB at 09:08

## 2023-01-24 ASSESSMENT — ACTIVITIES OF DAILY LIVING (ADL)
ADLS_ACUITY_SCORE: 35

## 2023-01-24 NOTE — PROGRESS NOTES
Resident/Fellow Attestation   I, German Velez Reyes, MD, PhD, was present with the medical/PUNEET student who participated in the service and in the documentation of the note.  I have verified the history and personally performed the physical exam and medical decision making.  I agree with the assessment and plan of care as documented in the note.      Overall improving today. Notable changes today with Hgb down to 7.0. No concerns, signs, or symptoms for acute bleeding. Non-surgical plan as per Ortho. No changes from Rheumatology until follow up, while continuing to hold MMF. Will continue to monitor and transfuse PRBCs as needed. Recommend continuation of physical therapy for lower leg pain.     German Velez Reyes, MD, PhD  PGY1  Date of Service (when I saw the patient): 01/24/23    [facility]    Progress Note - Medicine Service, [facility] TEAM 1       Date of Admission:  1/22/2023    Assessment & Plan   Milly Carroll is a 22 year old female with a 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 She requires hospitalization for further workup and pain management.      Today:  -Recheck Hgb this evening  -Continue remdesivir (day 2/3)  -Pain control   -Good oral intake   -Blood smear     #SLE with hx of lupus nephritis & lupus cerebritis, stable  #Multiple Intraabdominal Lymphadenopathies, stable  #Mild Splenomegaly, stable  On outpatient regimen of Mycofenolate, hydroxychloroquine, Belimumab, and prednisone. Plans for dose escalation of prednisone for concern for lupus flare from outpatient rheumatology notes. Multiple intraabdominal LAPs are not noted on previous imaging. This could be reactive for  the COVID infection or less likely lymphoma.  dsDNA antibody level elevated at >365.   -Rheumatology consult   -Prednisone 20 mg po/day  -MMF 1500mg BID, hold for noow  -Hydroxychloroquine 200 mg /day    #Left leg pain, improving   Presented with two days history of progressive, burning left calf pain. No evidence of gross swelling to suggest DVT, ultrasound also did not show thrombosis in the veins. No evidence of cellulitis or evidence of infection on overlying skin, joint movement was good and no evidence of acute arthritis. Possible component of neuropathic pain with single dermatomal involvement, however MRI of lumbar spine was negative. Single nerve neuropathy with dominant sensory presentation with lupus is also uncommon.  Also presented with left sided hip, knee and ankle pain in December for which she was managed as a lupus flare. Normal xray rules out avascular necrosis of the knee considering the history of avascular necrosis in the R knee; normal CK rules out myositis. Potentially an effect of COVID-19 infection or a lupus flare; lupus labs pending.   -Pain control- Tylenol PRN, diclofenac gel  -Rheumatology consult  -Follow-up on lupus labs      #COVID-19 infection, stable  Not requiring oxygen, asymptomatic. Tested positive on admission workup. Given coexisting comorbidity, plan to treat with remdesivir for 3 days as prevention of severe disease.    -Remdesivir IV (day 2/3 today)  -Monitor for symptoms, oxygen need  -Enoxaparin for DVT prophylaxis if repeat platelets and Hgb have normalized  -COVID precautions  -Remdesivir labs  -hold mycophenelate mofetil for now     #Anemia (Normocytic), declining  #Thrombocytopenia  Baseline Hgb was around 10, admission Hgb 8.2, no evidence of overt bleeding at presentation. Differential includes hemolytic process from lupus. HGb down to 7. LDH elevated at 423. C3 at 17 (near her baseline), C4 at 7 but near her baseline. Haptoglobin down to 23 from 197 one month  ago. Lab results concerning for hemolysis so will get peripheral smear and hepatic panel.   -recheck Hgb this afternoon  -Continue PTA Ferrous sulfate  -Management of lupus as above  -Blood smear  -Transfuse if Hgb further declines  -Hepatic panel    #Lymphopenia, declining  WBC decreased from 6.2 yesterday to 3.7 this morning. Further concerning for hemolysis. Will assess further with differential. Likely in the setting of acute SLE flare.   -CBC with Diff     #Bilateral Femoral head avascular necrosis, stable  #Hip pain, improving  Noted on admission CT imaging. 5/10 hip pain this morning. Orthopedics does not believe surgery is indicated at this time; she will follow-up with them outpatient. They also recommended discontinuing prednisone as soon as possible, but that likely will not be during hospitalization.  - Pain control as needed  - Follow up as outpatient     #R lower back pain, improving   Likely a MSK etiology due to the R sided nature and lack of relevant findings on spine MRI.   -Pain control- Tylenol PRN, ibuprofen PRN, robaxin PRN     #Elevated Troponin, stable  #Elevated BNP, stable   No clear evidence of ischemic process, slight troponin elevation . Differential includes a myocarditis component from lupus. Of note, patient has tachycardia, ECG was sinus with no concern for ST-T change. Echo demonstrated decreased LV fxn (EF=40-45%). Global RV fxn borderline reduced. Likely due to COVID-19 infection as well as potential lupus flare. Will stop trending troponin and BNP but will continue telemetry.  -Continue telemetry    #Hypoalbuminemia, stable  Likely due to malnutrition and inflammatory process. No albumin results this morn.    -Nutrition evaluation   -Continue to monitor    #NAGMA, stable  Most likely to dehydration and decreased PO intake (has not eaten in at least ~20 hours since admission). CO2 down to 14 this morn from 16.  -s/p 1L NS at the ED  -s/p LRs overnight   -Continue to monitor via  BMP     #Elevated Alk Phos, stable  No dilated billiary tree on imaging, except for the large gallbladder on CT. GGT near patient's baseline (82) implies extrahepatic cause (bone diease).   -Continue to monitor      ----------resolved medical problems-------------    #Hyponatremia, resolved    ----------chronic problems---------  #Hx of Pancreatitis   Was admitted in 2020 for acute pancreatitis 2/2 PD stones  -No symptoms of pancreatitis at present     ---------follow ups at discharge----------------  -rheumatology for lupus management   -ortho for bilateral femoral avascular necrosis potentially       Diet: Combination Diet Regular Diet Adult    DVT Prophylaxis: enoxaparin subcutaneously after platelets normalize and decrease risk of bleeding  Leung Catheter: Not present  Fluids: none  Lines: None     Cardiac Monitoring: ACTIVE order. Indication: Chest pain/ ACS rule out (24 hours)  Code Status: Full Code      Clinically Significant Risk Factors Present on Admission              # Hypoalbuminemia: Lowest albumin = 2 g/dL at 1/22/2023 11:34 PM, will monitor as appropriate   # Thrombocytopenia: Lowest platelets = 85 in last 2 days, will monitor for bleeding                Disposition Plan   Expected Discharge Date: 01/26/2023                The patient's care was discussed with the Attending Physician, Dr. Parrish.  Brenda Cyr, MS3      Medicine Service, [facility] TEAM [address] [place] [address]  Securely message with TV Volume Wizard App (more info)  Text page via [facility] Paging/Directory   See signed in provider for up to date coverage information  ______________________________________________________________________    Interval History   No acute events overnight. Milly was feeling tired this morning and was quiet. Her pain improved overnight and she has not noticed any fevers or chills. She also has not felt lightheaded at all in bed or while walking around. She is able to walk to the  bathroom on her own. She expressed understanding of the plan and did not have any questions.     Physical Exam   Vital Signs: Temp: 97.6  F (36.4  C) Temp src: Oral BP: (!) 120/91 Pulse: 73   Resp: 16 SpO2: 100 % O2 Device: None (Room air)    Weight: 0 lbs 0 oz    General: no acute distress.  HENT: Atraumatic.    Eyes: Mild proptosis.   Cardiovascular: Tachycardic, regular rhythm.  Normal S1 and S2. No murmur appreciated.  Pulmonary:  Breathing comfortably on room air. No respiratory distress. Normal breath sounds, no wheezing, rhonchi, or rales.  Abdominal: no distension.  Abdomen is soft. No guarding or rebound. There is abdominal tenderness in the R lower quadrants on palpation   Musculoskeletal: Mild pedal edema. Strength equal in bilateral ankles and knees. Sensation decreased to light touch on the back of the L calf up to the back of the knee. She can feel pressure on the calf.  Skin: warm and dry  Neurological:  No focal deficit present.    Psychiatric: quiet, normal affect      Medical Decision Making       Please see A&P for additional details of medical decision making.      Data      Echo 1/24/2023  Impression:  Left ventricular function is decreased. The ejection fraction is 40-45%  (mildly reduced).  Global right ventricular function is borderline reduced.  No significant valvular abnormalities present.  Pulmonary artery systolic pressure is normal.  The inferior vena cava is normal.  Trivial pericardial effusion is present.      Most Recent 3 CBC's:Recent Labs   Lab Test 01/24/23  0623 01/22/23  2334 12/16/22  0908   WBC 3.7* 6.2 6.9   HGB 7.0* 8.2* 10.4*   MCV 91 90 89   PLT 85* 120* 139*     Most Recent 3 BMP's:Recent Labs   Lab Test 01/24/23  0623 01/22/23  2334 12/16/22  0908    133* 141   POTASSIUM 3.6 3.6 4.4   CHLORIDE 114* 107 113*   CO2 14* 16* 20*   BUN 13.9 13.5 33.6*   CR 0.52 0.55 0.80   ANIONGAP 10 10 8   BISI 7.5* 7.4* 7.6*   * 91 111*     Most Recent 3 Hemoglobins:Recent  Labs   Lab Test 01/24/23  0623 01/22/23  2334 12/16/22  0908   HGB 7.0* 8.2* 10.4*

## 2023-01-24 NOTE — PROGRESS NOTES
Pt has been alert and oriented x4, independent on ft, VSS on RA, afebrile. Hgb 7.0 this am, dropped from 8.2 yesterday. Pt reported a normal BM yesterday. Medicine team put in order to recheck Hgb just now. Awaiting result.

## 2023-01-24 NOTE — PROGRESS NOTES
"ED PT Eval    Patient greeted supine in bed reporting sensory impairment/pain at L lateral lower leg and bilateral hip/groin pain (L worse than R). First, screened lumbar spine without any symptom provocation with all passive and active mobility assessments. Then, assessed L hip with noted symptom reproduction with all passive adduction and internal rotation. Some mild dorsiflexion and ankle eversion weakness also noted on the L side. At this time suspect that pain is stemming from the L hip. While less common, there is some literature to indicate that in some cases the common fibular nerve can penetrate a bifid piriformis muscle and in this case would help explain LE weakness and peripheral nerve pain noted on exam with passive hip testing. Provided in room exercises for proximal strengthening and recommend OP PT follow-up after discharge.     Donald Mckinley PT, DPT  Pager #[phone]       01/24/23 1100   Appointment Info   Signing Clinician's Name / Credentials (PT) Donald Mckinley PT, DPT   Quick Adds   Quick Adds Certification   Living Environment   People in Home sibling(s)   Current Living Arrangements house   Home Accessibility stairs to enter home;stairs within home   Number of Stairs, Main Entrance other (see comments)   Number of Stairs, Within Home, Primary other (see comments)   Transportation Anticipated family or friend will provide   Living Environment Comments Patient quite vague about home set-up but states that she lives with her sister in a 3 story house with many stairs. Patient also endorses \"many\" stairs to get into house.   Self-Care   Usual Activity Tolerance moderate   Current Activity Tolerance moderate   Activity/Exercise/Self-Care Comment Patient IND at baseline and enjoys spending time with her cousin that lives nearby.   General Information   Onset of Illness/Injury or Date of Surgery 01/22/23   Referring Physician Pati Ceron MD   Patient/Family Therapy Goals Statement (PT) To return " "home   Pertinent History of Current Problem (include personal factors and/or comorbidities that impact the POC) Per EMR \"Milly Carroll is a 22 year old female with a 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 She requires hospitalization for further workup and pain management.\"   Existing Precautions/Restrictions fall   General Observations activity: ambualte in room   Cognition   Affect/Mental Status (Cognition) flat/blunted affect   Cognitive Status Comments patient somewhat withdrawn   Posture    Posture Protracted shoulders;Forward head position   Range of Motion (ROM)   Range of Motion ROM is WFL   ROM Comment negative slump, negative SLR, positive L FABIR, negative L FADER, pain with extended passiv hip IR   Strength (Manual Muscle Testing)   Strength Comments L dorsiflexion 4/5, L ankle eversion 4/5, all else LE 5/5 bilaterally   Bed Mobility   Bed Mobility supine-sit;sit-supine   Supine-Sit [place] (Bed Mobility) independent   Sit-Supine [place] (Bed Mobility) independent   Transfers   Transfers sit-stand transfer   Sit-Stand Transfer   Sit-Stand [place] (Transfers) independent   Gait/Stairs (Locomotion)   [place] Level (Gait) independent   Comment, (Gait/Stairs) overall good stability, limited assessment in room   Balance   Balance Comments IND sitting, IND standing   Sensory Examination   Sensory Perception Comments report of sensory impairment over L lateral lower leg, from knee to mid calf area   Clinical Impression   Criteria for Skilled Therapeutic Intervention Yes, treatment indicated   PT Diagnosis (PT) impaired functional mobility   Influenced by the following impairments decreased activity tolerance   Functional limitations due to impairments bed mobility, transfers, gait, stairs   Clinical Presentation (PT Evaluation Complexity) Stable/Uncomplicated   Clinical Presentation Rationale clinical " judgement   Clinical Decision Making (Complexity) low complexity   Planned Therapy Interventions (PT) balance training;gait training;ROM (range of motion);stair training;strengthening;transfer training   Risk & Benefits of therapy have been explained evaluation/treatment results reviewed;care plan/treatment goals reviewed;risks/benefits reviewed;current/potential barriers reviewed;participants voiced agreement with care plan;participants included;patient   PT Total Evaluation Time   PT Eval, Low Complexity Minutes (08803) 15   Therapy Certification   Start of care date 01/24/23   Certification date from 01/24/23   Certification date to 02/07/23   Medical Diagnosis L LE pain   Physical Therapy Goals   PT Frequency 3x/week   PT Predicted Duration/Target Date for Goal Attainment 02/07/23   PT Goals Bed Mobility;Transfers;Gait;Stairs   PT: Bed Mobility Independent;Goal Met   PT: Transfers Independent;Goal Met   PT: Gait Independent;Greater than 200 feet   PT: Stairs Independent;Greater than 10 stairs   Interventions   Interventions Quick Adds Therapeutic Activity;Therapeutic Procedure   PT Discharge Planning   PT Plan proximal hip strengthening, stair simulation in room   PT Discharge Recommendation (DC Rec) home with assist;home with outpatient physical therapy   PT Rationale for DC Rec Patient with MSK pain limiting and will benefit from OP PT after d/c to further address symptoms. Do anticiapte patient will be safe to return home when medically stable with report from sister as needed.   PT Brief overview of current status IND   Total Session Time   Total Session Time (sum of timed and untimed services) 15           [facility]  OUTPATIENT PHYSICAL THERAPY EVALUATION  PLAN OF TREATMENT FOR OUTPATIENT REHABILITATION  (COMPLETE FOR INITIAL CLAIMS ONLY)  Patient's Last Name, First Name, M.I.  YOB: 2000  Milly Carroll                        Provider's Name  [facility] Medical Record No.  [id number]                             Onset Date:  01/22/23   Start of Care Date:  01/24/23   Type:     _X_PT   ___OT   ___SLP Medical Diagnosis:  L LE pain              PT Diagnosis:  impaired functional mobility Visits from SOC:  1     See note for plan of treatment, functional goals and certification details    I CERTIFY THE NEED FOR THESE SERVICES FURNISHED UNDER        THIS PLAN OF TREATMENT AND WHILE UNDER MY CARE     (Physician co-signature of this document indicates review and certification of the therapy plan).

## 2023-01-24 NOTE — PROGRESS NOTES
Pt A&Ox4. VSS. Independent, ambulatory. Denies pain. LR bolus completed. Continuous LR infusing @ 125mL/hr in PIV discontinued. PIV WDL saline locked. Regular diet. Telemetry. Continue with plan of care.

## 2023-01-24 NOTE — CONSULTS
Inpatient Rheumatology Consultation  Milly Carroll MRN# [id number]   Age: 22 year old YOB: 2000     Date of Admission: 01/22/2023  Reason for consult: ?SLE flare    Assessment and Plan:   Ms. Milly Carroll is a 22 year old female with a pertinent past medical history of suboptimally controlled SLE (rash, alopecia, photosensitivity, +dsDNA, +RNP, +ribosomal P, low C3/C4, lupus nephritis ISN/RPS class III with proteinuria) in s/o medication in-adherence and c/b ?cerebritis/seizures off antiepileptics since 2014, and h/o R knee osteonecrosis in 2015 admitted for progressive left leg pain, found to have minimally symptomatic COVID-19+ infection, new intraabdominal lymphadenopathy, and bilateral femoral head avascular necrosis without subchondral collapse on abdominal CT imaging. Rheumatology consulted for evaluation and mgmt of potential SLE flare.     The etiology of her left leg pain may be in the setting of an active SLE flare given her knee effusion present on exam, elevated inflammatory markers, persistent hypocomplementemia, and worsened cytopenias. She has had subjective improvement with the increased prednisone dose, and we would expect a corollary improvement in her blood counts. We will add on a MELISSA test today given that autoimmune hemolytic anemia may be contributory (elevated LDH, low haptoglobin, moderate RBC frags on smear). Referred pain from her femoral avascular necrosis may also be in the differential for her leg pain. The specific etiology of her lymphadenopathy is unclear at this time, and may be reactive in the setting of her COVID infection and/or SLE flare.     Recommendations:    #SLE flare  #Bilateral femoral avascular necrosis w/o subchondral collapse  #COVID-19 infection  #normocytic anemia with evidence of hemolysis  #abdominal LAD  -- Continue Prednisone 20 mg until scheduled 2/1/23 outpatient rheumatology visit with Dr. Diaz.   -- Continue PTA Hydroxychloroquine  --  Continue to hold PTA MMF at discharge given COVID infection and current Remdesivir treatment  -- Add-on MELISSA test (ordered for you)  -- Repeat CBC this evening  -- Agree with radiology to repeat imaging for evaluation of abdominal lymphadenopathy  -- Agree with orthopedics involvement for evaluation of bilateral femoral avascular necrosis  -- Outpatient rheumatology appt scheduled for 2/1/23, please encourage attendance and continued medication adherence.    Patient seen and staffed with attending physician, Dr. Steve.     Mekhi Villarreal MD  IM Resident, PGY-1  P:[phone]         Reason for Consultation   Evaluation and mgmt of presumptive SLE flare.         History of Present Illness   Ms. Milly Carroll is a 22 year old female with a pertinent past medical history of suboptimally controlled SLE (rash, alopecia, photosensitivity, +dsDNA, +RNP, +ribosomal P, low C3/C4, lupus nephritis ISN/RPS class III with proteinuria) in s/o medication in-adherence and c/b ?cerebritis/seizures off antiepileptics since 2014, and h/o R knee osteonecrosis (2015) who is admitted for progressive left leg pain, found to have asymptomatic COVID-19+ infection, new intraabdominal lymphadenopathy and bilateral femoral head avascular necrosis without subchondral collapse on abdominal CT imaging. Rheumatology consulted for evaluation and mgmt of potential SLE flare.     ED vitals notable for sinus tachycardia but otherwise hemodynamically stable, afebrile, and satting 100% on RA. Initial course notable for elevated CRP, BNP, troponin (plateau'd), d-dimer, continued hypocomplementemia, elevated LDH and low haptoglobin. LLE US negative for DVT. MRI lumbar spine showed minimal lumbar spondylosis without canal or foraminal narrowing, no abnormal enhancement of the spine, but mild diffuse retroperitoneal stranding was noted. Follow-up CT abd/pelvis remarkable for numerous pelvic, retroperitoneal, mesenteric, and portal caval enlarged lymph  nodes; as well as bilateral femoral head avascular necrosis without subchondral collapse. Echocardiogram was also performed given concerns for myocarditis in s/o elevated trop, which showed mildly reduced LVEF without wall motion abnormalities or significant valvular disease. She was started on remdesivir x3 days for COVID-19 in s/o of immunocompromised state and prednisone 20 mg for presumed SLE flare. PTA plaquenil was continued. MMF was held, level undetectable.     Recent Rheum hx:  1/20/23 Dr. Steve: Was recommended to increase prednisone to 20mg for 5 days, 15mg for 5 days, and back to 10 mg for worsening L knee warmth, pain and soreness, and the addition of Voltaren gel for symptom control, on 1/20/23 by Dr. Steve. Was taking her MMF, HCQ, and prednisone 10mg daily at that time. Has a visit scheduled with Dr. Diaz on 2/1/23.     12/29/22: Nephrology visit with Dr. Purvis. Considering joining clinical trial of Obinutuzumab at that time.     12/2022: ED visit 12/12 showed drop in complements and elevation in dsDNA, Hgb drop, and proteinuria with low serum albumin. Was ultimately admitted from clinic for SLE flare in s/o medication in-adherence (undetectable MMF levels) and concern for overlaying infection. Discharged on HQC, MMF, prednisone taper and plan to restart Benlysta weekly.     06/2022: Hospitalized for SLE flare. Renal Bx --> focal proliferative lupus nephritis, ISN/RPS class III with minimal activity and no significant chronicity. Treated w/ solumedrol 1gm x3 days, d/c'd on pred 60 mg.     Med trials:  Rituximab [id number] -->Anaphylactic rxn 12/2019  Benlysta Infusion [id number]  Benlysta SubQ 2021-?2022 (missed several months per chart review)    Reports:    Feeling better today after getting some sleep overnight and this morning, but still feeling tired. Left leg pain is significantly improved, and she has been moving around the room without much difficulty. No new swelling or pain. No  other joint pain. She states that she is feeling close to baseline.    She has a productive cough but no difficulty breathing.  No hemoptysis. No fevers or chills. She has also not had any chest pain or palpitations. She has been eating a little bit without nausea/vomiting. No issues with urination or bowel movements. No blood in her urine. No incontinence.  No new rashes.     Since her last hospitalization, she has been taking her medications: Cellcept, prednisone, hydroxychloroquine, and Vitamin D consistently. She says that joint swelling and leg pain has not usually been a part of her lupus flares, that this pain felt worse than before. She usually gets a rash on her face with her flares, but did have an issue with her R knee years ago.            Past Medical History   Reviewed          Past Surgical History   Reviewed         Social History   Reviewed          Family History   Reviewed          Immunizations   Reviewed          Allergies   Reviewed          Medications   (Not in a hospital admission)      Current Facility-Administered Medications   Medication     remdesivir 100 mg in sodium chloride 0.9 % 250 mL intermittent infusion    And     0.9% sodium chloride BOLUS     acetaminophen (TYLENOL) tablet 650 mg     diclofenac (VOLTAREN) 1 % topical gel 2 g     [Held by provider] enoxaparin ANTICOAGULANT (LOVENOX) injection 40 mg     ferrous sulfate (FEROSUL) tablet 325 mg     hydroxychloroquine (PLAQUENIL) tablet 200 mg     lidocaine (LMX4) cream     lidocaine 1 % 0.1-1 mL     Medication instructions: Do NOT use nebulized medications     melatonin tablet 1 mg     multivitamin, therapeutic (THERA-VIT) tablet 1 tablet     [Held by provider] mycophenolate (GENERIC EQUIVALENT) tablet 1,500 mg     predniSONE (DELTASONE) tablet 20 mg     senna-docusate (SENOKOT-S/PERICOLACE) 8.6-50 MG per tablet 1 tablet    Or     senna-docusate (SENOKOT-S/PERICOLACE) 8.6-50 MG per tablet 2 tablet     sodium chloride (PF) 0.9% PF  "flush 3 mL     sodium chloride (PF) 0.9% PF flush 3 mL     [START ON 1/25/2023] sulfamethoxazole-trimethoprim (BACTRIM DS) 800-160 MG per tablet 1 tablet     Current Outpatient Medications   Medication     acetaminophen (TYLENOL) 500 MG tablet     Belimumab 200 MG/ML SOAJ     calcium carbonate (TUMS) 500 MG chewable tablet     ferrous sulfate (FEROSUL) 325 (65 Fe) MG tablet     hydroxychloroquine (PLAQUENIL) 200 MG tablet     multivitamin, therapeutic (THERA-VIT) TABS tablet     mycophenolate (GENERIC EQUIVALENT) 500 MG tablet     predniSONE (DELTASONE) 5 MG tablet     sulfamethoxazole-trimethoprim (BACTRIM DS) 800-160 MG tablet     vitamin D3 (CHOLECALCIFEROL) 1.25 MG (09621 UT) capsule            Review of Systems   Complete 12 point ROS completed and negative unless mentioned in HPI.          Physical Exam   BP (!) 120/91 (BP Location: Right arm)   Pulse 73   Temp 97.6  F (36.4  C) (Oral)   Resp 16   Ht 1.562 m (5' 1.5\")   SpO2 100%   BMI 20.08 kg/m      General: no acute distress, awakened from sleep to voice, speaks very quietly in short sentences.  HEENT: MMM, no oral ulcers noted, no scleral injection, EOMI, no oropharyngeal erythema noted.  CV: RRR, S1/S2 without M/R/G, no JVP elevation appreciated. No pericardial rub.  Lung: No increased work of breathing on room air. Lungs clear to auscultation bilaterally.   Abdomen: Bowel sounds present. Soft, non-tender to palpation in all quadrants. No masses or obvious hepatosplenomegaly.  Neuro: AOx4, fluent speech, moves all extremities independently. Sensation intact to light touch in feet and hands.   Skin/Hair: warm, dry.   Ext: bilateral dorsalis pedis and posterior tibial pulses present.   Msk: Limited upper and lower extremity joint exam performed per patient preference: no edema or erythema and normal range of motion of BUE MCP, DIP, wrist, elbows. Moderate effusion near the L anterior patella bilaterally, slight effusion R anterior patella, both warm " but w/o tenderness to palpation. Slight tenderness to palpation over the left hip. Deferred ambulation and complete back exam at this time, but stated no difficulties standing up and walking around the room.          Data   Labs and imaging reviewed. Significant as below:    WBC 3.7 (6.2)  Hgb 7.0 (8.2), normocytic  Plt 85 (120)  Haptoglobin 23 (low)    Cr 0.52, CO2 14  Ca2+ 7.5  GGT 82 (82)  CK 27   (351 1 month ago)  Trop 26 (31)  D-dimer 5.10  proBNP 912 (2 days ago)    MMF level undetectable  dsDNA >365  C3 17 (26)  C4 7 (4)    BCx NGTD    CRP 72.5

## 2023-01-24 NOTE — PROGRESS NOTES
Hgb 7.0 this afternoon, no change from this am. Medicine team was notified. Awaiting order. Pt remained very flat, quiet, withdraw. Pt mostly had one-word response to questions. Still denied any pain thru out the day. No difference in appearance btwn L and R legs. No redness, welling, or warmth in L leg. Pt had no problem ambulating to the BR. Although withdrawn, pt has been stable, alert and oriented, able to make needs known.

## 2023-01-24 NOTE — CARE PLAN
Occupational Therapy: Orders received. Chart reviewed and discussed with care team.? Occupational Therapy not indicated due to pt at/near ADL baseline. Pt's mobility limited by MSK pain. PT will follow while IP for strengthening and gait.? Defer discharge recommendations to PT.? Will complete orders.

## 2023-01-25 ENCOUNTER — APPOINTMENT (OUTPATIENT)
Dept: PHYSICAL THERAPY | Facility: CLINIC | Age: 23
End: 2023-01-25
Payer: COMMERCIAL

## 2023-01-25 ENCOUNTER — APPOINTMENT (OUTPATIENT)
Dept: MRI IMAGING | Facility: CLINIC | Age: 23
End: 2023-01-25
Payer: COMMERCIAL

## 2023-01-25 LAB
ALBUMIN SERPL BCG-MCNC: 1.7 G/DL (ref 3.5–5.2)
ALP SERPL-CCNC: 222 U/L (ref 35–104)
ALT SERPL W P-5'-P-CCNC: <5 U/L (ref 10–35)
ANION GAP SERPL CALCULATED.3IONS-SCNC: 12 MMOL/L (ref 7–15)
APTT PPP: 36 SECONDS (ref 22–38)
AST SERPL W P-5'-P-CCNC: 34 U/L (ref 10–35)
BASOPHILS # BLD AUTO: 0 10E3/UL (ref 0–0.2)
BASOPHILS NFR BLD AUTO: 0 %
BILIRUB SERPL-MCNC: 0.2 MG/DL
BLD PROD TYP BPU: NORMAL
BLOOD COMPONENT TYPE: NORMAL
BUN SERPL-MCNC: 18.6 MG/DL (ref 6–20)
CA-I BLD-MCNC: 4.8 MG/DL (ref 4.4–5.2)
CALCIUM SERPL-MCNC: 8 MG/DL (ref 8.6–10)
CHLORIDE SERPL-SCNC: 116 MMOL/L (ref 98–107)
CODING SYSTEM: NORMAL
CREAT SERPL-MCNC: 0.55 MG/DL (ref 0.51–0.95)
CROSSMATCH: NORMAL
D DIMER PPP FEU-MCNC: 3.35 UG/ML FEU (ref 0–0.5)
DACRYOCYTES BLD QL SMEAR: SLIGHT
DEPRECATED HCO3 PLAS-SCNC: 14 MMOL/L (ref 22–29)
ELLIPTOCYTES BLD QL SMEAR: SLIGHT
EOSINOPHIL # BLD AUTO: 0 10E3/UL (ref 0–0.7)
EOSINOPHIL NFR BLD AUTO: 0 %
ERYTHROCYTE [DISTWIDTH] IN BLOOD BY AUTOMATED COUNT: 18.3 % (ref 10–15)
ERYTHROCYTE [DISTWIDTH] IN BLOOD BY AUTOMATED COUNT: 18.6 % (ref 10–15)
FIBRINOGEN PPP-MCNC: 166 MG/DL (ref 170–490)
FRAGMENTS BLD QL SMEAR: SLIGHT
GFR SERPL CREATININE-BSD FRML MDRD: >90 ML/MIN/1.73M2
GLUCOSE SERPL-MCNC: 117 MG/DL (ref 70–99)
HCT VFR BLD AUTO: 23.8 % (ref 35–47)
HCT VFR BLD AUTO: 23.9 % (ref 35–47)
HGB BLD-MCNC: 6.5 G/DL (ref 11.7–15.7)
HGB BLD-MCNC: 7 G/DL (ref 11.7–15.7)
HGB BLD-MCNC: 7 G/DL (ref 11.7–15.7)
HGB BLD-MCNC: 9 G/DL (ref 11.7–15.7)
IMM GRANULOCYTES # BLD: 0.1 10E3/UL
IMM GRANULOCYTES NFR BLD: 1 %
INR PPP: 1.38 (ref 0.85–1.15)
ISSUE DATE AND TIME: NORMAL
LDH SERPL L TO P-CCNC: 371 U/L (ref 0–250)
LYMPHOCYTES # BLD AUTO: 1.2 10E3/UL (ref 0.8–5.3)
LYMPHOCYTES NFR BLD AUTO: 22 %
MCH RBC QN AUTO: 26.6 PG (ref 26.5–33)
MCH RBC QN AUTO: 27.5 PG (ref 26.5–33)
MCHC RBC AUTO-ENTMCNC: 29.3 G/DL (ref 31.5–36.5)
MCHC RBC AUTO-ENTMCNC: 29.4 G/DL (ref 31.5–36.5)
MCV RBC AUTO: 91 FL (ref 78–100)
MCV RBC AUTO: 93 FL (ref 78–100)
MONOCYTES # BLD AUTO: 0.3 10E3/UL (ref 0–1.3)
MONOCYTES NFR BLD AUTO: 6 %
NEUTROPHILS # BLD AUTO: 3.6 10E3/UL (ref 1.6–8.3)
NEUTROPHILS NFR BLD AUTO: 71 %
NRBC # BLD AUTO: 0 10E3/UL
NRBC BLD AUTO-RTO: 0 /100
PATH REPORT.COMMENTS IMP SPEC: NORMAL
PATH REPORT.COMMENTS IMP SPEC: NORMAL
PATH REPORT.FINAL DX SPEC: NORMAL
PATH REPORT.MICROSCOPIC SPEC OTHER STN: NORMAL
PATH REPORT.MICROSCOPIC SPEC OTHER STN: NORMAL
PATH REPORT.RELEVANT HX SPEC: NORMAL
PATH REV: ABNORMAL
PLAT MORPH BLD: ABNORMAL
PLATELET # BLD AUTO: 107 10E3/UL (ref 150–450)
PLATELET # BLD AUTO: 88 10E3/UL (ref 150–450)
POLYCHROMASIA BLD QL SMEAR: SLIGHT
POTASSIUM SERPL-SCNC: 3 MMOL/L (ref 3.4–5.3)
POTASSIUM SERPL-SCNC: 4 MMOL/L (ref 3.4–5.3)
PROT SERPL-MCNC: 6 G/DL (ref 6.4–8.3)
RBC # BLD AUTO: 2.55 10E6/UL (ref 3.8–5.2)
RBC # BLD AUTO: 2.63 10E6/UL (ref 3.8–5.2)
RBC MORPH BLD: ABNORMAL
SODIUM SERPL-SCNC: 142 MMOL/L (ref 136–145)
UNIT ABO/RH: NORMAL
UNIT NUMBER: NORMAL
UNIT STATUS: NORMAL
UNIT TYPE ISBT: 5100
WBC # BLD AUTO: 4.7 10E3/UL (ref 4–11)
WBC # BLD AUTO: 5.2 10E3/UL (ref 4–11)

## 2023-01-25 PROCEDURE — 85018 HEMOGLOBIN: CPT | Performed by: INTERNAL MEDICINE

## 2023-01-25 PROCEDURE — 36415 COLL VENOUS BLD VENIPUNCTURE: CPT

## 2023-01-25 PROCEDURE — 250N000013 HC RX MED GY IP 250 OP 250 PS 637: Performed by: INTERNAL MEDICINE

## 2023-01-25 PROCEDURE — 99233 SBSQ HOSP IP/OBS HIGH 50: CPT | Mod: GC | Performed by: INTERNAL MEDICINE

## 2023-01-25 PROCEDURE — P9016 RBC LEUKOCYTES REDUCED: HCPCS

## 2023-01-25 PROCEDURE — 85025 COMPLETE CBC W/AUTO DIFF WBC: CPT

## 2023-01-25 PROCEDURE — 250N000013 HC RX MED GY IP 250 OP 250 PS 637

## 2023-01-25 PROCEDURE — 85384 FIBRINOGEN ACTIVITY: CPT

## 2023-01-25 PROCEDURE — 97110 THERAPEUTIC EXERCISES: CPT | Mod: GP

## 2023-01-25 PROCEDURE — 82330 ASSAY OF CALCIUM: CPT

## 2023-01-25 PROCEDURE — 73721 MRI JNT OF LWR EXTRE W/O DYE: CPT | Mod: 26 | Performed by: RADIOLOGY

## 2023-01-25 PROCEDURE — 80053 COMPREHEN METABOLIC PANEL: CPT

## 2023-01-25 PROCEDURE — 250N000012 HC RX MED GY IP 250 OP 636 PS 637

## 2023-01-25 PROCEDURE — 83010 ASSAY OF HAPTOGLOBIN QUANT: CPT

## 2023-01-25 PROCEDURE — 258N000003 HC RX IP 258 OP 636

## 2023-01-25 PROCEDURE — 84132 ASSAY OF SERUM POTASSIUM: CPT | Performed by: INTERNAL MEDICINE

## 2023-01-25 PROCEDURE — 36415 COLL VENOUS BLD VENIPUNCTURE: CPT | Performed by: INTERNAL MEDICINE

## 2023-01-25 PROCEDURE — 73721 MRI JNT OF LWR EXTRE W/O DYE: CPT | Mod: 50

## 2023-01-25 PROCEDURE — 120N000002 HC R&B MED SURG/OB UMMC

## 2023-01-25 PROCEDURE — 83615 LACTATE (LD) (LDH) ENZYME: CPT

## 2023-01-25 PROCEDURE — 85610 PROTHROMBIN TIME: CPT

## 2023-01-25 PROCEDURE — 250N000011 HC RX IP 250 OP 636

## 2023-01-25 PROCEDURE — 85730 THROMBOPLASTIN TIME PARTIAL: CPT

## 2023-01-25 PROCEDURE — 85379 FIBRIN DEGRADATION QUANT: CPT

## 2023-01-25 PROCEDURE — 85397 CLOTTING FUNCT ACTIVITY: CPT

## 2023-01-25 PROCEDURE — 85004 AUTOMATED DIFF WBC COUNT: CPT

## 2023-01-25 RX ORDER — NALOXONE HYDROCHLORIDE 0.4 MG/ML
0.2 INJECTION, SOLUTION INTRAMUSCULAR; INTRAVENOUS; SUBCUTANEOUS
Status: DISCONTINUED | OUTPATIENT
Start: 2023-01-25 | End: 2023-01-29 | Stop reason: HOSPADM

## 2023-01-25 RX ORDER — METHOCARBAMOL 750 MG/1
750 TABLET, FILM COATED ORAL 3 TIMES DAILY PRN
Status: DISCONTINUED | OUTPATIENT
Start: 2023-01-25 | End: 2023-01-26

## 2023-01-25 RX ORDER — NALOXONE HYDROCHLORIDE 0.4 MG/ML
0.4 INJECTION, SOLUTION INTRAMUSCULAR; INTRAVENOUS; SUBCUTANEOUS
Status: DISCONTINUED | OUTPATIENT
Start: 2023-01-25 | End: 2023-01-29 | Stop reason: HOSPADM

## 2023-01-25 RX ORDER — POTASSIUM CHLORIDE 750 MG/1
40 TABLET, EXTENDED RELEASE ORAL ONCE
Status: COMPLETED | OUTPATIENT
Start: 2023-01-25 | End: 2023-01-25

## 2023-01-25 RX ORDER — POTASSIUM CHLORIDE 750 MG/1
20 TABLET, EXTENDED RELEASE ORAL ONCE
Status: COMPLETED | OUTPATIENT
Start: 2023-01-25 | End: 2023-01-25

## 2023-01-25 RX ORDER — MYCOPHENOLATE MOFETIL 500 MG/1
1500 TABLET ORAL 2 TIMES DAILY
Qty: 180 TABLET | Refills: 0 | Status: CANCELLED | OUTPATIENT
Start: 2023-01-25

## 2023-01-25 RX ORDER — ONDANSETRON 2 MG/ML
4 INJECTION INTRAMUSCULAR; INTRAVENOUS ONCE
Status: COMPLETED | OUTPATIENT
Start: 2023-01-25 | End: 2023-01-25

## 2023-01-25 RX ORDER — OXYCODONE HYDROCHLORIDE 5 MG/1
5 TABLET ORAL EVERY 4 HOURS PRN
Status: DISCONTINUED | OUTPATIENT
Start: 2023-01-25 | End: 2023-01-27

## 2023-01-25 RX ADMIN — PREDNISONE 20 MG: 20 TABLET ORAL at 08:00

## 2023-01-25 RX ADMIN — OXYCODONE HYDROCHLORIDE 5 MG: 5 TABLET ORAL at 21:37

## 2023-01-25 RX ADMIN — HYDROXYCHLOROQUINE SULFATE 200 MG: 200 TABLET, FILM COATED ORAL at 08:00

## 2023-01-25 RX ADMIN — FERROUS SULFATE TAB 325 MG (65 MG ELEMENTAL FE) 325 MG: 325 (65 FE) TAB at 07:59

## 2023-01-25 RX ADMIN — ACETAMINOPHEN 650 MG: 325 TABLET ORAL at 14:24

## 2023-01-25 RX ADMIN — ONDANSETRON 4 MG: 2 INJECTION INTRAMUSCULAR; INTRAVENOUS at 18:33

## 2023-01-25 RX ADMIN — OXYCODONE HYDROCHLORIDE 5 MG: 5 TABLET ORAL at 10:58

## 2023-01-25 RX ADMIN — THERA TABS 1 TABLET: TAB at 07:59

## 2023-01-25 RX ADMIN — POTASSIUM CHLORIDE 40 MEQ: 750 TABLET, EXTENDED RELEASE ORAL at 12:31

## 2023-01-25 RX ADMIN — REMDESIVIR 100 MG: 100 INJECTION, POWDER, LYOPHILIZED, FOR SOLUTION INTRAVENOUS at 12:32

## 2023-01-25 RX ADMIN — METHOCARBAMOL 750 MG: 750 TABLET, FILM COATED ORAL at 17:51

## 2023-01-25 RX ADMIN — ACETAMINOPHEN 650 MG: 325 TABLET ORAL at 10:17

## 2023-01-25 RX ADMIN — SULFAMETHOXAZOLE AND TRIMETHOPRIM 1 TABLET: 800; 160 TABLET ORAL at 08:00

## 2023-01-25 RX ADMIN — OXYCODONE HYDROCHLORIDE 5 MG: 5 TABLET ORAL at 16:19

## 2023-01-25 RX ADMIN — POTASSIUM CHLORIDE 20 MEQ: 750 TABLET, EXTENDED RELEASE ORAL at 14:23

## 2023-01-25 RX ADMIN — SODIUM CHLORIDE 50 ML: 9 INJECTION, SOLUTION INTRAVENOUS at 18:22

## 2023-01-25 ASSESSMENT — ACTIVITIES OF DAILY LIVING (ADL)
ADLS_ACUITY_SCORE: 31

## 2023-01-25 NOTE — UTILIZATION REVIEW
Admission Status; Secondary Review Determination     Admission Date: 1/22/2023 11:46 PM       Under the authority of the Utilization Management Committee, the utilization review process indicated a secondary review on the above patient.  The review outcome is based on review of the medical records, discussions with staff, and applying clinical experience noted on the date of the review.        (x)      Inpatient Status Appropriate - This patient's medical care is consistent with medical management for inpatient care and reasonable inpatient medical practice.       RATIONALE FOR DETERMINATION      Brief clinical presentation, information copied from the chart, abbreviated and edited for relevant content:     Discussed with attending. Patient continues to have pain of unknown origin, significant drop in Hgb, down to 6.5, with concerns about internal bleeding. Advancing back to IP.       Milly Carroll is a 22 year old female with a 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 However, She developed acute, 8/10 intensity lower back/hip/pelvic pain today around 11:00AM. She requires hospitalization for further workup and pain management.  Hgb drop. Getting MRI and other diagnostics to evaluate her pain and new acute anemia.       At the time of admission with the information available to the attending physician, more than 2 nights hospital complex care was anticipated. Also, there was a risk of adverse outcome if patient was treated outpatient or observation. High intensity of services anticipated. Inpatient admission appropriate based on Medicare guidelines.       The information on this document is developed by the utilization review team in  order for the business office to ensure compliance.  This only denotes the appropriateness of proper admission status and does not reflect the quality of care rendered.         The definitions of Inpatient Status and Observation Status used in making the determination above are those provided in the CMS Coverage Manual, Chapter 1 and Chapter 6, section 70.4.      Sincerely,      Ailin Goyal MD   Utilization Review/ Case Management  [facility].

## 2023-01-25 NOTE — PROGRESS NOTES
Brief SW Note:  Due to pts COVID + status, SW attempted to complete CMA by phone @1004AM, @1022AM, and @1248PM.  Pt did not answer phone.  @1248PM SW attempted to call pts sister but unable to LVM.     SW placed pt on the evening list for evening staff to follow up with CMA.     will continue to follow for discharge planning, support, and resources.    KEYLA Lackey, [facility]  [address]   Office: [phone]   Pager: [phone]  [email or web address]

## 2023-01-25 NOTE — PLAN OF CARE
Goal Outcome Evaluation:      Plan of Care Reviewed With: patient    Overall Patient Progress: declining    Outcome Evaluation: A&Ox4, flat and hypoactive in am, more restless as pain increased as shift progressed. Pt reporting sudden onset of severe low back and bilat hip pain, provider assessed at bedside. At this time, no additionally imaging ordered. Tylenol, oxy, and heat packs to minimal relief, writer discussed additional pain control w/ team. HTN, all other VSS on RA. Tele NSR. Critical Hbg 6.5, 1 unit of blood ordered, transfusion started. Lovenox re-held. Very infrequent dry cough. LS clear. K+ 3.0, pt not on RN replacement, provider paged, protocol added, PO replce x2, recheck ordered for 1842. Poor appetite.

## 2023-01-25 NOTE — CONSULTS
Brief Orthopedic Surgery Note    Orthopedics was asked to review radiographs and provide treatment recommendations for incidental findings of bilateral femoral head osteonecrosis without subchondral collapse.    Briefly, Milly is a 22-year-old female with past medical history significant for SLE complicated by lupus nephritis and cerebritis on chronic steroids who presented to the emergency department for 2 days of left lower leg pain and numbness.  She was admitted to observation for further work-up.  During work-up CT abdomen pelvis was obtained which demonstrated incidental finding of bilateral femoral head osteonecrosis without subchondral collapse.  Per primary team her main complaint has been left lower leg pain and numbness.  Her bilateral hips have not been the main source of her pain.  Rheumatology is following for SLE flare.    No inpatient surgical management of bilateral femoral head AVN indicated.  Recommend outpatient orthopedic surgery follow-up with one of our hip surgeons, Dr. Reynaga, Dr. Rodrigues or Dr. Handley to discuss possible surgical invention on an outpatient basis.    Pain control per primary.  Agree with rheumatology consult.  Orthopedics will sign off at this time.  Please page with any change in exam or further concerns.    Of note, this patient was not evaluated by this provider.      Please page Orthopedic Surgery if further assistance is needed.     Discussed with Dr. Divya Franz, orthopedic surgery attending.    MERVAT PRINCE PA-C  1/24/2023 7:56 PM  Orthopaedic Surgery     Thank you for allowing me to participate in this patient's care. Please page me directly any questions/concerns.   Securely message with the Vocera Web Console (learn more here)  Text page via [employer] Paging/[employer]    If there is no response, if it is a weekend, or if it is during evening hours, please page the orthopaedic surgery resident on call via [employer] Paging/Directory

## 2023-01-25 NOTE — PLAN OF CARE
Goal Outcome Evaluation:      Plan of Care Reviewed With: patient    Overall Patient Progress: improvingOverall Patient Progress: improving    Outcome Evaluation: AxOx4. VSS on RA. Denied pain all night. Tele in place reading NSR. IND in room steady gait to the bathroom. Patient is calm/withdrawn, short responses but able to make needs known. -  Observation goals:  -Improvement of leg pain: Improving, denied pain overnight.   -COVID19 symptom monitoring: Met. Cont pulse ox in place, reports mild dry cough, Afebrile.    -Troponin check: Met, downtrending.

## 2023-01-25 NOTE — PLAN OF CARE
Brief Orthopedic Surgery Note    Orthopedics was re-paged today to discuss patient's continued and increased bilateral hip pain. Currently, patient is vitally stable and Medicine team does not have concern for infection at this time. She is being treated with 20mg prednisone daily, hydroxychloroquine for SLE and remdesivir for COVID.     Discussed patient with Dr. Divya Franz who again reviewed abdomen/pelvis CT as well as previous AP pelvis/left hip x-rays from December 2020 as well as recent labs. No evident collapse of the femoral head appreciated, but to rule this out, recommend MRI without contrast of both hips. CRP on 1/22/23 elevated but this could be in relation to her SLE. No leukocytosis.     Orthopedics will plan to see the patient upon completion of MRIs and will provide further recommendations at that time. If there is no collapse seen on MRI and no concern for infection, plan will be to continue managing pain and treatment of Lupus flare with outpatient follow-up with Orthopedics.     Please page Orthopedics with any changes in the patient's conditions or more urgent concerns in the meantime.     I did discuss this via telephone with  Brenda Cyr MS3 and Dr. German Valez Reyez.       Estefany Banuelos PA-C  1/25/2023 2:44 PM  [facility]  Orthopaedic Surgery

## 2023-01-25 NOTE — PROGRESS NOTES
Resident/Fellow Attestation   I, German Velez Reyes, MD, PhD was present with the medical/PUNEET student who participated in the service and in the documentation of the note.  I have verified the history and personally performed the physical exam and medical decision making.  I agree with the assessment and plan of care as documented in the note.      Significant bilateral hip pain today with worsening anemia. Given 1U of PRBCs given today MRI hip ordered for further evaluation of AVN and hip pain.     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 She developed acute, 8/10 intensity lower back/hip/pelvic pain today around 11:00AM. She requires hospitalization for further workup and pain management.       Today:  -Recheck Hgb this afternoon  -Oxycodone, robaxin, and topical diclofenac for acute back/pelvic pain  -Continue remdesivir (day 3/3)  -Good oral intake      #SLE with hx of lupus nephritis & lupus cerebritis, stable  #Multiple Intraabdominal Lymphadenopathies, stable  #Mild Splenomegaly, stable  On outpatient regimen of Mycofenolate, hydroxychloroquine, Belimumab, and prednisone. Plans for dose escalation of prednisone for concern for lupus flare from outpatient rheumatology notes. Multiple intraabdominal LAPs are not noted on previous imaging. This could be reactive for the COVID infection or less likely lymphoma.  dsDNA antibody  level elevated at >365.   -Prednisone 20 mg po/day  -MMF 1500mg BID, hold for now  -Hydroxychloroquine 200 mg /day  -Encourage medical adherence      #Acute lower back/pelvic pain  Developed acutely. Intense pain in the lower back/pelvis/hips. Most likely due to avascular necrosis of the femoral heads considering bony palpation elicits the most pain. Less likely kidney etiology as pounding on the back did not elicit pain. Also less likely retroperitoneal bleed. Started oxycodone and robaxin PRN for pain control. Will also try topical diclofenac gel. If intense pain persists, will consider pelvic X-ray.  -Oxycodone PRN for pain control  -Robaxin for pain control  -topical diclofenac  -monitor closely      #Left leg pain, improving   Presented with two days history of progressive, burning left calf pain. No evidence of gross swelling to suggest DVT, ultrasound also did not show thrombosis in the veins. No evidence of cellulitis or evidence of infection on overlying skin, joint movement was good and no evidence of acute arthritis. Possible component of neuropathic pain with single dermatomal involvement, however MRI of lumbar spine was negative. Single nerve neuropathy with dominant sensory presentation with lupus is also uncommon.  Also presented with left sided hip, knee and ankle pain in December for which she was managed as a lupus flare. Normal xray rules out avascular necrosis of the knee considering the history of avascular necrosis in the R knee; normal CK rules out myositis. Potentially an effect of COVID-19 infection or a lupus flare; lupus labs and rheumatology evaluation indicate likely flare.  -Pain control- Tylenol PRN, diclofenac gel     #COVID-19 infection, stable  Not requiring oxygen, asymptomatic. Tested positive on admission workup. Given coexisting comorbidity, plan to treat with remdesivir for 3 days as prevention of severe disease.  -Remdesivir IV (day 3/3 today)  -Monitor for  symptoms  -Enoxaparin for DVT prophylaxis if repeat platelets and Hgb have normalized  -COVID precautions  -Remdesivir labs  -hold mycophenelate mofetil for now      #Hypokalemia  Potassium found to be low this morning (3) down from 3.6 today. Potentially due to low oral intake.     #Anemia (Normocytic), declining  #Thrombocytopenia  Baseline Hgb was around 10, admission Hgb 8.2, no evidence of overt bleeding at presentation. Differential includes hemolytic process from lupus. HGb at 7. LDH elevated at 423. C3 at 17 (near her baseline), C4 at 7 but near her baseline. Haptoglobin down to 23 from 197 one month ago. Waiting on results of peripheral smear and hepatic panel.   -recheck Hgb this afternoon  -Continue PTA Ferrous sulfate  -Management of lupus as above  -Blood smear  -Transfuse if Hgb further declines    #Bilateral Femoral head avascular necrosis, increased pain  Noted on admission CT imaging. 5/10 hip pain this morning. Orthopedics does not believe surgery is indicated at this time; she will follow-up with them outpatient. They also recommended discontinuing prednisone as soon as possible, but that likely will not be during hospitalization.  - Pain control as needed  - Follow up as outpatient     #Elevated Troponin, stable  #Elevated BNP, stable   No clear evidence of ischemic process, slight troponin elevation . Differential includes a myocarditis component from lupus. Of note, patient has tachycardia, ECG was sinus with no concern for ST-T change. Echo demonstrated decreased LV fxn (EF=40-45%). Global RV fxn borderline reduced. Likely due to COVID-19 infection as well as potential lupus flare. Will stop trending troponin and BNP but will continue telemetry.  -Continue telemetry     #Hypoalbuminemia, stable  Likely due to malnutrition and inflammatory process. 1.7 this morning.    -Nutrition evaluation   -Continue to monitor     #NAGMA, stable  Most likely to dehydration and decreased PO intake. CO2 at 14  today.  -Continue to monitor via BMP     #Elevated Alk Phos, improving  No dilated billiary tree on imaging, except for the large gallbladder on CT. GGT near patient's baseline (82) implies extrahepatic cause (bone diease).   -Continue to monitor       ----------resolved medical problems-------------     #Hyponatremia, resolved    #Lymphopenia, resolved  WBC decreased acutely from 6.2 yesterday to 3.7. Now resolved. Likely in the setting of acute SLE flare.      ----------chronic problems---------  #Hx of Pancreatitis   Was admitted in 2020 for acute pancreatitis 2/2 PD stones  -No symptoms of pancreatitis at present     ---------follow ups at discharge----------------  -rheumatology for lupus management   -ortho for bilateral femoral avascular necrosis potentially     Diet: Combination Diet Regular Diet Adult    DVT Prophylaxis: Holding in the setting of anemia  Leung Catheter: Not present  Fluids: PO  Lines: None     Cardiac Monitoring: ACTIVE order. Indication: Electrolyte Imbalance (24 hours)- Magnesium <1.3 mg/ml; Potassium < =2.8 or > 5.5 mg/ml  Code Status: Full Code      Clinically Significant Risk Factors Present on Admission        # Hypokalemia: Lowest K = 3 mmol/L in last 2 days, will replace as needed       # Hypoalbuminemia: Lowest albumin = 1.7 g/dL at 1/25/2023  6:42 AM, will monitor as appropriate   # Thrombocytopenia: Lowest platelets = 85 in last 2 days, will monitor for bleeding                Disposition Plan      Expected Discharge Date: 01/26/2023                The patient's care was discussed with the Attending Physician, Dr. Parrish.  Brenda Cyr, MS3      Medicine Service, MAROON TEAM [address] [place] [address]  Securely message with [employer] (more info)  Text page via [employer] Paging/Directory   See signed in provider for up to date coverage information  ______________________________________________________________________    Interval History   No acute  overnight events. Pain that brought Milly into the hospital has improved, however, she has developed acute lower back/pelvic pain. Will continue to watch her closely.     Physical Exam   Vital Signs: Temp: 98.8  F (37.1  C) Temp src: Oral BP: (!) 146/91 Pulse: 105   Resp: 16 SpO2: 100 % O2 Device: None (Room air)    Weight: 119 lbs 12.8 oz    General: Acute distress, crying in bed.   HENT: Atraumatic.    Eyes: Mild proptosis.   Cardiovascular: Tachycardic, regular rhythm.  Normal S1 and S2. No murmur appreciated.  Pulmonary:  Breathing comfortably on room air. No respiratory distress. Normal breath sounds, no wheezing, rhonchi, or rales.  Abdominal: no distension.  Abdomen is soft. No guarding or rebound. Previous tenderness has improved.  Musculoskeletal: Severe pain in the lower back/pelvis rated 8/10. Most intense when palpating the bony prominences in the pelvis. Mild pedal edema. Sensation decreased to light touch on the back of the L calf up to the back of the knee.   Skin: warm and dry  Neurological:  No focal deficit present.    Psychiatric: quiet, normal affect      Medical Decision Making             Data     Most Recent 3 CBC's:Recent Labs   Lab Test 01/25/23  0642 01/24/23  1731 01/24/23  1206   WBC 5.2 4.3 4.7   HGB 7.0* 7.6* 7.0*   MCV 91 93 93   * 96* 88*     Most Recent 3 BMP's:Recent Labs   Lab Test 01/25/23  0642 01/24/23  0623 01/22/23  2334    138 133*   POTASSIUM 3.0* 3.6 3.6   CHLORIDE 116* 114* 107   CO2 14* 14* 16*   BUN 18.6 13.9 13.5   CR 0.55 0.52 0.55   ANIONGAP 12 10 10   BISI 8.0* 7.5* 7.4*   * 117* 91     Most Recent 3 Hemoglobins:Recent Labs   Lab Test 01/25/23  0642 01/24/23  1731 01/24/23  1206   HGB 7.0* 7.6* 7.0*

## 2023-01-26 LAB
ALBUMIN SERPL BCG-MCNC: 1.7 G/DL (ref 3.5–5.2)
ALP SERPL-CCNC: 232 U/L (ref 35–104)
ALT SERPL W P-5'-P-CCNC: 10 U/L (ref 10–35)
ANION GAP SERPL CALCULATED.3IONS-SCNC: 10 MMOL/L (ref 7–15)
AST SERPL W P-5'-P-CCNC: 49 U/L (ref 10–35)
BILIRUB SERPL-MCNC: 0.3 MG/DL
BUN SERPL-MCNC: 22.7 MG/DL (ref 6–20)
CALCIUM SERPL-MCNC: 7.9 MG/DL (ref 8.6–10)
CHLORIDE SERPL-SCNC: 113 MMOL/L (ref 98–107)
CK SERPL-CCNC: 16 U/L (ref 26–192)
CREAT SERPL-MCNC: 0.6 MG/DL (ref 0.51–0.95)
DEPRECATED HCO3 PLAS-SCNC: 15 MMOL/L (ref 22–29)
ERYTHROCYTE [DISTWIDTH] IN BLOOD BY AUTOMATED COUNT: 18.3 % (ref 10–15)
GFR SERPL CREATININE-BSD FRML MDRD: >90 ML/MIN/1.73M2
GLUCOSE SERPL-MCNC: 80 MG/DL (ref 70–99)
HAPTOGLOB SERPL-MCNC: <3 MG/DL (ref 32–197)
HCT VFR BLD AUTO: 32.9 % (ref 35–47)
HGB BLD-MCNC: 10 G/DL (ref 11.7–15.7)
MCH RBC QN AUTO: 27.5 PG (ref 26.5–33)
MCHC RBC AUTO-ENTMCNC: 30.4 G/DL (ref 31.5–36.5)
MCV RBC AUTO: 91 FL (ref 78–100)
PLATELET # BLD AUTO: 93 10E3/UL (ref 150–450)
POTASSIUM SERPL-SCNC: 4.1 MMOL/L (ref 3.4–5.3)
POTASSIUM SERPL-SCNC: 4.1 MMOL/L (ref 3.4–5.3)
PROT SERPL-MCNC: 7.7 G/DL (ref 6.4–8.3)
RBC # BLD AUTO: 3.63 10E6/UL (ref 3.8–5.2)
SODIUM SERPL-SCNC: 138 MMOL/L (ref 136–145)
WBC # BLD AUTO: 8.4 10E3/UL (ref 4–11)

## 2023-01-26 PROCEDURE — 250N000011 HC RX IP 250 OP 636

## 2023-01-26 PROCEDURE — 80053 COMPREHEN METABOLIC PANEL: CPT

## 2023-01-26 PROCEDURE — 36415 COLL VENOUS BLD VENIPUNCTURE: CPT | Performed by: INTERNAL MEDICINE

## 2023-01-26 PROCEDURE — 250N000013 HC RX MED GY IP 250 OP 250 PS 637

## 2023-01-26 PROCEDURE — 85027 COMPLETE CBC AUTOMATED: CPT

## 2023-01-26 PROCEDURE — 82550 ASSAY OF CK (CPK): CPT

## 2023-01-26 PROCEDURE — 84132 ASSAY OF SERUM POTASSIUM: CPT | Performed by: INTERNAL MEDICINE

## 2023-01-26 PROCEDURE — 99233 SBSQ HOSP IP/OBS HIGH 50: CPT | Mod: GC | Performed by: INTERNAL MEDICINE

## 2023-01-26 PROCEDURE — 99221 1ST HOSP IP/OBS SF/LOW 40: CPT | Performed by: PHYSICIAN ASSISTANT

## 2023-01-26 PROCEDURE — 120N000002 HC R&B MED SURG/OB UMMC

## 2023-01-26 PROCEDURE — 250N000012 HC RX MED GY IP 250 OP 636 PS 637

## 2023-01-26 PROCEDURE — 999N000128 HC STATISTIC PERIPHERAL IV START W/O US GUIDANCE

## 2023-01-26 PROCEDURE — 99232 SBSQ HOSP IP/OBS MODERATE 35: CPT | Mod: GC | Performed by: INTERNAL MEDICINE

## 2023-01-26 RX ORDER — LIDOCAINE 4 G/G
2 PATCH TOPICAL
Status: DISCONTINUED | OUTPATIENT
Start: 2023-01-26 | End: 2023-01-27

## 2023-01-26 RX ORDER — KETOROLAC TROMETHAMINE 15 MG/ML
15 INJECTION, SOLUTION INTRAMUSCULAR; INTRAVENOUS EVERY 6 HOURS PRN
Status: DISCONTINUED | OUTPATIENT
Start: 2023-01-26 | End: 2023-01-29

## 2023-01-26 RX ORDER — LIDOCAINE 4 G/G
1 PATCH TOPICAL
Status: DISCONTINUED | OUTPATIENT
Start: 2023-01-26 | End: 2023-01-26

## 2023-01-26 RX ORDER — CYCLOBENZAPRINE HCL 5 MG
10 TABLET ORAL EVERY 8 HOURS PRN
Status: DISCONTINUED | OUTPATIENT
Start: 2023-01-26 | End: 2023-01-27

## 2023-01-26 RX ORDER — LIDOCAINE 40 MG/G
CREAM TOPICAL
Status: DISCONTINUED | OUTPATIENT
Start: 2023-01-26 | End: 2023-01-27

## 2023-01-26 RX ADMIN — KETOROLAC TROMETHAMINE 15 MG: 15 INJECTION, SOLUTION INTRAMUSCULAR; INTRAVENOUS at 16:29

## 2023-01-26 RX ADMIN — KETOROLAC TROMETHAMINE 15 MG: 15 INJECTION, SOLUTION INTRAMUSCULAR; INTRAVENOUS at 08:58

## 2023-01-26 RX ADMIN — CYCLOBENZAPRINE HYDROCHLORIDE 10 MG: 5 TABLET, FILM COATED ORAL at 09:51

## 2023-01-26 RX ADMIN — OXYCODONE HYDROCHLORIDE 5 MG: 5 TABLET ORAL at 05:39

## 2023-01-26 RX ADMIN — ACETAMINOPHEN 650 MG: 325 TABLET ORAL at 05:39

## 2023-01-26 RX ADMIN — THERA TABS 1 TABLET: TAB at 08:21

## 2023-01-26 RX ADMIN — LIDOCAINE PATCH 4% 2 PATCH: 40 PATCH TOPICAL at 14:37

## 2023-01-26 RX ADMIN — OXYCODONE HYDROCHLORIDE 5 MG: 5 TABLET ORAL at 14:37

## 2023-01-26 RX ADMIN — ENOXAPARIN SODIUM 40 MG: 40 INJECTION SUBCUTANEOUS at 14:37

## 2023-01-26 RX ADMIN — DICLOFENAC SODIUM 2 G: 10 GEL TOPICAL at 08:21

## 2023-01-26 RX ADMIN — FERROUS SULFATE TAB 325 MG (65 MG ELEMENTAL FE) 325 MG: 325 (65 FE) TAB at 08:21

## 2023-01-26 RX ADMIN — HYDROXYCHLOROQUINE SULFATE 200 MG: 200 TABLET, FILM COATED ORAL at 08:21

## 2023-01-26 RX ADMIN — ACETAMINOPHEN 650 MG: 325 TABLET ORAL at 09:51

## 2023-01-26 RX ADMIN — PREDNISONE 20 MG: 20 TABLET ORAL at 08:21

## 2023-01-26 RX ADMIN — DICLOFENAC SODIUM 2 G: 10 GEL TOPICAL at 14:37

## 2023-01-26 ASSESSMENT — ACTIVITIES OF DAILY LIVING (ADL)
FALL_HISTORY_WITHIN_LAST_SIX_MONTHS: NO
DOING_ERRANDS_INDEPENDENTLY_DIFFICULTY: NO
ADLS_ACUITY_SCORE: 20
ADLS_ACUITY_SCORE: 31
DEPENDENT_IADLS:: INDEPENDENT
ADLS_ACUITY_SCORE: 31
CONCENTRATING,_REMEMBERING_OR_MAKING_DECISIONS_DIFFICULTY: NO
DIFFICULTY_EATING/SWALLOWING: NO
ADLS_ACUITY_SCORE: 31
HEARING_DIFFICULTY_OR_DEAF: NO
ADLS_ACUITY_SCORE: 31
DIFFICULTY_COMMUNICATING: NO
ADLS_ACUITY_SCORE: 31
TOILETING_ISSUES: NO
ADLS_ACUITY_SCORE: 20
WEAR_GLASSES_OR_BLIND: YES
ADLS_ACUITY_SCORE: 31
WALKING_OR_CLIMBING_STAIRS_DIFFICULTY: NO
ADLS_ACUITY_SCORE: 31
ADLS_ACUITY_SCORE: 20
CHANGE_IN_FUNCTIONAL_STATUS_SINCE_ONSET_OF_CURRENT_ILLNESS/INJURY: NO
DRESSING/BATHING_DIFFICULTY: NO

## 2023-01-26 NOTE — PLAN OF CARE
Goal Outcome Evaluation:      Plan of Care Reviewed With: patient    Overall Patient Progress: no changeOverall Patient Progress: no change    Outcome Evaluation: A&Ox4. Flat affect. VSS on RA. Pt c/o hip/back pain. Administered oxycodone and robaxin. Pt vomited x3 after administration of robaxin. Administered 1x dose of IV zofran. Finished 1U RBC. Hgb & K recheck at 2000. MRI safety checklist completed & sent to MRI. Plan for ortho reconsult when MRI results are in.

## 2023-01-26 NOTE — CONSULTS
[facility] Orthopedic Consultation    Milly Carroll MRN# [id number]   Age: 22 year old YOB: 2000   Date of Admission:  1/22/2023    Reason for consult: AVN bilateral femoral heads.    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 Orthopedics consulted for management of bilateral hip pain and incidental finding of bilateral femoral head AVN without subchondral collapse. MRI obtained on 1/26/23 of bilateral hips shows small chronic appearing bony infarcts bilaterally without associated bone marrow edema in addition to muscular edema throughout the hips and pelvis, question myositis. Unclear etiology of muscular edema, appreciate rheumatology input.  No leukocytosis.  Afebrile.  CRP elevated to 72, though question if this could be due to SLE flare.    Low concern for acute septic joint.  On exam patient has a excellent range of motion to the bilateral hips.  She is able to ambulate without assistance and denies pain with weightbearing.  Tenderness on exam is more consistent with muscular pain.     Recomendations:  - No acute orthopedic surgery intervention this hospitalization for bilateral femoral head AVN. Recommend outpatient follow up with one of our hip surgeons, Dr. Reynaga, Dr. Rodrigues or Dr. Handley.  -  Could consider pain consult.  Could trial scheduling of Toradol rather than as needed.     Orthopedics will sign off at this time. Please page with any questions or concerns.     Assessment and Plan discussed with Dr. Divya Franz, Orthopedic Surgery attending.          Chief Complaint:   Bilateral hip pain, incidental finding of AVN bilateral femoral heads.          History of Present Illness:   Milly Carroll is a 22 year old female with significant for SLE  complicated by lupus nephritis and cerebritis who was admitted on 1/22/23 for progressive left leg pain and numbness. Orthopedics consulted for management of bilateral hip pain and incidental finding of bilateral femoral head AVN without subchondral collapse. Patient initially presented with 2 day history of left lower leg burning pain.  This pain has worsened to include the bilateral hips and pelvis. MRI obtained on 1/25 which demonstrated bilateral tiny bony infarcts of the femoral heads as well as marked edema throughout the musculature of the pelvis, hips and intraabdominal space. On 1/26 developed significant increase in bilateral hip and pelvis pain.  Per bedside nurse, poor pain control on Toradol, Tylenol, ibuprofen, oxycodone.  Patient states that ice and heat does not help.  Patient was able to stand and ambulate unassisted to the bathroom this morning per RN.  Patient refused to get out of bed for evaluation this afternoon.     Patient unwilling to provide additional history, most obtained from chart review.       Past Medical History:     Past Medical History:   Diagnosis Date     Hepatitis      Lupus (systemic lupus erythematosus) (H)      Lupus cerebritis (H)      Pancreatitis 08/2017     Pyelonephritis 08/2017     Seizures (H)      Status epilepticus (H)              Past Surgical History:     Past Surgical History:   Procedure Laterality Date     IR RENAL BIOPSY LEFT  6/28/2022     NO HISTORY OF SURGERY       ORTHOPEDIC SURGERY               Social History:   Reviewed in the chart.           Family History:   Reviewed in the chart.            Allergies:     Allergies   Allergen Reactions     Rituximab Anaphylaxis             Medications:   Medication reviewed with patient and in chart.         Physical Exam:     Vitals:    01/25/23 2135 01/26/23 0119 01/26/23 0532 01/26/23 0957   BP: 124/86 (!) 131/90 (!) 152/81 135/83   BP Location:  Right arm Right arm Left arm   Patient Position:       Pulse: 63  66 93 105   Resp: 14 15 18 16   Temp: 97.5  F (36.4  C) 97.4  F (36.3  C) 98.4  F (36.9  C) 98.7  F (37.1  C)   TempSrc: Oral Oral Oral Oral   SpO2: 100% 100% 100% 99%   Weight:       Height:         General: Awake.  Follows commands. Only responds in 1-2 words.  Appears sleepy, eyes closed for most of exam.  Neuro: EOM grossly intact  Skin: No rashes,  skin color normal.  HEENT: Normal.   Lungs: Breathing comfortably and nonlabored, no wheezes or stridor noted.    Pelvis: Stable to AP and Lateral compression.    Right Lower Extremity:   - No gross deformity, skin intact.  -Full range of motion of the hip, knee and ankle.  Reports pain with active flexion extension of the hip, but no pain passively.  Mild pain with internal and external rotation.  - Diffuse tenderness to palpation from the mid thigh proximal including anterior thigh, lateral thigh,  anterior pelvis, and lower abdomen.  -Hip flexors, quads and hamstrings 4+/5 strength with pain.  - TA, GSC, EHL, FHL with 5/5 strength.  - SILT sp, dp, sa, willett, ti n. Distributions.   - DP/PT pulses palpable, toes warm and well perfused.  -  Able to straight leg raise.    Left Lower Extremity:   - No gross deformity, skin intact.  -Full range of motion of the hip, knee and ankle.  Reports pain with active flexion extension of the hip, but no pain passively.  Mild pain with internal and external rotation.  - Diffuse tenderness to palpation from the mid thigh proximal including anterior thigh, lateral thigh,  anterior pelvis, and lower abdomen.  -Hip flexors, quads and hamstrings 4+/5 strength with pain.  - TA, GSC, EHL, FHL with 5/5 strength.  - SILT sp, dp, sa, willett, ti n. Distributions.   - DP/PT pulses palpable, toes warm and well perfused.  -  Able to straight leg raise.          Imaging:   All imaging independently reviewed.  Results for orders placed or performed during the hospital encounter of 01/22/23   US Lower Extremity Venous Duplex Left    Impression     IMPRESSION:.  No deep vein thrombosis in the left lower extremity.    I have personally reviewed the examination and initial interpretation  and I agree with the findings.    LIZZY LUZ MD         SYSTEM ID:  [id number]   MR Lumbar Spine w/o & w Contrast    Impression    CONCLUSION:  1.  Minimal lumbar spondylosis. No canal or foraminal narrowing.    2.  No abnormal enhancement within the spine.    3.  Mild diffuse retroperitoneal stranding is partially evaluated. Consider abdominal imaging.   CT Abdomen Pelvis w Contrast   Result Value Ref Range    Radiologist flags Lymphadenopathy     Impression    IMPRESSION:   1. Numerous enlarged pelvic, retroperitoneal, mesenteric, splenic, and  portal caval lymph nodes and mild retroperitoneal stranding which are  nonspecific but may represent sequela of infection/inflammation or  underlying clinical history of known systemic lupus erythematosus.   Clinical follow-up is advised to guide further management.  Short  interval 3-6 month contrast enhanced CT could be considered for  follow-up.  2. Mild splenomegaly  3. 4 mm nonobstructing left renal stone.  4. Incidental bilateral femoral head avascular necrosis without  subchondral collapse.  Bilateral sacral iliac joint bony erosion  changes noted as well.  5. Distended bladder with heterogeneous material, favor excreted  contrast from MRI exam earlier on same date.  5. Large gallbladder with trace pericholecystic fluid.  No abnormal  gallbladder wall thickening.    [Recommend Follow Up: Lymphadenopathy]    This report will be copied to the [facility] to ensure a  provider acknowledges the finding. [facility] Center is available Monday  through Friday 8am-3:30 pm.    I have personally reviewed the examination and initial interpretation  and I agree with the findings.    WHIT IVEY MD         SYSTEM ID:  [id number]   XR Knee Left 1/2 Views    Impression    IMPRESSION: Normal joint spaces and alignment. No fracture or  definitive joint effusion. Mild prepatellar soft tissue reticulation/edema.   MR Hip Bilateral    Impression    IMPRESSION:  1.  Marked edema throughout the musculature of the hips and pelvis. The appearance suggests a myositis, recommend correlation with creatinine kinase (CK) levels. The other differential consideration would be that this is due to severe transudative   lymphedema, as there is edema also present within the subcutaneous tissues and in the intra-abdominal spaces.    2.  No evidence of acute myotendinous or other structural injury. No muscle or tendon tear. No fracture or contusion.    3.  No acute abnormality in the bones or bone marrow. Bone marrow signal is diffusely heterogeneous, probably related to red marrow/hematopoietic marrow. There are tiny there are tiny bone infarcts in the femoral heads, bilaterally, which appear chronic   and without associated bone marrow edema.   Echo Complete   Result Value Ref Range    LVEF  40-45% (mildly reduced)                Laboratory date:   CBC:  Lab Results   Component Value Date    WBC 8.4 01/26/2023    HGB 10.0 (L) 01/26/2023    PLT 93 (L) 01/26/2023       BMP:  Lab Results   Component Value Date     01/26/2023    POTASSIUM 4.1 01/26/2023    POTASSIUM 4.1 01/26/2023    CHLORIDE 113 (H) 01/26/2023    CO2 15 (L) 01/26/2023    BUN 22.7 (H) 01/26/2023    CR 0.60 01/26/2023    ANIONGAP 10 01/26/2023    BISI 7.9 (L) 01/26/2023    GLC 80 01/26/2023       Inflammatory Markers:  Lab Results   Component Value Date    WBC 8.4 01/26/2023    CRP 7.9 06/15/2022     (H) 01/22/2023       Cultures:  No results for input(s): CULT in the last 168 hours.    Total time spent on date of encounter: 45 minutes.     MERVAT PRINCE PA-C  1/26/2023 1:51 PM  Orthopaedic Surgery     Thank you for allowing me to participate in this patient's care. Please page me directly any questions/concerns.   Securely message with the [employer] (learn more here)  Text page  via [facility] Paging/Directory    If there is no response, if it is a weekend, or if it is during evening hours, please page the orthopaedic surgery resident on call via [facility] Paging/Directory

## 2023-01-26 NOTE — CONSULTS
Care Management Initial Consult    General Information  Assessment completed with: Family (Sister Krystal), Krystal  Type of CM/SW Visit: Initial Assessment  Primary Care Provider verified and updated as needed: No (sister reports that she is not sure who pts PCP is.)   Readmission within the last 30 days: no previous admission in last 30 days    Reason for Consult: discharge planning, utilization management concerns  Advance Care Planning: Advance Care Planning Reviewed: no concerns identified          Communication Assessment  Patient's communication style: spoken language (English or Bilingual)    Hearing Difficulty or Deaf: no        Cognitive  Cognitive/Neuro/Behavioral: .WDL except, mood/behavior  Level of Consciousness: alert  Arousal Level: opens eyes spontaneously  Orientation: oriented x 4  Mood/Behavior: anxious, flat affect  Best Language: 0 - No aphasia  Speech: clear, spontaneous, logical    Living Environment:   People in home: sibling(s)     Current living Arrangements: house      Able to return to prior arrangements: yes  Living Arrangement Comments: Pt lives with sister and brother in law    Family/Social Support:  Care provided by: self, other (see comments) (Sister and Brother in Law)  Provides care for: no one  Marital Status: Single  Sibling(s)          Description of Support System: Supportive, Involved    Support Assessment: Adequate family and caregiver support    Current Resources:   Patient receiving home care services: No  Community Resources: None  Equipment currently used at home: none  Supplies currently used at home: None    Employment/Financial:  Employment Status: unemployed        Financial Concerns: No concerns identified           Lifestyle & Psychosocial Needs:  Social Determinants of Health     Tobacco Use: Low Risk      Smoking Tobacco Use: Never     Smokeless Tobacco Use: Never     Passive Exposure: Not on file   Alcohol Use: Not on file   Financial Resource Strain: Not on  file   Food Insecurity: Not on file   Transportation Needs: Not on file   Physical Activity: Not on file   Stress: Not on file   Social Connections: Not on file   Intimate Partner Violence: Not on file   Depression: Not at risk     PHQ-2 Score: 2   Housing Stability: Not on file       Functional Status:  Prior to admission patient needed assistance:   Dependent ADLs:: Independent (Sister provides assistance when needed.)  Dependent IADLs:: Independent (Sister provides assistance when needed.)       Mental Health Status:  Mental Health Status: No Current Concerns       Chemical Dependency Status:  Chemical Dependency Status: No Current Concerns             Values/Beliefs:  Spiritual, Cultural Beliefs, Roman [employer] Practices, Values that affect care: no               Additional Information:  Milly Carroll is a 22 year old female with a 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     SW attempted multiple times to call pt by bedside phone yesterday.  SW also attempted to call pts sister Krystal but was unable to LVM.  SW delegated task to evening staff and evening staff attempted 2 times and was unable to reach pt by bedside phone.  Due to COVID+ status CMA to be conducted via phone.      @0841AM SW called pts sister Krystal and was able to complete CMA with sister.  SW introduced self and explained role in pt's care.  Krystal reports that pt lives with her and her  in a house.  She reports that they are assisting pt with disability applications.  She reports that pts mother used to be the primary care giver for pt but now lives with another sibling.  Krystal reports that either she or her  will transport pt when medically ready for discharge.       will continue to follow for discharge planning, support, and resources.    KEYLA Lackey, [facility]  [address]   Office: [phone]   Pager:  [phone]  [email or web address]

## 2023-01-26 NOTE — PLAN OF CARE
Goal Outcome Evaluation:      Plan of Care Reviewed With: sibling    Overall Patient Progress: no changeOverall Patient Progress: no change

## 2023-01-26 NOTE — PLAN OF CARE
Goal Outcome Evaluation:      Plan of Care Reviewed With: patient    Overall Patient Progress: no changeOverall Patient Progress: no change    Outcome Evaluation: [id number] AxOx4, Flat/withdrawn. C/o 7/10 BL hip pain and lower back pain. MRI completed this shift. K and HGB rechecks drawn and pending. Denied nausea. Left calf without any pain or deformity. Up to BR ad kodak. Cont. POC.

## 2023-01-26 NOTE — PROGRESS NOTES
Resident/Fellow Attestation   I, German Velez Reyes, MD, PhD was present with the medical/PUNEET student who participated in the service and in the documentation of the note.  I have verified the history and personally performed the physical exam and medical decision making.  I agree with the assessment and plan of care as documented in the note.      Pain stable today. Improved on scheduled Toradol. Rheumatology following and completing myositis panel for follow up of MRI findings.     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 She developed acute, 8/10 intensity lower back/hip/pelvic pain ib 1/26/23. This was not well controlled by oxycodone, tylenol, robaxin, or diclofenac gel. She requires hospitalization for further pain management.       Today:  -Add toradol for pain control  -try lidocaine patch for acute back pain  -Start enoxaparin subcutaneously for DVT prevention  -Encourage oral intake     #Acute lower back/pelvic pain, stable  Developed acutely on 1/26/23. Intense pain in the lower back/pelvis/hips. Most likely due to myositis considering lupus + COVID inflammation. A component of the pain is also likely avascular necrosis of the femoral heads considering bony palpation elicits intense pain. Less likely kidney etiology as pounding on the back did not elicit pain. Also less likely retroperitoneal bleed. MRI ruled out acute  trauma/subchondral collapse of the femoral heads.  -Add toradol PRN for pain control  -try topical lidocaine cream  -Switch from robaxin to flexaril due to nausea  -Oxycodone, tylenol, PRN for pain control  -monitor closely       #SLE with hx of lupus nephritis & lupus cerebritis, stable  #Multiple Intraabdominal Lymphadenopathies, stable  #Mild Splenomegaly, stable  On outpatient regimen of Mycofenolate, hydroxychloroquine, Belimumab, and prednisone. Plans for dose escalation of prednisone for concern for lupus flare from outpatient rheumatology notes. Multiple intraabdominal LAPs are not noted on previous imaging. This could be reactive for the COVID infection or less likely lymphoma.  dsDNA antibody level elevated at >365.   -Prednisone 20 mg po/day  -MMF 1500mg BID, held until outpt rheum appt 2/1.  -Hydroxychloroquine 200 mg /day  -Encourage medical adherence   -Enoxaparin subcutaneously for DVT prevention     #Left leg pain/numbness, improving  Presented with two days history of progressive, burning left calf pain. No evidence of gross swelling to suggest DVT, ultrasound also did not show thrombosis in the veins. No evidence of cellulitis or evidence of infection on overlying skin, joint movement was good and no evidence of acute arthritis. Possible component of neuropathic pain with single dermatomal involvement, however MRI of lumbar spine was negative. Single nerve neuropathy with dominant sensory presentation with lupus is also uncommon. Normal xray rules out avascular necrosis of the knee considering the history of avascular necrosis in the R knee; normal CK rules out myositis. Potentially an effect of COVID-19 infection or a lupus flare; lupus labs and rheumatology evaluation indicate likely flare.  -Pain control w/ Tylenol PRN, diclofenac gel    #Anemia (Normocytic), improving  #Thrombocytopenia  Baseline Hgb was around 10, admission Hgb 8.2, no evidence of overt bleeding. Differential includes  hemolytic process from lupus.Transfused yesterday (1/25/23) after Hgb was 6.5; has improved to 10 this morning. Hemolysis labs are reassuring and concern for hemolysis is lower.   -Continue PTA Ferrous sulfate  -Management of lupus as above  -Continue monitoring Hgb     #COVID-19 infection, improved  Not requiring oxygen, asymptomatic. Tested positive on admission workup. Given coexisting comorbidity, plan to treat with remdesivir for 3 days as prevention of severe disease.  -Monitor for symptoms  -Enoxaparin for DVT prophylaxis as platelets and Hgb stabilize  -COVID precautions  -hold mycophenelate mofetil for now      #Bilateral Femoral head avascular necrosis, increased pain  Noted on admission CT imaging. Intense hip pain this morning. Orthopedics does not believe surgery is indicated at this time; she will follow-up with them outpatient. They also recommended discontinuing prednisone as soon as possible, but that likely will not be during hospitalization.  - Pain control as needed  - Follow up as outpatient     #Elevated Troponin, stable  #Elevated BNP, stable   No clear evidence of ischemic process, slight troponin elevation . Differential includes a myocarditis component from lupus. Of note, patient has tachycardia, ECG was sinus with no concern for ST-T change. Echo demonstrated decreased LV fxn (EF=40-45%). Global RV fxn borderline reduced. Likely due to COVID-19 infection as well as potential lupus flare. Will stop trending troponin and BNP; can discontinue telemetry.  -discontinue telemetry     #Hypoalbuminemia, stable  Likely due to malnutrition and inflammatory process. 1.7 this morning.    -Nutrition evaluation   -Continue to monitor     #NAGMA, stable  Most likely to dehydration and decreased PO intake. CO2 at 15 today.  -Continue to monitor via BMP     #Elevated Alk Phos, stable  No dilated billiary tree on imaging, except for the large gallbladder on CT. GGT near patient's baseline (82) implies  extrahepatic cause (bone diease).   -Continue to monitor       ----------resolved medical problems-------------     #Hyponatremia, resolved     #Lymphopenia, resolved  WBC decreased acutely from 6.2 yesterday to 3.7. Now resolved. Likely in the setting of acute SLE flare.     #Hypokalemia, resolved.  Potassium found to be low; potassium replacement protocol initiated and levels improved.     ----------chronic problems---------  #Hx of Pancreatitis   Was admitted in 2020 for acute pancreatitis 2/2 PD stones  -No symptoms of pancreatitis at present     ---------follow ups at discharge----------------  -rheumatology for lupus management   -ortho for bilateral femoral avascular necrosis potentially  -PT and OT for pain control   -PCP  -psychology     Diet: Combination Diet Regular Diet Adult    DVT Prophylaxis: starting subcutaneous enoxaparin  Leung Catheter: Not present  Fluids: none  Lines: None     Cardiac Monitoring: ACTIVE order. Indication: Electrolyte Imbalance (24 hours)- Magnesium <1.3 mg/ml; Potassium < =2.8 or > 5.5 mg/ml  Code Status: Full Code      Clinically Significant Risk Factors        # Hypokalemia: Lowest K = 3 mmol/L in last 2 days, will replace as needed       # Hypoalbuminemia: Lowest albumin = 1.7 g/dL at 1/25/2023  6:42 AM, will monitor as appropriate   # Thrombocytopenia: Lowest platelets = 88 in last 2 days, will monitor for bleeding                 Disposition Plan      Expected Discharge Date: 01/27/2023                The patient's care was discussed with the Attending Physician, Dr. Parrish.  Brenda Cyr, MS3      Medicine Service, MARPOLLO TEAM 1  [facility]  Securely message with [employer] (more info)  Text page via [facility] Paging/Directory   See signed in provider for up to date coverage information  ______________________________________________________________________    Interval History    Got sick after taking robaxin. Still in significant  pain overnight. Sleep and appetite both poor due to pain.       Physical Exam   Vital Signs: Temp: 98.4  F (36.9  C) Temp src: Oral BP: (!) 152/81 Pulse: 93   Resp: 18 SpO2: 100 % O2 Device: None (Room air)    Weight: 119 lbs 12.8 oz     General: In mild distress; lying still in bed.   HENT: Atraumatic.    Cardiovascular: Tachycardic, regular rhythm.  Normal S1 and S2. No murmur appreciated.  Pulmonary:  Breathing comfortably on room air. No respiratory distress. Normal breath sounds, no wheezing, rhonchi, or rales.  Abdominal: no distension.  Abdomen is soft. No guarding or rebound.Mild LLQ tenderness on palpation.  Musculoskeletal: Severe pain in the lower back/pelvis rated 8/10. Most intense when palpating the bony prominences in the pelvis. Mild pedal edema. Sensation decreased to light touch on the back of the L calf up to the back of the knee.   Skin: warm and dry  Neurological:  No focal deficit present.    Psychiatric: quiet, normal affect    Medical Decision Making       Please see A&P for additional details of medical decision making.      Data   Imaging results reviewed over the past 24 hrs:   Recent Results (from the past 24 hour(s))   MR Hip Bilateral    Narrative    EXAM: MR HIP BILATERAL W/O CONTRAST  LOCATION: [facility]  DATE/TIME: 1/25/2023 8:55 PM    INDICATION: Low back and pelvic pain.  COMPARISON: None.  TECHNIQUE: Unenhanced.    FINDINGS:    Severe edema throughout the musculature of the hips and pelvis (including the iliopsoas, gluteal, adductor, and proximal quadriceps). There is also moderate soft tissue edema within the low abdomen and pelvis, and in the subcutaneous tissues is well,   suggesting that fluid may be related to volume overload/edema.    Tendons are intact without tearing.    Heterogeneous signal within the bone marrow of the pelvis and hips, is nonspecific, but favored to be related to underlying hematopoietic red marrow. There  are tiny chronic bone infarcts in the bilateral femoral heads; there is no bone marrow edema to   indicate acute osteonecrosis.     No acute fracture.    The hip and SI joints are normally aligned. Normal joint fluid without acute arthritis.    Intrapelvic viscera are within normal limits.      Impression    IMPRESSION:  1.  Marked edema throughout the musculature of the hips and pelvis. The appearance suggests a myositis, recommend correlation with creatinine kinase (CK) levels. The other differential consideration would be that this is due to severe transudative   lymphedema, as there is edema also present within the subcutaneous tissues and in the intra-abdominal spaces.    2.  No evidence of acute myotendinous or other structural injury. No muscle or tendon tear. No fracture or contusion.    3.  No acute abnormality in the bones or bone marrow. Bone marrow signal is diffusely heterogeneous, probably related to red marrow/hematopoietic marrow. There are tiny there are tiny bone infarcts in the femoral heads, bilaterally, which appear chronic   and without associated bone marrow edema.     Most Recent 3 CBC's:  Recent Labs   Lab Test 01/26/23  0656 01/25/23 2118 01/25/23  1327 01/25/23  0642 01/24/23  1731   WBC 8.4  --   --  5.2 4.3   HGB 10.0* 9.0* 6.5* 7.0* 7.6*   MCV 91  --   --  91 93   PLT 93*  --   --  107* 96*     Most Recent 3 BMP's:  Recent Labs   Lab Test 01/26/23  0656 01/25/23 2118 01/25/23  0642 01/24/23  0623     --  142 138   POTASSIUM 4.1  4.1 4.0 3.0* 3.6   CHLORIDE 113*  --  116* 114*   CO2 15*  --  14* 14*   BUN 22.7*  --  18.6 13.9   CR 0.60  --  0.55 0.52   ANIONGAP 10  --  12 10   BISI 7.9*  --  8.0* 7.5*   GLC 80  --  117* 117*     Most Recent 3 INR's:Recent Labs   Lab Test 01/25/23  1451 01/23/23  2031 06/28/22  0840   INR 1.38* 1.10 1.02     Most Recent 3 Hemoglobins:Recent Labs   Lab Test 01/25/23 2118 01/25/23  1327 01/25/23  0642   HGB 9.0* 6.5* 7.0*

## 2023-01-26 NOTE — PLAN OF CARE
Goal Outcome Evaluation:      Plan of Care Reviewed With: patient    Overall Patient Progress: no changeOverall Patient Progress: no change    Outcome Evaluation: A&Ox4; VSS, except HTN and intermittent tachycardia, on RA. Flat affect/withdrawn. Continuous pulse ox monitoring in place satting >97% all night. Tele monitoring in place showing NSR. Up ind to BR, voiding spontaneously. MRI prelim results showed marked edema throughout the musculature of the hips and pelvis suggestive of myositis. Hgb recheck 9.0; K recheck WDL, AM check ordered. Bilat hip pain managed with PRN tylenol and oxy x1. Slept well overnight.

## 2023-01-26 NOTE — PROGRESS NOTES
Inpatient Rheumatology Progress Note    Milly Carroll MRN# [id number]   Age: 22 year old YOB: 2000     Date of Admission:1/22/2023  Encounter Date: January 26, 2023  Reason for consult: SLE flare    Impression/Plan:  Ms. Milly Carroll is a 22 year old female with a pertinent past medical history of suboptimally controlled SLE (rash, alopecia, photosensitivity, +dsDNA, +RNP, +ribosomal P, low C3/C4, lupus nephritis ISN/RPS class III with proteinuria) in s/o medication in-adherence and c/b ?cerebritis/seizures off antiepileptics since 2014, and h/o R knee osteonecrosis in 2015 admitted for progressive left leg pain, found to have minimally symptomatic COVID-19+ infection, new intraabdominal lymphadenopathy, and bilateral femoral head avascular necrosis without subchondral collapse on abdominal CT imaging. Rheumatology consulted for evaluation and mgmt of potential SLE flare.     Hospital course notable for continued anemia requiring blood transfusions on 1/26, and recent hemolysis work-up notable for persistently low haptoglobin, low fibrinogen, and elevated d-dimer, pending [id number] activity. She has had interval worsening of hip pain over the last 24 hours, orthopedics re-consulted and recommended MR which revealed marked edema throughout the musculature of the hips and pelvis potentially c/w myositis. No evidence of other acute myotendinous or structural injury and no femoral head collapse.     Given that her CK is not elevated and normal LE/UE muscle strength on exam, myositis is not likely at this time, although we will add on a myomarker panel to better rule this out given the MRI findings. Differential for her back and hip pain also includes myalgias in the setting of her COVID infection, as well as worsening of her AVN referred pain given increased steroid doses. Less likely is worsening SLE flare given lack of skin changes and active joint erythema, therefore no clear indication for an  increase in steroid dosage at this time, given that this may paradoxically worsen her hip and back pain. Although, if a steroid increase were necessary to treat another clinical entity (e.g. hemolytic anemia), this would not be a contraindication.     Recommendations:    #SLE flare  #Bilateral femoral avascular necrosis w/o subchondral collapse  #COVID-19 infection  #normocytic anemia with evidence of hemolysis  #abdominal LAD, new  -- Myomarker 3 panel (done for you)  -- Continue Prednisone 20 mg until scheduled 2/1/23 outpatient rheumatology visit with Dr. Diaz.   -- Continue PTA Hydroxychloroquine  -- Continue to hold PTA MMF at discharge given COVID infection and current Remdesivir treatment.  -- Outpatient rheumatology appt scheduled for 2/1/23 with Dr. Diaz, will likely get a ride to this appointment with brother in law.     Patient was seen and staffed with attending physician, Dr. Boone. We will continue to follow.    Mekhi Villarreal MD  IM Resident, PGY-1  P:[phone]    Staff addendum  I performed the history and physical examination of the patient and discussed the management with the resident. I reviewed the available lab and imaging studies. I reviewed the resident s note and agree with the documented findings and plan of care.    Alfonso Boone MD  Rheumatology      -----------------------------------------------------    History of Present Illness:  Ms. Milly Carroll is a 22 year old female with a pertinent past medical history of suboptimally controlled SLE (rash, alopecia, photosensitivity, +dsDNA, +RNP, +ribosomal P, low C3/C4, lupus nephritis ISN/RPS class III with proteinuria) in s/o medication in-adherence and c/b ?cerebritis/seizures off antiepileptics since 2014, and h/o R knee osteonecrosis in 2015 admitted for progressive left leg pain, found to have minimally symptomatic COVID-19+ infection, new intraabdominal lymphadenopathy, and bilateral femoral head avascular necrosis without  "subchondral collapse on abdominal CT imaging. Rheumatology consulted for evaluation and mgmt of potential SLE flare.     Since last note, patient reports:  - Worsened back pain, although improved left hip pain. No other joint pain.  - Can still ambulate without assistance around the room.   - Feels warm all over, the ice packs help. Denies new rashes. No night sweats.  - Did not eat much yesterday, but was able to keep down some food today. No abdominal pain. Having bowel movements and urinating normally.     Notes that she often has difficulty getting to her appointments. Her brother in law usually takes her but sometimes he cannot give her a ride. She thinks that he can probably give her a ride to her appointment next week.     Past Medical History:   Reviewed.    Social History:  Patient reports that she has never smoked. She has never used smokeless tobacco. She reports that she does not currently use alcohol. She reports that she does not use drugs.    Family History:  Reviewed.    Medications:  Reviewed.     Allergies   Allergen Reactions    Rituximab Anaphylaxis     Review of Systems:  - A 14 point review of systems was performed and negative if not documented above in the HPI    Physical exam:  /83 (BP Location: Left arm)   Pulse 105   Temp 98.7  F (37.1  C) (Oral)   Resp 16   Ht 1.562 m (5' 1.5\")   Wt 54.3 kg (119 lb 12.8 oz)   SpO2 99%   BMI 22.27 kg/m      General: no acute distress, laying in hospital bed in hospital gown with ice packs on both hips and foreheard  HEENT: MMM, no oral ulcers noted, no scleral injection, EOMI, no oropharyngeal erythema noted. Red lips in the setting of eating a popsicle.   CV: RRR, S1/S2 without M/R/G, no JVP elevation appreciated. No pericardial rub.  Lung: No increased work of breathing on room air. Lungs clear to auscultation bilaterally.   Abdomen: Bowel sounds present. Soft, non-tender to palpation in all quadrants. No masses or obvious " hepatosplenomegaly.  Neuro: AOx4, fluent speech, moves all extremities independently. Sensation intact to light touch in feet and hands.   Skin/Hair: warm, dry. No new rashes noted. Anterior hair loss present, chronic.   Ext: bilateral dorsalis pedis and posterior tibial pulses present. Trace edema  Msk:  Slight edema but no erythema and normal range of motion of BUE MCP, DIP, wrist, elbows. Moderate effusion near the L anterior patella bilaterally unchanged from prior exam, slight effusion R anterior patella, both warm but w/o tenderness to palpation. Slight tenderness to palpation over the left hip. No pain on palpation of the spine.    Laboratory:  Reviewed. Significant findings below:    CK 16, d-dimer 3.35, , fibrinogen 166, pending ADAMS3

## 2023-01-27 LAB
HOLD SPECIMEN: NORMAL
MAGNESIUM SERPL-MCNC: 1.6 MG/DL (ref 1.7–2.3)
POTASSIUM SERPL-SCNC: 4 MMOL/L (ref 3.4–5.3)
SCANNED LAB RESULT: NORMAL

## 2023-01-27 PROCEDURE — 120N000002 HC R&B MED SURG/OB UMMC

## 2023-01-27 PROCEDURE — 99207 PR NO CHARGE FOLLOW UP PS: CPT | Performed by: INTERNAL MEDICINE

## 2023-01-27 PROCEDURE — 250N000011 HC RX IP 250 OP 636

## 2023-01-27 PROCEDURE — 250N000013 HC RX MED GY IP 250 OP 250 PS 637

## 2023-01-27 PROCEDURE — 99254 IP/OBS CNSLTJ NEW/EST MOD 60: CPT | Performed by: ANESTHESIOLOGY

## 2023-01-27 PROCEDURE — 83516 IMMUNOASSAY NONANTIBODY: CPT | Performed by: INTERNAL MEDICINE

## 2023-01-27 PROCEDURE — 36415 COLL VENOUS BLD VENIPUNCTURE: CPT | Performed by: INTERNAL MEDICINE

## 2023-01-27 PROCEDURE — 84132 ASSAY OF SERUM POTASSIUM: CPT | Performed by: INTERNAL MEDICINE

## 2023-01-27 PROCEDURE — 250N000012 HC RX MED GY IP 250 OP 636 PS 637

## 2023-01-27 PROCEDURE — 83735 ASSAY OF MAGNESIUM: CPT | Performed by: INTERNAL MEDICINE

## 2023-01-27 PROCEDURE — 99232 SBSQ HOSP IP/OBS MODERATE 35: CPT | Mod: GC | Performed by: INTERNAL MEDICINE

## 2023-01-27 RX ORDER — MAGNESIUM SULFATE 1 G/100ML
1 INJECTION INTRAVENOUS ONCE
Status: DISCONTINUED | OUTPATIENT
Start: 2023-01-27 | End: 2023-01-27

## 2023-01-27 RX ORDER — GABAPENTIN 100 MG/1
100 CAPSULE ORAL EVERY 8 HOURS
Status: DISCONTINUED | OUTPATIENT
Start: 2023-01-27 | End: 2023-01-28

## 2023-01-27 RX ORDER — LIDOCAINE HYDROCHLORIDE ANHYDROUS AND DEXTROSE MONOHYDRATE .8; 5 G/100ML; G/100ML
1 INJECTION, SOLUTION INTRAVENOUS CONTINUOUS
Status: DISCONTINUED | OUTPATIENT
Start: 2023-01-27 | End: 2023-01-28

## 2023-01-27 RX ORDER — MAGNESIUM SULFATE HEPTAHYDRATE 40 MG/ML
2 INJECTION, SOLUTION INTRAVENOUS ONCE
Status: COMPLETED | OUTPATIENT
Start: 2023-01-27 | End: 2023-01-27

## 2023-01-27 RX ORDER — OXYCODONE HYDROCHLORIDE 5 MG/1
5 TABLET ORAL EVERY 6 HOURS PRN
Status: DISCONTINUED | OUTPATIENT
Start: 2023-01-27 | End: 2023-01-28

## 2023-01-27 RX ORDER — TIZANIDINE 2 MG/1
2 TABLET ORAL EVERY 8 HOURS
Status: DISCONTINUED | OUTPATIENT
Start: 2023-01-27 | End: 2023-01-28

## 2023-01-27 RX ADMIN — MAGNESIUM SULFATE IN WATER 2 G: 40 INJECTION, SOLUTION INTRAVENOUS at 16:43

## 2023-01-27 RX ADMIN — THERA TABS 1 TABLET: TAB at 08:57

## 2023-01-27 RX ADMIN — HYDROXYCHLOROQUINE SULFATE 200 MG: 200 TABLET, FILM COATED ORAL at 08:57

## 2023-01-27 RX ADMIN — OXYCODONE HYDROCHLORIDE 5 MG: 5 TABLET ORAL at 08:57

## 2023-01-27 RX ADMIN — LIDOCAINE HYDROCHLORIDE 1 MG/KG/HR: 8 INJECTION, SOLUTION INTRAVENOUS at 14:14

## 2023-01-27 RX ADMIN — FERROUS SULFATE TAB 325 MG (65 MG ELEMENTAL FE) 325 MG: 325 (65 FE) TAB at 08:57

## 2023-01-27 RX ADMIN — TIZANIDINE 2 MG: 2 TABLET ORAL at 14:23

## 2023-01-27 RX ADMIN — SULFAMETHOXAZOLE AND TRIMETHOPRIM 1 TABLET: 800; 160 TABLET ORAL at 08:57

## 2023-01-27 RX ADMIN — PREDNISONE 20 MG: 20 TABLET ORAL at 08:57

## 2023-01-27 RX ADMIN — TIZANIDINE 2 MG: 2 TABLET ORAL at 20:17

## 2023-01-27 RX ADMIN — GABAPENTIN 100 MG: 100 CAPSULE ORAL at 20:16

## 2023-01-27 RX ADMIN — ACETAMINOPHEN 650 MG: 325 TABLET ORAL at 08:57

## 2023-01-27 RX ADMIN — GABAPENTIN 100 MG: 100 CAPSULE ORAL at 14:23

## 2023-01-27 ASSESSMENT — ACTIVITIES OF DAILY LIVING (ADL)
ADLS_ACUITY_SCORE: 22
ADLS_ACUITY_SCORE: 22
ADLS_ACUITY_SCORE: 20
ADLS_ACUITY_SCORE: 22
ADLS_ACUITY_SCORE: 20
ADLS_ACUITY_SCORE: 22
ADLS_ACUITY_SCORE: 20
ADLS_ACUITY_SCORE: 20
ADLS_ACUITY_SCORE: 22
ADLS_ACUITY_SCORE: 20

## 2023-01-27 NOTE — PROGRESS NOTES
Resident/Fellow Attestation   I, German Velez Reyes, MD, PhD was present with the medical/PUNEET student who participated in the service and in the documentation of the note. I have verified the history and personally performed the physical exam and medical decision making. I agree with the assessment and plan of care as documented in the note.      Pain Medicine involved today to continue managing pain. Started in lidocaine gtt, and scheduled PO meds. Some improvement today from a pain perspective. Will continue to monitor and tailoring treatment accordingly.     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 She developed intense, severe back pain during hospitalization that was not well controlled.      Today:  -Consulted pain team-- appreciate recs!!!  -Physical therapy  -continuous intravenous lidocaine (1 mg/kg/hr IBW) for 24-48 hours. (per pain team)  -Begin gabapentin 100 mg Q8H. Can escalate in 2-3 days provided patient tolerates without adverse events (per pain team)  -Switch PRN flexaril to tizanidine 2 mg Q8H. Can consider escalation in 2-3 days provided patient tolerates without adverse events (per pain team)  -continue enoxaparin for DVT prophylaxis  -start calorie counts       #Acute lower back/pelvic pain, stable  Developed acutely on 1/26/23. Intense pain in the lower back/pelvis/hips. Most likely due to myositis considering  lupus + COVID inflammation. A component of the pain is also likely avascular necrosis of the femoral heads considering bony palpation elicits intense pain. Less likely kidney etiology as pounding on the back did not elicit pain. Also less likely retroperitoneal bleed. MRI ruled out acute trauma/subchondral collapse of the femoral heads. Not well controlled by oxycodone, tylenol, robaxin, or diclofenac gel.   -Consulted pain team-- appreciate recs!!!  -continuous intravenous lidocaine (1 mg/kg/hr IBW) for 24-48 hours. (per pain team)  -Begin gabapentin 100 mg Q8H. Can escalate in 2-3 days provided patient tolerates without adverse events (per pain team)  -Switch PRN flexaril to tizanidine 2 mg Q8H. Can consider escalation in 2-3 days provided patient tolerates without adverse events (per pain team)  -Continue toradol PRN  -oxycodone  -Physical therapy today  -discontinue lidocaine patches/cream  -follow up with ortho as outpatient     SLE with hx of lupus nephritis & lupus cerebritis, stable  Multiple Intraabdominal Lymphadenopathies, stable  Mild Splenomegaly, stable  On outpatient regimen of Mycofenolate, hydroxychloroquine, Belimumab, and prednisone. Plans for dose escalation of prednisone for concern for lupus flare from outpatient rheumatology notes. Multiple intraabdominal LAPs are not noted on previous imaging. This could be reactive for the COVID infection or less likely lymphoma.  -Prednisone 20 mg po/day  -MMF 1500mg BID, held until outpt rheum appt 2/1.  -Hydroxychloroquine 200 mg /day  -Encourage medical adherence   -Enoxaparin subcutaneously for DVT prevention     COVID-19 infection, improved  Not requiring oxygen, asymptomatic. Tested positive on admission workup. Treated with remdesivir for 3 days.  -Enoxaparin for DVT prophylaxis as platelets and Hgb stabilize  -COVID precautions  -hold mycophenelate mofetil until rheum appt outpatient (2/1)   -start calorie counts given poor  appetite     ----------resolved medical problems-------------  Hypoalbuminemia, resolved  Likely due to malnutrition and inflammatory process on admission     NAGMA, resolved  Most likely to dehydration and decreased PO intake on admission     Elevated Alk Phos,resolved  No dilated billiary tree on imaging, except for the large gallbladder on CT. GGT near patient's baseline (82) implies extrahepatic cause (bone diease).     Elevated Troponin, resolved  Elevated BNP, resolved   No clear evidence of ischemic process, slight troponin elevation. Echo demonstrated decreased LV fxn (EF=40-45%). Global RV fxn borderline reduced. Likely due to COVID-19 infection as well as potential lupus flare.    Left leg pain/numbness, resolved  Presented with two days history of progressive, burning left calf pain. No evidence of gross swelling to suggest DVT, ultrasound also did not show thrombosis in the veins. No evidence of cellulitis or evidence of infection on overlying skin, joint movement was good and no evidence of acute arthritis. Possible component of neuropathic pain with single dermatomal involvement, however MRI of lumbar spine was negative. Single nerve neuropathy with dominant sensory presentation with lupus is also uncommon. Normal xray rules out avascular necrosis of the knee considering the history of avascular necrosis in the R knee; normal CK rules out myositis. Potentially an effect of COVID-19 infection or a lupus flare; lupus labs and rheumatology evaluation indicate likely flare.     Anemia (Normocytic), resolved  Baseline Hgb was around 10, admission Hgb 8.2, no evidence of bleeding. Differential includes hemolytic process from lupus.  -s/p 1 unit packed RBCs  -Continue PTA Ferrous sulfate  -Management of lupus as above  -Continue monitoring Hgb    Hyponatremia, resolved     Lymphopenia, resolved  WBC decreased acutely from 6.2 yesterday to 3.7. Now resolved. Likely in the setting of acute SLE  flare.      Hypokalemia, resolved.  Potassium found to be low; potassium replacement protocol initiated and levels improved.     ----------chronic problems---------  #Hx of Pancreatitis   Was admitted in 2020 for acute pancreatitis 2/2 PD stones  -No symptoms of pancreatitis at present     ---------follow ups at discharge----------------  -rheumatology for lupus management   -ortho for bilateral femoral avascular necrosis potentially  -PT and OT for pain control   -PCP  -psychology  -chronic pain clinic  ([phone])     Diet: Combination Diet Regular Diet Adult    DVT Prophylaxis: Enoxaparin (Lovenox) SQ  Leung Catheter: Not present  Fluids: PO  Lines: None     Cardiac Monitoring: None  Code Status: Full Code      Clinically Significant Risk Factors              # Hypoalbuminemia: Lowest albumin = 1.7 g/dL at 1/26/2023  6:56 AM, will monitor as appropriate   # Thrombocytopenia: Lowest platelets = 93 in last 2 days, will monitor for bleeding                 Disposition Plan      Expected Discharge Date: 01/29/2023      Destination: home with family  Discharge Comments: 1/27: Provider say possible Sunday or Monday discharge.        The patient's care was discussed with the Attending Physician, Dr. Parrish.    Brenda Cyr  Medical Student  Medicine Service, [facility] TEAM [address] [place] [address]  Securely message with [employer] (more info)  Text page via [facility] Paging/Directory   See signed in provider for up to date coverage information  ______________________________________________________________________    Interval History   No acute overnight events. Slept well despite pain. Feels that her appetite has been improving. Pain is at a 7/10 today. The numbness in her calf is improving, almost back to baseline.     Physical Exam   Vital Signs: Temp: 97.9  F (36.6  C) Temp src: Oral BP: 113/77 Pulse: 77   Resp: 18 SpO2: 100 % O2 Device: None (Room air)    Weight: 119 lbs 12.8  oz    General: No acute distress; lying still in bed.   HENT: Atraumatic.    Cardiovascular: Regular rate, regular rhythm.  Normal S1 and S2. No murmur appreciated.  Pulmonary:  Breathing comfortably on room air. No respiratory distress. Normal breath sounds, no wheezing, rhonchi, or rales.  Abdominal: no distension.  Abdomen is soft. No guarding or rebound. No tenderness on palpation.  Musculoskeletal: Severe pain in the lower back/pelvis rated 7/10. Most intense when palpating the bony prominences of the hip. Minimal pedal edema. Sensation to light touch returning on the back of the L calf up to the back of the knee.   Skin: warm and dry  Neurological:  No focal deficit present.    Psychiatric: quiet, normal affect    Medical Decision Making       Please see A&P for additional details of medical decision making.      Data   K+ today= 4.0

## 2023-01-27 NOTE — PROGRESS NOTES
Brief Inpatient Rheumatology Progress Note    Milly Carroll MRN# [id number]   Age: 22 year old YOB: 2000     Date of Admission:1/22/2023  Encounter Date: January 27, 2023  Reason for consult: SLE flare    22 year old female with a pertinent past medical history of suboptimally controlled SLE (rash, alopecia, photosensitivity, +dsDNA, +RNP, +ribosomal P, low C3/C4, lupus nephritis ISN/RPS class III with proteinuria) in s/o medication in-adherence and c/b ?cerebritis/seizures off antiepileptics since 2014, and h/o R knee osteonecrosis in 2015 admitted for progressive left leg pain, found to have minimally symptomatic COVID-19+ infection, new intraabdominal lymphadenopathy, and bilateral femoral head avascular necrosis without subchondral collapse.    Hospital course notable for continued hip and back pain without weakness, with normal CK but MR showing marked edema throughout the musculature of the hips and legs. Differential includes myalgias in the setting of her COVID infection and referred pain from her bilateral AVN. Slightly improved pain on interview today with improved appetite.    Impression/Plan:  #SLE flare  #Bilateral femoral avascular necrosis w/o subchondral collapse  #COVID-19 infection  #normocytic anemia with evidence of hemolysis   #abdominal LAD, new  -- No changes in current plan, continue current prednisone dose.   -- Await Myositis panel results.  -- Appreciate pain and physical therapy recommendations.     We will continue to follow.    Mekhi Villarreal MD  IM Resident, PGY-1  P:[phone]

## 2023-01-27 NOTE — PROGRESS NOTES
Care Management Follow Up    Length of Stay (days): 4    Expected Discharge Date: 01/29/2023     Concerns to be Addressed: discharge planning, utilization management     Patient plan of care discussed at interdisciplinary rounds: Yes    Anticipated Discharge Disposition: Home  Anticipated Discharge Services: None  Anticipated Discharge DME: None    Patient/family educated on Medicare website which has current facility and service quality ratings: N/A   Education Provided on the Discharge Plan: TBD  Patient/Family in Agreement with the Plan: TBD    Referrals Placed by CM/SW:  N/A  Private pay costs discussed: Not applicable    Additional Information:  SW informed by treatment team that they will need to monitor pt further throughout the weekend.  Possible discharge Sunday or Monday.  SW informed that pt will need a new PCP established.  SW delegated PCP task to CHW.     will continue to follow for discharge planning, support, and resources.    KEYLA Lackey, [facility]  [address]   Office: [phone]   Pager: [phone]  [email or web address]

## 2023-01-27 NOTE — PLAN OF CARE
Goal Outcome Evaluation:    AOx4. RA. 7/10 pain in hips and back, oxy and tylenol given. Lidocaine infusion running, started at 1423. Magnesium 1.6, IV replacement ordered - not yet administered. Will pass on to on coming RN to admin after lidocaine infusion complete. Fair/poor appetite.

## 2023-01-27 NOTE — PLAN OF CARE
Goal Outcome Evaluation:      Plan of Care Reviewed With: patient    Overall Patient Progress: no changeOverall Patient Progress: no change    Outcome Evaluation: Pt is on room air with O2 levels ranging between mid to high 90s. She has a right PIV. Pt also has a potassium RN protocol, but no need for replacement just yet until labs come back. Pt is on a regular diet and has no accuchecks. PRN toradol ordered in AM for pain control. Pt denies any pain and has been asleep throughout the night. Ortho and rheumatology saw her yesterday and as of now, there are no inpatient needs for her. Robaxin d/c due to nausea. Tele and continuous pulse ox d/c.

## 2023-01-27 NOTE — PROGRESS NOTES
CHW attempted to call pt via bedside 3x, to establish a new PCP. CHW was unable to get through to the pt.     Bacilio Glover   [facility] Health Worker  [facility]

## 2023-01-27 NOTE — CONSULTS
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     Plan:   1. Considering that patient's pain may be mediated by an inflammatory reaction precipitated by COVID infection, consider the addition of continuous intravenous lidocaine (1 mg/kg/hr IBW) for 24-48 hours. Discontinue all other sources of lidocaine (I.e. patches, cream)  2. Please change flexaril PRN to tizanidine 2 mg Q8H. Can consider escalation in 2-3 days provided patient tolerates without adverse events  3. Continue intavenous ketorolac as patient reports it is the \"only think that works.\"  4. Begin gabapentin 100 mg Q8H. Can escalate in 2-3 days provided patient tolerates without adverse events  5. Would not escalate oxycodone as patient reports it sedates her without much analgesic benefit  6. Please refer to Chronic Pain Clinic for formal evaluation upon discharge ([phone])    Thank you for the opportunity to participate in the care of Milly Carroll  Pain Service will continue to follow.    Discussed with attending anesthesiologist  Primary Service Contacted with Recommendations? Yes    Please Page the Pain Team Via [employer]: \"PAIN MANAGEMENT ACUTE INPATIENT/ [facility]\"    Nick Turner MD  1/27/2023      Chief Complaint:  Lower Back Pain      History of Present Illness:  Milly Carroll is a 22 year old female with PMH significant for SLE complicated by lupus nephritis/ cerebritis who was admitted on 1/22/23 for " left leg pain and lower back pain in the setting of a COVID infection. She reports that her back pain began >1 month ago and was 9/10 prior to her admission. She is not currently on any opioid analgesics for her chronic pain and reports that it is a constant throbbing discomfort that radiates to her pelvis bilaterally. Orthopedics and Rheumatology have both been consulted for management of bilateral hip pain and incidental finding of bilateral femoral head AVN without subchondral collapse. MRI obtained on 1/25 which demonstrated bilateral tiny bony infarcts of the femoral heads as well as marked edema throughout the musculature of the pelvis, hips and intraabdominal space. Yesterday, her lower back/pelvis pain significant increased. She has been treated with oxycodone, ketorolac, ice, and heat packs. The only intervention that the patient reports as useful has been IV ketorolac. Currently reporting her pain is a 7/10, but patient would like to achieve 5/10 prior to her discharge.    Per [place]  review pulled from system on 01/27/23. No prescriptions observed.     Past Medical History:  Past Medical History:   Diagnosis Date     Hepatitis      Lupus (systemic lupus erythematosus) (H)      Lupus cerebritis (H)      Pancreatitis 08/2017     Pyelonephritis 08/2017     Seizures (H)      Status epilepticus (H)          Family History:    Family History   Problem Relation Age of Onset     Other - See Comments Father         Question of lupus in father and paternal grandfather     Lupus Sister         older sister     Gastrointestinal Disease No family hx of         No known history of IBD, hepatitis, pancreatitis, celiac disease, or GI cancers before age 50     Glaucoma No family hx of      Macular Degeneration No family hx of        Social History:  Social History     Tobacco Use     Smoking status: Never     Smokeless tobacco: Never   Substance Use Topics     Alcohol use: Not Currently         Tobacco:   Denies  ETOH:   Not currently  H/O Substance Abuse:  Denies      Review of Systems:  Complete ROS reviewed. Unless otherwise noted, all other systems found to be negative.        Laboratory Results:  Recent Labs   Lab Test 01/26/23  0656 01/25/23  1451   INR  --  1.38*   PLT 93*  --    PTT  --  36   BUN 22.7*  --          Allergies:  Allergies   Allergen Reactions     Rituximab Anaphylaxis       Current Pain Related Medications:  Medications related to Pain Management (From now, onward)    Start     Dose/Rate Route Frequency Ordered Stop    01/26/23 1400  lidocaine patch in PLACE          Transdermal EVERY 8 HOURS SCHEDULED 01/26/23 1143      01/26/23 1200  Lidocaine (LIDOCARE) 4 % Patch 2 patch         2 patch  over 12 Hours Transdermal EVERY 24 HOURS 0800 01/26/23 1143      01/26/23 0940  cyclobenzaprine (FLEXERIL) tablet 10 mg         10 mg Oral EVERY 8 HOURS PRN 01/26/23 0940      01/26/23 0801  lidocaine (LMX4) cream          Topical ONCE PRN 01/26/23 0802      01/26/23 0752  ketorolac (TORADOL) injection 15 mg         15 mg Intravenous EVERY 6 HOURS PRN 01/26/23 0753 01/31/23 0751    01/25/23 1034  oxyCODONE (ROXICODONE) tablet 5 mg         5 mg Oral EVERY 4 HOURS PRN 01/25/23 1034      01/23/23 1702  diclofenac (VOLTAREN) 1 % topical gel 2 g         2 g Topical 4 TIMES DAILY PRN 01/23/23 1700      01/23/23 1659  acetaminophen (TYLENOL) tablet 650 mg         650 mg Oral EVERY 4 HOURS PRN 01/23/23 1659      01/23/23 1345  senna-docusate (SENOKOT-S/PERICOLACE) 8.6-50 MG per tablet 1 tablet        See Hyperspace for full Linked Orders Report.    1 tablet Oral 2 TIMES DAILY PRN 01/23/23 1359      01/23/23 1345  senna-docusate (SENOKOT-S/PERICOLACE) 8.6-50 MG per tablet 2 tablet        See Hyperspace for full Linked Orders Report.    2 tablet Oral 2 TIMES DAILY PRN 01/23/23 1359      01/23/23 1343  lidocaine 1 % 0.1-1 mL         0.1-1 mL Other EVERY 1 HOUR PRN 01/23/23 1359      01/23/23 1343  lidocaine (LMX4) cream           "Topical EVERY 1 HOUR PRN 01/23/23 1359              Physical Exam:  Vitals: /77 (BP Location: Left arm)   Pulse 77   Temp 97.9  F (36.6  C) (Oral)   Resp 18   Ht 1.562 m (5' 1.5\")   Wt 54.3 kg (119 lb 12.8 oz)   SpO2 100%   BMI 22.27 kg/m      Physical Exam:   CONSTITUTIONAL/GENERAL APPEARANCE:  NAD  EYES: EOMI, sclera anicteric, PERRLA  ENT/NECK: atraumatic, lips and oral mucous membranes moist  RESPIRATORY: non-labored breathing. No cough, wheeze  CV: RRR, no audible rubs, gallops, or murmurs  ABDOMEN: Soft, non-tender, non-distended  MUSCULOSKELETAL/BACK/SPINE/EXTREMITIES: Moves all extremities purposefully.  No edema or obvious joint deformities   NEURO: Alert and Oriented x3. Answers questions appropriately  SKIN/VASCULAR EXAM:  No jaundice, no visible rashes or lesions    Please see A&P for additional details of medical decision making.  75 MINUTES SPENT BY ME on the date of service doing chart review, history, exam, documentation & further activities per the note.        "

## 2023-01-27 NOTE — PLAN OF CARE
Goal Outcome Evaluation:      Plan of Care Reviewed With: patient    Overall Patient Progress: no changeOverall Patient Progress: no change    Outcome Evaluation:     Activity: ind/ SBA   Vascular Access: R PIV  Drains:      Neuro: WDL x flat affect and BLE numbness calf to thigh    Pain: low back, hips, lidocaine patch in place and nonpharmacologic and pharmacologic pain management measures in use   Resp:  RA, slight dry cough.  O2 sats mid to high 90s.   Cardiac: Int tachycardia    GI/: WNL    Infectious Disease: Covid 19, positive on admission. Poor appetite.   Electrolyte replacement: K RN protocol, no need for replacement today  Skin: WNL.    Events today: Pain reported at 7-8/10 in severity throughout day despite use of prn pain medicine, gentle activity, ice/heat, and other pain interventions, team aware. Poor appetite. Robaxin discontinued because it made pt nauseous, added ketorolac, flexeril, lidocaine patches. Seen by Ortho and Rheum. Tele and continuous pulse ox discontinued.

## 2023-01-28 LAB
ANION GAP SERPL CALCULATED.3IONS-SCNC: 7 MMOL/L (ref 7–15)
BACTERIA BLD CULT: NO GROWTH
BACTERIA BLD CULT: NO GROWTH
BUN SERPL-MCNC: 21.9 MG/DL (ref 6–20)
CALCIUM SERPL-MCNC: 7.8 MG/DL (ref 8.6–10)
CHLORIDE SERPL-SCNC: 114 MMOL/L (ref 98–107)
CREAT SERPL-MCNC: 0.6 MG/DL (ref 0.51–0.95)
DEPRECATED HCO3 PLAS-SCNC: 16 MMOL/L (ref 22–29)
ERYTHROCYTE [DISTWIDTH] IN BLOOD BY AUTOMATED COUNT: 20.1 % (ref 10–15)
GFR SERPL CREATININE-BSD FRML MDRD: >90 ML/MIN/1.73M2
GLUCOSE SERPL-MCNC: 93 MG/DL (ref 70–99)
HCT VFR BLD AUTO: 33.5 % (ref 35–47)
HGB BLD-MCNC: 9.8 G/DL (ref 11.7–15.7)
MAGNESIUM SERPL-MCNC: 1.8 MG/DL (ref 1.7–2.3)
MCH RBC QN AUTO: 29.3 PG (ref 26.5–33)
MCHC RBC AUTO-ENTMCNC: 29.3 G/DL (ref 31.5–36.5)
MCV RBC AUTO: 100 FL (ref 78–100)
PLATELET # BLD AUTO: 91 10E3/UL (ref 150–450)
POTASSIUM SERPL-SCNC: 4 MMOL/L (ref 3.4–5.3)
RBC # BLD AUTO: 3.34 10E6/UL (ref 3.8–5.2)
SODIUM SERPL-SCNC: 137 MMOL/L (ref 136–145)
WBC # BLD AUTO: 11.8 10E3/UL (ref 4–11)

## 2023-01-28 PROCEDURE — 83735 ASSAY OF MAGNESIUM: CPT | Performed by: INTERNAL MEDICINE

## 2023-01-28 PROCEDURE — 250N000013 HC RX MED GY IP 250 OP 250 PS 637

## 2023-01-28 PROCEDURE — 120N000002 HC R&B MED SURG/OB UMMC

## 2023-01-28 PROCEDURE — 82310 ASSAY OF CALCIUM: CPT

## 2023-01-28 PROCEDURE — 85014 HEMATOCRIT: CPT

## 2023-01-28 PROCEDURE — 250N000012 HC RX MED GY IP 250 OP 636 PS 637

## 2023-01-28 PROCEDURE — 99233 SBSQ HOSP IP/OBS HIGH 50: CPT | Mod: GC | Performed by: INTERNAL MEDICINE

## 2023-01-28 PROCEDURE — 36415 COLL VENOUS BLD VENIPUNCTURE: CPT

## 2023-01-28 RX ORDER — GABAPENTIN 100 MG/1
200 CAPSULE ORAL EVERY 8 HOURS
Status: DISCONTINUED | OUTPATIENT
Start: 2023-01-28 | End: 2023-01-29 | Stop reason: HOSPADM

## 2023-01-28 RX ORDER — TIZANIDINE 2 MG/1
4 TABLET ORAL EVERY 8 HOURS
Status: DISCONTINUED | OUTPATIENT
Start: 2023-01-28 | End: 2023-01-29 | Stop reason: HOSPADM

## 2023-01-28 RX ADMIN — TIZANIDINE 4 MG: 2 TABLET ORAL at 12:29

## 2023-01-28 RX ADMIN — PREDNISONE 20 MG: 20 TABLET ORAL at 08:24

## 2023-01-28 RX ADMIN — HYDROXYCHLOROQUINE SULFATE 200 MG: 200 TABLET, FILM COATED ORAL at 08:24

## 2023-01-28 RX ADMIN — THERA TABS 1 TABLET: TAB at 08:24

## 2023-01-28 RX ADMIN — TIZANIDINE 2 MG: 2 TABLET ORAL at 05:32

## 2023-01-28 RX ADMIN — FERROUS SULFATE TAB 325 MG (65 MG ELEMENTAL FE) 325 MG: 325 (65 FE) TAB at 08:23

## 2023-01-28 RX ADMIN — GABAPENTIN 100 MG: 100 CAPSULE ORAL at 05:32

## 2023-01-28 RX ADMIN — ACETAMINOPHEN 650 MG: 325 TABLET ORAL at 08:24

## 2023-01-28 RX ADMIN — GABAPENTIN 200 MG: 100 CAPSULE ORAL at 12:29

## 2023-01-28 ASSESSMENT — ACTIVITIES OF DAILY LIVING (ADL)
ADLS_ACUITY_SCORE: 22

## 2023-01-28 NOTE — PLAN OF CARE
Goal Outcome Evaluation:    AOx4. RA. 7/10 pain in hips and back, PRN tylenol and scheduled pain meds given. Oxy discontinued. Magnesium replaced yesterday, recheck this AM was  1.8. Tele discontinued. VSS. Good appetite, on po count. Quiet. Up independently to bathroom. Voids without difficulty. No BM.     Ambulated in room and stretched x1. Will continue to encourage pt to walk and stretch 3x daily.

## 2023-01-28 NOTE — PROGRESS NOTES
Calorie Count  Intake recorded for: 1/27  Total Kcals: 1181 Total Protein: 39g  Kcals from Hospital Food: 1181  Protein: 39g  Kcals from Outside Food (average):0 Protein: 0g  # Meals Ordered from Kitchen: 2 meals   # Meals Recorded: 1 meals (First - 100% sausage pizza w/ parmesan cheese, grapes, baked potato chips, ice cream, lemon lime soda)   # Supplements Recorded: 0

## 2023-01-28 NOTE — PLAN OF CARE
Goal Outcome Evaluation:       A&O (flat affect/withdrawn) VSS on RA. PIV SL. Mag replaced, recheck tomorrow AM. Reg diet, good appetite (Pieter ct's). Lidocaine gtt stopped, Pt did not tolerate, dizzy and HTN with drip (stopped). Denies pain. Independently walks to bathroom. Tele NSR.

## 2023-01-28 NOTE — PLAN OF CARE
Goal Outcome Evaluation:      Plan of Care Reviewed With: patient    Overall Patient Progress: no changeOverall Patient Progress: no change    Outcome Evaluation: Pt admitted for lupus flare. Denied pain ON. Up indep to toilet. Tele NSR. Pt not on lido gtt d/t intolerance. OP rheum f/u on 2/1 per rheum note. Pt remians on COVID ISO for positive test on 1/23/23. Plan is for discharge home once med ready

## 2023-01-28 NOTE — PROGRESS NOTES
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 Her left thigh pain and numbness has resolved however admission complicated by difficult to manage bilateral hip and back pain. She requires inpatient admission for optimization of her pain regimen. Pain team and rheumatology are consulted. She is on increased pta prednisone for her lupus flare.     Today:  - Increase gabapentin and tizanidine   - Discontinue lidocaine infusion   - Milly is encouraged to do walk around her room and stretch at least 3 times a day   - Discontinue telemetry       Lower back/pelvic pain  Left leg pain/numbness, resolved  Myositis  Bilateral avascular necrosis 2/2 chronic prednisone use   Milly reports Intense pain in the lower back/pelvis/hips. Back and hip pain has been going on several weeks prior to admission. Acutely worsened prior to admission and on 01/26 in the setting of chronic prednisone use. Mri notable for marked edema throughout the hip and pelvis muscles likely 2/2 combination of lupus flare and COVID inflammation given that the only medication that improves the pain is toradol. CK negative. Numbness: also considered nerve entrapment and referred pain from avascular necrosis   - Pain team following, appreciate recs    - Lidocaine infusion discontinued due to lightheadedness, dizziness and warmth.    - Increase gabapentin to 200 q8h and Tizanidine to 4mg q8hr   -Continue toradol PRN  - oxycodone has been discontinued as patient reports that it just  makes her sleepy and does not resolve her pain   -Physical therapy following, encourage ambulation and stretches   - Will need outpatient Orthopedics for avascular necrosis. No plans for inpatient management      SLE with hx of lupus nephritis & lupus cerebritis c/b flare c/b anemia, thrombocytopenia, and lymphopenia, improved  Multiple Intraabdominal Lymphadenopathies  Mild Splenomegal  Elevated dsDNA, low complements, and cytopenias on admission concerning for lupus flare. S/p 1 Unit pRBC. Multiple intraabdominal LAPs are not noted on previous imaging. This could be reactive for the COVID infection or less likely lymphoma.   - Rheumatology following .     -Prednisone 20 mg po/day    - holding MMF 1500mg BID, until outpt rheum appt 2/1 given current COVID infection   -pta Hydroxychloroquine 200 mg /day  -Ongoing discussion about medication adherence   -Continue PTA Ferrous sulfate    Elevated Troponin  Elevated BNP  Decreased EF   No clear evidence of ischemic process, slight troponin elevation, peaked. Echo demonstrated decreased LV fxn (EF=40-45%). Global RV fxn borderline reduced. Likely due to COVID-19 infection as well as lupus flare.    Hypoalbuminemia - Likely due to malnutrition and inflammatory process on admission    NAGMA, stable to improving   Most likely to dehydration and decreased PO intake on admission. PO intake has improved   - monitor on BMP       ------------------------------------resolved medical problems--------------------  Elevated Alk Phos - No dilated billiary tree on imaging, except for the large gallbladder on CT. GGT near patient's baseline (82) implies extrahepatic cause (bone diease).     COVID-19 infection, minimally symptomatic cough has resolved  Not requiring oxygen. Treated with remdesivir for 3 days.  - COVID precautions  - hold mycophenelate mofetil until rheum appt outpatient (2/1)  - DVT ppx with enoxaparin      ---------follow ups needed at  discharge----------------  -rheumatology for lupus management   -ortho for bilateral femoral avascular necrosis   -PT and OT for pain control   -Establish new PCP  -Psychology  -chronic pain clinic ([phone])     Diet: Combination Diet Regular Diet Adult  Snacks/Supplements Adult: Beneprotein; Between Meals  Calorie Counts    DVT Prophylaxis: Enoxaparin (Lovenox) SQ  Leung Catheter: Not present  Fluids: PO  Lines: None     Cardiac Monitoring: None  Code Status: Full Code      Clinically Significant Risk Factors            # Hypomagnesemia: Lowest Mg = 1.6 mg/dL in last 2 days, will replace as needed   # Hypoalbuminemia: Lowest albumin = 1.7 g/dL at 1/26/2023  6:56 AM, will monitor as appropriate   # Thrombocytopenia: Lowest platelets = 91 in last 2 days, will monitor for bleeding                 Disposition Plan      Expected Discharge Date: 01/29/2023      Destination: home with family  Discharge Comments: 1/27: Provider say possible Sunday or Monday discharge.        The patient's care was discussed with the Attending Physician, Dr. Parrish.    Kirsten eD La Cruz MD  Medicine Service, [address]  Securely message with [employer] (more info)  Text page via [facility] Paging/Directory   See signed in provider for up to date coverage information  ______________________________________________________________________    Interval History   No acute overnight events. Milly reports ongoing bilateral hip and back pain that has remained the same since admission. It has not worsened since admission however it's just not getting better. She was able to walk to the bathroom yesterday and was in pain doing that. She was seen by PT and they provided stretching exercises for her to participate in and she has been doing them. She denies any left calf pain or numbness today. She reports that the lidocaine infusion made her feel lightheaded, like her head was going down, dizzy,  and warm, which improved after stopping the infusion. Prior to that, it helped with her pain a little bit.     She is concerned about her left upper extremity swelling and is asking what can be done about it     She is eating and drinking without concerns. Having bowel movement. She reports that she has been updating her family and that we do not have to call them.     She is willing to trial waking and stretching three times a day and increasing her pain meds today.     Physical Exam   Vital Signs: Temp: 98  F (36.7  C) Temp src: Oral BP: 111/66 Pulse: 80   Resp: 16 SpO2: 100 % O2 Device: None (Room air)    Weight: 119 lbs 12.8 oz    General: Sitting up in bed, getting ready to eat breakfast, nontoxic appearing   HENT: Hair is thin, Face appears flushed, similar to previous days.   Cardiovascular: Regular rate, regular rhythm.  Normal S1 and S2. No murmur appreciated.  Pulmonary:  Breathing comfortably on room air. No respiratory distress. Clear to auscultation bilaterally.   Abdominal: +BS, soft, nontender, nondistended.   Extremities: left upper extremity is swollen, with some bruising, however improved from prior   Neurological:  No focal deficit present. Able to move all extremities appropriately   Psychiatric: Quiet, Flat affect    Data : reviewed all data in the last 24 hours.

## 2023-01-29 VITALS
WEIGHT: 119.8 LBS | SYSTOLIC BLOOD PRESSURE: 139 MMHG | BODY MASS INDEX: 22.62 KG/M2 | RESPIRATION RATE: 16 BRPM | HEIGHT: 61 IN | DIASTOLIC BLOOD PRESSURE: 89 MMHG | OXYGEN SATURATION: 100 % | TEMPERATURE: 97.3 F | HEART RATE: 78 BPM

## 2023-01-29 LAB
ANION GAP SERPL CALCULATED.3IONS-SCNC: 8 MMOL/L (ref 7–15)
BUN SERPL-MCNC: 22 MG/DL (ref 6–20)
CALCIUM SERPL-MCNC: 7.9 MG/DL (ref 8.6–10)
CHLORIDE SERPL-SCNC: 114 MMOL/L (ref 98–107)
CREAT SERPL-MCNC: 0.54 MG/DL (ref 0.51–0.95)
DEPRECATED HCO3 PLAS-SCNC: 19 MMOL/L (ref 22–29)
GFR SERPL CREATININE-BSD FRML MDRD: >90 ML/MIN/1.73M2
GLUCOSE SERPL-MCNC: 143 MG/DL (ref 70–99)
HOLD SPECIMEN: NORMAL
MAGNESIUM SERPL-MCNC: 1.8 MG/DL (ref 1.7–2.3)
POTASSIUM SERPL-SCNC: 3.9 MMOL/L (ref 3.4–5.3)
SODIUM SERPL-SCNC: 141 MMOL/L (ref 136–145)

## 2023-01-29 PROCEDURE — 82310 ASSAY OF CALCIUM: CPT

## 2023-01-29 PROCEDURE — 99238 HOSP IP/OBS DSCHRG MGMT 30/<: CPT | Mod: GC | Performed by: INTERNAL MEDICINE

## 2023-01-29 PROCEDURE — 36415 COLL VENOUS BLD VENIPUNCTURE: CPT

## 2023-01-29 PROCEDURE — 250N000012 HC RX MED GY IP 250 OP 636 PS 637

## 2023-01-29 PROCEDURE — 83735 ASSAY OF MAGNESIUM: CPT | Performed by: INTERNAL MEDICINE

## 2023-01-29 PROCEDURE — 250N000013 HC RX MED GY IP 250 OP 250 PS 637

## 2023-01-29 PROCEDURE — 250N000011 HC RX IP 250 OP 636

## 2023-01-29 RX ORDER — NAPROXEN 250 MG/1
250 TABLET ORAL 2 TIMES DAILY PRN
Qty: 60 TABLET | Refills: 0 | Status: SHIPPED | OUTPATIENT
Start: 2023-01-29 | End: 2023-01-01

## 2023-01-29 RX ORDER — MYCOPHENOLATE MOFETIL 500 MG/1
1500 TABLET ORAL 2 TIMES DAILY
Qty: 180 TABLET | Refills: 0 | Status: SHIPPED | OUTPATIENT
Start: 2023-01-29 | End: 2023-01-01

## 2023-01-29 RX ORDER — GABAPENTIN 100 MG/1
200 CAPSULE ORAL EVERY 8 HOURS
Qty: 90 CAPSULE | Refills: 0 | Status: SHIPPED | OUTPATIENT
Start: 2023-01-29 | End: 2023-01-01

## 2023-01-29 RX ORDER — PREDNISONE 20 MG/1
20 TABLET ORAL DAILY
Qty: 8 TABLET | Refills: 0 | Status: ON HOLD | OUTPATIENT
Start: 2023-01-29 | End: 2023-01-01

## 2023-01-29 RX ADMIN — TIZANIDINE 4 MG: 2 TABLET ORAL at 12:21

## 2023-01-29 RX ADMIN — ACETAMINOPHEN 650 MG: 325 TABLET ORAL at 08:38

## 2023-01-29 RX ADMIN — TIZANIDINE 4 MG: 2 TABLET ORAL at 05:36

## 2023-01-29 RX ADMIN — THERA TABS 1 TABLET: TAB at 08:39

## 2023-01-29 RX ADMIN — PREDNISONE 20 MG: 20 TABLET ORAL at 08:39

## 2023-01-29 RX ADMIN — FERROUS SULFATE TAB 325 MG (65 MG ELEMENTAL FE) 325 MG: 325 (65 FE) TAB at 08:39

## 2023-01-29 RX ADMIN — HYDROXYCHLOROQUINE SULFATE 200 MG: 200 TABLET, FILM COATED ORAL at 08:39

## 2023-01-29 RX ADMIN — GABAPENTIN 200 MG: 100 CAPSULE ORAL at 05:36

## 2023-01-29 RX ADMIN — GABAPENTIN 200 MG: 100 CAPSULE ORAL at 12:21

## 2023-01-29 RX ADMIN — KETOROLAC TROMETHAMINE 15 MG: 15 INJECTION, SOLUTION INTRAMUSCULAR; INTRAVENOUS at 00:09

## 2023-01-29 RX ADMIN — ACETAMINOPHEN 650 MG: 325 TABLET ORAL at 00:08

## 2023-01-29 ASSESSMENT — ACTIVITIES OF DAILY LIVING (ADL)
ADLS_ACUITY_SCORE: 22

## 2023-01-29 NOTE — PLAN OF CARE
Goal Outcome Evaluation:       A&O (withdrawn/flat affect). PIV SL. Up to bathroom independently. Voids without difficulty. Reg diet, good appetite. Protein powder given to add to meals/drinks. Expected discharge tomorrow home.

## 2023-01-29 NOTE — PLAN OF CARE
Pt discharging today. Family will pick pt up around 1600 and transport her home. Went over discharge instructions with pt. IV access removed. Helped  discharge meds for pt. Pt adequate for discharge.

## 2023-01-29 NOTE — PLAN OF CARE
Goal Outcome Evaluation:             A&O VSS on RA. PIV removed, all paperwork for discharge with pt. Ride home from family. All belongings with pt at discharge.

## 2023-01-29 NOTE — PLAN OF CARE
Goal Outcome Evaluation:      Plan of Care Reviewed With: patient    Overall Patient Progress: improvingOverall Patient Progress: improving    Outcome Evaluation: Pt CO pain around midnight in R hip. PRN tylenol and IV toradol used x1 with relief. Pt continues on po counts, had some snacks (ice cream and popsicles) ON. Up indep. Remains on special precautions for positive COVID test on 1/23. WBC rising, last two values were 8.4 and 11.8. Pt afebile ON. Lavern and zanaflex doses inc yesterday. BM 1/28. Possible discharge to home today vs tomorrow pending medical stability.

## 2023-01-29 NOTE — PROGRESS NOTES
Calorie Count  Intake recorded for: 1/28  Total Kcals: 1884 Total Protein: 59g  Kcals from Hospital Food: 1884  Protein: 59g  Kcals from Outside Food (average):0 Protein: 0g  # Meals Ordered from Kitchen: 3 meals   # Meals Recorded: 2 meals (First - 100% scrambled eggs, 2 sausage patties, ice cream, mandarin oranges, lemon lime soda)       (Second - 75% sausage pizza, grapes, brownie, ice cream, baked potato chips, lemon lime soda)   # Supplements Recorded: 0

## 2023-01-30 NOTE — DISCHARGE SUMMARY
[facility]  Discharge Summary - Medicine & Pediatrics       Date of Admission:  1/22/2023  Date of Discharge:  1/29/2023  4:22 PM  Discharging Provider: Dr. Parrish  Discharge Service: Medicine Service, KOREY TEAM 1    Discharge Diagnoses   Exacerbation of systemic lupus erythematosus  Bilateral avascular necrosis of femurs  COVID-19   Elevated alkaline phosphatase  Myositis/back pain    Follow-ups Needed After Discharge   Follow-up Appointments     Adult [facility] Follow-up and recommended labs and tests      Follow up with primary care provider, Estefany Bell, within 7 days for   hospital follow- up.  No follow up labs or test are needed.      Appointments on [place] and/or [facility] (with [facility] or [facility]   provider or service). Call [phone] if you haven't heard regarding   these appointments within 7 days of discharge.             should establish care with a primary rheumatology team who should work to ensure medical adherence to lupus medications. She should also establish care with orthopedics for management of bilateral femoral avascular necrosis. PT and OT outpatient would likely also be helpful for hip and back pain. Ms. Carroll may also benefit from outpatient psychology/therapy visits.     Unresulted Labs Ordered in the Past 30 Days of this Admission     Date and Time Order Name Status Description    1/26/2023  2:44 PM MyoMarker Panel 3 Plus In process       These results will be followed up by PCP Estefany Bell or rheumatology provider on 2/1/23.     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 Her left thigh pain and numbness has resolved however admission was complicated by difficult to manage bilateral hip and back pain, concerning for myositis and bone pain.  She was treated with an increased dose of the pta prednisone for her lupus flare. Pain was fairly well-controlled on a regimen of toradol, gabapentin, and tizanidine. Lidocaine drip was tried as per Pain Medicine, but she had dizziness with it, reason why it was discontinued. She was discharged with the recommendation to follow up with: Pain Medicine, Rheumatology (2/1/23), and establish care with PCP.   -Rheumatology to follow up Myositis Panel  -Pain Clinic appointment for follow up of current pain regimen  -PCP and Mental Health care establishment for chronic condition, coordination of complicated medical regimen, and management of depression and anxiety in the context of chronic conditions.       Consultations This Hospital Stay   RHEUMATOLOGY IP CONSULT  ORTHOPAEDIC SURGERY ADULT/PEDS IP CONSULT  PHYSICAL THERAPY ADULT IP CONSULT  OCCUPATIONAL THERAPY ADULT IP CONSULT  NURSING TO CONSULT FOR VASCULAR ACCESS CARE IP CONSULT  CARE MANAGEMENT / SOCIAL WORK IP CONSULT  PAIN MANAGEMENT ADULT IP CONSULT  PHYSICAL THERAPY ADULT IP CONSULT    Code Status: Full code     The patient was discussed with Dr. Duffy Germán L. Vélez Reyes, MD, PhD  [address] [address]  Phone: [phone]  ______________________________________________________________________    Physical Exam   Vital Signs: Temp: 97.3  F (36.3  C) Temp src: Axillary BP: 139/89 Pulse: 78   Resp: 16 SpO2: 100 % O2 Device: None (Room air)    Weight: 119 lbs 12.8 oz     General: Sitting up in bed, getting ready to eat breakfast, nontoxic appearing   HENT: Hair is thin  Cardiovascular: Regular rate, regular rhythm.  Normal S1 and S2. No murmur  appreciated.  Pulmonary:  Breathing comfortably on room air. No respiratory distress. Clear to auscultation bilaterally.   Abdominal: +BS, soft, nontender, nondistended.   Extremities: minimal pedal edema  Neurological:  No focal deficit present. Able to move all extremities appropriately   Psychiatric: Quiet, Flat affect      Primary Care Physician   Estefany Bell    Discharge Orders      Physical Therapy Referral      Orthopedic  Referral      Adult Mental Health  Referral      Pain Management  Referral      Primary Care Referral      Reason for your hospital stay    COVID19 infection with Lupus flare     Activity    Your activity upon discharge: activity as tolerated     Reason for your hospital stay    COVID19 infection with SLE flare     Activity    Your activity upon discharge: activity as tolerated     Discharge Instructions    Follow ups:  -Rheumatology February 1st, 2023  -Primary Care Physician  -Pain Medicine   -Health Psychology   -Orthopedic Surgery   -Physical and Occupational Therapy     Resume Home Care Services     Adult [facility] Follow-up and recommended labs and tests    Follow up with primary care provider, Estefany Bell, within 7 days for hospital follow- up.  No follow up labs or test are needed.      Appointments on [place] and/or [facility] (with [facility] or [facility] provider or service). Call [phone] if you haven't heard regarding these appointments within 7 days of discharge.     Diet    Follow this diet upon discharge: Orders Placed This Encounter      Combination Diet Regular Diet Adult     Diet    Follow this diet upon discharge: Orders Placed This Encounter      Snacks/Supplements Adult: Beneprotein; Between Meals      Calorie Counts      Combination Diet Regular Diet Adult       Significant Results and Procedures   Results for orders placed or performed during the hospital encounter of 01/22/23    Lower Extremity Venous Duplex Left    Narrative     EXAMINATION: US LOWER EXTREMITY VENOUS DUPLEX LEFT  1/23/2023 1:44 AM       CLINICAL HISTORY:   left posterior thigh radiating to calf pain     COMPARISON: None available        PROCEDURE COMMENTS: Ultrasound was performed of the deep venous system  of the left lower extremity using grayscale, color, and spectral  Doppler.    FINDINGS:  The common femoral, greater saphenous origin, femoral, popliteal, and  deep calf veins are visualized and are patent. Venous waveforms are  normal. There is normal response to compression.      Impression    IMPRESSION:.  No deep vein thrombosis in the left lower extremity.    I have personally reviewed the examination and initial interpretation  and I agree with the findings.    LIZZY LUZ MD         SYSTEM ID:  [id number]   MR Lumbar Spine w/o & w Contrast    [facility]  MR LUMBAR SPINE W/O AND W CONTRAST  1/23/2023 2:45 AM     INDICATION: Immunocompromised, lupus on chronic prednisone, pain and tender from L1-L5 and left musculature radiating down to left calf with new paresthesias.  TECHNIQUE: Routine.  CONTRAST: 5 mL Gadavist.  COMPARISON: None.    FINDINGS: Nomenclature is based on 5 lumbar type vertebral bodies. Alignment is normal. No marrow edema or abnormal enhancement within the spine. Normal distal spinal cord and cauda equina with conus medullaris at L1. Mild diffuse retroperitoneal   stranding is partially evaluated. Unremarkable visualized bony pelvis.    T12-L1: Normal disc height and signal. No herniation. No facet arthropathy. No spinal canal stenosis. No right neural foraminal stenosis. No left neural foraminal stenosis.    L1-L2: Normal disc height and signal. No herniation. No facet arthropathy. No spinal canal stenosis. No right neural foraminal stenosis. No left neural foraminal stenosis.    L2-L3: Minimal loss of disc space height and signal. Tiny left paracentral protrusion without neural  impingement. No facet arthropathy. No spinal canal stenosis. No right neural foraminal stenosis. No left neural foraminal stenosis.    L3-L4: Normal disc height and signal. No herniation. No facet arthropathy. No spinal canal stenosis. No right neural foraminal stenosis. No left neural foraminal stenosis.    L4-L5: Normal disc height and signal. No herniation. No facet arthropathy. No spinal canal stenosis. No right neural foraminal stenosis. No left neural foraminal stenosis.    L5-S1: Normal disc height and signal. No herniation. No facet arthropathy. No spinal canal stenosis. No right neural foraminal stenosis. No left neural foraminal stenosis.      Impression    CONCLUSION:  1.  Minimal lumbar spondylosis. No canal or foraminal narrowing.    2.  No abnormal enhancement within the spine.    3.  Mild diffuse retroperitoneal stranding is partially evaluated. Consider abdominal imaging.   CT Abdomen Pelvis w Contrast     Value    Radiologist flags Lymphadenopathy    Narrative    EXAMINATION: CT ABDOMEN PELVIS W CONTRAST, 1/23/2023 7:18 AM    INDICATION: Retroperitoneal stranding seen on MRI.  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    COMPARISON STUDY: MR abdomen 6/24/2020, MR L-spine 1/23/2023    TECHNIQUE: CT scan of the abdomen and pelvis was performed on  multidetector CT scanner using volumetric acquisition technique and  images were reconstructed in multiple planes with variable thickness  and reviewed on dedicated workstations.     CONTRAST: iopamidol (ISOVUE-370) solution 66 mL injected IV with oral  contrast    CT scan radiation dose is optimized to minimum requisite dose using  automated dose modulation techniques.    FINDINGS:    Lower thorax: Mild bibasilar atelectasis    Liver: No mass. No intrahepatic biliary ductal dilation.    Biliary System: Distended gallbladder similar to prior MRI exam from  2020.  Trace pericholecystic  fluid.  No abnormal gallbladder wall  thickening.  No extrahepatic biliary ductal dilation.    Pancreas: No mass or pancreatic ductal dilation.    Adrenal glands: No mass or nodules    Spleen: Enlarged measuring up to 13.5 cm.    Kidneys: No suspicious mass, obstructing calculus or hydronephrosis.   4 mm left midpole nonobstructing calculus.    Gastrointestinal tract:  Appendix is not definitively visualized.  No  abnormal secondary signs to suggest acute appendicitis.  Normal  caliber small and large bowel.    Mesentery/peritoneum/retroperitoneum: No air.    Lymph nodes: Numerous pelvic, retroperitoneal, mesenteric, and portal  caval enlarged lymph nodes. There is a 1.7 x 2.3 cm splenic lymph node  is seen on series 3 image 46. There is a 2.3 x 1.5 cystic node is seen  on series 3 image 70. There is a precaval node measuring up to 11 mm  in the short axis is seen on series 3 image 82, there is a left para  aortic node measuring up to 13 mm in short axis seen on series 3 image  82.    Vasculature: Patent major abdominal vasculature.    Pelvis: High density material within the bladder favored to represent  gadolinium contrast from prior study.  Small amount of pelvic free  fluid, likely physiologic.  Nonspecific presacral fat stranding.   Uterus and adnexa within normal limits.    Osseous structures: Bilateral femoral head avascular necrosis without  subchondral collapse.  Bilateral sacral iliac joint bony erosion  changes noted as well.      Soft tissues: Within normal limits.      Impression    IMPRESSION:   1. Numerous enlarged pelvic, retroperitoneal, mesenteric, splenic, and  portal caval lymph nodes and mild retroperitoneal stranding which are  nonspecific but may represent sequela of infection/inflammation or  underlying clinical history of known systemic lupus erythematosus.   Clinical follow-up is advised to guide further management.  Short  interval 3-6 month contrast enhanced CT could be considered  for  follow-up.  2. Mild splenomegaly  3. 4 mm nonobstructing left renal stone.  4. Incidental bilateral femoral head avascular necrosis without  subchondral collapse.  Bilateral sacral iliac joint bony erosion  changes noted as well.  5. Distended bladder with heterogeneous material, favor excreted  contrast from MRI exam earlier on same date.  5. Large gallbladder with trace pericholecystic fluid.  No abnormal  gallbladder wall thickening.    [Recommend Follow Up: Lymphadenopathy]    This report will be copied to the [facility] to ensure a  provider acknowledges the finding. Access Center is available Monday  through Friday 8am-3:30 pm.    I have personally reviewed the examination and initial interpretation  and I agree with the findings.    WHIT IVEY MD         SYSTEM ID:  [id number]   XR Knee Left 1/2 Views    Narrative    EXAM: XR KNEE LEFT 1/2 VIEWS  LOCATION: [facility]  DATE/TIME: 1/23/2023 7:17 PM    INDICATION: Pt with left calf and knee area pain, r o arthritis component  COMPARISON: 12/14/2022      Impression    IMPRESSION: Normal joint spaces and alignment. No fracture or definitive joint effusion. Mild prepatellar soft tissue reticulation/edema.   MR Hip Bilateral    Narrative    EXAM: MR HIP BILATERAL W/O CONTRAST  LOCATION: [facility]  DATE/TIME: 1/25/2023 8:55 PM    INDICATION: Low back and pelvic pain.  COMPARISON: None.  TECHNIQUE: Unenhanced.    FINDINGS:    Severe edema throughout the musculature of the hips and pelvis (including the iliopsoas, gluteal, adductor, and proximal quadriceps). There is also moderate soft tissue edema within the low abdomen and pelvis, and in the subcutaneous tissues is well,   suggesting that fluid may be related to volume overload/edema.    Tendons are intact without tearing.    Heterogeneous signal within the bone marrow of the pelvis and hips, is nonspecific,  but favored to be related to underlying hematopoietic red marrow. There are tiny chronic bone infarcts in the bilateral femoral heads; there is no bone marrow edema to   indicate acute osteonecrosis.     No acute fracture.    The hip and SI joints are normally aligned. Normal joint fluid without acute arthritis.    Intrapelvic viscera are within normal limits.      Impression    IMPRESSION:  1.  Marked edema throughout the musculature of the hips and pelvis. The appearance suggests a myositis, recommend correlation with creatinine kinase (CK) levels. The other differential consideration would be that this is due to severe transudative   lymphedema, as there is edema also present within the subcutaneous tissues and in the intra-abdominal spaces.    2.  No evidence of acute myotendinous or other structural injury. No muscle or tendon tear. No fracture or contusion.    3.  No acute abnormality in the bones or bone marrow. Bone marrow signal is diffusely heterogeneous, probably related to red marrow/hematopoietic marrow. There are tiny there are tiny bone infarcts in the femoral heads, bilaterally, which appear chronic   and without associated bone marrow edema.   Echo Complete     Value    LVEF  40-45% (mildly reduced)    [facility]    [id number]  [id number]  [id number]  [id number]^JENIFER^EL^     [facility],[place]  Echocardiography Laboratory  [address] [place] [address]     Name: MIKE JOSE  MRN: [id number]  : 2000  Study Date: 2023 07:09 AM  Age: 22 yrs  Gender: Female  Patient Location: [facility]  Reason For Study: Acute Myocarditis  Ordering Physician: EL BURGESS  Performed By: Bonnie Colon WANG     BSA: 1.5 m2  Height: 61 in  Weight: 108 lb  HR: 68  BP: 86/53 mmHg  ______________________________________________________________________________  Procedure  Complete Portable Echo Adult. Contrast Optison. Echocardiogram with two-  dimensional, color and spectral  Doppler performed. Optison (NDC #9744-1969-78)  given intravenously. Patient was given 5 ml mixture of 3 ml Optison and 6 ml  saline. 4 ml wasted.  ______________________________________________________________________________  Interpretation Summary  Left ventricular function is decreased. The ejection fraction is 40-45%  (mildly reduced).  Global right ventricular function is borderline reduced.  No significant valvular abnormalities present.  Pulmonary artery systolic pressure is normal.  The inferior vena cava is normal.  Trivial pericardial effusion is present.     ______________________________________________________________________________  Left Ventricle  Left ventricular size is normal. Left ventricular wall thickness is normal.  Left ventricular function is decreased. The ejection fraction is 40-45%  (mildly reduced). Global peak LV longitudinal strain is averaged at -15%. This  suggests abnormal strain (normal <-18%). There is no apical thrombus evident.     Right Ventricle  The right ventricle is normal size. Global right ventricular function is  borderline reduced.     Atria  The right atria appears normal. Mild left atrial enlargement is present.     Mitral Valve  The mitral valve is normal. Trace mitral insufficiency is present.     Aortic Valve  Aortic valve is normal in structure and function.     Tricuspid Valve  The tricuspid valve is normal. Trace to mild tricuspid insufficiency is  present. The right ventricular systolic pressure is approximated at 15.8 mmHg  plus the right atrial pressure. Pulmonary artery systolic pressure is normal.     Pulmonic Valve  Trace to mild pulmonic insufficiency is present.     Vessels  The inferior vena cava is normal. The aorta root is normal. Sinuses of  Valsalva 3.6 cm. Ascending aorta 3.2 cm. Estimated mean right atrial pressure  is normal.     Pericardium  Trivial pericardial effusion is present.     Miscellaneous  No significant valvular abnormalities  present.     Compared to Previous Study  This study was compared with the study from 19 . LV function is reduced.  ______________________________________________________________________________  MMode/2D Measurements & Calculations  RVDd: 4.0 cm  IVSd: 1.0 cm  LVIDd: 5.5 cm  LVIDs: 4.1 cm  LVPWd: 0.92 cm  FS: 25.5 %  LV mass(C)d: 201.4 grams  LV mass(C)dI: 138.5 grams/m2  Ao root diam: 3.6 cm  asc Aorta Diam: 3.2 cm  LVOT diam: 2.4 cm  LVOT area: 4.4 cm2     EF(MOD-bp): 41.1 %  LA Volume (BP): 53.1 ml     LA Volume Index (BP): 36.6 ml/m2  RWT: 0.34     Doppler Measurements & Calculations  MV E max herminia: 65.7 cm/sec  MV A max herminia: 55.7 cm/sec  MV E/A: 1.2  MV dec slope: 316.2 cm/sec2  MV dec time: 0.21 sec  LV V1 max PG: 3.8 mmHg  LV V1 max: 96.9 cm/sec  LV V1 VTI: 20.8 cm  SV(LVOT): 91.0 ml  SI(LVOT): 62.6 ml/m2  TR max herminia: 198.9 cm/sec  TR max PG: 15.8 mmHg  E/E' av.3  Lateral E/e': 6.0  Medial E/e': 8.6     ______________________________________________________________________________  Report approved by: Maureen Do 2023 09:05 AM               Discharge Medications   Discharge Medication List as of 2023  1:03 PM      START taking these medications    Details   diclofenac (VOLTAREN) 1 % topical gel Apply 2 g topically 4 times daily as needed for moderate pain (4-6), Disp-250 g, R-1, E-PrescribeFor right calf pain      gabapentin (NEURONTIN) 100 MG capsule Take 2 capsules (200 mg) by mouth every 8 hours, Disp-90 capsule, R-0, E-Prescribe      naproxen (NAPROSYN) 250 MG tablet Take 1 tablet (250 mg) by mouth 2 times daily as needed for moderate pain (4-6), Disp-60 tablet, R-0, E-Prescribe      tiZANidine (ZANAFLEX) 4 MG tablet Take 1 tablet (4 mg) by mouth every 8 hours, Disp-90 tablet, R-0, E-Prescribe         CONTINUE these medications which have CHANGED    Details   mycophenolate (GENERIC EQUIVALENT) 500 MG tablet Take 3 tablets (1,500 mg) by mouth 2 times daily, Disp-180 tablet, R-0,  E-PrescribeDon't start taking until you see Rheumatology      predniSONE (DELTASONE) 20 MG tablet Take 1 tablet (20 mg) by mouth daily, Disp-8 tablet, R-0, E-PrescribeTake 20mg daily until your next Rheumatology appointment         CONTINUE these medications which have NOT CHANGED    Details   acetaminophen (TYLENOL) 500 MG tablet Take 1 tablet (500 mg) by mouth every 4 hours as needed for mild pain, Disp-30 tablet, R-1, E-PrescribePlease fill here      Belimumab 200 MG/ML SOAJ Inject 200 mg Subcutaneous every 7 days, Disp-4 mL, R-11, E-Prescribe      calcium carbonate (TUMS) 500 MG chewable tablet Take 1 tablet (500 mg) by mouth daily, Disp-30 tablet, R-1, E-PrescribePlease fill here      ferrous sulfate (FEROSUL) 325 (65 Fe) MG tablet Take 1 tablet (325 mg) by mouth daily (with breakfast), Disp-100 tablet, R-3, E-PrescribePlease fill here      hydroxychloroquine (PLAQUENIL) 200 MG tablet Take 1 tablet (200 mg) by mouth daily, Disp-30 tablet, R-1, E-Prescribe      multivitamin, therapeutic (THERA-VIT) TABS tablet Take 1 tablet by mouth daily, Disp-30 tablet, R-11, E-Prescribe      sulfamethoxazole-trimethoprim (BACTRIM DS) 800-160 MG tablet Take 1 tablet by mouth Every Mon, Wed, Fri Morning, Disp-30 tablet, R-1, E-Prescribe      vitamin D3 (CHOLECALCIFEROL) 1.25 MG (56390 UT) capsule Take 1 capsule (50,000 Units) by mouth every 7 days, Disp-12 capsule, R-0, E-PrescribePlease fillhere           Allergies   Allergies   Allergen Reactions     Rituximab Anaphylaxis

## 2023-01-30 NOTE — PLAN OF CARE
Physical Therapy Discharge Summary    Reason for therapy discharge:    Discharged to home with outpatient therapy.    Progress towards therapy goal(s). See goals on Care Plan in [facility] electronic health record for goal details.  Goals partially met.  Barriers to achieving goals:   discharge from facility.    Therapy recommendation(s):    Continued therapy is recommended.  Rationale/Recommendations:  to address MSK pain.

## 2023-01-31 ENCOUNTER — PATIENT OUTREACH (OUTPATIENT)
Dept: CARE COORDINATION | Facility: CLINIC | Age: 23
End: 2023-01-31

## 2023-01-31 NOTE — PROGRESS NOTES
[facility] Contact  [facility]/Voicemail     Clinical Data: Transitional Care Management Outreach     Outreach attempted x 2.  Left message on patient's voicemail, providing [facility]'s 24/7 scheduling and nurse triage phone number [phone] ([phone]) for questions/concerns and/or to schedule an appt with an [facility] provider, if they do not have a PCP.      Plan:  [facility] will do no further outreaches at this time.     CLARISSE Ramos  [phone]  [facility]    *Connected Care Resource Team does NOT follow patient ongoing. Referrals are identified based on internal discharge reports and the outreach is to ensure patient has an understanding of their discharge instructions.

## 2023-02-01 ENCOUNTER — TELEPHONE (OUTPATIENT)
Dept: NEPHROLOGY | Facility: CLINIC | Age: 23
End: 2023-02-01

## 2023-02-01 ENCOUNTER — PRE VISIT (OUTPATIENT)
Dept: RHEUMATOLOGY | Facility: CLINIC | Age: 23
End: 2023-02-01

## 2023-02-01 NOTE — TELEPHONE ENCOUNTER
Called patient to assess if she was going to make her appt this morning with Dr. Diaz or not.  Patient confirmed that she has moved to [place].  Clarified that patient will be establishing care for her disease management.  Encouraged her to update us or send MyChart message to HIM to coordinate records or warm handoff of complex care needs.  Stressed importance to connect early to specialist to ensure her medications can be refilled by local providers.  Patient verbalized understanding.  Updated Dr. Diaz and Dr. Purvis with patient status.    Oralia Rodriguez RN, BSN, PHN  Vasculitis & Lupus Program Nurse Navigator  [phone]

## 2023-02-06 LAB
BASOPHILS # BLD MANUAL: 0 10E3/UL (ref 0–0.2)
BASOPHILS NFR BLD MANUAL: 1 %
BURR CELLS BLD QL SMEAR: SLIGHT
ELLIPTOCYTES BLD QL SMEAR: SLIGHT
EOSINOPHIL # BLD MANUAL: 0 10E3/UL (ref 0–0.7)
EOSINOPHIL NFR BLD MANUAL: 0 %
LYMPHOCYTES # BLD MANUAL: 0.9 10E3/UL (ref 0.8–5.3)
LYMPHOCYTES NFR BLD MANUAL: 20 %
MONOCYTES # BLD MANUAL: 0.2 10E3/UL (ref 0–1.3)
MONOCYTES NFR BLD MANUAL: 5 %
NEUTROPHILS # BLD MANUAL: 3 10E3/UL (ref 1.6–8.3)
NEUTROPHILS NFR BLD MANUAL: 64 %
OTHER CELLS # BLD MANUAL: 0.5 10E3/UL
OTHER CELLS NFR BLD MANUAL: 10 %
PATH REV: ABNORMAL
PLAT MORPH BLD: ABNORMAL
RBC MORPH BLD: ABNORMAL

## 2023-02-20 LAB
EJ AB SER QL: NEGATIVE
ENA JO1 AB SER IA-ACNC: <20 UNITS
ENA PM/SCL AB SER-ACNC: <20 UNITS
ENA SS-A 52KD IGG SER IA-ACNC: <20 UNITS
FIBRILLARIN AB SER QL: ABNORMAL
KU AB SER QL: NEGATIVE
MDA5 AB SER LINE BLOT-ACNC: <20 UNITS
MI2 AB SER QL: NEGATIVE
MJ AB SER LINE BLOT-ACNC: <20 UNITS
OJ AB SER QL: NEGATIVE
PL12 AB SER QL: NEGATIVE
PL7 AB SER QL: NEGATIVE
SAE1 IGG SER QL LINE BLOT: <20 UNITS
SRP AB SERPL QL: NEGATIVE
TIF1-GAMMA AB SER LINE BLOT-ACNC: <20 UNITS
U1 SNRNP AB SER IA-ACNC: 53 UNITS
U2 SNRNP AB SER QL: ABNORMAL

## 2023-06-09 NOTE — TELEPHONE ENCOUNTER
Patient overdue for follow up with Dr. Anders. Called and left message requesting either call back or [employer] message to update us on how she is doing and to schedule a follow up visit.   
Detail Level: Detailed

## 2023-06-13 NOTE — TELEPHONE ENCOUNTER
Attempted to call x 3 to discuss her symptoms. Voice message left.    Jaden hernandez,   Rheum fellow

## 2023-06-21 NOTE — PROGRESS NOTES
Milly is a 22 year old who is being evaluated via a billable video visit.      How would you like to obtain your AVS? MyChart  If the video visit is dropped, the invitation should be resent by: Text to cell phone: [phone]  Will anyone else be joining your video visit? No          Assessment & Plan     Hx of systemic lupus erythematosus (SLE) (H)  Has poor compliance of SLT care, currently scheduling process with rheumatology, pt has been admitting to hospital and ED visit multiple times associated with SLE worsening and complication   Encouraged her to continue monitoring with her PCP at [facility](Estefany Tang MD)  Otherwise, her current status is stable and fortunately improving    Thrombocytopenia, unspecified (H)  Has been stable, her Hgb is low due to poorly controlled SLE and nephropathy, will have her to work on intake, her med list won't be changed til being seen by specialist and her PCP    Splenomegaly      CHRISTA (acute kidney injury) (H)  Was admitted at the hospital due to worsening SLE/vo,conchita diarrhea with CHRISTA, sx has been improving, is supposed to see nephrology, she already scheduled   Encouraged her to see them as scheduled     Abdominal pain, generalized  mentioned above            MED REC REQUIRED  Post Medication Reconciliation Status: discharge medications reconciled, continue medications without change  FUTURE APPOINTMENTS:       - Follow-up visit with pcp in 2-3 months for routine exam for her general health status     Louis Delgado MD  [facility]   Milly is a 22 year old, presenting for the following health issues:  Follow Up (ED )        6/21/2023     1:04 PM   Additional Questions   Roomed by Gali OH     ED/UC Followup:    Facility:  [facility]   Date of visit: 6/14/23  Reason for visit: Abdominal Pain     Back Pain     Vomiting     Current Status: Pt states she is feeling better than she was before          Review of Systems   Constitutional, HEENT, cardiovascular, pulmonary, GI, , musculoskeletal, neuro, skin, endocrine and psych systems are negative, except as otherwise noted.      Objective           Vitals:  No vitals were obtained today due to virtual visit.    Physical Exam   GENERAL: Healthy, alert and no distress  EYES: Eyes grossly normal to inspection.  No discharge or erythema, or obvious scleral/conjunctival abnormalities.  RESP: No audible wheeze, cough, or visible cyanosis.  No visible retractions or increased work of breathing.    SKIN: Visible skin clear. No significant rash, abnormal pigmentation or lesions.  NEURO: Cranial nerves grossly intact.  Mentation and speech appropriate for age.  PSYCH: Mentation appears normal, affect normal/bright, judgement and insight intact, normal speech and appearance well-groomed.                Video-Visit Details  c  Type of service:  Video Visit   Video Start Time: 1:40  Video End Time: 2:25 PM    Originating Location (pt. Location): Home    Distant Location (provider location):  On-site  Platform used for Video Visit: Bernie

## 2023-06-22 NOTE — TELEPHONE ENCOUNTER
Unable to reach patient to schedule visit for tomorrow.    Reena Hinton, BSN, RN  RN Care Coordinator Rheumatology

## 2023-06-22 NOTE — TELEPHONE ENCOUNTER
Message left for patient to call this RN back directly.  Dr. Blank had a cancellation in her schedule tomorrow and would like to see her at 0800 in [facility].    MANISH Cordoba, RN  RN Care Coordinator Rheumatology

## 2023-06-25 NOTE — PROGRESS NOTES
Zabrina was getting her first infusion of belimumab today.  Of note she developed anaphylactic shock during a rituximab infusion several weeks ago.      I was paged at 9:52AM because she was starting to feel bad, had dyspnea, and was not mentating clearly.  This occurred after she had received ~66 mL of the planned ~280 mL infusion over 15 minutes.  The infusion was stopped, she was treated with albutrerol and intravenous fluids. Our fellow, Dr. Roberts, assessed Zabrina, as I was unavailable.      At noon, I visited Zabrina and spoke with nursing staff.  Zabrina was tired from having received Benadryl, but was breathing easily and hemodynamically stable.  SaO2 100% on room air. She had no noticeable edema.  Her lungs were clear.  Her heart rate was normal and rhythm regular.    After discussion with nursing staff, we decided to restart the belimumab at 25% of the original rate.  Staff will monitor her closely for re-emergence of any similar symptoms.  If that occurs, we will discontinue the infusion and not restart it.    Jonh Anders MD, PhD  , Pediatric Rheumatology  Pager      Never smoker

## 2023-06-28 NOTE — TELEPHONE ENCOUNTER
ANA and edilia sent for the patient to call back and schedule the following:    Appointment type: Return 60 minutes  Provider: One of the fellows  Return date: 1st available  Specialty phone number: [phone]  Additional appointment(s) needed:   Additonal Notes:

## 2023-07-10 NOTE — LETTER
7/10/2023       RE: Milly Carroll  [address] [place] [id number]     Dear Colleague,    Thank you for referring your patient, Milly Carroll, to the [facility] [place] [facility]. Please see a copy of my visit note below.    Milly is a 22 year old who is being evaluated via a billable video visit.      How would you like to obtain your AVS? MyChart  If the video visit is dropped, the invitation should be resent by: Text to cell phone: [phone]  Will anyone else be joining your video visit? No        Video-Visit Details    Type of service:  Video Visit   Video Start Time:11.34 am  Video End Time:11.40 am    Originating Location (pt. Location): Home    Distant Location (provider location):  On-site  Platform used for Video Visit: [facility]       Nephrology clinic follow up  7/10/23    Milly Carroll MRN:[id number] YOB: 2000  Date of Admission:(Not on file)  Primary care provider: Estefany Bell  Requesting physician: April Purvis, *      REASON FOR CONSULT:       HISTORY OF PRESENT ILLNESS:    Milly was initially diagnosed with lupus when she was 7 years old. 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 She has had varying course of disease since her diagnosis. She has most recently being treated with Cellcept 1500 mg BID, Plaquenil 200 mg BID and prednisone 5 mg daily. She was on Benlysta infusion in 2019 that was later switched to Benlysta subcutaneous in 2021. She is not receiving Benlysta for last many months. She was treated with Rituximab since 2013 but she developed anaphylactic reaction during Rituximab infusion in 12/2019.    She has had persistent hematuria and proteinuria since 2021 however a lot of these samples  were contaminated. She has had on and off documented proteinuria which is 0.3-0.7 g/g since 2013. She has always maintained her creatinine at baseline of 0.5-0.6 mg/dl however during most recent admission in 6/22, she had an elevation in her creatinine to 0.9 mg/dl. At the time, she was treated with IV solumedrol 1 gm x 3 and discharged home on prednisone 60 mg daily. Her cellcept was decreased down to 1000 mg BID during that admission for concerns of toxicity.   Today she denies any particular symptoms but continues to have persistently elevated dsDNA and significantly low complements.     Patient denies any LE edema, exertional dyspnea/orthopnea/PND, dizziness, lightheadedness, nausea, vomiting or diarrhea.   Denies use of OTC NSAIDS or other non prescribed meds, recent exposure to abx or contrast, obstructive urinary symptoms, skin rashes, joint pains, fevers, passing kidney stones, recurrent sinus infections or UTI's       PAST MEDICAL HISTORY:  Reviewed with patient on 07/10/2023   As per HPI    MEDICATIONS:  Reviewed with the patient in detail    ALLERGIES:    Reviewed with the patient in detail    REVIEW OF SYSTEMS:  A comprehensive of systems was negative except as noted above.    SOCIAL HISTORY:   Reviewed with patient, no smoking and no alcohol use     FAMILY MEDICAL HISTORY:   Reviewed, no family history of need for dialysis, transplant or CKD    PHYSICAL EXAM:   Vital signs:BP (!) 165/123   Pulse 104   Temp 98.6  F (37  C) (Oral)   Wt 48.4 kg (106 lb 11.2 oz)   LMP 05/29/2023 (Approximate)   SpO2 100%   BMI 19.84 kg/m      Physical Exam       Gen: Appears well  Neck: No JVD  Lungs: CTA  CVS: S1S2 normal, no murmurs heard  LE: no edema  Skin: no rashes  CNS: Grossly normal     Interval history: Since we last spoke, she did have a kidney bx which went well. Unfortunately, it was hard to get in touch with her after that. Currently she reports of taking her cellcept 1500 mg BID as prescribed. She was  admitted to the hospital 12/22 with cytopenias and undetectable level of MMF, concern for lupus flare and medication non adherence. Her dsDNA was >1000 and she was started back on cellcept 1500 mg BID along with a prednisone taper. Her creatinine peaked at 0.99 mg/dl from baseline of ~ 0.6 and her urine protein was at 2.26 g/g along with an active urinary sediment. She reports she is doing well after the hospitalization and she denies any symptoms at this time. Her aches and pains are better and she only has back pain at this time. She remains on prednisone taper.     Interval history 7/10/23: Since I last saw her, patient was in [place]. Moved back to [place] this summer and was out of her immunosuppression for atleast a few months. She presented to the ED at [facility] with vomiting and diarrhea and then transferred to [facility] for rheumatology consultation She was also diagnosed with C diff infection at the time.She was also thought to be in lupus flare and had an CHRISTA with creatinine of 1.58. UA with hematuria and proteinuria, UPCR of 2.7 g/g.She did have a C3 of 23 and C4 of 6 with dsDNA at 1446.    She was started on solumedrol 60 mg daily x 2 days and was discharged from the hospital with prednisone taper 40 mg daily for 1 week then 30 mg daily for 1 week then 20 mg daily for 1 week then 10 mg daily. Today,she tells me that she is on 0.5 mg of prednisone.  She was started back on  mg daily, and  mg BID       ASSESSMENT AND RECOMMENDATIONS:     # Lupus nephritis - class III  # SLE (+DsDNA, +RNP, +ribosomal P ab)  # h/o lupus cerebritis, seizures   # h/o rt knee osteonecrosis   # b/l AVN of hips b/l    Her hematuria and proteinuria along with elevated creatinine during her first presentation in 6/22 is an indication of active LN. She never had a kidney biopsy and was on Cellcept 1500 mg BID prior to this admission.   I did perform a kidney bx which showed class III lupus nephritis with minimal  activity, 1/10 gloms showing endocapillary proliferation while others have mesangial hypercellularity. There was some concern of her being adherent to cellcept. It was difficult to get in touch with her since until she had another admission 12/22 with a lupus flare and undetectable cellcept levels with peak in creatinine to 0.99 and UPCR increased to 2.2 g.g. She was restarted on cellcept 1500 mg BID and prednisone taper.    Unfortunately, she was lost to follow up again when she moved to [place] and was again stopped taking IS for a few months atleast now when she presented with C diff and lupus flare. Creatinine peaked to 1.58 which has now improved to 0.86 g/g still higher than her baseline of 0.5-0.6 mg/dl.Has a UPCR of 13 g/g. This is likely exaggerated by her hypertension however she does have an active urinary sediment. She has almost completed her prednisone taper and remains on 0.5 mg of prednisone daily. She reports of being adherent to her MMF of 500 mg BID. Her complements remain low.   Her blood pressures are significantly high and she has been on lisinopril 5 mg daily, which I am not entirely sure if she is taking. She is euvolemic on exam.     Her current picture is suggestive of active lupus flare while on  mg BID and after prednisone taper. 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 For now, I increased her cellcept to 1500 mg today however, I recommend doing a kidney bx at this time. If she has persistent significant activity, will strongly consider adding Obinutuzumab or switch her to Eurolupus cytoxan regimen   Will try to minimize exposure to steroids since she has AVN of b/l hips    --continue  mg daily  --Increase cellcept to 1500 mg BID   --increase lisinopril to10 mg daily   --will arrange for iron infusions given her iron deficiency anemia  --arrange for kidney bx ASAP  --will arrange for follow up with rheumatology     April Purvis MD        Again,  thank you for allowing me to participate in the care of your patient.      Sincerely,    April Purvis MD

## 2023-07-10 NOTE — PROGRESS NOTES
Milly is a 22 year old who is being evaluated via a billable video visit.      How would you like to obtain your AVS? MyChart  If the video visit is dropped, the invitation should be resent by: Text to cell phone: [phone]  Will anyone else be joining your video visit? No        Video-Visit Details    Type of service:  Video Visit   Video Start Time:11.34 am  Video End Time:11.40 am    Originating Location (pt. Location): Home    Distant Location (provider location):  On-site  Platform used for Video Visit: [facility]       Nephrology clinic follow up  7/10/23    Milly Carroll MRN:[id number] YOB: 2000  Date of Admission:(Not on file)  Primary care provider: Estefany Bell  Requesting physician: April Purvis, *      REASON FOR CONSULT:       HISTORY OF PRESENT ILLNESS:    Milly was initially diagnosed with lupus when she was 7 years old. 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 She has had varying course of disease since her diagnosis. She has most recently being treated with Cellcept 1500 mg BID, Plaquenil 200 mg BID and prednisone 5 mg daily. She was on Benlysta infusion in 2019 that was later switched to Benlysta subcutaneous in 2021. She is not receiving Benlysta for last many months. She was treated with Rituximab since 2013 but she developed anaphylactic reaction during Rituximab infusion in 12/2019.    She has had persistent hematuria and proteinuria since 2021 however a lot of these samples were contaminated. She has had on and off documented proteinuria which is 0.3-0.7 g/g since 2013. She has always maintained her creatinine at baseline of 0.5-0.6 mg/dl however during most recent admission in 6/22, she had an elevation in her creatinine to 0.9 mg/dl. At the time, she was treated with IV  solumedrol 1 gm x 3 and discharged home on prednisone 60 mg daily. Her cellcept was decreased down to 1000 mg BID during that admission for concerns of toxicity.   Today she denies any particular symptoms but continues to have persistently elevated dsDNA and significantly low complements.     Patient denies any LE edema, exertional dyspnea/orthopnea/PND, dizziness, lightheadedness, nausea, vomiting or diarrhea.   Denies use of OTC NSAIDS or other non prescribed meds, recent exposure to abx or contrast, obstructive urinary symptoms, skin rashes, joint pains, fevers, passing kidney stones, recurrent sinus infections or UTI's       PAST MEDICAL HISTORY:  Reviewed with patient on 07/10/2023   As per HPI    MEDICATIONS:  Reviewed with the patient in detail    ALLERGIES:    Reviewed with the patient in detail    REVIEW OF SYSTEMS:  A comprehensive of systems was negative except as noted above.    SOCIAL HISTORY:   Reviewed with patient, no smoking and no alcohol use     FAMILY MEDICAL HISTORY:   Reviewed, no family history of need for dialysis, transplant or CKD    PHYSICAL EXAM:   Vital signs:BP (!) 165/123   Pulse 104   Temp 98.6  F (37  C) (Oral)   Wt 48.4 kg (106 lb 11.2 oz)   LMP 05/29/2023 (Approximate)   SpO2 100%   BMI 19.84 kg/m      Physical Exam       Gen: Appears well  Neck: No JVD  Lungs: CTA  CVS: S1S2 normal, no murmurs heard  LE: no edema  Skin: no rashes  CNS: Grossly normal     Interval history: Since we last spoke, she did have a kidney bx which went well. Unfortunately, it was hard to get in touch with her after that. Currently she reports of taking her cellcept 1500 mg BID as prescribed. She was admitted to the hospital 12/22 with cytopenias and undetectable level of MMF, concern for lupus flare and medication non adherence. Her dsDNA was >1000 and she was started back on cellcept 1500 mg BID along with a prednisone taper. Her creatinine peaked at 0.99 mg/dl from baseline of ~ 0.6 and her urine  protein was at 2.26 g/g along with an active urinary sediment. She reports she is doing well after the hospitalization and she denies any symptoms at this time. Her aches and pains are better and she only has back pain at this time. She remains on prednisone taper.     Interval history 7/10/23: Since I last saw her, patient was in [place]. Moved back to [place] this summer and was out of her immunosuppression for atleast a few months. She presented to the ED at [facility] with vomiting and diarrhea and then transferred to [facility] for rheumatology consultation She was also diagnosed with C diff infection at the time.She was also thought to be in lupus flare and had an CHRISTA with creatinine of 1.58. UA with hematuria and proteinuria, UPCR of 2.7 g/g.She did have a C3 of 23 and C4 of 6 with dsDNA at 1446.    She was started on solumedrol 60 mg daily x 2 days and was discharged from the hospital with prednisone taper 40 mg daily for 1 week then 30 mg daily for 1 week then 20 mg daily for 1 week then 10 mg daily. Today,she tells me that she is on 0.5 mg of prednisone.  She was started back on  mg daily, and  mg BID       ASSESSMENT AND RECOMMENDATIONS:     # Lupus nephritis - class III  # SLE (+DsDNA, +RNP, +ribosomal P ab)  # h/o lupus cerebritis, seizures   # h/o rt knee osteonecrosis   # b/l AVN of hips b/l    Her hematuria and proteinuria along with elevated creatinine during her first presentation in 6/22 is an indication of active LN. She never had a kidney biopsy and was on Cellcept 1500 mg BID prior to this admission.   I did perform a kidney bx which showed class III lupus nephritis with minimal activity, 1/10 gloms showing endocapillary proliferation while others have mesangial hypercellularity. There was some concern of her being adherent to cellcept. It was difficult to get in touch with her since until she had another admission 12/22 with a lupus flare and undetectable cellcept levels with  peak in creatinine to 0.99 and UPCR increased to 2.2 g.g. She was restarted on cellcept 1500 mg BID and prednisone taper.    Unfortunately, she was lost to follow up again when she moved to [place] and was again stopped taking IS for a few months atleast now when she presented with C diff and lupus flare. Creatinine peaked to 1.58 which has now improved to 0.86 g/g still higher than her baseline of 0.5-0.6 mg/dl.Has a UPCR of 13 g/g. This is likely exaggerated by her hypertension however she does have an active urinary sediment. She has almost completed her prednisone taper and remains on 0.5 mg of prednisone daily. She reports of being adherent to her MMF of 500 mg BID. Her complements remain low.   Her blood pressures are significantly high and she has been on lisinopril 5 mg daily, which I am not entirely sure if she is taking. She is euvolemic on exam.     Her current picture is suggestive of active lupus flare while on  mg BID and after prednisone taper. 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 For now, I increased her cellcept to 1500 mg today however, I recommend doing a kidney bx at this time. If she has persistent significant activity, will strongly consider adding Obinutuzumab or switch her to Eurolupus cytoxan regimen   Will try to minimize exposure to steroids since she has AVN of b/l hips    --continue  mg daily  --Increase cellcept to 1500 mg BID   --increase lisinopril to10 mg daily   --will arrange for iron infusions given her iron deficiency anemia  --arrange for kidney bx ASAP  --will arrange for follow up with rheumatology     April Purvis MD

## 2023-07-10 NOTE — PATIENT INSTRUCTIONS
--Increase cellcept to 1500 mg BID ( take 3 tablets two times daily)  --start taking lisinopril 10 mg daily   --please stop by at the lab to get blood work done.

## 2023-07-11 NOTE — TELEPHONE ENCOUNTER
MTM referral from: [facility] visit (referral by provider)    MTM referral outreach attempt #2 on July 11, 2023 at 11:07 AM      Outcome: Patient not reachable after several attempts, will route to MTM Pharmacist/Provider as an FYI.  [facility] scheduling number is [phone].  Thank you for the referral.    Tara Quesada CPhT  MT

## 2023-07-17 NOTE — TELEPHONE ENCOUNTER
----- Message from FREDY Carmona CNP sent at 7/17/2023  6:44 AM CDT -----  Schedulers,     Order placed for random kidney biopsy, please update referring team when this is scheduled,  '  Thanks Jaylene       ----- Message -----  From: Kristina Lucero LPN  Sent: 7/14/2023   3:36 PM CDT  To: Kristina Lucero LPN; #    Hi    Here is another patient we referred for kidney biopsy.  Thanks  Kristina

## 2023-07-26 NOTE — PROGRESS NOTES
"[facility] Outpatient Rheumatology Visit  Date of service: July 26, 2023    Patient name: Milly Carroll  YOB: 2000  MRN: [id number]    HPI:    Milly is a 22yoF with history outlined as below who is presenting to [facility] rheumatology clinic to get re-established.    Pt states she's not sure why she is here but that she was scheduled to be here by her nephrologist so she showed up. Since her last visit in December she feels like things have \"been going good,\" in respect to her mobility and moving around but does get tired/fatigued easily. States appetite is good and having no fevers. She moved to [place] to live with her brother so that she could see other doctors for the management of her kidneys and get another opinion. She did not however see a nephrologist. She was babysitting her one year old nephew most of the time while she was there. She moved back here at the end of May as she could not find insurance there. She currently lives with her sister close to [place]. States things have been good despite her recent hospitalization for CHRISTA and C. Diff infection - stating n/v and diarrhea have improved. States she is eating 3 meals a day, no weight loss and she denies any food or housing insecurity. She cannot remember much about her visit with nephrology other than she was told to increase cellcept and her lisinopril.    Regarding medication non-adherence, states the reasons why she hasn't taken her medications are due to others' abilities to  the medication in a timely manner. She does not have a car and has difficulty picking the meds up on her own. She relies on her mother to get her medications but states that sometimes it takes her mom up to two weeks before she picks them up and by that time she will start to have a flare. While in [place] she states her mother felt it would be too difficult to mail her medications to her and thus she was not on any medications which was " between February to the end of May.    Currently she is taking mycophenolate 1500 mg twice daily, prednisone 0.5 mg daily, hydroxychloroquine 200mg daily. She has not taken belimumab since the end of 2020 because she struggled to call it in as it is a mailed medication. Her father was the one to usually call it in for her but after his untimely passing away she has not been able to find his list of phone numbers so that she can call the pharmacy and get her belimumab.    States she had blood clots in her legs when she was in the [facility]. Had APL testing done in the past which was negative.    Rheumatology review of systems: No new or persistent headache.  No new hair loss.  No change in vision or hearing.  No inflammatory eye disease history.  No history of miscarriage, she is not planning on pregnancy and not currently sexually active.  No dry eyes or dry mouth.  No facial rash.  No photosensitive rash. No nasal or oral ulcers.  No recurrent epistaxis or hemoptysis.  No chest pain or shortness of breath.  No abdominal pain, constipation, diarrhea.  No blood in stool.  No nausea or vomiting. No foamy/frothy urin.  No change in strength or sensation in the arms or legs.  No pitting or brittle nails.  No triphasic color change consistent with Raynaud's.  No skin thickening or tightening consistent with sclerodactyly.  No symptoms consistent with dactylitis.  No red hot or swollen joints.  No joint pain.  No joint stiffness.  No muscle pain.  No genital ulcers or lesions.  No fevers, chills, weight loss or night sweats.    Past Medical History:  Past Medical History:   Diagnosis Date    Hepatitis     Lupus (systemic lupus erythematosus) (H)     Lupus cerebritis (H)     Pancreatitis 08/2017    Pyelonephritis 08/2017    Seizures (H)     Status epilepticus (H)        Past Surgical History:  Past Surgical History:   Procedure Laterality Date    IR RENAL BIOPSY LEFT  6/28/2022    NO HISTORY OF SURGERY       ORTHOPEDIC SURGERY         Medications:  Current Outpatient Medications   Medication    acetaminophen (TYLENOL) 500 MG tablet    calcium carbonate (TUMS) 500 MG chewable tablet    ferrous sulfate (FEROSUL) 325 (65 Fe) MG tablet    hydroxychloroquine (PLAQUENIL) 200 MG tablet    lisinopril (ZESTRIL) 10 MG tablet    lisinopril (ZESTRIL) 5 MG tablet    multivitamin, therapeutic (THERA-VIT) TABS tablet    mycophenolate (GENERIC EQUIVALENT) 500 MG tablet    predniSONE (DELTASONE) 20 MG tablet    vitamin D3 (CHOLECALCIFEROL) 1.25 MG (00440 UT) capsule    Belimumab 200 MG/ML SOAJ     No current facility-administered medications for this visit.       Allergies:  Allergies   Allergen Reactions    Rituximab Anaphylaxis and Shortness Of Breath       Family history:  Older sister who is diagnosed with lupus but is not on medications.    Social History:   Lives with her sister. Not employed. 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   ETOH: Denies  Smoking: Denies  Drug use: Denies  Occupation: Unemployed.    Objective:  BP (!) 171/121   Pulse 78   Temp 98.3  F (36.8  C)   Wt 46 kg (101 lb 6.4 oz)   LMP 05/29/2023 (Approximate)   SpO2 100%   BMI 18.85 kg/m    GEN: sitting upright in chair, frail appearing, NAD  HEENT: No facial rash, sclera clear, no oral or nasal ulcers, no inflammatory nasal bridge/external ear changes  CV: RRR, no m/r/g  Pulm: CTAB no crackles, wheezing or ronchi  Extremities: Full active and passive ROM of bilateral shoulders, elbows (hyperextension), wrists, MCPs, PIPs, DIPs, hips, knees, ankles. Able to make a full fist with good strength bilaterally. Has difficulty bending her right 2nd digit PIP and DIP which is fairly new. No synovitis of any joints. No dactylitis. No digital pitting. No nail changes. No sclerodactyly  Skin: No acute cutaneous changes. Has chronic scars over her hands and forehead consistent with history of discoid lupus. Scarring alopecia throughout scalp, hair is thin.    WBC   Date Value Ref  Range Status   02/11/2021 4.5 4.0 - 11.0 10e9/L Final     WBC Count   Date Value Ref Range Status   07/10/2023 6.3 4.0 - 11.0 10e3/uL Final     Hemoglobin   Date Value Ref Range Status   07/10/2023 7.0 (L) 11.7 - 15.7 g/dL Final   02/11/2021 9.1 (L) 11.7 - 15.7 g/dL Final     Platelet Count   Date Value Ref Range Status   07/10/2023 108 (L) 150 - 450 10e3/uL Final   02/11/2021 104 (L) 150 - 450 10e9/L Final     Creatinine   Date Value Ref Range Status   07/10/2023 0.86 0.51 - 0.95 mg/dL Final   02/11/2021 0.58 0.50 - 1.00 mg/dL Final     Lab Results   Component Value Date    ALKPHOS 232 01/26/2023    ALKPHOS 140 02/11/2021     AST   Date Value Ref Range Status   01/26/2023 49 (H) 10 - 35 U/L Final   02/11/2021 117 (H) 0 - 35 U/L Final     Lab Results   Component Value Date    ALT 10 01/26/2023    ALT 55 02/11/2021     Sed Rate   Date Value Ref Range Status   10/09/2020 24 (H) 0 - 20 mm/h Final     Erythrocyte Sedimentation Rate   Date Value Ref Range Status   01/22/2023 110 (H) 0 - 20 mm/hr Final     CRP Inflammation   Date Value Ref Range Status   06/15/2022 7.9 0.0 - 8.0 mg/L Final   02/10/2021 <2.9 0.0 - 8.0 mg/L Final     UA RESULTS:  Recent Labs   Lab Test 07/10/23  1454   COLOR Light Yellow   APPEARANCE Slightly Cloudy*   URINEGLC Negative   URINEBILI Negative   URINEKETONE Negative   SG 1.014   UBLD Large*   URINEPH 7.0   PROTEIN 600*   NITRITE Negative   LEUKEST Small*   RBCU 97*   WBCU 40*      AIMEE Screen by EIA   Date Value Ref Range Status   05/10/2013 11.9 (H) <1.0 Final     Comment:     Interpretation:  Positive     DNA-ds   Date Value Ref Range Status   10/09/2020 46 (H) <10 IU/mL Final     Comment:     Positive     DNA (ds) Antibody   Date Value Ref Range Status   07/10/2023 >365.0 (H) <10.0 IU/mL Final     Comment:     Positive     RNP Antibody IgG   Date Value Ref Range Status   08/25/2017 2.4 (H) 0.0 - 0.9 AI Final     Comment:     Positive  Antibody index (AI) values reflect qualitative changes in  antibody   concentration that cannot be directly associated with clinical condition or   disease state.       SSA (RO) Antibody IgG   Date Value Ref Range Status   05/10/2013 7  Final     Comment:     Reference range: 0 to 40  Unit: AU/mL  (Note)  INTERPRETIVE INFORMATION: SSA (Ro) (NETTA) Ab, IgG   29 AU/mL or Less ............. Negative   30 - 40 AU/mL ................ Equivocal   41 AU/mL or Greater .......... Positive  SSA (Ro) antibody is seen in 70-75% of Sjogren syndrome  cases, 30-40% of systemic lupus erythematosus (SLE) and  5-10% of progressive systemic sclerosis (PSS).     SSB (LA) Antibody IgG   Date Value Ref Range Status   05/10/2013 0  Final     Comment:     Reference range: 0 to 40  Unit: AU/mL  (Note)  INTERPRETIVE INFORMATION: SSB (La) (NETTA) Ab, IgG   29 AU/mL or Less ............. Negative   30 - 40 AU/mL ................ Equivocal   41 AU/mL or Greater .......... Positive  SSB (La) antibody is seen in 50-60% of Sjogren syndrome  cases and is specific if it is the only NETTA antibody  present. 15-25% of patients with systemic lupus  erythematosus (SLE) and 5-10% of patients with progressive  systemic sclerosis (PSS) also have this antibody.     Aldolase   Date Value Ref Range Status   05/18/2013 11.5 (H)  Final     Comment:     Reference range: 3.3 to 9.7  Unit: U/L  (Note)  This specimen is hemolyzed. This may cause the result for  aldolase to be falsely increased.  REFERENCE INTERVAL: Aldolase  Access complete set of age- and/or gender-specific  reference intervals for this test in the [facility]  Test Directory ([email or web address]).  Performed by [facility],  [address] [place] [id number] [phone]  [email or web address] Gabriella Quesada MD, Lab. Director     Rheumatoid Factor   Date Value Ref Range Status   05/10/2013 13 0 - 14 IU/mL Final     Neutrophil Cytoplasmic IgG Antibody   Date Value Ref Range Status   05/11/2013   Final    <1:20  Reference range: <1:20  (Note)  The ANCA IFA is  <1:20; therefore, no further testing will  be performed.  INTERPRETIVE INFORMATION: Anti-Neutrophil Cyto Ab, IgG    Neutrophil Cytoplasmic Antibodies (C-ANCA = granular  cytoplasmic staining, P-ANCA = perinuclear staining) are  found in the serum of over 90 percent of patients with  certain necrotizing systemic vasculitides, and usually in  less than 5 percent of patients with collagen vascular  disease or arthritis.  Performed by [facility],  [address] [place] [id number] [phone]  [email or web address] Gabriella Quesada MD, Lab. Director       ASSESSMENT/PLAN:  Milly Carroll is a 22yoF with PMHx of long-standing SLE (+DsDNA, +RNP, +ribosomal P Ab and hypocomplementemia). 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 Her last visit at [facility] was in 12/2022 and since that time she has been hospitalized twice for flares of her lupus and had a recent hospitalization in 06/2023 for CHRISTA and C. Difficile infection.    Her lupus nephritis was diagnosed during her hospitalization in 06/2022 when she was found to have significant proteinuria and hematuria on urine sediment.  She was treated with Solu-Medrol 1g x3d followed by oral prednisone taper.  She was also managed with mycophenolate 1500 mg BID, hydroxychloroquine 200 mg daily and belimumab 200 mg subcutaneous once weekly.    Her last hospitalizations involved presentation with myalgia, fatigue, night sweats, fevers, new mouth sores and left hip pain exacerbated by weightbearing and ambulation.  She was hospitalized and was able to resume her immunosuppressive regimen as above (minus belimumab which she hasn't taken since 2020) however, it was noted that she was not filling her medications for what sounded like  close to a year at that time (since 2021 and her hospitalization was in 12/2022). She was again hospitalized in 01/2023 with left hip pain and neuropathic pain with an MRI showing evidence of muscle edema however, normal labs made myositis unlikely. Her myomarker panel did return with positivity for anti-U1 RNP, anti-U2 RNP and anti-U3 RNP.    After this hospitalization in 01/2023, the patient moved to [place]. During that time was not on any of her immunosuppressive medications from 02/2023 - 05/2023. She presented again to [facility] in 06/2023 with abd pain, N/V, diarrhea, cough and diagnosed with CHRISTA and C. Difficile infection. Rheumatology was consulted there and started her on: Solu-Medrol 60 mg followed by oral prednisone taper from 40mg to 10 mg until her follow-up here, plaquenil 200 mg daily, mycophenolate 500 mg BID (to balance treatment for nephritis and being mindful of her infection) and started back on lisinopril 5 mg (had been on 40 mg/d in [place]).    She followed up with nephrology on 7/10/23 and it was noted that her creatinine peaked to 1.58 and improved to 0.86 though still higher than her baseline of 0.5-0.6. Workup showed UPCR increased from 2.2 seven months ago to 13.4 on 7/10/23. Mycophenolate was increased back to 1500 mg and recommended doing a kidney biopsy with consideration for adding Obinutuzumab or switching her to Eurolupus cytoxan regimen. Labs also showed decreased C3, C4 and elevated dsDNA further supporting lupus flare which appears to be again due to medication non-adherence unfortunately.      DIAGNOSIS:  SLE (+dsDNA, +RNP, +ribosomal P Ab and hypocomplementemia)  Lupus nephritis class lll with concern for progression with elevated urine protein/creatinine  Lupus cerebritis ?, seizures  R knee osteonecrosis  Anemia of chronic disease  Iatrogenic immunosuppression  Medication non-adherence    PLAN:  -Continue  mg daily  -Continue prednisone 20 mg daily  -Continue  mycophenolate 1500 mg twice daily  -Continue lisinopril 10 mg daily (blood pressure very uncontrolled today in clinic)  -Not on belimumab, will remove from her medication list at this time as wouldn't expect to start this now if nephrology starts additional therapies for LN  -Renal biopsy and plan as set forward by nephrology for further immunosuppression as dictated by biopsy results  -Will connect her with pharmacy follow-up visit to address her barriers to medication adherence    Patient seen and discussed together with Dr. Steve.    Nnamdi Waddell, DO  Rheumatology Fellow  PGY4    Attending Attestation     Patient seen and discussed with Dr. Waddell. I confirmed key aspects of history and physical examination and personally reviewed the vital signs, medications, labs, and imaging. I was directly involved in medical decision making and management of this patient, and I agree with the documentation, findings, and plan.    Milly is 23 yo F with SLE c/b class III LN, cytopenias, possible cerebritis with seizure hx presented to establish care in Rheumatology. She has poor insight of her disease and non - compliance issues which puts her at risk for organ failure. She will be scheduled to get renal biopsy to decide further treatment plan which can include Obinutuzumab or Euro lupus Cytoxan regimen by Nephrology. She will continue MMF 1500 mg BID,  mg daily and prednisone 20 mg daily for now. Will place [facility] pharmacy referral and Neuropsychology referral to improve medication compliance and to evaluate for Neuropsychiatric problems.    Carlos Steve MD  Staff Rheumatologist, [facility]   Division of Rheumatic and Autoimmune diseases   Pager - [phone]   Pager: [phone]

## 2023-07-26 NOTE — NURSING NOTE
Chief Complaint   Patient presents with    Consult     New pt consult.     Blood pressure (!) 171/121, pulse 78, temperature 98.3  F (36.8  C), weight 46 kg (101 lb 6.4 oz), last menstrual period 05/29/2023, SpO2 100 %, not currently breastfeeding.  LELE GARCIA

## 2023-07-26 NOTE — PATIENT INSTRUCTIONS
Please follow up with our specialty pharmacist to discuss your medications and vaccination (shingrix)  Please set up an appointment with neuropsychiatry with your history of lupus cerebritis in the past  Continue mycophenolate 1500 mg twice daily, hydroxychloroquine 200 mg daily, prednisone 0.5 mg daily  Please schedule your kidney biopsy as you have evidence of active lupus flare involving your kidneys  Please see an eye doctor  Return to clinic in 6 weeks for follow-up

## 2023-07-26 NOTE — LETTER
"7/26/2023       RE: Milly Carroll  [address] [place] [id number]     Dear Colleague,    Thank you for referring your patient, Milly Carroll, to the [facility] [place] at [facility]. Please see a copy of my visit note below.    [facility] Outpatient Rheumatology Visit  Date of service: July 26, 2023    Patient name: Milly Carroll  YOB: 2000  MRN: [id number]    HPI:    Norma is a 22yoF with history outlined as below who is presenting to [facility] rheumatology clinic to get re-established.    Pt states she's not sure why she is here but that she was scheduled to be here by her nephrologist so she showed up. Since her last visit in December she feels like things have \"been going good,\" in respect to her mobility and moving around but does get tired/fatigued easily. States appetite is good and having no fevers. She moved to [place] to live with her brother so that she could see other doctors for the management of her kidneys and get another opinion. She did not however see a nephrologist. She was babysitting her one year old nephew most of the time while she was there. She moved back here at the end of May as she could not find insurance there. She currently lives with her sister close to [place]. States things have been good despite her recent hospitalization for CHRISTA and C. Diff infection - stating n/v and diarrhea have improved. States she is eating 3 meals a day, no weight loss and she denies any food or housing insecurity. She cannot remember much about her visit with nephrology other than she was told to increase cellcept and her lisinopril.    Regarding medication non-adherence, states the reasons why she hasn't taken her medications are due to others' abilities to  the medication in a timely manner. She does not have a car and has difficulty picking the meds up on her own. She relies on her mother " to get her medications but states that sometimes it takes her mom up to two weeks before she picks them up and by that time she will start to have a flare. While in [place] she states her mother felt it would be too difficult to mail her medications to her and thus she was not on any medications which was between February to the end of May.    Currently she is taking mycophenolate 1500 mg twice daily, prednisone 0.5 mg daily, hydroxychloroquine 200mg daily. She has not taken belimumab since the end of 2020 because she struggled to call it in as it is a mailed medication. Her father was the one to usually call it in for her but after his untimely passing away she has not been able to find his list of phone numbers so that she can call the pharmacy and get her belimumab.    States she had blood clots in her legs when she was in the [facility]. Had APL testing done in the past which was negative.    Rheumatology review of systems: No new or persistent headache.  No new hair loss.  No change in vision or hearing.  No inflammatory eye disease history.  No history of miscarriage, she is not planning on pregnancy and not currently sexually active.  No dry eyes or dry mouth.  No facial rash.  No photosensitive rash. No nasal or oral ulcers.  No recurrent epistaxis or hemoptysis.  No chest pain or shortness of breath.  No abdominal pain, constipation, diarrhea.  No blood in stool.  No nausea or vomiting. No foamy/frothy urin.  No change in strength or sensation in the arms or legs.  No pitting or brittle nails.  No triphasic color change consistent with Raynaud's.  No skin thickening or tightening consistent with sclerodactyly.  No symptoms consistent with dactylitis.  No red hot or swollen joints.  No joint pain.  No joint stiffness.  No muscle pain.  No genital ulcers or lesions.  No fevers, chills, weight loss or night sweats.    Past Medical History:  Past Medical History:   Diagnosis Date    Hepatitis     Lupus  (systemic lupus erythematosus) (H)     Lupus cerebritis (H)     Pancreatitis 08/2017    Pyelonephritis 08/2017    Seizures (H)     Status epilepticus (H)        Past Surgical History:  Past Surgical History:   Procedure Laterality Date    IR RENAL BIOPSY LEFT  6/28/2022    NO HISTORY OF SURGERY      ORTHOPEDIC SURGERY         Medications:  Current Outpatient Medications   Medication    acetaminophen (TYLENOL) 500 MG tablet    calcium carbonate (TUMS) 500 MG chewable tablet    ferrous sulfate (FEROSUL) 325 (65 Fe) MG tablet    hydroxychloroquine (PLAQUENIL) 200 MG tablet    lisinopril (ZESTRIL) 10 MG tablet    lisinopril (ZESTRIL) 5 MG tablet    multivitamin, therapeutic (THERA-VIT) TABS tablet    mycophenolate (GENERIC EQUIVALENT) 500 MG tablet    predniSONE (DELTASONE) 20 MG tablet    vitamin D3 (CHOLECALCIFEROL) 1.25 MG (39409 UT) capsule    Belimumab 200 MG/ML SOAJ     No current facility-administered medications for this visit.       Allergies:  Allergies   Allergen Reactions    Rituximab Anaphylaxis and Shortness Of Breath       Family history:  Older sister who is diagnosed with lupus but is not on medications.    Social History:   Lives with her sister. Not employed. 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   ETOH: Denies  Smoking: Denies  Drug use: Denies  Occupation: Unemployed.    Objective:  BP (!) 171/121   Pulse 78   Temp 98.3  F (36.8  C)   Wt 46 kg (101 lb 6.4 oz)   LMP 05/29/2023 (Approximate)   SpO2 100%   BMI 18.85 kg/m    GEN: sitting upright in chair, frail appearing, NAD  HEENT: No facial rash, sclera clear, no oral or nasal ulcers, no inflammatory nasal bridge/external ear changes  CV: RRR, no m/r/g  Pulm: CTAB no crackles, wheezing or ronchi  Extremities: Full active and passive ROM of bilateral shoulders, elbows (hyperextension), wrists, MCPs, PIPs, DIPs, hips, knees, ankles. Able to make a full fist with good strength bilaterally. Has difficulty bending her right 2nd digit PIP and DIP which is fairly  new. No synovitis of any joints. No dactylitis. No digital pitting. No nail changes. No sclerodactyly  Skin: No acute cutaneous changes. Has chronic scars over her hands and forehead consistent with history of discoid lupus. Scarring alopecia throughout scalp, hair is thin.    WBC   Date Value Ref Range Status   02/11/2021 4.5 4.0 - 11.0 10e9/L Final     WBC Count   Date Value Ref Range Status   07/10/2023 6.3 4.0 - 11.0 10e3/uL Final     Hemoglobin   Date Value Ref Range Status   07/10/2023 7.0 (L) 11.7 - 15.7 g/dL Final   02/11/2021 9.1 (L) 11.7 - 15.7 g/dL Final     Platelet Count   Date Value Ref Range Status   07/10/2023 108 (L) 150 - 450 10e3/uL Final   02/11/2021 104 (L) 150 - 450 10e9/L Final     Creatinine   Date Value Ref Range Status   07/10/2023 0.86 0.51 - 0.95 mg/dL Final   02/11/2021 0.58 0.50 - 1.00 mg/dL Final     Lab Results   Component Value Date    ALKPHOS 232 01/26/2023    ALKPHOS 140 02/11/2021     AST   Date Value Ref Range Status   01/26/2023 49 (H) 10 - 35 U/L Final   02/11/2021 117 (H) 0 - 35 U/L Final     Lab Results   Component Value Date    ALT 10 01/26/2023    ALT 55 02/11/2021     Sed Rate   Date Value Ref Range Status   10/09/2020 24 (H) 0 - 20 mm/h Final     Erythrocyte Sedimentation Rate   Date Value Ref Range Status   01/22/2023 110 (H) 0 - 20 mm/hr Final     CRP Inflammation   Date Value Ref Range Status   06/15/2022 7.9 0.0 - 8.0 mg/L Final   02/10/2021 <2.9 0.0 - 8.0 mg/L Final     UA RESULTS:  Recent Labs   Lab Test 07/10/23  1454   COLOR Light Yellow   APPEARANCE Slightly Cloudy*   URINEGLC Negative   URINEBILI Negative   URINEKETONE Negative   SG 1.014   UBLD Large*   URINEPH 7.0   PROTEIN 600*   NITRITE Negative   LEUKEST Small*   RBCU 97*   WBCU 40*      AIMEE Screen by EIA   Date Value Ref Range Status   05/10/2013 11.9 (H) <1.0 Final     Comment:     Interpretation:  Positive     DNA-ds   Date Value Ref Range Status   10/09/2020 46 (H) <10 IU/mL Final     Comment:      Positive     DNA (ds) Antibody   Date Value Ref Range Status   07/10/2023 >365.0 (H) <10.0 IU/mL Final     Comment:     Positive     RNP Antibody IgG   Date Value Ref Range Status   08/25/2017 2.4 (H) 0.0 - 0.9 AI Final     Comment:     Positive  Antibody index (AI) values reflect qualitative changes in antibody   concentration that cannot be directly associated with clinical condition or   disease state.       SSA (RO) Antibody IgG   Date Value Ref Range Status   05/10/2013 7  Final     Comment:     Reference range: 0 to 40  Unit: AU/mL  (Note)  INTERPRETIVE INFORMATION: SSA (Ro) (NETTA) Ab, IgG   29 AU/mL or Less ............. Negative   30 - 40 AU/mL ................ Equivocal   41 AU/mL or Greater .......... Positive  SSA (Ro) antibody is seen in 70-75% of Sjogren syndrome  cases, 30-40% of systemic lupus erythematosus (SLE) and  5-10% of progressive systemic sclerosis (PSS).     SSB (LA) Antibody IgG   Date Value Ref Range Status   05/10/2013 0  Final     Comment:     Reference range: 0 to 40  Unit: AU/mL  (Note)  INTERPRETIVE INFORMATION: SSB (La) (NETTA) Ab, IgG   29 AU/mL or Less ............. Negative   30 - 40 AU/mL ................ Equivocal   41 AU/mL or Greater .......... Positive  SSB (La) antibody is seen in 50-60% of Sjogren syndrome  cases and is specific if it is the only NETTA antibody  present. 15-25% of patients with systemic lupus  erythematosus (SLE) and 5-10% of patients with progressive  systemic sclerosis (PSS) also have this antibody.     Aldolase   Date Value Ref Range Status   05/18/2013 11.5 (H)  Final     Comment:     Reference range: 3.3 to 9.7  Unit: U/L  (Note)  This specimen is hemolyzed. This may cause the result for  aldolase to be falsely increased.  REFERENCE INTERVAL: Aldolase  Access complete set of age- and/or gender-specific  reference intervals for this test in the [facility]  Test Directory ([email or web address]).  Performed by [facility],  [address] [place] [address], [facility] [place] [address]  [phone]  [email or web address] Gabriella Quesada MD, Lab. Director     Rheumatoid Factor   Date Value Ref Range Status   05/10/2013 13 0 - 14 IU/mL Final     Neutrophil Cytoplasmic IgG Antibody   Date Value Ref Range Status   05/11/2013   Final    <1:20  Reference range: <1:20  (Note)  The ANCA IFA is <1:20; therefore, no further testing will  be performed.  INTERPRETIVE INFORMATION: Anti-Neutrophil Cyto Ab, IgG    Neutrophil Cytoplasmic Antibodies (C-ANCA = granular  cytoplasmic staining, P-ANCA = perinuclear staining) are  found in the serum of over 90 percent of patients with  certain necrotizing systemic vasculitides, and usually in  less than 5 percent of patients with collagen vascular  disease or arthritis.  Performed by [facility],  [address] [place] [id number] [phone]  [email or web address] Gabriella Quesada MD, Lab. Director       ASSESSMENT/PLAN:  Milly Carroll is a 22yoF with PMHx of long-standing SLE (+DsDNA, +RNP, +ribosomal P Ab and hypocomplementemia). 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 Her last visit at [facility] was in 12/2022 and since that time she has been hospitalized twice for flares of her lupus and had a recent hospitalization in 06/2023 for CHRISTA and C. Difficile infection.    Her lupus nephritis was diagnosed during her hospitalization in 06/2022 when she was found to have significant proteinuria and hematuria on urine sediment.  She was treated with Solu-Medrol 1g x3d followed by oral prednisone taper.  She was also managed with mycophenolate 1500 mg BID, hydroxychloroquine 200 mg daily and belimumab 200 mg subcutaneous once weekly.    Her last hospitalizations involved presentation with myalgia, fatigue, night sweats, fevers,  new mouth sores and left hip pain exacerbated by weightbearing and ambulation.  She was hospitalized and was able to resume her immunosuppressive regimen as above (minus belimumab which she hasn't taken since 2020) however, it was noted that she was not filling her medications for what sounded like close to a year at that time (since 2021 and her hospitalization was in 12/2022). She was again hospitalized in 01/2023 with left hip pain and neuropathic pain with an MRI showing evidence of muscle edema however, normal labs made myositis unlikely. Her myomarker panel did return with positivity for anti-U1 RNP, anti-U2 RNP and anti-U3 RNP.    After this hospitalization in 01/2023, the patient moved to [place]. During that time was not on any of her immunosuppressive medications from 02/2023 - 05/2023. She presented again to [facility] in 06/2023 with abd pain, N/V, diarrhea, cough and diagnosed with CHRISTA and C. Difficile infection. Rheumatology was consulted there and started her on: Solu-Medrol 60 mg followed by oral prednisone taper from 40mg to 10 mg until her follow-up here, plaquenil 200 mg daily, mycophenolate 500 mg BID (to balance treatment for nephritis and being mindful of her infection) and started back on lisinopril 5 mg (had been on 40 mg/d in [place]).    She followed up with nephrology on 7/10/23 and it was noted that her creatinine peaked to 1.58 and improved to 0.86 though still higher than her baseline of 0.5-0.6. Workup showed UPCR increased from 2.2 seven months ago to 13.4 on 7/10/23. Mycophenolate was increased back to 1500 mg and recommended doing a kidney biopsy with consideration for adding Obinutuzumab or switching her to Eurolupus cytoxan regimen. Labs also showed decreased C3, C4 and elevated dsDNA further supporting lupus flare which appears to be again due to medication non-adherence unfortunately.      DIAGNOSIS:  SLE (+dsDNA, +RNP, +ribosomal P Ab and hypocomplementemia)  Lupus  nephritis class lll with concern for progression with elevated urine protein/creatinine  Lupus cerebritis ?, seizures  R knee osteonecrosis  Anemia of chronic disease  Iatrogenic immunosuppression  Medication non-adherence    PLAN:  -Continue  mg daily  -Continue prednisone 0.5 mg daily  -Continue mycophenolate 1500 mg twice daily  -Continue lisinopril 10 mg daily (blood pressure very uncontrolled today in clinic)  -Not on belimumab, will remove from her medication list at this time as wouldn't expect to start this now if nephrology starts additional therapies for LN  -Renal biopsy and plan as set forward by nephrology for further immunosuppression as dictated by biopsy results  -Will connect her with pharmacy follow-up visit to address her barriers to medication adherence    Patient seen and discussed together with Dr. Steve.    Nnamdi Waddell DO  Rheumatology Fellow  [id number]  Pager: [phone]

## 2023-08-01 NOTE — TELEPHONE ENCOUNTER
MTM appointment no showed, we made one more attempt to reschedule.     Routing back to referring provider and MTM pharmacist.

## 2023-08-04 NOTE — TELEPHONE ENCOUNTER
Received a call from call center from Dr. Alfonso Bennett at [facility]. Milly had her biopsy today, he wants to make sure something was in place to see Dr. Purvis in a week. Writer reviewed chart and Oralia, from lupus and vasculitis program has been working with her. Writer routed this encounter to Oralia.

## 2023-08-07 NOTE — TELEPHONE ENCOUNTER
Left Voicemail (1st Attempt) and Sent Mychart (1st Attempt) for the patient to call back and schedule the following:    Appointment type: Neuropsych Eval  Provider: Brady Gonzales CSC  Return date: First available  Specialty phone number: [phone]  Additional appointment(s) needed: N/A  Additonal Notes: [facility]    Eusebio Grayson on 8/7/2023 at 3:37 PM

## 2023-08-07 NOTE — H&P
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 She requires hospitalization for further workup and pain management.       #Left leg pain     Presents with two days history of progressive, burning left calf pain. No evidence of gross swelling to suggest DVT, ultrasound also did not show thrombosis in the veins. No evidence of cellulitis or evidence of infection on overlying skin, joint movement was good and no evidence of acute arthritis around the left knee. Possible component of neuropathic pain with single dermatomal involvement, however MRI of lumbar spine was reassuring. Single nerve neuropathy with dominant sensory presentation with lupus is uncommon too.  Of note, also presented with left sided hip, knee and ankle pain in December for which she was managed as a lupus flare. Alternatively, some component of avascular necrosis of the knee considering the history of avascular necrosis in the R knee.    -Pain control- Tylenol PRN, diclofenac gel  -Rheumatology consult  -Check CK to rule out myositis  -Knee X-ray    #COVID-19 infection:  Not requiring oxygen, asymptomatic. Tested positive on admission workup. Given coexisting comorbidity, plan to treat with remdesivir for 3 days as prevention of severe disease    -Remdesivir IV for 3 days to prevent disease progression  -Monitor for symptoms, oxygen need  -Enoxaparin for DVT prophylaxis  -COVID precautions  -Remdesivir labs  -hold mycophenelate mofetil for now       #SLE with hx of lupus nephritis & lupus cerebritis  #Multiple Intraabdominal Lymphadenopathies  #Mild Splenomegaly      On outpatient regimen of Mycofenolate, hydroxychloroquine, Belimumab, and  prednisone. Plans for dose escalation of prednisone for concern for lupus flare from outpatient rheumatology notes. Multiple intraabdominal LAPs are not noted on previous imaging. This could be reactive for the COVID infection a differential to be considered is lymphoma. LDH is pending    -Rheumatology consult   -Ds DNA, complement pending  -Prednisone 20 mg po/day  -MMF 1500mg BID, hold for noow  -Hydroxychloroquine 200 mg /day    #Bilateral Femoral head avascular necrosis  Noted on admission CT imaging. No significant hip pain at present.     - Orthopedic evaluation to determine optimal course of action (surgical or non-surgical)  - Pain control as needed    #R lower back pain  Likely a MSK etiology due to the R sided nature and lack of relevant findings on spine MRI.   -Pain control- Tylenol PRN, ibuprofen PRN, robaxin PRN    #Hypoalbuminemia  Likely due to malnutrition and inflammatory process.    -Nutrition evaluation   -Continue to monitor     #NAGMA  #Hyponatremia  Most likely to dehydration and decreased PO intake (has not eaten in at least ~20 hours since admission).     -s/p 1L NS at the ED  -LR bolus   -encourage oral intake  -Nutrition consult  -Continue to monitor via BMP    #Elevated Troponin  #Elevated BNP    No clear evidence of ischemic process, slight troponin elevation . Differential includes a myocarditis component from lupus. Of note, patient has tachycardia, ECG was sinus with no concern for ST-T change. Concurrent with elevated BNP and troponin, will get an Echo to assess cardiac status to r/o myocarditis.     -Trend troponin  -Trend BNP  -Echo    #Anemia (Normocytic)  #Thrombocytopenia  Baseline Hgb was around 10, admission Hgb 8.2, no evidence of overt bleeding at presentation. Differential includes hemolytic process from lupus. Will add on hemolysis labs, cristóbal however is normal.     -LDH, Haptoglobin  -Continue PTA Ferrous sulfate  -Management of lupus as above    #Elevated Alk Phos  No  dilated billiary tree on imaging, except for the large gallbladder on CT. Possible cholestatic hepatitis process or extrahepatic alk phos ( given bone disease). Will add GGT to differentiate.     -Continue to monitor   -GGT      #Hx of Pancreatitis     Was admitted in 2020 for acute pancreatitis 2/2 PD stones    -No symptoms of pancreatitis at present     Diet: Combination Diet Regular Diet Adult    DVT Prophylaxis: Enoxaparin  Leung Catheter: Not present  Fluids: LR bolus  Lines: None     Cardiac Monitoring: None  Code Status: Full Code      Clinically Significant Risk Factors Present on Admission              # Hypoalbuminemia: Lowest albumin = 2 g/dL at 1/22/2023 11:34 PM, will monitor as appropriate   # Thrombocytopenia: Lowest platelets = 120 in last 2 days, will monitor for bleeding                Disposition Plan      Expected Discharge Date: 01/25/2023                The patient's care was discussed with the Attending Physician, Dr. Snehal Cyr, MS3    Medicine Service, [facility] TEAM [address] [place] [address]  Securely message with [employer] (more info)  Text page via [facility] Paging/Directory   See signed in provider for up to date coverage information     Resident/Fellow Attestation   I, Pati Ceron MD, was present with the medical/PUNE[employer] student who participated in the service and in the documentation of the note.  I have verified the history and personally performed the physical exam and medical decision making.  I agree with the assessment and plan of care as documented in the note.        Pati Ceron MD  PGY1  Date of Service (when I saw the patient): 01/23/23  ______________________________________________________________________    Chief Complaint   L calf pain and numbness.    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 She first noticed it when she was walking up the stairs and she felt pain in her lower back and radiates down across the lateral left hip, back of thigh down to the back of the calf.  It was described as burning in nature and 9/10 in severity. She has the discomfort at rest and with activity. There is decreased sensation over the back of the left calf up to the mid-thigh. Light touch sensation is decreased but pressure sensation is intact on the back of the L extremity. Tylenol, Voltaren, and massage have been only mildly helpful and she has noticed that movement is provocative. She also has had 1 week of aching, right-sided lower back pain that is 7/10 in severity. Of note, she had a recent admission ~1 month ago for bilateral hip pain. She reports medication adherence (including 10mg prednisone daily) since then. She has noticed ~once a week chills for the last month. She also has been experiencing night sweats ~2x/wk for the past few years; these are intense to the point where she has to change the sheets twice per night. Milly denies any bowel or bladder incontinence, nausea, vomiting, diarrhea, chest pain, or changes in weight. She has never had a history of rheumatic arthritis, there has been no trauma, rash, bruising, lumps. She denies any fevers, chest pain, shortness of breath.  She denies any lightheadedness, syncope.  She denies any history of drug use.       Patient had recent hospitalization for evaluation of possible lupus flare in setting of medication nonadherence and concern for septic arthritis. She had presented to the Emergency Department on 12/14/22 complaining of fatigue, shortness of breath, cough, fevers, chills, sweats, left hip and knee pain. Her workup was unremarkable. She was instructed to continue her outpatient medications including HCQ, MMF, prednisone, Bactrim and Benlysta.    Past Medical History    Past Medical History:   Diagnosis Date     Hepatitis       Lupus (systemic lupus erythematosus) (H)      Lupus cerebritis (H)      Pancreatitis 08/2017     Pyelonephritis 08/2017     Seizures (H)      Status epilepticus (H)        Past Surgical History   Past Surgical History:   Procedure Laterality Date     IR RENAL BIOPSY LEFT  6/28/2022     NO HISTORY OF SURGERY       ORTHOPEDIC SURGERY         Social History:  -Denies alcohol and any drug use.   -Not sexually active  -Lives with younger brother, older sister, brother in law, and niece  -Feels safe at home         Prior to Admission Medications   Prior to Admission Medications   Prescriptions Last Dose Informant Patient Reported? Taking?   Belimumab 200 MG/ML SOAJ 12/30/2022  No Yes   Sig: Inject 200 mg Subcutaneous every 7 days   acetaminophen (TYLENOL) 500 MG tablet Past Month  No Yes   Sig: Take 1 tablet (500 mg) by mouth every 4 hours as needed for mild pain   calcium carbonate (TUMS) 500 MG chewable tablet 1/22/2023  No Yes   Sig: Take 1 tablet (500 mg) by mouth daily   ferrous sulfate (FEROSUL) 325 (65 Fe) MG tablet 1/22/2023  No Yes   Sig: Take 1 tablet (325 mg) by mouth daily (with breakfast)   hydroxychloroquine (PLAQUENIL) 200 MG tablet 1/23/2023  No Yes   Sig: Take 1 tablet (200 mg) by mouth daily   multivitamin, therapeutic (THERA-VIT) TABS tablet 1/22/2023 Self No Yes   Sig: Take 1 tablet by mouth daily   mycophenolate (GENERIC EQUIVALENT) 500 MG tablet 1/22/2023 at HS  No Yes   Sig: Take 3 tablets (1,500 mg) by mouth 2 times daily   predniSONE (DELTASONE) 5 MG tablet 1/22/2023 at HS  No Yes   Sig: Take 4 tablets (20 mg) by mouth daily for 5 days, THEN 3 tablets (15 mg) daily for 7 days, THEN 2 tablets (10 mg) daily for 30 days.   Patient taking differently: Take 5 mg by mouth daily.   sulfamethoxazole-trimethoprim (BACTRIM DS) 800-160 MG tablet 1/23/2023  No Yes   Sig: Take 1 tablet by mouth Every Mon, Wed, Fri Morning   vitamin D3 (CHOLECALCIFEROL) 1.25 MG (59692 UT) capsule 1/22/2023  No Yes   Sig:  Take 1 capsule (50,000 Units) by mouth every 7 days      Facility-Administered Medications: None        Social History   I have reviewed this patient's social history and updated it with pertinent information if needed.  Social History     Tobacco Use     Smoking status: Never     Smokeless tobacco: Never   Substance Use Topics     Alcohol use: Not Currently     Drug use: Never        Physical Exam   Vital Signs: Temp: 98.4  F (36.9  C) Temp src: Oral BP: 108/75 Pulse: 89   Resp: 18 SpO2: 100 % O2 Device: None (Room air)    Weight: 0 lbs 0 oz    General: no acute distress.  HENT: Atraumatic.  No oropharyngeal exudate.   Eyes: EOMI, pupils round, reactive. conjunctivae normal. Mild proptosis.   Cardiovascular: Tachycardic, regular rhythm.   No murmur appreciated.  Pulmonary:  Breathing comfortably on room air. No respiratory distress. Normal breath sounds, no wheezing, rhonchi, or rales.  Abdominal: no distension.  Abdomen is soft. There is abdominal tenderness in the R and L lower quadrants on palpation   Musculoskeletal: Mild pedal edema.   Skin: warm and dry  Neurological:  No focal deficit present.    Psychiatric: quiet, nervous appearing, normal affect    Data   Imaging results reviewed over the past 24 hrs:   Recent Results (from the past 24 hour(s))   US Lower Extremity Venous Duplex Left    Narrative    EXAMINATION: US LOWER EXTREMITY VENOUS DUPLEX LEFT  1/23/2023 1:44 AM       CLINICAL HISTORY:   left posterior thigh radiating to calf pain     COMPARISON: None available        PROCEDURE COMMENTS: Ultrasound was performed of the deep venous system  of the left lower extremity using grayscale, color, and spectral  Doppler.    FINDINGS:  The common femoral, greater saphenous origin, femoral, popliteal, and  deep calf veins are visualized and are patent. Venous waveforms are  normal. There is normal response to compression.      Impression    IMPRESSION:.  No deep vein thrombosis in the left lower  extremity.    I have personally reviewed the examination and initial interpretation  and I agree with the findings.    LIZZY LUZ MD         SYSTEM ID:  [id number]   MR Lumbar Spine w/o & w Contrast    [facility]  MR LUMBAR SPINE W/O AND W CONTRAST  1/23/2023 2:45 AM     INDICATION: Immunocompromised, lupus on chronic prednisone, pain and tender from L1-L5 and left musculature radiating down to left calf with new paresthesias.  TECHNIQUE: Routine.  CONTRAST: 5 mL Gadavist.  COMPARISON: None.    FINDINGS: Nomenclature is based on 5 lumbar type vertebral bodies. Alignment is normal. No marrow edema or abnormal enhancement within the spine. Normal distal spinal cord and cauda equina with conus medullaris at L1. Mild diffuse retroperitoneal   stranding is partially evaluated. Unremarkable visualized bony pelvis.    T12-L1: Normal disc height and signal. No herniation. No facet arthropathy. No spinal canal stenosis. No right neural foraminal stenosis. No left neural foraminal stenosis.    L1-L2: Normal disc height and signal. No herniation. No facet arthropathy. No spinal canal stenosis. No right neural foraminal stenosis. No left neural foraminal stenosis.    L2-L3: Minimal loss of disc space height and signal. Tiny left paracentral protrusion without neural impingement. No facet arthropathy. No spinal canal stenosis. No right neural foraminal stenosis. No left neural foraminal stenosis.    L3-L4: Normal disc height and signal. No herniation. No facet arthropathy. No spinal canal stenosis. No right neural foraminal stenosis. No left neural foraminal stenosis.    L4-L5: Normal disc height and signal. No herniation. No facet arthropathy. No spinal canal stenosis. No right neural foraminal stenosis. No left neural foraminal stenosis.    L5-S1: Normal disc height and signal. No herniation. No facet arthropathy. No spinal canal stenosis. No right neural foraminal  stenosis. No left neural foraminal stenosis.      Impression    CONCLUSION:  1.  Minimal lumbar spondylosis. No canal or foraminal narrowing.    2.  No abnormal enhancement within the spine.    3.  Mild diffuse retroperitoneal stranding is partially evaluated. Consider abdominal imaging.   CT Abdomen Pelvis w Contrast   Result Value    Radiologist flags Lymphadenopathy    Narrative    EXAMINATION: CT ABDOMEN PELVIS W CONTRAST, 1/23/2023 7:18 AM    INDICATION: Retroperitoneal stranding seen on MRI.  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    COMPARISON STUDY: MR abdomen 6/24/2020, MR L-spine 1/23/2023    TECHNIQUE: CT scan of the abdomen and pelvis was performed on  multidetector CT scanner using volumetric acquisition technique and  images were reconstructed in multiple planes with variable thickness  and reviewed on dedicated workstations.     CONTRAST: iopamidol (ISOVUE-370) solution 66 mL injected IV with oral  contrast    CT scan radiation dose is optimized to minimum requisite dose using  automated dose modulation techniques.    FINDINGS:    Lower thorax: Mild bibasilar atelectasis    Liver: No mass. No intrahepatic biliary ductal dilation.    Biliary System: Distended gallbladder similar to prior MRI exam from  2020.  Trace pericholecystic fluid.  No abnormal gallbladder wall  thickening.  No extrahepatic biliary ductal dilation.    Pancreas: No mass or pancreatic ductal dilation.    Adrenal glands: No mass or nodules    Spleen: Enlarged measuring up to 13.5 cm.    Kidneys: No suspicious mass, obstructing calculus or hydronephrosis.   4 mm left midpole nonobstructing calculus.    Gastrointestinal tract:  Appendix is not definitively visualized.  No  abnormal secondary signs to suggest acute appendicitis.  Normal  caliber small and large bowel.    Mesentery/peritoneum/retroperitoneum: No air.    Lymph nodes: Numerous pelvic,  retroperitoneal, mesenteric, and portal  caval enlarged lymph nodes. There is a 1.7 x 2.3 cm splenic lymph node  is seen on series 3 image 46. There is a 2.3 x 1.5 cystic node is seen  on series 3 image 70. There is a precaval node measuring up to 11 mm  in the short axis is seen on series 3 image 82, there is a left para  aortic node measuring up to 13 mm in short axis seen on series 3 image  82.    Vasculature: Patent major abdominal vasculature.    Pelvis: High density material within the bladder favored to represent  gadolinium contrast from prior study.  Small amount of pelvic free  fluid, likely physiologic.  Nonspecific presacral fat stranding.   Uterus and adnexa within normal limits.    Osseous structures: Bilateral femoral head avascular necrosis without  subchondral collapse.  Bilateral sacral iliac joint bony erosion  changes noted as well.      Soft tissues: Within normal limits.      Impression    IMPRESSION:   1. Numerous enlarged pelvic, retroperitoneal, mesenteric, splenic, and  portal caval lymph nodes and mild retroperitoneal stranding which are  nonspecific but may represent sequela of infection/inflammation or  underlying clinical history of known systemic lupus erythematosus.   Clinical follow-up is advised to guide further management.  Short  interval 3-6 month contrast enhanced CT could be considered for  follow-up.  2. Mild splenomegaly  3. 4 mm nonobstructing left renal stone.  4. Incidental bilateral femoral head avascular necrosis without  subchondral collapse.  Bilateral sacral iliac joint bony erosion  changes noted as well.  5. Distended bladder with heterogeneous material, favor excreted  contrast from MRI exam earlier on same date.  5. Large gallbladder with trace pericholecystic fluid.  No abnormal  gallbladder wall thickening.    [Recommend Follow Up: Lymphadenopathy]    This report will be copied to the [facility] to ensure a  provider acknowledges the finding. Access  Center is available Monday  through Friday 8am-3:30 pm.    I have personally reviewed the examination and initial interpretation  and I agree with the findings.    WHIT IVEY MD         SYSTEM ID:  [id number]     Most Recent 3 CBC's:Recent Labs   Lab Test 01/22/23  2334 12/16/22  0908 12/15/22  0808   WBC 6.2 6.9 4.0   HGB 8.2* 10.4* 11.2*   MCV 90 89 87   * 139* 127*     Most Recent 3 BMP's:Recent Labs   Lab Test 01/22/23  2334 12/16/22  0908 12/15/22  0808   * 141 138   POTASSIUM 3.6 4.4 4.8   CHLORIDE 107 113* 110*   CO2 16* 20* 17*   BUN 13.5 33.6* 24.6*   CR 0.55 0.80 0.99*   ANIONGAP 10 8 11   BISI 7.4* 7.6* 7.9*   GLC 91 111* 143*     Most Recent 3 Hemoglobins:Recent Labs   Lab Test 01/22/23  2334 12/16/22  0908 12/15/22  0808   HGB 8.2* 10.4* 11.2*      Humira Counseling:  I discussed with the patient the risks of adalimumab including but not limited to myelosuppression, immunosuppression, autoimmune hepatitis, demyelinating diseases, lymphoma, and serious infections.  The patient understands that monitoring is required including a PPD at baseline and must alert us or the primary physician if symptoms of infection or other concerning signs are noted.

## 2023-08-08 NOTE — PATIENT INSTRUCTIONS
"Recommendations from today's MTM visit:                                                    MTM (medication therapy management) is a service provided by a clinical pharmacist designed to help you get the most of out of your medicines.      Attend nephrology appointment scheduled for Thursday, August 10th at 11 am.    Finish potassium chloride course (twice daily until medication gone).    Start taking metoprolol 50 mg daily.    Take ondansetron as needed for nausea or vomiting.    Start folic acid 1 mg daily.    Change your lisinopril so you take 20 mg daily.    Change your prednisone so you take 20 mg daily.     Follow-up: Return in 5 weeks (on 9/13/2023) for MTM pharmacist visit.    It was great speaking with you today.  I value your experience and would be very thankful for your time in providing feedback in our clinic survey. In the next few days, you may receive an email or text message from [employer] with a link to a survey related to your  clinical pharmacist.\"     To schedule another MTM appointment, please call the clinic directly or you may call the MTM scheduling line at [phone] or toll-free at [phone].     My Clinical Pharmacist's contact information:                                                      Please feel free to contact me with any questions or concerns you have.      Mare Veronica, [place]  Medication Therapy Management Pharmacist  [facility]  Phone: [phone]    "

## 2023-08-08 NOTE — PROGRESS NOTES
Medication Therapy Management (MTM) Encounter    ASSESSMENT:                            Medication Adherence/Access: See below for considerations    Systemic Lupus Erythematosus:   Patient feeling better since discharge. Currently adherent to medications and discussed adherence tools to maintain compliance moving forward.     Hypertension:   Educated patient on metoprolol and new lisinopril dose, including administration, side effects, and common counseling points. Discussed previous blood pressures and importance of taking daily. Patient will discuss further at follow-up.          Anemia: Stable    Supplements: Stable    GERD: Stable    Hypokalemia:   Level corrected in the hospital and supplementing until follow-up with nephrology later this week.     Pneumonia Infection/C. Diff Prevention:  Upon medication reconciliation, identified patient likely not taking antibiotics as prescribed. Alerted nurse who will plan on following up with patient.     Nausea: Stable    PLAN:                            Attend nephrology appointment scheduled for Thursday, August 10th at 11 am.    Finish potassium chloride 20 mEq course (twice daily until medication gone)    Start taking metoprolol 50 mg daily     Take ondansetron as needed for nausea or vomiting    Start folic acid 1 mg daily    Change your lisinopril so you take 20 mg daily    Change your prednisone so you take 20 mg daily     Follow-up: Return in 5 weeks (on 9/13/2023) for MTM pharmacist visit.    SUBJECTIVE/OBJECTIVE:                          Milly Carroll is a 23 year old female called for an initial visit. She was referred to me from Carlos Steve MD.      Reason for visit: Medication adherence and education.    Allergies/ADRs: Reviewed in chart  Past Medical History: Reviewed in chart  Tobacco: She reports that she has never smoked. She has never used smokeless tobacco.  Alcohol: not currently using    Medication Adherence/Access: Previous concerns with picking  up medications on time and being able to take daily. Reported no issues currently and has a good supply at home. Using an alarm to remember when to take medications which has helped. Educated on the possibilities of having medications delivered or mailed, dispensed in adherence packaging, and utilizing automatic refills or text reminders to  medications.        Systemic Lupus Erythematosus:   Hydroxychloroquine 200 mg daily  Mycophenolate 1500 mg twice daily   Prednisone 20 mg daily  Acetaminophen 500 mg every 4 hours as needed    Patient is feeling much better since discharging from the hospital. Reports she has been taking her medications as prescribed. Typically uses acetaminophen twice daily which works well for pain. No other questions or concerns at this time.                 Hypertension:   Lisinopril 20 mg daily    Metoprolol succinate 50 mg daily (not taking)   Patient reported decreasing lisinopril dose to 10 mg upon discharge. Notes she has medication at home from discharge but has not opened that bag yet.    BP Readings from Last 3 Encounters:   07/26/23 (!) 171/121   07/10/23 (!) 172/126   01/29/23 139/89       Anemia:  Ferrous sulfate 325 mg daily   Patient reported taking every day with food. Has occasional diarrhea but no issues with stomach discomfort.         Supplements:  Multivitamin daily   Vitamin D 10 mcg (400 international unit(s)) daily   Folic acid 1 mg   No concerns at this time.         GERD:  Calcium carbonate (Tums) 500 mg daily as needed   No concerns at this time.     Hypokalemia:   Potassium chloride 20 mEq ER twice daily for 7 days   Reported taking twice daily with food. No concerns at this time.         Pneumonia Infection/C. Diff Prevention:  Amoxicillin/Clavulanate 875/125 mg twice daily for 7 days   Vancomycin 125 mg twice daily for 14 days   Patient reported taking and finishing course of antibiotics. Prescribed course should still be active. Had bag of medications from  hospital discharge that she reported taking daily, so not clear if still taking these medications. Educated and will inform RN to reassess at upcoming appointment.     Nausea:  Ondansetron 4 mg ODT every 8 hours as needed   Not currently using but had issues with nausea and vomiting while taking medications in the hospital. Discussed medication including how and when to use.     Today's Vitals: LMP 05/29/2023 (Approximate)   ----------------  Post Discharge Medication Reconciliation Status: discharge medications reconciled, continue medications without change.    I spent 25 minutes with this patient today. All changes were made via collaborative practice agreement with Carlos Steve MD. A copy of the visit note was provided to the patient's provider(s).    A summary of these recommendations was sent via [employer].    Renu MacarioD  Medication Therapy Management Pharmacist  [facility]  Phone: [phone]    Higinio Lee PharmD  Medication Therapy Management Pharmacist  [facility]  Phone: [phone]     Telemedicine Visit Details  Type of service:  Telephone visit  Start Time:  11:00 am  End Time:  11:25 am     Medication Therapy Recommendations  Essential hypertension    Current Medication: lisinopril (ZESTRIL) 20 MG tablet   Rationale: Does not understand instructions - Adherence - Adherence   Recommendation: Provide Education - Take daily   Status: Patient Agreed - Adherence/Education          Current Medication: metoprolol succinate ER (TOPROL XL) 50 MG 24 hr tablet   Rationale: Does not understand instructions - Adherence - Adherence   Recommendation: Provide Education - Take once daily   Status: Patient Agreed - Adherence/Education         Hypokalemia    Current Medication: potassium chloride ER (KLOR-CON M) 20 MEQ CR tablet   Rationale: Does not understand instructions - Adherence - Adherence   Recommendation: Provide Education - Take twice  daily with food   Status: Patient Agreed - Adherence/Education         Nausea    Current Medication: ondansetron (ZOFRAN ODT) 4 MG ODT tab   Rationale: Does not understand instructions - Adherence - Adherence   Recommendation: Provide Education - Take three times daily as needed   Status: Patient Agreed - Adherence/Education         Systemic lupus erythematosus (H)    Current Medication: predniSONE (DELTASONE) 20 MG tablet   Rationale: Does not understand instructions - Adherence - Adherence   Recommendation: Provide Education - Take daily   Status: Patient Agreed - Adherence/Education         Takes dietary supplements    Current Medication: folic acid (FOLVITE) 1 MG tablet   Rationale: Does not understand instructions - Adherence - Adherence   Recommendation: Provide Education - Take daily   Status: Patient Agreed - Adherence/Education

## 2023-08-08 NOTE — LETTER
8/8/2023       RE: Milly Carroll  [address] [place] [id number]     Dear Colleague,    Thank you for referring your patient, Milly Carroll, to the [facility] [place] at [facility]. Please see a copy of my visit note below.    Medication Therapy Management (MTM) Encounter    ASSESSMENT:                            Medication Adherence/Access: See below for considerations    Systemic Lupus Erythematosus:   Patient feeling better since discharge. Currently adherent to medications and discussed adherence tools to maintain compliance moving forward.     Hypertension:   Educated patient on metoprolol and new lisinopril dose, including administration, side effects, and common counseling points. Discussed previous blood pressures and importance of taking daily. Patient will discuss further at follow-up.          Anemia: Stable    Supplements: Stable    GERD: Stable    Hypokalemia:   Level corrected in the hospital and supplementing until follow-up with nephrology later this week.     Pneumonia Infection/C. Diff Prevention:  Upon medication reconciliation, identified patient likely not taking antibiotics as prescribed. Alerted nurse who will plan on following up with patient.     Nausea: Stable    PLAN:                            Attend nephrology appointment scheduled for Thursday, August 10th at 11 am.    Finish potassium chloride 20 mEq course (twice daily until medication gone)    Start taking metoprolol 50 mg daily     Take ondansetron as needed for nausea or vomiting    Start folic acid 1 mg daily    Change your lisinopril so you take 20 mg daily    Change your prednisone so you take 20 mg daily     Follow-up: Return in 5 weeks (on 9/13/2023) for MTM pharmacist visit.    SUBJECTIVE/OBJECTIVE:                          Milly Carroll is a 23 year old female called for an initial visit. She was referred to me from Carlos Steve MD.      Reason  for visit: Medication adherence and education.    Allergies/ADRs: Reviewed in chart  Past Medical History: Reviewed in chart  Tobacco: She reports that she has never smoked. She has never used smokeless tobacco.  Alcohol: not currently using    Medication Adherence/Access: Previous concerns with picking up medications on time and being able to take daily. Reported no issues currently and has a good supply at home. Using an alarm to remember when to take medications which has helped. Educated on the possibilities of having medications delivered or mailed, dispensed in adherence packaging, and utilizing automatic refills or text reminders to  medications.        Systemic Lupus Erythematosus:   Hydroxychloroquine 200 mg daily  Mycophenolate 1500 mg twice daily   Prednisone 20 mg daily  Acetaminophen 500 mg every 4 hours as needed    Patient is feeling much better since discharging from the hospital. Reports she has been taking her medications as prescribed. Typically uses acetaminophen twice daily which works well for pain. No other questions or concerns at this time.                 Hypertension:   Lisinopril 20 mg daily    Metoprolol succinate 50 mg daily (not taking)   Patient reported decreasing lisinopril dose to 10 mg upon discharge. Notes she has medication at home from discharge but has not opened that bag yet.    BP Readings from Last 3 Encounters:   07/26/23 (!) 171/121   07/10/23 (!) 172/126   01/29/23 139/89       Anemia:  Ferrous sulfate 325 mg daily   Patient reported taking every day with food. Has occasional diarrhea but no issues with stomach discomfort.         Supplements:  Multivitamin daily   Vitamin D 10 mcg (400 international unit(s)) daily   Folic acid 1 mg   No concerns at this time.         GERD:  Calcium carbonate (Tums) 500 mg daily as needed   No concerns at this time.     Hypokalemia:   Potassium chloride 20 mEq ER twice daily for 7 days   Reported taking twice daily with food. No  concerns at this time.         Pneumonia Infection/C. Diff Prevention:  Amoxicillin/Clavulanate 875/125 mg twice daily for 7 days   Vancomycin 125 mg twice daily for 14 days   Patient reported taking and finishing course of antibiotics. Prescribed course should still be active. Had bag of medications from hospital discharge that she reported taking daily, so not clear if still taking these medications. Educated and will inform RN to reassess at upcoming appointment.     Nausea:  Ondansetron 4 mg ODT every 8 hours as needed   Not currently using but had issues with nausea and vomiting while taking medications in the hospital. Discussed medication including how and when to use.     Today's Vitals: LMP 05/29/2023 (Approximate)   ----------------  Post Discharge Medication Reconciliation Status: discharge medications reconciled, continue medications without change.    I spent 25 minutes with this patient today. All changes were made via collaborative practice agreement with Carlos Steve MD. A copy of the visit note was provided to the patient's provider(s).    A summary of these recommendations was sent via [employer].    Renu MacarioD  Medication Therapy Management Pharmacist  [facility]  Phone: [phone]    Higinio Lee PharmD  Medication Therapy Management Pharmacist  [facility]  Phone: [phone]     Telemedicine Visit Details  Type of service:  Telephone visit  Start Time:  11:00 am  End Time:  11:25 am     Medication Therapy Recommendations  Essential hypertension    Current Medication: lisinopril (ZESTRIL) 20 MG tablet   Rationale: Does not understand instructions - Adherence - Adherence   Recommendation: Provide Education - Take daily   Status: Patient Agreed - Adherence/Education          Current Medication: metoprolol succinate ER (TOPROL XL) 50 MG 24 hr tablet   Rationale: Does not understand instructions - Adherence - Adherence    Recommendation: Provide Education - Take once daily   Status: Patient Agreed - Adherence/Education         Hypokalemia    Current Medication: potassium chloride ER (KLOR-CON M) 20 MEQ CR tablet   Rationale: Does not understand instructions - Adherence - Adherence   Recommendation: Provide Education - Take twice daily with food   Status: Patient Agreed - Adherence/Education         Nausea    Current Medication: ondansetron (ZOFRAN ODT) 4 MG ODT tab   Rationale: Does not understand instructions - Adherence - Adherence   Recommendation: Provide Education - Take three times daily as needed   Status: Patient Agreed - Adherence/Education         Systemic lupus erythematosus (H)    Current Medication: predniSONE (DELTASONE) 20 MG tablet   Rationale: Does not understand instructions - Adherence - Adherence   Recommendation: Provide Education - Take daily   Status: Patient Agreed - Adherence/Education         Takes dietary supplements    Current Medication: folic acid (FOLVITE) 1 MG tablet   Rationale: Does not understand instructions - Adherence - Adherence   Recommendation: Provide Education - Take daily   Status: Patient Agreed - Adherence/Education                Again, thank you for allowing me to participate in the care of your patient.      Sincerely,    BRIDGER DENISE RP

## 2023-08-09 NOTE — TELEPHONE ENCOUNTER
Confirmed with patient today of Lab and Clinic appt times tomorrow as well as patient is taking Vancomycin ongoing as script is for 2 weeks.    Oralia Rodriguez RN, BSN, PHN  [facility] Nurse Navigator  [phone]

## 2023-08-10 NOTE — ED PROVIDER NOTES
History     Chief Complaint:  Abnormal Labs       HPI   Milly Carroll is a 23 year old female with history of lupus and HTN who presents with abnormal lab results. The patient reports that she was at a nephrology appointment who told her to present to urgent care due to having blood in her stool. She explains that urgent care found low blood counts and gave her a blood transfusion. She presents to the ED for further workup post-transfusion, including an ultrasound.  Her nephrologist did order a blood transfusion although she was unable to receive it prior to arrival.  Milly complains of back pain, mild fever, chills, and cough for a week. The patient denies abdominal pain.    Independent Historian:   None - Patient Only    Review of External Notes:   Reviewed external records. She was recently admitted to [facility] for sepsis secondary to pneumonia. Discharged on 7/31/23.       Medications:    Augmentin  Toprol  Zofran  Vancocin  Plaquenil  Zestril  Deltasone    Past Medical History:    Hepatitis  Lupus   Pyelonephritis  Seizures  Status epilepticus  Knee osteonecrosis  Hypokalemia  Functional visual loss  Sepsis  Immunosuppression  Pancreatitis  Thrombocytopenia  Anemia  HTN    Past Surgical History:    Renal biopsy  Orthopedic surgery    Physical Exam   Patient Vitals for the past 24 hrs:   BP Temp Temp src Pulse Resp SpO2   08/11/23 0058 (!) 194/135 97.9  F (36.6  C) -- 98 22 --   08/10/23 2345 -- 97.9  F (36.6  C) -- -- -- --   08/10/23 2315 (!) 189/133 -- -- 95 30 96 %   08/10/23 2300 -- 98.1  F (36.7  C) -- -- -- 94 %   08/10/23 2215 (!) 197/133 97.9  F (36.6  C) -- 89 28 98 %   08/10/23 2203 (!) 193/136 98.2  F (36.8  C) -- 97 (!) 32 --   08/10/23 2130 (!) 192/135 -- -- 98 26 (!) 88 %   08/10/23 2115 (!) 192/134 -- -- 105 22 --   08/10/23 2110 (!) 192/132 98.9  F (37.2  C) Oral 101 23 98 %   08/10/23 1945 (!) 192/136 -- -- -- -- --   08/10/23 1759 (!) 191/135 98.1  F (36.7  C) -- 61 18 100 %         Physical Exam  General: Awake, alert, in no acute distress   HEENT: Atraumatic   EOM normal   External ears normal   Trachea midline  Neck: Supple, normal ROM  CV: Regular rate and rhythm   No murmur   1+ lower extremity edema  PULM: Crackles in bases bilaterally  ABD: Soft, non-tender, non-distended. Normal bowel sounds   No rebound or guarding   Left flank tenderness  MSK: No gross deformities  NEURO: Alert, no focal deficits  Skin: Warm, dry and intact    Emergency Department Course   EKG:  ECG results from 08/10/23   EKG 12-lead, tracing only     Value    Systolic Blood Pressure     Diastolic Blood Pressure     Ventricular Rate 88    Atrial Rate 88    AK Interval 150    QRS Duration 72        QTc 488    P Axis 27    R AXIS -46    T Axis 39    Interpretation ECG      Sinus rhythm  Left axis deviation  Abnormal ECG  When compared with ECG of 22-JAN-2023 23:53,  Nonspecific T wave abnormality now evident in Lateral leads        Imaging:  CTA Chest Abdomen Pelvis w Contrast   Final Result   Abnormal   IMPRESSION:   1.  Left perinephric hematoma measuring 5.5 cm. No active extravasation within the hematoma. However, a 0.3 cm arterially hyperenhancing focus in the left renal lower pole is suspicious for pseudoaneurysm.      2.  Left lower lobe consolidation, compatible with pneumonia. Small groundglass opacity in the right upper lobe, likely infectious or inflammatory.      3.  Mild interstitial and alveolar pulmonary edema. Trace bilateral pleural effusions. Small pericardial effusion. Trace abdominal and pelvic ascites. Mild anasarca.      4.  Decreased size of mildly enlarged upper abdominal and retroperitoneal lymphadenopathy. Resolved pelvic lymphadenopathy.         [Critical Result: Left renal pseudoaneurysm and perinephric hematoma]      Finding was identified on 8/10/2023 10:26 PM CDT.       Dr. Sumi Lau was contacted by me on 8/10/2023 10:36 PM CDT and verbalized understanding of the  critical result.          Report per radiology    Laboratory:  Labs Ordered and Resulted from Time of ED Arrival to Time of ED Departure   CBC WITH PLATELETS AND DIFFERENTIAL - Abnormal       Result Value    WBC Count 4.0      RBC Count 2.21 (*)     Hemoglobin 6.3 (*)     Hematocrit 21.1 (*)     MCV 96      MCH 28.5      MCHC 29.9 (*)     RDW 16.8 (*)     Platelet Count 129 (*)     % Neutrophils 57      % Lymphocytes 31      % Monocytes 9      % Eosinophils 2      % Basophils 0      % Immature Granulocytes 1      NRBCs per 100 WBC 0      Absolute Neutrophils 2.3      Absolute Lymphocytes 1.2      Absolute Monocytes 0.4      Absolute Eosinophils 0.1      Absolute Basophils 0.0      Absolute Immature Granulocytes 0.0      Absolute NRBCs 0.0     TROPONIN T, HIGH SENSITIVITY - Abnormal    Troponin T, High Sensitivity 35 (*)    NT PROBNP INPATIENT - Abnormal    N terminal Pro BNP Inpatient 65,906 (*)    MAGNESIUM - Abnormal    Magnesium 1.6 (*)    COMPREHENSIVE METABOLIC PANEL - Abnormal    Sodium 141      Potassium 4.0      Chloride 111 (*)     Carbon Dioxide (CO2) 19 (*)     Anion Gap 11      Urea Nitrogen 11.5      Creatinine 0.74      Calcium 8.1 (*)     Glucose 92      Alkaline Phosphatase 122 (*)     AST 24      ALT <5      Protein Total 6.3 (*)     Albumin 2.8 (*)     Bilirubin Total 0.4      GFR Estimate >90     PARTIAL THROMBOPLASTIN TIME - Abnormal    aPTT 42 (*)    TROPONIN T, HIGH SENSITIVITY - Abnormal    Troponin T, High Sensitivity 26 (*)    INR - Normal    INR 1.10     ISTAT CREATININE POCT - Normal    Creatinine POCT 0.5      GFR, ESTIMATED POCT >60     ISTAT HCG QUALITATIVE PREGNANCY POCT - Normal    HCG Qualitative POCT Negative     URINE MACROSCOPIC WITH REFLEX TO MICRO   ISTAT CREATININE POCT   TYPE AND SCREEN, ADULT    ABO/RH(D) O POS      Antibody Screen Negative      SPECIMEN EXPIRATION DATE [id number]     PREPARE RED BLOOD CELLS (UNIT)    Blood Component Type Red Blood Cells      Product Code  S6713E70      Unit Status Transfused      Unit Number [id number]      CROSSMATCH Compatible      CODING SYSTEM [id number]      ISSUE DATE AND TIME [id number]      UNIT ABO/RH O+      UNIT TYPE ISBT 5100     PREPARE RED BLOOD CELLS (UNIT)    Blood Component Type Red Blood Cells      Product Code K1903Z69      Unit Status Transfused      Unit Number [id number]      CROSSMATCH Compatible      CODING SYSTEM [id number]      ISSUE DATE AND TIME [id number]      UNIT ABO/RH O+      UNIT TYPE ISBT 5100     PREPARE RED BLOOD CELLS (UNIT)   TRANSFUSE RED BLOOD CELLS (UNIT)   TRANSFUSE RED BLOOD CELLS (UNIT)   ABO/RH TYPE AND SCREEN          Emergency Department Course & Assessments:      Interventions:  Medications   0.9% sodium chloride BOLUS (250 mLs Intravenous $New Bag 8/10/23 1945)   fentaNYL (PF) (SUBLIMAZE) injection 50 mcg (50 mcg Intravenous $Given 8/10/23 2105)   magnesium sulfate 2 g in 50 mL sterile water intermittent infusion (0 g Intravenous Stopped 8/11/23 0042)   ondansetron (ZOFRAN) injection 4 mg (4 mg Intravenous $Given 8/10/23 2120)   sodium chloride (PF) 0.9% PF flush 100 mL (60 mLs Intravenous $Given 8/10/23 2154)   iopamidol (ISOVUE-370) solution 500 mL (80 mLs Intravenous $Given 8/10/23 2154)   furosemide (LASIX) injection 40 mg (40 mg Intravenous $Given 8/11/23 0109)        Assessments:  1900 I obtained history and examined the patient as noted above  2134 I rechecked the patient and explained findings  2214 I rechecked the patient again. Unchanged symptoms, placed on 2L oxygen    Independent Interpretation (X-rays, CTs, rhythm strip):  None    Consultations/Discussion of Management or Tests:  2254 I spoke with Dr. Biggs, IR, regarding the patient.  Discussed the case in detail.  Given her clinical stability, no obvious active extravasation, non-peritonitic, agree that angiography in the morning is appropriate unless patient shows signs or symptoms of decompensation overnight such as  hypotension, increasing abdominal pain, drop in hemoglobin, change in mental status, etc.  2300 I spoke with Dr. Avila, hospitalist, who accepts the patient to [facility].     ED Course as of 08/11/23 0112   Thu Aug 10, 2023   2233 Left perinephric hematoma   Pseudoaneurysm lower pole 3 mm         Social Determinants of Health affecting care:   None    Disposition:  The patient was transferred to [facility]. Dr. Avila accepted the patient for transfer.    Impression & Plan      Medical Decision Making:  Ill-appearing patient who has a complicated medical history including lupus, concern for recent lower GI bleeding, hospitalization for severe sepsis pneumonia  She is here with known anemia identified at her nephrology follow-up today  Underwent recent renal biopsy  ED workup is notable for anemia with hemoglobin of 6.3.  Will transfuse 2 units  I ordered CT chest abdomen pelvis to rule out pulmonary as well as him as a cause of her dyspnea well as evaluate intra-abdominal pathology  Imaging is notable for small pseudoaneurysm around the left kidney as well as left perinephric hematoma.  There is otherwise no significant retroperitoneal blood or intra-abdominal blood products  She is also noted to have bilateral small pleural effusions -patient did develop mild hypoxia was 86% requiring 2 L in the ED  She appears mildly clinically fluid overloaded although does have a BNP that is in the 60,000 range.  Possibly related to her required resuscitation at her recent hospitalization.  There is no pulmonary embolism.  It was elevated although downtrending.  EKG is nonischemic.  History of mildly reduced EF heart failure with last EF between 40 and 45 few months ago. Will give single dose Lasix here to prevent significant fluid overload in the setting of her receiving fluids with blood products as well as for magnesium replacement  Considered DIC although coags are not suggestive of this  Patient has no active  bleeding from her rectum therefore no indication for emergent colonoscopy at this time  She does have findings of left lower lobe infiltrate which is likely representative of her known pneumonia -she is on outpatient antibiotics and do not have reason to believe this is the cause of her decompensation or anemia today  Discussed with interventional radiology Dr. Biggs as above.  Recommending transfer to [facility] [place] [facility] for likely angiography in the morning unless patient decompensates overnight  Hospitalist Dr. Avila kindly accepts patient as direct admission to IMCU at [facility]  Discussed plan with patient who voices understanding and agreement.  All questions answered          Diagnosis:    ICD-10-CM    1. Bright red blood per rectum  K62.5       2. Anemia, unspecified type  D64.9       3. Renal hematoma, left, initial encounter  S37.012A       4. Hypomagnesemia  E83.42                Scribe Disclosure:  I, Jeremy Reynaga, am serving as a scribe at 6:58 PM on 8/10/2023 to document services personally performed by Sumi Nix DO based on my observations and the provider's statements to me.   8/10/2023   Sumi Nix DO Pappas Richter, Ellen, DO  08/11/23 0112

## 2023-08-10 NOTE — NURSING NOTE
Chief Complaint   Patient presents with    RECHECK       BP (!) 196/130   Pulse 101   Temp 99  F (37.2  C) (Oral)   Wt 52.3 kg (115 lb 3.2 oz)   LMP 05/29/2023 (Approximate)   SpO2 100%   BMI 21.42 kg/m      Horacio Juárez on 8/10/2023 at 10:37 AM

## 2023-08-10 NOTE — LETTER
8/10/2023       RE: Milly Carroll  [address] [address]     Dear Colleague,    Thank you for referring your patient, Milly Carroll, to the [facility] [place] at [facility]. Please see a copy of my visit note below.    Let Nephrology RN know of high BP'S  and patient feels SOB , oxygen is 100 %    Horacio Juárez on 8/10/2023 at 10:41 AM      Patient denies headaches, dizziness, lightheadedness when standing.  No numbness, tingling or weakness in arms or legs. Bps ran high while in the hospital as well.  Confirms SOB but resolving since being treated for pneumonia last week.  No chest pressure, able to catch breath, and denies need for oxygen.  Denies any pain.            Nephrology clinic follow up  8/10/23    Milly Carroll MRN:[id number] YOB: 2000  Date of Admission:(Not on file)  Primary care provider: Estefany Bell  Requesting physician: April Purvis, *      REASON FOR CONSULT:       HISTORY OF PRESENT ILLNESS:    Milly was initially diagnosed with lupus when she was 7 years old. 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 She has had varying course of disease since her diagnosis. She has most recently being treated with Cellcept 1500 mg BID, Plaquenil 200 mg BID and prednisone 5 mg daily. She was on Benlysta infusion in 2019 that was later switched to Benlysta subcutaneous in 2021. She is not receiving Benlysta for last many months. She was treated with Rituximab since 2013 but she developed anaphylactic reaction during Rituximab infusion in 12/2019.    She has had persistent hematuria and proteinuria since 2021 however a lot of these samples were contaminated. She has had on and off documented proteinuria which  is 0.3-0.7 g/g since 2013. She has always maintained her creatinine at baseline of 0.5-0.6 mg/dl however during most recent admission in 6/22, she had an elevation in her creatinine to 0.9 mg/dl. At the time, she was treated with IV solumedrol 1 gm x 3 and discharged home on prednisone 60 mg daily. Her cellcept was decreased down to 1000 mg BID during that admission for concerns of toxicity.   Today she denies any particular symptoms but continues to have persistently elevated dsDNA and significantly low complements.     Patient denies any LE edema, exertional dyspnea/orthopnea/PND, dizziness, lightheadedness, nausea, vomiting or diarrhea.   Denies use of OTC NSAIDS or other non prescribed meds, recent exposure to abx or contrast, obstructive urinary symptoms, skin rashes, joint pains, fevers, passing kidney stones, recurrent sinus infections or UTI's       PAST MEDICAL HISTORY:  Reviewed with patient on 08/10/2023   As per HPI    MEDICATIONS:  Reviewed with the patient in detail    ALLERGIES:    Reviewed with the patient in detail    REVIEW OF SYSTEMS:  A comprehensive of systems was negative except as noted above.    SOCIAL HISTORY:   Reviewed with patient, no smoking and no alcohol use     FAMILY MEDICAL HISTORY:   Reviewed, no family history of need for dialysis, transplant or CKD    PHYSICAL EXAM:   Vital signs:BP (!) 196/130   Pulse 101   Temp 99  F (37.2  C) (Oral)   Wt 52.3 kg (115 lb 3.2 oz)   LMP 05/29/2023 (Approximate)   SpO2 100%   BMI 21.42 kg/m      Physical Exam       Gen: Appears well  Neck: No JVD  Lungs: CTA  CVS: S1S2 normal, no murmurs heard  LE: no edema  Skin: no rashes  CNS: Grossly normal     Interval history 12/29/22: Since we last spoke, she did have a kidney bx which went well. Unfortunately, it was hard to get in touch with her after that. Currently she reports of taking her cellcept 1500 mg BID as prescribed. She was admitted to the hospital 12/22 with cytopenias and undetectable  level of MMF, concern for lupus flare and medication non adherence. Her dsDNA was >1000 and she was started back on cellcept 1500 mg BID along with a prednisone taper. Her creatinine peaked at 0.99 mg/dl from baseline of ~ 0.6 and her urine protein was at 2.26 g/g along with an active urinary sediment. She reports she is doing well after the hospitalization and she denies any symptoms at this time. Her aches and pains are better and she only has back pain at this time. She remains on prednisone taper.     Interval history 7/10/23: Since I last saw her, patient was in [place]. Moved back to [place] this summer and was out of her immunosuppression for atleast a few months. She presented to the ED at [facility] with vomiting and diarrhea and then transferred to [facility] for rheumatology consultation She was also diagnosed with C diff infection at the time.She was also thought to be in lupus flare and had an CHRISTA with creatinine of 1.58. UA with hematuria and proteinuria, UPCR of 2.7 g/g.She did have a C3 of 23 and C4 of 6 with dsDNA at 1446.    She was started on solumedrol 60 mg daily x 2 days and was discharged from the hospital with prednisone taper 40 mg daily for 1 week then 30 mg daily for 1 week then 20 mg daily for 1 week then 10 mg daily. Today,she tells me that she is on 0.5 mg of prednisone.  She was started back on  mg daily, and  mg BID       Interval history 8/10/23: Milly was admitted to the hospital initially at [facility] when she presented with dyspnea and was found to have sepsis with left lower lobe pneumonia.  She also had a hemoglobin of 5.6 and she was admitted to the ICU where she received vancomycin and Zosyn.  There was a concern of lower GI bleed, however I am unable to find a colonoscopy that was done.  She does report that the bleeding has stopped though.  She was found to have CHRISTA with a peak and a creatinine to 1.91, and creatinine improved to 1.05 mg/dl on discharge.  She did have a kidney bx on 8/2. She was discharged with Augmentin and PO Vancomycin which she is currently on it which she will continue for 5 more days. She was also anemic and thrombocytopenic during the admission, hemolysis was ruled out and she was started on prednisone 20 mg daily. Today she comes for a follow up. She initially reported mild shortness of breath but says overall she is feeling much better.     ASSESSMENT AND RECOMMENDATIONS:     # Lupus nephritis - class III  # SLE (+DsDNA, +RNP, +ribosomal P ab)  # h/o lupus cerebritis, seizures   # h/o rt knee osteonecrosis   # b/l AVN of hips b/l    Her hematuria and proteinuria along with elevated creatinine during her first presentation in 6/22 is an indication of active LN. She never had a kidney biopsy and was on Cellcept 1500 mg BID prior to this admission.   I did perform a kidney bx which showed class III lupus nephritis with minimal activity, 1/10 gloms showing endocapillary proliferation while others have mesangial hypercellularity. There was some concern of her being adherent to cellcept. It was difficult to get in touch with her since until she had another admission 12/22 with a lupus flare and undetectable cellcept levels with peak in creatinine to 0.99 and UPCR increased to 2.2 g.g. She was restarted on cellcept 1500 mg BID and prednisone taper.    Unfortunately, she was lost to follow up again when she moved to [place] and was again stopped taking IS for a few months when she presented with C diff and lupus flare. Creatinine peaked to 1.58 which has now improved to 0.86 g/g still higher than her baseline of 0.5-0.6 mg/dl.Has a UPCR of 13 g/g.   I increased her MMF to 1500 mg BID at the time and planned to obtain a repeat kidney bx.   Unfortunately she had another admission with pneumonia and had CHRISTA with creatinine upto 1.9 mg/d. She eventually had a kidney bx on 8/2/23 which was consistent with diffuse proliferative, sclerosing LN. There were  2% active crescents, 15% fibrous crescents. 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     Her creatinine has improved over time during the hospitalization down to 0.7 g/g however she continues to have 12 g of protein and significant immunologic activity with low complements and dsDNA. She has significant hypertension and currently on Lisinopril 20 mg daily and metoprolol 50 mg daily. She has anemia, thrombocytopenia which I think is also related to her very active lupus.     --With her hemoglobin being down to 6.1, I prescribed for 2 U PRBC transfusion  --arrange for stat US kidneys   --I will increase her prednisone to 60 mg daily  --start clonidine 0.1 mg BID for now, will eventually readjust her antihypertensives.   --I will arrange for Cyclophosphamide Eurolupus regimen, however will wait for her to complete her current antibiotic course for pneumonia     April Purvis MD

## 2023-08-10 NOTE — TELEPHONE ENCOUNTER
Time of Call: 1049    Depart/facility results are coming from: [facility] [place] [facility]    Phone number: [phone]    CRITICAL RESULTS: Hgb 6.1     Results taken by: Kristina Lucero LPN                 Diagnosis: Lupus      Ordering provider: Dr April Purvis    Course of Action: Sent to Oralia Rodriguez, [facility]    Kristina Lucero LPN  Nephrology  [phone]

## 2023-08-10 NOTE — PROGRESS NOTES
Let Nephrology RN know of high BP'S  and patient feels SOB , oxygen is 100 %    Horacio Juárez on 8/10/2023 at 10:41 AM

## 2023-08-10 NOTE — TELEPHONE ENCOUNTER
Updated Dr. Purvis and patient was seen in clinic today.  Patient assessment completed and Dr. Purvis ordered 2 units of PRBC to be given at [facility] tomorrow, first available after T&C to be completed later today.  Patient symptoms are consistent with hospital stay and starting to resolve per patient report.  Denies need to go back to the hospital.  Clinic Consent for blood was signed and witnessed and faxed over to [facility] [phone].  We cancelled IV KCL replacement r/t timing.  Instead we will be increasing patient oral K replacement protocol.  Instructions reviewed with patient who denies questions or concerns at this time and verbalized appts set for tomorrow.

## 2023-08-10 NOTE — PROGRESS NOTES
Nephrology clinic follow up  8/10/23    Milly Carroll MRN:[id number] YOB: 2000  Date of Admission:(Not on file)  Primary care provider: Estefany Bell  Requesting physician: April Purvis, *      REASON FOR CONSULT:       HISTORY OF PRESENT ILLNESS:    Milly was initially diagnosed with lupus when she was 7 years old. 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 She has had varying course of disease since her diagnosis. She has most recently being treated with Cellcept 1500 mg BID, Plaquenil 200 mg BID and prednisone 5 mg daily. She was on Benlysta infusion in 2019 that was later switched to Benlysta subcutaneous in 2021. She is not receiving Benlysta for last many months. She was treated with Rituximab since 2013 but she developed anaphylactic reaction during Rituximab infusion in 12/2019.    She has had persistent hematuria and proteinuria since 2021 however a lot of these samples were contaminated. She has had on and off documented proteinuria which is 0.3-0.7 g/g since 2013. She has always maintained her creatinine at baseline of 0.5-0.6 mg/dl however during most recent admission in 6/22, she had an elevation in her creatinine to 0.9 mg/dl. At the time, she was treated with IV solumedrol 1 gm x 3 and discharged home on prednisone 60 mg daily. Her cellcept was decreased down to 1000 mg BID during that admission for concerns of toxicity.   Today she denies any particular symptoms but continues to have persistently elevated dsDNA and significantly low complements.     Patient denies any LE edema, exertional dyspnea/orthopnea/PND, dizziness, lightheadedness, nausea, vomiting or diarrhea.   Denies use of OTC NSAIDS or other non prescribed meds, recent exposure to abx or contrast, obstructive urinary  symptoms, skin rashes, joint pains, fevers, passing kidney stones, recurrent sinus infections or UTI's       PAST MEDICAL HISTORY:  Reviewed with patient on 08/10/2023   As per HPI    MEDICATIONS:  Reviewed with the patient in detail    ALLERGIES:    Reviewed with the patient in detail    REVIEW OF SYSTEMS:  A comprehensive of systems was negative except as noted above.    SOCIAL HISTORY:   Reviewed with patient, no smoking and no alcohol use     FAMILY MEDICAL HISTORY:   Reviewed, no family history of need for dialysis, transplant or CKD    PHYSICAL EXAM:   Vital signs:BP (!) 196/130   Pulse 101   Temp 99  F (37.2  C) (Oral)   Wt 52.3 kg (115 lb 3.2 oz)   LMP 05/29/2023 (Approximate)   SpO2 100%   BMI 21.42 kg/m      Physical Exam       Gen: Appears well  Neck: No JVD  Lungs: CTA  CVS: S1S2 normal, no murmurs heard  LE: no edema  Skin: no rashes  CNS: Grossly normal     Interval history 12/29/22: Since we last spoke, she did have a kidney bx which went well. Unfortunately, it was hard to get in touch with her after that. Currently she reports of taking her cellcept 1500 mg BID as prescribed. She was admitted to the hospital 12/22 with cytopenias and undetectable level of MMF, concern for lupus flare and medication non adherence. Her dsDNA was >1000 and she was started back on cellcept 1500 mg BID along with a prednisone taper. Her creatinine peaked at 0.99 mg/dl from baseline of ~ 0.6 and her urine protein was at 2.26 g/g along with an active urinary sediment. She reports she is doing well after the hospitalization and she denies any symptoms at this time. Her aches and pains are better and she only has back pain at this time. She remains on prednisone taper.     Interval history 7/10/23: Since I last saw her, patient was in [place]. Moved back to [place] this summer and was out of her immunosuppression for atleast a few months. She presented to the ED at [facility] with vomiting and diarrhea and then  transferred to [facility] for rheumatology consultation She was also diagnosed with C diff infection at the time.She was also thought to be in lupus flare and had an CHRISTA with creatinine of 1.58. UA with hematuria and proteinuria, UPCR of 2.7 g/g.She did have a C3 of 23 and C4 of 6 with dsDNA at 1446.    She was started on solumedrol 60 mg daily x 2 days and was discharged from the hospital with prednisone taper 40 mg daily for 1 week then 30 mg daily for 1 week then 20 mg daily for 1 week then 10 mg daily. Today,she tells me that she is on 0.5 mg of prednisone.  She was started back on  mg daily, and  mg BID       Interval history 8/10/23: Milly was admitted to the hospital initially at [facility] when she presented with dyspnea and was found to have sepsis with left lower lobe pneumonia.  She also had a hemoglobin of 5.6 and she was admitted to the ICU where she received vancomycin and Zosyn.  There was a concern of lower GI bleed, however I am unable to find a colonoscopy that was done.  She does report that the bleeding has stopped though.  She was found to have CHRISTA with a peak and a creatinine to 1.91, and creatinine improved to 1.05 mg/dl on discharge. She did have a kidney bx on 8/2. She was discharged with Augmentin and PO Vancomycin which she is currently on it which she will continue for 5 more days. She was also anemic and thrombocytopenic during the admission, hemolysis was ruled out and she was started on prednisone 20 mg daily. Today she comes for a follow up. She initially reported mild shortness of breath but says overall she is feeling much better.     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 (class III in 6/22, < 5% IFTA)  # SLE (+DsDNA, +RNP, +ribosomal P ab)  # h/o lupus cerebritis, seizures   # h/o rt knee osteonecrosis   # b/l AVN of hips  b/l    Her hematuria and proteinuria along with elevated creatinine during her first presentation in 6/22 is an indication of active LN. She never had a kidney biopsy and was on Cellcept 1500 mg BID prior to this admission.   I did perform a kidney bx which showed class III lupus nephritis with minimal activity, 1/10 gloms showing endocapillary proliferation while others have mesangial hypercellularity. There was some concern of her being adherent to cellcept. It was difficult to get in touch with her since until she had another admission 12/22 with a lupus flare and undetectable cellcept levels with peak in creatinine to 0.99 and UPCR increased to 2.2 g.g. She was restarted on cellcept 1500 mg BID and prednisone taper.    Unfortunately, she was lost to follow up again when she moved to [place] and was again stopped taking IS for a few months when she presented with C diff and lupus flare. Creatinine peaked to 1.58 which has now improved to 0.86 g/g still higher than her baseline of 0.5-0.6 mg/dl.Has a UPCR of 13 g/g.   I increased her MMF to 1500 mg BID at the time and planned to obtain a repeat kidney bx.   Unfortunately she had another admission with pneumonia and had CHRISTA with creatinine upto 1.9 mg/d.     She eventually had a kidney bx on 8/2/23 which was consistent with diffuse proliferative, sclerosing LN. There were 2% active crescents, 15% fibrous crescents. 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 With comparison to her old kidney bx, she has significantly increased chronicity on this sample.     Her creatinine has improved over time during the hospitalization down to 0.7 g/g however she continues to have 12 g of protein and significant immunologic activity with low complements and dsDNA. She has significant  hypertension and currently on Lisinopril 20 mg daily and metoprolol 50 mg daily. She has anemia, thrombocytopenia which I think is also related to her very active lupus.     --With her hemoglobin being down to 6.1, I prescribed for 2 U PRBC transfusion  --arrange for stat US kidneys   --I will increase her prednisone to 60 mg daily  --start clonidine 0.1 mg BID for now, will eventually readjust her antihypertensives.   --given that she has persistently active LN and infact SLE, with significant immunologic activity, I will arrange for Cyclophosphamide Eurolupus regimen, however will wait for her to complete her current antibiotic course for pneumonia which is 5 days from now    April Purvis MD

## 2023-08-10 NOTE — PROGRESS NOTES
Patient denies headaches, dizziness, lightheadedness when standing.  No numbness, tingling or weakness in arms or legs. Bps ran high while in the hospital as well.  Confirms SOB but resolving since being treated for pneumonia last week.  No chest pressure, able to catch breath, and denies need for oxygen.  Denies any pain.

## 2023-08-10 NOTE — ED TRIAGE NOTES
Was seen at  with high BP, low HG, and low K. MD at  would like patient transferred to the [facility] intact in triage.

## 2023-08-11 PROBLEM — S37.019A PERINEPHRIC HEMATOMA: Status: ACTIVE | Noted: 2023-01-01

## 2023-08-11 NOTE — PROVIDER NOTIFICATION
With gross hemoptysis, diastolic heart failure, hypoxemia and inability to lay flat as well as malignant hypertension, when patient arrived in IM, identified that patient required escalation of care to ICU.    Met with ICU provider here at [facility], and we assessed patient at bedside together.  It was agreed that patient likely would require a bronchoscopy at some point and was appropriate for ICU level monitoring.  Initially was planning to transfer to ICU here at [facility], but ICU beds were available at the [facility], where it appears initial transfer had been planned for, so attempted to facilitate transfer to the [facility]'s ICU where rheumatology and her primary nephrology team will be available.  No rheumatology here at [facility], and patient does appear to have fulminant lupus with hemoptysis, lupus nephritis.    Discussed with ICU team at the [facility], and patient was accepted for transfer, pending better control of blood pressure.  At time of transfer request, ordered nitroglycerin had not yet been initiated    Patient was initiated on a nitroglycerin drip with additional diuretic dosing, clonidine.    Received 2 units of packed red blood cells which were initiated at [facility].  Recheck hemoglobin in the 9 range from 6.    Heart rate increased during her several hour stay here at [facility], potentially related to clonidine withdrawal, but also in the setting of heart failure.  With improvement in blood pressures to the 170's range, patient did have improvement in her shortness of breath, and appears more comfortable.  Still with hemoptysis, total of approximately 4 to 5 tablespoons of blood here at [facility], protecting airway.    Updated [facility] ICU team on generally improved change in condition.    Discharge orders placed and EMS transfer requested.    I was subsequently informed that lactic acid was elevated at 6.6.  This is  likely related to heart failure as previously noted.  On diuretic therapies and attempting control of blood pressure.  Fortunately, troponin trend was stable.  I did request a repeat EKG be performed prior to transfer, and this is pending.    Mukul Way MD (overnight)  [phone].  Call with questions    -----------------------------------------------------    Addendum:   Beds no longer available at [facility].    EKG demonstrated sinus tachycardia    Discussed with Dr. Gonzalez, who saw patient, will place pulmonary consult for possible bronchoscopy    [facility] Dr. [facility]gu[facility]    Patient is improving with blood pressure control, though still critically ill.

## 2023-08-11 NOTE — H&P
[facility]    History and Physical - Hospitalist Service       Date of Admission:  8/11/2023    Assessment & Plan      Milly Carroll is a 23 year old female admitted on 8/11/2023. 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 but with several recent admissions to [facility].  Today was seen by her nephrologist where there was concern for active lupus and prednisone was increased to 40 mg daily; labs prior to appointment with hemoglobin of 6.1.  Subsequent worsening cough and went to [facility] urgent care before being [facility] department.  At [facility], a CT of the chest abdomen and pelvis was performed demonstrating likely pseudoaneurysm with perinephric hematoma on the left in the setting of recent left renal biopsy.  Also noted to have left lower lobe pneumonia, present at [facility] hospitalization late July as well.  Blood transfusions were initiated, and patient developed hemoptysis as well as uncontrolled blood pressure in the 200 systolic range.  Has a history of some heart failure with most recent ejection fraction normalized as of [facility] echo in July.  Significantly elevated BNP, significant orthopnea, bipedal edema.  Tells me that she has not been able to lay flat in her bed since even during her hospitalization at [facility] from 7/31 - 8/4/2023.  Though CT chest with area concerning for pneumonia in the left lower lobe, current hemoptysis increases concern that this actually represents active vasculitis.      On arrival at [facility] patient with approximately 1 tablespoon of melanie blood with hemoptysis (not clot).  She tells me that she has been coughing up blood since around the time that blood transfusion initiated at [facility].  She is unable to lay flat, mild hypoxia sitting upright, and reports that she is  tiring in terms of her work of breathing.  Blood pressure is elevated in the 200 systolic range.    In the setting of perinephric hematoma with pseudoaneurysm, Dr. Biggs of interventional radiology at [facility] was aware prior to transfer from [facility].  Given patient's current presentation on intermediate care unit with hemoptysis and unable to lie flat, I have concerned that patient will be unable to tolerate any interventional radiology procedure without intubation.  Discussed with ICU provider, who evaluated patient at bedside as well.  Plan was made to transfer patient to the ICU.  She will likely require bronchoscopy at some point, though not emergently at this time.  If bronchoscopy was needed more acutely, would need to be intubated for this.  Given that she is ICU level of care, discussed with patient placement and was informed that there are indeed beds available at [facility].  Attempting to facilitate transfer to ICU at [facility]    Perinephric hematoma with suspected pseudoaneurysm:  -Interventional radiology consulted, Dr. Biggs aware here at [facility].  Now transferring to [facility]  -Control of blood pressure as below  -Will need to treat patient's diastolic heart failure as she is currently unable to lay flat for procedure.  See below regarding treatment of malignant hypertension.    Acute blood loss anemia: Multifactorial with recent suspected GI bleed at [facility] hospitalization, though she reports that she is no longer having any bloody or black stool, gross hemoptysis, perinephric hematoma following 8/2/2023 renal biopsy  -Receiving the second of 2 units packed red blood cells    Active lupus flare:  Lupus cerebritis  Lupus nephritis  Pulmonary hemorrhage suspect related to lupus flare, gross hemoptysis  -Prednisone 40 mg daily; may need to switch to higher dose Solu-Medrol, potentially 1 g daily with decompensated flare  -Nephrology  consulted  -Following transfer for to [facility], rheumatology consultation would be recommended.  Not available here at [facility]  -Resume prior to admission Plaquenil, mycophenolate when reconciled by pharmacy.  -Peripheral IV x 2 minimum  -Continue folic acid supplementation    Malignant hypertension: Patient appears to have diastolic heart failure with some overlap associated with her gross hemoptysis.  Mildly elevated troponin with hypoxia and what appears to be diastolic heart failure.  -Clonidine 0.1 mg twice daily plus every 4 hours as needed 0.1 mg dosing for systolic low prescription 180  -Initiating nitroglycerin drip, goal blood pressure less than 180  -Metoprolol XL 50 mg daily ordered  -Holding on lisinopril given anticipated procedural intervention with IR team for pseudoaneurysm.  -Limited TTE to evaluate diastolic dysfunction    Acute respiratory failure with hypoxia, orthopnea: Multifactorial secondary to diastolic heart failure with acute exacerbation, left lower lobe pneumonia, and alveolar hemorrhage  Left lower lobe pneumonia: Possible.  Suspect also contribution from vasculitis with hemoptysis and orthopnea.  Note significantly elevated procalcitonin through outside records with recent hospitalization at [facility].  Treated with vancomycin and Zosyn at that time, discharged on Augmentin.  No fevers, though tells me that she did have 1 fever at [facility].  No shaking chills.  No leukocytosis.  She is immunosuppressed on CellCept  -Continue IV Zosyn now while hospitalized  -Repeat procalcitonin pending           Diet:  N.p.o. per anesthesia guidelines  DVT Prophylaxis: Pneumatic Compression Devices  Leung Catheter: Not present  Lines: None     Cardiac Monitoring: None  Code Status:  Full code.  Confirmed with patient on admission    Clinically Significant Risk Factors Present on Admission        # Hypokalemia: Lowest K = 3.1 mmol/L in last 2 days, will replace as needed     #  Hypomagnesemia: Lowest Mg = 1.6 mg/dL in last 2 days, will replace as needed   # Hypoalbuminemia: Lowest albumin = 2.7 g/dL at 8/10/2023 10:11 AM, will monitor as appropriate  # Coagulation Defect: INR = 1.10 (Ref range: 0.85 - 1.15) and/or PTT = 42 Seconds (Ref range: 22 - 38 Seconds), will monitor for bleeding  # Thrombocytopenia: Lowest platelets = 110 in last 2 days, will monitor for bleeding   # Hypertension: Home medication list includes antihypertensive(s)               Disposition Plan    transfer to [facility] 8/11/2023       Mukul Way MD  Hospitalist Service  [facility]  Securely message with Info (more info)  Text page via [facility] Paging/Directory     ______________________________________________________________________    Chief Complaint   Cough, shortness of breath, chest pain    History is obtained from the patient, chart review, review of outside records including recent hospitalization with discharge 7/31 at [facility] for suspected GI bleed, left lower lobe pneumonia    History of Present Illness   Milly Carroll is a 23 year old female admitted on 8/11/2023. 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  Today was seen by her nephrologist where there was concern for active lupus and prednisone was increased to 40 mg daily; labs prior to appointment with hemoglobin of 6.1.  Subsequent worsening cough and went to [facility] urgent care before being transferred to [facility] emergency department.  At [facility], a CT of the chest abdomen and pelvis was performed demonstrating likely pseudoaneurysm with perinephric hematoma on the left in the setting of recent left renal biopsy (8/2 at [facility]).  Also noted to have left lower lobe pneumonia, present at [facility]  hospitalization late July as well.  Blood transfusions were initiated, and patient developed hemoptysis as well as uncontrolled blood pressure in the 200 systolic range.  Has a history of some heart failure with most recent ejection fraction normalized as of [facility] echo in July.  Significantly elevated BNP, significant orthopnea, bipedal edema.  Tells me that she has not been able to lay flat in her bed since even during her hospitalization at [facility] 7/29 - 7/31.  Though CT chest with area concerning for pneumonia in the left lower lobe, current hemoptysis increases concern that this actually represents active vasculitis.      On arrival at [facility] patient with approximately 1 tablespoon of melanie blood with hemoptysis (not clot).  She tells me that she has been coughing up blood since around the time that blood transfusion initiated at [facility].  She is unable to lay flat, mild hypoxia sitting upright, and reports that she is tiring in terms of her work of breathing.  Blood pressure is elevated in the 200 systolic range.    In the setting of perinephric hematoma with pseudoaneurysm, Dr. Biggs of interventional radiology at [facility] was aware prior to transfer from [facility].  Given patient's current presentation on intermediate care unit with hemoptysis and unable to lie flat, I have concerned that patient will be unable to tolerate any interventional radiology procedure without intubation.  Discussed with ICU provider, who evaluated patient at bedside as well.  Plan was made to transfer patient to the ICU.  She will likely require bronchoscopy at some point, though not emergently at this time.  If bronchoscopy was needed more acutely, would need to be intubated for this.  Given that she is ICU level of care, discussed with patient placement and was informed that there are indeed beds available at [facility].  Attempting to facilitate transfer to ICU at [facility]  M  23:18: [facility] ED: hx SLE and nephritis, also recent septic shock with PNA and lower GI bleed, and had a renal biopsy last week through [facility]. In follow up with Nephrology clinic today HGB 6.1 sent to ED where HGB 6.3. Found to have sven-nephric hematoma with pseudoaneurysm. Case discussed with Dr. Biggs of IR and plan for angiogram in AM. Plan for IMC transfer. Getting transfused 2 units in the ED. Has been normotensive thus far. Also does note ongoing scant intermittent lower GI bleeding.    Briefly noted above, patient with several hospitalizations through [facility] recently.  Hospitalized at [facility] 6/15 - 6/17 for bilateral hip pain thought to be secondary to nonadherence to medications, improved with reinitiation of steroids.  Note also that she was anemic and received 1 unit of packed red blood cells for hemoglobin of 6.8 during that admission.  Seen by rheumatology during that hospitalization as well    Was hospitalized 7/29 - 7/31/2023 at [facility] after experiencing nausea and vomiting with some bright red blood in her stool as well as some left-sided pleuritic chest discomfort and shortness of breath.  Clear phlegm at that time.  Hemoglobin of 5.6 on 7/29/2023 transfused 2 units of blood.  Given vancomycin and Zosyn for a left lower lobe pneumonia and procalcitonin noted to be 19.5 with leukocytosis to 23.7.  No colonoscopy or EGD by gastroenterology during that admission, but transferred to [facility] given concern and need for renal biopsy 7/31.  Procalcitonin as high as 79.94 on 7/31.  Antibiotics broadened to cefepime and metronidazole.  He was also on prophylactic vancomycin orally for history of C. difficile.  Cultures remained negative.  Patient's hemoglobin stabilized in the 9 range after transfusions at [facility], and no gastroenterology consult was pursued during [facility] hospitalization.  Patient was stable enough for left  renal biopsy for lupus nephritis on 8/2, and this was completed at [facility]. I see multiple mentions of medical nonadherence concern and outside chart, but also noted that patient had cushingoid facies and was able to recite her medications at those admissions.    Patient has continued to have a cough, which again was one of her primary complaints when she presented to [facility] 7/29.  Tells me that she has not been able to lay in a bed since that time because of increasing shortness of breath.  She does not believe the anti-infectives helped her feel better at all.  Has pedal edema since her hospitalization at [facility].  Reports no further black or bloody stools.    Despite perinephric hematoma, she has minimal complaints of back or flank pain.  With slight percussion this is noticed, but her primary complaint is her shortness of breath and cough.  Still no fevers.          Past Medical History    Past Medical History:   Diagnosis Date    Hepatitis     Lupus (systemic lupus erythematosus) (H)     Lupus cerebritis (H)     Pancreatitis 08/2017    Pyelonephritis 08/2017    Seizures (H)     Status epilepticus (H)        Past Surgical History   Past Surgical History:   Procedure Laterality Date    IR RENAL BIOPSY LEFT  6/28/2022    NO HISTORY OF SURGERY      ORTHOPEDIC SURGERY         Prior to Admission Medications   Prior to Admission Medications   Prescriptions Last Dose Informant Patient Reported? Taking?   Vitamin D, Cholecalciferol, 10 MCG (400 UNIT) TABS   Yes No   Sig: Take 10 mcg by mouth daily   acetaminophen (TYLENOL) 500 MG tablet   No No   Sig: Take 1 tablet (500 mg) by mouth every 4 hours as needed for mild pain   amoxicillin-clavulanate (AUGMENTIN) 875-125 MG tablet   Yes No   Sig: Take 1 tablet by mouth 2 times daily   calcium carbonate (TUMS) 500 MG chewable tablet   No No   Sig: Take 1 tablet (500 mg) by mouth daily   cloNIDine (CATAPRES) 0.1 MG tablet   No No   Sig: Take 1 tablet (0.1  "mg) by mouth 2 times daily   ferrous sulfate (FEROSUL) 325 (65 Fe) MG tablet   No No   Sig: Take 1 tablet (325 mg) by mouth daily (with breakfast)   folic acid (FOLVITE) 1 MG tablet   Yes No   Sig: Take 1 mg by mouth daily   hydroxychloroquine (PLAQUENIL) 200 MG tablet   No No   Sig: Take 1 tablet (200 mg) by mouth daily   lisinopril (ZESTRIL) 20 MG tablet   Yes No   Sig: Take 20 mg by mouth daily   metoprolol succinate ER (TOPROL XL) 50 MG 24 hr tablet   Yes No   Sig: Take 50 mg by mouth daily   multivitamin, therapeutic (THERA-VIT) TABS tablet  Self No No   Sig: Take 1 tablet by mouth daily   mycophenolate (GENERIC EQUIVALENT) 500 MG tablet   No No   Sig: Take 3 tablets (1,500 mg) by mouth 2 times daily   ondansetron (ZOFRAN ODT) 4 MG ODT tab   Yes No   Sig: Take 4 mg by mouth every 8 hours as needed for nausea   potassium chloride ER (KLOR-CON M) 20 MEQ CR tablet   Yes No   Sig: Take 20 mEq by mouth 2 times daily   potassium chloride ER (KLOR-CON M) 20 MEQ CR tablet   No No   Sig: Take 1 tablet (20 mEq) by mouth 3 times daily   predniSONE (DELTASONE) 20 MG tablet   No No   Sig: Take 1 tablet (20 mg) by mouth daily   vancomycin (VANCOCIN) 125 MG capsule   Yes No   Sig: Take 125 mg by mouth 2 times daily      Facility-Administered Medications: None           Physical Exam   Vital signs:  Temp: 98.1  F (36.7  C) Temp src: Oral BP: (!) 190/141 Pulse: (!) 123   Resp: (!) 37 SpO2: 92 % O2 Device: Oxymask Oxygen Delivery: 1 LPM      Estimated body mass index is 21.42 kg/m  as calculated from the following:    Height as of 1/25/23: 1.562 m (5' 1.5\").    Weight as of 8/10/23: 52.3 kg (115 lb 3.2 oz).      General Appearance: Fatigued and cushingoid appearing 23-year-old female sitting upright in bed with tachypnea.  Does not appear to be in any major discomfort associated with pain, but does appear to be short of breath  Eyes: No scleral icterus or injection, mild proptosis  HEENT: Cushingoid facies.  " Atraumatic  Respiratory: Patient with dark red hemoptysis, approximately 1 teaspoon at a time with multiple episodes in several minutes upon arrival.  Work of breathing increased with minimal accessory muscle use, but tachypnea in the 30s range.  She does report she feels that she is tiring out with her breathing.  Breathing worsens when she lays flat.  Only mild hypoxia sitting upright  Cardiovascular: Tachycardic with 2/6 systolic murmur at apex  : Left CVA tenderness to light percussion not present on right.  GI: Abdomen soft, nontender palpation.  No pulm mass.    Lymph/Hematologic: Notable pitting bipedal edema.  Otherwise without significant lower extremity edema  Skin: Mild pallor, but baseline darker skin tone  Musculoskeletal: Muscular tone and bulk intact in all extremities and appropriate for age, thin  Neurologic: Alert, conversant, appropriate in conversation.  Psychiatric: Blunted affect.  Soft-spoken.  Pleasant.  Might be overwhelmed, but also with a history of lupus cerebritis.  Does not appear to have psychotic decompensation    Medical Decision Making       Over 130 MINUTES SPENT BY ME on the date of service doing chart review, history, exam, documentation & further activities per the note.   Coordination of care with multiple providers including ICU staff overnight, facilitation of transfer to the [facility] ICU team, discussion with Dr. Gonzalez of ICU as well as interventional radiology team in a.m. as patient no longer transferring to the [facility].  Multiple reevaluations of patient overnight with progressive improvement including initial hospitalization, reevaluation and assessment with overnight ICU team, several evaluations in a.m. prior to anticipated transfer with improving clinical status.    65 minutes in critical care evaluating patient at bedside and initiating and adjusting treatments including cardiac drips for acute hypoxic respiratory failure suspect  secondary to diastolic heart failure in the setting of malignant hypertension, treatment of acute hypoxia    Data     I have personally reviewed the following data over the past 24 hrs:    7.5  \   9.5 (L)   / 143 (L)     141 111 (H) 11.5 /  83   4.0 19 (L) 0.5 \     ALT: <5 AST: 24 AP: 122 (H) TBILI: 0.4   ALB: 2.8 (L) TOT PROTEIN: 6.3 (L) LIPASE: N/A     Trop: 26 (H) BNP: 65,906 (H)     INR:  1.10 PTT:  42 (H)   D-dimer:  N/A Fibrinogen:  N/A       Imaging results reviewed over the past 24 hrs:   Recent Results (from the past 24 hour(s))   CTA Chest Abdomen Pelvis w Contrast   Result Value    Radiologist flags Left renal pseudoaneurysm and perinephric hematoma (AA)    Narrative    EXAM: CTA CHEST ABDOMEN PELVIS W CONTRAST  LOCATION: [facility]  DATE: 8/10/2023    INDICATION: Shortness of breath and cough after recent kidney biopsy.  COMPARISON: CT abdomen/pelvis 01/23/2023  TECHNIQUE: CT angiogram chest abdomen pelvis during arterial phase of injection of IV contrast. 2D and 3D MIP reconstructions were performed by the CT technologist. Dose reduction techniques were used.   CONTRAST: 80 mL Isovue 370    FINDINGS:   CT ANGIOGRAM CHEST, ABDOMEN, AND PELVIS:   No active contrast extravasation within the left perinephric hematoma.    Pulmonary Arteries: The pulmonary arteries are well-opacified with contrast. No filling defects are seen to suggest thromboembolism.    Thoracic Aorta: Patent and normal in caliber. No evidence of dissection, intramural hematoma, or penetrating ulcer.    Abdominal Aorta: Patent and normal in caliber. No evidence of dissection or penetrating ulcer.    Celiac Artery: Patent.  Superior Mesenteric Artery: Patent.  Right Renal Artery: Patent.  Left Renal Artery: 3 left renal arteries, which appear patent.  Inferior Mesenteric Artery: Patent.    Right Common Iliac Artery: Patent.  Right External Iliac Artery: Patent.  Right Internal Iliac Artery: Patent.  Right Common  Femoral Artery: Patent.  Proximal Right Superficial Femoral Artery: Patent.  Proximal Right Deep Femoral Artery: Patent.    Left Common Iliac Artery: Patent.  Left External Iliac Artery: Patent.  Left Internal Iliac Artery: Patent.  Left Common Femoral Artery: Patent.  Proximal Left Superficial Femoral Artery: Patent.  Proximal Left Deep Femoral Artery: Patent.    LUNGS AND PLEURA: Large area of consolidation in the left lower lobe. Small patchy groundglass opacity in the right upper lobe posterior segment. Bilateral central groundglass opacities with mild interlobular septal thickening. Trace bilateral pleural   effusions. No pneumothorax.    MEDIASTINUM/AXILLAE: No lymphadenopathy. Small pericardial effusion.    CORONARY ARTERY CALCIFICATION: None.    HEPATOBILIARY: Normal.    PANCREAS: Normal.    SPLEEN: Normal.    ADRENAL GLANDS: Normal.    KIDNEYS/BLADDER: 0.3 cm arterially hyperenhancing focus in the left renal lower pole, which is less conspicuous on portal venous phase. Left perinephric hematoma measuring 4.5 x 2.0 x 5.5 cm. No contrast extravasation within the hematoma. Left renal   midpole nonobstructing calculus measuring 0.4 cm. No hydronephrosis. Urinary bladder appears normal.    BOWEL: No obstruction or inflammatory change. Normal appendix. No evidence of acute appendicitis.    PERITONEUM/RETROPERITONEUM: Trace abdominal and pelvic ascites. Left perinephric hematoma measuring 4.5 x 2.0 x 5.5 cm. No intraperitoneal free air.    LYMPH NODES: Multiple mildly enlarged perigastric, alexandria hepatis, and retroperitoneal lymph nodes, which have decreased in size compared to prior. Previously enlarged pelvic lymph nodes are now not enlarged by size criteria.    PELVIC ORGANS: Unremarkable.    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 Subcutaneous edema of the bilateral flanks and proximal thighs.      Impression    IMPRESSION:  1.  Left perinephric hematoma measuring 5.5  cm. No active extravasation within the hematoma. However, a 0.3 cm arterially hyperenhancing focus in the left renal lower pole is suspicious for pseudoaneurysm.    2.  Left lower lobe consolidation, compatible with pneumonia. Small groundglass opacity in the right upper lobe, likely infectious or inflammatory.    3.  Mild interstitial and alveolar pulmonary edema. Trace bilateral pleural effusions. Small pericardial effusion. Trace abdominal and pelvic ascites. Mild anasarca.    4.  Decreased size of mildly enlarged upper abdominal and retroperitoneal lymphadenopathy. Resolved pelvic lymphadenopathy.      [Critical Result: Left renal pseudoaneurysm and perinephric hematoma]    Finding was identified on 8/10/2023 10:26 PM CDT.     Dr. Sumi Lau was contacted by me on 8/10/2023 10:36 PM CDT and verbalized understanding of the critical result.

## 2023-08-11 NOTE — ED NOTES
Patient experiencing diaphoresis and cool skin as well as cough with pink frothy sputum. O2 placed at 2L via NC for sats 88% on RA and c/o SOB. Improved to 94%    Dr Candace Lau notified.

## 2023-08-11 NOTE — PLAN OF CARE
Pt. Received from [facility] around 0200, BP in the 190's. Dr. Way here to see patient at this time.    Orientations: a/o x4 - quiet and withdrawn at time of admission  Vitals/Pain: VS not stable, hypertensive Sys>190 in the 200's and diastolic in the 130's, tachycardic 120's, tachypenic in the 40's. Medications givens for elevated BP: Labetalol, Lasix, Nitro gtt and Clonidine.   Pt. Reports mid-sternal chest pain with coughing, (EKG complete shows Sinus Tach). Pt. Reports pain to the L. Flank biopsy site. Dilaudid x2 given, this also helped improve work of breathing. Headache from nitroglycerin gtt improved with IVP dilaudid.   Lung sounds: Crackles throughout, continues to cough up red sputum - provider aware.   Tele: ST  Lines/Drains: X2 PIV to the left hand. X1 placed by anesthesia. Nitroglycerin infusing and actively triturating down ( at 0718), Highest dose was 150mcq/min.   Skin/Wounds: Small bruising to L. Flank from the biopsy. BLE Edema R>L - edema improving since admission now +2 Right +1 Left lower extremity.   GI/: Voids via pure-wick. Patient reports a brown BM in the ED 8/11  Labs: BNP, Hgb (received x2 units), LA: Sepsis protocol fired with a result of 6.6 - provider would like a re-draw at transfer,.   Ambulation/Assist: Pt. Reports that she limps around at home because she is unable to use her right leg since January. Not OOB for this shift.   Sleep Quality: na  Plan: Transfer to the [facility]. Report has been called.

## 2023-08-11 NOTE — PRE-PROCEDURE
GENERAL PRE-PROCEDURE:   Procedure:  Left renal angiogram with possible embolization  Date/Time:  8/11/2023 10:36 AM    Written consent obtained?: Yes    Risks and benefits: Risks, benefits and alternatives were discussed    Consent given by:  Patient  Patient states understanding of procedure being performed: Yes    Patient's understanding of procedure matches consent: Yes    Procedure consent matches procedure scheduled: Yes    Expected level of sedation:  Moderate  Appropriately NPO:  Yes  ASA Class:  3  Mallampati  :  Grade 2- soft palate, base of uvula, tonsillar pillars, and portion of posterior pharyngeal wall visible  Lungs:  Crackles right base and crackles left base  Heart:  Normal heart sounds with tachycardia  History & Physical reviewed:  History and physical reviewed and no updates needed  Statement of review:  I have reviewed the lab findings, diagnostic data, medications, and the plan for sedation    Rodolfo Jarrett PA-C  Interventional Radiology  [phone] (IR)  [phone] ([facility] Office)

## 2023-08-11 NOTE — CONSULTS
"IR NOTE:     IR consulted about Milly Carroll. She is a 23 year old female who had a renal biopsy approximately 1 week ago and now presents with a perinephric hematoma and a small pseudoaneurysm. She is currently hypertensive and not tachycardic. However, her Hgb did not rise appropriately after 3 units of blood. She also has a history of rectal bleeding with a few episodes today.     Vital signs:  Temp: 98.1  F (36.7  C) Temp src: Oral BP: (!) 197/133 Pulse: 89   Resp: 28 SpO2: 94 % O2 Device: Nasal cannula Oxygen Delivery: 2 LPM      Estimated body mass index is 21.42 kg/m  as calculated from the following:    Height as of 1/25/23: 1.562 m (5' 1.5\").    Weight as of an earlier encounter on 8/10/23: 52.3 kg (115 lb 3.2 oz).      Plan for angiogram in the morning with embolization of the pseudoaneurysm. If she becomes unstable earlier please page IR.     Abigail Biggs, DO  Interventional Radiology - [facility]   Paging center number: [phone]  "

## 2023-08-11 NOTE — CONSULTS
Interventional Radiology - Progress Note  Inpatient - [facility]  8/11/2023    IR Brief Note    Case reviewed last night by Dr Biggs. Was planned for angiogram today to embolize left renal PSA however transfer to [facility] scheduled and subsequently canceled. New consult order placed and patient scheduled for procedure today in IR. Reviewed with hospitalist, OK to proceed without protected airway as patient tolerating lying 30 degrees.     Rodolfo Jarrett PA-C  Interventional Radiology  [phone] (IR)  [phone] ([facility] Office)

## 2023-08-11 NOTE — IR NOTE
Interventional Radiology Intra-procedural Nursing Note    Patient Name: Milly Carroll  Medical Record Number: [id number]  Today's Date: August 11, 2023    Procedure: Left Renal Angiogram with Embolization and Moderate Sedation  Start time: 1110  End time: 1150  Report provided to: C RN    Note: Patient entered Interventional Radiology Suite number 2 via cart. Patient awake, alert and orientated. Assisted onto procedural table in supine position. Prepped and draped.  Dr. Biggs in room. Time out and procedure started. Vital signs stable. Telemetry reading - sinus tachycardia.    Procedure well tolerated by patient without complications. Procedure end with debrief by Dr. Biggs.      Administered medication totals:  Lidocaine 1% 9 mL Intradermal  Versed 1.5 mg IVP  Fentanyl 100 mcg IVP    Last dose of sedation administered at 1147.

## 2023-08-11 NOTE — CONSULTS
Consult Date: 08/11/2023    RENAL CONSULTATION    REQUESTING PHYSICIAN:  Melanie Holguin MD.    CONSULTANT:  Peter Zimmerman MD    REASON:  Lupus with lupus nephritis.    HISTORY OF PRESENT ILLNESS:  This is a 23-year-old female, admitted on 08/11/2023.  She had recently been hospitalized at [facility] and underwent a kidney biopsy there.  She is followed closely by the nephrology service at the [facility].  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  She has been on prednisone and CellCept as well as Plaquenil.  She went to the [facility] emergency room with cough.  A CT scan showed a perinephric hematoma with a severe pseudoaneurysm of the left renal artery.  She was treated with blood transfusions but developed hemoptysis and severe hypertension.  She was transferred to [facility].  She complains of hemoptysis, orthopnea and shortness of breath.  Plans were originally for transferring her to the [facility], where she has received her nephrology care, but this was declined by the [facility].  She was recently in the hospital at [facility] on 07/03/2023 through 08/04/2023.  Prior to admission, she was on lisinopril and metoprolol as well as potassium.  She was on Augmentin for pneumonia and Plaquenil.  She was on CellCept and prednisone.    CURRENT MEDICATIONS:  Include clonidine, Lasix, lisinopril and metoprolol.  She is on a nitroglycerin drip.  She is on Zosyn and vancomycin.  She is on CellCept and prednisone.    IMAGING: She went down for a renal angiogram today with interventional radiology, the results of which are pending.    PHYSICAL EXAMINATION:   GENERAL:  She is resting comfortably in bed.  She is on nasal cannula O2.    VITAL SIGNS:  Temperature is 99.6.  Pulse is 112.  Blood pressure is 167/115.  HEENT:  Head shows no trauma.   The pupils are round and react to light.    NECK:  Supple.  LUNGS:  Bibasilar rales.  HEART:  Tachycardia.  Normal S1 and S2.  No murmur.  ABDOMEN:  Soft and nontender.  She has a dressing over her right femoral area, which is clean and intact.   EXTREMITIES:  The lower extremities show 1+ edema, with good pulses.  The upper extremities show good pulses and no edema.  SKIN:  Negative.    LABORATORY EVALUATION:  The hemoglobin was dropped down to 6.3 and is now 9.5 post transfusion.  Her white count is 7.5 and platelet count 141.  Her creatinine is 0.74.  Her electrolytes are normal except for a bicarbonate low at 19.  Her magnesium is low, and her calcium is 8.1.  Her albumin is 2.8.    IMPRESSION:    1.  Lupus with lupus nephritis.  Her kidney function is stable.  Her lupus nephritis was class 3, and she has been on prednisone and CellCept.  We will continue the CellCept.  I am going to stop the prednisone and put her on IV Solu-Medrol 500 mg a day for 4 days.  Subsequently, she can be put back on a tapering dose of prednisone.  We have no evidence of active lupus nephritis, but we will check on this.  I will order laboratories for the morning and a repeat urinalysis and urine protein excretion.  2.  Hemoptysis.  Pulmonary has seen.  She is on tranexamic nebulizer.  There is consideration of pneumonia versus lupus vasculitis of the lung.  She will continue on CellCept and Plaquenil and be switched over to Solu-Medrol.  3.  Hypertension.  We will adjust her medications.  She has developed some tachycardia on nitroglycerin.  I am going to increase her metoprolol to 50 mg twice a day.  We will continue the clonidine and lisinopril.  I am going to stop the Lasix.  At this point, she just had an angiogram with a dye load would.  We want to keep her well hydrated to prevent dye toxicity.  4.  Anemia.  She has been bleeding around the left kidney and has now undergone an IR procedure, presumably with embolization of  bleeding site.  She has been transfused up to a hemoglobin of 9.5, and we will follow her along.  We will check iron, B12 and folate levels.    This patient has received her care for nephrology at the [facility].  She is a complex patient, and she would benefit by being followed closely in the hospital by doctors who know her history.  I suggest transferring her to the [facility].    Peter Zimmerman MD        D: 2023   T: 2023   MT: jah    Name:     MIKE JOSEJeremias  MRN:      [id number]        Account:      [id number]   :      2000           Consult Date: 2023     Document: [id number]

## 2023-08-11 NOTE — PROVIDER NOTIFICATION
Pt did not receive AM dose of metoprolol due to being down in IR. Clarified with Dr. Zimmerman that patient would receive one time dose now and evening dose around 2200. Normal scheduled time of 0900 and 2100 will be followed after today.

## 2023-08-11 NOTE — CONSULTS
PULMONOLOGY CONSULTATION      Milly Carroll   MRN# [id number]   Age: 23 year old YOB: 2000     Date of Admission:  8/11/2023  Reason for Consultation:     Hemoptysis   Requesting Physician:         Sumi Lau DO  [facility]  [address]  [place] [address]       Assessment and Plan:    1. Hemoptysis in setting of possible lupus flare. Has not had more hemoptysis last night or this morning. CT chest with LLL consolidation and some ground glass, interstitial opacities in right lung. Bleeding could be from necrotizing PNA/friable mucosa in setting of HTN. Alveolar hemorrhage and pneumonitis could also be likely however the ggo's are usually more  pronounced and diffuse. Agree with current broad spectrum abx. If able to bring up sputum, would send for gs/culture, cytology, and PJP. If bleeding worsens, we can initiate pulse steroids.     The patient was seen and examined with the resident/fellow/[facility] and/or medical student.  We have discussed the patient in detail and I agree with the findings, assessment, and plan as documented when this note was cosigned on this day. The current billing does not reflect time spent teaching.      Billing: I spent a total of 75min for an initial consult including review of chart, images, labs and notes (20min), history taking and examination (30min) and post-consultation documentation, decision making and discussion with primary team and nursing (25min).     Ran Karimi MD, MHA  Associate Professor of Medicine  Section of Interventional Pulmonology   Division of Pulmonary, Allergy, Critical Care and Sleep Medicine   [facility]  Pager: [phone]   Office: [phone]  Email: [email or web address]         HPI/Interval history     Milly Carroll is a 23 year old female with sig h/o for SLE, seizures who we are consulted for hemoptysis.    - Admitted to [facility] for concern of lupus flare. She has had a h/o lupus  cerebritis and had increase in prednisone to 40mg/d this month for possible worsening SLE. She had recently been treated for LLL PNA at [facility] 7/31-8/4.   - She c/o coughing up 1 table spoon of melanie blood for the past week. Has not had this before. She is not on anticoagulation.   - CT also shows perinephric hematoma in setting of recent biopsy. IR is consutled for management.     Other active medical problems include:   - SLE. Has been treated for systemic lupus by nephrologist at [facility]. Recently, prednisone increased to 40mg/d             Past Medical History:      Past Medical History:   Diagnosis Date    Hepatitis     Lupus (systemic lupus erythematosus) (H)     Lupus cerebritis (H)     Pancreatitis 08/2017    Pyelonephritis 08/2017    Seizures (H)     Status epilepticus (H)            Past Surgical History:      Past Surgical History:   Procedure Laterality Date    IR RENAL BIOPSY LEFT  6/28/2022    NO HISTORY OF SURGERY      ORTHOPEDIC SURGERY            Social History:     Social History     Tobacco Use    Smoking status: Never    Smokeless tobacco: Never   Substance Use Topics    Alcohol use: Not Currently          Family History:     Family History   Problem Relation Age of Onset    Other - See Comments Father         Question of lupus in father and paternal grandfather    Lupus Sister         older sister    Gastrointestinal Disease No family hx of         No known history of IBD, hepatitis, pancreatitis, celiac disease, or GI cancers before age 50    Glaucoma No family hx of     Macular Degeneration No family hx of            Allergies:      Allergies   Allergen Reactions    Rituximab Anaphylaxis and Shortness Of Breath          Medications:     Current Facility-Administered Medications   Medication    artificial saliva (BIOTENE MT) solution 2 spray    bisacodyl (DULCOLAX) suppository 10 mg    cloNIDine (CATAPRES) tablet 0.1 mg    cloNIDine (CATAPRES) tablet 0.1 mg    glucose gel  15-30 g    Or    dextrose 50 % injection 25-50 mL    Or    glucagon injection 1 mg    ferrous sulfate (FEROSUL) tablet 325 mg    folic acid (FOLVITE) tablet 1 mg    furosemide (LASIX) injection 20 mg    HYDROmorphone (DILAUDID) injection 0.2 mg    hydroxychloroquine (PLAQUENIL) tablet 200 mg    labetalol (NORMODYNE/TRANDATE) injection 10 mg    lisinopril (ZESTRIL) tablet 20 mg    metoprolol succinate ER (TOPROL XL) 24 hr tablet 50 mg    mycophenolate (GENERIC EQUIVALENT) tablet 1,500 mg    naloxone (NARCAN) injection 0.2 mg    Or    naloxone (NARCAN) injection 0.4 mg    Or    naloxone (NARCAN) injection 0.2 mg    Or    naloxone (NARCAN) injection 0.4 mg    nitroGLYcerin 50 mg in D5W 250 mL PERIPHERAL IV infusion    ondansetron (ZOFRAN ODT) ODT tab 4 mg    Or    ondansetron (ZOFRAN) injection 4 mg    ondansetron (ZOFRAN ODT) ODT tab 4 mg    piperacillin-tazobactam (ZOSYN) 3.375 g vial to attach to  mL bag    polyethylene glycol (MIRALAX) Packet 17 g    potassium chloride ER (KLOR-CON M) CR tablet 20 mEq    predniSONE (DELTASONE) tablet 40 mg    prochlorperazine (COMPAZINE) injection 10 mg    Or    prochlorperazine (COMPAZINE) tablet 10 mg    Or    prochlorperazine (COMPAZINE) suppository 25 mg    senna-docusate (SENOKOT-S/PERICOLACE) 8.6-50 MG per tablet 1 tablet    Or    senna-docusate (SENOKOT-S/PERICOLACE) 8.6-50 MG per tablet 2 tablet    vancomycin (VANCOCIN) capsule 125 mg          Review of Systems:     CONSTITUTIONAL: negative for fever, chills, change in weight  INTEGUMENTARY/SKIN: no rash or obvious new lesions  ENT/MOUTH: no sore throat, new sinus pain or nasal drainage  RESP: see interval history  CV: negative for chest pain, palpitations or peripheral edema  GI: no nausea, vomiting, change in stools  : no dysuria  MUSCULOSKELETAL: no myalgias, arthralgias  ENDOCRINE: blood sugars with adequate control  PSYCHIATRIC: mood stable  LYMPHATIC: no new lymphadenopathy  HEME: no bleeding or easy  bruisability  NEURO: no numbness, weakness, headaches         Physical Exam:     Temp:  [97.9  F (36.6  C)-99.6  F (37.6  C)] 99.6  F (37.6  C)  Pulse:  [] 109  Resp:  [18-51] 33  BP: (150-207)/(105-159) 150/105  SpO2:  [88 %-100 %] 100 %  Wt Readings from Last 4 Encounters:   08/10/23 52.3 kg (115 lb 3.2 oz)   07/26/23 46 kg (101 lb 6.4 oz)   07/10/23 48.4 kg (106 lb 11.2 oz)   01/25/23 54.3 kg (119 lb 12.8 oz)     Constitutional:   Awake, alert and in no apparent distress     Eyes:   Nonicteric, AIXA     ENT:    Trachea is midline. No gross neck abnormalities      Neck:   Supple without supraclavicular or cervical lymphadenopathy     Lungs:   Good air flow.  No crackles. No rhonchi.  No wheezes.     Cardiovascular:   Normal S1 and S2.  RRR.  No murmur, gallop or rub.  Radial, DP and PT pulses normal and symmetric     Abdomen:   NABS, soft, nontender, nondistended.  No HSM.     Musculoskeletal:   No edema.      Neurologic:   Alert and conversant. Cranial nerves  intact.       Skin:   Warm, dry.  No rash on limited exam.           Current Laboratory Data:   All laboratory and imaging data reviewed.    Results for orders placed or performed during the hospital encounter of 08/11/23 (from the past 24 hour(s))   EKG 12-lead, tracing only   Result Value Ref Range    Systolic Blood Pressure  mmHg    Diastolic Blood Pressure  mmHg    Ventricular Rate 108 BPM    Atrial Rate 108 BPM    PA Interval 146 ms    QRS Duration 72 ms     ms    QTc 482 ms    P Axis 34 degrees    R AXIS -60 degrees    T Axis 81 degrees    Interpretation ECG       Sinus tachycardia  Left axis deviation  Nonspecific ST and T wave abnormality  Abnormal ECG  When compared with ECG of 10-AUG-2023 19:43, (unconfirmed)  Nonspecific T wave abnormality no longer evident in Lateral leads     EKG 12-lead, tracing only   Result Value Ref Range    Systolic Blood Pressure  mmHg    Diastolic Blood Pressure  mmHg    Ventricular Rate 106 BPM    Atrial  Rate 106 BPM    WI Interval 136 ms    QRS Duration 70 ms     ms    QTc 486 ms    P Axis 39 degrees    R AXIS -66 degrees    T Axis 42 degrees    Interpretation ECG       Sinus tachycardia  Left anterior fascicular block  Nonspecific ST and T wave abnormality  Abnormal ECG  When compared with ECG of 11-AUG-2023 02:32, (unconfirmed)  Nonspecific T wave abnormality now evident in Inferior leads     CBC with platelets   Result Value Ref Range    WBC Count 7.5 4.0 - 11.0 10e3/uL    RBC Count 3.40 (L) 3.80 - 5.20 10e6/uL    Hemoglobin 9.5 (L) 11.7 - 15.7 g/dL    Hematocrit 31.2 (L) 35.0 - 47.0 %    MCV 92 78 - 100 fL    MCH 27.9 26.5 - 33.0 pg    MCHC 30.4 (L) 31.5 - 36.5 g/dL    RDW 17.5 (H) 10.0 - 15.0 %    Platelet Count 143 (L) 150 - 450 10e3/uL   CRP inflammation   Result Value Ref Range    CRP Inflammation 18.53 (H) <5.00 mg/L   Erythrocyte sedimentation rate auto   Result Value Ref Range    Erythrocyte Sedimentation Rate 54 (H) 0 - 20 mm/hr   Procalcitonin   Result Value Ref Range    Procalcitonin 0.30 (H) <0.05 ng/mL   Lactic Acid STAT   Result Value Ref Range    Lactic Acid 6.6 (HH) 0.7 - 2.0 mmol/L   EKG 12-lead, tracing only   Result Value Ref Range    Systolic Blood Pressure  mmHg    Diastolic Blood Pressure  mmHg    Ventricular Rate 115 BPM    Atrial Rate 115 BPM    WI Interval 148 ms    QRS Duration 72 ms     ms    QTc 495 ms    P Axis 58 degrees    R AXIS -52 degrees    T Axis 58 degrees    Interpretation ECG       Sinus tachycardia  Left axis deviation  Cannot rule out Anterior infarct , age undetermined  Abnormal ECG  When compared with ECG of 11-AUG-2023 02:33, (unconfirmed)  No significant change was found     Lactic acid whole blood   Result Value Ref Range    Lactic Acid 2.3 (H) 0.7 - 2.0 mmol/L            Previous Pulmonary Function Testing     FVC-Pred   Date Value Ref Range Status   10/03/2017 3.06 L    08/25/2017 3.05 L      FVC-Pre   Date Value Ref Range Status   10/03/2017 2.87 L     08/25/2017 2.51 L      FVC-%Pred-Pre   Date Value Ref Range Status   10/03/2017 93 %    08/25/2017 82 %      FEV1-Pre   Date Value Ref Range Status   10/03/2017 2.79 L    08/25/2017 2.23 L      FEV1-%Pred-Pre   Date Value Ref Range Status   10/03/2017 100 %    08/25/2017 80 %      FEV1FVC-Pred   Date Value Ref Range Status   10/03/2017 91 %    08/25/2017 91 %      FEV1FVC-Pre   Date Value Ref Range Status   10/03/2017 97 %    08/25/2017 89 %      No results found for: 37733  FEFMax-Pred   Date Value Ref Range Status   10/03/2017 6.28 L/sec    08/25/2017 6.28 L/sec      FEFMax-Pre   Date Value Ref Range Status   10/03/2017 7.07 L/sec    08/25/2017 4.13 L/sec      FEFMax-%Pred-Pre   Date Value Ref Range Status   10/03/2017 112 %    08/25/2017 65 %      ExpTime-Pre   Date Value Ref Range Status   10/03/2017 6.72 sec    08/25/2017 2.31 sec      FIFMax-Pre   Date Value Ref Range Status   10/03/2017 3.79 L/sec    08/25/2017 2.55 L/sec      FEV1FEV6-Pred   Date Value Ref Range Status   10/03/2017 88 %    08/25/2017 88 %      FEV1FEV6-Pre   Date Value Ref Range Status   10/03/2017 99 %    08/25/2017 90 %

## 2023-08-11 NOTE — PROGRESS NOTES
Patient personally examined by me.  Please review H&P done earlier today for complete admission details.    Patient was admitted for perinephric hematoma with pseudoaneurysm post recent renal biopsy.  Was also in acute hypoxic respiratory failure from left lower lobe pneumonia with possible superimposed pulmonary inflammation.    Patient underwent IR left renal angiogram with embolization under moderate sedation.  Tolerated procedure well.  Returned back to the floor and is currently on IMC status.  Continuing to be 4 L nasal cannula.    Pulmonary team evaluated patient and felt that her symptoms are due to persistent pneumonia with possible underlying necrotizing pneumonia leading to hemoptysis.  Cannot rule out lupus vasculitis at this time.  Continue IV Zosyn for now.  Ordered sputum cultures for Gram stain, cytology and PJP.    Nephrology evaluated patient.  She does not appear to be in lupus flareup but she does have lupus nephritis.  Her previous lupus nephritis was class III.  Continue on CellCept.  Discontinued oral prednisone and started on IV Solu-Medrol 100 mg a day for 4 days and then she can eventually be changed to oral prednisone taper.  Plan on obtaining urinalysis and urine protein excretion to evaluate for an acute flareup of lupus nephritis.    Patient has been in having uncontrolled hypertension and is currently on nitroglycerin drip.  Continue the same for now.  Monitor respiratory status very closely.  Low threshold for intubation and transfer to ICU if her respiratory status worsens.    Did speak with rheumatology on-call at the [facility] and they said to continue her current home regimen unless there is clear evidence of acute lupus flareup which there is not at this time.

## 2023-08-11 NOTE — PHARMACY-ADMISSION MEDICATION HISTORY
Pharmacist Admission Medication History    Admission medication history is complete. The information provided in this note is only as accurate as the sources available at the time of the update.    Medication reconciliation/reorder completed by provider prior to medication history? Yes    Information Source(s): Patient and CareEverywhere/SureScripts via in-person    Pertinent Information:   --  Clonidine and KCL 20 mEq po TID are new prescriptions from yesterday.  --  Pt started Augmentin and Vancomycin PO on 8/4/2023 PM.  --  Pt is taking hydroxycholoroquine, mycophenolate and prednisone for Lupus.    Changes made to PTA medication list:  Added: None  Deleted: acetaminophen PRN, MVI  Changed: None    Medication Affordability:  Not including over the counter (OTC) medications, was there a time in the past 3 months when you did not take your medications as prescribed because of cost?: No    Allergies reviewed with patient and updates made in EHR: yes    Medication History Completed By: Lynda Dodson PharmD 8/11/2023 9:28 AM    Prior to Admission medications    Medication Sig Last Dose Taking? Auth Provider Long Term End Date   amoxicillin-clavulanate (AUGMENTIN) 875-125 MG tablet Take 1 tablet by mouth 2 times daily X 7 days. 8/10/2023 at am Yes Reported, Patient     calcium carbonate (TUMS) 500 MG chewable tablet Take 1 tablet (500 mg) by mouth daily 8/10/2023 at am Yes Clint Gu MD     ferrous sulfate (FEROSUL) 325 (65 Fe) MG tablet Take 1 tablet (325 mg) by mouth daily (with breakfast) 8/10/2023 at am Yes Clint Gu MD     folic acid (FOLVITE) 1 MG tablet Take 1 mg by mouth daily 8/10/2023 at am Yes Reported, Patient     hydroxychloroquine (PLAQUENIL) 200 MG tablet Take 1 tablet (200 mg) by mouth daily 8/10/2023 at am Yes Clint Gu MD     lisinopril (ZESTRIL) 20 MG tablet Take 20 mg by mouth daily 8/10/2023 at am Yes Reported, Patient No    metoprolol succinate ER (TOPROL XL) 50 MG 24 hr tablet Take  50 mg by mouth daily 8/10/2023 at am Yes Reported, Patient Yes    mycophenolate (GENERIC EQUIVALENT) 500 MG tablet Take 3 tablets (1,500 mg) by mouth 2 times daily 8/10/2023 at am Yes April Purvis MD Yes    ondansetron (ZOFRAN ODT) 4 MG ODT tab Take 4 mg by mouth every 8 hours as needed for nausea PRN Yes Reported, Patient     potassium chloride ER (KLOR-CON M) 20 MEQ CR tablet Take 20 mEq by mouth 2 times daily 8/10/2023 at am Yes Reported, Patient     predniSONE (DELTASONE) 20 MG tablet Take 1 tablet (20 mg) by mouth daily 8/10/2023 at am Yes Velez Reyes, German, MD     vancomycin (VANCOCIN) 125 MG capsule Take 125 mg by mouth 2 times daily X 14 days. 8/10/2023 at am Yes Reported, Patient     Vitamin D, Cholecalciferol, 10 MCG (400 UNIT) TABS Take 10 mcg by mouth daily 8/10/2023 at am Yes Reported, Patient No    cloNIDine (CATAPRES) 0.1 MG tablet Take 1 tablet (0.1 mg) by mouth 2 times daily newly prescribed  April Purvis MD Yes    potassium chloride ER (KLOR-CON M) 20 MEQ CR tablet Take 1 tablet (20 mEq) by mouth 3 times daily newly prescribed  April Purvis MD

## 2023-08-12 NOTE — PLAN OF CARE
Goal Outcome Evaluation:      Plan of Care Reviewed With: patient    BP within desired parameters, HR 50s at times overnight; able to wean to 3 LPM O2 NC, continue to monitor. Pt states she still feels pain overnight, no requests for PRN intervention, able to sleep between cares. Able to reposition and shift own weight. LS with crackles in bases, no reports or observations of hemoptysis overnight. PIV with IVF, NTG as ordered, addition SL to hand. NTG at 60 mcg/min this AM, continue to monitor. Observed T-wave inversion overnight, awaiting EKG completion, see corresponding note. Tele SB. Bed alarms in use, pt not attempting to exit bed.

## 2023-08-12 NOTE — DISCHARGE INSTRUCTIONS
Embolization Discharge Instructions:  Embolization is a minimally invasive treatment that blocks one or more blood vessels to prevent (restrict) blood flow to the area. Please follow the below instructions after your angiogram, including monitoring of your procedure site.    Care instructions after angiogram procedure:  -  If you received sedation for your procedure, do not drive or operate heavy machinery for the rest of the day.  -  Do not lift objects greater than 10 pounds for 2 days following angiogram procedure.  -  Avoid excessive exercise and straining for 2 days.   -  Avoid tub baths, pools, hot tubs and Jacuzzis for 3 days or until procedure site is well healed.   -  You may shower beginning tomorrow. Do not scrub procedure site until well healed; pat dry.  -  Return to your normal activities as you tolerate after the 2 day restriction.   -  You can expect to return to work 1-2 days after your procedure - depending on the nature of your profession.  -  It is normal to have some tenderness and minimal swelling at procedure puncture site. A small area of discoloration may be present. Tenderness typically subsides in 1-2 days. A small knot may also be present at puncture site for 6-8 weeks. This can be a normal part of the healing process.     Please seek medical evaluation for:  - If you develop fevers (greater than 101 F (38.3C)).  - Ifyou develop increasing pain, redness, purulent drainage, tenderness, or swelling at procedure site.   - If you experience any bleeding from procedure/puncture site: lie down, firmly apply pressure to puncture site and call 911.  - Seek emergent evaluation if you experience any new leg/arm pain, discoloration or numbness.    Please call Charlene (Interventional Radiology RN clinician) at [phone] or Nola (Interventional Radiology NP) at [phone] for questions or concerns after the procedure. You can leave a voicemail. We work Monday through Friday 7:30am-4:30 or 5pm. An  alternative number to call for questions is Interventional Radiology at [phone].

## 2023-08-12 NOTE — PROGRESS NOTES
[facility]    Medicine Progress Note - Hospitalist Service    Date of Admission:  8/11/2023    Assessment & Plan   Milly Carroll is a 23 year old female admitted on 8/11/2023. She has a history of lupus nephritis as well as lupus cerebritis followed by nephrology and rheumatology at the [facility] but with several recent admissions to [facility].  Today was seen by her nephrologist where there was concern for active lupus and prednisone was increased to 40 mg daily; labs prior to appointment with hemoglobin of 6.1.  Subsequent worsening cough and went to [facility] urgent care before being transferred to [facility] emergency department.  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  Also noted to have left lower lobe pneumonia, present at [facility] hospitalization late July as well.  Blood transfusions were initiated, and patient developed hemoptysis as well as uncontrolled blood pressure in the 200 systolic range.  Has a history of some heart failure with most recent ejection fraction normalized as of [facility] echo in July.  Significantly elevated BNP, significant orthopnea, bipedal edema.  Tells me that she has not been able to lay flat in her bed since even during her hospitalization at [facility] from 7/31 - 8/4/2023.  Though CT chest with area concerning for pneumonia in the left lower lobe, current hemoptysis increases concern that this actually represents active vasculitis.       On arrival at [facility] patient with approximately 1 tablespoon of melanie blood with hemoptysis (not clot).  She tells me that she has been coughing up blood since around the time that blood transfusion initiated at [facility].  She is unable to lay flat, mild hypoxia sitting upright, and reports that she is tiring  in terms of her work of breathing.  Blood pressure is elevated in the 200 systolic range.     In the setting of perinephric hematoma with pseudoaneurysm, Dr. Biggs of interventional radiology at [facility] was aware prior to transfer from [facility].  Given patient's current presentation on intermediate care unit with hemoptysis and unable to lie flat, I have concerned that patient will be unable to tolerate any interventional radiology procedure without intubation.  On admission, care was discussed with ICU provider, who evaluated patient at bedside as well.  Plan was made to transfer patient to the ICU.  She will likely require bronchoscopy at some point, though not emergently at this time.  If bronchoscopy was needed more acutely, would need to be intubated for this.  Given that she is ICU level of care, admitting hospitalist discussed with patient placement and was informed that there are indeed beds available at [facility]. While working on transfer to [facility], admitting provider was notified that bed no longer available at the [facility].       Perinephric hematoma with suspected pseudoaneurysm s/p Successful coil embolization of the left renal artery inferior pole pseudoaneurysm by IR On 8/11/23  *hx of Recent renal biopsy  - IR evaluated- s/p Successful coil embolization of the left renal artery inferior pole pseudoaneurysm. 2 distal renal artery branch vessels were coiled using a total of 2 Pushpa coils on 8/11/23  -Control of blood pressure as below     Acute blood loss anemia:   Multifactorial with recent suspected GI bleed at [facility] hospitalization, though she reports that she is no longer having any bloody or black stool, gross hemoptysis, perinephric hematoma following 8/2/2023 renal biopsy.    - s/p  2 units pRBC on 8/11/23  - hgb this am 8.7g/dL  - she currently is not having hemoptysis on 8/12  - monitor counts     Active lupus flare  Lupus cerebritis  Lupus nephritis  -as above  [facility] no longer had bed available on 8/11. Hospitality rounder spoke with rheumatology on-call at the [facility] on 8/11 and they said to continue her current home regimen unless there is clear evidence of acute lupus flareup and at felt that there is not at this time.   - Resume prior to admission Plaquenil, mycophenolate  - nephrology has evaluated and initiated on IV Methylprednisolone 500mg daily x 4 days  - add Protonix IV daily for GI prophylaxis while on high dose steroid  -Continue folic acid supplementation  - Cr is 1.09 today, 0.74 yesterday. Discussed with nephrology, will hold IVF now given reduced EF and will adjust during rounds  - f/up labs    Hemoptysis possible lupus flare, vs necrotizing pneumonia/friable mucosa in the setting of htn   *Pulmonary team evaluated on 8/11, felt that her symptoms are due to persistent pneumonia with possible underlying necrotizing pneumonia leading to hemoptysis. Cannot rule out lupus vasculitis at this time.   - continuing with Zosyn at this time  - sputum culture/grams tain have been ordered  - not having hemoptysis on 8/12     Malignant hypertension: improved  present with SBP 200s  -Clonidine 0.1 mg twice daily initiated yesterday, hold today, bradycardic in the 50s this am. Discussed with nephrology  - stop Nitroglcerin drip, has been infusing for ~24 hrs, start Imdur 60mg daily  - continue with Lisinopril  - Metoprolol 50mg XL daily increased to BID yesterday, held this morning due to s  - adjust medications as indicated  - nephrology following, appreciate assistance     Acute respiratory failure with hypoxia, orthopnea  Acute HF with reduced EF (EF 20-25%)  Left lower lobe pneumonia,   Hemoptysis on 8/11/23- improved on 8/12  *Note significantly elevated procalcitonin through outside records with recent hospitalization at [facility].  Treated with vancomycin and Zosyn at that time, discharged on Augmentin.  No fevers, though tells me that she did have 1 fever at  [facility].  No shaking chills.  No leukocytosis.  She is immunosuppressed on CellCept  *TTE on 8/11 shows EF of 20-25%, mild to moderate tricuspid regurgitation, moderate pulmonary hypertension. Most recent echo report from 6/2023 reviewed through care everywhere showing EF 55%.   *EKG this morning shows T wave inversions in anterior, infer, lateral leads with sinus bradycardia and prolonged QTc. T wave inversions are new compared to EKG from yesterday. Patient denies symptoms of chest pain. She is sitting in bed and denies shortness of breath.  - given newly reduced EF, diffuse T wave inversions, cardiology consult placed  - BB, ACEi, imdur as above  - continue telemetry  - IVF stopped as above  - strict intake and output    Thrombocytopenia  Plat 110 on initial presentation and this increased to 143K yesterday.   - palt 93K this morning  - will monitor at this time  - f/unit(s) cbc    Hypomagnesemia  Hypokalemia  -replace per prtocol       Diet: Regular Diet Adult    DVT Prophylaxis: Pneumatic Compression Devices  Leung Catheter: Not present  Lines: None     Cardiac Monitoring: ACTIVE order. Indication: Drug overdose (24 hours)  Code Status: Full Code      Clinically Significant Risk Factors        # Hypokalemia: Lowest K = 3.1 mmol/L in last 2 days, will replace as needed     # Hypomagnesemia: Lowest Mg = 1.6 mg/dL in last 2 days, will replace as needed   # Hypoalbuminemia: Lowest albumin = 2.1 g/dL at 8/12/2023  8:19 AM, will monitor as appropriate  # Coagulation Defect: INR = 1.10 (Ref range: 0.85 - 1.15) and/or PTT = 42 Seconds (Ref range: 22 - 38 Seconds), will monitor for bleeding  # Thrombocytopenia: Lowest platelets = 93 in last 2 days, will monitor for bleeding    # Acute heart failure with reduced ejection fraction: last echo with EF <40% and receiving IV diuretics                Disposition Plan      Expected Discharge Date: 08/14/2023,  3:00 PM                Navdeep Brar MD  Hospitalist  Service  [facility]  Securely message with Steve (more info)  Text page via [employer] Paging/Directory   ______________________________________________________________________    Interval History   Patient reports that she is no longer having hemoptysis and non since yesterday. Endorses intermittent cough which she states is improving. Denies chest pain, nausea, vomiting or abdominal pain. She denies feeling short of breath, she however has not been out of bed or ambulated.   Echocardiogram shows significantly reduced ejection fraction to 20-25% which is new from her recent echocardiogram complete din 6/2023 in care everywhere.    Physical Exam   Vital Signs: Temp: 97.4  F (36.3  C) Temp src: Oral BP: (!) 141/103 Pulse: 67   Resp: 22 SpO2: 100 % O2 Device: Nasal cannula Oxygen Delivery: 2 LPM  Weight: 112 lbs 10.48 oz    General Appearance: Alert,awake and no apparent distress  Respiratory: decreased breath sounds bases, no wheezing  Cardiovascular: regular rate and rhythm, b/l LE edema  GI: soft and non-tender  Skin: warm and dry      Medical Decision Making       60 MINUTES SPENT BY ME on the date of service doing chart review, history, exam, documentation & further activities per the note.  MANAGEMENT DISCUSSED with the following over the past 24 hours: patient, RN, nephorologist   NOTE(S)/MEDICAL RECORDS REVIEWED over the past 24 hours: nephrology note, pulmonary note  Tests ORDERED & REVIEWED in the past 24 hours:  - BMP  - CMP  - CBC  - Magnesium  Tests personally interpreted in the past 24 hours:  - echocardiogram report, EKG showing as above       Data     I have personally reviewed the following data over the past 24 hrs:    4.1  \   8.7 (L)   / 93 (L)     136 104 17.5 /  130 (H)   3.8 21 (L) 1.09 (H) \     ALT: 23 AST: 79 (H) AP: 94 TBILI: 0.5   ALB: 2.1 (L) TOT PROTEIN: 5.3 (L) LIPASE: N/A       Imaging results reviewed over the past 24 hrs:   Recent Results (from the past 24  hour(s))   IR Renal Angiogram Left    Narrative    PROCEDURE:   1. Ultrasound guided right femoral artery access.   2. Selective catheterization and angiography of 2 left renal arteries.  3. Superselective catheterization and angiography of 2 greater than  third order inferior pole renal artery branch vessels.  4. Coil embolization of 2 greater than third order inferior pole renal  artery branch vessels with post embolization angiography.  5. Placement of a 6 Italian Angio-Seal closure device.  6. Moderate sedation. Sedation    DATE OF PROCEDURE: 8/11/2023    MODERATE SEDATION: Versed 1.5 mg IV; Fentanyl 100 mcg IV.  Under  physician supervision, Versed and fentanyl were administered for  moderate sedation. Pulse oximetry, heart rate and blood pressure were  continuously monitored by an independent trained observer. The  physician spent 40 minutes of face-to-face sedation time with the  patient.    CONTRAST: 42 mL Isovue IA    FLUOROSCOPY TIME: 9.4 minutes    AIR KERMA: 96.4 mGy.    COMPLICATIONS: None    CLINICAL HISTORY/INDICATION: Perinephric hematoma. 3 mm renal  pseudoaneurysm status post renal biopsy last week    PROCEDURE AND FINDINGS:  Following a discussion of the risks, benefits, indications, and  alternatives to treatment, appropriate informed consent was obtained  from the patient. The patient was brought to the interventional  radiology suite and placed supine on the table. Bilateral groins were  prepped and draped in a sterile fashion.  A timeout was performed per  universal protocol policy to confirm the correct patient, site and  procedure to be performed.    A preliminary ultrasound of the right  groin was performed and  demonstrates a patent right common femoral artery.  A permanent  ultrasound image was recorded.  Using a combination of fluoroscopy and  ultrasound, an access site was determined.  The overlying skin was  anesthetized with 1% Lidocaine.  Using ultrasound guidance,  access  into the  right common femoral artery was obtained with visualization  of needle entry into the vessel. A 0.018 wire was advanced through the  needle and exchange was made for a 5 Guinean micropuncture sheath. A  0.035 wire was advanced through the micropuncture sheath and exchange  was made for a 5 Guinean vascular sheath.    A Bar and 5 Guinean Omni select catheter were inserted into the  abdominal aorta and used to selectively catheterize the more superior  left renal artery. Digital subtraction angiography was performed  images show the renal artery is patent. No definite pseudoaneurysm is  identified. The catheter was then used to selectively catheterize the  inferior pole renal artery branch vessel. Digital subtraction  angiography was performed, images show no pseudoaneurysm.    The catheter was then used to selectively catheterize the more  superior renal artery branch vessel. Digital subtraction angiography  was repeated in multiple different projections, images show a small  pseudoaneurysm off the inferior pole of the left kidney.     A Prograte micro-catheter wire were used to selectively catheterize a  greater than third order renal artery inferior pole branch vessel.  Digital subtraction angiography was performed, images show a small  pseudoaneurysm. Coil embolization was performed. Postembolization  shows a small amount of contrast filling the pseudoaneurysm. The  microcatheter and wire were pulled back and used to selectively  catheterize a second greater than third order renal artery inferior  pole branch vessel. Digital subtraction angiography was performed,  images show access into the correct branch vessel. Coil embolization  was performed, post embolization angiography was performed, images  show no residual filling of the pseudoaneurysm. The remainder of the  renal artery is patent.    At this point, the catheter was removed over the wire. A preclosure  digital subtraction angiographic run was performed  to evaluate the  access site and adequate hemostasis was then obtained using  a 6  Sami Angio-Seal closure device.     Throughout the procedure, the patient was monitored by a radiology  nurse for cardiac rhythm and oxygen saturation which remained stable.  The patient tolerated the procedure well and left interventional  radiology in stable condition.      Impression    IMPRESSION:  1. Successful coil embolization of the left renal artery inferior pole  pseudoaneurysm. 2 distal renal artery branch vessels were coiled using  a total of 2 Pushpa coils.   Echo Complete   Result Value    LVEF  20-25%    [facility]    [id number]  [id number]  [id number]     [facility]  Echocardiography Laboratory  [address] [place] [id number]     Name: MIKE JOSE  MRN: [id number]  : 2000  Study Date: 2023 02:56 PM  Age: 23 yrs  Gender: Female  Patient Location: [facility]  Reason For Study: CHF  Ordering Physician: JUDD DELANEY  Referring Physician: NOVA BENJAMIN  Performed By: Lalitha Ortez     BSA: 1.5 m2  Height: 61 in  Weight: 115 lb  HR: 122  BP: 207/151 mmHg  ______________________________________________________________________________  Procedure  Complete Echo Adult.  ______________________________________________________________________________  Interpretation Summary     The visual ejection fraction is 20-25%.  The left ventricle is mildly dilated.  There is mild (1+) mitral regurgitation.  There is mild to moderate (1-2+) tricuspid regurgitation.  Moderate (46-55mmHg) pulmonary hypertension is present.  The above are new findings compared with study in   Contrast was used without apparent complications.  ______________________________________________________________________________  Left Ventricle  The left ventricle is mildly dilated. There is normal left ventricular wall  thickness. The visual ejection fraction is 20-25%. Normal left  ventricular  wall motion. No evidence of left ventricular mass or tumors.     Right Ventricle  The right ventricle is normal in structure, function and size.     Atria  Normal left atrial size. Right atrial size is normal.     Mitral Valve  There is mild (1+) mitral regurgitation.     Tricuspid Valve  The right ventricular systolic pressure is approximated at 41mmHg plus the  right atrial pressure. There is mild to moderate (1-2+) tricuspid  regurgitation. Moderate (46-55mmHg) pulmonary hypertension is present.     Aortic Valve  Normal tricuspid aortic valve.     Pulmonic Valve  Normal pulmonic valve.     Vessels  The aortic root is normal size. Dilation of the inferior vena cava is present  with normal respiratory variation in diameter.     Pericardium  Trivial pericardial effusion. There are no echocardiographic indications of  cardiac tamponade. Small left pleural effusion.     Rhythm  The rhythm was sinus tachycardia.  ______________________________________________________________________________  MMode/2D Measurements & Calculations  IVSd: 0.94 cm     LVIDd: 5.6 cm  LVIDs: 4.3 cm  LVPWd: 0.95 cm  FS: 24.2 %  LV mass(C)d: 205.8 grams  LV mass(C)dI: 137.8 grams/m2  Ao root diam: 3.6 cm  asc Aorta Diam: 3.6 cm  LVOT diam: 2.2 cm  LVOT area: 3.9 cm2  LA Volume (BP): 45.3 ml  LA Volume Index (BP): 30.4 ml/m2  RWT: 0.34     Doppler Measurements & Calculations  MV E max horacio: 124.7 cm/sec  MV dec slope: 767.6 cm/sec2  MV dec time: 0.16 sec  Ao V2 max: 106.0 cm/sec  Ao max P.0 mmHg  Ao V2 mean: 72.1 cm/sec  Ao mean P.3 mmHg  Ao V2 VTI: 14.0 cm  MARY(I,D): 2.8 cm2  MARY(V,D): 3.0 cm2  LV V1 max P.7 mmHg  LV V1 max: 81.7 cm/sec  LV V1 VTI: 9.9 cm  MR PISA: 2.7 cm2  MR ERO: 0.18 cm2  MR volume: 27.5 ml  SV(LVOT): 39.0 ml  SI(LVOT): 26.1 ml/m2  PA V2 max: 70.6 cm/sec  PA max P.0 mmHg  PA acc time: 0.08 sec  AV Horacio Ratio (DI): 0.77  MARY Index (cm2/m2): 1.9  E/E' av.3  Lateral E/e': 9.7  Medial E/e':  12.9  RV S Horacio: 14.9 cm/sec     ______________________________________________________________________________  Report approved by: Maureen Coffman 08/11/2023 04:14 PM

## 2023-08-12 NOTE — PROGRESS NOTES
Assessment and Plan:     SLE with history of LN. 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 She has been on high dose cellcept and prednisone. She is managed by [facility]. Recent repeat biopsy done at [facility] : 8/2/23.     FINAL DIAGNOSIS:  A-C. 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  -  Severe interstitial fibrosis and tubular atrophy (70-80%).     COMMENT:  Active crescentic injury involves 2% of sampled glomeruli, while 15% of the glomeruli demonstrate old fibrous crescents and 51% of the glomeruli are globally sclerosed. Light microscopic features raise the possibility of a concomitant class V (membranous) lupus nephritis component, but glomeruli are not available for confirmatory electron microscopy. The NIH activity and chronicity indices are 7/24 and 10/12, respectively.    She is on plaquenil, IV bolus solumedrol, Cellcept 1500 mg bid. TMP-SX prophylaxis.     Labs today show Na 136, K 3.8, HCO3 21, Cr: 0.86 > 1.09. Ca 7.4, alb 2.1. Mg 1.7, phos 5.1.     WBC 4.1, Hgb 8.7, plt 93. U/A 1.023 200 prot, lg bld, 4 WBC 15 RBC    UO yest 1525 ml.      Will plan to change to oral prednisone taper on Monday. Cont cellcept. Will consider additional treatment: CTX, rituxan (apparent allergy), other like belimumab (10 mg/kg V q 2 weeks for 3 doses and then q 4 weeks). I would try to avoid CTX in a young woman due to effects on fertility.     Will hold plaquenil for now given new cardiomyopathy.               Interval History:   Hypertension:  on imdur,. Lisinopril, metoprolol.     Renal artery injury with biopsy:   Digital subtraction angiography images show a small  pseudoaneurysm off the inferior pole of the left kidney. Coil embolization was performed.   "    ID: on oral vanco and IV zosyn.      Anemia: on folate and iron.     Cardiomyopathy: per Cards. Avoid angio if possible.            Review of Systems:   No SOB or chest pain.           Medications:      artificial saliva  2 spray Swish & Spit 4x Daily    [Held by provider] cloNIDine  0.1 mg Oral BID    ferrous sulfate  325 mg Oral Daily with breakfast    folic acid  1 mg Oral Daily    hydroxychloroquine  200 mg Oral Daily    isosorbide mononitrate  60 mg Oral Daily    lisinopril  20 mg Oral Daily    methylPREDNISolone  500 mg Intravenous Q24H    metoprolol succinate ER  50 mg Oral BID    mycophenolate  1,500 mg Oral BID    pantoprazole  40 mg Intravenous Daily with breakfast    piperacillin-tazobactam  3.375 g Intravenous Q6H    potassium chloride ER  20 mEq Oral TID    sulfamethoxazole-trimethoprim  1 tablet Oral Daily    tranexamic acid  500 mg Nebulization two times daily    vancomycin  125 mg Oral BID      STATIN NOT PRESCRIBED       Current active medications and PTA medications reviewed, see medication list for details.            Physical Exam:   Vitals were reviewed  Patient Vitals for the past 24 hrs:   BP Temp Temp src Pulse Resp SpO2 Height Weight   08/12/23 1100 -- 97.4  F (36.3  C) Oral -- -- -- -- --   08/12/23 1000 (!) 134/100 -- -- 56 17 100 % -- --   08/12/23 0950 (!) 141/98 -- -- 56 16 100 % -- --   08/12/23 0941 (!) 145/107 -- -- 61 19 100 % -- --   08/12/23 0936 (!) 143/104 -- -- 63 20 100 % 1.575 m (5' 2\") 51.1 kg (112 lb 10.5 oz)   08/12/23 0900 (!) 141/103 -- -- 67 22 100 % -- --   08/12/23 0831 -- -- -- -- -- 99 % -- --   08/12/23 0800 (!) 141/106 -- -- 57 13 100 % -- --   08/12/23 0700 (!) 141/110 97.4  F (36.3  C) Oral 57 14 100 % -- --   08/12/23 0655 (!) 144/108 -- -- 56 15 100 % -- --   08/12/23 0650 (!) 146/104 -- -- 53 15 100 % -- --   08/12/23 0645 (!) 146/103 -- -- 57 14 100 % -- --   08/12/23 0640 (!) 143/105 -- -- 53 14 100 % -- --   08/12/23 0635 (!) 143/106 -- -- 56 13 100 % " -- --   08/12/23 0630 (!) 142/103 -- -- 53 14 100 % -- --   08/12/23 0620 (!) 136/104 -- -- 50 13 100 % -- --   08/12/23 0615 (!) 144/104 -- -- 52 14 100 % -- --   08/12/23 0610 (!) 143/105 -- -- 55 14 100 % -- --   08/12/23 0605 (!) 145/106 -- -- 54 14 100 % -- --   08/12/23 0600 (!) 149/108 -- -- 52 14 100 % -- --   08/12/23 0555 (!) 148/109 -- -- 61 15 100 % -- --   08/12/23 0550 (!) 144/109 -- -- 55 12 100 % -- --   08/12/23 0545 (!) 151/110 -- -- 56 16 100 % -- --   08/12/23 0540 (!) 150/113 -- -- 66 18 100 % -- --   08/12/23 0535 (!) 145/110 -- -- 69 19 100 % -- --   08/12/23 0530 (!) 150/108 -- -- 65 16 100 % -- --   08/12/23 0525 (!) 142/104 -- -- 56 14 100 % -- --   08/12/23 0520 (!) 144/106 -- -- 58 14 100 % -- --   08/12/23 0515 (!) 146/106 -- -- 53 12 100 % -- --   08/12/23 0514 (!) 146/106 -- -- 50 13 100 % -- --   08/12/23 0400 (!) 143/105 97.1  F (36.2  C) Oral 54 14 100 % -- --   08/12/23 0300 -- -- -- 59 13 100 % -- --   08/12/23 0200 (!) 142/104 -- -- 56 13 100 % -- --   08/12/23 0108 -- -- -- 60 14 100 % -- --   08/12/23 0100 -- -- -- 58 13 100 % -- --   08/12/23 0000 (!) 139/99 97.6  F (36.4  C) Oral 64 15 100 % -- --   08/11/23 2300 -- -- -- 73 16 100 % -- --   08/11/23 2200 (!) 145/106 -- -- 74 20 100 % -- --   08/11/23 2100 -- -- -- 80 18 100 % -- --   08/11/23 2000 (!) 143/100 98.9  F (37.2  C) Oral 86 21 100 % -- --   08/11/23 1930 -- -- -- 82 17 100 % -- --   08/11/23 1900 -- -- -- 85 19 100 % -- --   08/11/23 1800 (!) 130/90 -- -- 96 22 99 % -- --   08/11/23 1626 (!) 136/97 -- -- 114 -- -- -- --   08/11/23 1600 (!) 150/106 -- -- 114 (!) 31 100 % -- --   08/11/23 1430 (!) 162/119 -- -- 113 -- -- -- --   08/11/23 1415 (!) 163/114 -- -- 113 -- -- -- --   08/11/23 1400 (!) 161/118 -- -- 119 -- -- -- --   08/11/23 1345 (!) 152/112 -- -- 110 -- -- -- --   08/11/23 1330 (!) 162/112 -- -- 110 -- -- -- --   08/11/23 1315 (!) 160/120 -- -- 107 -- -- -- --   08/11/23 1300 (!) 159/114 -- -- 113 -- 96  % -- --   23 1245 (!) 163/118 -- -- 109 -- 95 % -- --   23 1230 (!) 138/112 -- -- 106 -- 96 % -- --   23 1150 (!) 167/115 -- -- 112 30 98 % -- --   23 1145 (!) 165/117 -- -- 111 22 100 % -- --   23 1140 (!) 162/117 -- -- 106 20 100 % -- --   23 1135 (!) 165/115 -- -- 108 17 100 % -- --   23 1130 (!) 163/115 -- -- 107 21 100 % -- --   23 1125 (!) 162/113 -- -- 115 23 100 % -- --   23 1120 (!) 164/117 -- -- 109 23 100 % -- --   23 1115 (!) 164/114 -- -- 104 23 100 % -- --   23 1110 (!) 160/117 -- -- 107 26 100 % -- --       Temp:  [97.1  F (36.2  C)-98.9  F (37.2  C)] 97.4  F (36.3  C)  Pulse:  [] 56  Resp:  [12-31] 17  BP: (130-167)/() 134/100  SpO2:  [95 %-100 %] 100 %    Temperatures:  Current - Temp: 97.4  F (36.3  C); Max - Temp  Av.7  F (36.5  C)  Min: 97.1  F (36.2  C)  Max: 98.9  F (37.2  C)  Respiration range: Resp  Av.2  Min: 12  Max: 31  Pulse range: Pulse  Av.6  Min: 50  Max: 119  Blood pressure range: Systolic (24hrs), Av , Min:130 , Max:167   ; Diastolic (24hrs), Av, Min:90, Max:120    Pulse oximetry range: SpO2  Av.7 %  Min: 95 %  Max: 100 %    I/O last 3 completed shifts:  In: 1065.45 [I.V.:1065.45]  Out: 725 [Urine:725]      Intake/Output Summary (Last 24 hours) at 2023 1107  Last data filed at 2023 1031  Gross per 24 hour   Intake 1545.45 ml   Output 725 ml   Net 820.45 ml     Alert, quiet, doesn't talk much  Lungs with scattered rales and wheezes  Cor tachy, nl S1 S2 no rub or M  LE tr edema         Wt Readings from Last 4 Encounters:   23 51.1 kg (112 lb 10.5 oz)   08/10/23 52.3 kg (115 lb 3.2 oz)   23 46 kg (101 lb 6.4 oz)   07/10/23 48.4 kg (106 lb 11.2 oz)          Data:          Lab Results   Component Value Date     2023     08/10/2023     08/10/2023     2021     02/10/2021     02/10/2021    Lab Results   Component  Value Date    CHLORIDE 104 08/12/2023    CHLORIDE 111 08/10/2023    CHLORIDE 111 08/10/2023    CHLORIDE 119 06/13/2022    CHLORIDE 118 06/12/2022    CHLORIDE 113 06/11/2022    CHLORIDE 113 02/11/2021    CHLORIDE 110 02/10/2021    CHLORIDE 109 02/10/2021    Lab Results   Component Value Date    BUN 17.5 08/12/2023    BUN 11.5 08/10/2023    BUN 11.5 08/10/2023    BUN 16 06/13/2022    BUN 18 06/12/2022    BUN 21 06/11/2022    BUN 10 02/11/2021    BUN 14 02/10/2021    BUN 13 02/10/2021      Lab Results   Component Value Date    POTASSIUM 3.8 08/12/2023    POTASSIUM 4.0 08/10/2023    POTASSIUM 3.1 08/10/2023    POTASSIUM 3.6 06/13/2022    POTASSIUM 4.7 06/12/2022    POTASSIUM 3.5 06/11/2022    POTASSIUM 3.7 02/11/2021    POTASSIUM 3.6 02/10/2021    POTASSIUM 5.3 02/10/2021    Lab Results   Component Value Date    CO2 21 08/12/2023    CO2 19 08/10/2023    CO2 20 08/10/2023    CO2 19 06/13/2022    CO2 16 06/12/2022    CO2 19 06/11/2022    CO2 18 02/11/2021    CO2 20 02/10/2021    CO2 19 02/10/2021    Lab Results   Component Value Date    CR 1.09 08/12/2023    CR 0.5 08/10/2023    CR 0.74 08/10/2023    CR 0.76 08/10/2023    CR 0.58 02/11/2021    CR 0.62 02/10/2021    CR 0.48 02/10/2021        Recent Labs   Lab Test 08/12/23  0819 08/11/23  0447 08/10/23  1810   WBC 4.1 7.5 4.0   HGB 8.7* 9.5* 6.3*   HCT 26.8* 31.2* 21.1*   MCV 86 92 96   PLT 93* 143* 129*     Recent Labs   Lab Test 08/12/23  0819 08/10/23  1810 01/26/23  0656 01/24/23  1307 01/24/23  0623 10/09/20  2053 10/09/20  1749 12/27/17  1021 11/22/17  0858   AST 79* 24 49*   < > 41*   < > Unsatisfactory specimen - hemolyzed   < > 21   ALT 23 <5 10   < > 6*   < > 71*   < > 23   GGT  --   --   --   --  82*  --  82*  --  44*   ALKPHOS 94 122* 232*   < > 262*   < > 164*   < > 107   BILITOTAL 0.5 0.4 0.3   < > 0.2   < > 0.4   < > 0.3    < > = values in this interval not displayed.       Recent Labs   Lab Test 08/12/23  0819 08/10/23  1810 01/29/23  0839   MAG 1.7 1.6*  1.8     Recent Labs   Lab Test 08/12/23  0819 08/10/23  1011 07/10/23  1454   PHOS 5.1* 3.1 4.0     Recent Labs   Lab Test 08/12/23  0819 08/10/23  1810 08/10/23  1011   BISI 7.4* 8.1* 7.8*       Lab Results   Component Value Date    BISI 7.4 (L) 08/12/2023     Lab Results   Component Value Date    WBC 4.1 08/12/2023    HGB 8.7 (L) 08/12/2023    HCT 26.8 (L) 08/12/2023    MCV 86 08/12/2023    PLT 93 (L) 08/12/2023     Lab Results   Component Value Date     08/12/2023    POTASSIUM 3.8 08/12/2023    CHLORIDE 104 08/12/2023    CO2 21 (L) 08/12/2023     (H) 08/12/2023     Lab Results   Component Value Date    BUN 17.5 08/12/2023    CR 1.09 (H) 08/12/2023     Lab Results   Component Value Date    MAG 1.7 08/12/2023     Lab Results   Component Value Date    PHOS 5.1 (H) 08/12/2023       Creatinine   Date Value Ref Range Status   08/12/2023 1.09 (H) 0.51 - 0.95 mg/dL Final   08/10/2023 0.74 0.51 - 0.95 mg/dL Final   08/10/2023 0.76 0.51 - 0.95 mg/dL Final   07/10/2023 0.86 0.51 - 0.95 mg/dL Final   01/29/2023 0.54 0.51 - 0.95 mg/dL Final   01/28/2023 0.60 0.51 - 0.95 mg/dL Final   02/11/2021 0.58 0.50 - 1.00 mg/dL Final   02/10/2021 0.62 0.50 - 1.00 mg/dL Final   02/10/2021 0.48 (L) 0.50 - 1.00 mg/dL Final   01/13/2021 0.54 0.50 - 1.00 mg/dL Final   12/16/2020 0.52 0.50 - 1.00 mg/dL Final   11/18/2020 0.71 0.50 - 1.00 mg/dL Final     Creatinine POCT   Date Value Ref Range Status   08/10/2023 0.5 0.5 - 1.0 mg/dL Final       Attestation:  I have reviewed today's vital signs, notes, medications, labs and imaging.     Peter Zimmerman MD

## 2023-08-12 NOTE — PROGRESS NOTES
"  Interventional Radiology - Progress Note  Inpatient - [facility]  8/12/2023    S:  s/p left renal angiogram with coil embolization of PSA on 8/11/23    Milly is drowsy and very quiet this afternoon but reports doing ok post-procedure. Denies any new chest/back/abdominal pain, pain or bleeding at groin puncture site, new leg swelling/numbness/tingling, SOB, fever, nausea, or vomiting.     O:  BP (!) 134/100   Pulse 56   Temp 97.4  F (36.3  C) (Oral)   Resp 17   Ht 1.575 m (5' 2\")   Wt 51.1 kg (112 lb 10.5 oz)   LMP 05/29/2023 (Approximate)   SpO2 100%   BMI 20.60 kg/m    General: Resting in bed. Flat affect / minimally interactive.    Neuro: Drowsy but awake and responding appropriately to questions.  Resp: Non-labored breathing at rest on 2L NC.   Cardio/MSK: Extremities warm and well perfused. Trace BLE edema, non pitting. Faint but palpable b/l dorsalis pedis pulses.   Skin: Warm and dry. Not diaphoretic.   Abdomen: Soft, non distended, non tender.   Vascular: Right groin puncture site and dressing clean and dry, no redness, swelling, bruising, bleeding, or hematoma. Non tender. Faint but palpable right femoral pulse.     IMAGING:  Results for orders placed or performed during the hospital encounter of 08/11/23   IR Renal Angiogram Left    Narrative    PROCEDURE:   1. Ultrasound guided right femoral artery access.   2. Selective catheterization and angiography of 2 left renal arteries.  3. Superselective catheterization and angiography of 2 greater than  third order inferior pole renal artery branch vessels.  4. Coil embolization of 2 greater than third order inferior pole renal  artery branch vessels with post embolization angiography.  5. Placement of a 6 Peruvian Angio-Seal closure device.  6. Moderate sedation. Sedation    DATE OF PROCEDURE: 8/11/2023    MODERATE SEDATION: Versed 1.5 mg IV; Fentanyl 100 mcg IV.  Under  physician supervision, Versed and fentanyl were administered for  moderate " sedation. Pulse oximetry, heart rate and blood pressure were  continuously monitored by an independent trained observer. The  physician spent 40 minutes of face-to-face sedation time with the  patient.    CONTRAST: 42 mL Isovue IA    FLUOROSCOPY TIME: 9.4 minutes    AIR KERMA: 96.4 mGy.    COMPLICATIONS: None    CLINICAL HISTORY/INDICATION: Perinephric hematoma. 3 mm renal  pseudoaneurysm status post renal biopsy last week    PROCEDURE AND FINDINGS:  Following a discussion of the risks, benefits, indications, and  alternatives to treatment, appropriate informed consent was obtained  from the patient. The patient was brought to the interventional  radiology suite and placed supine on the table. Bilateral groins were  prepped and draped in a sterile fashion.  A timeout was performed per  universal protocol policy to confirm the correct patient, site and  procedure to be performed.    A preliminary ultrasound of the right  groin was performed and  demonstrates a patent right common femoral artery.  A permanent  ultrasound image was recorded.  Using a combination of fluoroscopy and  ultrasound, an access site was determined.  The overlying skin was  anesthetized with 1% Lidocaine.  Using ultrasound guidance,  access  into the right common femoral artery was obtained with visualization  of needle entry into the vessel. A 0.018 wire was advanced through the  needle and exchange was made for a 5 Martiniquais micropuncture sheath. A  0.035 wire was advanced through the micropuncture sheath and exchange  was made for a 5 Martiniquais vascular sheath.    A Bar and 5 Martiniquais Omni select catheter were inserted into the  abdominal aorta and used to selectively catheterize the more superior  left renal artery. Digital subtraction angiography was performed  images show the renal artery is patent. No definite pseudoaneurysm is  identified. The catheter was then used to selectively catheterize the  inferior pole renal artery branch vessel.  Digital subtraction  angiography was performed, images show no pseudoaneurysm.    The catheter was then used to selectively catheterize the more  superior renal artery branch vessel. Digital subtraction angiography  was repeated in multiple different projections, images show a small  pseudoaneurysm off the inferior pole of the left kidney.     A Prograte micro-catheter wire were used to selectively catheterize a  greater than third order renal artery inferior pole branch vessel.  Digital subtraction angiography was performed, images show a small  pseudoaneurysm. Coil embolization was performed. Postembolization  shows a small amount of contrast filling the pseudoaneurysm. The  microcatheter and wire were pulled back and used to selectively  catheterize a second greater than third order renal artery inferior  pole branch vessel. Digital subtraction angiography was performed,  images show access into the correct branch vessel. Coil embolization  was performed, post embolization angiography was performed, images  show no residual filling of the pseudoaneurysm. The remainder of the  renal artery is patent.    At this point, the catheter was removed over the wire. A preclosure  digital subtraction angiographic run was performed to evaluate the  access site and adequate hemostasis was then obtained using  a 6  Indonesian Angio-Seal closure device.     Throughout the procedure, the patient was monitored by a radiology  nurse for cardiac rhythm and oxygen saturation which remained stable.  The patient tolerated the procedure well and left interventional  radiology in stable condition.      Impression    IMPRESSION:  1. Successful coil embolization of the left renal artery inferior pole  pseudoaneurysm. 2 distal renal artery branch vessels were coiled using  a total of 2 Pushpa coils.   Echo Complete     Value    LVEF  20-25%    [facility]    [id number]  [id number]  [id number]  [id number]^KIMI     [facility]  [facility]  Echocardiography Laboratory  [address] [place] [address]     Name: MIKE JOSE  MRN: [id number]  : 2000  Study Date: 2023 02:56 PM  Age: 23 yrs  Gender: Female  Patient Location: [facility]  Reason For Study: CHF  Ordering Physician: JUDD DELANEY  Referring Physician: NOVA BENJAMIN  Performed By: Lalitha Ortez     BSA: 1.5 m2  Height: 61 in  Weight: 115 lb  HR: 122  BP: 207/151 mmHg  ______________________________________________________________________________  Procedure  Complete Echo Adult.  ______________________________________________________________________________  Interpretation Summary     The visual ejection fraction is 20-25%.  The left ventricle is mildly dilated.  There is mild (1+) mitral regurgitation.  There is mild to moderate (1-2+) tricuspid regurgitation.  Moderate (46-55mmHg) pulmonary hypertension is present.  The above are new findings compared with study in   Contrast was used without apparent complications.  ______________________________________________________________________________  Left Ventricle  The left ventricle is mildly dilated. There is normal left ventricular wall  thickness. The visual ejection fraction is 20-25%. Normal left ventricular  wall motion. No evidence of left ventricular mass or tumors.     Right Ventricle  The right ventricle is normal in structure, function and size.     Atria  Normal left atrial size. Right atrial size is normal.     Mitral Valve  There is mild (1+) mitral regurgitation.     Tricuspid Valve  The right ventricular systolic pressure is approximated at 41mmHg plus the  right atrial pressure. There is mild to moderate (1-2+) tricuspid  regurgitation. Moderate (46-55mmHg) pulmonary hypertension is present.     Aortic Valve  Normal tricuspid aortic valve.     Pulmonic Valve  Normal pulmonic valve.     Vessels  The aortic root is normal size. Dilation of the inferior vena cava is present  with  normal respiratory variation in diameter.     Pericardium  Trivial pericardial effusion. There are no echocardiographic indications of  cardiac tamponade. Small left pleural effusion.     Rhythm  The rhythm was sinus tachycardia.  ______________________________________________________________________________  MMode/2D Measurements & Calculations  IVSd: 0.94 cm     LVIDd: 5.6 cm  LVIDs: 4.3 cm  LVPWd: 0.95 cm  FS: 24.2 %  LV mass(C)d: 205.8 grams  LV mass(C)dI: 137.8 grams/m2  Ao root diam: 3.6 cm  asc Aorta Diam: 3.6 cm  LVOT diam: 2.2 cm  LVOT area: 3.9 cm2  LA Volume (BP): 45.3 ml  LA Volume Index (BP): 30.4 ml/m2  RWT: 0.34     Doppler Measurements & Calculations  MV E max horacio: 124.7 cm/sec  MV dec slope: 767.6 cm/sec2  MV dec time: 0.16 sec  Ao V2 max: 106.0 cm/sec  Ao max P.0 mmHg  Ao V2 mean: 72.1 cm/sec  Ao mean P.3 mmHg  Ao V2 VTI: 14.0 cm  MARY(I,D): 2.8 cm2  MARY(V,D): 3.0 cm2  LV V1 max P.7 mmHg  LV V1 max: 81.7 cm/sec  LV V1 VTI: 9.9 cm  MR PISA: 2.7 cm2  MR ERO: 0.18 cm2  MR volume: 27.5 ml  SV(LVOT): 39.0 ml  SI(LVOT): 26.1 ml/m2  PA V2 max: 70.6 cm/sec  PA max P.0 mmHg  PA acc time: 0.08 sec  AV Horacio Ratio (DI): 0.77  MARY Index (cm2/m2): 1.9  E/E' av.3  Lateral E/e': 9.7  Medial E/e': 12.9  RV S Horacio: 14.9 cm/sec     ______________________________________________________________________________  Report approved by: Maureen Coffman 2023 04:14 PM           8/10/23 CTA chest, abdomen, pelvis also reviewed. See Imaging tab for result details.       LABS:  CBC  Recent Labs   Lab 23  0819 23  0447 08/10/23  1810 08/10/23  1011   WBC 4.1 7.5 4.0 3.1*   RBC 3.12* 3.40* 2.21* 2.17*   HGB 8.7* 9.5* 6.3* 6.1*   HCT 26.8* 31.2* 21.1* 20.2*   MCV 86 92 96 93   MCH 27.9 27.9 28.5 28.1   MCHC 32.5 30.4* 29.9* 30.2*   RDW 18.7* 17.5* 16.8* 16.6*   PLT 93* 143* 129* 110*     CMP  Recent Labs   Lab 23  0819 23  0218 08/10/23  1940 08/10/23  1810 08/10/23  1011     --    --  141 139   POTASSIUM 3.8  --   --  4.0 3.1*   CHLORIDE 104  --   --  111* 111*   CO2 21*  --   --  19* 20*   ANIONGAP 11  --   --  11 8   * 83  --  92 98   BUN 17.5  --   --  11.5 11.5   CR 1.09*  --  0.5 0.74 0.76   GFRESTIMATED 73  --  >60 >90 >90   BISI 7.4*  --   --  8.1* 7.8*   MAG 1.7  --   --  1.6*  --    PHOS 5.1*  --   --   --  3.1   PROTTOTAL 5.3*  --   --  6.3*  --    ALBUMIN 2.1*  --   --  2.8* 2.7*   BILITOTAL 0.5  --   --  0.4  --    ALKPHOS 94  --   --  122*  --    AST 79*  --   --  24  --    ALT 23  --   --  <5  --      INR  Recent Labs   Lab 08/10/23  1810   INR 1.10           A:  Milly Carroll is a 23 year old woman with complex medical history as detailed in H&P and hospitalist notes including lupus nephritis. She was admitted with progressive dyspnea and blood loss anemia found to have left perinephric hematoma measuring 5.5 cm and new heart failure with reduced EF. IR consulted for possible bleeding intervention. S/p left renal angiogram with coil embolization of PSA on 8/11/2023.    P:    - Continue to follow serial hemoglobins until stabilized. Transfuse PRN.  - Post angiogram cares discussed briefly with patient. May need future reinforcement when more alert/engaged. Angiogram/embolization D/C instructions entered into navigator.  - If patient demonstrates clinical signs of re-bleeding would recommend repeat CTA to further assess for an active bleed prior to performing a potential repeat IR angiogram/embolization.  - Please contact IR with procedure-related questions or concerns.      Total time spent on the date of the encounter is 25 minutes, including time spent counseling the patient, performing a medically appropriate evaluation, reviewing prior medical history, ordering medications and tests, documenting clinical information in the medical record, and communication of results.    Oriana Dickerson DNP, APRN, NP-C  Interventional Radiology  [phone]      E/M codes for reference  only:  18035

## 2023-08-12 NOTE — CONSULTS
[facility]    Cardiology Consultation     Date of Admission:  8/11/2023    Assessment & Plan   Milly Carroll is a 23 year old female who was admitted for with progressive dyspnea. She has subsequently been found to have a blood loss anemia, perinephric hematoma now s/p coil embolization, lupus nephritis, and new heart failure with reduced ejection fraction.     The etiology of her heart failure is unclear. The differential diagnosis includes lupus myocarditis, plaquenil cardiotoxicity, stress induced cardiomyopathy, hypertensive heart disease (blood pressure was > 200 on admission), and less likely and coronary artery disease /vasculitis of the coronary arteries. She has subsequently been started on stress dose steroids for her lupus nephritis via nephrology and I would expect that to also treat any autoimmune cardiomyopathy. We also discussed the concerns over plaquenil being cardiotoxic and the team was OK stopping this medication as they did not feel it had any added benefit. The nephrology team would like to hold off on any additional contrast administration given the concern over her renal disease which I think is OK since she is currently stable, had no coronary calcification or apparent CAD on her most recent CTA of the chest, and is chest pain free with a relatively low troponin. Once her renal function has stabilized we will consider a dedicated CTA vs coronary angiography.     To help clarify the etiology of her heart failure I'd like to proceed with a cardiac MRI which cannot be performed until Monday. In the interim we will optimize her GDMT.     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 If tolerated will start MRA tomorrow  -Hold diuretic today but will consider if change in oxygenation  -Hold plaquenil if OK from a rheumatologic standpoint  -Consider evaluation of coronary arteries once renal function has stabilized (nephrology would like to wait a week)  -We will continue to follow    Rodolfo Bradford MD, MD    Primary Care Physician   Estefany Bell    Reason for Consult   Reason for consult: I was asked to evaluate this patient for new LV systolic dysfunction    History of Present Illness   Milly Carroll is a 23 year old female who presents with medical comorbidities including but not limited to SLE with lupus nephritis on plaquenil, hemoptysis concerning for potential lung vasculitis, and hypertension.     She has a history of LV systolic function and I'll note that an echocardiogram on 1/22 showed an ejection fraction of 40-45%. This was in the context of a hospital admission related to an active covid infection, myositis and lupus flare. A follow up echcoardiogram in June showed her ejection fraction normalized. Since then she has multiple hospitalizations related to lupus with systemic manifestations including nephritis and cerebritis, and was recently hospitalized at [facility] with a blood loss anemia, pneumonia, and concern for lupus nephritis. She was discharged on 8/4 following a kidney biopsy with plans to follow up at the [facility].     She subsequently represented to the emergency department with worsening cough and progressive anemia. IN the emergency department she was found to have a perinephric hematoma, hypertension into the 200's systolic, and hemoptysis with radiographic findings concerning for pneumonia. In addition she endorsed dyspnea, orthopnea, LE edema. Her NT-pro BNP was elevated at 65K, and troponin was  26. A CTA of the chest was without coronary calcifications or aortic pathology. An echocardiogram was performed which showed a mildly dilated left ventricle with global hypokinesis (EF 20-25%). Serial ECG's have shown progressive T wave inversions. Our team was consulted for further evaluation.  She has subsequently underwent coil embolization of pseudoaneurysms in her left renal artery.     She has no family history of heart disease and denies drug or alcohol use.     Past Medical History   Past Medical History:   Diagnosis Date    Hepatitis     Lupus (systemic lupus erythematosus) (H)     Lupus cerebritis (H)     Pancreatitis 08/2017    Pyelonephritis 08/2017    Seizures (H)     Status epilepticus (H)        Past Surgical History   Past Surgical History:   Procedure Laterality Date    IR RENAL BIOPSY LEFT  6/28/2022    NO HISTORY OF SURGERY      ORTHOPEDIC SURGERY         Prior to Admission Medications   Prior to Admission Medications   Prescriptions Last Dose Informant Patient Reported? Taking?   Vitamin D, Cholecalciferol, 10 MCG (400 UNIT) TABS 8/10/2023 at am  Yes Yes   Sig: Take 10 mcg by mouth daily   amoxicillin-clavulanate (AUGMENTIN) 875-125 MG tablet 8/10/2023 at am  Yes Yes   Sig: Take 1 tablet by mouth 2 times daily X 7 days.   calcium carbonate (TUMS) 500 MG chewable tablet 8/10/2023 at am  No Yes   Sig: Take 1 tablet (500 mg) by mouth daily   cloNIDine (CATAPRES) 0.1 MG tablet newly prescribed  No No   Sig: Take 1 tablet (0.1 mg) by mouth 2 times daily   ferrous sulfate (FEROSUL) 325 (65 Fe) MG tablet 8/10/2023 at am  No Yes   Sig: Take 1 tablet (325 mg) by mouth daily (with breakfast)   folic acid (FOLVITE) 1 MG tablet 8/10/2023 at am  Yes Yes   Sig: Take 1 mg by mouth daily   hydroxychloroquine (PLAQUENIL) 200 MG tablet 8/10/2023 at am  No Yes   Sig: Take 1 tablet (200 mg) by mouth daily   lisinopril (ZESTRIL) 20 MG tablet 8/10/2023 at am  Yes Yes   Sig: Take 20 mg by mouth daily   metoprolol  succinate ER (TOPROL XL) 50 MG 24 hr tablet 8/10/2023 at am  Yes Yes   Sig: Take 50 mg by mouth daily   mycophenolate (GENERIC EQUIVALENT) 500 MG tablet 8/10/2023 at am  No Yes   Sig: Take 3 tablets (1,500 mg) by mouth 2 times daily   ondansetron (ZOFRAN ODT) 4 MG ODT tab PRN  Yes Yes   Sig: Take 4 mg by mouth every 8 hours as needed for nausea   potassium chloride ER (KLOR-CON M) 20 MEQ CR tablet 8/10/2023 at am  Yes Yes   Sig: Take 20 mEq by mouth 2 times daily   potassium chloride ER (KLOR-CON M) 20 MEQ CR tablet newly prescribed  No No   Sig: Take 1 tablet (20 mEq) by mouth 3 times daily   predniSONE (DELTASONE) 20 MG tablet 8/10/2023 at am  No Yes   Sig: Take 1 tablet (20 mg) by mouth daily   vancomycin (VANCOCIN) 125 MG capsule 8/10/2023 at am  Yes Yes   Sig: Take 125 mg by mouth 2 times daily X 14 days.      Facility-Administered Medications: None     Current Facility-Administered Medications   Medication Dose Route Frequency    artificial saliva  2 spray Swish & Spit 4x Daily    [Held by provider] cloNIDine  0.1 mg Oral BID    ferrous sulfate  325 mg Oral Daily with breakfast    folic acid  1 mg Oral Daily    hydroxychloroquine  200 mg Oral Daily    isosorbide mononitrate  60 mg Oral Daily    lisinopril  20 mg Oral Daily    methylPREDNISolone  500 mg Intravenous Q24H    metoprolol succinate ER  50 mg Oral BID    mycophenolate  1,500 mg Oral BID    pantoprazole  40 mg Intravenous Daily with breakfast    piperacillin-tazobactam  3.375 g Intravenous Q6H    potassium chloride ER  20 mEq Oral TID    sulfamethoxazole-trimethoprim  1 tablet Oral Daily    tranexamic acid  500 mg Nebulization two times daily    vancomycin  125 mg Oral BID     Current Facility-Administered Medications   Medication Last Rate    STATIN NOT PRESCRIBED       Allergies   Allergies   Allergen Reactions    Rituximab Anaphylaxis and Shortness Of Breath       Social History    reports that she has never smoked. She has never used smokeless  "tobacco. She reports that she does not currently use alcohol. She reports that she does not use drugs.      Family History   I have reviewed this patient's family history and updated it with pertinent information if needed.  Family History   Problem Relation Age of Onset    Other - See Comments Father         Question of lupus in father and paternal grandfather    Lupus Sister         older sister    Gastrointestinal Disease No family hx of         No known history of IBD, hepatitis, pancreatitis, celiac disease, or GI cancers before age 50    Glaucoma No family hx of     Macular Degeneration No family hx of           Review of Systems   Endorses exertional dyspnea PND. Denies chest pain, LE edema, palpitations    Physical Exam   Vital Signs with Ranges  Temp:  [97.1  F (36.2  C)-98.9  F (37.2  C)] 97.4  F (36.3  C)  Pulse:  [] 56  Resp:  [12-31] 17  BP: (130-167)/() 134/100  SpO2:  [95 %-100 %] 100 %  Wt Readings from Last 4 Encounters:   08/12/23 51.1 kg (112 lb 10.5 oz)   08/10/23 52.3 kg (115 lb 3.2 oz)   07/26/23 46 kg (101 lb 6.4 oz)   07/10/23 48.4 kg (106 lb 11.2 oz)     I/O last 3 completed shifts:  In: 1065.45 [I.V.:1065.45]  Out: 725 [Urine:725]      Vitals: BP (!) 134/100   Pulse 56   Temp 97.4  F (36.3  C) (Oral)   Resp 17   Ht 1.575 m (5' 2\")   Wt 51.1 kg (112 lb 10.5 oz)   LMP 05/29/2023 (Approximate)   SpO2 100%   BMI 20.60 kg/m      Physical Exam:   General - Alert and oriented to time place and person in no acute distress, appears fatigued  Lungs: basilar crackles  Cardiovascular - No JVD, +1 RV heave, RRRm 1/6 systolic murmur at RUSB, no LE edema  Mental: Alert and orientated    No lab results found in last 7 days.    Invalid input(s): TROPONINIES    Recent Labs   Lab 08/12/23  0819 08/11/23  0447 08/11/23  0218 08/10/23  1940 08/10/23  1810 08/10/23  1011   WBC 4.1 7.5  --   --  4.0 3.1*   HGB 8.7* 9.5*  --   --  6.3* 6.1*   MCV 86 92  --   --  96 93   PLT 93* 143*  --   --  " 129* 110*   INR  --   --   --   --  1.10  --      --   --   --  141 139   POTASSIUM 3.8  --   --   --  4.0 3.1*   CHLORIDE 104  --   --   --  111* 111*   CO2 21*  --   --   --  19* 20*   BUN 17.5  --   --   --  11.5 11.5   CR 1.09*  --   --  0.5 0.74 0.76   GFRESTIMATED 73  --   --  >60 >90 >90   ANIONGAP 11  --   --   --  11 8   BISI 7.4*  --   --   --  8.1* 7.8*   *  --  83  --  92 98   ALBUMIN 2.1*  --   --   --  2.8* 2.7*   PROTTOTAL 5.3*  --   --   --  6.3*  --    BILITOTAL 0.5  --   --   --  0.4  --    ALKPHOS 94  --   --   --  122*  --    ALT 23  --   --   --  <5  --    AST 79*  --   --   --  24  --      Recent Labs   Lab Test 06/11/22  0146 08/19/17  0652   CHOL  --  102   HDL  --  17*   LDL  --  39   TRIG 100 231*     Recent Labs   Lab 08/12/23  0819 08/11/23  0447 08/10/23  1810   WBC 4.1 7.5 4.0   HGB 8.7* 9.5* 6.3*   HCT 26.8* 31.2* 21.1*   MCV 86 92 96   PLT 93* 143* 129*     No results for input(s): PH, PHV, PO2, PO2V, SAT, PCO2, PCO2V, HCO3, HCO3V in the last 168 hours.  Recent Labs   Lab 08/10/23  1810   NTBNPI 65,906*     No results for input(s): DD in the last 168 hours.  Recent Labs   Lab 08/11/23 0447   SED 54*     Recent Labs   Lab 08/12/23  0819 08/11/23  0447 08/10/23  1810   PLT 93* 143* 129*     No results for input(s): TSH in the last 168 hours.  Recent Labs   Lab 08/11/23  1603   COLOR Light Yellow   APPEARANCE Clear   URINEGLC Negative   URINEBILI Negative   URINEKETONE Negative   SG 1.023   UBLD Large*   URINEPH 6.0   PROTEIN 200*   NITRITE Negative   LEUKEST Negative   RBCU 15*   WBCU 4       Imaging:  Recent Results (from the past 48 hour(s))   CTA Chest Abdomen Pelvis w Contrast   Result Value    Radiologist flags Left renal pseudoaneurysm and perinephric hematoma (AA)    Narrative    EXAM: CTA CHEST ABDOMEN PELVIS W CONTRAST  LOCATION: [facility]  DATE: 8/10/2023    INDICATION: Shortness of breath and cough after recent kidney biopsy.  COMPARISON:  CT abdomen/pelvis 01/23/2023  TECHNIQUE: CT angiogram chest abdomen pelvis during arterial phase of injection of IV contrast. 2D and 3D MIP reconstructions were performed by the CT technologist. Dose reduction techniques were used.   CONTRAST: 80 mL Isovue 370    FINDINGS:   CT ANGIOGRAM CHEST, ABDOMEN, AND PELVIS:   No active contrast extravasation within the left perinephric hematoma.    Pulmonary Arteries: The pulmonary arteries are well-opacified with contrast. No filling defects are seen to suggest thromboembolism.    Thoracic Aorta: Patent and normal in caliber. No evidence of dissection, intramural hematoma, or penetrating ulcer.    Abdominal Aorta: Patent and normal in caliber. No evidence of dissection or penetrating ulcer.    Celiac Artery: Patent.  Superior Mesenteric Artery: Patent.  Right Renal Artery: Patent.  Left Renal Artery: 3 left renal arteries, which appear patent.  Inferior Mesenteric Artery: Patent.    Right Common Iliac Artery: Patent.  Right External Iliac Artery: Patent.  Right Internal Iliac Artery: Patent.  Right Common Femoral Artery: Patent.  Proximal Right Superficial Femoral Artery: Patent.  Proximal Right Deep Femoral Artery: Patent.    Left Common Iliac Artery: Patent.  Left External Iliac Artery: Patent.  Left Internal Iliac Artery: Patent.  Left Common Femoral Artery: Patent.  Proximal Left Superficial Femoral Artery: Patent.  Proximal Left Deep Femoral Artery: Patent.    LUNGS AND PLEURA: Large area of consolidation in the left lower lobe. Small patchy groundglass opacity in the right upper lobe posterior segment. Bilateral central groundglass opacities with mild interlobular septal thickening. Trace bilateral pleural   effusions. No pneumothorax.    MEDIASTINUM/AXILLAE: No lymphadenopathy. Small pericardial effusion.    CORONARY ARTERY CALCIFICATION: None.    HEPATOBILIARY: Normal.    PANCREAS: Normal.    SPLEEN: Normal.    ADRENAL GLANDS: Normal.    KIDNEYS/BLADDER: 0.3 cm  arterially hyperenhancing focus in the left renal lower pole, which is less conspicuous on portal venous phase. Left perinephric hematoma measuring 4.5 x 2.0 x 5.5 cm. No contrast extravasation within the hematoma. Left renal   midpole nonobstructing calculus measuring 0.4 cm. No hydronephrosis. Urinary bladder appears normal.    BOWEL: No obstruction or inflammatory change. Normal appendix. No evidence of acute appendicitis.    PERITONEUM/RETROPERITONEUM: Trace abdominal and pelvic ascites. Left perinephric hematoma measuring 4.5 x 2.0 x 5.5 cm. No intraperitoneal free air.    LYMPH NODES: Multiple mildly enlarged perigastric, alexandria hepatis, and retroperitoneal lymph nodes, which have decreased in size compared to prior. Previously enlarged pelvic lymph nodes are now not enlarged by size criteria.    PELVIC ORGANS: Unremarkable.    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 Subcutaneous edema of the bilateral flanks and proximal thighs.      Impression    IMPRESSION:  1.  Left perinephric hematoma measuring 5.5 cm. No active extravasation within the hematoma. However, a 0.3 cm arterially hyperenhancing focus in the left renal lower pole is suspicious for pseudoaneurysm.    2.  Left lower lobe consolidation, compatible with pneumonia. Small groundglass opacity in the right upper lobe, likely infectious or inflammatory.    3.  Mild interstitial and alveolar pulmonary edema. Trace bilateral pleural effusions. Small pericardial effusion. Trace abdominal and pelvic ascites. Mild anasarca.    4.  Decreased size of mildly enlarged upper abdominal and retroperitoneal lymphadenopathy. Resolved pelvic lymphadenopathy.      [Critical Result: Left renal pseudoaneurysm and perinephric hematoma]    Finding was identified on 8/10/2023 10:26 PM CDT.     Dr. Sumi Lau was contacted by me on 8/10/2023 10:36 PM CDT and verbalized understanding of the critical result.    IR Renal  Angiogram Left    Narrative    PROCEDURE:   1. Ultrasound guided right femoral artery access.   2. Selective catheterization and angiography of 2 left renal arteries.  3. Superselective catheterization and angiography of 2 greater than  third order inferior pole renal artery branch vessels.  4. Coil embolization of 2 greater than third order inferior pole renal  artery branch vessels with post embolization angiography.  5. Placement of a 6 Citizen of [place] Angio-Seal closure device.  6. Moderate sedation. Sedation    DATE OF PROCEDURE: 8/11/2023    MODERATE SEDATION: Versed 1.5 mg IV; Fentanyl 100 mcg IV.  Under  physician supervision, Versed and fentanyl were administered for  moderate sedation. Pulse oximetry, heart rate and blood pressure were  continuously monitored by an independent trained observer. The  physician spent 40 minutes of face-to-face sedation time with the  patient.    CONTRAST: 42 mL Isovue IA    FLUOROSCOPY TIME: 9.4 minutes    AIR KERMA: 96.4 mGy.    COMPLICATIONS: None    CLINICAL HISTORY/INDICATION: Perinephric hematoma. 3 mm renal  pseudoaneurysm status post renal biopsy last week    PROCEDURE AND FINDINGS:  Following a discussion of the risks, benefits, indications, and  alternatives to treatment, appropriate informed consent was obtained  from the patient. The patient was brought to the interventional  radiology suite and placed supine on the table. Bilateral groins were  prepped and draped in a sterile fashion.  A timeout was performed per  universal protocol policy to confirm the correct patient, site and  procedure to be performed.    A preliminary ultrasound of the right  groin was performed and  demonstrates a patent right common femoral artery.  A permanent  ultrasound image was recorded.  Using a combination of fluoroscopy and  ultrasound, an access site was determined.  The overlying skin was  anesthetized with 1% Lidocaine.  Using ultrasound guidance,  access  into the right common femoral  artery was obtained with visualization  of needle entry into the vessel. A 0.018 wire was advanced through the  needle and exchange was made for a 5 Citizen of [place] micropuncture sheath. A  0.035 wire was advanced through the micropuncture sheath and exchange  was made for a 5 Citizen of [place] vascular sheath.    A Bar and 5 Citizen of [place] Omni select catheter were inserted into the  abdominal aorta and used to selectively catheterize the more superior  left renal artery. Digital subtraction angiography was performed  images show the renal artery is patent. No definite pseudoaneurysm is  identified. The catheter was then used to selectively catheterize the  inferior pole renal artery branch vessel. Digital subtraction  angiography was performed, images show no pseudoaneurysm.    The catheter was then used to selectively catheterize the more  superior renal artery branch vessel. Digital subtraction angiography  was repeated in multiple different projections, images show a small  pseudoaneurysm off the inferior pole of the left kidney.     A Prograte micro-catheter wire were used to selectively catheterize a  greater than third order renal artery inferior pole branch vessel.  Digital subtraction angiography was performed, images show a small  pseudoaneurysm. Coil embolization was performed. Postembolization  shows a small amount of contrast filling the pseudoaneurysm. The  microcatheter and wire were pulled back and used to selectively  catheterize a second greater than third order renal artery inferior  pole branch vessel. Digital subtraction angiography was performed,  images show access into the correct branch vessel. Coil embolization  was performed, post embolization angiography was performed, images  show no residual filling of the pseudoaneurysm. The remainder of the  renal artery is patent.    At this point, the catheter was removed over the wire. A preclosure  digital subtraction angiographic run was performed to evaluate  the  access site and adequate hemostasis was then obtained using  a 6  [place] Angio-Seal closure device.     Throughout the procedure, the patient was monitored by a radiology  nurse for cardiac rhythm and oxygen saturation which remained stable.  The patient tolerated the procedure well and left interventional  radiology in stable condition.      Impression    IMPRESSION:  1. Successful coil embolization of the left renal artery inferior pole  pseudoaneurysm. 2 distal renal artery branch vessels were coiled using  a total of 2 Pushpa coils.   Echo Complete   Result Value    LVEF  20-25%    [facility]    [id number]  [id number]  [id number]     [facility]  Echocardiography Laboratory  [address] [place] [id number]     Name: MIKE JOSE  MRN: [id number]  : 2000  Study Date: 2023 02:56 PM  Age: 23 yrs  Gender: Female  Patient Location: [facility]  Reason For Study: CHF  Ordering Physician: JUDD DELANEY  Referring Physician: NOVA BENJAMIN  Performed By: Lalitha Ortez     BSA: 1.5 m2  Height: 61 in  Weight: 115 lb  HR: 122  BP: 207/151 mmHg  ______________________________________________________________________________  Procedure  Complete Echo Adult.  ______________________________________________________________________________  Interpretation Summary     The visual ejection fraction is 20-25%.  The left ventricle is mildly dilated.  There is mild (1+) mitral regurgitation.  There is mild to moderate (1-2+) tricuspid regurgitation.  Moderate (46-55mmHg) pulmonary hypertension is present.  The above are new findings compared with study in   Contrast was used without apparent complications.  ______________________________________________________________________________  Left Ventricle  The left ventricle is mildly dilated. There is normal left ventricular wall  thickness. The visual ejection fraction is 20-25%. Normal left ventricular  wall motion.  No evidence of left ventricular mass or tumors.     Right Ventricle  The right ventricle is normal in structure, function and size.     Atria  Normal left atrial size. Right atrial size is normal.     Mitral Valve  There is mild (1+) mitral regurgitation.     Tricuspid Valve  The right ventricular systolic pressure is approximated at 41mmHg plus the  right atrial pressure. There is mild to moderate (1-2+) tricuspid  regurgitation. Moderate (46-55mmHg) pulmonary hypertension is present.     Aortic Valve  Normal tricuspid aortic valve.     Pulmonic Valve  Normal pulmonic valve.     Vessels  The aortic root is normal size. Dilation of the inferior vena cava is present  with normal respiratory variation in diameter.     Pericardium  Trivial pericardial effusion. There are no echocardiographic indications of  cardiac tamponade. Small left pleural effusion.     Rhythm  The rhythm was sinus tachycardia.  ______________________________________________________________________________  MMode/2D Measurements & Calculations  IVSd: 0.94 cm     LVIDd: 5.6 cm  LVIDs: 4.3 cm  LVPWd: 0.95 cm  FS: 24.2 %  LV mass(C)d: 205.8 grams  LV mass(C)dI: 137.8 grams/m2  Ao root diam: 3.6 cm  asc Aorta Diam: 3.6 cm  LVOT diam: 2.2 cm  LVOT area: 3.9 cm2  LA Volume (BP): 45.3 ml  LA Volume Index (BP): 30.4 ml/m2  RWT: 0.34     Doppler Measurements & Calculations  MV E max horacio: 124.7 cm/sec  MV dec slope: 767.6 cm/sec2  MV dec time: 0.16 sec  Ao V2 max: 106.0 cm/sec  Ao max P.0 mmHg  Ao V2 mean: 72.1 cm/sec  Ao mean P.3 mmHg  Ao V2 VTI: 14.0 cm  MARY(I,D): 2.8 cm2  MARY(V,D): 3.0 cm2  LV V1 max P.7 mmHg  LV V1 max: 81.7 cm/sec  LV V1 VTI: 9.9 cm  MR PISA: 2.7 cm2  MR ERO: 0.18 cm2  MR volume: 27.5 ml  SV(LVOT): 39.0 ml  SI(LVOT): 26.1 ml/m2  PA V2 max: 70.6 cm/sec  PA max P.0 mmHg  PA acc time: 0.08 sec  AV Horacio Ratio (DI): 0.77  MARY Index (cm2/m2): 1.9  E/E' av.3  Lateral E/e': 9.7  Medial E/e': 12.9  RV S Horacio: 14.9 cm/sec      ______________________________________________________________________________  Report approved by: Maureen Coffman 08/11/2023 04:14 PM             Echo:  No results found for this or any previous visit (from the past 4320 hour(s)).    Clinically Significant Risk Factors            # Hypomagnesemia: Lowest Mg = 1.6 mg/dL in last 2 days, will replace as needed   # Hypoalbuminemia: Lowest albumin = 2.1 g/dL at 8/12/2023  8:19 AM, will monitor as appropriate  # Coagulation Defect: INR = 1.10 (Ref range: 0.85 - 1.15) and/or PTT = 42 Seconds (Ref range: 22 - 38 Seconds), will monitor for bleeding  # Thrombocytopenia: Lowest platelets = 93 in last 2 days, will monitor for bleeding    # Acute heart failure with reduced ejection fraction: last echo with EF <40% and receiving IV diuretics

## 2023-08-12 NOTE — PROVIDER NOTIFICATION
"Text page sent to Dr. Way:    \"0021:     Tele Tech noted new inverted Twave within last 24-48hrs. Also notes prolonged Qt, 0.53-0.55. No orders for EKG. Please assist.    Mare ELAM  [phone]\"    Call back number provided, awaiting new orders/response. Continue to monitor.     6:50 AM  Order placed this AM for one-time routine EKG tracing, awaiting completion.   "

## 2023-08-13 NOTE — PLAN OF CARE
A&O x 4, pleasant but quiet and withdrawn. Hypertensive (scheduled meds given), otherwise VSS on RA. Tele: SR. Assist x1. Regular diet, tolerating well. PIV x3. R groin access site, WDL, soft. Denies nausea/SOB. Pain controlled w/ dilaudid x2. Adequate UOP via bedside commode. Bmx2, soft. Plan for cardiac MRI tomorrow. Continue plan of care.

## 2023-08-13 NOTE — PROGRESS NOTES
Respiratory care note: held tx this am. Talked to RM if pt is bleeding.. RN said she is not. Robyn

## 2023-08-13 NOTE — PROGRESS NOTES
Assessment and Plan:   Milly Carroll is a 23 year old female who was admitted for with progressive dyspnea. She has subsequently been found to have a blood loss anemia, perinephric hematoma now s/p coil embolization, lupus nephritis, and new heart failure with reduced ejection fraction for which we were consulted.      The etiology of her heart failure is unclear. The differential diagnosis includes lupus myocarditis, plaquenil cardiotoxicity, stress induced cardiomyopathy, hypertensive heart disease (blood pressure was > 200 on admission), and less likely and coronary artery disease /vasculitis of the coronary arteries. Plaquenil has been held and she is now being . The nephrology team would like to hold off on any additional contrast administration given the concern over her renal disease which I think is OK since she is currently stable, had no coronary calcification or apparent CAD on her most recent CTA of the chest, and is chest pain free with a relatively low troponin. We will proceed with a cardiac MRI tomorrow which should help elucidate the etiology of her heart failure. If clinical suspicion for CAD remains the nephrology team would like us to wait to consider a dedicated CTA vs coronary angiography.      In the interim we will optimize her heart failure medical regimen. Her creatinine is rising and with her evolving nephritic process I've held her lisinopril and we will hold off on starting an MRA. She remains hypertensive and I think she would benefit from afterload reduction, for which I've added hydralazine which can be titrated to response.     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  Pulmonary hypertension undetermined etiology  Perinephric  "hematoma s/p coiling of a pseudoaneurysm  Thrombocytopenia     Plan:   -Cardiac MRI on Monday  -Start hydralazine 10mg Q8 hrs, continue carvedilol 12.5mg BID  -Hold lisinopril 30mg daily, hold off on starting MRA  -Continue to hold plaquenil if OK from a rheumatologic standpoint  -Consider evaluation of coronary arteries once lupus nephritis has stabilized (nephrology would like to wait a week)  -Will consider RHC to determine etiology of pulmonary hypertension pending clinical course   -We will continue to follow    Rodolfo Bradford MD        Interval History:     Patient feels SOB, orthopnea has improved         Medications:   I have reviewed this patient's current medications         Physical Exam:   Blood pressure (!) 164/125, pulse 61, temperature 97.5  F (36.4  C), temperature source Oral, resp. rate 21, height 1.575 m (5' 2\"), weight 53.5 kg (118 lb), last menstrual period 05/29/2023, SpO2 100 %, not currently breastfeeding.  Vitals:    08/12/23 0936 08/13/23 0514   Weight: 51.1 kg (112 lb 10.5 oz) 53.5 kg (118 lb)         Intake/Output Summary (Last 24 hours) at 8/13/2023 0552  Last data filed at 8/12/2023 2030  Gross per 24 hour   Intake 1065.45 ml   Output --   Net 1065.45 ml       08/07 2300 - 08/12 2259  In: 1885.45 [P.O.:720; I.V.:1165.45]  Out: 1525 [Urine:1525]  Net: 360.45    Exam:  General - Alert and oriented to time place and person in no acute distress  Lungs: Not on oxygen, basilar crackles  Cardiovascular - JVP estimated to be 8cmh20, +1 RV heave, RRRm 1/6 systolic murmur at RUSB, no LE edema  Mental: Alert and orientated, appears withdrawn            Data:   LABS (Last four results)  CMP  Recent Labs   Lab 08/12/23  0819 08/11/23  0218 08/10/23  1940 08/10/23  1810 08/10/23  1011     --   --  141 139   POTASSIUM 3.8  --   --  4.0 3.1*   CHLORIDE 104  --   --  111* 111*   CO2 21*  --   --  19* 20*   ANIONGAP 11  --   --  11 8   * 83  --  92 98   BUN 17.5  --   --  11.5 11.5   CR " 1.09*  --  0.5 0.74 0.76   GFRESTIMATED 73  --  >60 >90 >90   BISI 7.4*  --   --  8.1* 7.8*   MAG 1.7  --   --  1.6*  --    PHOS 5.1*  --   --   --  3.1   PROTTOTAL 5.3*  --   --  6.3*  --    ALBUMIN 2.1*  --   --  2.8* 2.7*   BILITOTAL 0.5  --   --  0.4  --    ALKPHOS 94  --   --  122*  --    AST 79*  --   --  24  --    ALT 23  --   --  <5  --      CBC  Recent Labs   Lab 08/12/23  0819 08/11/23  0447 08/10/23  1810 08/10/23  1011   WBC 4.1 7.5 4.0 3.1*   RBC 3.12* 3.40* 2.21* 2.17*   HGB 8.7* 9.5* 6.3* 6.1*   HCT 26.8* 31.2* 21.1* 20.2*   MCV 86 92 96 93   MCH 27.9 27.9 28.5 28.1   MCHC 32.5 30.4* 29.9* 30.2*   RDW 18.7* 17.5* 16.8* 16.6*   PLT 93* 143* 129* 110*     INR  Recent Labs   Lab 08/10/23  1810   INR 1.10     TROPONINS   Lab Results   Component Value Date    TROPI <0.015 08/20/2017    TROPI  10/15/2015     <0.015  The 99th percentile for upper reference range is 0.045 ug/L.  Troponin values in   the range of 0.045 - 0.120 ug/L may be associated with risks of adverse   clinical events.                                                                                                                 Rodolfo Bradford MD

## 2023-08-13 NOTE — PROGRESS NOTES
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 She has had a worsening of kidney function. Recent kidney biopsy suggested Class IV lupus nephritis. She is on cellcept and steroids. TMP-Sx for prophylaxis. Plaquenil on hold given cardiomyopathy.     Wt 51.1 > 53.5. Cr 0.86 >1.09 > 1.28. K 4.4, HCO3 19.     CHRISTA: could be due to active nephritis, CHRISTA due to contrast also possible.     Change to oral prednisone on Monday. Consider belimumab vs Cytoxan.             Interval History:   Pneumonia: zosyn, oral vanco.     Hypertension: BP remains high. Add amlodipine.     CHF:  on coreg, hydralazine and imdur. Lisinopril on hold. BP still hgh. Add amlodipine.     Anemia: oral Fe and folate. Hgb 8.9.               Review of Systems:   Denies chest pain or SOB. Taking po well. Not ambulating much.           Medications:      artificial saliva  2 spray Swish & Spit 4x Daily    carvedilol  12.5 mg Oral BID w/meals    ferrous sulfate  325 mg Oral Daily with breakfast    folic acid  1 mg Oral Daily    hydrALAZINE  10 mg Oral Q8H ENOCH    [Held by provider] hydroxychloroquine  200 mg Oral Daily    isosorbide mononitrate  60 mg Oral Daily    [Held by provider] lisinopril  30 mg Oral Daily    methylPREDNISolone  500 mg Intravenous Q24H    mycophenolate  1,500 mg Oral BID    pantoprazole  40 mg Intravenous Daily with breakfast    piperacillin-tazobactam  3.375 g Intravenous Q6H    sulfamethoxazole-trimethoprim  1 tablet Oral Daily    tranexamic acid  500 mg Nebulization two times daily    vancomycin  125 mg Oral BID      STATIN NOT PRESCRIBED       Current active medications and PTA medications reviewed, see medication list for details.            Physical Exam:   Vitals were reviewed  Patient Vitals for the past 24 hrs:   BP Temp Temp src Pulse Resp SpO2 Weight   08/13/23 1000 (!) 163/115 -- -- 71 22 99 % --   08/13/23 0800 (!) 162/121 -- -- 73 24 100 % --    23 0700 -- 97.5  F (36.4  C) Oral -- -- -- --   23 0600 (!) 164/125 -- -- 61 21 100 % --   23 0514 -- -- -- -- -- -- 53.5 kg (118 lb)   23 0400 (!) 159/114 98.5  F (36.9  C) Oral 59 15 100 % --   23 0017 -- 97.3  F (36.3  C) Oral -- -- -- --   23 0000 (!) 147/111 -- -- 52 13 100 % --   23 2200 (!) 145/114 -- -- 71 21 98 % --   23 -- -- -- -- -- 100 % --   23 2030 -- 97.3  F (36.3  C) Oral -- -- -- --   23 2000 (!) 136/107 -- -- 63 15 100 % --   23 1830 -- -- -- 50 13 100 % --   23 1715 (!) 119/94 -- -- 66 -- -- --   23 1634 -- 97.6  F (36.4  C) Oral -- -- -- --   23 1600 116/83 -- -- 64 14 100 % --   23 1400 (!) 129/102 -- -- 55 15 100 % --   23 1342 120/89 -- -- 68 -- -- --       Temp:  [97.3  F (36.3  C)-98.5  F (36.9  C)] 97.5  F (36.4  C)  Pulse:  [50-73] 71  Resp:  [13-24] 22  BP: (116-164)/() 163/115  SpO2:  [98 %-100 %] 99 %    Temperatures:  Current - Temp: 97.5  F (36.4  C); Max - Temp  Av.6  F (36.4  C)  Min: 97.3  F (36.3  C)  Max: 98.5  F (36.9  C)  Respiration range: Resp  Av.3  Min: 13  Max: 24  Pulse range: Pulse  Av.8  Min: 50  Max: 73  Blood pressure range: Systolic (24hrs), Av , Min:116 , Max:164   ; Diastolic (24hrs), Av, Min:83, Max:125    Pulse oximetry range: SpO2  Av.7 %  Min: 98 %  Max: 100 %    I/O last 3 completed shifts:  In: 1140 [P.O.:940; I.V.:200]  Out: 350 [Urine:350]      Intake/Output Summary (Last 24 hours) at 2023 1230  Last data filed at 2023 0930  Gross per 24 hour   Intake 960 ml   Output 350 ml   Net 610 ml     Alert, sitting up in chair  Lungs with few basilar rales  Cor no JVD, hyperdynamic, nl S1 S2 no M  LE 1+ edema         Wt Readings from Last 4 Encounters:   23 53.5 kg (118 lb)   08/10/23 52.3 kg (115 lb 3.2 oz)   23 46 kg (101 lb 6.4 oz)   07/10/23 48.4 kg (106 lb 11.2 oz)          Data:          Lab Results    Component Value Date     08/13/2023     08/12/2023     08/10/2023     02/11/2021     02/10/2021     02/10/2021    Lab Results   Component Value Date    CHLORIDE 108 08/13/2023    CHLORIDE 104 08/12/2023    CHLORIDE 111 08/10/2023    CHLORIDE 119 06/13/2022    CHLORIDE 118 06/12/2022    CHLORIDE 113 06/11/2022    CHLORIDE 113 02/11/2021    CHLORIDE 110 02/10/2021    CHLORIDE 109 02/10/2021    Lab Results   Component Value Date    BUN 27.0 08/13/2023    BUN 17.5 08/12/2023    BUN 11.5 08/10/2023    BUN 16 06/13/2022    BUN 18 06/12/2022    BUN 21 06/11/2022    BUN 10 02/11/2021    BUN 14 02/10/2021    BUN 13 02/10/2021      Lab Results   Component Value Date    POTASSIUM 4.4 08/13/2023    POTASSIUM 3.8 08/12/2023    POTASSIUM 4.0 08/10/2023    POTASSIUM 3.6 06/13/2022    POTASSIUM 4.7 06/12/2022    POTASSIUM 3.5 06/11/2022    POTASSIUM 3.7 02/11/2021    POTASSIUM 3.6 02/10/2021    POTASSIUM 5.3 02/10/2021    Lab Results   Component Value Date    CO2 19 08/13/2023    CO2 21 08/12/2023    CO2 19 08/10/2023    CO2 19 06/13/2022    CO2 16 06/12/2022    CO2 19 06/11/2022    CO2 18 02/11/2021    CO2 20 02/10/2021    CO2 19 02/10/2021    Lab Results   Component Value Date    CR 1.28 08/13/2023    CR 1.09 08/12/2023    CR 0.5 08/10/2023    CR 0.74 08/10/2023    CR 0.58 02/11/2021    CR 0.62 02/10/2021    CR 0.48 02/10/2021        Recent Labs   Lab Test 08/13/23  0527 08/12/23  0819 08/11/23  0447   WBC 5.5 4.1 7.5   HGB 8.9* 8.7* 9.5*   HCT 28.6* 26.8* 31.2*   MCV 88 86 92   * 93* 143*     Recent Labs   Lab Test 08/12/23  0819 08/10/23  1810 01/26/23  0656 01/24/23  1307 01/24/23  0623 10/09/20  2053 10/09/20  1749 12/27/17  1021 11/22/17  0858   AST 79* 24 49*   < > 41*   < > Unsatisfactory specimen - hemolyzed   < > 21   ALT 23 <5 10   < > 6*   < > 71*   < > 23   GGT  --   --   --   --  82*  --  82*  --  44*   ALKPHOS 94 122* 232*   < > 262*   < > 164*   < > 107   BILITOTAL 0.5  0.4 0.3   < > 0.2   < > 0.4   < > 0.3    < > = values in this interval not displayed.       Recent Labs   Lab Test 08/13/23  0527 08/12/23  0819 08/10/23  1810   MAG 1.9 1.7 1.6*     Recent Labs   Lab Test 08/12/23  0819 08/10/23  1011 07/10/23  1454   PHOS 5.1* 3.1 4.0     Recent Labs   Lab Test 08/13/23  0527 08/12/23  0819 08/10/23  1810   BISI 7.8* 7.4* 8.1*       Lab Results   Component Value Date    BISI 7.8 (L) 08/13/2023     Lab Results   Component Value Date    WBC 5.5 08/13/2023    HGB 8.9 (L) 08/13/2023    HCT 28.6 (L) 08/13/2023    MCV 88 08/13/2023     (L) 08/13/2023     Lab Results   Component Value Date     08/13/2023    POTASSIUM 4.4 08/13/2023    CHLORIDE 108 (H) 08/13/2023    CO2 19 (L) 08/13/2023     (H) 08/13/2023     Lab Results   Component Value Date    BUN 27.0 (H) 08/13/2023    CR 1.28 (H) 08/13/2023     Lab Results   Component Value Date    MAG 1.9 08/13/2023     Lab Results   Component Value Date    PHOS 5.1 (H) 08/12/2023       Creatinine   Date Value Ref Range Status   08/13/2023 1.28 (H) 0.51 - 0.95 mg/dL Final   08/12/2023 1.09 (H) 0.51 - 0.95 mg/dL Final   08/10/2023 0.74 0.51 - 0.95 mg/dL Final   08/10/2023 0.76 0.51 - 0.95 mg/dL Final   07/10/2023 0.86 0.51 - 0.95 mg/dL Final   01/29/2023 0.54 0.51 - 0.95 mg/dL Final   02/11/2021 0.58 0.50 - 1.00 mg/dL Final   02/10/2021 0.62 0.50 - 1.00 mg/dL Final   02/10/2021 0.48 (L) 0.50 - 1.00 mg/dL Final   01/13/2021 0.54 0.50 - 1.00 mg/dL Final   12/16/2020 0.52 0.50 - 1.00 mg/dL Final   11/18/2020 0.71 0.50 - 1.00 mg/dL Final     Creatinine POCT   Date Value Ref Range Status   08/10/2023 0.5 0.5 - 1.0 mg/dL Final       Attestation:  I have reviewed today's vital signs, notes, medications, labs and imaging.     Petre Zimmerman MD

## 2023-08-13 NOTE — PLAN OF CARE
Goal Outcome Evaluation:    A/O. VSS ex HTN.  Groin site soft, non-tender. Edema to bilateral LE. Up SBA, + void, denies pain

## 2023-08-13 NOTE — PROGRESS NOTES
[facility]    Medicine Progress Note - Hospitalist Service    Date of Admission:  8/11/2023    Assessment & Plan   Milly Carroll is a 23 year old female admitted on 8/11/2023. 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  Today was seen by her nephrologist where there was concern for active lupus and prednisone was increased to 40 mg daily; labs prior to appointment with hemoglobin of 6.1.  Subsequent worsening cough and went to [facility] urgent care before being transferred to [facility] [place] emergency department.  At [facility] [place], a CT of the chest abdomen and pelvis was performed demonstrating likely pseudoaneurysm with perinephric hematoma on the left in the setting of recent left renal biopsy.  Also noted to have left lower lobe pneumonia, present at [facility] hospitalization late July as well.  Blood transfusions were initiated, and patient developed hemoptysis as well as uncontrolled blood pressure in the 200 systolic range.  Has a history of some heart failure with most recent ejection fraction [facility].  Significantly elevated BNP, significant orthopnea, bipedal edema.  Tells me that she has not been able to lay flat in her bed since even during her hospitalization at [facility] from 7/31 - 8/4/2023.  Though CT chest with area concerning for pneumonia in the left lower lobe, current hemoptysis increases concern that this actually represents active vasculitis.       On arrival at [facility] patient with approximately 1 tablespoon of melanie blood with hemoptysis (not clot).  She tells me that she has been coughing up blood since around the time that blood transfusion initiated at [facility] [place].  She is unable to lay flat, mild hypoxia sitting upright, and reports that she is tiring  in terms of her work of breathing.  Blood pressure is elevated in the 200 systolic range.     In the setting of perinephric hematoma with pseudoaneurysm, Dr. Biggs of interventional radiology at [facility] was aware prior to transfer from [facility].  Given patient's current presentation on intermediate care unit with hemoptysis and unable to lie flat, I have concerned that patient will be unable to tolerate any interventional radiology procedure without intubation.  On admission, care was discussed with ICU provider, who evaluated patient at bedside as well.  Plan was made to transfer patient to the ICU.  She will likely require bronchoscopy at some point, though not emergently at this time.  If bronchoscopy was needed more acutely, would need to be intubated for this.  Given that she is ICU level of care, admitting hospitalist discussed with patient placement and was informed that there are indeed beds available at [facility]. While working on transfer to [facility], admitting provider was notified that bed no longer available at the [facility].       Perinephric hematoma s/p Successful coil embolization of the left renal artery inferior pole pseudoaneurysm by IR On 8/11/23  *hx of Recent renal biopsy  - IR evaluated- s/p Successful coil embolization of the left renal artery inferior pole pseudoaneurysm. 2 distal renal artery branch vessels were coiled using a total of 2 Pushpa coils on 8/11/23  -Control of blood pressure as below     Acute blood loss anemia:   Multifactorial with recent suspected GI bleed at [facility] hospitalization, though she reports that she is no longer having any bloody or black stool, gross hemoptysis, perinephric hematoma following 8/2/2023 renal biopsy.    - s/p  2 units pRBC on 8/11/23  - hgb stable last 2 days 8.7>8.9g/dL  - she currently is not having hemoptysis on 8/12  - monitor counts     Active lupus flare  Lupus cerebritis  Lupus nephritis  Acute kidney injury  *as  above [facility]ology on-call at the [facility] on 8/11.  - Resume prior to admission mycophenolate  - Plaquenil held due to new onset cardiomyopathy  - nephrology has evaluated and initiated on IV Methylprednisolone 500mg daily x daily, continue  - add Protonix IV daily for GI prophylaxis while on high dose steroid  - Continue folic acid supplementation  - Cr gradually trending up 0.74>1.09 >1.28 on 8/13  - lisinopril held on 8/13 per cardiology  - nephrology following, appreciate assistance    Hemoptysis possible lupus flare, vs necrotizing pneumonia/friable mucosa in the setting of htn  *Pulmonary team evaluated on 8/11, felt that her symptoms are due to persistent pneumonia with possible underlying necrotizing pneumonia leading to hemoptysis. Cannot rule out lupus vasculitis at this time.   - continuing with Zosyn at this time  - sputum culture/grams tain have been ordered, not obtained, has had non-productive cough  - has not had hemoptysis since the day of admission (8/11)     Malignant hypertension: improved  Present with SBP 200s  -Clonidine initiated on admission, discontinued on 8/12 due to bradycardia  - Nitroglycerin drip transition to imdur on 8/12  - Lisinopril held on 8/13 due to increasing creatinine, hydralazine added  - Metoprolol to Coreg BID as below  - cardiology/nephrology following appreciate assistance      Acute respiratory failure with hypoxia, orthopnea  Acute HF with reduced EF (EF 20-25%)  Left lower lobe pneumonia  Prolonged QT  Hemoptysis on 8/11/23- resolved on 8/12  *Note significantly elevated procalcitonin through outside records with recent hospitalization at [facility].  Treated with vancomycin and Zosyn at that time, discharged on Augmentin.  No fevers, though tells me that she did have 1 fever at [facility].  No shaking chills.  No leukocytosis.  She is immunosuppressed on CellCept  *TTE on 8/11 shows EF of  20-25%, mild to moderate tricuspid regurgitation, moderate pulmonary hypertension. Most recent echo report from 6/2023 reviewed through care everywhere showing EF 55%.   *EKG this morning shows T wave inversions in anterior, infer, lateral leads with sinus bradycardia and prolonged QTc. T wave inversions are new compared to EKG from yesterday. Patient denies symptoms of chest pain. She is sitting in bed and denies shortness of breath.  *given newly reduced EF, diffuse T wave inversions, cardiology consulted  - Imdur 60mg daily  - Metoprolol XL to Carvedilol 12.5mg BID on 8/12  - Lisinopril held on 8/13 due to increased cr and Hydralazine 10mg TID started for afterload reduction  - plan for cardiac MRI on Monday per cardiology  - cardiology following, appreciate assistance  - strict intake and output    Thrombocytopenia  Plat 110 on initial presentation.  - palt leigh ann 93K on 8/12, now improved to 127 on 8/13  - will monitor at this time      Hypomagnesemia  Hypokalemia  -replace per prtocol       Diet: Regular Diet Adult    DVT Prophylaxis: Pneumatic Compression Devices  Leung Catheter: Not present  Lines: None     Cardiac Monitoring: ACTIVE order. Indication: Drug overdose (24 hours)  Code Status: Full Code      Clinically Significant Risk Factors              # Hypoalbuminemia: Lowest albumin = 2.1 g/dL at 8/12/2023  8:19 AM, will monitor as appropriate    # Coagulation Defect: INR = 1.10 (Ref range: 0.85 - 1.15) and/or PTT = 42 Seconds (Ref range: 22 - 38 Seconds), will monitor for bleeding    # Thrombocytopenia: Lowest platelets = 93 in last 2 days, will monitor for bleeding  # Acute Kidney Injury, unspecified: based on a >150% or 0.3 mg/dL increase in last creatinine compared to past 90 day average, will monitor renal function     # Acute heart failure with reduced ejection fraction: last echo with EF <40% and receiving IV diuretics                Disposition Plan  pending clinical improvement, at least 3-4  days    Expected Discharge Date: 08/14/2023,  3:00 PM                Navdeep Brar MD  Hospitalist Service  [facility]  Securely message with [employer] (more info)  Text page via [employer] Paging/Directory   ______________________________________________________________________    Interval History   Patient currently on RA. Reports shortness of breath is better. Endorses intermittent cough, but no hemoptysis. Has not had hemoptysis since the day of admission. She is afebrile. Denies n/v or abdominal pain.     Physical Exam   Vital Signs: Temp: 97.5  F (36.4  C) Temp src: Oral BP: (!) 164/125 Pulse: 61   Resp: 21 SpO2: 100 % O2 Device: None (Room air) Oxygen Delivery: 1 LPM  Weight: 118 lbs 0 oz    General Appearance: Alert,awake and no apparent distress  Respiratory: decreased breath sounds bases, no wheezing  Cardiovascular: regular rate and rhythm, b/l LE edema  GI: soft and non-tender  Skin: warm and dry      Medical Decision Making       45 MINUTES SPENT BY ME on the date of service doing chart review, history, exam, documentation & further activities per the note.  MANAGEMENT DISCUSSED with the following over the past 24 hours: patient, RN, cardiology   NOTE(S)/MEDICAL RECORDS REVIEWED over the past 24 hours: nephrology note, pulmonary note  Tests ORDERED & REVIEWED in the past 24 hours:  - BMP  - CBC  - Magnesium      Data     I have personally reviewed the following data over the past 24 hrs:    5.5  \   8.9 (L)   / 127 (L)     138 108 (H) 27.0 (H) /  150 (H)   4.4 19 (L) 1.28 (H) \       Imaging results reviewed over the past 24 hrs:   No results found for this or any previous visit (from the past 24 hour(s)).

## 2023-08-13 NOTE — PLAN OF CARE
A&O x 4, quiet and withdrawn. VSS on 1L NC. Tele: SB w/ T wave inversion. Assist x1. Regular diet, tolerating well. PIV x3 CDI/SL. R groin access site WDL, soft, no hematoma. Denies pain/nausea/SOB. Adequate UOP via commode. BM x1. Sleeping between care. Continue plan of care.

## 2023-08-14 NOTE — PROGRESS NOTES
[facility]  Cardiology Progress Note  Date of Service: 08/14/2023  Primary Cardiologist: through [facility] versus Dr. Reno    Assessment & Plan   Milly Carroll is a 23 year old female admitted on 8/11/2023 with progressive dyspnea found to have blood loss anemia, perinephritic hematoma now status post coil embolization, lupus nephritis and new HFrEF.    Assessment:  1.  Cardiomyopathy, new. LVEF 20-25% on admission, now 45%. Feel presentation is most consistent with stress CM.   2.  Hypertension, uncontrolled.  Increase hydralazine.   3.  Prolonged QT on 8/12. Repeat ECG today  4.  Active lupus flare with lupus cerebritis, nephritis.  Creatinine stable/improving.  Nephrology following.  5.  Acute blood loss anemia, hemoglobin down to 6.1.  Status post 2 units PRBC 8/11/2023.  Hemoglobin stable ~8  6.  Perinephric hematoma s/p Successful coil embolization of the left renal artery inferior pole pseudoaneurysm by IR On 8/11/23     Plan:   Cardiac presentation most consistent with stress cardiomyopathy. LVEF down to 20-25% on admission with new findings of QT prolongation + T wave inversions on ECG. Repeat echocardiogram today revealed improvement in LVEF to 45%. Will optimize medications in an attempt to control blood pressure with plan to initiate other GDMT (Entresto, spironolactone, Jardiance, etc) once renal function has returned to baseline.    Limited echocardiogram and repeat ECG today  Medication changes  -Increase hydralazine to 25 mg 3 times daily  -Will price out Entresto/SGLT2 inhibitors; will not initiate until renal function   3.  Cardiac MRI on Wednesday  4.  Cardiology will continue to follow    [facility]  Pager: [phone]    Interval History    Milly Carroll is a 23 year old female admitted on 8/11/2023 with progressive dyspnea found to have blood loss anemia (hemoglobin down to 6.1) and perinephritic hematoma now status  post coil embolization, lupus nephritis, and new HFrEF.    Echocardiogram 8/11/2023 revealed LVEF 20 to 25%, mildly dilated LV, mild MR, mild to moderate TR, and moderate pulmonary hypertension.  She has been struggling with uncontrolled hypertension this admission resulting in up titration of her carvedilol and initiation of amlodipine, hydralazine, and Imdur.     Repeat echocardiogram today with LVEF 45%.  Patient is presently resting in her chair.  Denies chest pain and shortness of breath.      Physical Exam   Temp: 97.7  F (36.5  C) Temp src: Oral BP: (!) 151/112 Pulse: 65   Resp: 19 SpO2: 100 % O2 Device: None (Room air)    Vitals:    08/12/23 0936 08/13/23 0514 08/14/23 0500   Weight: 51.1 kg (112 lb 10.5 oz) 53.5 kg (118 lb) 55.6 kg (122 lb 9.6 oz)       GEN: well nourished, in no acute distress.  HEENT:  Pupils equal, round. Sclerae nonicteric.   NECK: Supple, no masses appreciated.  C/V:  Regular rate and rhythm, no murmur appreciated  RESP: Respirations are unlabored. Clear to auscultation bilaterally without wheezing, rales, or rhonchi.  GI: Abdomen soft, nontender.  EXTREM: Trace bilateral LE edema.  NEURO: Alert and oriented, cooperative.  SKIN: Warm and dry.     Medications    STATIN NOT PRESCRIBED        [START ON 8/15/2023] amLODIPine  10 mg Oral Daily    artificial saliva  2 spray Swish & Spit 4x Daily    carvedilol  12.5 mg Oral BID w/meals    ferrous sulfate  325 mg Oral Daily with breakfast    folic acid  1 mg Oral Daily    hydrALAZINE  25 mg Oral Q8H [facility]    [Held by provider] hydroxychloroquine  200 mg Oral Daily    isosorbide mononitrate  60 mg Oral Daily    [Held by provider] lisinopril  30 mg Oral Daily    mycophenolate  1,500 mg Oral BID    pantoprazole  40 mg Oral QAM AC    piperacillin-tazobactam  3.375 g Intravenous Q6H    sulfamethoxazole-trimethoprim  1 tablet Oral Daily    vancomycin  125 mg Oral BID       Data   Last 24 hours labs reviewed     Tele: SB/SR 50-60s    EKG: (reviewed  personally)   8/12/2023: SB with LAD, deep T wave inversions,  prolonged QT ~600 ms    Echo 8/11/2023  The visual ejection fraction is 20-25%.  The left ventricle is mildly dilated.  There is mild (1+) mitral regurgitation.  There is mild to moderate (1-2+) tricuspid regurgitation.  Moderate (46-55mmHg) pulmonary hypertension is present.  The above are new findings compared with study in 1-23  Contrast was used without apparent complications.    Echo 8/14/2023  The left ventricle is borderline dilated.  Biplane LVEF is 45%.  There is mild global hypokinesia of the left ventricle.  NOTE: Diastology paramaters and stain not performed. Compared to echo at OSH,  the LVEF and MR changes are new. Serial echo cardiogram and possible MRI may  be useful  There is moderate (2+) mitral regurgitation.  The aortic root is normal size.  Borderline/mild dilated pulmonary artery  Trivial posterior pericardial effusion

## 2023-08-14 NOTE — PLAN OF CARE
Goal Outcome Evaluation:    Orientations: A&O x4, flat affect/withdrawn.  Vitals/Pain: VSS on RA. HTN controlled with scheduled meds. C/o L shoulder and upper back pain. IV dilauded x3.  Tele: SR/SB with inverted T wave and prolonged QTC.  Lines/Drains: PIV x3 SL.  Skin/Wounds: R groin site WNL.  GI/: Adequate UOP. BM x1.   Diet: Regular, NPO since 0000 for cardiac MRI.  Labs: K/Mag protocols, recheck in AM.  Ambulation/Assist: x1 with GB. R foot drop.  Sleep Quality: Fair  Plan: Cardiac MRI today.

## 2023-08-14 NOTE — PROGRESS NOTES
[facility]    Medicine Progress Note - Hospitalist Service    Date of Admission:  8/11/2023    Assessment & Plan   Milly Carroll is a 23 year old female admitted on 8/11/2023. 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 but with several recent admissions to [facility].  Today was seen by her nephrologist where there was concern for active lupus and prednisone was increased to 40 mg daily; labs prior to appointment with hemoglobin of 6.1.  Subsequent worsening cough and went to [facility] urgent care before being transferred to [facility] emergency department.  At [facility], a CT of the chest abdomen and pelvis was performed demonstrating likely pseudoaneurysm with perinephric hematoma on the left in the setting of recent left renal biopsy.  Also noted to have left lower lobe pneumonia, present at [facility] hospitalization late July as well.  Blood transfusions were initiated, and patient developed hemoptysis as well as uncontrolled blood pressure in the 200 systolic range.  Has a history of some heart failure with most recent ejection fraction normalized as of [facility] echo in July.  Significantly elevated BNP, significant orthopnea, bipedal edema.  Tells me that she has not been able to lay flat in her bed since even during her hospitalization at [facility] from 7/31 - 8/4/2023.  Though CT chest with area concerning for pneumonia in the left lower lobe, current hemoptysis increases concern that this actually represents active vasculitis.       On arrival at [facility] patient with approximately 1 tablespoon of melanie blood with hemoptysis (not clot).  She tells me that she has been coughing up blood since around the time that blood transfusion initiated at [facility].  She is unable to lay flat, mild hypoxia sitting upright, and reports that she is tiring  in terms of her work of breathing.  Blood pressure is elevated in the 200 systolic range.     In the setting of perinephric hematoma with pseudoaneurysm, Dr. Biggs of interventional radiology at [facility] was aware prior to transfer from [facility].  Given patient's current presentation on intermediate care unit with hemoptysis and unable to lie flat, I have concerned that patient will be unable to tolerate any interventional radiology procedure without intubation.  On admission, care was discussed with ICU provider, who evaluated patient at bedside as well.  Plan was made to transfer patient to the ICU.  She will likely require bronchoscopy at some point, though not emergently at this time.  If bronchoscopy was needed more acutely, would need to be intubated for this.  Given that she is ICU level of care, admitting hospitalist discussed with patient placement and was informed that there are indeed beds available at [facility]. While working on transfer to [facility], admitting provider was notified that bed no longer available at the [facility].       ADDENDUM:1241PM  Dicussed with Dr. Nieves from nephrology service. She has spoken with patient's nephrologist at the  and recommend transfer for more comprehensive rheumatology evaluation and concern for systemic involvement.   Spoke with Dr. Juarez of hospitalist service and rheumatologist. Agree with transfer for further eval. Dr. Juarez has graciously accepted patient.  However, no bed available currently. Awaits bed at this time.  -patient is also updated with this plan    Perinephric hematoma s/p Successful coil embolization of the left renal artery inferior pole pseudoaneurysm by IR On 8/11/23  *hx of Recent renal biopsy  - IR evaluated- s/p Successful coil embolization of the left renal artery inferior pole pseudoaneurysm. 2 distal renal artery branch vessels were coiled using a total of 2 Pushpa coils on 8/11/23  -Control of blood  pressure as below     Acute blood loss anemia  Multifactorial with recent suspected GI bleed at [facility] hospitalization, though she reports that she is no longer having any bloody or black stool, gross hemoptysis, perinephric hematoma following 8/2/2023 renal biopsy.    - s/p  2 units pRBC on 8/11/23  - hgb stable last 2 days 8.7>8.9>8.2g/dL  - monitor counts     Active lupus flare  Lupus cerebritis  Lupus nephritis  Acute kidney injury  *as above [facility] no longer had [facility] available [facility] with rheumatology on-call at the [facility] on 8/11.  - Resume prior to admission mycophenolate  - Plaquenil held due to new onset cardiomyopathy  - nephrology has evaluated and initiated on IV Methylprednisolone 500mg daily,plan to transition to PO, will defer to nephrology  - Protonix daily for GI prophylaxis while on high dose steroid  - Continue folic acid supplementation  - Cr stable/improving currently, 0.74>1.09 >1.28>1.19 on 8/14  - lisinopril held on 8/13 per cardiology  - nephrology following, appreciate assistance    Hemoptysis possible lupus flare, vs pneumonia/friable mucosa in the setting of htn  *Pulmonary team evaluated on 8/11, felt that her symptoms are due to persistent pneumonia with possible underlying necrotizing pneumonia leading to hemoptysis. Cannot rule out lupus vasculitis at this time.   - continuing with Zosyn at this time  - sputum culture/gram  stain have been ordered, not obtained, has had non-productive cough  - has not had hemoptysis since the day of admission (8/11)     Malignant hypertension: improved  Present with SBP 200s  -Clonidine initiated on admission, discontinued on 8/12 due to bradycardia  - Nitroglycerin drip transition to imdur on 8/12  - Lisinopril held on 8/13 due to increasing creatinine, hydralazine and amlodipine added on 8/13  - Metoprolol to Coreg BID as below  - cardiology/nephrology following appreciate assistance      Acute respiratory failure  with hypoxia, orthopnea  Acute HF with reduced EF (EF 20-25%)  Left lower lobe pneumonia  Prolonged QT  Hemoptysis on 8/11/23- resolved on 8/12  *Note significantly elevated procalcitonin through outside records with recent hospitalization at [facility].  Treated with vancomycin and Zosyn at that time, discharged on Augmentin.  No fevers, though tells me that she did have 1 fever at [facility].  No shaking chills.  No leukocytosis.  She is immunosuppressed on CellCept  *TTE on 8/11 shows EF of 20-25%, mild to moderate tricuspid regurgitation, moderate pulmonary hypertension. Most recent echo report from 6/2023 reviewed through care everywhere showing EF 55%.   *EKG this morning shows T wave inversions in anterior, infer, lateral leads with sinus bradycardia and prolonged QTc. T wave inversions are new compared to EKG from yesterday. Patient denies symptoms of chest pain. She is sitting in bed and denies shortness of breath.  *given newly reduced EF, diffuse T wave inversions, cardiology consulted  - Imdur 60mg daily  - Metoprolol XL to Carvedilol 12.5mg BID on 8/12  - Lisinopril held on 8/13 due to increased cr and Hydralazine started for afterload reduction  - amlodipine added on 8/13 for BP control  - plan for cardiac MRI, per discussion with RN, MRI can't be completed until Wednesday  - cardiology following, appreciate assistance  - strict intake and output  - she is currently on RA and hypoxia has resolved    Thrombocytopenia  Plat 110 on initial presentation.  - palt leigh ann 93K on 8/12, now improved to 140 on 8/14  - will monitor at this time    Hypomagnesemia  Hypokalemia  -replace per prtocol    Left shoulder, scapular pain  C/o left shoulder pain. She has good range of motion in her shoulder. However, tender with palpation over her muscle over her shoulder, scapular area. ? MSK. No shortness of breath. Her cough is intermittent and stable  - will left shoulder XR  - tylenol prn          Diet: NPO for  Medical/Clinical Reasons Except for: Meds    DVT Prophylaxis: Pneumatic Compression Devices  Leung Catheter: Not present  Lines: None     Cardiac Monitoring: ACTIVE order. Indication: Drug overdose (24 hours)  Code Status: Full Code      Clinically Significant Risk Factors              # Hypoalbuminemia: Lowest albumin = 2.1 g/dL at 8/12/2023  8:19 AM, will monitor as appropriate       # Thrombocytopenia: Lowest platelets = 127 in last 2 days, will monitor for bleeding        # Chronic heart failure with reduced ejection fraction: last echo with EF <40%                Disposition Plan  pending clinical improvement, at least 3-4 days    Expected Discharge Date: 08/16/2023,  3:00 PM                Navdeep Brar MD  Hospitalist Service  [facility]  Securely message with [employer] (more info)  Text page via [employer] Paging/Directory   ______________________________________________________________________    Interval History   Patient currently on RA. Denies shortness of breath. Reports intermittent cough that is stable. She is afebrile.   C/o left shoulder and let sided back pain. Can't remember when it started, seems like yesterday. No reported nausea or vomiting    Physical Exam   Vital Signs: Temp: 97.5  F (36.4  C) Temp src: Oral BP: (!) 149/99 Pulse: 63   Resp: 16 SpO2: 100 % O2 Device: None (Room air)    Weight: 122 lbs 9.6 oz    General Appearance: Alert,awake and no apparent distress  Respiratory: decreased breath sounds bases, no wheezing  Cardiovascular: regular rate and rhythm, b/l LE edema  GI: soft and non-tender  Skin: warm and dry  MSK: good rom in left shoulder, reproducible pain with palpation over her shoulder/scapula, no shoulder effusion    Medical Decision Making       45 MINUTES SPENT BY ME on the date of service doing chart review, history, exam, documentation & further activities per the note.  MANAGEMENT DISCUSSED with the following over the past 24 hours: patient, RN    NOTE(S)/MEDICAL RECORDS REVIEWED over the past 24 hours: nephrology note,cardiology note  Tests ORDERED & REVIEWED in the past 24 hours:  - BMP  - CBC  - Magnesium      Data     I have personally reviewed the following data over the past 24 hrs:    7.5  \   8.2 (L)   / 140 (L)     137 109 (H) 31.6 (H) /  147 (H)   4.1 16 (L) 1.19 (H) \       Imaging results reviewed over the past 24 hrs:   No results found for this or any previous visit (from the past 24 hour(s)).

## 2023-08-14 NOTE — PROGRESS NOTES
[facility]   INPATIENT PULMONARY/IP PROGRESS NOTE    August 14, 2023         Assessment and Plan:       1. Hemoptysis in setting of possible lupus flare. Has not had more hemoptysis last night or this morning. CT chest with LLL consolidation and some ground glass, interstitial opacities in right lung. Bleeding could be from necrotizing PNA/friable mucosa in setting of HTN. Alveolar hemorrhage and pneumonitis could also be likely however the ggo's are usually more  pronounced and diffuse. Agree with current broad spectrum abx. If able to bring up sputum, would send for gs/culture, cytology, and PJP. If bleeding worsens, we can initiate pulse steroids.   8/14: pulse steroids initiated given results of kidney biopsy. Has not had hemoptysis since prior to admission. Cont zosyn for 14d total. Cont TXA nebs for 7d total. End times have been enterered by me.     The patient was seen and examined with the resident/fellow/PUNEET and/or medical student.  We have discussed the patient in detail and I agree with the findings, assessment, and plan as documented when this note was cosigned on this day. The current billing does not reflect time spent teaching.      Billing: I spent a total of 50min for a subsequent hospital visit including review of chart, images, labs and notes (15min), bedside exam and dialogue with the patient (15min), and post-visit documentation, decision making and discussion with primary team and nursing (20min).     Ran Karimi MD  Associate Professor of Medicine  Section of Interventional Pulmonology   Division of Pulmonary, Allergy, Critical Care and Sleep Medicine   [facility], [email or web address]  Pager: [phone]   Office: [phone]  Email: [email or web address]          Interval History:     No acute issues overnight. Last hemoptysis has been >5days  Awaiting transfer to [facility] for lupus flare management           Review of Systems:   12-point ROS was reviewed and normal other than  stated above.           Medications:      [START ON 8/15/2023] amLODIPine  10 mg Oral Daily    artificial saliva  2 spray Swish & Spit 4x Daily    carvedilol  12.5 mg Oral BID w/meals    ferrous sulfate  325 mg Oral Daily with breakfast    folic acid  1 mg Oral Daily    hydrALAZINE  25 mg Oral Q8H ENOCH    [Held by provider] hydroxychloroquine  200 mg Oral Daily    isosorbide mononitrate  60 mg Oral Daily    [Held by provider] lisinopril  30 mg Oral Daily    methylPREDNISolone  500 mg Intravenous Q24H    mycophenolate  1,500 mg Oral BID    pantoprazole  40 mg Oral QAM AC    piperacillin-tazobactam  3.375 g Intravenous Q6H    sulfamethoxazole-trimethoprim  1 tablet Oral Daily    vancomycin  125 mg Oral BID     acetaminophen **OR** acetaminophen, bisacodyl, cloNIDine, glucose **OR** dextrose **OR** glucagon, HYDROmorphone, HYDROmorphone, HOLD MEDICATION, labetalol, naloxone **OR** naloxone **OR** naloxone **OR** naloxone, polyethylene glycol, prochlorperazine **OR** prochlorperazine **OR** prochlorperazine, STATIN NOT PRESCRIBED, senna-docusate **OR** senna-docusate, sodium chloride (PF)         Physical Exam:   Temp:  [97.4  F (36.3  C)-98  F (36.7  C)] 97.7  F (36.5  C)  Pulse:  [58-78] 65  Resp:  [15-] 19  BP: (130-152)/() 151/112  SpO2:  [99 %-100 %] 100 %    Intake/Output Summary (Last 24 hours) at 2023 1316  Last data filed at 2023 0500  Gross per 24 hour   Intake 1200 ml   Output 250 ml   Net 950 ml     Temp:  [97.4  F (36.3  C)-98  F (36.7  C)] 97.7  F (36.5  C)  Pulse:  [58-78] 65  Resp:  [15-22] 19  BP: (130-152)/() 151/112  SpO2:  [99 %-100 %] 100 %    Intake/Output Summary (Last 24 hours) at 2023 1316  Last data filed at 2023 0500  Gross per 24 hour   Intake 1200 ml   Output 250 ml   Net 950 ml     Temp  Av.9  F (36.6  C)  Min: 97.1  F (36.2  C)  Max: 99.6  F (37.6  C)      Pulse  Av.3  Min: 50  Max: 126 Resp  Av  Min: 12  Max: 51  SpO2  Av.3 %  Min: 88 %   Max: 100 %         Intake/Output Summary (Last 24 hours) at 8/14/2023 1316  Last data filed at 8/14/2023 0500  Gross per 24 hour   Intake 1200 ml   Output 250 ml   Net 950 ml     Wt Readings from Last 4 Encounters:   08/14/23 55.6 kg (122 lb 9.6 oz)   08/10/23 52.3 kg (115 lb 3.2 oz)   07/26/23 46 kg (101 lb 6.4 oz)   07/10/23 48.4 kg (106 lb 11.2 oz)       Constitutional:   Awake, alert and in no apparent distress     Eyes:   nonicteric     ENT:    oral mucosa moist without lesions     Neck:   Supple without supraclavicular or cervical lymphadenopathy     Lungs:   Good air flow.  No crackles. No rhonchi.  No wheezes.     Cardiovascular:   Normal S1 and S2.  RRR.  No murmur, gallop or rub.     Abdomen:   NABS, soft, nontender, nondistended.  No HSM.     Musculoskeletal:   No edema     Neurologic:   Alert and conversant.     Skin:   Warm, dry.  No rash on limited exam.             Current Laboratory Data:   All laboratory and imaging data reviewed.    Results for orders placed or performed during the hospital encounter of 08/11/23 (from the past 24 hour(s))   Magnesium   Result Value Ref Range    Magnesium 2.0 1.7 - 2.3 mg/dL   Basic metabolic panel   Result Value Ref Range    Sodium 137 136 - 145 mmol/L    Potassium 4.1 3.4 - 5.3 mmol/L    Chloride 109 (H) 98 - 107 mmol/L    Carbon Dioxide (CO2) 16 (L) 22 - 29 mmol/L    Anion Gap 12 7 - 15 mmol/L    Urea Nitrogen 31.6 (H) 6.0 - 20.0 mg/dL    Creatinine 1.19 (H) 0.51 - 0.95 mg/dL    Calcium 8.1 (L) 8.6 - 10.0 mg/dL    Glucose 147 (H) 70 - 99 mg/dL    GFR Estimate 66 >60 mL/min/1.73m2   CBC with platelets   Result Value Ref Range    WBC Count 7.5 4.0 - 11.0 10e3/uL    RBC Count 2.98 (L) 3.80 - 5.20 10e6/uL    Hemoglobin 8.2 (L) 11.7 - 15.7 g/dL    Hematocrit 26.6 (L) 35.0 - 47.0 %    MCV 89 78 - 100 fL    MCH 27.5 26.5 - 33.0 pg    MCHC 30.8 (L) 31.5 - 36.5 g/dL    RDW 18.1 (H) 10.0 - 15.0 %    Platelet Count 140 (L) 150 - 450 10e3/uL   XR Shoulder Left G/E 3 Views     Narrative    XR SHOULDER LEFT G/E 3 VIEWS 2023 10:55 AM     HISTORY: shoulder pain    COMPARISON: None.       Impression    IMPRESSION:   Normal joint spaces and alignment. No fracture or dislocation.    VELMA JACKSON MD         SYSTEM ID:  [id number]   Echocardiogram Limited   Result Value Ref Range    Biplane LVEF 45%     Narrative    [id number]  [id number]  [id number]  [id number]^FANTASMABBING^ANICETO     [facility]  Echocardiography Laboratory  [address] [place] [address]     Name: MIKE JOSE  MRN: [id number]  : 2000  Study Date: 2023 11:36 AM  Age: 23 yrs  Gender: Female  Patient Location: [facility]  Reason For Study: Stress Cardiomyopathy  Ordering Physician: ANICETO KOWALSKI  Referring Physician: NOVA BENJAMIN  Performed By: Lalitha Ortez     BSA: 1.5 m2  Height: 62 in  Weight: 122 lb  HR: 74  BP: 149/99 mmHg  ______________________________________________________________________________  Procedure  Limited Echo Adult.  ______________________________________________________________________________  Interpretation Summary     The left ventricle is borderline dilated.  Biplane LVEF is 45%.  There is mild global hypokinesia of the left ventricle.  NOTE: Diastology paramaters and stain not performed. Compared to echo at OSH,  the LVEF and MR changes are new. Serial echo cardiogram and possible MRI may  be useful  There is moderate (2+) mitral regurgitation.  The aortic root is normal size.  Borderline/mild dilated pulmonary artery  Trivial posterior pericardial effusion  ______________________________________________________________________________  Left Ventricle  The left ventricle is borderline dilated. There is normal left ventricular  wall thickness. Biplane LVEF is 45%. NOTE: Diastology paramaters and stain not  performed. Compared to echo at OSH, the LVEF and MR changes are new. Serial  echo cardiogram and possible MRI may be useful. There is mild  global  hypokinesia of the left ventricle. There is no thrombus seen in the left  ventricle.     Right Ventricle  The right ventricle is normal in size and function.     Atria  The left atrium is mildly dilated. Right atrial size is normal.     Mitral Valve  There is moderate (2+) mitral regurgitation.     Tricuspid Valve  The right ventricular systolic pressure is approximated at 33mmHg plus the  right atrial pressure. There is mild to moderate (1-2+) tricuspid  regurgitation.     Aortic Valve  Normal tricuspid aortic valve.     Pulmonic Valve  The pulmonic valve is not well seen, but is grossly normal.     Vessels  The aortic root is normal size. Borderline/mild dilated pulmonary artery.     Pericardium  Trivial posterior pericardial effusion.     Rhythm  Sinus rhythm was noted.  ______________________________________________________________________________  MMode/2D Measurements & Calculations  IVSd: 0.88 cm     LVIDd: 5.4 cm  LVIDs: 3.4 cm  LVPWd: 1.2 cm  FS: 37.5 %  LV mass(C)d: 220.6 grams  LV mass(C)dI: 142.4 grams/m2  asc Aorta Diam: 3.4 cm  RWT: 0.45  TAPSE: 3.3 cm     Doppler Measurements & Calculations  MR PISA: 3.1 cm2  MR ERO: 0.17 cm2  MR volume: 44.7 ml  PA acc time: 0.11 sec     ______________________________________________________________________________  Report approved by: Maureen Navarro 08/14/2023 12:33 PM           .No lab results found in last 7 days.           Previous Pulmonary Function Testing   ..Most Recent [facility] Pulmonary Function Testing    FVC-Pred   Date Value Ref Range Status   10/03/2017 3.06 L    08/25/2017 3.05 L      FVC-Pre   Date Value Ref Range Status   10/03/2017 2.87 L    08/25/2017 2.51 L      FVC-%Pred-Pre   Date Value Ref Range Status   10/03/2017 93 %    08/25/2017 82 %      FEV1-Pre   Date Value Ref Range Status   10/03/2017 2.79 L    08/25/2017 2.23 L      FEV1-%Pred-Pre   Date Value Ref Range Status   10/03/2017 100 %    08/25/2017 80 %      FEV1FVC-Pred   Date  Value Ref Range Status   10/03/2017 91 %    08/25/2017 91 %      FEV1FVC-Pre   Date Value Ref Range Status   10/03/2017 97 %    08/25/2017 89 %      No results found for: 20029  FEFMax-Pred   Date Value Ref Range Status   10/03/2017 6.28 L/sec    08/25/2017 6.28 L/sec      FEFMax-Pre   Date Value Ref Range Status   10/03/2017 7.07 L/sec    08/25/2017 4.13 L/sec      FEFMax-%Pred-Pre   Date Value Ref Range Status   10/03/2017 112 %    08/25/2017 65 %      ExpTime-Pre   Date Value Ref Range Status   10/03/2017 6.72 sec    08/25/2017 2.31 sec      FIFMax-Pre   Date Value Ref Range Status   10/03/2017 3.79 L/sec    08/25/2017 2.55 L/sec      FEV1FEV6-Pred   Date Value Ref Range Status   10/03/2017 88 %    08/25/2017 88 %      FEV1FEV6-Pre   Date Value Ref Range Status   10/03/2017 99 %    08/25/2017 90 %      No results found for: 20055          Previous Chest Imaging   No images are attached to the encounter.         Previous Cardiology Imaging   [unfilled]

## 2023-08-14 NOTE — PLAN OF CARE
PT eval orders received, chart reviewed. Per chart review pt mobilizing SBA, plan for transfer to the [facility]. Will complete orders as pt transferring hospitals

## 2023-08-14 NOTE — PROGRESS NOTES
Transfer Type: [facility]  Transfer Triage Note    Date of call: 08/14/23  Time of call: 12:05 PM    Current Patient Location:  [facility]  Current Level of Care: Med Surg    Vitals: BP:147/97 HR: 74 O2 Sats: 99% on RA  Diagnosis: SLE with acute flare  Is COVID-19 a concern? No  Reason for requested transfer: Patient has established care here  Further diagnostic work up, management, and consultation for specialized care   Isolation Needs: None    Outside Records: Available  Additional records may be faxed to [phone].    Transfer accepted: Yes  Stability of Patient: Patient is vitally stable, with no critical labs, and will likely remain stable throughout the transfer process  Level of Care Needed: Med Surg  Telemetry Needed:  Med (Remote) Telemetry  Expected Time of Arrival for Transfer: greater than 24 hours  Arrival Location:  [facility]    Recommendations for Management and Stabilization: Not needed    Additional Comments:   23 year old female with PMHx of SLE (follows with nephrology and rheumatology at [facility]) who is currently admitted at [facility] for active lupus flare and perinephric hematoma (secondary to recent kidney biopsy).     Patient has been followed by nephrology while at [facility]. She has been treated for lupus nephritis with steroids and MMF while admitted.     Dr. Brar is requesting transfer to [facility] for comprehensive rheumatology evaluation (not available at [facility]) per the recommendations of nephrology at [facility] due to concern for multisystem lupus flare.     [facility] rheumatology joined the call and agreed that transfer to [facility] was appropriate for full evaluation, as they are unable to provide recommendations without seeing the patient.     Of note, the patient also has depressed EF (20-25%) and is currently being followed by cardiology at [facility]. She has also had episodes of hemoptysis (now resolved).      Given multidisciplinary care that is needed, patient is accepted for transfer to [facility] for rheumatology evaluation. Will likely also need nephrology and cardiology consultations as well.     Provider advised that due to bed availability, transfer likely to take >24 hrs. Encouraged provider to seek alternate facility if transfer deemed to be more urgent and requested provider call back with any clinical updates.     Janneth Juarez MD

## 2023-08-14 NOTE — CONSULTS
Patient has Medical Assistance through [facility].    Farxiga: $0/mo.   Jardiance: $0/mo.       Entresto: $0/mo.     India Chawla  Pharmacy Technician/Liaison, Discharge Pharmacy   [phone] (voice or text)  [email or web address]

## 2023-08-14 NOTE — PROGRESS NOTES
Renal Medicine Progress Note            Assessment/Plan:     Assessment: Milly Carroll is a 22 yo female with PMH SLE, lupus nephritis, avascular necrosis of bilateral hips    Class IV lupus nephritis, active crescentic lesions   Diagnosed with lupus at age 7, initially discoid lupus but later developed signs of systemic lupus.  As a child she underwent Cytoxan induction in 2013, later treated with rituximab however developed anaphylactic reaction in 12/2019.  Later treated with Benlysta infusion from 2019 onwards, switch to subcutaneous formulation in 2021.  Has not been on Benlysta for some time now.  History of noncompliance.  Follows with Dr. Purvis at [facility] nephrology.  Had biopsy 6/2022, focal proliferative and membranous lesions (class III and V) with minimal activity and no significant chronicity.  Currently on MMF 1500 mg twice daily and p.o. prednisone, however after 6 to 8 weeks of treatment still with active nephritis and probably other acute systemic symptoms.  Recent admission with active lupus flare, underwent renal biopsy 8/2 showing class IV diffuse sclerosing and focal proliferative lupus nephritis, tubulointerstitial immune complex deposition, severe IFTA, active crescents in 2% of gloms, 51% of gloms globally sclerosed.  Due to active lupus nephritis, will require reinduction.  Given the severity of her disease, complications from prior steroid use (avascular necrosis) and history of noncompliance, would best be suited for Cytoxan induction and steroid sparing treatment.  Discussed with her primary nephrologist Dr. Purvis, with ongoing infection we will hold off on starting induction for at least another 2 weeks.  Due to concern for active systemic lupus and need for rheumatology evaluation, highly recommend transfer to [facility] for further evaluation.  -Continue MMF 1500 mg twice daily  -Will discontinue methylprednisolone and instead start p.o. prednisone 30 mg daily  -Transfer to [facility] for  rheumatology evaluation  -Continue Bactrim for PJP prophylaxis    Nonoliguric CHRISTA  Baseline creatinine 0.6-0.8.  Creatinine this admission peaked at 1.28.  Did have IV contrast on 8/10 and additionally notably had postbiopsy bleeding requiring IR coil embolization of left renal artery for pseudoaneurysm.  Creatinine seems to be improving.  Does also have active lupus nephritis, treatment as above.    HTN  Cardiomyopathy  TTE on 8/11 with EF 20 to 25%, mild MR, mild to moderate TR, moderate pulmonary hypertension.  She had repeat TTE today showing EF 45%.  Has lower extremity edema, GGO on CT scan.  Would be helpful for rheumatology evaluation to determine if cardiomyopathy related to active lupus flare.    Pneumonia  Hemoptysis, resolved  Currently on IV Zosyn, pulmonology evaluating.  Rheumatology evaluation would also be helpful.      Plan/Recs:  1) start p.o. prednisone 30 mg daily starting tomorrow  2) continue MMF 1500 mg twice daily for now  3) recommend transfer to [facility] for rheumatology evaluation given probable multisystem lupus flare  4) continue Bactrim for PJP prophylaxis    Discussed plan with Dr. Brar and patient's primary nephrologist Dr. Purvis.  Notes reviewed from hospitalist team, cardiology, pulmonology, multiple previous admissions, primary nephrologist, primary rheumatologist.        Anuja Nieves MD   [facility]  Office: [phone]          Interval History:      -No acute events overnight  -She is very shy and timid, does not answer many questions  -Has a deep cough, denies hemoptysis  -Denies shortness of breath, fever, chills  Has some pain at groin site, otherwise-denies any complaints  -She is unclear of her prior lupus nephritis treatments.  Does remember having fever, chills, rigors during her rituximab infusion in the past  -Denies urinary complaints, denies gross hematuria           Medications and Allergies:      artificial saliva  2 spray Swish & Spit 4x Daily  "   carvedilol  25 mg Oral BID w/meals    ferrous sulfate  325 mg Oral Daily with breakfast    folic acid  1 mg Oral Daily    hydrALAZINE  25 mg Oral Q8H ENOCH    [Held by provider] hydroxychloroquine  200 mg Oral Daily    isosorbide mononitrate  60 mg Oral Daily    [Held by provider] lisinopril  30 mg Oral Daily    methylPREDNISolone  500 mg Intravenous Q24H    mycophenolate  1,500 mg Oral BID    pantoprazole  40 mg Oral QAM AC    piperacillin-tazobactam  3.375 g Intravenous Q6H    sulfamethoxazole-trimethoprim  1 tablet Oral Daily    vancomycin  125 mg Oral BID        Allergies   Allergen Reactions    Rituximab Anaphylaxis and Shortness Of Breath            Physical Exam:   Vitals were reviewed  BP (!) 149/99 (BP Location: Left arm)   Pulse 63   Temp 97.5  F (36.4  C) (Oral)   Resp 16   Ht 1.575 m (5' 2\")   Wt 55.6 kg (122 lb 9.6 oz)   LMP 05/29/2023 (Approximate)   SpO2 100%   BMI 22.42 kg/m      Wt Readings from Last 3 Encounters:   08/14/23 55.6 kg (122 lb 9.6 oz)   08/10/23 52.3 kg (115 lb 3.2 oz)   07/26/23 46 kg (101 lb 6.4 oz)       Intake/Output Summary (Last 24 hours) at 8/14/2023 0952  Last data filed at 8/14/2023 0500  Gross per 24 hour   Intake 1200 ml   Output 250 ml   Net 950 ml       GENERAL APPEARANCE: Chronically ill-appearing, NAD  HEENT: normocephalic, MMM  RESP: Coarse  CV: RRR  EXTREMITIES/SKIN: 1-2+ lower extremity edema pitting  NEURO: Alert, oriented, normal speech           Data:     BMP  Recent Labs   Lab 08/14/23  0534 08/13/23  0527 08/12/23  0819 08/11/23  0218 08/10/23  1940 08/10/23  1810    138 136  --   --  141   POTASSIUM 4.1 4.4 3.8  --   --  4.0   CHLORIDE 109* 108* 104  --   --  111*   BISI 8.1* 7.8* 7.4*  --   --  8.1*   CO2 16* 19* 21*  --   --  19*   BUN 31.6* 27.0* 17.5  --   --  11.5   CR 1.19* 1.28* 1.09*  --  0.5 0.74   * 150* 130* 83  --  92     CBC  Recent Labs   Lab 08/14/23  0534 08/13/23  0527 08/12/23  0819 08/11/23  0447   WBC 7.5 5.5 4.1 7.5 "   HGB 8.2* 8.9* 8.7* 9.5*   HCT 26.6* 28.6* 26.8* 31.2*   MCV 89 88 86 92   * 127* 93* 143*     Lab Results   Component Value Date    AST 79 (H) 08/12/2023    ALT 23 08/12/2023    GGT 82 (H) 01/24/2023    ALKPHOS 94 08/12/2023    BILITOTAL 0.5 08/12/2023    BILICONJ 0.0 03/07/2014     Lab Results   Component Value Date    INR 1.10 08/10/2023     Color Urine (no units)   Date Value   08/11/2023 Light Yellow   02/10/2021 Yellow     Appearance Urine (no units)   Date Value   08/11/2023 Clear   02/10/2021 Slightly Cloudy     Glucose Urine (mg/dL)   Date Value   08/11/2023 Negative   02/10/2021 Negative     Bilirubin Urine (no units)   Date Value   08/11/2023 Negative   02/10/2021 Small (A)     Ketones Urine (mg/dL)   Date Value   08/11/2023 Negative   02/10/2021 10 (A)     Specific Gravity Urine (no units)   Date Value   08/11/2023 1.023   02/10/2021 1.026     pH Urine   Date Value   08/11/2023 6.0   02/10/2021 6.5 pH     Protein Albumin Urine (mg/dL)   Date Value   08/11/2023 200 (A)   02/10/2021 100 (A)     Urobilinogen Urine (EU/dL)   Date Value   08/23/2013 Canceled, Test credited   Duplicate request     Nitrite Urine (no units)   Date Value   08/11/2023 Negative   02/10/2021 Negative     Leukocyte Esterase Urine (no units)   Date Value   08/11/2023 Negative   02/10/2021 Large (A)         Attestation:  I have reviewed today's vital signs, notes, medications, labs and imaging.    Anuja Nieves MD  [facility] - Nephrology  Office: [phone]

## 2023-08-14 NOTE — PLAN OF CARE
Goal Outcome Evaluation:  Adm w/active lupus flare and perinephric hematoma post kidney bx.     Orientations: A&O x4, flat affect/withdrawn.  Vitals/Pain: VSS on RA. HTN controlled with scheduled meds. C/o L shoulder/ upper back pain (xray neg). R IR groin site new pain, US pos for hematoma (no new orders per IR provider). Tylenol given q4h  Tele: SR/SB with inverted T wave and prolonged QTC.  Lines/Drains: PIV x3 SL, inter IV abx for pneumonia and IV steroids (PO starting tomorrow).  Skin/Wounds: R groin site   GI/: Fair UOP.    Diet: Regular, good PO  Labs: ECHO done, cardiac MRI pending when able to take per Cards order (cardiomyopathy, new. LVEF 20-25% on admission, now 45%)   Ambulation/Assist: x1 with GB. R foot drop.  Sleep Quality: Fair  Plan: tx to [facility] for comprehensive rheumatology evaluation when bed avail. Followed by renal for lupus nephritis

## 2023-08-15 NOTE — PROGRESS NOTES
[facility]    Medicine Progress Note - Hospitalist Service    Date of Admission:  8/11/2023    Assessment & Plan   Milly Carroll is a 23 year old female admitted on 8/11/2023. She has a history of lupus nephritis as well as lupus cerebritis followed by nephrology and rheumatology at the [facility] but with several recent admissions to [facility].  Today was seen by her nephrologist where there was concern for active lupus and prednisone was increased to 40 mg daily; labs prior to appointment with hemoglobin of 6.1.  Subsequent worsening cough and went to [facility] urgent care before being transferred to [facility] emergency department.  At [facility], a CT of the chest abdomen and pelvis was performed demonstrating likely pseudoaneurysm with perinephric hematoma on the left in the setting of recent left renal biopsy.  Also noted to have left lower lobe pneumonia, present at [facility] hospitalization late July as well.  Blood transfusions were initiated, and patient developed hemoptysis as well as uncontrolled blood pressure in the 200 systolic range.  Has a history of some heart failure with most recent ejection fraction normalized as of [facility] echo in July.  Significantly elevated BNP, significant orthopnea, bipedal edema.  Tells me that she has not been able to lay flat in her bed since even during her hospitalization at [facility] from 7/31 - 8/4/2023.  Though CT chest with area concerning for pneumonia in the left lower lobe, current hemoptysis increases concern that this actually represents active vasculitis.       On arrival at [facility] patient with approximately 1 tablespoon of melanie blood with hemoptysis (not clot).  She tells me that she has been coughing up blood since around the time that blood transfusion initiated at [facility].  She is unable to lay flat, mild hypoxia sitting upright, and reports that she is tiring  in terms of her work of breathing.  Blood pressure is elevated in the 200 systolic range.     In the setting of perinephric hematoma with pseudoaneurysm, Dr. Biggs of interventional radiology at [facility] was aware prior to transfer from [facility].  Given patient's current presentation on intermediate care unit with hemoptysis and unable to lie flat, I have concerned that patient will be unable to tolerate any interventional radiology procedure without intubation.  On admission, care was discussed with ICU provider, who evaluated patient at bedside as well.  Plan was made to transfer patient to the ICU.  She will likely require bronchoscopy at some point, though not emergently at this time.  If bronchoscopy was needed more acutely, would need to be intubated for this.  Given that she is ICU level of care, admitting hospitalist discussed with patient placement and was informed that there are indeed beds available at [facility]. While working on transfer to [facility], admitting provider was notified that bed no longer available at the [facility].       8/14/23  Nephrology here spoke with patient's primary nephrologist at the  and recommend transfer for more comprehensive rheumatologic evaluation and concern for systemic involvement.   Spoke with Dr. Juarez of hospitalist service and rheumatologist. Agree with transfer for further eval. Dr. Juarez has graciously accepted patient for transfer to [facility].  However, no bed available currently. Awaiting transfer to [facility].    Perinephric hematoma s/p Successful coil embolization of the left renal artery inferior pole pseudoaneurysm by IR On 8/11/23  *hx of Recent renal biopsy  - IR evaluated- s/p Successful coil embolization of the left renal artery inferior pole pseudoaneurysm. 2 distal renal artery branch vessels were coiled using a total of 2 Pushpa coils on 8/11/23  - right groin pain reported by patient on 8/14 and post vascular access US showed right  groin hematoma. No pseudoaneurysm  -Control of blood pressure as below     Acute blood loss anemia  Multifactorial with recent suspected GI bleed at [facility] hospitalization, though she reports that she is no longer having any bloody or black stool, gross hemoptysis, perinephric hematoma following 8/2/2023 renal biopsy.    - s/p  2 units pRBC on 8/11/23  - hgb has been stable.7>8.9>8.2g/dL  - monitor counts     Active lupus flare  Lupus cerebritis  Lupus nephritis  Acute kidney injury  - Resume prior to admission mycophenolate  - Plaquenil held due to new onset cardiomyopathy  - nephrology has evaluated and initiated on IV Methylprednisolone 500mg daily x 4 days.  Transition to Prednisone 30mg daily on 8/15  - Protonix daily for GI prophylaxis while on high dose steroid  - Continue folic acid supplementation  - Cr stable/improving currently, 0.74>1.09 >1.28>1.19>1.17 on 8/15  - on bactrium for PJP prophylaxis  - lisinopril held on 8/13 per cardiology  - nephrology following, appreciate assistance--as above accepted at [facility], awaiting bed    Hemoptysis possible lupus flare, vs pneumonia/friable mucosa in the setting of htn  *Pulmonary team evaluated on 8/11, felt that her symptoms are due to persistent pneumonia with possible underlying necrotizing pneumonia leading to hemoptysis. Cannot rule out lupus vasculitis at this time.   - continuing with Zosyn at this time, per pulmonary note on 8/14 for total of 14 days  - on TXA neb per pulmonary x 7 days  - sputum culture/gram  stain have been ordered, not obtained, has had non-productive cough  - has not had hemoptysis since the day of admission (8/11)     Malignant hypertension: improved  Present with SBP 200s  -Clonidine initiated on admission, discontinued on 8/12 due to bradycardia  - Nitroglycerin drip transition to imdur on 8/12  - Lisinopril held on 8/13 due to increasing creatinine, hydralazine and amlodipine added on 8/13  - Metoprolol to Coreg BID as below  -  cardiology/nephrology following appreciate assistance      Acute respiratory failure with hypoxia, orthopnea  Acute HF with reduced EF (EF 20-25%)  Left lower lobe pneumonia  Prolonged QT  Hemoptysis on 8/11/23- resolved on 8/12  *Note significantly elevated procalcitonin through outside records with recent hospitalization at [facility].  Treated with vancomycin and Zosyn at that time, discharged on Augmentin.  No fevers, though tells me that she did have 1 fever at [facility].  No shaking chills.  No leukocytosis.  She is immunosuppressed on CellCept  *TTE on 8/11 shows EF of 20-25%, mild to moderate tricuspid regurgitation, moderate pulmonary hypertension. Most recent echo report from 6/2023 reviewed through care everywhere showing EF 55%.   *EKG this morning shows T wave inversions in anterior, infer, lateral leads with sinus bradycardia and prolonged QTc. T wave inversions are new compared to EKG from yesterday. Patient denies symptoms of chest pain. She is sitting in bed and denies shortness of breath.  *given newly reduced EF, diffuse T wave inversions, cardiology consulted  *repeat echo on 8/14 shows LVEF 45%  *per cardiology possible stress induced cardiomyopathy on 8/14 note  - Imdur 60mg daily  - Metoprolol XL to Carvedilol 12.5mg BID on 8/12  - Lisinopril held on 8/13 due to increased cr and Hydralazine started for afterload reduction  - amlodipine added on 8/13 for BP control  - plan for cardiac MRI to assess myocardium and LV funcition, this likely will happen on WEd 8/16  - cardiology following, appreciate assistance  - strict intake and output  - she is currently on RA and hypoxia has resolved    Thrombocytopenia  Plat 110 on initial presentation.  - palt leigh ann 93K on 8/12, improved to 140 on 8/14, down to 115  - will monitor at this time    Hypomagnesemia  Hypokalemia  -replace per prtocol    Left shoulder, scapular pain  C/o left shoulder pain. She has good range of motion in her shoulder. However,  tender with palpation over her muscle over her shoulder, scapular area. ? MSK. No shortness of breath. Her cough is intermittent and stable  -xray shoulder without acute finding  - tylenol prn          Diet: Regular Diet Adult    DVT Prophylaxis: Pneumatic Compression Devices  Leung Catheter: Not present  Lines: None     Cardiac Monitoring: ACTIVE order. Indication: Drug overdose (24 hours)  Code Status: Full Code      Clinically Significant Risk Factors              # Hypoalbuminemia: Lowest albumin = 2.1 g/dL at 8/12/2023  8:19 AM, will monitor as appropriate       # Thrombocytopenia: Lowest platelets = 115 in last 2 days, will monitor for bleeding        # Chronic heart failure with reduced ejection fraction: last echo with EF <40%                Disposition Plan  patient has been accepted to [facility] for comprehensive rheumatologic evaluation for her lupus flare and concern for systemic involvement. Awaiting bed at this time     Expected Discharge Date: 08/18/2023,  3:00 PM      Discharge Comments: pending transfer to           Navdeep Brar MD  Hospitalist Service  [facility]  Securely message with [employer] (more info)  Text page via [employer] Paging/Directory   ______________________________________________________________________    Interval History   Patient reports her left shoulder discomfort is improved and intermittent currently.   Denies chest pain or shortness of breath at this time. She is coughing but non-productive. She is afebrile.   Denies n/v or abdominal pain.    Physical Exam   Vital Signs: Temp: 97.5  F (36.4  C) Temp src: Oral BP: (!) 169/110 Pulse: 73   Resp: 16 SpO2: 100 % O2 Device: None (Room air)    Weight: 126 lbs 5.18 oz    General Appearance: Alert,awake and no apparent distress  Respiratory: decreased breath sounds bases, no wheezing  Cardiovascular: regular rate and rhythm, b/l LE edema  GI: soft and non-tender  Skin: warm and dry  MSK: good rom in left  shoulder, reproducible pain with palpation over her shoulder/scapula, no shoulder effusion    Medical Decision Making       40 MINUTES SPENT BY ME on the date of service doing chart review, history, exam, documentation & further activities per the note.  MANAGEMENT DISCUSSED with the following over the past 24 hours: patient, RN   NOTE(S)/MEDICAL RECORDS REVIEWED over the past 24 hours: nephrology note,cardiology note  Tests ORDERED & REVIEWED in the past 24 hours:  - BMP  - CBC  Tests personally interpreted in the past 24 hours:  - echo report and left shoulder xray showing as above       Data     I have personally reviewed the following data over the past 24 hrs:    5.9  \   8.2 (L)   / 115 (L)     139 109 (H) 35.9 (H) /  126 (H)   3.6 17 (L) 1.17 (H) \       Imaging results reviewed over the past 24 hrs:   Recent Results (from the past 24 hour(s))   XR Shoulder Left G/E 3 Views    Narrative    XR SHOULDER LEFT G/E 3 VIEWS 2023 10:55 AM     HISTORY: shoulder pain    COMPARISON: None.       Impression    IMPRESSION:   Normal joint spaces and alignment. No fracture or dislocation.    VELMA JACKSON MD         SYSTEM ID:  [id number]   Echocardiogram Limited   Result Value    Biplane LVEF 45%    Narrative    [id number]  [id number]  [id number]  [id number]^DEX^ANICETO     [facility]  Echocardiography Laboratory  [address] [place] [address]     Name: MIKE JOSE  MRN: [id number]  : 2000  Study Date: 2023 11:36 AM  Age: 23 yrs  Gender: Female  Patient Location: [facility]  Reason For Study: Stress Cardiomyopathy  Ordering Physician: ANICETO KOWALSKI  Referring Physician: NOVA BENJAMIN  Performed By: Lalitha Ortez     BSA: 1.5 m2  Height: 62 in  Weight: 122 lb  HR: 74  BP: 149/99 mmHg  ______________________________________________________________________________  Procedure  Limited Echo  Adult.  ______________________________________________________________________________  Interpretation Summary     The left ventricle is borderline dilated.  Biplane LVEF is 45%.  There is mild global hypokinesia of the left ventricle.  NOTE: Diastology paramaters and stain not performed. Compared to echo at OSH,  the LVEF and MR changes are new. Serial echo cardiogram and possible MRI may  be useful  There is moderate (2+) mitral regurgitation.  The aortic root is normal size.  Borderline/mild dilated pulmonary artery  Trivial posterior pericardial effusion  ______________________________________________________________________________  Left Ventricle  The left ventricle is borderline dilated. There is normal left ventricular  wall thickness. Biplane LVEF is 45%. NOTE: Diastology paramaters and stain not  performed. Compared to echo at OSH, the LVEF and MR changes are new. Serial  echo cardiogram and possible MRI may be useful. There is mild global  hypokinesia of the left ventricle. There is no thrombus seen in the left  ventricle.     Right Ventricle  The right ventricle is normal in size and function.     Atria  The left atrium is mildly dilated. Right atrial size is normal.     Mitral Valve  There is moderate (2+) mitral regurgitation.     Tricuspid Valve  The right ventricular systolic pressure is approximated at 33mmHg plus the  right atrial pressure. There is mild to moderate (1-2+) tricuspid  regurgitation.     Aortic Valve  Normal tricuspid aortic valve.     Pulmonic Valve  The pulmonic valve is not well seen, but is grossly normal.     Vessels  The aortic root is normal size. Borderline/mild dilated pulmonary artery.     Pericardium  Trivial posterior pericardial effusion.     Rhythm  Sinus rhythm was noted.  ______________________________________________________________________________  MMode/2D Measurements & Calculations  IVSd: 0.88 cm     LVIDd: 5.4 cm  LVIDs: 3.4 cm  LVPWd: 1.2 cm  FS: 37.5 %  LV  mass(C)d: 220.6 grams  LV mass(C)dI: 142.4 grams/m2  asc Aorta Diam: 3.4 cm  RWT: 0.45  TAPSE: 3.3 cm     Doppler Measurements & Calculations  MR PISA: 3.1 cm2  MR ERO: 0.17 cm2  MR volume: 44.7 ml  PA acc time: 0.11 sec     ______________________________________________________________________________  Report approved by: Maureen Navarro 08/14/2023 12:33 PM         Us Post Vascular Access Low Ext Duplex    Narrative    ULTRASOUND POST VASCULAR ACCESS LOW EXT DUPLEX  August 14, 2023 2:27  PM     HISTORY: Patient is status post right femoral access for angiography.  Now having swelling in the puncture site location.    COMPARISON: None.    FINDINGS: Color Doppler and spectral waveform analysis performed.    The right distal external iliac artery, common femoral artery,  proximal superficial femoral artery and proximal profunda femoris  arteries are patent without significant stenosis. Normal triphasic  waveforms are identified. The adjacent veins are patent. There is no  evidence for pseudoaneurysm. There is a 2.8 x 2.9 x 1.0 cm hematoma in  the right groin.      Impression    IMPRESSION: Right groin hematoma. No pseudoaneurysm.    AKI JEAN MD         SYSTEM ID:  [id number]

## 2023-08-15 NOTE — PROGRESS NOTES
[facility]  Cardiology Progress Note  Date of Service: 08/15/2023  Primary Cardiologist: through [facility] versus Dr. Reno    Assessment & Plan   Milly Carroll is a 23 year old female admitted on 8/11/2023 with progressive dyspnea found to have blood loss anemia, perinephritic hematoma now status post coil embolization, lupus nephritis and new HFrEF.    Assessment:  1.  Cardiomyopathy, new. LVEF 20-25% on admission, now 45%. Feel presentation is most consistent with stress CM.   2.  Hypertension, uncontrolled. Resume lisinopril.  3.  Prolonged QT, improving. Repeat ECG today  4.  Active lupus flare with lupus cerebritis, nephritis.  Creatinine stable/improving.  Nephrology following. Plan is to transfer to [facility].   5.  Acute blood loss anemia, hemoglobin down to 6.1.  Status post 2 units PRBC 8/11/2023.  Hemoglobin stable ~8  6.  Perinephric hematoma s/p Successful coil embolization of the left renal artery inferior pole pseudoaneurysm by IR On 8/11/23     Plan:   Cardiac presentation most consistent with stress cardiomyopathy. LVEF down to 20-25% on admission with new findings of QT prolongation + T wave inversions on ECG. Repeat echocardiogram 8/14 revealed improvement in LVEF to 45%. ECG yesterday improving. Will work to optimize blood pressures and plan for cardiac MRI (inpatient versus outpatient) pending patient's course. Plan is to transfer to [facility] once bed is available.     Repeat ECG today  Medication changes  -resume lisinopril 30 mg once daily  3.  Cardiac MRI inpt versus outpatient  4.  Cardiology will continue to follow    RUDDY Delong [facility]  Pager: [phone]    Interval History    Milly Carroll is a 23 year old female admitted on 8/11/2023 with progressive dyspnea found to have blood loss anemia (hemoglobin down to 6.1) and perinephritic hematoma now status post coil embolization, lupus nephritis, and new HFrEF.    Echocardiogram  8/11/2023 revealed LVEF 20 to 25%, mildly dilated LV, mild MR, mild to moderate TR, and moderate pulmonary hypertension.  She has been struggling with uncontrolled hypertension this admission resulting in up titration of her carvedilol and initiation of amlodipine, hydralazine, and Imdur.     Repeat echocardiogram 8/14 with LVEF improving, 45%.      No acute events overnight. Labs are stable. Blood pressure remains uncontrolled. Patient is presently resting in bed and reports feeling well.     Physical Exam   Temp: 97.5  F (36.4  C) Temp src: Oral BP: (!) 169/110 Pulse: 73   Resp: 16 SpO2: 100 % O2 Device: None (Room air)    Vitals:    08/13/23 0514 08/14/23 0500 08/15/23 0517   Weight: 53.5 kg (118 lb) 55.6 kg (122 lb 9.6 oz) 57.3 kg (126 lb 5.2 oz)       GEN: well nourished, in no acute distress.  HEENT:  Pupils equal, round. Sclerae nonicteric.   NECK: Supple, no masses appreciated.  C/V:  Regular rate and rhythm, no murmur appreciated  RESP: Respirations are unlabored. Clear to auscultation bilaterally without wheezing, rales, or rhonchi.  GI: Abdomen soft, nontender.  EXTREM: Trace bilateral LE edema.  NEURO: Alert and oriented, cooperative.  SKIN: Warm and dry.     Medications    STATIN NOT PRESCRIBED        amLODIPine  10 mg Oral Daily    artificial saliva  2 spray Swish & Spit 4x Daily    carvedilol  12.5 mg Oral BID w/meals    ferrous sulfate  325 mg Oral Daily with breakfast    folic acid  1 mg Oral Daily    hydrALAZINE  25 mg Oral Q8H ENOCH    [Held by provider] hydroxychloroquine  200 mg Oral Daily    isosorbide mononitrate  60 mg Oral Daily    lisinopril  30 mg Oral Daily    mycophenolate  1,500 mg Oral BID    pantoprazole  40 mg Oral QAM AC    piperacillin-tazobactam  3.375 g Intravenous Q6H    predniSONE  30 mg Oral Daily    sulfamethoxazole-trimethoprim  1 tablet Oral Daily    vancomycin  125 mg Oral BID       Data   Last 24 hours labs reviewed     Tele: SB/SR 50-70s    EKG: (reviewed personally)    8/14/2023: SR with LAD, QT improving but still prolonged as are T wave inversions  8/12/2023: SB with LAD, deep T wave inversions, prolonged QT ~600 ms    Echo 8/11/2023  The visual ejection fraction is 20-25%.  The left ventricle is mildly dilated.  There is mild (1+) mitral regurgitation.  There is mild to moderate (1-2+) tricuspid regurgitation.  Moderate (46-55mmHg) pulmonary hypertension is present.  The above are new findings compared with study in 1-23  Contrast was used without apparent complications.    Echo 8/14/2023  The left ventricle is borderline dilated.  Biplane LVEF is 45%.  There is mild global hypokinesia of the left ventricle.  NOTE: Diastology paramaters and stain not performed. Compared to echo at OSH,  the LVEF and MR changes are new. Serial echo cardiogram and possible MRI may  be useful  There is moderate (2+) mitral regurgitation.  The aortic root is normal size.  Borderline/mild dilated pulmonary artery  Trivial posterior pericardial effusion

## 2023-08-15 NOTE — PLAN OF CARE
[id number].    AxOx4, quiet / withdrawn. Tele = SR / SB.    VSS on RA except HTN. Assist x1 GB. Regular diet. Up to bathroom with adequate output. No BM this shift.    PIV x3 SL. Scheduled abx.    Pain managed with PRN Tylenol x2, PO Dilaudid x1, IV Dilaudid x1 + warm packs / ice pack. Denies nausea.    Scattered bruises.  R groin hematoma-- CMS intact     Plan for transition to PO steroids and cardiac MRI in next few days. Awaiting transfer to [facility] for rheumatology when bed available. Continue plan of care.

## 2023-08-15 NOTE — PLAN OF CARE
Goal Outcome Evaluation:  [id number]       Pt. A&O x4, hypoactive/withdrawn. Vitals stable on RA.ex slightly elevated BP.  Reports L shoulder pain managed with tylenol and dilaudid. Infrequent dry cough. Tele: SR with inverted T- wave and prolo QT. Active BS, no BM, passing flatus. Adequately voiding in the bathroom. Tolerating regular diet. 3x PIV, SL, and intromittent IV zosyn. R groin site hematoma. Ax1 w/GB.  Plan: Pending placement to [facility] for nephrology and rheumatology follow up.

## 2023-08-15 NOTE — PROGRESS NOTES
Renal Medicine Progress Note            Assessment/Plan:     Assessment: Milly Carroll is a 22 yo female with PMH SLE, lupus nephritis, avascular necrosis of bilateral hips admitted 8/11/2023 with hemorrhage from recent biopsy complication and hemoptysis.    Class IV lupus nephritis, active crescentic lesions   Diagnosed with lupus at age 7, initially discoid lupus but later developed signs of systemic lupus.  As a child she underwent Cytoxan induction in 2013, later treated with rituximab however developed anaphylactic reaction in 12/2019.  Later treated with Benlysta infusion from 2019 onwards, switch to subcutaneous formulation in 2021.  Has not been on Benlysta for some time now.  History of noncompliance.  Follows with Dr. Purvis at [facility] nephrology.  Had biopsy 6/2022, focal proliferative and membranous lesions (class III and V) with minimal activity and no significant chronicity.  Currently on MMF 1500 mg twice daily and p.o. prednisone, however after 6 to 8 weeks of treatment still with active nephritis and probably other acute systemic symptoms.  Recent admission with active lupus flare, underwent renal biopsy 8/2 showing class IV diffuse sclerosing and focal proliferative lupus nephritis, tubulointerstitial immune complex deposition, severe IFTA, active crescents in 2% of gloms, 51% of gloms globally sclerosed.  Due to active lupus nephritis, will require reinduction.  Given the severity of her disease, complications from prior steroid use (avascular necrosis) and history of noncompliance, would best be suited for Cytoxan induction and steroid sparing treatment.  Discussed with her primary nephrologist Dr. Purvis on 8/14, with ongoing infection we will hold off on starting induction for at least another 2 weeks.  Due to concern for active systemic lupus and need for rheumatology evaluation, highly recommend transfer to [facility] for further evaluation.  -Continue MMF 1500 mg twice daily  -Will discontinue  methylprednisolone and instead start p.o. prednisone 30 mg daily  -Transfer to [facility] for rheumatology evaluation  -Continue Bactrim for PJP prophylaxis    Nonoliguric CHRISTA  Baseline creatinine 0.6-0.8.  Creatinine this admission peaked at 1.28.  Did have IV contrast on 8/10 and additionally notably had postbiopsy bleeding requiring IR coil embolization of left renal artery for pseudoaneurysm.  Creatinine seems to be improving.  Does also have active lupus nephritis, treatment as above.    HTN  Cardiomyopathy  TTE on 8/11 with EF 20 to 25%, mild MR, mild to moderate TR, moderate pulmonary hypertension.  She had repeat TTE today showing EF 45%.  Has lower extremity edema, GGO on CT scan.  Would be helpful for rheumatology evaluation to determine if cardiomyopathy related to active lupus flare.    Pneumonia  Hemoptysis, resolved  Currently on IV Zosyn, pulmonology evaluating.  Rheumatology evaluation would also be helpful.      Plan/Recs:  1) start p.o. prednisone 30 mg daily starting today  2) continue MMF 1500 mg twice daily for now  3) recommend transfer to [facility] for rheumatology evaluation given probable multisystem lupus flare  4) continue Bactrim for PJP prophylaxis     Notes reviewed from hospitalist team, cardiology, pulmonology, multiple previous admissions, primary nephrologist, primary rheumatologist.        Anuja Nieves MD   [facility]  Office: [phone]          Interval History:      -No acute events overnight  -She denies any particular complaints  -Continues to have cough, denies hemoptysis or phlegm  -Denies urinary complaints  -Denies shortness of breath           Medications and Allergies:      amLODIPine  10 mg Oral Daily    artificial saliva  2 spray Swish & Spit 4x Daily    carvedilol  12.5 mg Oral BID w/meals    ferrous sulfate  325 mg Oral Daily with breakfast    folic acid  1 mg Oral Daily    hydrALAZINE  25 mg Oral Q8H ENOCH    [Held by provider] hydroxychloroquine  200 mg  "Oral Daily    isosorbide mononitrate  60 mg Oral Daily    lisinopril  30 mg Oral Daily    mycophenolate  1,500 mg Oral BID    pantoprazole  40 mg Oral QAM AC    piperacillin-tazobactam  3.375 g Intravenous Q6H    predniSONE  30 mg Oral Daily    sulfamethoxazole-trimethoprim  1 tablet Oral Daily    vancomycin  125 mg Oral BID        Allergies   Allergen Reactions    Rituximab Anaphylaxis and Shortness Of Breath            Physical Exam:   Vitals were reviewed  BP (!) 169/110 (BP Location: Left arm)   Pulse 73   Temp 97.5  F (36.4  C) (Oral)   Resp 16   Ht 1.575 m (5' 2\")   Wt 57.3 kg (126 lb 5.2 oz)   LMP 05/29/2023 (Approximate)   SpO2 100%   BMI 23.10 kg/m      Wt Readings from Last 3 Encounters:   08/15/23 57.3 kg (126 lb 5.2 oz)   08/10/23 52.3 kg (115 lb 3.2 oz)   07/26/23 46 kg (101 lb 6.4 oz)       Intake/Output Summary (Last 24 hours) at 8/14/2023 0952  Last data filed at 8/14/2023 0500  Gross per 24 hour   Intake 1200 ml   Output 250 ml   Net 950 ml       GENERAL APPEARANCE: Chronically ill-appearing, NAD  HEENT: normocephalic, MMM  RESP: Coarse  CV: RRR  EXTREMITIES/SKIN: 1-2+ lower extremity edema pitting  NEURO: Alert, oriented, normal speech           Data:     BMP  Recent Labs   Lab 08/15/23  0645 08/14/23  0534 08/13/23  0527 08/12/23  0819    137 138 136   POTASSIUM 3.6 4.1 4.4 3.8   CHLORIDE 109* 109* 108* 104   BISI 8.1* 8.1* 7.8* 7.4*   CO2 17* 16* 19* 21*   BUN 35.9* 31.6* 27.0* 17.5   CR 1.17* 1.19* 1.28* 1.09*   * 147* 150* 130*       CBC  Recent Labs   Lab 08/15/23  0645 08/14/23  0534 08/13/23  0527 08/12/23  0819   WBC 5.9 7.5 5.5 4.1   HGB 8.2* 8.2* 8.9* 8.7*   HCT 26.7* 26.6* 28.6* 26.8*   MCV 88 89 88 86   * 140* 127* 93*       Lab Results   Component Value Date    AST 79 (H) 08/12/2023    ALT 23 08/12/2023    GGT 82 (H) 01/24/2023    ALKPHOS 94 08/12/2023    BILITOTAL 0.5 08/12/2023    BILICONJ 0.0 03/07/2014     Lab Results   Component Value Date    INR 1.10 " 08/10/2023     Color Urine (no units)   Date Value   08/11/2023 Light Yellow   02/10/2021 Yellow     Appearance Urine (no units)   Date Value   08/11/2023 Clear   02/10/2021 Slightly Cloudy     Glucose Urine (mg/dL)   Date Value   08/11/2023 Negative   02/10/2021 Negative     Bilirubin Urine (no units)   Date Value   08/11/2023 Negative   02/10/2021 Small (A)     Ketones Urine (mg/dL)   Date Value   08/11/2023 Negative   02/10/2021 10 (A)     Specific Gravity Urine (no units)   Date Value   08/11/2023 1.023   02/10/2021 1.026     pH Urine   Date Value   08/11/2023 6.0   02/10/2021 6.5 pH     Protein Albumin Urine (mg/dL)   Date Value   08/11/2023 200 (A)   02/10/2021 100 (A)     Urobilinogen Urine (EU/dL)   Date Value   08/23/2013 Canceled, Test credited   Duplicate request     Nitrite Urine (no units)   Date Value   08/11/2023 Negative   02/10/2021 Negative     Leukocyte Esterase Urine (no units)   Date Value   08/11/2023 Negative   02/10/2021 Large (A)         Attestation:  I have reviewed today's vital signs, notes, medications, labs and imaging.    Anuja Nieves MD  [facility] - Nephrology  Office: [phone]

## 2023-08-16 NOTE — PROGRESS NOTES
Allen Urena MD  [facility]  MRI Cardiac Myocarditis protocol  Contrast administered per weight based protocol.

## 2023-08-16 NOTE — PROGRESS NOTES
Renal Medicine Progress Note            Assessment/Plan:     Assessment: Milly Carroll is a 22 yo female with PMH SLE, lupus nephritis, avascular necrosis of bilateral hips admitted 8/11/2023 with hemorrhage from recent biopsy complication and hemoptysis.    Class IV lupus nephritis, active crescentic lesions   Diagnosed with lupus at age 7, initially discoid lupus but later developed signs of systemic lupus.  As a child she underwent Cytoxan induction in 2013, later treated with rituximab however developed anaphylactic reaction in 12/2019.  Later treated with Benlysta infusion from 2019 onwards, switch to subcutaneous formulation in 2021.  Has not been on Benlysta for some time now.  History of noncompliance.  Follows with Dr. Purvis at [facility] nephrology.  Had biopsy 6/2022, focal proliferative and membranous lesions (class III and V) with minimal activity and no significant chronicity.  Currently on MMF 1500 mg twice daily and p.o. prednisone, however after 6 to 8 weeks of treatment still with active nephritis and probably other acute systemic symptoms.  Recent admission with active lupus flare, underwent renal biopsy 8/2 showing class IV diffuse sclerosing and focal proliferative lupus nephritis, tubulointerstitial immune complex deposition, severe IFTA, active crescents in 2% of gloms, 51% of gloms globally sclerosed.  Due to active lupus nephritis, will require reinduction.  Given the severity of her disease, complications from prior steroid use (avascular necrosis) and history of noncompliance, would best be suited for Cytoxan induction and steroid sparing treatment.  Discussed with her primary nephrologist Dr. Purvis on 8/14, with ongoing infection we will hold off on starting induction for at least another 2 weeks.  Due to concern for active systemic lupus and need for rheumatology evaluation, highly recommend transfer to [facility] for further evaluation.  -Continue MMF 1500 mg twice daily  - continue p.o.  prednisone 30 mg daily  -Transfer to [facility] for rheumatology evaluation  -Continue Bactrim for PJP prophylaxis    Nonoliguric CHRISTA, improved  Baseline creatinine 0.6-0.8.  Creatinine this admission peaked at 1.28.  Did have IV contrast on 8/10 and additionally notably had postbiopsy bleeding requiring IR coil embolization of left renal artery for pseudoaneurysm.  Creatinine seems to be improving.  Does also have active lupus nephritis, treatment as above.    HTN  Cardiomyopathy  TTE on 8/11 with EF 20 to 25%, mild MR, mild to moderate TR, moderate pulmonary hypertension.  She had repeat TTE today showing EF 45%.  Has lower extremity edema, GGO on CT scan.  Would be helpful for rheumatology evaluation to determine if cardiomyopathy related to active lupus flare.    Pneumonia  Hemoptysis, resolved  Currently on IV Zosyn, pulmonology evaluating.  Rheumatology evaluation would also be helpful.    NAGMA  Likely due to renal disease. Will start sodium bicarb 650 mg BID.     Plan/Recs:  1)  continue p.o. prednisone 30 mg daily starting today  2) continue MMF 1500 mg twice daily for now  3) recommend transfer to [facility] for rheumatology evaluation given probable multisystem lupus flare  4) continue Bactrim for PJP prophylaxis  5) started sodium bicarb for acidosis, will monitor edema      Notes reviewed from hospitalist team, cardiology, pulmonology, multiple previous admissions, primary nephrologist, primary rheumatologist. Discussed with Dr. Brar.         Anuja Nieves MD   [facility] consultants  Office: [phone]          Interval History:      -No acute events overnight  - she notes some INIGUEZ when going for a walk yesterday   - continues to have cough, did have small bloody streaks in it today. No overt bleeding   - denies urinary complaints   - appetite ok            Medications and Allergies:      amLODIPine  10 mg Oral Daily    artificial saliva  2 spray Swish & Spit 4x Daily    carvedilol  25 mg Oral BID  "w/meals    ferrous sulfate  325 mg Oral Daily with breakfast    folic acid  1 mg Oral Daily    gadobutrol  7.5 mL Intravenous Once    hydrALAZINE  25 mg Oral Q8H ENOCH    [Held by provider] hydroxychloroquine  200 mg Oral Daily    isosorbide mononitrate  60 mg Oral Daily    [START ON 8/17/2023] lisinopril  40 mg Oral Daily    mycophenolate  1,500 mg Oral BID    pantoprazole  40 mg Oral QAM AC    piperacillin-tazobactam  3.375 g Intravenous Q6H    predniSONE  30 mg Oral Daily    sodium bicarbonate  650 mg Oral BID    sodium chloride (PF)   mL Intravenous Once    sulfamethoxazole-trimethoprim  1 tablet Oral Daily    vancomycin  125 mg Oral BID        Allergies   Allergen Reactions    Rituximab Anaphylaxis and Shortness Of Breath            Physical Exam:   Vitals were reviewed  BP (!) 169/116 (BP Location: Left arm)   Pulse 83   Temp 98.2  F (36.8  C) (Oral)   Resp 18   Ht 1.575 m (5' 2\")   Wt 57 kg (125 lb 11.2 oz)   LMP 05/29/2023 (Approximate)   SpO2 98%   BMI 22.99 kg/m      Wt Readings from Last 3 Encounters:   08/16/23 57 kg (125 lb 11.2 oz)   08/10/23 52.3 kg (115 lb 3.2 oz)   07/26/23 46 kg (101 lb 6.4 oz)       Intake/Output Summary (Last 24 hours) at 8/14/2023 0952  Last data filed at 8/14/2023 0500  Gross per 24 hour   Intake 1200 ml   Output 250 ml   Net 950 ml       GENERAL APPEARANCE: Chronically ill-appearing, NAD  HEENT: normocephalic, MMM  RESP: Coarse  CV: RRR  EXTREMITIES/SKIN: 1-2+ lower extremity edema pitting  NEURO: Alert, oriented, normal speech           Data:     BMP  Recent Labs   Lab 08/16/23  1239 08/16/23  0535 08/15/23  0645 08/14/23  0534 08/13/23  0527   NA  --  140 139 137 138   POTASSIUM 3.4 3.3* 3.6 4.1 4.4   CHLORIDE  --  112* 109* 109* 108*   BISI  --  8.0* 8.1* 8.1* 7.8*   CO2  --  16* 17* 16* 19*   BUN  --  35.2* 35.9* 31.6* 27.0*   CR  --  1.08* 1.17* 1.19* 1.28*   GLC  --  99 126* 147* 150*       CBC  Recent Labs   Lab 08/16/23  0535 08/15/23  0645 08/14/23  0534 " 08/13/23  0527   WBC 4.4 5.9 7.5 5.5   HGB 8.8* 8.2* 8.2* 8.9*   HCT 28.6* 26.7* 26.6* 28.6*   MCV 89 88 89 88   * 115* 140* 127*       Lab Results   Component Value Date    AST 79 (H) 08/12/2023    ALT 23 08/12/2023    GGT 82 (H) 01/24/2023    ALKPHOS 94 08/12/2023    BILITOTAL 0.5 08/12/2023    BILICONJ 0.0 03/07/2014     Lab Results   Component Value Date    INR 1.10 08/10/2023     Color Urine (no units)   Date Value   08/11/2023 Light Yellow   02/10/2021 Yellow     Appearance Urine (no units)   Date Value   08/11/2023 Clear   02/10/2021 Slightly Cloudy     Glucose Urine (mg/dL)   Date Value   08/11/2023 Negative   02/10/2021 Negative     Bilirubin Urine (no units)   Date Value   08/11/2023 Negative   02/10/2021 Small (A)     Ketones Urine (mg/dL)   Date Value   08/11/2023 Negative   02/10/2021 10 (A)     Specific Gravity Urine (no units)   Date Value   08/11/2023 1.023   02/10/2021 1.026     pH Urine   Date Value   08/11/2023 6.0   02/10/2021 6.5 pH     Protein Albumin Urine (mg/dL)   Date Value   08/11/2023 200 (A)   02/10/2021 100 (A)     Urobilinogen Urine (EU/dL)   Date Value   08/23/2013 Canceled, Test credited   Duplicate request     Nitrite Urine (no units)   Date Value   08/11/2023 Negative   02/10/2021 Negative     Leukocyte Esterase Urine (no units)   Date Value   08/11/2023 Negative   02/10/2021 Large (A)         Attestation:  I have reviewed today's vital signs, notes, medications, labs and imaging.    Anuja Nieves MD  [facility] - Nephrology  Office: [phone]

## 2023-08-16 NOTE — PLAN OF CARE
Orientations: A&O x4. Quiet, flat affect.   Vitals/Pain: Persistent hypertension, otherwise VSS on room air. Denies pain.   Tele: NSR  Lines/Drains: R PIV x2 - intermittent antibiotic   Skin/Wounds: R groin site CDI  GI/: Continent of bowel and bladder. Adequate urine ouput and + BM. Occasional nausea, medication not needed.   Labs: Electrolyte Replacement- K replaced per protocol  Ambulation/Assist: SBA  Plan: Continue plan of care. Cardiac MRI done today. Eventually transfer to [facility]?

## 2023-08-16 NOTE — PROGRESS NOTES
[facility]  Cardiology Progress Note  Date of Service: 08/16/2023  Primary Cardiologist: through [facility] versus Dr. Reno    Assessment & Plan   Milly Carroll is a 23 year old female admitted on 8/11/2023 with progressive dyspnea found to have blood loss anemia, perinephritic hematoma now status post coil embolization, lupus nephritis and new HFrEF.    Assessment:  1.  Cardiomyopathy, new. LVEF 20-25% on admission, now 45%. Feel presentation is most consistent with stress CM.   2.  Hypertension, uncontrolled.  Will increase lisinopril and carvedilol today.  3.  Prolonged QT, improving.  4.  Active lupus flare with lupus cerebritis, nephritis.  Creatinine stable/improving.  Nephrology following. Plan is to transfer to [facility].   5.  Acute blood loss anemia, hemoglobin down to 6.1.  Status post 2 units PRBC 8/11/2023.  Hemoglobin stable ~8  6.  Perinephric hematoma s/p Successful coil embolization of the left renal artery inferior pole pseudoaneurysm by IR On 8/11/23     Plan:   Cardiac presentation most consistent with stress cardiomyopathy. LVEF down to 20-25% on admission with new findings of QT prolongation + T wave inversions on ECG. Repeat echocardiogram 8/14 revealed improvement in LVEF to 45%.  Serial ECGs reveal improving QT, almost within normal limits.  Will work to optimize blood pressures and plan for cardiac MRI today. Plan is to transfer to [facility] once bed is available.     Medication changes  -Increase lisinopril to 40 mg once daily  -Increase carvedilol to 25 mg twice daily  2.  Cardiac MRI today  3.  Cardiology will continue to follow    RUDDY Delong [facility] - Heart Care  Pager: [phone]    Interval History    Milly Carroll is a 23 year old female admitted on 8/11/2023 with progressive dyspnea found to have blood loss anemia (hemoglobin down to 6.1) and perinephritic hematoma now status post coil embolization, lupus nephritis, and new  HFrEF.    Echocardiogram 8/11/2023 revealed LVEF 20 to 25%, mildly dilated LV, mild MR, mild to moderate TR, and moderate pulmonary hypertension.  She has been struggling with uncontrolled hypertension this admission resulting in up titration of her carvedilol and initiation of amlodipine, hydralazine, and Imdur.     Repeat echocardiogram 8/14 with LVEF improving, 45%.      No acute events overnight. Labs are stable. Blood pressure remains uncontrolled.  Awaiting cardiac MRI planned for today.    Physical Exam   Temp: 98.2  F (36.8  C) Temp src: Oral BP: (!) 169/116 Pulse: 83   Resp: 18 SpO2: 98 % O2 Device: None (Room air)    Vitals:    08/14/23 0500 08/15/23 0517 08/16/23 0627   Weight: 55.6 kg (122 lb 9.6 oz) 57.3 kg (126 lb 5.2 oz) 57 kg (125 lb 11.2 oz)       GEN: withdrawn, in no acute distress.  HEENT:  Pupils equal, round. Sclerae nonicteric.   NECK: Supple, no masses appreciated.  C/V:  Regular rate and rhythm, no murmur appreciated  RESP: Respirations are unlabored. Clear to auscultation bilaterally without wheezing, rales, or rhonchi.  GI: Abdomen soft, nontender.  EXTREM: Trace bilateral LE edema.  NEURO: Alert and oriented, cooperative.  SKIN: Warm and dry.     Medications    STATIN NOT PRESCRIBED        amLODIPine  10 mg Oral Daily    artificial saliva  2 spray Swish & Spit 4x Daily    carvedilol  25 mg Oral BID w/meals    ferrous sulfate  325 mg Oral Daily with breakfast    folic acid  1 mg Oral Daily    hydrALAZINE  25 mg Oral Q8H ENOCH    [Held by provider] hydroxychloroquine  200 mg Oral Daily    isosorbide mononitrate  60 mg Oral Daily    [START ON 8/17/2023] lisinopril  40 mg Oral Daily    mycophenolate  1,500 mg Oral BID    pantoprazole  40 mg Oral QAM AC    piperacillin-tazobactam  3.375 g Intravenous Q6H    predniSONE  30 mg Oral Daily    sodium bicarbonate  650 mg Oral BID    sulfamethoxazole-trimethoprim  1 tablet Oral Daily    vancomycin  125 mg Oral BID       Data   Last 24 hours labs  reviewed     Tele: SR 70-80s    EKG: (reviewed personally)   A 15 2023: SR with LAD, QT continues to improve, almost within normal limits  8/14/2023: SR with LAD, QT improving but still prolonged as are T wave inversions  8/12/2023: SB with LAD, deep T wave inversions, prolonged QT ~600 ms    Echo 8/11/2023  The visual ejection fraction is 20-25%.  The left ventricle is mildly dilated.  There is mild (1+) mitral regurgitation.  There is mild to moderate (1-2+) tricuspid regurgitation.  Moderate (46-55mmHg) pulmonary hypertension is present.  The above are new findings compared with study in 1-23  Contrast was used without apparent complications.    Echo 8/14/2023  The left ventricle is borderline dilated.  Biplane LVEF is 45%.  There is mild global hypokinesia of the left ventricle.  NOTE: Diastology paramaters and stain not performed. Compared to echo at OSH,  the LVEF and MR changes are new. Serial echo cardiogram and possible MRI may  be useful  There is moderate (2+) mitral regurgitation.  The aortic root is normal size.  Borderline/mild dilated pulmonary artery  Trivial posterior pericardial effusion

## 2023-08-16 NOTE — CONSULTS
Care Management Initial Consult    General Information  Assessment completed with: Patient,    Type of CM/SW Visit: Initial Assessment    Primary Care Provider verified and updated as needed: Yes   Readmission within the last 30 days: no previous admission in last 30 days      Reason for Consult: discharge planning, decision-making, community resources, financial concerns  Advance Care Planning: Advance Care Planning Reviewed: no concerns identified          Communication Assessment  Patient's communication style: spoken language (English or Bilingual)             Cognitive  Cognitive/Neuro/Behavioral: WDL  Level of Consciousness: alert  Arousal Level: opens eyes spontaneously  Orientation: oriented x 4  Mood/Behavior: hypoactive (quiet, withdrawn)  Best Language: 0 - No aphasia  Speech: logical, clear, spontaneous    Living Environment:   People in home: parent(s)     Current living Arrangements: house      Able to return to prior arrangements: yes       Family/Social Support:  Care provided by: self  Provides care for: no one  Marital Status: Single  Parent(s), Sibling(s)          Description of Support System: Supportive    Support Assessment: Adequate family and caregiver support    Current Resources:   Patient receiving home care services: No     Community Resources:    Equipment currently used at home:    Supplies currently used at home: None    Employment/Financial:  Employment Status: unemployed        Financial Concerns: unable to afford rent/mortgage           Does the patient's insurance plan have a 3 day qualifying hospital stay waiver?  No    Lifestyle & Psychosocial Needs:  Social Determinants of Health     Tobacco Use: Low Risk  (8/14/2023)    Patient History     Smoking Tobacco Use: Never     Smokeless Tobacco Use: Never     Passive Exposure: Not on file   Alcohol Use: Not on file   Financial Resource Strain: Not on file   Food Insecurity: Not on file   Transportation Needs: Not on file   Physical  Activity: Not on file   Stress: Not on file   Social Connections: Not on file   Intimate Partner Violence: Not on file   Depression: Not at risk (6/21/2023)    PHQ-2     PHQ-2 Score: 1   Housing Stability: Not on file       Functional Status:  Prior to admission patient needed assistance:   Dependent ADLs:: Independent  Dependent IADLs:: Independent  Assesssment of Functional Status: At functional baseline    Mental Health Status:  Mental Health Status: No Current Concerns       Chemical Dependency Status:  Chemical Dependency Status: No Current Concerns             Values/Beliefs:  Spiritual, Cultural Beliefs, Jainism Practices, Values that affect care: no               Additional Information:   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  Was adm due to  concern for active lupus  at the clinic and prednisone was increased to 40 mg daily;  noted hemoglobin of 6.1.  also had worsening cough and  CT  chest abd and pelvis  shows poss pseudoaneurysm with perinephric hematoma   Also noted to have left lower lobe pneumonia.  Writer met with patient in room to discuss plan of care. Patient states she lives with her mother and is independent with ADLs and IADLs. . Patient states she would like to return to her home once cleared medically.   Patient stating the only assistance she would need is to receive some financial aide. Patient was informed not to work due to her illness and is dependent on her mother. Patient has assistance for her medical needs but can't afford personal needs.   Writer requested if I could speak with her mother and patient consented.   Writer called patients mother Shari. Inquired about the process of getting  Social Security disability.  Shari reported she has not started the process yet as she does not know where to begin.   Writer spoke with SW who stated we would need  to call  [facility]  in  to obtain a form which will then be submitted to patients primary PCP.    Neri Leon RN  [phone]

## 2023-08-16 NOTE — PLAN OF CARE
A&O x 4, quiet and cooperative. Hypertensive at times, otherwise VSS on RA. Tele: SR w/ T wave abnormality. SBA, up in chair w/ lunch/dinner, ambulated in hallway w/ writer. Regular diet, tolerating well. PIV x3 SL/CDI. R groin site, nontender. Denies nausea/SOB. Pt c/o of shoulder pain, controlled w/ PRN heating pad. Adequate UOP. BM x3, soft and brown. Plan for Cardiac MRI tomorrow. Continue plan of care.

## 2023-08-16 NOTE — PLAN OF CARE
Goal Outcome Evaluation:         Alert and oriented x4. Vital signs stable on RA, ex HTN. SBA. Tolerating regular diet. Lung sounds clear, equal bilaterally. + flatus, BM x1. Adequate urine output. R groin site WNL. Pain managed with PO dilaudid and aqua K pad. Denies nausea. Tele SR.

## 2023-08-16 NOTE — PROGRESS NOTES
[facility]    Medicine Progress Note - Hospitalist Service    Date of Admission:  8/11/2023    Assessment & Plan   Milly Carroll is a 23 year old female admitted on 8/11/2023. 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  Today was seen by her nephrologist where there was concern for active lupus and prednisone was increased to 40 mg daily; labs prior to appointment with hemoglobin of 6.1.  Subsequent worsening cough and went to [facility] urgent care before being transferred to [facility] emergency department.  At [facility], a CT of the chest abdomen and pelvis was performed demonstrating likely pseudoaneurysm with perinephric hematoma on the left in the setting of recent left renal biopsy.  Also noted to have left lower lobe pneumonia, present at [facility] hospitalization late July as well.  Blood transfusions were initiated, and patient developed hemoptysis as well as uncontrolled blood pressure in the 200 systolic range.  Has a history of some heart failure with most recent ejection fraction normalized as of [facility] echo in July.  Significantly elevated BNP, significant orthopnea, bipedal edema.  Tells me that she has not been able to lay flat in her bed since even during her hospitalization at [facility] from 7/31 - 8/4/2023.  Though CT chest with area concerning for pneumonia in the left lower lobe, current hemoptysis increases concern that this actually represents active vasculitis.       On arrival at [facility] patient with approximately 1 tablespoon of melanie blood with hemoptysis (not clot).  She tells me that she has been coughing up blood since around the time that blood transfusion initiated at [facility].  She is unable to lay flat, mild hypoxia sitting upright, and reports that she is tiring  in terms of her work of breathing.  Blood pressure is elevated in the 200 systolic range.     In the setting of perinephric hematoma with pseudoaneurysm, Dr. Biggs of interventional radiology at [facility] was aware prior to transfer from [facility].  Given patient's current presentation on intermediate care unit with hemoptysis and unable to lie flat, I have concerned that patient will be unable to tolerate any interventional radiology procedure without intubation.  On admission, care was discussed with ICU provider, who evaluated patient at bedside as well.  Plan was made to transfer patient to the ICU.  She will likely require bronchoscopy at some point, though not emergently at this time.  If bronchoscopy was needed more acutely, would need to be intubated for this.  Given that she is ICU level of care, admitting hospitalist discussed with patient placement and was informed that there are indeed beds available at [facility]. While working on transfer to [facility], admitting provider was notified that bed no longer available at the [facility].       8/14/23  Nephrology here spoke with patient's primary nephrologist at the  and recommend transfer for more comprehensive rheumatologic evaluation and concern for systemic involvement.   Spoke with Dr. Juarez of hospitalist service and rheumatologist. Agree with transfer for further eval. Dr. Juarez has graciously accepted patient for transfer to [facility].  However, no bed available currently. Awaiting transfer to [facility].    Perinephric hematoma s/p Successful coil embolization of the left renal artery inferior pole pseudoaneurysm by IR On 8/11/23  *hx of Recent renal biopsy  - IR evaluated- s/p Successful coil embolization of the left renal artery inferior pole pseudoaneurysm. 2 distal renal artery branch vessels were coiled using a total of 2 Pushpa coils on 8/11/23  - right groin pain reported by patient on 8/14 and post vascular access US showed right  groin hematoma. No pseudoaneurysm  -Control of blood pressure as below     Acute blood loss anemia  Multifactorial with recent suspected GI bleed at [facility] hospitalization, though she reports that she is no longer having any bloody or black stool, gross hemoptysis, perinephric hematoma following 8/2/2023 renal biopsy.    - s/p  2 units pRBC on 8/11/23  - hgb has been stable.7>8.9>8.2g/dL  - monitor counts     Active lupus flare  Lupus cerebritis  Lupus nephritis  Acute kidney injury  - Resume prior to admission mycophenolate  - Plaquenil held due to new onset cardiomyopathy  - nephrology has evaluated and initiated on IV Methylprednisolone 500mg daily x 4 days.  Transition to Prednisone 30mg daily on 8/15  - Protonix daily for GI prophylaxis while on high dose steroid  - Continue folic acid supplementation  - Cr stable/improving, cr peaked at 1.28, down to 1.08 on 8/16  - on bactrium for PJP prophylaxis  - nephrology following, appreciate assistance--as above accepted at [facility], awaiting bed    Hemoptysis possible lupus flare, vs pneumonia/friable mucosa in the setting of htn  *Pulmonary team evaluated on 8/11, felt that her symptoms are due to persistent pneumonia with possible underlying necrotizing pneumonia leading to hemoptysis. Cannot rule out lupus vasculitis at this time.   - continuing with Zosyn at this time, per pulmonary note on 8/14 for total of 14 days  - on TXA neb per pulmonary x 7 days  - sputum culture/gram  stain have been ordered, not obtained, has non-productive cough  - no melanie hemoptysis since the day of admission (8/11). She does report on 8/16 a streak of blood when she coughed up, otherwise cough has been non-productive this hospital stay     Malignant hypertension: improved  Present with SBP 200s  -Clonidine initiated on admission, discontinued on 8/12 due to bradycardia  - Nitroglycerin drip transition to imdur on 8/12  - Lisinopril held on 8/13 due to increasing creatinine, resumed on  8/15 per cardiology  - hydralazine and amlodipine added on 8/13  - Metoprolol to Coreg BID as below  - cardiology/nephrology following appreciate assistance      Acute respiratory failure with hypoxia, orthopnea  Acute HF with reduced EF (EF 20-25% echo on 8/11, improved EF to 45% on echo 8/14)  Left lower lobe pneumonia  Prolonged QT  Hemoptysis on 8/11/23- resolved on 8/12  *Note significantly elevated procalcitonin through outside records with recent hospitalization at [facility].  Treated with vancomycin and Zosyn at that time, discharged on Augmentin.  No fevers, though tells me that she did have 1 fever at [facility].  No shaking chills.  No leukocytosis.  She is immunosuppressed on CellCept  *TTE on 8/11 shows EF of 20-25%, mild to moderate tricuspid regurgitation, moderate pulmonary hypertension. Most recent echo report from 6/2023 reviewed through care everywhere showing EF 55%.   *EKG this morning shows T wave inversions in anterior, infer, lateral leads with sinus bradycardia and prolonged QTc. T wave inversions are new compared to EKG from yesterday. Patient denies symptoms of chest pain. She is sitting in bed and denies shortness of breath.  *given newly reduced EF, diffuse T wave inversions, cardiology consulted  *repeat echo on 8/14 shows LVEF 45%  *per cardiology possible stress induced cardiomyopathy on 8/14 note  - Imdur 60mg daily  - Metoprolol XL to Carvedilol 12.5mg BID on 8/12  - Lisinopril held on 8/13 due to increased cr, resumed on 8/15  --Hydralazine started  on 8/13  - amlodipine added on 8/13 for BP control  - possible cardiac MRI today to assess myocardium and LV funcition  - cardiology following, appreciate assistance  - strict intake and output  - she is currently on RA and hypoxia has resolved    Thrombocytopenia  Plat 110 on initial presentation.  - palt fluctuating, leigh ann 93K on 8/12, 147 on 8/16  - will monitor at this time    Hypomagnesemia  Hypokalemia  -replace per prtocol    Left  shoulder, scapular pain-improved  C/o left shoulder pain. She has good range of motion in her shoulder. However, tender with palpation over her muscle over her shoulder, scapular area. ? MSK. No shortness of breath.  -xray shoulder without acute finding  - tylenol prn   - reports not having shoulder pain on 8/16         Diet: Regular Diet Adult    DVT Prophylaxis: Pneumatic Compression Devices  Leung Catheter: Not present  Lines: None     Cardiac Monitoring: ACTIVE order. Indication: Acute decompensated heart failure (48 hours)  Code Status: Full Code      Clinically Significant Risk Factors        # Hypokalemia: Lowest K = 3.3 mmol/L in last 2 days, will replace as needed       # Hypoalbuminemia: Lowest albumin = 2.1 g/dL at 8/12/2023  8:19 AM, will monitor as appropriate       # Thrombocytopenia: Lowest platelets = 115 in last 2 days, will monitor for bleeding        # Chronic heart failure with reduced ejection fraction: last echo with EF <40%                Disposition Plan  patient has been accepted to [facility] for comprehensive rheumatologic evaluation for her lupus flare and concern for systemic involvement. Awaiting bed at this time     Expected Discharge Date: 08/18/2023,  3:00 PM      Discharge Comments: pending transfer to           Chaitanya Rahul Brar MD  Hospitalist Service  [facility]  Securely message with [employer] (more info)  Text page via [employer] Paging/Directory   ______________________________________________________________________    Interval History   Patient denies chest pain or shortness of breath at rest.   She reports when she took a long walk in the hallway yesterda, felt fatigued and also some sob. Currently denying sob.   Endorses cough. Mostly has been non-productive. This am, she states she coughed up a phlegm x1 and had streak of blood in it.     Physical Exam   Vital Signs: Temp: 98.2  F (36.8  C) Temp src: Oral BP: (!) 169/116 Pulse: 83   Resp: 18 SpO2: 98 % O2  Device: None (Room air)    Weight: 125 lbs 11.2 oz    General Appearance: Alert,awake and no apparent distress  Respiratory: decreased breath sounds bases, no wheezing  Cardiovascular: regular rate and rhythm, b/l LE edema  GI: soft and non-tender  Skin: warm and dry      Medical Decision Making       45 MINUTES SPENT BY ME on the date of service doing chart review, history, exam, documentation & further activities per the note.  MANAGEMENT DISCUSSED with the following over the past 24 hours: patient, RN   NOTE(S)/MEDICAL RECORDS REVIEWED over the past 24 hours: nephrology note,cardiology note, pulmonary  Tests ORDERED & REVIEWED in the past 24 hours:  - BMP  - CBC      Data     I have personally reviewed the following data over the past 24 hrs:    4.4  \   8.8 (L)   / 147 (L)     140 112 (H) 35.2 (H) /  99   3.3 (L) 16 (L) 1.08 (H) \       Imaging results reviewed over the past 24 hrs:   No results found for this or any previous visit (from the past 24 hour(s)).

## 2023-08-17 PROBLEM — R04.2 HEMOPTYSIS: Status: ACTIVE | Noted: 2023-01-01

## 2023-08-17 NOTE — PROGRESS NOTES
Renal Medicine Progress Note            Assessment/Plan:     Assessment: Milly Carroll is a 22 yo female with PMH SLE, lupus nephritis, avascular necrosis of bilateral hips admitted 8/11/2023 with hemorrhage from recent biopsy complication and hemoptysis.    Class IV lupus nephritis, active crescentic lesions   Diagnosed with lupus at age 7, initially discoid lupus but later developed signs of systemic lupus.  As a child she underwent Cytoxan induction in 2013, later treated with rituximab however developed anaphylactic reaction in 12/2019.  Later treated with Benlysta infusion from 2019 onwards, switch to subcutaneous formulation in 2021.  Has not been on Benlysta for some time now.  History of noncompliance.  Follows with Dr. Purvis at [facility] nephrology.  Had biopsy 6/2022, focal proliferative and membranous lesions (class III and V) with minimal activity and no significant chronicity.  Currently on MMF 1500 mg twice daily and p.o. prednisone, however after 6 to 8 weeks of treatment still with active nephritis and probably other acute systemic symptoms.  Recent admission with active lupus flare, underwent renal biopsy 8/2 showing class IV diffuse sclerosing and focal proliferative lupus nephritis, tubulointerstitial immune complex deposition, severe IFTA, active crescents in 2% of gloms, 51% of gloms globally sclerosed.  Due to active lupus nephritis, will require reinduction.  Given the severity of her disease, complications from prior steroid use (avascular necrosis) and history of noncompliance, would best be suited for Cytoxan induction and steroid sparing treatment.  Discussed with her primary nephrologist Dr. Purvis on 8/14, with ongoing infection we will hold off on starting induction for at least another 2 weeks.  Due to concern for active systemic lupus and need for rheumatology evaluation, highly recommend transfer to [facility] for further evaluation.  -Continue MMF 1500 mg twice daily  - continue p.o.  prednisone 30 mg daily  -Transfer to [facility] for rheumatology evaluation  -Continue Bactrim for PJP prophylaxis    Nonoliguric CHRISTA, improved  Baseline creatinine 0.6-0.8.  Creatinine this admission peaked at 1.28.  Did have IV contrast on 8/10 and additionally notably had postbiopsy bleeding requiring IR coil embolization of left renal artery for pseudoaneurysm.  Creatinine seems to be improving.  Does also have active lupus nephritis, treatment as above.    HTN  Cardiomyopathy  TTE on 8/11 with EF 20 to 25%, mild MR, mild to moderate TR, moderate pulmonary hypertension.  She had repeat TTE today showing EF 45%.  Has lower extremity edema, GGO on CT scan.  Would be helpful for rheumatology evaluation to determine if cardiomyopathy related to active lupus flare.Cardiology following and managing BP.     Pneumonia  Hemoptysis, resolved  Currently on IV Zosyn, pulmonology evaluating.  Rheumatology evaluation would also be helpful.    NAGMA  Likely due to renal disease. Will start sodium bicarb 650 mg BID.     Plan/Recs:  1)  continue p.o. prednisone 30 mg daily   2) continue MMF 1500 mg twice daily for now  3) recommend transfer to [facility] for rheumatology evaluation given probable multisystem lupus flare  4) continue Bactrim for PJP prophylaxis  5) started sodium bicarb for acidosis, will monitor edema   6) cardiology titrating BP meds     Notes reviewed from hospitalist team, cardiology, pulmonology, multiple previous admissions, primary nephrologist, primary rheumatologist. Discussed with Dr. Brar.         Anuja Nieves MD   [facility]  Office: [phone]          Interval History:      -No acute events overnight  - cardiac MRI without inflammation   - hydralazine increased and spironolactone started per cardiology   - she has some SOB when falling asleep, but otherwise none, not even with exertion   - continues to have cough, but no hemoptysis. No more streaks of blood   - denies n/v   - appetite  "ok            Medications and Allergies:      amLODIPine  10 mg Oral Daily    artificial saliva  2 spray Swish & Spit 4x Daily    carvedilol  25 mg Oral BID w/meals    ferrous sulfate  325 mg Oral Daily with breakfast    folic acid  1 mg Oral Daily    hydrALAZINE  50 mg Oral Q8H ENOCH    [Held by provider] hydroxychloroquine  200 mg Oral Daily    isosorbide mononitrate  60 mg Oral Daily    lisinopril  40 mg Oral Daily    mycophenolate  1,500 mg Oral BID    pantoprazole  40 mg Oral QAM AC    piperacillin-tazobactam  3.375 g Intravenous Q6H    predniSONE  30 mg Oral Daily    sodium bicarbonate  650 mg Oral BID    spironolactone  25 mg Oral Daily    sulfamethoxazole-trimethoprim  1 tablet Oral Daily    vancomycin  125 mg Oral BID        Allergies   Allergen Reactions    Rituximab Anaphylaxis and Shortness Of Breath            Physical Exam:   Vitals were reviewed  BP (!) 153/105 (BP Location: Right arm)   Pulse 96   Temp 97.6  F (36.4  C) (Oral)   Resp 19   Ht 1.575 m (5' 2\")   Wt 57 kg (125 lb 11.2 oz)   LMP 05/29/2023 (Approximate)   SpO2 99%   BMI 22.99 kg/m      Wt Readings from Last 3 Encounters:   08/16/23 57 kg (125 lb 11.2 oz)   08/10/23 52.3 kg (115 lb 3.2 oz)   07/26/23 46 kg (101 lb 6.4 oz)       Intake/Output Summary (Last 24 hours) at 8/14/2023 0952  Last data filed at 8/14/2023 0500  Gross per 24 hour   Intake 1200 ml   Output 250 ml   Net 950 ml       GENERAL APPEARANCE: Chronically ill-appearing, NAD  HEENT: normocephalic, MMM  RESP: Coarse  CV: RRR  EXTREMITIES/SKIN: 1-2+ lower extremity edema pitting  NEURO: Alert, oriented, normal speech           Data:     BMP  Recent Labs   Lab 08/17/23  0543 08/16/23  1239 08/16/23  0535 08/15/23  0645 08/14/23  0534     --  140 139 137   POTASSIUM 3.3* 3.4 3.3* 3.6 4.1   CHLORIDE 115*  --  112* 109* 109*   BISI 8.0*  --  8.0* 8.1* 8.1*   CO2 15*  --  16* 17* 16*   BUN 33.0*  --  35.2* 35.9* 31.6*   CR 0.99*  --  1.08* 1.17* 1.19*   *  --  99 " 126* 147*       CBC  Recent Labs   Lab 08/17/23  0543 08/16/23  0535 08/15/23  0645 08/14/23  0534   WBC 4.6 4.4 5.9 7.5   HGB 8.1* 8.8* 8.2* 8.2*   HCT 26.2* 28.6* 26.7* 26.6*   MCV 89 89 88 89   * 147* 115* 140*       Lab Results   Component Value Date    AST 79 (H) 08/12/2023    ALT 23 08/12/2023    GGT 82 (H) 01/24/2023    ALKPHOS 94 08/12/2023    BILITOTAL 0.5 08/12/2023    BILICONJ 0.0 03/07/2014     Lab Results   Component Value Date    INR 1.10 08/10/2023     Color Urine (no units)   Date Value   08/11/2023 Light Yellow   02/10/2021 Yellow     Appearance Urine (no units)   Date Value   08/11/2023 Clear   02/10/2021 Slightly Cloudy     Glucose Urine (mg/dL)   Date Value   08/11/2023 Negative   02/10/2021 Negative     Bilirubin Urine (no units)   Date Value   08/11/2023 Negative   02/10/2021 Small (A)     Ketones Urine (mg/dL)   Date Value   08/11/2023 Negative   02/10/2021 10 (A)     Specific Gravity Urine (no units)   Date Value   08/11/2023 1.023   02/10/2021 1.026     pH Urine   Date Value   08/11/2023 6.0   02/10/2021 6.5 pH     Protein Albumin Urine (mg/dL)   Date Value   08/11/2023 200 (A)   02/10/2021 100 (A)     Urobilinogen Urine (EU/dL)   Date Value   08/23/2013 Canceled, Test credited   Duplicate request     Nitrite Urine (no units)   Date Value   08/11/2023 Negative   02/10/2021 Negative     Leukocyte Esterase Urine (no units)   Date Value   08/11/2023 Negative   02/10/2021 Large (A)         Attestation:  I have reviewed today's vital signs, notes, medications, labs and imaging.    Anuja Nieves MD  [facility] - Nephrology  Office: [phone]

## 2023-08-17 NOTE — PLAN OF CARE
Goal Outcome Evaluation:    Orientations: AxOx4   Diet: Regular diet   Vitals/Pain: VSS on RA except HTN   Tele: NSR.   Lines/Drains: PIV x2 SL   Skin/Wounds: Scattered bruises   GI/: adequate output wt 0x BM  Labs: Abnormal/Trends, Electrolyte Replacement- na  Ambulation/Assist: SBA w/ GB   Sleep Quality: good  Pain Management: PRN Tylenol x1 + warm pack.   Plan: Awaiting transfer to [facility] when bed available

## 2023-08-17 NOTE — PLAN OF CARE
[id number].    AxOx4, quiet. Tele = NSR.    VSS on RA except HTN. SBA w/ GB. Regular diet. Up to bathroom with adequate output. No BM this shift.    PIV x2 SL. Scheduled abx.    Scattered bruises.    Pain managed with PRN Tylenol x1 + warm pack.     Continue plan of care. Awaiting transfer to [facility] when bed available.

## 2023-08-17 NOTE — DISCHARGE SUMMARY
[facility]  Hospitalist Discharge Summary      Date of Admission:  8/11/2023  Date of Discharge:  8/17/2023 to higher level of specialty care at [facility]  Discharging Provider: Sofy Dawkins MD  Discharge Service: Hospitalist Service    Discharge Diagnoses   Active lupus flare  Lupus cerebritis  Lupus nephritis  Acute kidney injury  Hemoptysis due to possible lupus flare, vs pneumonia/friable mucosa in the setting of hypertension   Malignant hypertension, improved   Acute respiratory failure with hypoxia, orthopnea  Acute HF with reduced EF (EF 20-25% echo on 8/11, improved EF to 45% on echo 8/14)  Left lower lobe pneumonia  Prolonged QT  Acute blood loss anemia   Perinephric hematoma s/p Successful coil embolization of the left renal artery inferior pole pseudoaneurysm by IR On 8/11/23   Hypomagnesemia  Hypokalemia  Thrombocytopenia     Clinically Significant Risk Factors          Follow-ups Needed After Discharge   Rheumatology, nephrology, cardiology consultations    Unresulted Labs Ordered in the Past 30 Days of this Admission       Date and Time Order Name Status Description    8/10/2023 12:09 PM PREPARE RED BLOOD CELLS (UNIT) Preliminary     8/10/2023 12:09 PM PREPARE RED BLOOD CELLS (UNIT) Preliminary     8/10/2023 12:09 PM PREPARE RED BLOOD CELLS (UNIT) Preliminary     8/10/2023 12:09 PM PREPARE RED BLOOD CELLS (UNIT) Preliminary         These results will be followed up by n/a    Discharge Disposition   Discharged to [facility] hospital, inpatient setting  Condition at discharge: Stable    Hospital Course   Very pleasant 23 year old female admitted on 8/11/2023. She has a history of lupus nephritis as well as lupus cerebritis followed by nephrology and rheumatology at the [facility] but with several recent admissions to [facility].  Today was seen by her nephrologist where there was concern for active lupus and prednisone was increased to 40 mg daily; labs prior to  appointment with hemoglobin of 6.1.  Subsequent worsening cough and went to [facility] urgent care before being transferred to [facility] emergency department.  At [facility], a CT of the chest abdomen and pelvis was performed demonstrating likely pseudoaneurysm with perinephric hematoma on the left in the setting of recent left renal biopsy.  Also noted to have left lower lobe pneumonia, present at [facility] late July as well.  Blood transfusions were initiated, and patient developed hemoptysis as well as uncontrolled blood pressure in the 200 systolic range.  Has a history of some heart failure with most recent ejection fraction normalized as of [facility] echo in July.  Significantly elevated BNP, significant orthopnea, bipedal edema.  Tells me that she has not been able to lay flat in her bed since even during her hospitalization at [facility] from 7/31 - 8/4/2023.  Though CT chest with area concerning for pneumonia in the left lower lobe, current hemoptysis increases concern that this actually represents active vasculitis.      In the setting of perinephric hematoma with pseudoaneurysm, Dr. Abigail Biggs of interventional radiology at [facility] was aware prior to transfer from [facility].  Given patient's presentation on intermediate care unit with hemoptysis and unable to lie flat, I have concerned that patient will be unable to tolerate any interventional radiology procedure without intubation.  On admission, care was discussed with ICU provider, who evaluated patient at bedside as well.  Plan was made to transfer patient to the ICU.  She will likely require bronchoscopy at some point, though not emergently at this time.  If bronchoscopy were needed more acutely, may need to be intubated for this.  Given that she is ICU level of care, admitting hospitalist discussed with patient placement and was informed that there are indeed beds available at  [facility]. While working on transfer to [facility], admitting provider was notified that bed no longer available at the [facility].       Nephrology here spoke with patient's primary nephrologist at [facility] and recommend transfer for more comprehensive rheumatologic evaluation and concern for systemic involvement.     Spoke with Dr. Juarez of hospitalist service and rheumatologist. Agree with transfer for further eval. Dr. Juarez has graciously accepted patient for transfer to [facility].  However, no bed available currently. Still awaiting transfer to [facility].    Active lupus flare  Lupus cerebritis  Lupus nephritis  Acute kidney injury  -Resumed prior to admission mycophenolate  -Plaquenil held due to new onset cardiomyopathy  -nephrology following, appreciate assistance--as above accepted at [facility], awaiting bed  -Completed IV Methylprednisolone 500mg daily x 4 days with transition to Prednisone 30mg daily on 8/15  -Protonix daily for GI prophylaxis while on high dose steroid  -Continue folic acid supplementation  -On bactrium for PJP prophylaxis  -Cardiac MRI completed    Hemoptysis due to possible lupus flare, vs pneumonia/friable mucosa in the setting of hypertension  Pulmonary team evaluated on 8/11, felt that her symptoms are due to persistent pneumonia with possible underlying necrotizing pneumonia leading to hemoptysis. Cannot rule out lupus vasculitis at this time.   -continuing with Zosyn at this time, per pulmonary note on 8/14 for total of 14 days  -on TXA neb per pulmonary x 7 days  -sputum culture/gram  stain have been ordered, not obtained, has non-productive cough  -no melanie hemoptysis since the day of admission (8/11). Did report on 8/16 a streak of blood when she coughed up, otherwise cough has been non-productive this hospital stay.     Malignant hypertension, improved  Presented with SBP 200s.   -Clonidine initiated on admission, discontinued on 8/12 due to bradycardia  -Nitroglycerin drip transition to  imdur on 8/12  -Lisinopril held on 8/13 due to increasing creatinine, resumed on 8/15 per cardiology  -Hydralazine and amlodipine added on 8/13  -Metoprolol to Coreg BID as below with dose increase  -increased prn labetolol dose from 10 to 20 mg prn on 8/17/2023  -cardiology/nephrology following, appreciate assistance      Acute respiratory failure with hypoxia, orthopnea  Acute HF with reduced EF (EF 20-25% echo on 8/11, improved EF to 45% on echo 8/14)  Left lower lobe pneumonia  Prolonged QT  Hemoptysis on 8/11/23- resolved on 8/12  *Note significantly elevated procalcitonin through outside records with recent hospitalization at [facility].  Treated with vancomycin and Zosyn at that time, discharged on Augmentin.  No fevers, though tells me that she did have 1 fever at [facility].  No shaking chills.  No leukocytosis.  She is immunosuppressed on CellCept  *TTE on 8/11 shows EF of 20-25%, mild to moderate tricuspid regurgitation, moderate pulmonary hypertension. Most recent echo report from 6/2023 reviewed through care everywhere showing EF 55%.   *EKG this morning shows T wave inversions in anterior, infer, lateral leads with sinus bradycardia and prolonged QTc. T wave inversions are new compared to EKG from yesterday. Patient denies symptoms of chest pain. She is sitting in bed and denies shortness of breath.  *given newly reduced EF, diffuse T wave inversions, cardiology consulted  *repeat echo on 8/14 shows LVEF 45%  *per cardiology possible stress induced cardiomyopathy on 8/14 note  -Imdur 60mg daily  -Metoprolol XL to Carvedilol 12.5mg BID on 8/12, increased to 25 mg BID on 8/16  -Lisinopril held on 8/13 due to increased cr, resumed on 8/15  -Hydralazine started  on 8/13  -amlodipine added on 8/13 for BP control  -Cardiac MRI completed 8/16 which describes the following 1. Mildly dilated left ventricle with moderately reduced systolic function. 2. No late enhancement to suggest prior infarct, inflammation or  infiltration. today to assess myocardium and LV funcition  - cardiology following, appreciate assistance  -strict intake and output    Acute blood loss anemia  Multifactorial with recent suspected GI bleed at [facility] hospitalization, though she reports that she is no longer having any bloody or black stool, gross hemoptysis, perinephric hematoma following 8/2/2023 renal biopsy.    - s/p  2 units pRBC on 8/11/23  - stable in low 8 range  Recent Labs   Lab 08/17/23  0543 08/16/23  0535 08/15/23  0645   HGB 8.1* 8.8* 8.2*     Perinephric hematoma s/p Successful coil embolization of the left renal artery inferior pole pseudoaneurysm by IR On 8/11/23  Hx of Recent renal biopsy  -IR evaluated- s/p Successful coil embolization of the left renal artery inferior pole pseudoaneurysm. 2 distal renal artery branch vessels were coiled using a total of 2 Pushpa coils on 8/11/23  -Right groin pain reported by patient on 8/14 and post vascular access US showed right groin hematoma. No pseudoaneurysm  -Control of blood pressure as above    Thrombocytopenia  -fluctuating, leigh ann 93K on 8/12  Recent Labs   Lab 08/17/23  0543 08/16/23  0535 08/15/23  0645   * 147* 115*     Hypomagnesemia  Hypokalemia  -replace per prtocol     Left shoulder, scapular pain-improved  C/o left shoulder pain. She has good range of motion in her shoulder. However, tender with palpation over her muscle over her shoulder, scapular area. ? MSK. No shortness of breath.  -xray shoulder without acute finding  -tylenol prn   -8/17/2023. Still having this pain off and on.     Consultations This Hospital Stay   INTENSIVIST IP CONSULT  NEPHROLOGY IP CONSULT  INTERVENTIONAL RADIOLOGY ADULT/PEDS IP CONSULT  PULMONARY IP CONSULT  INTERVENTIONAL RADIOLOGY ADULT/PEDS IP CONSULT  CARDIOLOGY IP CONSULT  PHYSICAL THERAPY ADULT IP CONSULT  CARE MANAGEMENT / SOCIAL WORK IP CONSULT  PHARMACY LIAISON FOR MEDICATION COVERAGE CONSULT    Code Status   Full Code    Time Spent  on this Encounter   I, Sofy Dawkins MD, personally saw the patient today and spent greater than 30 minutes discharging this patient.       Sofy Dawkins MD  [place] [address] [address] [place] [address]  Phone: [phone]  ______________________________________________________________________    Physical Exam   Vital Signs: Temp: 98.4  F (36.9  C) Temp src: Oral BP: (!) 141/114 Pulse: 102   Resp: 18 SpO2: 100 % O2 Device: None (Room air)    Weight: 125 lbs 11.2 oz         Primary Care Physician   Estefany Bell    Discharge Orders      Reason for your hospital stay    Lupus flare with pain, hypertension, renal impairment     Activity - Up ad kodak     Intake and output    Every shift     Full Code     Diet    Follow this diet upon discharge:       Regular Diet Adult       Significant Results and Procedures   See Epic    Discharge Medications   Current Discharge Medication List        START taking these medications    Details   acetaminophen (TYLENOL) 325 MG tablet Take 2 tablets (650 mg) by mouth every 4 hours as needed for mild pain    Associated Diagnoses: Perinephric hematoma      amLODIPine (NORVASC) 10 MG tablet Take 1 tablet (10 mg) by mouth daily    Associated Diagnoses: Perinephric hematoma      artificial saliva (BIOTENE MT) SOLN solution Swish and spit 2 mLs (2 sprays) in mouth 4 times daily    Associated Diagnoses: Perinephric hematoma      carvedilol (COREG) 25 MG tablet Take 1 tablet (25 mg) by mouth 2 times daily (with meals)    Associated Diagnoses: Perinephric hematoma      hydrALAZINE (APRESOLINE) 50 MG tablet Take 1 tablet (50 mg) by mouth every 8 hours    Associated Diagnoses: Perinephric hematoma      HYDROmorphone (DILAUDID) 0.2 MG/ML SOLN injection' Inject 1 mL (0.2 mg) into the vein every 3 hours as needed for severe pain  Refills: 0    Associated Diagnoses: Perinephric hematoma      HYDROmorphone (DILAUDID) 2 MG tablet Take 0.5 tablets (1 mg)  by mouth every 4 hours as needed for moderate pain  Refills: 0    Associated Diagnoses: Perinephric hematoma      isosorbide mononitrate (IMDUR) 60 MG 24 hr tablet Take 1 tablet (60 mg) by mouth daily    Associated Diagnoses: Perinephric hematoma      labetalol (NORMODYNE/TRANDATE) 5 MG/ML injection Inject 4 mLs (20 mg) into the vein every 2 hours as needed for high blood pressure (give for SBP > 180)    Associated Diagnoses: Perinephric hematoma      pantoprazole (PROTONIX) 40 MG EC tablet Take 1 tablet (40 mg) by mouth every morning (before breakfast)    Associated Diagnoses: Perinephric hematoma      piperacillin-tazobactam (ZOSYN) 3-0.375 GM vial Inject 3.375 g into the vein every 6 hours    Associated Diagnoses: Perinephric hematoma      spironolactone (ALDACTONE) 25 MG tablet Take 1 tablet (25 mg) by mouth daily    Associated Diagnoses: Perinephric hematoma      sulfamethoxazole-trimethoprim (BACTRIM) 400-80 MG tablet Take 1 tablet by mouth daily    Associated Diagnoses: Perinephric hematoma           CONTINUE these medications which have CHANGED    Details   lisinopril (ZESTRIL) 40 MG tablet Take 1 tablet (40 mg) by mouth daily    Associated Diagnoses: Perinephric hematoma      mycophenolate (GENERIC EQUIVALENT) 500 MG tablet Take 3 tablets (1,500 mg) by mouth 2 times daily    Associated Diagnoses: Perinephric hematoma      predniSONE (DELTASONE) 10 MG tablet Take 3 tablets (30 mg) by mouth daily    Associated Diagnoses: Perinephric hematoma      vancomycin (VANCOCIN) 125 MG capsule Take 1 capsule (125 mg) by mouth 2 times daily    Associated Diagnoses: Perinephric hematoma           CONTINUE these medications which have NOT CHANGED    Details   ferrous sulfate (FEROSUL) 325 (65 Fe) MG tablet Take 1 tablet (325 mg) by mouth daily (with breakfast)  Qty: 100 tablet, Refills: 3    Comments: Please fill here  Associated Diagnoses: Systemic lupus erythematosus with other organ involvement, unspecified SLE type (H)       folic acid (FOLVITE) 1 MG tablet Take 1 mg by mouth daily      metoprolol succinate ER (TOPROL XL) 50 MG 24 hr tablet Take 50 mg by mouth daily      ondansetron (ZOFRAN ODT) 4 MG ODT tab Take 4 mg by mouth every 8 hours as needed for nausea      !! potassium chloride ER (KLOR-CON M) 20 MEQ CR tablet Take 20 mEq by mouth 2 times daily      Vitamin D, Cholecalciferol, 10 MCG (400 UNIT) TABS Take 10 mcg by mouth daily      !! potassium chloride ER (KLOR-CON M) 20 MEQ CR tablet Take 1 tablet (20 mEq) by mouth 3 times daily  Qty: 90 tablet, Refills: 1    Associated Diagnoses: Hypertension, essential       !! - Potential duplicate medications found. Please discuss with provider.        STOP taking these medications       amoxicillin-clavulanate (AUGMENTIN) 875-125 MG tablet Comments:   Reason for Stopping:         calcium carbonate (TUMS) 500 MG chewable tablet Comments:   Reason for Stopping:         cloNIDine (CATAPRES) 0.1 MG tablet Comments:   Reason for Stopping:         hydroxychloroquine (PLAQUENIL) 200 MG tablet Comments:   Reason for Stopping:             Allergies   Allergies   Allergen Reactions    Rituximab Anaphylaxis and Shortness Of Breath

## 2023-08-17 NOTE — H&P
[facility]    History and Physical - Hospitalist Service, GOLD TEAM        Date of Admission:  8/17/2023    Assessment & Plan     Milly Carroll is a 23 year old female w/ PMH lupus C/B lupus flares, cerebritis, nephritis, CHRISTA also past medical history hemoptysis, malignant hypertension, orthopnea and acute respiratory failure with hypoxia, diastolic HF (EF 45% on 8/14, previously 20-25% on 8/11), QTc prolongation, recent LLL pneumonia, recent perinephritic hematoma status post IR embolization (8/11/2023), chronic thrombocytopenia, electrolyte disturbances admitted on 8/17/2023. She was hospitalized at [facility] 8/11/2023 to 8/17/2023 and transfers today for higher level of care. (See also HPI)    Lupus c/b lupus flares  Lupus cerebritis, active crescentic lesions   Hx medication non-adherence  Follow with [facility] rheumatology last seen 7/26/23.  Dx at age 7 initially felt to be discoid but later developed systemic signs.  Treated as a child with Cytoxan (2013) and later with rituximab with associated rituximab anaphylactic reaction.  Then treated with Benlysta infusion. Inpatient started IV Methylprednisolone 500mg daily x 4 days then transitioned to oral prednisone.   -consult to rheumatology  - follow up C3, C4, CRP, ESR  - Lupus-focused meds:   -Plaquenil held due to new onset cardiomyopathy, continue to hold   - continue prednisone 30mg daily  - MMF 1500mg BID  - PPI while on prednisone   - Bactrim PJP ptx   - continue folate supplementation  -We will need to follow-up with neuropsychiatry for medical compliance  -We will need outpatient follow-up with ophthalmology for eye exam in the setting of long-term Plaquenil  - SW consult given compliance and environmental concerns    Class IV lupus nephritis  CHRISTA, nonoliguric  Follows with Dr. Purvis at [facility] nephrology. Had biopsy 6/2022, focal proliferative and membranous lesions (class III and V) with minimal  activity and no significant chronicity.  Biopsy 8/2 showing class IV diffuse sclerosing focal pallor for day of lupus nephritis, tubulointerstitial immune complex deposition, severe I FTA, active crescents and glomeruli sclerosed.  Baseline creatinine 0.6-0.8. Creatinine this admission peaked at 1.28. Did have IV contrast on 8/10 and additionally notably had postbiopsy bleeding requiring IR coil embolization of left renal artery for pseudoaneurysm. Creatinine  improving and recently 1s.   - active lupus nephritis, treatment as above.   - consult nephrology   - briefly on sodium bicarb for acidosis, stopped 8/17  - trend BMP, weights, I&O    Perinephritic hematoma with pseudoaneurysm s/p IR embolization (8/11/2023)  Renal bx (8/2/23)  Right groin hematoma  IR embolization of left renal artery inferior pole pseudoaneurysm by IR. Interval improvement in renal function.  Patient noted on 8/14/2023 to have right groin pain at the vascular access site with ultrasound showing right groin hematoma and no pseudoaneurysm  -Monitor right groin site  -Continue to monitor for bleeding  - pain mng:   - dilaudid oral 1mg q4h prn   - dilaudid IV 0.2mg q3h prn   - APAP 650mg q6h prn   - added topical icyhot     Doroteo Hemoptysis w/o clot, improved   Acute respiratory failure with hypoxia  Orthopnea  No overt hemoptysis since admission 8/11/2023. In the setting of receiving transfusions and TXA x7 days. CT C/A/P w/ IV con 8/11 w/ 5.5 cm left perinephritic hematoma and 0.3 cm hyperenhancing focus of fluid LL pole suspicious for pseudoaneurysm.  Admission work-up also concerning for question of GI bleed in the setting of melena.  No other interval chest imaging.  Cardiac imaging as discussed below.  - CXR 2 view  -Continue to elevate head of bed until patient orthopnea improves  -Low threshold to involve interventional pulmonology if any clinical worsening    Cardiomyopathy and Diastolic HF (EF 45% on 8/14, previously 20-25% on  8/11)  QTc prolongation, improved   MRI Cardiac Myocarditis protocol (8/14) with interval improvement of flow EF that was seen on 8/11/23.  Cardiac work-up showing persistent LV dilation no evidence of infarct.  EKG w/ interval improvement in QTc and T wave inversions that were previously noted.  Received furosemide 8/11, discontinued thereafter.  -Appreciate cardiology recommendations ([facility])  - consult consult to follow here  - med mng:  -Imdur 60mg daily  -Metoprolol XL to Carvedilol 12.5mg BID on 8/12, increased to 25 mg BID on 8/16  - Lisinopril held on 8/13 due to increased cr, resumed on 8/15- 40mg daily    - Hydralazine started  on 8/13, dose increased 8/17 (50mg TID)  - amlodipine 10mg daily added on 8/13 for BP control  - spironolactone 25mg daily  - test claim Entresto/SGLT2 inhibitors $0/month; will not initiate until renal function   - clonidine was stopped at [facility] d/t bradycardia   - low threshold to add diuresis     Malignant hypertension- improved  SBP>200 on admission.  Initially started on clonidine which was discontinued on 8/12 due to bradycardia.  Nitroglycerin drip on 8/12 was transitioned same-day to Imdur.  -Antihypertensives as above    LLL pneumonia  Acute hypoxic respiratory failure-resolved  PTA receiving a course of Augmentin.  Initially on Vanco and Zosyn on 8/11, vancomycin discontinued inpatient.  LLL consolidation noted per admission CTA and per symptoms on admission to [facility] along with interstitial edema and trace bilateral pleural effusions. IV zosyn started 8/11 at [facility].   - sputum culture/gram stain have been ordered, not obtained, has had non-productive cough   - continue IV zosyn for now x 14 days per [facility] pulm, pending interval progress and specialist recommendations  - low threshold to consult pulmonary if repeat hemoptysis or respiratory decline     Chronic thrombocytopenia  Blood loss anemia   Occurs in the setting of perinephric hematoma as well as hemoptysis. Status  post 2 units PRBC 8/11/2023. Hemoglobin stable ~8. Plt stable in the 110-140s.  -Trend CBC and vitals  - completed TXA neb per pulmonary x 7 day (8/17)  - continue oral iron    Right >left upper and lower extremity edema  Paresis of right 2nd digit   Noted on exam.  RUE PIV present. Patient right index finger stuck in extension, able to painlessly flex passively. Cardiolipin Abx neg 2019  - hold anticoagulants in the setting of recent significant bleed, can consider if continues to be stable   - RLE  and RUE duplex 8/17/23 neg for DVT    Left shoulder, scapular pain-improved  C/o left shoulder pain at [facility] w/ intact ROM. Tender to palpation over her muscle over her shoulder, scapular area.   - follow up rheum input  -xray shoulder without acute finding  -tylenol prn     Headaches  Ongoing since admission and initially generalized, on admission to [facility] is right frontal, has been responsive to APAP and dilaudid  - continue APAP and dilaudid   - treat underlying HTN and lupus cares as above     Hypomagnesemia- improved  Hypokalemia -improved  In the setting of CHRISTA and diuresis, suspect renal losses  - continue KlorCon 20 meq BID  - trend BMP and Mg    Hx C. Diff (6/2023) -  oral vancomycin started 8/11 for c/f abdominal infection, continue for now ? 10 day course     Melena -resolved - continue to monitor    Hx pancreatic insufficiency 2/2 pancreatitis  Hx acute on chronic pancreatitis (2020)   Last followed with GI in 2020 for pancreatitis and multiple stones and debris in the main pancreatic duct and was recommended to be on Creon at that time  -Stools currently normal, can consider Creon if concerns for pancreatic insufficiency and loose stools    Hx remote generalized tonic-clonic seizures seizures  Right foot drop   Patient was on Keppra as a child. No current PTA antiepileptic medications  - monitor  -Consult neurology if any neurologic changes    Hx CMV and EBV + (2022) - no hx of following with ID - consider  repeat if felt to be contributory     Diet:  Regular  DVT Prophylaxis: Pneumatic Compression Devices and hold chemoptx given recent blood loss anemia   Leung Catheter: Not present  Lines: None     Cardiac Monitoring: None  Code Status:  Full    Clinically Significant Risk Factors Present on Admission        # Hypokalemia: Lowest K = 3.3 mmol/L in last 2 days, will replace as needed         # Thrombocytopenia: Lowest platelets = 124 in last 2 days, will monitor for bleeding   # Hypertension: Home medication list includes antihypertensive(s)  # Chronic heart failure with reduced ejection fraction: last echo with EF <40%         # Financial/Environmental Concerns:           Disposition Plan    suspect >5 days pending specialist input     The patient's care was discussed with the Attending Physician, Dr. Sheets, Bedside Nurse, and Patient.    Chasidy Beltrán PA-C  Hospitalist Service, [facility]  Securely message with [employer] (more info)  Text page via [facility] Paging/Directory   See signed in provider for up to date coverage information    ______________________________________________________________________    Chief Complaint   Transfer from [facility] for rheumatology consult setting of suspected acute lupus flare, hemoptysis and dyspnea/ orthopnea    History is obtained from the patient    History of Present Illness   Milly Carroll is a 23 year old female w/ PMH lupus C/B lupus flares, cerebritis, nephritis, CHRISTA also past medical history hemoptysis, malignant hypertension, orthopnea and acute respiratory failure with hypoxia, diastolic HF (EF 45% on 8/14, previously 20-25% on 8/11), QTc prolongation, recent LLL pneumonia, recent perinephritic hematoma status post IR embolization (8/11/2023), chronic thrombocytopenia, electrolyte disturbances admitted on 8/17/2023. She was hospitalized at [facility] 8/11/2023 to 8/17/2023 and transfers today for higher  level of care. (See also HPI)    Patient has had several lupus flare associated hospitalizations this past year and was hospitalized 6/2023 for CHRISTA and C. difficile infection.    Subsequent hospital records patient was seen multiple times at [facility] with concern for active lupus where her prednisone was increased to 40 mg daily.  Patient also noted to have hemoglobin 6.1 during that time.  Patient had worsening of her cough for which she was seen by [facility] and was transferred to [facility] ER where she had CT chest abdomen pelvis showing likely pseudoaneurysm with perinephritic hematoma on the left in the setting of a recent renal biopsy as well as a LLL pneumonia.  Patient received blood transfusions and antihypertensives for SBP's>200.  Patient was also noted to have a significantly elevated BNP and orthopnea ongoing since 7/31/2023 and work-up showed an EF of 20-25% on initial TTE (8/11/2023) that improved on follow-up cardiac MRI (EF 45%).  Of note there was some concern at [facility] for patient needing intubation for procedures given her severe orthopnea but was not intubated during her results admission.    Currently patient notes that she is having a right frontal headache which is different from her headaches over the course of her hospitalization as they were more global and generalized and is currently a 7/10.  Patient notes that her headaches improve typically Tylenol and Dilaudid that she was receiving at [facility].  She does not notice any visual changes or sensory changes.      She is not short of breath at rest but can generally only tolerate walking down the hallway before becoming significantly short of breath.  She continues to have a nonproductive deep cough since her admission.  Her last visit of coughing up blood was a small streak on 8/16.  She notes that the amount of blood she coughed up on 8/11 was enough to fill a emesis bag approximately 1 to 2 inches  and was bright red.    She continues to wake up at night gasping for air and cannot tolerate lying down flat due to increased shortness of breath with laying.    Denies N/V, F/C, chest pain, sob, palpitations, dizziness, vision changes, loss of appetite, abdominal pain, rashes, lumps, lesions, urinary complaints (including dysuria), changes to bowels (including diarrhea, constipation), current bleeding (including epistaxis, bleeding gums, hematuria, melena, hematochezia) or other complaints.       Past Medical History    Past Medical History:   Diagnosis Date    Hepatitis     Lupus (systemic lupus erythematosus) (H)     Lupus cerebritis (H)     Pancreatitis 08/2017    Pyelonephritis 08/2017    Seizures (H)     Status epilepticus (H)        Past Surgical History   Past Surgical History:   Procedure Laterality Date    IR RENAL ANGIOGRAM LEFT  8/11/2023    IR RENAL BIOPSY LEFT  6/28/2022    NO HISTORY OF SURGERY      ORTHOPEDIC SURGERY         Prior to Admission Medications   Prior to Admission Medications   Prescriptions Last Dose Informant Patient Reported? Taking?   HYDROmorphone (DILAUDID) 0.2 MG/ML SOLN injection'   No No   Sig: Inject 1 mL (0.2 mg) into the vein every 3 hours as needed for severe pain   HYDROmorphone (DILAUDID) 2 MG tablet   No No   Sig: Take 0.5 tablets (1 mg) by mouth every 4 hours as needed for moderate pain   Vitamin D, Cholecalciferol, 10 MCG (400 UNIT) TABS   Yes No   Sig: Take 10 mcg by mouth daily   acetaminophen (TYLENOL) 325 MG tablet   No No   Sig: Take 2 tablets (650 mg) by mouth every 4 hours as needed for mild pain   amLODIPine (NORVASC) 10 MG tablet   No No   Sig: Take 1 tablet (10 mg) by mouth daily   artificial saliva (BIOTENE MT) SOLN solution   No No   Sig: Swish and spit 2 mLs (2 sprays) in mouth 4 times daily   carvedilol (COREG) 25 MG tablet   No No   Sig: Take 1 tablet (25 mg) by mouth 2 times daily (with meals)   ferrous sulfate (FEROSUL) 325 (65 Fe) MG tablet   No No    Sig: Take 1 tablet (325 mg) by mouth daily (with breakfast)   folic acid (FOLVITE) 1 MG tablet   Yes No   Sig: Take 1 mg by mouth daily   hydrALAZINE (APRESOLINE) 50 MG tablet   No No   Sig: Take 1 tablet (50 mg) by mouth every 8 hours   isosorbide mononitrate (IMDUR) 60 MG 24 hr tablet   No No   Sig: Take 1 tablet (60 mg) by mouth daily   labetalol (NORMODYNE/TRANDATE) 5 MG/ML injection   No No   Sig: Inject 4 mLs (20 mg) into the vein every 2 hours as needed for high blood pressure (give for SBP > 180)   lisinopril (ZESTRIL) 40 MG tablet   No No   Sig: Take 1 tablet (40 mg) by mouth daily   metoprolol succinate ER (TOPROL XL) 50 MG 24 hr tablet   Yes No   Sig: Take 50 mg by mouth daily   mycophenolate (GENERIC EQUIVALENT) 500 MG tablet   No No   Sig: Take 3 tablets (1,500 mg) by mouth 2 times daily   ondansetron (ZOFRAN ODT) 4 MG ODT tab   Yes No   Sig: Take 4 mg by mouth every 8 hours as needed for nausea   pantoprazole (PROTONIX) 40 MG EC tablet   No No   Sig: Take 1 tablet (40 mg) by mouth every morning (before breakfast)   piperacillin-tazobactam (ZOSYN) 3-0.375 GM vial   No No   Sig: Inject 3.375 g into the vein every 6 hours   potassium chloride ER (KLOR-CON M) 20 MEQ CR tablet   Yes No   Sig: Take 20 mEq by mouth 2 times daily   potassium chloride ER (KLOR-CON M) 20 MEQ CR tablet   No No   Sig: Take 1 tablet (20 mEq) by mouth 3 times daily   predniSONE (DELTASONE) 10 MG tablet   No No   Sig: Take 3 tablets (30 mg) by mouth daily   spironolactone (ALDACTONE) 25 MG tablet   No No   Sig: Take 1 tablet (25 mg) by mouth daily   sulfamethoxazole-trimethoprim (BACTRIM) 400-80 MG tablet   No No   Sig: Take 1 tablet by mouth daily   vancomycin (VANCOCIN) 125 MG capsule   No No   Sig: Take 1 capsule (125 mg) by mouth 2 times daily      Facility-Administered Medications: None        Review of Systems    The 10 point Review of Systems is negative other than noted in the HPI or here.     Allergies   Allergies    Allergen Reactions    Rituximab Anaphylaxis and Shortness Of Breath        Physical Exam   Vital Signs:     BP: (!) 175/111 () Pulse: 100   Resp: 18 SpO2: 100 % O2 Device: None (Room air)    Weight: 125 lbs 6.4 oz  GENERAL: Alert and oriented x 3. NAD.  Able to sit upright independently.  Appears comfortable.. Cooperative.   HEENT: Anicteric sclera. Mucous membranes moist. NC. AT. EOMI. PERRLA  CV: + 5/6 systolic murmur. RRR. S1, S2.   RESPIRATORY: + diffuse rales throughout left lung and right lower lung, good excursion. Effort normal on RA. Lungs no wheezing, or rhonchi.   GI: Abdomen soft, NT, NABS   NEUROLOGICAL: No focal deficits. Moves all extremities.  CN 2-12 grossly intact.  No cerebellar signs.  No pronator drift.  EXTREMITIES: + Inability to actively flex the right index finger, no pain to passive extension or flexion.  +R>L 1+ peripheral edema, edema is most pronounced on the right upper extremity from the hands extending to the cubital fossa where IV is intact.  Right lower extremity greater than left lower extremity pitting edema diffusely from the PIP joints to the right knee.  Intact bilateral pedal pulses.   SKIN: No jaundice. No rashes on exposed skin.  BACK: no lesions      Medical Decision Making       90 MINUTES SPENT BY ME on the date of service doing chart review, history, exam, documentation & further activities per the note.      Data     I have personally reviewed the following data over the past 24 hrs:    4.6  \   8.1 (L)   / 124 (L)     141 115 (H) 33.0 (H) /  108 (H)   3.5 15 (L) 0.99 (H) \

## 2023-08-17 NOTE — PROVIDER NOTIFICATION
Provider notified (name): Brittani Gregorio MD (Per Triage, Dr. Lord, to page)  Reason for notification: New transfer from [facility]. No orders in. Hypertensive, /111

## 2023-08-17 NOTE — PLAN OF CARE
Orientation: A/Ox4    Vitals/Tele: HTN - IV PRN labetalol given, NSR, LSC - RA, Afebrile     IV Access/drains: 2PIV - SL    Diet: Reg - adequate appetite    Mobility: SBA GB    GI/: Adequate UO via toilet, BS audible    Wound/Skin: Scattered bruising, LE edema    Consults: Cards, Nephrology    Discharge Plan: Transfer to [facility]      See Flow sheets for assessment

## 2023-08-17 NOTE — PLAN OF CARE
Assumed care at 1500:    Elevated BP, outside of PRN parameters, other VSS on RA. Denies pain, CP or SOB. Transferred to [facility] via [facility] stretcher. Personal belongings sent w/ pt.    Goal Outcome Evaluation:      Plan of Care Reviewed With: patient    Overall Patient Progress: improving    Outcome Evaluation: Pt transferred to [facility]

## 2023-08-17 NOTE — PROGRESS NOTES
[facility]  Medicine Progress Note - Hospitalist Service  Date of Admission:  8/11/2023  Assessment & Plan   Very pleasant 23 year old female admitted on 8/11/2023. 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  Today was seen by her nephrologist where there was concern for active lupus and prednisone was increased to 40 mg daily; labs prior to appointment with hemoglobin of 6.1.  Subsequent worsening cough and went to [facility] urgent care before being transferred to [facility] emergency department.  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  Also noted to have left lower lobe pneumonia, present at [facility] hospitalization late July as well.  Blood transfusions were initiated, and patient developed hemoptysis as well as uncontrolled blood pressure in the 200 systolic range.  Has a history of some heart failure with most recent ejection fraction normalized as of [facility] echo in July.  Significantly elevated BNP, significant orthopnea, bipedal edema.  Tells me that she has not been able to lay flat in her bed since even during her hospitalization at [facility] from 7/31 - 8/4/2023.  Though CT chest with area concerning for pneumonia in the left lower lobe, current hemoptysis increases concern that this actually represents active vasculitis.      In the setting of perinephric hematoma with pseudoaneurysm, Dr. Abigail Biggs of interventional radiology at [facility] was aware prior to transfer from [facility].  Given patient's presentation on intermediate care unit with hemoptysis and unable to lie flat, I have concerned that patient will be unable to tolerate any  interventional radiology procedure without intubation.  On admission, care was discussed with ICU provider, who evaluated patient at bedside as well.  Plan was made to transfer patient to the ICU.  She will likely require bronchoscopy at some point, though not emergently at this time.  If bronchoscopy were needed more acutely, may need to be intubated for this.  Given that she is ICU level of care, admitting hospitalist discussed with patient placement and was informed that there are indeed beds available at [facility]. While working on transfer to [facility], admitting provider was notified that bed no longer available at the [facility].       Nephrology here spoke with patient's primary nephrologist at [facility] and recommend transfer for more comprehensive rheumatologic evaluation and concern for systemic involvement.     Spoke with Dr. Juarez of hospitalist service and rheumatologist. Agree with transfer for further eval. Dr. Juarez has graciously accepted patient for transfer to [facility].  However, no bed available currently. Still awaiting transfer to [facility].    Active lupus flare  Lupus cerebritis  Lupus nephritis  Acute kidney injury  -Resumed prior to admission mycophenolate  -Plaquenil held due to new onset cardiomyopathy  -nephrology following, appreciate assistance--as above accepted at [facility], awaiting bed  -Completed IV Methylprednisolone 500mg daily x 4 days with transition to Prednisone 30mg daily on 8/15  -Protonix daily for GI prophylaxis while on high dose steroid  -Continue folic acid supplementation  -On bactrium for PJP prophylaxis  -Cardiac MRI completed    Hemoptysis due to possible lupus flare, vs pneumonia/friable mucosa in the setting of hypertension  Pulmonary team evaluated on 8/11, felt that her symptoms are due to persistent pneumonia with possible underlying necrotizing pneumonia leading to hemoptysis. Cannot rule out lupus vasculitis at this time.   -continuing with Zosyn at this time, per  pulmonary note on 8/14 for total of 14 days  -on TXA neb per pulmonary x 7 days  -sputum culture/gram  stain have been ordered, not obtained, has non-productive cough  -no melanie hemoptysis since the day of admission (8/11). Did report on 8/16 a streak of blood when she coughed up, otherwise cough has been non-productive this hospital stay.     Malignant hypertension, improved  Presented with SBP 200s.   -Clonidine initiated on admission, discontinued on 8/12 due to bradycardia  -Nitroglycerin drip transition to imdur on 8/12  -Lisinopril held on 8/13 due to increasing creatinine, resumed on 8/15 per cardiology  -Hydralazine and amlodipine added on 8/13  -Metoprolol to Coreg BID as below with dose increase  -increased prn labetolol dose from 10 to 20 mg prn on 8/17/2023  -cardiology/nephrology following, appreciate assistance      Acute respiratory failure with hypoxia, orthopnea  Acute HF with reduced EF (EF 20-25% echo on 8/11, improved EF to 45% on echo 8/14)  Left lower lobe pneumonia  Prolonged QT  Hemoptysis on 8/11/23- resolved on 8/12  *Note significantly elevated procalcitonin through outside records with recent hospitalization at [facility].  Treated with vancomycin and Zosyn at that time, discharged on Augmentin.  No fevers, though tells me that she did have 1 fever at [facility].  No shaking chills.  No leukocytosis.  She is immunosuppressed on CellCept  *TTE on 8/11 shows EF of 20-25%, mild to moderate tricuspid regurgitation, moderate pulmonary hypertension. Most recent echo report from 6/2023 reviewed through care everywhere showing EF 55%.   *EKG this morning shows T wave inversions in anterior, infer, lateral leads with sinus bradycardia and prolonged QTc. T wave inversions are new compared to EKG from yesterday. Patient denies symptoms of chest pain. She is sitting in bed and denies shortness of breath.  *given newly reduced EF, diffuse T wave inversions, cardiology consulted  *repeat echo on 8/14  shows LVEF 45%  *per cardiology possible stress induced cardiomyopathy on 8/14 note  -Imdur 60mg daily  -Metoprolol XL to Carvedilol 12.5mg BID on 8/12, increased to 25 mg BID on 8/16  -Lisinopril held on 8/13 due to increased cr, resumed on 8/15  -Hydralazine started  on 8/13  -amlodipine added on 8/13 for BP control  -Cardiac MRI completed 8/16 which describes the following 1. Mildly dilated left ventricle with moderately reduced systolic function. 2. No late enhancement to suggest prior infarct, inflammation or infiltration. today to assess myocardium and LV funcition  - cardiology following, appreciate assistance  -strict intake and output    Acute blood loss anemia  Multifactorial with recent suspected GI bleed at [facility] hospitalization, though she reports that she is no longer having any bloody or black stool, gross hemoptysis, perinephric hematoma following 8/2/2023 renal biopsy.    - s/p  2 units pRBC on 8/11/23  - stable in low 8 range  Recent Labs   Lab 08/17/23  0543 08/16/23  0535 08/15/23  0645   HGB 8.1* 8.8* 8.2*     Perinephric hematoma s/p Successful coil embolization of the left renal artery inferior pole pseudoaneurysm by IR On 8/11/23  Hx of Recent renal biopsy  -IR evaluated- s/p Successful coil embolization of the left renal artery inferior pole pseudoaneurysm. 2 distal renal artery branch vessels were coiled using a total of 2 Pushpa coils on 8/11/23  -Right groin pain reported by patient on 8/14 and post vascular access US showed right groin hematoma. No pseudoaneurysm  -Control of blood pressure as above    Thrombocytopenia  -fluctuating, leigh ann 93K on 8/12  Recent Labs   Lab 08/17/23  0543 08/16/23  0535 08/15/23  0645   * 147* 115*     Hypomagnesemia  Hypokalemia  -replace per prtocol     Left shoulder, scapular pain-improved  C/o left shoulder pain. She has good range of motion in her shoulder. However, tender with palpation over her muscle over her shoulder, scapular area. ? MSK.  No shortness of breath.  -xray shoulder without acute finding  -tylenol prn   -8/17/2023. Still having this pain off and on.      Diet: Regular Diet Adult    DVT Prophylaxis: Pneumatic Compression Devices and Ambulate every shift  Leung Catheter: Not present  Lines: None     Cardiac Monitoring: ACTIVE order. Indication: Acute decompensated heart failure (48 hours)  Code Status: Full Code      Clinically Significant Risk Factors        # Hypokalemia: Lowest K = 3.3 mmol/L in last 2 days, will replace as needed       # Hypoalbuminemia: Lowest albumin = 2.1 g/dL at 8/12/2023  8:19 AM, will monitor as appropriate   # Thrombocytopenia: Lowest platelets = 124 in last 2 days, will monitor for bleeding    # Chronic heart failure with reduced ejection fraction: last echo with EF <40%           # Financial/Environmental Concerns: unable to afford rent/mortgage         Disposition Plan      Expected Discharge Date: 08/18/2023,  3:00 PM    Destination: home with family  Discharge Comments: pending transfer to         Medically stable for transfer to the [facility] as soon as a bed is available.  We will do orders at that time.  Sofy Dawkins MD  Hospitalist Service  [facility]  Securely message with [employer] (more info)  Text page via [facility] Paging/Directory   ______________________________________________________________________    Interval History   Patient new to me today.  History and physical reviewed as well as consultant notes and daily progress notes.    Patient reports ongoing orthopnea symptoms and occasional headaches.  Headaches are worse when she is seated.  Denies any vision changes or dizziness.  Reports ongoing left shoulder pain and some left flank pain with no obvious alleviating or exacerbating factors.  No fevers.  Ongoing nonproductive cough without any hematemesis.    Physical Exam   Vital Signs: Temp: 97.2  F (36.2  C) Temp src: Oral BP: (!) 164/105 Pulse: 93    Resp: 20 SpO2: 100 % O2 Device: None (Room air)    Weight: 125 lbs 11.2 oz    General Appearance: Propped up in bed, no respiratory distress, calm, cooperative  Respiratory: no tenderness to palpation of anterior chest wall or left flank  Clear, no crackles or wheezes  Cardiovascular: Regular, no murmur  GI: Soft, nontender  Skin: No jaundice, no acute rashes    Medical Decision Making       MANAGEMENT DISCUSSED with the following over the past 24 hours: Patient, bedside nurse, charge nurse   NOTE(S)/MEDICAL RECORDS REVIEWED over the past 24 hours: Consultant notes, history and physical, interval notes from hospitalists, medication administration record  Tests ORDERED & REVIEWED in the past 24 hours:  - See lab/imaging results included in the data section of the note      Data     I have personally reviewed the following data over the past 24 hrs:    4.6  \   8.1 (L)   / 124 (L)     141 115 (H) 33.0 (H) /  108 (H)   3.3 (L) 15 (L) 0.99 (H) \       Imaging results reviewed over the past 24 hrs:   Recent Results (from the past 24 hour(s))   MR Cardiac w contrast    [facility]                                                     CMR Report      MRN:             [id number]                                  Name:        MIKE JOSE YINA                                  :            2000-Aug-02                                  Scan Date:      2023-Aug-16                                  Electronically signed by Allen Urena 2023-Aug-16 14:50:42    SUMMARY   ==========================================================================================================    Clinical history: Probable stress induced cardiomyopathy, evaluate for myocarditis.   Comparison CMR: None    1. The LV is mildly dilated with normal wall thickness. The global systolic function is moderately reduced.  The LVEF is 40%. There is moderate global hypokinesis of the left ventricle.     2.  The RV is normal in cavity size. The global systolic function is low normal. The RVEF is 52%.     3. Both atria are normal in size.    4. There is mild to moderate mitral regurgitation by qualitative assessment.     5. There is no evidence of myocardial edema on T2 weighted imaging. There is no evidence of myocardial iron  overload.     5. Late gadolinium enhancement imaging shows no MI, fibrosis or infiltrative disease.     6. A small circumferential pericardial effusion is noted.     CONCLUSIONS:     1. Mildly dilated left ventricle with moderately reduced systolic function.    2. No late enhancement to suggest prior infarct, inflammation or infiltration.     CORE EXAM   ==========================================================================================================    MEASUREMENTS   ----------------------------------------------------------------------------------------      VOLUMETRIC ANALYSIS       ----------------------------------------------  .-----------------------------------------------------------.                    LV     Reference  RV     Reference    +------+-----------+-------+-----------+-------+------------+   EDV   ml         200.7   ()  121.6   (100-184)          ml/m^2     128.2   (65-99)    77.7   ()     ESV   ml         120.3   (29-66)    57.9   (29-82)            ml/m^2      76.9   (19-37)    37.0   (20-45)      CO    L/min       5.87              4.65                      L/min/m^2   3.75              2.97                MASS  g          113.1   ()                             g/m^2       72.3   (47-77)                        SV    ml          80.4   ()   63.8   ()           ml/m^2      51.3   (42-66)    40.7   (39-63)      EF    %           40.0   (56-75)    52.4   (49-73)     '------+-----------+-------+-----------+-------+------------'            CARDIAC OUTPUT HR:   73 BPM      LV DIMENSIONS       ----------------------------------------------          WALL THICKNESS - ANTEROSEPTAL:  1.0 cm          WALL THICKNESS - INFEROLATERAL:  0.8 cm          LV MIKEL:  5.6 cm          LV ESD:  4.2 cm        EXTRACELLULAR VOLUME MEASUREMENT       ----------------------------------------------          PRE-CONTRAST T1 MYOCARDIUM:  1029 msec          PRE-CONTRAST T1 LV CAVITY:  1655 msec          POST-CONTRAST T1 MYOCARDIUM:  613 msec          POST-CONTRAST T1 LV CAVITY:  495 msec          ECV:  33.1 %        IRON QUANTIFICATION       ----------------------------------------------          MYOCARDIAL T2*:  47 msec      ANATOMY   ----------------------------------------------------------------------------------------      PERICARDIUM       ----------------------------------------------          EFFUSION:  SMALL   LOCATION:  CIRCUMFERENTIAL         SCAN INFO   ==========================================================================================================    GENERAL   ----------------------------------------------------------------------------------------      CONTRAST AGENT       ----------------------------------------------          TYPE:  Gadavist          GD CONCENTRATION:  0.5 M          VOLUME ADMINISTERED:  7.5 ml          DOSAGE:  0.07 mmol/kg        LAB RESULT       ----------------------------------------------          HEMATOCRIT:  29 %        VITALS       ----------------------------------------------          HEIGHT:  62.0 in          HEIGHT:  157.5 cm          WEIGHT:  125.0 lbs          WEIGHT:  56.7 kgs          BSA:  1.57 m^2        PULSE SEQUENCES       ----------------------------------------------          SSFP cine, Pre-contrast T1 mapping, Post-contrast T1 mapping, T2* mapping, Bright-blood SSFP          morphology         SETUP       ----------------------------------------------          SCAN TYPE:  Clinical          PATIENT TYPE:  Inpatient          INCOMPLETE  SCAN:  No          REASON(S) FOR SCAN:  Myocarditis (known/suspect)           REFERRING PHYSICIAN:  YVONNE CAPONE          ATTENDING PHYSICIAN:  SHERLYN RODRIGUEZ   ----------------------------------------------------------------------------------------      CPT Codes      ICD10 Codes      Report generated by [employer], a product of [employer]      Post-Care Instructions: I reviewed with the patient in detail post-care instructions. Patient is not to engage in any heavy lifting, exercise, or swimming for the next 14 days. Should the patient develop any fevers, chills, bleeding, severe pain patient will contact the office immediately.

## 2023-08-17 NOTE — PROGRESS NOTES
Antimicrobial Stewardship Team Note    Antimicrobial Stewardship Program - A joint venture between [facility] and [facility] to optimize antibiotic management.     Patient: Milly Carroll  MRN: [id number]  Allergies: Rituximab    Brief Summary: Milly Carroll is a 23 year old female admitted on 8/11/23 with active lupus flare, found to have blood loss anemia, perinephric hematoma, and new heart failure with reduced EF. PMH significant for lupus nephritis & lupus cerebritis, followed by nephrology and rheumatology at [facility] with several recent admissions to [facility]. Reports unable to lay flat in her bed since her hospitalization at [facility] 7/31-8/4. She was started on Zosyn this admission given concern for pneumonia.    Patient is awaiting transfer to [facility] to see primary nephrologist and rheumatologist for more comprehensive rheumatologic evaluation with concern for systemic involvement, no bed available for several days. She is being treated with steroids for lupus flare, on Bactrim for PJP prophylaxis as well as PO vancomycin BID prophylaxis that was started after previous hospitalization while on Augmentin (2 week planned course would be completed 8/18).    Pulmonology saw patient 8/11 for hemoptysis, felt symptoms due to persistent pneumonia, cannot rule out lupus vasculitis. Sputum culture ordered but not obtained, has non-productive cough. Patient with normal WBC count, afebrile, and stable on room air since 8/13. CT c/a/p 8/10 with left lower lobe consolidation compatible with pneumonia, small groundglass opacity in right upper lobe. No cultures have been obtained.         Active Anti-infective Medications   (From admission, onward)                 Start     Stop    08/11/23 1430  sulfamethoxazole-trimethoprim  1 tablet,   Oral,   DAILY        Prophylaxis of PCP       --    08/11/23 0930  [Held by provider]  hydroxychloroquine  200 mg,   Oral,   DAILY         (Held by provider since Sat 8/12/2023 at 1705 by Navdeep Brar MD.Hold Reason: Other)   lupus       --    08/11/23 0930  vancomycin  125 mg,   Oral,   2 TIMES DAILY        Intra-Abdominal Infection       --    08/11/23 0500  piperacillin-tazobactam  3.375 g,   Intravenous,   EVERY 6 HOURS        Hospital-Acquired Pneumonia       --                  Assessment: HAP  Patient presented with hemoptysis, found to have perinephric hematoma and acute lupus flare. Complex past medical history, plan for transfer to [facility] but awaiting bed availability. She was started on Zosyn on 8/11 given concern for pneumonia with left lower lobe consolidation identified on imaging, although she was without systemic signs of infection given normal WBC count and afebrile. She is currently on day 7 which is typically adequate treatment for pneumonia and is clinically stability on room air with no fevers, recommend completing 10 day course of Zosyn given extensive consolidation seen on CT. Reasonable to complete an additional week of PO vancomycin prophylaxis following completion of Zosyn.    Recommendations:  Complete 10 day course of Zosyn on 8/20.  Continue PO vancomycin BID for an additional week (through 8/27).  Continue Bactrim prophylaxis.    Discussed with ID Staff MD Vee Curtis, PharmD, BCIDP    Vital Signs/Clinical Features:  Vitals         08/15 0700  08/16 0659 08/16 0700  08/17 0659 08/17 0700  08/17 1327   Most Recent      Temp ( F) 97.5 -  97.9    97.2 -  98.2    97.6 -  98.2     97.6 (36.4) 08/17 1057    Pulse 73 -  81    83 -  93    90 -  98     98 08/17 1300    Resp 16 -  18    18 -  20    18 -  19     18 08/17 1300    /102 -  173/125    141/101 -  169/116    147/102 -  183/121     147/102 08/17 1300    SpO2 (%)   100    98 -  100      99     99 08/17 1057            Labs  Estimated Creatinine Clearance: 80.2 mL/min (A) (based on SCr of 0.99 mg/dL (H)).  Recent Labs   Lab Test  08/12/23  0819 08/13/23  0527 08/14/23  0534 08/15/23  0645 08/16/23  0535 08/17/23  0543   CR 1.09* 1.28* 1.19* 1.17* 1.08* 0.99*       Recent Labs   Lab Test 01/13/21  1043 02/10/21  1305 02/11/21  0615 08/11/21  1227 12/14/22  1257 12/14/22  2330 12/15/22  0808 12/16/22  0908 01/24/23  1206 01/24/23  1731 08/12/23  0819 08/13/23  0527 08/14/23  0534 08/15/23  0645 08/16/23  0535 08/17/23  0543   WBC 3.8* 3.9* 4.5   < > 5.0  --  4.0   < > 4.7   < > 4.1 5.5 7.5 5.9 4.4 4.6   ANEU 2.1 2.6 3.2  --  4.7  --  2.4  --  3.0  --   --   --   --   --   --   --    ALYM 1.3 0.7* 0.7*  --  0.2*  --  1.4  --  0.9  --   --   --   --   --   --   --    ANNE 0.4 0.4 0.4  --  0.2  --  0.2  --  0.2  --   --   --   --   --   --   --    AEOS 0.0 0.2 0.2  --  0.0  --  0.0  --  0.0  --   --   --   --   --   --   --    HGB 9.1* 9.9* 9.1*   < > 5.6*   < > 11.2*   < > 7.0*   < > 8.7* 8.9* 8.2* 8.2* 8.8* 8.1*   HCT 30.4* 32.5* 29.6*   < > 18.2*  --  35.6   < > 23.8*   < > 26.8* 28.6* 26.6* 26.7* 28.6* 26.2*   MCV 75* 76* 75*   < > 85  --  87   < > 93   < > 86 88 89 88 89 89   * 118* 104*   < > 147*  --  127*   < > 88*   < > 93* 127* 140* 115* 147* 124*    < > = values in this interval not displayed.       Recent Labs   Lab Test 01/22/23  2334 01/24/23  0623 01/24/23  1307 01/25/23  0642 01/26/23  0656 07/10/23  1454 08/10/23  1011 08/10/23  1810 08/12/23  0819   BILITOTAL 0.4 0.2 0.3 0.2 0.3  --   --  0.4 0.5   ALKPHOS 331* 262*  --  222* 232*  --   --  122* 94   ALBUMIN 2.0* 1.7*  --  1.7* 1.7* 2.8* 2.7* 2.8* 2.1*   AST 48* 41*  --  34 49*  --   --  24 79*   ALT 9* 6*  --  <5* 10  --   --  <5 23       Recent Labs   Lab Test 08/20/17  1003 08/22/17  0520 12/10/19  1524 12/10/19  1528 12/11/19  0536 10/09/20  1749 01/13/21  1043 02/10/21  1305 04/25/22  0736 06/11/22  0146 06/12/22  0641 06/13/22  0558 06/15/22  1347 12/14/22  1257 12/15/22  0808 12/16/22  0908 01/22/23  2341 08/11/23  0447 08/11/23  0605 08/11/23  0808   PCAL  --   --   1.81  --   --   --   --   --   --   --   --   --   --   --   --   --   --  0.30*  --   --    LACT 0.5*  --   --  0.9  --   --  0.6*  --   --   --   --   --   --  1.3  --   --   --   --  6.6* 2.3*   CRP  --    < > 30.1*  --    < > <2.9 3.5 <2.9  --  99.0* 91.0* 40.0* 7.9  --   --   --   --   --   --   --    CRPI  --   --   --   --   --   --   --   --   --   --   --   --   --  37.50* 28.20* 14.10* 72.50* 18.53*  --   --    SED  --    < > 113*  --    < > 24*  --   --  128* >140*  --   --  118*  --   --   --  110* 54*  --   --     < > = values in this interval not displayed.       Recent Labs   Lab Test 01/23/23  1447   MPACID <0.25*   MPAG <6.5*       Culture Results:  7-Day Micro Results       Procedure Component Value Units Date/Time    Respiratory Aerobic Bacterial Culture with Gram Stain     Order Status: Sent Lab Status: No result     Specimen: Sputum from Bronchus     Respiratory Aerobic Bacterial Culture     Order Status: Sent Lab Status: No result     Specimen: Sputum from Bronchus             Recent Labs   Lab Test 12/14/22  1308 12/14/22  1456 07/10/23  1454 08/10/23  1022 08/11/23  1603   URINEPH 6.5 6.5 7.0 7.0 6.0   NITRITE Negative Negative Negative Negative Negative   LEUKEST Moderate* Moderate* Small* Negative Negative   WBCU 76* 60* 40* 23* 4             Recent Labs   Lab Test 12/10/19  1534 02/10/21  1215   RVSPEC Nasopharyngeal  --    IFLUA Negative Not Detected   FLUAH1 Negative Not Detected   FLUAH3 Negative Not Detected   SV2982 Negative Not Detected   IFLUB Negative Not Detected   RSVA Negative Not Detected   RSVB Negative Not Detected   PIV1 Negative Not Detected   PIV2 Negative Not Detected   PIV3 Negative Not Detected   HMPV Negative Not Detected   HRVS Negative  --    ADVBE Negative  --    ADVC Negative  --              Imaging: MR Cardiac w contrast    Result Date: 8/16/2023                                                   [facility]  Report   MRN:             [id number]                                Name:        MIKE JOSE                                :            2000-Aug-02                                Scan Date:      2023-Aug-16                              Electronically signed by Allen Urena 2023-Aug-16 14:50:42 SUMMARY  ========================================================================================================== Clinical history: Probable stress induced cardiomyopathy, evaluate for myocarditis. Comparison CMR: None 1. The LV is mildly dilated with normal wall thickness. The global systolic function is moderately reduced. The LVEF is 40%. There is moderate global hypokinesis of the left ventricle. 2. The RV is normal in cavity size. The global systolic function is low normal. The RVEF is 52%. 3. Both atria are normal in size. 4. There is mild to moderate mitral regurgitation by qualitative assessment. 5. There is no evidence of myocardial edema on T2 weighted imaging. There is no evidence of myocardial iron overload. 5. Late gadolinium enhancement imaging shows no MI, fibrosis or infiltrative disease. 6. A small circumferential pericardial effusion is noted. CONCLUSIONS: 1. Mildly dilated left ventricle with moderately reduced systolic function. 2. No late enhancement to suggest prior infarct, inflammation or infiltration. CORE EXAM  ========================================================================================================== MEASUREMENTS  ----------------------------------------------------------------------------------------     VOLUMETRIC ANALYSIS      ---------------------------------------------- .-----------------------------------------------------------.                   LV     Reference  RV     Reference   +------+-----------+-------+-----------+-------+------------+  EDV   ml         200.7   ()  121.6   (100-184)         ml/m^2     128.2   (65-99)    77.7   ()     ESV   ml         120.3   (29-66)    57.9   (29-82)           ml/m^2      76.9   (19-37)    37.0   (20-45)     CO    L/min       5.87              4.65                     L/min/m^2   3.75              2.97               MASS  g          113.1   ()                            g/m^2       72.3   (47-77)                       SV    ml          80.4   ()   63.8   ()          ml/m^2      51.3   (42-66)    40.7   (39-63)     EF    %           40.0   (56-75)    52.4   (49-73)    '------+-----------+-------+-----------+-------+------------'         CARDIAC OUTPUT HR:  73 BPM     LV DIMENSIONS      ----------------------------------------------         WALL THICKNESS - ANTEROSEPTAL:  1.0 cm         WALL THICKNESS - INFEROLATERAL:  0.8 cm         LV MIKEL:  5.6 cm         LV ESD:  4.2 cm     EXTRACELLULAR VOLUME MEASUREMENT      ----------------------------------------------         PRE-CONTRAST T1 MYOCARDIUM:  1029 msec         PRE-CONTRAST T1 LV CAVITY:  1655 msec         POST-CONTRAST T1 MYOCARDIUM:  613 msec         POST-CONTRAST T1 LV CAVITY:  495 msec         ECV:  33.1 %     IRON QUANTIFICATION      ----------------------------------------------         MYOCARDIAL T2*:  47 msec ANATOMY  ----------------------------------------------------------------------------------------     PERICARDIUM      ----------------------------------------------         EFFUSION:  SMALL  LOCATION:  CIRCUMFERENTIAL SCAN INFO  ========================================================================================================== GENERAL  ----------------------------------------------------------------------------------------     CONTRAST AGENT      ----------------------------------------------         TYPE:  Gadavist         GD CONCENTRATION:  0.5 M         VOLUME ADMINISTERED:  7.5 ml         DOSAGE:  0.07 mmol/kg     LAB RESULT       ----------------------------------------------         HEMATOCRIT:  29 %     VITALS      ----------------------------------------------         HEIGHT:  62.0 in         HEIGHT:  157.5 cm         WEIGHT:  125.0 lbs         WEIGHT:  56.7 kgs         BSA:  1.57 m^2     PULSE SEQUENCES      ----------------------------------------------         SSFP cine, Pre-contrast T1 mapping, Post-contrast T1 mapping, T2* mapping, Bright-blood SSFP         morphology     SETUP      ----------------------------------------------         SCAN TYPE:  Clinical         PATIENT TYPE:  Inpatient         INCOMPLETE SCAN:  No         REASON(S) FOR SCAN:  Myocarditis (known/suspect)         REFERRING PHYSICIAN:  YVONNE CAPONE         ATTENDING PHYSICIAN:  SHERLYN RODRIGUEZ  ----------------------------------------------------------------------------------------     CPT Codes     ICD10 Codes Report generated by [employer], a product of [employer]    Us Post Vascular Access Low Ext Duplex    Result Date: 8/14/2023  ULTRASOUND POST VASCULAR ACCESS LOW EXT DUPLEX  August 14, 2023 2:27 PM HISTORY: Patient is status post right femoral access for angiography. Now having swelling in the puncture site location. COMPARISON: None. FINDINGS: Color Doppler and spectral waveform analysis performed. The right distal external iliac artery, common femoral artery, proximal superficial femoral artery and proximal profunda femoris arteries are patent without significant stenosis. Normal triphasic waveforms are identified. The adjacent veins are patent. There is no evidence for pseudoaneurysm. There is a 2.8 x 2.9 x 1.0 cm hematoma in the right groin.     IMPRESSION: Right groin hematoma. No pseudoaneurysm. AKI JEAN MD   SYSTEM ID:  [id number]    XR Shoulder Left G/E 3 Views    Result Date: 8/14/2023  XR SHOULDER LEFT G/E 3 VIEWS 8/14/2023 10:55 AM HISTORY: shoulder pain COMPARISON: None.     IMPRESSION:  Normal joint spaces and  alignment. No fracture or dislocation. VELMA JACKSON MD   SYSTEM ID:  [id number]    Echocardiogram Limited    Result Date: 2023  [id number]^ANICETO  [facility] Echocardiography Laboratory [address] [place] [address]  Name: MIKE JOSE MRN: [id number] : 2000 Study Date: 2023 11:36 AM Age: 23 yrs Gender: Female Patient Location: [facility] Reason For Study: Stress Cardiomyopathy Ordering Physician: ANICETO KOWALSKI Referring Physician: NOVA BENJAMIN Performed By: Lalitha Ortez  BSA: 1.5 m2 Height: 62 in Weight: 122 lb HR: 74 BP: 149/99 mmHg ______________________________________________________________________________ Procedure Limited Echo Adult. ______________________________________________________________________________ Interpretation Summary  The left ventricle is borderline dilated. Biplane LVEF is 45%. There is mild global hypokinesia of the left ventricle. NOTE: Diastology paramaters and stain not performed. Compared to echo at OSH, the LVEF and MR changes are new. Serial echo cardiogram and possible MRI may be useful There is moderate (2+) mitral regurgitation. The aortic root is normal size. Borderline/mild dilated pulmonary artery Trivial posterior pericardial effusion ______________________________________________________________________________ Left Ventricle The left ventricle is borderline dilated. There is normal left ventricular wall thickness. Biplane LVEF is 45%. NOTE: Diastology paramaters and stain not performed. Compared to echo at OSH, the LVEF and MR changes are new. Serial echo cardiogram and possible MRI may be useful. There is mild global hypokinesia of the left ventricle. There is no thrombus seen in the left ventricle.  Right Ventricle The right ventricle is normal in size and function.  Atria The left atrium is mildly dilated. Right atrial size is normal.  Mitral Valve There is moderate (2+) mitral  regurgitation.  Tricuspid Valve The right ventricular systolic pressure is approximated at 33mmHg plus the right atrial pressure. There is mild to moderate (1-2+) tricuspid regurgitation.  Aortic Valve Normal tricuspid aortic valve.  Pulmonic Valve The pulmonic valve is not well seen, but is grossly normal.  Vessels The aortic root is normal size. Borderline/mild dilated pulmonary artery.  Pericardium Trivial posterior pericardial effusion.  Rhythm Sinus rhythm was noted. ______________________________________________________________________________ MMode/2D Measurements & Calculations IVSd: 0.88 cm  LVIDd: 5.4 cm LVIDs: 3.4 cm LVPWd: 1.2 cm FS: 37.5 % LV mass(C)d: 220.6 grams LV mass(C)dI: 142.4 grams/m2 asc Aorta Diam: 3.4 cm RWT: 0.45 TAPSE: 3.3 cm  Doppler Measurements & Calculations MR PISA: 3.1 cm2 MR ERO: 0.17 cm2 MR volume: 44.7 ml PA acc time: 0.11 sec  ______________________________________________________________________________ Report approved by: Maureen Navarro 08/14/2023 12:33 PM       IR Renal Angiogram Left    Result Date: 8/14/2023  PROCEDURE: 1. Ultrasound guided right femoral artery access. 2. Selective catheterization and angiography of two left renal arteries. 3. Superselective catheterization and angiography of two greater than third order inferior pole renal artery branch vessels. 4. Coil embolization of two greater than third order inferior pole renal artery branch vessels with post embolization angiography. 5. Placement of a 6 [place] Angio-Seal closure device. 6. Moderate sedation. DATE OF PROCEDURE: 8/11/2023. MODERATE SEDATION: Versed 1.5 mg IV; Fentanyl 100 mcg IV.  Under physician supervision, Versed and fentanyl were administered for moderate sedation. Pulse oximetry, heart rate and blood pressure were continuously monitored by an independent trained observer. The physician spent 40 minutes of face-to-face sedation time with the patient. CONTRAST: 42 mL Isovue IA FLUOROSCOPY TIME:  9.4 minutes AIR KERMA: 96.4 mGy. COMPLICATIONS: None. CLINICAL HISTORY/INDICATION: Perinephric hematoma. 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 PROCEDURE AND FINDINGS: Following a discussion of the risks, benefits, indications, and alternatives to treatment, appropriate informed consent was obtained from the patient. The patient was brought to the interventional radiology suite and placed supine on the table. Bilateral groins were prepped and draped in a sterile fashion. A timeout was performed per universal protocol policy to confirm the correct patient, site and procedure to be performed. A preliminary ultrasound of the right  groin was performed and demonstrates a patent right common femoral artery.  A permanent ultrasound image was recorded. Using a combination of fluoroscopy and ultrasound, an access site was determined. The overlying skin was anesthetized with 1% Lidocaine. Using ultrasound guidance, access into the right common femoral artery was obtained with visualization of needle entry into the vessel. A 0.018 wire was advanced through the needle and exchange was made for a 5 Serbian micropuncture sheath. A 0.035 wire was advanced through the micropuncture sheath and exchange was made for a 5 Serbian vascular sheath. A Bar and 5 Serbian Omni select catheter were inserted into the abdominal aorta and used to selectively catheterize the more superior left renal artery. Digital subtraction angiography was performed. Images show the renal artery is patent. No definite pseudoaneurysm is identified. The catheter was then used to selectively catheterize the inferior pole renal artery branch vessel. Digital subtraction angiography was performed, images show no pseudoaneurysm. The catheter was then used to selectively catheterize the more superior renal artery branch vessel. Digital subtraction angiography was repeated in multiple different projections, images show a small pseudoaneurysm off  the inferior pole of the left kidney. A PROGREAT microcatheter wire were used to selectively catheterize a greater than third order renal artery inferior pole branch vessel. Digital subtraction angiography was performed, images show a small pseudoaneurysm. Coil embolization was performed. Postembolization shows a small amount of contrast filling the pseudoaneurysm. The microcatheter and wire were pulled back and used to selectively catheterize a second greater than third order renal artery inferior pole branch vessel. Digital subtraction angiography was performed, images show access into the correct branch vessel. Coil embolization was performed, post embolization angiography was performed, images show no residual filling of the pseudoaneurysm. The remainder of the renal artery is patent. At this point, the catheter was removed over the wire. A perclosure digital subtraction angiographic run was performed to evaluate the access site and adequate hemostasis was then obtained using  a 6 Italian Angio-Seal closure device. Throughout the procedure, the patient was monitored by a radiology nurse for cardiac rhythm and oxygen saturation which remained stable. The patient tolerated the procedure well and left interventional radiology in stable condition.     IMPRESSION: Successful coil embolization of the left renal artery inferior pole pseudoaneurysm. Two distal renal artery branch vessels were coiled using a total of two Pushpa coils. MATTHEW DANIEL DO   SYSTEM ID:  [id number]    Echo Complete    Result Date: 2023  [id number]^JUDD^KIMI  [facility] Echocardiography Laboratory [address] [place] [address]  Name: MIKE JOSE MRN: [id number] : 2000 Study Date: 2023 02:56 PM Age: 23 yrs Gender: Female Patient Location: [facility] Reason For Study: CHF Ordering Physician: JUDD DELANEY Referring Physician: NOVA BENJAMIN Performed By: Lalitha ROY  Neelima  BSA: 1.5 m2 Height: 61 in Weight: 115 lb HR: 122 BP: 207/151 mmHg ______________________________________________________________________________ Procedure Complete Echo Adult. ______________________________________________________________________________ Interpretation Summary  The visual ejection fraction is 20-25%. The left ventricle is mildly dilated. There is mild (1+) mitral regurgitation. There is mild to moderate (1-2+) tricuspid regurgitation. Moderate (46-55mmHg) pulmonary hypertension is present. The above are new findings compared with study in 1-23 Contrast was used without apparent complications. ______________________________________________________________________________ Left Ventricle The left ventricle is mildly dilated. There is normal left ventricular wall thickness. The visual ejection fraction is 20-25%. Normal left ventricular wall motion. No evidence of left ventricular mass or tumors.  Right Ventricle The right ventricle is normal in structure, function and size.  Atria Normal left atrial size. Right atrial size is normal.  Mitral Valve There is mild (1+) mitral regurgitation.  Tricuspid Valve The right ventricular systolic pressure is approximated at 41mmHg plus the right atrial pressure. There is mild to moderate (1-2+) tricuspid regurgitation. Moderate (46-55mmHg) pulmonary hypertension is present.  Aortic Valve Normal tricuspid aortic valve.  Pulmonic Valve Normal pulmonic valve.  Vessels The aortic root is normal size. Dilation of the inferior vena cava is present with normal respiratory variation in diameter.  Pericardium Trivial pericardial effusion. There are no echocardiographic indications of cardiac tamponade. Small left pleural effusion.  Rhythm The rhythm was sinus tachycardia. ______________________________________________________________________________ MMode/2D Measurements & Calculations IVSd: 0.94 cm  LVIDd: 5.6 cm LVIDs: 4.3 cm LVPWd: 0.95 cm FS: 24.2 % LV  mass(C)d: 205.8 grams LV mass(C)dI: 137.8 grams/m2 Ao root diam: 3.6 cm asc Aorta Diam: 3.6 cm LVOT diam: 2.2 cm LVOT area: 3.9 cm2 LA Volume (BP): 45.3 ml LA Volume Index (BP): 30.4 ml/m2 RWT: 0.34  Doppler Measurements & Calculations MV E max horacio: 124.7 cm/sec MV dec slope: 767.6 cm/sec2 MV dec time: 0.16 sec Ao V2 max: 106.0 cm/sec Ao max P.0 mmHg Ao V2 mean: 72.1 cm/sec Ao mean P.3 mmHg Ao V2 VTI: 14.0 cm MARY(I,D): 2.8 cm2 MARY(V,D): 3.0 cm2 LV V1 max P.7 mmHg LV V1 max: 81.7 cm/sec LV V1 VTI: 9.9 cm MR PISA: 2.7 cm2 MR ERO: 0.18 cm2 MR volume: 27.5 ml SV(LVOT): 39.0 ml SI(LVOT): 26.1 ml/m2 PA V2 max: 70.6 cm/sec PA max P.0 mmHg PA acc time: 0.08 sec AV Horacio Ratio (DI): 0.77 MARY Index (cm2/m2): 1.9 E/E' av.3 Lateral E/e': 9.7 Medial E/e': 12.9 RV S Horacio: 14.9 cm/sec  ______________________________________________________________________________ Report approved by: Maureen Coffman 2023 04:14 PM       CTA Chest Abdomen Pelvis w Contrast    Result Date: 8/10/2023  EXAM: CTA CHEST ABDOMEN PELVIS W CONTRAST LOCATION: [facility] DATE: 8/10/2023 INDICATION: Shortness of breath and cough after recent kidney biopsy. COMPARISON: CT abdomen/pelvis 2023 TECHNIQUE: CT angiogram chest abdomen pelvis during arterial phase of injection of IV contrast. 2D and 3D MIP reconstructions were performed by the CT technologist. Dose reduction techniques were used. CONTRAST: 80 mL Isovue 370 FINDINGS: CT ANGIOGRAM CHEST, ABDOMEN, AND PELVIS: No active contrast extravasation within the left perinephric hematoma. Pulmonary Arteries: The pulmonary arteries are well-opacified with contrast. No filling defects are seen to suggest thromboembolism. Thoracic Aorta: Patent and normal in caliber. No evidence of dissection, intramural hematoma, or penetrating ulcer. Abdominal Aorta: Patent and normal in caliber. No evidence of dissection or penetrating ulcer. Celiac Artery: Patent. Superior  Mesenteric Artery: Patent. Right Renal Artery: Patent. Left Renal Artery: 3 left renal arteries, which appear patent. Inferior Mesenteric Artery: Patent. Right Common Iliac Artery: Patent. Right External Iliac Artery: Patent. Right Internal Iliac Artery: Patent. Right Common Femoral Artery: Patent. Proximal Right Superficial Femoral Artery: Patent. Proximal Right Deep Femoral Artery: Patent. Left Common Iliac Artery: Patent. Left External Iliac Artery: Patent. Left Internal Iliac Artery: Patent. Left Common Femoral Artery: Patent. Proximal Left Superficial Femoral Artery: Patent. Proximal Left Deep Femoral Artery: Patent. LUNGS AND PLEURA: Large area of consolidation in the left lower lobe. Small patchy groundglass opacity in the right upper lobe posterior segment. Bilateral central groundglass opacities with mild interlobular septal thickening. Trace bilateral pleural effusions. No pneumothorax. MEDIASTINUM/AXILLAE: No lymphadenopathy. Small pericardial effusion. CORONARY ARTERY CALCIFICATION: None. HEPATOBILIARY: Normal. PANCREAS: Normal. SPLEEN: Normal. ADRENAL GLANDS: Normal. KIDNEYS/BLADDER: 0.3 cm arterially hyperenhancing focus in the left renal lower pole, which is less conspicuous on portal venous phase. Left perinephric hematoma measuring 4.5 x 2.0 x 5.5 cm. No contrast extravasation within the hematoma. Left renal midpole nonobstructing calculus measuring 0.4 cm. No hydronephrosis. Urinary bladder appears normal. BOWEL: No obstruction or inflammatory change. Normal appendix. No evidence of acute appendicitis. PERITONEUM/RETROPERITONEUM: Trace abdominal and pelvic ascites. Left perinephric hematoma measuring 4.5 x 2.0 x 5.5 cm. No intraperitoneal free air. LYMPH NODES: Multiple mildly enlarged perigastric, alexandria hepatis, and retroperitoneal lymph nodes, which have decreased in size compared to prior. Previously enlarged pelvic lymph nodes are now not enlarged by size criteria. PELVIC ORGANS: Unremarkable.  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 Subcutaneous edema of the bilateral flanks and proximal thighs.     IMPRESSION: 1.  Left perinephric hematoma measuring 5.5 cm. No active extravasation within the hematoma. However, a 0.3 cm arterially hyperenhancing focus in the left renal lower pole is suspicious for pseudoaneurysm. 2.  Left lower lobe consolidation, compatible with pneumonia. Small groundglass opacity in the right upper lobe, likely infectious or inflammatory. 3.  Mild interstitial and alveolar pulmonary edema. Trace bilateral pleural effusions. Small pericardial effusion. Trace abdominal and pelvic ascites. Mild anasarca. 4.  Decreased size of mildly enlarged upper abdominal and retroperitoneal lymphadenopathy. Resolved pelvic lymphadenopathy. [Critical Result: Left renal pseudoaneurysm and perinephric hematoma] Finding was identified on 8/10/2023 10:26 PM CDT. Dr. Sumi Lau was contacted by me on 8/10/2023 10:36 PM CDT and verbalized understanding of the critical result.

## 2023-08-17 NOTE — PROGRESS NOTES
[facility]  Cardiology Progress Note  Date of Service: 08/17/2023  Primary Cardiologist[facility] Reno    Assessment & Plan   Milly Carroll is a 23 year old female admitted on 8/11/2023 with progressive dyspnea found to have blood loss anemia, perinephritic hematoma now status post coil embolization, lupus nephritis and new HFrEF.    Assessment:  1.  Cardiomyopathy, new. LVEF 20-25% on admission, now 40% by cardiac MRI. Feel presentation is most consistent with baseline cardiomyopathy acutely worsened by the development of stress CM. Optimizing medications.   2.  Hypertension, uncontrolled.  Of note, did have CTA chest, abdomen, pelvis 8/10/2023 that revealed patent renal arteries.  Will increase hydralazine and start spironolactone today.  3.  Prolonged QT, improved  4.  Active lupus flare with lupus cerebritis, nephritis.  Creatinine stable/improving.  Nephrology following. Plan is to transfer to [facility].   5.  Acute blood loss anemia, hemoglobin down to 6.1.  Status post 2 units PRBC 8/11/2023.  Hemoglobin stable ~8  6.  Perinephric hematoma s/p Successful coil embolization of the left renal artery inferior pole pseudoaneurysm by IR On 8/11/23     Plan:   Cardiac presentation most consistent with stress cardiomyopathy. LVEF down to 20-25% on admission with new findings of QT prolongation + T wave inversions on ECG. Repeat echocardiogram 8/14 revealed improvement in LVEF to 45%.  Cardiac MRI 8/16/2023 revealed mildly dilated LV with moderately reduced systolic function, LVEF 40%.  No late enhancement to suggest prior infarct, inflammation, or infiltration.  Serial ECGs reveal improving QT. Will work to optimize blood pressures and GDMT regimen. Plan is to transfer to [facility] once bed is available.     Medication changes  -Increase hydralazine to 50 mg 3 times daily  -Start spironolactone 25 mg once daily  2.  Cardiology will continue to follow    Rosario Peter  RUDDY MOLINA [facility]  Pager: [phone]    Interval History    Milly Carroll is a 23 year old female admitted on 8/11/2023 with progressive dyspnea found to have blood loss anemia (hemoglobin down to 6.1) and perinephritic hematoma now status post coil embolization, lupus nephritis, and new HFrEF.    Echocardiogram 8/11/2023 revealed LVEF 20 to 25%, mildly dilated LV, mild MR, mild to moderate TR, and moderate pulmonary hypertension.  She has been struggling with uncontrolled hypertension this admission resulting in up titration of her carvedilol and initiation of amlodipine, hydralazine, and Imdur.     Repeat echocardiogram 8/14 with LVEF improving, 45%.  Cardiac MRI 8/16/2023 revealed moderately dilated LV with moderately reduced systolic function; no late enhancement to suggest prior infarct, inflammation, or infiltration.    No acute events overnight. Labs are stable, renal function continues to improve. Blood pressure remains uncontrolled.  Patient denies chest pain, shortness of breath, lightheadedness or dizziness.    Physical Exam   Temp: 97.6  F (36.4  C) Temp src: Oral BP: (!) 153/105 Pulse: 96   Resp: 19 SpO2: 99 % O2 Device: None (Room air)    Vitals:    08/14/23 0500 08/15/23 0517 08/16/23 0627   Weight: 55.6 kg (122 lb 9.6 oz) 57.3 kg (126 lb 5.2 oz) 57 kg (125 lb 11.2 oz)       GEN: withdrawn, in no acute distress.  HEENT:  Pupils equal, round. Sclerae nonicteric.   NECK: Supple, no masses appreciated.  C/V:  Regular rate and rhythm, no murmur appreciated  RESP: Respirations are unlabored. Clear to auscultation bilaterally without wheezing, rales, or rhonchi.  GI: Abdomen soft, nontender.  EXTREM: Trace bilateral LE edema.  NEURO: Alert and oriented, cooperative.  SKIN: Warm and dry.     Medications    STATIN NOT PRESCRIBED        amLODIPine  10 mg Oral Daily    artificial saliva  2 spray Swish & Spit 4x Daily    carvedilol  25 mg Oral BID w/meals    ferrous sulfate  325 mg Oral  Daily with breakfast    folic acid  1 mg Oral Daily    hydrALAZINE  50 mg Oral Q8H ENOCH    [Held by provider] hydroxychloroquine  200 mg Oral Daily    isosorbide mononitrate  60 mg Oral Daily    lisinopril  40 mg Oral Daily    mycophenolate  1,500 mg Oral BID    pantoprazole  40 mg Oral QAM AC    piperacillin-tazobactam  3.375 g Intravenous Q6H    predniSONE  30 mg Oral Daily    sodium bicarbonate  650 mg Oral BID    spironolactone  25 mg Oral Daily    sulfamethoxazole-trimethoprim  1 tablet Oral Daily    vancomycin  125 mg Oral BID       Data   Last 24 hours labs reviewed     Tele: SR 70-80s    EKG: (reviewed personally)   8/15/2023: SR with LAD, QT continues to improve, almost within normal limits  8/14/2023: SR with LAD, QT improving but still prolonged as are T wave inversions  8/12/2023: SB with LAD, deep T wave inversions, prolonged QT ~600 ms    Echo 8/11/2023  The visual ejection fraction is 20-25%.  The left ventricle is mildly dilated.  There is mild (1+) mitral regurgitation.  There is mild to moderate (1-2+) tricuspid regurgitation.  Moderate (46-55mmHg) pulmonary hypertension is present.  The above are new findings compared with study in 1-23  Contrast was used without apparent complications.    Echo 8/14/2023  The left ventricle is borderline dilated.  Biplane LVEF is 45%.  There is mild global hypokinesia of the left ventricle.  NOTE: Diastology paramaters and stain not performed. Compared to echo at OSH,  the LVEF and MR changes are new. Serial echo cardiogram and possible MRI may  be useful  There is moderate (2+) mitral regurgitation.  The aortic root is normal size.  Borderline/mild dilated pulmonary artery  Trivial posterior pericardial effusion

## 2023-08-18 NOTE — PROGRESS NOTES
[facility]    Medicine Progress Note - Hospitalist Service, GOLD TEAM 9    Date of Admission:  8/17/2023    Assessment & Plan     Milly Carroll is a 23 year old female w/ PMH lupus C/B lupus flares, cerebritis, nephritis, CHRISTA also past medical history hemoptysis, malignant hypertension, orthopnea and acute respiratory failure with hypoxia, diastolic HF (EF 45% on 8/14, previously 20-25% on 8/11), QTc prolongation, recent LLL pneumonia, recent perinephritic hematoma status post IR embolization (8/11/2023), chronic thrombocytopenia, electrolyte disturbances admitted on 8/17/2023. She was hospitalized at [facility] 8/11/2023 to 8/17/2023 and transfers today for higher level of care. (See also HPI)    Today's Plan:  - consult recommendations pending- cardiology, nephrology, rheumatology  - continue antihypertensives  - continue vancomycin and zosyn. Will consider curbsiding ID regarding length of abx course.    Lupus c/b lupus flares  Lupus cerebritis, active crescentic lesions   Hx medication non-adherence  Follow with [facility] rheumatology last seen 7/26/23.  Dx at age 7 initially felt to be discoid but later developed systemic signs.  Treated as a child with Cytoxan (2013) and later with rituximab with associated rituximab anaphylactic reaction.  Then treated with Benlysta infusion. Inpatient started IV Methylprednisolone 500mg daily x 4 days then transitioned to oral prednisone.   - consult to rheumatology  - follow up C3, C4, CRP, ESR  - Lupus-focused meds:   -Plaquenil held due to new onset cardiomyopathy, continue to hold   - continue prednisone 30mg daily  - MMF 1500mg BID  - PPI while on prednisone  - Bactrim PJP ptx  - continue folate supplementation  -We will need outpatient follow-up with ophthalmology for eye exam in the setting of long-term Plaquenil  - SW consult given compliance and environmental concerns  - Rheumatology consulted, will await formal  recs    Class IV lupus nephritis  CHRISTA, nonoliguric  Follows with Dr. Purvis at [facility] nephrology. Had biopsy 6/2022, focal proliferative and membranous lesions (class III and V) with minimal activity and no significant chronicity.  Biopsy 8/2 showing class IV diffuse sclerosing focal pallor for day of lupus nephritis, tubulointerstitial immune complex deposition, severe I FTA, active crescents and glomeruli sclerosed.  Baseline creatinine 0.6-0.8. Creatinine this admission peaked at 1.28. Did have IV contrast on 8/10 and additionally notably had postbiopsy bleeding requiring IR coil embolization of left renal artery for pseudoaneurysm. Creatinine  improving and recently 1s.   - active lupus nephritis, treatment as above.   - consult nephrology for resistance hypertension, recommendations pending  - briefly on sodium bicarb for acidosis, stopped 8/17  - trend BMP, weights, I&O    Perinephritic hematoma with pseudoaneurysm s/p IR embolization (8/11/2023)  Renal bx (8/2/23)  Right groin hematoma  IR embolization of left renal artery inferior pole pseudoaneurysm by IR. Interval improvement in renal function.  Patient noted on 8/14/2023 to have right groin pain at the vascular access site with ultrasound showing right groin hematoma and no pseudoaneurysm  -Monitor right groin site  -Continue to monitor for bleeding  - pain mng:   - dilaudid oral 1mg q4h prn   - dilaudid IV 0.2mg q3h prn   - APAP 650mg q6h prn   - added topical icyhot     Doroteo Hemoptysis w/o clot, improved   Acute respiratory failure with hypoxia  Orthopnea  No overt hemoptysis since admission 8/11/2023. In the setting of receiving transfusions and TXA x7 days. CT C/A/P w/ IV con 8/11 w/ 5.5 cm left perinephritic hematoma and 0.3 cm hyperenhancing focus of fluid LL pole suspicious for pseudoaneurysm.  Admission work-up also concerning for question of GI bleed in the setting of melena. Hemoptysis improved since admission to [facility]  -Low threshold to involve  interventional pulmonology if any clinical worsening  - will hold lovenox for now.    Cardiomyopathy and Diastolic HF (EF 45% on 8/14, previously 20-25% on 8/11)  QTc prolongation, improved   MRI Cardiac Myocarditis protocol (8/14) with interval improvement of flow EF that was seen on 8/11/23.  Cardiac work-up showing persistent LV dilation no evidence of infarct.  EKG w/ interval improvement in QTc and T wave inversions that were previously noted.  Received furosemide 8/11, discontinued thereafter.  - cardiology consulted, appreciate recommendations.  - consult consult to follow here  - med mng:  -Imdur 60mg daily  -Metoprolol XL to Carvedilol 12.5mg BID on 8/12, increased to 25 mg BID on 8/16  - Lisinopril held on 8/13 due to increased cr, resumed on 8/15- 40mg daily    - Hydralazine started  on 8/13, dose increased 8/17 (50mg TID)  - amlodipine 10mg daily added on 8/13 for BP control  - spironolactone 25mg daily  - test claim Entresto/SGLT2 inhibitors $0/month; will not initiate until renal function   - clonidine was stopped at [facility] d/t bradycardia   - consider adding torsemide for resistant hypertensin    Malignant hypertension- improved  SBP>200 on admission.  Initially started on clonidine which was discontinued on 8/12 due to bradycardia.  Nitroglycerin drip on 8/12 was transitioned same-day to Imdur. Patient's BP have been sbp<180 since admission overnight.    LLL pneumonia  Acute hypoxic respiratory failure-resolved  PTA receiving a course of Augmentin.  Initially on Vanco and Zosyn on 8/11, vancomycin discontinued inpatient.  LLL consolidation noted per admission CTA and per symptoms on admission to [facility] along with interstitial edema and trace bilateral pleural effusions. IV zosyn started 8/11 at [facility].   - sputum culture/gram stain have been ordered, not obtained, has had non-productive cough   - continue vancomycin and IV zosyn for now x 14 days. Will reach out to ID regarding necessitation for prolonged  infectious course.  - low threshold to consult pulmonary if repeat hemoptysis or respiratory decline    Chronic thrombocytopenia  Blood loss anemia   Occurs in the setting of perinephric hematoma as well as hemoptysis. Status post 2 units PRBC 8/11/2023. Hemoglobin stable ~8. Plt stable in the 110-140s.  -Trend CBC and vitals  - completed TXA neb per pulmonary x 7 day (8/17)  - continue oral iron    Right >left upper and lower extremity edema  Paresis of right 2nd digit   Noted on exam.  RUE PIV present. Patient right index finger stuck in extension, able to painlessly flex passively. Cardiolipin Abx neg 2019  - hold anticoagulants in the setting of recent significant bleed, can consider if continues to be stable   - RLE  and RUE duplex 8/17/23 neg for DVT    Left shoulder, scapular pain-improved  C/o left shoulder pain at [facility] w/ intact ROM. Tender to palpation over her muscle over her shoulder, scapular area.   - follow up rheum input  -xray shoulder without acute finding  -tylenol prn     Headaches  Ongoing since admission and initially generalized, on admission to [facility] is right frontal, has been responsive to APAP and dilaudid  - continue APAP and dilaudid   - treat underlying HTN and lupus cares as above     Hypomagnesemia- improved  Hypokalemia -improved  In the setting of CHRISTA and diuresis, suspect renal losses  - continue KlorCon 20 meq BID  - trend BMP and Mg    Hx C. Diff (6/2023) -  oral vancomycin started 8/11 for c/f abdominal infection, continue for now ? 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 No current PTA antiepileptic medications  - monitor  -Consult neurology if any neurologic changes    Hx CMV and EBV + (2022) - no hx of following with ID - consider repeat if felt to be contributory       Diet: Combination Diet Regular Diet Adult    DVT Prophylaxis: VTE Prophylaxis contraindicated due to recent hematoma  Leung Catheter: Not present  Lines: None     Cardiac Monitoring: None  Code Status: Full Code      Clinically Significant Risk Factors Present on Admission        # Hypokalemia: Lowest K = 3.3 mmol/L in last 2 days, will replace as needed     # Hypomagnesemia: Lowest Mg = 1.6 mg/dL in last 2 days, will replace as needed     # Thrombocytopenia: Lowest platelets = 105 in last 2 days, will monitor for bleeding   # Hypertension: Home medication list includes antihypertensive(s)  # Chronic heart failure with reduced ejection fraction: last echo with EF <40%         # Financial/Environmental Concerns:           Disposition Plan      Expected Discharge Date: 08/21/2023      Destination: home with family  Discharge Comments: Dispo: home w/ family  Plan: pending sputum Cx  Progress: OP Psych & SW          SANIYA CHAMORRO MD  Hospitalist Service, GOLD TEAM [address] [place] [address]  Securely message with [employer] (more info)  Text page via [facility] Paging/Directory   See signed in provider for up to date coverage information  ______________________________________________________________________    Interval History   NoAION. This morning states that the dyspnea on exertion, orthopnea has improved. Also denies any nasal congestion, rhinnorhea, coughing, abd pain, nausea, emesis. No rashes or joint swelling. No HA, lightheadedness, visual impairment, auditory impairment, focal sensory or motor deficits.    Physical Exam   Vital Signs: Temp: (P) 97.9  F (36.6  C) Temp src: (P) Oral BP: (!) 164/107 Pulse: 105   Resp:  (P) 19 SpO2: (P) 100 % O2 Device: (P) None (Room air)    Weight: 125 lbs 6.4 oz    General Appearance: A and O x3. Comfortable in bed  Respiratory: incspiratory rales on the lung bases. No rhonchi or wheezing  Cardiovascular: S1, S2, RRR, 4/6 systolic murmur on 2nd LICS  GI: NBS, soft, nontender  Skin: no rashe     Medical Decision Making       60 MINUTES SPENT BY ME on the date of service doing chart review, history, exam, documentation & further activities per the note.  Tests ORDERED & REVIEWED in the past 24 hours:  Tests personally interpreted in the past 24 hours:      Data     I have personally reviewed the following data over the past 24 hrs:    4.7  \   8.7 (L)   / 105 (L)     143 117 (H) 30.9 (H) /  84   3.6 14 (L) 0.88 \     Procal: N/A CRP: 10.60 (H) Lactic Acid: N/A         Imaging results reviewed over the past 24 hrs:   Recent Results (from the past 24 hour(s))   US Upper Extremity Venous Duplex Right    Narrative    EXAMINATION: DOPPLER VENOUS ULTRASOUND OF THE RIGHT UPPER EXTREMITY,  8/17/2023 7:56 PM     COMPARISON: None.    HISTORY: Unilateral right arm and leg swelling    TECHNIQUE:  Gray-scale evaluation with compression, spectral flow and  color Doppler assessment of the deep venous system of the right upper  extremity.    FINDINGS:    Right: Normal blood flow and waveforms are demonstrated in the  internal jugular, innominate, subclavian, and axillary veins. There is  normal compressibility of the brachial, basilic and cephalic veins.      Impression    IMPRESSION: No evidence of right upper extremity deep venous  thrombosis.    I have personally reviewed the examination and initial interpretation  and I agree with the findings.    KENIA CERDA MD         SYSTEM ID:  [id number]   US Lower Extremity Venous Duplex Right    Narrative    ULTRASOUND LOWER EXTREMITY VENOUS DUPLEX RIGHT 8/17/2023 7:56 PM    CLINICAL HISTORY: unilateral right arm and leg swelling, assess for  DVT.     COMPARISONS:  "Ultrasound post vascular access lower extremity duplex  8/14/2023.    REFERRING PROVIDER: BROOKE CERVANTES    TECHNIQUE: Grayscale, color Doppler, Doppler waveform ultrasound  evaluation was performed through the right common femoral, femoral,  and popliteal veins. Right posterior tibial and peroneal veins were  evaluated with grayscale imaging and compression.    Left common femoral vein was evaluated for symmetry.    Cine clip was saved.    FINDINGS: Left common femoral vein is patent, fully compressible, and  demonstrates normal phasic Doppler waveform.    Anechoic 1.5 x 3.5 x 3.1 cm collection in the right groin demonstrates  no internal vascular flow by color Doppler imaging, previously mixed  echogenicity and 1.0 x 2.8 x 2.9 cm.    Right common femoral, femoral, and popliteal veins are fully  compressible, patent, and demonstrate normal phasic Doppler waveforms.    Right posterior tibial veins are fully compressible to the ankle.    Right peroneal veins are fully compressible to the distal calf.      Impression    IMPRESSION:   1. No deep venous thrombosis demonstrated in the RIGHT leg.    2. 1.5 x 3.5 x 3.1 cm collection in the right groin, previously 1.0 x  2.8 x 2.9 cm. Differential includes hematoma and seroma.    Reference: \"Duplex Ultrasound in the Diagnosis of Lower-Extremity Deep  Venous Thrombosis\"- Vicky Lynn MD, S; Bhavin Resendez MD  (Circulation. 2014;129:917-921. http://circ.ahajournals.org)    JUDD DUNLAP MD         SYSTEM ID:  [id number]   XR Chest 2 Views    Narrative    Exam: XR CHEST 2 VIEWS, 8/17/2023 8:53 PM    Comparison: CT 8/10/2023, radiograph 12/14/2022, 6/11/2022    History: interval follow up of pulmonary edema and pneumonia,  persistent orthopnea and PND    Findings:  PA and lateral views of the chest. Trachea is midline.  Cardiomediastinal silhouette is within normal limits. Persistent hazy  left basilar opacities. There is no pneumothorax or pleural effusion.  The upper " abdomen is unremarkable. No acute osseous abnormality.       Impression    Impression:   Persistent hazy left basilar opacities, likely representing ongoing  infection.    I have personally reviewed the examination and initial interpretation  and I agree with the findings.    LIZZY LUZ MD         SYSTEM ID:  [id number]

## 2023-08-18 NOTE — PLAN OF CARE
"BP (!) 164/107   Pulse 105   Temp (P) 97.9  F (36.6  C) (Oral)   Resp (P) 19   Ht 1.575 m (5' 2\")   Wt 56.9 kg (125 lb 6.4 oz)   LMP 05/29/2023 (Approximate)   SpO2 (P) 100%   BMI 22.94 kg/m      Care from: [id number]    VS & Pain: VSS on room air ex hypertensive- prn Labetalol x1 was given, prn Tylenol x1 was given for L shoulder pain  Neuro: A&O x4  Respiratory: frequent dry nonproductive cough  Cardiac: diminished lung sound in BLL  Peripheral neurovascular: 2+ edema in R arm and BLE  GI/: voids spontaneously, BM x1 this shift  Nutrition: good appetite and oral intake  Skin: scattered bruising, 2 RN skin assessment was done by writer and Anjali ZELAYA RN  Musculoskeletal: generalized weakness  Lines: R PIV is saline locked  Activity: up independently  Events this shift: Call light within reach.    Plan: Continue to follow poc.      Goal Outcome Evaluation:    Plan of Care Reviewed With: patient    Overall Patient Progress: no change    Outcome Evaluation: prn Zofran x1 was given for nausea and emsis x1, prn Tylenol x1 was given for L shoulder pain, prn Labetalol x1 was given for hypertension  "

## 2023-08-18 NOTE — PROGRESS NOTES
NURSING PROGRESS NOTE  Shift Summary      Date: August 18, 2023   000-0700    Neuro/Musculoskeletal:  A&Ox4, Calm and pleasant towards writer and other staff. Receptive to all cares.   Cardiac:  Not on TELE. Apical pulse regular. BP elevated overnight >180, PRN labetolol administered x2, which was effective. Continues on scheduled antihypertensives.    Respiratory:  LS: diminished bibasilary, frequent moist non-productive cough. SpO2: % on RA. Denied SOB.  Absent of any hemoptysis.   GI/:  BS: normoactive x4Q, Last BM: 8/17. Voiding spontaneously.   Diet/Appetite:  Tolerating a regular diet. Denied nausea.   Activity:  SBA. Pt does have a R foot drop and has had it for the past 3 months but has not been addressed by a neurologist/ physician.   Pain:  L scapular pain 8/10. Pt stated pain occurred after stretching. Musculoskeletal pain. PRN Tylenol/ Dilaudid administered. Cold/heat applied   Skin:  Pt states that her RUE and BLE are chronically swollen and there is not an increase in size/edema than what is chronic. Pt refused Skin assessment on previous shift.   LDAs + Drips/IVF:  R PIV: TKO    Awaiting results for RUE and RLE US.

## 2023-08-18 NOTE — CONSULTS
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 frequent recent hospitalizations, presenting as transfer from [facility] for higher level of care. Cardiology was consulted to assist with HF management    Patient initially presented with signs/symptoms of decompensated HF in setting of lupus flare. TTE (8/11) demonstrated new reduction in LV systolic function (LVEF 20-25%), which improved to 40% on CMR (8/16), after initiation of SSS. CMR negative for late enhancement that would be consistent with MI, fibrosis, or infiltrative disease. Personal review of TTE 8/11 appears that apical-mid wall segments are hypokinetic, while the basal segments have preserved contractility. Clinical scenario most likely represents stress CM.     She has been started on GDMT as tolerated, yet restricted based on renal function. Further ischemic evaluation was deferred due to renal compromise, prior CTA chest without coronary calcification, CMR findings, and lack of risk factors, but can consider in future. As stress CM is a transient disorder, patients are started on medical regimen for HFrEF until there is evidence in recovery of systolic function. At this time would recommend to continue titration of GDMT. Otherwise, continue to manage blood pressure control and maintaining volume optimization. Hold on CTA/LHC for now.     is a transient disorder, and the initiation and duration of medical therapy is not known given the lack of clinical trial data. Typically, patients are started on medical regimen for HFrEF until there is evidence in recovery of systolic function. For now, would recommend to continue to treat underlying stressors as much as possible while starting GDMT.    #HFimpEF 2/2 Stress CM (LVEF >40%;  20-25% prior)  - Preload: Appears mildly hypervolemic, recommend 40 IV lasix   - Afterload: BP goal <130; titrate HTN medications and GDMT as below  - Contratility: Beta blockade   - GDMT  - Beta Blockade: Coreg 25mg BID  - ACE/ARB/ARNI: Discontinue lisinopril for 36 hour washout to transition to Entresto 24/26; can use losartan as bridge   - MRA: Spironolactone 25 mg daily   - SGLT-2i: Start Empagliflozin 10 mg daily   - Ischemic evaluation  - Defer on CTA/LHC at this time  - Maintain Electrolyte goals: K > 4.0, Mg > 2.0    Plan of care discussed with Dr. Carrasco, who agrees with above plan.    Thank you for consulting the cardiovascular services at the [facility]. Please do not hesitate to call us with any questions.     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 frequent recent hospitalizations, presenting as transfer from [facility] for higher level of care. Cardiology was consulted to assist with HFimpEF management.     Patient hospitalized at [facility] 8/11-8/17/23 with AHRF, decompensated HF, blood loss anemia req. 2 units pRBC, perinephic hematoma s/p coil embolization, CAP, hypertensive emergency, hemoptysis. Unifying diagnosis was concerning for lupus flare and rheumatology has been treating with SSS. No pressor or inotrope requirements.     TTE was completed which demonstrated LVEF 20-25% with mild MR, moderate TR, and moderate pHTN; findings new from previous TTE 1/24/23. ECG c/w QT-c prolongation 486 and nons-specific TWI. Troponin unremarkable; no CP. Cardiology was consulted and initiated comprehensive evaluation for cardiomyopathy. Ischemic eval w/ CTA deferred due to renal fxn. Recommended optimization of GDMT, holding plaquenil, and  CMR. CMR described moderate global LV hypokinesis (LVEF 40%), moderate MR, low normal RVEF 52%, and no late enhancement consistent with MI, fibrosis, or infiltrative disease.Presentation was thought to be consistent with stress CM. Serial ECG's showed improvement to prolonged Qtc.     At the time of interview, the patient denies chest pain, dyspnea at rest or with exertion, orthopnea, PND, palpitations, lightheadedness, or syncope.     Review of Systems:    Complete review of systems was performed and negative except per HPI.      CARDIAC HISTORY:  EKG:  Per HPI    Transthoracic echocardiogram:   8/11/23  The visual ejection fraction is 20-25%.  The left ventricle is mildly dilated.  There is mild (1+) mitral regurgitation.  There is mild to moderate (1-2+) tricuspid regurgitation.  Moderate (46-55mmHg) pulmonary hypertension is present.  The above are new findings compared with study in 1-23  Contrast was used without apparent complications.    8/14/23  The left ventricle is borderline dilated.  Biplane LVEF is 45%.  There is mild global hypokinesia of the left ventricle.  NOTE: Diastology paramaters and stain not performed. Compared to echo at OSH,  the LVEF and MR changes are new. Serial echo cardiogram and possible MRI may  be useful  There is moderate (2+) mitral regurgitation.  The aortic root is normal size.  Borderline/mild dilated pulmonary artery  Trivial posterior pericardial effusion    CMR:  2023-Aug-16 14:50:42    SUMMARY  1. The LV is mildly dilated with normal wall thickness. The global systolic function is moderately reduced.  The LVEF is 40%. There is moderate global hypokinesis of the left ventricle.   2. The RV is normal in cavity size. The global systolic function is low normal. The RVEF is 52%.   3. Both atria are normal in size.  4. There is mild to moderate mitral regurgitation by qualitative assessment.   5. There is no evidence of myocardial edema on T2 weighted imaging. There is no  evidence of myocardial iron  overload.   5. Late gadolinium enhancement imaging shows no MI, fibrosis or infiltrative disease.   6. A small circumferential pericardial effusion is noted.      CONCLUSIONS:      1. Mildly dilated left ventricle with moderately reduced systolic function.  2. No late enhancement to suggest prior infarct, inflammation or infiltration.     Catheterization(s):   None    PMH:  Past Medical History:   Diagnosis Date    Hepatitis     Lupus (systemic lupus erythematosus) (H)     Lupus cerebritis (H)     Pancreatitis 08/2017    Pyelonephritis 08/2017    Seizures (H)     Status epilepticus (H)      Active Problems:  Patient Active Problem List    Diagnosis Date Noted    Proteinuria 10/03/2017     Priority: High    Hemoptysis 08/17/2023     Priority: Medium    Perinephric hematoma 08/11/2023     Priority: Medium    Tachycardia 01/23/2023     Priority: Medium    Hx of systemic lupus erythematosus (SLE) (H) 01/23/2023     Priority: Medium    Pain of left lower extremity 01/23/2023     Priority: Medium    Thrombocytopenia, unspecified (H) 06/13/2022     Priority: Medium    Splenomegaly 06/13/2022     Priority: Medium    Thrombocytopenia (H) 06/13/2022     Priority: Medium    Fever, unspecified 06/13/2022     Priority: Medium    Fever in pediatric patient 06/13/2022     Priority: Medium    Anemia, unspecified type 06/13/2022     Priority: Medium    Other systemic lupus erythematosus with other organ involvement (H) 06/13/2022     Priority: Medium    Systemic lupus erythematosus, unspecified SLE type, unspecified organ involvement status (H) 06/13/2022     Priority: Medium    Contact with and (suspected) exposure to covid-19 06/13/2022     Priority: Medium    Lupus (systemic lupus erythematosus) (H) 04/25/2022     Priority: Medium    Fever, unspecified fever cause 02/10/2021     Priority: Medium    Cellulitis of left lower extremity 01/13/2021     Priority: Medium    Drug-induced systemic lupus  erythematosus, unspecified organ involvement status (H) 01/13/2021     Priority: Medium    Pancreatitis, acute 06/23/2020     Priority: Medium    Sepsis (H) 12/10/2019     Priority: Medium    Anaphylaxis 11/20/2019     Priority: Medium    Immunosuppression (H) 04/18/2019     Priority: Medium    Abnormal echocardiogram - repeat in 2021 04/14/2019     Priority: Medium     4/2019 - Showed mild-moderate dilation of the aortic root at the sinus of valsalva. In discussion with the pediatric cardiologists, this was not truly dilation and her aortic root is normal for her age. She should receive the next screening echo in 2-3 years.      Other forms of systemic lupus erythematosus (H) 04/10/2019     Priority: Medium    Pyelonephritis 08/17/2017     Priority: Medium    Knee osteonecrosis, right (H) 05/19/2014     Priority: Medium    Functional visual loss 12/16/2013     Priority: Medium    Posterior subcapsular cataract, both eyes 12/16/2013     Priority: Medium    Encounter for therapeutic drug monitoring 12/16/2013     Priority: Medium    Hypokalemia 06/23/2013     Priority: Medium    Coagulopathy (H) 05/19/2013     Priority: Medium    Seizure (H) 05/18/2013     Priority: Medium    Acute hepatitis 05/14/2013     Priority: Medium    Exacerbation of systemic lupus erythematosus 05/14/2013     Priority: Medium    Generalized weakness 05/14/2013     Priority: Medium    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 Provider to review and confirm.      Systemic lupus erythematosus (H) 05/11/2013     Priority: Medium    Rash 05/11/2013     Priority: Medium     Social History:  Social History     Tobacco Use    Smoking status: Never    Smokeless tobacco: Never   Vaping Use    Vaping Use: Never used   Substance Use Topics    Alcohol use: Not Currently    Drug use: Never     Family History:  Family History   Problem Relation Age  of Onset    Other - See Comments Father         Question of lupus in father and paternal grandfather    Lupus Sister         older sister    Gastrointestinal Disease No family hx of         No known history of IBD, hepatitis, pancreatitis, celiac disease, or GI cancers before age 50    Glaucoma No family hx of     Macular Degeneration No family hx of        Medications:   amLODIPine  10 mg Oral Daily    artificial saliva  2 spray Swish & Spit 4x Daily    carvedilol  25 mg Oral BID w/meals    cholecalciferol  10 mcg Oral Daily    ferrous sulfate  325 mg Oral Daily with breakfast    folic acid  1 mg Oral Daily    hydrALAZINE  50 mg Oral Q8H    isosorbide mononitrate  60 mg Oral Daily    lisinopril  40 mg Oral Daily    mycophenolate  1,500 mg Oral BID    pantoprazole  40 mg Oral QAM AC    piperacillin-tazobactam  3.375 g Intravenous Q6H    potassium chloride ER  20 mEq Oral BID    predniSONE  30 mg Oral Daily    sodium chloride (PF)  3 mL Intracatheter Q8H    spironolactone  25 mg Oral Daily    sulfamethoxazole-trimethoprim  1 tablet Oral Daily    vancomycin  125 mg Oral BID           PHYSICAL EXAM:  Temp:  [97.4  F (36.3  C)-98.4  F (36.9  C)] (P) 97.9  F (36.6  C)  Pulse:  [] 105  Resp:  [15-19] (P) 19  BP: (141-189)/(102-121) 182/119  SpO2:  [97 %-100 %] (P) 100 %    Intake/Output Summary (Last 24 hours) at 8/18/2023 1146  Last data filed at 8/18/2023 0900  Gross per 24 hour   Intake 356 ml   Output --   Net 356 ml     GENERAL: Alert and oriented x 3. NAD.  Able to sit upright independently.  Appears comfortable.. Cooperative.   HEENT: Anicteric sclera. Mucous membranes moist. NC. AT. EOMI. PERRLA  CV: 4/6 systolic murmur LSB;  RRR. S1, S2; +JVD w/ v-waves   RESPIRATORY: + diffuse rales throughout left lung and right lower lung, good excursion. Effort normal on RA. Lungs no wheezing, or rhonchi.   GI: Abdomen soft, NT  NEUROLOGICAL: No focal deficits. Moves all extremities.  CN 2-12 grossly intact.     EXTREMITIES: 1+ pitting edema   SKIN: No jaundice. No rashes on exposed skin.  BACK: no lesions      DIAGNOSTICS  All labs and imaging were reviewed, of note:    CMP  Recent Labs   Lab 08/18/23  0726 08/17/23  1207 08/17/23  0543 08/16/23  1239 08/16/23  0535 08/15/23  0645 08/14/23  0534 08/13/23  0527 08/12/23  0819     --  141  --  140 139 137   < > 136   POTASSIUM 3.6 3.5 3.3* 3.4 3.3* 3.6 4.1   < > 3.8   CHLORIDE 117*  --  115*  --  112* 109* 109*   < > 104   CO2 14*  --  15*  --  16* 17* 16*   < > 21*   ANIONGAP 12  --  11  --  12 13 12   < > 11   GLC 84  --  108*  --  99 126* 147*   < > 130*   BUN 30.9*  --  33.0*  --  35.2* 35.9* 31.6*   < > 17.5   CR 0.88  --  0.99*  --  1.08* 1.17* 1.19*   < > 1.09*   GFRESTIMATED >90  --  82  --  74 67 66   < > 73   BISI 8.2*  --  8.0*  --  8.0* 8.1* 8.1*   < > 7.4*   MAG  --   --  1.8  --  1.9 2.0 2.0   < > 1.7   PHOS 3.1  --   --   --   --   --   --   --  5.1*   PROTTOTAL  --   --   --   --   --   --   --   --  5.3*   ALBUMIN  --   --   --   --   --   --   --   --  2.1*   BILITOTAL  --   --   --   --   --   --   --   --  0.5   ALKPHOS  --   --   --   --   --   --   --   --  94   AST  --   --   --   --   --   --   --   --  79*   ALT  --   --   --   --   --   --   --   --  23    < > = values in this interval not displayed.     CBC  Recent Labs   Lab 08/18/23 0726 08/17/23  0543 08/16/23  0535 08/15/23  0645   WBC 4.7 4.6 4.4 5.9   RBC 3.29* 2.96* 3.21* 3.02*   HGB 8.7* 8.1* 8.8* 8.2*   HCT 29.1* 26.2* 28.6* 26.7*   MCV 88 89 89 88   MCH 26.4* 27.4 27.4 27.2   MCHC 29.9* 30.9* 30.8* 30.7*   RDW 18.5* 17.9* 17.8* 17.8*   * 124* 147* 115*     INRNo lab results found in last 7 days.  Arterial Blood GasNo lab results found in last 7 days.    Lab Results   Component Value Date    TROPI <0.015 08/20/2017    TROPI  10/15/2015     <0.015  The 99th percentile for upper reference range is 0.045 ug/L.  Troponin values in   the range of 0.045 - 0.120 ug/L may be  associated with risks of adverse   clinical events.         Opt out

## 2023-08-18 NOTE — CONSULTS
RN[facility] acknowledging IP SW consult; CM consult entered and completed, please utilize IP CM/SW consult in future for related needs. Pt did not offer any pertinent needs or concerns indicated by order, see IP CM Consult Note.      Guilherme Khanna RN BSN  7D RN[facility]  Phone: [phone]  Pager: [phone]    For Weekend & Holiday on call RN Care Coordinator:  (Tasks: Home care, home infusion, medical equipment, transportation, IMM & SALGADO forms, etc.)  [facility] (0800 - 1630) Saturday and Sunday    Units: 5A, 5B, & 5C: [phone]    Units: 6B, 6C, 6D: [phone]    Units: 7A, 7B, 7C, 7D: [phone]    Units: 6A/ICU : [phone]    [facility] ([id number]) Saturday and Sunday    Units: 6 Med/Surg, 8A, 10 ICU, & Pediatric Units: [phone]    Units: 5 Ortho, 5Med/Surg & WB ED: [phone]

## 2023-08-18 NOTE — CONSULTS
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    # Lupus nephritis ,Active/Class IV    Diagnosed with lupus as at the age 7 years old Biopsy confirmed lupus Nephritis ,class III in 6/2022.She has been on Cellcept 1500mg bid ,predinsolone and hydroxychlorquine.Has history of noncompliance. Biopsy repeated recently ( 8/2/23) for suspected flare ,where biopsy showed class IV ,with significant chronicity active cresents.In the review her e-documents there was agreed plan to swap her to Cytoxan from Cellcept given flare/disease progression on cellcept.Concern with ongoing infection ,pneumonia ,postponed .    - Continue Cellcept and Prednisone   - OB-GYN consult and opinion for future fertility   -Cystain c GFR ,Significant discripancy between the biopsy chronicty index and kidney function  - Trend BMI     #CHRISTA   Likely Multifactorial ,Lupus flare ,contrast and recent post biopsy bleeding and left renal artery pseudoaneurysm.Baseline creatinine  was 0.8 mg/dl ,peaked at 1.28 mg/dl and reduced back to the baseline.       #Hypertension   She is on cocktails of antihypertensives ,Amlodipine 10 mg ,Carvedilol 25mg bd ,hydralazine 50 mg tds,lisinopril 40 mg po od  and Labetalol PRN.Despite her BP control is sub optimal .    - Add torsamide 20 mg daily    # NAGMA  Likely due to renal disease. W  - Increase sodium bicarb 1300mg po tds.    # LLL pneumonia    # Cardiomyopathy     Recommendations were communicated to primary team  via verbal/Note     Seen and discussed with Dr. Anh Steen MD  Nephrology Fellow     REASON FOR CONSULT: Lupus nephritis and poorly hypertension .    HISTORY OF PRESENT ILLNESS:  Admitting provider and nursing notes reviewed  Milly Carroll is a 23 year old Milly Carroll is a 22 yo female with PMH SLE, lupus nephritis, avascular necrosis of bilateral hips admitted 8/11/2023 with left renal artery pseudoaneurysm with perinephric collection  from recent biopsy complication and hemoptysis.S/P post coil embolization, lupus nephritis and new HFrEF .  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 Repeat biopsy showed class 4 lupus with some active crescent ,significant chronicity.Still on antibiotics( recommended for total of 14 days ) ,hemoptysis and cough better .Transferred [facility] on 8/17.      PAST MEDICAL HISTORY:  Reviewed with patient on 08/18/2023     Past Medical History:   Diagnosis Date     Hepatitis      Lupus (systemic lupus erythematosus) (H)      Lupus cerebritis (H)      Pancreatitis 08/2017     Pyelonephritis 08/2017     Seizures (H)      Status epilepticus (H)        Past Surgical History:   Procedure Laterality Date     IR RENAL ANGIOGRAM LEFT  8/11/2023     IR RENAL BIOPSY LEFT  6/28/2022     NO HISTORY OF SURGERY       ORTHOPEDIC SURGERY          MEDICATIONS:  PTA Meds  Prior to Admission medications    Medication Sig Last Dose Taking? Auth Provider Long Term End Date   acetaminophen (TYLENOL) 325 MG tablet Take 2 tablets (650 mg) by mouth every 4 hours as needed for mild pain   Sofy Dawkins MD     amLODIPine (NORVASC) 10 MG tablet Take 1 tablet (10 mg) by mouth daily   Sofy Dawkins MD Yes     artificial saliva (BIOTENE MT) SOLN solution Swish and spit 2 mLs (2 sprays) in mouth 4 times daily   Sofy Dawkins MD     carvedilol (COREG) 25 MG tablet Take 1 tablet (25 mg) by mouth 2 times daily (with meals)   Sofy Dawkins MD Yes    ferrous sulfate (FEROSUL) 325 (65 Fe) MG tablet Take 1 tablet (325 mg) by mouth daily (with breakfast)   Clint Gu MD     folic acid (FOLVITE) 1 MG tablet Take 1 mg by mouth daily   Reported, Patient     hydrALAZINE (APRESOLINE) 50 MG tablet Take 1 tablet (50 mg) by mouth every 8 hours   Sofy Dawkins MD Yes    HYDROmorphone (DILAUDID) 0.2 MG/ML SOLN injection' Inject 1 mL (0.2 mg) into the vein every 3 hours as needed for severe pain   Sofy Dawkins MD     HYDROmorphone (DILAUDID) 2 MG tablet Take 0.5 tablets (1 mg) by mouth every 4 hours as needed for moderate pain   Sofy Dawkins MD     isosorbide mononitrate (IMDUR) 60 MG 24 hr tablet Take 1 tablet (60 mg) by mouth daily   Sofy Dawkins MD Yes    labetalol (NORMODYNE/TRANDATE) 5 MG/ML injection Inject 4 mLs (20 mg) into the vein every 2 hours as needed for high blood pressure (give for SBP > 180)   Sofy Dawkins MD Yes    lisinopril (ZESTRIL) 40 MG tablet Take 1 tablet (40 mg) by mouth daily   Sofy Dawkins MD Yes    metoprolol succinate ER (TOPROL XL) 50 MG 24 hr tablet Take 50 mg by mouth daily   Reported, Patient Yes    mycophenolate (GENERIC EQUIVALENT) 500 MG tablet Take 3 tablets (1,500 mg) by mouth 2 times daily   Sofy Dawkins MD Yes    ondansetron (ZOFRAN ODT) 4 MG ODT tab Take 4 mg by mouth every 8 hours as needed for nausea   Reported, Patient     pantoprazole (PROTONIX) 40 MG EC tablet Take 1 tablet (40 mg) by mouth every morning (before breakfast)   Sofy Dawkins MD No    piperacillin-tazobactam (ZOSYN) 3-0.375 GM vial Inject 3.375 g into the vein every 6 hours   Sofy Dawkins,  MD     potassium chloride ER (KLOR-CON M) 20 MEQ CR tablet Take 1 tablet (20 mEq) by mouth 3 times daily   April Purvis MD     potassium chloride ER (KLOR-CON M) 20 MEQ CR tablet Take 20 mEq by mouth 2 times daily   Reported, Patient     predniSONE (DELTASONE) 10 MG tablet Take 3 tablets (30 mg) by mouth daily   Sofy Dawkins MD     spironolactone (ALDACTONE) 25 MG tablet Take 1 tablet (25 mg) by mouth daily   Sofy Dawkins MD Yes    sulfamethoxazole-trimethoprim (BACTRIM) 400-80 MG tablet Take 1 tablet by mouth daily   Sofy Dawkins MD No    vancomycin (VANCOCIN) 125 MG capsule Take 1 capsule (125 mg) by mouth 2 times daily   Sofy Dawkins MD No    Vitamin D, Cholecalciferol, 10 MCG (400 UNIT) TABS Take 10 mcg by mouth daily   Reported, Patient No       Current Meds    amLODIPine  10 mg Oral Daily     artificial saliva  2 spray Swish & Spit 4x Daily     carvedilol  25 mg Oral BID w/meals     cholecalciferol  10 mcg Oral Daily     ferrous sulfate  325 mg Oral Daily with breakfast     folic acid  1 mg Oral Daily     hydrALAZINE  50 mg Oral Q8H     isosorbide mononitrate  60 mg Oral Daily     lisinopril  40 mg Oral Daily     mycophenolate  1,500 mg Oral BID     pantoprazole  40 mg Oral QAM AC     piperacillin-tazobactam  3.375 g Intravenous Q6H     potassium chloride ER  20 mEq Oral BID     predniSONE  30 mg Oral Daily     sodium chloride (PF)  3 mL Intracatheter Q8H     spironolactone  25 mg Oral Daily     sulfamethoxazole-trimethoprim  1 tablet Oral Daily     vancomycin  125 mg Oral BID     Infusion Meds      ALLERGIES:    Allergies   Allergen Reactions     Rituximab Anaphylaxis and Shortness Of Breath       REVIEW OF SYSTEMS:  A comprehensive of systems was negative except as noted above.    SOCIAL HISTORY:   Social History     Socioeconomic History     Marital status: Single     Spouse name: Not on file     Number of children: Not on file     Years of  "education: Not on file     Highest education level: Not on file   Occupational History     Not on file   Tobacco Use     Smoking status: Never     Smokeless tobacco: Never   Vaping Use     Vaping Use: Never used   Substance and Sexual Activity     Alcohol use: Not Currently     Drug use: Never     Sexual activity: Not on file   Other Topics Concern     Not on file   Social History Narrative    2013     Milly is the 2nd youngest of 7 children.  She is in the 10th grade and lives with her parents and siblings.  Her family is originally from [place], but Milly was born in the .        2022: lives in [place] with older sister, brother-in-law, niece, and brother.         Social Determinants of Health     Financial Resource Strain: Not on file   Food Insecurity: Not on file   Transportation Needs: Not on file   Physical Activity: Not on file   Stress: Not on file   Social Connections: Not on file   Intimate Partner Violence: Not on file   Housing Stability: Not on file     Reviewed with patient       FAMILY MEDICAL HISTORY:   Family History   Problem Relation Age of Onset     Other - See Comments Father         Question of lupus in father and paternal grandfather     Lupus Sister         older sister     Gastrointestinal Disease No family hx of         No known history of IBD, hepatitis, pancreatitis, celiac disease, or GI cancers before age 50     Glaucoma No family hx of      Macular Degeneration No family hx of      Reviewed with patient     PHYSICAL EXAM:   Temp  Av.8  F (36.6  C)  Min: 97.1  F (36.2  C)  Max: 99.6  F (37.6  C)      Pulse  Av.7  Min: 50  Max: 126 Resp  Av.1  Min: 12  Max: 51  SpO2  Av.5 %  Min: 91 %  Max: 100 %       BP (!) 164/107   Pulse 105   Temp (P) 97.9  F (36.6  C) (Oral)   Resp (P) 19   Ht 1.575 m (5' 2\")   Wt 56.9 kg (125 lb 6.4 oz)   LMP 2023 (Approximate)   SpO2 (P) 100%   BMI 22.94 kg/m     Date 23 0700 - 23 0659   Shift " 1739-7166 9582-6125 7936-5905 24 Hour Total   INTAKE   P.O. 236   236   Shift Total(mL/kg) 236(4.15)   236(4.15)   OUTPUT   Shift Total(mL/kg)       Weight (kg) 56.88 56.88 56.88 56.88      Admit Weight: 56.9 kg (125 lb 6.4 oz)     GENERAL APPEARANCE: no distress,  awake  EYES: no scleral icterus, pupils equal  Endo: no goiter, has moon facies  Lymphatics: no cervical or supraclavicular LAD  Pulmonary: lungs clear to auscultation with equal breath sounds bilaterally, no clubbing  CV: regular rhythm, normal rate, no rub   - Edema +1  GI: soft, nontender, normal bowel sounds  MS: no evidence of inflammation in joints, no muscle tenderness  : no carrasquillo  SKIN: no rash, warm, dry, no cyanosis  NEURO: face symmetric, no asterixis     LABS:   CMP  Recent Labs   Lab 08/18/23  1406 08/18/23  0726 08/17/23  1207 08/17/23  0543 08/16/23  1239 08/16/23  0535 08/15/23  0645 08/13/23  0527 08/12/23  0819   NA  --  143  --  141  --  140 139   < > 136   POTASSIUM  --  3.6 3.5 3.3* 3.4 3.3* 3.6   < > 3.8   CHLORIDE  --  117*  --  115*  --  112* 109*   < > 104   CO2  --  14*  --  15*  --  16* 17*   < > 21*   ANIONGAP  --  12  --  11  --  12 13   < > 11   GLC  --  84  --  108*  --  99 126*   < > 130*   BUN  --  30.9*  --  33.0*  --  35.2* 35.9*   < > 17.5   CR  --  0.88  --  0.99*  --  1.08* 1.17*   < > 1.09*   GFRESTIMATED  --  >90  --  82  --  74 67   < > 73   BISI  --  8.2*  --  8.0*  --  8.0* 8.1*   < > 7.4*   MAG 1.6*  --   --  1.8  --  1.9 2.0   < > 1.7   PHOS  --  3.1  --   --   --   --   --   --  5.1*   PROTTOTAL  --   --   --   --   --   --   --   --  5.3*   ALBUMIN  --   --   --   --   --   --   --   --  2.1*   BILITOTAL  --   --   --   --   --   --   --   --  0.5   ALKPHOS  --   --   --   --   --   --   --   --  94   AST  --   --   --   --   --   --   --   --  79*   ALT  --   --   --   --   --   --   --   --  23    < > = values in this interval not displayed.     CBC  Recent Labs   Lab 08/18/23  0726 08/17/23  0543  08/16/23  0535 08/15/23  0645   HGB 8.7* 8.1* 8.8* 8.2*   WBC 4.7 4.6 4.4 5.9   RBC 3.29* 2.96* 3.21* 3.02*   HCT 29.1* 26.2* 28.6* 26.7*   MCV 88 89 89 88   MCH 26.4* 27.4 27.4 27.2   MCHC 29.9* 30.9* 30.8* 30.7*   RDW 18.5* 17.9* 17.8* 17.8*   * 124* 147* 115*     INRNo lab results found in last 7 days.  ABGNo lab results found in last 7 days.   URINE STUDIES  Recent Labs   Lab Test 08/11/23  1603 08/10/23  1022 07/10/23  1454 12/14/22  1456   COLOR Light Yellow Light Yellow Light Yellow Yellow   APPEARANCE Clear Clear Slightly Cloudy* Slightly Cloudy*   URINEGLC Negative Negative Negative Negative   URINEBILI Negative Negative Negative Negative   URINEKETONE Negative Negative Negative Negative   SG 1.023 1.015 1.014 1.018   UBLD Large* Large* Large* Large*   URINEPH 6.0 7.0 7.0 6.5   PROTEIN 200* 300* 600* 300*   NITRITE Negative Negative Negative Negative   LEUKEST Negative Negative Small* Moderate*   RBCU 15* 121* 97* 18*   WBCU 4 23* 40* 60*     Recent Labs   Lab Test 06/11/22  1527 04/25/22  0554 12/15/21  1241 12/16/20  1426 11/18/20  1343 10/21/20  1455 07/15/20  1313 06/28/20  0430 06/24/20  1335 06/23/20  1813 04/16/20  1026 04/24/19  1045 04/10/19  1255 05/09/18  1108 10/11/17  1014 10/03/17  1614 09/13/17  1002 08/30/17  0900 08/23/17  2100 08/21/17  1932 08/16/17  1134 05/10/17  1029 02/08/17  1030 11/20/15  0947 07/17/15  1037   UTPG 1.30* 0.83* 0.53* 0.17 0.23* Canceled, Test credited  0.17 0.36* 0.72* 0.69* 0.48* 0.16 0.32* 0.45* 0.13 0.31* 0.37* 0.42* 0.51* 1.09* 1.75* 0.41* 0.13 0.13 0.17 0.21*     PTH  Recent Labs   Lab Test 07/10/23  1454   PTHI 129*     IRON STUDIES  Recent Labs   Lab Test 07/10/23  1454 06/11/22  0146 06/25/20  0656 12/27/17  1021 11/22/17  0858 08/28/17  0540 08/22/17  0520   IRON 15*  --   --  99 20* 51  --    *  --   --  310 388 312  --    IRONSAT 9*  --   --  32 5* 16  --    GABRIEL 1,079* 123 318*  --   --   --  135       IMAGING:  All imaging studies reviewed  by me.     Michel Steen MD

## 2023-08-18 NOTE — SUMMARY OF CARE
2 RN skin assessment completed by:  and Anjali ZELAYA RN      - Findings (add LDA if needed): scattered bruising, dry skin

## 2023-08-18 NOTE — CONSULTS
Rheumatology Consult Note-Resident    Milly Carroll MRN# [id number]   Age: 23 year old YOB: 2000     Date of Admission: 8/17/2023    Reason for consult: for lupus, c/b nephritis ,cerebritis, hemoptysis     Assessment & Plan:     ASSESSMENT:  Ms. 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 She also has history of medication non-adherence due to interstate move and transportation issues.     Her las rheumatology outpatient visit was on 7/26/2023 and nephrology visit was on 8/10/2023 where concerns of her lupus nephritis flare was addressed. She was recently hospitalized at [facility] from 8/11-8/17 after she experienced shortness of breath following pRBC transfusion for low Hb which was noted during nephrology outpatient visit. At [facility], she was treated for perinephric hematoma - likely secondary to renal biopsy in early August - s/p IR embolization (8/11), and new onset heart failure (EF 20-25% on 8/11 improved to 40% on 8/14), and hypertensive emergency. There was also concern for for LLL pneumonia (recommended on 14 day IV Vanc/zosyn at discharge) and one time event of hemoptysis that has been resolved.   For lupus flare at [facility], she received IV methylprednisolone 500mg daily x 4 days and then transitioned to oral prednisone 30mg daily since 8/15. She has been continuing her cellcept 1500mg BID. Plaquenil was held due to new onset cardiomyopathy / prolonged Qtc.    She was transferred to [facility] for higher level of care on 8/17. Rheumatology was consulted for recommendation on management of her lupus flare.     #SLE (+dsDNA, +RNP, +ribosomal P Ab, hypocomplementemia)   #Lupus nephritis class IV   #?lupus cerebritis, hx of one time seizure  #hx of  medication non-adherence   #iatrogenic immunosuppression   #hx of AVN     Patient shows active lupus, evidenced by elevated dsDNA, low C3, C4, and proteinuria from 8/10 lab. However, she does not have joint, skin involvement she had during her previously flares. She has a history of one time seizure when she was young that raised a concern for lupus cerebritis, but currently she does not have headache, brain fogginess, confusion, or other neurological symptoms that would suggest active CNS involvement of lupus.    Kidney involvement seems to be most active issue in Ms. Carroll, noted by class IV lupus nephritis biopsy result on 08/2023 compared to Class III/IV lupus nephritis in 6/2022. Both high activity level of lupus and potential medication non-adherence may have contributed to progression of her nephritis.   We appreciate nephrology's involvement for management of lupus nephritis. Per chart review, re-induction with Cytoxan is being considered by patient's primary nephrologist, Dr. Purvis, after concern for infection is resolved.     In the interim, we agree with continuing oral prednisone 30mg, cellcept 1500mg BID. Given her prior history of avascular necrosis, we do not plan to increase her prednisone dose. Patient was previously on hydroxychloroquine 200mg daily that has been held due to new-onset cardiomyopathy and Qtc prolongation. Patient had normal QT back in 01/2023 hospitalization. We suspect other medication interactions (e.g., antibiotics) could also contribute to Qtc prolongation. We recommend holding hydroxychloroquine for now and consider restarting near discharge when Qtc and cardiomyopathy has stabilized.     We will check C3, C4, dsDNA to compare with 8/10 result which was taken before her IV methylprednisolone pulse course (8/11-14).     PLAN:  -Repeat C3, C4, dsDNA  (ordered for you)  -Appreciate nephrology involvement for Lupus nephritis. Nephrology considering potential re-induction with    cyclophosphamide after discussion with primary nephrologist. In case cyclophosphamide re-induction is pursued, would appreciate infectious disease's input on patient's current 14-day IV Vanc/Zosyn regimen.   -Continue oral prednisone 30mg, cellcept 1500mg BID.   -Continue to hold plaquenil for now. Plan to resume after Qtc is normalized near discharge.     I discussed the findings and recommendations with the patient.  I communicated the assessment and plan to the consulting team.    Case seen and discussed with Dr. Boone who agrees with above assessment and plan.   Thank you for consulting rheumatology. Please contact us if there are any questions.     Thor Hardin,  PGY-1 Internal Medicine / Dermatology (#[id number])  [facility]  Secure message with [employer] (more info)  Text page via [facility] Paging/Directory     Staff addendum  I performed the history and physical examination of the patient and discussed the management with the resident. I reviewed the available lab and imaging studies. I reviewed the resident s note and agree with the documented findings and plan of care.    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 Her lupus was diagnosed when she was 7 years old. She had discoid lupus initially followed by systemic symptoms (rash, photosensitivity ,alopecia, arthritis, cytopenia, hypocomplementemia, positive serology +dsDNA, +RNP, +Ribosomal P ab).  She was transferred from [facility] for which she was admitted for perinephric hematoma (likely 2/2 kidney biopsy on early August), new-onset HF, LLL pneumonia, and lupus flare. She was transerred to [facility] on 8/17/2023 for higher level of care.     For her treatment history, she was most recently on cellcept 1500mg BID, plaquenil 200mg BID,  prednisone 0.5mg daily. Her prednisone dose was increased to 20mg on her last rhuematology visit on 7/26/2023 - followed by increase to 60mg nephrology visit on 8/10/2023. She was admitted shortly after nephrology visit and has received IV methylprednisolone 500mg X 4 days (8/11-14) and has been on oral prednisone 30mg since 8/15.   Previously she was on Benlysta infusion in 2019, later switched to Benlysta subcutaneous 2021. However, due to her personal circumstances (moving, transportation), she has not been getting benlysta for many months.   She has also been treated with rituximab since 2013 but developed anaphylactic reaction during infusion in 2019 which led her to stop rituximab. Per chart review, patient has also received cytoxan induction in 2013.     Today, patient does not have acute concerns related to her lupus. She says her shortness of breath is bothering her the most. She has been staying in 45 degrees elevated position during hospitalization. She says she is able to walk to the bathroom in her room without much difficulty. She has not tried walking around the espinal so is not sure if her exercise tolerance has also declined.     She reports that for her previous flares she has had skin rashes or arthritis. Currently she does not have skin findings or pain in her joints. She reports that she had one time seizure during the childhood during a hospital stay but none since that episode. She denies headache, brain fogginess, confusion, and is aware where she is at the moment. No chest pain, oral ulcer, difficulty with urination. She also reports that she has not seen blood in her stool since her July hospitalization at [facility] and also no hemoptysis since [facility]. For her hemoptysis at [facility], she says coughing brought up blood and it was fresh blood  rather than a clot. She denies night sweat, fever, chills during this time or any time prior to this hospitalization.     Patient reports that she  is sexually active, not yet . Does not use contraception. She has moved back from [place] and plans to stay in the area for a while.       WBC 4.7, Hb 8.7, Plt 105k   ESR 38 < 54 < 110  CRP 10 <18.5 < 72.5       C3  38 (8/10/2023) < 44 (7/10/2023)  C4  10 (8/10/2023) < 7 (7/10/2023)   dsDNA 203 (8/10/2023) <  >365 (7/10/2023)  Protein random urine: 754 (8/10/2023)  Protein albumin urine 300mg (8/10/2023) < 600 (7/10/2023)      Last outpatient follow-up:  Rheumatology- 7/26/2023  Renal 8/10/2023   Renal bx:   6/2022: focal proliferative/membranous (class III, IV)  8/2023: class IV diffuse sclerosing. S/o membranous lupus nephritis   Kidney biopsy on 8/2/2023 showed diffuse proliferative, sclerosing LN with 2% active crescents, 15% fibrous crescents, 50% glomerulus globally sclerosed and severe tublo-interstitial fibrosis. Findings were concerning for membranous lupus nephritis.     Current lupus medication:   Plaquenil - Previously 200mg daily. Held due to new onset cardiomyopathy  Prednisone 30mg daily (s/p IV methylprednisolone 500mg x 4 days 8/11-8/14. On Oral prednisone 30mg since 8/15).   MMF 1500mg BID   PPI, Bactrim PJP Ptx, Folate supplementation   Past treatment hx:  Cytoxan induction in 2013. Tried rituximab but discontinued after anaphylactic reaction   Afterwards, started on benlysta but discontinued for difficulty of shipping the medication to [place] from [place].     Patient denies any fevers, chills, weight loss, vision changes, headaches, focal weakness or numbness.  Denies any arthralgias, morning stiffness, Raynaud's, myalgias,  Alopecia, rash, vitiligo, photosensitivity, nasal or oral ulcers, ear fullness or drainage.       HISTORY REVIEW:  Past Medical History:   Diagnosis Date    Hepatitis     Lupus (systemic lupus erythematosus) (H)     Lupus cerebritis (H)     Pancreatitis 08/2017    Pyelonephritis 08/2017    Seizures (H)     Status epilepticus (H)      Past Surgical History:  "  Procedure Laterality Date    IR RENAL ANGIOGRAM LEFT  8/11/2023    IR RENAL BIOPSY LEFT  6/28/2022    NO HISTORY OF SURGERY      ORTHOPEDIC SURGERY       Family History   Problem Relation Age of Onset    Other - See Comments Father         Question of lupus in father and paternal grandfather    Lupus Sister         older sister    Gastrointestinal Disease No family hx of         No known history of IBD, hepatitis, pancreatitis, celiac disease, or GI cancers before age 50    Glaucoma No family hx of     Macular Degeneration No family hx of          Allergies   Allergen Reactions    Rituximab Anaphylaxis and Shortness Of Breath         ROS     A 14 point ROS was performed with pertinent findings listed above.    I have reviewed the patients past medical history, past surgical history, current medications, current allergies, family history and social history.     Objective     PHYSICAL EXAM  BP (!) 182/119 (BP Location: Left arm)   Pulse 105   Temp (P) 97.9  F (36.6  C) (Oral)   Resp (P) 19   Ht 1.575 m (5' 2\")   Wt 56.9 kg (125 lb 6.4 oz)   LMP 05/29/2023 (Approximate)   SpO2 (P) 100%   BMI 22.94 kg/m    Wt Readings from Last 4 Encounters:   08/17/23 56.9 kg (125 lb 6.4 oz)   08/16/23 57 kg (125 lb 11.2 oz)   08/10/23 52.3 kg (115 lb 3.2 oz)   07/26/23 46 kg (101 lb 6.4 oz)     Constitutional: WD-WN-WG cooperative. Sitting on bed with 45 degree incline.   Eyes: nl EOM, PERRLA, vision, conjunctiva, sclera  Neck: no mass or thyroid enlargement  Resp: inspiratory rales on lung bases   CV: RRR, no murmurs, rubs or gallops appreciated   GI: no ABD mass or tenderness, no HSM  : not tested  Lymph: no cervical, supraclavicular, inguinal or epitrochlear nodes  MS: No active synovitis or deformity. Full ROM.  Normal  strength  Skin: no nail pitting, alopecia, rash, nodules or lesions  Neuro: no focal deficitis   Psych: nl judgement, orientation, memory, affect.    CBC:  Recent Labs   Lab Test 08/18/23  0726 " 08/17/23  0543 08/16/23  0535   WBC 4.7 4.6 4.4   RBC 3.29* 2.96* 3.21*   HGB 8.7* 8.1* 8.8*   HCT 29.1* 26.2* 28.6*   MCV 88 89 89   MCH 26.4* 27.4 27.4   MCHC 29.9* 30.9* 30.8*   RDW 18.5* 17.9* 17.8*   * 124* 147*       BMP:  Recent Labs   Lab Test 08/18/23  0726 08/17/23  1207 08/17/23  0543 08/16/23  1239 08/16/23  0535     --  141  --  140   POTASSIUM 3.6 3.5 3.3*   < > 3.3*   CHLORIDE 117*  --  115*  --  112*   CO2 14*  --  15*  --  16*   ANIONGAP 12  --  11  --  12   GLC 84  --  108*  --  99   BUN 30.9*  --  33.0*  --  35.2*   CR 0.88  --  0.99*  --  1.08*   GFRESTIMATED >90  --  82  --  74   BISI 8.2*  --  8.0*  --  8.0*    < > = values in this interval not displayed.       LFT:  Recent Labs   Lab Test 08/12/23  0819 08/10/23  1810 08/10/23  1011 07/10/23  1454 01/26/23  0656   PROTTOTAL 5.3* 6.3*  --   --  7.7   ALBUMIN 2.1* 2.8* 2.7*   < > 1.7*   BILITOTAL 0.5 0.4  --   --  0.3   ALKPHOS 94 122*  --   --  232*   AST 79* 24  --   --  49*   ALT 23 <5  --   --  10    < > = values in this interval not displayed.          SED 59 06/23/2020     12/10/2019     Ferritin   Date Value Ref Range Status   07/10/2023 1,079 (H) 6 - 175 ng/mL Final   06/11/2022 123 12 - 150 ng/mL Final   06/25/2020 318 (H) 12 - 150 ng/mL Final   08/22/2017 135 12 - 150 ng/mL Final   05/21/2013 264 (H) 7 - 142 ng/mL Final       UA RESULTS:  Recent Labs   Lab Test 08/11/23  1603 08/10/23  1022 07/10/23  1454   COLOR Light Yellow Light Yellow Light Yellow   APPEARANCE Clear Clear Slightly Cloudy*   URINEGLC Negative Negative Negative   URINEBILI Negative Negative Negative   URINEKETONE Negative Negative Negative   SG 1.023 1.015 1.014   UBLD Large* Large* Large*   URINEPH 6.0 7.0 7.0   PROTEIN 200* 300* 600*   NITRITE Negative Negative Negative   LEUKEST Negative Negative Small*   RBCU 15* 121* 97*   WBCU 4 23* 40*         Autoimmunity labs:       Lab Results   Component Value Date    F3YFITC 38 (L) 08/10/2023    A5CNAEC  44 (L) 07/10/2023    I4YZVQG 17 (L) 01/23/2023     Lab Results   Component Value Date    T7RLSFH 10 (L) 08/10/2023    Q7KXXEZ 7 (L) 07/10/2023    X2QWITE 7 (L) 01/23/2023     Lab Results   Component Value Date    SUDHA 11.9 (H) 05/10/2013     Lab Results   Component Value Date    .0 (H) 08/10/2023    DNA >365.0 (H) 07/10/2023    DNA >365.0 (H) 01/23/2023     Lab Results   Component Value Date    RNPIGG 2.4 (H) 08/25/2017    SMIGG 0.3 08/25/2017    SSAIGG 0.2 08/25/2017    SSBIGG <0.2 08/25/2017    SCLIGG <0.2 08/25/2017       IMAGING:  CXR 8/17/2023  Findings:  PA and lateral views of the chest. Trachea is midline.  Cardiomediastinal silhouette is within normal limits. Persistent hazy  left basilar opacities. There is no pneumothorax or pleural effusion.  The upper abdomen is unremarkable. No acute osseous abnormality.                                                                 Impression:   Persistent hazy left basilar opacities, likely representing ongoing  infection.

## 2023-08-18 NOTE — PLAN OF CARE
"Cares 1700 to 1900    BP (!) 175/111 (BP Location: Left arm, Patient Position: Semi-Tapia's, Cuff Size: Adult Regular)   Pulse 100   Temp 97.4  F (36.3  C) (Oral)   Resp 18   Ht 1.575 m (5' 2\")   Wt 56.9 kg (125 lb 6.4 oz)   LMP 05/29/2023 (Approximate)   SpO2 100%   BMI 22.94 kg/m      Hypertensive, scheduled coreg given. Endorses headache 7/10, tylenol & PO dilaudid 1mg given. Alert & oriented x4. Denies SOB, chest pain. Swelling +2 in RUE & BLE, ultrasound ordered. LUE stronger than RUE. Resting comfortably. Stand by assist. Call light in reach.     Goal Outcome Evaluation:    Plan of Care Reviewed With: patient    Overall Patient Progress: improvingOverall Patient Progress: improving    Outcome Evaluation: Transferred from [facility].      "

## 2023-08-18 NOTE — CONSULTS
Physician Attestation   I, Argenis Kamara, saw and evaluated Milly Carroll with Resident/Fellow. I have reviewed and discussed with the Resident/Fellow their history, physical and plan.    I personally reviewed the vital signs, medications, labs, and imaging.    In brief, the patient is a 24-year-old female with history of SLE and lupus nephritis who has been followed by Dr. Purvis.  The patient was initially diagnosed with SLE when she was 7 years old: symptoms included fever, muscle ache and rashes.  She was found to have +DsDNA, +RNP, +ribosomal P ab . Per Dr. Purvis's note, She has most recently being treated with Cellcept 1500 mg BID, Plaquenil 200 mg BID and prednisone 5 mg daily. She was on Benlysta infusion in 2019 that was later switched to Benlysta subcutaneous in 2021. She later did not receive Benlysta.  She was treated with Rituximab since 2013 but she developed anaphylactic reaction during Rituximab infusion in 12/2019.  She has had persistent hematuria and proteinuria since 2021. She has had on and off documented proteinuria which is 0.3-0.7 g/g since 2013.  She has Kidney BX in 2022 which showed class III and IV LN. She was treated with CellCept 1500 mg BID but then was non-compliant to this. She was then moved to [place] and stopped all her meds and just came back to [place] in summer 2023. She was admitted for C.diff diarrhea. She has AAKI with Cr of 1.58. At that time, her DS DNA was 1446. She was started on solumedrol 60 mg daily x 2 days  with prednisone tapered. CellCept was resumed at dose 500 mg BID. She had another kidney Bx on 8/2/23 (No EM sample), result showed Class IV with 2% active crescents, 50% glomerular sclerosis, severe IFTA (70-80%)and concern for class V lupus on bx done 8/23. (class III in 6/22, < 5% IFTA). She was then seen by Dr. Purvis on 8/10/23. Noted progressive increase in Cr 0.8-0.9 from baseline of 0.5-0.6 in 2022 and progressive proteinuria and hypoalbuminemia, UPCR  12-13 g/d and Salb 2-2.2. During her visit, she as found to have Hb of 6.1 and repeat kidney US showed that she has perinephric hematoma. She was then admitted at [facility] and then transferred to [facility].  Prednisone was increased to 60 mg daily. Plan is to proceed with Eurolupus given progression of kidney disease and ongoing activities despite high dose CellCept as well as prior reaction to other IS.     The patient was recently transferred from  for further care. She has been treated for PNA with Vanc/Zosyn. Now she has no fever but still has some cough. She has no rashes or joint pain. Her BP is high at 160-170/90. On exam, she has no rashes but 2+ pitting edema.     Regarding LN class 4/5, would continue CellCept and prednisone for now. Discussed with Tanna Purvis and Mely harman he next plan. Cytoxan Eurolupus was decided because she has failed several agents and she only has minimal non sclerotic glom so she has less reserved to go through another flare. However, would want ID to see when is the safe time to start Cytoxan. Agree with involving Ob-Gyn for lupron/eggs preservation consult. Please repeat C3/C4 and DS DNA. Please check renal panel daily. Please start Torsemide 20 mg daily and strict I/Os.    Rest per the resident/fellow's note.   Total time spent 100 minutes on the date of encounter for chart review, history taking, physical exam, labs and notes reviewed, advised and coordinating care.     Argenis Kamara  Date of Service (when I saw the patient): August 18, 2023

## 2023-08-18 NOTE — PLAN OF CARE
Goal Outcome Evaluation: Assumed cares [id number]. Pt alert and oriented x4. Denied pain, N/v. Up in room independently. Showered. No hemoptysis. RUE edema. US completed, results pending. HTN. Denied feeling symptomatic. Monitor BP.     Pt refused 2 RN skin check and full skin assessment.       Plan of Care Reviewed With: patient    Overall Patient Progress: no changeOverall Patient Progress: no change    Outcome Evaluation: Denied pain, N/V. HTN. Denied feeling symptomatic. Loose cough but denied hemoptysis.

## 2023-08-18 NOTE — CONSULTS
Care Management Initial Consult    General Information  Assessment completed with: Patient,    Type of CM/SW Visit: Initial Assessment  Primary Care Provider verified and updated as needed: Yes   Readmission within the last 30 days: yes   Return Category: Exacerbation of disease    Reason for Consult: discharge planning  Advance Care Planning, Reviewed: no concerns identified        Communication Assessment  Patient's communication style: spoken language (English or Bilingual)    Hearing Difficulty or Deaf: no   Wear Glasses or Blind: no    Cognitive  Cognitive/Neuro/Behavioral: WDL    Level of Consciousness: alert    Arousal Level: opens eyes spontaneously    Orientation: oriented x 4    Mood/Behavior: calm, cooperative, flat affect, hypoactive (quiet, withdrawn)    Best Language: 0 - No aphasia    Speech: clear, spontaneous, logical    Living Environment:   People in home: parent(s)     Current living Arrangements: house      Able to return to prior arrangements: yes     Family/Social Support:  Care provided by: self  Provides care for: no one  Marital Status: Single  Support System: Parent(s), Sibling(s)          Description of Support System: Supportive, Involved       Current Resources:   Patient receiving home care services: No  Community Resources: None  Equipment currently used at home: none  Supplies currently used at home: None    Employment/Financial:  Employment Status: unemployed     Financial Concerns:     Referral to Financial Worker: No  Does the patient's insurance plan have a 3 day qualifying hospital stay waiver?  No    Lifestyle & Psychosocial Needs:  Social Determinants of Health     Tobacco Use: Low Risk  (8/14/2023)    Patient History     Smoking Tobacco Use: Never     Smokeless Tobacco Use: Never     Passive Exposure: Not on file   Alcohol Use: Not on file   Financial Resource Strain: Not on file   Food Insecurity: Not on file   Transportation Needs: Not on file   Physical Activity: Not on  file   Stress: Not on file   Social Connections: Not on file   Intimate Partner Violence: Not on file   Depression: Not at risk (6/21/2023)    PHQ-2     PHQ-2 Score: 1   Housing Stability: Not on file     Functional Status:  Prior to admission patient needed assistance:   Dependent ADLs:: Independent  Dependent IADLs:: Independent     Mental Health Status:  Mental Health Status: No Current Concerns       Chemical Dependency Status:  Chemical Dependency Status: No Current Concerns           Values/Beliefs:  Spiritual, Cultural Beliefs, Jehovah's witness Practices, Values that affect care: no             Additional Information:  23 year old female w/ PMH lupus C/B lupus flares, cerebritis, nephritis, CHRISTA also past medical history hemoptysis, malignant hypertension, orthopnea and acute respiratory failure with hypoxia, diastolic HF (EF 45% on 8/14, previously 20-25% on 8/11), QTc prolongation, recent LLL pneumonia, recent perinephritic hematoma status post IR embolization (8/11/2023), chronic thrombocytopenia, electrolyte disturbances admitted on 8/17/2023. She was hospitalized at [facility] 8/11/2023 to 8/17/2023 and transfers today for higher level of care.      CMA completed at bedside, as indicated by concerns r/t medication compliance or environmental concerns; pt confirms listed address, PCP, and payer source. Pt resides at listed home with her mother, Shari. Pt denies any PTA home services, DME, OP services, BH/ISIDRO concerns, or need for financial counseling. Pt reports her mother is available to provide discharge transportation. RNCC encouraged PCP follow-up within 7-10 days of discharge. No new needs anticipated; Provider notes consult for neuropsych r/t Rx compliance, TBD. CM team to continue to follow and assist, as needed, upon safe departure from floor and facility.       Guilherme Khanna RN BSN  7D RNCC  Phone: [phone]  Pager: [phone]    For Weekend & Holiday on call RN Care Coordinator:  (Tasks:  Home care, home infusion, medical equipment, transportation, IMM & SALGADO forms, etc.)  [place] (0800 - 1630) Saturday and Sunday    Units: 5A, 5B, & 5C: [phone]    Units: 6B, 6C, 6D: [phone]    Units: 7A, 7B, 7C, 7D: [phone]    Units: 6A/ICU : [phone]    [facility] ([id number]) Saturday and Sunday    Units: 6 Med/Surg, 8A, 10 ICU, & Pediatric Units: [phone]    Units: 5 Ortho, 5Med/Surg & WB ED: [phone]

## 2023-08-19 NOTE — PLAN OF CARE
"BP (!) 163/111 (BP Location: Left arm)   Pulse 91   Temp 97.9  F (36.6  C) (Oral)   Resp 20   Ht 1.575 m (5' 2\")   Wt 60 kg (132 lb 4.4 oz)   LMP 05/29/2023 (Approximate)   SpO2 100%   BMI 24.19 kg/m      Goal Outcome Evaluation:    Plan of care reviewed with: patient   Overall patient progress: no change    Time: 9321-3372  Neuro/Pain: A&Ox4, flat affect. Denied  pain.   Activity: independent   Cardiac/vs: hypertensive, not met parameter for prn BP meds. Denied chest pain/INIGUEZ. Remained afebrile.  Respiratory: room air sating > 92% on room air. Coarse crackles on bilateral lower lobes.   GI/: no bm this shift. Active bowel sound. Voiding spontaneously without difficult, not saving.   Diet: regular diet, denied nausea.   Skin: no skin deficit.   LDAs: PIV saline locked.   Labs/Imaging: reviewed.   Change this shift: Zosyn switched to Augmentin po.    Plan: continue to monitor, follow POC. Contact [facility] for any concerns.     "

## 2023-08-19 NOTE — PLAN OF CARE
Assumed care at 1620 -1930:    VSS on RA. Denies pain, CP or SOB. Refused weight. Pt appeared tired and opted to rest during shift.

## 2023-08-19 NOTE — PLAN OF CARE
"Goal Outcome Evaluation:            Blood pressure (!) 163/111, pulse 72, temperature 97.9  F (36.6  C), temperature source Axillary, resp. rate 16, height 1.575 m (5' 2\"), weight 56.1 kg (123 lb 9.6 oz), last menstrual period 05/29/2023, SpO2 100 %, not currently breastfeeding.    Pt continue to have Hypertensive  issue. BPs in red throughout but did not warrant notifying Provider based on parameters set to notify. Pt was given her scheduled hydralazine. She is Afebrile with other vital signs stable.She is on room air with 02 saturations WNL. She denies any pain/N/V/D. She took a shower before bedtime. Right PIV dressing C/D/I and infusing well. Up independently to the bathroom. No issues this shift. Continue to monitor Vital signs (BP) and treat as needed per other and follow plan of care.      Problem: Plan of Care - These are the overarching goals to be used throughout the patient stay.    Goal: Plan of Care Review  Description: The Plan of Care Review/Shift note should be completed every shift.  The Outcome Evaluation is a brief statement about your assessment that the patient is improving, declining, or no change.  This information will be displayed automatically on your shift note.  Outcome: Not Progressing     Problem: Plan of Care - These are the overarching goals to be used throughout the patient stay.    Goal: Patient-Specific Goal (Individualized)  Description: You can add care plan individualizations to a care plan. Examples of Individualization might be:  \"Parent requests to be called daily at 9am for status\", \"I have a hard time hearing out of my right ear\", or \"Do not touch me to wake me up as it startles me\".  Outcome: Not Progressing     Problem: Hypertension Comorbidity  Goal: Blood Pressure in Desired Range  Outcome: Not Progressing  Intervention: Maintain Blood Pressure Management  Recent Flowsheet Documentation  Taken 8/19/2023 0400 by Krystal Rincon RN  Medication Review/Management: " medications reviewed  Taken 8/19/2023 0000 by Krystal Rincon RN  Medication Review/Management: medications reviewed  Taken 8/18/2023 2000 by Krystal Rincon RN  Medication Review/Management: medications reviewed     Problem: Hypertension Comorbidity  Goal: Blood Pressure in Desired Range  Intervention: Maintain Blood Pressure Management  Recent Flowsheet Documentation  Taken 8/19/2023 0400 by Krystal Rincon RN  Medication Review/Management: medications reviewed  Taken 8/19/2023 0000 by Krystal Rincon RN  Medication Review/Management: medications reviewed  Taken 8/18/2023 2000 by Krystal Rincon RN  Medication Review/Management: medications reviewed     Problem: Plan of Care - These are the overarching goals to be used throughout the patient stay.    Goal: Absence of Hospital-Acquired Illness or Injury  Outcome: Progressing  Intervention: Identify and Manage Fall Risk  Recent Flowsheet Documentation  Taken 8/19/2023 0400 by Krystal Rincon RN  Safety Promotion/Fall Prevention:   assistive device/personal items within reach   lighting adjusted   clutter free environment maintained   nonskid shoes/slippers when out of bed   room near nurse's station   room organization consistent  Taken 8/19/2023 0000 by Krystal Rincon RN  Safety Promotion/Fall Prevention:   clutter free environment maintained   lighting adjusted   nonskid shoes/slippers when out of bed   room near nurse's station   room organization consistent  Taken 8/18/2023 2000 by Krystal Rincon RN  Safety Promotion/Fall Prevention:   assistive device/personal items within reach   lighting adjusted   clutter free environment maintained   nonskid shoes/slippers when out of bed   room near nurse's station   room organization consistent  Intervention: Prevent Skin Injury  Recent Flowsheet Documentation  Taken 8/19/2023 0400 by Krystal Rincon RN  Body Position: position changed independently  Taken 8/19/2023 0000 by Krystal Rincon  RN  Body Position: position changed independently  Taken 8/18/2023 2000 by Krystal Rincon RN  Body Position: position changed independently  Intervention: Prevent and Manage VTE (Venous Thromboembolism) Risk  Recent Flowsheet Documentation  Taken 8/19/2023 0400 by Krystal Rincon RN  VTE Prevention/Management:   compression stockings off   SCDs (sequential compression devices) off  Taken 8/18/2023 2000 by Krystal Rincon RN  VTE Prevention/Management:   compression stockings off   SCDs (sequential compression devices) off     Problem: Plan of Care - These are the overarching goals to be used throughout the patient stay.    Goal: Absence of Hospital-Acquired Illness or Injury  Intervention: Prevent Skin Injury  Recent Flowsheet Documentation  Taken 8/19/2023 0400 by Krystal Rincon RN  Body Position: position changed independently  Taken 8/19/2023 0000 by Krystal Rincon RN  Body Position: position changed independently  Taken 8/18/2023 2000 by Krystal Rincon RN  Body Position: position changed independently     Problem: Plan of Care - These are the overarching goals to be used throughout the patient stay.    Goal: Absence of Hospital-Acquired Illness or Injury  Intervention: Prevent and Manage VTE (Venous Thromboembolism) Risk  Recent Flowsheet Documentation  Taken 8/19/2023 0400 by Krystal Rincon RN  VTE Prevention/Management:   compression stockings off   SCDs (sequential compression devices) off  Taken 8/18/2023 2000 by Krystal Rincon RN  VTE Prevention/Management:   compression stockings off   SCDs (sequential compression devices) off     Problem: Plan of Care - These are the overarching goals to be used throughout the patient stay.    Goal: Optimal Comfort and Wellbeing  Intervention: Provide Person-Centered Care  Recent Flowsheet Documentation  Taken 8/19/2023 0400 by Krystal Rincon RN  Trust Relationship/Rapport:   care explained   questions answered   questions  encouraged  Taken 8/18/2023 2000 by Krystal Rincon RN  Trust Relationship/Rapport:   care explained   questions answered   questions encouraged     Problem: Pain Acute  Goal: Optimal Pain Control and Function  Outcome: Progressing  Intervention: Prevent or Manage Pain  Recent Flowsheet Documentation  Taken 8/19/2023 0400 by Krystal Rincon RN  Medication Review/Management: medications reviewed  Taken 8/19/2023 0000 by Krystal Rincon RN  Medication Review/Management: medications reviewed  Taken 8/18/2023 2000 by Krystal Rincon RN  Medication Review/Management: medications reviewed     Problem: Pain Acute  Goal: Optimal Pain Control and Function  Intervention: Prevent or Manage Pain  Recent Flowsheet Documentation  Taken 8/19/2023 0400 by Krystal Rincon RN  Medication Review/Management: medications reviewed  Taken 8/19/2023 0000 by Krystal Rincon RN  Medication Review/Management: medications reviewed  Taken 8/18/2023 2000 by Krystal Rincon RN  Medication Review/Management: medications reviewed     Problem: Heart Failure Comorbidity  Goal: Maintenance of Heart Failure Symptom Control  Intervention: Maintain Heart Failure Management  Recent Flowsheet Documentation  Taken 8/19/2023 0400 by Krystal Rincon RN  Medication Review/Management: medications reviewed  Taken 8/19/2023 0000 by Krystal Rincon RN  Medication Review/Management: medications reviewed  Taken 8/18/2023 2000 by Krystal Rincon RN  Medication Review/Management: medications reviewed

## 2023-08-19 NOTE — CONSULTS
Gynecology Brief Progress Note     Gynecology reached out to for possible discussion with patient regarding cryopreservation. Patient is an 23 year old with a PMH lupus C/B lupus flares, cerebritis, nephritis, CHRISTA, malignant hypertension, orthopnea and acute respiratory failure with hypoxia, diastolic HF, recent LLL pneumonia, recent perinephritic hematoma status post IR embolization (8/11/2023) who is admitted to the medicine service on the [facility]. Gyn reached out to regarding possibly oocyte cryopreservation in the setting of planning to start cytoxan for lupus nephritis.     Discussed with Dr. Tan on the phone. Unfortunately [facility] does not do IVF or cryopreservation. Recommend that patient urgently reach out to a fertility clinic to discuss options. I would encourage the patient to make this call while she is still inpatient to expedite this discussion, if she is able and feeling up for it.     Contact options below:       [facility]   [email or web address]   [place] location:  [facility] [place]  [address], [address]  [place], [place] [id number] [phone]     [place] location:  [facility]  [address], [address]  [place], [place] [id number] [phone]  __________________________________________________  [facility] [address], [address]  [place]  [address]  [phone]  _________________________________________________  [facility]  [email or web address]  [place] Location:  [address], [address]  [place] [address]   [place] Location:  [facility], [address]  [place] [address]  [phone]     Bonnie Becker MD MPH  OB/Gyn Resident PGY-3  8/19/2023  5:01 PM

## 2023-08-19 NOTE — PLAN OF CARE
Assumed cares from [id number]    Reason for admission: 8/17 hemoptysis, HTN, recent LLL pneumonia, perinephritic hematomas s/p emoblization 8/11, hx of chronic thrombocytopenia, SLE with exacerbation, HF with EF 45%    VS: HTN-within parameters, no additional PRN's given, otherwise stable  Pain/Nausea: denies  Neuro: A&Ox4, generalized weakness, otherwise intact.  Cardiac: WDL  Resp: diminished sounds in RLL, otherwise clear on room air  GI/: +BS, passing flatus, LBM 8/18, voids spontaneously.  Skin: edema 2+ in B/L feet, scattered bruising, otherwise intact.  Diet: tolerating regular diet    Activity: SBA/independent in room  LDA: R PIV SL  Labs: reviewed    Plan: continue POC.    Goal Outcome Evaluation:      Plan of Care Reviewed With: patient    Overall Patient Progress: improvingOverall Patient Progress: improving    Outcome Evaluation: tolerating diet, cough diminished

## 2023-08-19 NOTE — CONSULTS
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 Carroll has a LLL consolidation on CT imaging that has been present for around 3 weeks, in combination with clinical symptoms of cough, mild hemoptysis, and fatigue, that are all improving on antibiotic therapy. It is somewhat difficult to compare her scans as they are in different systems, though it seems that she has had overall improvement in the LLL consolidation over time. It is certainly possible that she had some component of a necrotizing pneumonia given the extent of tissue involved and the seemingly mild hemoptysis, though she has remained hemodynamically stable, afebrile, and with only mild elevation of her CRP. At this point, she has had 3 weeks of broad-spectrum antibiotic therapy, and no radiographic signs of a lung abscess. As she does not describe clinical improvement until she was admitted the second time (though this could be for multiple reasons aside from infection), agree with 2 weeks of antibiotic therapy from the 11th (ie until 8/25). This will give her a prolonged course of antibiotics following clinical improvement in case there was a necrotizing component. I do not see the need for IV therapy for this indication as she is taking PO adequately. I also do not think her re-admission was due to augmentin failure, so would transition back to augmentin at this time to complete the course. Would also consider other potential etiologies for her lung findings such as vasculitis related to lupus w/  associated hemorrhage.     Regarding escalating immunosuppression, the risks vs the benefits of each individual case must always be weighed. In Ms. Carroll's case, the diagnosis of pneumonia is a true one, though at this point we are likely over-treating, and she might have a vasculitis/autoimmune component to her lung findings as well. I would not consider her to have an uncontrolled infection at this time. If Nephrology and Rheumatology teams feel that she needs increased immunosuppression sooner rather than later (perhaps this can be guided by pending complement levels), I would not say she currently has an absolute contraindication to that. On the flip side, if immunosuppression can safely be held for an additional week or two, it may be worthwhile to ensure Ms. Carroll is clinically stable off antibiotics prior to increasing immunosuppression.    Recommendations:  Discontinue pip/tazo  Start PO augmentin 875/125 mg BID. Recommend stop date of 8/25.  Continue PO vancomycin 125 mg BID while on augmentin as C difficile ppx.   Continue bactrim ppx  Defer final decision on increased immunosuppression to Nephrology and Rheumatology, though no absolute contraindication from an ID standpoint.     Recommendations discussed with primary team.    Thank you for this consult. ID will sign off with this plan. Please feel free to call with any questions.     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     Ms. Carroll was recently admitted to [facility] 7/29 - 7/31 for blood in stool, nausea, vomiting, and L sided chest pain and cough. She had an  elevated procalcitonin at the time and was empirically started on vancomycin and pip/tazo. CT of the chest (7/29) showed an opacity in the LLL and a smaller infiltrate in the RUL. 1/2 blood cultures on admission grew Kocuria sp that was felt to be a contaminate. She was switched to cefepime/metronidazole and at discharge was given an additional 7 days of augmentin. During this time, she was also treated with PO vancomycin given recent history of C difficile colitis.     Ms. Carroll notes that prior to her [facility] admission, she had a dry cough for a few weeks and felt fatigued, but did not have fevers. She was around a 1 year old niece who had similar URI symptoms at the time. She has not recently traveled or been exposed to animals that she is aware of. She also notes that prior to her first admission she had quite a bit of nausea/vomiting and diarrhea that brought her in as well. She was treated with IV antibiotics, but didn't have significant improvement in her respiratory symptoms. She notes that she was still coughing at home. She was discharged on PO augmentin that she states she took twice daily without missed doses. It caused a bit of loose stools. She came back to the hospital due to hemoptysis. On imaging, she was found to have a L perinephric hematoma and possible pseudoaneurysm in the setting of a recent renal biopsy, and re-demonstration of her LLL pneumonia. She was evaluated by Pulmonology and it was thought that she either had a necrotizing pneumonia that led to hemoptysis vs lupus vasculitis affecting the lung. She was treated with IV pip/tazo, and it was recommended that she complete a 2 week course. IR was consulted for the pseudoaneurysm and she underwent coiling on 8/11/23. She was subsequently transferred to [facility]. She notes that her shortness of breath and cough have significantly improved this admission (as compared to the prior admission at [place]), though she is not 100% back to her  "baseline. She denies any additional hemoptysis following presentation, and her cough is non-productive at this time. She has not had any issues with her current antibiotics.          Review of Systems:   Complete 12 point review of systems negative except as noted in HPI.        Past Medical History:     Past Medical History:   Diagnosis Date    Hepatitis     Lupus (systemic lupus erythematosus) (H)     Lupus cerebritis (H)     Pancreatitis 08/2017    Pyelonephritis 08/2017    Seizures (H)     Status epilepticus (H)            Relevant Microbiology:   7/29/23 Blood cultures X2: 1/2 Kocuria species  8/2/23 Blood cultures X2: NGTD    No culture data from this admission.         Recent Antimicrobials:   Vancomycin + pip/tazo -> cefepime + metronidazole 7/29 - 7/31  Augmentin 7/31 - 8/11    Pip/tazo 8/11 - present     Bactrim ppx  PO vancomycin      Allergies:      Allergies   Allergen Reactions    Rituximab Anaphylaxis and Shortness Of Breath        Family History:   Reviewed and noncontributory.   Family History   Problem Relation Age of Onset    Other - See Comments Father         Question of lupus in father and paternal grandfather    Lupus Sister         older sister    Gastrointestinal Disease No family hx of         No known history of IBD, hepatitis, pancreatitis, celiac disease, or GI cancers before age 50    Glaucoma No family hx of     Macular Degeneration No family hx of           Social History:   Lives in [place] with family. Around 1 year old niece. No animal exposures.          Physical Exam:   BP (!) 183/118 (BP Location: Left arm)   Pulse 91   Temp 98.1  F (36.7  C) (Oral)   Resp 18   Ht 1.575 m (5' 2\")   Wt 56.1 kg (123 lb 9.6 oz)   LMP 05/29/2023 (Approximate)   SpO2 99%   BMI 22.61 kg/m     Exam:   GENERAL:  Well-developed, thin, sitting in bed in no acute distress.   ENT:  Head is normocephalic, atraumatic, mild alopecia. Oropharynx is moist without exudates or ulcers.  EYES:  Eyes have " anicteric sclerae.    NECK:  Supple.  LUNGS:  Few crackles at L base, shallow breaths. On RA.   CARDIOVASCULAR:  Regular rate and rhythm with no murmurs, gallops or rubs.  ABDOMEN:  Normal bowel sounds, soft, nontender.  EXT: Extremities warm, 1+ pitting edema extending to calves bilaterally.   SKIN:  No acute rashes.    NEUROLOGIC:  Grossly nonfocal.         Laboratory Data:   Metabolic Studies       Recent Labs   Lab Test 08/19/23  0622 08/18/23  1406 08/18/23  0726 08/12/23  0819 08/11/23  0808 08/11/23  0605 08/11/23  0447 01/27/23  0641 01/26/23  0656 06/27/20  0651 06/26/20  0632     --  143   < >  --   --   --    < > 138   < > 143   POTASSIUM 4.2  --  3.6   < >  --   --   --    < > 4.1  4.1   < > 3.4   CHLORIDE 116*  --  117*   < >  --   --   --    < > 113*   < > 114*   CO2 14*  --  14*   < >  --   --   --    < > 15*   < > 25   ANIONGAP 9  --  12   < >  --   --   --    < > 10   < > 4   BUN 26.8*  --  30.9*   < >  --   --   --    < > 22.7*   < > 15   CR 0.94  --  0.88   < >  --   --   --    < > 0.60   < > 0.66   GFRESTIMATED 88  --  >90   < >  --   --   --    < > >90   < > >90   GLC 96  --  84   < >  --   --   --    < > 80   < > 152*   A1C  --   --   --   --   --   --   --   --   --   --  5.3   BISI 8.3*  --  8.2*   < >  --   --   --    < > 7.9*   < > 7.5*   PHOS 3.3  --  3.1   < >  --   --   --    < >  --    < >  --    MAG 2.3   < >  --    < >  --   --   --    < >  --    < >  --    LACT  --   --   --   --  2.3* 6.6*  --   --   --    < >  --    PCAL  --   --   --   --   --   --  0.30*  --   --   --   --    CKT  --   --  27  --   --   --   --   --  16*   < > 52    < > = values in this interval not displayed.       Hepatic Studies    Recent Labs   Lab Test 08/12/23  0819 08/10/23  1810 08/10/23  1011 07/10/23  1454 01/26/23  0656 01/25/23  1451 01/25/23  0642 01/24/23  1307 01/24/23  0623   BILITOTAL 0.5 0.4  --   --  0.3  --    < > 0.3 0.2   DBIL  --   --   --   --   --   --   --  <0.20 <0.20   ALKPHOS 94  122*  --   --  232*  --    < >  --  262*   PROTTOTAL 5.3* 6.3*  --   --  7.7  --    < >  --  6.2*   ALBUMIN 2.1* 2.8* 2.7*   < > 1.7*  --    < >  --  1.7*   AST 79* 24  --   --  49*  --    < >  --  41*   ALT 23 <5  --   --  10  --    < >  --  6*   LDH  --   --   --   --   --  371*  --   --  423*    < > = values in this interval not displayed.       Hematology Studies      Recent Labs   Lab Test 08/19/23  0622 08/18/23  0726 08/17/23  0543 08/16/23  0535 08/15/23  0645 08/14/23  0534 01/24/23  1731 01/24/23  1206 12/16/22  0908 12/15/22  0808   WBC 4.4 4.7 4.6 4.4 5.9 7.5   < > 4.7   < > 4.0   ANEU  --   --   --   --   --   --   --  3.0  --  2.4   ALYM  --   --   --   --   --   --   --  0.9  --  1.4   ANNE  --   --   --   --   --   --   --  0.2  --  0.2   AEOS  --   --   --   --   --   --   --  0.0  --  0.0   HGB 8.6* 8.7* 8.1* 8.8* 8.2* 8.2*   < > 7.0*   < > 11.2*   HCT 28.1* 29.1* 26.2* 28.6* 26.7* 26.6*   < > 23.8*   < > 35.6   PLT 91* 105* 124* 147* 115* 140*   < > 88*   < > 127*    < > = values in this interval not displayed.       Medication levels    Recent Labs   Lab Test 01/23/23  1447   MPACID <0.25*   MPAG <6.5*     Inflammatory Markers    Recent Labs   Lab Test 08/18/23  0726 08/11/23  0447 01/22/23  2341 06/15/22  1347 06/13/22  0558 06/12/22  0641 06/11/22  0146 04/25/22  0736 02/10/21  1305 01/13/21  1043   SED 38* 54* 110* 118*  --   --  >140* 128*  --   --    CRP  --   --   --  7.9 40.0* 91.0* 99.0*  --  <2.9 3.5     Imaging:  Recent Results (from the past 48 hour(s))   US Upper Extremity Venous Duplex Right    Narrative    EXAMINATION: DOPPLER VENOUS ULTRASOUND OF THE RIGHT UPPER EXTREMITY,  8/17/2023 7:56 PM     COMPARISON: None.    HISTORY: Unilateral right arm and leg swelling    TECHNIQUE:  Gray-scale evaluation with compression, spectral flow and  color Doppler assessment of the deep venous system of the right upper  extremity.    FINDINGS:    Right: Normal blood flow and waveforms are  "demonstrated in the  internal jugular, innominate, subclavian, and axillary veins. There is  normal compressibility of the brachial, basilic and cephalic veins.      Impression    IMPRESSION: No evidence of right upper extremity deep venous  thrombosis.    I have personally reviewed the examination and initial interpretation  and I agree with the findings.    KENIA CERDA MD         SYSTEM ID:  [id number]   US Lower Extremity Venous Duplex Right    Narrative    ULTRASOUND LOWER EXTREMITY VENOUS DUPLEX RIGHT 8/17/2023 7:56 PM    CLINICAL HISTORY: unilateral right arm and leg swelling, assess for  DVT.     COMPARISONS: Ultrasound post vascular access lower extremity duplex  8/14/2023.    REFERRING PROVIDER: BROOKE CERVANTES    TECHNIQUE: Grayscale, color Doppler, Doppler waveform ultrasound  evaluation was performed through the right common femoral, femoral,  and popliteal veins. Right posterior tibial and peroneal veins were  evaluated with grayscale imaging and compression.    Left common femoral vein was evaluated for symmetry.    Cine clip was saved.    FINDINGS: Left common femoral vein is patent, fully compressible, and  demonstrates normal phasic Doppler waveform.    Anechoic 1.5 x 3.5 x 3.1 cm collection in the right groin demonstrates  no internal vascular flow by color Doppler imaging, previously mixed  echogenicity and 1.0 x 2.8 x 2.9 cm.    Right common femoral, femoral, and popliteal veins are fully  compressible, patent, and demonstrate normal phasic Doppler waveforms.    Right posterior tibial veins are fully compressible to the ankle.    Right peroneal veins are fully compressible to the distal calf.      Impression    IMPRESSION:   1. No deep venous thrombosis demonstrated in the RIGHT leg.    2. 1.5 x 3.5 x 3.1 cm collection in the right groin, previously 1.0 x  2.8 x 2.9 cm. Differential includes hematoma and seroma.    Reference: \"Duplex Ultrasound in the Diagnosis of Lower-Extremity Deep  Venous " "Thrombosis\"- Vicky Lynn MD, S; Bhavin Resendez MD  (Circulation. 2014;129:917-921. http://circ.ahajournals.org)    JUDD DUNLAP MD         SYSTEM ID:  [id number]   XR Chest 2 Views    Narrative    Exam: XR CHEST 2 VIEWS, 8/17/2023 8:53 PM    Comparison: CT 8/10/2023, radiograph 12/14/2022, 6/11/2022    History: interval follow up of pulmonary edema and pneumonia,  persistent orthopnea and PND    Findings:  PA and lateral views of the chest. Trachea is midline.  Cardiomediastinal silhouette is within normal limits. Persistent hazy  left basilar opacities. There is no pneumothorax or pleural effusion.  The upper abdomen is unremarkable. No acute osseous abnormality.       Impression    Impression:   Persistent hazy left basilar opacities, likely representing ongoing  infection.    I have personally reviewed the examination and initial interpretation  and I agree with the findings.    LIZZY LUZ MD         SYSTEM ID:  [id number]      "

## 2023-08-19 NOTE — PROGRESS NOTES
Nephrology Progress Note  08/19/2023         Assessment & Recommendations:   Milly Carroll is a 23 year old year old female    # Lupus nephritis ,class IV/V  # Poor medical compliance  # Allergic Rxn with RTX  She has been followed by Dr. Purvis.  The patient was initially diagnosed with SLE when she was 7 years old: symptoms included fever, muscle ache and rashes.  She was found to have +DsDNA, +RNP, +ribosomal P ab . Per Dr. Purvis's note, She has most recently being treated with Cellcept 1500 mg BID, Plaquenil 200 mg BID and prednisone 5 mg daily. She was on Benlysta infusion in 2019 that was later switched to Benlysta subcutaneous in 2021. She later did not receive Benlysta.  She was treated with Rituximab since 2013 but she developed anaphylactic reaction during Rituximab infusion in 12/2019.  She has had persistent hematuria and proteinuria since 2021. She has had on and off documented proteinuria which is 0.3-0.7 g/g since 2013.  She has Kidney BX in 2022 which showed class III and IV LN. She was treated with CellCept 1500 mg BID but then was non-compliant to this. She was then moved to [place] and stopped all her meds and just came back to [place] in summer 2023. She was admitted for C.diff diarrhea. She has AAKI with Cr of 1.58. At that time, her DS DNA was 1446. She was started on solumedrol 60 mg daily x 2 days  with prednisone tapered. CellCept was resumed at dose 500 mg BID. She had another kidney Bx on 8/2/23 (No EM sample), result showed Class IV with 2% active crescents, 50% glomerular sclerosis, severe IFTA (70-80%)and concern for class V lupus on bx done 8/23. (class III in 6/22, < 5% IFTA). She was then seen by Dr. Purvis on 8/10/23. Noted progressive increase in Cr 0.8-0.9 from baseline of 0.5-0.6 in 2022 and progressive proteinuria and hypoalbuminemia, UPCR 12-13 g/d and Salb 2-2.2. During her visit, she as found to have Hb of 6.1 and repeat kidney US showed that she has perinephric hematoma. She  "was then admitted at [facility] and then transferred to [facility].  Prednisone was increased to 60 mg daily and then tapered, now on 30 mg per day. Plan is to proceed with Eurolupus given progression of kidney disease and ongoing activities despite high dose CellCept as well as prior reaction to other IS.    - Continue Cellcept 1500 mg twice daily and Prednisone 30 mg daily  - OB-GYN recommend the patient to contact outside facility for ovopreservation  - Cystain c GFR (added on for you)  - Pending repeat DS DNA, C3, C4  - Will have Rheum discussed with her tomorrow; looks like ID cleared for her  #Hypertension; uncontrolled  On Amlodipine 10 mg ,Carvedilol 25mg bd ,hydralazine 50 mg TID ,lisinopril 40 mg po od  and Labetalol PRN. Uncontrolled blood pressure likely driven from volume overloaded.  - Add torsamide 20 mg daily-> first dose today  # NAGMA  Likely due to renal disease. W  -Start sodium bicarb 1300mg po TID  # LLL pneumonia    # Cardiomyopathy   Recommendations were communicated to primary team via note and verbal.     Argenis Kamara MD   Division of Renal Disease and Hypertension  [facility]  myairmail  Vocera Web Console    Interval History :   Nursing and provider notes from last 24 hours reviewed.  In the last 24 hours Milly Carroll has no acute event overnight.  This morning she reported no fever chest pain or short of breath but upon observation I noticed that she still has increased work of breathing.  Blood pressure still elevated.  Body weight today 68 kg which increased from 56 mg upon presentation.    Review of Systems:   I reviewed the following systems:  Negative except as mentioned above.  Physical Exam:   I/O last 3 completed shifts:  In: 230 [I.V.:230]  Out: -    BP (!) 155/100 (BP Location: Left arm, Patient Position: Semi-Tapia's, Cuff Size: Adult Regular)   Pulse 94   Temp 98.2  F (36.8  C) (Oral)   Resp 18   Ht 1.575 m (5' 2\")   Wt 60 kg (132 lb 4.4 oz)   LMP 05/29/2023 " (Approximate)   SpO2 100%   BMI 24.19 kg/m       GENERAL APPEARANCE: alert, NAD  EYES:  no scleral icterus, pupils equal  PULM: lungs: crackles to auscultation bilaterally, equal air movement, no clubbing  CV: regular rhythm, normal rate, no rub     -edema: 2+   GI: soft, non tender, non-distended, bowel sounds are nl  INTEGUMENT: no cyanosis, no rash  NEURO:  no asterixis       Labs:   All labs reviewed by me  Electrolytes/Renal -   Recent Labs   Lab Test 08/19/23  0622 08/18/23  1406 08/18/23  0726 08/17/23  1207 08/17/23  0543 08/13/23  0527 08/12/23  0819     --  143  --  141   < > 136   POTASSIUM 4.2  --  3.6 3.5 3.3*   < > 3.8   CHLORIDE 116*  --  117*  --  115*   < > 104   CO2 14*  --  14*  --  15*   < > 21*   BUN 26.8*  --  30.9*  --  33.0*   < > 17.5   CR 0.94  --  0.88  --  0.99*   < > 1.09*   GLC 96  --  84  --  108*   < > 130*   BISI 8.3*  --  8.2*  --  8.0*   < > 7.4*   MAG 2.3 1.6*  --   --  1.8   < > 1.7   PHOS 3.3  --  3.1  --   --   --  5.1*    < > = values in this interval not displayed.       CBC -   Recent Labs   Lab Test 08/19/23  0622 08/18/23  0726 08/17/23  0543   WBC 4.4 4.7 4.6   HGB 8.6* 8.7* 8.1*   PLT 91* 105* 124*       LFTs -   Recent Labs   Lab Test 08/12/23  0819 08/10/23  1810 08/10/23  1011 07/10/23  1454 01/26/23  0656   ALKPHOS 94 122*  --   --  232*   BILITOTAL 0.5 0.4  --   --  0.3   ALT 23 <5  --   --  10   AST 79* 24  --   --  49*   PROTTOTAL 5.3* 6.3*  --   --  7.7   ALBUMIN 2.1* 2.8* 2.7*   < > 1.7*    < > = values in this interval not displayed.       Iron Panel -   Recent Labs   Lab Test 07/10/23  1454 06/25/20  0656 12/27/17  1021 11/22/17  0858   IRON 15*  --  99 20*   IRONSAT 9*  --  32 5*   GABRIEL 1,079*   < >  --   --     < > = values in this interval not displayed.         Imaging:  All imaging studies reviewed by me.     Current Medications:   amLODIPine  10 mg Oral Daily    amoxicillin-clavulanate  1 tablet Oral Q12H [facility] (08/20)    artificial saliva  2 spray  Swish & Spit 4x Daily    carvedilol  25 mg Oral BID w/meals    cholecalciferol  10 mcg Oral Daily    empagliflozin  10 mg Oral Daily    ferrous sulfate  325 mg Oral Daily with breakfast    folic acid  1 mg Oral Daily    hydrALAZINE  50 mg Oral Q8H    isosorbide mononitrate  60 mg Oral Daily    [START ON 8/20/2023] losartan  50 mg Oral Daily    mycophenolate  1,500 mg Oral BID    pantoprazole  40 mg Oral QAM AC    potassium chloride ER  20 mEq Oral BID    predniSONE  30 mg Oral Daily    sodium chloride (PF)  3 mL Intracatheter Q8H    spironolactone  25 mg Oral Daily    sulfamethoxazole-trimethoprim  1 tablet Oral Daily    torsemide  20 mg Oral Daily    vancomycin  125 mg Oral BID       Argenis Kamara MD

## 2023-08-19 NOTE — PROGRESS NOTES
[facility]    Medicine Progress Note - Hospitalist Service, GOLD TEAM 9    Date of Admission:  8/17/2023    Assessment & Plan     Milyl Carroll is a 23 year old female w/ PMH lupus C/B lupus flares, cerebritis, nephritis, CHRISTA also past medical history hemoptysis, malignant hypertension, orthopnea and acute respiratory failure with hypoxia, diastolic HF (EF 45% on 8/14, previously 20-25% on 8/11), QTc prolongation, recent LLL pneumonia, recent perinephritic hematoma status post IR embolization (8/11/2023), chronic thrombocytopenia, electrolyte disturbances admitted on 8/17/2023. She was hospitalized at [facility] 8/11/2023 to 8/17/2023 and transfers today for higher level of care. (See also HPI)    Today's Plan:  - ID consulted. Once cleared by ID will consider starting cytoxan. Until then would continue on cellcept, and prednisone. Will switch form zosyn to Augmentin until 8/25. Ok to to begin cytoxan per ID  - Gyn consulted for oocyte cryopreservation prior to starting cytoxan.  - In process of Lisinopril washout period prior to beginning entresto. Otherwise continuing GDMT.  - continue antihypertensives  - continue vancomycin    Lupus c/b lupus flares  Lupus cerebritis, active crescentic lesions   Hx medication non-adherence  Follow with [facility] rheumatology last seen 7/26/23.  Dx at age 7 initially felt to be discoid but later developed systemic signs.  Treated as a child with Cytoxan (2013) and later with rituximab with associated rituximab anaphylactic reaction.  Then treated with Benlysta infusion. Inpatient started IV Methylprednisolone 500mg daily x 4 days then transitioned to oral prednisone.   - consult to rheumatology  - follow up C3, C4, CRP, ESR  - Lupus-focused meds:   -Plaquenil held due to new onset cardiomyopathy, continue to hold   - continue prednisone 30mg daily  - MMF 1500mg BID  - PPI while on prednisone  - Bactrim PJP ptx  - continue  folate supplementation  -We will need outpatient follow-up with ophthalmology for eye exam in the setting of long-term Plaquenil  - SW consult given compliance and environmental concerns  - Rheumatology consulted, will await formal recs    Class IV lupus nephritis  CHRISTA, nonoliguric  Follows with Dr. Purvis at [facility] nephrology. Had biopsy 6/2022, focal proliferative and membranous lesions (class III and V) with minimal activity and no significant chronicity.  Biopsy 8/2 showing class IV diffuse sclerosing focal pallor for day of lupus nephritis, tubulointerstitial immune complex deposition, severe I FTA, active crescents and glomeruli sclerosed.  Baseline creatinine 0.6-0.8. Creatinine this admission peaked at 1.28. Did have IV contrast on 8/10 and additionally notably had postbiopsy bleeding requiring IR coil embolization of left renal artery for pseudoaneurysm. Creatinine  improving and recently 1s.   - active lupus nephritis, treatment as above.   - consult nephrology for resistance hypertension, recommendations pending  - briefly on sodium bicarb for acidosis, stopped 8/17  - trend BMP, weights, I&O    Perinephritic hematoma with pseudoaneurysm s/p IR embolization (8/11/2023)  Renal bx (8/2/23)  Right groin hematoma  IR embolization of left renal artery inferior pole pseudoaneurysm by IR. Interval improvement in renal function.  Patient noted on 8/14/2023 to have right groin pain at the vascular access site with ultrasound showing right groin hematoma and no pseudoaneurysm  -Monitor right groin site  -Continue to monitor for bleeding  - pain mng:   - dilaudid oral 1mg q4h prn   - dilaudid IV 0.2mg q3h prn   - APAP 650mg q6h prn   - added topical icyhot     Doroteo Hemoptysis w/o clot, improved   Acute respiratory failure with hypoxia  Orthopnea  No overt hemoptysis since admission 8/11/2023. In the setting of receiving transfusions and TXA x7 days. CT C/A/P w/ IV con 8/11 w/ 5.5 cm left perinephritic hematoma and 0.3  cm hyperenhancing focus of fluid LL pole suspicious for pseudoaneurysm.  Admission work-up also concerning for question of GI bleed in the setting of melena. Hemoptysis improved since admission to [facility]  -Low threshold to involve interventional pulmonology if any clinical worsening  - will hold lovenox for now.    Cardiomyopathy and Diastolic HF (EF 45% on 8/14, previously 20-25% on 8/11)  QTc prolongation, improved   MRI Cardiac Myocarditis protocol (8/14) with interval improvement of flow EF that was seen on 8/11/23.  Cardiac work-up showing persistent LV dilation no evidence of infarct.  EKG w/ interval improvement in QTc and T wave inversions that were previously noted.  Received furosemide 8/11, discontinued thereafter.  - cardiology consulted, appreciate recommendations.  - consult consult to follow here  - med mng:  -Imdur 60mg daily  -Metoprolol XL to Carvedilol 12.5mg BID on 8/12, increased to 25 mg BID on 8/16  - Lisinopril held on 8/13 due to increased cr, resumed on 8/15- 40mg daily    - Hydralazine started  on 8/13, dose increased 8/17 (50mg TID)  - amlodipine 10mg daily added on 8/13 for BP control  - spironolactone 25mg daily  - test claim Entresto/SGLT2 inhibitors $0/month; will not initiate until renal function   - clonidine was stopped at [facility] d/t bradycardia   - consider adding torsemide for resistant hypertensin    Malignant hypertension- improved  SBP>200 on admission.  Initially started on clonidine which was discontinued on 8/12 due to bradycardia.  Nitroglycerin drip on 8/12 was transitioned same-day to Imdur. Patient's BP have been sbp<180 since admission overnight.    LLL pneumonia  Acute hypoxic respiratory failure-resolved  PTA receiving a course of Augmentin.  Initially on Vanco and Zosyn on 8/11, vancomycin discontinued inpatient.  LLL consolidation noted per admission CTA and per symptoms on admission to [facility] along with interstitial edema and trace bilateral pleural effusions. IV zosyn  started 8/11 at [facility].   - sputum culture/gram stain have been ordered, not obtained, has had non-productive cough   - continue vancomycin and IV zosyn for now x 14 days. Will reach out to ID regarding necessitation for prolonged infectious course.  - low threshold to consult pulmonary if repeat hemoptysis or respiratory decline    Chronic thrombocytopenia  Blood loss anemia   Occurs in the setting of perinephric hematoma as well as hemoptysis. Status post 2 units PRBC 8/11/2023. Hemoglobin stable ~8. Plt stable in the 110-140s.  -Trend CBC and vitals  - completed TXA neb per pulmonary x 7 day (8/17)  - continue oral iron    Right >left upper and lower extremity edema  Paresis of right 2nd digit   Noted on exam.  RUE PIV present. Patient right index finger stuck in extension, able to painlessly flex passively. Cardiolipin Abx neg 2019  - hold anticoagulants in the setting of recent significant bleed, can consider if continues to be stable   - RLE  and RUE duplex 8/17/23 neg for DVT    Left shoulder, scapular pain-improved  C/o left shoulder pain at [facility] w/ intact ROM. Tender to palpation over her muscle over her shoulder, scapular area.   - follow up rheum input  -xray shoulder without acute finding  -tylenol prn     Headaches  Ongoing since admission and initially generalized, on admission to [facility] is right frontal, has been responsive to APAP and dilaudid  - continue APAP and dilaudid   - treat underlying HTN and lupus cares as above     Hypomagnesemia- improved  Hypokalemia -improved  In the setting of CHRISTA and diuresis, suspect renal losses  - continue KlorCon 20 meq BID  - trend BMP and Mg    Hx C. Diff (6/2023) -  oral vancomycin started 8/11 for c/f abdominal infection, continue for now ? 10 day course     Melena -resolved - continue to monitor    Hx pancreatic insufficiency 2/2 pancreatitis  Hx acute on chronic pancreatitis (2020)   Last followed with GI in 2020 for pancreatitis and multiple stones and  debris in the main pancreatic duct and was recommended to be on Creon at that time  -Stools currently normal, can consider Creon if concerns for pancreatic insufficiency and loose stools    Hx remote generalized tonic-clonic seizures seizures  Right foot drop   Patient was on Keppra as a child. No current PTA antiepileptic medications  - monitor  -Consult neurology if any neurologic changes    Hx CMV and EBV + (2022) - no hx of following with ID - consider repeat if felt to be contributory       Diet: Combination Diet Regular Diet Adult    DVT Prophylaxis: VTE Prophylaxis contraindicated due to recent hematoma  Leung Catheter: Not present  Lines: None     Cardiac Monitoring: None  Code Status: Full Code      Clinically Significant Risk Factors            # Hypomagnesemia: Lowest Mg = 1.6 mg/dL in last 2 days, will replace as needed       # Thrombocytopenia: Lowest platelets = 91 in last 2 days, will monitor for bleeding        # Chronic heart failure with reduced ejection fraction: last echo with EF <40%             # Financial/Environmental Concerns:           Disposition Plan     Expected Discharge Date: 08/21/2023      Destination: home with family  Discharge Comments: Dispo: home w/ family  Plan: pending sputum Cx  Progress: OP Psych & SW          SANIYA CHAMORRO MD  Hospitalist Service, GOLD TEAM [address] [place] [address]  Securely message with Image Socket (more info)  Text page via [facility] Paging/Directory   See signed in provider for up to date coverage information  ______________________________________________________________________    Interval History   NoAION. This morning states that the dyspnea on exertion, orthopnea has improved. Also denies any nasal congestion, rhinnorhea, coughing, abd pain, nausea, emesis. No rashes or joint swelling. No HA, lightheadedness, visual impairment, auditory impairment, focal sensory or motor deficits.    Physical Exam   Vital Signs:  Temp: 97.6  F (36.4  C) Temp src: Oral BP: (!) 159/100 Pulse: 92   Resp: 20 SpO2: 100 % O2 Device: None (Room air)    Weight: 132 lbs 4.42 oz    General Appearance: A and O x3. Comfortable in bed  Respiratory: incspiratory rales on the lung bases. No rhonchi or wheezing  Cardiovascular: S1, S2, RRR, 4/6 systolic murmur on 2nd LICS  GI: NBS, soft, nontender  Skin: no rashe     Medical Decision Making       60 MINUTES SPENT BY ME on the date of service doing chart review, history, exam, documentation & further activities per the note.  Tests ORDERED & REVIEWED in the past 24 hours:  Tests personally interpreted in the past 24 hours:      Data     I have personally reviewed the following data over the past 24 hrs:    4.4  \   8.6 (L)   / 91 (L)     139 116 (H) 26.8 (H) /  96   4.2 14 (L) 0.94 \       Imaging results reviewed over the past 24 hrs:   No results found for this or any previous visit (from the past 24 hour(s)).

## 2023-08-19 NOTE — PROGRESS NOTES
[facility]   Cardiology Consults  Progress Note   ASSESSMENT/PLAN:  23 year old female w/ PMH  HFimpEF, pancreatic insufficiency in chronic pancreatitis, SLE c/b lupus cerebritis/class IV lupus nephritis with multiple associated complications from flares req. frequent recent hospitalizations, presenting as transfer from [facility] for higher level of care. Cardiology was consulted to assist with HF management. Patient initially presented with signs/symptoms of decompensated HF in setting of lupus flare. TTE (8/11) demonstrated new reduction in LV systolic function (LVEF 20-25%), which improved to 40% on CMR (8/16), after initiation of SSS. CMR negative for late enhancement that would be consistent with MI, fibrosis, or infiltrative disease. Personal review of TTE 8/11 appears that apical-mid wall segments are hypokinetic, while the basal segments have preserved contractility. Clinical scenario most likely represents stress CM.      She has been started on GDMT as tolerated, yet restricted based on renal function. Further ischemic evaluation was deferred due to renal compromise, prior CTA chest without coronary calcification, CMR findings, and lack of risk factors, but can consider in future. As stress CM is a transient disorder, patients are started on medical regimen for HFrEF until there is evidence in recovery of systolic function. At this time would recommend to continue titration of GDMT. Otherwise, continue to manage blood pressure control and maintaining volume optimization. Hold on CTA/LHC for now.       Typically, patients are started on medical regimen for HFrEF until there is evidence in recovery of systolic function. For now, would recommend to continue to treat underlying stressors as much as possible while starting GDMT.     #HFimpEF 2/2 Stress CM (LVEF >40%; 20-25% prior)  - Preload: Appears mildly hypervolemic, on torsemide 20mg  - Afterload: BP goal <130; titrate HTN  medications and GDMT as below  - Contratility: Beta blockade   - GDMT  - Beta Blockade: Coreg 25mg BID  - ACE/ARB/ARNI: Discontinue lisinopril for 36 hour washout to transition to Entresto 24/26  - MRA: Spironolactone 25 mg daily   - SGLT-2i: Continue Empagliflozin 10 mg daily   -Other therapies: Currently on hydralazine 50mg TID, IMDUR 60mg daily  - Ischemic evaluation  - Defer on CTA/LHC at this time  - Maintain Electrolyte goals: K > 4.0, Mg > 2.0     Plan of care discussed with Dr. Carrasco, who agrees with above plan.     Thank you for consulting the cardiovascular services at the [facility]. We will sign off. Please do not hesitate to call us with any questions.     Calvin Messer   Cardiology fellow      Interval history:    Patient denies any chest pain, SOB, lightheadedness, abdominal pain. Some adjustments made to her medications including holding her lisinipril with plans on switching her to entresto. She continues to be hypertensive.        Brief HPI:   23 year old female w/ PMH  HFimpEF, pancreatic insufficiency in chronic pancreatitis, SLE c/b lupus cerebritis/class IV lupus nephritis with multiple associated complications from flares req. frequent recent hospitalizations, presenting as transfer from [facility] for higher level of care. Cardiology was consulted to assist with HFimpEF management.      Patient hospitalized at [facility] 8/11-8/17/23 with AHRF, decompensated HF, blood loss anemia req. 2 units pRBC, perinephic hematoma s/p coil embolization, CAP, hypertensive emergency, hemoptysis. Unifying diagnosis was concerning for lupus flare and rheumatology has been treating with SSS. No pressor or inotrope requirements.      TTE was completed which demonstrated LVEF 20-25% with mild MR, moderate TR, and moderate pHTN; findings new from previous TTE 1/24/23. ECG c/w QT-c prolongation 486 and nons-specific TWI. Troponin unremarkable; no CP. Cardiology was consulted and  initiated comprehensive evaluation for cardiomyopathy. Ischemic eval w/ CTA deferred due to renal fxn. Recommended optimization of GDMT, holding plaquenil, and CMR. CMR described moderate global LV hypokinesis (LVEF 40%), moderate MR, low normal RVEF 52%, and no late enhancement consistent with MI, fibrosis, or infiltrative disease.Presentation was thought to be consistent with stress CM. Serial ECG's showed improvement to prolonged Qtc.       Interval History:    Physical Exam:  Temp:  [97.4  F (36.3  C)-98  F (36.7  C)] 97.9  F (36.6  C)  Pulse:  [] 72  Resp:  [16-18] 16  BP: (134-186)/() 163/111  SpO2:  [99 %-100 %] 100 %    I/O:    Wt:   Wt Readings from Last 5 Encounters:   08/18/23 56.1 kg (123 lb 9.6 oz)   08/16/23 57 kg (125 lb 11.2 oz)   08/10/23 52.3 kg (115 lb 3.2 oz)   07/26/23 46 kg (101 lb 6.4 oz)   07/10/23 48.4 kg (106 lb 11.2 oz)       GEN: NAD  Pulm: basilar crackles  Cardiac: JVP 10,  no murmurs  Vascular: +1 lower extremity edema and palpable pulses  GI: soft, non distended  Neuro: CN II-XII grossly intact    Medications:   amLODIPine  10 mg Oral Daily    artificial saliva  2 spray Swish & Spit 4x Daily    carvedilol  25 mg Oral BID w/meals    cholecalciferol  10 mcg Oral Daily    ferrous sulfate  325 mg Oral Daily with breakfast    folic acid  1 mg Oral Daily    hydrALAZINE  50 mg Oral Q8H    isosorbide mononitrate  60 mg Oral Daily    lisinopril  40 mg Oral Daily    mycophenolate  1,500 mg Oral BID    pantoprazole  40 mg Oral QAM AC    piperacillin-tazobactam  3.375 g Intravenous Q6H    potassium chloride ER  20 mEq Oral BID    predniSONE  30 mg Oral Daily    sodium chloride (PF)  3 mL Intracatheter Q8H    spironolactone  25 mg Oral Daily    sulfamethoxazole-trimethoprim  1 tablet Oral Daily    vancomycin  125 mg Oral BID         Labs:   CMP  Recent Labs   Lab 08/19/23  0622 08/18/23  1406 08/18/23  0726 08/17/23  1207 08/17/23  0543 08/16/23  1239 08/16/23  0535 08/13/23  0527  08/12/23  0819     --  143  --  141  --  140   < > 136   POTASSIUM 4.2  --  3.6 3.5 3.3*   < > 3.3*   < > 3.8   CHLORIDE 116*  --  117*  --  115*  --  112*   < > 104   CO2 14*  --  14*  --  15*  --  16*   < > 21*   ANIONGAP 9  --  12  --  11  --  12   < > 11   GLC 96  --  84  --  108*  --  99   < > 130*   BUN 26.8*  --  30.9*  --  33.0*  --  35.2*   < > 17.5   CR 0.94  --  0.88  --  0.99*  --  1.08*   < > 1.09*   GFRESTIMATED 88  --  >90  --  82  --  74   < > 73   BISI 8.3*  --  8.2*  --  8.0*  --  8.0*   < > 7.4*   MAG 2.3 1.6*  --   --  1.8  --  1.9   < > 1.7   PHOS 3.3  --  3.1  --   --   --   --   --  5.1*   PROTTOTAL  --   --   --   --   --   --   --   --  5.3*   ALBUMIN  --   --   --   --   --   --   --   --  2.1*   BILITOTAL  --   --   --   --   --   --   --   --  0.5   ALKPHOS  --   --   --   --   --   --   --   --  94   AST  --   --   --   --   --   --   --   --  79*   ALT  --   --   --   --   --   --   --   --  23    < > = values in this interval not displayed.     CBC  Recent Labs   Lab 08/19/23  0622 08/18/23  0726 08/17/23  0543 08/16/23  0535   WBC 4.4 4.7 4.6 4.4   RBC 3.13* 3.29* 2.96* 3.21*   HGB 8.6* 8.7* 8.1* 8.8*   HCT 28.1* 29.1* 26.2* 28.6*   MCV 90 88 89 89   MCH 27.5 26.4* 27.4 27.4   MCHC 30.6* 29.9* 30.9* 30.8*   RDW 18.9* 18.5* 17.9* 17.8*   PLT 91* 105* 124* 147*     INRNo lab results found in last 7 days.  Arterial Blood GasNo lab results found in last 7 days.      Transthoracic echocardiogram:   8/11/23  The visual ejection fraction is 20-25%.  The left ventricle is mildly dilated.  There is mild (1+) mitral regurgitation.  There is mild to moderate (1-2+) tricuspid regurgitation.  Moderate (46-55mmHg) pulmonary hypertension is present.  The above are new findings compared with study in 1-23  Contrast was used without apparent complications.     8/14/23  The left ventricle is borderline dilated.  Biplane LVEF is 45%.  There is mild global hypokinesia of the left ventricle.  NOTE:  Diastology paramaters and stain not performed. Compared to echo at OSH,  the LVEF and MR changes are new. Serial echo cardiogram and possible MRI may  be useful  There is moderate (2+) mitral regurgitation.  The aortic root is normal size.  Borderline/mild dilated pulmonary artery  Trivial posterior pericardial effusion     CMR:  2023-Aug-16 14:50:42     SUMMARY  1. The LV is mildly dilated with normal wall thickness. The global systolic function is moderately reduced.  The LVEF is 40%. There is moderate global hypokinesis of the left ventricle.   2. The RV is normal in cavity size. The global systolic function is low normal. The RVEF is 52%.   3. Both atria are normal in size.  4. There is mild to moderate mitral regurgitation by qualitative assessment.   5. There is no evidence of myocardial edema on T2 weighted imaging. There is no evidence of myocardial iron  overload.   5. Late gadolinium enhancement imaging shows no MI, fibrosis or infiltrative disease.   6. A small circumferential pericardial effusion is noted.      CONCLUSIONS:      1. Mildly dilated left ventricle with moderately reduced systolic function.  2. No late enhancement to suggest prior infarct, inflammation or infiltration.      Catheterization(s):   None

## 2023-08-20 NOTE — PLAN OF CARE
Goal Outcome Evaluation:      Plan of Care Reviewed With: patient    Overall Patient Progress: no changeOverall Patient Progress: no change    Temp: 98.1  F (36.7  C) Temp src: Oral BP: (!) 155/108 Pulse: 103   Resp: 18 SpO2: 100 % O2 Device: None (Room air)    NEURO: A&Ox4, calm/cooperative, able to make needs known  RESPIRATORY: Stable on RA, coarse LS throughout, denies SOB  CARDIAC: WNL except HTN and tachycardia, not within notifying parameters, denies chest pain  GI/: Voids without difficulty, strict I&Os- pt saving voids in hat, LBM 8/18, denies nausea, denies abd pain, UA needed  SKIN: WNL except +2 BLE edema and +1 facial edema  DIET: Regular  PAIN/NAUSEA: Denies  IV ACCESS: PIV SL  ACTIVITY: Independent  LAB: Reviewed, UA needed  PLAN: Continue w/ POC, strict I&Os, discharge plan pending

## 2023-08-20 NOTE — PROGRESS NOTES
[facility]    Medicine Progress Note - Hospitalist Service, GOLD TEAM 9    Date of Admission:  8/17/2023    Assessment & Plan     Milly Carroll is a 23 year old female w/ PMH lupus C/B lupus flares, cerebritis, nephritis, CHRISTA also past medical history hemoptysis, malignant hypertension, orthopnea and acute respiratory failure with hypoxia, diastolic HF (EF 45% on 8/14, previously 20-25% on 8/11), QTc prolongation, recent LLL pneumonia, recent perinephritic hematoma status post IR embolization (8/11/2023), chronic thrombocytopenia, electrolyte disturbances admitted on 8/17/2023. She was hospitalized at [facility] 8/11/2023 to 8/17/2023 and transfers today for higher level of care. (See also HPI)    Today's Plan:  - Abx switched from zosyn to augmentin  - Gyn consulted for oocyte cryopreservation prior to starting cytoxan.-- unable to do so inpatient and will need to f/unit(s) with IVF clinic  - In process of Lisinopril washout period prior to beginning entresto. Will complete washout period tomorrow morning. Otherwise continuing GDMT.  - continue antihypertensives. Will increase frequency of torsemide to BID  - continue vancomycin po for CDI    Lupus c/b lupus flares  Lupus cerebritis, active crescentic lesions   Hx medication non-adherence  Follow with [facility] rheumatology last seen 7/26/23.  Dx at age 7 initially felt to be discoid but later developed systemic signs.  Treated as a child with Cytoxan (2013) and later with rituximab with associated rituximab anaphylactic reaction.  Then treated with Benlysta infusion. Inpatient started IV Methylprednisolone 500mg daily x 4 days then transitioned to oral prednisone.   - consult to rheumatology  - follow up C3, C4, CRP, ESR  - Lupus-focused meds:   -Plaquenil held due to new onset cardiomyopathy, continue to hold   - continue prednisone 30mg daily  - MMF 1500mg BID  - PPI while on prednisone  - Bactrim PJP ptx  -  continue folate supplementation  -We will need outpatient follow-up with ophthalmology for eye exam in the setting of long-term Plaquenil  - SW consult given compliance and environmental concerns  - Rheumatology consulted, will await formal recs    Class IV lupus nephritis  CHRISTA, nonoliguric  Follows with Dr. Purvis at [facility] nephrology. Had biopsy 6/2022, focal proliferative and membranous lesions (class III and V) with minimal activity and no significant chronicity.  Biopsy 8/2 showing class IV diffuse sclerosing focal pallor for day of lupus nephritis, tubulointerstitial immune complex deposition, severe I FTA, active crescents and glomeruli sclerosed.  Baseline creatinine 0.6-0.8. Creatinine this admission peaked at 1.28. Did have IV contrast on 8/10 and additionally notably had postbiopsy bleeding requiring IR coil embolization of left renal artery for pseudoaneurysm. Creatinine  improving and recently 1s.   - active lupus nephritis, treatment as above.   - consult nephrology for resistance hypertension  - briefly on sodium bicarb for acidosis, stopped 8/17  - trend BMP, weights, I&O    Perinephritic hematoma with pseudoaneurysm s/p IR embolization (8/11/2023)  Renal bx (8/2/23)  Right groin hematoma  IR embolization of left renal artery inferior pole pseudoaneurysm by IR. Interval improvement in renal function.  Patient noted on 8/14/2023 to have right groin pain at the vascular access site with ultrasound showing right groin hematoma and no pseudoaneurysm  -Monitor right groin site  -Continue to monitor for bleeding  - pain mng:   - dilaudid oral 1mg q4h prn   - dilaudid IV 0.2mg q3h prn   - APAP 650mg q6h prn   - added topical icyhot     Doroteo Hemoptysis w/o clot, improved   Acute respiratory failure with hypoxia  Orthopnea  No overt hemoptysis since admission 8/11/2023. In the setting of receiving transfusions and TXA x7 days. CT C/A/P w/ IV con 8/11 w/ 5.5 cm left perinephritic hematoma and 0.3 cm  hyperenhancing focus of fluid LL pole suspicious for pseudoaneurysm.  Admission work-up also concerning for question of GI bleed in the setting of melena. Hemoptysis improved since admission to [facility]  -Low threshold to involve interventional pulmonology if any clinical worsening  - will hold lovenox for now.    Cardiomyopathy and Diastolic HF (EF 45% on 8/14, previously 20-25% on 8/11)  QTc prolongation, improved   MRI Cardiac Myocarditis protocol (8/14) with interval improvement of flow EF that was seen on 8/11/23.  Cardiac work-up showing persistent LV dilation no evidence of infarct.  EKG w/ interval improvement in QTc and T wave inversions that were previously noted.  Received furosemide 8/11, discontinued thereafter.  - cardiology consulted, appreciate recommendations.  - consult consult to follow here  - med mng:  -Imdur 60mg daily  -Metoprolol XL to Carvedilol 12.5mg BID on 8/12, increased to 25 mg BID on 8/16  - Lisinopril held on 8/13 due to increased cr, resumed on 8/15- 40mg daily    - Hydralazine started  on 8/13, dose increased 8/17 (50mg TID)  - amlodipine 10mg daily added on 8/13 for BP control  - spironolactone 25mg daily  - test claim Entresto/SGLT2 inhibitors $0/month; will not initiate until renal function   - clonidine was stopped at [facility] d/t bradycardia   - consider adding torsemide for resistant hypertensin    Malignant hypertension- improved  SBP>200 on admission.  Initially started on clonidine which was discontinued on 8/12 due to bradycardia.  Nitroglycerin drip on 8/12 was transitioned same-day to Imdur. Patient's BP have been sbp<180 since admission overnight.    LLL pneumonia  Acute hypoxic respiratory failure-resolved  PTA receiving a course of Augmentin.  Initially on Vanco and Zosyn on 8/11, vancomycin discontinued inpatient.  LLL consolidation noted per admission CTA and per symptoms on admission to [facility] along with interstitial edema and trace bilateral pleural effusions. IV zosyn started  8/11 at [facility].   - sputum culture/gram stain have been ordered, not obtained, has had non-productive cough   - Switched from IV Vanc/zosyn to Augmentin on 8/19  - low threshold to consult pulmonary if repeat hemoptysis or respiratory decline    Chronic thrombocytopenia  Blood loss anemia   Occurs in the setting of perinephric hematoma as well as hemoptysis. Status post 2 units PRBC 8/11/2023. Hemoglobin stable ~8. Plt stable in the 110-140s.  -Trend CBC and vitals  - completed TXA neb per pulmonary x 7 day (8/17)  - continue oral iron    Right >left upper and lower extremity edema  Paresis of right 2nd digit   Noted on exam.  RUE PIV present. Patient right index finger stuck in extension, able to painlessly flex passively. Cardiolipin Abx neg 2019  - hold anticoagulants in the setting of recent significant bleed, can consider if continues to be stable   - RLE  and RUE duplex 8/17/23 neg for DVT    Left shoulder, scapular pain-improved  C/o left shoulder pain at SD w/ intact ROM. Tender to palpation over her muscle over her shoulder, scapular area.   - follow up rheum input  -xray shoulder without acute finding  -tylenol prn     Headaches  Ongoing since admission and initially generalized, on admission to [facility] is right frontal, has been responsive to APAP and dilaudid  - continue APAP and dilaudid   - treat underlying HTN and lupus cares as above     Hypomagnesemia- improved  Hypokalemia -improved  In the setting of CHRISTA and diuresis, suspect renal losses  - continue KlorCon 20 meq BID  - trend BMP and Mg    Hx C. Diff (6/2023) -  oral vancomycin started 8/11 for c/f abdominal infection, continue for now ? 10 day course     Melena -resolved - continue to monitor    Hx pancreatic insufficiency 2/2 pancreatitis  Hx acute on chronic pancreatitis (2020)   Last followed with GI in 2020 for pancreatitis and multiple stones and debris in the main pancreatic duct and was recommended to be on Creon at that time  -Stools  currently normal, can consider Creon if concerns for pancreatic insufficiency and loose stools    Hx remote generalized tonic-clonic seizures seizures  Right foot drop   Patient was on Keppra as a child. No current PTA antiepileptic medications  - monitor  -Consult neurology if any neurologic changes    Hx CMV and EBV + (2022) - no hx of following with ID - consider repeat if felt to be contributory       Diet: Combination Diet Regular Diet Adult    DVT Prophylaxis: VTE Prophylaxis contraindicated due to recent hematoma  Leung Catheter: Not present  Lines: None     Cardiac Monitoring: None  Code Status: Full Code      Clinically Significant Risk Factors            # Hypomagnesemia: Lowest Mg = 1.6 mg/dL in last 2 days, will replace as needed       # Thrombocytopenia: Lowest platelets = 91 in last 2 days, will monitor for bleeding        # Chronic heart failure with reduced ejection fraction: last echo with EF <40%             # Financial/Environmental Concerns:           Disposition Plan     Expected Discharge Date: 08/21/2023      Destination: home with family  Discharge Comments: Dispo: home w/ family  Plan: pending sputum Cx  Progress: OP Psych & SW          SANIYA CHAMORRO MD  Hospitalist Service, GOLD TEAM [address] [place] [address]  Securely message with [employer] (more info)  Text page via [facility] Paging/Directory   See signed in provider for up to date coverage information  ______________________________________________________________________    Interval History   NoAION. This morning she is denying sob, carrillo, denies any nasal congestion, rhinnorhea, coughing, abd pain, nausea, emesis. No rashes or joint swelling. No HA, lightheadedness, visual impairment, auditory impairment, focal sensory or motor deficits.    Physical Exam   Vital Signs: Temp: 97.9  F (36.6  C) Temp src: Oral BP: (!) 170/112 Pulse: 97   Resp: 16 SpO2: 99 % O2 Device: None (Room air)    Weight: 132 lbs  4.42 oz    General Appearance: A and O x3. Comfortable in bed  Respiratory: incspiratory rales on the lung bases. No rhonchi or wheezing  Cardiovascular: S1, S2, RRR, 4/6 systolic murmur on 2nd LICS  GI: NBS, soft, nontender  Skin: no rashe     Medical Decision Making       60 MINUTES SPENT BY ME on the date of service doing chart review, history, exam, documentation & further activities per the note.  Tests ORDERED & REVIEWED in the past 24 hours:  Tests personally interpreted in the past 24 hours:      Data     I have personally reviewed the following data over the past 24 hrs:    6.4  \   9.0 (L)   / 107 (L)     138 113 (H) 25.6 (H) /  90   4.6 15 (L) 1.03 (H) \       Imaging results reviewed over the past 24 hrs:   No results found for this or any previous visit (from the past 24 hour(s)).

## 2023-08-20 NOTE — PROGRESS NOTES
Nephrology Progress Note  08/20/2023         Assessment & Recommendations:   Milly Carroll is a 23 year old year old female with Hx of advanced LN and recent PNA and perinephric hematoma who was transferred from  for further care.   # Lupus nephritis ,class IV/V  # Likely CKD stage 4 based on cystatin C  # Poor medical compliance  # Allergic Rxn with RTX  # Cystatin C discrepancy-> need repeat as she is in inflammatory state and may falsely increase Cystatin C but I do believe that her true GFR is less than 60 given finding o Bx  She has been followed by Dr. Purvis.  The patient was initially diagnosed with SLE when she was 7 years old: symptoms included fever, muscle ache and rashes.  She was found to have +DsDNA, +RNP, +ribosomal P ab . Per Dr. Purvis's note, She has most recently being treated with Cellcept 1500 mg BID, Plaquenil 200 mg BID and prednisone 5 mg daily. She was on Benlysta infusion in 2019 that was later switched to Benlysta subcutaneous in 2021. She later did not receive Benlysta.  She was treated with Rituximab since 2013 but she developed anaphylactic reaction during Rituximab infusion in 12/2019.  She has had persistent hematuria and proteinuria since 2021. She has had on and off documented proteinuria which is 0.3-0.7 g/g since 2013.  She has Kidney BX in 2022 which showed class III and IV LN. She was treated with CellCept 1500 mg BID but then was non-compliant to this. She was then moved to [place] and stopped all her meds and just came back to [place] in summer 2023. She was admitted for C.diff diarrhea. She has AAKI with Cr of 1.58. At that time, her DS DNA was 1446. She was started on solumedrol 60 mg daily x 2 days  with prednisone tapered. CellCept was resumed at dose 500 mg BID. She had another kidney Bx on 8/2/23 (No EM sample), result showed Class IV with 2% active crescents, 50% glomerular sclerosis, severe IFTA (70-80%)and concern for class V lupus on bx done 8/23. (class III in  6/22, < 5% IFTA). She was then seen by Dr. Purvis on 8/10/23. Noted progressive increase in Cr 0.8-0.9 from baseline of 0.5-0.6 in 2022 and progressive proteinuria and hypoalbuminemia, UPCR 12-13 g/d and Salb 2-2.2. During her visit, she as found to have Hb of 6.1 and repeat kidney US showed that she has perinephric hematoma. She was then admitted at [facility] and then transferred to [facility].  Prednisone was increased to 60 mg daily and then tapered, and now on 30 mg per day. Initial plan was to proceed with Eurolupus given progression of kidney disease and ongoing activities despite high dose CellCept but would hold off until she has completed the course of Abx. Of note, the patient also has Cystatin C of 3.2 while creatinine of 1.03. This is more consistent with what has been shown in a kidney Bx.   - Continue Cellcept 1500 mg twice daily and Prednisone 30 mg daily  - OB-GYN recommend the patient to contact outside facility for ovopreservation-> talked to her today, she is not interested in and OK to proceed with Cytoxan if indicated  - Continue Jardaince 10 mg per day  - Check cystatin C tomorrow  - Pending repeat DS DNA, C3, C4  - Would hold off on Cytoxan until at least complete course of Abx  #Hypertension; uncontrolled  On Amlodipine 10 mg ,Carvedilol 25mg bd ,hydralazine 50 mg TID. Was on lisinopril 40 mg daily but now switched to losartan (plan Entresto by Card) and Labetalol PRN. Uncontrolled blood pressure likely driven from volume overloaded.  Torsemide 20 mg was added yesterday but we will increase the dose today to 40 mg as due to persistent volume overload.  - Increase torsemide to 40 mg/day today  # NAGMA  Likely due to renal disease. W  -Continue sodium bicarb 1300mg po TID  # LLL pneumonia    ID is ok to start Cytoxan but would like to hold off until she complete the course of Abx (8/25/23).   # Cardiomyopathy   Followed by cardiology, neurology team plan to switch to  "Entresto.    Recommendations were communicated to primary team via note and verbal.     Argenis Kamara MD   Division of Renal Disease and Hypertension  [facility] Web Console    Interval History :   Nursing and provider notes from last 24 hours reviewed.  In the last 24 hours Milly Carroll has no acute event overnight. I/O was inaccurate. I ordered strict I/Os yesterday but nothing was charted and no urine hat in the bathroom. Spoke to unit nurse today about important of UOP collection.  Overall, the patient is feeling okay, we again discussed about Cytoxan and recommendation from OB/GYN regarding it preservation in which the patient is not interested in at this time.  I also spoke to Dr. Menendez, her outside nephrologist about the timing of Cytoxan in which I am worried about risk of worsening infection since she just has PNA and not yet complete the course of antibiotic. Lab this morning came back with creatinine slightly increased from 0.94 to 1.03, bicarb 15. Serum albumin (added is 3.0 from 2.2). Her swelling is slightly improved. No weight was recorded.     Review of Systems:   I reviewed the following systems:  Negative except as mentioned above.  Physical Exam:   I/O last 3 completed shifts:  In: 180 [P.O.:180]  Out: -    BP (!) 148/101 (BP Location: Left arm)   Pulse 107   Temp 98  F (36.7  C) (Oral)   Resp 22   Ht 1.575 m (5' 2\")   Wt 60 kg (132 lb 4.4 oz)   LMP 05/29/2023 (Approximate)   SpO2 100%   BMI 24.19 kg/m       GENERAL APPEARANCE: alert, NAD  EYES:  no scleral icterus, pupils equal  PULM: lungs: crackles to auscultation bilaterally but improved, equal air movement, no clubbing  CV: regular rhythm, normal rate, no rub     -edema: 1-2+   GI: soft, non tender, non-distended, bowel sounds are nl  INTEGUMENT: no cyanosis, no rash  NEURO:  no asterixis     Labs:   All labs reviewed by me  Electrolytes/Renal -   Recent Labs   Lab Test 08/20/23  0550 08/19/23  0622 08/18/23  1406 " 08/18/23  0726    139  --  143   POTASSIUM 4.6 4.2  --  3.6   CHLORIDE 113* 116*  --  117*   CO2 15* 14*  --  14*   BUN 25.6* 26.8*  --  30.9*   CR 1.03* 0.94  --  0.88   GLC 90 96  --  84   BISI 8.6 8.3*  --  8.2*   MAG 1.9 2.3 1.6*  --    PHOS 3.5 3.3  --  3.1       CBC -   Recent Labs   Lab Test 08/20/23  0550 08/19/23  0622 08/18/23  0726   WBC 6.4 4.4 4.7   HGB 9.0* 8.6* 8.7*   * 91* 105*       LFTs -   Recent Labs   Lab Test 08/20/23  0550 08/12/23  0819 08/10/23  1810 07/10/23  1454 01/26/23  0656   ALKPHOS  --  94 122*  --  232*   BILITOTAL  --  0.5 0.4  --  0.3   ALT  --  23 <5  --  10   AST  --  79* 24  --  49*   PROTTOTAL  --  5.3* 6.3*  --  7.7   ALBUMIN 3.0* 2.1* 2.8*   < > 1.7*    < > = values in this interval not displayed.       Iron Panel -   Recent Labs   Lab Test 07/10/23  1454 06/25/20  0656 12/27/17  1021 11/22/17  0858   IRON 15*  --  99 20*   IRONSAT 9*  --  32 5*   GABRIEL 1,079*   < >  --   --     < > = values in this interval not displayed.         Imaging:  All imaging studies reviewed by me.     Current Medications:   amLODIPine  10 mg Oral Daily    amoxicillin-clavulanate  1 tablet Oral Q12H ENOCH (08/20)    artificial saliva  2 spray Swish & Spit 4x Daily    carvedilol  25 mg Oral BID w/meals    cholecalciferol  10 mcg Oral Daily    empagliflozin  10 mg Oral Daily    ferrous sulfate  325 mg Oral Daily with breakfast    folic acid  1 mg Oral Daily    hydrALAZINE  50 mg Oral Q8H    isosorbide mononitrate  60 mg Oral Daily    losartan  50 mg Oral Daily    mycophenolate  1,500 mg Oral BID    pantoprazole  40 mg Oral QAM AC    potassium chloride ER  20 mEq Oral BID    predniSONE  30 mg Oral Daily    sodium bicarbonate  1,300 mg Oral TID    sodium chloride (PF)  3 mL Intracatheter Q8H    spironolactone  25 mg Oral Daily    sulfamethoxazole-trimethoprim  1 tablet Oral Daily    torsemide  20 mg Oral Daily    vancomycin  125 mg Oral BID       Argenis Kamara MD

## 2023-08-20 NOTE — PLAN OF CARE
Time of care: 2300-0730    23 y.o. female admitted with hemoptysis. Full code and gold 9 is primary. No isolation, regular diet, SBA, PIV, denies pain, VS q4, no BG, VSS on RA. Hard BP's and there is an order to notify above 180 systolic. Pt is alert and oriented and able to make needs known. Continue to monitor for changes.       Goal Outcome Evaluation: Ongoing, progressing    Plan of Care Reviewed With: patient    Overall Patient Progress: no change     normal...

## 2023-08-21 NOTE — PROGRESS NOTES
Nephrology Progress Note  08/21/2023         Assessment & Recommendations:   Milly Carroll is a 23 year old year old female with Hx of advanced LN and recent PNA and perinephric hematoma who was transferred from  for further care.     # Lupus nephritis ,class IV/V  # Likely CKD stage 4 based on cystatin C  # Poor medical compliance  # Allergic Rxn with RTX  # Cystatin C discrepancy-> need repeat as she is in inflammatory state and may falsely increase Cystatin C but I do believe that her true GFR is less than 60 given finding o Bx  She has been followed by Dr. Purvis.  The patient was initially diagnosed with SLE when she was 7 years old: symptoms included fever, muscle ache and rashes.  She was found to have +DsDNA, +RNP, +ribosomal P ab . Per Dr. Purvis's note, She has most recently being treated with Cellcept 1500 mg BID, Plaquenil 200 mg BID and prednisone 5 mg daily. She was on Benlysta infusion in 2019 that was later switched to Benlysta subcutaneous in 2021. She later did not receive Benlysta.  She was treated with Rituximab since 2013 but she developed anaphylactic reaction during Rituximab infusion in 12/2019.  She has had persistent hematuria and proteinuria since 2021. She has had on and off documented proteinuria which is 0.3-0.7 g/g since 2013.  She has Kidney BX in 2022 which showed class III and IV LN. She was treated with CellCept 1500 mg BID but then was non-compliant to this. She was then moved to [place] and stopped all her meds and just came back to [place] in summer 2023. She was admitted for C.diff diarrhea. She has AAKI with Cr of 1.58. At that time, her DS DNA was 1446. She was started on solumedrol 60 mg daily x 2 days  with prednisone tapered. CellCept was resumed at dose 500 mg BID. She had another kidney Bx on 8/2/23 (No EM sample), result showed Class IV with 2% active crescents, 50% glomerular sclerosis, severe IFTA (70-80%)and concern for class V lupus on bx done 8/23. (class III in  6/22, < 5% IFTA). She was then seen by Dr. Purvis on 8/10/23. Noted progressive increase in Cr 0.8-0.9 from baseline of 0.5-0.6 in 2022 and progressive proteinuria and hypoalbuminemia, UPCR 12-13 g/d and Salb 2-2.2. During her visit, she as found to have Hb of 6.1 and repeat kidney US showed that she has perinephric hematoma. She was then admitted at [facility] and then transferred to [facility].  Prednisone was increased to 60 mg daily and then tapered, and now on 30 mg per day. Initial plan was to proceed with Eurolupus given progression of kidney disease and ongoing activities despite high dose CellCept but would hold off until she has completed the course of Abx. Of note, the patient also has Cystatin C of 3.2 while creatinine of 1.03. This is more consistent with what has been shown in a kidney Bx.     - Continue Cellcept 1500 mg twice daily and Prednisone 30 mg daily  - OB-GYN recommend the patient to contact outside facility for ovopreservation. She is not interested having kids. She would like to proceed with cytoxan treatment. This treatment could be arranged by her primary nephrologist outpatient.   - Continue Jardaince 10 mg per day  - Pending repeat DS DNA, C3, C4  - Would hold off on Cytoxan until at least complete course of Abx      #Hypertension; uncontrolled  Uncontrolled blood pressure likely driven by volume overloaded.    --Amlodipine 10 mg   --Carvedilol 25mg bd  --hydralazine 50 mg TID  --losartan (plan Entresto by Card) and Labetalol PRN.   --Torsemide 40 mg 40         # NAGMA  Likely due to renal disease.   -Continue sodium bicarb 1300 mg po TID      # LLL pneumonia    ID is ok to start Cytoxan but would like to hold off until she complete the course of Abx (8/25/23).     # Cardiomyopathy   Followed by cardiology, neurology team plan to switch to Entresto.    Recommendations were communicated to primary team via note and verbal.     Disp; pt should follow up with her primary nephrology.  "    Bhumika Bravo MD   Division of Renal Disease and Hypertension  [facility]  [employer]    Interval History :   Nursing and provider notes from last 24 hours reviewed.  Pt has flat affect. She is not interested in ovo-perservation. She would like to proceed with cytoxan treatment. She denies any HA, N/V, or abd pain. LE swelling is minimal.     Review of Systems:   I reviewed the following systems:  Negative except as mentioned above.  Physical Exam:   I/O last 3 completed shifts:  In: 180 [P.O.:180]  Out: 600 [Urine:600]   /88 (BP Location: Left arm)   Pulse 113   Temp 98  F (36.7  C) (Oral)   Resp 16   Ht 1.575 m (5' 2\")   Wt 53.8 kg (118 lb 9.7 oz)   SpO2 99%   BMI 21.69 kg/m       GENERAL APPEARANCE: alert, NAD  EYES:  no scleral icterus, pupils equal  PULM: lungs: crackles to auscultation bilaterally but improved, equal air movement, no clubbing  CV: regular rhythm, normal rate, no rub     -edema: +   GI: soft, non tender, non-distended, bowel sounds are nl  INTEGUMENT: no cyanosis, no rash  NEURO:  no asterixis     Labs:   All labs reviewed by me  Electrolytes/Renal -   Recent Labs   Lab Test 08/21/23  0446 08/20/23  0550 08/19/23  0622    138 139   POTASSIUM 4.5 4.6 4.2   CHLORIDE 108* 113* 116*   CO2 17* 15* 14*   BUN 29.5* 25.6* 26.8*   CR 1.22* 1.03* 0.94   GLC 96 90 96   BISI 8.8 8.6 8.3*   MAG 1.7 1.9 2.3   PHOS 3.5 3.5 3.3       CBC -   Recent Labs   Lab Test 08/21/23  0446 08/20/23  0550 08/19/23  0622   WBC 10.1 6.4 4.4   HGB 10.2* 9.0* 8.6*   * 107* 91*       LFTs -   Recent Labs   Lab Test 08/20/23  0550 08/12/23  0819 08/10/23  1810 07/10/23  1454 01/26/23  0656   ALKPHOS  --  94 122*  --  232*   BILITOTAL  --  0.5 0.4  --  0.3   ALT  --  23 <5  --  10   AST  --  79* 24  --  49*   PROTTOTAL  --  5.3* 6.3*  --  7.7   ALBUMIN 3.0* 2.1* 2.8*   < > 1.7*    < > = values in this interval not displayed.       Iron Panel -   Recent Labs   Lab Test 07/10/23  1454 " 06/25/20  0656 12/27/17  1021 11/22/17  0858   IRON 15*  --  99 20*   IRONSAT 9*  --  32 5*   GABRIEL 1,079*   < >  --   --     < > = values in this interval not displayed.         Imaging:  All imaging studies reviewed by me.     Current Medications:   amLODIPine  10 mg Oral Daily    amoxicillin-clavulanate  1 tablet Oral Q12H ENOCH (08/20)    artificial saliva  2 spray Swish & Spit 4x Daily    carvedilol  25 mg Oral BID w/meals    cholecalciferol  10 mcg Oral Daily    empagliflozin  10 mg Oral Daily    ferrous sulfate  325 mg Oral Daily with breakfast    folic acid  1 mg Oral Daily    hydrALAZINE  50 mg Oral Q8H    isosorbide mononitrate  60 mg Oral Daily    mycophenolate  1,500 mg Oral BID    pantoprazole  40 mg Oral QAM AC    potassium chloride ER  20 mEq Oral BID    predniSONE  30 mg Oral Daily    sacubitril-valsartan  1 tablet Oral BID    sodium bicarbonate  1,300 mg Oral TID    sodium chloride (PF)  3 mL Intracatheter Q8H    spironolactone  25 mg Oral Daily    sulfamethoxazole-trimethoprim  1 tablet Oral Daily    torsemide  20 mg Oral BID    vancomycin  125 mg Oral BID       Bhumika Bravo MD

## 2023-08-21 NOTE — PROGRESS NOTES
"[facility]  Neurology Consultation - Progress Note    Patient Name:  Milly Carroll  Date of Service:  August 21, 2023    Subjective:    - No acute events overnight  - Patient feels well this morning and has no current complaints    Objective:    Vitals: BP (!) 151/93   Pulse 104   Temp 98  F (36.7  C) (Oral)   Resp 16   Ht 1.575 m (5' 2\")   Wt 49.1 kg (108 lb 4.8 oz)   SpO2 99%   BMI 19.81 kg/m    General: Lying in bed, NAD  Head: Atraumatic, normocephalic   Cardiac: Appears well-perfused  Extremities: Swelling (pt affirms is chronic) around the R ankle  Neuro:  Mental status: Awake, alert, oriented to person, place, mo/year, and situation. Able to provide a fair history, answer questions, and follow commands w/o issue. Speech is clear and fluent, no concern for aphasia. No concern for neglect  Cranial nerves: PERRL, conjugate gaze, EOMI with possible vertical nystagmus, facial sensation intact, no facial asymmetry, hearing intact to conversation, no dysarthria, tongue is midline  Motor: Diminished bulk in the BLE. Normal tone. No abnormal movements noted. Strength is as follows    R L  Shoulder Ab 5 5  Elbow Flex 5 5  Elbow Ext 5 5    5 5  Hip Flex 5 5  Knee Flex 5 5  Knee Ext 5 5  Dorsiflex 3- 5  Plantarflex 5 5  Eversion 3+ 5  Inversion 5 5     Reflexes: 2+ and symmetric in the bilateral biceps and brachioradialis. L>R reflexes in the BLE but w/o spread  Sensory: Dull to light touch and pinprick in anterolateral R shin, dorsum of foot, and interweb space.  Otherwise intact in distal extremities x4, and on the plantar and lateral/medial aspects of her foot.    Coordination: FNF and HS without ataxia or dysmetria  Gait: Deferred    Pertinent Investigations:    I have personally reviewed most recent and pertinent labs, tests, and radiological images.     Assessment  Milly Carroll is a 23yr old female with an unfortunate history of SLE with multiple complications " including lupus cerebritis with seizure, nephritis, cardiomyopathy, with course complicated by multiple flares in the setting of reported non-adherence and limited health literacy. On my interview, Ms Carroll struggles to recall basic information about her history and tells an inconsistent story which changes with new information. To some extent, I can attribute this to her complex medical conditions with multiple, frequent care contacts in multiple systems, and limited health literacy in her and her family. However, in c/o lupus cerebritis I do wonder if there may be some underlying cognitive dysfunction. 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 The latter is unlikely given the patient has had her neuropathy for months and in the case of a vasculitic process would have expected this to worsen and involve more nerves in this period of time. Regardless, rheumatology has recommended therapy that would treat for such a vasculitis and so the patient will be covered either way. For further clarification, could consider an EMG as an outpatient with Neurology follow-up    Recommendations:  - Continue PT/OT  - MRI Brain w/ and w/o contrast (would prefer inpatient if possible)  - Consider Neuropsych testing outpatient  - Consider EMG outpatient  - Neurology will sign off. Please contact us again should other issues arise    Patient was seen and discussed with Dr Annabella Mcclure MD  Neurology PGY4

## 2023-08-21 NOTE — PROGRESS NOTES
[facility]   Cardiology Consults  Progress Note     ASSESSMENT/PLAN:  23 year old female w/ PMH  HFimpEF, pancreatic insufficiency in chronic pancreatitis, SLE c/b lupus cerebritis/class IV lupus nephritis with multiple associated complications from flares req. frequent recent hospitalizations, presenting as transfer from [facility] for higher level of care. Cardiology was consulted to assist with HF management. Patient initially presented with signs/symptoms of decompensated HF in setting of lupus flare. TTE (8/11) demonstrated new reduction in LV systolic function (LVEF 20-25%), which improved to 40% on CMR (8/16), after initiation of SSS. CMR negative for late enhancement that would be consistent with MI, fibrosis, or infiltrative disease. Personal review of TTE 8/11 appears that apical-mid wall segments are hypokinetic, while the basal segments have preserved contractility. Clinical scenario most likely represents stress CM.      She has been started on GDMT as tolerated, yet restricted based on renal function. Further ischemic evaluation was deferred due to renal compromise, prior CTA chest without coronary calcification, CMR findings, and lack of risk factors, but can consider in future. As stress CM is a transient disorder, patients are started on medical regimen for HFrEF until there is evidence in recovery of systolic function. At this time would recommend to continue titration of GDMT, treating underlying stressors, and optimize volume status/BP control. Hold on CTA/LHC for now.      #HFimpEF 2/2 Stress CM (LVEF >40%; 20-25% prior)  - Preload: Appears hypervolemic, on torsemide 20mg BID - would increase to 40 BID today   - Afterload: BP goal <130; titrate HTN medications and GDMT as below  - Contratility: Beta blockade   - GDMT  - Beta Blockade: Coreg 25mg BID -  - ACE/ARB/ARNI: Start Entresto 24/26mg BID - can up titrate for additional BP control   - MRA: Spironolactone 25 mg  daily   - SGLT-2i: Continue Empagliflozin 10 mg daily   - Other therapies: Currently on hydralazine 50mg TID, IMDUR 60mg daily  - Ischemic evaluation - Defer on CTA/LHC at this time  - Maintain Electrolyte goals: K > 4.0, Mg > 2.0     Plan of care discussed with Dr. Villela, who agrees with above plan.     Thank you for consulting the cardiovascular services at the [facility].     Mauri Concepcion MD   Cardiology fellow      Interval history:    BP remains uncontrolled, planning to start entresto today  Unclear if cystatin C is reflective of renal status, rechecking today         Brief HPI:   23 year old female w/ PMH  HFimpEF, pancreatic insufficiency in chronic pancreatitis, SLE c/b lupus cerebritis/class IV lupus nephritis with multiple associated complications from flares req. frequent recent hospitalizations, presenting as transfer from [facility] for higher level of care. Cardiology was consulted to assist with HFimpEF management.      Patient hospitalized at [facility] 8/11-8/17/23 with AHRF, decompensated HF, blood loss anemia req. 2 units pRBC, perinephic hematoma s/p coil embolization, CAP, hypertensive emergency, hemoptysis. Unifying diagnosis was concerning for lupus flare and rheumatology has been treating with SSS. No pressor or inotrope requirements.      TTE was completed which demonstrated LVEF 20-25% with mild MR, moderate TR, and moderate pHTN; findings new from previous TTE 1/24/23. ECG c/w QT-c prolongation 486 and nons-specific TWI. Troponin unremarkable; no CP. Cardiology was consulted and initiated comprehensive evaluation for cardiomyopathy. Ischemic eval w/ CTA deferred due to renal fxn. Recommended optimization of GDMT, holding plaquenil, and CMR. CMR described moderate global LV hypokinesis (LVEF 40%), moderate MR, low normal RVEF 52%, and no late enhancement consistent with MI, fibrosis, or infiltrative disease.Presentation was thought to be consistent with stress CM.  Serial ECG's showed improvement to prolonged Qtc.       Interval History:    Physical Exam:  Temp:  [96.8  F (36  C)-98.3  F (36.8  C)] 96.8  F (36  C)  Pulse:  [] 85  Resp:  [16-22] 20  BP: (148-182)/(101-117) 176/116  SpO2:  [99 %-100 %] 99 %    I/O:    Wt:   Wt Readings from Last 5 Encounters:   08/20/23 53.8 kg (118 lb 9.7 oz)   08/16/23 57 kg (125 lb 11.2 oz)   08/10/23 52.3 kg (115 lb 3.2 oz)   07/26/23 46 kg (101 lb 6.4 oz)   07/10/23 48.4 kg (106 lb 11.2 oz)       GEN: NAD  Pulm: basilar crackles  Cardiac: JVP 10,  no murmurs  Vascular: +1 lower extremity edema and palpable pulses  GI: soft, non distended  Neuro: CN II-XII grossly intact    Medications:   amLODIPine  10 mg Oral Daily    amoxicillin-clavulanate  1 tablet Oral Q12H ENOCH (08/20)    artificial saliva  2 spray Swish & Spit 4x Daily    carvedilol  25 mg Oral BID w/meals    cholecalciferol  10 mcg Oral Daily    empagliflozin  10 mg Oral Daily    ferrous sulfate  325 mg Oral Daily with breakfast    folic acid  1 mg Oral Daily    hydrALAZINE  50 mg Oral Q8H    isosorbide mononitrate  60 mg Oral Daily    losartan  50 mg Oral Daily    mycophenolate  1,500 mg Oral BID    pantoprazole  40 mg Oral QAM AC    potassium chloride ER  20 mEq Oral BID    predniSONE  30 mg Oral Daily    sodium bicarbonate  1,300 mg Oral TID    sodium chloride (PF)  3 mL Intracatheter Q8H    spironolactone  25 mg Oral Daily    sulfamethoxazole-trimethoprim  1 tablet Oral Daily    torsemide  20 mg Oral BID    vancomycin  125 mg Oral BID         Labs:   CMP  Recent Labs   Lab 08/21/23  0446 08/20/23  0550 08/19/23  0622 08/18/23  1406 08/18/23  0726    138 139  --  143   POTASSIUM 4.5 4.6 4.2  --  3.6   CHLORIDE 108* 113* 116*  --  117*   CO2 17* 15* 14*  --  14*   ANIONGAP 14 10 9  --  12   GLC 96 90 96  --  84   BUN 29.5* 25.6* 26.8*  --  30.9*   CR 1.22* 1.03* 0.94  --  0.88   GFRESTIMATED 64 78 88  --  >90   BISI 8.8 8.6 8.3*  --  8.2*   MAG 1.7 1.9 2.3 1.6*  --     PHOS 3.5 3.5 3.3  --  3.1   ALBUMIN  --  3.0*  --   --   --      CBC  Recent Labs   Lab 08/21/23  0446 08/20/23  0550 08/19/23  0622 08/18/23  0726   WBC 10.1 6.4 4.4 4.7   RBC 3.67* 3.32* 3.13* 3.29*   HGB 10.2* 9.0* 8.6* 8.7*   HCT 33.2* 29.4* 28.1* 29.1*   MCV 91 89 90 88   MCH 27.8 27.1 27.5 26.4*   MCHC 30.7* 30.6* 30.6* 29.9*   RDW 19.0* 18.9* 18.9* 18.5*   * 107* 91* 105*     INRNo lab results found in last 7 days.  Arterial Blood GasNo lab results found in last 7 days.      Transthoracic echocardiogram:   8/11/23  The visual ejection fraction is 20-25%.  The left ventricle is mildly dilated.  There is mild (1+) mitral regurgitation.  There is mild to moderate (1-2+) tricuspid regurgitation.  Moderate (46-55mmHg) pulmonary hypertension is present.  The above are new findings compared with study in 1-23  Contrast was used without apparent complications.     8/14/23  The left ventricle is borderline dilated.  Biplane LVEF is 45%.  There is mild global hypokinesia of the left ventricle.  NOTE: Diastology paramaters and stain not performed. Compared to echo at OSH,  the LVEF and MR changes are new. Serial echo cardiogram and possible MRI may  be useful  There is moderate (2+) mitral regurgitation.  The aortic root is normal size.  Borderline/mild dilated pulmonary artery  Trivial posterior pericardial effusion     CMR:  2023-Aug-16 14:50:42     SUMMARY  1. The LV is mildly dilated with normal wall thickness. The global systolic function is moderately reduced.  The LVEF is 40%. There is moderate global hypokinesis of the left ventricle.   2. The RV is normal in cavity size. The global systolic function is low normal. The RVEF is 52%.   3. Both atria are normal in size.  4. There is mild to moderate mitral regurgitation by qualitative assessment.   5. There is no evidence of myocardial edema on T2 weighted imaging. There is no evidence of myocardial iron  overload.   5. Late gadolinium enhancement imaging  shows no MI, fibrosis or infiltrative disease.   6. A small circumferential pericardial effusion is noted.      CONCLUSIONS:      1. Mildly dilated left ventricle with moderately reduced systolic function.  2. No late enhancement to suggest prior infarct, inflammation or infiltration.      Catheterization(s):   None

## 2023-08-21 NOTE — PLAN OF CARE
Shift Note 1930 - 2300    Neuro: A&Ox4. R. Foot drop  Cardiac: VSS. SBP goal <180   Respiratory: Sating >94% on RA. Coarse in the bases.  GI/: Adequate urine output, UA sent. BM X1  Diet/appetite: Tolerating reg diet. Eating well.  Activity:  Independent, up to chair and in halls.  Pain: At acceptable level on current regimen.   Skin: No new deficits noted.  LDA's: PIV SL    Plan: Continue with POC. Notify primary team with changes.        Goal Outcome Evaluation:    Problem: Plan of Care - These are the overarching goals to be used throughout the patient stay.    Goal: Optimal Comfort and Wellbeing  Outcome: Progressing  Intervention: Provide Person-Centered Care  Recent Flowsheet Documentation  Taken 8/20/2023 1950 by Ling Johns RN  Trust Relationship/Rapport:   care explained   questions encouraged   choices provided     Problem: Pain Acute  Goal: Optimal Pain Control and Function  Outcome: Progressing  Intervention: Prevent or Manage Pain  Recent Flowsheet Documentation  Taken 8/20/2023 1950 by Ling Johns RN  Medication Review/Management: medications reviewed     Problem: Hypertension Comorbidity  Goal: Blood Pressure in Desired Range  Outcome: Progressing  Intervention: Maintain Blood Pressure Management  Recent Flowsheet Documentation  Taken 8/20/2023 1950 by Ling Johns RN  Medication Review/Management: medications reviewed

## 2023-08-21 NOTE — PLAN OF CARE
PT 7D cancel/defer      Physical Therapy: Orders received. Chart reviewed and discussed with care team.? Physical Therapy not indicated due to independent mobility status. Pt denies concern for discharge or need for IP PT. Pt states they have an AFO in [place] but denies using it. Pt may benefit from outpatient PT to address chronic R foot drop. Defer discharge recommendations to the medical team, anticipate return to prior living arrangement once medically ready.? Will complete orders.

## 2023-08-21 NOTE — PROGRESS NOTES
[facility]    Medicine Progress Note - Hospitalist Service, GOLD TEAM 9    Date of Admission:  8/17/2023    Assessment & Plan     Milly Carroll is a 23 year old female w/ PMH lupus C/B lupus flares, cerebritis, nephritis, CHRISTA also past medical history hemoptysis, malignant hypertension, orthopnea and acute respiratory failure with hypoxia, diastolic HF (EF 45% on 8/14, previously 20-25% on 8/11), QTc prolongation, recent LLL pneumonia, recent perinephritic hematoma status post IR embolization (8/11/2023), chronic thrombocytopenia, electrolyte disturbances admitted on 8/17/2023. She was hospitalized at [facility] 8/11/2023 to 8/17/2023 and transfers today for higher level of care. (See also HPI)    Today's Plan:  - plan to continue Augmentin until 8/25.  - Gyn consulted for oocyte cryopreservation prior to starting cytoxan.-- unable to do so inpatient and will need to follow up with IVF clinic  - Begin Entresto today following 36 hours washout period. Monitor BP  - continue antihypertensives. May need to decrease torsemide  - continue vancomycin po for CDI  - Will reach out to cardiology, nephrology and rheumatology regarding planning for dispo  - Neurology considering EMG while admitted.    Lupus c/b lupus flares  Lupus cerebritis, active crescentic lesions   Hx medication non-adherence  Follow with [facility] rheumatology last seen 7/26/23.  Dx at age 7 initially felt to be discoid but later developed systemic signs.  Treated as a child with Cytoxan (2013) and later with rituximab with associated rituximab anaphylactic reaction.  Then treated with Benlysta infusion. Inpatient started IV Methylprednisolone 500mg daily x 4 days then transitioned to oral prednisone.   - consult to rheumatology  - follow up C3, C4, CRP, ESR  - Lupus-focused meds:   -Plaquenil held due to new onset cardiomyopathy, continue to hold  - continue prednisone 30mg daily  - MMF 1500mg BID  -  PPI while on prednisone  - Bactrim PJP ptx  - continue folate supplementation  -We will need outpatient follow-up with ophthalmology for eye exam in the setting of long-term Plaquenil  - SW consult given compliance and environmental concerns  - Rheumatology consulted, will await formal recs    Class IV lupus nephritis  CHRISTA, nonoliguric  Follows with Dr. Purvis at [facility] nephrology. Had biopsy 6/2022, focal proliferative and membranous lesions (class III and V) with minimal activity and no significant chronicity.  Biopsy 8/2 showing class IV diffuse sclerosing focal pallor for day of lupus nephritis, tubulointerstitial immune complex deposition, severe I FTA, active crescents and glomeruli sclerosed.  Baseline creatinine 0.6-0.8. Creatinine this admission peaked at 1.28. Did have IV contrast on 8/10 and additionally notably had postbiopsy bleeding requiring IR coil embolization of left renal artery for pseudoaneurysm. Creatinine  improving and recently 1s.   - active lupus nephritis, treatment as above.   -  nephrology for resistant htn  - sodium bicarbonate 1300mg tid  - trend BMP, weights, I&O    Perinephritic hematoma with pseudoaneurysm s/p IR embolization (8/11/2023)  Renal bx (8/2/23)  Right groin hematoma  IR embolization of left renal artery inferior pole pseudoaneurysm by IR. Interval improvement in renal function.  Patient noted on 8/14/2023 to have right groin pain at the vascular access site with ultrasound showing right groin hematoma and no pseudoaneurysm  -Monitor right groin site  -Continue to monitor for bleeding  - pain mng:   - dilaudid oral 1mg q4h prn   - dilaudid IV 0.2mg q3h prn   - APAP 650mg q6h prn   - topical icyhot    Doroteo Hemoptysis w/o clot, improved   Acute respiratory failure with hypoxia  Orthopnea  No overt hemoptysis since admission 8/11/2023. In the setting of receiving transfusions and TXA x7 days. CT C/A/P w/ IV con 8/11 w/ 5.5 cm left perinephritic hematoma and 0.3 cm  hyperenhancing focus of fluid LL pole suspicious for pseudoaneurysm.  Admission work-up also concerning for question of GI bleed in the setting of melena. Hemoptysis improved since admission to [facility]  -Low threshold to involve interventional pulmonology if any clinical worsening  - will hold lovenox for now.    Cardiomyopathy and Diastolic HF (EF 45% on 8/14, previously 20-25% on 8/11)  QTc prolongation, improved   MRI Cardiac Myocarditis protocol (8/14) with interval improvement of flow EF that was seen on 8/11/23.  Cardiac work-up showing persistent LV dilation no evidence of infarct.  EKG w/ interval improvement in QTc and T wave inversions that were previously noted.  Received furosemide 8/11, discontinued thereafter.  - cardiology consulted, appreciate recommendations.  - consult consult to follow here  - med mng:  -Imdur 60mg daily  -Metoprolol XL to Carvedilol 12.5mg BID on 8/12, increased to 25 mg BID on 8/16  - Lisinopril held on 8/13 due to increased cr, resumed on 8/15- 40mg daily    - Hydralazine started  on 8/13, dose increased 8/17 (50mg TID)  - amlodipine 10mg daily added on 8/13 for BP control  - spironolactone 25mg daily  - test claim Entresto/SGLT2 inhibitors $0/month; will not initiate until renal function   - clonidine was stopped at [facility] d/t bradycardia   - consider adding torsemide for resistant hypertensin    Malignant hypertension- improved  SBP>200 on admission.  Initially started on clonidine which was discontinued on 8/12 due to bradycardia.  Nitroglycerin drip on 8/12 was transitioned same-day to Imdur. Patient's BP have been sbp<180 since admission overnight.    LLL pneumonia  Acute hypoxic respiratory failure-resolved  PTA receiving a course of Augmentin.  Initially on Vanco and Zosyn on 8/11, vancomycin discontinued inpatient.  LLL consolidation noted per admission CTA and per symptoms on admission to [facility] along with interstitial edema and trace bilateral pleural effusions. IV zosyn started  8/11 at [facility].   - sputum culture/gram stain have been ordered, not obtained, has had non-productive cough   - Switched from IV Vanc/zosyn to Augmentin on 8/19  - low threshold to consult pulmonary if repeat hemoptysis or respiratory decline    Chronic thrombocytopenia  Blood loss anemia   Occurs in the setting of perinephric hematoma as well as hemoptysis. Status post 2 units PRBC 8/11/2023. Hemoglobin stable ~8. Plt stable in the 110-140s.  -Trend CBC and vitals  - completed TXA neb per pulmonary x 7 day (8/17)  - continue oral iron    Right >left upper and lower extremity edema  Paresis of right 2nd digit   Noted on exam.  RUE PIV present. Patient right index finger stuck in extension, able to painlessly flex passively. Cardiolipin Abx neg 2019  - hold anticoagulants in the setting of recent significant bleed, can consider if continues to be stable   - RLE  and RUE duplex 8/17/23 neg for DVT    Left shoulder, scapular pain-improved  C/o left shoulder pain at SD w/ intact ROM. Tender to palpation over her muscle over her shoulder, scapular area.   - follow up rheum input  -xray shoulder without acute finding  -tylenol prn     Headaches  Ongoing since admission and initially generalized, on admission to [facility] is right frontal, has been responsive to APAP and dilaudid  - continue APAP and dilaudid   - treat underlying HTN and lupus cares as above     Hypomagnesemia- improved  Hypokalemia -improved  In the setting of CHRISTA and diuresis, suspect renal losses  - continue KlorCon 20 meq BID  - trend BMP and Mg    Hx C. Diff (6/2023) -  oral vancomycin started 8/11 for c/f abdominal infection, continue for now ? 10 day course     Melena -resolved - continue to monitor    Hx pancreatic insufficiency 2/2 pancreatitis  Hx acute on chronic pancreatitis (2020)   Last followed with GI in 2020 for pancreatitis and multiple stones and debris in the main pancreatic duct and was recommended to be on Creon at that time  -Stools  currently normal, can consider Creon if concerns for pancreatic insufficiency and loose stools    Hx remote generalized tonic-clonic seizures seizures  Right foot drop   Patient was on Keppra as a child. No current PTA antiepileptic medications  - monitor  -Consult neurology if any neurologic changes    Hx CMV and EBV + (2022) - no hx of following with ID - consider repeat if felt to be contributory       Diet: Combination Diet Regular Diet Adult    DVT Prophylaxis: VTE Prophylaxis contraindicated due to recent hematoma  Leung Catheter: Not present  Lines: None     Cardiac Monitoring: None  Code Status: Full Code      Clinically Significant Risk Factors              # Hypoalbuminemia: Lowest albumin = 3 g/dL at 8/20/2023  5:50 AM, will monitor as appropriate     # Thrombocytopenia: Lowest platelets = 107 in last 2 days, will monitor for bleeding        # Chronic heart failure with reduced ejection fraction: last echo with EF <40%             # Financial/Environmental Concerns:           Disposition Plan      Expected Discharge Date: 08/23/2023      Destination: home with family  Discharge Comments: Dispo: home w/ family  Plan: Lisinopril washout, will complete Mon AM  Progress: PO Vanco & augmentin; torsemide to BID          SANIYA CHAMORRO MD  Hospitalist Service, GOLD TEAM 9  [facility]  Securely message with [employer] (more info)  Text page via [facility] Paging/Directory   See signed in provider for up to date coverage information  ______________________________________________________________________    Interval History   NoAION. This morning she is denying sob, carrillo, denies any nasal congestion, rhinnorhea, coughing, abd pain, nausea, emesis. No rashes or joint swelling. No HA, lightheadedness, visual impairment, auditory impairment, focal sensory or motor deficits.    Physical Exam   Vital Signs: Temp: 98.3  F (36.8  C) Temp src: Oral BP: 123/78 Pulse: 91   Resp: 16  SpO2: 99 % O2 Device: None (Room air)    Weight: 118 lbs 9.72 oz    General Appearance: A and O x3. Comfortable in bed  Respiratory: incspiratory rales on the lung bases. No rhonchi or wheezing  Cardiovascular: S1, S2, RRR, 4/6 systolic murmur on 2nd LICS  GI: NBS, soft, nontender  Skin: no rashe     Medical Decision Making       60 MINUTES SPENT BY ME on the date of service doing chart review, history, exam, documentation & further activities per the note.  Tests ORDERED & REVIEWED in the past 24 hours:  Tests personally interpreted in the past 24 hours:      Data     I have personally reviewed the following data over the past 24 hrs:    10.1  \   10.2 (L)   / 125 (L)     139 108 (H) 29.5 (H) /  96   4.5 17 (L) 1.22 (H) \       Imaging results reviewed over the past 24 hrs:   No results found for this or any previous visit (from the past 24 hour(s)).

## 2023-08-21 NOTE — PLAN OF CARE
Time of care: 2300-0730     23 y.o. female admitted with hemoptysis. Full code and gold 9 is primary. No isolation, regular diet, SBA, PIV, denies pain, VS q4, no BG, VSS on RA. Strict I&O, hard BP's and there is an order to notify above 180 systolic. Pt is alert and oriented and able to make needs known. Continue to monitor for changes.         Goal Outcome Evaluation: Ongoing, progressing     Plan of Care Reviewed With: patient     Overall Patient Progress: no change

## 2023-08-21 NOTE — PROGRESS NOTES
[facility]   Cardiology Consults  Progress Note      ASSESSMENT/PLAN:  23 year old female w/ PMH  HFimpEF, pancreatic insufficiency in chronic pancreatitis, SLE c/b lupus cerebritis/class IV lupus nephritis with multiple associated complications from flares req. frequent recent hospitalizations, presenting as transfer from [facility] for higher level of care. Cardiology was consulted to assist with HF management. Patient initially presented with signs/symptoms of decompensated HF in setting of lupus flare. TTE (8/11) demonstrated new reduction in LV systolic function (LVEF 20-25%), which improved to 40% on CMR (8/16), after initiation of SSS. CMR negative for late enhancement that would be consistent with MI, fibrosis, or infiltrative disease. Personal review of TTE 8/11 appears that apical-mid wall segments are hypokinetic, while the basal segments have preserved contractility. Clinical scenario most likely represents stress CM.      She has been started on GDMT as tolerated, yet restricted based on renal function. Further ischemic evaluation was deferred due to renal compromise, prior CTA chest without coronary calcification, CMR findings, and lack of risk factors, but can consider in future. As stress CM is a transient disorder, patients are started on medical regimen for HFrEF until there is evidence in recovery of systolic function. At this time would recommend to continue titration of GDMT, treating underlying stressors, and optimize volume status/BP control. Hold on CTA/LHC for now.      #HFimpEF 2/2 Stress CM (LVEF >40%; 20-25% prior)  - Preload: Appears mildly hypervolemic, on torsemide 20mg BID   - Afterload: BP goal <130; titrate HTN medications and GDMT as below  - Contratility: Beta blockade   - GDMT  - Beta Blockade: Coreg 25mg BID -  - ACE/ARB/ARNI: Start Entresto 24/26mg BID - can up titrate for additional BP control   - MRA: Spironolactone 25 mg daily   - SGLT-2i:  Continue Empagliflozin 10 mg daily   - Other therapies: Currently on hydralazine 50mg TID, IMDUR 60mg daily  - Ischemic evaluation - Defer on CTA/LHC at this time  - Maintain Electrolyte goals: K > 4.0, Mg > 2.0     Plan of care discussed with Dr. Villela, who agrees with above plan. Cardiology will sign off, please reach out for other questions.     Thank you for consulting the cardiovascular services at the [facility].      Mauri Concepcion MD   Cardiology fellow        Interval history:     BP remains uncontrolled, planning to start entresto today  Unclear if cystatin C is reflective of renal status, rechecking today            Brief HPI:   23 year old female w/ PMH  HFimpEF, pancreatic insufficiency in chronic pancreatitis, SLE c/b lupus cerebritis/class IV lupus nephritis with multiple associated complications from flares req. frequent recent hospitalizations, presenting as transfer from [facility] for higher level of care. Cardiology was consulted to assist with HFimpEF management.      Patient hospitalized at [facility] 8/11-8/17/23 with AHRF, decompensated HF, blood loss anemia req. 2 units pRBC, perinephic hematoma s/p coil embolization, CAP, hypertensive emergency, hemoptysis. Unifying diagnosis was concerning for lupus flare and rheumatology has been treating with SSS. No pressor or inotrope requirements.      TTE was completed which demonstrated LVEF 20-25% with mild MR, moderate TR, and moderate pHTN; findings new from previous TTE 1/24/23. ECG c/w QT-c prolongation 486 and nons-specific TWI. Troponin unremarkable; no CP. Cardiology was consulted and initiated comprehensive evaluation for cardiomyopathy. Ischemic eval w/ CTA deferred due to renal fxn. Recommended optimization of GDMT, holding plaquenil, and CMR. CMR described moderate global LV hypokinesis (LVEF 40%), moderate MR, low normal RVEF 52%, and no late enhancement consistent with MI, fibrosis, or infiltrative  disease.Presentation was thought to be consistent with stress CM. Serial ECG's showed improvement to prolonged Qtc.         Interval History:     Physical Exam:  Temp:  [96.8  F (36  C)-98.3  F (36.8  C)] 96.8  F (36  C)  Pulse:  [] 85  Resp:  [16-22] 20  BP: (148-182)/(101-117) 176/116  SpO2:  [99 %-100 %] 99 %     I/O:     Wt:       Wt Readings from Last 5 Encounters:   08/20/23 53.8 kg (118 lb 9.7 oz)   08/16/23 57 kg (125 lb 11.2 oz)   08/10/23 52.3 kg (115 lb 3.2 oz)   07/26/23 46 kg (101 lb 6.4 oz)   07/10/23 48.4 kg (106 lb 11.2 oz)         GEN: NAD  Pulm: basilar crackles  Cardiac: JVP 10,  no murmurs  Vascular: +1 lower extremity edema and palpable pulses  GI: soft, non distended  Neuro: CN II-XII grossly intact     Medications:   amLODIPine  10 mg Oral Daily    amoxicillin-clavulanate  1 tablet Oral Q12H ENOCH (08/20)    artificial saliva  2 spray Swish & Spit 4x Daily    carvedilol  25 mg Oral BID w/meals    cholecalciferol  10 mcg Oral Daily    empagliflozin  10 mg Oral Daily    ferrous sulfate  325 mg Oral Daily with breakfast    folic acid  1 mg Oral Daily    hydrALAZINE  50 mg Oral Q8H    isosorbide mononitrate  60 mg Oral Daily    losartan  50 mg Oral Daily    mycophenolate  1,500 mg Oral BID    pantoprazole  40 mg Oral QAM AC    potassium chloride ER  20 mEq Oral BID    predniSONE  30 mg Oral Daily    sodium bicarbonate  1,300 mg Oral TID    sodium chloride (PF)  3 mL Intracatheter Q8H    spironolactone  25 mg Oral Daily    sulfamethoxazole-trimethoprim  1 tablet Oral Daily    torsemide  20 mg Oral BID    vancomycin  125 mg Oral BID            Labs:   CMP          Recent Labs   Lab 08/21/23  0446 08/20/23  0550 08/19/23  0622 08/18/23  1406 08/18/23  0726    138 139  --  143   POTASSIUM 4.5 4.6 4.2  --  3.6   CHLORIDE 108* 113* 116*  --  117*   CO2 17* 15* 14*  --  14*   ANIONGAP 14 10 9  --  12   GLC 96 90 96  --  84   BUN 29.5* 25.6* 26.8*  --  30.9*   CR 1.22* 1.03* 0.94  --  0.88    GFRESTIMATED 64 78 88  --  >90   BISI 8.8 8.6 8.3*  --  8.2*   MAG 1.7 1.9 2.3 1.6*  --    PHOS 3.5 3.5 3.3  --  3.1   ALBUMIN  --  3.0*  --   --   --       CBC         Recent Labs   Lab 08/21/23  0446 08/20/23  0550 08/19/23  0622 08/18/23  0726   WBC 10.1 6.4 4.4 4.7   RBC 3.67* 3.32* 3.13* 3.29*   HGB 10.2* 9.0* 8.6* 8.7*   HCT 33.2* 29.4* 28.1* 29.1*   MCV 91 89 90 88   MCH 27.8 27.1 27.5 26.4*   MCHC 30.7* 30.6* 30.6* 29.9*   RDW 19.0* 18.9* 18.9* 18.5*   * 107* 91* 105*      INRNo lab results found in last 7 days.  Arterial Blood GasNo lab results found in last 7 days.        Transthoracic echocardiogram:   8/11/23  The visual ejection fraction is 20-25%.  The left ventricle is mildly dilated.  There is mild (1+) mitral regurgitation.  There is mild to moderate (1-2+) tricuspid regurgitation.  Moderate (46-55mmHg) pulmonary hypertension is present.  The above are new findings compared with study in 1-23  Contrast was used without apparent complications.     8/14/23  The left ventricle is borderline dilated.  Biplane LVEF is 45%.  There is mild global hypokinesia of the left ventricle.  NOTE: Diastology paramaters and stain not performed. Compared to echo at OSH,  the LVEF and MR changes are new. Serial echo cardiogram and possible MRI may  be useful  There is moderate (2+) mitral regurgitation.  The aortic root is normal size.  Borderline/mild dilated pulmonary artery  Trivial posterior pericardial effusion     CMR:  2023-Aug-16 14:50:42     SUMMARY  1. The LV is mildly dilated with normal wall thickness. The global systolic function is moderately reduced.  The LVEF is 40%. There is moderate global hypokinesis of the left ventricle.   2. The RV is normal in cavity size. The global systolic function is low normal. The RVEF is 52%.   3. Both atria are normal in size.  4. There is mild to moderate mitral regurgitation by qualitative assessment.   5. There is no evidence of myocardial edema on T2 weighted  imaging. There is no evidence of myocardial iron  overload.   5. Late gadolinium enhancement imaging shows no MI, fibrosis or infiltrative disease.   6. A small circumferential pericardial effusion is noted.      CONCLUSIONS:      1. Mildly dilated left ventricle with moderately reduced systolic function.  2. No late enhancement to suggest prior infarct, inflammation or infiltration.      Catheterization(s):   None

## 2023-08-22 NOTE — PROGRESS NOTES
Nephrology Progress Note  08/22/2023         Assessment & Recommendations:   Milly aCrroll is a 23 year old year old female  with history of advance lupus nephritis s/p biopsy on 8/2/2023 complicated by perinephric hematoma. Nephrology on consult for further management.     #Lupus nephritis, class IV/V  #CKD stage 4 based on cystatin C   -follows with Dr. Purvis; patient was initially diagnosed with SLE at age 7; symptoms included fever, muscle ache and rashes w/ +DsDNA, +RNP, +ribosomal P ab  -prior regimen included Cellcept 1500 mg BID, Plaquenil 200 mg BID and prednisone 5 mg daily; patient endorses compliance with regimen   -repeat kidney Bx on 8/2/23 (No EM sample), result showed Class IV with 2% active crescents, 50% glomerular sclerosis, severe IFTA (70-80%)and concern for class V lupus on bx done 8/23. (class III in 6/22, < 5% IFTA)   -s/p prednisone 60 mg daily now tapered to 30 mg per day, cellcept 1500 mg BID, holding plaquinel in setting of cardiomyopathy  -previously pending initiation of cytoxan in setting of Augmentin completion for LLL pneumonia; however ID consulted and approved initiation of cytoxan at this time  -team discussed options for ovopreservation; patient declined preservation prior to initiation  -will proceed with cytoxan 500 mg today given progression in disease as evidenced by biopsy despite compliance with high dose cellcept  -discontinue cellcept 1500 mg BID at this time given initiation of cytoxan   -continue prednisone 30 mg daily   -will discuss possibility of lupron injections with OBGYN as alterative for ovopreservation following first cytoxan dosing   -plan for cytoxan 500 mg [id number] x 6 cycles outpatient     #Rising creatinine   -creatinine 1.03>1.22>1.35 today; possibly in setting of over diuresis vs hypotension in setting of aggressive anti-hypertensive regimen   -consider decreasing torsemide as patient appears euvolemic vs discontinuing entresto and resuming losartan if  "creatinine continues to trend upwards     #Hypertension, uncontrolled   -blood pressures mildly hypotensive today  -recommend maintaining blood pressures >120/80 to assure adequate renal perfusion     #Metabolic Acidosis   -likely in setting of CKD4   -continue sodium bicarbonate 1300 mg TID     Recommendations were communicated to primary team via phone.     Seen and discussed with Dr. Carson Umana DO   Internal Medicine Intern   Pager: [phone]   Division of Renal Disease and Hypertension  [facility]  [facility]  [employer] Web Console    Interval History :   Nursing and provider notes from last 24 hours reviewed. No acute events overnight. Patient has no complaints this morning. Denies any fevers, chills, headaches, chest pain, SOB, abdominal pain or dysuria. Reports improvement in cough.   Review of Systems:   I reviewed the following systems. Negative unless mentioned above.     Physical Exam:   I/O last 3 completed shifts:  In: 830 [P.O.:830]  Out: 1500 [Urine:1500]   /67 (BP Location: Left arm, Patient Position: Supine, Cuff Size: Adult Regular)   Pulse 112   Temp 98.1  F (36.7  C) (Oral)   Resp 18   Ht 1.575 m (5' 2\")   Wt 49.1 kg (108 lb 4.8 oz)   SpO2 100%   BMI 19.81 kg/m       GENERAL APPEARANCE: resting comfortably  EYES:  no scleral icterus   PULM: crackles at bases bilaterally  CV: regular rhythm, normal rate, no rub, minimal lower extremity edema   GI: soft, non-tender, non-distended, bowel sounds are normal  INTEGUMENT: no rash  NEURO:  oriented x 3     Labs:   All labs reviewed by me  Electrolytes/Renal -   Recent Labs   Lab Test 08/22/23  0450 08/21/23  0446 08/20/23  0550    139 138   POTASSIUM 4.9 4.5 4.6   CHLORIDE 103 108* 113*   CO2 19* 17* 15*   BUN 34.0* 29.5* 25.6*   CR 1.35* 1.22* 1.03*   * 96 90   BISI 8.8 8.8 8.6   MAG 1.6* 1.7 1.9   PHOS 4.0 3.5 3.5       CBC -   Recent Labs   Lab Test 08/22/23  0450 08/21/23  0446 08/20/23  0550   WBC 12.5* 10.1 " 6.4   HGB 11.1* 10.2* 9.0*   * 125* 107*       LFTs -   Recent Labs   Lab Test 08/20/23  0550 08/12/23  0819 08/10/23  1810 07/10/23  1454 01/26/23  0656   ALKPHOS  --  94 122*  --  232*   BILITOTAL  --  0.5 0.4  --  0.3   ALT  --  23 <5  --  10   AST  --  79* 24  --  49*   PROTTOTAL  --  5.3* 6.3*  --  7.7   ALBUMIN 3.0* 2.1* 2.8*   < > 1.7*    < > = values in this interval not displayed.       Iron Panel -   Recent Labs   Lab Test 07/10/23  1454 06/25/20  0656 12/27/17  1021 11/22/17  0858   IRON 15*  --  99 20*   IRONSAT 9*  --  32 5*   GABRIEL 1,079*   < >  --   --     < > = values in this interval not displayed.         Imaging:      Current Medications:   amLODIPine  10 mg Oral Daily    amoxicillin-clavulanate  1 tablet Oral Q12H ENOCH (08/20)    artificial saliva  2 spray Swish & Spit 4x Daily    carvedilol  25 mg Oral BID w/meals    cholecalciferol  10 mcg Oral Daily    empagliflozin  10 mg Oral Daily    ferrous sulfate  325 mg Oral Daily with breakfast    folic acid  1 mg Oral Daily    hydrALAZINE  50 mg Oral Q8H    isosorbide mononitrate  60 mg Oral Daily    mycophenolate  1,500 mg Oral BID    pantoprazole  40 mg Oral QAM AC    potassium chloride ER  20 mEq Oral BID    predniSONE  30 mg Oral Daily    sacubitril-valsartan  1 tablet Oral BID    sodium bicarbonate  1,300 mg Oral TID    sodium chloride (PF)  3 mL Intracatheter Q8H    spironolactone  25 mg Oral Daily    sulfamethoxazole-trimethoprim  1 tablet Oral Daily    torsemide  20 mg Oral BID    vancomycin  125 mg Oral BID       Cherelle Umana, DO

## 2023-08-22 NOTE — PLAN OF CARE
Cares from: [id number]    V/S & pain: vitally stable, flank pain due to stretching as reported by pt.  Neuro: alert, oriented x4  Respiratory:on room air, denies sob  Skin: edema, right foot drop and some swelling.  GI/: voids adequately without difficulty  Nutrition: strict I and O, Regular diet  Lines/drains: RPIV saline locked  Activity: up independent    Events this shift: call light w/in reach and able to make needs known.  Offered hot packs for flank pain. Pt. Reported relief afterwards.  Slept in between cares. will continue to monitor.  No acute event this shift    Plan:  for continuity of care

## 2023-08-22 NOTE — PROGRESS NOTES
S: Pt seen at [facility] Bed side in room for delivery and instructions. O: I see the EPIC order for the RT AFO due to Weakness. I see she is size medium in the [place] AFO. A: She was fit with the AFO but not tried on with her sandal due to any AFO not working in a Sandal. Instructions to return with a large enough shoe and non grippy socks and then to use with PT. The brace can stay in the shoe since it can only be used with a shoe. P: FU PRN. G: The goal is to dorsiflex her foot when ambulating.  Electronically signed Harlan Martinez , LPO.

## 2023-08-22 NOTE — PROGRESS NOTES
"[facility]    Medicine Progress Note - Hospitalist Service, GOLD TEAM 9    Date of Admission:  8/17/2023    Assessment & Plan     Milly Carroll is a 23 year old female w/ PMH lupus C/B lupus flares, cerebritis, nephritis, CHRISTA also past medical history hemoptysis, malignant hypertension, orthopnea and acute respiratory failure with hypoxia, diastolic HF (EF 45% on 8/14, previously 20-25% on 8/11), QTc prolongation, recent LLL pneumonia, recent perinephritic hematoma status post IR embolization (8/11/2023), chronic thrombocytopenia, electrolyte disturbances admitted on 8/17/2023. She was hospitalized at [facility] 8/11/2023 to 8/17/2023 and transfers today for higher level of care. (See also HPI)    Today's Plan:   - plan to continue Augmentin until 8/25. continue vancomycin po for CDI  - MRI brain w/ new finding in L.frontal area, discussed w/ neurology. No further recs at this time (no need for EEG/EMG at this time), suspect this finding is subacute and this MRI imaging should be used as her \"new baseline imaging\". Also updated rheumatology regarding MRI results.   - orthotic c/s for brace for foot drop. PT consulted  - continue cellcept 1500 mg BID and prednisone 30 mg daily per rheumatology   - uptrending Cr, appreciate nephrology assistance.   - current plan for cytoxan (inpt vs outpt), full recs to come after talking w/ nephrology    Lupus c/b lupus flares  Lupus cerebritis, active crescentic lesions   New Patchy L. Frontal Lobe area on MRI suspected to be related to Lupus cerebritis hx w/o acute concern per Neurology  Hx medication non-adherence  Follow with [facility] rheumatology last seen 7/26/23.  Dx at age 7 initially felt to be discoid but later developed systemic signs.  Treated as a child with Cytoxan (2013) and later with rituximab with associated rituximab anaphylactic reaction.  Then treated with Benlysta infusion. Inpatient started IV " Methylprednisolone 500mg daily x 4 days then transitioned to oral prednisone.   - consult to rheumatology  - follow up C3, C4, CRP, ESR  - Lupus-focused meds:   - Plaquenil held due to new onset cardiomyopathy, continue to hold  - continue prednisone 30mg daily  - MMF 1500mg BID  - PPI while on prednisone  - Bactrim PJP ptx  - continue folate supplementation  - Team discussed Cytoxan w/ patient and discussed possible OB-GYN outpatient eval for ovopreservation, but patient declined, reporting not interested having kids. She would like to proceed with cytoxan treatment.    - Neurology consulted, Brain MRI New patchy areas of white matter T2 hyperintensity, greatest in the left frontal lobe, nonspecific, but possibly related to lupus cerebritis. No acute concerns/further w/up per neurology.   - SW consult given compliance and environmental concerns  [ ] Dispo: We will need outpatient follow-up with ophthalmology for eye exam in the setting of long-term Plaquenil. Scheduled for Rheum f/up 9/6.     Class IV lupus nephritis  CHRISTA, nonoliguric  Follows with Dr. Purvis at [facility] nephrology. Had biopsy 6/2022, focal proliferative and membranous lesions (class III and V) with minimal activity and no significant chronicity.  Biopsy 8/2 showing class IV diffuse sclerosing focal pallor for day of lupus nephritis, tubulointerstitial immune complex deposition, severe I FTA, active crescents and glomeruli sclerosed.  Baseline creatinine 0.6-0.8. Creatinine this admission peaked at 1.28. Did have IV contrast on 8/10 and additionally notably had postbiopsy bleeding requiring IR coil embolization of left renal artery for pseudoaneurysm. Creatinine  improving and recently 1s.   - active lupus nephritis, treatment as above.   -  nephrology for resistant htn  - sodium bicarbonate 1300mg tid  - trend BMP, weights, I&O    Perinephritic hematoma with pseudoaneurysm s/p IR embolization (8/11/2023)  Renal bx (8/2/23)  Right groin hematoma  IR  embolization of left renal artery inferior pole pseudoaneurysm by IR. Interval improvement in renal function.  Patient noted on 8/14/2023 to have right groin pain at the vascular access site with ultrasound showing right groin hematoma and no pseudoaneurysm  -Monitor right groin site  -Continue to monitor for bleeding  - pain mng:   - dilaudid oral 1mg q4h prn   - dilaudid IV 0.2mg q3h prn   - APAP 650mg q6h prn   - topical icyhot    Doroteo Hemoptysis w/o clot, improved   Acute respiratory failure with hypoxia  Orthopnea  No overt hemoptysis since admission 8/11/2023. In the setting of receiving transfusions and TXA x7 days. CT C/A/P w/ IV con 8/11 w/ 5.5 cm left perinephritic hematoma and 0.3 cm hyperenhancing focus of fluid LL pole suspicious for pseudoaneurysm.  Admission work-up also concerning for question of GI bleed in the setting of melena. Hemoptysis improved since admission to [facility]  -Low threshold to involve interventional pulmonology if any clinical worsening  - will hold lovenox for now.    Cardiomyopathy and Diastolic HF (EF 45% on 8/14, previously 20-25% on 8/11)  QTc prolongation, resolved   MRI Cardiac Myocarditis protocol (8/14) with interval improvement of flow EF that was seen on 8/11/23.  Cardiac work-up showing persistent LV dilation no evidence of infarct.  EKG w/ interval improvement in QTc and T wave inversions that were previously noted.  Received furosemide 8/11, discontinued thereafter.  - cardiology consulted, appreciate recommendations.  - consult consult to follow here  - med mng:  -Imdur 60mg daily  -Metoprolol XL to Carvedilol 12.5mg BID on 8/12, increased to 25 mg BID on 8/16  - Lisinopril held on 8/13 due to increased cr, resumed on 8/15- 40mg daily    - Hydralazine started  on 8/13, dose increased 8/17 (50mg TID)  - amlodipine 10mg daily added on 8/13 for BP control  - spironolactone 25mg daily  - test claim Entresto/SGLT2 inhibitors $0/month; will not initiate until renal function    - clonidine was stopped at [facility] d/t bradycardia   - consider adding torsemide for resistant hypertensin    Malignant hypertension- improved  SBP>200 on admission.  Initially started on clonidine which was discontinued on 8/12 due to bradycardia.  Nitroglycerin drip on 8/12 was transitioned same-day to Imdur. Patient's BP have been sbp<180 since admission overnight.    LLL pneumonia  Acute hypoxic respiratory failure-resolved  PTA receiving a course of Augmentin.  Initially on Vanco and Zosyn on 8/11, vancomycin discontinued inpatient.  LLL consolidation noted per admission CTA and per symptoms on admission to [facility] along with interstitial edema and trace bilateral pleural effusions. IV zosyn started 8/11 at [facility].   - sputum culture/gram stain have been ordered, not obtained, has had non-productive cough   - Switched from IV Vanc/zosyn to Augmentin on 8/19- 8/25  - low threshold to consult pulmonary if repeat hemoptysis or respiratory decline    Chronic thrombocytopenia  Blood loss anemia   Occurs in the setting of perinephric hematoma as well as hemoptysis. Status post 2 units PRBC 8/11/2023. Hemoglobin stable ~8. Plt stable in the 110-140s.  - Trend CBC and vitals  - completed TXA neb per pulmonary x 7 day (8/17)  - continue oral iron    Right >left upper and lower extremity edema  Paresis of right 2nd digit   Noted on exam.  RUE PIV present. Patient right index finger stuck in extension, able to painlessly flex passively. Cardiolipin Abx neg 2019  - hold anticoagulants in the setting of recent significant bleed, can consider if continues to be stable   - RLE  and RUE duplex 8/17/23 neg for DVT    Left shoulder, scapular pain-improved  C/o left shoulder pain at [facility] w/ intact ROM. Tender to palpation over her muscle over her shoulder, scapular area.   - follow up rheum input  -xray shoulder without acute finding  -tylenol prn     Headaches  Ongoing since admission and initially generalized, on admission to [facility] is  right frontal, has been responsive to APAP and dilaudid  - continue APAP and dilaudid   - treat underlying HTN and lupus cares as above     Hypomagnesemia- improved  Hypokalemia -improved  In the setting of CHRISTA and diuresis, suspect renal losses  - continue KlorCon 20 meq BID  - trend BMP and Mg    Hx C. Diff (6/2023) -  oral vancomycin started 8/11 for c/f abdominal infection, continue for now 10 day course   - PO vanc ppx    Melena -resolved - continue to monitor    Hx pancreatic insufficiency 2/2 pancreatitis  Hx acute on chronic pancreatitis (2020)   Last followed with GI in 2020 for pancreatitis and multiple stones and debris in the main pancreatic duct and was recommended to be on Creon at that time  -Stools currently normal, can consider Creon if concerns for pancreatic insufficiency and loose stools    Hx remote generalized tonic-clonic seizures seizures  Right foot drop   Patient was on Keppra as a child. No current PTA antiepileptic medications  - Neurology consult (signed off)   - Continue PT/OT  - orthotic consult for possible brace   [ ] Dispo: Consider Neuropsych testing outpatient. Consider EMG outpatient    Hx CMV and EBV + (2022) - no hx of following with ID - consider repeat if felt to be contributory       Diet: Combination Diet Regular Diet Adult    DVT Prophylaxis: VTE Prophylaxis contraindicated due to recent hematoma  Leung Catheter: Not present  Lines: None     Cardiac Monitoring: None  Code Status: Full Code      Clinically Significant Risk Factors            # Hypomagnesemia: Lowest Mg = 1.6 mg/dL in last 2 days, will replace as needed   # Hypoalbuminemia: Lowest albumin = 3 g/dL at 8/20/2023  5:50 AM, will monitor as appropriate     # Thrombocytopenia: Lowest platelets = 125 in last 2 days, will monitor for bleeding  # Acute Kidney Injury, unspecified: based on a >150% or 0.3 mg/dL increase in last creatinine compared to past 90 day average, will monitor renal function       # Chronic  heart failure with reduced ejection fraction: last echo with EF <40%             # Financial/Environmental Concerns:           Disposition Plan     Expected Discharge Date: 08/23/2023      Destination: home with family  Discharge Comments: Dispo: home w/ family  Plan: Lisinopril washout, will complete Mon AM  Progress: PO Vanco & augmentin; torsemide to BID          Rach Hernandez MD  Hospitalist Service, GOLD TEAM 9  [facility]  Securely message with [employer] (more info)  Text page via [employer] Paging/Directory   See signed in provider for up to date coverage information  ______________________________________________________________________    Interval History   No acute events overnight. Patient doing well. Denies concerns. Reports eating/voiding/stooling appropriately. Denies new pains. Discussed MRI results w/ patient as well as neurology/rheumatology team. Will talk w/ Nephrology regarding increase Cr on labs, continue BP regimen and diuresis for now. Repeat labs in AM. Continue discussion w/ nephrology regarding possible inpt vs outpatient cytoxan.     Physical Exam   Vital Signs: Temp: 98.1  F (36.7  C) Temp src: Oral BP: (!) 155/111 (scheduled hydralazine  tab given) Pulse: 94   Resp: 18 SpO2: 100 % O2 Device: None (Room air)    Weight: 108 lbs 4.8 oz    General Appearance: A and O x3. Comfortable in bed and eating breakfast  Respiratory: CTAB. No rhonchi or wheezing, on RA w/o increased WOB  Cardiovascular: S1, S2, RRR, 4/6 systolic murmur on 2nd LICS  GI: NBS, soft, nontender  Skin: no rashes    Medical Decision Making       50 MINUTES SPENT BY ME on the date of service doing chart review, history, exam, documentation & further activities per the note.  MANAGEMENT DISCUSSED with the following over the past 24 hours: Nephrology, Neurology, Rheumatology       Data     I have personally reviewed the following data over the past 24 hrs:    12.5 (H)  \   11.1 (L)   / 149  (L)     136 103 34.0 (H) /  105 (H)   4.9 19 (L) 1.35 (H) \       Imaging results reviewed over the past 24 hrs:   Recent Results (from the past 24 hour(s))   MR Brain w/o & w Contrast    Narrative    EXAM: MR BRAIN W/O & W CONTRAST  8/21/2023 6:56 PM     HISTORY:  concern for lupus cerebritis       COMPARISON:  MRI 12/15/2022, 5/15/2013    TECHNIQUE: Axial FLAIR,  T1-weighted, and susceptibility images were  obtained without intravenous contrast. Following intravenous  gadolinium-based contrast administration, axial T2-weighted,  diffusion, FLAIR, and axial and coronal T1-weighted images were  obtained.    CONTRAST: 7.5mL Gadavist.    FINDINGS:  There is no mass effect, midline shift, acute intracranial hemorrhage.  Chronic T1 hyperintensity and calcification in the basal ganglia  bilaterally, similar compared to 12/15/2022. Unchanged T1  hyperintensity with susceptibility artifact in the dentate nuclei,  likely chronic calcium deposit. The ventricles are proportionate to  the cerebral sulci. Diffusion and susceptibility weighted images are  negative for acute/focal abnormality. Major intracranial vascular  structures are within normal limits. Patchy and confluent  periventricular white matter T2 hyperintensities without associated  enhancement.    Postcontrast images demonstrate no abnormal intracranial enhancement.    No suspicious abnormality of the skull marrow signal. Mucosal  thickening scattered throughout the paranasal nasal sinuses. Right  greater than left mastoid air cell effusion. The orbits are grossly  unremarkable.      Impression    IMPRESSION:  1. New patchy areas of white matter T2 hyperintensity, greatest in the  left frontal lobe, nonspecific, but possibly related to lupus  cerebritis.  3. Unchanged bilateral basal ganglia calcifications and dentate  nucleus calcifications.    I have personally reviewed the examination and initial interpretation  and I agree with the findings.    REINALDO CHOU,  MD         SYSTEM ID:  [id number]

## 2023-08-22 NOTE — PLAN OF CARE
"Cares 0700 to 2300    /83   Pulse 94   Temp 98.2  F (36.8  C) (Oral)   Resp 16   Ht 1.575 m (5' 2\")   Wt 49.1 kg (108 lb 4.8 oz)   SpO2 98%   BMI 19.81 kg/m      Goal Outcome Evaluation:    Plan of Care Reviewed With: patient    Overall Patient Progress: no changeOverall Patient Progress: no change    Outcome Evaluation: Alert & oriented x4. Cough decreased. BP's stable today. Brain MRI completed. Up ad kodak.    Has R foot drop w/swelling, providers aware.     "

## 2023-08-23 NOTE — PROGRESS NOTES
Nursing Focus: Chemotherapy  D: Positive brisk bright red blood return via PIV. Insertion site is clean/dry/intact, dressing intact with no complaints of pain.  Urine output is recorded in intake Doc Flowsheet.    I: Premedications given per order (see electronic medical administration record). Dose #1 of Cytoxan started to infuse over 60 minutes. Reviewed pt teaching on chemotherapy side effects.  Pt denies need for further teaching. Chemotherapy double checked per protocol by two chemotherapy competent RN's.   A: Tolerating chemo well. Denies nausea.   P: Continue to monitor urine output and symptoms of nausea. Screen for symptoms of toxicity.

## 2023-08-23 NOTE — PROGRESS NOTES
Interval Gynecology Remote Consult Note     Contacted by primary care team to discuss need for Lupron in the setting of Cytoxan use for treatment of SLE. Reviewed with primary care team team that most benefit for Lupron use prior is prior to initiation of Cytoxan - the patient was administered her first dose today. Also noted limited data on efficacy of Lupron therapy in long-term oocyte preservation. We reviewed side effects of Lupron as well, including medically-induced menopausal symptoms. Per chart review and discussion with patient's care team, patient expresses no wish for pregnancies in the future. Primary team to discuss these benefits and drawbacks of Lupron therapy with the patient. Gynecology to prescribe if and according to the patient's wishes, as well as duration of Cytoxan use.     Above discussed with Dr. Stewart.     Ayesha Stephenson MD   OB/GYN PGY-4  Gynecology Service   08/23/23 1700    [facility] staff:  Appreciate note by Dr. Stephenson.   have reviewed, edited, and agree with the note.      Mable Stewart MD, FACOG

## 2023-08-23 NOTE — PLAN OF CARE
"[id number]  /65 (BP Location: Left arm, Patient Position: Semi-Tapia's, Cuff Size: Adult Regular)   Pulse 92   Temp 98.4  F (36.9  C) (Oral)   Resp 18   Ht 1.575 m (5' 2\")   Wt 44.8 kg (98 lb 12.3 oz)   SpO2 100%   BMI 18.06 kg/m      A&Ox4, calls appropriately. VSS on room air. Up ad kodak, in bed for shift, encouraging activity. Regular diet, fair appetite, reports only eating 2 meals a day as normal for her. Voids spontaneously, saving, 2 BM on shift. Denies pain. PRN Zofran given x1 for nausea w/ good relief per pt. Ortho saw pt at bedside today for (R) foot drop, see note. (R) PIV saline locked, vascular consulted to place new IV for blood return for IV chemo.     Goal Outcome Evaluation:      Plan of Care Reviewed With: patient    Overall Patient Progress: no changeOverall Patient Progress: no change    Outcome Evaluation: Denies pain, PRN Zofran for nausea x1. Plan to start IV Chemo this evening.      "

## 2023-08-23 NOTE — PROGRESS NOTES
Nephrology Progress Note  08/23/2023         Assessment & Recommendations:   Milly Carroll is a 23 year old year old female  with history of advanced lupus nephritis class IV/V on biopsy 8/2/2023. Nephrology on consult for management.      #Lupus nephritis, class IV/V  #CKD stage 4 based on cystatin C   -follows with Dr. Purvis; patient was initially diagnosed with SLE at age 7; symptoms included fever, muscle ache and rashes w/ +DsDNA, +RNP, +ribosomal P ab  -prior regimen included Cellcept 1500 mg BID, Plaquenil 200 mg BID and prednisone 5 mg daily; patient endorses compliance with regimen   -repeat kidney Bx on 8/2/23 (No EM sample), result showed Class IV with 2% active crescents, 50% glomerular sclerosis, severe IFTA (70-80%)and concern for class V lupus on bx done 8/23. (class III in 6/22, < 5% IFTA)   -s/p prednisone 60 mg tapered to 30 mg, cellcept 1500 mg BID; holding plaquenil inpatient in setting of cardiomyopathy   -plan to proceed with cytoxan given progression of nephritis despite high doses of cellcept; previously pending initiation of cytoxan to complete Augmentin course for LLL pneumonia; however ID approved initiation of cytoxan at this time  -patient declined ovo preservation prior to initiation of cytoxan  -s/p first dose of 500 mg cytoxan 8/22; tolerated well without adverse effects   -discontinued cellcept given initiation of cytoxan   -continue prednisone 30 mg daily   -pending OBGYN recommendations regarding lupron injections   -plan for cytoxan 500 mg [id number] x 6 cycles outpatient      #Acute Kidney Injury   -creatinine 1.03>1.22>1.35>1.69 today; possibly secondary to hypotension in setting of aggressive anti-hypertensive regimen vs side effects from initiation of ace/arbs   -consider discontinuing amlodipine or discontinuing entresto and resuming losartan once creatinine stabilizes      #Hypertension, uncontrolled   -blood pressures mildly hypotensive 90s/50s; notably 40SBP drop since  "initiation of entresto on 8/21   -recommend maintaining SBPs >120s to assure adequate renal perfusion   -would consider adjusting antihypertensive regimen by discontinuing amlodipine or discontinuing entresto and resuming losartan once creatinine stabilizes      #Metabolic Acidosis   -likely in setting of CKD4   -continue sodium bicarbonate 1300 mg TID     Recommendations were communicated to primary team via phone.     Seen and discussed with Dr. Carson Umana,    Internal Medicine PGY-1  [phone]  Division of Renal Disease and Hypertension  [facility]  [email or web address]  Vocera Web Console    Interval History :   Nursing and provider notes from last 24 hours reviewed.  Milly Carroll tolerated the first dose of cytoxan well. Denies any fevers, chills, nausea, vomiting, abdominal dysuria, hematuria at this time. Cough is still present, but has improved.     Physical Exam:   I/O last 3 completed shifts:  In: 480 [P.O.:480]  Out: 1600 [Urine:1600]   BP 95/49 (BP Location: Left arm)   Pulse 85   Temp 97.8  F (36.6  C) (Oral)   Resp 16   Ht 1.575 m (5' 2\")   Wt 44.8 kg (98 lb 12.3 oz)   SpO2 95%   BMI 18.06 kg/m       GENERAL APPEARANCE: resting comfortably in bed  EYES:  no scleral icterus, pupils equal  PULM: lungs to auscultation bilaterally, equal air movement, no clubbing  CV: regular rhythm, normal rate, no rub, trace lower extremity edema   GI: soft, non-tender, non-distended, bowel sounds are present  INTEGUMENT: no cyanosis, no rash  NEURO: oriented x 3    Labs:   All labs reviewed by me  Electrolytes/Renal -   Recent Labs   Lab Test 08/23/23  0615 08/22/23  0450 08/21/23  0446 08/20/23  0550    136 139 138   POTASSIUM 4.7 4.9 4.5 4.6   CHLORIDE 104 103 108* 113*   CO2 20* 19* 17* 15*   BUN 36.7* 34.0* 29.5* 25.6*   CR 1.69* 1.35* 1.22* 1.03*   * 105* 96 90   BISI 8.6 8.8 8.8 8.6   MAG 1.7 1.6* 1.7 1.9   PHOS  --  4.0 3.5 3.5       CBC -   Recent Labs   Lab Test 08/22/23  0450 " 08/21/23  0446 08/20/23  0550   WBC 12.5* 10.1 6.4   HGB 11.1* 10.2* 9.0*   * 125* 107*       LFTs -   Recent Labs   Lab Test 08/20/23  0550 08/12/23  0819 08/10/23  1810 07/10/23  1454 01/26/23  0656   ALKPHOS  --  94 122*  --  232*   BILITOTAL  --  0.5 0.4  --  0.3   ALT  --  23 <5  --  10   AST  --  79* 24  --  49*   PROTTOTAL  --  5.3* 6.3*  --  7.7   ALBUMIN 3.0* 2.1* 2.8*   < > 1.7*    < > = values in this interval not displayed.       Iron Panel -   Recent Labs   Lab Test 07/10/23  1454 06/25/20  0656 12/27/17  1021 11/22/17  0858   IRON 15*  --  99 20*   IRONSAT 9*  --  32 5*   GABRIEL 1,079*   < >  --   --     < > = values in this interval not displayed.         Imaging:  All imaging studies reviewed by me.     Current Medications:   amLODIPine  10 mg Oral Daily    amoxicillin-clavulanate  1 tablet Oral Q12H ENOCH (08/20)    artificial saliva  2 spray Swish & Spit 4x Daily    carvedilol  25 mg Oral BID w/meals    cholecalciferol  10 mcg Oral Daily    empagliflozin  10 mg Oral Daily    ferrous sulfate  325 mg Oral Daily with breakfast    folic acid  1 mg Oral Daily    hydrALAZINE  50 mg Oral Q8H    isosorbide mononitrate  60 mg Oral Daily    pantoprazole  40 mg Oral QAM AC    potassium chloride ER  20 mEq Oral BID    predniSONE  30 mg Oral Daily    [Held by provider] sacubitril-valsartan  1 tablet Oral BID    sodium bicarbonate  1,300 mg Oral TID    sodium chloride (PF)  3 mL Intracatheter Q8H    [Held by provider] spironolactone  25 mg Oral Daily    sulfamethoxazole-trimethoprim  1 tablet Oral Daily    [Held by provider] torsemide  20 mg Oral BID    vancomycin  125 mg Oral BID       Cherelle Umana DO

## 2023-08-23 NOTE — PROGRESS NOTES
[facility]    Medicine Progress Note - Hospitalist Service, GOLD TEAM 9    Date of Admission:  8/17/2023    Assessment & Plan     Milly Carroll is a 23 year old female w/ PMH lupus C/B lupus flares, cerebritis, nephritis, CHRISTA also past medical history hemoptysis, malignant hypertension, orthopnea and acute respiratory failure with hypoxia, diastolic HF (EF 45% on 8/14, previously 20-25% on 8/11), QTc prolongation, recent LLL pneumonia, recent perinephritic hematoma status post IR embolization (8/11/2023), chronic thrombocytopenia, electrolyte disturbances admitted on 8/17/2023. She was hospitalized at [facility] 8/11/2023 to 8/17/2023 and transfers today for higher level of care. (See also HPI)    Today's Plan:   - s/p Cytoxan infusion 8/22 PM, no complications  - plan to continue Augmentin until 8/25. continue vancomycin po for CDI  - orthotic team provided patient w/ brace  - continue prednisone 30 mg daily per rheumatology   - uptrending Cr--> holding torsemide, aldactone, entresto, and amlodipine   - if AM Cr still uptrending then will trial small fluid bolus  - continue Hydralazine and Imdur for blood pressure (wnl at this time)  - discussed w/ OBGYN regarding Lupron risks and benefits, will discuss further w/ patient in AM    Lupus c/b lupus flares  Lupus cerebritis, active crescentic lesions   New Patchy L. Frontal Lobe area on MRI suspected to be related to Lupus cerebritis hx w/o acute concern per Neurology  Hx medication non-adherence  Follow with [facility] rheumatology last seen 7/26/23.  Dx at age 7 initially felt to be discoid but later developed systemic signs.  Treated as a child with Cytoxan (2013) and later with rituximab with associated rituximab anaphylactic reaction.  Then treated with Benlysta infusion. Inpatient started IV Methylprednisolone 500mg daily x 4 days then transitioned to oral prednisone.   - consult to rheumatology  -  Lupus-focused meds:   - Plaquenil held due to new onset cardiomyopathy, continue to hold  - continue prednisone 30mg daily  - discontinued MMF 1500mg BID (last dose 8/22)  - Started Cytoxan 8/22 (plan for 1 dose q2wk for total 6 doses)  - PPI while on prednisone  - Bactrim PJP ptx  - continue folate supplementation  - discussed Lupron q4wk w/ OBGYN team. Will continue to discuss risk/benefits of medication w/ patient, but so far discussions w/ patient showing limited interest in this therapy and reporting no interest in having children/pregnancy.  - Neurology consulted, Brain MRI New patchy areas of white matter T2 hyperintensity, greatest in the left frontal lobe, nonspecific, but possibly related to lupus cerebritis. No acute concerns/further w/up per neurology.   - SW consult given compliance and environmental concerns  [ ] Dispo: We will need outpatient follow-up with ophthalmology for eye exam in the setting of long-term Plaquenil. Scheduled for Rheum f/up 9/6.     Class IV lupus nephritis  CHRISTA, nonoliguric  Follows with Dr. Purvis at [facility] nephrology. Had biopsy 6/2022, focal proliferative and membranous lesions (class III and V) with minimal activity and no significant chronicity.  Biopsy 8/2 showing class IV diffuse sclerosing focal pallor for day of lupus nephritis, tubulointerstitial immune complex deposition, severe I FTA, active crescents and glomeruli sclerosed.  Baseline creatinine 0.6-0.8. Creatinine this admission peaked at 1.28. Did have IV contrast on 8/10 and additionally notably had postbiopsy bleeding requiring IR coil embolization of left renal artery for pseudoaneurysm. Creatinine  improving and recently 1s.   - active lupus nephritis, treatment as above.   - holding entresto, spironolactone, torsemide, amlodipine for worsening Cr function   - continue hydralazine and Imdur for BP management (wnl at this time)  - sodium bicarbonate 1300mg tid  - trend BMP, weights, I&O    Perinephritic hematoma  with pseudoaneurysm s/p IR embolization (8/11/2023)  Renal bx (8/2/23)  Right groin hematoma  IR embolization of left renal artery inferior pole pseudoaneurysm by IR. Interval improvement in renal function.  Patient noted on 8/14/2023 to have right groin pain at the vascular access site with ultrasound showing right groin hematoma and no pseudoaneurysm  -Monitor right groin site  -Continue to monitor for bleeding  - pain mng:   - dilaudid oral 1mg q4h prn   - APAP 650mg q6h prn   - topical icyhot    Doroteo Hemoptysis w/o clot, resolved   Acute respiratory failure with hypoxia, resolved  Orthopnea, resolved  No overt hemoptysis since admission 8/11/2023. In the setting of receiving transfusions and TXA x7 days. CT C/A/P w/ IV con 8/11 w/ 5.5 cm left perinephritic hematoma and 0.3 cm hyperenhancing focus of fluid LL pole suspicious for pseudoaneurysm.  Admission work-up also concerning for question of GI bleed in the setting of melena. Hemoptysis improved since admission to [facility]  - on RA  - Low threshold to involve interventional pulmonology if any clinical worsening  - will hold lovenox for now.    Cardiomyopathy and Diastolic HF (EF 45% on 8/14, previously 20-25% on 8/11)  QTc prolongation, resolved   MRI Cardiac Myocarditis protocol (8/14) with interval improvement of flow EF that was seen on 8/11/23.  Cardiac work-up showing persistent LV dilation no evidence of infarct.  EKG w/ interval improvement in QTc and T wave inversions that were previously noted.  Received furosemide 8/11, discontinued thereafter.  - cardiology consulted, appreciate recommendations.  - consult consult to follow here  - med mng:  - Imdur 60mg daily  - hold Entresto for CHRISTA, possible consider switching back to just ACEi  - continue Coreg 25mg BID    - Hydralazine started on 8/13, dose increased 8/17 (50mg TID)  - hold amlodipine 10mg daily for CHRISTA  - hold spironolactone 25mg daily for CHRISTA  - clonidine was stopped at [facility] d/t bradycardia   -  hold torsemide for CHRISTA  - continue Jardiance qday    Malignant hypertension- resolved  SBP>200 on admission.  Initially started on clonidine which was discontinued on 8/12 due to bradycardia.  Nitroglycerin drip on 8/12 was transitioned same-day to Imdur. Patient's BP have been sbp<180 since admission overnight.    LLL pneumonia  Acute hypoxic respiratory failure-resolved  PTA receiving a course of Augmentin.  Initially on Vanco and Zosyn on 8/11, vancomycin discontinued inpatient.  LLL consolidation noted per admission CTA and per symptoms on admission to [facility] along with interstitial edema and trace bilateral pleural effusions. IV zosyn started 8/11 at [facility].   - sputum culture/gram stain have been ordered, not obtained, has had non-productive cough   - Switched from IV Vanc/zosyn to Augmentin on 8/19- 8/25  - low threshold to consult pulmonary if repeat hemoptysis or respiratory decline    Chronic thrombocytopenia  Blood loss anemia   Occurs in the setting of perinephric hematoma as well as hemoptysis. Status post 2 units PRBC 8/11/2023. Hemoglobin stable ~8. Plt stable in the 110-140s.  - Trend CBC and vitals  - completed TXA neb per pulmonary x 7 day (8/17)  - continue oral iron    Right >left upper and lower extremity edema  Paresis of right 2nd digit   Noted on exam.  RUE PIV present. Patient right index finger stuck in extension, able to painlessly flex passively. Cardiolipin Abx neg 2019  - hold anticoagulants in the setting of recent significant bleed, can consider if continues to be stable   - RLE  and RUE duplex 8/17/23 neg for DVT    Left shoulder, scapular pain-improved  C/o left shoulder pain at [facility] w/ intact ROM. Tender to palpation over her muscle over her shoulder, scapular area. xray shoulder without acute finding  - pain as above  [ ] Dispo: f/up Rheum outpatient (scheduled 9/6)    Headaches  Ongoing since admission and initially generalized, on admission to [facility] is right frontal, has been  responsive to APAP and dilaudid  - continue APAP and dilaudid   - treat underlying HTN and lupus cares as above   - NO NSAIDS    Hypomagnesemia- improved  Hypokalemia -improved  In the setting of CHRISTA and diuresis, suspect renal losses  - continue KlorCon 20 meq BID  - trend BMP and Mg    Hx C. Diff (6/2023):   S/p oral vancomycin started 8/11 for c/f abdominal infection, for 10 day course   - PO vanc ppx    Melena -resolved - continue to monitor    Hx pancreatic insufficiency 2/2 pancreatitis  Hx acute on chronic pancreatitis (2020)   Last followed with GI in 2020 for pancreatitis and multiple stones and debris in the main pancreatic duct and was recommended to be on Creon at that time  -Stools currently normal, can consider Creon if concerns for pancreatic insufficiency and loose stools    Hx remote generalized tonic-clonic seizures seizures  Right foot drop   Patient was on Keppra as a child. No current PTA antiepileptic medications  - Neurology consult (signed off)   - Continue PT  - orthotic consult, provided brace on 8/23  [ ] Dispo: Consider Neuropsych testing outpatient. Consider EMG outpatient    Hx CMV and EBV + (2022) - no hx of following with ID - consider repeat if felt to be contributory       Diet: Combination Diet Regular Diet Adult    DVT Prophylaxis: VTE Prophylaxis contraindicated due to recent hematoma  Leung Catheter: Not present  Lines: None     Cardiac Monitoring: None  Code Status: Full Code      Clinically Significant Risk Factors            # Hypomagnesemia: Lowest Mg = 1.6 mg/dL in last 2 days, will replace as needed   # Hypoalbuminemia: Lowest albumin = 3 g/dL at 8/20/2023  5:50 AM, will monitor as appropriate      # Acute Kidney Injury, unspecified: based on a >150% or 0.3 mg/dL increase in last creatinine compared to past 90 day average, will monitor renal function       # Chronic heart failure with reduced ejection fraction: last echo with EF <40%             # Financial/Environmental  Concerns:           Disposition Plan     Expected Discharge Date: 08/24/2023      Destination: home with family  Discharge Comments: Dispo: home w/ family  Plan: Lisinopril washout, will complete Mon AM  Progress: PO Vanco & augmentin; torsemide to BID          Rach Hernandez MD  Hospitalist Service, GOLD TEAM 9  [facility]  Securely message with [employer] (more info)  Text page via [employer] Paging/Directory   See signed in provider for up to date coverage information  ______________________________________________________________________    Interval History   Completed Cytoxan infusion on 8/22 w/o issue. No acute events overnight. Patient doing well. Denies new pains/concerns. Discussed uptrending Cr w/ patient and nephrology. Holding Entresto, spironolactone, amlodipine, torsemide at this time. Repeat labs in AM, would like to see stable/downtrending Cr prior to discharge. Possible consider fluid bolus in AM if continued worsening renal function. Talked w/ OBGYN regarding Lupron risks and benefits today, will discuss further w/ patient but so far patient has showed very little interest in this therapy and stated no interest in having kids/pregnancy in future.     Physical Exam   Vital Signs: Temp: 98.1  F (36.7  C) Temp src: Oral BP: 123/84 Pulse: 78   Resp: 16 SpO2: 99 % O2 Device: None (Room air)    Weight: 98 lbs 12.26 oz    General Appearance: A and O x3. Comfortable in bed and eating breakfast  Respiratory: CTAB. No rhonchi or wheezing, on RA w/o increased WOB  Cardiovascular: S1, S2, RRR, 4/6 systolic murmur on 2nd LICS  GI: NBS, soft, nontender  Skin: no rashes    Medical Decision Making       50 MINUTES SPENT BY ME on the date of service doing chart review, history, exam, documentation & further activities per the note.  MANAGEMENT DISCUSSED with the following over the past 24 hours: Nephrology, OBGYN, Rheumatology       Data     I have personally reviewed the following  data over the past 24 hrs:    N/A  \   N/A   / N/A     138 104 36.7 (H) /  123 (H)   4.7 20 (L) 1.69 (H) \       Imaging results reviewed over the past 24 hrs:   No results found for this or any previous visit (from the past 24 hour(s)).

## 2023-08-23 NOTE — PROGRESS NOTES
Date: August 23, 2023   [id number]     Neuro/Musculoskeletal:  A&Ox4, Calm and pleasant towards writer and other staff. Receptive to all cares.   Cardiac:  Not on TELE. Apical pulse regular. BP parameters not met to administer Scheduled Hydralazine. MAP remained >65           Respiratory:  LS: CTA, frequent moist non-productive cough. SpO2: % on RA. Denied SOB.  Absent of any hemoptysis.   GI/:  BS: normoactive x4Q, Last BM: 8/22. Voiding spontaneously without difficulty.   Diet/Appetite:  Tolerating a regular diet. Denied nausea.   Activity:  Up ad kodak/ Independent. Pt now has an AFO for her R foot drop but it does not fit inside her sandal.   Pain: Tylenol/ Dilaudid administered for c/o L flank discomfort.    Skin:  LDAs + Drips/IVF:  New PIV placed on Pt's RUE due to no blood return on lower PIV, which is required for Pt's new chemo meds. PIV x2 to RUE: SL      Pt received first dose of Cytoxan and three doses of  Mesnex. Tolerated well.

## 2023-08-24 NOTE — PLAN OF CARE
"[id number]  /66 (BP Location: Left arm)   Pulse 81   Temp 98.1  F (36.7  C) (Oral)   Resp 16   Ht 1.575 m (5' 2\")   Wt 47.1 kg (103 lb 14.4 oz)   SpO2 100%   BMI 19.00 kg/m      A&Ox4, calls appropriately, quiet. VSS on room air. Up ad kodak. Regular diet, fair appetite. Voids spontaneously, saving. 3 BM today, soft/formed. Denies pain/nausea. (R) PIV x2 saline locked. No acute changes today.     Goal Outcome Evaluation:      Plan of Care Reviewed With: patient    Overall Patient Progress: improvingOverall Patient Progress: improving    Outcome Evaluation: Pt reports doing well today. No pain/nausea. Cough decreased.      "

## 2023-08-24 NOTE — PROGRESS NOTES
"  Goal Outcome Evaluation:      Plan of Care Reviewed With: patient     Overall Patient Progress: improvingOverall Patient Progress: improving     Outcome Evaluation: Pt reports doing well today. No pain/nausea. Cough decreased.      Shift: [id number]    Vital Signs: /73 (BP Location: Left arm, Patient Position: Semi-Tapia's, Cuff Size: Adult Regular)   Pulse 98   Temp 98.2  F (36.8  C) (Oral)   Resp 16   Ht 1.575 m (5' 2\")   Wt 47.1 kg (103 lb 14.4 oz)   SpO2 99%   BMI 19.00 kg/m         Patient is A&Ox4, calls appropriately and able to make her needs known. VSS on room air, denies chest pain or SOB. Up ad kodak. Regular diet with good appetite. Voids spontaneously, no BM this shift. Pt denies pain/nausea. (R) PIV, NS 500ml bolus infusing. No acute changes today, will continue to monitor and assess.           "

## 2023-08-24 NOTE — PROGRESS NOTES
Date: August 24, 2023   [id number]     Neuro/Musculoskeletal:  A&Ox4, Calm and pleasant towards writer and other staff. Receptive to all cares.   Cardiac:  Not on TELE. Apical pulse regular. VSS. MAP remained >65           Respiratory:  LS: CTA, frequent moist non-productive cough. SpO2: % on RA. Denied SOB.  Absent of any hemoptysis.   GI/:  BS: normoactive x4Q, Last BM: 8/22. Voiding spontaneously without difficulty.   Diet/Appetite:  Tolerating a regular diet. Denied nausea.   Activity:  Up ad kodak/ Independent. Pt now has an AFO for her R foot drop but it does not fit inside her sandal.   Pain: Denied and offered no c/o pain.  Skin:  LDAs + Drips/IVF:  PIV x2 to RUE: SL. Dressings remained CDI

## 2023-08-24 NOTE — PROGRESS NOTES
[facility]    Medicine Progress Note - Hospitalist Service, GOLD TEAM 9    Date of Admission:  8/17/2023    Assessment & Plan     Milly Carroll is a 23 year old female w/ PMH lupus C/B lupus flares, cerebritis, nephritis, CHRISTA also past medical history hemoptysis, malignant hypertension, orthopnea and acute respiratory failure with hypoxia, diastolic HF (EF 45% on 8/14, previously 20-25% on 8/11), QTc prolongation, recent LLL pneumonia, recent perinephritic hematoma status post IR embolization (8/11/2023), chronic thrombocytopenia, electrolyte disturbances admitted on 8/17/2023. She was hospitalized at [facility] 8/11/2023 to 8/17/2023 and transfers today for higher level of care. (See also HPI)    Today's Plan:   - continue Augmentin until 8/25  - slight increase Cr but appears to be plateauing  - continue holding torsemide, aldactone, entresto, and amlodipine   - admin 500ml LR bolus (slowly given heart hx)  - continue Hydralazine and Imdur for blood pressure (wnl at this time)  - discussed w/ patient Lupron risk/benefits w/ patient further, patient denying interest in starting the medication. Again stating no interest in pregnancy/childbirth in the future  - if Cr improving/downtrending, will plan for discharge tmr w/ Neph f/up in 1wk w/ labs during visit. Possible discharge on torsemide 20mg qday (but continue to hold aldactone, entresto, and amlodipine)    Lupus c/b lupus flares  Lupus cerebritis, active crescentic lesions   New Patchy L. Frontal Lobe area on MRI suspected to be related to Lupus cerebritis hx w/o acute concern per Neurology  Hx medication non-adherence  Follow with [facility] rheumatology last seen 7/26/23.  Dx at age 7 initially felt to be discoid but later developed systemic signs.  Treated as a child with Cytoxan (2013) and later with rituximab with associated rituximab anaphylactic reaction.  Then treated with Benlysta infusion. Inpatient  started IV Methylprednisolone 500mg daily x 4 days then transitioned to oral prednisone.   - Rheumatology consulted  - Lupus-focused meds:   - holding Plaquenil held due to new onset cardiomyopathy  - continue prednisone 30mg daily  - discontinued MMF 1500mg BID (last dose 8/22)  - Started Cytoxan 1x dose on 8/22 (plan for 1 dose q2wk for total 6 doses)  - PPI while on prednisone  - Bactrim PJP ptx  - continue folate supplementation  - Discussed Lupron subcutaneous q4wk w/ OBGYN team and w/ patient (reviewing risk vs benefits). Patient reporting no interest in having children/pregnancy in the future and not interested in starting the medication.  - Neurology consulted, Brain MRI New patchy areas of white matter T2 hyperintensity, greatest in the left frontal lobe, nonspecific, but possibly related to lupus cerebritis. No acute concerns/further w/up per neurology.   - SW consult given compliance and environmental concerns  [ ] Dispo: We will need outpatient follow-up with ophthalmology for eye exam in the setting of long-term Plaquenil. Scheduled for Rheum f/up 9/6.     Class IV lupus nephritis  CHRISTA, nonoliguric  Follows with Dr. Purvis at [facility] nephrology. Had biopsy 6/2022, focal proliferative and membranous lesions (class III and V) with minimal activity and no significant chronicity.  Biopsy 8/2 showing class IV diffuse sclerosing focal pallor for day of lupus nephritis, tubulointerstitial immune complex deposition, severe I FTA, active crescents and glomeruli sclerosed.  Baseline creatinine 0.6-0.8. Creatinine this admission peaked at 1.28. Did have IV contrast on 8/10 and additionally notably had postbiopsy bleeding requiring IR coil embolization of left renal artery for pseudoaneurysm. Creatinine  improving and recently 1s.   - active lupus nephritis, treatment as above.   - holding entresto, spironolactone, torsemide, amlodipine for worsening Cr function   - continue hydralazine and Imdur for BP management (wnl  at this time)  - Trial 500ml LR bolus 8/24  - sodium bicarbonate 1300mg tid  - trend BMP, weights, I&O  [ ] Dispo: Neph f/up in 1wk w/ labs during visit. Possible discharge on torsemide 20mg qday (but continue to hold aldactone, entresto, and amlodipine)    Perinephritic hematoma with pseudoaneurysm s/p IR embolization (8/11/2023)  Renal bx (8/2/23)  Right groin hematoma  IR embolization of left renal artery inferior pole pseudoaneurysm by IR. Interval improvement in renal function.  Patient noted on 8/14/2023 to have right groin pain at the vascular access site with ultrasound showing right groin hematoma and no pseudoaneurysm  - Monitor right groin site  - Continue to monitor for bleeding  - pain mng:   - dilaudid oral 1mg q4h prn   - APAP 650mg q6h prn   - topical icyhot    Doroteo Hemoptysis w/o clot, resolved   Acute respiratory failure with hypoxia, resolved  Orthopnea, resolved  No overt hemoptysis since admission 8/11/2023. In the setting of receiving transfusions and TXA x7 days. CT C/A/P w/ IV con 8/11 w/ 5.5 cm left perinephritic hematoma and 0.3 cm hyperenhancing focus of fluid LL pole suspicious for pseudoaneurysm.  Admission work-up also concerning for question of GI bleed in the setting of melena. Hemoptysis improved since admission to [facility]  - on RA  - Low threshold to involve interventional pulmonology if any clinical worsening  - will hold lovenox for now.    Cardiomyopathy and Diastolic HF (EF 45% on 8/14, previously 20-25% on 8/11)  QTc prolongation, resolved   MRI Cardiac Myocarditis protocol (8/14) with interval improvement of flow EF that was seen on 8/11/23.  Cardiac work-up showing persistent LV dilation no evidence of infarct.  EKG w/ interval improvement in QTc and T wave inversions that were previously noted.  Received furosemide 8/11, discontinued thereafter.  - cardiology consulted, appreciate recommendations.  - consult consult to follow here  - med mng:  - Imdur 60mg daily  - hold  Entresto for CHRISTA, possible consider switching back to just ACEi  - continue Coreg 25mg BID    - Hydralazine started on 8/13, dose increased 8/17 (50mg TID)  - hold amlodipine 10mg daily for CHRISTA  - hold spironolactone 25mg daily for CHRISTA  - clonidine was stopped at [facility] d/t bradycardia   - hold torsemide for CHRISTA  - continue Jardiance qday    Malignant hypertension- resolved  SBP>200 on admission.  Initially started on clonidine which was discontinued on 8/12 due to bradycardia.  Nitroglycerin drip on 8/12 was transitioned same-day to Imdur. Patient's BP have been sbp<180 since admission overnight.    LLL pneumonia  Acute hypoxic respiratory failure-resolved  PTA receiving a course of Augmentin.  Initially on Vanco and Zosyn on 8/11, vancomycin discontinued inpatient.  LLL consolidation noted per admission CTA and per symptoms on admission to [facility] along with interstitial edema and trace bilateral pleural effusions. IV zosyn started 8/11 at [facility].   - sputum culture/gram stain have been ordered, not obtained, has had non-productive cough   - Switched from IV Vanc/zosyn to Augmentin on 8/19- 8/25  - low threshold to consult pulmonary if repeat hemoptysis or respiratory decline    Chronic thrombocytopenia  Blood loss anemia   Occurs in the setting of perinephric hematoma as well as hemoptysis. Status post 2 units PRBC 8/11/2023. Hemoglobin stable ~8. Plt stable in the 110-140s.  - Trend CBC and vitals  - completed TXA neb per pulmonary x 7 day (8/17)  - continue oral iron    Right >left upper and lower extremity edema  Paresis of right 2nd digit   Noted on exam.  RUE PIV present. Patient right index finger stuck in extension, able to painlessly flex passively. Cardiolipin Abx neg 2019  - hold anticoagulants in the setting of recent significant bleed, can consider if continues to be stable   - RLE  and RUE duplex 8/17/23 neg for DVT    Left shoulder, scapular pain-improved  C/o left shoulder pain at [facility] w/ intact ROM.  Tender to palpation over her muscle over her shoulder, scapular area. xray shoulder without acute finding  - pain as above  [ ] Dispo: f/up Rheum outpatient (scheduled 9/6)    Headaches  Ongoing since admission and initially generalized, on admission to [facility] is right frontal, has been responsive to APAP and dilaudid  - continue APAP and dilaudid   - treat underlying HTN and lupus cares as above   - NO NSAIDS    Hypomagnesemia- improved  Hypokalemia -improved  In the setting of CHRISTA and diuresis, suspect renal losses  - continue KlorCon 20 meq BID  - trend BMP and Mg    Hx C. Diff (6/2023):   S/p oral vancomycin started 8/11 for c/f abdominal infection, for 10 day course   - PO vanc ppx    Melena -resolved - continue to monitor    Hx pancreatic insufficiency 2/2 pancreatitis  Hx acute on chronic pancreatitis (2020)   Last followed with GI in 2020 for pancreatitis and multiple stones and debris in the main pancreatic duct and was recommended to be on Creon at that time  -Stools currently normal, can consider Creon if concerns for pancreatic insufficiency and loose stools    Hx remote generalized tonic-clonic seizures seizures  Right foot drop   Patient was on Keppra as a child. No current PTA antiepileptic medications  - Neurology consult (signed off)   - Continue PT  - orthotic consult, provided brace on 8/23  [ ] Dispo: Consider Neuropsych testing outpatient. Consider EMG outpatient    Hx CMV and EBV + (2022) - no hx of following with ID - consider repeat if felt to be contributory       Diet: Combination Diet Regular Diet Adult    DVT Prophylaxis: VTE Prophylaxis contraindicated due to recent hematoma  Leung Catheter: Not present  Lines: None     Cardiac Monitoring: None  Code Status: Full Code      Clinically Significant Risk Factors              # Hypoalbuminemia: Lowest albumin = 3 g/dL at 8/20/2023  5:50 AM, will monitor as appropriate     # Thrombocytopenia: Lowest platelets = 113 in last 2 days, will monitor  for bleeding  # Acute Kidney Injury, unspecified: based on a >150% or 0.3 mg/dL increase in last creatinine compared to past 90 day average, will monitor renal function       # Chronic heart failure with reduced ejection fraction: last echo with EF <40%             # Financial/Environmental Concerns:           Disposition Plan     Expected Discharge Date: 08/24/2023      Destination: home with family  Discharge Comments: Dispo: home w/ family  Plan: Lisinopril washout, will complete Mon AM  Progress: PO Vanco & augmentin; torsemide to BID          Rach Hernandez MD  Hospitalist Service, GOLD TEAM 9  [facility]  Securely message with [employer] (more info)  Text page via [facility] Paging/Directory   See signed in provider for up to date coverage information  ______________________________________________________________________    Interval History   No acute events overnight. Cr on labs slightly increased but possibly peaking/plateauing. Nephrology recommending 500ml fluid bolus (slow infusion given heart history) and repeat labs in AM. Patient reports doing well, denied any acute concerns. Discussed Lupron subcutaneous q4wk w/ OBGYN team and w/ patient (reviewing risk vs benefits). Patient reporting no interest in having children/pregnancy in the future and not interested in starting the medication.    Physical Exam   Vital Signs: Temp: 98  F (36.7  C) Temp src: Oral BP: 125/84 Pulse: 82   Resp: 16 SpO2: 100 % O2 Device: None (Room air)    Weight: 103 lbs 14.4 oz    General Appearance: A and O x3. Comfortable laying in bed/resting  Respiratory: CTAB. No rhonchi or wheezing, on RA w/o increased WOB  Cardiovascular: S1, S2, RRR, 4/6 systolic murmur on 2nd LICS  GI: NBS, soft, nontender  Skin: no rashes    Medical Decision Making       50 MINUTES SPENT BY ME on the date of service doing chart review, history, exam, documentation & further activities per the note.  MANAGEMENT DISCUSSED  with the following over the past 24 hours: Nephrology, OBGYN, Rheumatology       Data     I have personally reviewed the following data over the past 24 hrs:    11.3 (H)  \   8.8 (L)   / 113 (L)     139 108 (H) 37.3 (H) /  98   5.3 21 (L) 1.76 (H) \       Imaging results reviewed over the past 24 hrs:   No results found for this or any previous visit (from the past 24 hour(s)).

## 2023-08-24 NOTE — PROGRESS NOTES
Nephrology Progress Note  08/24/2023         Assessment & Recommendations:   Milly Carroll is a 23 year old year old female  with history of advanced lupus nephritis class IV/V on biopsy 8/2/2023. Nephrology on consult for management.      #Lupus nephritis, class IV/V  #CKD stage 4 based on cystatin C   -follows with Dr. Purvis; patient was initially diagnosed with SLE at age 7; symptoms included fever, muscle ache and rashes w/ +DsDNA, +RNP, +ribosomal P ab  -prior regimen included Cellcept 1500 mg BID, Plaquenil 200 mg BID and prednisone 5 mg daily; patient endorses compliance with regimen   -repeat kidney Bx on 8/2/23 (No EM sample), result showed Class IV with 2% active crescents, 50% glomerular sclerosis, severe IFTA (70-80%)and concern for class V lupus on bx done 8/23. (class III in 6/22, < 5% IFTA)   -holding plaquenil in setting of cardiomyopathy   -tolerated first dose of 500 mg cytoxan 8/22 without adverse effects   -discontinued cellcept given initiation of cytoxan   -continue prednisone 30 mg daily   -plan for cytoxan 500 mg [id number] for total of 6 cycles on discharge   -several conversations regarding IVF vs leuprolide injections for ovo preservation in setting of cytoxan injection; patient would not like to pursue interventions at this time      #Acute Kidney Injury   -creatinine beginning to plateau today 1.69>1.76; likely secondary to decreased renal perfusion in setting of aggressive anti-hypertensive regimen vs side effects from initiation of ace/arbs vs over diuresis given euvolemic exam and net negative urine output   -continue to hold amlodipine, entresto, spirolactone, torsemide today   -recommend 100 ml/hour 0.9% NS x 5 hours for total of 500 ml today     #Hypertension, uncontrolled   -blood pressures 120s/ 80s after holding amlodipine, entresto, torsemide, spirolactone  -continue to hold medications above today   -consider initiating torsemide 20 mg daily on discharge; continue holding  "amlodipine, entresto and spirolactone on discharge; will plan for close follow-up in nephrology clinic in 1 week to consider resumption of ace/arb and spirolactone      #Metabolic Acidosis   -likely in setting of CKD4   -continue sodium bicarbonate 1300 mg TID     Seen and discussed with Dr. Carson Umana,    Internal Medicine PGY-1   Division of Renal Disease and Hypertension  [facility] Web [facility]    Interval History :   Nursing and provider notes from last 24 hours reviewed. Patient doing well this morning without acute complaints. Denies any fevers, chills, shortness of breath, chest pain, abdominal pain, or dysuria at this time. Able to ambulate to bathroom without complaints.       Physical Exam:   I/O last 3 completed shifts:  In: 371 [P.O.:365; I.V.:6]  Out: 1500 [Urine:1500]   /84 (BP Location: Left arm, Patient Position: Semi-Tapia's, Cuff Size: Adult Regular)   Pulse 82   Temp 98  F (36.7  C) (Oral)   Resp 16   Ht 1.575 m (5' 2\")   Wt 47.1 kg (103 lb 14.4 oz)   SpO2 100%   BMI 19.00 kg/m       GENERAL APPEARANCE: comfortable   EYES:  no scleral icterus, pupils equal  PULM: lungs clear to auscultation bilaterally, equal air movement, no clubbing  CV: regular rhythm, normal rate, no rub, trace lower extremity edema in bilateral feet   GI: soft, non-tender, non-distended, bowel sounds are present  INTEGUMENT: no cyanosis, no rash  NEURO:  oriented x 3     Labs:   All labs reviewed by me  Electrolytes/Renal -   Recent Labs   Lab Test 08/24/23  0602 08/23/23  0615 08/22/23  0450 08/21/23  0446 08/20/23  0550    138 136 139 138   POTASSIUM 5.3 4.7 4.9 4.5 4.6   CHLORIDE 108* 104 103 108* 113*   CO2 21* 20* 19* 17* 15*   BUN 37.3* 36.7* 34.0* 29.5* 25.6*   CR 1.76* 1.69* 1.35* 1.22* 1.03*   GLC 98 123* 105* 96 90   BISI 8.7 8.6 8.8 8.8 8.6   MAG 1.8 1.7 1.6* 1.7 1.9   PHOS  --   --  4.0 3.5 3.5       CBC -   Recent Labs   Lab Test 08/24/23  0602 08/22/23  0450 " 08/21/23  0446   WBC 11.3* 12.5* 10.1   HGB 8.8* 11.1* 10.2*   * 149* 125*       LFTs -   Recent Labs   Lab Test 08/20/23  0550 08/12/23  0819 08/10/23  1810 07/10/23  1454 01/26/23  0656   ALKPHOS  --  94 122*  --  232*   BILITOTAL  --  0.5 0.4  --  0.3   ALT  --  23 <5  --  10   AST  --  79* 24  --  49*   PROTTOTAL  --  5.3* 6.3*  --  7.7   ALBUMIN 3.0* 2.1* 2.8*   < > 1.7*    < > = values in this interval not displayed.       Iron Panel -   Recent Labs   Lab Test 07/10/23  1454 06/25/20  0656 12/27/17  1021 11/22/17  0858   IRON 15*  --  99 20*   IRONSAT 9*  --  32 5*   GABRIEL 1,079*   < >  --   --     < > = values in this interval not displayed.         Imaging:  All imaging studies reviewed by me.     Current Medications:   [Held by provider] amLODIPine  10 mg Oral Daily    amoxicillin-clavulanate  1 tablet Oral Q12H [facility] (08/20)    carvedilol  25 mg Oral BID w/meals    cholecalciferol  10 mcg Oral Daily    empagliflozin  10 mg Oral Daily    ferrous sulfate  325 mg Oral Daily with breakfast    folic acid  1 mg Oral Daily    hydrALAZINE  50 mg Oral Q8H    isosorbide mononitrate  60 mg Oral Daily    pantoprazole  40 mg Oral QAM AC    potassium chloride ER  20 mEq Oral BID    predniSONE  30 mg Oral Daily    [Held by provider] sacubitril-valsartan  1 tablet Oral BID    sodium bicarbonate  1,300 mg Oral TID    sodium chloride (PF)  3 mL Intracatheter Q8H    [Held by provider] spironolactone  25 mg Oral Daily    sulfamethoxazole-trimethoprim  1 tablet Oral Daily    [Held by provider] torsemide  20 mg Oral BID    vancomycin  125 mg Oral BID       Cherelle Umana DO

## 2023-08-24 NOTE — PROGRESS NOTES
CLINICAL NUTRITION SERVICES    Reviewed nutrition risk factors due to LOS.    CLINICAL HISTORY  Pt with PMH lupus C/B lupus flares, cerebritis, nephritis, CHRISTA also past medical history hemoptysis, malignant hypertension, orthopnea and acute respiratory failure with hypoxia, diastolic HF (EF 45% on 8/14, previously 20-25% on 8/11), QTc prolongation, recent LLL pneumonia, recent perinephritic hematoma status post IR embolization (8/11/2023), chronic thrombocytopenia, electrolyte disturbances admitted on 8/17/2023. She was hospitalized at [facility] 8/11/2023 to 8/17/2023 and transfers today for higher level of care. (See also HPI)     NUTRITION HISTORY  - Pt is tolerating diet, eating well per nursing documentation (consuming % of meals, ordering 2-3 meals a day).  - Weight history reviewed. Pt's weights fluctuate greatly, may be d/t fluid shifts. Difficult to assess true wt loss.  - Met with pt and made her aware of nutrition supplement options. She says eating is going well and reports no nutrition questions or concerns at this time.  - RD will order supplements PRN.    No nutrition issues identified at this time. RD will follow via rounds at this time, unless consulted.    Cooper Graham, CORY, LD  7C ([phone])/7D RD pager: [phone]  Weekend/Holiday RD pager: [phone]

## 2023-08-25 NOTE — PROVIDER NOTIFICATION
Critical Lab  7D [id number]   Enio 9  Tara JONES [phone]  K+ 6.3 (5.3)  Thanks    Enio PURVIS text paged at [phone] via [facility]

## 2023-08-25 NOTE — TELEPHONE ENCOUNTER
Update from Dr. Purvis that patient will be discharging soon and will need Cytoxan dose 2-6 outpatient.  Cytoxan dose #1 in hospital.  [facility] not available so scheduled [facility] for # 2/6 dosing only then switching to [facility] after that #3-6    Prior Authorization Infusion/Clinic Administered Request    [facility]  Diagnosis and ICD:SLE Exacerbation  Drug/Therapy: Cytoxan Infusion    Previously Tried and Failed Therapies: MMF, Prednisone, Hydroxychloroquine,     Date of provider note with supporting information: 8/18 Hospital Nephrology Consult by Dr. Kamara    Urgency (When is the patient scheduled?):  Sept 6th, 2nd dose of 6 total doses needed.  Please let me know if additional support is needed.    Team Work makes the Dream Work!    Oralia Rodriguez RN, BSN, PHN  Vasculitis & Lupus Program Nurse Navigator  [phone]

## 2023-08-25 NOTE — PROGRESS NOTES
Nephrology Progress Note  08/25/2023         Assessment & Recommendations:   Milly Carroll is a 23 year old year old female  with history of advanced lupus nephritis class IV/V on biopsy 8/2/2023. Nephrology on consult for management.     #Lupus nephritis, class IV/V  #CKD stage 4 based on cystatin C   -follows with Dr. Purvis; patient was initially diagnosed with SLE at age 7; symptoms included fever, muscle ache and rashes w/ +DsDNA, +RNP, +ribosomal P ab  -prior regimen included Cellcept 1500 mg BID, Plaquenil 200 mg BID and prednisone 5 mg daily; patient endorses compliance with regimen   -repeat kidney Bx on 8/2/23 (No EM sample), result showed Class IV with 2% active crescents, 50% glomerular sclerosis, severe IFTA (70-80%)and concern for class V lupus on bx done 8/23. (class III in 6/22, < 5% IFTA)   -discontinued plaquenil 2/2 cardiomyopathy; discontinued cellcept after initiation of cytoxan   -several conversations regarding IVF vs leuprolide injections for ovo preservation in setting of cytoxan injection; patient would not like to pursue interventions at this time    -tolerated first dose of 500 mg cytoxan 8/22 without adverse effects   -plan for cytoxan 500 mg [id number] for 6 cycles total; next cytoxan scheduled for 9/6   -decrease prednisone taper to 20 mg daily      #Acute Kidney Injury   -creatinine downtrending today 1.69>1.76> 1.57; likely secondary to decreased renal perfusion in setting of aggressive anti-hypertensive regimen vs side effects from initiation of ace/arbs vs over diuresis given euvolemic exam and net negative urine output   -continue to hold amlodipine, entresto, spironolactone, torsemide on discharge      #Hypertension, uncontrolled   -blood pressures stable in 130s/80s   -hold amlodipine, entresto, torsemide, spirolactone on discharge; will plan for close follow-up in nephrology clinic in 1 week to consider resumption of ace/arbs and spironolactone      #Metabolic Acidosis   -likely  "in setting of CKD4   -continue sodium bicarbonate 1300 mg TID     Seen and discussed with Dr. Carson Umana,    Internal Medicine PGY-1   [id number]  Division of Renal Disease and Hypertension  [facility]  [employer]  Vocera Web Console    Interval History :   Nursing and provider notes from last 24 hours reviewed. No acute events overnight. Patient doing well without complaints at this time. Excited to go home today.     Physical Exam:   I/O last 3 completed shifts:  In: 1790 [P.O.:1790]  Out: 2100 [Urine:2100]   BP (!) 148/97 (BP Location: Left arm)   Pulse 94   Temp 98.2  F (36.8  C) (Oral)   Resp 18   Ht 1.575 m (5' 2\")   Wt 47.1 kg (103 lb 14.4 oz)   SpO2 99%   BMI 19.00 kg/m       GENERAL APPEARANCE: resting comfortably   EYES:  no scleral icterus, pupils equal  PULM: lungs clear to auscultation bilaterally, equal air movement, no clubbing  CV: regular rhythm, normal rate, no rub, trace lower extremity edema   GI: soft, non-tender, non-distended, bowel sounds are present  INTEGUMENT: no cyanosis, no rash  NEURO: oriented x 3     Labs:   All labs reviewed by me  Electrolytes/Renal -   Recent Labs   Lab Test 08/25/23  0848 08/25/23  0539 08/24/23  0602 08/23/23  0615 08/22/23  0450 08/21/23  0446 08/20/23  0550   NA  --  139 139 138 136 139 138   POTASSIUM 5.1 6.3* 5.3 4.7 4.9 4.5 4.6   CHLORIDE  --  111* 108* 104 103 108* 113*   CO2  --  17* 21* 20* 19* 17* 15*   BUN  --  32.3* 37.3* 36.7* 34.0* 29.5* 25.6*   CR  --  1.57* 1.76* 1.69* 1.35* 1.22* 1.03*   GLC  --  86 98 123* 105* 96 90   BISI  --  9.1 8.7 8.6 8.8 8.8 8.6   MAG  --  1.9 1.8 1.7 1.6* 1.7 1.9   PHOS  --   --   --   --  4.0 3.5 3.5       CBC -   Recent Labs   Lab Test 08/24/23  0602 08/22/23  0450 08/21/23  0446   WBC 11.3* 12.5* 10.1   HGB 8.8* 11.1* 10.2*   * 149* 125*       LFTs -   Recent Labs   Lab Test 08/20/23  0550 08/12/23  0819 08/10/23  1810 07/10/23  1454 01/26/23  0656   ALKPHOS  --  94 122*  --  232* "   BILITOTAL  --  0.5 0.4  --  0.3   ALT  --  23 <5  --  10   AST  --  79* 24  --  49*   PROTTOTAL  --  5.3* 6.3*  --  7.7   ALBUMIN 3.0* 2.1* 2.8*   < > 1.7*    < > = values in this interval not displayed.       Iron Panel -   Recent Labs   Lab Test 07/10/23  1454 06/25/20  0656 12/27/17  1021 11/22/17  0858   IRON 15*  --  99 20*   IRONSAT 9*  --  32 5*   GABRIEL 1,079*   < >  --   --     < > = values in this interval not displayed.         Imaging:  All imaging studies reviewed by me.     Current Medications:   [Held by provider] amLODIPine  10 mg Oral Daily    amoxicillin-clavulanate  1 tablet Oral Q12H [facility] (08/20)    calcium gluconate  1 g Intravenous Once    carvedilol  25 mg Oral BID w/meals    cholecalciferol  10 mcg Oral Daily    empagliflozin  10 mg Oral Daily    ferrous sulfate  325 mg Oral Daily with breakfast    folic acid  1 mg Oral Daily    hydrALAZINE  50 mg Oral Q8H    isosorbide mononitrate  60 mg Oral Daily    pantoprazole  40 mg Oral QAM AC    [Held by provider] potassium chloride ER  20 mEq Oral BID    predniSONE  30 mg Oral Daily    [Held by provider] sacubitril-valsartan  1 tablet Oral BID    sodium bicarbonate  1,300 mg Oral TID    sodium chloride (PF)  3 mL Intracatheter Q8H    [Held by provider] spironolactone  25 mg Oral Daily    sulfamethoxazole-trimethoprim  1 tablet Oral Daily    [Held by provider] torsemide  20 mg Oral BID    vancomycin  125 mg Oral BID       Cherelle Umana DO

## 2023-08-25 NOTE — TELEPHONE ENCOUNTER
Updated by Dr. Purvis that patient will be discharged from hospital with needing Cytoxan Infusion scheduled q 2weeks x 6 doses total.  Dose #1 was in the hospital, first outpatient appt needed is Sept 6th.    Milly Phillips is now getting her first dose of cyclophosphamide while in the hospital. I think she got it last night. I added therapy plan for next 5 doses starting 9/6. [facility]? Also once it is all set, can you let Dr Byrd (neph attending now) know the exaxct date etc so that they can communicate it with her. his pager number is [phone]. Also keep me in the loop. She will be getting Cytoxan 500 mg Q 14 days for 5 occasions .    [facility] for 130pm, Sept 6th, Wednesday  [facility] for 9/20, 10/4, 10/18, and 11/1 all scheduled at 8am, Wednesday.  Paged Dr. Byrd as directed by Dr. Purvis to update.   Updated Dr. Purvis as well who signed the updated scheduling orders that were updated to every 14 days from every 28 days.  Sent patient ENDYMIONt message to updates in scheduling.    Team Work makes the Dream Work!    Oralia Rodriguez RN, BSN, PHN  V[facility]cul[facility] Lupus Program Nurse Navigator  [phone]

## 2023-08-25 NOTE — TELEPHONE ENCOUNTER
Call back from Dr. Byrd who request she follow up with Nephrology next week.  Patient scheduled for next Thursday, updated patient via [facility] and provider.  Dr. Purvis ordered CBC, Renal Panel, UA, UPCR per T.O.  Ordered and called patient to coordinate labs before appt [place].  Patient agreed to go to Health system for labs on Monday the 28th of August, appt scheduled and lab orders linked to appt time to prevent gaps.    [employer] message sent with next weeks appts times, dates, and locations.    Team Work makes the Dream Work!    Oralia Rodriguez RN, BSN, PHN  Vasculitis & Lupus Program Nurse Navigator  [phone]

## 2023-08-25 NOTE — PLAN OF CARE
"Blood pressure 125/86, pulse 86, temperature 98.2  F (36.8  C), temperature source Oral, resp. rate 18, height 1.575 m (5' 2\"), weight 47.1 kg (103 lb 14.4 oz), SpO2 100 %, Via RA .       Assumed care at 19:00- 2300.     Patient A & O X 4, Able to make needs known. Denies pain  VSS no respiratory distress noted. Received scheduled medications .Right PIV SL. Patient up ad kodak in room. Void adequate. No bowel movement on this shift. Patient resting in bed comfortably. Call light within reach, use call light appropriately. Continue with POC and update MD with change.          Goal Outcome Evaluation:      Plan of Care Reviewed With: patient    Overall Patient Progress: no change.            "

## 2023-08-25 NOTE — PLAN OF CARE
Goal Outcome Evaluation:      Plan of Care Reviewed With: patient    Overall Patient Progress: no changeOverall Patient Progress: no change    Outcome Evaluation: Pt C/o pain in L flank area- PO PRN pain meds given along with heat- effectve per pt. Plan for possible D/c today with plan for neph F/u in one week.    ASSUMED CARES: [id number]  STATUS: Pt admitted 8/17 for Hemoptysis. Lupus Flare. Cardiomyopathy. Nephritis. Chemo Precautions.   NEURO: A/o x 4  VS: VSS on RA.   ACTIVITY: Up ad kodak.   PAIN: C/o L flank pain- PRNs given per order- refer to MAR  CARDIAC: Denies CP.   RESP: Denies SOB  GI/: Voiding spontaneously. No BM this shift.   DIET: Regular  SKIN: No adjustments made.   LDA'S: R PIV x 2 SL.   LABS: Creat 1.76. Mg 1.8. Hgb 8.8.   CHANGES THIS SHIFT: No changes noted.   POC: Cont with POC. Plan for possible discharge today with plans for Neph Follow up in one week. Call light within reach.

## 2023-08-25 NOTE — PROGRESS NOTES
Patient discharged from the hospital at 1430 to home. Discharge instructions reviewed and given to pt. PIVs removed. Pt wheeled down to the main pharmacy by NA, family waiting in the lobby.

## 2023-08-26 NOTE — DISCHARGE SUMMARY
[facility]  Hospitalist Discharge Summary      Date of Admission:  8/17/2023  Date of Discharge:  8/25/2023  3:26 PM  Discharging Provider: Rach Hernandez MD  Discharge Service: Hospitalist Service, GOLD TEAM 9    Discharge Diagnoses   Lupus c/b lupus flares  Lupus cerebritis, active crescentic lesions   New Patchy L. Frontal Lobe area on MRI suspected to be related to Lupus cerebritis hx w/o acute concern   Hx medication non-adherence  Class IV lupus nephritis  CHRISTA, nonoliguric  Malignant Hypertension, resolved  Cardiomyopathy and Diastolic HF (EF 45% on 8/14, previously 20-25% on 8/11)  QTc prolongation, resolved   LLL pneumonia  Acute hypoxic respiratory failure, resolved  Chronic thrombocytopenia  Recent Doroteo Hemoptysis, resolved prior to transfer  Hx of C.diff  R. Foot Drop    Clinically Significant Risk Factors          Follow-ups Needed After Discharge   Follow-up Appointments     Adult [facility] Follow-up and recommended labs and tests      1. Recommend follow up w/ Nephrology w/in 1 week from discharge (w/ plan   for labs to be repeated at clinic visit), if not scheduled prior to   discharge patient will be called to arrange follow up  2. Rheumatology follow up scheduled 9/6  3. Next Cytoxan infusion is on 9/6, from then on it will be every 2 weeks   4. Outpatient referral placed for cardiology, to be called to schedule  5. Outpatient referral for Neurology placed, to be called to schedule  6. Outpatient ophthalmology referral placed, to be called to schedule (Plaquenil eye exam)      Appointments on [place] and/or [facility] (with [facility] or [facility]   provider or service). Call [phone] if you haven't heard regarding   these appointments within 7 days of discharge.          Discharge Disposition   Discharged to home  Condition at discharge: Stable    Hospital Course   Milly Carroll is a 23 year old female w/ PMH lupus C/B lupus flares, lupus cerebritis,  lupus nephritis, recent renal biopsy on 8/2/23.       [facility] Course  Patient was seen by Nephrology clinic on 8/10 outpatient where her labs were concerning for low Hgb and other labs concerning for acute lupus flare and recommended ED evaluation.Patient presented to [facility]  and transferred to [facility] ED w/ complaints of cough, F/C, back pain, bloody stools. ED w/up at that time included blood transfusion, imaging concerning for small pseudoaneurysm around the L. Kidney as well as L. Perinephritic hematoma in setting of recent renal biopsy. In the ED at that time she was also noted to have blt pleural effusions and hypoxia w/ elevated BNP (Hx of reduced EF), significantly uncontrolled HTN, and hemoptysis. Patient stabilized in the ED and case discussed w/ [facility] w/ recommendation for transfer.       [facility] Course  Transferred on 8/11 to [facility] w/ patient presenting w/ melanie hemoptysis w/ respiratory distress and uncontrolled HTN and immediately transferred to ICU. Given acute decompensation and patient's primary medical care teams at [facility], transfer plans were made for transfer to [facility] once bed available. During stay at [facility], nephrology consulted patient continued on mycophenolate and high dose steroid taper for treatment of acute lupus flare and CHRISTA. Pulmonary evaluation on 8/11 suspected hemoptysis related to possible persistent pneumonia w/ underlying necrosis or possibly vasculitis- treated w/ Zosyn and TXA nebs w/ improvement/resolution of hemoptysis after first day of admission. Respiratory distress believed to be combo hemoptysis, acute HF (EF 20-25% on 8/11 and then EF 45% on 8/14) suspected to be stress induced cardiomyopathy. Cardiac MRI 8/16 showed mild LV dilation w/ moderately reduced systolic function w/o late enhancement to suggest prior infarct/inflammation/infiltration. Cardiology and nephrology worked to control her BP, patient briefly on nitroglycerin gtt and  transitioned to orals (imdur, coreg, lisinopril, hydralazine, amlodipine). IR also evaluated patient and performed coil embolization of L. Renal artery inferior pole pseudoaneurysm on 8/11, complicated by R. Groin hematoma.       [facility] Course  Bed at [facility] available on 8/17 at which time patient was transferred. Nephrology, Cardiology, Rheumatology, Neurology, Infectious disease, and OBGyn consulted.   ID transitioned Zosyn to oral Augmentin for Pneumonia tx w/ recommend last day tx 8/25 (continue PO Vanc for C.diff ppx til off augmentin). No outpatient ID follow up needed at this time.   Cardiology consulted for assistance w/ HF management as well as HTN management. Cardiology recommended continue Coreg, start spironolactone, starting empagliflozin, switching from lisinopril to entresto, starting amlopipine, continuing imdur and hydralazine. On this regimen for GDMT and HTN tx, blood pressures overcorrected w/ soft blood pressures concerning for causing renal function decrease. Regimen was then adjusted to continue the Imdur, Hydralazine, Coreg, Empagliflozin (stopping Entresto and not resuming PTA lisinopril, stopping amlodipine, stopping spironolactone, stopping torsemide). These medication adjustments seem to keep BP normotensive as well as labs showing improvement in Cr. Additionally, serial EKGs during admission w/ improving Qtc. Outpatient cardiology referral placed.   Neurology consulted for R. 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 Orthotics team able to provide brace. Brain MRI also completed given hx of lupus cerebritis w/ seizure hx which showed new patchy areas of white matter T2 hyperintensity, greatest in the left frontal lobe, nonspecific, but possibly related to lupus cerebritis. No acute concerns/further w/up per neurology, believed this new imaging to likely be patient's new  baseline imaging. Outpatient Neurology referral placed.   Nephrology/Rheumatology initially recommended continue cellcept and prednisone  w/ plans for outpatient Cytoxan but ultimately discontinued cellcept and administered first dose of Cytoxan on 8/22. Patient tolerated Cytoxan well, plan for outpatient continued infusion [id number] for total 6 doses w/ next infusion scheduled on 9/6. CHRISTA worsened during stay believed to be 2/2 renal hypoperfusion from intense antihypertensive regimen and ARB therapy. Ultimately, blood pressure regimen/GDMT adjust to Coreg BID, Hydral TID, Imdur (holding entresto, amlodipine, torsemide, and spironolactone) and patient also given 500ml fluid bolus w/ improvement of Creatinine. Nephrology recommended on discharge to continue to outpatient on this regimen w/ plan for close nephrology follow up w/in 1 week for lab check. Also recommended discharge on decreased dose of prednisone 20mg qday (down from 30mg qday). Rheumatology follow up scheduled for 9/6.   OBGyrachel santana consulted for discussions regarding Lupron/IVF for ovo preservation in setting of Cytoxan initiation. Risks and Benefits and Drug facts regarding medication relayed to patient. Patient autonomously decided against any interventions at this time. No OBGYN/TEJA referral placed at this time.     At time of discharge, patient feeling clinically well and ready for discharge. Denied any concerns. Breathing comfortably on RA, no cough/hemoptysis. Reports ambulating well w/ orthotic for R. Foot drop. Eating well. Labs showing Cr downtrending. Patient w/ 1-2 doses of Augment left to complete course at home. SW met w/ patient to review transportation options outpatient related to her insurance plan and other community resources available to help ensure patient attends her outpatient appointments and Cytoxan infusion sessions.      Lupus c/b lupus flares  Lupus cerebritis  Lupus Nephritis  Hx medication non-adherence  - Rheumatology  consulted, Nephrology Consulted   - Lupus-focused meds:   - discharge on prednisone 20mg daily (first dose of 20mg on 8/26)   - PPI while on prednisone  - Bactrim PJP ptx  - Started Cytoxan 1x dose on 8/22 (plan for 1 dose q2wk for total 6 doses)  - continue folate supplementation  - holding Plaquenil held due to new onset cardiomyopathy  - discontinued MMF 1500mg BID (last dose 8/22)     Class IV lupus nephritis  CHRISTA, nonoliguric  Metabolic Acidosis  Hypomagnesemia- improved  Hypokalemia -improved  - active lupus nephritis, treatment as above.   - BP and HF regimen as below  - sodium bicarbonate 1300mg tid  - continue Potassium supplement as outpatient    Cardiomyopathy and Diastolic HF (EF 45% on 8/14, previously 20-25% on 8/11)  R>L upper and lower extremity edema, resolved  QTc prolongation, resolved   Malignant Hypertension, resolved  - cardiology consulted  - RLE  and RUE duplex 8/17/23 neg for DVT  - med mng:  - Coreg 25mg BID  - Imdur 60mg daily  - Hydralazine 50mg TID  - Jardiance qday  - hold Entresto for CHRISTA, possible consider switching back to just ACEi outpatient  - hold amlodipine 10mg daily for CHRISTA  - hold spironolactone 25mg daily for CHRISTA  - hold torsemide 20mg BID for CHRISTA     LLL pneumonia  Acute hypoxic respiratory failure-resolved  Hx of C.diff infxn (6/2023)  On RA during [facility] admission. ID consulted, recommended transtion Zosyn to Augmentin (til 8/25). Continue PO Vanc for C.diff ppx til off abx.     Renal bx (8/2/23) complicated by Perinephritic hematoma with pseudoaneurysm   s/p IR embolization (8/11/2023) complicated by R. Groin hematoma  Monitored during hospital stay at [facility], no issues w/ bleeding and pain/lesion improved prior to discharge.      Doroteo Hemoptysis w/o clot, resolved prior to [facility] admission  Acute respiratory failure with hypoxia, resolved prior to [facility] admission  Hemoptysis improved since admission to [facility]. Patient on RA during entire [facility] admission.      Chronic  thrombocytopenia  Acute Blood loss anemia   Occurs in the setting of perinephric hematoma as well as hemoptysis. Status post 2 units PRBC 8/11/2023. Completed TXA neb per pulmonary x 7 day (8/17). Labs trended. Continued PTA oral iron.     Left shoulder, scapular pain-improved  C/o left shoulder pain at SD w/ intact ROM. No complaints during [facility] admission. Patient to follow up w/ Rheumatology outpatient.      Intermittent Headaches, improved  Ongoing since admission and initially generalized, on admission to [facility] is right frontal, has been responsive to APAP and dilaudid along w/ treatment of HTN and Lupus. NO NSAIDS     Melena, resolved prior to [facility] admission     Hx pancreatic insufficiency 2/2 pancreatitis  Hx acute on chronic pancreatitis (2020)   Last followed with GI in 2020 for pancreatitis and multiple stones and debris in the main pancreatic duct and was recommended to be on Creon at that time. Stools described as normal.     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 No current PTA antiepileptic medications  - Neurology consult (signed off)   - Brain MRI New patchy areas of white matter T2 hyperintensity, greatest in the left frontal lobe, nonspecific, but possibly related to lupus cerebritis. No acute concerns/further w/up per neurology.   - orthotic consult, provided Maritza on 8/23 for foot drop         Consultations This Hospital Stay   NEPHROLOGY GENERAL ADULT IP CONSULT  RHEUMATOLOGY IP CONSULT  CARDIOLOGY HEART FAILURE (HF) IP CONSULT  SOCIAL WORK IP CONSULT  CARE MANAGEMENT / SOCIAL WORK IP CONSULT  CARDIOLOGY GENERAL ADULT IP CONSULT  CARDIOLOGY GENERAL ADULT IP CONSULT  INFECTIOUS DISEASE GENERAL ADULT IP CONSULT  NEUROLOGY GENERAL ADULT IP CONSULT  GYNECOLOGY IP CONSULT  PHYSICAL THERAPY ADULT IP CONSULT  ORTHOSIS EXTREMITY LOWER REFERRAL IP CONSULT  NURSING TO  CONSULT FOR VASCULAR ACCESS CARE IP CONSULT    Code Status   Prior    Time Spent on this Encounter   I, Rach Hernandez MD, personally saw the patient today and spent greater than 30 minutes discharging this patient.       Rach Hernandez MD  [facility] 7D MED SURG  [address]  [place] [address]  Phone: [phone]  ______________________________________________________________________    Physical Exam   Vital Signs: Temp: 98  F (36.7  C) Temp src: Oral BP: 132/81 Pulse: 90   Resp: 17 SpO2: 99 % O2 Device: None (Room air)    Weight: 103 lbs 14.4 oz  General Appearance:  A and O x3. Comfortable laying in bed/resting  Respiratory: CTAB. No rhonchi or wheezing, on RA w/o increased WOB  Cardiovascular: S1, S2, RRR, 3/6 systolic murmur on 2nd LICS, no LE edema  GI: NBS, soft, nontender  Skin: no rashes       Primary Care Physician   Estefany Bell    Discharge Orders      Adult Cardiology Eval  Referral      Adult Neurology  Referral      Adult Eye  Referral      Reason for your hospital stay    Lupus c/b lupus flares  Lupus cerebritis, active crescentic lesions   New Patchy L. Frontal Lobe area on MRI suspected to be related to Lupus cerebritis hx w/o acute concern   Hx medication non-adherence  Class IV lupus nephritis  CHRISTA, nonoliguric  Malignant Hypertension, resolved  Cardiomyopathy and Diastolic HF (EF 45% on 8/14, previously 20-25% on 8/11)  QTc prolongation, resolved   LLL pneumonia  Acute hypoxic respiratory failure, resolved  Chronic thrombocytopenia  Recent Doroteo Hemoptysis, resolved prior to transfer  Hx of C.diff  R. Foot Drop     Activity    Your activity upon discharge: activity as tolerated     Adult [facility] Follow-up and recommended labs and tests    1. Recommend follow up w/ Nephrology w/in 1 week from discharge (w/ plan for labs to be repeated at clinic visit), if not scheduled prior to discharge patient will be called to arrange follow up  2. Rheumatology follow  up scheduled 9/6  3. Next Cytoxan infusion is on 9/6, from then on it will be every 2 weeks       Appointments on [place] and/or [facility] (with [facility] or [facility] provider or service). Call [phone] if you haven't heard regarding these appointments within 7 days of discharge.     Diet    Follow this diet upon discharge: Regular Diet Adult       Significant Results and Procedures   Most Recent 3 CBC's:  Recent Labs   Lab Test 08/24/23  0602 08/22/23  0450 08/21/23  0446   WBC 11.3* 12.5* 10.1   HGB 8.8* 11.1* 10.2*   MCV 91 88 91   * 149* 125*     Most Recent 3 BMP's:  Recent Labs   Lab Test 08/25/23  0848 08/25/23  0539 08/24/23  0602 08/23/23  0615   NA  --  139 139 138   POTASSIUM 5.1 6.3* 5.3 4.7   CHLORIDE  --  111* 108* 104   CO2  --  17* 21* 20*   BUN  --  32.3* 37.3* 36.7*   CR  --  1.57* 1.76* 1.69*   ANIONGAP  --  11 10 14   BISI  --  9.1 8.7 8.6   GLC  --  86 98 123*     Most Recent 2 LFT's:  Recent Labs   Lab Test 08/12/23  0819 08/10/23  1810   AST 79* 24   ALT 23 <5   ALKPHOS 94 122*   BILITOTAL 0.5 0.4   ,   Results for orders placed or performed during the hospital encounter of 08/17/23   US Upper Extremity Venous Duplex Right    Narrative    EXAMINATION: DOPPLER VENOUS ULTRASOUND OF THE RIGHT UPPER EXTREMITY,  8/17/2023 7:56 PM     COMPARISON: None.    HISTORY: Unilateral right arm and leg swelling    TECHNIQUE:  Gray-scale evaluation with compression, spectral flow and  color Doppler assessment of the deep venous system of the right upper  extremity.    FINDINGS:    Right: Normal blood flow and waveforms are demonstrated in the  internal jugular, innominate, subclavian, and axillary veins. There is  normal compressibility of the brachial, basilic and cephalic veins.      Impression    IMPRESSION: No evidence of right upper extremity deep venous  thrombosis.    I have personally reviewed the examination and initial interpretation  and I agree with the findings.    KENIA CERDA MD     "     SYSTEM ID:  [id number]    Lower Extremity Venous Duplex Right    Narrative    ULTRASOUND LOWER EXTREMITY VENOUS DUPLEX RIGHT 8/17/2023 7:56 PM    CLINICAL HISTORY: unilateral right arm and leg swelling, assess for  DVT.     COMPARISONS: Ultrasound post vascular access lower extremity duplex  8/14/2023.    REFERRING PROVIDER: BROOKE CERVANTES    TECHNIQUE: Grayscale, color Doppler, Doppler waveform ultrasound  evaluation was performed through the right common femoral, femoral,  and popliteal veins. Right posterior tibial and peroneal veins were  evaluated with grayscale imaging and compression.    Left common femoral vein was evaluated for symmetry.    Cine clip was saved.    FINDINGS: Left common femoral vein is patent, fully compressible, and  demonstrates normal phasic Doppler waveform.    Anechoic 1.5 x 3.5 x 3.1 cm collection in the right groin demonstrates  no internal vascular flow by color Doppler imaging, previously mixed  echogenicity and 1.0 x 2.8 x 2.9 cm.    Right common femoral, femoral, and popliteal veins are fully  compressible, patent, and demonstrate normal phasic Doppler waveforms.    Right posterior tibial veins are fully compressible to the ankle.    Right peroneal veins are fully compressible to the distal calf.      Impression    IMPRESSION:   1. No deep venous thrombosis demonstrated in the RIGHT leg.    2. 1.5 x 3.5 x 3.1 cm collection in the right groin, previously 1.0 x  2.8 x 2.9 cm. Differential includes hematoma and seroma.    Reference: \"Duplex Ultrasound in the Diagnosis of Lower-Extremity Deep  Venous Thrombosis\"- Vicky Lynn MD, S; Bhavin Resendez MD  (Circulation. 2014;129:917-921. http://circ.ahajournals.org)    JUDD DUNLAP MD         SYSTEM ID:  [id number]   XR Chest 2 Views    Narrative    Exam: XR CHEST 2 VIEWS, 8/17/2023 8:53 PM    Comparison: CT 8/10/2023, radiograph 12/14/2022, 6/11/2022    History: interval follow up of pulmonary edema and pneumonia,  persistent " orthopnea and PND    Findings:  PA and lateral views of the chest. Trachea is midline.  Cardiomediastinal silhouette is within normal limits. Persistent hazy  left basilar opacities. There is no pneumothorax or pleural effusion.  The upper abdomen is unremarkable. No acute osseous abnormality.       Impression    Impression:   Persistent hazy left basilar opacities, likely representing ongoing  infection.    I have personally reviewed the examination and initial interpretation  and I agree with the findings.    LIZZY LUZ MD         SYSTEM ID:  [id number]   MR Brain w/o & w Contrast    Narrative    EXAM: MR BRAIN W/O & W CONTRAST  8/21/2023 6:56 PM     HISTORY:  concern for lupus cerebritis       COMPARISON:  MRI 12/15/2022, 5/15/2013    TECHNIQUE: Axial FLAIR,  T1-weighted, and susceptibility images were  obtained without intravenous contrast. Following intravenous  gadolinium-based contrast administration, axial T2-weighted,  diffusion, FLAIR, and axial and coronal T1-weighted images were  obtained.    CONTRAST: 7.5mL Gadavist.    FINDINGS:  There is no mass effect, midline shift, acute intracranial hemorrhage.  Chronic T1 hyperintensity and calcification in the basal ganglia  bilaterally, similar compared to 12/15/2022. Unchanged T1  hyperintensity with susceptibility artifact in the dentate nuclei,  likely chronic calcium deposit. The ventricles are proportionate to  the cerebral sulci. Diffusion and susceptibility weighted images are  negative for acute/focal abnormality. Major intracranial vascular  structures are within normal limits. Patchy and confluent  periventricular white matter T2 hyperintensities without associated  enhancement.    Postcontrast images demonstrate no abnormal intracranial enhancement.    No suspicious abnormality of the skull marrow signal. Mucosal  thickening scattered throughout the paranasal nasal sinuses. Right  greater than left mastoid air cell effusion. The orbits are  grossly  unremarkable.      Impression    IMPRESSION:  1. New patchy areas of white matter T2 hyperintensity, greatest in the  left frontal lobe, nonspecific, but possibly related to lupus  cerebritis.  3. Unchanged bilateral basal ganglia calcifications and dentate  nucleus calcifications.    I have personally reviewed the examination and initial interpretation  and I agree with the findings.    REINALDO CHOU MD         SYSTEM ID:  [id number]       Discharge Medications   Discharge Medication List as of 8/25/2023  2:11 PM        START taking these medications    Details   amoxicillin-clavulanate (AUGMENTIN) 500-125 MG tablet Take 1 tablet by mouth every 12 hours for 1 day, Disp-2 tablet, R-0, E-Prescribe      empagliflozin (JARDIANCE) 10 MG TABS tablet Take 1 tablet (10 mg) by mouth daily, Disp-90 tablet, R-1, E-Prescribe      sodium bicarbonate 650 MG tablet Take 2 tablets (1,300 mg) by mouth 3 times daily, Disp-180 tablet, R-0, E-Prescribe           CONTINUE these medications which have CHANGED    Details   carvedilol (COREG) 25 MG tablet Take 1 tablet (25 mg) by mouth 2 times daily (with meals), Disp-60 tablet, R-0, E-Prescribe      hydrALAZINE (APRESOLINE) 50 MG tablet Take 1 tablet (50 mg) by mouth 3 times daily, Disp-90 tablet, R-0, E-Prescribe      isosorbide mononitrate (IMDUR) 60 MG 24 hr tablet Take 1 tablet (60 mg) by mouth daily, Disp-30 tablet, R-0, E-Prescribe      ondansetron (ZOFRAN ODT) 4 MG ODT tab Take 1 tablet (4 mg) by mouth every 8 hours as needed for nausea, Disp-12 tablet, R-0, E-Prescribe      predniSONE (DELTASONE) 10 MG tablet Take 2 tablets (20 mg) by mouth daily, Disp-60 tablet, R-0, E-Prescribe      sulfamethoxazole-trimethoprim (BACTRIM) 400-80 MG tablet Take 1 tablet by mouth daily, Disp-30 tablet, R-0, E-Prescribe      vancomycin (VANCOCIN) 125 MG capsule Take 1 capsule (125 mg) by mouth 2 times daily, Disp-60 capsule, R-0, E-Prescribe           CONTINUE these medications which have  NOT CHANGED    Details   acetaminophen (TYLENOL) 325 MG tablet Take 2 tablets (650 mg) by mouth every 4 hours as needed for mild pain, Transitional      ferrous sulfate (FEROSUL) 325 (65 Fe) MG tablet Take 1 tablet (325 mg) by mouth daily (with breakfast), Disp-100 tablet, R-3, E-PrescribePlease fill here      folic acid (FOLVITE) 1 MG tablet Take 1 mg by mouth daily, Historical      pantoprazole (PROTONIX) 40 MG EC tablet Take 1 tablet (40 mg) by mouth every morning (before breakfast), Transitional      potassium chloride ER (KLOR-CON M) 20 MEQ CR tablet Take 20 mEq by mouth 2 times daily, Historical      Vitamin D, Cholecalciferol, 10 MCG (400 UNIT) TABS Take 10 mcg by mouth daily, Historical           STOP taking these medications       amLODIPine (NORVASC) 10 MG tablet Comments:   Reason for Stopping:         artificial saliva (BIOTENE MT) SOLN solution Comments:   Reason for Stopping:         HYDROmorphone (DILAUDID) 0.2 MG/ML SOLN injection' Comments:   Reason for Stopping:         HYDROmorphone (DILAUDID) 2 MG tablet Comments:   Reason for Stopping:         labetalol (NORMODYNE/TRANDATE) 5 MG/ML injection Comments:   Reason for Stopping:         lisinopril (ZESTRIL) 40 MG tablet Comments:   Reason for Stopping:         metoprolol succinate ER (TOPROL XL) 50 MG 24 hr tablet Comments:   Reason for Stopping:         mycophenolate (GENERIC EQUIVALENT) 500 MG tablet Comments:   Reason for Stopping:         piperacillin-tazobactam (ZOSYN) 3-0.375 GM vial Comments:   Reason for Stopping:         spironolactone (ALDACTONE) 25 MG tablet Comments:   Reason for Stopping:             Allergies   Allergies   Allergen Reactions    Rituximab Anaphylaxis and Shortness Of Breath

## 2023-08-27 NOTE — PROGRESS NOTES
Clinic Care Coordination Contact  [facility]: Post-Discharge Note  SITUATION                                                      Admission:    Admission Date: 08/17/23   Reason for Admission: Lupus c/b lupus flares  Lupus cerebritis, active crescentic lesions   New Patchy L. Frontal Lobe area on MRI suspected to be related to Lupus cerebritis hx w/o acute concern   Hx medication non-adherence  Class IV lupus nephritis  CHRISTA, nonoliguric  Malignant Hypertension, resolved  Cardiomyopathy and Diastolic HF (EF 45% on 8/14, previously 20-25% on 8/11)  QTc prolongation, resolved   LLL pneumonia  Acute hypoxic respiratory failure, resolved  Chronic thrombocytopenia  Recent Doroteo Hemoptysis, resolved prior to transfer  Hx of C.diff  R. Foot Drop  Discharge:   Discharge Date: 08/25/23  Discharge Diagnosis: Lupus c/b lupus flares  Lupus cerebritis, active crescentic lesions   New Patchy L. Frontal Lobe area on MRI suspected to be related to Lupus cerebritis hx w/o acute concern   Hx medication non-adherence  Class IV lupus nephritis  CHRISTA, nonoliguric  Malignant Hypertension, resolved  Cardiomyopathy and Diastolic HF (EF 45% on 8/14, previously 20-25% on 8/11)  QTc prolongation, resolved   LLL pneumonia  Acute hypoxic respiratory failure, resolved  Chronic thrombocytopenia  Recent Doroteo Hemoptysis, resolved prior to transfer  Hx of C.diff  R. Foot Drop    BACKGROUND                                                      Per hospital discharge summary and inpatient provider notes:    marla Carroll is a 23 year old female w/ PMH lupus C/B lupus flares, lupus cerebritis, lupus nephritis, recent renal biopsy on 8/2/23.     ASSESSMENT           Discharge Assessment  How are you doing now that you are home?: doing well  How are your symptoms? (Red Flag symptoms escalate to triage hotline per guidelines): Improved  Do you feel your condition is stable enough to be safe at home until your provider visit?: Yes  Does the patient  have their discharge instructions? : Yes  Does the patient have questions regarding their discharge instructions? : No  Were you started on any new medications or were there changes to any of your previous medications? : Yes  Does the patient have all of their medications?: Yes  Do you have questions regarding any of your medications? : No  Discharge follow-up appointment scheduled within 14 calendar days? : Yes  Discharge Follow Up Appointment Date: 08/31/23  Discharge Follow Up Appointment Scheduled with?: Specialty Care Provider    Post-op ([facility] Only)  If the patient had a surgery or procedure, do they have any questions for a nurse?: No         PLAN                                                      Outpatient Plan:      Follow-up Appointments     Adult [facility] Follow-up and recommended labs and tests      1. Recommend follow up w/ Nephrology w/in 1 week from discharge (w/ plan   for labs to be repeated at clinic visit), if not scheduled prior to   discharge patient will be called to arrange follow up  2. Rheumatology follow up scheduled 9/6  3. Next Cytoxan infusion is on 9/6, from then on it will be every 2 weeks   4. Outpatient referral placed for cardiology, to be called to schedule  5. Outpatient referral for Neurology placed, to be called to schedule  6. Outpatient ophthalmology referral placed, to be called to schedule (Plaquenil eye exam)        Appointments on [place] and/or [facility] (with [facility] or [facility]   provider or service). Call [phone] if you haven't heard regarding   these appointments within 7 days of discharge.      Future Appointments   Date Time Provider Department Center   8/28/2023  6:30 PM RH LAB ONLY [facility]   8/31/2023  9:30 AM April Purvis MD [facility]   9/6/2023 11:30 AM Nnamdi Waddell DO [facility]   9/6/2023  1:30 PM [facility] INFUSION NURSE [facility]   9/13/2023 11:00 AM Mare Veronica, [facility]   9/20/2023  8:00  AM RH INFUSION CHAIR 11 [facility] [place] [facility]   10/4/2023  8:00 AM RH INFUSION CHAIR 10 [facility] [place]   10/18/2023  8:00 AM RH INFUSION CHAIR 1 [facility] [place] [facility]   11/1/2023  8:00 AM RH INFUSION CHAIR 11 [facility]     For any urgent concerns, please contact our 24 hour nurse triage line: [phone]     CLARISSE Powre  [phone]  [facility]

## 2023-08-28 NOTE — TELEPHONE ENCOUNTER
Updated by PA team that insurance prior auth was obtained for upcoming appt.  Team Work makes the [employer] Work!    Oralia Rodriguez RN, BSN, PHN  Vasculitis & Lupus Program Nurse Navigator  [phone]

## 2023-08-31 NOTE — LETTER
8/31/2023       RE: Milly Carroll  [address] [place] [id number]     Dear Colleague,    Thank you for referring your patient, Milly Carroll, to the [facility] [place] [facility]. Please see a copy of my visit note below.          Nephrology clinic follow up  8.31.23    Milly Carroll MRN:[id number] YOB: 2000  Date of Admission:(Not on file)  Primary care provider: Estefany Bell  Requesting physician: April Purvis, *      REASON FOR CONSULT:       HISTORY OF PRESENT ILLNESS:    Milly was initially diagnosed with lupus when she was 7 years old. 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 She has had varying course of disease since her diagnosis. She has most recently being treated with Cellcept 1500 mg BID, Plaquenil 200 mg BID and prednisone 5 mg daily. She was on Benlysta infusion in 2019 that was later switched to Benlysta subcutaneous in 2021. She is not receiving Benlysta for last many months. She was treated with Rituximab since 2013 but she developed anaphylactic reaction during Rituximab infusion in 12/2019.    She has had persistent hematuria and proteinuria since 2021 however a lot of these samples were contaminated. She has had on and off documented proteinuria which is 0.3-0.7 g/g since 2013. She has always maintained her creatinine at baseline of 0.5-0.6 mg/dl however during most recent admission in 6/22, she had an elevation in her creatinine to 0.9 mg/dl. At the time, she was treated with IV solumedrol 1 gm x 3 and discharged home on prednisone 60 mg daily. Her cellcept was decreased down to 1000 mg BID during that admission for concerns of toxicity.   Today she denies any particular symptoms but continues to  have persistently elevated dsDNA and significantly low complements.     Patient denies any LE edema, exertional dyspnea/orthopnea/PND, dizziness, lightheadedness, nausea, vomiting or diarrhea.   Denies use of OTC NSAIDS or other non prescribed meds, recent exposure to abx or contrast, obstructive urinary symptoms, skin rashes, joint pains, fevers, passing kidney stones, recurrent sinus infections or UTI's       PAST MEDICAL HISTORY:  Reviewed with patient on 08/31/2023   As per HPI    MEDICATIONS:  Reviewed with the patient in detail    ALLERGIES:    Reviewed with the patient in detail    REVIEW OF SYSTEMS:  A comprehensive of systems was negative except as noted above.    SOCIAL HISTORY:   Reviewed with patient, no smoking and no alcohol use     FAMILY MEDICAL HISTORY:   Reviewed, no family history of need for dialysis, transplant or CKD    PHYSICAL EXAM:   Vital signs:BP (!) 168/129   Pulse 85   Temp 98.1  F (36.7  C)   Wt 47.6 kg (105 lb)   SpO2 100%   BMI 19.20 kg/m      Physical Exam       Gen: Appears well  Neck: No JVD  Lungs: CTA  CVS: S1S2 normal, no murmurs heard  LE: no edema  Skin: no rashes  CNS: Grossly normal     Interval history 12/29/22: Since we last spoke, she did have a kidney bx which went well. Unfortunately, it was hard to get in touch with her after that. Currently she reports of taking her cellcept 1500 mg BID as prescribed. She was admitted to the hospital 12/22 with cytopenias and undetectable level of MMF, concern for lupus flare and medication non adherence. Her dsDNA was >1000 and she was started back on cellcept 1500 mg BID along with a prednisone taper. Her creatinine peaked at 0.99 mg/dl from baseline of ~ 0.6 and her urine protein was at 2.26 g/g along with an active urinary sediment. She reports she is doing well after the hospitalization and she denies any symptoms at this time. Her aches and pains are better and she only has back pain at this time. She remains on prednisone  taper.     Interval history 7/10/23: Since I last saw her, patient was in [place]. Moved back to [place] this summer and was out of her immunosuppression for atleast a few months. She presented to the ED at [facility] with vomiting and diarrhea and then transferred to [facility] [place] for rheumatology consultation She was also diagnosed with C diff infection at the time.She was also thought to be in lupus flare and had an CHRISTA with creatinine of 1.58. UA with hematuria and proteinuria, UPCR of 2.7 g/g.She did have a C3 of 23 and C4 of 6 with dsDNA at 1446.    She was started on solumedrol 60 mg daily x 2 days and was discharged from the hospital with prednisone taper 40 mg daily for 1 week then 30 mg daily for 1 week then 20 mg daily for 1 week then 10 mg daily. Today,she tells me that she is on 0.5 mg of prednisone.  She was started back on  mg daily, and  mg BID       Interval history 8/10/23: Milly was admitted to the hospital initially at [facility] when she presented with dyspnea and was found to have sepsis with left lower lobe pneumonia.  She also had a hemoglobin of 5.6 and she was admitted to the ICU where she received vancomycin and Zosyn.  There was a concern of lower GI bleed, however I am unable to find a colonoscopy that was done.  She does report that the bleeding has stopped though.  She was found to have CHRISTA with a peak and a creatinine to 1.91, and creatinine improved to 1.05 mg/dl on discharge. She did have a kidney bx on 8/2. She was discharged with Augmentin and PO Vancomycin which she is currently on it which she will continue for 5 more days. She was also anemic and thrombocytopenic during the admission, hemolysis was ruled out and she was started on prednisone 20 mg daily. Today she comes for a follow up. She initially reported mild shortness of breath but says overall she is feeling much better.     Interval history: She reports that she is doing okay. Denies any new symptoms in  regards to active lupus. She had another hospitalization, discharged on 8.25 and  has now been started on Eurolupus cytoxan regimen, first dose on 8.22. She was discharged on prednisone taper.     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 (class III in 6/22, < 5% IFTA)  # SLE (+DsDNA, +RNP, +ribosomal P ab)  # h/o lupus cerebritis, seizures   # h/o rt knee osteonecrosis   # b/l AVN of hips b/l    Her hematuria and proteinuria along with elevated creatinine during her first presentation in 6/22 is an indication of active LN. She never had a kidney biopsy and was on Cellcept 1500 mg BID prior to this admission.   I did perform a kidney bx which showed class III lupus nephritis with minimal activity, 1/10 gloms showing endocapillary proliferation while others have mesangial hypercellularity. There was some concern of her being adherent to cellcept. It was difficult to get in touch with her since until she had another admission 12/22 with a lupus flare and undetectable cellcept levels with peak in creatinine to 0.99 and UPCR increased to 2.2 g.g. She was restarted on cellcept 1500 mg BID and prednisone taper.    Unfortunately, she was lost to follow up again when she moved to [place] and was again stopped taking IS for a few months when she presented with C diff and lupus flare. Creatinine peaked to 1.58 which has now improved to 0.86 g/g still higher than her baseline of 0.5-0.6 mg/dl.Has a UPCR of 13 g/g.   I increased her MMF to 1500 mg BID at the time and planned to obtain a repeat kidney bx.   Unfortunately she had another admission with pneumonia and had CHRISTA with creatinine upto 1.9 mg/d.     She eventually had a kidney bx on 8/2/23 which was consistent with diffuse proliferative, sclerosing LN. There were 2% active crescents, 15% fibrous crescents. 50% gloms are globally sclerosed  and she has severe (70-80%) tubulo-interstitial fibrosis. 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 With comparison to her old kidney bx, she has significantly increased chronicity on this sample.     Her creatinine has improved over time during the hospitalization down to 0.7 g/g however she continues to have 12 g of protein and significant immunologic activity with low complements and dsDNA. She has significant hypertension and currently on Lisinopril 20 mg daily and metoprolol 50 mg daily. She has anemia, thrombocytopenia which I think is also related to her very active lupus.       --Continue Eurolupus Cyclophosphamide regimen  Increase the dose of hydralazine to 100 mg TID (50 mg 3 times a day)  --cut down on the potassium in your diet. Decrease the amount of bananas, oranges, tomatoes   --if your potassium is still high on the blood work today, I will send a medicine called 'Lokelma' to your pharmacy           April Purvis MD

## 2023-08-31 NOTE — PATIENT INSTRUCTIONS
--increase the dose of hydralazine to 100 mg TID (50 mg 3 times a day)  --cut down on the potassium in your diet. Decrease the amount of bananas, oranges, tomatoes   --if your potassium is still high on the blood work today, I will send a medicine called 'Lokelma' to your pharmacy

## 2023-08-31 NOTE — PROGRESS NOTES
Nephrology clinic follow up  8.31.23    Milly Carroll MRN:[id number] YOB: 2000  Date of Admission:(Not on file)  Primary care provider: Estefany Bell  Requesting physician: April Purvis, *      Interval history 12/29/22: Since we last spoke, she did have a kidney bx which went well. Unfortunately, it was hard to get in touch with her after that. Currently she reports of taking her cellcept 1500 mg BID as prescribed. She was admitted to the hospital 12/22 with cytopenias and undetectable level of MMF, concern for lupus flare and medication non adherence. Her dsDNA was >1000 and she was started back on cellcept 1500 mg BID along with a prednisone taper. Her creatinine peaked at 0.99 mg/dl from baseline of ~ 0.6 and her urine protein was at 2.26 g/g along with an active urinary sediment. She reports she is doing well after the hospitalization and she denies any symptoms at this time. Her aches and pains are better and she only has back pain at this time. She remains on prednisone taper.     Interval history 7/10/23: Since I last saw her, patient was in [place]. Moved back to [place] this summer and was out of her immunosuppression for atleast a few months. She presented to the ED at [facility] with vomiting and diarrhea and then transferred to [facility] for rheumatology consultation She was also diagnosed with C diff infection at the time.She was also thought to be in lupus flare and had an CHRISTA with creatinine of 1.58. UA with hematuria and proteinuria, UPCR of 2.7 g/g.She did have a C3 of 23 and C4 of 6 with dsDNA at 1446.  She was started on solumedrol 60 mg daily x 2 days and was discharged from the hospital with prednisone taper 40 mg daily for 1 week then 30 mg daily for 1 week then 20 mg daily for 1 week then 10 mg daily. Today,she tells me that she is on 0.5 mg of prednisone.  She was started back on  mg daily, and  mg BID       Interval history 8/10/23:  Milly was admitted to the hospital initially at [facility] when she presented with dyspnea and was found to have sepsis with left lower lobe pneumonia.  She also had a hemoglobin of 5.6 and she was admitted to the ICU where she received vancomycin and Zosyn.  There was a concern of lower GI bleed, however I am unable to find a colonoscopy that was done.  She does report that the bleeding has stopped though.  She was found to have CHRISTA with a peak and a creatinine to 1.91, and creatinine improved to 1.05 mg/dl on discharge. She did have a kidney bx on 8/2. She was discharged with Augmentin and PO Vancomycin which she is currently on it which she will continue for 5 more days. She was also anemic and thrombocytopenic during the admission, hemolysis was ruled out and she was started on prednisone 20 mg daily. Today she comes for a follow up. She initially reported mild shortness of breath but says overall she is feeling much better.     Interval history 8/31/23: She reports that she is doing okay. Denies any new symptoms in regards to active lupus. She had another hospitalization, discharged on 8.25 and  has now been started on Eurolupus cytoxan regimen, first dose on 8.22. She was discharged on prednisone taper.     PAST MEDICAL HISTORY:  Reviewed with patient on 08/31/2023   As per HPI    MEDICATIONS:  Reviewed with the patient in detail    ALLERGIES:    Reviewed with the patient in detail    REVIEW OF SYSTEMS:  A comprehensive of systems was negative except as noted above.    SOCIAL HISTORY:   Reviewed with patient, no smoking and no alcohol use     FAMILY MEDICAL HISTORY:   Reviewed, no family history of need for dialysis, transplant or CKD    PHYSICAL EXAM:   Vital signs:BP (!) 168/129   Pulse 85   Temp 98.1  F (36.7  C)   Wt 47.6 kg (105 lb)   SpO2 100%   BMI 19.20 kg/m      Physical Exam       Gen: Appears well  Neck: No JVD  Lungs: CTA  CVS: S1S2 normal, no murmurs heard  LE: no edema  Skin: no  rashes  CNS: Grossly normal         Identification:    Milly was initially diagnosed with lupus when she was 7 years old. 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 She has had varying course of disease since her diagnosis.  She was on Benlysta infusion in 2019 that was later switched to Benlysta subcutaneous in 2021. Benlysta was eventually discontinued. She was treated with Rituximab since 2013 but she developed anaphylactic reaction during Rituximab infusion in 12/2019 and has not received since. She remained on MMF 1500 mg BID and HCQ since then. She has had persistent hematuria and proteinuria on UA since 2021 however a lot of these samples were contaminated. She has had on and off documented proteinuria of 0.3-0.7 g/g since 2013. She has always maintained her creatinine at baseline of 0.5-0.6 mg/dl     Her hematuria and proteinuria along with elevated creatinine during her first presentation to me in 6/22 was an indication of active LN. She had never had a kidney biopsy and was on Cellcept 1500 mg BID at the time.     I did perform a kidney bx which showed class III lupus nephritis with minimal activity, 1/10 gloms showing endocapillary proliferation while others have mesangial hypercellularity. There was some concern of her being adherent to cellcept. She did not show in the clinic (multiple attempts made) post her biopsy to discuss management until she had another admission in 12/22 with a lupus flare and undetectable cellcept levels with peak in creatinine to 0.99 and UPCR increased to 2.2 g.g. She was restarted on cellcept 1500 mg BID and prednisone taper.    She was lost to follow up again as moved to [place] and again stopped taking IS for at least few months until she presented with C diff and lupus flare in 6/23.  Creatinine peaked to 1.58 and had a UPCR of 13 g/g. We restarted her MMF at 1500 mg BID  and planned to obtain a repeat kidney bx which was in process of being arranged. Her creatinine did trend down after this episode however remained above her baseline.     had another admission  in 8/23 with pneumonia and had CHRISTA with creatinine upto 1.9 mg/d. She eventually had a kidney bx on 8/2/23 which was consistent with diffuse proliferative, sclerosing LN. There were 2% active crescents, 15% fibrous crescents. 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 With comparison to her old kidney bx, she has significantly increased chronicity on this sample.   I recommended starting Cyclophosphamide- Euroloupus regimen given the difficulty she had with being compliant to PO MMF. We discussed the effects on fertility and offered Lupron as well as egg freezing options and she did not want either.     She has been on Eurolupus Cytoxan regimen starting 8/22/23.      ASSESSMENT AND RECOMMENDATIONS:     # Lupus nephritis :  Bx: 8/23:  Class IV with 2% active crescents, 50% glomerular sclerosis, severe IFTA and concern for class V lupus   Bx: 6/22: class III in 6/22, < 5% IFTA    # SLE (+DsDNA, +RNP, +ribosomal P ab)  # h/o lupus cerebritis, seizures   # h/o rt knee osteonecrosis   # b/l AVN of hips b/l    Her creatinine has improved over time down to 0.7 g/g however she continues to have 12 g of protein and significant immunologic activity with low complements and high dsDNA. She has significant hypertension and currently on Lisinopril 20 mg daily and metoprolol 50 mg daily. She has anemia, thrombocytopenia which I think is also related to her very active lupus.     Recommendations today:    --Continue Eurolupus Cyclophosphamide regimen  --Increase  the dose of hydralazine to 100 mg TID   --cut down on the potassium in your diet. Decrease the amount of bananas, oranges, tomatoes   --if your potassium is still high on the blood work today, I will send a medicine called 'Lokelma' to your pharmacy           April Purvis MD

## 2023-08-31 NOTE — NURSING NOTE
Chief Complaint   Patient presents with    RECHECK     Return visit.     Blood pressure (!) 168/129, pulse 85, temperature 98.1  F (36.7  C), weight 47.6 kg (105 lb), SpO2 100 %, not currently breastfeeding.  LELE GARCIA

## 2023-09-06 NOTE — LETTER
9/6/2023       RE: Milly Carroll  [address] [place] [id number]     Dear Colleague,    Thank you for referring your patient, Milly Carroll, to the [facility] [place] at [facility]. Please see a copy of my visit note below.    [facility] Outpatient Rheumatology Visit  Date of service: September 6, 2023    Patient name: Milly Carroll  YOB: 2000  MRN: [id number]    HISTORY OF PRESENT ILLNESS:  Milly is a 23yoF who is being seen via televisit for lupus nephritis follow-up.    Please refer to A/P for clinical course since our last visit in 07/2023.    Today: Patient unable to make it to clinic today so our visit was converted to a televisit. She states her cough went away that she had. Feels a lot more energized. She completed antibiotics. She reports tolerating the cytoxan. Usually has fevers, fatigue and rash over face and nausea. She is taking  mg daily. She is currently on prednisone 20 mg for now without taper, denies any adverse effects. She has been taking new BP meds. Usually diastolic in 100s and systolic in 150s. She denies any CP, headache, vision changes or dizziness.    Rheumatology ROS: No new or persistent headache.  No new hair loss.  No change in vision or hearing.  No inflammatory eye disease history.  No history of miscarriage.  No history of blood clots.  No dry eyes or dry mouth.  No facial rash.  No photosensitive rash.  No personal or first-degree relatives with psoriasis.  No nasal or oral ulcers.  No recurrent epistaxis or hemoptysis.  No recurrent sinusitis or recurrent pneumonia.  No chest pain or shortness of breath.  No abdominal pain, constipation, diarrhea.  No blood in stool or black tarry stools.  No nausea or vomiting.  No foamy or frothy urine.  No bowel or bladder incontinence.  No change in strength or sensation in the arms or legs.  No pitting or brittle nails.  No triphasic  color change consistent with Raynaud's.  No skin thickening or tightening consistent with sclerodactyly.  No symptoms consistent with dactylitis.  No red hot or swollen joints.  No joint pain.  No joint stiffness.  No muscle pain.  No genital ulcers or lesions.  No fevers, chills, weight loss or night sweats.    Past Medical History:  Past Medical History:   Diagnosis Date    Hepatitis     Lupus (systemic lupus erythematosus) (H)     Lupus cerebritis (H)     Pancreatitis 08/2017    Pyelonephritis 08/2017    Seizures (H)     Status epilepticus (H)        Past Surgical History:  Past Surgical History:   Procedure Laterality Date    IR RENAL ANGIOGRAM LEFT  8/11/2023    IR RENAL BIOPSY LEFT  6/28/2022    NO HISTORY OF SURGERY      ORTHOPEDIC SURGERY         Medications:  Current Outpatient Medications   Medication    acetaminophen (TYLENOL) 325 MG tablet    carvedilol (COREG) 25 MG tablet    empagliflozin (JARDIANCE) 10 MG TABS tablet    ferrous sulfate (FEROSUL) 325 (65 Fe) MG tablet    folic acid (FOLVITE) 1 MG tablet    hydrALAZINE (APRESOLINE) 100 MG tablet    isosorbide mononitrate (IMDUR) 60 MG 24 hr tablet    ondansetron (ZOFRAN ODT) 4 MG ODT tab    pantoprazole (PROTONIX) 40 MG EC tablet    potassium chloride ER (KLOR-CON M) 20 MEQ CR tablet    predniSONE (DELTASONE) 10 MG tablet    sodium bicarbonate 650 MG tablet    sulfamethoxazole-trimethoprim (BACTRIM) 400-80 MG tablet    vancomycin (VANCOCIN) 125 MG capsule    Vitamin D, Cholecalciferol, 10 MCG (400 UNIT) TABS     No current facility-administered medications for this visit.       Allergies:  Allergies   Allergen Reactions    Rituximab Anaphylaxis and Shortness Of Breath       Family history:  No family history of autoimmune disease    Social History:  ETOH:   Smoking:   Drug use:  Occupation:    OBJECTIVE:  There were no vitals taken for this visit.    GEN: NAD, well-appearing  HEENT: No facial rash, sclera clear, no oral or nasal ulcers, no inflammatory  nasal bridge/external ear changes, good saliva pool  CV: RRR, normal S1 and S2, no m/r/g  Pulm: CTAB, no crackles, wheezing or rhonchi  Abdomen: soft, non-tender, not distended, no masses  Extremities: Full active and passive ROM of b/l shoulders, elbows, wrists, MCPs, PIPs, DIPs, hips, knees, ankles. Makes full fists b/l with good strength. No synovitis of any joints. No dactylitis. No digital pitting. No nail changes. No sclerodactyly.  Skin: No acute cutaneous changes  Neuro: Gait normal. Strength of bilateral proximal and distal upper and lower extremities is 5/5.     Labs:  WBC   Date Value Ref Range Status   02/11/2021 4.5 4.0 - 11.0 10e9/L Final     WBC Count   Date Value Ref Range Status   08/28/2023 8.6 4.0 - 11.0 10e3/uL Final     Hemoglobin   Date Value Ref Range Status   08/28/2023 9.3 (L) 11.7 - 15.7 g/dL Final   02/11/2021 9.1 (L) 11.7 - 15.7 g/dL Final     Platelet Count   Date Value Ref Range Status   08/28/2023 182 150 - 450 10e3/uL Final   02/11/2021 104 (L) 150 - 450 10e9/L Final     Creatinine   Date Value Ref Range Status   08/28/2023 1.29 (H) 0.51 - 0.95 mg/dL Final   02/11/2021 0.58 0.50 - 1.00 mg/dL Final     Lab Results   Component Value Date    ALKPHOS 94 08/12/2023    ALKPHOS 140 02/11/2021     AST   Date Value Ref Range Status   08/12/2023 79 (H) 0 - 45 U/L Final     Comment:     Reference intervals for this test were updated on 6/12/2023 to more accurately reflect our healthy population. There may be differences in the flagging of prior results with similar values performed with this method. Interpretation of those prior results can be made in the context of the updated reference intervals.   02/11/2021 117 (H) 0 - 35 U/L Final     Lab Results   Component Value Date    ALT 23 08/12/2023    ALT 55 02/11/2021     Sed Rate   Date Value Ref Range Status   10/09/2020 24 (H) 0 - 20 mm/h Final     Erythrocyte Sedimentation Rate   Date Value Ref Range Status   08/18/2023 38 (H) 0 - 20 mm/hr  Final     CRP Inflammation   Date Value Ref Range Status   06/15/2022 7.9 0.0 - 8.0 mg/L Final   02/10/2021 <2.9 0.0 - 8.0 mg/L Final     UA RESULTS:  Recent Labs   Lab Test 08/28/23  1750   COLOR Light Yellow   APPEARANCE Clear   URINEGLC 100*   URINEBILI Negative   URINEKETONE Negative   SG 1.014   UBLD Moderate*   URINEPH 7.5*   PROTEIN 200*   NITRITE Negative   LEUKEST Negative   RBCU 23*   WBCU 3      AIMEE Screen by EIA   Date Value Ref Range Status   05/10/2013 11.9 (H) <1.0 Final     Comment:     Interpretation:  Positive     DNA-ds   Date Value Ref Range Status   10/09/2020 46 (H) <10 IU/mL Final     Comment:     Positive     DNA (ds) Antibody   Date Value Ref Range Status   08/19/2023 38.0 (H) <10.0 IU/mL Final     Comment:     Positive     RNP Antibody IgG   Date Value Ref Range Status   08/25/2017 2.4 (H) 0.0 - 0.9 AI Final     Comment:     Positive  Antibody index (AI) values reflect qualitative changes in antibody   concentration that cannot be directly associated with clinical condition or   disease state.       SSA (RO) Antibody IgG   Date Value Ref Range Status   05/10/2013 7  Final     Comment:     Reference range: 0 to 40  Unit: AU/mL  (Note)  INTERPRETIVE INFORMATION: SSA (Ro) (NETTA) Ab, IgG   29 AU/mL or Less ............. Negative   30 - 40 AU/mL ................ Equivocal   41 AU/mL or Greater .......... Positive  SSA (Ro) antibody is seen in 70-75% of Sjogren syndrome  cases, 30-40% of systemic lupus erythematosus (SLE) and  5-10% of progressive systemic sclerosis (PSS).     SSB (LA) Antibody IgG   Date Value Ref Range Status   05/10/2013 0  Final     Comment:     Reference range: 0 to 40  Unit: AU/mL  (Note)  INTERPRETIVE INFORMATION: SSB (La) (NETTA) Ab, IgG   29 AU/mL or Less ............. Negative   30 - 40 AU/mL ................ Equivocal   41 AU/mL or Greater .......... Positive  SSB (La) antibody is seen in 50-60% of Sjogren syndrome  cases and is specific if it is the only NETTA  antibody  present. 15-25% of patients with systemic lupus  erythematosus (SLE) and 5-10% of patients with progressive  systemic sclerosis (PSS) also have this antibody.     Aldolase   Date Value Ref Range Status   05/18/2013 11.5 (H)  Final     Comment:     Reference range: 3.3 to 9.7  Unit: U/L  (Note)  This specimen is hemolyzed. This may cause the result for  aldolase to be falsely increased.  REFERENCE INTERVAL: Aldolase  Access complete set of age- and/or gender-specific  reference intervals for this test in the [facility]  Test Directory ([email or web address]).  Performed by [facility],  [address] [facility] [place] [address] [phone]  [email or web address], Gabriella Quesada MD, Lab. Director     Rheumatoid Factor   Date Value Ref Range Status   05/10/2013 13 0 - 14 IU/mL Final     Neutrophil Cytoplasmic IgG Antibody   Date Value Ref Range Status   05/11/2013   Final    <1:20  Reference range: <1:20  (Note)  The ANCA IFA is <1:20; therefore, no further testing will  be performed.  INTERPRETIVE INFORMATION: Anti-Neutrophil Cyto Ab, IgG    Neutrophil Cytoplasmic Antibodies (C-ANCA = granular  cytoplasmic staining, P-ANCA = perinuclear staining) are  found in the serum of over 90 percent of patients with  certain necrotizing systemic vasculitides, and usually in  less than 5 percent of patients with collagen vascular  disease or arthritis.  Performed by [facility],  [address] [facility] [address] [phone]  [email or web address], Gabriella Quesada MD, Lab. Director     Path:  8/2/2023  FINAL DIAGNOSIS:  A-C. 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  -  Severe interstitial fibrosis and tubular atrophy (70-80%).     COMMENT:  Active crescentic injury involves 2% of sampled glomeruli, while 15% of the glomeruli demonstrate old fibrous crescents  and 51% of the glomeruli are globally sclerosed. Light microscopic features raise the possibility of a concomitant class V (membranous) lupus nephritis component, but glomeruli are not available for confirmatory electron microscopy. The NIH activity and chronicity indices are 7/24 and 10/12, respectively.    Imaging:   _______    ASSESSMENT & PLAN    Milly Carroll is a 23yoF with PMHx of long-standing SLE (+DsDNA, +RNP, +ribosomal P Ab and hypocomplementemia). 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 She has had a multitude of comorbidities develop surrounding our last visit in 07/2023 and between that visit and now which included: hospitalizations for lupus flares, hospitalization in 06/2023 for CHRISTA and C. Difficile infection, hospitalization in late July - August for rectal bleeding, dizziness, fevers and found to have severe sepsis d/t pneumonia. On 8/2/2023 she underwent renal biopsy with findings as below. After this she was noted to have pseudoaneurysm with perinephric hematoma, anemia and hemoptysis. She was evaluated by cardiology for concern of lupus myocarditis as well with no evidence of such on CMR. HCQ was also held briefly for concern of cardiac toxicity and prolonging QTc.     Her lupus nephritis was initially diagnosed during her hospitalization in 06/2022 when she was found to have significant proteinuria and hematuria on urine sediment.  Pathology showed: focal proliferative nephritis ISN/RPS class III with minimal activity. Her more recent pathology report from 08/2023 showed: diffuse sclerosing and focal proliferative lupus nephritis, ISN/RPS Class IV with severe interstitial fibrosis and tubular atrophy (70-80%) and light microscopy findings raise concern  for concomitant class V lupus nephritis but not available for EM.    In the past she was treated with Solu-Medrol 1g x3d followed by oral prednisone taper.  She was also managed with mycophenolate 1500 mg BID, hydroxychloroquine 200 mg daily and belimumab 200 mg subcutaneous once weekly. Patient has not been taking belimumab since 2020 due to poor medication adherence. After our last visit, we did not resume belimumab as nephrology had plans to obtain above biopsy and then decide on starting a regimen for suspected lupus nephritis flare, which as above has been confirmed and as of 8/22/2023 the patient received first dose of Eurolupus cyclophosphamide.     During one of her hospitalizations in 01/2023 she had left hip pain and neuropathic pain with an MRI showing evidence of muscle edema however, normal labs made myositis unlikely. Her myomarker panel did return with positivity for anti-U1 RNP, anti-U2 RNP and anti-U3 RNP.     After this hospitalization in 01/2023, the patient moved to [place]. During that time was not on any of her immunosuppressive medications from 02/2023 - 05/2023. She presented again to [facility] in 06/2023 with abd pain, N/V, diarrhea, cough and diagnosed with CHRISTA and C. Difficile infection. Rheumatology was consulted there and started her on: Solu-Medrol 60 mg followed by oral prednisone taper from 40mg to 10 mg until her follow-up here, plaquenil 200 mg daily, mycophenolate 500 mg BID (to balance treatment for nephritis and being mindful of her infection) and started back on lisinopril 5 mg (had been on 40 mg/d in [place]).     She followed up with nephrology on 7/10/23 and it was noted that her creatinine peaked to 1.58 and improved to 0.86 though still higher than her baseline of 0.5-0.6. Workup showed UPCR increased from 2.2 seven months ago to 13.4 on 7/10/23. Mycophenolate was increased back to 1500 mg and recommended doing a kidney biopsy with consideration for adding  Obinutuzumab or switching her to Eurolupus cytoxan regimen. Labs also showed decreased C3, C4 and elevated dsDNA further supporting lupus flare which appears to be again due to medication non-adherence unfortunately.        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 Abhay Waddell, DO  Rheumatology Fellow  PGY4

## 2023-09-06 NOTE — PROGRESS NOTES
[facility] Outpatient Rheumatology Virtual Visit  Date of service: September 6, 2023    Video visit: Visit start time: 11:43 am  Visit end time: 12:03 pm    By Bernie  Patient's location: Home  Providers' location: On-site      Patient name: Milly Carroll  YOB: 2000  MRN: [id number]    HISTORY OF PRESENT ILLNESS:  Milly is a 23yoF who is being seen via televisit for lupus nephritis follow-up.    Today: Patient unable to make it to clinic today so our visit was converted to a televisit. She states her cough that she had went away. Did have blood she was coughing up but this has stopped as well. Feeling a lot more energized now. She completed her antibiotics for PNA.     Usually with lupus flares she has fevers, fatigue and rash over face and nausea. Doesn't have any of this now. She is taking  mg daily. She is currently on prednisone 20 mg for now without taper, denies any adverse effects. She has been taking new BP meds but BP is still running high and she states usually systolics are in the 150s and diastolic in the 100s. She denies any CP, headache, vision changes or dizziness.    She reports tolerating the cytoxan.     Rheumatology ROS: Negative, otherwise as above.    Past Medical History:  Past Medical History:   Diagnosis Date     Hepatitis      Lupus (systemic lupus erythematosus) (H)      Lupus cerebritis (H)      Pancreatitis 08/2017     Pyelonephritis 08/2017     Seizures (H)      Status epilepticus (H)        Past Surgical History:  Past Surgical History:   Procedure Laterality Date     IR RENAL ANGIOGRAM LEFT  8/11/2023     IR RENAL BIOPSY LEFT  6/28/2022     NO HISTORY OF SURGERY       ORTHOPEDIC SURGERY         Medications:  Current Outpatient Medications   Medication     acetaminophen (TYLENOL) 325 MG tablet     carvedilol (COREG) 25 MG tablet     empagliflozin (JARDIANCE) 10 MG TABS tablet     ferrous sulfate (FEROSUL) 325 (65 Fe) MG tablet     folic acid (FOLVITE) 1 MG  "tablet     hydrALAZINE (APRESOLINE) 100 MG tablet     isosorbide mononitrate (IMDUR) 60 MG 24 hr tablet     ondansetron (ZOFRAN ODT) 4 MG ODT tab     pantoprazole (PROTONIX) 40 MG EC tablet     potassium chloride ER (KLOR-CON M) 20 MEQ CR tablet     predniSONE (DELTASONE) 10 MG tablet     sodium bicarbonate 650 MG tablet     sulfamethoxazole-trimethoprim (BACTRIM) 400-80 MG tablet     vancomycin (VANCOCIN) 125 MG capsule     Vitamin D, Cholecalciferol, 10 MCG (400 UNIT) TABS     No current facility-administered medications for this visit.       Allergies:  Allergies   Allergen Reactions     Rituximab Anaphylaxis and Shortness Of Breath     OBJECTIVE:  There were no vitals taken for this visit.    GEN: NAD, laying on couch during visit.   HEENT: No facial rash visible on camera, sclera clear  Neuro: Alert and oriented, answering questions appropriately  Psych: Answering questions with one word answers \"good.\" Speech is monotone and with slow prosody.    Labs:  WBC   Date Value Ref Range Status   02/11/2021 4.5 4.0 - 11.0 10e9/L Final     WBC Count   Date Value Ref Range Status   08/28/2023 8.6 4.0 - 11.0 10e3/uL Final     Hemoglobin   Date Value Ref Range Status   08/28/2023 9.3 (L) 11.7 - 15.7 g/dL Final   02/11/2021 9.1 (L) 11.7 - 15.7 g/dL Final     Platelet Count   Date Value Ref Range Status   08/28/2023 182 150 - 450 10e3/uL Final   02/11/2021 104 (L) 150 - 450 10e9/L Final     Creatinine   Date Value Ref Range Status   08/28/2023 1.29 (H) 0.51 - 0.95 mg/dL Final   02/11/2021 0.58 0.50 - 1.00 mg/dL Final     Lab Results   Component Value Date    ALKPHOS 94 08/12/2023    ALKPHOS 140 02/11/2021     AST   Date Value Ref Range Status   08/12/2023 79 (H) 0 - 45 U/L Final     Comment:     Reference intervals for this test were updated on 6/12/2023 to more accurately reflect our healthy population. There may be differences in the flagging of prior results with similar values performed with this method. Interpretation " of those prior results can be made in the context of the updated reference intervals.   02/11/2021 117 (H) 0 - 35 U/L Final     Lab Results   Component Value Date    ALT 23 08/12/2023    ALT 55 02/11/2021     Sed Rate   Date Value Ref Range Status   10/09/2020 24 (H) 0 - 20 mm/h Final     Erythrocyte Sedimentation Rate   Date Value Ref Range Status   08/18/2023 38 (H) 0 - 20 mm/hr Final     CRP Inflammation   Date Value Ref Range Status   06/15/2022 7.9 0.0 - 8.0 mg/L Final   02/10/2021 <2.9 0.0 - 8.0 mg/L Final     UA RESULTS:  Recent Labs   Lab Test 08/28/23  1750   COLOR Light Yellow   APPEARANCE Clear   URINEGLC 100*   URINEBILI Negative   URINEKETONE Negative   SG 1.014   UBLD Moderate*   URINEPH 7.5*   PROTEIN 200*   NITRITE Negative   LEUKEST Negative   RBCU 23*   WBCU 3      AIMEE Screen by EIA   Date Value Ref Range Status   05/10/2013 11.9 (H) <1.0 Final     Comment:     Interpretation:  Positive     DNA-ds   Date Value Ref Range Status   10/09/2020 46 (H) <10 IU/mL Final     Comment:     Positive     DNA (ds) Antibody   Date Value Ref Range Status   08/19/2023 38.0 (H) <10.0 IU/mL Final     Comment:     Positive     RNP Antibody IgG   Date Value Ref Range Status   08/25/2017 2.4 (H) 0.0 - 0.9 AI Final     Comment:     Positive  Antibody index (AI) values reflect qualitative changes in antibody   concentration that cannot be directly associated with clinical condition or   disease state.       SSA (RO) Antibody IgG   Date Value Ref Range Status   05/10/2013 7  Final     Comment:     Reference range: 0 to 40  Unit: AU/mL  (Note)  INTERPRETIVE INFORMATION: SSA (Ro) (NETTA) Ab, IgG   29 AU/mL or Less ............. Negative   30 - 40 AU/mL ................ Equivocal   41 AU/mL or Greater .......... Positive  SSA (Ro) antibody is seen in 70-75% of Sjogren syndrome  cases, 30-40% of systemic lupus erythematosus (SLE) and  5-10% of progressive systemic sclerosis (PSS).     SSB (LA) Antibody IgG   Date Value Ref Range  Status   05/10/2013 0  Final     Comment:     Reference range: 0 to 40  Unit: AU/mL  (Note)  INTERPRETIVE INFORMATION: SSB (La) (NETTA) Ab, IgG   29 AU/mL or Less ............. Negative   30 - 40 AU/mL ................ Equivocal   41 AU/mL or Greater .......... Positive  SSB (La) antibody is seen in 50-60% of Sjogren syndrome  cases and is specific if it is the only NETTA antibody  present. 15-25% of patients with systemic lupus  erythematosus (SLE) and 5-10% of patients with progressive  systemic sclerosis (PSS) also have this antibody.     Aldolase   Date Value Ref Range Status   05/18/2013 11.5 (H)  Final     Comment:     Reference range: 3.3 to 9.7  Unit: U/L  (Note)  This specimen is hemolyzed. This may cause the result for  aldolase to be falsely increased.  REFERENCE INTERVAL: Aldolase  Access complete set of age- and/or gender-specific  reference intervals for this test in the [facility]  Test Directory ([email or web address]).  Performed by [email or web address],  [address] [facility] [place] [address] [phone]  [email or web address], Gabriella Quesada MD, Lab. Director     Rheumatoid Factor   Date Value Ref Range Status   05/10/2013 13 0 - 14 IU/mL Final     Neutrophil Cytoplasmic IgG Antibody   Date Value Ref Range Status   05/11/2013   Final    <1:20  Reference range: <1:20  (Note)  The ANCA IFA is <1:20; therefore, no further testing will  be performed.  INTERPRETIVE INFORMATION: Anti-Neutrophil Cyto Ab, IgG    Neutrophil Cytoplasmic Antibodies (C-ANCA = granular  cytoplasmic staining, P-ANCA = perinuclear staining) are  found in the serum of over 90 percent of patients with  certain necrotizing systemic vasculitides, and usually in  less than 5 percent of patients with collagen vascular  disease or arthritis.  Performed by [email or web address],  [address] [place] [address] [phone]  [email or web address], Gabriella Quesada MD, Lab. Director     Path:  8/2/2023  FINAL DIAGNOSIS:  A-C. Native kidney biopsy:  -  Diffuse  sclerosing and focal proliferative lupus nephritis, ISN/RPS Class IV (see comment).  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  -  Severe interstitial fibrosis and tubular atrophy (70-80%).     COMMENT:  Active crescentic injury involves 2% of sampled glomeruli, while 15% of the glomeruli demonstrate old fibrous crescents and 51% of the glomeruli are globally sclerosed. Light microscopic features raise the possibility of a concomitant class V (membranous) lupus nephritis component, but glomeruli are not available for confirmatory electron microscopy. The NIH activity and chronicity indices are 7/24 and 10/12, respectively.    ASSESSMENT & PLAN    Milly Carroll is a 23yoF with PMHx of long-standing SLE (+DsDNA, +RNP, +ribosomal P Ab and hypocomplementemia). 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 Patient is seen for lupus follow-up.    She has had a multitude of comorbidities develop surrounding our last visit in 07/2023 and between that visit and now which included: medication non-adherence while in [place] from 02/2023 - 05/2023, hospitalizations for lupus flares, hospitalization in 06/2023 for CHRISTA and C. Difficile infection, hospitalization in late July - August for rectal bleeding, dizziness, fevers and found to have severe sepsis d/t pneumonia. This was further complicated by malignant hypertension, hemoptysis and stress induced cardiomyopathy. On 8/2/2023 she underwent renal biopsy with findings as below. After this she was noted to have pseudoaneurysm with perinephric hematoma and underwent IR coil embolization of left renal artery inferior pole pseudoaneurysm on 8/11. She was also evaluated by cardiology for HF and  underwent CMR without evidence of inflammation. HCQ was also held briefly for concern of cardiac toxicity and prolonging QTc but she is back on this now.     Her lupus nephritis was initially diagnosed during her hospitalization in 06/2022 when she was found to have significant proteinuria and hematuria on urine sediment.  Pathology showed: focal proliferative nephritis ISN/RPS class III with minimal activity. Her more recent pathology report from 08/2023 showed: diffuse sclerosing and focal proliferative lupus nephritis, ISN/RPS Class IV with severe interstitial fibrosis and tubular atrophy (70-80%) and light microscopy findings raise concern for concomitant class V lupus nephritis but not available for EM.    In the past she was treated with Solu-Medrol 1g x3d followed by oral prednisone taper.  She was also managed with mycophenolate 1500 mg BID, hydroxychloroquine 200 mg daily and belimumab 200 mg subcutaneous once weekly. Patient has not been taking belimumab since 2020 due to poor medication adherence. After our last visit, we did not resume belimumab as nephrology planned to obtain biopsy and then decide on starting a regimen for suspected lupus nephritis flare, which as above has been confirmed and as of 8/22/2023 the patient received first dose of Eurolupus cyclophosphamide. She is following with nephrology. Her serology shows an improvement in her dsDNA (down from >360 to 32) and complement levels increasing though still below normal range. Most recent creatinine is improved to 1.29 (down from 1.76), urine studies still with hematuria/proteinuria.         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  Lupus cerebritis ?, h/o seizures  R knee osteonecrosis  Anemia of chronic disease, thrombocytopenia  Iatrogenic immunosuppression  Medication non-adherence  HTN     PLAN:  -- Will defer to nephrology for the management of her lupus nephritis  treatment with Eurolupus cytoxan regimen and associated drug monitoring  -- Continue  mg daily  -- Continue prednisone 20 mg daily  -- Multiple discussions were had with patient during hospitalization for ovopreservation but patient declined this  -- Pharmacy MTM visit next week to address her barriers to medication adherence     Patient seen and discussed together with Dr. Abhay Waddell, DO  Rheumatology Fellow  PGY4    Attending Note: I saw and evaluated the patient with Dr. Waddell. I agree with the assessment and plan.    Aaron Blank MD

## 2023-09-08 NOTE — PROGRESS NOTES
Nursing Note  Milly Carroll presents today to [facility] for:   Chief Complaint   Patient presents with    Infusion     Cytoxan     During today's [facility] appointment, orders from Dr. Purvis were completed.  Frequency: once. Pt received a dose inpatient last month    Progress note:  Patient identification verified by name and date of birth.  Assessment completed.  Vitals recorded in Doc Flowsheets.  Patient was provided with education regarding medication/procedure and possible side effects.  Patient verbalized understanding.     present during visit today: Not Applicable.    Pt hypertensive after fluids given. 174/124. Dr. Purvis notified. Ordered Clonidine x 1. Pt has dose of Hydralazine to take when she gets home  Blood moderate in urine. Dr. Purvis gave ok to give  AST/ALT were added on because they were not ordered. Dr. Purvis gave ok to proceed without waiting for results of these.    Treatment Conditions: ~~~ NOTE: If the patient answers yes to any of the questions below, hold the infusion and contact ordering provider or on-call provider.    Have you recently had an elevated temperature, fever, chills, productive cough, coughing for 3 weeks or longer or hemoptysis,  abnormal vital signs, night sweats,  chest pain or have you noticed a decrease in your appetite, unexplained weight loss or fatigue? No  Do you have any open wounds or new incisions? No  Do you have any upcoming hospitalizations or surgeries? Does not include esophagogastroduodenoscopy, colonoscopy, endoscopic retrograde cholangiopancreatography (ERCP), endoscopic ultrasound (EUS), dental procedures or joint aspiration/steroid injections No  Do you currently have any signs of illness or infection or are you on any antibiotics? No  Have you had any new, sudden or worsening abdominal pain? No  Have you or anyone in your household received a live vaccination in the past 4 weeks?  Please note: No live vaccines while on biologic/chemotherapy until 6 months after the last treatment. Patient can receive the flu vaccine (shot only), pneumovax and the Covid vaccine. It is optimal for the patient to get these vaccines mid cycle, but they can be given at any time as long as it is not on the day of the infusion. No  Have you recently been diagnosed with any new nervous system diseases (ie. Multiple sclerosis, Guillain [place], seizures, neurological changes) or cancer diagnosis? Are you on any form of radiation or chemotherapy? No  Are you pregnant or breast feeding or do you have plans of pregnancy in the future? No  Have you been having any signs of worsening depression or suicidal ideations?  (benlysta only) No  Have there been any other new onset medical symptoms? No    Premedications: administered per order.    Drug Waste Record: No    Infusion length and rate:  infusion given over approximately  30 minutes    Labs: were drawn per orders.     Vascular access: peripheral IV was placed by vascular access nurse.    Is the next appt scheduled? N/a    Post Infusion Assessment:  Patient tolerated infusion without incident.     Discharge Plan:   Follow up plan of care with: ongoing infusions at [facility]. and primary care provider,  Discharge instructions were reviewed with patient.  Patient/representative verbalized understanding of discharge instructions and all questions answered.  Patient discharged from [facility] in stable condition.    Marcia Douglas RN         Administrations This Visit       0.9% sodium chloride BOLUS       Admin Date  09/08/2023 Action  $New Bag Dose  500 mL Route  Intravenous Administered By  Marcia Douglas RN              cloNIDine (CATAPRES) tablet 0.1 mg       Admin Date  09/08/2023 Action  $Given Dose  0.1 mg Route  Oral Administered By  Marcia Douglas RN              cycloPHOSphamide (CYTOXAN) 500 mg in  sodium chloride 0.9 % 300 mL infusion (NON-Oncology indication)       Admin Date  09/08/2023 Action  $New Bag Dose  500 mg Rate  600 mL/hr Route  Intravenous Administered By  Eugenia Leonardo RN              mesna (MESNEX) 290 mg in sodium chloride 0.9 % 57.9 mL infusion       Admin Date  09/08/2023 Action  $New Bag Dose  290 mg Rate  231.6 mL/hr Route  Intravenous Administered By  Marcia Douglas RN               Admin Date  09/08/2023 Action  $New Bag Dose  290 mg Rate  231.6 mL/hr Route  Intravenous Administered By  Eugenia Leonardo RN              ondansetron (ZOFRAN) injection 8 mg       Admin Date  09/08/2023 Action  $Given Dose  8 mg Route  Intravenous Administered By  Marcia Douglas RN

## 2023-09-12 NOTE — Clinical Note
2/8/2017      RE: Milly Carroll  [id number]  [facility] [address]       Milly is a 16 year old girl who was seen in follow-up in Pediatric Rheumatology clinic today.    The encounter diagnosis was Systemic lupus erythematosus (H).              Medications:     **SHE HAS NOT BEEN TAKING THESE MEDICATIONS FOR THE PAST ~6 MONTHS**    Current Outpatient Prescriptions   Medication Sig Dispense Refill     mycophenolate (CELLCEPT - GENERIC EQUIVALENT) 500 MG tablet Take 3 tablets (1,500 mg) by mouth 2 times daily 180 tablet 11     calcium carbonate (TUMS) 500 MG chewable tablet Take 1 tablet (500 mg) by mouth daily 30 tablet 11     hydroxychloroquine (PLAQUENIL) 200 MG tablet Take 1 tablet (200 mg) by mouth daily 30 tablet 11     Vitamin D, Cholecalciferol, 400 UNITS CAPS Take 400 Units by mouth daily 30 capsule 11     Cholecalciferol (VITAMIN D) 400 UNITS capsule Take 1 capsule by mouth daily 30 capsule 11       She received rituximab last in December 2015.       Interval History:     Milly returns for scheduled follow-up accompanied by her father.  I last saw her over 1 year ago, in December 2015.  Due to lapse of insurance she has not followed up.  For the same reason, she has not had any of her medications for the past 6 months or so.  Remarkably, she feels that she is doing well.  She denies arthralgia, myalgia, rash, hair loss, dyspnea, chest pain, Raynaud's, GI symptoms, and gross hematuria.  Her menses are regular.  She does have some back pain.  Her weight is stable and her appetite is good.    She is now in 9th grade and enjoys yoga and guitar.  She is not involved in extracurricular activities.      She had a flu shot this season.         Review of Systems:     A comprehensive review of systems was performed and was negative apart from that listed above.    I reviewed the growth chart and she is growing nicely along her percentile lines.       Examination:     Blood pressure 111/69,  Well-Child Checkup: 6 to 8 Years     Struggles in school can indicate problems with a child’s health or development. If your child is having trouble in school, talk to the child’s healthcare provider.    Even if your child is healthy, keep bringing him o "pulse 80, temperature 97.7  F (36.5  C), temperature source Oral, resp. rate 20, height 5' 1.02\" (155 cm), weight 124 lb 1.9 oz (56.3 kg).     63%ile based on CDC 2-20 Years weight-for-age data using vitals from 2/8/2017.    Blood pressure percentiles are 58% systolic and 65% diastolic based on 2000 NHANES data.     In general Milly was well appearing and in good spirits.   HEENT:  Pupils were equal, round and reactive to light.  Nose normal.  Oropharynx moist and pink.  She has two small areas of erythema on the hard palate (see Photos in Media tab from today).  NECK:  Supple, no lymphadenopathy.  CHEST:  Clear to auscultation.  HEART:  Regular rate and rhythm.  No murmur.  ABDOMEN:  Soft, non-tender, no hepatosplenomegaly.  JOINTS:  Normal.  SKIN:  She has a couple of small, erythematous, non-blanching lesions on the pads of her 4th and 5th fingers on the right.        Laboratory Investigations:     Results for orders placed or performed in visit on 02/08/17 (from the past 48 hour(s))   CBC with platelets differential   Result Value Ref Range    WBC 5.6 4.0 - 11.0 10e9/L    RBC Count 3.98 3.7 - 5.3 10e12/L    Hemoglobin 8.9 (L) 11.7 - 15.7 g/dL    Hematocrit 30.4 (L) 35.0 - 47.0 %    MCV 76 (L) 77 - 100 fl    MCH 22.4 (L) 26.5 - 33.0 pg    MCHC 29.3 (L) 31.5 - 36.5 g/dL    RDW 17.4 (H) 10.0 - 15.0 %    Platelet Count 288 150 - 450 10e9/L    Diff Method Automated Method     % Neutrophils 58.4 %    % Lymphocytes 28.8 %    % Monocytes 11.2 %    % Eosinophils 1.2 %    % Basophils 0.4 %    % Immature Granulocytes 0.0 %    Nucleated RBCs 0 0 /100    Absolute Neutrophil 3.3 1.3 - 7.0 10e9/L    Absolute Lymphocytes 1.6 1.0 - 5.8 10e9/L    Absolute Monocytes 0.6 0.0 - 1.3 10e9/L    Absolute Eosinophils 0.1 0.0 - 0.7 10e9/L    Absolute Basophils 0.0 0.0 - 0.2 10e9/L    Abs Immature Granulocytes 0.0 0 - 0.4 10e9/L    Absolute Nucleated RBC 0.0    Complement C3   Result Value Ref Range    Complement C3 35 (L) 76 - 169 mg/dL " Teaching your child healthy eating and lifestyle habits can lead to a lifetime of good health. To help, set a good example with your words and actions. Remember, good habits formed now will stay with your child forever.  Here are some tips:  · Help your chi   Complement C4   Result Value Ref Range    Complement C4 4 (L) 15 - 50 mg/dL   Creatinine   Result Value Ref Range    Creatinine 0.59 0.50 - 1.00 mg/dL    GFR Estimate  mL/min/1.7m2     GFR not calculated, patient <16 years old.  Non  GFR Calc      GFR Estimate If Black  mL/min/1.7m2     GFR not calculated, patient <16 years old.   GFR Calc     Hepatic panel   Result Value Ref Range    Bilirubin Direct <0.1 0.0 - 0.2 mg/dL    Bilirubin Total 0.3 0.2 - 1.3 mg/dL    Albumin 3.3 (L) 3.4 - 5.0 g/dL    Protein Total 8.7 6.8 - 8.8 g/dL    Alkaline Phosphatase 146 70 - 230 U/L    ALT 19 0 - 50 U/L    AST 28 0 - 35 U/L   IgG   Result Value Ref Range    IGG 2780 (H) 695 - 1620 mg/dL   TSH   Result Value Ref Range    TSH 1.45 0.40 - 4.00 mU/L   Protein  random urine   Result Value Ref Range    Protein Random Urine 0.40 g/L    Protein Total Urine g/gr Creatinine 0.13 0 - 0.2 g/g Cr   Routine UA with microscopic   Result Value Ref Range    Color Urine Yellow     Appearance Urine Clear     Glucose Urine Negative NEG mg/dL    Bilirubin Urine Negative NEG    Ketones Urine Negative NEG mg/dL    Specific Gravity Urine 1.027 1.003 - 1.035    Blood Urine Negative NEG    pH Urine 6.5 5.0 - 7.0 pH    Protein Albumin Urine 10 (A) NEG mg/dL    Urobilinogen mg/dL 2.0 0.0 - 2.0 mg/dL    Nitrite Urine Negative NEG    Leukocyte Esterase Urine Small (A) NEG    Source Midstream Urine     WBC Urine 1 0 - 2 /HPF    RBC Urine <1 0 - 2 /HPF    Squamous Epithelial /HPF Urine 2 (H) 0 - 1 /HPF    Mucous Urine Present (A) NEG /LPF   Creatinine urine calculation only   Result Value Ref Range    Creatinine Urine 304 mg/dL              Impression:     Mlily is a 16 year old  with   1. Systemic lupus erythematosus (H)        She is doing much better than I would have expected for someone with lupus who hasn't taken immunosuppressive medications for 6 months.    She does have a few subtle exam findings of active disease,  Now that your child is in school, a good night’s sleep is even more important. At this age, your child needs about 10 hours of sleep each night. Here are some tips:  · Set a bedtime and make sure your child follows it each night.   · TV, computer, and video Bedwetting, or urinating when sleeping, can be frustrating for both you and your child. But it’s usually not a sign of a major problem. Your child’s body may simply need more time to mature.  If a child suddenly starts wetting the bed, the cause is often a including the palate lesions and vasculitic type lesions on her fingertips.  Otherwise she is doing well clinically.  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  I was pleased to see that she does not have hematuria or significant proteinuria.  Anti-dsDNA antibodies are pending.    I am concerned that she is at risk of her her lupus becoming even more active if she were to remain off of her medications.  The family was in agreement with restarting medications.         Plan:     1. Restart Cellcept 1000 mg bid.  This is less than the 1500 mg bid she had been on.  2. Restart hydroxychloroquine 200 mg daily.  3. Restart calcium and Vitamin D.  4. Continue screening eye exams while on hydroxychoroquine.  This should be annual.  5. Follow up with me in 3 months.      It is a pleasure to continue to participate in Milly's care.  Please feel free to contact me with any questions or concerns you have regarding Milly's care.    Jonh Anders MD, PhD  , Pediatric Rheumatology      CC  NOVA BENJAMIN    Copy to patient  Parent(s) of Milly Carroll  [id number]  [place] [address]           Double Z Plasty Text: The lesion was extirpated to the level of the fat with a #15 scalpel blade. Given the location of the defect, shape of the defect and the proximity to free margins a double Z-plasty was deemed most appropriate for repair. Using a sterile surgical marker, the appropriate transposition arms of the double Z-plasty were drawn incorporating the defect and placing the expected incisions within the relaxed skin tension lines where possible. The area thus outlined was incised deep to adipose tissue with a #15 scalpel blade. The skin margins were undermined to an appropriate distance in all directions utilizing iris scissors. The opposing transposition arms were then transposed and carried over into place in opposite direction and anchored with interrupted buried subcutaneous sutures.

## 2023-09-17 NOTE — TELEPHONE ENCOUNTER
LVM for pt to call back and schedule a follow up    Please schedule a return visit with Dr. Waddell on 10/18 at 11 or 11:30am. Please note appt will need to be manually scheduled. Overbook has been approved by provider.    Rosa Archuleta

## 2023-09-22 NOTE — PATIENT INSTRUCTIONS
Biologic Infusion Post Education:   Call the triage nurse at your clinic or seek medical attention if you have chills and/or temperature greater than or equal to 100.5, uncontrolled nausea/vomiting, diarrhea, constipation, dizziness, shortness of breath, chest pain, heart palpitations, weakness or any other new or concerning symptoms, questions or concerns.    You cannot have any live virus vaccines prior to or during treatment or up to 6 months post infusion.  If you have an upcoming surgery, medical procedure or dental procedure during treatment, this should be discussed with your ordering physician and your surgeon/dentist.    If you are having any concerning symptom, if you are unsure if you should get your next infusion or wish to speak to a provider before your next infusion, please call your care coordinator or triage nurse at your clinic to notify them so we can adequately serve you.

## 2023-09-22 NOTE — PROGRESS NOTES
MD Notification    Person Name: Dr. April Purvis (Nephrologist)    Date/Time: 9/22/23 at 1411  - talked with RN at 1426 jonathon going to get a hold of MD    Interaction: phone call/pager    Purpose of Notification:  Patient had blood & lg amount of protein in urine  Pt WBC only 3.2 and ANC only 1.2    Orders Received: MD is aware and okay to proceed with infusion.    Comments:

## 2023-09-28 NOTE — PROGRESS NOTES
Milly is a 19 year old woman who was seen in follow-up in Pediatric Rheumatology clinic today.    The encounter diagnosis was Systemic lupus erythematosus with other organ involvement, unspecified SLE type (H).    She is currently taking the following medications and the doses as documented.          Medications:     Current Outpatient Medications   Medication Sig Dispense Refill     calcium carbonate (TUMS) 500 MG chewable tablet Take 1 tablet (500 mg) by mouth daily 30 tablet 0     famotidine (PEPCID) 20 MG tablet Take 2 tablets (40 mg) by mouth daily 30 tablet 11     ferrous sulfate 325 (65 Fe) MG PO tablet Take 1 tablet (325 mg) by mouth daily (with breakfast) 100 tablet 3     hydroxychloroquine 200 MG PO tablet Take 1 tablet (200 mg) by mouth daily 30 tablet 11     multivitamin, therapeutic (THERA-VIT) TABS tablet Take 1 tablet by mouth daily 30 tablet 11     mycophenolate (GENERIC EQUIVALENT) 500 MG tablet Take 3 tablets (1,500 mg) by mouth 2 times daily 180 tablet 11     polyethylene glycol (MIRALAX/GLYCOLAX) packet Take 17 g by mouth 2 times daily 100 packet 0     predniSONE (DELTASONE) 20 MG tablet Take 2 tablets (40 mg) by mouth daily Or as instructed. 60 tablet 11     predniSONE (DELTASONE) 5 MG tablet Take 8 tablets (40 mg) by mouth daily Or as directed. 100 tablet 1     vitamin D3 (CHOLECALCIFEROL) 400 units capsule Take 1 capsule (400 Units) by mouth daily 30 capsule 0     ketoconazole (NIZORAL) 2 % external shampoo Apply topically three times a week ;Suds up and leave on scalp for 5 minutes before rinsing. (Patient not taking: Reported on 2/19/2020) 120 mL 11     She is also on belimumab 600 mg IV q4 weeks.    Milly is tolerating the medication(s) well.          Interval History:     Milly returns for scheduled follow-up.  I last saw her 4 weeks ago.  She returns today for another monthly belimumab infusion.  These have been going well.  She has tapered down on her prednisone to 20 mg daily.  She  "reports that her skin is doing better.  She denies joint pains or stiffness.  Her appetite is good.    She will graduate from high school this year and really doesn't have plans for after graduation, apart from helping to care for her sister's new baby boy.           Review of Systems:     A comprehensive review of systems was performed and was negative apart from that listed above.    I reviewed the growth chart and she has now regained some of the weight she lost recently.         Examination:     Blood pressure 103/73, pulse 80, temperature 98.4  F (36.9  C), temperature source Tympanic, resp. rate 24, height 1.564 m (5' 1.58\"), weight 58.7 kg (129 lb 6.6 oz), unknown if currently breastfeeding.     58 %ile based on CDC (Girls, 2-20 Years) weight-for-age data based on Weight recorded on 2/19/2020.    Blood pressure percentiles are not available for patients who are 18 years or older.    In general Milly was well appearing and in good spirits.   HEENT:  Pupils were equal, round and reactive to light.  Nose normal.  Oropharynx moist and pink with no intraoral lesions.  She has large dental caries.  NECK:  Supple, no lymphadenopathy.  CHEST:  Clear to auscultation.  HEART:  Regular rate and rhythm.  No murmur.  ABDOMEN:  Soft, non-tender, no hepatosplenomegaly.  JOINTS:  Normal.  She has a well-healed scar over the lateral right knee.  SKIN:  She has hyperpigmented lesions on her palms and fingers, plus some more distally-located small red macular lesions.  She has scaly scalp.  All of this has improved since the last visit.       Laboratory Investigations:     Infusion Therapy Visit on 02/19/2020   Component Date Value Ref Range Status     WBC 02/19/2020 5.1  4.0 - 11.0 10e9/L Final     RBC Count 02/19/2020 4.01  3.8 - 5.2 10e12/L Final     Hemoglobin 02/19/2020 10.1* 11.7 - 15.7 g/dL Final     Hematocrit 02/19/2020 34.0* 35.0 - 47.0 % Final     MCV 02/19/2020 85  78 - 100 fl Final     MCH 02/19/2020 25.2* 26.5 - " 33.0 pg Final     MCHC 02/19/2020 29.7* 31.5 - 36.5 g/dL Final     RDW 02/19/2020 16.6* 10.0 - 15.0 % Final     Platelet Count 02/19/2020 139* 150 - 450 10e9/L Final     % Neutrophils 02/19/2020 69.4  % Final     % Lymphocytes 02/19/2020 26.3  % Final     % Monocytes 02/19/2020 4.1  % Final     % Eosinophils 02/19/2020 0.0  % Final     % Basophils 02/19/2020 0.0  % Final     % Immature Granulocytes 02/19/2020 0.2  % Final     Nucleated RBCs 02/19/2020 0  0 /100 Final     Absolute Neutrophil 02/19/2020 3.5  1.6 - 8.3 10e9/L Corrected    CORRECTED ON 02/19 AT 1318: PREVIOUSLY REPORTED AS 3.6     Absolute Lymphocytes 02/19/2020 1.3  0.8 - 5.3 10e9/L Corrected    CORRECTED ON 02/19 AT 1318: PREVIOUSLY REPORTED AS 1.4     Absolute Monocytes 02/19/2020 0.2  0.0 - 1.3 10e9/L Final     Absolute Eosinophils 02/19/2020 0.0  0.0 - 0.7 10e9/L Final     Absolute Basophils 02/19/2020 0.0  0.0 - 0.2 10e9/L Final     Abs Immature Granulocytes 02/19/2020 0.0  0 - 0.4 10e9/L Final     Absolute Nucleated RBC 02/19/2020 0.0   Final     Diff Method 02/19/2020 Automated Method   Final     Sodium 02/19/2020 143  133 - 144 mmol/L Final     Potassium 02/19/2020 3.6  3.4 - 5.3 mmol/L Final     Chloride 02/19/2020 115* 96 - 110 mmol/L Final     Carbon Dioxide 02/19/2020 25  20 - 32 mmol/L Final     Anion Gap 02/19/2020 3  3 - 14 mmol/L Final     Glucose 02/19/2020 131* 70 - 99 mg/dL Final     Urea Nitrogen 02/19/2020 13  7 - 19 mg/dL Final     Creatinine 02/19/2020 0.53  0.50 - 1.00 mg/dL Final     GFR Estimate 02/19/2020 >90  >60 mL/min/[1.73_m2] Final    Comment: Non  GFR Calc  Starting 12/18/2018, serum creatinine based estimated GFR (eGFR) will be   calculated using the Chronic Kidney Disease Epidemiology Collaboration   (CKD-EPI) equation.       GFR Estimate If Black 02/19/2020 >90  >60 mL/min/[1.73_m2] Final    Comment:  GFR Calc  Starting 12/18/2018, serum creatinine based estimated GFR (eGFR) will be    calculated using the Chronic Kidney Disease Epidemiology Collaboration   (CKD-EPI) equation.       Calcium 02/19/2020 8.2* 8.5 - 10.1 mg/dL Final     Bilirubin Total 02/19/2020 0.3  0.2 - 1.3 mg/dL Final     Albumin 02/19/2020 3.3* 3.4 - 5.0 g/dL Final     Protein Total 02/19/2020 7.7  6.8 - 8.8 g/dL Final     Alkaline Phosphatase 02/19/2020 142  40 - 150 U/L Final     ALT 02/19/2020 54* 0 - 50 U/L Final     AST 02/19/2020 64* 0 - 35 U/L Final     Complement C3 02/19/2020 28* 81 - 157 mg/dL Final     Complement C4 02/19/2020 9* 13 - 39 mg/dL Final     IGG 02/19/2020 1,664* 610 - 1,616 mg/dL Final     Lipase 02/19/2020 903* 0 - 194 U/L Final              Impression:     Milly is a 19 year old  with   1. Systemic lupus erythematosus with other organ involvement, unspecified SLE type (H)        She is doing very well clinically.  I suspect this is due to the addition of the belimumab.     In clinic, we had discussed tapering the dose of prednisone, but her low C3, C4, and platelet count make me concerned that her lupus is becoming more active.  I will therefore recommend she stay at 20 mg prednisone daily.  Again, she is getting another dose of belimumab today, so it is my hope that this combination will lead to improvement.  If not, we may need to increase the dose of prednisone.  Another option would be trying weekly subcutaneous belimumab in place of the monthly IV infusions.         Plan:     1. Continue current medications including prednisone 20 mg daily.  2. Continue screening eye exams annually while on hydroxychloroquine.  3. I again encouraged her to see a dentist to deal with her caries.  This is a risk for infection.  4. Follow up with me in 2 months.        It is a pleasure to continue to participate in Milly's care.  Please feel free to contact me with any questions or concerns you have regarding Milly's care.    Jonh Anders MD, PhD  , Pediatric Rheumatology      YANIV BENJAMIN  NOVA WHITE    Copy to patient  Shari Carroll [facility]  [address]   [facility] [address]       Detail Level: Detailed

## 2023-10-06 NOTE — PROGRESS NOTES
Infusion Nursing Note:  Milly Carroll presents today for Mesna/Cytoxan.    Patient seen by provider today: No   present during visit today: Not Applicable.    Note: Pre-medicated with 500 ml NS bolus, IV Zofran and IV Mesna. Mesna given post Cytoxan.      Intravenous Access:  Labs drawn without difficulty.  Peripheral IV placed.    Treatment Conditions:  Lab Results   Component Value Date    HGB 9.9 (L) 10/06/2023    WBC 3.0 (L) 10/06/2023    ANEU 2.0 10/06/2023    ANEUTAUTO 1.2 (L) 09/22/2023     (L) 10/06/2023        Lab Results   Component Value Date     10/06/2023    POTASSIUM 3.0 (L) 10/06/2023    MAG 1.9 08/25/2023    CR 0.95 10/06/2023    BISI 8.5 (L) 10/06/2023    BILITOTAL 0.4 09/22/2023    ALBUMIN 3.0 (L) 10/06/2023    ALT 15 10/06/2023    AST 40 10/06/2023       Results reviewed, labs did NOT meet treatment parameters: Spoke with Oralia Rodriguez, RN, Dr. Purvis's nurse, after paging provider and not hearing back. Oralia spoke with Dr. Purvis who gave the verbal ok to proceed with infusion today.     Biological Infusion Checklist:  ~~~ NOTE: If the patient answers yes to any of the questions below, hold the infusion and contact ordering provider or on-call provider.    Have you recently had an elevated temperature, fever, chills, productive cough, coughing for 3 weeks or longer or hemoptysis,  abnormal vital signs, night sweats,  chest pain or have you noticed a decrease in your appetite, unexplained weight loss or fatigue? No  Do you have any open wounds or new incisions? No  Do you have any upcoming hospitalizations or surgeries? Does not include esophagogastroduodenoscopy, colonoscopy, endoscopic retrograde cholangiopancreatography (ERCP), endoscopic ultrasound (EUS), dental procedures or joint aspiration/steroid injections No  Do you currently have any signs of illness or infection or are you on any antibiotics? No  Have you had any new, sudden or worsening abdominal pain? No  Have you  or anyone in your household received a live vaccination in the past 4 weeks? Please note: No live vaccines while on biologic/chemotherapy until 6 months after the last treatment. Patient can receive the flu vaccine (shot only), pneumovax and the Covid vaccine. It is optimal for the patient to get these vaccines mid cycle, but they can be given at any time as long as it is not on the day of the infusion. No  Have you recently been diagnosed with any new nervous system diseases (ie. Multiple sclerosis, Guillain [place], seizures, neurological changes) or cancer diagnosis? Are you on any form of radiation or chemotherapy? No  Are you pregnant or breast feeding or do you have plans of pregnancy in the future? No  Have you been having any signs of worsening depression or suicidal ideations?  (benlysta only) No  Have there been any other new onset medical symptoms? No  Have you had any new blood clots? (IVIG only) No    UA RESULTS:  Recent Labs   Lab Test 10/06/23  0841   COLOR Yellow   APPEARANCE Clear   URINEGLC Negative   URINEBILI Negative   URINEKETONE Negative   SG 1.020   UBLD Large*   URINEPH 6.5   PROTEIN 300*   NITRITE Negative   LEUKEST Trace*   RBCU 29*   WBCU 11*         Post Infusion Assessment:  Patient tolerated infusion without incident.  Blood return noted pre and post infusion.  Site patent and intact, free from redness, edema or discomfort.  No evidence of extravasations.  Access discontinued per protocol.  Biologic Infusion Post Education: Call the triage nurse at your clinic or seek medical attention if you have chills and/or temperature greater than or equal to 100.5, uncontrolled nausea/vomiting, diarrhea, constipation, dizziness, shortness of breath, chest pain, heart palpitations, weakness or any other new or concerning symptoms, questions or concerns.  You cannot have any live virus vaccines prior to or during treatment or up to 6 months post infusion.  If you have an upcoming surgery, medical  procedure or dental procedure during treatment, this should be discussed with your ordering physician and your surgeon/dentist.  If you are having any concerning symptom, if you are unsure if you should get your next infusion or wish to speak to a provider before your next infusion, please call your care coordinator or triage nurse at your clinic to notify them so we can adequately serve you.       Discharge Plan:   Discharge instructions reviewed with: Patient.  Patient and/or family verbalized understanding of discharge instructions and all questions answered.  AVS to patient via [employer].  Patient will return 10/20/23 for next appointment.   Patient discharged in stable condition accompanied by: self.  Departure Mode: Ambulatory.      Elaina Jose RN

## 2023-10-13 NOTE — LETTER
10/13/2023    Estefany Bell MD  [address] [place] [address]    RE: Milly BRAN Glen       Dear Colleague,     I had the pleasure of seeing Milly Carroll in the [facility].  CARDIOLOGY CLINIC CONSULTATION    PRIMARY CARE PHYSICIAN:  Estefany Bell    HISTORY OF PRESENT ILLNESS:  This is a very pleasant 23-year-old young patient who is seeing me in the heart failure clinic at [facility].  The patient was recently admitted to [facility] in August of this year and was seen in cardiology consultation.  Reportedly the patient has a history of stress-induced cardiomyopathy that has been diagnosed since about a year.  Her EF has been as low as 20% most recent echo showing about 40 to 45%.  She has had an MRI for that this year as well which has not shown any reversible findings inflammation or scar.    Of note the patient has a history of multiple other noncardiac issues including hepatitis lupus cerebritis Lupus nephritis anemia chronic kidney disease chronic need for immunosuppression and steroid use thrombocytopenia.  She lives with her mom.  She currently does not work.  Her dad  of cardiac problems in the 50s.    The patient has been referred to cardiology heart failure clinic for LV dysfunction.  As mentioned pertinent data include chest imaging that has not shown any coronary calcifications which would be expected at this very young age.  Cardiac MRI 2023 showing EF of 40% with mildly dilated LV without infarction inflammation or infiltration.    Patient reports no cardiac symptoms.  NYHA class I no angina syncope presyncope.    PAST MEDICAL HISTORY:  Past Medical History:   Diagnosis Date    Hepatitis     Lupus (systemic lupus erythematosus) (H)     Lupus cerebritis (H)     Pancreatitis 2017    Pyelonephritis 2017    Seizures (H)     Status epilepticus (H)        MEDICATIONS:  Current Outpatient Medications   Medication    acetaminophen  (TYLENOL) 325 MG tablet    carvedilol (COREG) 25 MG tablet    empagliflozin (JARDIANCE) 10 MG TABS tablet    ferrous sulfate (FEROSUL) 325 (65 Fe) MG tablet    folic acid (FOLVITE) 1 MG tablet    isosorbide mononitrate (IMDUR) 60 MG 24 hr tablet    ondansetron (ZOFRAN ODT) 4 MG ODT tab    pantoprazole (PROTONIX) 40 MG EC tablet    potassium chloride ER (KLOR-CON M) 20 MEQ CR tablet    predniSONE (DELTASONE) 10 MG tablet    sodium bicarbonate 650 MG tablet    sulfamethoxazole-trimethoprim (BACTRIM) 400-80 MG tablet    Vitamin D, Cholecalciferol, 10 MCG (400 UNIT) TABS    vancomycin (VANCOCIN) 125 MG capsule     No current facility-administered medications for this visit.       SOCIAL HISTORY:  I have reviewed this patient's social history and updated it with pertinent information if needed. Milly Carroll  reports that she has never smoked. She has never used smokeless tobacco. She reports that she does not currently use alcohol. She reports that she does not use drugs.    PHYSICAL EXAM:  Pulse:  [97] 97  BP: (136)/(96) 136/96  SpO2:  [100 %] 100 %  111 lbs 9.6 oz    Constitutional: alert, no distress  Respiratory: Good bilateral air entry  Cardiovascular: Normal regular heart sounds JVP normal no edema  GI: nondistended  Neuropsychiatric: appropriate affact    ASSESSMENT: Pertinent issues addressed/ reviewed during this cardiology visit  Heart failure with reduced ejection fraction last EF measuring 40%  Multiple noncardiac issues including chronic steroid use lupus related medical problems    RECOMMENDATIONS:  Unfortunately at this time I am not exactly sure of what medications the patient is taking.  Her discharge summary mentions that she needed to be on all the heart failure medications but the medication reconciliation shows a lot of medications that had to be discontinued.  Some notes mention she is on hydralazine while some says she is on Entresto.  Patient tells me she is on lisinopril.  And med rec shows  that she is just on Coreg and Jardiance  First and foremost we need to know what medication she is taking I will have my nursing team call her and reconciliate her medication list for us.  I would like for this patient to be on a 4 drug regimen with beta-blockers Arni and SGLT2 inhibitors.  Subsequently if tolerated add spironolactone as well.  She does not have any diuretic needs at this time.  I believe that her diagnosis of cardiomyopathy is likely related to uncontrolled hypertension from her renal lupus issues and steroid use.  I think this is unlikely to be stress-induced cardiomyopathy.  MRI findings to suggest global hypokinesis.    Plan is to get her on optimal guideline directed medical therapy and reevaluate LV function after achieving complete normotension at home in the next 2 to 3 months with repeat echocardiography.    Any medication changes will have to be discussed with her nephrology team and rheumatology especially given her concerns for possible drug-induced lupus flares and renal disease    It was a pleasure seeing this patient in clinic today. Please do not hesitate to contact me with any future questions.     LG Soto, [facility]  Cardiology - [facility]  October 13, 2023    Review of the result(s) of each unique test - Last echo ECG CBC BMP cardiac MRI     The level of medical decision making during this visit was of moderate complexity.    This note was completed in part using dictation via the Dragon voice recognition software. Some word and grammatical errors may occur and must be interpreted in the appropriate clinical context.  If there are any questions pertaining to this issue, please contact me for further clarification.      Thank you for allowing me to participate in the care of your patient.      Sincerely,     Lo Elias MD     [facility]  cc:   Yassine Tan MD  [address] [place] [id number]

## 2023-10-13 NOTE — PROGRESS NOTES
Call out to pt per Dr. Elias request to reconcile cardiac meds following pt's OV today. No answer, left VM with call back number. Izabel Pink, RN on 10/13/2023 at 4:24 PM  ===View-only below this line===  ----- Message -----  From: Lo Elias MD  Sent: 10/13/2023   1:12 PM CDT  To: [facility] Heart Team 7    Can you please call her today in 2 hours and reconcile her meds. Not sure what she is taking for HF.        10/13/23 per Dr. Elias:    RECOMMENDATIONS:  Unfortunately at this time I am not exactly sure of what medications the patient is taking.  Her discharge summary mentions that she needed to be on all the heart failure medications but the medication reconciliation shows a lot of medications that had to be discontinued.  Some notes mention she is on hydralazine while some says she is on Entresto.  Patient tells me she is on lisinopril.  And med rec shows that she is just on Coreg and Jardiance  First and foremost we need to know what medication she is taking I will have my nursing team call her and reconciliate her medication list for us.  I would like for this patient to be on a 4 drug regimen with beta-blockers Arni and SGLT2 inhibitors.  Subsequently if tolerated add spironolactone as well.  She does not have any diuretic needs at this time.  I believe that her diagnosis of cardiomyopathy is likely related to uncontrolled hypertension from her renal lupus issues and steroid use.  I think this is unlikely to be stress-induced cardiomyopathy.  MRI findings to suggest global hypokinesis.

## 2023-10-13 NOTE — PROGRESS NOTES
CARDIOLOGY CLINIC CONSULTATION    PRIMARY CARE PHYSICIAN:  Estefany Bell    HISTORY OF PRESENT ILLNESS:  This is a very pleasant 23-year-old young patient who is seeing me in the heart failure clinic at [facility].  The patient was recently admitted to [facility] in August of this year and was seen in cardiology consultation.  Reportedly the patient has a history of stress-induced cardiomyopathy that has been diagnosed since about a year.  Her EF has been as low as 20% most recent echo showing about 40 to 45%.  She has had an MRI for that this year as well which has not shown any reversible findings inflammation or scar.    Of note the patient has a history of multiple other noncardiac issues including hepatitis lupus cerebritis Lupus nephritis anemia chronic kidney disease chronic need for immunosuppression and steroid use thrombocytopenia.  She lives with her mom.  She currently does not work.  Her dad  of cardiac problems in the 50s.    The patient has been referred to cardiology heart failure clinic for LV dysfunction.  As mentioned pertinent data include chest imaging that has not shown any coronary calcifications which would be expected at this very young age.  Cardiac MRI 2023 showing EF of 40% with mildly dilated LV without infarction inflammation or infiltration.    Patient reports no cardiac symptoms.  NYHA class I no angina syncope presyncope.    PAST MEDICAL HISTORY:  Past Medical History:   Diagnosis Date    Hepatitis     Lupus (systemic lupus erythematosus) (H)     Lupus cerebritis (H)     Pancreatitis 2017    Pyelonephritis 2017    Seizures (H)     Status epilepticus (H)        MEDICATIONS:  Current Outpatient Medications   Medication    acetaminophen (TYLENOL) 325 MG tablet    carvedilol (COREG) 25 MG tablet    empagliflozin (JARDIANCE) 10 MG TABS tablet    ferrous sulfate (FEROSUL) 325 (65 Fe) MG tablet    folic acid (FOLVITE) 1 MG tablet    isosorbide  mononitrate (IMDUR) 60 MG 24 hr tablet    ondansetron (ZOFRAN ODT) 4 MG ODT tab    pantoprazole (PROTONIX) 40 MG EC tablet    potassium chloride ER (KLOR-CON M) 20 MEQ CR tablet    predniSONE (DELTASONE) 10 MG tablet    sodium bicarbonate 650 MG tablet    sulfamethoxazole-trimethoprim (BACTRIM) 400-80 MG tablet    Vitamin D, Cholecalciferol, 10 MCG (400 UNIT) TABS    vancomycin (VANCOCIN) 125 MG capsule     No current facility-administered medications for this visit.       SOCIAL HISTORY:  I have reviewed this patient's social history and updated it with pertinent information if needed. Milly Carroll  reports that she has never smoked. She has never used smokeless tobacco. She reports that she does not currently use alcohol. She reports that she does not use drugs.    PHYSICAL EXAM:  Pulse:  [97] 97  BP: (136)/(96) 136/96  SpO2:  [100 %] 100 %  111 lbs 9.6 oz    Constitutional: alert, no distress  Respiratory: Good bilateral air entry  Cardiovascular: Normal regular heart sounds JVP normal no edema  GI: nondistended  Neuropsychiatric: appropriate affact    ASSESSMENT: Pertinent issues addressed/ reviewed during this cardiology visit  Heart failure with reduced ejection fraction last EF measuring 40%  Multiple noncardiac issues including chronic steroid use lupus related medical problems    RECOMMENDATIONS:  Unfortunately at this time I am not exactly sure of what medications the patient is taking.  Her discharge summary mentions that she needed to be on all the heart failure medications but the medication reconciliation shows a lot of medications that had to be discontinued.  Some notes mention she is on hydralazine while some says she is on Entresto.  Patient tells me she is on lisinopril.  And med rec shows that she is just on Coreg and Jardiance  First and foremost we need to know what medication she is taking I will have my nursing team call her and reconciliate her medication list for us.  I would like for this  patient to be on a 4 drug regimen with beta-blockers Arni and SGLT2 inhibitors.  Subsequently if tolerated add spironolactone as well.  She does not have any diuretic needs at this time.  I believe that her diagnosis of cardiomyopathy is likely related to uncontrolled hypertension from her renal lupus issues and steroid use.  I think this is unlikely to be stress-induced cardiomyopathy.  MRI findings to suggest global hypokinesis.    Plan is to get her on optimal guideline directed medical therapy and reevaluate LV function after achieving complete normotension at home in the next 2 to 3 months with repeat echocardiography.    Any medication changes will have to be discussed with her nephrology team and rheumatology especially given her concerns for possible drug-induced lupus flares and renal disease    It was a pleasure seeing this patient in clinic today. Please do not hesitate to contact me with any future questions.     LG Soto, [facility]  Cardiology - [facility]  October 13, 2023    Review of the result(s) of each unique test - Last echo ECG CBC BMP cardiac MRI     The level of medical decision making during this visit was of moderate complexity.    This note was completed in part using dictation via the Dragon voice recognition software. Some word and grammatical errors may occur and must be interpreted in the appropriate clinical context.  If there are any questions pertaining to this issue, please contact me for further clarification.

## 2023-10-20 NOTE — PROGRESS NOTES
Infusion Nursing Note:  Milly Carroll presents today for labs, DARZALEX NOT GIVEN  Patient seen by provider today: No   present during visit today: Not Applicable.    Note: HGB  8.0 today. We should   NOT give Cytoxan today./ Dr Grande/FERNANDEZ Jimenez.  Orders entered to draw more labs today.    Pt had returned a phone call to Milly at Dr Grande's office this AM.  After RN notified Dr Grande of lab results, Oralia at MD office attempted to contact pt but only got a voice mail    Intravenous Access:  Labs drawn without difficulty.  Peripheral IV placed.    Treatment Conditions:  Biological Infusion Checklist:  ~~~ NOTE: If the patient answers yes to any of the questions below, hold the infusion and contact ordering provider or on-call provider.    Have you recently had an elevated temperature, fever, chills, productive cough, coughing for 3 weeks or longer or hemoptysis,  abnormal vital signs, night sweats,  chest pain or have you noticed a decrease in your appetite, unexplained weight loss or fatigue? No  Do you have any open wounds or new incisions? No  Do you have any upcoming hospitalizations or surgeries? Does not include esophagogastroduodenoscopy, colonoscopy, endoscopic retrograde cholangiopancreatography (ERCP), endoscopic ultrasound (EUS), dental procedures or joint aspiration/steroid injections No  Do you currently have any signs of illness or infection or are you on any antibiotics? No  Have you had any new, sudden or worsening abdominal pain? No  Have you or anyone in your household received a live vaccination in the past 4 weeks? Please note: No live vaccines while on biologic/chemotherapy until 6 months after the last treatment. Patient can receive the flu vaccine (shot only), pneumovax and the Covid vaccine. It is optimal for the patient to get these vaccines mid cycle, but they can be given at any time as long as it is not on the day of the infusion. No  Have you recently been diagnosed  with any new nervous system diseases (ie. Multiple sclerosis, Guillain Blanding, seizures, neurological changes) or cancer diagnosis? Are you on any form of radiation or chemotherapy? No  Are you pregnant or breast feeding or do you have plans of pregnancy in the future? No  Have you been having any signs of worsening depression or suicidal ideations?  (benlysta only) No  Have there been any other new onset medical symptoms? No  Have you had any new blood clots? (IVIG only) No      Post Infusion Assessment:  Site patent and intact, free from redness, edema or discomfort.  No evidence of extravasations.  Access discontinued per protocol.       Discharge Plan:   Discharge instructions reviewed with: Patient.  Patient and/or family verbalized understanding of discharge instructions and all questions answered.  AVS to patient via [employer].  Patient will return 11/3 for next appointment.   Patient discharged in stable condition accompanied by: self.  Departure Mode: Ambulatory.      Neli Hinkle RN

## 2023-10-25 NOTE — LETTER
"10/25/2023       RE: Milly Carroll  [address] [place] [id number]     Dear Colleague,    Thank you for referring your patient, Milly Carroll, to the [facility]HEUMATOLOG[facility] [place] at [facility]. Please see a copy of my visit note below.    [facility] Outpatient Rheumatology Visit  Date of service: July 26, 2023    Patient name: Milly Carroll  YOB: 2000  MRN: [id number]    F/up: SLE, last visit 9/6/2023    HPI:  Milly is a 23yoF with history outlined as below who is presenting to [facility] rheumatology clinic for lupus f/up.    Rheum history:  She is unsure why she is here other than being told to come in by her nephrologist. Last rheumatology visit prior to establishing here was in December 2022 and she felt like things have \"been going good,\" in respect to her mobility and moving around but does get tired/fatigued easily. States appetite is good and having no fevers. She moved to [place] to live with her brother so that she could see other doctors for the management of her kidneys and get another opinion. She did not however see a nephrologist. She was babysitting her one year old nephew most of the time while she was there. She moved back here at the end of May as she could not find insurance there. She currently lives with her sister close to [place]. States things have been good despite her recent hospitalization for CHRISTA and C. Diff infection - stating n/v and diarrhea have improved. States she is eating 3 meals a day, no weight loss and she denies any food or housing insecurity. She cannot remember much about her visit with nephrology other than she was told to increase cellcept and her lisinopril.    Regarding medication non-adherence, states the reasons why she hasn't taken her medications are due to others' abilities to  the medication in a timely manner. She does not have a car and has difficulty " picking the meds up on her own. She relies on her mother to get her medications but states that sometimes it takes her mom up to two weeks before she picks them up and by that time she will start to have a flare. While in [place] she states her mother felt it would be too difficult to mail her medications to her and thus she was not on any medications which was between February to the end of May.    Currently she is taking mycophenolate 1500 mg twice daily, prednisone 0.5 mg daily, hydroxychloroquine 200mg daily. She has not taken belimumab since the end of 2020 because she struggled to call it in as it is a mailed medication. Her father was the one to usually call it in for her but after his untimely passing away she has not been able to find his list of phone numbers so that she can call the pharmacy and get her belimumab.    States she had blood clots in her legs when she was in the [facility]. Had APL testing done in the past which was negative.    Our last visit in 09/2023 was converted to televisit as she couldn't make it in person. She stated her symptoms were improving including cough and energy better. Finished course of antibiotics for PNA. She was adherent to her HCQ and prednisone. BP still running high with systolics in the 150s and diastolic in the 100s. She had begun cytoxan Euro-lupus protocol and was tolerating then.    Interim history:  Received three doses of cytoxan. 4th dose held by nephrology given down trend in hemoglobin down to 8.0. dsDNA increasing and complements decreasing. Continues to have proteinuria. Seen by cardiology but treatment recommendations difficult to establish as it wasn't clear what medications she was taking.    Today:  Taking walks with cousin and their dog. Doesn't feel fatigued or tired. No lupus flare symptoms that she reports. Urinating fine. No rashes or joint pains.    She is waiting on blood pressure cuff to get to her so isn't checking her blood  pressure. Denies headaches, vision change, weakness, lightheadedness, fatigue, chest pain, dyspnea, presyncope or any other symptoms.    States she is taking two tabs of prednisone daily. When asked about taking HCQ she says yes then no then yes. Reports taking one tab twice a day. Last visit reported taking one tab daily.    She otherwise really has no questions, concerns or anything else she would like to be addressed today.    Past Medical History:  Past Medical History:   Diagnosis Date    Hepatitis     Lupus (systemic lupus erythematosus) (H)     Lupus cerebritis (H)     Pancreatitis 08/2017    Pyelonephritis 08/2017    Seizures (H)     Status epilepticus (H)      Past Surgical History:  Past Surgical History:   Procedure Laterality Date    IR RENAL ANGIOGRAM LEFT  8/11/2023    IR RENAL BIOPSY LEFT  6/28/2022    NO HISTORY OF SURGERY      ORTHOPEDIC SURGERY       Medications:  Current Outpatient Medications   Medication    acetaminophen (TYLENOL) 325 MG tablet    carvedilol (COREG) 25 MG tablet    empagliflozin (JARDIANCE) 10 MG TABS tablet    ferrous sulfate (FEROSUL) 325 (65 Fe) MG tablet    folic acid (FOLVITE) 1 MG tablet    isosorbide mononitrate (IMDUR) 60 MG 24 hr tablet    ondansetron (ZOFRAN ODT) 4 MG ODT tab    pantoprazole (PROTONIX) 40 MG EC tablet    potassium chloride ER (KLOR-CON M) 20 MEQ CR tablet    predniSONE (DELTASONE) 10 MG tablet    sodium bicarbonate 650 MG tablet    sulfamethoxazole-trimethoprim (BACTRIM) 400-80 MG tablet    Vitamin D, Cholecalciferol, 10 MCG (400 UNIT) TABS    vancomycin (VANCOCIN) 125 MG capsule     No current facility-administered medications for this visit.     Allergies:  Allergies   Allergen Reactions    Rituximab Anaphylaxis and Shortness Of Breath     Family history:  Older sister who is diagnosed with lupus but is not on medications.    Social History:   Lives with her sister. Not employed. 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   ETOH: Denies  Smoking: Denies  Drug use:  "Denies  Occupation: Unemployed.    Objective:  BP (!) 146/105   Pulse 99   Ht 1.575 m (5' 2\")   Wt 49 kg (108 lb)   SpO2 100%   BMI 19.75 kg/m    GEN: sitting upright in chair, frail appearing, NAD  HEENT: Light lacy erythematous malar rash, sclera clear, no oral or nasal ulcers, no inflammatory nasal bridge/external ear changes  CV: RRR, no m/r/g  Pulm: CTAB no crackles, wheezing or ronchi  Extremities: Full active and passive ROM of bilateral shoulders, elbows (hyperextension), wrists, MCPs, PIPs, DIPs, hips, knees, ankles. No synovitis of any joints. Full fist formation.  Skin: No acute cutaneous changes. Has chronic scars over her hands and forehead consistent with history of discoid lupus. Scarring alopecia throughout scalp greatest over occiput, hair is thin.    WBC   Date Value Ref Range Status   02/11/2021 4.5 4.0 - 11.0 10e9/L Final     WBC Count   Date Value Ref Range Status   10/20/2023 3.3 (L) 4.0 - 11.0 10e3/uL Final     Hemoglobin   Date Value Ref Range Status   10/20/2023 8.0 (L) 11.7 - 15.7 g/dL Final   02/11/2021 9.1 (L) 11.7 - 15.7 g/dL Final     Platelet Count   Date Value Ref Range Status   10/20/2023 55 (L) 150 - 450 10e3/uL Final   02/11/2021 104 (L) 150 - 450 10e9/L Final     Creatinine   Date Value Ref Range Status   10/20/2023 1.04 (H) 0.51 - 0.95 mg/dL Final   02/11/2021 0.58 0.50 - 1.00 mg/dL Final     Lab Results   Component Value Date    ALKPHOS 232 01/26/2023    ALKPHOS 140 02/11/2021     AST   Date Value Ref Range Status   10/20/2023 33 0 - 45 U/L Final     Comment:     Reference intervals for this test were updated on 6/12/2023 to more accurately reflect our healthy population. There may be differences in the flagging of prior results with similar values performed with this method. Interpretation of those prior results can be made in the context of the updated reference intervals.   02/11/2021 117 (H) 0 - 35 U/L Final     Lab Results   Component Value Date    ALT 10 01/26/2023    " ALT 55 02/11/2021     Sed Rate   Date Value Ref Range Status   10/09/2020 24 (H) 0 - 20 mm/h Final     Erythrocyte Sedimentation Rate   Date Value Ref Range Status   08/18/2023 38 (H) 0 - 20 mm/hr Final     CRP Inflammation   Date Value Ref Range Status   06/15/2022 7.9 0.0 - 8.0 mg/L Final   02/10/2021 <2.9 0.0 - 8.0 mg/L Final     UA RESULTS:  Recent Labs   Lab Test 07/10/23  1454   COLOR Light Yellow   APPEARANCE Slightly Cloudy*   URINEGLC Negative   URINEBILI Negative   URINEKETONE Negative   SG 1.014   UBLD Large*   URINEPH 7.0   PROTEIN 600*   NITRITE Negative   LEUKEST Small*   RBCU 97*   WBCU 40*      AIMEE Screen by EIA   Date Value Ref Range Status   05/10/2013 11.9 (H) <1.0 Final     Comment:     Interpretation:  Positive     DNA-ds   Date Value Ref Range Status   10/09/2020 46 (H) <10 IU/mL Final     Comment:     Positive     DNA (ds) Antibody   Date Value Ref Range Status   10/20/2023 104.0 (H) <10.0 IU/mL Final     Comment:     Positive     RNP Antibody IgG   Date Value Ref Range Status   08/25/2017 2.4 (H) 0.0 - 0.9 AI Final     Comment:     Positive  Antibody index (AI) values reflect qualitative changes in antibody   concentration that cannot be directly associated with clinical condition or   disease state.       SSA (RO) Antibody IgG   Date Value Ref Range Status   05/10/2013 7  Final     Comment:     Reference range: 0 to 40  Unit: AU/mL  (Note)  INTERPRETIVE INFORMATION: SSA (Ro) (NETTA) Ab, IgG   29 AU/mL or Less ............. Negative   30 - 40 AU/mL ................ Equivocal   41 AU/mL or Greater .......... Positive  SSA (Ro) antibody is seen in 70-75% of Sjogren syndrome  cases, 30-40% of systemic lupus erythematosus (SLE) and  5-10% of progressive systemic sclerosis (PSS).     SSB (LA) Antibody IgG   Date Value Ref Range Status   05/10/2013 0  Final     Comment:     Reference range: 0 to 40  Unit: AU/mL  (Note)  INTERPRETIVE INFORMATION: SSB (La) (NETTA) Ab, IgG   29 AU/mL or Less .............  Negative   30 - 40 AU/mL ................ Equivocal   41 AU/mL or Greater .......... Positive  SSB (La) antibody is seen in 50-60% of Sjogren syndrome  cases and is specific if it is the only NETTA antibody  present. 15-25% of patients with systemic lupus  erythematosus (SLE) and 5-10% of patients with progressive  systemic sclerosis (PSS) also have this antibody.     Aldolase   Date Value Ref Range Status   05/18/2013 11.5 (H)  Final     Comment:     Reference range: 3.3 to 9.7  Unit: U/L  (Note)  This specimen is hemolyzed. This may cause the result for  aldolase to be falsely increased.  REFERENCE INTERVAL: Aldolase  Access complete set of age- and/or gender-specific  reference intervals for this test in the [facility]  Test Directory ([email or web address]).  Performed by [facility],  [address] [facility],[place] [id number] [phone]  [email or web address], Gabriella Quesada MD, Lab. Director     Rheumatoid Factor   Date Value Ref Range Status   05/10/2013 13 0 - 14 IU/mL Final     Neutrophil Cytoplasmic IgG Antibody   Date Value Ref Range Status   05/11/2013   Final    <1:20  Reference range: <1:20  (Note)  The ANCA IFA is <1:20; therefore, no further testing will  be performed.  INTERPRETIVE INFORMATION: Anti-Neutrophil Cyto Ab, IgG    Neutrophil Cytoplasmic Antibodies (C-ANCA = granular  cytoplasmic staining, P-ANCA = perinuclear staining) are  found in the serum of over 90 percent of patients with  certain necrotizing systemic vasculitides, and usually in  less than 5 percent of patients with collagen vascular  disease or arthritis.  Performed by [facility],  [address] [place] [address] [phone]  [email or web address], Gabriella Quesada MD, Lab. Director       ASSESSMENT/PLAN:  Milly Carroll is a 23yoF with PMHx of long-standing SLE (+DsDNA, +RNP, +ribosomal P Ab and hypocomplementemia). 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 Patient is seen for lupus follow-up.     She has had a multitude of comorbidities develop surrounding our last visit in 07/2023 and between that visit and now which included: medication non-adherence while in [place] from 02/2023 - 05/2023, hospitalizations for lupus flares, hospitalization in 06/2023 for CHRISTA and C. Difficile infection, hospitalization in late July - August for rectal bleeding, dizziness, fevers and found to have severe sepsis d/t pneumonia. This was further complicated by malignant hypertension, hemoptysis and stress induced cardiomyopathy. On 8/2/2023 she underwent renal biopsy with findings as below. After this she was noted to have pseudoaneurysm with perinephric hematoma and underwent IR coil embolization of left renal artery inferior pole pseudoaneurysm on 8/11. She was also evaluated by cardiology for HF and underwent CMR without evidence of inflammation. HCQ was also held briefly for concern of cardiac toxicity and prolonging QTc but she is back on this now. Following with cardiology and now cardiomyopathy felt to be d/t HTN from her lupus nephritis.     Her lupus nephritis was initially diagnosed during her hospitalization in 06/2022 when she was found to have significant proteinuria and hematuria on urine sediment.  Pathology showed: focal proliferative nephritis ISN/RPS class III with minimal activity. Her more recent pathology report from 08/2023 showed: diffuse sclerosing and focal proliferative lupus nephritis, ISN/RPS Class IV with severe interstitial fibrosis and tubular atrophy (70-80%) and light microscopy findings raise concern for concomitant class V lupus nephritis but not available for EM.     In the past she was treated with Solu-Medrol 1g x3d followed by  oral prednisone taper.  She was also managed with mycophenolate 1500 mg BID, hydroxychloroquine 200 mg daily and belimumab 200 mg subcutaneous once weekly. Patient has not been taking belimumab since 2020 due to poor medication adherence. Did not resume belimumab as planning renal biopsy and then treatment for suspected lupus nephritis flare, which as above has been confirmed and as of 8/22/2023 the patient received first dose of Eurolupus cyclophosphamide. She is following with nephrology - no show last visit. Adhering to cyclophosphamide infusions but her hemoglobin has drifted down to 8.0. Her serologies were showing improvement: dsDNA (down from >360 to 32) and complement levels increasing though still below normal range but now dsDNA up to 104 and C3 and C4 decreasing to 41 and 9, respectively. She continues to have proteinuria with prot/creatinine ratio of 4.9. Hematuria though states she was menstruating during last urine collection.      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 Received 3 doses (held for now due to cytopenias).  -- If cytoxan no longer to be continued given cytopenias then mycophenolate could be considered. Will discuss with nephrology.  -- Order placed for CBC tomorrow, despite her anemia patient is asymptomatic and otherwise without concerns.  -- Continue  mg daily (Rx dropped off, unclear if she's taking this given med adherence issues and increased dsDNA with decreased complements, new Rx placed, pt instructed how to take it)  -- Continue prednisone 20 mg daily  -- Reconnect with [facility] for  medication reconciliation, med teaching, addressing barriers for non-adherence    Patient seen and discussed together with Dr. Boone.    Nnamdi Waddell, DO  Rheumatology Fellow  PGY4

## 2023-10-25 NOTE — PATIENT INSTRUCTIONS
1) Take hydroxychloroquine 200 mg (one tablet) daily - we sent this to your pharmacy  2) Continue prednisone 20 mg daily  3) Please recheck labs in 4-6 weeks prior to next visit with us in 6 weeks  4) You have a follow-up appointment with Dr. Purvis on 11/2/2023

## 2023-10-25 NOTE — PROGRESS NOTES
Call back out to pt for cardiology medication reconciliation. NO answer. Second attempt to reach pt. VM left with reason for calling & asked pt call back. Izabel Pink, RN on 10/25/2023 at 2:53 PM      Last OV 10/13/23 w/Dr. Elias - unclear what medications pt taking.     Hospital discharge note 8/25/23 [facility] -    Nephrology/Rheumatology initially recommended continue cellcept and prednisone w/ plans for outpatient Cytoxan but ultimately discontinued cellcept and administered first dose of Cytoxan on 8/22. Patient tolerated Cytoxan well, plan for outpatient continued infusion [id number] for total 6 doses w/ next infusion scheduled on 9/6. CHRISTA worsened during stay believed to be 2/2 renal hypoperfusion from intense antihypertensive regimen and ARB therapy. Ultimately, blood pressure regimen/GDMT adjust to Coreg BID, Hydral TID, Imdur (holding entresto, amlodipine, torsemide, and spironolactone) and patient also given 500ml fluid bolus w/ improvement of Creatinine. Nephrology recommended on discharge to continue to outpatient on this regimen w/ plan for close nephrology follow up w/in 1 week for lab check. Also recommended discharge on decreased dose of prednisone 20mg qday (down from 30mg qday). Rheumatology follow up scheduled for 9/6.   Hydralazine 50mg TID  - Jardiance qday  - hold Entresto for CHRISTA, possible consider switching back to just ACEi outpatient  - hold amlodipine 10mg daily for CHRISTA  - hold spironolactone 25mg daily for CHRISTA  - Outpatient referral placed for cardiology

## 2023-10-25 NOTE — NURSING NOTE
"Chief Complaint   Patient presents with    RECHECK     Vital signs:      BP: (!) 146/105 Pulse: 99     SpO2: 100 %     Height: 157.5 cm (5' 2\") (pt reported) Weight: 49 kg (108 lb)  Estimated body mass index is 19.75 kg/m  as calculated from the following:    Height as of this encounter: 1.575 m (5' 2\").    Weight as of this encounter: 49 kg (108 lb).      Rajni Bearden CMA   10/25/2023 10:28 AM    "

## 2023-10-25 NOTE — PROGRESS NOTES
"[facility] Outpatient Rheumatology Visit  Date of service: Oct 25, 2023    Patient name: Milly Carroll  YOB: 2000  MRN: [id number]    F/up: SLE    HPI:  Milly is a 23yoF with history outlined as below who is presenting to [facility] rheumatology clinic for lupus f/up.    Rheum history:  She is unsure why she is here other than being told to come in by her nephrologist. Last rheumatology visit prior to establishing here was in December 2022 and she felt like things have \"been going good,\" in respect to her mobility and moving around but does get tired/fatigued easily. States appetite is good and having no fevers. She moved to [place] to live with her brother so that she could see other doctors for the management of her kidneys and get another opinion. She did not however see a nephrologist. She was babysitting her one year old nephew most of the time while she was there. She moved back here at the end of May as she could not find insurance there. She currently lives with her sister close to [place]. States things have been good despite her recent hospitalization for CHRISTA and C. Diff infection - stating n/v and diarrhea have improved. States she is eating 3 meals a day, no weight loss and she denies any food or housing insecurity. She cannot remember much about her visit with nephrology other than she was told to increase cellcept and her lisinopril.    Regarding medication non-adherence, states the reasons why she hasn't taken her medications are due to others' abilities to  the medication in a timely manner. She does not have a car and has difficulty picking the meds up on her own. She relies on her mother to get her medications but states that sometimes it takes her mom up to two weeks before she picks them up and by that time she will start to have a flare. While in [place] she states her mother felt it would be too difficult to mail her medications to her and thus she was not " on any medications which was between February to the end of May.    Currently she is taking mycophenolate 1500 mg twice daily, prednisone 0.5 mg daily, hydroxychloroquine 200mg daily. She has not taken belimumab since the end of 2020 because she struggled to call it in as it is a mailed medication. Her father was the one to usually call it in for her but after his untimely passing away she has not been able to find his list of phone numbers so that she can call the pharmacy and get her belimumab.    States she had blood clots in her legs when she was in the [facility]. Had APL testing done in the past which was negative.    Our last visit in 09/2023 was converted to televisit as she couldn't make it in person. She stated her symptoms were improving including cough and energy better. Finished course of antibiotics for PNA. She was adherent to her HCQ and prednisone. BP still running high with systolics in the 150s and diastolic in the 100s. She had begun cytoxan Euro-lupus protocol and was tolerating then.    Interim history:  Received three doses of cytoxan. 4th dose held by nephrology given down trend in hemoglobin down to 8.0. dsDNA increasing and complements decreasing. Continues to have proteinuria. Seen by cardiology but treatment recommendations difficult to establish as it wasn't clear what medications she was taking.    Today:  Taking walks with cousin and their dog. Doesn't feel fatigued or tired. No lupus flare symptoms that she reports. Urinating fine. No rashes or joint pains.    She is waiting on blood pressure cuff to get to her so isn't checking her blood pressure. Denies headaches, vision change, weakness, lightheadedness, fatigue, chest pain, dyspnea, presyncope or any other symptoms.    States she is taking two tabs of prednisone daily. When asked about taking HCQ she says yes then no then yes. Reports taking one tab twice a day. Last visit reported taking one tab daily.    She otherwise  "really has no questions, concerns or anything else she would like to be addressed today.    14 point review of systems collected and negative if not documented above.    Past Medical History:  Past Medical History:   Diagnosis Date    Hepatitis     Lupus (systemic lupus erythematosus) (H)     Lupus cerebritis (H)     Pancreatitis 08/2017    Pyelonephritis 08/2017    Seizures (H)     Status epilepticus (H)      Past Surgical History:  Past Surgical History:   Procedure Laterality Date    IR RENAL ANGIOGRAM LEFT  8/11/2023    IR RENAL BIOPSY LEFT  6/28/2022    NO HISTORY OF SURGERY      ORTHOPEDIC SURGERY       Medications:  Current Outpatient Medications   Medication    acetaminophen (TYLENOL) 325 MG tablet    carvedilol (COREG) 25 MG tablet    empagliflozin (JARDIANCE) 10 MG TABS tablet    ferrous sulfate (FEROSUL) 325 (65 Fe) MG tablet    folic acid (FOLVITE) 1 MG tablet    isosorbide mononitrate (IMDUR) 60 MG 24 hr tablet    ondansetron (ZOFRAN ODT) 4 MG ODT tab    pantoprazole (PROTONIX) 40 MG EC tablet    potassium chloride ER (KLOR-CON M) 20 MEQ CR tablet    predniSONE (DELTASONE) 10 MG tablet    sodium bicarbonate 650 MG tablet    sulfamethoxazole-trimethoprim (BACTRIM) 400-80 MG tablet    Vitamin D, Cholecalciferol, 10 MCG (400 UNIT) TABS    vancomycin (VANCOCIN) 125 MG capsule     No current facility-administered medications for this visit.     Allergies:  Allergies   Allergen Reactions    Rituximab Anaphylaxis and Shortness Of Breath     Family history:  Older sister who is diagnosed with lupus but is not on medications.    Social History:   Lives with her sister. Not employed. 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   ETOH: Denies  Smoking: Denies  Drug use: Denies  Occupation: Unemployed.    I have reviewed the patients past medical history, past surgical history, current medications, current allergies, family history and social history.     Objective:  BP (!) 146/105   Pulse 99   Ht 1.575 m (5' 2\")   Wt 49 kg (108 lb) "   SpO2 100%   BMI 19.75 kg/m    GEN: sitting upright in chair, frail appearing, NAD  HEENT: Light lacy erythematous malar rash, sclera clear, no oral or nasal ulcers, no inflammatory nasal bridge/external ear changes  CV: RRR, no m/r/g  Pulm: CTAB no crackles, wheezing or ronchi  Extremities: Full active and passive ROM of bilateral shoulders, elbows (hyperextension), wrists, MCPs, PIPs, DIPs, hips, knees, ankles. No synovitis of any joints. Full fist formation.  Skin: No acute cutaneous changes. Has chronic scars over her hands and forehead consistent with history of discoid lupus. Scarring alopecia throughout scalp greatest over occiput, hair is thin.    WBC   Date Value Ref Range Status   02/11/2021 4.5 4.0 - 11.0 10e9/L Final     WBC Count   Date Value Ref Range Status   10/20/2023 3.3 (L) 4.0 - 11.0 10e3/uL Final     Hemoglobin   Date Value Ref Range Status   10/20/2023 8.0 (L) 11.7 - 15.7 g/dL Final   02/11/2021 9.1 (L) 11.7 - 15.7 g/dL Final     Platelet Count   Date Value Ref Range Status   10/20/2023 55 (L) 150 - 450 10e3/uL Final   02/11/2021 104 (L) 150 - 450 10e9/L Final     Creatinine   Date Value Ref Range Status   10/20/2023 1.04 (H) 0.51 - 0.95 mg/dL Final   02/11/2021 0.58 0.50 - 1.00 mg/dL Final     Lab Results   Component Value Date    ALKPHOS 232 01/26/2023    ALKPHOS 140 02/11/2021     AST   Date Value Ref Range Status   10/20/2023 33 0 - 45 U/L Final     Comment:     Reference intervals for this test were updated on 6/12/2023 to more accurately reflect our healthy population. There may be differences in the flagging of prior results with similar values performed with this method. Interpretation of those prior results can be made in the context of the updated reference intervals.   02/11/2021 117 (H) 0 - 35 U/L Final     Lab Results   Component Value Date    ALT 10 01/26/2023    ALT 55 02/11/2021     Sed Rate   Date Value Ref Range Status   10/09/2020 24 (H) 0 - 20 mm/h Final     Erythrocyte  Sedimentation Rate   Date Value Ref Range Status   08/18/2023 38 (H) 0 - 20 mm/hr Final     CRP Inflammation   Date Value Ref Range Status   06/15/2022 7.9 0.0 - 8.0 mg/L Final   02/10/2021 <2.9 0.0 - 8.0 mg/L Final     UA RESULTS:  Recent Labs   Lab Test 07/10/23  1454   COLOR Light Yellow   APPEARANCE Slightly Cloudy*   URINEGLC Negative   URINEBILI Negative   URINEKETONE Negative   SG 1.014   UBLD Large*   URINEPH 7.0   PROTEIN 600*   NITRITE Negative   LEUKEST Small*   RBCU 97*   WBCU 40*      AIMEE Screen by EIA   Date Value Ref Range Status   05/10/2013 11.9 (H) <1.0 Final     Comment:     Interpretation:  Positive     DNA-ds   Date Value Ref Range Status   10/09/2020 46 (H) <10 IU/mL Final     Comment:     Positive     DNA (ds) Antibody   Date Value Ref Range Status   10/20/2023 104.0 (H) <10.0 IU/mL Final     Comment:     Positive     RNP Antibody IgG   Date Value Ref Range Status   08/25/2017 2.4 (H) 0.0 - 0.9 AI Final     Comment:     Positive  Antibody index (AI) values reflect qualitative changes in antibody   concentration that cannot be directly associated with clinical condition or   disease state.       SSA (RO) Antibody IgG   Date Value Ref Range Status   05/10/2013 7  Final     Comment:     Reference range: 0 to 40  Unit: AU/mL  (Note)  INTERPRETIVE INFORMATION: SSA (Ro) (NETTA) Ab, IgG   29 AU/mL or Less ............. Negative   30 - 40 AU/mL ................ Equivocal   41 AU/mL or Greater .......... Positive  SSA (Ro) antibody is seen in 70-75% of Sjogren syndrome  cases, 30-40% of systemic lupus erythematosus (SLE) and  5-10% of progressive systemic sclerosis (PSS).     SSB (LA) Antibody IgG   Date Value Ref Range Status   05/10/2013 0  Final     Comment:     Reference range: 0 to 40  Unit: AU/mL  (Note)  INTERPRETIVE INFORMATION: SSB (La) (NETTA) Ab, IgG   29 AU/mL or Less ............. Negative   30 - 40 AU/mL ................ Equivocal   41 AU/mL or Greater .......... Positive  SSB (La) antibody  is seen in 50-60% of Sjogren syndrome  cases and is specific if it is the only NETTA antibody  present. 15-25% of patients with systemic lupus  erythematosus (SLE) and 5-10% of patients with progressive  systemic sclerosis (PSS) also have this antibody.     Aldolase   Date Value Ref Range Status   05/18/2013 11.5 (H)  Final     Comment:     Reference range: 3.3 to 9.7  Unit: U/L  (Note)  This specimen is hemolyzed. This may cause the result for  aldolase to be falsely increased.  REFERENCE INTERVAL: Aldolase  Access complete set of age- and/or gender-specific  reference intervals for this test in the [facility]  Test Directory ([email or web address]).  Performed by [facility],  [address] [facility],[place] [id number] [phone]  [email or web address], Gabriella Quesada MD, Lab. Director     Rheumatoid Factor   Date Value Ref Range Status   05/10/2013 13 0 - 14 IU/mL Final     Neutrophil Cytoplasmic IgG Antibody   Date Value Ref Range Status   05/11/2013   Final    <1:20  Reference range: <1:20  (Note)  The ANCA IFA is <1:20; therefore, no further testing will  be performed.  INTERPRETIVE INFORMATION: Anti-Neutrophil Cyto Ab, IgG    Neutrophil Cytoplasmic Antibodies (C-ANCA = granular  cytoplasmic staining, P-ANCA = perinuclear staining) are  found in the serum of over 90 percent of patients with  certain necrotizing systemic vasculitides, and usually in  less than 5 percent of patients with collagen vascular  disease or arthritis.  Performed by [facility],  [address] [facility],[place] [id number] [phone]  [email or web address], Gabriella Quesada MD, Lab. Director       ASSESSMENT/PLAN:  Milly Carroll is a 23yoF with PMHx of long-standing SLE (+DsDNA, +RNP, +ribosomal P Ab and hypocomplementemia). 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 Patient is seen for lupus follow-up.     She has had a multitude of comorbidities develop surrounding our last visit in 07/2023 and between that visit and now which included: medication non-adherence while in [place] from 02/2023 - 05/2023, hospitalizations for lupus flares, hospitalization in 06/2023 for CHRISTA and C. Difficile infection, hospitalization in late July - August for rectal bleeding, dizziness, fevers and found to have severe sepsis d/t pneumonia. This was further complicated by malignant hypertension, hemoptysis and stress induced cardiomyopathy. On 8/2/2023 she underwent renal biopsy with findings as below. After this she was noted to have pseudoaneurysm with perinephric hematoma and underwent IR coil embolization of left renal artery inferior pole pseudoaneurysm on 8/11. She was also evaluated by cardiology for HF and underwent CMR without evidence of inflammation. HCQ was also held briefly for concern of cardiac toxicity and prolonging QTc but she is back on this now. Following with cardiology and now cardiomyopathy felt to be d/t HTN from her lupus nephritis.     Her lupus nephritis was initially diagnosed during her hospitalization in 06/2022 when she was found to have significant proteinuria and hematuria on urine sediment.  Pathology showed: focal proliferative nephritis ISN/RPS class III with minimal activity. Her more recent pathology report from 08/2023 showed: diffuse sclerosing and focal proliferative lupus nephritis, ISN/RPS Class IV with severe interstitial fibrosis and tubular atrophy (70-80%) and light microscopy findings raise concern for concomitant class V lupus nephritis but not available for EM.     In the past she was treated with Solu-Medrol 1g x3d followed by oral prednisone taper.  She was also managed with mycophenolate 1500 mg BID, hydroxychloroquine 200 mg daily  and belimumab 200 mg subcutaneous once weekly. Patient has not been taking belimumab since 2020 due to poor medication adherence. Did not resume belimumab as planning renal biopsy and then treatment for suspected lupus nephritis flare, which as above has been confirmed and as of 8/22/2023 the patient received first dose of Eurolupus cyclophosphamide. She is following with nephrology - no show last visit. Adhering to cyclophosphamide infusions but her hemoglobin has drifted down to 8.0. Her serologies were showing improvement: dsDNA (down from >360 to 32) and complement levels increasing though still below normal range but now dsDNA up to 104 and C3 and C4 decreasing to 41 and 9, respectively. She continues to have proteinuria with prot/creatinine ratio of 4.9. Hematuria though states she was menstruating during last urine collection.      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 Patient is agreeable to continue  --routine screening drug toxicity monitoring labs reviewed:  White Blood Cell (WBC) Count: 3.3 x 10^3/uL (Low, reference range 4.0 - 11.0 x 10^3/uL), indicating a decrease from the previous value of 4.5 x 10^9/L on February 11, 2021.  Hemoglobin: 8.0 g/dL (Low, reference range 11.7 - 15.7 g/dL), a decrease from 9.1 g/dL noted on February 11, 2021. This suggests a persistent anemia.  Platelet Count: 55 x 10^3/uL (Low, reference range 150 - 450 x  10^3/uL), down from 104 x 10^9/L on February 11, 2021, indicating thrombocytopenia.  Creatinine: 1.04 mg/dL (High, reference range 0.51 - 0.95 mg/dL), showing an increase from the previous normal value of 0.58 mg/dL on February 11, 2021. This indicates a possible decline in renal function.  Alkaline Phosphatase (ALKPHOS): 232 U/L on January 26, 2023, which has normalized to 140 U/L as of February 11, 2021.  Aspartate Aminotransferase (AST): 33 U/L (reference range 0 - 45 U/L), significantly improved from the previous high of 117 U/L (reference range 0 - 35 U/L) on February 11, 2021.  Alanine Aminotransferase (ALT): 10 U/L on January 26, 2023, improved from 55 U/L on February 11, 2021.  Erythrocyte Sedimentation Rate (ESR): 38 mm/hr (High, reference range 0 - 20 mm/hr) on August 18, 2023, up from 24 mm/h (High) on October 9, 2020. This indicates a persistent inflammatory process.   Do not suspect that the changes identified on her labs are secondary to HCQ but instead secondary to cytoxan     PLAN:  -- Appreciate nephrology assistance with management of lupus nephritis with Eurolupus cytoxan regimen. Received 3 doses (held for now due to cytopenias).  -- If cytoxan no longer to be continued given cytopenias then mycophenolate could be considered. Will discuss with nephrology.  -- Order placed for CBC tomorrow, despite her anemia patient is asymptomatic and otherwise without concerns.  -- Continue  mg daily (Rx dropped off, unclear if she's taking this given med adherence issues and increased dsDNA with decreased complements, new Rx placed, pt again counseled how to take it)  -- Continue prednisone 20 mg daily  -- referral to [facility] pharmacist for medication reconciliation, med teaching, addressing barriers for non-adherence    Patient seen and discussed together with Dr. Boone.    Nnamdi Waddell DO  Rheumatology Fellow  PGY4    Staff addendum  I performed the history and physical examination of  the patient and discussed the management with the fellow. I reviewed the available lab and imaging studies. I reviewed the fellow's note and agree with the documented findings and plan of care.    Alfonso Boone MD  Rheumatology

## 2023-10-30 NOTE — TELEPHONE ENCOUNTER
[facility] referral from: [facility] visit (referral by provider)    MT referral outreach attempt #2 on October 30, 2023 at 11:30 AM      Outcome: Patient not reachable after several attempts, will route to [facility] Pharmacist/Provider as an FYI.  [facility] scheduling number is [phone].  Thank you for the referral.    Use hbc for the carrier/Plan on the flowsheet      [address] Message Sent    SHAVONNE Benitez

## 2023-11-01 NOTE — TELEPHONE ENCOUNTER
Referral placed for medication reconciliation and to discuss adherence techniques. Coordinators were unable to reach patient.

## 2023-11-08 NOTE — TELEPHONE ENCOUNTER
Attempted to schedule recommended 4 week follow up from 8/31 visit, unable to reach pt at this time, lvm with writer's direct line for call back.  Gail Martini,  Nephrology Clinic Navigator  [facility]  Direct: [phone].

## 2023-11-15 NOTE — PROGRESS NOTES
Call back out to pt for cardiology medication reconciliation. NO answer. Third attempt to reach pt. VM left with reason for calling & asked pt call back. Also, follow up orders weren't entered by provider - presumably awaiting review of pt's home medications as plan was to optimize meds and then re-evaluate 2 months after that. Entered follow up OV order for 2 months. RN will close encounter due to 3 attempts made. Izabel Pink RN on 11/15/2023 at 2:15 PM

## 2023-11-16 NOTE — PROGRESS NOTES
Vasculitis and Lupus Program Note: Patient Outreach Encounter    REASON FOR CALL:     REASON FOR CALL: RECHECK and Lupus Program Care Coordination                                  SITUATION/BACKROUND:   Patient is being treated for Lupus Nephritis.  Missed last few scheduled appts.  Last 2 Cytoxan doses were skipped r/ t Neutropenia    Per provider instruction, writer to follow up on symptom follow up. And clinic follow up.     ASSESSMENT:     Nurse Assessments:  Denies any Lupus flare or other complications at this time.  Medications  Nephrology medication reconciliation completed: Yes- see list below for details.  Any barriers to taking medication(s) as prescribed?  No  Taking over the counter medication(s?) Yes    Current Outpatient Medications (Antihypertensive, Cardiovascular, Diuretics, Beta blockers, Calcium blockers, Anticoagulants)   Medication Sig    carvedilol (COREG) 25 MG tablet Take 1 tablet (25 mg) by mouth 2 times daily (with meals)     Current Outpatient Medications (Other)   Medication Sig    acetaminophen (TYLENOL) 325 MG tablet Take 2 tablets (650 mg) by mouth every 4 hours as needed for mild pain    empagliflozin (JARDIANCE) 10 MG TABS tablet Take 1 tablet (10 mg) by mouth daily    ferrous sulfate (FEROSUL) 325 (65 Fe) MG tablet Take 1 tablet (325 mg) by mouth daily (with breakfast)    folic acid (FOLVITE) 1 MG tablet Take 1 mg by mouth daily    hydroxychloroquine (PLAQUENIL) 200 MG tablet Take 1 tablet (200 mg) by mouth daily    isosorbide mononitrate (IMDUR) 60 MG 24 hr tablet Take 1 tablet (60 mg) by mouth daily    ondansetron (ZOFRAN ODT) 4 MG ODT tab Take 1 tablet (4 mg) by mouth every 8 hours as needed for nausea    pantoprazole (PROTONIX) 40 MG EC tablet Take 1 tablet (40 mg) by mouth every morning (before breakfast)    potassium chloride ER (KLOR-CON M) 20 MEQ CR tablet Take 20 mEq by mouth 2 times daily    predniSONE (DELTASONE) 10 MG tablet Take 2 tablets (20 mg) by mouth daily     sodium bicarbonate 650 MG tablet Take 2 tablets (1,300 mg) by mouth 3 times daily    sulfamethoxazole-trimethoprim (BACTRIM) 400-80 MG tablet Take 1 tablet by mouth daily    Vitamin D, Cholecalciferol, 10 MCG (400 UNIT) TABS Take 10 mcg by mouth daily     Prednisone is at 5mg daily  Stopped Zofran      PLAN:     Patient scheduled follow up with Dr. Purvis Dec 18th at 8am.  Stressed importance to call if not able to make to allow someone else to have appt if she unable to make appt.  Patient verbalized understanding.    Education:  Reviewed importance to taking meds daily as prescribed and checking blood pressures as often as possible.  Method:  general discussion/verbal explanation  Discussed: managing/taking blood pressure,  Medications, and checking MyChart every week.  Education material provided and patient was given an opportunity to ask questions.      Asked and refilled prescriptions per provider and sent to pt's preferred pharmacy.  Denies need for refills.      Follow Up:   Follow up call in 1-2 weeks  Patient to follow up at next scheduled appointment with provider.   Patient to call/MyChart message with updates    Patient verbalized understanding and will follow up as recommended.    Oralia Rodriguez RN  Vasculitis and Lupus Program: [phone]

## 2023-11-16 NOTE — Clinical Note
Hi- did you want patient seen before 12/18.?  That was your only availability, ok to switch to video.  She is getting labs tomorrow.  Last 2 cytoxan she did not get, no showed last one.  Let me know if you need me to overbook her to another day or if you need any other care coord before appt. Thanks Oralia Rodriguez RN, BSN, PHN Vasculitis & Lupus Program Nurse Navigator [phone]

## 2023-12-07 NOTE — PROGRESS NOTES
Message left to schedule labs before clinic.    Oralia Rodriguez RN, BSN, PHN  Vasculitis & Lupus Program Nephrology Nurse Navigator  [phone]

## 2023-12-08 NOTE — ED NOTES
12/10/19 1624   Child Life   Location ED  (CC: Fever)   Intervention Initial Assessment;Preparation;Family Support   Preparation Comment This writer introduced self to patient; patient arrived from [facility]. Patient familiar with inpatient admissions, declined preparation or supplies. Declined activities for ED wait time. Provided fleece blanket. No further CFL needs expressed.   Family Support Comment No family present.   Anxiety Low Anxiety   Major Change/Loss/Stressor/Fears medical condition, self   Techniques to [facility] with Loss/Stress/Change diversional activity;family presence;favorite toy/object/blanket   Outcomes/Follow Up Continue to Follow/Support;Provided Materials      Influenza Vaccination

## 2023-12-08 NOTE — TELEPHONE ENCOUNTER
Sent [employer] msg, also called and lvm for appt reminder on 12/18 at 8:00 am in person with labs at 7:00 am.  Gail Martini,  Nephrology Clinic Navigator  [facility]  Direct: [phone].

## 2023-12-08 NOTE — TELEPHONE ENCOUNTER
Called patient and left a brief message for appointment reminder on 12/18. Also stated I will be sending a [employer] message.    Heather Valencia RN  [facility]

## 2023-12-17 NOTE — ED PROVIDER NOTES
Transfer of care from previous shift provider:. Lupus history presenting with left subscapular pain. CT CAP pending. [facility] called and on wait list with need for rheumatology. Hospitalist and nephrologist said supportive care with fluids. [facility] do not have rheumatology on weekends. Labs remarkable for CHRISTA. [facility] does have nephrology and said to call back if issues finding placement. Dr Banegas feels like low threshold to call [facility] back due to poor kidney function.    ED Course as of 12/17/23 1142   Sun Dec 17, 2023   0754 Reports deep left chest pain/back pain starting at rest 2 days ago with no modifying factors.  Ebbs and flows but never completely goes away.  No chest x-ray done yet so we will start with this also adding troponin and EKG and hcg in preparation for possible need for CT imaging. D dimer is significantly below recent baselines.   0855 Troponin T, High Sensitivity(!!): 735   0856 EKG: Normal sinus rhythm, no ST deviations, no abnormal T wave inversion in V3-6.   0856   Dextrose bolus   0900 Dr Kaleb nice with CT PE study based on renal function and concern for significant cardiopulmonary process with elevated troponin.   1106 Pain much improved s/p nitroglycerin x2.   1107 Contacted [facility] stat doc for transfer discussion for NSTEMI.   1118 Hemoglobin(!!): 6.2  Likely dilutional receiving 2 L of fluid with prior hgb 7 hours ago at 8.1. Will stop heparin for now.   1124 Spoke with [facility] to update on likely NSTEMI. Waiting to hear back from hospitalist to update.   1139 Platelet Count(!!): 40   1139 Troponin T, High Sensitivity(!!): 494  Down trending. Pain completely resolved now s/p nitroglycerin x2.   1142 Accepted at [facility]     Assessment and Plan:  Final diagnoses:   Exacerbation of systemic lupus erythematosus (H)   CHRISTA (acute kidney injury) (H24)   Generalized abdominal pain   NSTEMI (non-ST elevated myocardial infarction) (H)   Left-sided chest pain      EKG  with lateral TWI and troponin returns severely elevated and with pain completely resolved this is concerning for cardiac etiology secondary to lupus. CT PE study performed with elevated troponin with no PE. Initially started heparin drip (bolus not given due to anemia and thrombocytopenia) and later stopped when repeat hgb returned down 2 poitns to 6.2 with worsening thrombocytopenia 40 (prior 49). Low suspicion for HIT and suspect dilutional receiving 2+ L of fluid in 49 kg patient. At this point will hold off on type/screen and PRBC deferring to receiving facility. With need for urgent specialist management [facility] re-contacted who accepts transfer (Dr Pederson). [facility] also had been re-contacted as preferred location as this is where she follows but would still be on the wait list with likely no bed available today still.    Disposition: [facility]    Addendum: pain returning and will start nitroglycerin drip    Critical care time:  50 minutes of critical care time was spent with this patient, exclusive of all other separately billable services, reviewing EKG/labs/imaging and managing treatment including IV heparin and coordinating transfer.        Joesph Perez MD  12/17/23 1153       Joesph Perez MD  12/17/23 1200

## 2023-12-17 NOTE — PROGRESS NOTES
Discussed current GFR value with Dr. Banegas prior to shift change/transfer of care to Dr. Perez. Reviewed MD notes, waiting to hear if they want to proceed with giving contrast and CTs ordered. sushma

## 2023-12-17 NOTE — ED TRIAGE NOTES
Patient presents to ER with complaint of a Lupus exacerbation with back pain abdominal pain weakness and shortness of breath on exertion. Pain 8/10. Symptoms began 2 days ago and have become worse. Last tylenol 2000 12/16. BP (!) 147/119   Pulse 115   Wt 49 kg (108 lb)   LMP 12/11/2023 (Approximate)   SpO2 98%   BMI 19.75 kg/m         Triage Assessment (Adult)       Row Name 12/17/23 0341          Triage Assessment    Airway WDL WDL        Respiratory WDL    Respiratory WDL WDL        Skin Circulation/Temperature WDL    Skin Circulation/Temperature WDL WDL        Cardiac WDL    Cardiac WDL WDL        Peripheral/Neurovascular WDL    Peripheral Neurovascular WDL WDL;X        Cognitive/Neuro/Behavioral WDL    Cognitive/Neuro/Behavioral WDL WDL

## 2023-12-17 NOTE — PROGRESS NOTES
Transfer Type: [facility]  Transfer Triage Note    Date of call: 12/17/23  Time of call: 6:10 AM    Current Patient Location:  [facility] ED  Current Level of Care: ED    Vitals:     BP: (!) 135/111 Pulse: 103     SpO2: 100 %   Weight: 49 kg (108 lb)    at    Diagnosis: Lupus nephritis  Reason for requested transfer: Patient has established care here  Further diagnostic work up, management, and consultation for specialized care   Isolation Needs: None    Care everywhere has been updated and reviewed: Yes  Necessary images have been sent through PACS: Yes    If patient is transferring for specialty care or specific procedure, the specialist required has participated in the transfer call and agreed with need for transfer and anticipated timeline: Yes, Provider name: Marilu Turk specialty with: Nephrology  - Unable to get nephrology on the phone at the time of the call, but SOC will have them reach out to the provider once they call back.    Transfer accepted: Yes  Stability of Patient: Patient is vitally stable, with no critical labs, and will likely remain stable throughout the transfer process  Is the patient appropriate for [facility]? Yes  Level of Care Needed: Med Surg  Telemetry Needed:  None  Expected Time of Arrival for Transfer: greater than 24 hours  Arrival Location:  [facility]     Recommendations for Management and Stabilization: Given    Additional Comments:     Milly Carroll is a 23 year old female with a history of lupus c/b lupus nephritis. She has recently been lost to follow up with rheumatology trying to contact her on a weekly basis. Also follows with nephrology and had been on cytoxan for her lupus nephritis, although this was stopped due to worsening anemia. Pt reports she is currently taking her medications as prescribed.    Presented with generalized malaise, nausea and vomiting for 2-3 days. Also reports L scapula back  pain and abdominal pain. Labs notable for worsening renal function - Cr 2.19 up from 1.04. Has biopsy proven lupus nephritis. CRP mildly elevated at 13.97. Ferritin significantly elevated at 32,775 (previous 1079). Hgb is stable from prior, platelets slightly worse. D-dimer 1.85.    ED provider to obtain CT PE and Abd/Pelvis imaging. Will also add on liver labs. dsDNA and complement levels are pending as well as UPC to assess for proteinuria.    Pt would benefit from transfer to a facility with subspecialists to further work up and treat her presentation given her complex medical history.    Jayda Han MD    Addendum dates 12/17 at 12 PM  The patient was accepted to [facility] in [place] for the quicker availability of a staffed bed in this facility.  The patient is no longer coming to the [facility].    Jeffrey Cardoza MD

## 2023-12-17 NOTE — PLAN OF CARE
On-call nephrology fellow, Dr Michel Steen, was called about this patient this morning. Briefly, Ms. Carroll has lupus nephritis with advanced kidney disease with last kidney biopsy (8/2023) showing diffuse proliferative, sclerosing LN (2% active crescents, 15% fibrous crescents, 50% gloms are globally sclerosed and she has severe (70-80%) tubulo-interstitial fibrosis). She has a history of non-adherence to therapy and has been lost to follow up previously. She last saw nephrology here in late August and plan was to continue cytoxan for her active LN but appears to have been lost to follow up since then.      She presented to [facility] with 2-3 days of nausea and vomiting. Her labs are notable for an CHRISTA on CKD with Cr 2.19 from her baseline 1.0. Of note, her serum creatinine underestimates her degree of kidney dysfunction, as Cys C-estimated GFR is only 18. Given her complex history and advanced kidney disease, I agree that transfer to [facility] would be appropriate so that our team can evaluate her.

## 2023-12-17 NOTE — ED PROVIDER NOTES
History     Chief Complaint   Patient presents with    Lupus Exacerbation     HPI  Milly Carroll is a 23 year old female who has pretty extensive past medical history including lupus and lupus nephritis.  She says she has had pancreatitis in the past as well.  She saw rheumatology on October 25, there was a very thorough note with her recent history which is reviewed.  Since that time they have been trying to contact her on almost a weekly basis and it looks like they have not been very successful.  It looks like she did have a telephone visit with nephrology in mid November.  At that time they discussed a clinic appointment on December 18, which is tomorrow.  She was not aware that she had an appointment scheduled for tomorrow.    She comes in here to the emergency department early this morning reporting a 2 to 3-day history of nausea and vomiting.  She reports back pain below her left scapula, abdominal pain that she describes as all over her entire abdomen.  Weakness and shortness of breath on exertion.  She states she has not been able to keep anything down for the last couple of days.  She has been trying to drink liquids but is coming right back up again.  She says she is having diarrhea, perhaps 2 brown watery stools per day.  Denies any black bloody or tarry stools.  She feels she is still urinating normally.  She says she has been chilled, she thinks they have documented some fevers at home but she has no idea what kind of numbers.    Allergies:  Allergies   Allergen Reactions    Rituximab Anaphylaxis and Shortness Of Breath       Problem List:    Patient Active Problem List    Diagnosis Date Noted    Proteinuria 10/03/2017     Priority: High    Hemoptysis 08/17/2023     Priority: Medium    Perinephric hematoma 08/11/2023     Priority: Medium    Tachycardia 01/23/2023     Priority: Medium    Hx of systemic lupus erythematosus (SLE) (H) 01/23/2023     Priority: Medium    Pain of left lower extremity  01/23/2023     Priority: Medium    Thrombocytopenia, unspecified (H24) 06/13/2022     Priority: Medium    Splenomegaly 06/13/2022     Priority: Medium    Thrombocytopenia (H24) 06/13/2022     Priority: Medium    Fever, unspecified 06/13/2022     Priority: Medium    Fever in pediatric patient 06/13/2022     Priority: Medium    Anemia, unspecified type 06/13/2022     Priority: Medium    Other systemic lupus erythematosus with other organ involvement (H) 06/13/2022     Priority: Medium    Systemic lupus erythematosus, unspecified SLE type, unspecified organ involvement status (H) 06/13/2022     Priority: Medium    Contact with and (suspected) exposure to covid-19 06/13/2022     Priority: Medium    Lupus (systemic lupus erythematosus) (H) 04/25/2022     Priority: Medium    Fever, unspecified fever cause 02/10/2021     Priority: Medium    Cellulitis of left lower extremity 01/13/2021     Priority: Medium    Drug-induced systemic lupus erythematosus, unspecified organ involvement status (H24) 01/13/2021     Priority: Medium    Pancreatitis, acute 06/23/2020     Priority: Medium    Sepsis (H) 12/10/2019     Priority: Medium    Anaphylaxis 11/20/2019     Priority: Medium    Immunosuppression (H24) 04/18/2019     Priority: Medium    Abnormal echocardiogram - repeat in 2021 04/14/2019     Priority: Medium     4/2019 - Showed mild-moderate dilation of the aortic root at the sinus of valsalva. In discussion with the pediatric cardiologists, this was not truly dilation and her aortic root is normal for her age. She should receive the next screening echo in 2-3 years.      Other forms of systemic lupus erythematosus (H) 04/10/2019     Priority: Medium    Pyelonephritis 08/17/2017     Priority: Medium    Knee osteonecrosis, right (H) 05/19/2014     Priority: Medium    Functional visual loss 12/16/2013     Priority: Medium    Posterior subcapsular cataract, both eyes 12/16/2013     Priority: Medium    Encounter for therapeutic drug  monitoring 12/16/2013     Priority: Medium    Hypokalemia 06/23/2013     Priority: Medium    Coagulopathy (H24) 05/19/2013     Priority: Medium    Seizure (H) 05/18/2013     Priority: Medium    Acute hepatitis 05/14/2013     Priority: Medium    Exacerbation of systemic lupus erythematosus 05/14/2013     Priority: Medium    Generalized weakness 05/14/2013     Priority: Medium    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 Provider to review and confirm.      Systemic lupus erythematosus (H) 05/11/2013     Priority: Medium    Rash 05/11/2013     Priority: Medium        Past Medical History:    Past Medical History:   Diagnosis Date    Hepatitis     Lupus (systemic lupus erythematosus) (H)     Lupus cerebritis (H)     Pancreatitis 08/2017    Pyelonephritis 08/2017    Seizures (H)     Status epilepticus (H)        Past Surgical History:    Past Surgical History:   Procedure Laterality Date    IR RENAL ANGIOGRAM LEFT  8/11/2023    IR RENAL BIOPSY LEFT  6/28/2022    NO HISTORY OF SURGERY      ORTHOPEDIC SURGERY         Family History:    Family History   Problem Relation Age of Onset    Other - See Comments Father         Question of lupus in father and paternal grandfather    Lupus Sister         older sister    Gastrointestinal Disease No family hx of         No known history of IBD, hepatitis, pancreatitis, celiac disease, or GI cancers before age 50    Glaucoma No family hx of     Macular Degeneration No family hx of        Social History:  Marital Status:  Single [1]  Social History     Tobacco Use    Smoking status: Never    Smokeless tobacco: Never   Vaping Use    Vaping Use: Never used   Substance Use Topics    Alcohol use: Not Currently    Drug use: Never        Medications:    acetaminophen (TYLENOL) 325 MG tablet  carvedilol (COREG) 25 MG tablet  empagliflozin (JARDIANCE) 10 MG TABS tablet  ferrous sulfate  (FEROSUL) 325 (65 Fe) MG tablet  folic acid (FOLVITE) 1 MG tablet  hydroxychloroquine (PLAQUENIL) 200 MG tablet  isosorbide mononitrate (IMDUR) 60 MG 24 hr tablet  ondansetron (ZOFRAN ODT) 4 MG ODT tab  pantoprazole (PROTONIX) 40 MG EC tablet  potassium chloride ER (KLOR-CON M) 20 MEQ CR tablet  predniSONE (DELTASONE) 10 MG tablet  sodium bicarbonate 650 MG tablet  sulfamethoxazole-trimethoprim (BACTRIM) 400-80 MG tablet  Vitamin D, Cholecalciferol, 10 MCG (400 UNIT) TABS          Review of Systems   Constitutional:  Positive for chills and diaphoresis.   HENT:  Negative for congestion.    Eyes:  Negative for redness.   Respiratory:  Positive for shortness of breath. Negative for cough and chest tightness.    Cardiovascular:  Negative for chest pain.   Gastrointestinal:  Positive for abdominal pain, diarrhea, nausea and vomiting. Negative for blood in stool.   Genitourinary:  Negative for dysuria.   Musculoskeletal:  Negative for arthralgias.   Skin:  Negative for rash.   Neurological:  Negative for light-headedness.   Psychiatric/Behavioral:  Negative for agitation.        Physical Exam   BP: (!) 147/119  Pulse: 115  Weight: 49 kg (108 lb)  SpO2: 98 %      Physical Exam  Vitals and nursing note reviewed.   Constitutional:       Appearance: Normal appearance. She is ill-appearing and diaphoretic.      Comments: pale   HENT:      Head: Normocephalic and atraumatic.      Mouth/Throat:      Mouth: Mucous membranes are moist.   Eyes:      Conjunctiva/sclera: Conjunctivae normal.   Cardiovascular:      Rate and Rhythm: Regular rhythm. Tachycardia present.      Heart sounds: Normal heart sounds.   Pulmonary:      Effort: Pulmonary effort is normal.      Breath sounds: Normal breath sounds.   Abdominal:      General: Abdomen is flat. Bowel sounds are normal.      Palpations: Abdomen is soft.      Tenderness: There is no right CVA tenderness or left CVA tenderness.      Comments: Tender everywhere I palpate in her abdomen  with some mild guarding.   Skin:     General: Skin is warm.   Neurological:      Mental Status: She is alert and oriented to person, place, and time.   Psychiatric:         Mood and Affect: Mood normal.         Behavior: Behavior normal.         ED Course              ED Course as of 12/17/23 0713   Sun Dec 17, 2023   0545 CBC with platelets differential(!!)  Lab reporting schistocytes   0554 Appears to be having a flare of her lupus.  She is quite ill-appearing.  Labs back and she has some concerning findings.  CBC with a white count of 5.9, hemoglobin 8.1, platelets 49.  Lab called and reported schistocytes as well.  Creatinine is 2.19 with a GFR of 32.  Back in September her GFR was greater than 90 so she is having some acute kidney injury as well.  Inflammatory markers elevated with a CRP of 13.97 and sed rate of 84.  D-dimer 1.85.  Patient has had all of her recent care for this at the [facility] so I called where shortly after initially receiving labs at 5:16 AM.  Patient placement was not sure if they had any beds available so I am waiting to hear back on that.  I then also requested to consult with provider they are while we are waiting.  I am still waiting to speak to someone.  I went back in and spoke with the patient, she has received a liter of fluids, some Zofran and fentanyl and she is not having any pain at this time.  No further nausea and is feeling much more comfortable.    0708 I have spoken with Dr. Han, hospitalist and Dr. Steen, nephrology at [facility].  They both agree that she should be transferred, however there are no beds currently available.  They felt that current treatment for the time being should include fluid resuscitation, she is just gotten her second liter of normal saline and has maintenance fluids at 125 an hour.  Currently her pain and nausea are controlled with some fentanyl and some Zofran.  I also called [facility], however neither hospital there has  rheumatology available today, so ideally I would like to find someplace where she could see both nephrology and rheumatology today.  I therefore tried calling [facility] in [place], as well as [facility] in [place] and neither of them have any availability either.  We will continue to look for placement.  It is currently change of shift, care patient be turned over to Dr. Perez at this time.     Procedures                  Results for orders placed or performed during the hospital encounter of 12/17/23 (from the past 24 hour(s))   CBC with platelets differential    Narrative    The following orders were created for panel order CBC with platelets differential.  Procedure                               Abnormality         Status                     ---------                               -----------         ------                     CBC with platelets and d...[[id number]]  Abnormal            Final result                 Please view results for these tests on the individual orders.   Basic metabolic panel   Result Value Ref Range    Sodium 138 135 - 145 mmol/L    Potassium 3.4 3.4 - 5.3 mmol/L    Chloride 108 (H) 98 - 107 mmol/L    Carbon Dioxide (CO2) 15 (L) 22 - 29 mmol/L    Anion Gap 15 7 - 15 mmol/L    Urea Nitrogen 32.8 (H) 6.0 - 20.0 mg/dL    Creatinine 2.19 (H) 0.51 - 0.95 mg/dL    GFR Estimate 32 (L) >60 mL/min/1.73m2    Calcium 7.6 (L) 8.6 - 10.0 mg/dL    Glucose 127 (H) 70 - 99 mg/dL   CRP inflammation   Result Value Ref Range    CRP Inflammation 13.97 (H) <5.00 mg/L   Erythrocyte sedimentation rate auto   Result Value Ref Range    Erythrocyte Sedimentation Rate 84 (H) 0 - 20 mm/hr   Lipase   Result Value Ref Range    Lipase 52 13 - 60 U/L   Ferritin   Result Value Ref Range    Ferritin 32,775 (H) 6 - 175 ng/mL   Iron and iron binding capacity   Result Value Ref Range    Iron 130 37 - 145 ug/dL    Iron Binding Capacity 175 (L) 240 - 430 ug/dL    Iron Sat Index 74 (H) 15 - 46 %   CBC with  platelets and differential   Result Value Ref Range    WBC Count 5.9 4.0 - 11.0 10e3/uL    RBC Count 2.64 (L) 3.80 - 5.20 10e6/uL    Hemoglobin 8.1 (L) 11.7 - 15.7 g/dL    Hematocrit 26.6 (L) 35.0 - 47.0 %     (H) 78 - 100 fL    MCH 30.7 26.5 - 33.0 pg    MCHC 30.5 (L) 31.5 - 36.5 g/dL    RDW 18.9 (H) 10.0 - 15.0 %    Platelet Count 49 (LL) 150 - 450 10e3/uL    % Neutrophils 70 %    % Lymphocytes 23 %    % Monocytes 6 %    % Eosinophils 0 %    % Basophils 0 %    % Immature Granulocytes 1 %    NRBCs per 100 WBC 1 (H) <1 /100    Absolute Neutrophils 4.2 1.6 - 8.3 10e3/uL    Absolute Lymphocytes 1.3 0.8 - 5.3 10e3/uL    Absolute Monocytes 0.3 0.0 - 1.3 10e3/uL    Absolute Eosinophils 0.0 0.0 - 0.7 10e3/uL    Absolute Basophils 0.0 0.0 - 0.2 10e3/uL    Absolute Immature Granulocytes 0.0 <=0.4 10e3/uL    Absolute NRBCs 0.0 10e3/uL   Hepatic panel   Result Value Ref Range    Protein Total 7.0 6.4 - 8.3 g/dL    Albumin 2.8 (L) 3.5 - 5.2 g/dL    Bilirubin Total 0.7 <=1.2 mg/dL    Alkaline Phosphatase 132 40 - 150 U/L     (H) 0 - 45 U/L    ALT 65 (H) 0 - 50 U/L    Bilirubin Direct 0.23 0.00 - 0.30 mg/dL   [place] Draw    Narrative    The following orders were created for panel order [place] Draw.  Procedure                               Abnormality         Status                     ---------                               -----------         ------                     Extra Blue Top Tube[[id number]]                              Final result               Extra Red Top Tube[[id number]]                               Final result               Extra Red Top Tube[[id number]]                               Final result               Extra Green Top (Lithium...[[id number]]                      Final result                 Please view results for these tests on the individual orders.   Extra Blue Top Tube   Result Value Ref Range    Hold Specimen JIC    Extra Red Top Tube   Result Value Ref Range    Hold Specimen JIC     Extra Red Top Tube   Result Value Ref Range    Hold Specimen JIC    Extra Green Top (Lithium Heparin) Tube   Result Value Ref Range    Hold Specimen JIC    Partial thromboplastin time   Result Value Ref Range    aPTT 35 22 - 38 Seconds   INR   Result Value Ref Range    INR 1.20 (H) 0.85 - 1.15   D dimer quantitative   Result Value Ref Range    D-Dimer Quantitative 1.85 (H) 0.00 - 0.50 ug/mL FEU    Narrative    This D-dimer assay is intended for use in conjunction with a clinical pretest probability assessment model to exclude pulmonary embolism (PE) and deep venous thrombosis (DVT) in outpatients suspected of PE or DVT. The cut-off value is 0.50 ug/mL FEU.       Medications   ondansetron (ZOFRAN) injection 4 mg (4 mg Intravenous $Given 12/17/23 0426)   sodium chloride 0.9 % infusion (0 mLs Intravenous Paused 12/17/23 0626)   sodium chloride 0.9% BOLUS 1,000 mL (1,000 mLs Intravenous $New Bag 12/17/23 0628)   sodium chloride 0.9% BOLUS 1,000 mL (0 mLs Intravenous Stopped 12/17/23 0541)   fentaNYL (PF) (SUBLIMAZE) injection 50 mcg (50 mcg Intravenous $Given 12/17/23 0426)       Assessments & Plan (with Medical Decision Making)     I have reviewed the nursing notes.    I have reviewed the findings, diagnosis, plan and need for follow up with the patient.          New Prescriptions    No medications on file       Final diagnoses:   Exacerbation of systemic lupus erythematosus (H)   CHRISTA (acute kidney injury) (H24)   Generalized abdominal pain       12/17/2023   [facility]       Pastor Banegas MD  12/17/23 0407

## 2023-12-17 NOTE — PROGRESS NOTES
IV Contrast- Discharge Instructions After Your CT Scan      The IV contrast you received today will be filtered from your bloodstream by your kidneys during the next 24 hours and pass from the body in urine.  You will not be aware of this process and your urine will not change in color.  To help this process you should drink at least 4 additional glasses of water or juice today.  This reduces stress on your kidneys.    Most contrast reactions are immediate.  Should you develop symptoms of concern after discharge, contact the department at the number below.  After hours you should contact your personal physician.  If you develop breathing distress or wheezing, call 911.    1.  Has the patient had a previous reaction to IV contrast? No    2.  Does the patient have kidney disease? No    3.  Is the patient on dialysis? No    If YES to any of these questions, exam will be reviewed with a Radiologist before administering contrast.      Dr. Manuel discussed with Dr Perez and authorized use of IV contrast and to proceed with scans. HEBERT ordaz

## 2023-12-18 NOTE — TELEPHONE ENCOUNTER
I received a phone call from Dr. Serrano for [facility] in [place] regarding Milly Carroll.  She has systemic lupus with lupus nephritis. She was admitted today for chest pain, abdominal pain and poor oral intake and watery diarrhea.    Her labs are concerning for hemoglobin 8.1, repeat 6.0, thrombocytopenia repeat 4051 , significantly elevated ferritin [32, 775] and elevated liver enzymes , ALT 65.  Blood smear showing schistocytes, troponin 730 5 repeat 494.     CT chest showing mild bilateral parenchymal changes suspicious for atypical/viral infection etiology including COVID-19 pneumonia.    She is pending for cardiology consultation as well as nephrology consultation at outside hospital    Her presentation is concerning for macrophage activation syndrome, another possibility would be TTP so I suggested checking respiratory viral panel to check for COVID and hematology consultation for opinion regarding macrophage activation syndrome.    Kamila Montanez MD  Rheumatology attending

## 2023-12-18 NOTE — PROGRESS NOTES
Transfer Type: [facility]  Transfer Triage Note    Date of call: 12/17/23  Time of call: 8:48 PM    Current Patient Location:  [facility]   Current Level of Care: ICU    Vitals: BP:137/99 HR: 96 O2 Sats: 95% on RA  Diagnosis: Acute Lupus flare   Reason for requested transfer: Further diagnostic work up, management, and consultation for specialized care   Isolation Needs: None    Care everywhere has been updated and reviewed: Yes  Necessary images have been sent through PACS: No    If patient is transferring for specialty care or specific procedure, the specialist required has participated in the transfer call and agreed with need for transfer and anticipated timeline: No    Transfer accepted: Yes  Stability of Patient: Patient is vitally stable, with no critical labs, and will likely remain stable throughout the transfer process  Is the patient appropriate for [facility]? Yes  Level of Care Needed: [facility]  Telemetry Needed:  Med (Remote) Telemetry  Expected Time of Arrival for Transfer: greater than 24 hours  Arrival Location:  [facility]     Recommendations for Management and Stabilization: Not needed    Additional Comments:     Milly Carroll is a 23 year old female with a history of lupus c/b lupus nephritis. She has recently been lost to follow up with rheumatology trying to contact her on a weekly basis. Also follows with nephrology and had been on cytoxan for her lupus nephritis, although this was stopped due to worsening anemia. Pt reports she is currently taking her medications as prescribed.     Presented with generalized malaise, nausea and vomiting for 2-3 days. Also reports L scapula back pain and abdominal pain. Labs notable for worsening renal function - Cr 2.19 up from 1.04. Has biopsy proven lupus nephritis. CRP mildly elevated at 13.97. Ferritin significantly elevated at 32,775 (previous 1079). , platelets slightly  worse. D-dimer 1.85.    Patient was earlier today planned to be admitted to [facility] however was accepted to  [facility] in [place] for the quicker availability of a staffed bed in this facility.     Later rheumatology recommended for hematology consultation due to c/f macrophage activation syndrome va TTP with labs concerning for hemoglobin 8.1, repeat 6.0 (note post 2L IVF bolus), thrombocytopenia repeat 4051, significantly elevated ferritin [32, 775] and elevated liver enzymes , ALT 65.  Blood smear showing schistocytes, troponin 730 5 repeat 494.      CT chest showing mild bilateral parenchymal changes suspicious for atypical/viral infection etiology including COVID-19 pneumonia, no PE. Pending Respiratory viral and COVID lab.     She is currently in ICU at [facility] due to need for plasmapheresis as currently being treated for acute Lupus flare. Patient is now accepted back to [facility], for multispecialty consult need, including hematology, rheumatology, nephrology and cardiology if needed. Patient will be on [facility] with Transfusion medicine team for possible plasmapheresis need. [facility] is aware that transfer might be delayed due to immediate bed not being available, likely will take more than 24hrs until bed opens.       Kristyn Esteves MD

## 2023-12-19 PROBLEM — M32.14 LUPUS NEPHRITIS (H): Status: ACTIVE | Noted: 2023-01-01

## 2023-12-20 NOTE — PLAN OF CARE
Goal Outcome Evaluation:      Plan of Care Reviewed With: patient    Overall Patient Progress: no changeOverall Patient Progress: no change    Outcome Evaluation:     Pt arrived from [facility].     A&Ox4.     Generalized weakness.     R PIV SL.     C/o L scapular pain - managed w/ heat and tylenol. Blood cultures taken.     UA needed - pt did not void since arrival to floor. Bladder scanned at 0600 for 188.     On tele - sinus w/ inverted T waves.     R HD catheter.     Unable to give heparin d/t it being locked at a workstation. Spoke with pharmacy who is working on fixing this issue. Will inform oncoming nurse to check with pharmacy if unable to administer.

## 2023-12-20 NOTE — CONSULTS
[facility]    Cardiology Consult Note-Cardiology      Date of Admission:  12/19/2023  Consult Requested by: Dr. Mueller  Reason for Consult: Heart failure management    Assessment & Plan: HVSL   Milly Carroll is a 23 year old female admitted on 12/19/2023 as transfer from [facility] for management of Lupus flare as well as concern for tropinemia and heart failure. She has had unfortunately multiple records with different levels of GDMT for heart failure.  She was followed up with  on 10/2023, she had MRI at that point that did not show any signs of iron overload MI, fibrosis, infiltrative disease.  EF was globally down to 40%, concern was that patient did not have Takotsubo as patient EF continued to be down. EF  at [facility] is relatively unchanged based on outside read compared to prior imaging from [facility].  She was being managed at [facility] for lupus flare with coordination from nephrology and cardiology.  Today she does appear volume overloaded, however her kidney function continues to worsen.  Is reassuring that she is satting 100% on room air, does not complain of any current chest pain or shortness of breath, and was lying flat comfortably during our review.  We are concerned given her history that she is at risk for developing proximal vessel CAD at a young age.  She would benefit from workup of her elevated troponins.    #Acute on chronic systolic heart failure  #Myocardial Injury  #Qtc prologation    -Recommend Lexiscan  -Appreciate nephrology consult, will hold diuresis today  -Strict I/O and daily weights  -Recommend low salt cardiac diet  -Limit use of Qtc prolonging drugs as much as possible  -Ok to continue coreg and imdur, unable to add ACE/ARB/ARNI/SGLT-2/MRA due to current CHRISTA     The patient's care was discussed with the Attending Physician, Dr. Arreaga .      Guerrero Mcgarry MD  [facility]  [facility]    I very much appreciated the opportunity to see and assess Milly Carroll in the hospital with CV Fellow Dr Mcgarry. I agree with the note above which summarizes my findings and current recommendations.  Please do not hesitate to contact my office if you have any questions or concerns.      Wilmer Arreaga MD  Cardiac Arrhythmia Service  [facility]  [phone]     Clinically Significant Risk Factors Present on Admission              # Hypoalbuminemia: Lowest albumin = 2.8 g/dL at 12/19/2023 11:35 PM, will monitor as appropriate   # Thrombocytopenia: Lowest platelets = 59 in last 2 days, will monitor for bleeding  # Acute Kidney Injury, unspecified: based on a >150% or 0.3 mg/dL increase in last creatinine compared to past 90 day average, will monitor renal function   # Chronic heart failure with reduced ejection fraction: last echo with EF <40%         # Financial/Environmental Concerns:        ______________________________________________________________________    Chief Complaint   Chest pain    History is obtained from the patient and chart review    History of Present Illness   Milly Carroll is a 23 year old female with PMH Takotsubo cardiomyopathy 1 year ago (EF as low as 20%, recovered to 40-45% on 8/2023 on TTE/CMR), lupus cerebritis and nephritis, HTN, Qtc prolongation, chronic steroid induced thrombocytopenia, past perinephric hematoma s/p emobolization in Aug-2023. She initially presented to [facility] ED[facility] 12/17/23 for chest pain with concerns for NSTEMI. Trop trend was 735->494, no BNP obtained. Nephrology, hematology, and cardiology were consulted and the consensus was she was in a lupus crisis 2.2 mixed compliance with medications. She was treated with PLEX and high dose steroids. From cardiology perspective, her initial EKG reported to show T-wave inversions, and initial rise of troponin was thought to be 2.2 CHRISTA from lupus flare. she was  being diuresed with lasix gtt 10mg/hr after conversation with nephrology and cardiology. GDMT regimen is unclear from multiple different providers, including [facility] appointment on 10/13/23. It is unclear if a pharm reconcilation was completed. On transfer to [facility] this admission, she is now on Coreg 25mg and Imdur 60mg every day. She does not provide history on interview, but was obtained via chart review.     Past Medical History    Past Medical History:   Diagnosis Date    Hepatitis     Lupus (systemic lupus erythematosus) (H)     Lupus cerebritis (H)     Pancreatitis 08/2017    Pyelonephritis 08/2017    Seizures (H)     Status epilepticus (H)        Past Surgical History   Past Surgical History:   Procedure Laterality Date    IR RENAL ANGIOGRAM LEFT  8/11/2023    IR RENAL BIOPSY LEFT  6/28/2022    NO HISTORY OF SURGERY      ORTHOPEDIC SURGERY         Medications   Current Facility-Administered Medications   Medication    acetaminophen (TYLENOL) tablet 650 mg    calcium carbonate (TUMS) chewable tablet 1,000 mg    carvedilol (COREG) tablet 25 mg    folic acid (FOLVITE) tablet 1 mg    heparin ANTICOAGULANT injection 5,000 Units    isosorbide mononitrate (IMDUR) 24 hr tablet 60 mg    lidocaine (LMX4) cream    lidocaine 1 % 0.1-1 mL    naloxone (NARCAN) injection 0.2 mg    Or    naloxone (NARCAN) injection 0.4 mg    Or    naloxone (NARCAN) injection 0.2 mg    Or    naloxone (NARCAN) injection 0.4 mg    oxyCODONE IR (ROXICODONE) half-tab 2.5 mg    Or    oxyCODONE (ROXICODONE) tablet 5 mg    pantoprazole (PROTONIX) EC tablet 40 mg    piperacillin-tazobactam (ZOSYN) 2.25 g vial to attach to  ml bag    predniSONE (DELTASONE) tablet 60 mg    senna-docusate (SENOKOT-S/PERICOLACE) 8.6-50 MG per tablet 1 tablet    Or    senna-docusate (SENOKOT-S/PERICOLACE) 8.6-50 MG per tablet 2 tablet    sodium bicarbonate tablet 1,300 mg    sodium chloride (PF) 0.9% PF flush 3 mL    sodium chloride (PF) 0.9% PF flush 3 mL             Physical Exam   Vital Signs: Temp: 96.8  F (36  C) Temp src: Axillary BP: (!) 128/97 Pulse: 62   Resp: 15 SpO2: 100 % O2 Device: None (Room air)    Weight: 123 lbs 14.38 oz    General Appearance: Tired, NAD, Jugular line in place  Respiratory: clear breath sounds b/l  Cardiovascular: RRR, no MRG. 1+ pedal edema slightly above the ankles. No baseline JVD, but (+) hepatojugular reflux, cap refill <2 seconds      Medical Decision Making             Data       TTE 12/17/23  Interpretation Summary   The left ventricle is normal size. There is mild concentric hypertrophy.   The left ventricular systolic function is moderately reduced. Qualitative left ventricle ejection fraction is 35-40%. There is moderate global hypokinesis of   the left ventricle.   The right ventricular cavity size is qualitatively normal. Normal right ventricular systolic function.   Left atrium is mildly enlarged by volume.   Mild to moderate mitral regurgitation.   Mild tricuspid regurgitation.   Right ventricular systolic pressure estimate is 35-40 mmHg.   Small pericardial effusion, located posteriorly.   No prior echocardiogram available for comparison.     TTE 8/14/23  Interpretation Summary     The left ventricle is borderline dilated.  Biplane LVEF is 45%.  There is mild global hypokinesia of the left ventricle.  NOTE: Diastology paramaters and stain not performed. Compared to echo at OSH,  the LVEF and MR changes are new. Serial echo cardiogram and possible MRI may  be useful  There is moderate (2+) mitral regurgitation.  The aortic root is normal size.  Borderline/mild dilated pulmonary artery  Trivial posterior pericardial effusion    CMR 8/16/23  Clinical history: Probable stress induced cardiomyopathy, evaluate for myocarditis.   Comparison CMR: None     1. The LV is mildly dilated with normal wall thickness. The global systolic function is moderately reduced.  The LVEF is 40%. There is moderate global hypokinesis of the left  ventricle.      2. The RV is normal in cavity size. The global systolic function is low normal. The RVEF is 52%.      3. Both atria are normal in size.     4. There is mild to moderate mitral regurgitation by qualitative assessment.      5. There is no evidence of myocardial edema on T2 weighted imaging. There is no evidence of myocardial iron  overload.      5. Late gadolinium enhancement imaging shows no MI, fibrosis or infiltrative disease.      6. A small circumferential pericardial effusion is noted.      CONCLUSIONS:      1. Mildly dilated left ventricle with moderately reduced systolic function.     2. No late enhancement to suggest prior infarct, inflammation or infiltration.     EKG 12/17/23

## 2023-12-20 NOTE — CONSULTS
"Subjective    CHIEF COMPLAINT/REASON FOR CONSULT  Reason for consult: Lupus nephritis w/ acute flare, s/p plasmapheresis 12/18      RHEUMATIC DISEASE HISTORY  History of discoid lupus diagnosed in 2005 ~ at age 5, had biopsy of facial lesions seen by dermatologist H&E \"fairly consistent with lupus\".  Possible discoid in dad and paternal grandfather per note in 2013. ? Sister.  2013 developed multisystem symptoms including polyarthritis, malar rash, vasculitic rash over hands, alopecia, daily fevers, cytopenias, hypocomplementemia.      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  1 out of 10 glomeruli with full house positivity no necrosis or crescents.  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  She required intubation.  History of lupus cerebritis, leptomeningeal enhancement on MRI, history of seizure.  Normal sleep EEG reported in 2013.  Placed on Keppra  Beta-2 GP negative in 2017 and 2019.  Lupus anticoagulant negative in 2019.  Cardiolipin is negative in 2019.  NETTA panel in 2017: Positive RNP, negative anti-Cardenas, negative SSA, negative SSB, negative SCL 70  Report of sicca symptoms  Elevated anti-F-actin antibody (smooth muscle) with elevation of LFT's and  lipase presumed related to pancreatitis  Had elevated muscle markers (aldolase)/myositis which resolved     2013: Negative beta-2 glycoprotein  Elevated aldolase at 16.6  Von Willebrand antigen at 407 reference range 55-1 60  Indeterminate TB QuantiFERON  Negative " "ANCA  Negative Tesha 1 antibody  High liver function test and lipase;      MRI right knee with osteonecrosis May 2014: \"Impression:  1. Large area of osteonecrosis involving the lateral femoral condyle  with partial collapse along the zone of provisional calcification and  irregularity of the unossified epiphyseal cartilage. No full-thickness  defect or loose body demonstrated. Orthopedic consult is recommended.  2. No substantial effusion or evidence of synovitis\"     Episodes of running out of medication due to miscommunication / pharmacy    Rheumatological treatment:  Prednisone 2012 - present (interruptions in care) (most recently 20mg daily)  Hydroxychloroquine 2013- present (interuptions in care)  Cyclophosphamide IV ~5/2013- 7/2013  CellCept 7/2013- 8/2023  Rituximab 12/2015 through 2019 ( anaphylactic reaction, admitted to PICU)  Benlysta /belimumab 12/20/2019 IV changed to subcu 2021.  She reported insurance stopped sending the subcu injections at home so she stopped taking them.  Cyclophosphamide with mesna EuroLupus protocol: First dose while hospitalized~8/24/2023, second dose 9/8/2023, third dose 9/22/2023.  Last dose 10/6/2023 held  due to Hg decreasing to 8.0. Initial plan was for 6 doses.      HISTORY OF PRESENT ILLNESS    Ms. Carroll is a 23 year old female with medical history notable for  lupus complicated by lupus flares, cerebritis and nephritis. Patient also has hypertension, chronic systolic heart failure( EF 45%), QTc prolongation, past perinephric hematoma s/p emobolization in Aug-2023 and chronic thrombocytopenia.  She has recently been lost to follow up with rheumatology trying to contact her on a weekly basis. Also follows with nephrology and had been on cytoxan for her lupus nephritis, although this was stopped due to worsening anemia . Pt reported she was taking her medications as prescribed.     Presented to [facility] ED with generalized malaise, nausea and vomiting for 2-3 days was " initially admitted to [facility] in [place] on 17-Dec-2023.. Also reports L scapula back pain and abdominal pain. Labs notable for worsening renal function - Cr 2.19 up from 1.04. Has biopsy proven lupus nephritis. CRP mildly elevated at 13.97. Ferritin significantly elevated at 32,775 (previous 1079). , platelets slightly worse. D-dimer 1.85. Peak troponin 735 now dontrending . Peripheral smear showed schistocytes and increased reticulocytosis c/f blood loss and hemolytic process.  dsDNA elevated at 43 downtrending from 104 2 month ago. During prior flares this year has gone as high as 203 (August admission) and >365 from Jan to July 2023. C3 decreased at 15 with peak of 55 during August admission and leigh ann 17 in jan 2023.    At [facility], patient was evaluated for a possible NSTEMI. Patient did not undergo cardiac catheterization and was treated medically. Evaluated by cardiology with ACS thought to be unlikely.  CT pulmonary angiogram was negative for pulmonary embolism Patient had abnormal CXR and hypothermia and patient was treated with empiric antibiotics for possible pneumonia. Assays for Influenza A, influenza , RSV and Covid-19 were negative. Blood cultures from 17-Dec were negative at the time of transfer. UA showed 10k-50k e. coli . She was treated with empiric cefepime and metronidazole starting 12/17.  Vancomycin was discontinued 12/18 after negative MRSA nasal swab.     Patient appeared to be in lupus flare and was treated with high dose steroids (solumedrol 500 mg q12 for 3 days and prednisone 60 mg thereafter) and plasma exchange (started on 12/18). It was suspected that lupus crisis was secondary to non-adherence to therapy. There was also concern for hemolytic anemia and patient had thrombocytopenia. TTP and catastrophic antiphospholipid syndrome were on the differential, but there was low suspicion for the two. After evaluation, it was felt that decline in hemoglobin  was due to dilution from fluid resuscitation as well as TMA. Hemoglobin responded appropriately to transfusion.     Cardiology and nephrology saw here at OSH and started on IV lasix to diurese with net - 2-4L.     Patient was transferred to [facility] on 19-Dec-2023 for multispecialty consult need, including hematology, rheumatology, nephrology and cardiology if needed.   At admission to [facility] labs notable for : decreasing ferritin (63387 to 1267), decreasing CRP (13.97 to normal), increasing creatinine (2.19 to 3 with ~1 baseline), increasing hemoglobin (6.2 -> 7.3 s/p 1U RBC)     During last rheumatology visit in October it seemed like patient struggled with understanding her medication regimen and was referred to [facility] pharmacy.  Today she denies any chest pain, SOB, n/v, diarrhea/constipation, hematuria, numbness/tingling, arthralgia, new rashes/lesions.    12/2023 lupus labs:  Beta-2 glycoprotein negative  Antiphospholipid antibodies positive at 16  Lupus anticoagulant: Negative  ADAMTS 13 in January 2023 at 78%.  Repeat 53 during this admission      In August 2023,  she had prolonged hospital stay for lupus flare (8/10- 8/25 [facility] -> [facility] -> [facility] -. [facility]). She developed perinephritic hematoma in the setting of recent renal biopsy which required transfusion. IR also evaluated patient and performed coil embolization of L. Renal artery inferior pole pseudoaneurysm on 8/11, complicated by R. Groin hematoma.  During stay at [facility], nephrology consulted patient continued on mycophenolate and high dose steroid taper for treatment of acute lupus flare and CHRISTA. Pulmonary evaluation on 8/11 suspected hemoptysis related to possible persistent pneumonia w/ underlying necrosis or possibly vasculitis- treated w/ Zosyn and TXA nebs w/ improvement/resolution of hemoptysis after first day of admission. Respiratory distress believed to be combo hemoptysis, acute HF (EF 20-25% on 8/11 and then EF 45% on  8/14) suspected to be stress induced cardiomyopathy. Cardiac MRI 8/16 showed mild LV dilation w/ moderately reduced systolic function w/o late enhancement to suggest prior infarct/inflammation/infiltration     Neurology consulted for R. 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 Brain MRI also completed given hx of lupus cerebritis w/ seizure hx which showed new patchy areas of white matter T2 hyperintensity, greatest in the left frontal lobe, nonspecific, but possibly related to lupus cerebritis. No acute concerns/further w/up per neurology, believed this new imaging to likely be patient's new baseline imaging. Nephrology/Rheumatology initially recommended continue cellcept and prednisone  w/ plans for outpatient Cytoxan but ultimately discontinued cellcept and administered first dose of Cytoxan on 8/22     Past Medical History:   Diagnosis Date    Hepatitis     Lupus (systemic lupus erythematosus) (H)     Lupus cerebritis (H)     Pancreatitis 08/2017    Pyelonephritis 08/2017    Seizures (H)     Status epilepticus (H)      REVIEW OF SYSTEMS  Pertinent positives and negatives as documented in the above history of present illness.    Objective   VITAL SIGNS  Temp:  [96.8  F (36  C)-97.6  F (36.4  C)] 96.8  F (36  C)  Pulse:  [62-67] 62  Resp:  [12-16] 15  BP: (128-134)/(96-99) 128/97  SpO2:  [99 %-100 %] 100 %  Admission Weight: 56.2 kg (123 lb 14.4 oz)  Current Weight: 56.2 kg (123 lb 14.4 oz)    PHYSICAL EXAMINATION  Physical Exam  General: flat affect, appears fatgued, no apparent distress.  Eyes: Clear conjunctivae and lids.  Skin: No rheumatologic rashes or ulcers.  Heart: Regular rate  Lungs: crackles bilaterally, on room air  Abdomen: Soft, No tenderness noted.  Spine: nontender  Joints: No synovitis of the upper and lower extremities including hands, wrists, elbows, knees, ankles or feet  "bilaterally; swollen hands and ankles bilaterally      Lab:     Lab Results   Component Value Date    WBC 4.9 12/20/2023    HGB 7.3 (L) 12/20/2023    HCT 23.3 (L) 12/20/2023     12/20/2023    PLT 63 (L) 12/20/2023   ,No components found for: \"SEDRATE\",   Lab Results   Component Value Date    CRP 7.9 06/15/2022   , No results found for: \"CREATININE\",   Lab Results   Component Value Date    ALT 25 12/20/2023    AST 35 12/20/2023    GGT 82 (H) 01/24/2023    ALKPHOS 68 12/20/2023     Lab Results   Component Value Date/Time    CRP 7.9 06/15/2022 01:47 PM    CRP 40.0 (H) 06/13/2022 05:58 AM    CRP 91.0 (H) 06/12/2022 06:41 AM    CRP <2.9 02/10/2021 01:05 PM    CRP 3.5 01/13/2021 10:43 AM    CRP <2.9 10/09/2020 05:49 PM     Lab Results   Component Value Date/Time    HGB 7.3 (L) 12/20/2023 04:15 AM    HGB 7.0 (L) 12/19/2023 11:35 PM    HGB 6.2 (LL) 12/17/2023 11:03 AM    HGB 9.1 (L) 02/11/2021 06:15 AM    HGB 9.9 (L) 02/10/2021 01:05 PM    HGB 9.1 (L) 01/13/2021 10:43 AM     12/20/2023 04:15 AM     (H) 12/19/2023 11:35 PM     (H) 12/17/2023 11:03 AM    MCV 75 (L) 02/11/2021 06:15 AM    MCV 76 (L) 02/10/2021 01:05 PM    MCV 75 (L) 01/13/2021 10:43 AM    WBC 4.9 12/20/2023 04:15 AM    WBC 4.3 12/19/2023 11:35 PM    WBC 5.3 12/17/2023 11:03 AM    WBC 4.5 02/11/2021 06:15 AM    WBC 3.9 (L) 02/10/2021 01:05 PM    WBC 3.8 (L) 01/13/2021 10:43 AM    PLT 63 (L) 12/20/2023 04:15 AM    PLT 59 (L) 12/19/2023 11:35 PM    PLT 40 (LL) 12/17/2023 11:03 AM     (L) 02/11/2021 06:15 AM     (L) 02/10/2021 01:05 PM     (L) 01/13/2021 10:43 AM     Lab Results   Component Value Date/Time    CH50 102 06/19/2013 07:06 PM      Lab Results   Component Value Date/Time    ALDOLASE 11.5 (H) 05/18/2013 08:45 AM    ALDOLASE 16.6 (H) 05/10/2013 12:36 PM      Lab Results   Component Value Date/Time     (H) 01/25/2023 02:51 PM     (H) 01/24/2023 06:23 AM     (H) 12/14/2022 03:56 PM    LDH " 349 (H) 06/25/2020 06:56 AM     (H) 04/10/2019 11:23 AM     08/25/2017 05:00 AM     08/23/2013 11:01 AM    LDH 2,448 (H) 05/11/2013 07:21 PM    LDH 2,352 (H) 05/10/2013 12:36 PM     Imaging:    Immunization:   Immunization History   Administered Date(s) Administered    COVID-19 MONOVALENT 12+ (Pfizer) 10/08/2021, 10/30/2021    Comvax (HIB/HepB) 02/22/2002    DTAP (<7y) 02/22/2002, 03/12/2008    DTaP / Hep B / IPV 04/20/2007, 09/07/2007    HEPATITIS A (PEDS 12M-18Y) 04/20/2007, 11/02/2007    HPV9 01/27/2017    Influenza (IIV3) PF 10/03/2014    Influenza Vaccine >6 months,quad, PF 11/01/2013, 10/23/2015, 09/19/2017, 10/03/2017, 10/02/2019, 10/21/2020, 12/15/2021    MMR 09/07/2007    MMR/V 04/20/2007    Meningococcal ACWY (Menactra ) 05/03/2014    Meningococcal ACWY (Menveo ) 05/03/2014    Pneumococcal (PCV 7) 02/22/2002    Pneumococcal 23 valent 09/19/2017    Polio, Unspecified  02/22/2002    Poliovirus, inactivated (IPV) 02/22/2002, 11/02/2007    TDAP Vaccine (Boostrix) 05/03/2014    Varicella 11/02/2007         Assessment & Plan     #SLE (+dsDNA, +RNP, +ribosomal P Ab and hypocomplementemia)  #Lupus flare with MAHA, renal insufficiency (multifactorial) concern for lupus nephritis  #H/o Lupus cerebritis ?, h/o seizures  #Bicytopenia    Severe SLE with polyarthritis, malar rash, vasculitic rash over hands, alopecia, daily fevers, cytopenias, hypocomplementemia, seizures in the past. Multiple medication trials including cyclophosphamide x3, hydroxychloroquine, CellCept (mycophenolate), chronic prednisone therapy, rituximab, Benlysta (belilumab).   Ongoing concern for non-adherence, running out of meds, cost; Seems like her lupus was adequately controlled with a pediatric rheumatologist, and became more out of control with transitioning due to the adult side potentially related to also losing a family member (father) who took care of her medications.    Initially pancytopenia, now with bicytopenia  potentially improved with PLEX. Admitted now with flare of lupus, cytopenia, nephritis, cardiomyopathy, worsening symptoms. Potentially exacerbated by pulmonary infection (CXR abnormal, blood cx negative).    Has been started on high dose steroids at the OSH, but need to be cautions given h/o documented avascular necrosis of bone.     We considered the use of Obinutuzumab (another anti-CD 20 ab) given h/o anaphylaxis with rituximab. Howerever, there is a higher risk of cardiac events and thrombocytopenia compared to rituximab (Yves QUEEN, García RODRIGUEZ, Deena DIETZ, Ashley P, Rama R. Obinutuzumab-related adverse events: A systematic review and meta-analysis. Hematol Oncol. 2021 Apr;39(2):215-221)     Discussed with hematology chance of TTP, which hematology strongly feel is excluded given the level of [id number] and therefor no indication for continued plasma exchange. She remains bicytopenic, which we recommend hematology evaluation for consideration of bone marrow biopsy (patient never had one).     We discussed the case in detail with our colleagues in Nephrology who agree with the need for additional therapy beside steroids. We have discussed options including resuming her Cytoxan (Euro Lupus protocol) Vs the use of Obinutuzumab but Nephrology also shared concerns about cytopenia so we agreed together to restart her Cyclophosphamide rather than using Obinutuzumab    Recommendations    - patient would require cyclophosphamide  - continue prednisone 60mg daily  - no plans for further PLEX per verbal discussion with hematology  - please hold HCQ given history of prolonged QT   - added on B12 d/t c/f B12 deficiency contributing to cytopenias  - Follow pending results of in-house blood smear and MELISSA    Parrish Lantigua,   Internal Medicine, PGY-2    Staff addendum    I performed the history and physical examination of the patient and discussed the management with the resident.I have reviewed the patients  history as well as the available lab and imaging studies. I reviewed the resident s note and agree with the documented findings and plan of care     Kamila Montanez MD  Rheumatology attending     120 MINUTES SPENT BY ME on the date of service doing chart review, history, exam, documentation & further activities per the note.

## 2023-12-20 NOTE — CONSULTS
Nephrology Initial Consult  December 20, 2023      Milly Carroll MRN:[id number] YOB: 2000  Date of Admission:12/19/2023  Primary care provider: No Ref-Primary, Physician  Requesting physician: Claribel Mueller MD    ASSESSMENT AND RECOMMENDATIONS:   #CHRISTA in setting of lupus flare and history of lupus nephritis,   - she did have contrast exposure and was given vancomycin at outside hospital, last dose 12/18/23   - baseline Scr ~ 1 in 10/2023  - Scr trend during recent hospitalization 2.1->2.8->3  - UA 8 RBCS  - urine prot/cr ~ 8 grams  - 12/18 renal US no hydronephrosis or suspicious renal lesions  - last kidney biopsy in 8/2023  - check cystatin C today and tomorrow  - avoid nephrotoxins  - daily renal panel  - strict I/O's  - check vanc level  - pt has a temporary right internal jugular line placed on 12/17 for PLEX treatment    #SLE  #Lupus nephritis  - was taking cyclosporin and prednisone prior to admission  - s/p cytoxan treatment as well. Unclear if she has had 3 or 4 treatments  - check complements and DNAds  - continue prednisone 60 mg daily  - consider cytoxan  - rheumatology consulted - appreciate their recommendations    #BP/CV  - was getting lasix gtts prior to transfer  - mild LE edema  -  ml since midnight  - recent ECHO 8/2023 showed LVEF 45% with moderate mitral regurgitation, mild global hypokinesia of left ventricle.   - 12/17/23 CT chest shows pericardial effusion  - continue carvedilol and isosorbide  - hold off on diuretics today  - cardiology consulted - appreciate their recommendations    #antimicrobials  - previously on cefepime, metronidazole and vanc prior to transfer  - blood cultures pending  -  getting zosyn now    #Electrolytes:   - K 3.7, Na 139, Bicarb 21  - continue sodium bicarb 1300 mg TID      #Concern for hemolytic anemia, TTP  - received PLEX prior to transfer  - management per primary      Recommendations were communicated to primary team via this  "note.     Discussed with Dr. Kamara.     ION BAIG PA-C   Division of Renal Disease and Hypertension  [employer] Web [employer]      REASON FOR CONSULT: CHRISTA, lupus nephritis    HISTORY OF PRESENT ILLNESS:  Admitting provider and nursing notes reviewed  History primarily taken from chart.     \"Patient is a 22 y/o woman who has lupus complicated by lupus flares, cerebritis and nephritis. Patient also has hypertension, chronic systolic heart failure, QTc prolongation, past perinephric hematoma s/p emobolization in Aug-2023 and chronic thrombocytopenia. Patient presented to [facility] emergency department and was initially admitted to [facility] in [place] on 17-Dec-2023. Patient was transferred to [facility] on 19-Dec-2023.      At [facility], patient was evaluated for a possible NSTEMI. Patient did not undergo cardiac catheterization and was treated medically. CT pulmonary angiogram was negative for pulmonary embolism Patient had abnormal CXR and hypothermia and patient was treated with empiric antibiotics for possible pneumonia. Assays for Influenza A, influenza , RSV and Covid-19 were negative. Blood cultures from 17-Dec were negative at the time of transfer.      Patient appeared to be in lupus crisis and was treated with high dose steroids (solumedrol 500 mg q12 for 3 days and prednisone 60 mg thereafter) and plasma exchange (on 17-Dec and 18-Dec). It was suspected that lupus crisis was secondary to non-adherence to therapy. There was also concern for hemolytic anemia and patient had thrombocytopenia. TTP and catastrophic antiphospholipid syndrome were on the differential, but there was low suspicion for the two. After evaluation, it was felt that decline in hemoglobin was due to dilution from fluid resuscitation as well as TMA. Hemoglobin responded appropriately to transfusion. Patient was found to have acute kidney injury. In light of patient's known chronic systolic " "heart failure, goal was for a negative fluid balance between 2-4 litres and patient was started on IV lasix. As patient had no evidence of bleeding, chemoprophylaxis for DVT was started on 18-Dec-2023.\"      After transfer to [facility]    PAST MEDICAL HISTORY:    Past Medical History:   Diagnosis Date    Hepatitis     Lupus (systemic lupus erythematosus) (H)     Lupus cerebritis (H)     Pancreatitis 08/2017    Pyelonephritis 08/2017    Seizures (H)     Status epilepticus (H)        Past Surgical History:   Procedure Laterality Date    IR RENAL ANGIOGRAM LEFT  8/11/2023    IR RENAL BIOPSY LEFT  6/28/2022    NO HISTORY OF SURGERY      ORTHOPEDIC SURGERY          MEDICATIONS:  PTA Meds  Prior to Admission medications    Medication Sig Last Dose Taking? Auth Provider Long Term End Date   acetaminophen (TYLENOL) 325 MG tablet Take 2 tablets (650 mg) by mouth every 4 hours as needed for mild pain   Sofy Dawkins MD     carvedilol (COREG) 25 MG tablet Take 1 tablet (25 mg) by mouth 2 times daily (with meals)   Rach Hernandez MD Yes    empagliflozin (JARDIANCE) 10 MG TABS tablet Take 1 tablet (10 mg) by mouth daily   Rach Hernandez MD     ferrous sulfate (FEROSUL) 325 (65 Fe) MG tablet Take 1 tablet (325 mg) by mouth daily (with breakfast)   Clint Gu MD     folic acid (FOLVITE) 1 MG tablet Take 1 mg by mouth daily   Reported, Patient     hydroxychloroquine (PLAQUENIL) 200 MG tablet Take 1 tablet (200 mg) by mouth daily   Nnamdi Waddell,      isosorbide mononitrate (IMDUR) 60 MG 24 hr tablet Take 1 tablet (60 mg) by mouth daily   Rach Hernandez MD Yes    ondansetron (ZOFRAN ODT) 4 MG ODT tab Take 1 tablet (4 mg) by mouth every 8 hours as needed for nausea   Rach Hernandez MD     pantoprazole (PROTONIX) 40 MG EC tablet Take 1 tablet (40 mg) by mouth every morning (before breakfast)   Sofy Dawkins MD No    potassium chloride ER (KLOR-CON M) 20 MEQ CR tablet Take 20 mEq by mouth 2 times " daily   Reported, Patient     predniSONE (DELTASONE) 10 MG tablet Take 2 tablets (20 mg) by mouth daily   Rach Hernandez MD     sodium bicarbonate 650 MG tablet Take 2 tablets (1,300 mg) by mouth 3 times daily   Rach Hernandez MD     sulfamethoxazole-trimethoprim (BACTRIM) 400-80 MG tablet Take 1 tablet by mouth daily   Rach Hernandez MD No    Vitamin D, Cholecalciferol, 10 MCG (400 UNIT) TABS Take 10 mcg by mouth daily   Reported, Patient No       Current Meds   carvedilol  25 mg Oral BID w/meals    folic acid  1 mg Oral Daily    heparin ANTICOAGULANT  5,000 Units Subcutaneous Q8H    isosorbide mononitrate  60 mg Oral Daily    pantoprazole  40 mg Oral QAM AC    piperacillin-tazobactam  2.25 g Intravenous Q6H    predniSONE  60 mg Oral Daily    sodium bicarbonate  1,300 mg Oral TID    sodium chloride (PF)  3 mL Intracatheter Q8H     Infusion Meds      ALLERGIES:    Allergies   Allergen Reactions    Rituximab Anaphylaxis and Shortness Of Breath       REVIEW OF SYSTEMS:  A comprehensive of systems was negative except as noted above.    SOCIAL HISTORY:   Social History     Socioeconomic History    Marital status: Single     Spouse name: Not on file    Number of children: Not on file    Years of education: Not on file    Highest education level: Not on file   Occupational History    Not on file   Tobacco Use    Smoking status: Never    Smokeless tobacco: Never   Vaping Use    Vaping Use: Never used   Substance and Sexual Activity    Alcohol use: Not Currently    Drug use: Never    Sexual activity: Not on file   Other Topics Concern    Not on file   Social History Narrative    6/20/2013     Milly is the 2nd youngest of 7 children.  She is in the 10th grade and lives with her parents and siblings.  Her family is originally from [place], but Milly was born in the .        06/2022: lives in [place] with older sister, brother-in-law, niece, and brother.         Social Determinants of Health     Financial Resource Strain:  "Not on file   Food Insecurity: Not on file   Transportation Needs: Not on file   Physical Activity: Not on file   Stress: Not on file   Social Connections: Not on file   Interpersonal Safety: Not on file   Housing Stability: Not on file       FAMILY MEDICAL HISTORY:   Family History   Problem Relation Age of Onset    Other - See Comments Father         Question of lupus in father and paternal grandfather    Lupus Sister         older sister    Gastrointestinal Disease No family hx of         No known history of IBD, hepatitis, pancreatitis, celiac disease, or GI cancers before age 50    Glaucoma No family hx of     Macular Degeneration No family hx of        PHYSICAL EXAM:   Temp  Av.3  F (36.3  C)  Min: 96.8  F (36  C)  Max: 97.6  F (36.4  C)      Pulse  Av.7  Min: 62  Max: 67 Resp  Av.3  Min: 12  Max: 16  SpO2  Av.7 %  Min: 99 %  Max: 100 %       BP (!) 128/97 (BP Location: Left arm, Patient Position: Semi-Tapia's)   Pulse 62   Temp 96.8  F (36  C) (Axillary)   Resp 15   Ht 1.575 m (5' 2\")   Wt 56.2 kg (123 lb 14.4 oz)   LMP 2023 (Approximate)   SpO2 100%   BMI 22.66 kg/m        Admit Weight: 56.2 kg (123 lb 14.4 oz)     GENERAL APPEARANCE: drowsy, no acute distress  EYES: no scleral icterus, pupils equal  Pulmonary: lungs clear to auscultation, no respiratory distress  CV: regular rhythm, normal rate    - Edema Trace nonpitting LE edema  GI: soft, nontender  MS: no evidence of inflammation in joints, no muscle tenderness  : no carrasquillo  SKIN:  warm, dry  NEURO: face symmetric     LABS:   I have reviewed the following labs:  CMP  Recent Labs   Lab 23  0415 23  2335 23  0429    140 138   POTASSIUM 3.7 3.6 3.4   CHLORIDE 105 106 108*   CO2 21* 18* 15*   ANIONGAP 13 16* 15   * 144* 127*   BUN 54.1* 52.1* 32.8*   CR 3.00* 2.87* 2.19*   GFRESTIMATED 22* 23* 32*   BISI 7.4* 7.4* 7.6*   MAG  --  2.0  --    PHOS  --  6.3*  --    PROTTOTAL 5.4* 5.5* 7.0 "   ALBUMIN 2.9* 2.8* 2.8*   BILITOTAL 0.3 0.4 0.7   ALKPHOS 68 71 132   AST 35 37 276*   ALT 25 25 65*     CBC  Recent Labs   Lab 12/20/23  0415 12/19/23  2335 12/17/23  1103 12/17/23  0429   HGB 7.3* 7.0* 6.2* 8.1*   WBC 4.9 4.3 5.3 5.9   RBC 2.33* 2.22* 2.05* 2.64*   HCT 23.3* 22.8* 21.2* 26.6*    103* 103* 101*   MCH 31.3 31.5 30.2 30.7   MCHC 31.3* 30.7* 29.2* 30.5*   RDW 18.5* 18.6* 18.8* 18.9*   PLT 63* 59* 40* 49*     INR  Recent Labs   Lab 12/19/23  2335 12/17/23  0431   INR 1.12 1.20*   PTT 30 35     ABGNo lab results found in last 7 days.   URINE STUDIES  Recent Labs   Lab Test 12/17/23  0823 10/20/23  0806 10/06/23  0841 09/22/23  1308   COLOR Yellow Light Yellow Yellow Yellow   APPEARANCE Slightly Cloudy* Clear Clear Clear   URINEGLC Negative Negative Negative Negative   URINEBILI Negative Negative Negative Negative   URINEKETONE Negative Negative Negative Negative   SG 1.019 1.016 1.020 1.018   UBLD Large* Large* Large* Large*   URINEPH 6.5 6.5 6.5 7.0   PROTEIN 600* 300* 300* 600*   NITRITE Negative Negative Negative Negative   LEUKEST Negative Negative Trace* Negative   RBCU 68* 93* 29* 54*   WBCU 47* 5 11* 4     Recent Labs   Lab Test 06/11/22  1527 04/25/22  0554 12/15/21  1241 12/16/20  1426 11/18/20  1343 10/21/20  1455 07/15/20  1313 06/28/20  0430 06/24/20  1335 06/23/20  1813 04/16/20  1026 04/24/19  1045 04/10/19  1255 05/09/18  1108 10/11/17  1014 10/03/17  1614 09/13/17  1002 08/30/17  0900 08/23/17  2100 08/21/17  1932 08/16/17  1134 05/10/17  1029 02/08/17  1030 11/20/15  0947   UTPG 1.30* 0.83* 0.53* 0.17 0.23* Canceled, Test credited  0.17 0.36* 0.72* 0.69* 0.48* 0.16 0.32* 0.45* 0.13 0.31* 0.37* 0.42* 0.51* 1.09* 1.75* 0.41* 0.13 0.13 0.17     PTH  Recent Labs   Lab Test 07/10/23  1454   PTHI 129*     IRON STUDIES  Recent Labs   Lab Test 12/20/23  0415 12/17/23  0429 07/10/23  1454 06/11/22  0146 06/25/20  0656 12/27/17  1021 11/22/17  0858 08/28/17  0540 08/22/17  0520   IRON   --  130 15*  --   --  99 20* 51  --    FEB  --  175* 164*  --   --  310 388 312  --    IRONSAT  --  74* 9*  --   --  32 5* 16  --    GABRIEL 1,267* 32,775* 1,079* 123 318*  --   --   --  135       IMAGING: Reviewed        ION BAIG PA-C

## 2023-12-20 NOTE — PROGRESS NOTES
Antimicrobial Stewardship Team Note    Antimicrobial Stewardship Program - A joint venture between [facility] and  Physicians to optimize antibiotic management.  NOT a formal consult - Restricted Antimicrobial Review     Patient: Milly Carroll  MRN: [id number]  Allergies: Rituximab    Brief Summary: Milly Carroll is a 23-year old female with PMHx of HTN, NICM, hx of pancreatitis (2017), perinephric hematoma s/p emobolization 8/2023, and lupus c/b lupus flares, cerebritis, and nephritis and medication non-adherence and loss to follow-up, who presented to [facility] ED with L subscapular pain and was admitted to [facility] in [place] on 12/17 in lupus crisis, ultimately transferred to [facility] on 12/19 for further medical management where she usually seeks care.    History of Present Illness: Of note, patient is seen by [facility] for which she is prescribed hydroxychloroquine and CellCept and prednisone, and multiple medication trials (recently cyclophosphamide and [place]). Adherence is less clear based on fill history and reportedly patient lost to follow-up recently with prior challenges noted with medication accessibility (potential cost concerns). Patient initially presented to ED in the setting of generalized malaise, N/V for 2-3 days and L-sided chest pain. No cough, SOB, fevers, or chills noted. Her vitals were normal, other than hypertensive, and she had no leukocytosis 5.3 with a normal lactate of 0.8. CT chest showed mild bilateral parenchymal changes suspicious for atypical/viral infection etiology including COVID-19 pneumonia) with CT A/P showing nonbstructive L nephrolithiasis. Urine culture was collected which showed 10-50k of E. Coli (Bactrim-R). At [facility], she reported back and scapular pain, and cardiology, nephrology, heme/onc, and rheumatology. She was started on empiric vancomycin, cefepime, and metronidazole 12/17 based on abnormal CT chest which showed scattered  bilateral pulmonary infiltrates with areas of tree-in-bud. COVID-19, flu A/B, and RSV all negative. She had elevated troponin which was thought to be less likely ACS and regardless managed medically with gentle diuresis in setting of thrombocytopenia, anemia, in addition to CHRISTA (Scr 2.19, uprending). She was placed on pulsed steroids and started PLEX as well with concerns for lupus crisis. Vancomycin was discontinued 12/18 given negative MRSA nares. Upon admit to [facility] 12/19, she was afebrile with WBC 5.3 and tachycardic (HR 96) and hypertensive (/99) on room air. Her Scr remained elevated (2.87, baseline ~0.7-0.9). She was transitioned to Zosyn for CAP and blood cultures are pending. Today she remains afebrile and slightly hypertensive with slightly worsening CHRISTA (Scr. 3.00).         Active Anti-infective Medications   (From admission, onward)                 Start     Stop    12/20/23 0330  piperacillin-tazobactam  2.25 g,   Intravenous,   EVERY 6 HOURS        Community Acquired Pneumonia       --                  Assessment: Non-infectious pulmonary infiltrates    Today patient is on day 3 of therapy for possible CAP. Aside from L-sided chest pain, which may have been musculoskeletal, patient did not present with typical signs of respiratory infection. Over the last few days patient has remained afebrile, without leukocytosis, and no supplemental respiratory requirements or cough noted. It may be that chest findings are indicative of non-infectious cause such as fluid overload, or potentially pneumonitis related to lupus and medication non-adherence but again patient has not demonstrated additional respiratory clinical concerns so lower suspicion for the latter. For an acute bacterial pneumonia would expect more unilateral focal lobar consolidations. Patient has also been stable without atypical coverage. Additionally upon review of prior imaging it does not appear patient has worsened from prior  in 8/2023 which also lowers concern for acute infection. No concerns for GI infection at this time with initial N/V/D noted particularly with CT A/P unremarkable. In the absence of clear evidence for infection and clinical stability from a respiratory standpoint, recommend stop Zosyn and monitor off antibiotics. Given that patient is back on steroids regardless of outpatient adherence, may consider restarting Bactrim for PJP prophylaxis once CHRISTA improves.    Recommendations:  - Stop Zosyn and monitor off antibiotics unless clear evidence of infection  - Consider restarting Bactrim for PJP prophylaxis given on steroids once CHRISTA improves    Pharmacy took the following actions: Called/paged provider, Electronic note created.    Discussed with ID Staff: Erin Pike MD; Abigail Hansen, PharmD, BCIDP  Annalee Marshall, PharmD  PGY2 ID Infectious Diseases Pharmacy Resident  Pager: [phone]    Vital Signs/Clinical Features:  Vitals         12/18 0700  12/19 0659 12/19 0700  12/20 0659 12/20 0700  12/20 1229   Most Recent      Temp ( F)     97.6      96.8     96.8 (36) 12/20 0834    Pulse   65 -  67      62     62 12/20 0834    Resp   12 -  16      15     15 12/20 0834    BP   128/96 -  134/99    123/91 -  128/97     123/91 12/20 1211    SpO2 (%)   99 -  100      100     100 12/20 0834            Labs  Estimated Creatinine Clearance: 26.1 mL/min (A) (based on SCr of 3 mg/dL (H)).  Recent Labs   Lab Test 09/22/23  1314 10/06/23  0825 10/20/23  0806 12/17/23  0429 12/19/23  2335 12/20/23  0415   CR 0.88 0.95 1.04* 2.19* 2.87* 3.00*       Recent Labs   Lab Test 02/10/21  1305 02/11/21  0615 08/11/21  1227 12/14/22  1257 12/14/22  2330 12/15/22  0808 12/16/22  0908 01/24/23  1206 01/24/23  1731 10/06/23  0825 10/20/23  0806 12/17/23  0417 12/17/23  0429 12/17/23  1103 12/19/23  2335 12/20/23  0415   WBC 3.9* 4.5   < > 5.0  --  4.0   < > 4.7   < > 3.0* 3.3* 5.7 5.9 5.3 4.3 4.9   ANEU 2.6 3.2  --  4.7  --  2.4  --  3.0  --   2.0  --   --   --   --   --   --    ALYM 0.7* 0.7*  --  0.2*  --  1.4  --  0.9  --  0.6*  --   --   --   --   --   --    ANNE 0.4 0.4  --  0.2  --  0.2  --  0.2  --  0.2  --   --   --   --   --   --    AEOS 0.2 0.2  --  0.0  --  0.0  --  0.0  --  0.1  --   --   --   --   --   --    HGB 9.9* 9.1*   < > 5.6*   < > 11.2*   < > 7.0*   < > 9.9* 8.0* 8.0* 8.1* 6.2* 7.0* 7.3*   HCT 32.5* 29.6*   < > 18.2*  --  35.6   < > 23.8*   < > 31.4* 25.0* 26.5* 26.6* 21.2* 22.8* 23.3*   MCV 76* 75*   < > 85  --  87   < > 93   < > 91 91 102* 101* 103* 103* 100   * 104*   < > 147*  --  127*   < > 88*   < > 101* 55* 51* 49* 40* 59* 63*    < > = values in this interval not displayed.       Recent Labs   Lab Test 08/12/23  0819 08/20/23  0550 09/22/23  1314 10/06/23  0825 10/20/23  0806 12/17/23  0429 12/19/23  2335 12/20/23  0415   BILITOTAL 0.5  --  0.4  --  0.3 0.7 0.4 0.3   ALKPHOS 94  --  122*  --  116* 132 71 68   ALBUMIN 2.1*   < > 3.4*  3.3* 3.0* 2.5* 2.8* 2.8* 2.9*   AST 79*   < > 27 40 33 276* 37 35   ALT 23   < > 12 15 12 65* 25 25    < > = values in this interval not displayed.       Recent Labs   Lab Test 08/20/17  1003 08/22/17  0520 12/10/19  1524 12/10/19  1528 12/11/19  0536 01/13/21  1043 02/10/21  1305 04/25/22  0736 06/11/22  0146 06/12/22  0641 06/13/22  0558 06/15/22  1347 12/14/22  1257 12/15/22  0808 12/16/22  0908 01/22/23  2341 08/11/23  0447 08/11/23  0605 08/11/23  0808 08/18/23  0726 12/17/23  0429 12/20/23  0415   PCAL  --   --  1.81  --   --   --   --   --   --   --   --   --   --   --   --   --  0.30*  --   --   --   --   --    LACT 0.5*  --   --  0.9  --  0.6*  --   --   --   --   --   --  1.3  --   --   --   --  6.6* 2.3*  --   --   --    CRP  --    < > 30.1*  --    < > 3.5 <2.9  --  99.0* 91.0* 40.0* 7.9  --   --   --   --   --   --   --   --   --   --    CRPI  --   --   --   --   --   --   --   --   --   --   --   --  37.50*   < > 14.10* 72.50* 18.53*  --   --  10.60* 13.97* <3.00   SED  --     < > 113*  --    < >  --   --    < > >140*  --   --  118*  --   --   --  110* 54*  --   --  38* 84*  --     < > = values in this interval not displayed.       Recent Labs   Lab Test 01/23/23  1447   MPACID <0.25*   MPAG <6.5*       Culture Results:  7-Day Micro Results       Procedure Component Value Units Date/Time    Blood Culture Hand, Left [[id number]] Collected: 12/20/23 0420    Order Status: Resulted Lab Status: In process Updated: 12/20/23 0424    Specimen: Blood from Hand, Left     Blood Culture Wrist, Right [[id number]] Collected: 12/20/23 0415    Order Status: Resulted Lab Status: In process Updated: 12/20/23 0423    Specimen: Blood from Wrist, Right     Urine Culture [[id number]]  (Abnormal)  (Susceptibility) Collected: 12/17/23 0823    Order Status: Completed Lab Status: Final result Updated: 12/19/23 0830    Specimen: Urine, Midstream      Culture 10,000-50,000 CFU/mL Escherichia coli    Susceptibility       Escherichia coli (1)       Antibiotic Interpretation Sensitivity Method Status    Amoxicillin/Clavulanate Susceptible <=8 SHAY Final    Ampicillin Susceptible <=8 SHAY Final    Ampicillin/ Sulbactam Susceptible <=8 SHAY Final    Aztreonam Susceptible <=4 SHAY Final    Cefazolin Susceptible <=2 SHAY Final    Cefepime Susceptible <=2 SHAY Final    Ceftazidime Susceptible <=1 SHAY Final    Ceftriaxone Susceptible <=1 SHAY Final    Cefoxitin Susceptible <=8 SHAY Final    Ciprofloxacin   SHAY Final     Ciprofloxacin not resistant.  Due to test limitations, lab cannot provide SHAY to determine susceptibility.       Ertapenem Susceptible <=0.5 SHAY Final    Gentamicin Susceptible <=4 SHAY Final    Imipenem Susceptible <=1 SHAY Final    Levofloxacin   SHAY Final     Levofloxacin not resistant.  Due to test limitations, lab cannot provide SHAY to determine susceptibility.       Nitrofurantoin Susceptible <=32 SHAY Final    Piperacillin/Tazobactam   SHAY Final     Piperacillin/Tazobactam not resistant.  Due to test  limitations, lab cannot provide SHAY to determine susceptibility.       Tetracycline Susceptible <=4 SHAY Final    Tobramycin Susceptible <=4 SHAY Final    Trimethoprim/Sulfamethoxazole Resistant >2/38 SHAY Final    Cefpodoxime Susceptible <=2 SHAY Final    Cefuroxime Susceptible <=4 SHAY Final    Trimethoprim Resistant >8 SHAY Final                               Recent Labs   Lab Test 09/08/23  1048 09/22/23  1308 10/06/23  0841 10/20/23  0806 12/17/23  0823   URINEPH 6.5 7.0 6.5 6.5 6.5   NITRITE Negative Negative Negative Negative Negative   LEUKEST Negative Negative Trace* Negative Negative   WBCU 2 4 11* 5 47*             Recent Labs   Lab Test 12/10/19  1534 02/10/21  1215   RVSPEC Nasopharyngeal  --    IFLUA Negative Not Detected   FLUAH1 Negative Not Detected   FLUAH3 Negative Not Detected   XT7329 Negative Not Detected   IFLUB Negative Not Detected   RSVA Negative Not Detected   RSVB Negative Not Detected   PIV1 Negative Not Detected   PIV2 Negative Not Detected   PIV3 Negative Not Detected   HMPV Negative Not Detected   HRVS Negative  --    ADVBE Negative  --    ADVC Negative  --              Imaging: US RENAL COMPLETE    Result Date: 12/18/2023  This document is currently in Final Status Exam Accession# [id number] US RENAL COMPLETE   CLINICAL HISTORY: renal ultrasound, CHRISTA; FINDINGS: The right kidney measures 10.6 cm, and the left kidney measures 10.6 cm in length. There is no hydronephrosis or suspicious focal renal lesion. No shadowing renal stone. The ureteral jets are not visualized. The bladder is otherwise unremarkable. IMPRESSION: No hydronephrosis or suspicious renal lesion. Electronically Signed: Stefanie Limon MD 12/18/2023 8:53 AM    XR CHEST 1 VIEW    Result Date: 12/18/2023  EXAM: XR CHEST 1 VIEW LOCATION: [facility] DATE: 12/18/2023 INDICATION: Confirm hemodialysis cath placement. COMPARISON: 12/17/2023. IMPRESSION: Cardiac enlargement. Right-sided dual-lumen central venous catheter tip  at the cavoatrial junction. Minimal bibasilar interstitial change. No pneumothorax. Electronically signed by: Mario Dumont MD 12/18/2023 1:27 AM CST    CT CHEST WO IV CONTRAST    Result Date: 12/17/2023  This document is currently in Final Status Exam Accession# [id number] CT CHEST WO IV CONTRAST CLINICAL HISTORY: Chest pain, nonspecific; COMPARISON: Chest x-ray from earlier today. TECHNIQUE:  Contiguous images were obtained from the thoracic inlet to the upper abdomen without contrast. Images were reviewed in multiple planes and windows. FINDINGS: Scattered bilateral pulmonary infiltrates with areas of tree-in-bud also noted. The heart is enlarged and there is a small pericardial effusion. No evidence of pleural effusion.  The aortic arch and great vessels are unremarkable. There is no obvious mediastinal, hilar or axillary lymphadenopathy. The visualized bony structures are intact without lesion. Limited visualization of the upper abdominal organs is within normal limits. IMPRESSION: 1. Scattered bilateral pulmonary infiltrates. Difficult to evaluate for subtle lymphadenopathy secondary to nonuse of IV contrast. 2. The heart is enlarged and there is a small pericardial effusion. Electronically Signed: Prosper Arauz MD 12/17/2023 9:12 PM    XR CHEST 1 VIEW    Result Date: 12/17/2023  This document is currently in Final Status Exam Accession# [id number] XR CHEST 1 VIEW CLINICAL HISTORY: chest pain; COMPARISON: None. FINDINGS: The lungs are well inflated without focal infiltrate, atelectasis or pneumothorax. The costophrenic angles are well visualized without effusion. The cardiac silhouette is markedly enlarged. There is minimal central pulmonary vascular congestion..  There is no acute osseous abnormality. IMPRESSION: Marked enlargement of the cardiac silhouette. Would recommend an echocardiogram to evaluate cardiac function and/or potential pericardial effusion, Electronically Signed: Prosper Arauz MD  12/17/2023 5:28 PM    CT Chest Pulmonary Embolism w Contrast    Result Date: 12/17/2023  PROCEDURE INFORMATION: Exam: CT Chest With Contrast; Diagnostic Exam date and time: 12/17/2023 9:14 AM Age: 23 years old Clinical indication: Other: See notes; Additional info: Chest pain, elevated ddimer, lupus TECHNIQUE: Imaging protocol: Diagnostic computed tomography of the chest with contrast. 3D rendering (Not supervised by radiologist): MIP and/or 3D reconstructed images were created by the technologist. Radiation optimization: All CT scans at this facility use at least one of these dose optimization techniques: automated exposure control; mA and/or kV adjustment per patient size (includes targeted exams where dose is matched to clinical indication); or iterative reconstruction. Contrast material: ISOVUE 370; Contrast volume: 62 ml; Contrast route: INTRAVENOUS (IV);  REPORTING DATA: Count of CT and Cardiac NM exams in prior 12 months: This patient has received 2 known CTs and 0 known cardiac nuclear medicine studies in the 12 months prior to the current study. COMPARISON: CT CHEST PE ABDOMEN PELVIS W CONTRAST 8/10/2023 9:28 PM FINDINGS: Lungs: Mild scattered patchy ground-glass opacities.  Mild left lower lobe atelectasis. Pleural spaces: No pneumothorax. No pleural effusion. Heart: Moderate cardiomegaly. Suggestion of moderate pericardial effusion. Coronary arteries: No coronary artery calcification.  Lymph nodes: No enlarged lymph nodes. Vasculature: No aortic aneurysm.  No pulmonary embolism. Bones/joints: No acute fracture. Soft tissues:  No subcutaneous edema.     IMPRESSION: Mild bilateral parenchymal changes, suspicious for atypical/viral infectious etiology to include Covid 19 pneumonia. THIS DOCUMENT HAS BEEN ELECTRONICALLY SIGNED BY FEDERICA MCLAUGHLIN MD    CT Abdomen Pelvis w Contrast    Result Date: 12/17/2023  PROCEDURE INFORMATION: Exam: CT Abdomen And Pelvis With Contrast Exam date and time: 12/17/2023 9:14 AM  Age: 23 years old Clinical indication: Pain; Other: See notes; Additional info: Abd pain, lupus TECHNIQUE: Imaging protocol: Computed tomography of the abdomen and pelvis with contrast. Radiation optimization: All CT scans at this facility use at least one of these dose optimization techniques: automated exposure control; mA and/or kV adjustment per patient size (includes targeted exams where dose is matched to clinical indication); or iterative reconstruction. Contrast material: ISOVUE 370; Contrast volume: 62 ml; Contrast route: INTRAVENOUS (IV);  REPORTING DATA: Count of CT and Cardiac NM exams in prior 12 months: This patient has received 2 known CTs and 0 known cardiac nuclear medicine studies in the 12 months prior to the current study. COMPARISON: CT CHEST PE ABDOMEN PELVIS W CONTRAST 8/10/2023 9:28 PM FINDINGS: Lungs: Mild patchy ground-glass opacities in the lung bases. Mild left lower lobe atelectasis. Heart: Cardiomegaly with pericardial effusion. Liver: No mass. Gallbladder and bile ducts: No calcified stones. No ductal dilation. Pancreas: Pancreatic calcifications, which may reflect chronic pancreatitis. Spleen: No splenomegaly. Adrenal glands: No mass. Kidneys and ureters: Postsurgical changes of the left kidney. Nonobstructive left renal calcification. No hydronephrosis. Stomach and bowel: No obstruction. No mucosal thickening. Appendix: Appendix unremarkable. Intraperitoneal space: No free air. No significant fluid collection. Vasculature: No abdominal aortic aneurysm. Lymph nodes: No enlarged lymph nodes. Urinary bladder: No acute abnormality. Reproductive: No acute abnormality. Bones/joints: Subtle sclerotic changes within the femoral heads, suspicious for mild avascular necrosis. No subchondral collapse. Soft tissues: Mild generalized subcutaneous edema.     IMPRESSION: Nonobstructive left nephrolithiasis.  No hydronephrosis. Femoral head AVN. Thorax findings as above. Please see concurrent CT  chest report for full details. THIS DOCUMENT HAS BEEN ELECTRONICALLY SIGNED BY FEDERICA MCLAUGHLIN MD    XR Chest Port 1 View    Result Date: 12/17/2023  PROCEDURE INFORMATION: Exam: XR Chest Exam date and time: 12/17/2023 8:03 AM Age: 23 years old Clinical indication: Pain; Other: See notes; Additional info: Left chest/left thoracic back pain - feels deep in thorax TECHNIQUE: Imaging protocol: Radiologic exam of the chest. Views: 1 view. COMPARISON: CR XR CHEST 2 VIEWS 8/17/2023 8:53 PM FINDINGS:  Lungs: Mild patchy perihilar airspace opacities resident bilaterally.  Pleural spaces: No definite pneumothorax. No pleural effusion.  Heart/Mediastinum: Cardiac silhouette mildly enlarged.  Bones/Joints: Unchanged.     IMPRESSION: Perihilar parenchymal changes, which may reflect pulmonary edema versus multifocal pneumonia. THIS DOCUMENT HAS BEEN ELECTRONICALLY SIGNED BY FEDERICA MCLAUGHLIN MD

## 2023-12-20 NOTE — PLAN OF CARE
[place] [facility] [place]    Milly Carroll was admitted from [facility] in [place] for Lupus .  2 RN skin assessment: completed by Sonja TYLER & Lisa PICHARDO.   Result of skin assessment and interventions/actions: see flowsheets  Height, weight: completed? yes  Patient belongings & admission documents: see Flowsheets, completed? yes  MDRO education added to care plan: N/A

## 2023-12-20 NOTE — H&P
[facility]    History and Physical - Hospitalist Service, GOLD TEAM        Date of Admission:  12/19/2023    Assessment & Plan      A: Patient is a 22 y/o woman who has lupus complicated by lupus flares, cerebritis and nephritis. Patient also has hypertension, chronic systolic heart failure, QTc prolongation, past perinephric hematoma s/p emobolization in Aug-2023 and chronic thrombocytopenia. Patient presented to [facility] emergency department and was initially admitted to [facility] in [place] on 17-Dec-2023. Patient was transferred to [facility] on 19-Dec-2023.     At [facility], patient was evaluated for a possible NSTEMI. Patient did not undergo cardiac catheterization and was treated medically. CT pulmonary angiogram was negative for pulmonary embolism Patient had abnormal CXR and hypothermia and patient was treated with empiric antibiotics for possible pneumonia. Assays for Influenza A, influenza , RSV and Covid-19 were negative. Blood cultures from 17-Dec were negative at the time of transfer.     Patient appeared to be in lupus crisis and was treated with high dose steroids (solumedrol 500 mg q12 for 3 days and prednisone 60 mg thereafter) and plasma exchange (on 17-Dec and 18-Dec). It was suspected that lupus crisis was secondary to non-adherence to therapy. There was also concern for hemolytic anemia and patient had thrombocytopenia. TTP and catastrophic antiphospholipid syndrome were on the differential, but there was low suspicion for the two. After evaluation, it was felt that decline in hemoglobin was due to dilution from fluid resuscitation as well as TMA. Hemoglobin responded appropriately to transfusion. Patient was found to have acute kidney injury. In light of patient's known chronic systolic heart failure, goal was for a negative fluid balance between 2-4 litres and patient was started on IV lasix. As patient had no  evidence of bleeding, chemoprophylaxis for DVT was started on 18-Dec-2023.    P:  1.) Lupus flare; patient has longstanding history of SLE:  - Consulting Rheumatology.  - Patient was started on prednisone 60 mg daily at outside hospital and this is to be continued.  - Patient has previously been on hydroxychloroquine 200 mg daily but this is to be on hold for now.    2.) Acute kidney injury; patient also has lupus nephritis:  - Nephrology to see.  - Patient to continue sodium bicarbonate 1300 mg three times daily.  - Monitoring labs.  - Patient had been on diuretics at outside hospital. Will hold diuretics until patient seen by Nephrology.    3.) Transaminitis, resolved for now:  - Monitoring as needed.  - Monitoring for symptoms.    4.) Abnormal chest CT:  - Patient had been on cefepime and metronidazole at outside hospital; patient also was on vancomycin until 18-Dec-2023. Patient to be on renally dosed zosyn for now.  - Will repeat blood cultures.    5.) Chronic systolic heart failure from non-ischemic cardiomyopathy:  - Echocardiogram on 17-Dec-2023 showed LVEF 35-40%.  - Monitoring for evidence of acute heart failure.  - Jardiance to be on hold for now.    6.) Possible NSTEMI:  - This was treated medically at outside hospital.     7.) Hypertension:  - Continuing coreg and isosorbide.   - Monitoring.    8.) Possible left CVA tenderness:  - Will recheck urinalysis.     9.) History of DVT:  - Patient to be on subcutaneous heparin for DVT prophylaxis.          Diet: Regular Diet Adult    Leung Catheter: Not present  Lines: None     Cardiac Monitoring: ACTIVE order. Indication: Lupus flare  Code Status: Full Code      Clinically Significant Risk Factors Present on Admission              # Hypoalbuminemia: Lowest albumin = 2.8 g/dL at 12/19/2023 11:35 PM, will monitor as appropriate   # Thrombocytopenia: Lowest platelets = 59 in last 2 days, will monitor for bleeding  # Acute Kidney Injury, unspecified: based on a  >150% or 0.3 mg/dL increase in last creatinine compared to past 90 day average, will monitor renal function   # Chronic heart failure with reduced ejection fraction: last echo with EF <40%         # Financial/Environmental Concerns:           Disposition Plan      Expected Discharge Date: 12/21/2023                  Juan Guzmán MD  Hospitalist Service, [facility]  Securely message with Plated (more info)  Text page via [facility] Paging/Directory   See signed in provider for up to date coverage information    ______________________________________________________________________    Chief Complaint   Lupus nephritis    History of Present Illness   Patient is a 22 y/o woman who has lupus complicated by lupus flares, cerebritis and nephritis. Patient also has hypertension, chronic systolic heart failure, QTc prolongation, past perinephric hematoma s/p emobolization in Aug-2023 and chronic thrombocytopenia. Patient presented to [facility] emergency department and was initially admitted to [facility] in [place] on 17-Dec-2023. Patient was transferred to [facility] on 19-Dec-2023.     At [facility], patient was evaluated for a possible NSTEMI. Patient did not undergo cardiac catheterization and was treated medically. CT pulmonary angiogram was negative for pulmonary embolism Patient had abnormal CXR and hypothermia and patient was treated with empiric antibiotics for possible pneumonia. Assays for Influenza A, influenza , RSV and Covid-19 were negative. Blood cultures from 17-Dec were negative at the time of transfer.     Patient appeared to be in lupus crisis and was treated with high dose steroids (solumedrol 500 mg q12 for 3 days and prednisone 60 mg thereafter) and plasma exchange. It was suspected that lupus crisis was secondary to non-adherence to therapy. There was also concern for hemolytic anemia and patient had  thrombocytopenia. TTP and catastrophic antiphospholipid syndrome were on the differential, but there was low suspicion for the two. After evaluation, it was felt that decline in hemoglobin was due to dilution from fluid resuscitation as well as TMA. Hemoglobin responded appropriately to transfusion. Patient was found to have acute kidney injury. In light of patient's known chronic systolic heart failure, goal was for a negative fluid balance between 2-4 litres and patient was started on IV lasix. As patient had no evidence of bleeding, chemoprophylaxis for DVT was started on 18-Dec-2023.    Patient noted having a 2 week history of dyspnea and back pain. Patient noted that pain was sharp and somewhat pleuritic. Patient noted no cough, no dyspnea, no fever and no chills. Patient noted having poor appetite for 1 week prior to presentation. Patient noted having some improvement in back pain and dyspnea.      Past Medical History    Past Medical History:   Diagnosis Date    Hepatitis     Lupus (systemic lupus erythematosus) (H)     Lupus cerebritis (H)     Pancreatitis 08/2017    Pyelonephritis 08/2017    Seizures (H)     Status epilepticus (H)        Past Surgical History   Past Surgical History:   Procedure Laterality Date    IR RENAL ANGIOGRAM LEFT  8/11/2023    IR RENAL BIOPSY LEFT  6/28/2022    NO HISTORY OF SURGERY      ORTHOPEDIC SURGERY         Prior to Admission Medications   Prior to Admission Medications   Prescriptions Last Dose Informant Patient Reported? Taking?   Vitamin D, Cholecalciferol, 10 MCG (400 UNIT) TABS   Yes No   Sig: Take 10 mcg by mouth daily   acetaminophen (TYLENOL) 325 MG tablet   No No   Sig: Take 2 tablets (650 mg) by mouth every 4 hours as needed for mild pain   carvedilol (COREG) 25 MG tablet   No No   Sig: Take 1 tablet (25 mg) by mouth 2 times daily (with meals)   empagliflozin (JARDIANCE) 10 MG TABS tablet   No No   Sig: Take 1 tablet (10 mg) by mouth daily   ferrous sulfate  (FEROSUL) 325 (65 Fe) MG tablet   No No   Sig: Take 1 tablet (325 mg) by mouth daily (with breakfast)   folic acid (FOLVITE) 1 MG tablet   Yes No   Sig: Take 1 mg by mouth daily   hydroxychloroquine (PLAQUENIL) 200 MG tablet   No No   Sig: Take 1 tablet (200 mg) by mouth daily   isosorbide mononitrate (IMDUR) 60 MG 24 hr tablet   No No   Sig: Take 1 tablet (60 mg) by mouth daily   ondansetron (ZOFRAN ODT) 4 MG ODT tab   No No   Sig: Take 1 tablet (4 mg) by mouth every 8 hours as needed for nausea   pantoprazole (PROTONIX) 40 MG EC tablet   No No   Sig: Take 1 tablet (40 mg) by mouth every morning (before breakfast)   potassium chloride ER (KLOR-CON M) 20 MEQ CR tablet   Yes No   Sig: Take 20 mEq by mouth 2 times daily   predniSONE (DELTASONE) 10 MG tablet   No No   Sig: Take 2 tablets (20 mg) by mouth daily   sodium bicarbonate 650 MG tablet   No No   Sig: Take 2 tablets (1,300 mg) by mouth 3 times daily   sulfamethoxazole-trimethoprim (BACTRIM) 400-80 MG tablet   No No   Sig: Take 1 tablet by mouth daily      Facility-Administered Medications: None        Allergies:  - Rituximab    Social History:  - Patient does not smoke and does not consume alcohol.    Physical Exam   Vital Signs: Temp: 97.6  F (36.4  C) Temp src: Oral BP: (!) 128/96 Pulse: 67   Resp: 16 SpO2: 99 % O2 Device: None (Room air)    Weight: 123 lbs 14.38 oz    General: Patient comfortable, NAD.  Eyes: No scleral icterus or conjunctival injection.  Heart: RRR, S1 S2 w/o murmurs.  Lungs: Breath sounds present. No crackles/wheezes heard.  Gastrointestinal: Abdomen soft.  Musculoskeletal: No visible joint swelling or erythema.  : Possible discomfort with palpation of left CVA.  Skin: Warm, dry.  Neuro: Cranial nerves II-XII grossly intact.  Psych: Affect pleasant.    Labs noted.  Sodium 140; Potassium 3.6; Creatinine 2.87;   AST 37; ALT 25;   WBC 4.3; Hb 7.0; Platelets 59;  INR 1.12; PTT 30;    On 17-Dec-2023:   HCG negative;  Complement C3 15;  Complement C4 4; DNA-ds 43.0    On 17-Dec-2023,     CT pulmonary angiogram: Mild bilateral parenchymal changes, suspicious for atypical/viral infectious   etiology to include Covid 19 pneumonia.    CT abdomen/pelvis:  Nonobstructive left nephrolithiasis.  No hydronephrosis.   Femoral head AVN.  Thorax findings as above. Please see concurrent CT chest report for full   details.    Medical Decision Making

## 2023-12-20 NOTE — PLAN OF CARE
Problem: Adult Inpatient Plan of Care  Goal: Plan of Care Review  Outcome: Not Progressing  Flowsheets (Taken 12/20/2023 1238)  Outcome Evaluation: Pt lethargic during most of the encounter. Nephorology and Cardiology came by to see patient. BP slightly hypertensive, on BP meds. Respirations dips down at times, encouraged deep breathing.. Bed alarm on, UA still needed, hat in room. Pt appears to be comfortable resting. Provider would like to be notified if pt remains lethargic in the later afternoon. Continue POC.  Plan of Care Reviewed With: patient  Overall Patient Progress: no change  Goal: Patient-Specific Goal (Individualized)  Outcome: Not Progressing  Flowsheets (Taken 12/20/2023 1238)  Individualized Care Needs: unable to assess, pt lethargic this encounter.  Goal: Absence of Hospital-Acquired Illness or Injury  Outcome: Not Progressing  Intervention: Identify and Manage Fall Risk  Recent Flowsheet Documentation  Taken 12/20/2023 0928 by Edward Bai RN  Safety Promotion/Fall Prevention:   room near nurse's station   nonskid shoes/slippers when out of bed   increased rounding and observation   clutter free environment maintained   treat reversible contributory factors   toileting scheduled   room organization consistent   safety round/check completed   activity supervised  Intervention: Prevent Skin Injury  Recent Flowsheet Documentation  Taken 12/20/2023 0928 by Edward Bai RN  Body Position: supine, head elevated  Intervention: Prevent and Manage VTE (Venous Thromboembolism) Risk  Recent Flowsheet Documentation  Taken 12/20/2023 0928 by Edward Bai RN  VTE Prevention/Management: SCDs (sequential compression devices) on  Intervention: Prevent Infection  Recent Flowsheet Documentation  Taken 12/20/2023 0928 by Edward Bai RN  Infection Prevention:   single patient room provided   rest/sleep promoted   hand hygiene promoted  Goal: Optimal Comfort and Wellbeing  Outcome: Not  Progressing  Goal: Readiness for Transition of Care  Outcome: Not Progressing     Problem: Acute Kidney Injury/Impairment  Goal: Fluid and Electrolyte Balance  Outcome: Not Progressing  Goal: Improved Oral Intake  Outcome: Not Progressing  Goal: Effective Renal Function  Outcome: Not Progressing  Intervention: Monitor and Support Renal Function  Recent Flowsheet Documentation  Taken 12/20/2023 0928 by Edward Bai RN  Medication Review/Management: medications reviewed     Problem: Fatigue  Goal: Improved Activity Tolerance  Outcome: Not Progressing  Intervention: Promote Improved Energy  Flowsheets  Taken 12/20/2023 1255  Sleep/Rest Enhancement:   natural light exposure provided   snack  Activity Management: activity encouraged  Environmental Support:   calm environment promoted   rest periods encouraged  Taken 12/20/2023 0928  Activity Management: patient refuses activity   Goal Outcome Evaluation:      Plan of Care Reviewed With: patient    Overall Patient Progress: no change    Outcome Evaluation: Pt lethargic during most of the encounter. Nephorology and Cardiology came by to see patient. BP slightly hypertensive, on BP meds. Respirations dips down at times, encouraged deep breathing.. Bed alarm on, UA still needed, hat in room. Pt appears to be comfortable resting. Provider would like to be notified if pt remains lethargic in the later afternoon. Continue POC.

## 2023-12-21 NOTE — PLAN OF CARE
"  Problem: Adult Inpatient Plan of Care  Goal: Plan of Care Review  Outcome: Progressing  Flowsheets (Taken 12/21/2023 1244)  Outcome Evaluation: Pt appears more awake compared to yesterday. Family came to visit at bedside. Pt went to stress test in [place] around 1030, NPO prior to this procedure. Pt eager to eat once she gets back to her room. R PIV SL. Provider would like to be notified if oxygenation saturations dip down or she has new onset of cough, as antibiotics have been stopped today. Tele monitoring, SNR this morning, order to be ok off tele monitioring while pt is away. Pt has not voided this encounter. Continues to complain of left scapular pain, rest and repositioning offered.Continue POC.  Plan of Care Reviewed With: patient  Overall Patient Progress: improving  Goal: Patient-Specific Goal (Individualized)  Description: You can add care plan individualizations to a care plan. Examples of Individualization might be:  \"Parent requests to be called daily at 9am for status\", \"I have a hard time hearing out of my right ear\", or \"Do not touch me to wake me up as it startles  me\".  Outcome: Progressing  Goal: Absence of Hospital-Acquired Illness or Injury  Outcome: Progressing  Intervention: Identify and Manage Fall Risk  Recent Flowsheet Documentation  Taken 12/21/2023 0900 by Edward Bai RN  Safety Promotion/Fall Prevention:   room near nurse's station   nonskid shoes/slippers when out of bed   increased rounding and observation   clutter free environment maintained   treat reversible contributory factors   toileting scheduled   room organization consistent   safety round/check completed   activity supervised  Intervention: Prevent Skin Injury  Recent Flowsheet Documentation  Taken 12/21/2023 0900 by Edward Bai RN  Body Position: supine, head elevated  Intervention: Prevent and Manage VTE (Venous Thromboembolism) Risk  Recent Flowsheet Documentation  Taken 12/21/2023 0900 by Edward Bai, " RN  VTE Prevention/Management: SCDs (sequential compression devices) on  Intervention: Prevent Infection  Recent Flowsheet Documentation  Taken 12/21/2023 0900 by Edward Bai RN  Infection Prevention:   single patient room provided   rest/sleep promoted   hand hygiene promoted  Goal: Optimal Comfort and Wellbeing  Outcome: Progressing  Intervention: Monitor Pain and Promote Comfort  Recent Flowsheet Documentation  Taken 12/21/2023 0900 by Edward Bai RN  Pain Management Interventions:   heat applied   pillow support provided   repositioned   rest  Goal: Readiness for Transition of Care  Outcome: Progressing     Problem: Acute Kidney Injury/Impairment  Goal: Fluid and Electrolyte Balance  Outcome: Progressing  Goal: Improved Oral Intake  Outcome: Progressing  Goal: Effective Renal Function  Outcome: Progressing  Intervention: Monitor and Support Renal Function  Recent Flowsheet Documentation  Taken 12/21/2023 0900 by Edwrad Bai RN  Medication Review/Management: medications reviewed     Problem: Fatigue  Goal: Improved Activity Tolerance  Outcome: Progressing  Intervention: Promote Improved Energy  Recent Flowsheet Documentation  Taken 12/21/2023 0900 by Edward Bai RN  Activity Management: dorsiflexion/plantar flexion performed   Goal Outcome Evaluation:      Plan of Care Reviewed With: patient    Overall Patient Progress: improving

## 2023-12-21 NOTE — CARE PLAN
End of shift Summary: See flowsheet for VS and detail assessments.     Changes this Shift:      Pulmonary: Clear lung sounds. Patient denied SOB, difficulty breathing. On RA. Pt RR ranged from 9-16 (Notify provider if less than 8).     Output: Patient did not void this shift. Encouraged pt to drink fluids.     Activity: Standby assist. generalized weakness. Patient did not get OOB this shift.     Skin: Edema bilaterally.        Pain: Patient denied pain.     Neuro/CMS: Alert and oriented x 4. Patient denies numbness and tingling.     IV:  Right PIV Saline locked. R HD catheter.    Additional info: Regular diet.     Plan: Continue POC.

## 2023-12-21 NOTE — PHARMACY-ADMISSION MEDICATION HISTORY
Pharmacist Admission Medication History    Admission medication history is complete. The information provided in this note is only as accurate as the sources available at the time of the update.    Information Source(s): Patient, Clinic records, Hospital records, and CareEverywhere/SureScripts and bag containing prescription bottles that are being stored in central pharmacy (bag already sealed but was able to get names of drug on label) via in-person    Pertinent Information:   Patient was recently admitted to [facility] from 12/17-12/19. Only acetaminophen, carvedilol, isosorbide, and ondansetron was continued. Rest of medications on their list was stopped but unclear for reason.  Carvedilol, empagliflozin, prednisone, Bactrim sliding scale, isosorbide mononitrate, and sodium bicarbonate were all bag sent to pharmacy  Of note, hydralazine 50 mg was in bag with prescriptions sent to central pharmacy. Per chart review, hydralazine was stopped in October 2023 (so did not add to list)    Changes made to PTA medication list:  Added: None  Deleted: None  Changed: None    Medication Affordability:  Not including over the counter (OTC) medications, was there a time in the past 3 months when you did not take your medications as prescribed because of cost?: No     Allergies reviewed with patient and updates made in EHR: yes    Medication History Completed By: Caitlin Davis formerly [facility] 12/20/2023 7:41 PM    PTA Med List   Medication Sig Last Dose    acetaminophen (TYLENOL) 325 MG tablet Take 2 tablets (650 mg) by mouth every 4 hours as needed for mild pain 12/18/2023 at prn    carvedilol (COREG) 25 MG tablet Take 1 tablet (25 mg) by mouth 2 times daily (with meals) 12/19/2023 at pm    empagliflozin (JARDIANCE) 10 MG TABS tablet Take 1 tablet (10 mg) by mouth daily Past Week    ferrous sulfate (FEROSUL) 325 (65 Fe) MG tablet Take 1 tablet (325 mg) by mouth daily (with breakfast) Past Week    folic acid (FOLVITE) 1 MG tablet Take 1  mg by mouth daily Past Week    hydroxychloroquine (PLAQUENIL) 200 MG tablet Take 1 tablet (200 mg) by mouth daily Past Week    isosorbide mononitrate (IMDUR) 60 MG 24 hr tablet Take 1 tablet (60 mg) by mouth daily 12/19/2023    ondansetron (ZOFRAN ODT) 4 MG ODT tab Take 1 tablet (4 mg) by mouth every 8 hours as needed for nausea 12/18/2023 at prn    pantoprazole (PROTONIX) 40 MG EC tablet Take 1 tablet (40 mg) by mouth every morning (before breakfast) Past Week    potassium chloride ER (KLOR-CON M) 20 MEQ CR tablet Take 20 mEq by mouth 2 times daily Past Week    predniSONE (DELTASONE) 10 MG tablet Take 2 tablets (20 mg) by mouth daily Past Week    sulfamethoxazole-trimethoprim (BACTRIM) 400-80 MG tablet Take 1 tablet by mouth daily Past Week    Vitamin D, Cholecalciferol, 10 MCG (400 UNIT) TABS Take 10 mcg by mouth daily Past Week

## 2023-12-21 NOTE — PLAN OF CARE
[id number] Shift report, pt is [facility] status   A&Ox4, calm and cooperative, withdrawn and flat affect   Up with Ax1+ walker, ambulating to BR   Pt initially refused to go to BR to void- bladder scanned for 317cc - advised to try to void within the next 2 hours - pt called after 2 hours and was able to void and had small formed BM   Pain in left shoulder 8/10- tylenol given per pt request and hot packs applied  No swallowing difficulty noted - pt takes pills whole    Port in right neck - intact   PIV-SL  Denies nausea, dizziness, SOB or CP  Had several helpings of ice cream and popsicles   Continue w/POC

## 2023-12-21 NOTE — CONSULTS
Care Management Initial Consult    General Information  Assessment completed with: Patient,    Type of CM/SW Visit: Initial Assessment    Primary Care Provider verified and updated as needed: No (Pt stated she does not have a primary care provider)   Readmission within the last 30 days: no previous admission in last 30 days      Reason for Consult: discharge planning  Advance Care Planning: Advance Care Planning Reviewed: no concerns identified          Communication Assessment  Patient's communication style: spoken language (English or Bilingual)    Hearing Difficulty or Deaf: no   Wear Glasses or Blind: yes    Cognitive  Cognitive/Neuro/Behavioral: WDL  Level of Consciousness: alert  Arousal Level: arouses to voice  Orientation: oriented x 4  Mood/Behavior: hypoactive (quiet, withdrawn)  Best Language: 0 - No aphasia  Speech: clear    Living Environment:   People in home: other relative(s) (Aunt)     Current living Arrangements: house      Able to return to prior arrangements: yes       Family/Social Support:  Care provided by: other (see comments) (Aunt)  Provides care for: no one  Marital Status: Single  Other (specify) (Aunt)          Description of Support System: Supportive         Current Resources:   Patient receiving home care services: No     Community Resources:    Equipment currently used at home: none  Supplies currently used at home: None    Employment/Financial:  Employment Status: unemployed        Financial Concerns: none   Referral to Financial Worker: No       Does the patient's insurance plan have a 3 day qualifying hospital stay waiver?  No    Lifestyle & Psychosocial Needs:  Social Determinants of Health     Food Insecurity: Not on file   Depression: Not at risk (6/21/2023)    PHQ-2     PHQ-2 Score: 1   Housing Stability: Not on file   Tobacco Use: Low Risk  (10/25/2023)    Patient History     Smoking Tobacco Use: Never     Smokeless Tobacco Use: Never     Passive Exposure: Not on file    Financial Resource Strain: Not on file   Alcohol Use: Not on file   Transportation Needs: Not on file   Physical Activity: Not on file   Interpersonal Safety: Not on file   Stress: Not on file   Social Connections: Not on file       Functional Status:  Prior to admission patient needed assistance:   Dependent ADLs:: Independent  Dependent IADLs:: Independent  Assesssment of Functional Status: Not at  functional baseline    Mental Health Status:  Mental Health Status: No Current Concerns       Chemical Dependency Status:  Chemical Dependency Status: No Current Concerns             Values/Beliefs:  Spiritual, Cultural Beliefs, Bahai Practices, Values that affect care: no               Additional Information:  Writer met with Pt at bedside, introducing self and role. Pt lives in a house with her aunt. Pt stated that there are no stairs to enter the home, but are stairs once inside. Writer prompted Pt with questions regarding number of stairs, Pt did not specify. Writer asked if there were 10 or less stairs, Pt responded, yes. Pt stated that she does not have a primary care physician. Pt identified as being independent with ADLs/IADLs, and did not use any equipment. Pt identified that her aunt provides transportation. Pt identified as unemployed, and stated she does not have a source of income. Pt stated she has no current financial concerns. Pt did not identify any mental health or substance use concerns. Care management will continue to follow for discharge planning.     Idalia Romero  [facility] Student  [facility] 10 ICU  ED  Email: [email or web address]  Supervisor: SOFIA Villalobos

## 2023-12-21 NOTE — PROGRESS NOTES
[facility]    Medicine Progress Note - Hospitalist Service, GOLD TEAM 22    Date of Admission:  12/19/2023    Assessment & Plan      Milly Carroll is a 22 y/o woman who has lupus complicated by lupus flares, cerebritis and nephritis. Patient also has hypertension, chronic systolic heart failure, QTc prolongation, past perinephric hematoma s/p emobolization in Aug-2023 and chronic thrombocytopenia. She was admitted to [facility] in [place] on 17-Dec-2023 and transferred to [facility] on 19-Dec-2023.     At [facility], patient was treated medically for a possible NSTEMI and pneumonia. She was treated for lupus crisis with steroids and plasma exchange.     Today:   - Lexiscan test done today, awaiting final read and cardiology recommendations  - Rheumatology planning for cyclophosphamide tomorrow  - IV diuresis with close monitoring  - Nephrology to arrange for line care    Lupus flare  Patient has longstanding history of SLE and difficulty with treatment adherence.   - Rheumatology consulted, appreciate recommendations  - Continue prednisone 60 mg daily for now.  - Avoid hydroxychloroquine for now due to QTc  - Additional treatment pending rheumatology recommendations- anticipate cyclophosphamide 12/22  - Bactrim three days weekly for PJP prophylaxis    Acute kidney injury  Lupus nephritis  - Nephrology consulted, appreciate recommendations  - Continue sodium bicarbonate 1300 mg three times daily.  - IV furosemide daily  - Monitoring labs.    Acute on chronic systolic heart failure  Myocardial injury  QTc prolongation  Echocardiogram on 17-Dec-2023 showed LVEF 35-40%.  - Cardiology consulted, appreciate recommendations  - Strict I/O, daily weights  - Low salt diet  - Continue carvedilol, Imdur  - Avoid ACEi/ARB due to CHRISTA  - Avoid QTc prolonging medications  - Lexiscan done 12/21, final read pending  - Jardiance to be on hold for now.      Anemia with concern for hemolysis  Thrombocytopenia  The rheumatologist kindly discussed with the hematologist today regarding possible need for further hematologic work-up. The hematologist felt that this should be deferred.   - Oral vitamin B12  - Trend CBC    Abnormal chest CT concerning for possible pneumonia  Cefepime and metronidazole started , Zosyn started .   - Reviewed case and antibiotic stewardship recommendations. Given lack of fever, other possible etiologies for lung infiltrates (inflammation, volume overload) elected to stop antibiotics . 4 day course is reasonable. Will monitor.     Hypertension:  - Continuing coreg and isosorbide.   - Monitoring.    History of DVT:  - DVT prophylaxis as tolerated due to thrombocytopenia    Transaminitis  - Monitoring    Social history: Her dad  during . She moved in with her aunt shortly before this admission. She completed high school but no postsecondary education. No consistent employment.           Diet: Regular Diet Adult    DVT Prophylaxis: Pneumatic Compression Devices  Leung Catheter: Not present  Lines: None     Cardiac Monitoring: ACTIVE order. Indication: Chest pain/ ACS rule out (24 hours)  Code Status: Full Code      Clinically Significant Risk Factors              # Hypoalbuminemia: Lowest albumin = 2.8 g/dL at 2023  5:53 AM, will monitor as appropriate   # Thrombocytopenia: Lowest platelets = 59 in last 2 days, will monitor for bleeding  # Acute Kidney Injury, unspecified: based on a >150% or 0.3 mg/dL increase in last creatinine compared to past 90 day average, will monitor renal function   # Chronic heart failure with reduced ejection fraction: last echo with EF <40%           # Financial/Environmental Concerns: none         Disposition Plan     Expected Discharge Date: 2023      Destination: home with family              Claribel Mueller MD  Hospitalist Service, GOLD TEAM 22  [facility]  [facility]  Securely message with [employer] (more info)  Text page via [facility] Paging/Directory   See signed in provider for up to date coverage information  ______________________________________________________________________    Interval History   Milly went for her stress test today. She said that she felt okay. No cough or shortness of breath. She has food from a fast food restaurant. No nausea or vomiting. No focal complaints.     Physical Exam   Vital Signs: Temp: 97.6  F (36.4  C) Temp src: Oral BP: (!) 146/97 Pulse: 63   Resp: 17 SpO2: 100 % O2 Device: None (Room air)    Weight: 122 lbs 4.8 oz    General Appearance: Fatigued, withdrawn affect. Sitting up in bed.    Respiratory: Comfortable on room air. Clear to auscultation.   Cardiovascular: RRR, no murmur, pulses intact.   GI: Abdomen soft, non-tender, non-distended  Skin: No rash  Other: Catheter present in neck. Moves all extremities easily. Withdrawn affect, talking in short sentences.     Medical Decision Making       50 MINUTES SPENT BY ME on the date of service doing chart review, history, exam, documentation & further activities per the note.      Data     I have personally reviewed the following data over the past 24 hrs:    5.8  \   7.7 (L)   / 68 (L)     138 106 62.0 (H) /  179 (H)   3.4 21 (L) 3.06 (H) \     ALT: N/A AST: N/A AP: N/A TBILI: N/A   ALB: 2.8 (L) TOT PROTEIN: N/A LIPASE: N/A       Imaging results reviewed over the past 24 hrs:   Recent Results (from the past 24 hour(s))   NM Lexiscan stress test (nuc card)   Result Value    Target    NM MPI Radiologist Consult For Cardiology    Narrative    EXAMINATION: NM MPI RADIOLOGIST CONSULT FOR CARDIOLOGY, 12/21/2023  1:09 PM     COMPARISON: Chest CT 12/17/2023    HISTORY: Chronic systolic heart failure from non-ischemic  cardiomyopathy    TECHNIQUE: Limited field of view 5 mm CT images were acquired for  attenuation correction purposes for nuclear medicine cardiac  study.  Radiology consulted to review the CT images. Please refer to separate  dictation for the nuclear medicine portion of the study.    FINDINGS:     Heart size is enlarged. Trace pericardial effusion. Central venous  catheter terminates in the superior cavoatrial junction. No  significant coronary artery atherosclerotic calcifications. The  visualized mediastinal and hilar lymph nodes are not enlarged.    No pleural effusion or pneumothorax at this level. Patchy groundglass  opacities bilaterally, better evaluated on chest CT 12/17/2023. Linear  atelectasis in the left lung base.    Visualized upper abdomen is unremarkable. No acute osseous lesions of  the visualized thorax.      Impression    IMPRESSION:  1. No acute abnormality on the CT images of the lower chest and upper  abdomen.  2. Please see separate report for the nuclear medicine portion of the  study.    I have personally reviewed the examination and initial interpretation  and I agree with the findings.    LUCY JUNG MD         SYSTEM ID:  [id number]

## 2023-12-21 NOTE — PROGRESS NOTES
Brief Rheumatology note     I attempted to see the patient today but she was getting stress test recommended by cardiology. Her urine output has been low.    Labs reviewed showing hemoglobin and stable platelets, normalized haptoglobin, negative MELISSA test.      Given IV fluids needed as part of the infusion plan with cyclophosphamide and concern for fluid overload, I reached out to Nephrology and primary service,  after discussion, the consensus is that she will undergo diuresis today and we will await the results of stress and cardiology opinion then proceed with cyclophosphamide infusion likely tomorrow.    Kamila Montanez MD  Rheumatology attending

## 2023-12-21 NOTE — PROGRESS NOTES
"Nephrology Progress Note  12/21/2023         Assessment & Recommendations:   # CHRISTA stage 3 secondary to LN flare and contrast induced nephropathy  # SLE; poorly controlled due to poor compliance  # Lupus nephritis ,class IV/V  # Likely CKD stage 4 based on cystatin C  # Poor medical compliance  # Allergic Rxn with RTX  # Cystatin C discrepancy  She is clearly has LN flare from \"poor compliance\". When she takes med, LN seem to be responding. I discussed with rheumatologist and Dr. Purvis (outside neph) at length about the next step given complex situation. Since she has responded to Cytoxan before and she has not completed the course yet, it would be reasonable to resume that given improvement in heme parameter. Obinutuzumab is considered given possibility of AIHA and thrombocytopenia but we would need close follow up on her cell count given stronger marrow suppression. Now will continue Prednisone 60 mg daily. Would hold CellCept for now. Holding HCQ due to history prolonged QT interval. Would recommend Bactrim single strength 1 tab thrice weekly for PJP PPX for now.   - Discussed with Rheum. Will likely start cytoxan tomorrow pending lexiscan results and cardiology recs.   - Will plan to start furosemide today given need for IV fluids with cytoxan treatment to avoid fluid overload.  - plan may include Cytoxan 500 mg x1 and Obinutuzumab 1 gm x1 dose for induction  - Again stress compliance to her  - Pred 60 mg daily for now  - Renal panel daily  - avoid nephrotoxins  - strict I/O's  - daily weights    # MAHA  # Anemia  # Thrombocytopenia  # KATLIN syndrome?  ABILIO TS13 was 53% on 12/17/23. S/p PLEX x 2 on 12/18-12/19. This could be related to lupus flare. Would not suggest PLEX at this time. direct FAZAL test ordered.     # Hypertension  /90, minimal edema.  On coreg 25 mg BID, ISSMO 24 mg daily.   - start furosemide 40 mg IV daily.   # Met acidosis  - bicarb 21,  improving  Continue sodium bicarb 1300 nmg " TID.  # Pericardial effusion   Likely from pericarditis from LN flare. Other DDX could be uremic pericarditis. Will monitor Cr and clinical. She has a Temp RIJ line placed 12/17/23 that can be used for dialysis if needed.          Recommendations were communicated to primary team and consultants verbally and via this note.     ION BAIG PA-C     Interval History :   Reviewed provider and nursing notes from past 24 hours. Scr 3, eGFR 21, however cystatin C 5.1 with GFR 10.  ml yesterday and 200 ml since midnight. hemoglobin and platelets stable,  normalized haptoglobin, negative MELISSA test.    She is likely starting cytoxan tomorrow. She had lexiscan on [employer] today.     Review of Systems:   A comprehensive review of systems was negative except as noted above.    Physical Exam:   I/O last 3 completed shifts:  In: -   Out: 550 [Urine:550]  /101  Pulse 63  Resp 17  SpO2 100%   GENERAL APPEARANCE: alert and no distress  EYES:  no scleral icterus, pupils equal  PULM: lungs clear to auscultation, equal air movement, no cyanosis or clubbing  CV: regular rhythm, normal rate, no rub     -edema 1+ edema LE   MS: no evidence of inflammation in joints, no muscle tenderness  NEURO: mentation intact and speech normal   Access: temporary right CVC    Labs:   All labs reviewed by me  Electrolytes/Renal -   Recent Labs   Lab Test 12/21/23  0553 12/20/23  0415 12/19/23  2335 10/20/23  0806 10/06/23  0825 08/25/23  0848 08/25/23  0539 08/24/23  0602    139 140   < > 143   < > 139 139   POTASSIUM 3.4 3.7 3.6   < > 3.0*   < > 6.3* 5.3   CHLORIDE 106 105 106   < > 114*   < > 111* 108*   CO2 21* 21* 18*   < > 19*   < > 17* 21*   BUN 62.0* 54.1* 52.1*   < > 20.7*   < > 32.3* 37.3*   CR 3.06* 3.00* 2.87*   < > 0.95   < > 1.57* 1.76*   * 163* 144*   < > 79   < > 86 98   BISI 7.4* 7.4* 7.4*   < > 8.5*   < > 9.1 8.7   MAG  --   --  2.0  --   --   --  1.9 1.8   PHOS 5.8*  --  6.3*  --  4.0   < >  --   --     <  > = values in this interval not displayed.       CBC -   Recent Labs   Lab Test 12/21/23  0553 12/20/23  1336 12/20/23  0415   WBC 5.8 5.7 4.9   HGB 7.7* 7.6* 7.3*   PLT 68* 71* 63*       LFTs -   Recent Labs   Lab Test 12/21/23  0553 12/20/23  0415 12/19/23  2335 12/17/23  0429   ALKPHOS  --  68 71 132   BILITOTAL  --  0.3 0.4 0.7   ALT  --  25 25 65*   AST  --  35 37 276*   PROTTOTAL  --  5.4* 5.5* 7.0   ALBUMIN 2.8* 2.9* 2.8* 2.8*       Iron Panel -   Recent Labs   Lab Test 12/20/23  0415 12/17/23  0429 07/10/23  1454 06/25/20  0656 12/27/17  1021   IRON  --  130 15*  --  99   IRONSAT  --  74* 9*  --  32   GABRIEL 1,267* 32,775* 1,079*   < >  --     < > = values in this interval not displayed.         Imaging: Reviewed        Current Medications:   carvedilol  25 mg Oral BID w/meals    folic acid  1 mg Oral Daily    [Held by provider] heparin ANTICOAGULANT  5,000 Units Subcutaneous Q8H    isosorbide mononitrate  60 mg Oral Daily    pantoprazole  40 mg Oral QAM AC    predniSONE  60 mg Oral Daily    sodium bicarbonate  1,300 mg Oral TID    sodium chloride (PF)  3 mL Intracatheter Q8H       ION BAIG PA-C

## 2023-12-21 NOTE — PROGRESS NOTES
[facility]    Medicine Progress Note - Hospitalist Service, GOLD TEAM 22    Date of Admission:  12/19/2023    Assessment & Plan      Milly Carroll is a 24 y/o woman who has lupus complicated by lupus flares, cerebritis and nephritis. Patient also has hypertension, chronic systolic heart failure, QTc prolongation, past perinephric hematoma s/p emobolization in Aug-2023 and chronic thrombocytopenia. She was admitted to [facility] in [place] on 17-Dec-2023 and transferred to [facility] on 19-Dec-2023.     At [facility], patient was treated medically for a possible NSTEMI and pneumonia. She was treated for lupus crisis with steroids and plasma exchange.     Today:   - Appreciate multispecialty consultation; ongoing discussions expected  - Continue prednisone for now  - Trend labs  - Lexiscan ordered per cardiology recommendations    Lupus flare  Patient has longstanding history of SLE and difficulty with treatment adherence.   - Rheumatology consulted, appreciate recommendations  - Continue prednisone 60 mg daily for now.  - Avoid hydroxychloroquine for now due to QTc  - Additional treatment pending rheumatology recommendations  - Discuss options for PJP prophylaxis given kidney injury    Acute kidney injury  Lupus nephritis  - Nephrology consulted, appreciate recommendations  - Continue sodium bicarbonate 1300 mg three times daily.  - Monitoring labs.    Acute on chronic systolic heart failure  Myocardial injury  QTc prolongation  Echocardiogram on 17-Dec-2023 showed LVEF 35-40%.  - Cardiology consulted, appreciate recommendations  - Strict I/O, daily weights  - Low salt diet  - Continue carvedilol, Imdur  - Avoid ACEi/ARB due to CHRISTA  - Avoid QTc prolonging medications  - Lexiscan ordered  - Jardiance to be on hold for now.    Anemia with concern for hemolysis  Thrombocytopenia  The rheumatologist kindly discussed with the hematologist today  regarding possible need for further hematologic work-up. The hematologist felt that this should be deferred.   - B vitamin studies pending  - Trend CBC    Abnormal chest CT concerning for possible pneumonia  Cefepime and metronidazole started 12-17, Zosyn started 12/19.   - Reviewed case and antibiotic stewardship recommendations. Given lack of fever, other possible etiologies for lung infiltrates (inflammation, volume overload) elected to stop antibiotics 12/20. 4 day course is reasonable. Will monitor.     Hypertension:  - Continuing coreg and isosorbide.   - Monitoring.    History of DVT:  - DVT prophylaxis as tolerated due to thrombocytopenia    Transaminitis  - Monitoring          Diet: Regular Diet Adult    DVT Prophylaxis: Pneumatic Compression Devices  Leung Catheter: Not present  Lines: None     Cardiac Monitoring: ACTIVE order. Indication: Lupus flare  Code Status: Full Code      Clinically Significant Risk Factors Present on Admission              # Hypoalbuminemia: Lowest albumin = 2.8 g/dL at 12/19/2023 11:35 PM, will monitor as appropriate   # Thrombocytopenia: Lowest platelets = 59 in last 2 days, will monitor for bleeding  # Acute Kidney Injury, unspecified: based on a >150% or 0.3 mg/dL increase in last creatinine compared to past 90 day average, will monitor renal function   # Chronic heart failure with reduced ejection fraction: last echo with EF <40%         # Financial/Environmental Concerns:           Disposition Plan     Expected Discharge Date: 12/21/2023                    Claribel Mueller MD  Hospitalist Service, GOLD TEAM [address] [place] [address]  Securely message with [employer] (more info)  Text page via [facility] Paging/Directory   See signed in provider for up to date coverage information  ______________________________________________________________________    Interval History   Milly remains quite fatigued. She declined answering many questions for me,  saying she was tired which is understandable given illness and likely kept awake a lot of the night.   Per RN, Milly was up and ambulating without difficulty in the afternoon.   I discussed the case with rheumatology and hematology teams as well as antimicrobial stewardship today.     Physical Exam   Vital Signs: Temp: 97.6  F (36.4  C) Temp src: Oral BP: (!) 135/105 Pulse: 57   Resp: 14 SpO2: 100 % O2 Device: None (Room air)    Weight: 123 lbs 14.38 oz    General Appearance: Fatigued, appears ill but hemodynamically stable. Reclined in bed.   Respiratory: Comfortable on room air. Clear to auscultation.   Cardiovascular: RRR, no murmur, pulses intact.   GI: Abdomen soft, non-tender, non-distended  Skin: No rash  Other: Catheter present in neck.    Medical Decision Making       60 MINUTES SPENT BY ME on the date of service doing chart review, history, exam, documentation & further activities per the note.      Data     I have personally reviewed the following data over the past 24 hrs:    5.7  \   7.6 (L)   / 71 (L)     139 105 54.1 (H) /  163 (H)   3.7 21 (L) 3.00 (H) \       ALT: 25 AST: 35 AP: 68 TBILI: 0.3   ALB: 2.9 (L) TOT PROTEIN: 5.4 (L) LIPASE: N/A       Trop: N/A BNP: 70,000 (H)       Procal: N/A CRP: <3.00 Lactic Acid: N/A       INR:  1.12 PTT:  30   D-dimer:  N/A Fibrinogen:  N/A       Ferritin:  1,267 (H) % Retic:  4.6 (H) LDH:  320 (H)       Imaging results reviewed over the past 24 hrs:   No results found for this or any previous visit (from the past 24 hour(s)).

## 2023-12-22 NOTE — PROGRESS NOTES
Care Management Follow Up    Length of Stay (days): 3  Expected Discharge Date: 12/24/2023  Concerns to be Addressed: discharge planning     Patient plan of care discussed at interdisciplinary rounds: Yes  Anticipated Discharge Disposition: Home  Anticipated Discharge Services: Home care  Anticipated Discharge DME: None  Patient/family educated on Medicare website which has current facility and service quality ratings: no  Education Provided on the Discharge Plan: Yes  Patient/Family in Agreement with the Plan: yes  Referrals Placed by CM/SW:  Home care referral  Private pay costs discussed: Not applicable    Additional Information:  RNCC spoke with primary team and was notified pt may be ready for discharge this weekend and they would like home nursing set up due to pt being hospitalized because she couldn't manage her medications. RNCC attempted to meet with pt multiple times today but pt has been unavailable. RNCC sent referral to [facility] to secure a home care agency. Weekend RNCC to met with pt and explain home care and ensure pt is agreeable to it and verify address of residence. If home care agency cannot be secured, weekend RNCC to determine if pt has a PCP. If not get pt established with a PCP so she can follow up in clinic for additional teaching on her medications.    Addendum:  RNCC received call from [facility] liaison, that there is only 4 agencies in the [facility]. 3 agencies have declined. [facility] stated their admissions is gone today and wont be back until next Wednesday. They will review pt then and determine if they can accept. [facility] will continue to follow and direct [facility] to pt to get home care set up next week if they accept. RNCC to resend home care referral with updated address if pt's current address does not match the face sheet.\    Modesto Black RN BSN  5A RN Care Coordinator   Ph: [phone]  Pager: [phone]

## 2023-12-22 NOTE — PROGRESS NOTES
[facility]    Cardiology Consult Note-Cardiology      Date of Admission:  12/19/2023  Consult Requested by: Dr. Mueller  Reason for Consult: Heart failure management    Assessment & Plan: HVSL   Milly Carroll is a 23 year old female admitted on 12/19/2023 as transfer from [facility] for management of Lupus flare as well as concern for tropinemia and heart failure. She has had unfortunately multiple records with different levels of GDMT for heart failure.  She was followed up with  on 10/2023, she had MRI at that point that did not show any signs of iron overload MI, fibrosis, infiltrative disease.  EF was globally down to 40%, concern was that patient did not have Takotsubo as patient EF continued to be down. EF  at [facility] is relatively unchanged based on outside read compared to prior imaging from [facility].  She was being managed at [facility] for lupus flare with coordination from nephrology and cardiology.  Today she does appear volume overloaded, however her kidney function continues to worsen.  Is reassuring that she is satting 100% on room air, does not complain of any current chest pain or shortness of breath, and was lying flat comfortably during our review.  We are concerned given her history that she is at risk for developing proximal vessel CAD at a young age.  She would benefit from workup of her elevated troponins.    Lexiscan is unremarkable and without evidence of inducible ischemia, global hypokinesis noted     #Acute on chronic systolic heart failure  #Myocardial Injury  #Qtc prologation    -Appreciate nephrology consult, ok with Lasix 40mg IV for today  -Appreciate rhuematology recommendations for treatment of lupus flare  -Strict I/O and daily weights  -Recommend low salt cardiac diet  -Limit use of Qtc prolonging drugs as much as possible  -Ok to continue coreg and imdur, unable to add ACE/ARB/ARNI/SGLT-2/MRA due to current  CHRISTA    We will continue to follow peripherally, feel free to reach out with any additional questions or concerns.     The patient's care was discussed with the Attending Physician, Dr. Arreaga .      Guerrero Mcgarry MD  [facility]    I very much appreciated the opportunity to  assess Milly Carroll in the hospital with CV Fellow Dr Mcgarry. The note above summarizes my findings and current recommendations.  Please do not hesitate to contact my office if you have any questions or concerns.      Wilmer Arreaga MD  Cardiac Arrhythmia Service  [facility]  [phone]     Clinically Significant Risk Factors        # Hypokalemia: Lowest K = 2.8 mmol/L in last 2 days, will replace as needed       # Hypoalbuminemia: Lowest albumin = 2.7 g/dL at 12/22/2023  5:14 AM, will monitor as appropriate   # Thrombocytopenia: Lowest platelets = 68 in last 2 days, will monitor for bleeding    # Acute Kidney Injury, unspecified: based on a >150% or 0.3 mg/dL increase in last creatinine compared to past 90 day average, will monitor renal function   # Acute heart failure with reduced ejection fraction: last echo with EF <40% and receiving IV diuretics           # Financial/Environmental Concerns: none      ______________________________________________________________________    Subjective   Feels better today, denies chest pain, SOB. Not talkative today, but mental status improved.     Chief Complaint   Chest pain    History is obtained from the patient and chart review    History of Present Illness   Milly Carroll is a 23 year old female with PMH Takotsubo cardiomyopathy 1 year ago (EF as low as 20%, recovered to 40-45% on 8/2023 on TTE/CMR), lupus cerebritis and nephritis, HTN, Qtc prolongation, chronic steroid induced thrombocytopenia, past perinephric hematoma s/p emobolization in Aug-2023. She initially presented to [facility] ED[facility] 12/17/23 for chest  pain with concerns for NSTEMI. Trop trend was 735->494, no BNP obtained. Nephrology, hematology, and cardiology were consulted and the consensus was she was in a lupus crisis 2.2 mixed compliance with medications. She was treated with PLEX and high dose steroids. From cardiology perspective, her initial EKG reported to show T-wave inversions, and initial rise of troponin was thought to be 2.2 CHRISTA from lupus flare. she was being diuresed with lasix gtt 10mg/hr after conversation with nephrology and cardiology. GDMT regimen is unclear from multiple different providers, including [facility] appointment on 10/13/23. It is unclear if a pharm reconcilation was completed. On transfer to [facility] this admission, she is now on Coreg 25mg and Imdur 60mg every day. She does not provide history on interview, but was obtained via chart review.     Past Medical History    Past Medical History:   Diagnosis Date    Hepatitis     Lupus (systemic lupus erythematosus) (H)     Lupus cerebritis (H)     Pancreatitis 08/2017    Pyelonephritis 08/2017    Seizures (H)     Status epilepticus (H)        Past Surgical History   Past Surgical History:   Procedure Laterality Date    IR RENAL ANGIOGRAM LEFT  8/11/2023    IR RENAL BIOPSY LEFT  6/28/2022    NO HISTORY OF SURGERY      ORTHOPEDIC SURGERY         Medications   Current Facility-Administered Medications   Medication    acetaminophen (TYLENOL) tablet 975 mg    calcium carbonate (TUMS) chewable tablet 1,000 mg    carvedilol (COREG) tablet 25 mg    cyanocobalamin (VITAMIN B-12) tablet 100 mcg    cycloPHOSphamide (CYTOXAN) 500 mg in sodium chloride 0.9 % 300 mL infusion    folic acid (FOLVITE) tablet 1 mg    furosemide (LASIX) injection 40 mg    furosemide (LASIX) injection 40 mg    heparin ANTICOAGULANT injection 5,000 Units    HOLD: Caffeine containing medications 12 hours prior to the procedure    HOLD: dipyridamole (PERSANTINE) or aspirin/dipyridamole (AGGRENOX) 48 hours prior to the procedure     HOLD: theophylline or aminophylline 12 hours prior to the procedure    IF patient diabetic - HOLD: ALL ORAL HYPOGLYCEMICS and include: glipizide, glyburide, glimepiride, gliclazide, metformin, any metformin containing medication, on day of the procedure    isosorbide mononitrate (IMDUR) 24 hr tablet 60 mg    lidocaine (LMX4) cream    lidocaine 1 % 0.1-1 mL    mesna (MESNEX) 620 mg in sodium chloride 0.9 % 61.2 mL infusion    Followed by    mesna (MESNEX) 620 mg in sodium chloride 0.9 % 61.2 mL infusion    naloxone (NARCAN) injection 0.2 mg    Or    naloxone (NARCAN) injection 0.4 mg    Or    naloxone (NARCAN) injection 0.2 mg    Or    naloxone (NARCAN) injection 0.4 mg    ondansetron (ZOFRAN) injection 8 mg    oxyCODONE IR (ROXICODONE) half-tab 2.5 mg    Or    oxyCODONE (ROXICODONE) tablet 5 mg    pantoprazole (PROTONIX) EC tablet 40 mg    predniSONE (DELTASONE) tablet 60 mg    senna-docusate (SENOKOT-S/PERICOLACE) 8.6-50 MG per tablet 1 tablet    Or    senna-docusate (SENOKOT-S/PERICOLACE) 8.6-50 MG per tablet 2 tablet    sodium bicarbonate tablet 1,300 mg    sodium chloride (PF) 0.9% PF flush 1-10 mL    sodium chloride (PF) 0.9% PF flush 10 mL    sodium chloride (PF) 0.9% PF flush 3 mL    sodium chloride (PF) 0.9% PF flush 3 mL    sodium chloride (PF) 0.9% PF flush 5-10 mL    sodium chloride 0.9% BOLUS 500 mL    sodium chloride bacteriostatic 0.9 % flush 0-1 mL    sulfamethoxazole-trimethoprim (BACTRIM) 400-80 MG per tablet 1 tablet            Physical Exam   Vital Signs: Temp: 97.5  F (36.4  C) Temp src: Oral BP: (!) 162/113 Pulse: 75   Resp: 18 SpO2: 99 % O2 Device: None (Room air)    Weight: 122 lbs 8 oz    General Appearance: Well appearing, NAD, Jugular line in place, not very interactive  Respiratory: clear breath sounds b/l  Cardiovascular: RRR, no MRG. 1+ pedal edema slightly above the ankles. No baseline JVD, but (+) hepatojugular reflux, cap refill <2 seconds      Medical Decision Making             Data    Lexiscan 12/21/23  Result Text       The nuclear stress test is negative for inducible myocardial ischemia or infarction.    LVEDv 198ml. LVESv 104ml. LV EF 47%.    There is no prior study for comparison.       TTE 12/17/23  Interpretation Summary   The left ventricle is normal size. There is mild concentric hypertrophy.   The left ventricular systolic function is moderately reduced. Qualitative left ventricle ejection fraction is 35-40%. There is moderate global hypokinesis of   the left ventricle.   The right ventricular cavity size is qualitatively normal. Normal right ventricular systolic function.   Left atrium is mildly enlarged by volume.   Mild to moderate mitral regurgitation.   Mild tricuspid regurgitation.   Right ventricular systolic pressure estimate is 35-40 mmHg.   Small pericardial effusion, located posteriorly.   No prior echocardiogram available for comparison.     TTE 8/14/23  Interpretation Summary     The left ventricle is borderline dilated.  Biplane LVEF is 45%.  There is mild global hypokinesia of the left ventricle.  NOTE: Diastology paramaters and stain not performed. Compared to echo at OSH,  the LVEF and MR changes are new. Serial echo cardiogram and possible MRI may  be useful  There is moderate (2+) mitral regurgitation.  The aortic root is normal size.  Borderline/mild dilated pulmonary artery  Trivial posterior pericardial effusion    CMR 8/16/23  Clinical history: Probable stress induced cardiomyopathy, evaluate for myocarditis.   Comparison CMR: None     1. The LV is mildly dilated with normal wall thickness. The global systolic function is moderately reduced.  The LVEF is 40%. There is moderate global hypokinesis of the left ventricle.      2. The RV is normal in cavity size. The global systolic function is low normal. The RVEF is 52%.      3. Both atria are normal in size.     4. There is mild to moderate mitral regurgitation by qualitative assessment.      5. There is no  evidence of myocardial edema on T2 weighted imaging. There is no evidence of myocardial iron  overload.      5. Late gadolinium enhancement imaging shows no MI, fibrosis or infiltrative disease.      6. A small circumferential pericardial effusion is noted.      CONCLUSIONS:      1. Mildly dilated left ventricle with moderately reduced systolic function.     2. No late enhancement to suggest prior infarct, inflammation or infiltration.     EKG 12/17/23

## 2023-12-22 NOTE — PROGRESS NOTES
Patient admitted to:  BMT 5409  Admitted from: [facility]  Arrived by: EMS  Reason for admission: Chemotherapy induction  Patient accompanied by: EMS personnel  Belongings: Jacket, cell phone, misc. Belongings to be kept on patient  Teaching: Oriented to room, unit protocols, how to use call light, etc.    Skin double check completed by: Jens DONOVAN RN and Nicole ROY RN  Mild edema in feet otherwise skin looks great   operating room

## 2023-12-22 NOTE — PLAN OF CARE
"Goal Outcome Evaluation:    Vitals:within normal limits , except blood pressure a little elevated    Neuro: Alert and oriented X4    Pain: denies    Other: RN from [place] called reports given . Transport called and concluded that she would likely be leaving for [place] at around 7 am.     Activity: Position changed independently, staff offered to assist to bathroom, but patient said \"NO\"                        "

## 2023-12-22 NOTE — PROGRESS NOTES
Nephrology Progress Note  12/22/2023       Mrs Carroll is a 23 yof w/SLE with LN since 2007.  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  She presented to local hospital 12/17 ([facility] [place]) due to SOB and malaise, found to have CHRISTA and pancytopenia with evidence of MAHA (hapto 21,  and + schistocytes).  Transferred to [facility] for PLEX starting on 12/18, tx from [facility] to [place] on 12/21.  Nephrology consulted for management of CHRISTA on CKD and SLE with LN.       Interval History :   Mrs Carroll's Cr is slightly improved, giving lasix again today for mild peripheral edema.  Starting cytoxan today in addition to prednisone.  We discussed her bigger picture CKD and likelihood of progressing to ESRD.  Will see how much she recovers from CHRISTA before making further plans but gfr currently is very low based on previous Cystatin-c and her exam.      Assessment & Recommendations:   CHRISTA on CKD4-Baseline Cr ~1 but has large discrepancy between gfr by Cr vs Cystatin-c as gfr by Cr was 78ml/kg/1.7m2 on 8/20/2023 and cystatin-c suggested 18ml/min/1.7m2.  Now with LN flare due to poor compliance, also had contrast exposure on 12/17.  We are in contact with Dr Purvis who manages Mrs Carroll as an outpatient.  Has responded to cytoxan before, in discussion with Rheumatology we can restart this along with current dosing of prednisone 60mg daily.     -CHRISTA due to LN flare, also had contrast exposure.  Cr stable/slightly improved.   -Continue prednisone 60mg daily.   -Holding HCQ due to long QT.    -Starting Cytoxan today.   -No further plans for PLEX per heme.     -Has RIJ temp line placed for PLEX.      Please avoid placing a PICC line in any patient with CKD III or above, CHRISTA, or ESKD, as this will impact negatively the ability of the patient to have an AV fistula or AV Graft should they need it in the future.  Internal jugular or External Jugular  " are the preferred type of access in patients with kidney disease. (Link to guidelines)    Volume status-Volume overloaded but some of it is 3rd spaced fluid with low albumin.  Giving 40mg lasix (ordered).    Electrolytes/pH-K 2.8, bicarb 22, did get lasix yesterday.  Replacing.      Ca/phos/pth-Ca 7.8, Mg 2, Phos 5.2.      Anemia-Hgb 7.8, has run low recently.  Iron sats ~70% on 12/17.      Nutrition-Taking PO    Time spent: 40 minutes on this date of encounter for chart review, physical exam, medical decision making and co-ordination of care.     Seen and discussed with Dr Kamara    Recommendations were communicated to primary team via verbal communication.     FREDY Khan CNS  Clinical Nurse Specialist  [phone]    Review of Systems:   I reviewed the following systems:  Gen: No fevers or chills  CV: No CP at rest  Resp: No SOB at rest  GI: No N/V    Physical Exam:   No intake/output data recorded.   BP (!) 165/114   Pulse 65   Temp 97.8  F (36.6  C) (Oral)   Resp 18   Ht 1.58 m (5' 2.21\")   Wt 55.6 kg (122 lb 8 oz)   LMP 12/11/2023 (Approximate)   SpO2 100%   BMI 22.26 kg/m       GENERAL APPEARANCE: alert and no distress, flat affect.    EYES:  No scleral icterus  HENT: mouth without ulcers or lesions  PULM: Breathing non-labored on RA  CV: regular rhythm, normal rate     -edema +1 BLE  GI: soft, non-tender, non-distended  MS: no evidence of inflammation in joints, no muscle tenderness  NEURO: mentation intact and speech normal  Lines: Temp R CVC     Labs:   All labs reviewed by me  Electrolytes/Renal -   Recent Labs   Lab Test 12/22/23  0514 12/21/23  0553 12/20/23  0415 12/19/23  2335 08/25/23  0848 08/25/23  0539 08/24/23  0602    138 139 140   < > 139 139   POTASSIUM 2.8* 3.4 3.7 3.6   < > 6.3* 5.3   CHLORIDE 109* 106 105 106   < > 111* 108*   CO2 22 21* 21* 18*   < > 17* 21*   BUN 64.4* 62.0* 54.1* 52.1*   < > 32.3* 37.3*   CR 2.86* 3.06* 3.00* 2.87*   < > 1.57* 1.76*   * " 179* 163* 144*   < > 86 98   BISI 7.8* 7.4* 7.4* 7.4*   < > 9.1 8.7   MAG  --   --   --  2.0  --  1.9 1.8   PHOS 5.2* 5.8*  --  6.3*   < >  --   --     < > = values in this interval not displayed.       CBC -   Recent Labs   Lab Test 12/22/23  0514 12/21/23  0553 12/20/23  1336   WBC 5.7 5.8 5.7   HGB 7.8* 7.7* 7.6*   PLT 68* 68* 71*       LFTs -   Recent Labs   Lab Test 12/22/23  0514 12/21/23  0553 12/20/23  0415 12/19/23  2335 12/17/23 0429   ALKPHOS  --   --  68 71 132   BILITOTAL  --   --  0.3 0.4 0.7   ALT  --   --  25 25 65*   AST  --   --  35 37 276*   PROTTOTAL  --   --  5.4* 5.5* 7.0   ALBUMIN 2.7* 2.8* 2.9* 2.8* 2.8*       Iron Panel -   Recent Labs   Lab Test 12/20/23  0415 12/17/23  0429 07/10/23  1454 06/25/20  0656 12/27/17  1021   IRON  --  130 15*  --  99   IRONSAT  --  74* 9*  --  32   GABRIEL 1,267* 32,775* 1,079*   < >  --     < > = values in this interval not displayed.           Current Medications:   carvedilol  25 mg Oral BID w/meals    cyanocobalamin  100 mcg Oral Daily    folic acid  1 mg Oral Daily    furosemide  40 mg Intravenous Daily    [Held by provider] heparin ANTICOAGULANT  5,000 Units Subcutaneous Q8H    isosorbide mononitrate  60 mg Oral Daily    pantoprazole  40 mg Oral QAM AC    predniSONE  60 mg Oral Daily    sodium bicarbonate  1,300 mg Oral TID    sodium chloride (PF)  3 mL Intracatheter Q8H    sulfamethoxazole-trimethoprim  1 tablet Oral Once per day on Monday Wednesday Friday

## 2023-12-22 NOTE — PROVIDER NOTIFICATION
"Claribel Mueller paged via [employer] @ 07:35-    \"Patient has arrived to 5409. Potassium is 2.8. Can we please get replacement orders for electrolytes? Thank you!!!\"  "

## 2023-12-22 NOTE — PROGRESS NOTES
Subjective      Ms. Carroll was seen and examined today at bedside.  She is more conversant today however she does not remember what was going on over the past few days, she denies joint pain, oral sores, chest pain.    Objective   VITAL SIGNS  Temp:  [97.6  F (36.4  C)-98.3  F (36.8  C)] 97.8  F (36.6  C)  Pulse:  [] 65  Resp:  [18] 18  BP: (140-165)/() 165/114  SpO2:  [100 %] 100 %  Admission Weight: 56.2 kg (123 lb 14.4 oz)  Current Weight: 55.6 kg (122 lb 8 oz)    PHYSICAL EXAMINATION  Physical Exam  General: Alert,  flat affect, no apparent distress.  Skin: No rheumatologic rashes or ulcers.   Lungs: bilateral crackles  Abdomen: Soft  Joints: No synovitis of the upper and lower extremities including hands, wrists, elbows, knees, ankles or feet bilaterally.     Lab:     Stable hematologic parameters [WBC, hemoglobin, platelets]  Cystatin C, creatinine discrepancy  Negative direct antiglobulin test  Double-stranded DNA continues to downtrend  Low normal vitamin B12  Improving haptoglobin   Decreasing LDH    Imaging: Lexiscan negative for inducible myocardial ischemia    Assessment & Plan     # Systemic lupus erythematosus  # Lupus nephritis flare  # Microangiopathic hemolytic anemia, improving  # Bicytopenia, stable    Milly has active lupus with active lupus nephritis, microangiopathic hemolytic anemia as active components of lupus at the current time.  Her situation is complex and has been complicated by medication nonadherence.    Hematologic parameters seems to have been stable, this is encouraging specially after stopping plasma exchange.  The current main concern is lupus nephritis flare specially with discrepancy and Cystatin C compared to creatinine.  Cystatin C revealed very poor GFR [9 mL/min/1.73 m ] which is concerning..    In collaboration with our colleagues in nephrology, we decided to pursue cyclophosphamide as steroid sparing agent for that she resume her Euro lupus protocol at the  current time.  Obinutuzumab was suggested however severe cytopenias has been reported with obinutuzumab.  Given her previous response to cyclophosphamide then it was felt that we could pursue resuming her your lupus protocol.  Further long-term plan for lupus nephritis per her outpatient nephrologist.     - Follow results of LDH and Haptoglobin (added to samples today however sample was hemolyzed)    - Cyclophosphamide 500 mg today for management of Lupus flare along with MESNA, we will help with ordering this as inpatient.   - Continue with prednisone 60 mg daily and bactrim for PJP prophylaxis renally adjusted, further taper of prednisone for lupus nephritis per nephrology.  - Continue to hold Hydroxychloroquine given prolonged QTC  - Continue to trend labs (CBC, Creatinine, AST daily) and blood smear Haptoglobin, LDH every other day     55 MINUTES SPENT BY ME on the date of service doing chart review, history, exam, documentation & further activities per the note.        Kamila Montanez MD  Rheumatology attending

## 2023-12-22 NOTE — PROGRESS NOTES
[facility]    Medicine Progress Note - Hospitalist Service, GOLD TEAM 8    Date of Admission:  12/19/2023    Assessment & Plan      Milly Carroll is a 24 y/o woman who has lupus complicated by lupus flares, cerebritis and nephritis. Patient also has hypertension, chronic systolic heart failure, QTc prolongation, past perinephric hematoma s/p emobolization in Aug-2023 and chronic thrombocytopenia. She was admitted to [facility] in [place] on 17-Dec-2023 and transferred to [facility] on 19-Dec-2023 then to [place] on 12/22/23 for cyclophosphamide infusion.      At [facility], patient was treated medically for a possible NSTEMI and pneumonia. She was treated for lupus crisis with steroids and plasma exchange.     Today:   - Cyclophosphamide infusion per Rheum  - Lexiscan negative for inducible ischemia   - Appreciate Nephrology, Rheum and Cards rec    Lupus flare  Patient has longstanding history of SLE and difficulty with treatment adherence. Previously father was helping.    - Rheumatology consulted, appreciate recommendations  - Continue prednisone 60 mg daily for now.  - Avoid hydroxychloroquine for now due to QTc  - plan for cyclophosphamide today  - Bactrim three days weekly for PJP prophylaxis    Acute kidney injury, improving  Lupus nephritis  - Nephrology consulted, appreciate recommendations  - Continue sodium bicarbonate 1300 mg three times daily.  - IV furosemide daily  - Monitoring labs.    Acute on chronic systolic heart failure  Myocardial injury  QTc prolongation  Echocardiogram on 17-Dec-2023 showed LVEF 35-40%. Lexiscan 12/21 negative for ischemia   - Cardiology consulted, appreciate recommendations  - Strict I/O, daily weights  - Low salt diet  - Continue carvedilol, Imdur  - Avoid ACEi/ARB due to CHRISTA  - Avoid QTc prolonging medications  - Jardiance to be on hold for now.     Anemia with concern for  hemolysis  Thrombocytopenia  The rheumatologist kindly discussed with the hematologist today regarding possible need for further hematologic work-up. The hematologist felt that this should be deferred.   - Oral vitamin B12  - Trend CBC    Abnormal chest CT concerning for possible pneumonia  Cefepime and metronidazole started , Zosyn started .   - Reviewed case and antibiotic stewardship recommendations. Given lack of fever, other possible etiologies for lung infiltrates (inflammation, volume overload) elected to stop antibiotics . 4 day course is reasonable.   - Will monitor.     Hypertension:  - Continuing coreg and isosorbide.   - Monitoring.    History of DVT:  - DVT prophylaxis will be restarted given platelet >40    Transaminitis  - Monitoring    Social history: Her dad  during . She moved in with her aunt shortly before this admission. She completed high school but no postsecondary education. No consistent employment. May need home nurse to help manage medications.           Diet: Regular Diet Adult    DVT Prophylaxis: Pneumatic Compression Devices  Leung Catheter: Not present  Lines: None     Cardiac Monitoring: None  Code Status: Full Code      Clinically Significant Risk Factors        # Hypokalemia: Lowest K = 2.8 mmol/L in last 2 days, will replace as needed       # Hypoalbuminemia: Lowest albumin = 2.7 g/dL at 2023  5:14 AM, will monitor as appropriate   # Thrombocytopenia: Lowest platelets = 68 in last 2 days, will monitor for bleeding  # Acute Kidney Injury, unspecified: based on a >150% or 0.3 mg/dL increase in last creatinine compared to past 90 day average, will monitor renal function   # Acute heart failure with reduced ejection fraction: last echo with EF <40% and receiving IV diuretics           # Financial/Environmental Concerns: none         Disposition Plan             Rodolfo Quevedo MD  Hospitalist Service, GOLD TEAM 8  [facility]  [facility]  Securely message with [employer] (more info)  Text page via [facility] Paging/Directory   See signed in provider for up to date coverage information  ______________________________________________________________________    Interval History   No acute eventes overnight, appears tired and withdrawn but denies being depressed    Physical Exam   Vital Signs: Temp: 97.5  F (36.4  C) Temp src: Oral BP: (!) 162/113 Pulse: 75   Resp: 18 SpO2: 99 % O2 Device: None (Room air)    Weight: 122 lbs 8 oz    Constitutional: Awake, alert, cooperative, no apparent distress  Respiratory: Clear to auscultation bilaterally  Cardiovascular: Regular rate and rhythm  GI: Normal bowel sounds, soft, non-distended, non-tender  Skin/Integumen: No rashes, no cyanosis      Medical Decision Making       55 MINUTES SPENT BY ME on the date of service doing chart review, history, exam, documentation & further activities per the note.      Data     I have personally reviewed the following data over the past 24 hrs:    5.7  \   7.8 (L)   / 68 (L)     141 109 (H) 64.4 (H) /  141 (H)   2.8 (L) 22 2.86 (H) \     ALT: N/A AST: N/A AP: N/A TBILI: N/A   ALB: 2.7 (L) TOT PROTEIN: N/A LIPASE: N/A       Imaging results reviewed over the past 24 hrs:   Recent Results (from the past 24 hour(s))   NM Lexiscan stress test (nuc card)   Result Value    Target     Narrative      The nuclear stress test is negative for inducible myocardial ischemia   or infarction.    LVEDv 198ml. LVESv 104ml. LV EF 47%.    There is no prior study for comparison.     NM MPI Radiologist Consult For Cardiology    Narrative    EXAMINATION: NM MPI RADIOLOGIST CONSULT FOR CARDIOLOGY, 12/21/2023  1:09 PM     COMPARISON: Chest CT 12/17/2023    HISTORY: Chronic systolic heart failure from non-ischemic  cardiomyopathy    TECHNIQUE: Limited field of view 5 mm CT images were acquired for  attenuation correction purposes for nuclear medicine cardiac study.  Radiology consulted to  review the CT images. Please refer to separate  dictation for the nuclear medicine portion of the study.    FINDINGS:     Heart size is enlarged. Trace pericardial effusion. Central venous  catheter terminates in the superior cavoatrial junction. No  significant coronary artery atherosclerotic calcifications. The  visualized mediastinal and hilar lymph nodes are not enlarged.    No pleural effusion or pneumothorax at this level. Patchy groundglass  opacities bilaterally, better evaluated on chest CT 12/17/2023. Linear  atelectasis in the left lung base.    Visualized upper abdomen is unremarkable. No acute osseous lesions of  the visualized thorax.      Impression    IMPRESSION:  1. No acute abnormality on the CT images of the lower chest and upper  abdomen.  2. Please see separate report for the nuclear medicine portion of the  study.    I have personally reviewed the examination and initial interpretation  and I agree with the findings.    LUCY JUNG MD         SYSTEM ID:  [id number]

## 2023-12-22 NOTE — PROGRESS NOTES
Transfer Type: [facility]  Transfer Triage Note    Originating unit: [facility]     Final intended location for transfer: [facility] surg or IMC     What services or anticipated services require transfer to the [place]? (please refer to triage job guide or discuss with PP RN if [facility] capabilities would be appropriate) unable to give IV cyclophosphamide on [facility]. See hospitalist note from today    Tele required:  No    Time of admission request: tomorrow morning    Current guidance: Patients transferring across the [place] from ED to inpatient unit should be seen, orders written, and H/P signed by the hospital medicine team prior to transfer.    Anticipated primary team on unit: Team: Hospitalist     Patient added to Interhospital transfer list?  Yes    Brief case description: unable to give cyclophosphamide on [facility]    Macarena Angulo MD

## 2023-12-22 NOTE — CARE PLAN
End of shift Summary: See flowsheet for VS and detail assessments.     Changes this Shift:      Pulmonary: Clear lung sounds. Patient denied SOB, difficulty breathing. On RA.      Output: Patient had a bowel movement this shift. Encouraged pt to drink fluids.     Activity: Standby assist. generalized weakness. Patient got out of bed to use the bathroom this shift. Pt has been sleeping most of shift.     Skin: Edema bilaterally.        Pain: Patient denied pain.     Neuro/CMS: Alert and oriented x 4. Patient denies numbness and tingling.     IV:  Right PIV Saline locked. R HD catheter.     Additional info: Pt changed from NPO status to regular diet.     Plan: Continue POC.

## 2023-12-23 NOTE — PLAN OF CARE
Goal Outcome Evaluation:  Hypertensive (140s-160s/100s) otherwise VSS. Afebrile. Alert & oriented x4. Up SBA; no bed alarm & calling appropriately. Adequate intake & output. Negative c. Diff. 2x BM. 40mEq K+ replaced x2 with next recheck due @ ~2100. Tele discontinued after being in NSR all AM. Will continue with plan of care.    Goal: Absence of Hospital-Acquired Illness or Injury  Outcome: Progressing  Intervention: Identify and Manage Fall Risk  Recent Flowsheet Documentation  Taken 12/22/2023 1500 by Jens Jeffery RN  Safety Promotion/Fall Prevention:   assistive device/personal items within reach   clutter free environment maintained   increased rounding and observation   nonskid shoes/slippers when out of bed   patient and family education   room organization consistent   safety round/check completed  Taken 12/22/2023 1200 by Jens Jeffery, RN  Safety Promotion/Fall Prevention:   activity supervised   assistive device/personal items within reach   clutter free environment maintained   increased rounding and observation   nonskid shoes/slippers when out of bed   patient and family education   room organization consistent   safety round/check completed  Taken 12/22/2023 0800 by Jens Jeffery, RN  Safety Promotion/Fall Prevention:   activity supervised   assistive device/personal items within reach   clutter free environment maintained   increased rounding and observation   nonskid shoes/slippers when out of bed   patient and family education   room organization consistent   safety round/check completed  Intervention: Prevent Skin Injury  Recent Flowsheet Documentation  Taken 12/22/2023 0800 by Jens Jeffrey, RN  Body Position: position changed independently  Skin Protection: adhesive use limited  Device Skin Pressure Protection:   absorbent pad utilized/changed   adhesive use limited   tubing/devices free from skin contact  Intervention: Prevent and Manage VTE (Venous Thromboembolism) Risk  Recent Flowsheet  Documentation  Taken 12/22/2023 0800 by Jens Jeffery RN  VTE Prevention/Management: (Ambulation promoted) other (see comments)  Intervention: Prevent Infection  Recent Flowsheet Documentation  Taken 12/22/2023 1500 by Jens Jeffery RN  Infection Prevention:   cohorting utilized   environmental surveillance performed   equipment surfaces disinfected   hand hygiene promoted   personal protective equipment utilized   rest/sleep promoted   single patient room provided   visitors restricted/screened  Taken 12/22/2023 1200 by Jens Jeffery RN  Infection Prevention:   cohorting utilized   environmental surveillance performed   equipment surfaces disinfected   hand hygiene promoted   personal protective equipment utilized   rest/sleep promoted   single patient room provided   visitors restricted/screened  Taken 12/22/2023 0800 by Jens Jeffery RN  Infection Prevention:   cohorting utilized   environmental surveillance performed   equipment surfaces disinfected   hand hygiene promoted   personal protective equipment utilized   rest/sleep promoted   single patient room provided   visitors restricted/screened  Goal: Optimal Comfort and Wellbeing  Outcome: Progressing  Goal: Readiness for Transition of Care  Outcome: Progressing  Intervention: Mutually Develop Transition Plan  Recent Flowsheet Documentation  Taken 12/22/2023 0800 by Jens Jeffery RN  Equipment Currently Used at Home: none     Problem: Acute Kidney Injury/Impairment  Goal: Fluid and Electrolyte Balance  Outcome: Progressing  Goal: Improved Oral Intake  Outcome: Progressing  Goal: Effective Renal Function  Outcome: Progressing  Intervention: Monitor and Support Renal Function  Recent Flowsheet Documentation  Taken 12/22/2023 1500 by Jens Jeffery RN  Medication Review/Management: medications reviewed  Taken 12/22/2023 1200 by Jens Jeffery RN  Medication Review/Management: medications reviewed  Taken 12/22/2023 0800 by Jens Jeffery  RN  Medication Review/Management: medications reviewed     Problem: Fatigue  Goal: Improved Activity Tolerance  Outcome: Progressing  Intervention: Promote Improved Energy  Recent Flowsheet Documentation  Taken 12/22/2023 1500 by Jens Jeffery, RN  Activity Management:   activity adjusted per tolerance   activity encouraged  Taken 12/22/2023 0800 by Jens Jeffery, RN  Activity Management: activity adjusted per tolerance

## 2023-12-23 NOTE — PROGRESS NOTES
[facility]    Medicine Progress Note - Hospitalist Service, GOLD TEAM 8    Date of Admission:  12/19/2023  Date of service:  12/23/2023    Patient seen and examined with Dr. Alcantar from nephrology.  45 minutes spent with the patient at bedside reviewing her diagnosis(es) below and follow up as outpatient with nephrology and rheumatology ie / regarding her diagnosis(es) of lupus cerebritis, nephritis.  H/o SLE X 7 years.  Lives in [place], [place].  Is to receive another 2 infusions of cyclophosphamide after discharge.    Assessment & Plan      Milly Carroll is a 24 y/o woman who has lupus complicated by lupus flares, cerebritis and nephritis. Patient also has hypertension, chronic systolic heart failure, QTc prolongation, past perinephric hematoma s/p emobolization in Aug-2023 and chronic thrombocytopenia. She was admitted to [facility] in [place] on 17-Dec-2023 and transferred to [facility] on 19-Dec-2023 then to [place] on 12/22/23 for cyclophosphamide infusion.      At [facility], patient was treated medically for a possible NSTEMI and pneumonia. She was treated for lupus crisis with steroids and plasma exchange.     Today - 12/23/2023.  No changes to medications, labs or plan per nephrology.    - Appreciate Nephrology, Rheum and Cards rec    Lupus flare  Patient has longstanding history of SLE and difficulty with treatment adherence. Previously father was helping.    - Rheumatology consulted, appreciate recommendations  - Continue prednisone 60 mg daily for now.  - Avoid hydroxychloroquine for now due to QTc  - plan for cyclophosphamide today  - Bactrim three days weekly for PJP prophylaxis    Acute kidney injury, improving  Lupus nephritis  - Nephrology consulted, appreciate recommendations  - Continue sodium bicarbonate 1300 mg three times daily.  - IV furosemide daily  - Monitoring labs.    Acute on chronic systolic heart  failure  Myocardial injury  QTc prolongation  Echocardiogram on 17-Dec-2023 showed LVEF 35-40%. Lexiscan  negative for ischemia   - Cardiology consulted, appreciate recommendations  - Strict I/O, daily weights  - Low salt diet  - Continue carvedilol, Imdur  - Avoid ACEi/ARB due to CHRISTA  - Avoid QTc prolonging medications  - Jardiance to be on hold for now.     Anemia with concern for hemolysis  Thrombocytopenia  The rheumatologist kindly discussed with the hematologist today regarding possible need for further hematologic work-up. The hematologist felt that this should be deferred.   - Oral vitamin B12  - Trend CBC    Abnormal chest CT concerning for possible pneumonia  Cefepime and metronidazole started , Zosyn started .   - Reviewed case and antibiotic stewardship recommendations. Given lack of fever, other possible etiologies for lung infiltrates (inflammation, volume overload) elected to stop antibiotics . 4 day course is reasonable.   - Will monitor.     Hypertension:  - Continuing coreg and isosorbide.   - Monitoring.    History of DVT:  - DVT prophylaxis will be restarted given platelet >40    Transaminitis  - Monitoring    Social history: Her dad  during . She moved in with her aunt shortly before this admission. She completed high school but no postsecondary education. No consistent employment. May need home nurse to help manage medications.           Diet: Regular Diet Adult    DVT Prophylaxis: Pneumatic Compression Devices  Leung Catheter: Not present  Lines: None     Cardiac Monitoring: None  Code Status: Full Code      Clinically Significant Risk Factors        # Hypokalemia: Lowest K = 2.8 mmol/L in last 2 days, will replace as needed  # Hypernatremia: Highest Na = 146 mmol/L in last 2 days, will monitor as appropriate      # Hypoalbuminemia: Lowest albumin = 2.7 g/dL at 2023  4:09 AM, will monitor as appropriate   # Thrombocytopenia: Lowest platelets = 68 in last 2  days, will monitor for bleeding    # Acute heart failure with reduced ejection fraction: last echo with EF <40% and receiving IV diuretics           # Financial/Environmental Concerns: none         Disposition Plan     Expected Discharge Date: 12/24/2023      Destination: home with family  Discharge Comments: Will need Home RN for med management.      60 MINUTES SPENT BY ME on this date of service doing chart review, obtaining & updating any of the elements in the past,family and social histories, performing an applicable exam, chart documentation & further activities as documented in this note.     Mario Escobar MD  Hospitalist Service, [address]  Securely message with [employer] (more info)  Text page via [facility] Paging/Directory   See signed in provider for up to date coverage information  ______________________________________________________________________    Interval History   No complaints.    Physical Exam   Vital Signs: Temp: 98.1  F (36.7  C) Temp src: Oral BP: (!) 156/111 (rn notified) Pulse: 73   Resp: 17 SpO2: 100 % O2 Device: None (Room air)    Weight: 124 lbs 4.8 oz  No edema  Appears comfortable  Awake, alert, no acute distress  Minimally interactive    Data     I have personally reviewed the following data over the past 24 hrs:    5.0  \   7.1 (L)   / 71 (L)     146 (H) 113 (H) 63.6 (H) /  161 (H)   3.5 19 (L) 2.62 (H) \     ALT: N/A AST: N/A AP: N/A TBILI: N/A   ALB: 2.7 (L) TOT PROTEIN: N/A LIPASE: N/A

## 2023-12-23 NOTE — PROGRESS NOTES
Nephrology Progress Note  12/23/2023       23 yof w/SLE with LN since 2007.  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  She [facility] [place] due to SOB and malaise, found to have CHRISTA and pancytopenia with evidence of MAHA (hapto 21,  and + schistocytes).  Transferred to [facility] for PLEX starting on 12/18, Nephrology consulted for management of CHRISTA on CKD and SLE with LN flare.       Interval History :   -Mrs Carroll's Cr is slightly improved, Trace edema on exam  -First in hospital dose of cytoxan received 12/22 [she had received 3 doses of Cytoxan on an outside basis before]  - currently on Prednisone 60 mg daily  -Feels ok, no SOB, wants to go home  -No Plans for further PLEX by hematology    Assessment & Recommendations:   # CHRISTA stage 3 secondary to LN flare and contrast induced nephropathy  # SLE; poorly controlled due to poor compliance, h/o lupus cerebritis, seizures  # Pericardial effusion   # Lupus nephritis,class III/V (done 6/2022-read at [facility])  # Likely CKD stage 4 based on cystatin C  # Poor medical compliance  # Allergic Rxn with RTX  # Cystatin C discrepancy: Cystatin C 5.6 (eGFR 9) vs Cr 3.06 (eGFR 21)  # Psychosocial complexity/Hx of non-Compliance  # h/o rt knee osteonecrosis   # b/l AVN of hips  # S/p PLEX x 2 on 12/18-12/19  # Met acidosis  # Anemia  # Thrombocytopenia    Kidney bx on 8/2/23 which was consistent with diffuse proliferative, sclerosing LN. There were 2% active crescents, 15% fibrous crescents. 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   She is managed by Dr Purvis as an outpatient.  However, has hx of non-compliance and last seen in August 2022. She has responded to cytoxan before, so her course was continued while inpatient.  -I talked to her primary nephrologist today.  She had already received 3 doses of  "Cytoxan on outside basis and she received her fourth dose on December 22 with, 2023.  -She will need 2 more doses of Cytoxan 500 mg IV every 2 weeks for the next month. Her primary nephrologist Dr. April Lee will communicate that to her nephrologist in [place]  -Discussions about the effects on fertility were done earlier in her disease course, she was offered Lupron as well as egg freezing options but she did not want either.   -Upon discharge, she should be discharged on prednisone 60 mg daily for 2 weeks then her dose is to be tapered by 10 mg every 2 weeks until she is at 20mg daily, then the dose is to be reduced by 5 mg every 2 weeks until she is on 5 mg daily  -While on a high dose prednisone [more than 20%] she should continue on PJP prophylaxis with Bactrim  -No further plans for PLEX per heme; can remove the temporary catheter  -There has been a slight decline in hb  but her plts are improving, will monitor  -No lasix upon discharge, continue coreg 25 mg BID, ISMO 24 mg daily  -continue Sodium bicarb 1300 nmg TID. This dose need to be adjusted upon Follow up labs    Time spent: 60 minutes on this date of encounter for chart review, physical exam, medical decision making and co-ordination of care.     Recommendations were communicated to primary team via verbal communication.     Dieter Aden MD   [facility]   Department of Medicine   Division of Renal Disease and Hypertension     Review of Systems:   I reviewed the following systems:  Gen: No fevers or chills  CV: No CP at rest  Resp: No SOB at rest  GI: No N/V    Physical Exam:   I/O last 3 completed shifts:  In: 412.4 [P.O.:240; I.V.:172.4]  Out: 2200 [Urine:1700; Stool:500]   BP (!) 156/111 (BP Location: Right arm)   Pulse 71   Temp 98.2  F (36.8  C) (Oral)   Resp 17   Ht 1.58 m (5' 2.21\")   Wt 56.4 kg (124 lb 4.8 oz)   LMP 12/11/2023 (Approximate)   SpO2 100%   BMI 22.59 kg/m       GENERAL APPEARANCE: alert " and no distress, flat affect.    EYES:  No scleral icterus  HENT: mouth without ulcers or lesions  PULM: Breathing non-labored on RA  CV: regular rhythm, normal rate     -edema +1 BLE  GI: soft, non-tender, non-distended  MS: no evidence of inflammation in joints, no muscle tenderness  NEURO: mentation intact and speech normal  Lines: Temp R CVC     Labs:   All labs reviewed by me  Current Medications:   carvedilol  25 mg Oral BID w/meals    cyanocobalamin  100 mcg Oral Daily    folic acid  1 mg Oral Daily    furosemide  40 mg Intravenous Daily    heparin ANTICOAGULANT  5,000 Units Subcutaneous Q8H    isosorbide mononitrate  60 mg Oral Daily    pantoprazole  40 mg Oral QAM AC    predniSONE  60 mg Oral Daily    sodium bicarbonate  1,300 mg Oral TID    sodium chloride (PF)  3 mL Intracatheter Q8H    sulfamethoxazole-trimethoprim  1 tablet Oral Once per day on Monday Wednesday Friday       Dieter Aden MD   [facility]   Department of Medicine   Division of Renal Disease and Hypertension

## 2023-12-23 NOTE — PLAN OF CARE
Goal Outcome Evaluation:  Hypertensive (150s/100s) otherwise VSS. Afebrile. Alert & oriented x4. Up SBA; no bed alarm & calling appropriately. Adequate intake & output. K+ recheck @ 1123 = 3.5 with additional recheck due overnight. PIVs saline locked. Will continue with plan of care.     Goal: Absence of Hospital-Acquired Illness or Injury  Outcome: Progressing  Intervention: Identify and Manage Fall Risk  Recent Flowsheet Documentation  Taken 12/23/2023 1200 by Jens Jeffery RN  Safety Promotion/Fall Prevention:   activity supervised   assistive device/personal items within reach   clutter free environment maintained   increased rounding and observation   nonskid shoes/slippers when out of bed   patient and family education   room organization consistent   safety round/check completed  Taken 12/23/2023 0800 by Jens Jeffery RN  Safety Promotion/Fall Prevention:   activity supervised   assistive device/personal items within reach   clutter free environment maintained   increased rounding and observation   nonskid shoes/slippers when out of bed   patient and family education   room organization consistent   safety round/check completed  Intervention: Prevent Skin Injury  Recent Flowsheet Documentation  Taken 12/23/2023 0800 by Jens Jeffery RN  Body Position: position changed independently  Skin Protection: adhesive use limited  Device Skin Pressure Protection:   absorbent pad utilized/changed   adhesive use limited   tubing/devices free from skin contact  Intervention: Prevent and Manage VTE (Venous Thromboembolism) Risk  Recent Flowsheet Documentation  Taken 12/23/2023 0800 by Jens Jeffery RN  VTE Prevention/Management: (Ambulation promoted) other (see comments)  Intervention: Prevent Infection  Recent Flowsheet Documentation  Taken 12/23/2023 1200 by Jens Jeffery RN  Infection Prevention:   cohorting utilized   environmental surveillance performed   equipment surfaces disinfected   hand hygiene  promoted   personal protective equipment utilized   rest/sleep promoted   single patient room provided   visitors restricted/screened  Taken 12/23/2023 0800 by Jens Jeffery RN  Infection Prevention:   cohorting utilized   environmental surveillance performed   equipment surfaces disinfected   hand hygiene promoted   personal protective equipment utilized   rest/sleep promoted   single patient room provided   visitors restricted/screened  Goal: Optimal Comfort and Wellbeing  Outcome: Progressing  Goal: Readiness for Transition of Care  Outcome: Progressing     Problem: Acute Kidney Injury/Impairment  Goal: Fluid and Electrolyte Balance  Outcome: Progressing  Intervention: Monitor and Manage Fluid and Electrolyte Balance  Recent Flowsheet Documentation  Taken 12/23/2023 0800 by Jens Jeffery RN  Fluid/Electrolyte Management: fluids provided  Goal: Improved Oral Intake  Outcome: Progressing  Intervention: Promote and Optimize Oral Intake  Recent Flowsheet Documentation  Taken 12/23/2023 0800 by Jens Jeffery RN  Oral Nutrition Promotion:   physical activity promoted   social interaction promoted  Goal: Effective Renal Function  Outcome: Progressing  Intervention: Monitor and Support Renal Function  Recent Flowsheet Documentation  Taken 12/23/2023 1200 by Jens Jeffery RN  Medication Review/Management: medications reviewed  Taken 12/23/2023 0800 by Jens Jeffery RN  Medication Review/Management: medications reviewed     Problem: Fatigue  Goal: Improved Activity Tolerance  Outcome: Progressing  Intervention: Promote Improved Energy  Recent Flowsheet Documentation  Taken 12/23/2023 0800 by Jens Jeffery RN  Activity Management:   activity adjusted per tolerance   activity encouraged

## 2023-12-23 NOTE — PLAN OF CARE
"BP (!) 155/107   Pulse 69   Temp 98.1  F (36.7  C) (Oral)   Resp 18   Ht 1.58 m (5' 2.21\")   Wt 55.6 kg (122 lb 8 oz)   LMP 12/11/2023 (Approximate)   SpO2 99%   BMI 22.26 kg/m      Pt slept comfortably throughout the night between cares. Hypertensive but within parameters set by provider. Other VSS on room air, afebrile. Denies pain and nausea. No PRNs given. 1 bm overnight. Refused heparin injections. Potassium level below goal this morning, PO replacement given. Other heme and electrolyte levels above goal. Continue with current POC.    Goal Outcome Evaluation:    Problem: Adult Inpatient Plan of Care  Goal: Absence of Hospital-Acquired Illness or Injury  Outcome: Progressing  Intervention: Identify and Manage Fall Risk  Recent Flowsheet Documentation  Taken 12/23/2023 0500 by Mare Monson RN  Safety Promotion/Fall Prevention: safety round/check completed  Taken 12/23/2023 0400 by Mare Monson RN  Safety Promotion/Fall Prevention: safety round/check completed  Taken 12/23/2023 0300 by Mare Monson RN  Safety Promotion/Fall Prevention: safety round/check completed  Taken 12/23/2023 0200 by Mare Monson RN  Safety Promotion/Fall Prevention: safety round/check completed  Taken 12/23/2023 0100 by Mare Monson RN  Safety Promotion/Fall Prevention: safety round/check completed  Taken 12/23/2023 0000 by Mare Monson RN  Safety Promotion/Fall Prevention: safety round/check completed  Taken 12/22/2023 2300 by Mare Monson RN  Safety Promotion/Fall Prevention: safety round/check completed  Taken 12/22/2023 2200 by Mare Monson RN  Safety Promotion/Fall Prevention: safety round/check completed  Taken 12/22/2023 2050 by Mare Monson RN  Safety Promotion/Fall Prevention: safety round/check completed  Taken 12/22/2023 1950 by Mare Monson RN  Safety Promotion/Fall Prevention: safety round/check completed  Intervention: Prevent Skin Injury  Recent Flowsheet Documentation  Taken 12/23/2023 " 0400 by Mare Monson RN  Body Position: position changed independently  Taken 12/22/2023 2050 by Mare Monson RN  Body Position: position changed independently  Skin Protection: adhesive use limited  Device Skin Pressure Protection:   absorbent pad utilized/changed   adhesive use limited   tubing/devices free from skin contact  Intervention: Prevent Infection  Recent Flowsheet Documentation  Taken 12/22/2023 2050 by Mare Monson RN  Infection Prevention:   cohorting utilized   environmental surveillance performed   equipment surfaces disinfected   hand hygiene promoted   personal protective equipment utilized   rest/sleep promoted   single patient room provided   visitors restricted/screened  Goal: Optimal Comfort and Wellbeing  Outcome: Progressing     Problem: Acute Kidney Injury/Impairment  Goal: Fluid and Electrolyte Balance  Outcome: Not Progressing  Goal: Improved Oral Intake  Outcome: Progressing  Goal: Effective Renal Function  Intervention: Monitor and Support Renal Function  Recent Flowsheet Documentation  Taken 12/22/2023 2050 by Mare Monson RN  Medication Review/Management: medications reviewed     Problem: Fatigue  Goal: Improved Activity Tolerance  Outcome: Not Progressing  Intervention: Promote Improved Energy  Recent Flowsheet Documentation  Taken 12/22/2023 2050 by Mare Monson RN  Activity Management:   activity adjusted per tolerance   activity encouraged

## 2023-12-23 NOTE — PLAN OF CARE
"Remains hypertensive (160/115,within parameters). OVSS on RA. Denies pain. Denies n/v. Up SBA. PIV SL. AUOP, small BM. Working on dinner. Visiting with family and resting between cares.       Problem: Adult Inpatient Plan of Care  Goal: Plan of Care Review  Description: The Plan of Care Review/Shift note should be completed every shift.  The Outcome Evaluation is a brief statement about your assessment that the patient is improving, declining, or no change.  This information will be displayed automatically on your shift  note.  Outcome: Progressing  Goal: Patient-Specific Goal (Individualized)  Description: You can add care plan individualizations to a care plan. Examples of Individualization might be:  \"Parent requests to be called daily at 9am for status\", \"I have a hard time hearing out of my right ear\", or \"Do not touch me to wake me up as it startles  me\".  Outcome: Progressing  Goal: Absence of Hospital-Acquired Illness or Injury  Outcome: Progressing  Intervention: Identify and Manage Fall Risk  Recent Flowsheet Documentation  Taken 12/23/2023 1700 by Rachel Joaquin, RN  Safety Promotion/Fall Prevention:   safety round/check completed   assistive device/personal items within reach   clutter free environment maintained   increased rounding and observation   increase visualization of patient   lighting adjusted   nonskid shoes/slippers when out of bed   patient and family education   room organization consistent  Intervention: Prevent Skin Injury  Recent Flowsheet Documentation  Taken 12/23/2023 1543 by Rachel Joaquin, RN  Body Position: position changed independently  Intervention: Prevent Infection  Recent Flowsheet Documentation  Taken 12/23/2023 1700 by Rachel Joaquin, RN  Infection Prevention:   cohorting utilized   environmental surveillance performed   equipment surfaces disinfected   hand hygiene promoted   personal protective equipment utilized   rest/sleep promoted   single patient room provided   " visitors restricted/screened  Goal: Optimal Comfort and Wellbeing  Outcome: Progressing  Goal: Readiness for Transition of Care  Outcome: Progressing     Problem: Acute Kidney Injury/Impairment  Goal: Fluid and Electrolyte Balance  Outcome: Progressing  Goal: Improved Oral Intake  Outcome: Progressing  Goal: Effective Renal Function  Outcome: Progressing  Intervention: Monitor and Support Renal Function  Recent Flowsheet Documentation  Taken 12/23/2023 1700 by Rachel Joaquin RN  Medication Review/Management: medications reviewed     Problem: Fatigue  Goal: Improved Activity Tolerance  Outcome: Progressing  Intervention: Promote Improved Energy  Recent Flowsheet Documentation  Taken 12/23/2023 1700 by Rachel Joaquin, RN  Activity Management: activity adjusted per tolerance  Taken 12/23/2023 1543 by Rachel Joaquin, RN  Activity Management: activity adjusted per tolerance

## 2023-12-23 NOTE — PROGRESS NOTES
Stop time on MAR & chart I & O  Chemo drug: cycloPHOSphamide (CYTOXAN) 500 mg in sodium chloride 0.9 % 300 mL infusion   Tolerated: Well  Intervention: Prophylactic antiemetic administered  Response: Tolerated  Plan: Continue to assess & monitor  Lab: Na, K, UpH drug level: Will do @ 0404

## 2023-12-24 NOTE — PROGRESS NOTES
[facility]    Medicine Progress Note - Hospitalist Service, GOLD TEAM 8    Date of Admission:  12/19/2023    Assessment & Plan      Milly Carroll is a 22 y/o woman who has lupus complicated by lupus flares, cerebritis and nephritis. Patient also has hypertension, chronic systolic heart failure, QTc prolongation, past perinephric hematoma s/p emobolization in Aug-2023 and chronic thrombocytopenia. She was admitted to [facility] in [place] on 17-Dec-2023 and transferred to [facility] on 19-Dec-2023 then to [facility] on 12/22/23 for cyclophosphamide infusion.      At [facility], patient was treated medically for a possible NSTEMI and pneumonia. She was treated for lupus crisis with steroids and plasma exchange.     Today's Plan  - Spoke with aunt and ankle, will help pt with medications and PCP apt. Will pickup pt tomorrow  - Spoke with Nephrology, her primary Nephrologist in [place] to assume care  - Stop Imdur, start nifedipine per Neph  - Pull internal jugular line    Lupus flare  Lupus Nephritis   CHRISTA, improving  Patient has longstanding history of SLE and difficulty with treatment adherence. Previously father was helping but passed. Now living with aunt who will help pt.    - Rheumatology consulted, appreciate recommendations  - Nephrology following, appreciate rec's  - S/p 3rd dose of Cytoxan on 12/23   - Will need two more doses every 2 weeks. Outpt Nephrologist to arrange  - Continue prednisone 60 mg daily. Will discharge and taper per Nephrology rec's   - 60 mg for 2 weeks then reduce by 10 mg every 2 wks until she is at 20 mg    - Once at 20 mg, reduce by 5 mg every 2 weeks until she on 5 mg daily    - Will need PCP prophy until below 20 mg    - Avoid hydroxychloroquine for now due to QTc  - NaHCO 650 TID  - Bactrim three days weekly for PJP prophylaxis  - Will stop Lasix    Acute on chronic systolic heart failure  Myocardial  injury  QTc prolongation  Echocardiogram on 17-Dec-2023 showed LVEF 35-40%. Lexiscan  negative for ischemia   - Cardiology consulted, appreciate recommendations  - Strict I/O, daily weights  - Low salt diet  - Continue carvedilol,   - Avoid ACEi/ARB due to CHRISTA  - Avoid QTc prolonging medications  - Jardiance to be on hold for now.     Anemia with concern for hemolysis  Thrombocytopenia  The rheumatologist kindly discussed with the hematologist today regarding possible need for further hematologic work-up. The hematologist felt that this should be deferred.   - Oral vitamin B12  - Trend CBC    Abnormal chest CT concerning for possible pneumonia  Cefepime and metronidazole started , Zosyn started .   - Reviewed case and antibiotic stewardship recommendations. Given lack of fever, other possible etiologies for lung infiltrates (inflammation, volume overload) elected to stop antibiotics . 4 day course is reasonable.   - Will monitor.     Hypertension:  - Continuing coreg   - Stop Imdur, start nifedipine per Neph   - Monitoring.    History of DVT:  - DVT prophylaxis will be restarted given platelet >40    Transaminitis  - Monitoring    Social history: Her dad  during . She moved in with her aunt shortly before this admission. She completed high school but no postsecondary education. No consistent employment. Declined by multiple Home health. Spoke with aunt Shira, she will help pt with medications and arrange PCP follow-up. Discussed with Nephrology, pt's outpatient Nephrologist aware of the plan and will continue as outpatient.           Diet: Regular Diet Adult    DVT Prophylaxis: Pneumatic Compression Devices  Leung Catheter: Not present  Lines: None     Cardiac Monitoring: None  Code Status: Full Code      Clinically Significant Risk Factors        # Hypokalemia: Lowest K = 3 mmol/L in last 2 days, will replace as needed  # Hypernatremia: Highest Na = 146 mmol/L in last 2 days, will  monitor as appropriate      # Hypoalbuminemia: Lowest albumin = 2.7 g/dL at 12/23/2023  4:09 AM, will monitor as appropriate   # Thrombocytopenia: Lowest platelets = 67 in last 2 days, will monitor for bleeding    # Acute heart failure with reduced ejection fraction: last echo with EF <40% and receiving IV diuretics           # Financial/Environmental Concerns: none         Disposition Plan     Expected Discharge Date: 12/24/2023      Destination: home with family  Discharge Comments: Will need Home RN for med management.      55 MINUTES SPENT BY ME on this date of service doing chart review, obtaining & updating any of the elements in the past,family and social histories, performing an applicable exam, chart documentation & further activities as documented in this note.     Rodolfo Quevedo MD  Hospitalist Service, [address]  Securely message with [employer] (more info)  Text page via [employer] Paging/Directory   See signed in provider for up to date coverage information  ______________________________________________________________________    Interval History   No acute events, has no complaints. She is requesting to go home if possible     Physical Exam   Vital Signs: Temp: 97.9  F (36.6  C) Temp src: Oral BP: (!) 147/105 Pulse: 77   Resp: 16 SpO2: 100 % O2 Device: None (Room air)    Weight: 124 lbs 4.8 oz  Constitutional: Awake, alert, cooperative, no apparent distress  Respiratory: Clear to auscultation bilaterally  Cardiovascular: Regular rate and rhythm  GI: Normal bowel sounds, soft, non-distended, non-tender  Skin/Integumen: No rashes, no cyanosis    Data     I have personally reviewed the following data over the past 24 hrs:    4.0  \   7.6 (L)   / 67 (L)     144 111 (H) 58.9 (H) /  95   3.4 21 (L) 2.42 (H) \     ALT: N/A AST: N/A AP: N/A TBILI: N/A   ALB: 2.8 (L) TOT PROTEIN: N/A LIPASE: N/A

## 2023-12-24 NOTE — PROGRESS NOTES
Nephrology Progress Note  12/24/2023       23 yof w/SLE with LN since 2007.  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  She presented to local hospital 12/17 ([facility] in [place]) due to SOB and malaise, found to have CHRISTA and pancytopenia with evidence of MAHA (hapto 21,  and + schistocytes).  Transferred to [facility] for PLEX starting on 12/18, Nephrology consulted for management of CHRISTA on CKD and SLE with LN flare.       Interval History :   -Mrs Chamberlain Cr continues to improve  -First in hospital dose of cytoxan received 12/22 [she had received 3 doses of Cytoxan on an outside basis before]  - currently on Prednisone 60 mg daily  -Feels ok, no SOB, wants to go home  -No Plans for further PLEX by hematology  -BP remains elevated 150-160/105-115    Assessment & Recommendations:   # CHRISTA stage 3 secondary to LN flare and contrast induced nephropathy  # SLE; poorly controlled due to poor compliance, h/o lupus cerebritis, seizures  # Pericardial effusion   # Lupus nephritis,class III/V (done 6/2022-read at [place])  # Likely CKD stage 4 based on cystatin C  # Poor medical compliance  # Allergic Rxn with RTX  # Cystatin C discrepancy: Cystatin C 5.6 (eGFR 9) vs Cr 3.06 (eGFR 21)  # Psychosocial complexity/Hx of non-Compliance  # h/o rt knee osteonecrosis   # b/l AVN of hips  # S/p PLEX x 2 on 12/18-12/19  # Met acidosis  # Anemia  # Thrombocytopenia  #HTN    -Kidney bx on 8/2/23 was consistent with diffuse proliferative, sclerosing LN. There were 2% active crescents, 15% fibrous crescents. 50% of gloms are globally sclerosed and she has severe (70-80%) tubulo-interstitial fibrosis.     -She is managed by Dr Purvis as an outpatient.  However, has hx of non-compliance. She has responded to cytoxan before, so her course was continued while inpatient.     -I talked to her primary nephrologist. She had already received 3 doses of Cytoxan on  "outside basis and she received her fourth dose on December 22 with, 2023.    -She will need 2 more doses of Cytoxan 500 mg IV every 2 weeks for the next month. Her primary nephrologist Dr. April Lee will communicate that to her nephrologist in [place]    -Discussions about the effects on fertility were done earlier in her disease course, she was offered Lupron as well as egg freezing options but she did not want either.     -Upon discharge, she should be discharged on prednisone 60 mg daily for 2 weeks then her dose is to be tapered by 10 mg every 2 weeks until she is at 20mg daily, then the dose is to be reduced by 5 mg every 2 weeks until she is on 5 mg daily    -While on a high dose prednisone [more than 20%] she should continue on PJP prophylaxis with Bactrim three times weekly    -No further plans for PLEX per heme; her temporary catheter was removed    -There has been a slight decline in hb  but her plts are improving, will monitor, no new labs today    -No lasix upon discharge, continue coreg 25 mg BID, would stop IMDUR and start nifedipine 60 mg instead    -Cut down Sodium bicarb to 650 mg TID. This dose need to be adjusted upon Follow up labs (high Blood pressure and high salt laod)    Time spent: 35 minutes on this date of encounter for chart review, physical exam, medical decision making and co-ordination of care.     Recommendations were communicated to primary team via verbal communication.     Dieter Aden MD   [facility]   Department of Medicine   Division of Renal Disease and Hypertension     Review of Systems:   I reviewed the following systems:  Gen: No fevers or chills  CV: No CP at rest  Resp: No SOB at rest  GI: No N/V    Physical Exam:   I/O last 3 completed shifts:  In: 720 [P.O.:720]  Out: 1000 [Urine:1000]   BP (!) 147/105 (BP Location: Left arm, Cuff Size: Adult Regular)   Pulse 77   Temp 97.9  F (36.6  C) (Oral)   Resp 16   Ht 1.58 m (5' 2.21\")   Wt " 56.4 kg (124 lb 4.8 oz)   LMP 12/11/2023 (Approximate)   SpO2 100%   BMI 22.59 kg/m       GENERAL APPEARANCE: alert and no distress, flat affect.    EYES:  No scleral icterus  HENT: mouth without ulcers or lesions  PULM: Breathing non-labored on RA  CV: regular rhythm, normal rate     -edema +1 BLE  GI: soft, non-tender, non-distended  MS: no evidence of inflammation in joints, no muscle tenderness  NEURO: mentation intact and speech normal  Lines: Temp R CVC     Labs:   All labs reviewed by me  Current Medications:   carvedilol  25 mg Oral BID w/meals    cyanocobalamin  100 mcg Oral Daily    folic acid  1 mg Oral Daily    furosemide  40 mg Intravenous Daily    heparin ANTICOAGULANT  5,000 Units Subcutaneous Q8H    isosorbide mononitrate  60 mg Oral Daily    pantoprazole  40 mg Oral QAM AC    predniSONE  60 mg Oral Daily    sodium bicarbonate  1,300 mg Oral TID    sodium chloride (PF)  3 mL Intracatheter Q8H    sulfamethoxazole-trimethoprim  1 tablet Oral Once per day on Monday Wednesday Friday       Dieter Aden MD   [facility]   Department of Medicine   Division of Renal Disease and Hypertension

## 2023-12-24 NOTE — PLAN OF CARE
"A&OX4. Remains hypertensive within parameters. Started nifedipine today. OVSS on RA. Denies pain. Flat/hypoactive affect and requires much encouragement for cares. Refused heparin. Educated, continued refusal. PIV SL. Internal jugular removed by provider today. Dressing over site intact. BM today. Reports voiding but not saving urine, educated and hat placed in toilet. SBA with walker. Slept between cares.       Problem: Adult Inpatient Plan of Care  Goal: Plan of Care Review  Description: The Plan of Care Review/Shift note should be completed every shift.  The Outcome Evaluation is a brief statement about your assessment that the patient is improving, declining, or no change.  This information will be displayed automatically on your shift  note.  Outcome: Progressing  Goal: Patient-Specific Goal (Individualized)  Description: You can add care plan individualizations to a care plan. Examples of Individualization might be:  \"Parent requests to be called daily at 9am for status\", \"I have a hard time hearing out of my right ear\", or \"Do not touch me to wake me up as it startles  me\".  Outcome: Progressing  Goal: Absence of Hospital-Acquired Illness or Injury  Outcome: Progressing  Intervention: Identify and Manage Fall Risk  Recent Flowsheet Documentation  Taken 12/24/2023 1500 by Rachel Joaquin RN  Safety Promotion/Fall Prevention: safety round/check completed  Taken 12/24/2023 1400 by Rachel Joaquin, RN  Safety Promotion/Fall Prevention: safety round/check completed  Taken 12/24/2023 1300 by Rachel Joaquin, RN  Safety Promotion/Fall Prevention: safety round/check completed  Taken 12/24/2023 1200 by Rachel Joaquin RN  Safety Promotion/Fall Prevention:   safety round/check completed   activity supervised   assistive device/personal items within reach   clutter free environment maintained   increased rounding and observation   increase visualization of patient   mobility aid in reach   nonskid shoes/slippers when out of " bed   patient and family education   room organization consistent   room near nurse's station   supervised activity  Taken 12/24/2023 1100 by Rachel Joaquin RN  Safety Promotion/Fall Prevention: safety round/check completed  Taken 12/24/2023 1000 by Rachel Joaquin RN  Safety Promotion/Fall Prevention: safety round/check completed  Taken 12/24/2023 0900 by Rachel Joaquin RN  Safety Promotion/Fall Prevention:   safety round/check completed   activity supervised   assistive device/personal items within reach   clutter free environment maintained   increased rounding and observation   increase visualization of patient   mobility aid in reach   nonskid shoes/slippers when out of bed   patient and family education   room organization consistent   room near nurse's station   supervised activity  Taken 12/24/2023 0800 by Rachel Joaquin RN  Safety Promotion/Fall Prevention: safety round/check completed  Taken 12/24/2023 0730 by Rachel Joaquin RN  Safety Promotion/Fall Prevention: safety round/check completed  Intervention: Prevent Skin Injury  Recent Flowsheet Documentation  Taken 12/24/2023 0900 by Rachel Joaquin RN  Body Position: position changed independently  Intervention: Prevent and Manage VTE (Venous Thromboembolism) Risk  Recent Flowsheet Documentation  Taken 12/24/2023 0900 by Rachel Joaquin RN  VTE Prevention/Management: (ambulation promoted) other (see comments)  Intervention: Prevent Infection  Recent Flowsheet Documentation  Taken 12/24/2023 1200 by Rachel Joaquin RN  Infection Prevention:   cohorting utilized   environmental surveillance performed   equipment surfaces disinfected   hand hygiene promoted   personal protective equipment utilized   rest/sleep promoted   single patient room provided   visitors restricted/screened  Taken 12/24/2023 0900 by Rachel Joaquin RN  Infection Prevention:   cohorting utilized   environmental surveillance performed   equipment surfaces disinfected   hand hygiene promoted    personal protective equipment utilized   rest/sleep promoted   single patient room provided   visitors restricted/screened  Goal: Optimal Comfort and Wellbeing  Outcome: Progressing  Goal: Readiness for Transition of Care  Outcome: Progressing     Problem: Acute Kidney Injury/Impairment  Goal: Fluid and Electrolyte Balance  Outcome: Progressing  Goal: Improved Oral Intake  Outcome: Progressing  Goal: Effective Renal Function  Outcome: Progressing  Intervention: Monitor and Support Renal Function  Recent Flowsheet Documentation  Taken 12/24/2023 1200 by Rachel Joaquin RN  Medication Review/Management: medications reviewed  Taken 12/24/2023 0900 by Rachel Joaquin RN  Medication Review/Management: medications reviewed     Problem: Fatigue  Goal: Improved Activity Tolerance  Outcome: Progressing  Intervention: Promote Improved Energy  Recent Flowsheet Documentation  Taken 12/24/2023 0900 by Rachel Joaquin, RN  Activity Management: activity adjusted per tolerance

## 2023-12-24 NOTE — PROGRESS NOTES
Care Management Follow Up/Transition Planning Update    Length of Stay (days): 5    Expected Discharge Date: 12/24/2023     Concerns to be Addressed: discharge planning for outpatient follow up needs.    Patient plan of care discussed at interdisciplinary rounds: Yes    Anticipated Discharge Disposition: Home to the [facility] area with her Aunt Jaimee-Phone: [phone]     Anticipated Discharge Services: None  Anticipated Discharge DME: None    Education Provided on the Discharge Plan: Yes  Patient/Family in Agreement with the Plan: yes    Referrals Placed by CM/SW:    Private pay costs discussed: Not applicable    Additional Information:  After discussion with the MD team this early morning, the Department of Care Management attempted to inquire if patient could be transitioned back to [facility] secondary to the fact that a Transfer agreement had been obtained on December 17, 2023.  Louise at phone  was the staff person at [facility] who was assisting with transfer needs.  By late morning after the MD team collaborated about a discharge plan, it was determined that Ms. Carroll would be able to transition to home with her Aunt Jaimee who will assist with coordinating outpatient follow up needed including establishing care with a primary care provider.  Per MD team, patient will also be followed by a Nephrologist in her home area.  Patient's aunt will also assist patient with medication management and compliance and making sure that patient follows up in general in the community with her medical providers.  Aunt will arrinve in the morning to transport patient and PCU Charge RN was updated about the above.  ____________________________    MANEULA Mckinney.S.N., R.N., P.H.N.  Administrative Nurse Supervisor [facility]  Care Coordinator Nurse [place]/[facility]-Today only at pager [phone]  [facility]

## 2023-12-25 NOTE — PLAN OF CARE
A x O, VSS on RA. Denies pain and n/v. Patient refusing heparin shot and also not saving urine. Internal jugular dressing w/ dried drainage. Potassium 3.1, needs replacing this morning. SBA + walker. Resting between cares. Continue POC.

## 2023-12-25 NOTE — PLAN OF CARE
Pt discharged to: Home  Via: Private Car  Time: 1415  Reason not before 11am: Awaiting ride and for medications to be filled  Accompanied by: Aunt  Belongings: Sent with patient  Teaching: Completed with patient, aunt

## 2023-12-25 NOTE — PROGRESS NOTES
Care Management Discharge Note    Discharge Date: 12/25/2023       Discharge Disposition: Home    Discharge Services: None    Discharge DME: None    Discharge Transportation: family or friend will provide    Private pay costs discussed: Not applicable    Does the patient's insurance plan have a 3 day qualifying hospital stay waiver?  No    PAS Confirmation Code:NA    Patient/family educated on Medicare website which has current facility and service quality ratings: no    Education Provided on the Discharge Plan: Yes  Persons Notified of Discharge Plans: patient  Patient/Family in Agreement with the Plan: yes    Handoff Referral Completed: No, No PCP on file . Patient is yet to establish one with the help of aunt per last RN CC note.    Additional Information:    Jacqui Li RN, PHN, BSN   Float Nurse Care Coordinator  Covering for [address]  Phone [phone]

## 2023-12-25 NOTE — PROGRESS NOTES
Brief Cardiology Update    Noted that patient is to be discharged today. Appreciate the care from primary, nephrology, and rheumatology. Unfortunately kidney function has not recovered enough yet for us to add any additional GDMT at this time. Ok with nephrology's plan of coreg and nifedipine for now, but please make sure patient has cardiology follow-up as an outpatient for continued management of her heart failure.    We will sign off at this time, please feel free to reach out with any additional questions or concerns.     Guerrero Mcgarry MD  Cardiology Fellow    I very much appreciated the opportunity to see and assess Milly Carroll in the hospital with CV Fellow Dr Mcgarry. I agree with the note above which summarizes my findings and current recommendations.  Please do not hesitate to contact my office if you have any questions or concerns.      Wilmer Arreaga MD  Cardiac Arrhythmia Service  [facility]  [phone]

## 2023-12-25 NOTE — DISCHARGE SUMMARY
[facility]  Hospitalist Discharge Summary      Date of Admission:  12/19/2023  Date of Discharge:  12/25/2023  Discharging Provider: Rodolfo Quevedo MD  Discharge Service: Hospitalist Service, GOLD TEAM 8    Discharge Diagnoses     Please see Hospital Course below    Clinically Significant Risk Factors          Follow-ups Needed After Discharge   Follow-up Appointments     Follow Up and recommended labs and tests      Follow up with primary care provider (Aunt to arrange near home) within 7   days for hospital follow- up.  The following labs/tests are recommended:   BMP, CBC.      Follow up with Nephrology in [place] with Dr. April Lee in 1 week, you   will get a call for follow up    Follow up with your Rheumatologist near home in 4 weeks    Follow up with your Cardiologist in 2-4 weeks            Unresulted Labs Ordered in the Past 30 Days of this Admission       No orders found from 11/19/2023 to 12/20/2023.        These results will be followed up by NA    Discharge Disposition   Discharged to home  Condition at discharge: Good    Hospital Course   Milly Carroll is a 22 y/o woman who has lupus complicated by lupus flares, cerebritis and nephritis. Patient also has hypertension, chronic systolic heart failure, QTc prolongation, past perinephric hematoma s/p emobolization in Aug-2023 and chronic thrombocytopenia. She was admitted to [facility] in [place] on 17-Dec-2023 and transferred to [facility] on 19-Dec-2023 then to [place] on 12/22/23 for cyclophosphamide infusion.      At [facility], patient was treated medically for a possible NSTEMI and pneumonia. She was treated for lupus crisis with steroids and plasma exchange.     The following issues were addressed while inpatient    Lupus flare  Lupus Nephritis   CHRISTA, improving  Patient has longstanding history of SLE and difficulty with treatment adherence. Previously father was  helping but passed. Now living with aunt who will help pt.    - Rheumatology consulted, appreciate recommendations  - Nephrology following, appreciate rec's  - S/p 3rd dose of Cytoxan on 12/23   - Will need two more doses every 2 weeks. Outpt Nephrologist to arrange   - Dr. Aden of Nephrology communicated plan with Dr. Chen who is her primary nephrologist   - Continue prednisone 60 mg daily. Will discharge and taper per Nephrology rec's   - 60 mg for 2 weeks then reduce by 10 mg every 2 wks until she is at 20 mg    - Once at 20 mg, reduce by 5 mg every 2 weeks until she on 5 mg daily    - Will need PCP prophy until below 20 mg    - Avoid hydroxychloroquine for now due to QTc  - NaHCO 650 TID  - Bactrim for PJP prophylaxis  - No need for Lasix on discharge per nephrology    Acute on chronic systolic heart failure  Probable demand ischemia   QTc prolongation  Echocardiogram on 17-Dec-2023 showed LVEF 35-40%. Lexiscan 12/21 negative for inducible ischemia   - Cardiology consulted, appreciate recommendations  - Strict I/O, daily weights  - Low salt diet  - Continue carvedilol   - Avoid ACEi/ARB due to CHRISTA  - Avoid QTc prolonging medications  - Jardiance to be on hold for now.     Anemia with concern for hemolysis  Thrombocytopenia  The rheumatologist kindly discussed with the hematologist regarding possible need for further hematologic work-up. The hematologist felt that likely etiology is lupus and workup should be deferred  - Oral vitamin B12, iron   - Trend CBC    Abnormal chest CT concerning for possible pneumonia  Cefepime and metronidazole started 12-17, Zosyn started 12/19.   - Reviewed case and antibiotic stewardship recommendations. Given lack of fever, other possible etiologies for lung infiltrates (inflammation, volume overload) elected to stop antibiotics 12/20. 4 day course is reasonable.   - Will monitor.     Hypertension:  - Continuing coreg   - Stop Imdur, start nifedipine per Neph   -  Monitoring.    Transaminitis  - Monitoring    Social history: Her dad  during . She moved in with her aunt shortly before this admission. She completed high school but no postsecondary education. No consistent employment. Declined by multiple Home health. Spoke with aunt Shira, she will help pt with medications and arrange PCP follow-up. Discussed with Nephrology, pt's outpatient Nephrologist aware of the plan and will continue as outpatient.     Consultations This Hospital Stay   NEPHROLOGY GENERAL ADULT IP CONSULT  HEMATOLOGY ADULT IP CONSULT  RHEUMATOLOGY IP CONSULT  CARDIOLOGY GENERAL ADULT IP CONSULT  CARE MANAGEMENT / SOCIAL WORK IP CONSULT  NURSING TO CONSULT FOR VASCULAR ACCESS CARE IP CONSULT    Code Status   Full Code    Time Spent on this Encounter   I, Rodolfo Quevedo MD, personally saw the patient today and spent greater than 30 minutes discharging this patient.       Rodolfo Quevedo MD  [facility] [address] [address]  Phone: [phone]  Fax: [phone]  ______________________________________________________________________    Physical Exam   Vital Signs: Temp: 98.6  F (37  C) Temp src: Axillary BP: (!) 125/90 Pulse: 83   Resp: 16 SpO2: 99 % O2 Device: None (Room air)    Weight: 124 lbs 12.49 oz    Constitutional: Awake, alert, cooperative, no apparent distress  Respiratory: Clear to auscultation bilaterally  Cardiovascular: Regular rate and rhythm  GI: Normal bowel sounds, soft, non-distended, non-tender  Skin/Integumen: No rashes, no cyanosis       Primary Care Physician   Physician No Ref-Primary    Discharge Orders      CBC with platelets     Basic metabolic panel     Primary Care Referral      Reason for your hospital stay    You were hospitalized for lupus flare and kidney damage from lupus. Treated with medications and plasma exchange, improved and discharged     Activity    Your activity upon discharge: activity as  tolerated     Follow Up and recommended labs and tests    Follow up with primary care provider (Aunt to arrange near home) within 7 days for hospital follow- up.  The following labs/tests are recommended: BMP, CBC.      Follow up with Nephrology in [place] with Dr. April Lee in 1 week, you will get a call for follow up    Follow up with your Rheumatologist near home in 4 weeks    Follow up with your Cardiologist in 2-4 weeks     Diet    Follow this diet upon discharge: Orders Placed This Encounter      Regular Diet Adult       Significant Results and Procedures   Most Recent 3 CBC's:  Recent Labs   Lab Test 12/25/23  0442 12/24/23  0943 12/23/23  0409   WBC 5.9 4.0 5.0   HGB 8.2* 7.6* 7.1*   * 101* 99   PLT 71* 67* 71*     Most Recent 3 BMP's:  Recent Labs   Lab Test 12/25/23  0442 12/24/23  1605 12/24/23  0943 12/23/23  1123 12/23/23  0409     --  144  --  146*   POTASSIUM 3.1*  3.1* 3.9 3.4   < > 3.0*  3.0*   CHLORIDE 110*  --  111*  --  113*   CO2 20*  --  21*  --  19*   BUN 60.5*  --  58.9*  --  63.6*   CR 2.57*  --  2.42*  --  2.62*   ANIONGAP 14  --  12  --  14   BISI 8.0*  --  8.2*  --  7.7*   *  --  95  --  161*    < > = values in this interval not displayed.   ,   Results for orders placed or performed during the hospital encounter of 12/19/23   NM MPI Radiologist Consult For Cardiology    Narrative    EXAMINATION: NM MPI RADIOLOGIST CONSULT FOR CARDIOLOGY, 12/21/2023  1:09 PM     COMPARISON: Chest CT 12/17/2023    HISTORY: Chronic systolic heart failure from non-ischemic  cardiomyopathy    TECHNIQUE: Limited field of view 5 mm CT images were acquired for  attenuation correction purposes for nuclear medicine cardiac study.  Radiology consulted to review the CT images. Please refer to separate  dictation for the nuclear medicine portion of the study.    FINDINGS:     Heart size is enlarged. Trace pericardial effusion. Central venous  catheter terminates in the superior cavoatrial  junction. No  significant coronary artery atherosclerotic calcifications. The  visualized mediastinal and hilar lymph nodes are not enlarged.    No pleural effusion or pneumothorax at this level. Patchy groundglass  opacities bilaterally, better evaluated on chest CT 12/17/2023. Linear  atelectasis in the left lung base.    Visualized upper abdomen is unremarkable. No acute osseous lesions of  the visualized thorax.      Impression    IMPRESSION:  1. No acute abnormality on the CT images of the lower chest and upper  abdomen.  2. Please see separate report for the nuclear medicine portion of the  study.    I have personally reviewed the examination and initial interpretation  and I agree with the findings.    LUCY JUNG MD         SYSTEM ID:  [id number]   NM Lexiscan stress test (nuc card)     Value    Target     Narrative      The nuclear stress test is negative for inducible myocardial ischemia   or infarction.    LVEDv 198ml. LVESv 104ml. LV EF 47%.    There is no prior study for comparison.         Discharge Medications   Current Discharge Medication List        START taking these medications    Details   cyanocobalamin (CYANOCOBALAMIN) 100 MCG tablet Take 1 tablet (100 mcg) by mouth daily  Qty: 30 tablet, Refills: 3    Associated Diagnoses: Generalized weakness      NIFEdipine ER (ADALAT CC) 60 MG 24 hr tablet Take 1 tablet (60 mg) by mouth daily  Qty: 30 tablet, Refills: 3    Associated Diagnoses: Hypertension, unspecified type           CONTINUE these medications which have CHANGED    Details   carvedilol (COREG) 25 MG tablet Take 1 tablet (25 mg) by mouth 2 times daily (with meals)  Qty: 60 tablet, Refills: 3    Associated Diagnoses: Perinephric hematoma      ferrous sulfate (FEROSUL) 325 (65 Fe) MG tablet Take 1 tablet (325 mg) by mouth daily (with breakfast)  Qty: 100 tablet, Refills: 3    Associated Diagnoses: Systemic lupus erythematosus with other organ involvement, unspecified SLE type (H)       folic acid (FOLVITE) 1 MG tablet Take 1 tablet (1 mg) by mouth daily  Qty: 30 tablet, Refills: 3    Associated Diagnoses: Generalized weakness      ondansetron (ZOFRAN ODT) 4 MG ODT tab Take 1 tablet (4 mg) by mouth every 8 hours as needed for nausea  Qty: 20 tablet, Refills: 3    Associated Diagnoses: Hx of systemic lupus erythematosus (SLE) (H)      pantoprazole (PROTONIX) 40 MG EC tablet Take 1 tablet (40 mg) by mouth every morning (before breakfast)  Qty: 30 tablet, Refills: 3    Associated Diagnoses: Perinephric hematoma      potassium chloride ER (KLOR-CON M) 20 MEQ CR tablet Take 1 tablet (20 mEq) by mouth 2 times daily  Qty: 30 tablet, Refills: 3    Associated Diagnoses: Hypokalemia      predniSONE (DELTASONE) 20 MG tablet Take 3 tablets (60 mg) by mouth daily for 14 days, THEN 2.5 tablets (50 mg) daily for 14 days, THEN 2 tablets (40 mg) daily for 14 days, THEN 1.5 tablets (30 mg) daily for 14 days, THEN 1 tablet (20 mg) daily for 14 days.  Qty: 140 tablet, Refills: 0    Associated Diagnoses: Perinephric hematoma      sodium bicarbonate 650 MG tablet Take 1 tablet (650 mg) by mouth 3 times daily  Qty: 90 tablet, Refills: 3    Associated Diagnoses: Hx of systemic lupus erythematosus (SLE) (H)      sulfamethoxazole-trimethoprim (BACTRIM) 400-80 MG tablet Take 1 tablet by mouth daily  Qty: 30 tablet, Refills: 3    Associated Diagnoses: Perinephric hematoma      Vitamin D, Cholecalciferol, 10 MCG (400 UNIT) TABS Take 10 mcg by mouth daily  Qty: 30 tablet, Refills: 3    Associated Diagnoses: Generalized weakness           CONTINUE these medications which have NOT CHANGED    Details   acetaminophen (TYLENOL) 325 MG tablet Take 2 tablets (650 mg) by mouth every 4 hours as needed for mild pain    Associated Diagnoses: Perinephric hematoma           STOP taking these medications       empagliflozin (JARDIANCE) 10 MG TABS tablet Comments:   Reason for Stopping:         hydroxychloroquine (PLAQUENIL) 200 MG tablet  Comments:   Reason for Stopping:         isosorbide mononitrate (IMDUR) 60 MG 24 hr tablet Comments:   Reason for Stopping:             Allergies   Allergies   Allergen Reactions    Rituximab Anaphylaxis and Shortness Of Breath

## 2023-12-26 PROBLEM — N18.5 CKD (CHRONIC KIDNEY DISEASE) STAGE 5, GFR LESS THAN 15 ML/MIN (H): Status: ACTIVE | Noted: 2023-01-01

## 2023-12-26 NOTE — PROGRESS NOTES
Clinic Care Coordination Contact  Program:  [facility]: [place]   Renewal: UCARE   Date Applied:     JOYCE Outreach:   12/26/23: 1st outreach attempt. Left a message on voicemail with call back information and requested return call.  Plan: CTA will call again within 2 weeks.  Camryn Rosa  Care   [facility]  Clinic Care Coordination  [phone]      Health Insurance:      Referral/Screening:

## 2023-12-26 NOTE — COMMUNITY RESOURCES LIST (ENGLISH)
12/26/2023   [facility] [employer]  N/A  For questions about this resource list or additional care needs, please contact your primary care clinic or care manager.  Phone: [phone]   Email: N/A   Address: [address] [place] [address]   Hours: N/A        Transportation       Free or low-cost transportation  1  [facility] "[employer] [facility] - [employer] - Free or Low-cost Transportation Distance: 2.39 miles      In-Person   [address] [place] [address]  Language: English  Hours: Mon - Fri 8:00 AM - 4:30 PM  Fees: Free   Phone: (484) [phone] Website: [email or web address]-grand-[email or web address]          Important Numbers & Websites       Emergency Services   911  [facility]   311  Poison Control   (477) 434-9368  Suicide Prevention Lifeline   (251) 232-7076 (TALK)  Child Abuse Hotline   (539) 989-5018 (4-A-Child)  Sexual Assault Hotline   (832) 627-4127 (HOPE)  National Runaway Safeline   (554) 944-2096 (RUNAWAY)  All-Options Talkline   (876) 322-2055  Substance Abuse Referral   (123) 229-3613 (HELP)

## 2023-12-26 NOTE — NURSING NOTE
Patient presents to follow up with her lupas and establish a PCP. She is having shortness of breath today. Needs a form for disability filled out.  Neli Walker LPN.........................12/26/2023  11:31 AM

## 2023-12-26 NOTE — PROGRESS NOTES
Assessment & Plan   x of systemic lupus erythematosus (SLE) (H)  Multiple systems including cardiac lung renal  - CBC and Differential; Future    CKD (chronic kidney disease) stage 5, GFR less than 15 ml/min (H)  Patient has a nephrology appointment coming up.    Systemic lupus erythematosus, unspecified SLE type, unspecified organ involvement status (H)  Patient has cardiology appointment coming up  Letter will be dictated.  She is obviously a candidate for disability.               No follow-ups on file.    Guerrero Denton MD  [facility]    Subjective   Milly is a 23 year old, presenting for the following health issues:  Shortness of Breath      12/26/2023    11:26 AM   Additional Questions   Roomed by Neli Walker LPn   Accompanied by Uncle, genoveva       Patient arrives here to establish care.  She is with her uncle who is also going to make sure she makes her follow-up appointment.  Patient over the years has missed multiple medical appointments.  Patient arrives here to establish care and also obtain a statement for disability.  She was recently discharged from [facility].  Her uncle reports that she had gone to ER on December 17.  She was subsequently transferred to [place].  And transferred to [facility] and subsequently to the [facility].  She was diagnosed with lupus.  With kidney involvement lung involvement heart involvement.  She has not been able to work for the last 3 years.  She is weak short of breath with activity.  She cannot stand or walk for any significant amount of time.  She is in the process of getting appointment with cardiology.  Nephrology.  And rheumatology.  Her uncle indicates that he would like to start the disability process.  Patient does have follow-up labs scheduled for January 1.    History of Present Illness       Reason for visit:  Select a .    She eats 0-1 servings of fruits and vegetables daily.She consumes 2 sweetened  beverage(s) daily.She exercises with enough effort to increase her heart rate 9 or less minutes per day.  She exercises with enough effort to increase her heart rate 3 or less days per week. She is missing 1 dose(s) of medications per week.                 Review of Systems   Respiratory:  Positive for shortness of breath.             Objective    LMP 12/11/2023 (Approximate)   There is no height or weight on file to calculate BMI.  Physical Exam  Constitutional:       Appearance: She is ill-appearing. She is not toxic-appearing.      Comments: Patient is in a wheelchair.    Cardiovascular:      Rate and Rhythm: Normal rate and regular rhythm.      Comments: 1 out of 6 systolic murmur  Pulmonary:      Comments: Patient is left side is clear right side occasional crackles  Psychiatric:         Mood and Affect: Mood normal.

## 2023-12-26 NOTE — PROGRESS NOTES
Reached out provider to set up tentative plan for follow up, Jan 3rd at 11am virtual available.  Left voicemail and sent OpenEdt message for patient to:  -Check in on status of how she is doing  -BP checks since home  -med regimen review/symptoms check in  -Follow up appt scheduling- reviewed date and time Dr. Purvis is available and requested patient return call to confirm availability.  -Cytoxin infusion schedule in 2 weeks time- need to confirm patient availability and where she will be before scheduling ([place] or [place]).  -Vasculitis & Lupus nurse line left for timely support.    Oralia Rodriguez RN, BSN, PHN  Vasculitis & Lupus Program Nephrology Nurse Navigator  [phone]

## 2023-12-26 NOTE — LETTER
December 27, 2023      Milly Carroll  [address] [address]        To Whom It May Concern:    Milly Carroll  was seen on 12/26/23 to establish care.  She was recently hospitalized for approximately a week due to systemic lupus erythematosus.  She has multiple system involvement including her heart, kidneys with acute renal failure, anemia.  Patient is being followed by nephrology, cardiology, and rheumatology.  She is remain extremely weak needing assistance in her ADLs.  She currently is not employable and is unlikely to be employable in the near future.  Please call if you should have any questions.        Sincerely,        Guerrero Denton MD

## 2023-12-27 PROBLEM — D69.6 THROMBOCYTOPENIA (H): Status: RESOLVED | Noted: 2022-06-13 | Resolved: 2023-01-01

## 2023-12-27 PROBLEM — I50.9 CHF (CONGESTIVE HEART FAILURE) (H): Status: ACTIVE | Noted: 2023-01-01

## 2023-12-27 NOTE — ED PROVIDER NOTES
"ED PROVIDER NOTE  Patient Name: Milly Carroll  MRN: [id number]    CC:    Chief Complaint   Patient presents with    Cough    Shortness of Breath         MDM  23 year old female presenting with cough and shortness of breath.    In the ED, BP (!) 154/109   Pulse 68   Temp 99.2  F (37.3  C) (Tympanic)   Resp (!) 9   Ht 1.588 m (5' 2.5\")   Wt 59.9 kg (132 lb)   LMP 12/11/2023 (Approximate)   SpO2 99%   BMI 23.76 kg/m      Physical exam revealed clear auscultation bilaterally.  Does have some tachypnea with a rate of 30.  Benign abdominal exam.  Grossly neurologically intact.  In no acute distress, no accessory muscle use.  Overall does not look critically ill or toxic .      I have independently reviewed and interpreted the patients test results which I have ordered this visit which are the following:  Labs were remarkable for white blood cell count of 8.4.  Hemoglobin 8.2 which is stable.  Troponin 118 which is decreased from previous.  Low concern for coronary abnormality.  TSH 9.9, procalcitonin 0.15, CRP less than 3, potassium 5.6 which is mildly elevated, sodium 141, creatinine 2.98, proBNP greater than 70,000.  Patient 7.45.  pCO2 30.  Lactic acid 2.4 then 2.6.  T41.06. Imaging included x-ray of the chest revealed bilateral infiltrates concerning for pneumonitis.  EKG independently reviewed by me revealed EKG reveals a sinus rhythm, no signs of acute ischemia, some artifact.  Low Voltage, ventricular rate 91.  QTc of 457..    Patient was given 500 cc normal saline, 2 DuoNebs, morphine 2 mg IV, Tylenol 650 mg IV.  In addition patient was given Zofran 4 mg IV.    I had discussions with rheumatologist at the [facility], had recommended 160 mg of IV Lasix, and broad-spectrum antibiotics in the case thus this may represent infectious etiologies.  Cytoxan injury is possible.  The etiology is not quite clear.    Discussions with pulmonology at the [facility] who recommended admission possible bronchoscopy " and observation as this can worsen, believe that a CT scan would not likely be helpful.  [facility] they felt uncomfortable taking the patient.  [facility] called.  Patient is on the wait list for the [facility].    Plan  - EKG reveals a sinus rhythm, no signs of acute ischemia, some artifact.  Low Voltage, ventricular rate 91.  QTc of 457..  -Blood cultures x 2  -Lasix 160 mg IV  -Vancomycin and Zosyn IV  -DSDNA, C3 and C4 pending      Clinical impression  Pneumonitis    Disposition  Signed out to my partner      HPI    Milly Carroll is a 23 year old female with PMH of lupus, generalized weakness, hypoalbuminemia, hypocomplementemia, immunosuppression, CKD 5 who presents with cough and shortness of breath.     History of obtained by: Patient and aunt    Patient discharged yesterday from [facility] who has had lupus complicated by lupus flares, cerebritis, nephritis, chronic heart failure, QTc prolongation, perinephric hematoma status post embolization, chronic thrombocytopenia.  She was admitted initially on 12/19 to [facility], she was treated for NSTEMI and pneumonia.  She was also treated for lupus crisis with steroids and plasma exchange.  Patient had received his third dose of Cytoxan 12/23, will need more doses outpatient.  Patient is to continue 60 mg of prednisone daily for 2 weeks and then taper avoiding hydrochloric and given QTc prolongation.  Bactrim for prophylaxis.  Believe did not actually have pneumonia, and antibiotics were deferred ultimately.    Today, patient presents with her aunt with worsening chest pain and shortness of breath.  States she has fevers and chills, no nausea vomiting and diarrhea.  She also presents with bilateral shoulder pain.  She states that it has been ongoing since her admission.  Denies any abdominal pain vomiting diarrhea.   She has been taking her medications as prescribed.  He does not know if she has been started on new medications.  The aunt is still learning about her complicated disease as the patient just recently moved in with the aunt.  Patient has been discontinued on Jardiance, Plaquenil, and Imdur.    PMH and SH reviewed.    10 point ROS reviewed and negative except as stated in HPI.    Past medical history:  Past Medical History:   Diagnosis Date    Hepatitis     Lupus (systemic lupus erythematosus) (H)     Lupus cerebritis (H)     Pancreatitis 08/2017    Pyelonephritis 08/2017    Seizures (H)     Status epilepticus (H)        Social history:  Social Connections: Not on file     Social History     Socioeconomic History    Marital status: Single   Tobacco Use    Smoking status: Never     Passive exposure: Never    Smokeless tobacco: Never   Vaping Use    Vaping Use: Never used   Substance and Sexual Activity    Alcohol use: Not Currently    Drug use: Never    Sexual activity: Not Currently   Social History Narrative    6/20/2013     Milly is the 2nd youngest of 7 children.  She is in the 10th grade and lives with her parents and siblings.  Her family is originally from [place], but Milly was born in the .        06/2022: lives in [place] with older sister, brother-in-law, niece, and brother.         Social Determinants of Health     Financial Resource Strain: Low Risk  (12/26/2023)    Financial Resource Strain     Within the past 12 months, have you or your family members you live with been unable to get utilities (heat, electricity) when it was really needed?: No   Food Insecurity: Low Risk  (12/26/2023)    Food Insecurity     Within the past 12 months, did you worry that your food would run out before you got money to buy more?: No     Within the past 12 months, did the food you bought just not last and you didn t have money to get more?: No   Transportation Needs: High Risk (12/26/2023)    Transportation Needs      Within the past 12 months, has lack of transportation kept you from medical appointments, getting your medicines, non-medical meetings or appointments, work, or from getting things that you need?: Yes   Interpersonal Safety: Low Risk  (12/26/2023)    Interpersonal Safety     Do you feel physically and emotionally safe where you currently live?: Yes     Within the past 12 months, have you been hit, slapped, kicked or otherwise physically hurt by someone?: No     Within the past 12 months, have you been humiliated or emotionally abused in other ways by your partner or ex-partner?: No   Housing Stability: Low Risk  (12/26/2023)    Housing Stability     Do you have housing? : Yes     Are you worried about losing your housing?: No         I have reviewed the patients prescribed medications:  No current facility-administered medications for this encounter.    Current Outpatient Medications:     acetaminophen (TYLENOL) 325 MG tablet, Take 2 tablets (650 mg) by mouth every 4 hours as needed for mild pain, Disp: , Rfl:     carvedilol (COREG) 25 MG tablet, Take 1 tablet (25 mg) by mouth 2 times daily (with meals), Disp: 60 tablet, Rfl: 3    cyanocobalamin (CYANOCOBALAMIN) 100 MCG tablet, Take 1 tablet (100 mcg) by mouth daily, Disp: 30 tablet, Rfl: 3    ferrous sulfate (FEROSUL) 325 (65 Fe) MG tablet, Take 1 tablet (325 mg) by mouth daily (with breakfast), Disp: 100 tablet, Rfl: 3    folic acid (FOLVITE) 1 MG tablet, Take 1 tablet (1 mg) by mouth daily, Disp: 30 tablet, Rfl: 3    NIFEdipine ER (ADALAT CC) 60 MG 24 hr tablet, Take 1 tablet (60 mg) by mouth daily, Disp: 30 tablet, Rfl: 3    ondansetron (ZOFRAN ODT) 4 MG ODT tab, Take 1 tablet (4 mg) by mouth every 8 hours as needed for nausea, Disp: 20 tablet, Rfl: 3    pantoprazole (PROTONIX) 40 MG EC tablet, Take 1 tablet (40 mg) by mouth every morning (before breakfast), Disp: 30 tablet, Rfl: 3    potassium chloride ER (KLOR-CON M) 20 MEQ CR tablet, Take 1 tablet (20 mEq)  "by mouth 2 times daily, Disp: 30 tablet, Rfl: 3    predniSONE (DELTASONE) 20 MG tablet, Take 3 tablets (60 mg) by mouth daily for 14 days, THEN 2.5 tablets (50 mg) daily for 14 days, THEN 2 tablets (40 mg) daily for 14 days, THEN 1.5 tablets (30 mg) daily for 14 days, THEN 1 tablet (20 mg) daily for 14 days., Disp: 140 tablet, Rfl: 0    sodium bicarbonate 650 MG tablet, Take 1 tablet (650 mg) by mouth 3 times daily, Disp: 90 tablet, Rfl: 3    sulfamethoxazole-trimethoprim (BACTRIM) 400-80 MG tablet, Take 1 tablet by mouth daily, Disp: 30 tablet, Rfl: 3    Vitamin D, Cholecalciferol, 10 MCG (400 UNIT) TABS, Take 10 mcg by mouth daily, Disp: 30 tablet, Rfl: 3    Allergies:  Allergies   Allergen Reactions    Rituximab Anaphylaxis and Shortness Of Breath         Vitals:    12/29/23 1745 12/29/23 1758 12/30/23 0415 12/30/23 0905   BP: (!) 151/104  (!) 152/113 (!) 154/109   Pulse: 69 75 68    Resp: 16 (!) 9     Temp:       TempSrc:       SpO2: 100% 100% 99%    Weight:       Height:           Physical Exam  Vitals: BP (!) 154/109   Pulse 68   Temp 99.2  F (37.3  C) (Tympanic)   Resp (!) 9   Ht 1.588 m (5' 2.5\")   Wt 59.9 kg (132 lb)   LMP 12/11/2023 (Approximate)   SpO2 99%   BMI 23.76 kg/m    Constitutional: awake, NAD  Eyes: No conjunctival injection and normal lids, PERRL, EOMI  ENT: external nose and ears atraumatic  Neck: Symmetric, trachea midline, Supple  CV: RRR, no murmurs appreciated.  Pulm: Unlabored respiratory effort, good air movement, CTAB, no w/c/r appreciated.  GI: Soft, nontender, nondistended.  No rebound or guarding  MSK: No deformities.  No cyanosis.  Neuro: AOx4, normal speech, following commands, grossly symmetric strength in all extremities.  Cranial nerves III-XII grossly intact.    Skin: warm & dry, no rashes or lesions  Psych: Appropriate mood & affect    Past medical history, past surgical history, problem list, family history, social history, medication list, and allergies were reviewed " as documented in epic snapshot.  Relevant review of systems are documented within the HPI above.    Complexity of the Problems Addressed  5 (High) - 1 or more chronic illnesses with severe exacerbation, progression, or side effects of treatment or 1 acute chronic illness or injury that poses threat to live or bodily function    Complexity of Data  I have reviewed the following pertinent historical labs, diagnoses, tests, and/or imaging which contribute to complexity of this patient's care.  5 - (Extensive) - (2 of three above)    Complication Risk  Based on my interpretation of the current labs, historical tests and/or external tests. Patient does have a risk level as stated below of morbidity, threat to life, and bodily function, and severe exacerbation if not treated.   5 - High (drug therapy requiring intensive monitoring, elective major surgery with risk factors, emergency major surgery, hospitalization or excalation of hospital level care, decision not to resuscitate or to de-escalate care because of poor prognosis, parenteral controlled substances)  ED Events, Labs and Imaging:  ED Course as of 12/30/23 1934   Tue Dec 26, 2023   2311 2 unsuccessful attempts made at IV access.   Wed Dec 27, 2023   0016 Lactic Acid(!): 2.4   0017 Hemoglobin(!): 8.2   0017 WBC: 8.4   0029 Sed Rate: 1   0039 Troponin 118, likely probably decreased from the troponin last collected of 400 on 12/17.   0054 Cxr    IMPRESSION:   1.   Hypoventilatory changes with moderate fluid overload as above.   2.   Concurrent bilateral pneumonitis can not be excluded.   3.   Remainder of findings described as above.      0109 TSH(!): 9.90   0109 Procalcitonin: 0.15   0109 CRP Inflammation: <3.00   0109 Magnesium: 2.1   0112 Creatinine(!): 2.98   0112 Urea Nitrogen(!): 75.1   0112 Anion Gap: 15   0112 Carbon Dioxide (CO2)(!): 17   0112 Sodium: 141   0112 Glucose(!): 133   0112 Chloride(!): 109   0112 Protein Total(!): 5.7   0112 Albumin(!): 3.0    0117 N-Terminal Pro BNP Inpatient(!): >70,000  Stable    0136 T4 Free: 1.06   0146 Dr. Perez   0218 Ph Venous(!): 7.45   0218 PCO2 Venous(!): 30   0218 Bicarbonate Venous: 21   0358 Lactic Acid(!): 2.6   0500 Patient was declined from [facility], Dr. Perez, recommended that patient be admitted to [facility] where she was recently discharged from.  The reasoning is that given that she had knees that were beyond the acute buildings [facility], she may need them again   0510 Call to [facility]--has no beds. On waitlist.    0513 Call to [facility]--does not have rheumatology   0513 Call to [facility] in [place], no rheumatology   0516 Call to [facility] in [place], [place]. No beds.    0519 Call to [facility], consult to rheumatology request, awaiting phone call   0535 Dr. Steve Rheumotology at the [facility] recommend treating for possible infection as well as giving a dose of Lasix 160 mg IV.     1636 Echocardiogram Complete  Left ventricle   The ejection fraction is 15-20% (severely reduced). Severe diffuse hypokinesis is present. Abnormal non-specific septal  motion is present.     Tricuspid Valve  Moderate to severe tricuspid insufficiency is present. The right ventricular  systolic pressure is approximated at 68.4 mmHg plus the right atrial pressure.  Pulmonary hypertension is present. Pulmonary artery systolic pressure is 83  mmHg (68 mmHg+RA pressure 15 mmHg.).     1658 Evening vancomycin ordered.  Scheduled cefepime ordered.   1709 Checked in on patient and relative.  Patient continues to have pain.  As needed morphine already ordered, adding scheduled Tylenol.   Thu Dec 28, 2023   0838 Potassium: 5.2  Normal today after being mildly elevated 5.6 yesterday.   0858 Resting comfortably in bed.  Diminished bilateral breath sounds.  Normal respiratory effort and no tachypnea.  We have not placed a urine collection hat in the bathroom and  reminded patient that we need to collect urine to monitor I/O's.   1014 Called [facility] - still no bed.   1848 Ordered home carvedilol with elevated blood pressures.  Also given dose of furosemide 40 mg.  If she tolerates this well may want to consider giving furosemide dose tomorrow morning to avoid nocturnal diuresis.   Fri Dec 29, 2023   1913 Hx of Lupus. COVID pos. HF. Awaiting bed at [facility]. Sign out at shift change.       Nolan Cruz MD  Emergency Medicine    Dictation Disclaimer: Some notes are completed with voice-recognition dictation software. Errors are generally corrected in real time. Please contact me via Epic staff message if you note any errors requiring clarification.     Dionicio Cruz MD  12/27/23 0616       Dionicio Cruz MD  12/27/23 0632       Rosie Mann,   12/30/23 1934

## 2023-12-27 NOTE — ED PROVIDER NOTES
Transfer of care from previous shift provider:.  Lupus history just seen here recently in the ED and transferred to [facility] and subsequently [facility], presenting with dyspnea starting yesterday.  Not requiring oxygen but with increased work of breathing and tachypneic up to RR 30.  Chest x-ray shows infiltrates. Contacted all hospitals and ultimately on wait list at [facility].  Rheumatology/pulmonology consulted and recommended Lasix 160 and vancomycin/Zosyn which were given.  ICU bed at [facility] is available if patient does deteriorate such that she needs this level of care.  Currently stable on room air.    ED Course as of 12/31/23 0422   Tue Dec 26, 2023   2311 2 unsuccessful attempts made at IV access.   Wed Dec 27, 2023   0016 Lactic Acid(!): 2.4   0017 Hemoglobin(!): 8.2   0017 WBC: 8.4   0029 Sed Rate: 1   0039 Troponin 118, likely probably decreased from the troponin last collected of 400 on 12/17.   0054 Cxr    IMPRESSION:   1.   Hypoventilatory changes with moderate fluid overload as above.   2.   Concurrent bilateral pneumonitis can not be excluded.   3.   Remainder of findings described as above.      0109 TSH(!): 9.90   0109 Procalcitonin: 0.15   0109 CRP Inflammation: <3.00   0109 Magnesium: 2.1   0112 Creatinine(!): 2.98   0112 Urea Nitrogen(!): 75.1   0112 Anion Gap: 15   0112 Carbon Dioxide (CO2)(!): 17   0112 Sodium: 141   0112 Glucose(!): 133   0112 Chloride(!): 109   0112 Protein Total(!): 5.7   0112 Albumin(!): 3.0   0117 N-Terminal Pro BNP Inpatient(!): >70,000  Stable    0136 T4 Free: 1.06   0146 Dr. Perez   0218 Ph Venous(!): 7.45   0218 PCO2 Venous(!): 30   0218 Bicarbonate Venous: 21   0358 Lactic Acid(!): 2.6   0500 Patient was declined from [facility], Dr. Perez, recommended that patient be admitted to [facility] where she was recently discharged from.  The reasoning is that given that she had knees that were beyond the acute buildings [facility], she may need them again   0510  Call to [facility]--has no beds. On waitlist.    0513 Call to [facility]--does not have rheumatology   0513 Call to [facility] in [place], no rheumatology   0516 Call to [facility] in [place], [place]. No beds.    0519 Call to [facility], consult to rheumatology request, awaiting phone call   0535 Dr. Steve Rheumotology at the [facility] recommend treating for possible infection as well as giving a dose of Lasix 160 mg IV.     1636 Echocardiogram Complete  Left ventricle   The ejection fraction is 15-20% (severely reduced). Severe diffuse hypokinesis is present. Abnormal non-specific septal  motion is present.     Tricuspid Valve  Moderate to severe tricuspid insufficiency is present. The right ventricular  systolic pressure is approximated at 68.4 mmHg plus the right atrial pressure.  Pulmonary hypertension is present. Pulmonary artery systolic pressure is 83  mmHg (68 mmHg+RA pressure 15 mmHg.).     1658 Evening vancomycin ordered.  Scheduled cefepime ordered.   1709 Checked in on patient and relative.  Patient continues to have pain.  As needed morphine already ordered, adding scheduled Tylenol.   u Dec 28, 2023   0838 Potassium: 5.2  Normal today after being mildly elevated 5.6 yesterday.   0858 Resting comfortably in bed.  Diminished bilateral breath sounds.  Normal respiratory effort and no tachypnea.  We have not placed a urine collection hat in the bathroom and reminded patient that we need to collect urine to monitor I/O's.   1014 Called [facility] - still no bed.   1848 Ordered home carvedilol with elevated blood pressures.  Also given dose of furosemide 40 mg.  If she tolerates this well may want to consider giving furosemide dose tomorrow morning to avoid nocturnal diuresis.   Fri Dec 29, 2023   1913 Hx of Lupus. COVID pos. HF. Awaiting bed at [facility]. Sign out at shift change.     Assessment and Plan:  Final diagnoses:   Shortness of breath    Acute on chronic systolic heart failure (H)   Tricuspid valve insufficiency, unspecified etiology - moderate to severe   Exacerbation of systemic lupus erythematosus      Awaiting [facility] placement. Rebolus if significantly worsening dyspnea. I/Os on order and RN checking on any output during day shift. Broad spectrum antibiotics ordered. Signed out to Dr Owen.     Joesph Perez MD  12/27/23 9565       Rosie Mann DO  12/31/23 0631

## 2023-12-27 NOTE — PROGRESS NOTES
2 DuoNeb given as ordered.  BS Fine crackles/Diminished.  Strong NPC.  No change in BS post nebulizer treatment.

## 2023-12-27 NOTE — PHARMACY-VANCOMYCIN DOSING SERVICE
Pharmacy Vancomycin Initial Note  Date of Service 2023  Patient's  2000  23 year old, female    Indication:  Pneumonia    Current estimated CrCl = Estimated Creatinine Clearance: 28 mL/min (A) (based on SCr of 2.98 mg/dL (H)).    Creatinine for last 3 days  2023:  9:43 AM Creatinine 2.42 mg/dL  2023:  4:42 AM Creatinine 2.57 mg/dL  2023: 12:01 AM Creatinine 2.98 mg/dL    Recent Vancomycin Level(s) for last 3 days  No results found for requested labs within last 3 days.      Vancomycin IV Administrations (past 72 hours)        No vancomycin orders with administrations in past 72 hours.                    Nephrotoxins and other renal medications (From now, onward)      Start     Dose/Rate Route Frequency Ordered Stop    2345  furosemide (LASIX) injection 160 mg        Note to Pharmacy: DO NOT USE THIS FIELD FOR ADMIN INSTRUCTIONS; INFORMATION DOES NOT SHOW ON MAR. USE THE FIELD ABOVE MARKED ADMIN INSTRUCTIONS    160 mg  over 1-3 Minutes Intravenous ONCE 2343      2345  vancomycin (VANCOCIN) 1,250 mg in 0.9% NaCl 250 mL intermittent infusion         1,250 mg  over 90 Minutes Intravenous ONCE 23 0543              Contrast Orders - past 72 hours (72h ago, onward)      None            InsightRX Prediction of Planned Initial Vancomycin Regimen  Loading dose: 1250 mg at 06:00 2023.  Regimen: 750 mg IV every 24 hours.  Start time: 06:00 on 2023  Exposure target: AUC24 (range)400-600 mg/L.hr   AUC24,ss: 507 mg/L.hr  Probability of AUC24 > 400: 76 %  Ctrough,ss: 16.9 mg/L  Probability of Ctrough,ss > 20: 33 %  Probability of nephrotoxicity (Lodise MANUEL ): 13 %          Plan:  Start vancomycin  1250 mg IV once while in the ED. Continue vancomycin 750 mg IV every 24 hours starting 24 hours after first dose.   Vancomycin monitoring method: AUC  Vancomycin therapeutic monitoring goal: 400-600 mg*h/L  Pharmacy will check vancomycin levels as  appropriate in 1-3 Days.    Serum creatinine levels will be ordered daily for the first week of therapy and at least twice weekly for subsequent weeks.      Felton Mcgee RPH

## 2023-12-27 NOTE — PHARMACY-VANCOMYCIN DOSING SERVICE
Pharmacy Vancomycin Note  Date of Service 2023  Patient's  2000   23 year old, female    Indication: Community Acquired Pneumonia  Day of Therapy: 1  Current vancomycin regimen:  750 mg IV q24h, starting 24 hours after 1500 mg loading dose  Current vancomycin monitoring method: AUC  Current vancomycin therapeutic monitoring goal: 400-600 mg*h/L    InsightRX Prediction of Current Vancomycin Regimen  Loading dose: 1500 mg (changed by different Rph from initial note w/o documentation)  Regimen: 750 mg IV every 24 hours.  Start time: 08:00 on 2023, 24 hours after 1500 mg load  Exposure target: AUC24 (range)400-600 mg/L.hr   AUC24,ss: 525 mg/L.hr  Probability of AUC24 > 400: 78 %  Ctrough,ss: 17.5 mg/L  Probability of Ctrough,ss > 20: 37 %  Probability of nephrotoxicity (Lodise MANUEL ): 13 %    Current estimated CrCl = Estimated Creatinine Clearance: 28 mL/min (A) (based on SCr of 2.98 mg/dL (H)).    Creatinine for last 3 days  2023:  4:42 AM Creatinine 2.57 mg/dL  2023: 12:01 AM Creatinine 2.98 mg/dL    Recent Vancomycin Levels (past 3 days)  No results found for requested labs within last 3 days.    Vancomycin IV Administrations (past 72 hours)                     vancomycin (VANCOCIN) 1,500 mg in sodium chloride 0.9 % 500 mL intermittent infusion (mg) 1,500 mg New Bag 23 0817                    Nephrotoxins and other renal medications (From now, onward)      Start     Dose/Rate Route Frequency Ordered Stop    23 0800  vancomycin (VANCOCIN) 750 mg in NaCl 0.9% 150 mL infusion         750 mg  over 60 Minutes Intravenous EVERY 24 HOURS 23 1704                 Contrast Orders - past 72 hours (72h ago, onward)      None            Interpretation of levels and current regimen:  Has serum creatinine changed greater than 50% in last 72 hours: No    Urine output:  diminished urine output    Renal Function: Worsening    Plan:  Continue Current Dose  Vancomycin monitoring  method: AUC  Vancomycin therapeutic monitoring goal: 400-600 mg*h/L  Pharmacy will check vancomycin levels as appropriate in 1-3 Days.  Serum creatinine levels will be ordered daily for the first week of therapy and at least twice weekly for subsequent weeks.    Urmila Pickett RPH

## 2023-12-27 NOTE — ED TRIAGE NOTES
Patient presents with cough and SOB since this morning.  Patient does have a productive cough she states and chest pain when she coughs.BP (!) 179/112   Pulse 96   Temp 97.6  F (36.4  C) (Temporal)   Resp 18   Wt 59.9 kg (132 lb)   LMP 12/11/2023 (Approximate)   SpO2 99%   BMI 23.76 kg/m         Triage Assessment (Adult)       Row Name 12/26/23 2059          Triage Assessment    Airway WDL WDL        Respiratory WDL    Respiratory WDL cough;X     Cough Frequency frequent     Cough Type productive;loose

## 2023-12-27 NOTE — PHARMACY-ADMISSION MEDICATION HISTORY
Pharmacy Intern Admission Medication History    Admission medication history is complete. The information provided in this note is only as accurate as the sources available at the time of the update.    Information Source(s): Patient, Family member, Hospital records, and CareEverywhere/SureScripts via in-person    Pertinent Information: Aunt was in the room with patient. Patient was recently discharged from [facility] in [facility] and had med changes and new Rx fills. Patient has recently moved in with aunt and uncle and everyone is learning the new regimens. Uncle recently bought a weekly pill pack to organize but was admitted before it was used. Patient having n/v and pain and does not seem familiar with drug names or admin Hx at this time. Aunt attests that she saw the patient use AVS from 12/25 visit to take medicine. Recently stopped Hydroxychloroquine, Imdur, and Jardiance. Denies any use of OTCs besides APAP. Patient is awaiting Tx. Nurse notified of patient pain and N/v.      Changes made to PTA medication list:  Added: None  Deleted: None  Changed: None    Medication Affordability:  Not including over the counter (OTC) medications, was there a time in the past 3 months when you did not take your medications as prescribed because of cost?: No    Allergies reviewed with patient: yes    Medication History Completed By: Tha Johnson [facility] 12/27/2023 1:36 PM    PTA Med List   Medication Sig Last Dose    acetaminophen (TYLENOL) 325 MG tablet Take 2 tablets (650 mg) by mouth every 4 hours as needed for mild pain Unknown at AM    carvedilol (COREG) 25 MG tablet Take 1 tablet (25 mg) by mouth 2 times daily (with meals) 12/26/2023 at AM    cyanocobalamin (CYANOCOBALAMIN) 100 MCG tablet Take 1 tablet (100 mcg) by mouth daily 12/26/2023 at AM    ferrous sulfate (FEROSUL) 325 (65 Fe) MG tablet Take 1 tablet (325 mg) by mouth daily (with breakfast) 12/26/2023 at AM    folic acid (FOLVITE) 1 MG tablet Take 1 tablet (1  mg) by mouth daily 12/26/2023 at AM    NIFEdipine ER (ADALAT CC) 60 MG 24 hr tablet Take 1 tablet (60 mg) by mouth daily 12/26/2023 at AM    ondansetron (ZOFRAN ODT) 4 MG ODT tab Take 1 tablet (4 mg) by mouth every 8 hours as needed for nausea Unknown    pantoprazole (PROTONIX) 40 MG EC tablet Take 1 tablet (40 mg) by mouth every morning (before breakfast) 12/26/2023 at AM    potassium chloride ER (KLOR-CON M) 20 MEQ CR tablet Take 1 tablet (20 mEq) by mouth 2 times daily 12/26/2023 at AM    predniSONE (DELTASONE) 20 MG tablet Take 3 tablets (60 mg) by mouth daily for 14 days, THEN 2.5 tablets (50 mg) daily for 14 days, THEN 2 tablets (40 mg) daily for 14 days, THEN 1.5 tablets (30 mg) daily for 14 days, THEN 1 tablet (20 mg) daily for 14 days. 12/26/2023 at AM    sodium bicarbonate 650 MG tablet Take 1 tablet (650 mg) by mouth 3 times daily 12/26/2023 at AM    sulfamethoxazole-trimethoprim (BACTRIM) 400-80 MG tablet Take 1 tablet by mouth daily 12/26/2023 at AM    Vitamin D, Cholecalciferol, 10 MCG (400 UNIT) TABS Take 10 mcg by mouth daily 12/26/2023 at AM

## 2023-12-27 NOTE — ED NOTES
Patient called nursing staff to bedside with C/O continued 9/10 chest pain and SOB. MD made aware.

## 2023-12-28 NOTE — ED PROVIDER NOTES
Emergency Department Transfer of Care Note    Assessment / Plan / Medical Decision Making   Agree with previous provider's plan and exam.     Transfer of Care Plan:  23-year-old with complex past medical history including lupus who presents in what appears to be florid heart failure.  Rheumatology and pulmonology consulted at [facility] and patient is on the bed list there.  Currently getting ongoing empiric antibiotic therapy and intermittent IV Lasix dosing.    ED Course as of 12/30/23 1934   Tue Dec 26, 2023   2311 2 unsuccessful attempts made at IV access.   Wed Dec 27, 2023   0016 Lactic Acid(!): 2.4   0017 Hemoglobin(!): 8.2   0017 WBC: 8.4   0029 Sed Rate: 1   0039 Troponin 118, likely probably decreased from the troponin last collected of 400 on 12/17.   0054 Cxr    IMPRESSION:   1.   Hypoventilatory changes with moderate fluid overload as above.   2.   Concurrent bilateral pneumonitis can not be excluded.   3.   Remainder of findings described as above.      0109 TSH(!): 9.90   0109 Procalcitonin: 0.15   0109 CRP Inflammation: <3.00   0109 Magnesium: 2.1   0112 Creatinine(!): 2.98   0112 Urea Nitrogen(!): 75.1   0112 Anion Gap: 15   0112 Carbon Dioxide (CO2)(!): 17   0112 Sodium: 141   0112 Glucose(!): 133   0112 Chloride(!): 109   0112 Protein Total(!): 5.7   0112 Albumin(!): 3.0   0117 N-Terminal Pro BNP Inpatient(!): >70,000  Stable    0136 T4 Free: 1.06   0146 Dr. Perez   0218 Ph Venous(!): 7.45   0218 PCO2 Venous(!): 30   0218 Bicarbonate Venous: 21   0358 Lactic Acid(!): 2.6   0500 Patient was declined from [facility], Dr. Perez, recommended that patient be admitted to [facility] where she was recently discharged from.  The reasoning is that given that she had knees that were beyond the acute buildings [facility], she may need them again   0510 Call to [facility]--has no beds. On waitlist.    0513 Call to [facility]--does not have rheumatology   0513 Call to  [facility] in [place], no rheumatology   0516 Call to [facility] in [place], [place]. No beds.    0519 Call to [facility], consult to rheumatology request, awaiting phone call   0535 Dr. Steve Rheumotology at the [facility] recommend treating for possible infection as well as giving a dose of Lasix 160 mg IV.     1636 Echocardiogram Complete  Left ventricle   The ejection fraction is 15-20% (severely reduced). Severe diffuse hypokinesis is present. Abnormal non-specific septal  motion is present.     Tricuspid Valve  Moderate to severe tricuspid insufficiency is present. The right ventricular  systolic pressure is approximated at 68.4 mmHg plus the right atrial pressure.  Pulmonary hypertension is present. Pulmonary artery systolic pressure is 83  mmHg (68 mmHg+RA pressure 15 mmHg.).     1658 Evening vancomycin ordered.  Scheduled cefepime ordered.   1709 Checked in on patient and relative.  Patient continues to have pain.  As needed morphine already ordered, adding scheduled Tylenol.   Thu Dec 28, 2023   0838 Potassium: 5.2  Normal today after being mildly elevated 5.6 yesterday.   0858 Resting comfortably in bed.  Diminished bilateral breath sounds.  Normal respiratory effort and no tachypnea.  We have not placed a urine collection hat in the bathroom and reminded patient that we need to collect urine to monitor I/O's.   1014 Called [facility] - still no bed.   1848 Ordered home carvedilol with elevated blood pressures.  Also given dose of furosemide 40 mg.  If she tolerates this well may want to consider giving furosemide dose tomorrow morning to avoid nocturnal diuresis.   Fri Dec 29, 2023   1913 Hx of Lupus. COVID pos. HF. Awaiting bed at [facility]. Sign out at shift change.         Discharge Medication List as of 12/30/2023 12:56 PM          Final diagnoses:   Shortness of breath   Acute on chronic systolic heart failure (H) - EF 15-20% by ECHO Dec 27   Tricuspid valve  insufficiency, unspecified etiology - moderate to severe   Exacerbation of systemic lupus erythematosus   COVID-19       Santiago Owen MD  12/26/2023   [facility]     Santiago Owen MD  12/27/23 5455       Rosie Mann DO  12/30/23 7460

## 2023-12-28 NOTE — ED PROVIDER NOTES
Transfer of care from previous shift provider:. No issues overnight.    ED Course as of 12/28/23 1915   Thu Dec 28, 2023   0838 Potassium: 5.2  Normal today after being mildly elevated 5.6 yesterday.   0858 Resting comfortably in bed.  Diminished bilateral breath sounds.  Normal respiratory effort and no tachypnea.  We have not placed a urine collection hat in the bathroom and reminded patient that we need to collect urine to monitor I/O's.   1014 Called [facility] - still no bed.   1848 Ordered home carvedilol with elevated blood pressures.  Also given dose of furosemide 40 mg.  If she tolerates this well may want to consider giving furosemide dose tomorrow morning to avoid nocturnal diuresis.     Assessment and Plan:  Final diagnoses:   Shortness of breath   Acute on chronic systolic heart failure (H)   Tricuspid valve insufficiency, unspecified etiology - moderate to severe   Exacerbation of systemic lupus erythematosus      Stable today. Low dose furosemide ordered. Consider higher dose in morning. Signed out to Dr Owen.     Joesph Perez MD  12/28/23 1917

## 2023-12-28 NOTE — PHARMACY-VANCOMYCIN DOSING SERVICE
Pharmacy Vancomycin Note  Date of Service 2023  Patient's  2000   23 year old, female    Indication: Community Acquired Pneumonia  Day of Therapy: 1  Current vancomycin regimen:  750 mg IV q24h  Current vancomycin monitoring method: AUC  Current vancomycin therapeutic monitoring goal: 400-600 mg*h/L    InsightRX Prediction of Current Vancomycin Regimen  Loading dose: N/A  Regimen: 750 mg IV every 24 hours.  Start time: 08:39 on 2023  Exposure target: AUC24 (range)400-600 mg/L.hr   AUC24,ss: 575 mg/L.hr  Probability of AUC24 > 400: 85 %  Ctrough,ss: 19.5 mg/L  Probability of Ctrough,ss > 20: 48 %  Probability of nephrotoxicity (Lodise MANUEL ): 16 %      Current estimated CrCl = Estimated Creatinine Clearance: 24.8 mL/min (A) (based on SCr of 3.36 mg/dL (H)).    Creatinine for last 3 days  2023: 12:01 AM Creatinine 2.98 mg/dL  2023:  6:23 AM Creatinine 3.36 mg/dL    Recent Vancomycin Levels (past 3 days)  No results found for requested labs within last 3 days.    Vancomycin IV Administrations (past 72 hours)                     vancomycin (VANCOCIN) 750 mg in NaCl 0.9% 150 mL infusion (mg) 750 mg Given 23 0839    vancomycin (VANCOCIN) 1,500 mg in sodium chloride 0.9 % 500 mL intermittent infusion (mg) 1,500 mg New Bag 23 0817                    Nephrotoxins and other renal medications (From now, onward)      Start     Dose/Rate Route Frequency Ordered Stop    23 1127  vancomycin place abdullahi - receiving intermittent dosing         1 each Intravenous SEE ADMIN INSTRUCTIONS 23 1128                 Contrast Orders - past 72 hours (72h ago, onward)      None            Interpretation of levels and current regimen:  Vancomycin level is reflective of -600    Has serum creatinine changed greater than 50% in last 72 hours: No    Urine output: adequate     Renal Function: Worsening    InsightRX Prediction of Planned New Vancomycin Regimen  Loading dose:  N/A  Regimen: 1250 mg IV every 48 hours.  Start time: 08:39 on 12/29/2023  Exposure target: AUC24 (range)400-600 mg/L.hr   AUC24,ss: 493 mg/L.hr  Probability of AUC24 > 400: 71 %  Ctrough,ss: 14 mg/L  Probability of Ctrough,ss > 20: 24 %  Probability of nephrotoxicity (Lodise MANUEL 2009): 9 %      Plan:  Holding dose until vanco level at 0600 12/28/23 due to worsening renal function. Anticipate 1250mg Q48hr pending vanco-renal labs.     Vancomycin monitoring method: AUC  Vancomycin therapeutic monitoring goal: 400-600 mg*h/L  Pharmacy will check vancomycin levels as appropriate tomorrow 12/28/23 0600   Serum creatinine levels will be ordered daily for the first week of therapy and at least twice weekly for subsequent weeks.    Tha Johnson, [facility]

## 2023-12-28 NOTE — ED NOTES
Called [facility] for update on transfer. Pt is still active on waitlist, awaiting bed assignment with no known ETA.

## 2023-12-29 NOTE — ED NOTES
Dr. Owen notified of critical Vanco results.   States that pharmacy needs to be made aware of the results.  Stephanie Burch RN on 12/29/2023 at 6:57 AM

## 2023-12-29 NOTE — ED PROVIDER NOTES
Emergency Department Transfer of Care Note    Assessment / Plan / Medical Decision Making   Agree with previous provider's plan and exam.     Transfer of Care Plan:  23-year-old with complex past medical history including lupus who presents in what appears to be florid heart failure.  Rheumatology and pulmonology consulted at [facility] and patient is on the bed list there.  Currently getting ongoing empiric antibiotic therapy and intermittent IV Lasix dosing.    7:12 PM 12/28/23 - Pt continues to board. No acute change. Remains stable. May need additional lasix / afterload reduction over next 24 hours if continues to board due to HTN.    ED Course as of 12/30/23 1935   Tue Dec 26, 2023   2311 2 unsuccessful attempts made at IV access.   Wed Dec 27, 2023   0016 Lactic Acid(!): 2.4   0017 Hemoglobin(!): 8.2   0017 WBC: 8.4   0029 Sed Rate: 1   0039 Troponin 118, likely probably decreased from the troponin last collected of 400 on 12/17.   0054 Cxr    IMPRESSION:   1.   Hypoventilatory changes with moderate fluid overload as above.   2.   Concurrent bilateral pneumonitis can not be excluded.   3.   Remainder of findings described as above.      0109 TSH(!): 9.90   0109 Procalcitonin: 0.15   0109 CRP Inflammation: <3.00   0109 Magnesium: 2.1   0112 Creatinine(!): 2.98   0112 Urea Nitrogen(!): 75.1   0112 Anion Gap: 15   0112 Carbon Dioxide (CO2)(!): 17   0112 Sodium: 141   0112 Glucose(!): 133   0112 Chloride(!): 109   0112 Protein Total(!): 5.7   0112 Albumin(!): 3.0   0117 N-Terminal Pro BNP Inpatient(!): >70,000  Stable    0136 T4 Free: 1.06   0146 Dr. Perez   0218 Ph Venous(!): 7.45   0218 PCO2 Venous(!): 30   0218 Bicarbonate Venous: 21   0358 Lactic Acid(!): 2.6   0500 Patient was declined from [facility], Dr. Perez, recommended that patient be admitted to [facility] where she was recently discharged from.  The reasoning is that given that she had knees that were beyond the acute buildings  [place], she may need them again   0510 Call to [facility]--has no beds. On waitlist.    0513 Call to [facility]--does not have rheumatology   0513 Call to [facility] in [place], no rheumatology   0516 Call to [facility] in [place], [place]. No beds.    0519 Call to [facility], consult to rheumatology request, awaiting phone call   0535 Dr. Steve Rheumotology at the [facility] recommend treating for possible infection as well as giving a dose of Lasix 160 mg IV.     1636 Echocardiogram Complete  Left ventricle   The ejection fraction is 15-20% (severely reduced). Severe diffuse hypokinesis is present. Abnormal non-specific septal  motion is present.     Tricuspid Valve  Moderate to severe tricuspid insufficiency is present. The right ventricular  systolic pressure is approximated at 68.4 mmHg plus the right atrial pressure.  Pulmonary hypertension is present. Pulmonary artery systolic pressure is 83  mmHg (68 mmHg+RA pressure 15 mmHg.).     1658 Evening vancomycin ordered.  Scheduled cefepime ordered.   1709 Checked in on patient and relative.  Patient continues to have pain.  As needed morphine already ordered, adding scheduled Tylenol.   Thu Dec 28, 2023   0838 Potassium: 5.2  Normal today after being mildly elevated 5.6 yesterday.   0858 Resting comfortably in bed.  Diminished bilateral breath sounds.  Normal respiratory effort and no tachypnea.  We have not placed a urine collection hat in the bathroom and reminded patient that we need to collect urine to monitor I/O's.   1014 Called [facility] - still no bed.   1848 Ordered home carvedilol with elevated blood pressures.  Also given dose of furosemide 40 mg.  If she tolerates this well may want to consider giving furosemide dose tomorrow morning to avoid nocturnal diuresis.   Fri Dec 29, 2023   1913 Hx of Lupus. COVID pos. HF. Awaiting bed at [facility]. Sign out at shift change.         Discharge  Medication List as of 12/30/2023 12:56 PM          Final diagnoses:   Shortness of breath   Acute on chronic systolic heart failure (H) - EF 15-20% by ECHO Dec 27   Tricuspid valve insufficiency, unspecified etiology - moderate to severe   Exacerbation of systemic lupus erythematosus   COVID-19       Santiago Owen MD  12/28/23   [facility]         Santiago Owen MD  12/28/23 1913       Rosie Mann DO  12/30/23 1935

## 2023-12-29 NOTE — PROGRESS NOTES
Updated Lupus providers to ensure our team is aware to watch for hospital transfer and support with transition.  Oralia Rodriguez RN, BSN, PHN  [facility] Nephrology Nurse Navigator  [phone]

## 2023-12-29 NOTE — PROGRESS NOTES
"Writer called the [facility] to check on placement.  Bed placement coordinator reports that the pt is currently #5 on \"the list\".  No ETA as of yet.  "

## 2023-12-29 NOTE — ED NOTES
Pt has chronic R shoulder pain, pain medication given and effective. Pt slept most of the night with exception of getting up to the bathroom couple times.

## 2023-12-29 NOTE — PHARMACY-VANCOMYCIN DOSING SERVICE
Pharmacy Vancomycin Initial Note  Date of Service 2023  Patient's  2000  23 year old, female    Indication: Community Acquired Pneumonia    Current estimated CrCl = Estimated Creatinine Clearance: 26.8 mL/min (A) (based on SCr of 3.11 mg/dL (H)).    Creatinine for last 3 days  2023: 12:01 AM Creatinine 2.98 mg/dL  2023:  6:23 AM Creatinine 3.36 mg/dL  2023:  5:39 AM Creatinine 3.11 mg/dL    Recent Vancomycin Level(s) for last 3 days  2023:  5:39 AM Vancomycin 36.4 ug/mL      Vancomycin IV Administrations (past 72 hours)                     vancomycin (VANCOCIN) 750 mg in NaCl 0.9% 150 mL infusion (mg) 750 mg Given 23 0839    vancomycin (VANCOCIN) 1,500 mg in sodium chloride 0.9 % 500 mL intermittent infusion (mg) 1,500 mg New Bag 23 0817                    Nephrotoxins and other renal medications (From now, onward)      Start     Dose/Rate Route Frequency Ordered Stop    23 1127  vancomycin place abdullahi - receiving intermittent dosing         1 each Intravenous SEE ADMIN INSTRUCTIONS 23 1128              Contrast Orders - past 72 hours (72h ago, onward)      None            InsightRX Prediction of Planned Initial Vancomycin Regimen  Loading dose: N/A  Regimen: 750 mg IV every 24 hours.  Start time: 08:40 on 2023  Exposure target: AUC24 (range)400-600 mg/L.hr   AUC24,ss: 1001 mg/L.hr  Probability of AUC24 > 400: 100 %  Ctrough,ss: 34.5 mg/L  Probability of Ctrough,ss > 20: 99 %  Probability of nephrotoxicity (Lodise MANUEL ): 55 %          Plan:  Will hold Vancomycin today and check a level in AM, patient's kinetics acting unpredictable. Receiving large Lasix doses. Will draw level in AM and reassess.   Vancomycin monitoring method: AUC  Vancomycin therapeutic monitoring goal: 400-600 mg*h/L  Pharmacy will check vancomycin levels as appropriate in 1-3 Days.    Serum creatinine levels will be ordered daily for the first week of therapy and at  least twice weekly for subsequent weeks.      Isaias Michaud, RPH

## 2023-12-29 NOTE — ED NOTES
12/29/23 0635   Critical Test Results/Notification   Critical Lab Result (Lab Name and Value) Vanco 36.4   What Time Did The Lab Notify You? 0635   Provider Notified no, other (see comment)  (notified charge.)

## 2023-12-29 NOTE — ED PROVIDER NOTES
12/29/23   7:00 AM    I am accepting the care of this patient from Dr Owen at change of shift.  Milly Carroll is a 22 yo female with Lupus here with exacerbation of heart failure (EF 15% here by ECHO) and elevated WBC. She is on empiric antibiotics vancomycin, cefepime) and Lasix.  She has not had prednisone here.    She was on oral prednisone Dec 20 - 25 while in patient at [facility]. She was discharged Dec 25 from [facility] on 60 mg prednisone daily for two weeks, then a taper.     They did give three doses of cyclophosphamide (last dose 12/23)      Her Lexiscan on Dec 19 showed EF 47%, now it is 15 - 20% with severe diffuse hypokinesis.      Labs show:  DNA ds antibodies 30, C3 29, C4 3- this shows Lupus flare  CBC with WBC 21,200, hgb 9.6, platelets 73,000  BMP with Cr 3.11 (stable)  PCT 0.15, CRP <3, ESR 1.0  Troponin up to 348 now  BNP >70,000  Lactic acid 2.4 and 2.6  BC no growth after two days    Chest xray: pneumonitis,     Patient Vitals for the past 24 hrs:   BP Temp Temp src Pulse Resp SpO2   12/29/23 1758 -- -- -- 75 (!) 9 100 %   12/29/23 1745 (!) 151/104 -- -- 69 16 100 %   12/29/23 1741 (!) 151/104 -- -- 65 -- --   12/29/23 1700 -- -- -- 66 18 99 %   12/29/23 1645 -- -- -- 67 17 98 %   12/29/23 1630 -- -- -- 66 19 100 %   12/29/23 1615 -- -- -- 71 (!) 32 98 %   12/29/23 1600 -- -- -- 75 23 99 %   12/29/23 1545 -- -- -- 74 15 98 %   12/29/23 1530 -- -- -- 73 -- 99 %   12/29/23 1515 -- -- -- 82 22 98 %   12/29/23 1500 -- -- -- 80 -- 98 %   12/29/23 1445 -- -- -- 76 13 99 %   12/29/23 1430 -- -- -- 75 23 98 %   12/29/23 1415 -- -- -- 72 28 97 %   12/29/23 1400 (!) 130/96 -- -- 71 18 98 %   12/29/23 1345 -- -- -- 73 14 98 %   12/29/23 1330 -- -- -- 74 14 97 %   12/29/23 1315 -- -- -- 77 12 98 %   12/29/23 1300 (!) 152/104 -- -- 75 18 99 %   12/29/23 1245 -- -- -- 79 21 100 %   12/29/23 1230 -- -- -- 75 26 --   12/29/23 1215 -- -- -- 78 23 100 %   12/29/23 1200 (!) 156/104 -- -- 79 15 98 %   12/29/23 1145  -- -- -- 80 19 98 %   12/29/23 1130 -- -- -- 75 (!) 7 98 %   12/29/23 1115 -- -- -- 76 11 99 %   12/29/23 1100 (!) 156/111 -- -- 81 13 99 %   12/29/23 1045 -- -- -- -- -- 99 %   12/29/23 1037 -- -- -- 75 17 100 %   12/29/23 1000 (!) 162/113 99.2  F (37.3  C) Tympanic 79 10 100 %   12/29/23 0908 -- -- -- -- -- 92 %   12/29/23 0900 (!) 169/105 -- -- 85 20 100 %   12/29/23 0800 -- -- -- 87 16 99 %   12/29/23 0700 (!) 166/116 -- -- 85 13 97 %   12/29/23 0635 -- -- -- 87 -- 97 %   12/29/23 0600 (!) 170/119 -- -- 86 -- 98 %   12/29/23 0500 (!) 163/118 -- -- 88 -- 98 %   12/29/23 0400 (!) 167/125 -- -- 84 -- 99 %   12/29/23 0300 (!) 171/125 -- -- 81 13 97 %   12/29/23 0200 (!) 169/121 -- -- 80 12 98 %   12/29/23 0100 (!) 166/123 -- -- 78 11 97 %   12/29/23 0000 (!) 156/118 -- -- 80 11 96 %   12/28/23 2300 (!) 175/127 -- -- 81 11 97 %   12/28/23 2200 (!) 175/136 -- -- 90 18 (!) 50 %   12/28/23 2100 (!) 172/124 -- -- 86 -- 99 %   12/28/23 2000 -- -- -- 85 -- 96 %         ED Course    7:25 AM  I spoke with the hospitalist and rheumatologist at [facility] and we did review her chart.      I have ordered a 4 Plex.      [facility] has inpatient rheumatology but did not have beds.     8:09 AM  Rheumatology recommends we restart prednisone 60 mg daily and Bactrim for PJP prophylaxis.  The Lupus flare seems to be on the downtrend.  Her SOB is likely fluid overload, and perhaps she has an infection.     8:50 AM   She looks good- she is comfortable, on room air.    9:03 AM  COVID positive.  I did ask pharmacy to review remdesivir for her and I did order that.    2:34 PM  She had increased chest pain. Repeat EKG shows NSR, minimal change from Dec 26.  Repeat labs show CBC with WBC 19,900 hgb 9.8, platelets 69,000, BMP with Cr 3.07, Ca 8.0, troponin 290 (improved), lactic acid 1.5.  We gave some IV calcium.     7 PM  I am signing out her care to Dr Mann at change of shift.     Diagnosis    (R06.02) Shortness of breath    (I50.23) Acute on  chronic systolic heart failure (H)    (I07.1) Tricuspid valve insufficiency, unspecified etiology  Comment: moderate to severe    (M32.9) Exacerbation of systemic lupus erythematosus      Plan: per Dr Johnathan Pacheco, Wilmer Jeffery MD  12/29/23 1931

## 2023-12-29 NOTE — ED NOTES
Patient reports midsternum chest pain that is sharp, does not radiate, sudden onset.  MD updated.  V/o for an EKG.

## 2023-12-30 NOTE — ED NOTES
Pt used call light. States she has a question for Dr. Pacheco. I asked her if I would be able to help her. She said no. I asked her if I could relay a question to the provider as he may be busy at the start of hi shift. She said she would wait to speak with him. Pt has no other needs at this time.

## 2023-12-30 NOTE — ED PROVIDER NOTES
12/30/23   7:00 AM    I am accepting the care of this patient from Dr Mann at change of shift.  Milly Carroll is a 24 yo female here with complications from COVID, Lupus and likely cardiac complications associated with cyclophosphamide.     ED Course    7:39 AM  I spoke with her this morning. She is feeling better and told me that she intends to leave this afternoon with her uncle and ask him to drive her to the [facility].  She intends to present to the [facility] emergency department as a way to get into [facility]. I told her that if she does this it will be listed as leaving our facility AMA but I understand that she has been here now 82 hours with no access to cardiology or rheumatology.     11:52 AM  She is leaving AMA    Diagnosis    (R06.02) Shortness of breath    (I50.23) Acute on chronic systolic heart failure (H)  Comment: EF 15-20% by ECHO Dec 27    (I07.1) Tricuspid valve insufficiency, unspecified etiology  Comment: moderate to severe    (M32.9) Exacerbation of systemic lupus erythematosus    (U07.1) COVID-19      Plan: Patient is leaving AMA           Wilmer Pacheco MD  12/30/23 [id number]

## 2023-12-30 NOTE — ED NOTES
Pt used call light. Requesting to leave and have IV pulled out. States she is not going to wait here for another hospital to accept. Explained we cannot recommend that, she states she understands and will fill out AMA forms.     MD notified.

## 2023-12-30 NOTE — PHARMACY-VANCOMYCIN DOSING SERVICE
Pharmacy Vancomycin Note  Date of Service 2023  Patient's  2000   23 year old, female    Indication: Community Acquired Pneumonia  Day of Therapy: 4  Current vancomycin regimen:  Held  Current vancomycin monitoring method: AUC  Current vancomycin therapeutic monitoring goal: 15-20 mg/L    [employer] Prediction of Current Vancomycin Regimen  Loading dose: 1500 mg IV  Regimen: 750 mg IV every 24 hours.  Start time: 08:40 on 2023  Exposure target: AUC24 (range)400-600 mg/L.hr   AUC24,ss: 1001 mg/L.hr  Probability of AUC24 > 400: 100 %  Ctrough,ss: 34.5 mg/L  Probability of Ctrough,ss > 20: 99 %  Probability of nephrotoxicity (Lodise MANUEL ): 55 %    Current estimated CrCl = Estimated Creatinine Clearance: 24 mL/min (A) (based on SCr of 3.48 mg/dL (H)).    Creatinine for last 3 days  2023:  6:23 AM Creatinine 3.36 mg/dL  2023:  5:39 AM Creatinine 3.11 mg/dL;  2:56 PM Creatinine 3.07 mg/dL  2023:  7:16 AM Creatinine 3.48 mg/dL    Recent Vancomycin Levels (past 3 days)  2023:  5:39 AM Vancomycin 36.4 ug/mL  2023:  7:16 AM Vancomycin 27.9 ug/mL    Vancomycin IV Administrations (past 72 hours)                     vancomycin (VANCOCIN) 750 mg in NaCl 0.9% 150 mL infusion (mg) 750 mg Given 23 0839                    Nephrotoxins and other renal medications (From now, onward)      Start     Dose/Rate Route Frequency Ordered Stop    23 1127  vancomycin place abdullahi - receiving intermittent dosing         1 each Intravenous SEE ADMIN INSTRUCTIONS 23 1128                 Contrast Orders - past 72 hours (72h ago, onward)      None            Interpretation of levels and current regimen:  Vancomycin level is reflective of supratherapeutic level    Has serum creatinine changed greater than 50% in last 72 hours: No    Urine output:  diminished urine output    Renal Function: Worsening    [employer] Prediction of Planned New Vancomycin Regimen  Loading dose:  N/A  Regimen: 250 mg IV every 24 hours.  Start time: 09:30 on 12/30/2023  Exposure target: AUC24 (range)400-600 mg/L.hr   AUC24,ss: 442 mg/L.hr  Probability of AUC24 > 400: 75 %  Ctrough,ss: 16 mg/L  Probability of Ctrough,ss > 20: 11 %  Probability of nephrotoxicity (Lodise MANUEL 2009): 11 %      Plan:  Hold Dose  Vancomycin monitoring method: AUC  Vancomycin therapeutic monitoring goal: 15-20 mg/L; Patient's changing renal function makes her a poor candent for AUC. Patient's treated for Cap goal 15-20   Pharmacy will check vancomycin levels as appropriate in  Tomorrow .  Serum creatinine levels will be ordered daily for the first week of therapy and at least twice weekly for subsequent weeks.    Babatunde Mendoza, [facility]

## 2023-12-30 NOTE — ED PROVIDER NOTES
History     Chief Complaint   Patient presents with    Cough    Shortness of Breath     HPI  Milly Carroll is a 23 year old female who was signed out to me at shift change.  Patient presented with COVID, recently admitted to the [facility] on oral prednisone for a lupus flare.  Upon arrival she had a newly decreased ejection fraction from her baseline 47% to 15 to 20% with severe diffuse hypokinesis.  She had an uneventful night.  We are awaiting bed placement for transfer back to the [facility] at this time as we do not have cardiology or rheumatology on staff here.  She has been on remdesivir for COVID.  Also on antibiotic therapy.  Remains on the Solu-Medrol.    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     Priority: Medium    Hemoptysis 08/17/2023     Priority: Medium    Perinephric hematoma 08/11/2023     Priority: Medium    Tachycardia 01/23/2023     Priority: Medium    Hx of systemic lupus erythematosus (SLE) (H) 01/23/2023     Priority: Medium    Pain of left lower extremity 01/23/2023     Priority: Medium    Thrombocytopenia, unspecified (H24) 06/13/2022     Priority: Medium    Splenomegaly 06/13/2022     Priority: Medium    Fever, unspecified 06/13/2022     Priority: Medium    Fever in pediatric patient 06/13/2022     Priority: Medium    Anemia, unspecified type 06/13/2022     Priority: Medium    Other systemic lupus erythematosus with other organ involvement (H) 06/13/2022     Priority: Medium    Systemic lupus erythematosus, unspecified SLE type, unspecified organ involvement status (H) 06/13/2022      Priority: Medium    Contact with and (suspected) exposure to covid-19 06/13/2022     Priority: Medium    Lupus (systemic lupus erythematosus) (H) 04/25/2022     Priority: Medium    Fever, unspecified fever cause 02/10/2021     Priority: Medium    Cellulitis of left lower extremity 01/13/2021     Priority: Medium    Drug-induced systemic lupus erythematosus, unspecified organ involvement status (H24) 01/13/2021     Priority: Medium    Pancreatitis, acute 06/23/2020     Priority: Medium    Sepsis (H) 12/10/2019     Priority: Medium    Anaphylaxis 11/20/2019     Priority: Medium    Immunosuppression (H24) 04/18/2019     Priority: Medium    Abnormal echocardiogram - repeat in 2021 04/14/2019     Priority: Medium     4/2019 - Showed mild-moderate dilation of the aortic root at the sinus of valsalva. In discussion with the pediatric cardiologists, this was not truly dilation and her aortic root is normal for her age. She should receive the next screening echo in 2-3 years.      Other forms of systemic lupus erythematosus (H) 04/10/2019     Priority: Medium    Pyelonephritis 08/17/2017     Priority: Medium    Knee osteonecrosis, right (H) 05/19/2014     Priority: Medium    Functional visual loss 12/16/2013     Priority: Medium    Posterior subcapsular cataract, both eyes 12/16/2013     Priority: Medium    Encounter for therapeutic drug monitoring 12/16/2013     Priority: Medium    Hypokalemia 06/23/2013     Priority: Medium    Coagulopathy (H24) 05/19/2013     Priority: Medium    Seizure (H) 05/18/2013     Priority: Medium    Acute hepatitis 05/14/2013     Priority: Medium    Exacerbation of systemic lupus erythematosus 05/14/2013     Priority: Medium    Generalized weakness 05/14/2013     Priority: Medium    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 Provider to review and confirm.      Systemic lupus erythematosus  "(H) 05/11/2013     Priority: Medium        Past Medical History:    Past Medical History:   Diagnosis Date    Hepatitis     Lupus (systemic lupus erythematosus) (H)     Lupus cerebritis (H)     Pancreatitis 08/2017    Pyelonephritis 08/2017    Seizures (H)     Status epilepticus (H)        Past Surgical History:    Past Surgical History:   Procedure Laterality Date    IR RENAL ANGIOGRAM LEFT  8/11/2023    IR RENAL BIOPSY LEFT  6/28/2022    NO HISTORY OF SURGERY      ORTHOPEDIC SURGERY         Family History:    Family History   Problem Relation Age of Onset    Other - See Comments Father         Question of lupus in father and paternal grandfather    Lupus Sister         older sister    Gastrointestinal Disease No family hx of         No known history of IBD, hepatitis, pancreatitis, celiac disease, or GI cancers before age 50    Glaucoma No family hx of     Macular Degeneration No family hx of        Social History:  Marital Status:  Single [1]  Social History     Tobacco Use    Smoking status: Never     Passive exposure: Never    Smokeless tobacco: Never   Vaping Use    Vaping Use: Never used   Substance Use Topics    Alcohol use: Not Currently    Drug use: Never        Medications:    acetaminophen (TYLENOL) 325 MG tablet  carvedilol (COREG) 25 MG tablet  cyanocobalamin (CYANOCOBALAMIN) 100 MCG tablet  ferrous sulfate (FEROSUL) 325 (65 Fe) MG tablet  folic acid (FOLVITE) 1 MG tablet  NIFEdipine ER (ADALAT CC) 60 MG 24 hr tablet  ondansetron (ZOFRAN ODT) 4 MG ODT tab  pantoprazole (PROTONIX) 40 MG EC tablet  potassium chloride ER (KLOR-CON M) 20 MEQ CR tablet  predniSONE (DELTASONE) 20 MG tablet  sodium bicarbonate 650 MG tablet  sulfamethoxazole-trimethoprim (BACTRIM) 400-80 MG tablet  Vitamin D, Cholecalciferol, 10 MCG (400 UNIT) TABS          Review of Systems    Physical Exam   BP: (!) 179/112  Pulse: 96  Temp: 97.6  F (36.4  C)  Resp: 18  Height: 158.8 cm (5' 2.5\")  Weight: 59.9 kg (132 lb)  SpO2: 99 " %      Physical Exam    ED Course              ED Course as of 12/30/23 0433   Tue Dec 26, 2023   2311 2 unsuccessful attempts made at IV access.   Wed Dec 27, 2023   0016 Lactic Acid(!): 2.4   0017 Hemoglobin(!): 8.2   0017 WBC: 8.4   0029 Sed Rate: 1   0039 Troponin 118, likely probably decreased from the troponin last collected of 400 on 12/17.   0054 Cxr    IMPRESSION:   1.   Hypoventilatory changes with moderate fluid overload as above.   2.   Concurrent bilateral pneumonitis can not be excluded.   3.   Remainder of findings described as above.      0109 TSH(!): 9.90   0109 Procalcitonin: 0.15   0109 CRP Inflammation: <3.00   0109 Magnesium: 2.1   0112 Creatinine(!): 2.98   0112 Urea Nitrogen(!): 75.1   0112 Anion Gap: 15   0112 Carbon Dioxide (CO2)(!): 17   0112 Sodium: 141   0112 Glucose(!): 133   0112 Chloride(!): 109   0112 Protein Total(!): 5.7   0112 Albumin(!): 3.0   0117 N-Terminal Pro BNP Inpatient(!): >70,000  Stable    0136 T4 Free: 1.06   0146 Dr. Perez   0218 Ph Venous(!): 7.45   0218 PCO2 Venous(!): 30   0218 Bicarbonate Venous: 21   0358 Lactic Acid(!): 2.6   0500 Patient was declined from [facility], Dr. Perez, recommended that patient be admitted to [facility] where she was recently discharged from.  The reasoning is that given that she had knees that were beyond the acute buildings [facility], she may need them again   0510 Call to [facility]--has no beds. On waitlist.    0513 Call to [facility]--does not have rheumatology   0513 Call to [facility] in [place], no rheumatology   0516 Call to [facility] in [place], [place]. No beds.    0519 Call to [facility], consult to rheumatology request, awaiting phone call   0535 Dr. Steve Rheumotology at the [facility] recommend treating for possible infection as well as giving a dose of Lasix 160 mg IV.     1636 Echocardiogram Complete  Left ventricle   The ejection fraction is  15-20% (severely reduced). Severe diffuse hypokinesis is present. Abnormal non-specific septal  motion is present.     Tricuspid Valve  Moderate to severe tricuspid insufficiency is present. The right ventricular  systolic pressure is approximated at 68.4 mmHg plus the right atrial pressure.  Pulmonary hypertension is present. Pulmonary artery systolic pressure is 83  mmHg (68 mmHg+RA pressure 15 mmHg.).     1658 Evening vancomycin ordered.  Scheduled cefepime ordered.   1709 Checked in on patient and relative.  Patient continues to have pain.  As needed morphine already ordered, adding scheduled Tylenol.   u Dec 28, 2023   0838 Potassium: 5.2  Normal today after being mildly elevated 5.6 yesterday.   0858 Resting comfortably in bed.  Diminished bilateral breath sounds.  Normal respiratory effort and no tachypnea.  We have not placed a urine collection hat in the bathroom and reminded patient that we need to collect urine to monitor I/O's.   1014 Called [place] - still no bed.   1848 Ordered home carvedilol with elevated blood pressures.  Also given dose of furosemide 40 mg.  If she tolerates this well may want to consider giving furosemide dose tomorrow morning to avoid nocturnal diuresis.   Fri Dec 29, 2023   1913 Hx of Lupus. COVID pos. HF. Awaiting bed at [facility]. Sign out at shift change.     Procedures              Critical Care time:  none               Results for orders placed or performed during the hospital encounter of 12/26/23 (from the past 24 hour(s))   Vancomycin level   Result Value Ref Range    Vancomycin 36.4 (HH)   ug/mL   Basic metabolic panel   Result Value Ref Range    Sodium 139 135 - 145 mmol/L    Potassium 4.6 3.4 - 5.3 mmol/L    Chloride 104 98 - 107 mmol/L    Carbon Dioxide (CO2) 16 (L) 22 - 29 mmol/L    Anion Gap 19 (H) 7 - 15 mmol/L    Urea Nitrogen 87.2 (H) 6.0 - 20.0 mg/dL    Creatinine 3.11 (H) 0.51 - 0.95 mg/dL    GFR Estimate 21 (L) >60 mL/min/1.73m2    Calcium 8.1 (L) 8.6 -  10.0 mg/dL    Glucose 77 70 - 99 mg/dL   CBC with platelets   Result Value Ref Range    WBC Count 21.2 (H) 4.0 - 11.0 10e3/uL    RBC Count 3.00 (L) 3.80 - 5.20 10e6/uL    Hemoglobin 9.6 (L) 11.7 - 15.7 g/dL    Hematocrit 30.3 (L) 35.0 - 47.0 %     (H) 78 - 100 fL    MCH 32.0 26.5 - 33.0 pg    MCHC 31.7 31.5 - 36.5 g/dL    RDW 18.2 (H) 10.0 - 15.0 %    Platelet Count 73 (L) 150 - 450 10e3/uL   Troponin T, High Sensitivity   Result Value Ref Range    Troponin T, High Sensitivity 348 (HH) <=14 ng/L   Symptomatic Influenza A/B, RSV, & SARS-CoV2 PCR (COVID-19) Nose    Specimen: Nose; Swab   Result Value Ref Range    Influenza A PCR Negative Negative    Influenza B PCR Negative Negative    RSV PCR Negative Negative    SARS CoV2 PCR Positive (A) Negative    Narrative    Testing was performed using the Xpert Xpress CoV2/Flu/RSV Assay on the [employer] GeneXpert Instrument. This test should be ordered for the detection of SARS-CoV-2, influenza, and RSV viruses in individuals who meet clinical and/or epidemiological criteria. Test performance is unknown in asymptomatic patients. This test is for in vitro diagnostic use under the FDA EUA for laboratories certified under CLIA to perform high or moderate complexity testing. This test has not been FDA cleared or approved. A negative result does not rule out the presence of PCR inhibitors in the specimen or target RNA in concentration below the limit of detection for the assay. If only one viral target is positive but coinfection with multiple targets is suspected, the sample should be re-tested with another FDA cleared, approved, or authorized test, if coinfection would change clinical management. This test was validated by the [facility] [employer]. These laboratories are certified under the Clinical Laboratory Improvement Amendments of 1988 (CLIA-88) as qualified to perform high complexity laboratory testing.   Troponin T, High Sensitivity   Result Value Ref Range     Troponin T, High Sensitivity 290 (HH) <=14 ng/L   Basic metabolic panel   Result Value Ref Range    Sodium 136 135 - 145 mmol/L    Potassium 4.9 3.4 - 5.3 mmol/L    Chloride 101 98 - 107 mmol/L    Carbon Dioxide (CO2) 17 (L) 22 - 29 mmol/L    Anion Gap 18 (H) 7 - 15 mmol/L    Urea Nitrogen 84.8 (H) 6.0 - 20.0 mg/dL    Creatinine 3.07 (H) 0.51 - 0.95 mg/dL    GFR Estimate 21 (L) >60 mL/min/1.73m2    Calcium 8.0 (L) 8.6 - 10.0 mg/dL    Glucose 199 (H) 70 - 99 mg/dL   Lactic acid whole blood   Result Value Ref Range    Lactic Acid 1.5 0.7 - 2.0 mmol/L   CBC with platelets differential    Narrative    The following orders were created for panel order CBC with platelets differential.  Procedure                               Abnormality         Status                     ---------                               -----------         ------                     CBC with platelets and d...[[id number]]  Abnormal            Final result                 Please view results for these tests on the individual orders.   CBC with platelets and differential   Result Value Ref Range    WBC Count 19.9 (H) 4.0 - 11.0 10e3/uL    RBC Count 3.04 (L) 3.80 - 5.20 10e6/uL    Hemoglobin 9.8 (L) 11.7 - 15.7 g/dL    Hematocrit 30.1 (L) 35.0 - 47.0 %    MCV 99 78 - 100 fL    MCH 32.2 26.5 - 33.0 pg    MCHC 32.6 31.5 - 36.5 g/dL    RDW 18.0 (H) 10.0 - 15.0 %    Platelet Count 69 (L) 150 - 450 10e3/uL    % Neutrophils 97 %    % Lymphocytes 1 %    % Monocytes 1 %    % Eosinophils 0 %    % Basophils 0 %    % Immature Granulocytes 1 %    NRBCs per 100 WBC 0 <1 /100    Absolute Neutrophils 19.5 (H) 1.6 - 8.3 10e3/uL    Absolute Lymphocytes 0.2 (L) 0.8 - 5.3 10e3/uL    Absolute Monocytes 0.1 0.0 - 1.3 10e3/uL    Absolute Eosinophils 0.0 0.0 - 0.7 10e3/uL    Absolute Basophils 0.0 0.0 - 0.2 10e3/uL    Absolute Immature Granulocytes 0.2 <=0.4 10e3/uL    Absolute NRBCs 0.0 10e3/uL       Medications   ondansetron (ZOFRAN) injection 4 mg (4 mg Intravenous  $Given 12/29/23 2022)   ceFEPIme (MAXIPIME) 2 g vial to attach to  mL bag for ADULTS or 50 mL bag for PEDS (2 g Intravenous $New Bag 12/29/23 0829)   vancomycin place abdullahi - receiving intermittent dosing (has no administration in time range)   HYDROmorphone (PF) (DILAUDID) injection 0.3 mg (0.3 mg Intravenous $Given 12/29/23 2332)   carvedilol (COREG) tablet 25 mg (25 mg Oral $Given 12/29/23 1741)   methylPREDNISolone sodium succinate (solu-MEDROL) injection 62.5 mg (62.5 mg Intravenous $Given 12/29/23 0827)   remdesivir 100 mg in sodium chloride 0.9 % 250 mL intermittent infusion (has no administration in time range)     And   sodium chloride 0.9% BOLUS 50 mL (has no administration in time range)   acetaminophen (OFIRMEV) infusion 1,000 mg (has no administration in time range)   acetaminophen (TYLENOL) tablet 1,000 mg (1,000 mg Oral $Given 12/29/23 1740)   ipratropium - albuterol 0.5 mg/2.5 mg/3 mL (DUONEB) neb solution 3 mL (3 mLs Nebulization $Given 12/27/23 0028)   ipratropium - albuterol 0.5 mg/2.5 mg/3 mL (DUONEB) neb solution 3 mL (3 mLs Nebulization $Given 12/27/23 0026)   morphine (PF) injection 2 mg (2 mg Intravenous $Given 12/27/23 0043)   acetaminophen (TYLENOL) tablet 650 mg (650 mg Oral $Given 12/27/23 0411)   sodium chloride 0.9% BOLUS 500 mL (0 mLs Intravenous Stopped 12/27/23 0520)   sodium chloride 0.9 % 50 mL with furosemide 160 mg *See DOSE to administer in MAR details (order question) ( Intravenous $New Bag 12/27/23 0621)   ceFEPIme (MAXIPIME) 2 g vial to attach to  mL bag for ADULTS or 50 mL bag for PEDS (0 g Intravenous Stopped 12/27/23 0808)   morphine (PF) injection 2 mg (2 mg Intravenous $Given 12/27/23 0729)   vancomycin (VANCOCIN) 1,500 mg in sodium chloride 0.9 % 500 mL intermittent infusion (0 mg Intravenous Stopped 12/27/23 1010)   morphine (PF) injection 2 mg (2 mg Intravenous $Given 12/27/23 1115)   furosemide (LASIX) injection 40 mg (40 mg Intravenous $Given 12/28/23  1929)   metoclopramide (REGLAN) injection 5 mg (5 mg Intravenous $Given 12/28/23 1947)   furosemide (LASIX) injection 40 mg (40 mg Intravenous $Given 12/29/23 0815)   remdesivir 200 mg in sodium chloride 0.9 % 250 mL intermittent infusion (0 mg Intravenous Stopped 12/29/23 1135)     Followed by   sodium chloride 0.9% BOLUS 50 mL (0 mLs Intravenous Stopped 12/29/23 1200)   calcium gluconate 10 % injection 1 g (0 g Intravenous Stopped 12/29/23 1752)       Assessments & Plan (with Medical Decision Making)     I have reviewed the nursing notes.    I have reviewed the findings, diagnosis, plan and need for follow up with the patient.           Medical Decision Making  The patient's presentation was of straightforward complexity (a clearly self-limited or minor problem).    The patient's evaluation involved:  history and exam without other MDM data elements    The patient's management necessitated only low risk treatment.        New Prescriptions    No medications on file       Final diagnoses:   Shortness of breath   Acute on chronic systolic heart failure (H)   Tricuspid valve insufficiency, unspecified etiology - moderate to severe   Exacerbation of systemic lupus erythematosus   No issues overnight for me.  Awaiting transfer to higher level of care.  Continue prednisone, remdesivir, home medications.  Continue antibiotics.  Recommend touching base with [facility] team again in the morning for coordination of care for possible transfer and bed availability.    12/26/2023   [facility] [place]       Rosie Mann DO  12/30/23 4248

## 2023-12-30 NOTE — ED NOTES
Patient had complaints of right shoulder/arm pain between 8-9/10 on pain scale, hot pack given once throughout night and given Dilaudid 0.3mg every 4 hours. Patient has been up all night long on her phone.  Intake was 738 mls, ate 50% of her supper, and urine output was 225 this shift.

## 2024-01-01 ENCOUNTER — PATIENT OUTREACH (OUTPATIENT)
Dept: CARE COORDINATION | Facility: CLINIC | Age: 24
End: 2024-01-01

## 2024-01-01 ENCOUNTER — MYC MEDICAL ADVICE (OUTPATIENT)
Dept: NEPHROLOGY | Facility: CLINIC | Age: 24
End: 2024-01-01
Payer: COMMERCIAL

## 2024-01-01 ENCOUNTER — HOSPITAL ENCOUNTER (INPATIENT)
Facility: CLINIC | Age: 24
LOS: 22 days | Discharge: HOME | End: 2024-01-24
Attending: EMERGENCY MEDICINE | Admitting: INTERNAL MEDICINE
Payer: COMMERCIAL

## 2024-01-01 ENCOUNTER — TELEPHONE (OUTPATIENT)
Dept: INFECTIOUS DISEASES | Facility: CLINIC | Age: 24
End: 2024-01-01
Payer: MEDICAID

## 2024-01-01 ENCOUNTER — APPOINTMENT (OUTPATIENT)
Dept: LAB | Facility: CLINIC | Age: 24
End: 2024-01-01
Payer: MEDICAID

## 2024-01-01 ENCOUNTER — PATIENT OUTREACH (OUTPATIENT)
Dept: NURSING | Facility: CLINIC | Age: 24
End: 2024-01-01
Payer: COMMERCIAL

## 2024-01-01 ENCOUNTER — APPOINTMENT (OUTPATIENT)
Dept: CARDIOLOGY | Facility: CLINIC | Age: 24
End: 2024-01-01
Attending: INTERNAL MEDICINE
Payer: COMMERCIAL

## 2024-01-01 ENCOUNTER — PATIENT OUTREACH (OUTPATIENT)
Dept: NEPHROLOGY | Facility: CLINIC | Age: 24
End: 2024-01-01

## 2024-01-01 ENCOUNTER — APPOINTMENT (OUTPATIENT)
Dept: NUCLEAR MEDICINE | Facility: CLINIC | Age: 24
End: 2024-01-01
Attending: INTERNAL MEDICINE
Payer: COMMERCIAL

## 2024-01-01 ENCOUNTER — APPOINTMENT (OUTPATIENT)
Dept: CARDIOLOGY | Facility: CLINIC | Age: 24
End: 2024-01-01
Attending: INTERNAL MEDICINE
Payer: MEDICAID

## 2024-01-01 ENCOUNTER — PATIENT OUTREACH (OUTPATIENT)
Dept: CARE COORDINATION | Facility: CLINIC | Age: 24
End: 2024-01-01
Payer: COMMERCIAL

## 2024-01-01 ENCOUNTER — OFFICE VISIT (OUTPATIENT)
Dept: FAMILY MEDICINE | Facility: CLINIC | Age: 24
End: 2024-01-01
Payer: MEDICAID

## 2024-01-01 ENCOUNTER — APPOINTMENT (OUTPATIENT)
Dept: ULTRASOUND IMAGING | Facility: CLINIC | Age: 24
End: 2024-01-01
Attending: INTERNAL MEDICINE
Payer: COMMERCIAL

## 2024-01-01 ENCOUNTER — TELEPHONE (OUTPATIENT)
Dept: FAMILY MEDICINE | Facility: CLINIC | Age: 24
End: 2024-01-01
Payer: MEDICAID

## 2024-01-01 ENCOUNTER — APPOINTMENT (OUTPATIENT)
Dept: GENERAL RADIOLOGY | Facility: CLINIC | Age: 24
End: 2024-01-01
Attending: STUDENT IN AN ORGANIZED HEALTH CARE EDUCATION/TRAINING PROGRAM
Payer: COMMERCIAL

## 2024-01-01 ENCOUNTER — APPOINTMENT (OUTPATIENT)
Dept: GENERAL RADIOLOGY | Facility: CLINIC | Age: 24
End: 2024-01-01
Attending: EMERGENCY MEDICINE
Payer: COMMERCIAL

## 2024-01-01 ENCOUNTER — APPOINTMENT (OUTPATIENT)
Dept: INTERVENTIONAL RADIOLOGY/VASCULAR | Facility: CLINIC | Age: 24
End: 2024-01-01
Attending: PHYSICIAN ASSISTANT
Payer: COMMERCIAL

## 2024-01-01 ENCOUNTER — CARE COORDINATION (OUTPATIENT)
Dept: CARDIOLOGY | Facility: CLINIC | Age: 24
End: 2024-01-01

## 2024-01-01 ENCOUNTER — OFFICE VISIT (OUTPATIENT)
Dept: CARDIOLOGY | Facility: CLINIC | Age: 24
End: 2024-01-01
Attending: STUDENT IN AN ORGANIZED HEALTH CARE EDUCATION/TRAINING PROGRAM
Payer: MEDICAID

## 2024-01-01 ENCOUNTER — APPOINTMENT (OUTPATIENT)
Dept: GENERAL RADIOLOGY | Facility: CLINIC | Age: 24
End: 2024-01-01
Attending: EMERGENCY MEDICINE
Payer: MEDICAID

## 2024-01-01 ENCOUNTER — APPOINTMENT (OUTPATIENT)
Dept: CT IMAGING | Facility: CLINIC | Age: 24
End: 2024-01-01
Attending: NURSE PRACTITIONER
Payer: MEDICAID

## 2024-01-01 ENCOUNTER — HOSPITAL ENCOUNTER (INPATIENT)
Facility: CLINIC | Age: 24
LOS: 5 days | Discharge: HOME | End: 2024-02-19
Attending: EMERGENCY MEDICINE | Admitting: INTERNAL MEDICINE
Payer: MEDICAID

## 2024-01-01 VITALS
SYSTOLIC BLOOD PRESSURE: 130 MMHG | OXYGEN SATURATION: 100 % | BODY MASS INDEX: 20.85 KG/M2 | WEIGHT: 114 LBS | TEMPERATURE: 97.7 F | DIASTOLIC BLOOD PRESSURE: 94 MMHG | HEART RATE: 100 BPM | RESPIRATION RATE: 20 BRPM

## 2024-01-01 VITALS
RESPIRATION RATE: 16 BRPM | WEIGHT: 120.3 LBS | SYSTOLIC BLOOD PRESSURE: 111 MMHG | DIASTOLIC BLOOD PRESSURE: 65 MMHG | OXYGEN SATURATION: 100 % | HEART RATE: 75 BPM | TEMPERATURE: 98.2 F | HEIGHT: 62 IN | BODY MASS INDEX: 22.14 KG/M2

## 2024-01-01 VITALS
TEMPERATURE: 97.4 F | RESPIRATION RATE: 18 BRPM | HEIGHT: 62 IN | OXYGEN SATURATION: 100 % | DIASTOLIC BLOOD PRESSURE: 108 MMHG | BODY MASS INDEX: 20.41 KG/M2 | HEART RATE: 87 BPM | WEIGHT: 110.9 LBS | SYSTOLIC BLOOD PRESSURE: 148 MMHG

## 2024-01-01 VITALS
SYSTOLIC BLOOD PRESSURE: 160 MMHG | DIASTOLIC BLOOD PRESSURE: 118 MMHG | HEART RATE: 120 BPM | HEIGHT: 62 IN | BODY MASS INDEX: 19.89 KG/M2 | OXYGEN SATURATION: 100 % | WEIGHT: 108.1 LBS

## 2024-01-01 DIAGNOSIS — U07.1 INFECTION DUE TO 2019 NOVEL CORONAVIRUS: ICD-10-CM

## 2024-01-01 DIAGNOSIS — I10 HYPERTENSION, UNSPECIFIED TYPE: ICD-10-CM

## 2024-01-01 DIAGNOSIS — R53.1 GENERALIZED WEAKNESS: ICD-10-CM

## 2024-01-01 DIAGNOSIS — I95.0 IDIOPATHIC HYPOTENSION: ICD-10-CM

## 2024-01-01 DIAGNOSIS — M32.9 HX OF SYSTEMIC LUPUS ERYTHEMATOSUS (SLE) (H): ICD-10-CM

## 2024-01-01 DIAGNOSIS — M32.9 SYSTEMIC LUPUS ERYTHEMATOSUS, UNSPECIFIED SLE TYPE, UNSPECIFIED ORGAN INVOLVEMENT STATUS (H): ICD-10-CM

## 2024-01-01 DIAGNOSIS — I50.814 RIGHT-SIDED CONGESTIVE HEART FAILURE SECONDARY TO LEFT-SIDED CONGESTIVE HEART FAILURE (H): ICD-10-CM

## 2024-01-01 DIAGNOSIS — A04.72 C. DIFFICILE COLITIS: ICD-10-CM

## 2024-01-01 DIAGNOSIS — M32.15 OTHER SYSTEMIC LUPUS ERYTHEMATOSUS WITH TUBULO-INTERSTITIAL NEPHROPATHY (H): Chronic | ICD-10-CM

## 2024-01-01 DIAGNOSIS — S37.019A PERINEPHRIC HEMATOMA: ICD-10-CM

## 2024-01-01 DIAGNOSIS — I50.814 RIGHT-SIDED CONGESTIVE HEART FAILURE SECONDARY TO LEFT-SIDED CONGESTIVE HEART FAILURE (H): Primary | ICD-10-CM

## 2024-01-01 DIAGNOSIS — Z09 HOSPITAL DISCHARGE FOLLOW-UP: Primary | ICD-10-CM

## 2024-01-01 DIAGNOSIS — Z99.2 ESRD (END STAGE RENAL DISEASE) ON DIALYSIS (H): ICD-10-CM

## 2024-01-01 DIAGNOSIS — M32.19 OTHER SYSTEMIC LUPUS ERYTHEMATOSUS WITH OTHER ORGAN INVOLVEMENT (H): ICD-10-CM

## 2024-01-01 DIAGNOSIS — I50.9 ACUTE ON CHRONIC CONGESTIVE HEART FAILURE, UNSPECIFIED HEART FAILURE TYPE (H): ICD-10-CM

## 2024-01-01 DIAGNOSIS — R09.02 HYPOXIA: ICD-10-CM

## 2024-01-01 DIAGNOSIS — I51.81 TAKOTSUBO CARDIOMYOPATHY: ICD-10-CM

## 2024-01-01 DIAGNOSIS — Z99.2 DIALYSIS PATIENT (H): ICD-10-CM

## 2024-01-01 DIAGNOSIS — R00.0 TACHYCARDIA: ICD-10-CM

## 2024-01-01 DIAGNOSIS — A04.71 RECURRENT CLOSTRIDIOIDES DIFFICILE DIARRHEA: ICD-10-CM

## 2024-01-01 DIAGNOSIS — A04.71 RECURRENT CLOSTRIDIUM DIFFICILE DIARRHEA: Primary | ICD-10-CM

## 2024-01-01 DIAGNOSIS — J10.1 INFLUENZA B: ICD-10-CM

## 2024-01-01 DIAGNOSIS — N18.6 ESRD (END STAGE RENAL DISEASE) ON DIALYSIS (H): ICD-10-CM

## 2024-01-01 DIAGNOSIS — U07.1 COVID-19: ICD-10-CM

## 2024-01-01 DIAGNOSIS — M32.14 LUPUS NEPHRITIS (H): ICD-10-CM

## 2024-01-01 DIAGNOSIS — M32.14 LUPUS NEPHRITIS (H): Primary | ICD-10-CM

## 2024-01-01 DIAGNOSIS — I50.23 ACUTE ON CHRONIC SYSTOLIC CONGESTIVE HEART FAILURE (H): ICD-10-CM

## 2024-01-01 DIAGNOSIS — I50.23 ACUTE ON CHRONIC SYSTOLIC HEART FAILURE (H): Primary | ICD-10-CM

## 2024-01-01 LAB
ACANTHOCYTES BLD QL SMEAR: ABNORMAL
ACANTHOCYTES BLD QL SMEAR: ABNORMAL
ACANTHOCYTES BLD QL SMEAR: NORMAL
ACANTHOCYTES BLD QL SMEAR: NORMAL
ALBUMIN MFR UR ELPH: 225.8 MG/DL
ALBUMIN SERPL BCG-MCNC: 2.7 G/DL (ref 3.5–5.2)
ALBUMIN SERPL BCG-MCNC: 2.7 G/DL (ref 3.5–5.2)
ALBUMIN SERPL BCG-MCNC: 2.9 G/DL (ref 3.5–5.2)
ALBUMIN SERPL BCG-MCNC: 2.9 G/DL (ref 3.5–5.2)
ALBUMIN SERPL BCG-MCNC: 3 G/DL (ref 3.5–5.2)
ALBUMIN SERPL BCG-MCNC: 3.1 G/DL (ref 3.5–5.2)
ALBUMIN SERPL BCG-MCNC: 3.1 G/DL (ref 3.5–5.2)
ALBUMIN SERPL BCG-MCNC: 3.2 G/DL (ref 3.5–5.2)
ALBUMIN SERPL BCG-MCNC: 3.4 G/DL (ref 3.5–5.2)
ALBUMIN UR-MCNC: 100 MG/DL
ALBUMIN UR-MCNC: 100 MG/DL
ALBUMIN UR-MCNC: 70 MG/DL
ALP SERPL-CCNC: 102 U/L (ref 40–150)
ALP SERPL-CCNC: 106 U/L (ref 40–150)
ALP SERPL-CCNC: 106 U/L (ref 40–150)
ALP SERPL-CCNC: 108 U/L (ref 40–150)
ALP SERPL-CCNC: 109 U/L (ref 40–150)
ALP SERPL-CCNC: 110 U/L (ref 40–150)
ALP SERPL-CCNC: 112 U/L (ref 40–150)
ALP SERPL-CCNC: 113 U/L (ref 40–150)
ALP SERPL-CCNC: 116 U/L (ref 40–150)
ALP SERPL-CCNC: 144 U/L (ref 40–150)
ALP SERPL-CCNC: 178 U/L (ref 40–150)
ALP SERPL-CCNC: 92 U/L (ref 40–150)
ALP SERPL-CCNC: 95 U/L (ref 40–150)
ALP SERPL-CCNC: 96 U/L (ref 40–150)
ALP SERPL-CCNC: 98 U/L (ref 40–150)
ALT SERPL W P-5'-P-CCNC: 12 U/L (ref 0–50)
ALT SERPL W P-5'-P-CCNC: 15 U/L (ref 0–50)
ALT SERPL W P-5'-P-CCNC: 16 U/L (ref 0–50)
ALT SERPL W P-5'-P-CCNC: 36 U/L (ref 0–50)
ALT SERPL W P-5'-P-CCNC: 42 U/L (ref 0–50)
ALT SERPL W P-5'-P-CCNC: 42 U/L (ref 0–50)
ALT SERPL W P-5'-P-CCNC: 43 U/L (ref 0–50)
ALT SERPL W P-5'-P-CCNC: 44 U/L (ref 0–50)
ALT SERPL W P-5'-P-CCNC: 48 U/L (ref 0–50)
ALT SERPL W P-5'-P-CCNC: 50 U/L (ref 0–50)
ALT SERPL W P-5'-P-CCNC: 50 U/L (ref 0–50)
ALT SERPL W P-5'-P-CCNC: 51 U/L (ref 0–50)
ALT SERPL W P-5'-P-CCNC: 53 U/L (ref 0–50)
ALT SERPL W P-5'-P-CCNC: 63 U/L (ref 0–50)
ALT SERPL W P-5'-P-CCNC: 70 U/L (ref 0–50)
ANION GAP SERPL CALCULATED.3IONS-SCNC: 10 MMOL/L (ref 7–15)
ANION GAP SERPL CALCULATED.3IONS-SCNC: 10 MMOL/L (ref 7–15)
ANION GAP SERPL CALCULATED.3IONS-SCNC: 11 MMOL/L (ref 7–15)
ANION GAP SERPL CALCULATED.3IONS-SCNC: 11 MMOL/L (ref 7–15)
ANION GAP SERPL CALCULATED.3IONS-SCNC: 12 MMOL/L (ref 7–15)
ANION GAP SERPL CALCULATED.3IONS-SCNC: 13 MMOL/L (ref 7–15)
ANION GAP SERPL CALCULATED.3IONS-SCNC: 14 MMOL/L (ref 7–15)
ANION GAP SERPL CALCULATED.3IONS-SCNC: 15 MMOL/L (ref 7–15)
ANION GAP SERPL CALCULATED.3IONS-SCNC: 16 MMOL/L (ref 7–15)
ANION GAP SERPL CALCULATED.3IONS-SCNC: 18 MMOL/L (ref 7–15)
ANION GAP SERPL CALCULATED.3IONS-SCNC: 19 MMOL/L (ref 7–15)
ANION GAP SERPL CALCULATED.3IONS-SCNC: 19 MMOL/L (ref 7–15)
ANION GAP SERPL CALCULATED.3IONS-SCNC: 7 MMOL/L (ref 7–15)
APPEARANCE UR: ABNORMAL
APPEARANCE UR: ABNORMAL
APPEARANCE UR: CLEAR
AST SERPL W P-5'-P-CCNC: 17 U/L (ref 0–45)
AST SERPL W P-5'-P-CCNC: 17 U/L (ref 0–45)
AST SERPL W P-5'-P-CCNC: 20 U/L (ref 0–45)
AST SERPL W P-5'-P-CCNC: 20 U/L (ref 0–45)
AST SERPL W P-5'-P-CCNC: 22 U/L (ref 0–45)
AST SERPL W P-5'-P-CCNC: 22 U/L (ref 0–45)
AST SERPL W P-5'-P-CCNC: 24 U/L (ref 0–45)
AST SERPL W P-5'-P-CCNC: 25 U/L (ref 0–45)
AST SERPL W P-5'-P-CCNC: 25 U/L (ref 0–45)
AST SERPL W P-5'-P-CCNC: 26 U/L (ref 0–45)
AST SERPL W P-5'-P-CCNC: 27 U/L (ref 0–45)
AST SERPL W P-5'-P-CCNC: 35 U/L (ref 0–45)
AST SERPL W P-5'-P-CCNC: 36 U/L (ref 0–45)
AST SERPL W P-5'-P-CCNC: 62 U/L (ref 0–45)
AST SERPL W P-5'-P-CCNC: 71 U/L (ref 0–45)
ATRIAL RATE - MUSE: 125 BPM
ATRIAL RATE - MUSE: 75 BPM
ATRIAL RATE - MUSE: 84 BPM
ATRIAL RATE - MUSE: 84 BPM
AUER BODIES BLD QL SMEAR: ABNORMAL
AUER BODIES BLD QL SMEAR: ABNORMAL
AUER BODIES BLD QL SMEAR: NORMAL
AUER BODIES BLD QL SMEAR: NORMAL
BACTERIA #/AREA URNS HPF: ABNORMAL /HPF
BACTERIA #/AREA URNS HPF: ABNORMAL /HPF
BACTERIA ABSC ANAEROBE+AEROBE CULT: NO GROWTH
BACTERIA ABSC ANAEROBE+AEROBE CULT: NORMAL
BACTERIA BLD CULT: NO GROWTH
BASE EXCESS BLDV CALC-SCNC: 4 MMOL/L (ref -3–3)
BASO STIPL BLD QL SMEAR: ABNORMAL
BASO STIPL BLD QL SMEAR: ABNORMAL
BASO STIPL BLD QL SMEAR: NORMAL
BASO STIPL BLD QL SMEAR: NORMAL
BASOPHILS # BLD AUTO: 0 10E3/UL (ref 0–0.2)
BASOPHILS # BLD AUTO: 0.1 10E3/UL (ref 0–0.2)
BASOPHILS # BLD AUTO: ABNORMAL 10*3/UL
BASOPHILS # BLD MANUAL: 0 10E3/UL (ref 0–0.2)
BASOPHILS NFR BLD AUTO: 0 %
BASOPHILS NFR BLD AUTO: ABNORMAL %
BASOPHILS NFR BLD MANUAL: 0 %
BILIRUB DIRECT SERPL-MCNC: <0.2 MG/DL (ref 0–0.3)
BILIRUB SERPL-MCNC: 0.2 MG/DL
BILIRUB SERPL-MCNC: 0.3 MG/DL
BILIRUB SERPL-MCNC: 0.4 MG/DL
BILIRUB SERPL-MCNC: 0.4 MG/DL
BILIRUB SERPL-MCNC: 0.6 MG/DL
BILIRUB UR QL STRIP: NEGATIVE
BITE CELLS BLD QL SMEAR: ABNORMAL
BITE CELLS BLD QL SMEAR: ABNORMAL
BITE CELLS BLD QL SMEAR: NORMAL
BITE CELLS BLD QL SMEAR: NORMAL
BLISTER CELLS BLD QL SMEAR: ABNORMAL
BLISTER CELLS BLD QL SMEAR: ABNORMAL
BLISTER CELLS BLD QL SMEAR: NORMAL
BLISTER CELLS BLD QL SMEAR: NORMAL
BUN SERPL-MCNC: 10.5 MG/DL (ref 6–20)
BUN SERPL-MCNC: 101 MG/DL (ref 6–20)
BUN SERPL-MCNC: 107 MG/DL (ref 6–20)
BUN SERPL-MCNC: 107 MG/DL (ref 6–20)
BUN SERPL-MCNC: 109 MG/DL (ref 6–20)
BUN SERPL-MCNC: 109 MG/DL (ref 6–20)
BUN SERPL-MCNC: 112 MG/DL (ref 6–20)
BUN SERPL-MCNC: 112 MG/DL (ref 6–20)
BUN SERPL-MCNC: 120 MG/DL (ref 6–20)
BUN SERPL-MCNC: 19.5 MG/DL (ref 6–20)
BUN SERPL-MCNC: 20.5 MG/DL (ref 6–20)
BUN SERPL-MCNC: 24.6 MG/DL (ref 6–20)
BUN SERPL-MCNC: 26.1 MG/DL (ref 6–20)
BUN SERPL-MCNC: 26.7 MG/DL (ref 6–20)
BUN SERPL-MCNC: 28.2 MG/DL (ref 6–20)
BUN SERPL-MCNC: 32.5 MG/DL (ref 6–20)
BUN SERPL-MCNC: 36.9 MG/DL (ref 6–20)
BUN SERPL-MCNC: 37.3 MG/DL (ref 6–20)
BUN SERPL-MCNC: 45.6 MG/DL (ref 6–20)
BUN SERPL-MCNC: 49.9 MG/DL (ref 6–20)
BUN SERPL-MCNC: 59.2 MG/DL (ref 6–20)
BUN SERPL-MCNC: 59.2 MG/DL (ref 6–20)
BUN SERPL-MCNC: 66.7 MG/DL (ref 6–20)
BUN SERPL-MCNC: 85 MG/DL (ref 6–20)
BUN SERPL-MCNC: 86.7 MG/DL (ref 6–20)
BUN SERPL-MCNC: 87.3 MG/DL (ref 6–20)
BUN SERPL-MCNC: 89.3 MG/DL (ref 6–20)
BUN SERPL-MCNC: 92.5 MG/DL (ref 6–20)
BUN SERPL-MCNC: 95.2 MG/DL (ref 6–20)
BUN SERPL-MCNC: 96.8 MG/DL (ref 6–20)
BUN SERPL-MCNC: 96.9 MG/DL (ref 6–20)
BUN SERPL-MCNC: 97.7 MG/DL (ref 6–20)
BURR CELLS BLD QL SMEAR: ABNORMAL
BURR CELLS BLD QL SMEAR: NORMAL
BURR CELLS BLD QL SMEAR: NORMAL
BURR CELLS BLD QL SMEAR: SLIGHT
C DIFF GDH STL QL IA: POSITIVE
C DIFF GDH STL QL IA: POSITIVE
C DIFF TOX A+B STL QL IA: NEGATIVE
C DIFF TOX A+B STL QL IA: POSITIVE
C DIFF TOX B STL QL: POSITIVE
C DIFF TOX B STL QL: POSITIVE
C PNEUM DNA SPEC QL NAA+PROBE: NOT DETECTED
C3 SERPL-MCNC: 33 MG/DL (ref 81–157)
C3 SERPL-MCNC: 41 MG/DL (ref 81–157)
C3 SERPL-MCNC: 44 MG/DL (ref 81–157)
C4 SERPL-MCNC: 3 MG/DL (ref 13–39)
C4 SERPL-MCNC: 6 MG/DL (ref 13–39)
C4 SERPL-MCNC: 6 MG/DL (ref 13–39)
CALCIUM SERPL-MCNC: 7.2 MG/DL (ref 8.6–10)
CALCIUM SERPL-MCNC: 7.4 MG/DL (ref 8.6–10)
CALCIUM SERPL-MCNC: 7.5 MG/DL (ref 8.6–10)
CALCIUM SERPL-MCNC: 7.6 MG/DL (ref 8.6–10)
CALCIUM SERPL-MCNC: 7.8 MG/DL (ref 8.6–10)
CALCIUM SERPL-MCNC: 7.9 MG/DL (ref 8.6–10)
CALCIUM SERPL-MCNC: 8 MG/DL (ref 8.6–10)
CALCIUM SERPL-MCNC: 8 MG/DL (ref 8.6–10)
CALCIUM SERPL-MCNC: 8.1 MG/DL (ref 8.6–10)
CALCIUM SERPL-MCNC: 8.2 MG/DL (ref 8.6–10)
CALCIUM SERPL-MCNC: 8.3 MG/DL (ref 8.6–10)
CALCIUM SERPL-MCNC: 8.4 MG/DL (ref 8.6–10)
CALCIUM SERPL-MCNC: 8.5 MG/DL (ref 8.6–10)
CALCIUM SERPL-MCNC: 8.7 MG/DL (ref 8.6–10)
CALCIUM SERPL-MCNC: 8.7 MG/DL (ref 8.6–10)
CALCIUM SERPL-MCNC: 8.8 MG/DL (ref 8.6–10)
CALCIUM SERPL-MCNC: 8.8 MG/DL (ref 8.6–10)
CALCIUM SERPL-MCNC: 8.9 MG/DL (ref 8.6–10)
CALCIUM SERPL-MCNC: 9.1 MG/DL (ref 8.6–10)
CHLORIDE SERPL-SCNC: 100 MMOL/L (ref 98–107)
CHLORIDE SERPL-SCNC: 101 MMOL/L (ref 98–107)
CHLORIDE SERPL-SCNC: 103 MMOL/L (ref 98–107)
CHLORIDE SERPL-SCNC: 104 MMOL/L (ref 98–107)
CHLORIDE SERPL-SCNC: 105 MMOL/L (ref 98–107)
CHLORIDE SERPL-SCNC: 106 MMOL/L (ref 98–107)
CHLORIDE SERPL-SCNC: 107 MMOL/L (ref 98–107)
CHLORIDE SERPL-SCNC: 108 MMOL/L (ref 98–107)
CHLORIDE SERPL-SCNC: 108 MMOL/L (ref 98–107)
CHLORIDE SERPL-SCNC: 109 MMOL/L (ref 98–107)
CHLORIDE SERPL-SCNC: 110 MMOL/L (ref 98–107)
CHLORIDE SERPL-SCNC: 111 MMOL/L (ref 98–107)
CHLORIDE SERPL-SCNC: 113 MMOL/L (ref 98–107)
CHLORIDE SERPL-SCNC: 98 MMOL/L (ref 98–107)
CHLORIDE SERPL-SCNC: 99 MMOL/L (ref 98–107)
CK SERPL-CCNC: 66 U/L (ref 26–192)
COLOR UR AUTO: ABNORMAL
COLOR UR AUTO: YELLOW
COLOR UR AUTO: YELLOW
CREAT SERPL-MCNC: 1.85 MG/DL (ref 0.51–0.95)
CREAT SERPL-MCNC: 1.96 MG/DL (ref 0.51–0.95)
CREAT SERPL-MCNC: 2.24 MG/DL (ref 0.51–0.95)
CREAT SERPL-MCNC: 2.24 MG/DL (ref 0.51–0.95)
CREAT SERPL-MCNC: 2.34 MG/DL (ref 0.51–0.95)
CREAT SERPL-MCNC: 2.38 MG/DL (ref 0.51–0.95)
CREAT SERPL-MCNC: 2.48 MG/DL (ref 0.51–0.95)
CREAT SERPL-MCNC: 2.77 MG/DL (ref 0.51–0.95)
CREAT SERPL-MCNC: 2.83 MG/DL (ref 0.51–0.95)
CREAT SERPL-MCNC: 2.86 MG/DL (ref 0.51–0.95)
CREAT SERPL-MCNC: 2.97 MG/DL (ref 0.51–0.95)
CREAT SERPL-MCNC: 3.03 MG/DL (ref 0.51–0.95)
CREAT SERPL-MCNC: 3.07 MG/DL (ref 0.51–0.95)
CREAT SERPL-MCNC: 3.1 MG/DL (ref 0.51–0.95)
CREAT SERPL-MCNC: 3.21 MG/DL (ref 0.51–0.95)
CREAT SERPL-MCNC: 3.35 MG/DL (ref 0.51–0.95)
CREAT SERPL-MCNC: 3.38 MG/DL (ref 0.51–0.95)
CREAT SERPL-MCNC: 3.39 MG/DL (ref 0.51–0.95)
CREAT SERPL-MCNC: 3.41 MG/DL (ref 0.51–0.95)
CREAT SERPL-MCNC: 3.49 MG/DL (ref 0.51–0.95)
CREAT SERPL-MCNC: 3.54 MG/DL (ref 0.51–0.95)
CREAT SERPL-MCNC: 3.57 MG/DL (ref 0.51–0.95)
CREAT SERPL-MCNC: 3.69 MG/DL (ref 0.51–0.95)
CREAT SERPL-MCNC: 3.71 MG/DL (ref 0.51–0.95)
CREAT SERPL-MCNC: 3.97 MG/DL (ref 0.51–0.95)
CREAT SERPL-MCNC: 4.1 MG/DL (ref 0.51–0.95)
CREAT SERPL-MCNC: 4.15 MG/DL (ref 0.51–0.95)
CREAT SERPL-MCNC: 4.19 MG/DL (ref 0.51–0.95)
CREAT SERPL-MCNC: 4.29 MG/DL (ref 0.51–0.95)
CREAT SERPL-MCNC: 4.31 MG/DL (ref 0.51–0.95)
CREAT SERPL-MCNC: 4.33 MG/DL (ref 0.51–0.95)
CREAT SERPL-MCNC: 4.39 MG/DL (ref 0.51–0.95)
CREAT UR-MCNC: 133.1 MG/DL
CRP SERPL-MCNC: 11.8 MG/L
CRP SERPL-MCNC: 13.4 MG/L
CRP SERPL-MCNC: 14.5 MG/L
CRP SERPL-MCNC: 22.5 MG/L
CRP SERPL-MCNC: 4.69 MG/L
CRP SERPL-MCNC: 8.18 MG/L
CYSTATIN C (ROCHE): 5.4 MG/L (ref 0.6–1)
CYSTATIN C (ROCHE): 6 MG/L (ref 0.6–1)
D DIMER PPP FEU-MCNC: 1.18 UG/ML FEU (ref 0–0.5)
DACRYOCYTES BLD QL SMEAR: ABNORMAL
DACRYOCYTES BLD QL SMEAR: NORMAL
DACRYOCYTES BLD QL SMEAR: NORMAL
DACRYOCYTES BLD QL SMEAR: SLIGHT
DACRYOCYTES BLD QL SMEAR: SLIGHT
DEPRECATED HCO3 PLAS-SCNC: 13 MMOL/L (ref 22–29)
DEPRECATED HCO3 PLAS-SCNC: 14 MMOL/L (ref 22–29)
DEPRECATED HCO3 PLAS-SCNC: 15 MMOL/L (ref 22–29)
DEPRECATED HCO3 PLAS-SCNC: 16 MMOL/L (ref 22–29)
DEPRECATED HCO3 PLAS-SCNC: 17 MMOL/L (ref 22–29)
DEPRECATED HCO3 PLAS-SCNC: 18 MMOL/L (ref 22–29)
DEPRECATED HCO3 PLAS-SCNC: 18 MMOL/L (ref 22–29)
DEPRECATED HCO3 PLAS-SCNC: 19 MMOL/L (ref 22–29)
DEPRECATED HCO3 PLAS-SCNC: 20 MMOL/L (ref 22–29)
DEPRECATED HCO3 PLAS-SCNC: 20 MMOL/L (ref 22–29)
DEPRECATED HCO3 PLAS-SCNC: 21 MMOL/L (ref 22–29)
DEPRECATED HCO3 PLAS-SCNC: 21 MMOL/L (ref 22–29)
DEPRECATED HCO3 PLAS-SCNC: 22 MMOL/L (ref 22–29)
DEPRECATED HCO3 PLAS-SCNC: 22 MMOL/L (ref 22–29)
DEPRECATED HCO3 PLAS-SCNC: 23 MMOL/L (ref 22–29)
DEPRECATED HCO3 PLAS-SCNC: 24 MMOL/L (ref 22–29)
DEPRECATED HCO3 PLAS-SCNC: 24 MMOL/L (ref 22–29)
DEPRECATED HCO3 PLAS-SCNC: 25 MMOL/L (ref 22–29)
DEPRECATED HCO3 PLAS-SCNC: 26 MMOL/L (ref 22–29)
DEPRECATED HCO3 PLAS-SCNC: 26 MMOL/L (ref 22–29)
DEPRECATED HCO3 PLAS-SCNC: 27 MMOL/L (ref 22–29)
DEPRECATED HCO3 PLAS-SCNC: 27 MMOL/L (ref 22–29)
DIASTOLIC BLOOD PRESSURE - MUSE: NORMAL MMHG
DRVVT SCREEN RATIO: 0.85
DSDNA AB SER-ACNC: 12 IU/ML
DSDNA AB SER-ACNC: 12 IU/ML
DSDNA AB SER-ACNC: 13 IU/ML
EGFRCR SERPLBLD CKD-EPI 2021: 14 ML/MIN/1.73M2
EGFRCR SERPLBLD CKD-EPI 2021: 15 ML/MIN/1.73M2
EGFRCR SERPLBLD CKD-EPI 2021: 16 ML/MIN/1.73M2
EGFRCR SERPLBLD CKD-EPI 2021: 17 ML/MIN/1.73M2
EGFRCR SERPLBLD CKD-EPI 2021: 17 ML/MIN/1.73M2
EGFRCR SERPLBLD CKD-EPI 2021: 18 ML/MIN/1.73M2
EGFRCR SERPLBLD CKD-EPI 2021: 19 ML/MIN/1.73M2
EGFRCR SERPLBLD CKD-EPI 2021: 20 ML/MIN/1.73M2
EGFRCR SERPLBLD CKD-EPI 2021: 21 ML/MIN/1.73M2
EGFRCR SERPLBLD CKD-EPI 2021: 22 ML/MIN/1.73M2
EGFRCR SERPLBLD CKD-EPI 2021: 23 ML/MIN/1.73M2
EGFRCR SERPLBLD CKD-EPI 2021: 23 ML/MIN/1.73M2
EGFRCR SERPLBLD CKD-EPI 2021: 24 ML/MIN/1.73M2
EGFRCR SERPLBLD CKD-EPI 2021: 27 ML/MIN/1.73M2
EGFRCR SERPLBLD CKD-EPI 2021: 29 ML/MIN/1.73M2
EGFRCR SERPLBLD CKD-EPI 2021: 29 ML/MIN/1.73M2
EGFRCR SERPLBLD CKD-EPI 2021: 31 ML/MIN/1.73M2
EGFRCR SERPLBLD CKD-EPI 2021: 31 ML/MIN/1.73M2
EGFRCR SERPLBLD CKD-EPI 2021: 36 ML/MIN/1.73M2
EGFRCR SERPLBLD CKD-EPI 2021: 39 ML/MIN/1.73M2
ELLIPTOCYTES BLD QL SMEAR: ABNORMAL
ELLIPTOCYTES BLD QL SMEAR: NORMAL
ELLIPTOCYTES BLD QL SMEAR: NORMAL
ELLIPTOCYTES BLD QL SMEAR: SLIGHT
EOSINOPHIL # BLD AUTO: 0 10E3/UL (ref 0–0.7)
EOSINOPHIL # BLD AUTO: 0.1 10E3/UL (ref 0–0.7)
EOSINOPHIL # BLD AUTO: ABNORMAL 10*3/UL
EOSINOPHIL # BLD MANUAL: 0 10E3/UL (ref 0–0.7)
EOSINOPHIL NFR BLD AUTO: 0 %
EOSINOPHIL NFR BLD AUTO: 1 %
EOSINOPHIL NFR BLD AUTO: ABNORMAL %
EOSINOPHIL NFR BLD MANUAL: 0 %
ERYTHROCYTE [DISTWIDTH] IN BLOOD BY AUTOMATED COUNT: 14.3 % (ref 10–15)
ERYTHROCYTE [DISTWIDTH] IN BLOOD BY AUTOMATED COUNT: 14.4 % (ref 10–15)
ERYTHROCYTE [DISTWIDTH] IN BLOOD BY AUTOMATED COUNT: 14.5 % (ref 10–15)
ERYTHROCYTE [DISTWIDTH] IN BLOOD BY AUTOMATED COUNT: 14.5 % (ref 10–15)
ERYTHROCYTE [DISTWIDTH] IN BLOOD BY AUTOMATED COUNT: 14.6 % (ref 10–15)
ERYTHROCYTE [DISTWIDTH] IN BLOOD BY AUTOMATED COUNT: 14.7 % (ref 10–15)
ERYTHROCYTE [DISTWIDTH] IN BLOOD BY AUTOMATED COUNT: 14.8 % (ref 10–15)
ERYTHROCYTE [DISTWIDTH] IN BLOOD BY AUTOMATED COUNT: 14.9 % (ref 10–15)
ERYTHROCYTE [DISTWIDTH] IN BLOOD BY AUTOMATED COUNT: 15.3 % (ref 10–15)
ERYTHROCYTE [DISTWIDTH] IN BLOOD BY AUTOMATED COUNT: 15.7 % (ref 10–15)
ERYTHROCYTE [DISTWIDTH] IN BLOOD BY AUTOMATED COUNT: 15.8 % (ref 10–15)
ERYTHROCYTE [DISTWIDTH] IN BLOOD BY AUTOMATED COUNT: 16.5 % (ref 10–15)
ERYTHROCYTE [DISTWIDTH] IN BLOOD BY AUTOMATED COUNT: 16.6 % (ref 10–15)
ERYTHROCYTE [DISTWIDTH] IN BLOOD BY AUTOMATED COUNT: 16.9 % (ref 10–15)
ERYTHROCYTE [DISTWIDTH] IN BLOOD BY AUTOMATED COUNT: 17 % (ref 10–15)
ERYTHROCYTE [DISTWIDTH] IN BLOOD BY AUTOMATED COUNT: 17.1 % (ref 10–15)
ERYTHROCYTE [DISTWIDTH] IN BLOOD BY AUTOMATED COUNT: 17.3 % (ref 10–15)
ERYTHROCYTE [DISTWIDTH] IN BLOOD BY AUTOMATED COUNT: 17.6 % (ref 10–15)
ERYTHROCYTE [DISTWIDTH] IN BLOOD BY AUTOMATED COUNT: 17.7 % (ref 10–15)
ERYTHROCYTE [DISTWIDTH] IN BLOOD BY AUTOMATED COUNT: 17.7 % (ref 10–15)
ERYTHROCYTE [DISTWIDTH] IN BLOOD BY AUTOMATED COUNT: 17.8 % (ref 10–15)
ERYTHROCYTE [DISTWIDTH] IN BLOOD BY AUTOMATED COUNT: 17.9 % (ref 10–15)
ERYTHROCYTE [DISTWIDTH] IN BLOOD BY AUTOMATED COUNT: 19 % (ref 10–15)
ERYTHROCYTE [DISTWIDTH] IN BLOOD BY AUTOMATED COUNT: 19.2 % (ref 10–15)
FERRITIN SERPL-MCNC: 786 NG/ML (ref 6–175)
FIBRINOGEN PPP-MCNC: 296 MG/DL (ref 170–490)
FLUAV H1 2009 PAND RNA SPEC QL NAA+PROBE: NOT DETECTED
FLUAV H1 RNA SPEC QL NAA+PROBE: NOT DETECTED
FLUAV H3 RNA SPEC QL NAA+PROBE: NOT DETECTED
FLUAV RNA SPEC QL NAA+PROBE: NEGATIVE
FLUAV RNA SPEC QL NAA+PROBE: NOT DETECTED
FLUBV RNA RESP QL NAA+PROBE: POSITIVE
FLUBV RNA SPEC QL NAA+PROBE: NOT DETECTED
FRAGMENTS BLD QL SMEAR: ABNORMAL
FRAGMENTS BLD QL SMEAR: ABNORMAL
FRAGMENTS BLD QL SMEAR: NORMAL
FRAGMENTS BLD QL SMEAR: NORMAL
GFR SERPL CREATININE-BSD FRML MDRD: 8 ML/MIN/1.73M2
GFR SERPL CREATININE-BSD FRML MDRD: 9 ML/MIN/1.73M2
GLUCOSE BLDC GLUCOMTR-MCNC: 100 MG/DL (ref 70–99)
GLUCOSE BLDC GLUCOMTR-MCNC: 103 MG/DL (ref 70–99)
GLUCOSE BLDC GLUCOMTR-MCNC: 108 MG/DL (ref 70–99)
GLUCOSE BLDC GLUCOMTR-MCNC: 110 MG/DL (ref 70–99)
GLUCOSE BLDC GLUCOMTR-MCNC: 112 MG/DL (ref 70–99)
GLUCOSE BLDC GLUCOMTR-MCNC: 122 MG/DL (ref 70–99)
GLUCOSE BLDC GLUCOMTR-MCNC: 122 MG/DL (ref 70–99)
GLUCOSE BLDC GLUCOMTR-MCNC: 129 MG/DL (ref 70–99)
GLUCOSE BLDC GLUCOMTR-MCNC: 131 MG/DL (ref 70–99)
GLUCOSE BLDC GLUCOMTR-MCNC: 133 MG/DL (ref 70–99)
GLUCOSE BLDC GLUCOMTR-MCNC: 135 MG/DL (ref 70–99)
GLUCOSE BLDC GLUCOMTR-MCNC: 136 MG/DL (ref 70–99)
GLUCOSE BLDC GLUCOMTR-MCNC: 142 MG/DL (ref 70–99)
GLUCOSE BLDC GLUCOMTR-MCNC: 147 MG/DL (ref 70–99)
GLUCOSE BLDC GLUCOMTR-MCNC: 150 MG/DL (ref 70–99)
GLUCOSE BLDC GLUCOMTR-MCNC: 150 MG/DL (ref 70–99)
GLUCOSE BLDC GLUCOMTR-MCNC: 171 MG/DL (ref 70–99)
GLUCOSE BLDC GLUCOMTR-MCNC: 175 MG/DL (ref 70–99)
GLUCOSE BLDC GLUCOMTR-MCNC: 214 MG/DL (ref 70–99)
GLUCOSE BLDC GLUCOMTR-MCNC: 296 MG/DL (ref 70–99)
GLUCOSE BLDC GLUCOMTR-MCNC: 306 MG/DL (ref 70–99)
GLUCOSE SERPL-MCNC: 101 MG/DL (ref 70–99)
GLUCOSE SERPL-MCNC: 101 MG/DL (ref 70–99)
GLUCOSE SERPL-MCNC: 103 MG/DL (ref 70–99)
GLUCOSE SERPL-MCNC: 104 MG/DL (ref 70–99)
GLUCOSE SERPL-MCNC: 107 MG/DL (ref 70–99)
GLUCOSE SERPL-MCNC: 114 MG/DL (ref 70–99)
GLUCOSE SERPL-MCNC: 118 MG/DL (ref 70–99)
GLUCOSE SERPL-MCNC: 120 MG/DL (ref 70–99)
GLUCOSE SERPL-MCNC: 121 MG/DL (ref 70–99)
GLUCOSE SERPL-MCNC: 121 MG/DL (ref 70–99)
GLUCOSE SERPL-MCNC: 122 MG/DL (ref 70–99)
GLUCOSE SERPL-MCNC: 123 MG/DL (ref 70–99)
GLUCOSE SERPL-MCNC: 123 MG/DL (ref 70–99)
GLUCOSE SERPL-MCNC: 124 MG/DL (ref 70–99)
GLUCOSE SERPL-MCNC: 128 MG/DL (ref 70–99)
GLUCOSE SERPL-MCNC: 131 MG/DL (ref 70–99)
GLUCOSE SERPL-MCNC: 134 MG/DL (ref 70–99)
GLUCOSE SERPL-MCNC: 142 MG/DL (ref 70–99)
GLUCOSE SERPL-MCNC: 158 MG/DL (ref 70–99)
GLUCOSE SERPL-MCNC: 163 MG/DL (ref 70–99)
GLUCOSE SERPL-MCNC: 200 MG/DL (ref 70–99)
GLUCOSE SERPL-MCNC: 235 MG/DL (ref 70–99)
GLUCOSE SERPL-MCNC: 299 MG/DL (ref 70–99)
GLUCOSE SERPL-MCNC: 72 MG/DL (ref 70–99)
GLUCOSE SERPL-MCNC: 73 MG/DL (ref 70–99)
GLUCOSE SERPL-MCNC: 80 MG/DL (ref 70–99)
GLUCOSE SERPL-MCNC: 88 MG/DL (ref 70–99)
GLUCOSE SERPL-MCNC: 90 MG/DL (ref 70–99)
GLUCOSE SERPL-MCNC: 94 MG/DL (ref 70–99)
GLUCOSE SERPL-MCNC: 95 MG/DL (ref 70–99)
GLUCOSE UR STRIP-MCNC: NEGATIVE MG/DL
GRAM STAIN RESULT: NORMAL
GRAM STAIN RESULT: NORMAL
HADV DNA SPEC QL NAA+PROBE: NOT DETECTED
HAPTOGLOB SERPL-MCNC: 62 MG/DL (ref 30–200)
HAPTOGLOB SERPL-MCNC: 63 MG/DL (ref 30–200)
HAPTOGLOB SERPL-MCNC: 90 MG/DL (ref 30–200)
HAPTOGLOB SERPL-MCNC: <10 MG/DL (ref 30–200)
HBV CORE AB SERPL QL IA: NONREACTIVE
HBV SURFACE AB SERPL IA-ACNC: 4.38 M[IU]/ML
HBV SURFACE AB SERPL IA-ACNC: 7.01 M[IU]/ML
HBV SURFACE AB SERPL IA-ACNC: NONREACTIVE M[IU]/ML
HBV SURFACE AB SERPL IA-ACNC: NONREACTIVE M[IU]/ML
HBV SURFACE AG SERPL QL IA: NONREACTIVE
HBV SURFACE AG SERPL QL IA: NONREACTIVE
HCG SERPL QL: NEGATIVE
HCO3 BLDV-SCNC: 29 MMOL/L (ref 21–28)
HCOV PNL SPEC NAA+PROBE: NOT DETECTED
HCT VFR BLD AUTO: 25.5 % (ref 35–47)
HCT VFR BLD AUTO: 26.1 % (ref 35–47)
HCT VFR BLD AUTO: 26.5 % (ref 35–47)
HCT VFR BLD AUTO: 26.6 % (ref 35–47)
HCT VFR BLD AUTO: 26.9 % (ref 35–47)
HCT VFR BLD AUTO: 27 % (ref 35–47)
HCT VFR BLD AUTO: 27.6 % (ref 35–47)
HCT VFR BLD AUTO: 28 % (ref 35–47)
HCT VFR BLD AUTO: 28.6 % (ref 35–47)
HCT VFR BLD AUTO: 28.7 % (ref 35–47)
HCT VFR BLD AUTO: 29 % (ref 35–47)
HCT VFR BLD AUTO: 29.3 % (ref 35–47)
HCT VFR BLD AUTO: 29.3 % (ref 35–47)
HCT VFR BLD AUTO: 29.4 % (ref 35–47)
HCT VFR BLD AUTO: 29.5 % (ref 35–47)
HCT VFR BLD AUTO: 30.2 % (ref 35–47)
HCT VFR BLD AUTO: 30.2 % (ref 35–47)
HCT VFR BLD AUTO: 30.3 % (ref 35–47)
HCT VFR BLD AUTO: 30.4 % (ref 35–47)
HCT VFR BLD AUTO: 30.6 % (ref 35–47)
HCT VFR BLD AUTO: 30.7 % (ref 35–47)
HCT VFR BLD AUTO: 30.9 % (ref 35–47)
HCT VFR BLD AUTO: 31.2 % (ref 35–47)
HCT VFR BLD AUTO: 31.4 % (ref 35–47)
HCT VFR BLD AUTO: 31.9 % (ref 35–47)
HCT VFR BLD AUTO: 32 % (ref 35–47)
HCT VFR BLD AUTO: 32.2 % (ref 35–47)
HCT VFR BLD AUTO: 32.6 % (ref 35–47)
HCT VFR BLD AUTO: 32.7 % (ref 35–47)
HCT VFR BLD AUTO: 34.6 % (ref 35–47)
HGB BLD-MCNC: 10.1 G/DL (ref 11.7–15.7)
HGB BLD-MCNC: 10.1 G/DL (ref 11.7–15.7)
HGB BLD-MCNC: 10.2 G/DL (ref 11.7–15.7)
HGB BLD-MCNC: 10.2 G/DL (ref 11.7–15.7)
HGB BLD-MCNC: 11.1 G/DL (ref 11.7–15.7)
HGB BLD-MCNC: 7.8 G/DL (ref 11.7–15.7)
HGB BLD-MCNC: 7.9 G/DL (ref 11.7–15.7)
HGB BLD-MCNC: 8.1 G/DL (ref 11.7–15.7)
HGB BLD-MCNC: 8.2 G/DL (ref 11.7–15.7)
HGB BLD-MCNC: 8.4 G/DL (ref 11.7–15.7)
HGB BLD-MCNC: 8.4 G/DL (ref 11.7–15.7)
HGB BLD-MCNC: 8.5 G/DL (ref 11.7–15.7)
HGB BLD-MCNC: 8.6 G/DL (ref 11.7–15.7)
HGB BLD-MCNC: 8.8 G/DL (ref 11.7–15.7)
HGB BLD-MCNC: 9 G/DL (ref 11.7–15.7)
HGB BLD-MCNC: 9.1 G/DL (ref 11.7–15.7)
HGB BLD-MCNC: 9.2 G/DL (ref 11.7–15.7)
HGB BLD-MCNC: 9.2 G/DL (ref 11.7–15.7)
HGB BLD-MCNC: 9.3 G/DL (ref 11.7–15.7)
HGB BLD-MCNC: 9.4 G/DL (ref 11.7–15.7)
HGB BLD-MCNC: 9.6 G/DL (ref 11.7–15.7)
HGB BLD-MCNC: 9.7 G/DL (ref 11.7–15.7)
HGB BLD-MCNC: 9.9 G/DL (ref 11.7–15.7)
HGB C CRYSTALS: ABNORMAL
HGB C CRYSTALS: ABNORMAL
HGB C CRYSTALS: NORMAL
HGB C CRYSTALS: NORMAL
HGB UR QL STRIP: ABNORMAL
HMPV RNA SPEC QL NAA+PROBE: NOT DETECTED
HOLD SPECIMEN: NORMAL
HOWELL-JOLLY BOD BLD QL SMEAR: ABNORMAL
HOWELL-JOLLY BOD BLD QL SMEAR: ABNORMAL
HOWELL-JOLLY BOD BLD QL SMEAR: NORMAL
HOWELL-JOLLY BOD BLD QL SMEAR: NORMAL
HPIV1 RNA SPEC QL NAA+PROBE: NOT DETECTED
HPIV2 RNA SPEC QL NAA+PROBE: NOT DETECTED
HPIV3 RNA SPEC QL NAA+PROBE: NOT DETECTED
HPIV4 RNA SPEC QL NAA+PROBE: NOT DETECTED
HSV1 DNA SPEC QL NAA+PROBE: NOT DETECTED
HSV2 DNA SPEC QL NAA+PROBE: NOT DETECTED
HYALINE CASTS: 22 /LPF
HYALINE CASTS: 4 /LPF
IMM GRANULOCYTES # BLD: 0.1 10E3/UL
IMM GRANULOCYTES # BLD: 0.1 10E3/UL
IMM GRANULOCYTES # BLD: 0.2 10E3/UL
IMM GRANULOCYTES # BLD: 0.2 10E3/UL
IMM GRANULOCYTES # BLD: ABNORMAL 10*3/UL
IMM GRANULOCYTES NFR BLD: 1 %
IMM GRANULOCYTES NFR BLD: 1 %
IMM GRANULOCYTES NFR BLD: 2 %
IMM GRANULOCYTES NFR BLD: 2 %
IMM GRANULOCYTES NFR BLD: ABNORMAL %
INR PPP: 1.18 (ref 0.85–1.15)
INTERPRETATION ECG - MUSE: NORMAL
IRON BINDING CAPACITY (ROCHE): 189 UG/DL (ref 240–430)
IRON SATN MFR SERPL: 40 % (ref 15–46)
IRON SERPL-MCNC: 76 UG/DL (ref 37–145)
KETONES UR STRIP-MCNC: ABNORMAL MG/DL
KETONES UR STRIP-MCNC: NEGATIVE MG/DL
KETONES UR STRIP-MCNC: NEGATIVE MG/DL
LA PPP-IMP: NEGATIVE
LACTATE SERPL-SCNC: 1.1 MMOL/L (ref 0.7–2)
LACTATE SERPL-SCNC: 1.5 MMOL/L (ref 0.7–2)
LACTATE SERPL-SCNC: 1.5 MMOL/L (ref 0.7–2)
LACTATE SERPL-SCNC: 1.7 MMOL/L (ref 0.7–2)
LACTATE SERPL-SCNC: 1.8 MMOL/L (ref 0.7–2)
LACTATE SERPL-SCNC: 2.1 MMOL/L (ref 0.7–2)
LACTATE SERPL-SCNC: 2.1 MMOL/L (ref 0.7–2)
LACTATE SERPL-SCNC: 2.2 MMOL/L (ref 0.7–2)
LACTATE SERPL-SCNC: 2.5 MMOL/L (ref 0.7–2)
LACTATE SERPL-SCNC: 2.8 MMOL/L (ref 0.7–2)
LACTATE SERPL-SCNC: 4 MMOL/L (ref 0.7–2)
LDH SERPL L TO P-CCNC: 354 U/L (ref 0–250)
LDH SERPL L TO P-CCNC: 415 U/L (ref 0–250)
LDH SERPL L TO P-CCNC: 425 U/L (ref 0–250)
LDH SERPL L TO P-CCNC: 481 U/L (ref 0–250)
LEUKOCYTE ESTERASE UR QL STRIP: ABNORMAL
LIPASE SERPL-CCNC: 68 U/L (ref 13–60)
LUPUS INTERPRETATION: ABNORMAL
LVEF ECHO: NORMAL
LYMPHOCYTES # BLD AUTO: 0.2 10E3/UL (ref 0.8–5.3)
LYMPHOCYTES # BLD AUTO: 0.6 10E3/UL (ref 0.8–5.3)
LYMPHOCYTES # BLD AUTO: 0.8 10E3/UL (ref 0.8–5.3)
LYMPHOCYTES # BLD AUTO: 2.6 10E3/UL (ref 0.8–5.3)
LYMPHOCYTES # BLD AUTO: ABNORMAL 10*3/UL
LYMPHOCYTES # BLD MANUAL: 0.4 10E3/UL (ref 0.8–5.3)
LYMPHOCYTES NFR BLD AUTO: 21 %
LYMPHOCYTES NFR BLD AUTO: 3 %
LYMPHOCYTES NFR BLD AUTO: 6 %
LYMPHOCYTES NFR BLD AUTO: 6 %
LYMPHOCYTES NFR BLD AUTO: ABNORMAL %
LYMPHOCYTES NFR BLD MANUAL: 13 %
M PNEUMO DNA SPEC QL NAA+PROBE: NOT DETECTED
MAGNESIUM SERPL-MCNC: 1.7 MG/DL (ref 1.7–2.3)
MAGNESIUM SERPL-MCNC: 1.8 MG/DL (ref 1.7–2.3)
MAGNESIUM SERPL-MCNC: 1.9 MG/DL (ref 1.7–2.3)
MCH RBC QN AUTO: 29 PG (ref 26.5–33)
MCH RBC QN AUTO: 29.1 PG (ref 26.5–33)
MCH RBC QN AUTO: 29.2 PG (ref 26.5–33)
MCH RBC QN AUTO: 29.5 PG (ref 26.5–33)
MCH RBC QN AUTO: 29.7 PG (ref 26.5–33)
MCH RBC QN AUTO: 31 PG (ref 26.5–33)
MCH RBC QN AUTO: 31.1 PG (ref 26.5–33)
MCH RBC QN AUTO: 31.2 PG (ref 26.5–33)
MCH RBC QN AUTO: 31.3 PG (ref 26.5–33)
MCH RBC QN AUTO: 31.4 PG (ref 26.5–33)
MCH RBC QN AUTO: 31.4 PG (ref 26.5–33)
MCH RBC QN AUTO: 31.5 PG (ref 26.5–33)
MCH RBC QN AUTO: 31.6 PG (ref 26.5–33)
MCH RBC QN AUTO: 31.6 PG (ref 26.5–33)
MCH RBC QN AUTO: 31.7 PG (ref 26.5–33)
MCH RBC QN AUTO: 31.8 PG (ref 26.5–33)
MCH RBC QN AUTO: 31.8 PG (ref 26.5–33)
MCH RBC QN AUTO: 31.9 PG (ref 26.5–33)
MCH RBC QN AUTO: 31.9 PG (ref 26.5–33)
MCH RBC QN AUTO: 32 PG (ref 26.5–33)
MCH RBC QN AUTO: 32.1 PG (ref 26.5–33)
MCH RBC QN AUTO: 32.4 PG (ref 26.5–33)
MCH RBC QN AUTO: 32.4 PG (ref 26.5–33)
MCH RBC QN AUTO: 32.8 PG (ref 26.5–33)
MCH RBC QN AUTO: 32.8 PG (ref 26.5–33)
MCHC RBC AUTO-ENTMCNC: 29.9 G/DL (ref 31.5–36.5)
MCHC RBC AUTO-ENTMCNC: 30 G/DL (ref 31.5–36.5)
MCHC RBC AUTO-ENTMCNC: 30.1 G/DL (ref 31.5–36.5)
MCHC RBC AUTO-ENTMCNC: 30.1 G/DL (ref 31.5–36.5)
MCHC RBC AUTO-ENTMCNC: 30.3 G/DL (ref 31.5–36.5)
MCHC RBC AUTO-ENTMCNC: 30.3 G/DL (ref 31.5–36.5)
MCHC RBC AUTO-ENTMCNC: 30.4 G/DL (ref 31.5–36.5)
MCHC RBC AUTO-ENTMCNC: 30.5 G/DL (ref 31.5–36.5)
MCHC RBC AUTO-ENTMCNC: 30.6 G/DL (ref 31.5–36.5)
MCHC RBC AUTO-ENTMCNC: 30.7 G/DL (ref 31.5–36.5)
MCHC RBC AUTO-ENTMCNC: 30.8 G/DL (ref 31.5–36.5)
MCHC RBC AUTO-ENTMCNC: 30.9 G/DL (ref 31.5–36.5)
MCHC RBC AUTO-ENTMCNC: 30.9 G/DL (ref 31.5–36.5)
MCHC RBC AUTO-ENTMCNC: 31 G/DL (ref 31.5–36.5)
MCHC RBC AUTO-ENTMCNC: 31.1 G/DL (ref 31.5–36.5)
MCHC RBC AUTO-ENTMCNC: 31.3 G/DL (ref 31.5–36.5)
MCHC RBC AUTO-ENTMCNC: 31.4 G/DL (ref 31.5–36.5)
MCHC RBC AUTO-ENTMCNC: 31.4 G/DL (ref 31.5–36.5)
MCHC RBC AUTO-ENTMCNC: 31.5 G/DL (ref 31.5–36.5)
MCHC RBC AUTO-ENTMCNC: 31.6 G/DL (ref 31.5–36.5)
MCHC RBC AUTO-ENTMCNC: 31.6 G/DL (ref 31.5–36.5)
MCHC RBC AUTO-ENTMCNC: 31.7 G/DL (ref 31.5–36.5)
MCHC RBC AUTO-ENTMCNC: 32 G/DL (ref 31.5–36.5)
MCHC RBC AUTO-ENTMCNC: 32.1 G/DL (ref 31.5–36.5)
MCHC RBC AUTO-ENTMCNC: 32.1 G/DL (ref 31.5–36.5)
MCHC RBC AUTO-ENTMCNC: 32.5 G/DL (ref 31.5–36.5)
MCV RBC AUTO: 100 FL (ref 78–100)
MCV RBC AUTO: 101 FL (ref 78–100)
MCV RBC AUTO: 102 FL (ref 78–100)
MCV RBC AUTO: 103 FL (ref 78–100)
MCV RBC AUTO: 104 FL (ref 78–100)
MCV RBC AUTO: 105 FL (ref 78–100)
MCV RBC AUTO: 105 FL (ref 78–100)
MCV RBC AUTO: 106 FL (ref 78–100)
MCV RBC AUTO: 106 FL (ref 78–100)
MCV RBC AUTO: 108 FL (ref 78–100)
MCV RBC AUTO: 96 FL (ref 78–100)
MCV RBC AUTO: 97 FL (ref 78–100)
MCV RBC AUTO: 97 FL (ref 78–100)
MCV RBC AUTO: 98 FL (ref 78–100)
MCV RBC AUTO: 99 FL (ref 78–100)
MCV RBC AUTO: 99 FL (ref 78–100)
MONOCYTES # BLD AUTO: 0.2 10E3/UL (ref 0–1.3)
MONOCYTES # BLD AUTO: 0.7 10E3/UL (ref 0–1.3)
MONOCYTES # BLD AUTO: 0.8 10E3/UL (ref 0–1.3)
MONOCYTES # BLD AUTO: 1.4 10E3/UL (ref 0–1.3)
MONOCYTES # BLD AUTO: ABNORMAL 10*3/UL
MONOCYTES # BLD MANUAL: 0.3 10E3/UL (ref 0–1.3)
MONOCYTES NFR BLD AUTO: 10 %
MONOCYTES NFR BLD AUTO: 4 %
MONOCYTES NFR BLD AUTO: 6 %
MONOCYTES NFR BLD AUTO: 8 %
MONOCYTES NFR BLD AUTO: ABNORMAL %
MONOCYTES NFR BLD MANUAL: 9 %
MRSA DNA SPEC QL NAA+PROBE: NEGATIVE
MRSA DNA SPEC QL NAA+PROBE: NEGATIVE
MUCOUS THREADS #/AREA URNS LPF: PRESENT /LPF
MYELOCYTES # BLD MANUAL: 0 10E3/UL
MYELOCYTES NFR BLD MANUAL: 1 %
NEUTROPHILS # BLD AUTO: 12.4 10E3/UL (ref 1.6–8.3)
NEUTROPHILS # BLD AUTO: 4.9 10E3/UL (ref 1.6–8.3)
NEUTROPHILS # BLD AUTO: 8.3 10E3/UL (ref 1.6–8.3)
NEUTROPHILS # BLD AUTO: 8.7 10E3/UL (ref 1.6–8.3)
NEUTROPHILS # BLD AUTO: ABNORMAL 10*3/UL
NEUTROPHILS # BLD MANUAL: 2.3 10E3/UL (ref 1.6–8.3)
NEUTROPHILS NFR BLD AUTO: 71 %
NEUTROPHILS NFR BLD AUTO: 82 %
NEUTROPHILS NFR BLD AUTO: 85 %
NEUTROPHILS NFR BLD AUTO: 91 %
NEUTROPHILS NFR BLD AUTO: ABNORMAL %
NEUTROPHILS NFR BLD MANUAL: 77 %
NEUTS HYPERSEG BLD QL SMEAR: ABNORMAL
NEUTS HYPERSEG BLD QL SMEAR: ABNORMAL
NEUTS HYPERSEG BLD QL SMEAR: NORMAL
NEUTS HYPERSEG BLD QL SMEAR: NORMAL
NITRATE UR QL: NEGATIVE
NRBC # BLD AUTO: 0 10E3/UL
NRBC BLD AUTO-RTO: 0 /100
NT-PROBNP SERPL-MCNC: ABNORMAL PG/ML (ref 0–450)
NT-PROBNP SERPL-MCNC: ABNORMAL PG/ML (ref 0–450)
O2/TOTAL GAS SETTING VFR VENT: 0 %
OXYHGB MFR BLDV: 79 % (ref 70–75)
P AXIS - MUSE: 23 DEGREES
P AXIS - MUSE: 27 DEGREES
P AXIS - MUSE: 57 DEGREES
P AXIS - MUSE: 61 DEGREES
PATH REPORT.COMMENTS IMP SPEC: NORMAL
PATH REPORT.FINAL DX SPEC: NORMAL
PATH REPORT.FINAL DX SPEC: NORMAL
PATH REPORT.MICROSCOPIC SPEC OTHER STN: NORMAL
PATH REPORT.RELEVANT HX SPEC: NORMAL
PATH REPORT.RELEVANT HX SPEC: NORMAL
PCO2 BLDV: 44 MM HG (ref 40–50)
PH BLDV: 7.42 [PH] (ref 7.32–7.43)
PH UR STRIP: 5 [PH] (ref 5–7)
PH UR STRIP: 5.5 [PH] (ref 5–7)
PH UR STRIP: 7.5 [PH] (ref 5–7)
PHOSPHATE SERPL-MCNC: 2.8 MG/DL (ref 2.5–4.5)
PHOSPHATE SERPL-MCNC: 5.9 MG/DL (ref 2.5–4.5)
PHOSPHATE SERPL-MCNC: 6.1 MG/DL (ref 2.5–4.5)
PHOSPHATE SERPL-MCNC: 6.3 MG/DL (ref 2.5–4.5)
PLAT MORPH BLD: ABNORMAL
PLAT MORPH BLD: NORMAL
PLAT MORPH BLD: NORMAL
PLATELET # BLD AUTO: 173 10E3/UL (ref 150–450)
PLATELET # BLD AUTO: 175 10E3/UL (ref 150–450)
PLATELET # BLD AUTO: 202 10E3/UL (ref 150–450)
PLATELET # BLD AUTO: 210 10E3/UL (ref 150–450)
PLATELET # BLD AUTO: 226 10E3/UL (ref 150–450)
PLATELET # BLD AUTO: 38 10E3/UL (ref 150–450)
PLATELET # BLD AUTO: 39 10E3/UL (ref 150–450)
PLATELET # BLD AUTO: 40 10E3/UL (ref 150–450)
PLATELET # BLD AUTO: 43 10E3/UL (ref 150–450)
PLATELET # BLD AUTO: 44 10E3/UL (ref 150–450)
PLATELET # BLD AUTO: 45 10E3/UL (ref 150–450)
PLATELET # BLD AUTO: 48 10E3/UL (ref 150–450)
PLATELET # BLD AUTO: 50 10E3/UL (ref 150–450)
PLATELET # BLD AUTO: 53 10E3/UL (ref 150–450)
PLATELET # BLD AUTO: 57 10E3/UL (ref 150–450)
PLATELET # BLD AUTO: 57 10E3/UL (ref 150–450)
PLATELET # BLD AUTO: 58 10E3/UL (ref 150–450)
PLATELET # BLD AUTO: 61 10E3/UL (ref 150–450)
PLATELET # BLD AUTO: 62 10E3/UL (ref 150–450)
PLATELET # BLD AUTO: 63 10E3/UL (ref 150–450)
PLATELET # BLD AUTO: 64 10E3/UL (ref 150–450)
PLATELET # BLD AUTO: 64 10E3/UL (ref 150–450)
PLATELET # BLD AUTO: 65 10E3/UL (ref 150–450)
PLATELET # BLD AUTO: 67 10E3/UL (ref 150–450)
PLATELET # BLD AUTO: 85 10E3/UL (ref 150–450)
PO2 BLDV: 49 MM HG (ref 25–47)
POLYCHROMASIA BLD QL SMEAR: ABNORMAL
POLYCHROMASIA BLD QL SMEAR: NORMAL
POLYCHROMASIA BLD QL SMEAR: NORMAL
POLYCHROMASIA BLD QL SMEAR: SLIGHT
POTASSIUM BLD-SCNC: 7.1 MMOL/L (ref 3.4–5.3)
POTASSIUM SERPL-SCNC: 2.7 MMOL/L (ref 3.4–5.3)
POTASSIUM SERPL-SCNC: 2.7 MMOL/L (ref 3.4–5.3)
POTASSIUM SERPL-SCNC: 3.2 MMOL/L (ref 3.4–5.3)
POTASSIUM SERPL-SCNC: 3.5 MMOL/L (ref 3.4–5.3)
POTASSIUM SERPL-SCNC: 3.5 MMOL/L (ref 3.4–5.3)
POTASSIUM SERPL-SCNC: 3.7 MMOL/L (ref 3.4–5.3)
POTASSIUM SERPL-SCNC: 3.7 MMOL/L (ref 3.4–5.3)
POTASSIUM SERPL-SCNC: 3.8 MMOL/L (ref 3.4–5.3)
POTASSIUM SERPL-SCNC: 3.8 MMOL/L (ref 3.4–5.3)
POTASSIUM SERPL-SCNC: 3.9 MMOL/L (ref 3.4–5.3)
POTASSIUM SERPL-SCNC: 4.1 MMOL/L (ref 3.4–5.3)
POTASSIUM SERPL-SCNC: 4.1 MMOL/L (ref 3.4–5.3)
POTASSIUM SERPL-SCNC: 4.2 MMOL/L (ref 3.4–5.3)
POTASSIUM SERPL-SCNC: 4.3 MMOL/L (ref 3.4–5.3)
POTASSIUM SERPL-SCNC: 4.4 MMOL/L (ref 3.4–5.3)
POTASSIUM SERPL-SCNC: 4.5 MMOL/L (ref 3.4–5.3)
POTASSIUM SERPL-SCNC: 4.5 MMOL/L (ref 3.4–5.3)
POTASSIUM SERPL-SCNC: 4.6 MMOL/L (ref 3.4–5.3)
POTASSIUM SERPL-SCNC: 4.6 MMOL/L (ref 3.4–5.3)
POTASSIUM SERPL-SCNC: 4.7 MMOL/L (ref 3.4–5.3)
POTASSIUM SERPL-SCNC: 4.8 MMOL/L (ref 3.4–5.3)
POTASSIUM SERPL-SCNC: 4.9 MMOL/L (ref 3.4–5.3)
POTASSIUM SERPL-SCNC: 4.9 MMOL/L (ref 3.4–5.3)
POTASSIUM SERPL-SCNC: 5.1 MMOL/L (ref 3.4–5.3)
POTASSIUM SERPL-SCNC: 5.2 MMOL/L (ref 3.4–5.3)
POTASSIUM SERPL-SCNC: 5.3 MMOL/L (ref 3.4–5.3)
PR INTERVAL - MUSE: 132 MS
PR INTERVAL - MUSE: 136 MS
PR INTERVAL - MUSE: 142 MS
PR INTERVAL - MUSE: 162 MS
PROCALCITONIN SERPL IA-MCNC: 0.3 NG/ML
PROCALCITONIN SERPL IA-MCNC: 0.65 NG/ML
PROCALCITONIN SERPL IA-MCNC: 0.9 NG/ML
PROCALCITONIN SERPL IA-MCNC: 0.95 NG/ML
PROCALCITONIN SERPL IA-MCNC: 1.39 NG/ML
PROCALCITONIN SERPL IA-MCNC: 2.15 NG/ML
PROT SERPL-MCNC: 5.3 G/DL (ref 6.4–8.3)
PROT SERPL-MCNC: 5.3 G/DL (ref 6.4–8.3)
PROT SERPL-MCNC: 5.4 G/DL (ref 6.4–8.3)
PROT SERPL-MCNC: 5.5 G/DL (ref 6.4–8.3)
PROT SERPL-MCNC: 5.5 G/DL (ref 6.4–8.3)
PROT SERPL-MCNC: 5.6 G/DL (ref 6.4–8.3)
PROT SERPL-MCNC: 5.7 G/DL (ref 6.4–8.3)
PROT SERPL-MCNC: 5.8 G/DL (ref 6.4–8.3)
PROT SERPL-MCNC: 5.8 G/DL (ref 6.4–8.3)
PROT SERPL-MCNC: 6.2 G/DL (ref 6.4–8.3)
PROT SERPL-MCNC: 7.2 G/DL (ref 6.4–8.3)
PROT/CREAT 24H UR: 1.7 MG/MG CR (ref 0–0.2)
PTT RATIO: 1.11
QRS DURATION - MUSE: 70 MS
QRS DURATION - MUSE: 74 MS
QRS DURATION - MUSE: 76 MS
QRS DURATION - MUSE: 76 MS
QT - MUSE: 340 MS
QT - MUSE: 392 MS
QT - MUSE: 412 MS
QT - MUSE: 442 MS
QTC - MUSE: 460 MS
QTC - MUSE: 463 MS
QTC - MUSE: 490 MS
QTC - MUSE: 522 MS
R AXIS - MUSE: -10 DEGREES
R AXIS - MUSE: -31 DEGREES
R AXIS - MUSE: -40 DEGREES
R AXIS - MUSE: -62 DEGREES
RBC # BLD AUTO: 2.46 10E6/UL (ref 3.8–5.2)
RBC # BLD AUTO: 2.47 10E6/UL (ref 3.8–5.2)
RBC # BLD AUTO: 2.56 10E6/UL (ref 3.8–5.2)
RBC # BLD AUTO: 2.66 10E6/UL (ref 3.8–5.2)
RBC # BLD AUTO: 2.69 10E6/UL (ref 3.8–5.2)
RBC # BLD AUTO: 2.74 10E6/UL (ref 3.8–5.2)
RBC # BLD AUTO: 2.76 10E6/UL (ref 3.8–5.2)
RBC # BLD AUTO: 2.78 10E6/UL (ref 3.8–5.2)
RBC # BLD AUTO: 2.81 10E6/UL (ref 3.8–5.2)
RBC # BLD AUTO: 2.85 10E6/UL (ref 3.8–5.2)
RBC # BLD AUTO: 2.92 10E6/UL (ref 3.8–5.2)
RBC # BLD AUTO: 2.92 10E6/UL (ref 3.8–5.2)
RBC # BLD AUTO: 2.94 10E6/UL (ref 3.8–5.2)
RBC # BLD AUTO: 2.98 10E6/UL (ref 3.8–5.2)
RBC # BLD AUTO: 2.98 10E6/UL (ref 3.8–5.2)
RBC # BLD AUTO: 3.02 10E6/UL (ref 3.8–5.2)
RBC # BLD AUTO: 3.04 10E6/UL (ref 3.8–5.2)
RBC # BLD AUTO: 3.06 10E6/UL (ref 3.8–5.2)
RBC # BLD AUTO: 3.06 10E6/UL (ref 3.8–5.2)
RBC # BLD AUTO: 3.08 10E6/UL (ref 3.8–5.2)
RBC # BLD AUTO: 3.11 10E6/UL (ref 3.8–5.2)
RBC # BLD AUTO: 3.12 10E6/UL (ref 3.8–5.2)
RBC # BLD AUTO: 3.13 10E6/UL (ref 3.8–5.2)
RBC # BLD AUTO: 3.18 10E6/UL (ref 3.8–5.2)
RBC # BLD AUTO: 3.25 10E6/UL (ref 3.8–5.2)
RBC # BLD AUTO: 3.26 10E6/UL (ref 3.8–5.2)
RBC # BLD AUTO: 3.26 10E6/UL (ref 3.8–5.2)
RBC # BLD AUTO: 3.54 10E6/UL (ref 3.8–5.2)
RBC AGGLUT BLD QL: ABNORMAL
RBC AGGLUT BLD QL: ABNORMAL
RBC AGGLUT BLD QL: NORMAL
RBC AGGLUT BLD QL: NORMAL
RBC MORPH BLD: ABNORMAL
RBC MORPH BLD: NORMAL
RBC MORPH BLD: NORMAL
RBC URINE: 12 /HPF
RBC URINE: 7 /HPF
RBC URINE: 79 /HPF
RETICS # AUTO: 0.08 10E6/UL (ref 0.03–0.1)
RETICS # AUTO: 0.1 10E6/UL (ref 0.03–0.1)
RETICS # AUTO: 0.13 10E6/UL (ref 0.03–0.1)
RETICS/RBC NFR AUTO: 3.1 % (ref 0.5–2)
RETICS/RBC NFR AUTO: 3.1 % (ref 0.5–2)
RETICS/RBC NFR AUTO: 5 % (ref 0.5–2)
ROULEAUX BLD QL SMEAR: ABNORMAL
ROULEAUX BLD QL SMEAR: ABNORMAL
ROULEAUX BLD QL SMEAR: NORMAL
ROULEAUX BLD QL SMEAR: NORMAL
RSV RNA SPEC NAA+PROBE: NEGATIVE
RSV RNA SPEC QL NAA+PROBE: NOT DETECTED
RSV RNA SPEC QL NAA+PROBE: NOT DETECTED
RV+EV RNA SPEC QL NAA+PROBE: NOT DETECTED
SA TARGET DNA: NEGATIVE
SA TARGET DNA: NEGATIVE
SAO2 % BLDV: 80.7 % (ref 70–75)
SARS-COV-2 RNA RESP QL NAA+PROBE: NEGATIVE
SICKLE CELLS BLD QL SMEAR: ABNORMAL
SICKLE CELLS BLD QL SMEAR: ABNORMAL
SICKLE CELLS BLD QL SMEAR: NORMAL
SICKLE CELLS BLD QL SMEAR: NORMAL
SMUDGE CELLS BLD QL SMEAR: ABNORMAL
SMUDGE CELLS BLD QL SMEAR: ABNORMAL
SMUDGE CELLS BLD QL SMEAR: NORMAL
SMUDGE CELLS BLD QL SMEAR: NORMAL
SODIUM SERPL-SCNC: 131 MMOL/L (ref 135–145)
SODIUM SERPL-SCNC: 134 MMOL/L (ref 135–145)
SODIUM SERPL-SCNC: 135 MMOL/L (ref 135–145)
SODIUM SERPL-SCNC: 136 MMOL/L (ref 135–145)
SODIUM SERPL-SCNC: 137 MMOL/L (ref 135–145)
SODIUM SERPL-SCNC: 138 MMOL/L (ref 135–145)
SODIUM SERPL-SCNC: 139 MMOL/L (ref 135–145)
SODIUM SERPL-SCNC: 140 MMOL/L (ref 135–145)
SODIUM SERPL-SCNC: 142 MMOL/L (ref 135–145)
SODIUM SERPL-SCNC: 142 MMOL/L (ref 135–145)
SODIUM SERPL-SCNC: 144 MMOL/L (ref 135–145)
SODIUM SERPL-SCNC: 145 MMOL/L (ref 135–145)
SP GR UR STRIP: 1.01 (ref 1–1.03)
SP GR UR STRIP: 1.01 (ref 1–1.03)
SP GR UR STRIP: 1.02 (ref 1–1.03)
SPHEROCYTES BLD QL SMEAR: ABNORMAL
SPHEROCYTES BLD QL SMEAR: ABNORMAL
SPHEROCYTES BLD QL SMEAR: NORMAL
SPHEROCYTES BLD QL SMEAR: NORMAL
SQUAMOUS EPITHELIAL: 2 /HPF
SQUAMOUS EPITHELIAL: 4 /HPF
SQUAMOUS EPITHELIAL: 8 /HPF
STOMATOCYTES BLD QL SMEAR: ABNORMAL
STOMATOCYTES BLD QL SMEAR: ABNORMAL
STOMATOCYTES BLD QL SMEAR: NORMAL
STOMATOCYTES BLD QL SMEAR: NORMAL
SYSTOLIC BLOOD PRESSURE - MUSE: NORMAL MMHG
T AXIS - MUSE: 0 DEGREES
T AXIS - MUSE: 145 DEGREES
T AXIS - MUSE: 153 DEGREES
T AXIS - MUSE: 65 DEGREES
TARGETS BLD QL SMEAR: ABNORMAL
TARGETS BLD QL SMEAR: ABNORMAL
TARGETS BLD QL SMEAR: NORMAL
TARGETS BLD QL SMEAR: NORMAL
THROMBIN TIME: 18.6 SECONDS (ref 13–19)
TOXIC GRANULES BLD QL SMEAR: ABNORMAL
TOXIC GRANULES BLD QL SMEAR: ABNORMAL
TOXIC GRANULES BLD QL SMEAR: NORMAL
TOXIC GRANULES BLD QL SMEAR: NORMAL
TRANSITIONAL EPI: <1 /HPF
TROPONIN T SERPL HS-MCNC: 382 NG/L
TROPONIN T SERPL HS-MCNC: 438 NG/L
TROPONIN T SERPL HS-MCNC: 469 NG/L
UROBILINOGEN UR STRIP-MCNC: NORMAL MG/DL
VANCOMYCIN SERPL-MCNC: 22.8 UG/ML
VANCOMYCIN SERPL-MCNC: 23.2 UG/ML
VANCOMYCIN SERPL-MCNC: 26.1 UG/ML
VANCOMYCIN SERPL-MCNC: 33.2 UG/ML
VANCOMYCIN SERPL-MCNC: 33.9 UG/ML
VANCOMYCIN SERPL-MCNC: 41.7 UG/ML
VARIANT LYMPHS BLD QL SMEAR: ABNORMAL
VARIANT LYMPHS BLD QL SMEAR: ABNORMAL
VARIANT LYMPHS BLD QL SMEAR: NORMAL
VARIANT LYMPHS BLD QL SMEAR: NORMAL
VENTRICULAR RATE- MUSE: 125 BPM
VENTRICULAR RATE- MUSE: 75 BPM
VENTRICULAR RATE- MUSE: 84 BPM
VENTRICULAR RATE- MUSE: 84 BPM
VZV DNA SPEC QL NAA+PROBE: NOT DETECTED
WBC # BLD AUTO: 11.5 10E3/UL (ref 4–11)
WBC # BLD AUTO: 11.9 10E3/UL (ref 4–11)
WBC # BLD AUTO: 12.2 10E3/UL (ref 4–11)
WBC # BLD AUTO: 14.9 10E3/UL (ref 4–11)
WBC # BLD AUTO: 17.4 10E3/UL (ref 4–11)
WBC # BLD AUTO: 2.1 10E3/UL (ref 4–11)
WBC # BLD AUTO: 2.3 10E3/UL (ref 4–11)
WBC # BLD AUTO: 2.4 10E3/UL (ref 4–11)
WBC # BLD AUTO: 20.1 10E3/UL (ref 4–11)
WBC # BLD AUTO: 3 10E3/UL (ref 4–11)
WBC # BLD AUTO: 3 10E3/UL (ref 4–11)
WBC # BLD AUTO: 3.3 10E3/UL (ref 4–11)
WBC # BLD AUTO: 3.6 10E3/UL (ref 4–11)
WBC # BLD AUTO: 5.3 10E3/UL (ref 4–11)
WBC # BLD AUTO: 5.3 10E3/UL (ref 4–11)
WBC # BLD AUTO: 5.5 10E3/UL (ref 4–11)
WBC # BLD AUTO: 5.7 10E3/UL (ref 4–11)
WBC # BLD AUTO: 5.8 10E3/UL (ref 4–11)
WBC # BLD AUTO: 6 10E3/UL (ref 4–11)
WBC # BLD AUTO: 6 10E3/UL (ref 4–11)
WBC # BLD AUTO: 6.1 10E3/UL (ref 4–11)
WBC # BLD AUTO: 6.3 10E3/UL (ref 4–11)
WBC # BLD AUTO: 6.5 10E3/UL (ref 4–11)
WBC # BLD AUTO: 6.7 10E3/UL (ref 4–11)
WBC # BLD AUTO: 7.6 10E3/UL (ref 4–11)
WBC # BLD AUTO: 7.9 10E3/UL (ref 4–11)
WBC # BLD AUTO: 8.7 10E3/UL (ref 4–11)
WBC # BLD AUTO: 9.3 10E3/UL (ref 4–11)
WBC # BLD AUTO: 9.8 10E3/UL (ref 4–11)
WBC # BLD AUTO: 9.8 10E3/UL (ref 4–11)
WBC URINE: 10 /HPF
WBC URINE: 3 /HPF
WBC URINE: 8 /HPF

## 2024-01-01 PROCEDURE — 87641 MR-STAPH DNA AMP PROBE: CPT | Performed by: STUDENT IN AN ORGANIZED HEALTH CARE EDUCATION/TRAINING PROGRAM

## 2024-01-01 PROCEDURE — 84145 PROCALCITONIN (PCT): CPT | Performed by: INTERNAL MEDICINE

## 2024-01-01 PROCEDURE — 85045 AUTOMATED RETICULOCYTE COUNT: CPT | Performed by: STUDENT IN AN ORGANIZED HEALTH CARE EDUCATION/TRAINING PROGRAM

## 2024-01-01 PROCEDURE — 80053 COMPREHEN METABOLIC PANEL: CPT | Performed by: INTERNAL MEDICINE

## 2024-01-01 PROCEDURE — 83550 IRON BINDING TEST: CPT | Performed by: INTERNAL MEDICINE

## 2024-01-01 PROCEDURE — 210N000002 HC R&B HEART CARE

## 2024-01-01 PROCEDURE — 83735 ASSAY OF MAGNESIUM: CPT

## 2024-01-01 PROCEDURE — 250N000011 HC RX IP 250 OP 636

## 2024-01-01 PROCEDURE — 250N000013 HC RX MED GY IP 250 OP 250 PS 637: Performed by: STUDENT IN AN ORGANIZED HEALTH CARE EDUCATION/TRAINING PROGRAM

## 2024-01-01 PROCEDURE — 90935 HEMODIALYSIS ONE EVALUATION: CPT | Performed by: INTERNAL MEDICINE

## 2024-01-01 PROCEDURE — 250N000013 HC RX MED GY IP 250 OP 250 PS 637

## 2024-01-01 PROCEDURE — 250N000012 HC RX MED GY IP 250 OP 636 PS 637: Performed by: STUDENT IN AN ORGANIZED HEALTH CARE EDUCATION/TRAINING PROGRAM

## 2024-01-01 PROCEDURE — 90935 HEMODIALYSIS ONE EVALUATION: CPT

## 2024-01-01 PROCEDURE — 85014 HEMATOCRIT: CPT | Performed by: STUDENT IN AN ORGANIZED HEALTH CARE EDUCATION/TRAINING PROGRAM

## 2024-01-01 PROCEDURE — 250N000011 HC RX IP 250 OP 636: Mod: JZ | Performed by: STUDENT IN AN ORGANIZED HEALTH CARE EDUCATION/TRAINING PROGRAM

## 2024-01-01 PROCEDURE — 83605 ASSAY OF LACTIC ACID: CPT | Performed by: STUDENT IN AN ORGANIZED HEALTH CARE EDUCATION/TRAINING PROGRAM

## 2024-01-01 PROCEDURE — 99232 SBSQ HOSP IP/OBS MODERATE 35: CPT | Performed by: STUDENT IN AN ORGANIZED HEALTH CARE EDUCATION/TRAINING PROGRAM

## 2024-01-01 PROCEDURE — 36415 COLL VENOUS BLD VENIPUNCTURE: CPT | Performed by: INTERNAL MEDICINE

## 2024-01-01 PROCEDURE — 120N000002 HC R&B MED SURG/OB UMMC

## 2024-01-01 PROCEDURE — 02H633Z INSERTION OF INFUSION DEVICE INTO RIGHT ATRIUM, PERCUTANEOUS APPROACH: ICD-10-PCS | Performed by: RADIOLOGY

## 2024-01-01 PROCEDURE — 250N000013 HC RX MED GY IP 250 OP 250 PS 637: Performed by: INTERNAL MEDICINE

## 2024-01-01 PROCEDURE — 93306 TTE W/DOPPLER COMPLETE: CPT | Mod: 26 | Performed by: INTERNAL MEDICINE

## 2024-01-01 PROCEDURE — 84145 PROCALCITONIN (PCT): CPT | Performed by: EMERGENCY MEDICINE

## 2024-01-01 PROCEDURE — 99232 SBSQ HOSP IP/OBS MODERATE 35: CPT | Performed by: INTERNAL MEDICINE

## 2024-01-01 PROCEDURE — 99233 SBSQ HOSP IP/OBS HIGH 50: CPT | Performed by: STUDENT IN AN ORGANIZED HEALTH CARE EDUCATION/TRAINING PROGRAM

## 2024-01-01 PROCEDURE — 93308 TTE F-UP OR LMTD: CPT | Mod: 26 | Performed by: INTERNAL MEDICINE

## 2024-01-01 PROCEDURE — 99233 SBSQ HOSP IP/OBS HIGH 50: CPT | Performed by: INTERNAL MEDICINE

## 2024-01-01 PROCEDURE — 5A1D70Z PERFORMANCE OF URINARY FILTRATION, INTERMITTENT, LESS THAN 6 HOURS PER DAY: ICD-10-PCS | Performed by: INTERNAL MEDICINE

## 2024-01-01 PROCEDURE — 250N000011 HC RX IP 250 OP 636: Performed by: INTERNAL MEDICINE

## 2024-01-01 PROCEDURE — 86225 DNA ANTIBODY NATIVE: CPT

## 2024-01-01 PROCEDURE — 71250 CT THORAX DX C-: CPT

## 2024-01-01 PROCEDURE — 250N000012 HC RX MED GY IP 250 OP 636 PS 637: Performed by: INTERNAL MEDICINE

## 2024-01-01 PROCEDURE — 71045 X-RAY EXAM CHEST 1 VIEW: CPT | Mod: 26 | Performed by: RADIOLOGY

## 2024-01-01 PROCEDURE — 93971 EXTREMITY STUDY: CPT | Mod: RT

## 2024-01-01 PROCEDURE — 83010 ASSAY OF HAPTOGLOBIN QUANT: CPT | Performed by: INTERNAL MEDICINE

## 2024-01-01 PROCEDURE — 96365 THER/PROPH/DIAG IV INF INIT: CPT | Performed by: EMERGENCY MEDICINE

## 2024-01-01 PROCEDURE — 99231 SBSQ HOSP IP/OBS SF/LOW 25: CPT | Performed by: STUDENT IN AN ORGANIZED HEALTH CARE EDUCATION/TRAINING PROGRAM

## 2024-01-01 PROCEDURE — 82610 CYSTATIN C: CPT | Performed by: STUDENT IN AN ORGANIZED HEALTH CARE EDUCATION/TRAINING PROGRAM

## 2024-01-01 PROCEDURE — 80048 BASIC METABOLIC PNL TOTAL CA: CPT | Performed by: INTERNAL MEDICINE

## 2024-01-01 PROCEDURE — 200N000002 HC R&B ICU UMMC

## 2024-01-01 PROCEDURE — 99215 OFFICE O/P EST HI 40 MIN: CPT | Performed by: INTERNAL MEDICINE

## 2024-01-01 PROCEDURE — 272N000192 HC ACCESSORY CR2

## 2024-01-01 PROCEDURE — 258N000003 HC RX IP 258 OP 636: Performed by: INTERNAL MEDICINE

## 2024-01-01 PROCEDURE — 86706 HEP B SURFACE ANTIBODY: CPT | Performed by: INTERNAL MEDICINE

## 2024-01-01 PROCEDURE — 36415 COLL VENOUS BLD VENIPUNCTURE: CPT

## 2024-01-01 PROCEDURE — 87205 SMEAR GRAM STAIN: CPT

## 2024-01-01 PROCEDURE — 84100 ASSAY OF PHOSPHORUS: CPT

## 2024-01-01 PROCEDURE — 36415 COLL VENOUS BLD VENIPUNCTURE: CPT | Performed by: STUDENT IN AN ORGANIZED HEALTH CARE EDUCATION/TRAINING PROGRAM

## 2024-01-01 PROCEDURE — 80202 ASSAY OF VANCOMYCIN: CPT | Performed by: STUDENT IN AN ORGANIZED HEALTH CARE EDUCATION/TRAINING PROGRAM

## 2024-01-01 PROCEDURE — 86225 DNA ANTIBODY NATIVE: CPT | Performed by: INTERNAL MEDICINE

## 2024-01-01 PROCEDURE — 99222 1ST HOSP IP/OBS MODERATE 55: CPT | Mod: GC | Performed by: STUDENT IN AN ORGANIZED HEALTH CARE EDUCATION/TRAINING PROGRAM

## 2024-01-01 PROCEDURE — 99233 SBSQ HOSP IP/OBS HIGH 50: CPT | Mod: GC | Performed by: INTERNAL MEDICINE

## 2024-01-01 PROCEDURE — 99223 1ST HOSP IP/OBS HIGH 75: CPT | Performed by: INTERNAL MEDICINE

## 2024-01-01 PROCEDURE — 200N000001 HC R&B ICU

## 2024-01-01 PROCEDURE — 99291 CRITICAL CARE FIRST HOUR: CPT | Performed by: INTERNAL MEDICINE

## 2024-01-01 PROCEDURE — 93005 ELECTROCARDIOGRAM TRACING: CPT

## 2024-01-01 PROCEDURE — 87493 C DIFF AMPLIFIED PROBE: CPT | Performed by: STUDENT IN AN ORGANIZED HEALTH CARE EDUCATION/TRAINING PROGRAM

## 2024-01-01 PROCEDURE — 120N000003 HC R&B IMCU UMMC

## 2024-01-01 PROCEDURE — 36415 COLL VENOUS BLD VENIPUNCTURE: CPT | Performed by: NURSE PRACTITIONER

## 2024-01-01 PROCEDURE — 86140 C-REACTIVE PROTEIN: CPT | Performed by: PHYSICIAN ASSISTANT

## 2024-01-01 PROCEDURE — 82610 CYSTATIN C: CPT | Performed by: INTERNAL MEDICINE

## 2024-01-01 PROCEDURE — 76937 US GUIDE VASCULAR ACCESS: CPT | Mod: 26 | Performed by: RADIOLOGY

## 2024-01-01 PROCEDURE — 999N000208 ECHOCARDIOGRAM COMPLETE

## 2024-01-01 PROCEDURE — 85025 COMPLETE CBC W/AUTO DIFF WBC: CPT | Performed by: STUDENT IN AN ORGANIZED HEALTH CARE EDUCATION/TRAINING PROGRAM

## 2024-01-01 PROCEDURE — 86160 COMPLEMENT ANTIGEN: CPT

## 2024-01-01 PROCEDURE — 999N000111 HC STATISTIC OT IP EVAL DEFER

## 2024-01-01 PROCEDURE — 999N000147 HC STATISTIC PT IP EVAL DEFER: Performed by: PHYSICAL THERAPIST

## 2024-01-01 PROCEDURE — 83605 ASSAY OF LACTIC ACID: CPT | Performed by: EMERGENCY MEDICINE

## 2024-01-01 PROCEDURE — 999N000248 HC STATISTIC IV INSERT WITH US BY RN

## 2024-01-01 PROCEDURE — 83010 ASSAY OF HAPTOGLOBIN QUANT: CPT | Performed by: STUDENT IN AN ORGANIZED HEALTH CARE EDUCATION/TRAINING PROGRAM

## 2024-01-01 PROCEDURE — 71045 X-RAY EXAM CHEST 1 VIEW: CPT

## 2024-01-01 PROCEDURE — 86160 COMPLEMENT ANTIGEN: CPT | Performed by: INTERNAL MEDICINE

## 2024-01-01 PROCEDURE — P9045 ALBUMIN (HUMAN), 5%, 250 ML: HCPCS | Mod: JZ | Performed by: NURSE PRACTITIONER

## 2024-01-01 PROCEDURE — 77001 FLUOROGUIDE FOR VEIN DEVICE: CPT | Mod: 26 | Performed by: RADIOLOGY

## 2024-01-01 PROCEDURE — 99239 HOSP IP/OBS DSCHRG MGMT >30: CPT | Performed by: INTERNAL MEDICINE

## 2024-01-01 PROCEDURE — 80048 BASIC METABOLIC PNL TOTAL CA: CPT | Performed by: STUDENT IN AN ORGANIZED HEALTH CARE EDUCATION/TRAINING PROGRAM

## 2024-01-01 PROCEDURE — 36558 INSERT TUNNELED CV CATH: CPT | Mod: GC | Performed by: RADIOLOGY

## 2024-01-01 PROCEDURE — 82728 ASSAY OF FERRITIN: CPT | Performed by: INTERNAL MEDICINE

## 2024-01-01 PROCEDURE — 99285 EMERGENCY DEPT VISIT HI MDM: CPT | Mod: 25 | Performed by: EMERGENCY MEDICINE

## 2024-01-01 PROCEDURE — 85027 COMPLETE CBC AUTOMATED: CPT | Performed by: INTERNAL MEDICINE

## 2024-01-01 PROCEDURE — 85060 BLOOD SMEAR INTERPRETATION: CPT | Performed by: STUDENT IN AN ORGANIZED HEALTH CARE EDUCATION/TRAINING PROGRAM

## 2024-01-01 PROCEDURE — 84156 ASSAY OF PROTEIN URINE: CPT | Performed by: INTERNAL MEDICINE

## 2024-01-01 PROCEDURE — 87324 CLOSTRIDIUM AG IA: CPT | Mod: 59 | Performed by: FAMILY MEDICINE

## 2024-01-01 PROCEDURE — 999N000208 ECHOCARDIOGRAM LIMITED

## 2024-01-01 PROCEDURE — 250N000011 HC RX IP 250 OP 636: Mod: JZ | Performed by: INTERNAL MEDICINE

## 2024-01-01 PROCEDURE — 93971 EXTREMITY STUDY: CPT | Mod: 26 | Performed by: RADIOLOGY

## 2024-01-01 PROCEDURE — 87040 BLOOD CULTURE FOR BACTERIA: CPT | Performed by: STUDENT IN AN ORGANIZED HEALTH CARE EDUCATION/TRAINING PROGRAM

## 2024-01-01 PROCEDURE — 99153 MOD SED SAME PHYS/QHP EA: CPT

## 2024-01-01 PROCEDURE — A9540 TC99M MAA: HCPCS | Performed by: STUDENT IN AN ORGANIZED HEALTH CARE EDUCATION/TRAINING PROGRAM

## 2024-01-01 PROCEDURE — 78580 LUNG PERFUSION IMAGING: CPT | Mod: 26 | Performed by: RADIOLOGY

## 2024-01-01 PROCEDURE — 83605 ASSAY OF LACTIC ACID: CPT | Performed by: NURSE PRACTITIONER

## 2024-01-01 PROCEDURE — 87324 CLOSTRIDIUM AG IA: CPT | Performed by: STUDENT IN AN ORGANIZED HEALTH CARE EDUCATION/TRAINING PROGRAM

## 2024-01-01 PROCEDURE — 250N000009 HC RX 250: Performed by: INTERNAL MEDICINE

## 2024-01-01 PROCEDURE — 272N000504 HC NEEDLE CR4

## 2024-01-01 PROCEDURE — 82550 ASSAY OF CK (CPK): CPT

## 2024-01-01 PROCEDURE — 84484 ASSAY OF TROPONIN QUANT: CPT | Performed by: INTERNAL MEDICINE

## 2024-01-01 PROCEDURE — 83605 ASSAY OF LACTIC ACID: CPT | Performed by: INTERNAL MEDICINE

## 2024-01-01 PROCEDURE — 258N000003 HC RX IP 258 OP 636: Performed by: STUDENT IN AN ORGANIZED HEALTH CARE EDUCATION/TRAINING PROGRAM

## 2024-01-01 PROCEDURE — 36415 COLL VENOUS BLD VENIPUNCTURE: CPT | Performed by: EMERGENCY MEDICINE

## 2024-01-01 PROCEDURE — 85014 HEMATOCRIT: CPT | Performed by: INTERNAL MEDICINE

## 2024-01-01 PROCEDURE — 83010 ASSAY OF HAPTOGLOBIN QUANT: CPT

## 2024-01-01 PROCEDURE — 80202 ASSAY OF VANCOMYCIN: CPT | Performed by: INTERNAL MEDICINE

## 2024-01-01 PROCEDURE — 634N000001 HC RX 634: Mod: JZ | Performed by: INTERNAL MEDICINE

## 2024-01-01 PROCEDURE — 82310 ASSAY OF CALCIUM: CPT | Performed by: NURSE PRACTITIONER

## 2024-01-01 PROCEDURE — 87641 MR-STAPH DNA AMP PROBE: CPT | Performed by: INTERNAL MEDICINE

## 2024-01-01 PROCEDURE — 258N000003 HC RX IP 258 OP 636: Performed by: EMERGENCY MEDICINE

## 2024-01-01 PROCEDURE — 83615 LACTATE (LD) (LDH) ENZYME: CPT | Performed by: STUDENT IN AN ORGANIZED HEALTH CARE EDUCATION/TRAINING PROGRAM

## 2024-01-01 PROCEDURE — 83735 ASSAY OF MAGNESIUM: CPT | Performed by: NURSE PRACTITIONER

## 2024-01-01 PROCEDURE — 93325 DOPPLER ECHO COLOR FLOW MAPG: CPT | Mod: 26 | Performed by: INTERNAL MEDICINE

## 2024-01-01 PROCEDURE — 84145 PROCALCITONIN (PCT): CPT | Performed by: PHYSICIAN ASSISTANT

## 2024-01-01 PROCEDURE — 83615 LACTATE (LD) (LDH) ENZYME: CPT | Performed by: INTERNAL MEDICINE

## 2024-01-01 PROCEDURE — 93010 ELECTROCARDIOGRAM REPORT: CPT | Performed by: INTERNAL MEDICINE

## 2024-01-01 PROCEDURE — 84132 ASSAY OF SERUM POTASSIUM: CPT | Performed by: INTERNAL MEDICINE

## 2024-01-01 PROCEDURE — 99291 CRITICAL CARE FIRST HOUR: CPT | Performed by: NURSE PRACTITIONER

## 2024-01-01 PROCEDURE — 85027 COMPLETE CBC AUTOMATED: CPT | Performed by: STUDENT IN AN ORGANIZED HEALTH CARE EDUCATION/TRAINING PROGRAM

## 2024-01-01 PROCEDURE — 84703 CHORIONIC GONADOTROPIN ASSAY: CPT | Performed by: EMERGENCY MEDICINE

## 2024-01-01 PROCEDURE — 83880 ASSAY OF NATRIURETIC PEPTIDE: CPT | Performed by: EMERGENCY MEDICINE

## 2024-01-01 PROCEDURE — 85027 COMPLETE CBC AUTOMATED: CPT | Performed by: NURSE PRACTITIONER

## 2024-01-01 PROCEDURE — 86140 C-REACTIVE PROTEIN: CPT

## 2024-01-01 PROCEDURE — 250N000011 HC RX IP 250 OP 636: Performed by: STUDENT IN AN ORGANIZED HEALTH CARE EDUCATION/TRAINING PROGRAM

## 2024-01-01 PROCEDURE — 93306 TTE W/DOPPLER COMPLETE: CPT | Mod: 26 | Performed by: STUDENT IN AN ORGANIZED HEALTH CARE EDUCATION/TRAINING PROGRAM

## 2024-01-01 PROCEDURE — 82040 ASSAY OF SERUM ALBUMIN: CPT | Performed by: EMERGENCY MEDICINE

## 2024-01-01 PROCEDURE — 80053 COMPREHEN METABOLIC PANEL: CPT | Performed by: FAMILY MEDICINE

## 2024-01-01 PROCEDURE — 85007 BL SMEAR W/DIFF WBC COUNT: CPT | Performed by: STUDENT IN AN ORGANIZED HEALTH CARE EDUCATION/TRAINING PROGRAM

## 2024-01-01 PROCEDURE — 80053 COMPREHEN METABOLIC PANEL: CPT | Performed by: EMERGENCY MEDICINE

## 2024-01-01 PROCEDURE — 36415 COLL VENOUS BLD VENIPUNCTURE: CPT | Performed by: FAMILY MEDICINE

## 2024-01-01 PROCEDURE — 83615 LACTATE (LD) (LDH) ENZYME: CPT

## 2024-01-01 PROCEDURE — G0463 HOSPITAL OUTPT CLINIC VISIT: HCPCS

## 2024-01-01 PROCEDURE — 83690 ASSAY OF LIPASE: CPT | Performed by: NURSE PRACTITIONER

## 2024-01-01 PROCEDURE — 86140 C-REACTIVE PROTEIN: CPT | Performed by: INTERNAL MEDICINE

## 2024-01-01 PROCEDURE — 81001 URINALYSIS AUTO W/SCOPE: CPT | Performed by: STUDENT IN AN ORGANIZED HEALTH CARE EDUCATION/TRAINING PROGRAM

## 2024-01-01 PROCEDURE — 82805 BLOOD GASES W/O2 SATURATION: CPT | Performed by: NURSE PRACTITIONER

## 2024-01-01 PROCEDURE — 99285 EMERGENCY DEPT VISIT HI MDM: CPT | Mod: 25

## 2024-01-01 PROCEDURE — 250N000011 HC RX IP 250 OP 636: Performed by: PHYSICIAN ASSISTANT

## 2024-01-01 PROCEDURE — 99232 SBSQ HOSP IP/OBS MODERATE 35: CPT | Mod: GC | Performed by: INTERNAL MEDICINE

## 2024-01-01 PROCEDURE — 99231 SBSQ HOSP IP/OBS SF/LOW 25: CPT | Mod: 24 | Performed by: INTERNAL MEDICINE

## 2024-01-01 PROCEDURE — 250N000009 HC RX 250

## 2024-01-01 PROCEDURE — 96375 TX/PRO/DX INJ NEW DRUG ADDON: CPT | Performed by: EMERGENCY MEDICINE

## 2024-01-01 PROCEDURE — 87070 CULTURE OTHR SPECIMN AEROBIC: CPT

## 2024-01-01 PROCEDURE — 87340 HEPATITIS B SURFACE AG IA: CPT | Performed by: INTERNAL MEDICINE

## 2024-01-01 PROCEDURE — 81001 URINALYSIS AUTO W/SCOPE: CPT | Performed by: INTERNAL MEDICINE

## 2024-01-01 PROCEDURE — C1887 CATHETER, GUIDING: HCPCS

## 2024-01-01 PROCEDURE — 93000 ELECTROCARDIOGRAM COMPLETE: CPT | Performed by: INTERNAL MEDICINE

## 2024-01-01 PROCEDURE — 99254 IP/OBS CNSLTJ NEW/EST MOD 60: CPT | Performed by: INTERNAL MEDICINE

## 2024-01-01 PROCEDURE — 99292 CRITICAL CARE ADDL 30 MIN: CPT | Performed by: NURSE PRACTITIONER

## 2024-01-01 PROCEDURE — 71046 X-RAY EXAM CHEST 2 VIEWS: CPT

## 2024-01-01 PROCEDURE — 99207 PR NO BILLABLE SERVICE THIS VISIT: CPT | Performed by: PHYSICIAN ASSISTANT

## 2024-01-01 PROCEDURE — 85384 FIBRINOGEN ACTIVITY: CPT | Performed by: STUDENT IN AN ORGANIZED HEALTH CARE EDUCATION/TRAINING PROGRAM

## 2024-01-01 PROCEDURE — 99222 1ST HOSP IP/OBS MODERATE 55: CPT | Mod: GC | Performed by: DERMATOLOGY

## 2024-01-01 PROCEDURE — 85004 AUTOMATED DIFF WBC COUNT: CPT

## 2024-01-01 PROCEDURE — C1750 CATH, HEMODIALYSIS,LONG-TERM: HCPCS

## 2024-01-01 PROCEDURE — 85027 COMPLETE CBC AUTOMATED: CPT | Performed by: FAMILY MEDICINE

## 2024-01-01 PROCEDURE — 85613 RUSSELL VIPER VENOM DILUTED: CPT

## 2024-01-01 PROCEDURE — C1769 GUIDE WIRE: HCPCS

## 2024-01-01 PROCEDURE — 87529 HSV DNA AMP PROBE: CPT | Performed by: DERMATOLOGY

## 2024-01-01 PROCEDURE — 85379 FIBRIN DEGRADATION QUANT: CPT | Performed by: INTERNAL MEDICINE

## 2024-01-01 PROCEDURE — 85025 COMPLETE CBC W/AUTO DIFF WBC: CPT | Performed by: EMERGENCY MEDICINE

## 2024-01-01 PROCEDURE — 85045 AUTOMATED RETICULOCYTE COUNT: CPT

## 2024-01-01 PROCEDURE — 255N000002 HC RX 255 OP 636: Performed by: STUDENT IN AN ORGANIZED HEALTH CARE EDUCATION/TRAINING PROGRAM

## 2024-01-01 PROCEDURE — 80076 HEPATIC FUNCTION PANEL: CPT | Performed by: INTERNAL MEDICINE

## 2024-01-01 PROCEDURE — 250N000011 HC RX IP 250 OP 636: Mod: JZ | Performed by: NURSE PRACTITIONER

## 2024-01-01 PROCEDURE — 93010 ELECTROCARDIOGRAM REPORT: CPT | Performed by: EMERGENCY MEDICINE

## 2024-01-01 PROCEDURE — 87798 DETECT AGENT NOS DNA AMP: CPT | Performed by: DERMATOLOGY

## 2024-01-01 PROCEDURE — 87637 SARSCOV2&INF A&B&RSV AMP PRB: CPT | Performed by: EMERGENCY MEDICINE

## 2024-01-01 PROCEDURE — 87581 M.PNEUMON DNA AMP PROBE: CPT | Performed by: PHYSICIAN ASSISTANT

## 2024-01-01 PROCEDURE — 99222 1ST HOSP IP/OBS MODERATE 55: CPT | Mod: 25 | Performed by: INTERNAL MEDICINE

## 2024-01-01 PROCEDURE — 99232 SBSQ HOSP IP/OBS MODERATE 35: CPT | Performed by: SPECIALIST

## 2024-01-01 PROCEDURE — 81001 URINALYSIS AUTO W/SCOPE: CPT

## 2024-01-01 PROCEDURE — 99232 SBSQ HOSP IP/OBS MODERATE 35: CPT | Mod: GC | Performed by: STUDENT IN AN ORGANIZED HEALTH CARE EDUCATION/TRAINING PROGRAM

## 2024-01-01 PROCEDURE — 258N000003 HC RX IP 258 OP 636: Performed by: NURSE PRACTITIONER

## 2024-01-01 PROCEDURE — 87633 RESP VIRUS 12-25 TARGETS: CPT | Performed by: PHYSICIAN ASSISTANT

## 2024-01-01 PROCEDURE — 0JH63XZ INSERTION OF TUNNELED VASCULAR ACCESS DEVICE INTO CHEST SUBCUTANEOUS TISSUE AND FASCIA, PERCUTANEOUS APPROACH: ICD-10-PCS | Performed by: RADIOLOGY

## 2024-01-01 PROCEDURE — 99255 IP/OBS CONSLTJ NEW/EST HI 80: CPT | Mod: GC | Performed by: INTERNAL MEDICINE

## 2024-01-01 PROCEDURE — 85390 FIBRINOLYSINS SCREEN I&R: CPT | Mod: 26 | Performed by: PATHOLOGY

## 2024-01-01 PROCEDURE — 84100 ASSAY OF PHOSPHORUS: CPT | Performed by: EMERGENCY MEDICINE

## 2024-01-01 PROCEDURE — 250N000011 HC RX IP 250 OP 636: Performed by: EMERGENCY MEDICINE

## 2024-01-01 PROCEDURE — 255N000002 HC RX 255 OP 636: Performed by: INTERNAL MEDICINE

## 2024-01-01 PROCEDURE — 99231 SBSQ HOSP IP/OBS SF/LOW 25: CPT | Mod: GC | Performed by: INTERNAL MEDICINE

## 2024-01-01 PROCEDURE — 250N000013 HC RX MED GY IP 250 OP 250 PS 637: Performed by: PHYSICIAN ASSISTANT

## 2024-01-01 PROCEDURE — 120N000001 HC R&B MED SURG/OB

## 2024-01-01 PROCEDURE — 36415 COLL VENOUS BLD VENIPUNCTURE: CPT | Performed by: PHYSICIAN ASSISTANT

## 2024-01-01 PROCEDURE — 99152 MOD SED SAME PHYS/QHP 5/>YRS: CPT | Mod: GC | Performed by: RADIOLOGY

## 2024-01-01 PROCEDURE — 83605 ASSAY OF LACTIC ACID: CPT | Performed by: PHYSICIAN ASSISTANT

## 2024-01-01 PROCEDURE — 78580 LUNG PERFUSION IMAGING: CPT

## 2024-01-01 PROCEDURE — 258N000003 HC RX IP 258 OP 636: Performed by: PHYSICIAN ASSISTANT

## 2024-01-01 PROCEDURE — 93005 ELECTROCARDIOGRAM TRACING: CPT | Performed by: EMERGENCY MEDICINE

## 2024-01-01 PROCEDURE — 99214 OFFICE O/P EST MOD 30 MIN: CPT | Performed by: FAMILY MEDICINE

## 2024-01-01 PROCEDURE — 87040 BLOOD CULTURE FOR BACTERIA: CPT | Performed by: EMERGENCY MEDICINE

## 2024-01-01 PROCEDURE — 99239 HOSP IP/OBS DSCHRG MGMT >30: CPT | Performed by: STUDENT IN AN ORGANIZED HEALTH CARE EDUCATION/TRAINING PROGRAM

## 2024-01-01 PROCEDURE — 93321 DOPPLER ECHO F-UP/LMTD STD: CPT | Mod: 26 | Performed by: INTERNAL MEDICINE

## 2024-01-01 PROCEDURE — 84484 ASSAY OF TROPONIN QUANT: CPT | Performed by: EMERGENCY MEDICINE

## 2024-01-01 PROCEDURE — 83735 ASSAY OF MAGNESIUM: CPT | Performed by: EMERGENCY MEDICINE

## 2024-01-01 PROCEDURE — 250N000013 HC RX MED GY IP 250 OP 250 PS 637: Performed by: EMERGENCY MEDICINE

## 2024-01-01 PROCEDURE — 84145 PROCALCITONIN (PCT): CPT | Performed by: STUDENT IN AN ORGANIZED HEALTH CARE EDUCATION/TRAINING PROGRAM

## 2024-01-01 PROCEDURE — 84132 ASSAY OF SERUM POTASSIUM: CPT | Performed by: STUDENT IN AN ORGANIZED HEALTH CARE EDUCATION/TRAINING PROGRAM

## 2024-01-01 PROCEDURE — 87075 CULTR BACTERIA EXCEPT BLOOD: CPT

## 2024-01-01 PROCEDURE — 87493 C DIFF AMPLIFIED PROBE: CPT | Performed by: FAMILY MEDICINE

## 2024-01-01 PROCEDURE — 86704 HEP B CORE ANTIBODY TOTAL: CPT | Performed by: STUDENT IN AN ORGANIZED HEALTH CARE EDUCATION/TRAINING PROGRAM

## 2024-01-01 PROCEDURE — 343N000001 HC RX 343: Performed by: STUDENT IN AN ORGANIZED HEALTH CARE EDUCATION/TRAINING PROGRAM

## 2024-01-01 RX ORDER — NIFEDIPINE 90 MG/1
90 TABLET, EXTENDED RELEASE ORAL DAILY
Status: DISCONTINUED | OUTPATIENT
Start: 2024-01-01 | End: 2024-01-01

## 2024-01-01 RX ORDER — CALCIUM CARBONATE 500(1250)
500 TABLET ORAL
Status: DISCONTINUED | OUTPATIENT
Start: 2024-01-01 | End: 2024-01-01 | Stop reason: HOSPADM

## 2024-01-01 RX ORDER — VANCOMYCIN HYDROCHLORIDE 125 MG/1
125 CAPSULE ORAL DAILY
Status: DISCONTINUED | OUTPATIENT
Start: 2024-01-01 | End: 2024-01-01

## 2024-01-01 RX ORDER — SULFAMETHOXAZOLE AND TRIMETHOPRIM 400; 80 MG/1; MG/1
1 TABLET ORAL
Status: DISCONTINUED | OUTPATIENT
Start: 2024-01-01 | End: 2024-01-01 | Stop reason: HOSPADM

## 2024-01-01 RX ORDER — PREDNISONE 50 MG/1
50 TABLET ORAL DAILY
Status: DISCONTINUED | OUTPATIENT
Start: 2024-01-01 | End: 2024-01-01

## 2024-01-01 RX ORDER — LIDOCAINE 4 G/G
3 PATCH TOPICAL
Status: DISCONTINUED | OUTPATIENT
Start: 2024-01-01 | End: 2024-01-01

## 2024-01-01 RX ORDER — NALOXONE HYDROCHLORIDE 0.4 MG/ML
0.2 INJECTION, SOLUTION INTRAMUSCULAR; INTRAVENOUS; SUBCUTANEOUS
Status: DISCONTINUED | OUTPATIENT
Start: 2024-01-01 | End: 2024-01-01 | Stop reason: HOSPADM

## 2024-01-01 RX ORDER — FLUMAZENIL 0.1 MG/ML
0.2 INJECTION, SOLUTION INTRAVENOUS
Status: DISCONTINUED | OUTPATIENT
Start: 2024-01-01 | End: 2024-01-01 | Stop reason: HOSPADM

## 2024-01-01 RX ORDER — HEPARIN SODIUM 5000 [USP'U]/.5ML
5000 INJECTION, SOLUTION INTRAVENOUS; SUBCUTANEOUS 2 TIMES DAILY
Status: DISCONTINUED | OUTPATIENT
Start: 2024-01-01 | End: 2024-01-01 | Stop reason: HOSPADM

## 2024-01-01 RX ORDER — ACETAMINOPHEN 500 MG
1000 TABLET ORAL ONCE
Status: COMPLETED | OUTPATIENT
Start: 2024-01-01 | End: 2024-01-01

## 2024-01-01 RX ORDER — OSELTAMIVIR PHOSPHATE 30 MG/1
30 CAPSULE ORAL DAILY PRN
Status: DISCONTINUED | OUTPATIENT
Start: 2024-01-01 | End: 2024-01-01 | Stop reason: DRUGHIGH

## 2024-01-01 RX ORDER — ONDANSETRON 2 MG/ML
4 INJECTION INTRAMUSCULAR; INTRAVENOUS ONCE
Status: COMPLETED | OUTPATIENT
Start: 2024-01-01 | End: 2024-01-01

## 2024-01-01 RX ORDER — BUMETANIDE 0.25 MG/ML
4 INJECTION INTRAMUSCULAR; INTRAVENOUS ONCE
Status: COMPLETED | OUTPATIENT
Start: 2024-01-01 | End: 2024-01-01

## 2024-01-01 RX ORDER — BUMETANIDE 0.5 MG/1
1 TABLET ORAL
Status: DISCONTINUED | OUTPATIENT
Start: 2024-01-01 | End: 2024-01-01

## 2024-01-01 RX ORDER — FENTANYL CITRATE 50 UG/ML
25-50 INJECTION, SOLUTION INTRAMUSCULAR; INTRAVENOUS EVERY 5 MIN PRN
Status: DISCONTINUED | OUTPATIENT
Start: 2024-01-01 | End: 2024-01-01 | Stop reason: HOSPADM

## 2024-01-01 RX ORDER — FOLIC ACID 1 MG/1
1 TABLET ORAL DAILY
Status: DISCONTINUED | OUTPATIENT
Start: 2024-01-01 | End: 2024-01-01 | Stop reason: HOSPADM

## 2024-01-01 RX ORDER — NIFEDIPINE 30 MG/1
30 TABLET, EXTENDED RELEASE ORAL DAILY
Status: DISCONTINUED | OUTPATIENT
Start: 2024-01-01 | End: 2024-01-01

## 2024-01-01 RX ORDER — VIT B COMP NO.3/FOLIC/C/BIOTIN 1 MG-60 MG
1 TABLET ORAL DAILY
Status: DISCONTINUED | OUTPATIENT
Start: 2024-01-01 | End: 2024-01-01 | Stop reason: HOSPADM

## 2024-01-01 RX ORDER — NICOTINE POLACRILEX 4 MG
15-30 LOZENGE BUCCAL
Status: DISCONTINUED | OUTPATIENT
Start: 2024-01-01 | End: 2024-01-01 | Stop reason: HOSPADM

## 2024-01-01 RX ORDER — PROCHLORPERAZINE MALEATE 5 MG
5 TABLET ORAL EVERY 6 HOURS PRN
Status: DISCONTINUED | OUTPATIENT
Start: 2024-01-01 | End: 2024-01-01 | Stop reason: HOSPADM

## 2024-01-01 RX ORDER — CARVEDILOL 12.5 MG/1
12.5 TABLET ORAL 2 TIMES DAILY
Status: DISCONTINUED | OUTPATIENT
Start: 2024-01-01 | End: 2024-01-01

## 2024-01-01 RX ORDER — NALOXONE HYDROCHLORIDE 0.4 MG/ML
0.4 INJECTION, SOLUTION INTRAMUSCULAR; INTRAVENOUS; SUBCUTANEOUS
Status: DISCONTINUED | OUTPATIENT
Start: 2024-01-01 | End: 2024-01-01 | Stop reason: HOSPADM

## 2024-01-01 RX ORDER — PANTOPRAZOLE SODIUM 40 MG/1
40 TABLET, DELAYED RELEASE ORAL
Status: DISCONTINUED | OUTPATIENT
Start: 2024-01-01 | End: 2024-01-01 | Stop reason: HOSPADM

## 2024-01-01 RX ORDER — HYDRALAZINE HYDROCHLORIDE 20 MG/ML
10 INJECTION INTRAMUSCULAR; INTRAVENOUS ONCE
Status: COMPLETED | OUTPATIENT
Start: 2024-01-01 | End: 2024-01-01

## 2024-01-01 RX ORDER — AMOXICILLIN 250 MG
2 CAPSULE ORAL 2 TIMES DAILY PRN
Status: DISCONTINUED | OUTPATIENT
Start: 2024-01-01 | End: 2024-01-01 | Stop reason: HOSPADM

## 2024-01-01 RX ORDER — SODIUM BICARBONATE 650 MG/1
650 TABLET ORAL 3 TIMES DAILY
Status: DISCONTINUED | OUTPATIENT
Start: 2024-01-01 | End: 2024-01-01

## 2024-01-01 RX ORDER — MIDODRINE HYDROCHLORIDE 5 MG/1
5 TABLET ORAL
Status: DISCONTINUED | OUTPATIENT
Start: 2024-01-01 | End: 2024-01-01

## 2024-01-01 RX ORDER — BUMETANIDE 0.5 MG/1
2 TABLET ORAL
Status: DISCONTINUED | OUTPATIENT
Start: 2024-01-01 | End: 2024-01-01

## 2024-01-01 RX ORDER — ONDANSETRON 4 MG/1
4 TABLET, ORALLY DISINTEGRATING ORAL EVERY 8 HOURS PRN
Status: DISCONTINUED | OUTPATIENT
Start: 2024-01-01 | End: 2024-01-01 | Stop reason: HOSPADM

## 2024-01-01 RX ORDER — ONDANSETRON 4 MG/1
4 TABLET, ORALLY DISINTEGRATING ORAL EVERY 6 HOURS PRN
Status: DISCONTINUED | OUTPATIENT
Start: 2024-01-01 | End: 2024-01-01

## 2024-01-01 RX ORDER — ACETAMINOPHEN 650 MG/1
650 SUPPOSITORY RECTAL EVERY 4 HOURS PRN
Status: DISCONTINUED | OUTPATIENT
Start: 2024-01-01 | End: 2024-01-01 | Stop reason: HOSPADM

## 2024-01-01 RX ORDER — CARVEDILOL 25 MG/1
25 TABLET ORAL 2 TIMES DAILY
Status: DISCONTINUED | OUTPATIENT
Start: 2024-01-01 | End: 2024-01-01 | Stop reason: HOSPADM

## 2024-01-01 RX ORDER — NIFEDIPINE 30 MG/1
60 TABLET, EXTENDED RELEASE ORAL DAILY
Status: DISCONTINUED | OUTPATIENT
Start: 2024-01-01 | End: 2024-01-01 | Stop reason: HOSPADM

## 2024-01-01 RX ORDER — ALBUTEROL SULFATE 90 UG/1
2 AEROSOL, METERED RESPIRATORY (INHALATION) EVERY 6 HOURS PRN
Status: DISCONTINUED | OUTPATIENT
Start: 2024-01-01 | End: 2024-01-01 | Stop reason: HOSPADM

## 2024-01-01 RX ORDER — OXYCODONE HYDROCHLORIDE 5 MG/1
5 TABLET ORAL EVERY 4 HOURS PRN
Status: DISCONTINUED | OUTPATIENT
Start: 2024-01-01 | End: 2024-01-01 | Stop reason: HOSPADM

## 2024-01-01 RX ORDER — TORSEMIDE 20 MG/1
20 TABLET ORAL DAILY
Status: DISCONTINUED | OUTPATIENT
Start: 2024-01-01 | End: 2024-01-01

## 2024-01-01 RX ORDER — CALCIUM CARBONATE 500(1250)
500 TABLET ORAL
Qty: 270 TABLET | Refills: 0 | Status: SHIPPED | OUTPATIENT
Start: 2024-01-01 | End: 2024-04-23

## 2024-01-01 RX ORDER — BUMETANIDE 0.25 MG/ML
2 INJECTION INTRAMUSCULAR; INTRAVENOUS ONCE
Status: COMPLETED | OUTPATIENT
Start: 2024-01-01 | End: 2024-01-01

## 2024-01-01 RX ORDER — OSELTAMIVIR PHOSPHATE 30 MG/1
30 CAPSULE ORAL ONCE
Status: COMPLETED | OUTPATIENT
Start: 2024-01-01 | End: 2024-01-01

## 2024-01-01 RX ORDER — OSELTAMIVIR PHOSPHATE 30 MG/1
30 CAPSULE ORAL DAILY
Status: DISCONTINUED | OUTPATIENT
Start: 2024-01-01 | End: 2024-01-01

## 2024-01-01 RX ORDER — TORSEMIDE 20 MG/1
40 TABLET ORAL DAILY
Status: DISCONTINUED | OUTPATIENT
Start: 2024-01-01 | End: 2024-01-01

## 2024-01-01 RX ORDER — LIDOCAINE 40 MG/G
CREAM TOPICAL
Status: DISCONTINUED | OUTPATIENT
Start: 2024-01-01 | End: 2024-01-01 | Stop reason: HOSPADM

## 2024-01-01 RX ORDER — FERROUS SULFATE 325(65) MG
325 TABLET ORAL
Status: DISCONTINUED | OUTPATIENT
Start: 2024-01-01 | End: 2024-01-01

## 2024-01-01 RX ORDER — HEPARIN SODIUM 1000 [USP'U]/ML
2 INJECTION, SOLUTION INTRAVENOUS; SUBCUTANEOUS ONCE
Status: COMPLETED | OUTPATIENT
Start: 2024-01-01 | End: 2024-01-01

## 2024-01-01 RX ORDER — VANCOMYCIN HYDROCHLORIDE 1 G/200ML
1000 INJECTION, SOLUTION INTRAVENOUS ONCE
Qty: 200 ML | Refills: 0 | Status: COMPLETED | OUTPATIENT
Start: 2024-01-01 | End: 2024-01-01

## 2024-01-01 RX ORDER — PREDNISONE 20 MG/1
40 TABLET ORAL DAILY
Status: DISCONTINUED | OUTPATIENT
Start: 2024-01-01 | End: 2024-01-01 | Stop reason: HOSPADM

## 2024-01-01 RX ORDER — CEFAZOLIN SODIUM 2 G/100ML
2 INJECTION, SOLUTION INTRAVENOUS
Status: DISCONTINUED | OUTPATIENT
Start: 2024-01-01 | End: 2024-01-01

## 2024-01-01 RX ORDER — HYDRALAZINE HYDROCHLORIDE 10 MG/1
10 TABLET, FILM COATED ORAL EVERY 4 HOURS PRN
Status: DISCONTINUED | OUTPATIENT
Start: 2024-01-01 | End: 2024-01-01 | Stop reason: HOSPADM

## 2024-01-01 RX ORDER — NIFEDIPINE 30 MG/1
30 TABLET, EXTENDED RELEASE ORAL ONCE
Status: COMPLETED | OUTPATIENT
Start: 2024-01-01 | End: 2024-01-01

## 2024-01-01 RX ORDER — POTASSIUM CHLORIDE 750 MG/1
20 TABLET, EXTENDED RELEASE ORAL 2 TIMES DAILY
Status: DISCONTINUED | OUTPATIENT
Start: 2024-01-01 | End: 2024-01-01 | Stop reason: HOSPADM

## 2024-01-01 RX ORDER — PIPERACILLIN SODIUM, TAZOBACTAM SODIUM 2; .25 G/10ML; G/10ML
2.25 INJECTION, POWDER, LYOPHILIZED, FOR SOLUTION INTRAVENOUS EVERY 6 HOURS
Status: DISCONTINUED | OUTPATIENT
Start: 2024-01-01 | End: 2024-01-01

## 2024-01-01 RX ORDER — SALIVA STIMULANT COMB. NO.3
1 SPRAY, NON-AEROSOL (ML) MUCOUS MEMBRANE 4 TIMES DAILY
Status: DISCONTINUED | OUTPATIENT
Start: 2024-01-01 | End: 2024-01-01 | Stop reason: HOSPADM

## 2024-01-01 RX ORDER — PANTOPRAZOLE SODIUM 40 MG/1
40 TABLET, DELAYED RELEASE ORAL
Qty: 90 TABLET | Refills: 0 | Status: SHIPPED | OUTPATIENT
Start: 2024-01-01 | End: 2024-04-23

## 2024-01-01 RX ORDER — VANCOMYCIN HYDROCHLORIDE 1 G/200ML
1000 INJECTION, SOLUTION INTRAVENOUS ONCE
Status: COMPLETED | OUTPATIENT
Start: 2024-01-01 | End: 2024-01-01

## 2024-01-01 RX ORDER — CARVEDILOL 25 MG/1
25 TABLET ORAL 2 TIMES DAILY WITH MEALS
Status: DISCONTINUED | OUTPATIENT
Start: 2024-01-01 | End: 2024-01-01

## 2024-01-01 RX ORDER — HYDRALAZINE HYDROCHLORIDE 20 MG/ML
20 INJECTION INTRAMUSCULAR; INTRAVENOUS EVERY 4 HOURS PRN
Status: DISCONTINUED | OUTPATIENT
Start: 2024-01-01 | End: 2024-01-01 | Stop reason: HOSPADM

## 2024-01-01 RX ORDER — PREDNISONE 5 MG/1
5 TABLET ORAL DAILY
Status: DISCONTINUED | OUTPATIENT
Start: 2024-01-01 | End: 2024-01-01 | Stop reason: HOSPADM

## 2024-01-01 RX ORDER — VANCOMYCIN HYDROCHLORIDE 125 MG/1
125 CAPSULE ORAL 2 TIMES DAILY
Status: DISCONTINUED | OUTPATIENT
Start: 2024-01-01 | End: 2024-01-01 | Stop reason: HOSPADM

## 2024-01-01 RX ORDER — METOCLOPRAMIDE HYDROCHLORIDE 5 MG/ML
5 INJECTION INTRAMUSCULAR; INTRAVENOUS ONCE
Status: COMPLETED | OUTPATIENT
Start: 2024-01-01 | End: 2024-01-01

## 2024-01-01 RX ORDER — LIDOCAINE 40 MG/G
CREAM TOPICAL
Status: DISCONTINUED | OUTPATIENT
Start: 2024-01-01 | End: 2024-01-01

## 2024-01-01 RX ORDER — ONDANSETRON 2 MG/ML
4 INJECTION INTRAMUSCULAR; INTRAVENOUS EVERY 6 HOURS PRN
Status: DISCONTINUED | OUTPATIENT
Start: 2024-01-01 | End: 2024-01-01

## 2024-01-01 RX ORDER — SULFAMETHOXAZOLE AND TRIMETHOPRIM 400; 80 MG/1; MG/1
1 TABLET ORAL DAILY
Status: DISCONTINUED | OUTPATIENT
Start: 2024-01-01 | End: 2024-01-01

## 2024-01-01 RX ORDER — CEFTAZIDIME 1 G/1
1 INJECTION, POWDER, FOR SOLUTION INTRAMUSCULAR; INTRAVENOUS EVERY 24 HOURS
Status: DISCONTINUED | OUTPATIENT
Start: 2024-01-01 | End: 2024-01-01

## 2024-01-01 RX ORDER — SULFAMETHOXAZOLE AND TRIMETHOPRIM 400; 80 MG/1; MG/1
1 TABLET ORAL
Qty: 30 TABLET | Refills: 3 | Status: SHIPPED | OUTPATIENT
Start: 2024-01-01

## 2024-01-01 RX ORDER — HYDROMORPHONE HYDROCHLORIDE 1 MG/ML
0.5 INJECTION, SOLUTION INTRAMUSCULAR; INTRAVENOUS; SUBCUTANEOUS ONCE
Status: COMPLETED | OUTPATIENT
Start: 2024-01-01 | End: 2024-01-01

## 2024-01-01 RX ORDER — OSELTAMIVIR PHOSPHATE 30 MG/1
30 CAPSULE ORAL
Qty: 4 CAPSULE | Refills: 0 | Status: SHIPPED | OUTPATIENT
Start: 2024-01-01 | End: 2024-01-01

## 2024-01-01 RX ORDER — HYDRALAZINE HYDROCHLORIDE 10 MG/1
10 TABLET, FILM COATED ORAL EVERY 4 HOURS PRN
Status: DISCONTINUED | OUTPATIENT
Start: 2024-01-01 | End: 2024-01-01

## 2024-01-01 RX ORDER — AMOXICILLIN 250 MG
1 CAPSULE ORAL 2 TIMES DAILY PRN
Status: DISCONTINUED | OUTPATIENT
Start: 2024-01-01 | End: 2024-01-01 | Stop reason: HOSPADM

## 2024-01-01 RX ORDER — LABETALOL HYDROCHLORIDE 5 MG/ML
10 INJECTION, SOLUTION INTRAVENOUS
Status: DISCONTINUED | OUTPATIENT
Start: 2024-01-01 | End: 2024-01-01 | Stop reason: HOSPADM

## 2024-01-01 RX ORDER — BUMETANIDE 2 MG/1
TABLET ORAL
Start: 2024-01-01

## 2024-01-01 RX ORDER — BUMETANIDE 1 MG/1
2 TABLET ORAL
Status: DISCONTINUED | OUTPATIENT
Start: 2024-01-01 | End: 2024-01-01

## 2024-01-01 RX ORDER — METHOCARBAMOL 500 MG/1
500 TABLET, FILM COATED ORAL 4 TIMES DAILY PRN
Status: DISCONTINUED | OUTPATIENT
Start: 2024-01-01 | End: 2024-01-01 | Stop reason: HOSPADM

## 2024-01-01 RX ORDER — CALCIUM CARBONATE 500 MG/1
1000 TABLET, CHEWABLE ORAL 4 TIMES DAILY PRN
Status: DISCONTINUED | OUTPATIENT
Start: 2024-01-01 | End: 2024-01-01 | Stop reason: HOSPADM

## 2024-01-01 RX ORDER — LOSARTAN POTASSIUM 50 MG/1
50 TABLET ORAL DAILY
Status: DISCONTINUED | OUTPATIENT
Start: 2024-01-01 | End: 2024-01-01

## 2024-01-01 RX ORDER — ACETAMINOPHEN 325 MG/1
650 TABLET ORAL EVERY 4 HOURS PRN
Status: DISCONTINUED | OUTPATIENT
Start: 2024-01-01 | End: 2024-01-01 | Stop reason: HOSPADM

## 2024-01-01 RX ORDER — PROCHLORPERAZINE 25 MG
25 SUPPOSITORY, RECTAL RECTAL EVERY 12 HOURS PRN
Status: DISCONTINUED | OUTPATIENT
Start: 2024-01-01 | End: 2024-01-01 | Stop reason: HOSPADM

## 2024-01-01 RX ORDER — CARVEDILOL 6.25 MG/1
6.25 TABLET ORAL 2 TIMES DAILY
Status: DISCONTINUED | OUTPATIENT
Start: 2024-01-01 | End: 2024-01-01

## 2024-01-01 RX ORDER — TORSEMIDE 20 MG/1
40 TABLET ORAL ONCE
Status: DISCONTINUED | OUTPATIENT
Start: 2024-01-01 | End: 2024-01-01

## 2024-01-01 RX ORDER — LOSARTAN POTASSIUM 100 MG/1
100 TABLET ORAL DAILY
Status: DISCONTINUED | OUTPATIENT
Start: 2024-01-01 | End: 2024-01-01 | Stop reason: HOSPADM

## 2024-01-01 RX ORDER — HYDRALAZINE HYDROCHLORIDE 10 MG/1
10 TABLET, FILM COATED ORAL 2 TIMES DAILY
Status: DISCONTINUED | OUTPATIENT
Start: 2024-01-01 | End: 2024-01-01

## 2024-01-01 RX ORDER — CEFEPIME HYDROCHLORIDE 2 G/1
2 INJECTION, POWDER, FOR SOLUTION INTRAVENOUS EVERY 24 HOURS
Status: DISCONTINUED | OUTPATIENT
Start: 2024-01-01 | End: 2024-01-01

## 2024-01-01 RX ORDER — SULFAMETHOXAZOLE AND TRIMETHOPRIM 400; 80 MG/1; MG/1
1 TABLET ORAL
Status: DISCONTINUED | OUTPATIENT
Start: 2024-01-01 | End: 2024-01-01

## 2024-01-01 RX ORDER — LOSARTAN POTASSIUM 50 MG/1
50 TABLET ORAL ONCE
Status: COMPLETED | OUTPATIENT
Start: 2024-01-01 | End: 2024-01-01

## 2024-01-01 RX ORDER — VIT B COMP NO.3/FOLIC/C/BIOTIN 1 MG-60 MG
1 TABLET ORAL DAILY
Qty: 90 TABLET | Refills: 0 | Status: SHIPPED | OUTPATIENT
Start: 2024-01-01 | End: 2024-04-23

## 2024-01-01 RX ORDER — HEPARIN SODIUM 5000 [USP'U]/.5ML
5000 INJECTION, SOLUTION INTRAVENOUS; SUBCUTANEOUS EVERY 12 HOURS
Status: DISCONTINUED | OUTPATIENT
Start: 2024-01-01 | End: 2024-01-01 | Stop reason: HOSPADM

## 2024-01-01 RX ORDER — BUMETANIDE 0.25 MG/ML
4 INJECTION INTRAMUSCULAR; INTRAVENOUS ONCE
Status: DISCONTINUED | OUTPATIENT
Start: 2024-01-01 | End: 2024-01-01

## 2024-01-01 RX ORDER — BUMETANIDE 1 MG/1
1 TABLET ORAL
Status: DISCONTINUED | OUTPATIENT
Start: 2024-01-01 | End: 2024-01-01 | Stop reason: HOSPADM

## 2024-01-01 RX ORDER — PREDNISONE 20 MG/1
TABLET ORAL
Qty: 35 TABLET | Refills: 0 | Status: SHIPPED | OUTPATIENT
Start: 2024-01-01 | End: 2024-01-01

## 2024-01-01 RX ORDER — UBIDECARENONE 75 MG
100 CAPSULE ORAL DAILY
Status: DISCONTINUED | OUTPATIENT
Start: 2024-01-01 | End: 2024-01-01 | Stop reason: HOSPADM

## 2024-01-01 RX ORDER — VANCOMYCIN HYDROCHLORIDE 125 MG/1
125 CAPSULE ORAL 4 TIMES DAILY
Status: DISCONTINUED | OUTPATIENT
Start: 2024-01-01 | End: 2024-01-01 | Stop reason: HOSPADM

## 2024-01-01 RX ORDER — HYDRALAZINE HYDROCHLORIDE 20 MG/ML
10 INJECTION INTRAMUSCULAR; INTRAVENOUS EVERY 4 HOURS PRN
Status: DISCONTINUED | OUTPATIENT
Start: 2024-01-01 | End: 2024-01-01

## 2024-01-01 RX ORDER — FUROSEMIDE 10 MG/ML
80 INJECTION INTRAMUSCULAR; INTRAVENOUS ONCE
Status: COMPLETED | OUTPATIENT
Start: 2024-01-01 | End: 2024-01-01

## 2024-01-01 RX ORDER — B COMPLEX, C NO.20/FOLIC ACID 1 MG
1 CAPSULE ORAL DAILY
Status: DISCONTINUED | OUTPATIENT
Start: 2024-01-01 | End: 2024-01-01 | Stop reason: HOSPADM

## 2024-01-01 RX ORDER — CEFEPIME HYDROCHLORIDE 1 G/1
1 INJECTION, POWDER, FOR SOLUTION INTRAMUSCULAR; INTRAVENOUS EVERY 24 HOURS
Status: DISCONTINUED | OUTPATIENT
Start: 2024-01-01 | End: 2024-01-01

## 2024-01-01 RX ORDER — HYDRALAZINE HYDROCHLORIDE 25 MG/1
25 TABLET, FILM COATED ORAL 3 TIMES DAILY PRN
Status: DISCONTINUED | OUTPATIENT
Start: 2024-01-01 | End: 2024-01-01 | Stop reason: HOSPADM

## 2024-01-01 RX ORDER — BUMETANIDE 0.5 MG/1
2 TABLET ORAL
Status: DISCONTINUED | OUTPATIENT
Start: 2024-01-01 | End: 2024-01-01 | Stop reason: HOSPADM

## 2024-01-01 RX ORDER — ERGOCALCIFEROL (VITAMIN D2) 10 MCG
10 TABLET ORAL DAILY
Qty: 90 TABLET | Refills: 3 | Status: SHIPPED | OUTPATIENT
Start: 2024-01-01 | End: 2024-04-23

## 2024-01-01 RX ORDER — HEPARIN SODIUM 1000 [USP'U]/ML
500 INJECTION, SOLUTION INTRAVENOUS; SUBCUTANEOUS CONTINUOUS
Status: DISCONTINUED | OUTPATIENT
Start: 2024-01-01 | End: 2024-01-01 | Stop reason: HOSPADM

## 2024-01-01 RX ORDER — FUROSEMIDE 10 MG/ML
40 INJECTION INTRAMUSCULAR; INTRAVENOUS ONCE
Status: COMPLETED | OUTPATIENT
Start: 2024-01-01 | End: 2024-01-01

## 2024-01-01 RX ORDER — HYDRALAZINE HYDROCHLORIDE 10 MG/1
10 TABLET, FILM COATED ORAL 3 TIMES DAILY
Status: DISCONTINUED | OUTPATIENT
Start: 2024-01-01 | End: 2024-01-01

## 2024-01-01 RX ORDER — MORPHINE SULFATE 2 MG/ML
2 INJECTION, SOLUTION INTRAMUSCULAR; INTRAVENOUS ONCE
Status: COMPLETED | OUTPATIENT
Start: 2024-01-01 | End: 2024-01-01

## 2024-01-01 RX ORDER — CARVEDILOL 12.5 MG/1
12.5 TABLET ORAL 2 TIMES DAILY WITH MEALS
Status: DISCONTINUED | OUTPATIENT
Start: 2024-01-01 | End: 2024-01-01

## 2024-01-01 RX ORDER — CARVEDILOL 25 MG/1
25 TABLET ORAL 2 TIMES DAILY
Status: DISCONTINUED | OUTPATIENT
Start: 2024-01-01 | End: 2024-01-01

## 2024-01-01 RX ORDER — METOLAZONE 5 MG/1
5 TABLET ORAL ONCE
Status: COMPLETED | OUTPATIENT
Start: 2024-01-01 | End: 2024-01-01

## 2024-01-01 RX ORDER — OSELTAMIVIR PHOSPHATE 30 MG/1
30 CAPSULE ORAL
Status: DISCONTINUED | OUTPATIENT
Start: 2024-01-01 | End: 2024-01-01 | Stop reason: HOSPADM

## 2024-01-01 RX ORDER — OXYCODONE HYDROCHLORIDE 5 MG/1
5 TABLET ORAL
Status: COMPLETED | OUTPATIENT
Start: 2024-01-01 | End: 2024-01-01

## 2024-01-01 RX ORDER — MIDODRINE HYDROCHLORIDE 5 MG/1
10 TABLET ORAL
Status: DISCONTINUED | OUTPATIENT
Start: 2024-01-01 | End: 2024-01-01

## 2024-01-01 RX ORDER — LABETALOL HYDROCHLORIDE 5 MG/ML
10 INJECTION, SOLUTION INTRAVENOUS ONCE
Status: COMPLETED | OUTPATIENT
Start: 2024-01-01 | End: 2024-01-01

## 2024-01-01 RX ORDER — VANCOMYCIN HYDROCHLORIDE 125 MG/1
125 CAPSULE ORAL 4 TIMES DAILY
Status: DISCONTINUED | OUTPATIENT
Start: 2024-01-01 | End: 2024-01-01

## 2024-01-01 RX ORDER — PIPERACILLIN SODIUM, TAZOBACTAM SODIUM 3; .375 G/15ML; G/15ML
3.38 INJECTION, POWDER, LYOPHILIZED, FOR SOLUTION INTRAVENOUS ONCE
Status: DISCONTINUED | OUTPATIENT
Start: 2024-01-01 | End: 2024-01-01

## 2024-01-01 RX ORDER — HYDRALAZINE HYDROCHLORIDE 20 MG/ML
10 INJECTION INTRAMUSCULAR; INTRAVENOUS EVERY 4 HOURS PRN
Status: DISCONTINUED | OUTPATIENT
Start: 2024-01-01 | End: 2024-01-01 | Stop reason: HOSPADM

## 2024-01-01 RX ORDER — DEXTROSE MONOHYDRATE 25 G/50ML
25-50 INJECTION, SOLUTION INTRAVENOUS
Status: DISCONTINUED | OUTPATIENT
Start: 2024-01-01 | End: 2024-01-01 | Stop reason: HOSPADM

## 2024-01-01 RX ORDER — HYDROMORPHONE HCL IN WATER/PF 6 MG/30 ML
0.2 PATIENT CONTROLLED ANALGESIA SYRINGE INTRAVENOUS EVERY 6 HOURS PRN
Status: DISCONTINUED | OUTPATIENT
Start: 2024-01-01 | End: 2024-01-01

## 2024-01-01 RX ORDER — CARVEDILOL 25 MG/1
25 TABLET ORAL 2 TIMES DAILY WITH MEALS
Qty: 180 TABLET | Refills: 0 | Status: SHIPPED | OUTPATIENT
Start: 2024-01-01 | End: 2024-04-23

## 2024-01-01 RX ORDER — FOLIC ACID 1 MG/1
1 TABLET ORAL DAILY
Qty: 90 TABLET | Refills: 0 | Status: SHIPPED | OUTPATIENT
Start: 2024-01-01 | End: 2024-04-23

## 2024-01-01 RX ORDER — PREDNISONE 10 MG/1
10 TABLET ORAL DAILY
Status: DISCONTINUED | OUTPATIENT
Start: 2024-01-01 | End: 2024-01-01 | Stop reason: HOSPADM

## 2024-01-01 RX ORDER — NIFEDIPINE 90 MG/1
90 TABLET, FILM COATED, EXTENDED RELEASE ORAL DAILY
Qty: 90 TABLET | Refills: 0 | Status: SHIPPED | OUTPATIENT
Start: 2024-01-01 | End: 2024-01-01

## 2024-01-01 RX ORDER — PREDNISONE 20 MG/1
20 TABLET ORAL DAILY
Status: DISCONTINUED | OUTPATIENT
Start: 2024-01-01 | End: 2024-01-01 | Stop reason: HOSPADM

## 2024-01-01 RX ORDER — HYDRALAZINE HYDROCHLORIDE 25 MG/1
25 TABLET, FILM COATED ORAL 3 TIMES DAILY
Status: DISCONTINUED | OUTPATIENT
Start: 2024-01-01 | End: 2024-01-01

## 2024-01-01 RX ORDER — ONDANSETRON 4 MG/1
4 TABLET, ORALLY DISINTEGRATING ORAL EVERY 8 HOURS PRN
Qty: 20 TABLET | Refills: 3 | Status: ON HOLD | OUTPATIENT
Start: 2024-01-01 | End: 2024-01-01

## 2024-01-01 RX ORDER — CARVEDILOL 25 MG/1
25 TABLET ORAL 2 TIMES DAILY WITH MEALS
Status: DISCONTINUED | OUTPATIENT
Start: 2024-01-01 | End: 2024-01-01 | Stop reason: HOSPADM

## 2024-01-01 RX ORDER — HYDROMORPHONE HCL IN WATER/PF 6 MG/30 ML
0.2 PATIENT CONTROLLED ANALGESIA SYRINGE INTRAVENOUS
Status: DISCONTINUED | OUTPATIENT
Start: 2024-01-01 | End: 2024-01-01 | Stop reason: HOSPADM

## 2024-01-01 RX ORDER — PROCHLORPERAZINE MALEATE 5 MG
5 TABLET ORAL ONCE
Status: COMPLETED | OUTPATIENT
Start: 2024-01-01 | End: 2024-01-01

## 2024-01-01 RX ORDER — HEPARIN SODIUM 5000 [USP'U]/.5ML
5000 INJECTION, SOLUTION INTRAVENOUS; SUBCUTANEOUS EVERY 8 HOURS
Status: DISCONTINUED | OUTPATIENT
Start: 2024-01-01 | End: 2024-01-01

## 2024-01-01 RX ORDER — LIDOCAINE 4 G/G
3 PATCH TOPICAL
Status: DISCONTINUED | OUTPATIENT
Start: 2024-01-01 | End: 2024-01-01 | Stop reason: HOSPADM

## 2024-01-01 RX ORDER — BUMETANIDE 2 MG/1
2 TABLET ORAL
Qty: 180 TABLET | Refills: 0 | Status: ON HOLD | OUTPATIENT
Start: 2024-01-01 | End: 2024-01-01

## 2024-01-01 RX ADMIN — FERROUS SULFATE TAB 325 MG (65 MG ELEMENTAL FE) 325 MG: 325 (65 FE) TAB at 07:59

## 2024-01-01 RX ADMIN — CARVEDILOL 25 MG: 25 TABLET, FILM COATED ORAL at 08:11

## 2024-01-01 RX ADMIN — VANCOMYCIN HYDROCHLORIDE 125 MG: 125 CAPSULE ORAL at 01:20

## 2024-01-01 RX ADMIN — CALCIUM 500 MG: 500 TABLET ORAL at 17:02

## 2024-01-01 RX ADMIN — FOLIC ACID 1 MG: 1 TABLET ORAL at 09:07

## 2024-01-01 RX ADMIN — LOSARTAN POTASSIUM 50 MG: 50 TABLET, FILM COATED ORAL at 13:33

## 2024-01-01 RX ADMIN — PANTOPRAZOLE SODIUM 40 MG: 40 TABLET, DELAYED RELEASE ORAL at 08:23

## 2024-01-01 RX ADMIN — SODIUM CHLORIDE 250 ML: 9 INJECTION, SOLUTION INTRAVENOUS at 13:32

## 2024-01-01 RX ADMIN — Medication 10 MCG: at 08:12

## 2024-01-01 RX ADMIN — SODIUM BICARBONATE 650 MG: 650 TABLET ORAL at 14:48

## 2024-01-01 RX ADMIN — HYDROMORPHONE HYDROCHLORIDE 1 MG: 2 TABLET ORAL at 08:41

## 2024-01-01 RX ADMIN — FERROUS SULFATE TAB 325 MG (65 MG ELEMENTAL FE) 325 MG: 325 (65 FE) TAB at 08:11

## 2024-01-01 RX ADMIN — HYDROMORPHONE HYDROCHLORIDE 0.2 MG: 0.2 INJECTION, SOLUTION INTRAMUSCULAR; INTRAVENOUS; SUBCUTANEOUS at 05:51

## 2024-01-01 RX ADMIN — HYDROCORTISONE SODIUM SUCCINATE 50 MG: 100 INJECTION, POWDER, FOR SOLUTION INTRAMUSCULAR; INTRAVENOUS at 19:58

## 2024-01-01 RX ADMIN — HYDROMORPHONE HYDROCHLORIDE 0.2 MG: 0.2 INJECTION, SOLUTION INTRAMUSCULAR; INTRAVENOUS; SUBCUTANEOUS at 08:06

## 2024-01-01 RX ADMIN — VANCOMYCIN HYDROCHLORIDE 125 MG: 125 CAPSULE ORAL at 09:15

## 2024-01-01 RX ADMIN — HEPARIN SODIUM 5000 UNITS: 5000 INJECTION, SOLUTION INTRAVENOUS; SUBCUTANEOUS at 13:59

## 2024-01-01 RX ADMIN — Medication 10 MCG: at 08:40

## 2024-01-01 RX ADMIN — SODIUM CHLORIDE 250 ML: 9 INJECTION, SOLUTION INTRAVENOUS at 07:53

## 2024-01-01 RX ADMIN — VANCOMYCIN HYDROCHLORIDE 1500 MG: 10 INJECTION, POWDER, LYOPHILIZED, FOR SOLUTION INTRAVENOUS at 19:40

## 2024-01-01 RX ADMIN — PREDNISONE 60 MG: 20 TABLET ORAL at 08:24

## 2024-01-01 RX ADMIN — OXYCODONE HYDROCHLORIDE 5 MG: 5 TABLET ORAL at 16:39

## 2024-01-01 RX ADMIN — PREDNISONE 50 MG: 50 TABLET ORAL at 11:36

## 2024-01-01 RX ADMIN — CARVEDILOL 12.5 MG: 12.5 TABLET, FILM COATED ORAL at 18:21

## 2024-01-01 RX ADMIN — OXYCODONE HYDROCHLORIDE 5 MG: 5 TABLET ORAL at 10:25

## 2024-01-01 RX ADMIN — HYDRALAZINE HYDROCHLORIDE 25 MG: 25 TABLET, FILM COATED ORAL at 15:08

## 2024-01-01 RX ADMIN — VANCOMYCIN HYDROCHLORIDE 125 MG: 125 CAPSULE ORAL at 13:20

## 2024-01-01 RX ADMIN — SULFAMETHOXAZOLE AND TRIMETHOPRIM 1 TABLET: 400; 80 TABLET ORAL at 09:05

## 2024-01-01 RX ADMIN — Medication 10 MCG: at 09:16

## 2024-01-01 RX ADMIN — Medication 10 MCG: at 08:09

## 2024-01-01 RX ADMIN — PREDNISONE 50 MG: 50 TABLET ORAL at 08:40

## 2024-01-01 RX ADMIN — HYDRALAZINE HYDROCHLORIDE 10 MG: 10 TABLET, FILM COATED ORAL at 00:54

## 2024-01-01 RX ADMIN — ACETAMINOPHEN 650 MG: 325 TABLET, FILM COATED ORAL at 14:38

## 2024-01-01 RX ADMIN — SODIUM BICARBONATE 650 MG: 650 TABLET ORAL at 19:59

## 2024-01-01 RX ADMIN — FERROUS SULFATE TAB 325 MG (65 MG ELEMENTAL FE) 325 MG: 325 (65 FE) TAB at 12:24

## 2024-01-01 RX ADMIN — Medication 100 MCG: at 08:08

## 2024-01-01 RX ADMIN — SODIUM CHLORIDE 250 ML: 9 INJECTION, SOLUTION INTRAVENOUS at 20:41

## 2024-01-01 RX ADMIN — HYDROMORPHONE HYDROCHLORIDE 0.5 MG: 1 INJECTION, SOLUTION INTRAMUSCULAR; INTRAVENOUS; SUBCUTANEOUS at 12:56

## 2024-01-01 RX ADMIN — HYDRALAZINE HYDROCHLORIDE 25 MG: 25 TABLET, FILM COATED ORAL at 09:14

## 2024-01-01 RX ADMIN — Medication 10 MCG: at 07:55

## 2024-01-01 RX ADMIN — HYDRALAZINE HYDROCHLORIDE 10 MG: 10 TABLET, FILM COATED ORAL at 19:08

## 2024-01-01 RX ADMIN — Medication 10 MCG: at 08:48

## 2024-01-01 RX ADMIN — LOSARTAN POTASSIUM 100 MG: 100 TABLET, FILM COATED ORAL at 11:57

## 2024-01-01 RX ADMIN — HYDRALAZINE HYDROCHLORIDE 10 MG: 10 TABLET, FILM COATED ORAL at 08:11

## 2024-01-01 RX ADMIN — FENTANYL CITRATE 50 MCG: 50 INJECTION, SOLUTION INTRAMUSCULAR; INTRAVENOUS at 14:12

## 2024-01-01 RX ADMIN — CEFEPIME 1 G: 1 INJECTION, POWDER, FOR SOLUTION INTRAMUSCULAR; INTRAVENOUS at 16:54

## 2024-01-01 RX ADMIN — HYDRALAZINE HYDROCHLORIDE 10 MG: 10 TABLET, FILM COATED ORAL at 09:19

## 2024-01-01 RX ADMIN — PANTOPRAZOLE SODIUM 40 MG: 40 TABLET, DELAYED RELEASE ORAL at 06:38

## 2024-01-01 RX ADMIN — MIDAZOLAM 1 MG: 1 INJECTION INTRAMUSCULAR; INTRAVENOUS at 13:51

## 2024-01-01 RX ADMIN — CARVEDILOL 25 MG: 25 TABLET, FILM COATED ORAL at 20:33

## 2024-01-01 RX ADMIN — BUMETANIDE 0.5 MG/HR: 0.25 INJECTION, SOLUTION INTRAMUSCULAR; INTRAVENOUS at 21:34

## 2024-01-01 RX ADMIN — FERROUS SULFATE TAB 325 MG (65 MG ELEMENTAL FE) 325 MG: 325 (65 FE) TAB at 08:09

## 2024-01-01 RX ADMIN — HEPARIN SODIUM 5000 UNITS: 5000 INJECTION, SOLUTION INTRAVENOUS; SUBCUTANEOUS at 09:36

## 2024-01-01 RX ADMIN — FOLIC ACID 1 MG: 1 TABLET ORAL at 09:41

## 2024-01-01 RX ADMIN — FERROUS SULFATE TAB 325 MG (65 MG ELEMENTAL FE) 325 MG: 325 (65 FE) TAB at 07:55

## 2024-01-01 RX ADMIN — FUROSEMIDE 40 MG: 10 INJECTION, SOLUTION INTRAVENOUS at 09:49

## 2024-01-01 RX ADMIN — Medication: at 11:05

## 2024-01-01 RX ADMIN — VANCOMYCIN HYDROCHLORIDE 125 MG: 125 CAPSULE ORAL at 09:06

## 2024-01-01 RX ADMIN — HYDROMORPHONE HYDROCHLORIDE 1 MG: 2 TABLET ORAL at 11:53

## 2024-01-01 RX ADMIN — FOLIC ACID 1 MG: 1 TABLET ORAL at 08:04

## 2024-01-01 RX ADMIN — VANCOMYCIN HYDROCHLORIDE 125 MG: 125 CAPSULE ORAL at 20:02

## 2024-01-01 RX ADMIN — HYDRALAZINE HYDROCHLORIDE 25 MG: 25 TABLET, FILM COATED ORAL at 04:48

## 2024-01-01 RX ADMIN — CARVEDILOL 12.5 MG: 12.5 TABLET, FILM COATED ORAL at 09:19

## 2024-01-01 RX ADMIN — HYDRALAZINE HYDROCHLORIDE 25 MG: 25 TABLET, FILM COATED ORAL at 21:50

## 2024-01-01 RX ADMIN — HYDRALAZINE HYDROCHLORIDE 25 MG: 25 TABLET, FILM COATED ORAL at 08:03

## 2024-01-01 RX ADMIN — HEPARIN SODIUM 5000 UNITS: 5000 INJECTION, SOLUTION INTRAVENOUS; SUBCUTANEOUS at 14:09

## 2024-01-01 RX ADMIN — FOLIC ACID 1 MG: 1 TABLET ORAL at 08:11

## 2024-01-01 RX ADMIN — HEPARIN SODIUM 5000 UNITS: 5000 INJECTION, SOLUTION INTRAVENOUS; SUBCUTANEOUS at 20:17

## 2024-01-01 RX ADMIN — CARVEDILOL 6.25 MG: 6.25 TABLET, FILM COATED ORAL at 20:21

## 2024-01-01 RX ADMIN — Medication 10 MCG: at 08:56

## 2024-01-01 RX ADMIN — HYDROMORPHONE HYDROCHLORIDE 1 MG: 2 TABLET ORAL at 06:54

## 2024-01-01 RX ADMIN — HYDROCORTISONE SODIUM SUCCINATE 50 MG: 100 INJECTION, POWDER, FOR SOLUTION INTRAMUSCULAR; INTRAVENOUS at 09:30

## 2024-01-01 RX ADMIN — HYDRALAZINE HYDROCHLORIDE 25 MG: 25 TABLET, FILM COATED ORAL at 12:24

## 2024-01-01 RX ADMIN — NIFEDIPINE 90 MG: 90 TABLET, FILM COATED, EXTENDED RELEASE ORAL at 09:21

## 2024-01-01 RX ADMIN — FERROUS SULFATE TAB 325 MG (65 MG ELEMENTAL FE) 325 MG: 325 (65 FE) TAB at 08:39

## 2024-01-01 RX ADMIN — SULFAMETHOXAZOLE AND TRIMETHOPRIM 1 TABLET: 400; 80 TABLET ORAL at 18:25

## 2024-01-01 RX ADMIN — CARVEDILOL 25 MG: 25 TABLET, FILM COATED ORAL at 11:56

## 2024-01-01 RX ADMIN — VANCOMYCIN HYDROCHLORIDE 125 MG: 125 CAPSULE ORAL at 20:17

## 2024-01-01 RX ADMIN — MIDAZOLAM 0.5 MG: 1 INJECTION INTRAMUSCULAR; INTRAVENOUS at 14:37

## 2024-01-01 RX ADMIN — CARVEDILOL 25 MG: 25 TABLET, FILM COATED ORAL at 08:08

## 2024-01-01 RX ADMIN — HYDROCORTISONE SODIUM SUCCINATE 50 MG: 100 INJECTION, POWDER, FOR SOLUTION INTRAMUSCULAR; INTRAVENOUS at 15:29

## 2024-01-01 RX ADMIN — ALBUMIN HUMAN 12.5 G: 0.05 INJECTION, SOLUTION INTRAVENOUS at 23:37

## 2024-01-01 RX ADMIN — HYDROMORPHONE HYDROCHLORIDE 1 MG: 2 TABLET ORAL at 14:21

## 2024-01-01 RX ADMIN — FERROUS SULFATE TAB 325 MG (65 MG ELEMENTAL FE) 325 MG: 325 (65 FE) TAB at 08:04

## 2024-01-01 RX ADMIN — Medication: at 07:53

## 2024-01-01 RX ADMIN — PREDNISONE 60 MG: 20 TABLET ORAL at 09:05

## 2024-01-01 RX ADMIN — MIDODRINE HYDROCHLORIDE 10 MG: 5 TABLET ORAL at 01:58

## 2024-01-01 RX ADMIN — Medication 100 MCG: at 14:07

## 2024-01-01 RX ADMIN — SULFAMETHOXAZOLE AND TRIMETHOPRIM 1 TABLET: 400; 80 TABLET ORAL at 15:34

## 2024-01-01 RX ADMIN — HYDROMORPHONE HYDROCHLORIDE 1 MG: 2 TABLET ORAL at 16:00

## 2024-01-01 RX ADMIN — CARVEDILOL 25 MG: 25 TABLET, FILM COATED ORAL at 08:04

## 2024-01-01 RX ADMIN — PIPERACILLIN AND TAZOBACTAM 2.25 G: 2; .25 INJECTION, POWDER, FOR SOLUTION INTRAVENOUS at 09:36

## 2024-01-01 RX ADMIN — PANTOPRAZOLE SODIUM 40 MG: 40 TABLET, DELAYED RELEASE ORAL at 07:58

## 2024-01-01 RX ADMIN — SODIUM CHLORIDE 250 ML: 9 INJECTION, SOLUTION INTRAVENOUS at 12:37

## 2024-01-01 RX ADMIN — PREDNISONE 50 MG: 50 TABLET ORAL at 07:54

## 2024-01-01 RX ADMIN — PIPERACILLIN AND TAZOBACTAM 2.25 G: 2; .25 INJECTION, POWDER, FOR SOLUTION INTRAVENOUS at 20:43

## 2024-01-01 RX ADMIN — METHOCARBAMOL 500 MG: 500 TABLET ORAL at 05:20

## 2024-01-01 RX ADMIN — Medication 1 CAPSULE: at 09:15

## 2024-01-01 RX ADMIN — PANTOPRAZOLE SODIUM 40 MG: 40 TABLET, DELAYED RELEASE ORAL at 09:41

## 2024-01-01 RX ADMIN — KIT FOR THE PREPARATION OF TECHNETIUM TC 99M ALBUMIN AGGREGATED 7 MILLICURIE: 2.5 INJECTION, POWDER, FOR SOLUTION INTRAVENOUS at 11:41

## 2024-01-01 RX ADMIN — HYDRALAZINE HYDROCHLORIDE 10 MG: 20 INJECTION INTRAMUSCULAR; INTRAVENOUS at 19:50

## 2024-01-01 RX ADMIN — CARVEDILOL 12.5 MG: 12.5 TABLET, FILM COATED ORAL at 08:11

## 2024-01-01 RX ADMIN — POTASSIUM CHLORIDE 20 MEQ: 750 TABLET, EXTENDED RELEASE ORAL at 20:16

## 2024-01-01 RX ADMIN — FERROUS SULFATE TAB 325 MG (65 MG ELEMENTAL FE) 325 MG: 325 (65 FE) TAB at 11:57

## 2024-01-01 RX ADMIN — HYDRALAZINE HYDROCHLORIDE 25 MG: 25 TABLET, FILM COATED ORAL at 14:43

## 2024-01-01 RX ADMIN — CARVEDILOL 25 MG: 25 TABLET, FILM COATED ORAL at 09:16

## 2024-01-01 RX ADMIN — Medication 100 MCG: at 09:15

## 2024-01-01 RX ADMIN — Medication 100 MCG: at 12:24

## 2024-01-01 RX ADMIN — HYDROCORTISONE SODIUM SUCCINATE 50 MG: 100 INJECTION, POWDER, FOR SOLUTION INTRAMUSCULAR; INTRAVENOUS at 09:07

## 2024-01-01 RX ADMIN — VITAM B12 100 MCG: 100 TAB at 17:01

## 2024-01-01 RX ADMIN — ACETAMINOPHEN 650 MG: 325 TABLET, FILM COATED ORAL at 02:46

## 2024-01-01 RX ADMIN — Medication 10 MCG: at 15:41

## 2024-01-01 RX ADMIN — Medication 10 MCG: at 07:59

## 2024-01-01 RX ADMIN — PREDNISONE 50 MG: 20 TABLET ORAL at 09:15

## 2024-01-01 RX ADMIN — FOLIC ACID 1 MG: 1 TABLET ORAL at 08:23

## 2024-01-01 RX ADMIN — HYDRALAZINE HYDROCHLORIDE 25 MG: 25 TABLET, FILM COATED ORAL at 15:47

## 2024-01-01 RX ADMIN — HYDROMORPHONE HYDROCHLORIDE 1 MG: 2 TABLET ORAL at 20:21

## 2024-01-01 RX ADMIN — MIDAZOLAM 0.5 MG: 1 INJECTION INTRAMUSCULAR; INTRAVENOUS at 14:31

## 2024-01-01 RX ADMIN — NIFEDIPINE 60 MG: 60 TABLET, EXTENDED RELEASE ORAL at 11:55

## 2024-01-01 RX ADMIN — SODIUM BICARBONATE 650 MG: 650 TABLET ORAL at 15:48

## 2024-01-01 RX ADMIN — FOLIC ACID 1 MG: 1 TABLET ORAL at 08:39

## 2024-01-01 RX ADMIN — PREDNISONE 50 MG: 50 TABLET ORAL at 08:04

## 2024-01-01 RX ADMIN — CALCIUM 500 MG: 500 TABLET ORAL at 17:39

## 2024-01-01 RX ADMIN — BUMETANIDE 0.5 MG/HR: 0.25 INJECTION, SOLUTION INTRAMUSCULAR; INTRAVENOUS at 21:18

## 2024-01-01 RX ADMIN — FERROUS SULFATE TAB 325 MG (65 MG ELEMENTAL FE) 325 MG: 325 (65 FE) TAB at 09:15

## 2024-01-01 RX ADMIN — PANTOPRAZOLE SODIUM 40 MG: 40 TABLET, DELAYED RELEASE ORAL at 11:57

## 2024-01-01 RX ADMIN — SODIUM BICARBONATE 650 MG: 650 TABLET ORAL at 12:23

## 2024-01-01 RX ADMIN — CALCIUM 500 MG: 500 TABLET ORAL at 09:48

## 2024-01-01 RX ADMIN — HYDRALAZINE HYDROCHLORIDE 10 MG: 10 TABLET, FILM COATED ORAL at 07:58

## 2024-01-01 RX ADMIN — CEFEPIME HYDROCHLORIDE 2 G: 2 INJECTION, POWDER, FOR SOLUTION INTRAVENOUS at 09:08

## 2024-01-01 RX ADMIN — FOLIC ACID 1 MG: 1 TABLET ORAL at 08:56

## 2024-01-01 RX ADMIN — FOLIC ACID 1 MG: 1 TABLET ORAL at 07:54

## 2024-01-01 RX ADMIN — PANTOPRAZOLE SODIUM 40 MG: 40 TABLET, DELAYED RELEASE ORAL at 08:24

## 2024-01-01 RX ADMIN — MIDAZOLAM 1 MG: 1 INJECTION INTRAMUSCULAR; INTRAVENOUS at 14:13

## 2024-01-01 RX ADMIN — FOLIC ACID 1 MG: 1 TABLET ORAL at 07:59

## 2024-01-01 RX ADMIN — CEFEPIME 2 G: 2 INJECTION, POWDER, FOR SOLUTION INTRAVENOUS at 08:41

## 2024-01-01 RX ADMIN — PREDNISONE 50 MG: 50 TABLET ORAL at 11:56

## 2024-01-01 RX ADMIN — BUMETANIDE 1 MG: 0.5 TABLET ORAL at 16:18

## 2024-01-01 RX ADMIN — PREDNISONE 50 MG: 20 TABLET ORAL at 09:47

## 2024-01-01 RX ADMIN — CALCIUM CARBONATE (ANTACID) CHEW TAB 500 MG 1000 MG: 500 CHEW TAB at 18:35

## 2024-01-01 RX ADMIN — PANTOPRAZOLE SODIUM 40 MG: 40 TABLET, DELAYED RELEASE ORAL at 09:22

## 2024-01-01 RX ADMIN — PANTOPRAZOLE SODIUM 40 MG: 40 TABLET, DELAYED RELEASE ORAL at 06:57

## 2024-01-01 RX ADMIN — SODIUM CHLORIDE 300 ML: 9 INJECTION, SOLUTION INTRAVENOUS at 08:57

## 2024-01-01 RX ADMIN — ONDANSETRON 4 MG: 4 TABLET, ORALLY DISINTEGRATING ORAL at 09:50

## 2024-01-01 RX ADMIN — MIDODRINE HYDROCHLORIDE 10 MG: 5 TABLET ORAL at 12:00

## 2024-01-01 RX ADMIN — Medication 100 MCG: at 07:53

## 2024-01-01 RX ADMIN — HYDRALAZINE HYDROCHLORIDE 10 MG: 10 TABLET, FILM COATED ORAL at 14:13

## 2024-01-01 RX ADMIN — CARVEDILOL 25 MG: 25 TABLET, FILM COATED ORAL at 20:09

## 2024-01-01 RX ADMIN — FUROSEMIDE 80 MG: 10 INJECTION, SOLUTION INTRAVENOUS at 13:58

## 2024-01-01 RX ADMIN — BUMETANIDE 2 MG: 0.5 TABLET ORAL at 17:50

## 2024-01-01 RX ADMIN — Medication 1 CAPSULE: at 09:48

## 2024-01-01 RX ADMIN — HYDROMORPHONE HYDROCHLORIDE 1 MG: 2 TABLET ORAL at 11:51

## 2024-01-01 RX ADMIN — VANCOMYCIN HYDROCHLORIDE 125 MG: 125 CAPSULE ORAL at 08:23

## 2024-01-01 RX ADMIN — HYDRALAZINE HYDROCHLORIDE 25 MG: 25 TABLET, FILM COATED ORAL at 20:18

## 2024-01-01 RX ADMIN — HYDRALAZINE HYDROCHLORIDE 25 MG: 25 TABLET, FILM COATED ORAL at 08:24

## 2024-01-01 RX ADMIN — HYDRALAZINE HYDROCHLORIDE 25 MG: 25 TABLET, FILM COATED ORAL at 08:48

## 2024-01-01 RX ADMIN — BUMETANIDE 2 MG: 0.5 TABLET ORAL at 11:36

## 2024-01-01 RX ADMIN — MORPHINE SULFATE 2 MG: 2 INJECTION, SOLUTION INTRAMUSCULAR; INTRAVENOUS at 07:55

## 2024-01-01 RX ADMIN — PIPERACILLIN AND TAZOBACTAM 2.25 G: 2; .25 INJECTION, POWDER, FOR SOLUTION INTRAVENOUS at 21:48

## 2024-01-01 RX ADMIN — CARVEDILOL 25 MG: 25 TABLET, FILM COATED ORAL at 12:24

## 2024-01-01 RX ADMIN — SODIUM CHLORIDE 250 ML: 9 INJECTION, SOLUTION INTRAVENOUS at 23:38

## 2024-01-01 RX ADMIN — SODIUM CHLORIDE 1000 ML: 9 INJECTION, SOLUTION INTRAVENOUS at 17:02

## 2024-01-01 RX ADMIN — PANTOPRAZOLE SODIUM 40 MG: 40 TABLET, DELAYED RELEASE ORAL at 07:53

## 2024-01-01 RX ADMIN — TORSEMIDE 20 MG: 20 TABLET ORAL at 15:19

## 2024-01-01 RX ADMIN — PROCHLORPERAZINE EDISYLATE 5 MG: 5 INJECTION INTRAMUSCULAR; INTRAVENOUS at 12:05

## 2024-01-01 RX ADMIN — NIFEDIPINE 30 MG: 30 TABLET, FILM COATED, EXTENDED RELEASE ORAL at 09:14

## 2024-01-01 RX ADMIN — Medication 100 MCG: at 08:23

## 2024-01-01 RX ADMIN — PREDNISONE 60 MG: 20 TABLET ORAL at 07:58

## 2024-01-01 RX ADMIN — PANTOPRAZOLE SODIUM 40 MG: 40 TABLET, DELAYED RELEASE ORAL at 09:36

## 2024-01-01 RX ADMIN — PANTOPRAZOLE SODIUM 40 MG: 40 TABLET, DELAYED RELEASE ORAL at 09:14

## 2024-01-01 RX ADMIN — FOLIC ACID 1 MG: 1 TABLET ORAL at 09:15

## 2024-01-01 RX ADMIN — HYDROCORTISONE SODIUM SUCCINATE 50 MG: 100 INJECTION, POWDER, FOR SOLUTION INTRAMUSCULAR; INTRAVENOUS at 01:58

## 2024-01-01 RX ADMIN — Medication 10 MCG: at 08:04

## 2024-01-01 RX ADMIN — FOLIC ACID 1 MG: 1 TABLET ORAL at 09:05

## 2024-01-01 RX ADMIN — CARVEDILOL 25 MG: 25 TABLET, FILM COATED ORAL at 19:54

## 2024-01-01 RX ADMIN — Medication 100 MCG: at 08:46

## 2024-01-01 RX ADMIN — VANCOMYCIN HYDROCHLORIDE 125 MG: 125 CAPSULE ORAL at 17:36

## 2024-01-01 RX ADMIN — VANCOMYCIN HYDROCHLORIDE 125 MG: 125 CAPSULE ORAL at 13:04

## 2024-01-01 RX ADMIN — ONDANSETRON 4 MG: 2 INJECTION INTRAMUSCULAR; INTRAVENOUS at 17:15

## 2024-01-01 RX ADMIN — SODIUM CHLORIDE 300 ML: 9 INJECTION, SOLUTION INTRAVENOUS at 13:32

## 2024-01-01 RX ADMIN — SODIUM CHLORIDE 300 ML: 9 INJECTION, SOLUTION INTRAVENOUS at 12:37

## 2024-01-01 RX ADMIN — PANTOPRAZOLE SODIUM 40 MG: 40 TABLET, DELAYED RELEASE ORAL at 08:03

## 2024-01-01 RX ADMIN — PANTOPRAZOLE SODIUM 40 MG: 40 TABLET, DELAYED RELEASE ORAL at 09:05

## 2024-01-01 RX ADMIN — ACETAMINOPHEN 650 MG: 325 TABLET, FILM COATED ORAL at 21:20

## 2024-01-01 RX ADMIN — CARVEDILOL 25 MG: 25 TABLET, FILM COATED ORAL at 20:11

## 2024-01-01 RX ADMIN — CALCIUM 500 MG: 500 TABLET ORAL at 09:07

## 2024-01-01 RX ADMIN — Medication 10 MCG: at 08:26

## 2024-01-01 RX ADMIN — CALCIUM 500 MG: 500 TABLET ORAL at 09:36

## 2024-01-01 RX ADMIN — HEPARIN SODIUM 5000 UNITS: 5000 INJECTION, SOLUTION INTRAVENOUS; SUBCUTANEOUS at 09:08

## 2024-01-01 RX ADMIN — FOLIC ACID 1 MG: 1 TABLET ORAL at 11:36

## 2024-01-01 RX ADMIN — SODIUM BICARBONATE 650 MG: 650 TABLET ORAL at 14:09

## 2024-01-01 RX ADMIN — MIDODRINE HYDROCHLORIDE 10 MG: 5 TABLET ORAL at 09:15

## 2024-01-01 RX ADMIN — Medication 100 MCG: at 08:11

## 2024-01-01 RX ADMIN — HEPARIN SODIUM 5000 UNITS: 5000 INJECTION, SOLUTION INTRAVENOUS; SUBCUTANEOUS at 01:58

## 2024-01-01 RX ADMIN — CALCIUM 500 MG: 500 TABLET ORAL at 12:39

## 2024-01-01 RX ADMIN — SULFAMETHOXAZOLE AND TRIMETHOPRIM 1 TABLET: 400; 80 TABLET ORAL at 07:59

## 2024-01-01 RX ADMIN — Medication 100 MCG: at 08:04

## 2024-01-01 RX ADMIN — HYDRALAZINE HYDROCHLORIDE 20 MG: 20 INJECTION INTRAMUSCULAR; INTRAVENOUS at 00:41

## 2024-01-01 RX ADMIN — HYDRALAZINE HYDROCHLORIDE 25 MG: 25 TABLET, FILM COATED ORAL at 20:22

## 2024-01-01 RX ADMIN — VANCOMYCIN HYDROCHLORIDE 125 MG: 125 CAPSULE ORAL at 20:47

## 2024-01-01 RX ADMIN — PREDNISONE 60 MG: 20 TABLET ORAL at 08:12

## 2024-01-01 RX ADMIN — HYDRALAZINE HYDROCHLORIDE 25 MG: 25 TABLET, FILM COATED ORAL at 12:32

## 2024-01-01 RX ADMIN — FOLIC ACID 1 MG: 1 TABLET ORAL at 11:55

## 2024-01-01 RX ADMIN — CALCIUM 500 MG: 500 TABLET ORAL at 12:40

## 2024-01-01 RX ADMIN — HEPARIN SODIUM 5000 UNITS: 5000 INJECTION, SOLUTION INTRAVENOUS; SUBCUTANEOUS at 20:02

## 2024-01-01 RX ADMIN — SODIUM CHLORIDE 300 ML: 9 INJECTION, SOLUTION INTRAVENOUS at 15:29

## 2024-01-01 RX ADMIN — MIDAZOLAM 1 MG: 1 INJECTION INTRAMUSCULAR; INTRAVENOUS at 14:23

## 2024-01-01 RX ADMIN — FERROUS SULFATE TAB 325 MG (65 MG ELEMENTAL FE) 325 MG: 325 (65 FE) TAB at 08:46

## 2024-01-01 RX ADMIN — VANCOMYCIN HYDROCHLORIDE 125 MG: 125 CAPSULE ORAL at 13:57

## 2024-01-01 RX ADMIN — HYDROMORPHONE HYDROCHLORIDE 1 MG: 2 TABLET ORAL at 21:17

## 2024-01-01 RX ADMIN — LOSARTAN POTASSIUM 100 MG: 100 TABLET, FILM COATED ORAL at 07:55

## 2024-01-01 RX ADMIN — SODIUM BICARBONATE 650 MG: 650 TABLET ORAL at 08:43

## 2024-01-01 RX ADMIN — PANTOPRAZOLE SODIUM 40 MG: 40 TABLET, DELAYED RELEASE ORAL at 08:05

## 2024-01-01 RX ADMIN — Medication 100 MCG: at 09:41

## 2024-01-01 RX ADMIN — CARVEDILOL 25 MG: 25 TABLET, FILM COATED ORAL at 08:41

## 2024-01-01 RX ADMIN — HEPARIN SODIUM 1600 UNITS: 1000 INJECTION INTRAVENOUS; SUBCUTANEOUS at 13:46

## 2024-01-01 RX ADMIN — EPOETIN ALFA-EPBX 2000 UNITS: 10000 INJECTION, SOLUTION INTRAVENOUS; SUBCUTANEOUS at 15:27

## 2024-01-01 RX ADMIN — SULFAMETHOXAZOLE AND TRIMETHOPRIM 1 TABLET: 400; 80 TABLET ORAL at 20:18

## 2024-01-01 RX ADMIN — SODIUM CHLORIDE 50 ML: 9 INJECTION, SOLUTION INTRAVENOUS at 15:50

## 2024-01-01 RX ADMIN — FOLIC ACID 1 MG: 1 TABLET ORAL at 08:03

## 2024-01-01 RX ADMIN — CARVEDILOL 25 MG: 25 TABLET, FILM COATED ORAL at 20:18

## 2024-01-01 RX ADMIN — SULFAMETHOXAZOLE AND TRIMETHOPRIM 1 TABLET: 400; 80 TABLET ORAL at 20:08

## 2024-01-01 RX ADMIN — VANCOMYCIN HYDROCHLORIDE 125 MG: 125 CAPSULE ORAL at 12:57

## 2024-01-01 RX ADMIN — PIPERACILLIN AND TAZOBACTAM 2.25 G: 2; .25 INJECTION, POWDER, FOR SOLUTION INTRAVENOUS at 09:30

## 2024-01-01 RX ADMIN — PROCHLORPERAZINE MALEATE 5 MG: 5 TABLET ORAL at 19:08

## 2024-01-01 RX ADMIN — SULFAMETHOXAZOLE AND TRIMETHOPRIM 1 TABLET: 400; 80 TABLET ORAL at 19:55

## 2024-01-01 RX ADMIN — VITAM B12 100 MCG: 100 TAB at 09:47

## 2024-01-01 RX ADMIN — CARVEDILOL 25 MG: 25 TABLET, FILM COATED ORAL at 17:01

## 2024-01-01 RX ADMIN — CARVEDILOL 12.5 MG: 12.5 TABLET, FILM COATED ORAL at 19:50

## 2024-01-01 RX ADMIN — FERROUS SULFATE TAB 325 MG (65 MG ELEMENTAL FE) 325 MG: 325 (65 FE) TAB at 08:25

## 2024-01-01 RX ADMIN — MIDODRINE HYDROCHLORIDE 10 MG: 5 TABLET ORAL at 17:42

## 2024-01-01 RX ADMIN — HYDRALAZINE HYDROCHLORIDE 10 MG: 20 INJECTION INTRAMUSCULAR; INTRAVENOUS at 21:41

## 2024-01-01 RX ADMIN — LABETALOL HYDROCHLORIDE 10 MG: 5 INJECTION, SOLUTION INTRAVENOUS at 22:53

## 2024-01-01 RX ADMIN — HYDRALAZINE HYDROCHLORIDE 25 MG: 25 TABLET, FILM COATED ORAL at 08:40

## 2024-01-01 RX ADMIN — HYDRALAZINE HYDROCHLORIDE 25 MG: 25 TABLET, FILM COATED ORAL at 14:17

## 2024-01-01 RX ADMIN — FOLIC ACID 1 MG: 1 TABLET ORAL at 09:48

## 2024-01-01 RX ADMIN — FOLIC ACID 1 MG: 1 TABLET ORAL at 08:41

## 2024-01-01 RX ADMIN — HYDRALAZINE HYDROCHLORIDE 10 MG: 10 TABLET, FILM COATED ORAL at 20:21

## 2024-01-01 RX ADMIN — PANTOPRAZOLE SODIUM 40 MG: 40 TABLET, DELAYED RELEASE ORAL at 08:41

## 2024-01-01 RX ADMIN — FERROUS SULFATE TAB 325 MG (65 MG ELEMENTAL FE) 325 MG: 325 (65 FE) TAB at 08:23

## 2024-01-01 RX ADMIN — ACETAMINOPHEN 650 MG: 325 TABLET, FILM COATED ORAL at 21:42

## 2024-01-01 RX ADMIN — Medication: at 12:37

## 2024-01-01 RX ADMIN — SODIUM BICARBONATE 650 MG: 650 TABLET ORAL at 14:13

## 2024-01-01 RX ADMIN — ACETAMINOPHEN 650 MG: 325 TABLET, FILM COATED ORAL at 12:16

## 2024-01-01 RX ADMIN — HYDROMORPHONE HYDROCHLORIDE 1 MG: 2 TABLET ORAL at 05:21

## 2024-01-01 RX ADMIN — ONDANSETRON 4 MG: 2 INJECTION INTRAMUSCULAR; INTRAVENOUS at 17:39

## 2024-01-01 RX ADMIN — HYDROMORPHONE HYDROCHLORIDE 1 MG: 2 TABLET ORAL at 01:19

## 2024-01-01 RX ADMIN — PREDNISONE 50 MG: 50 TABLET ORAL at 08:23

## 2024-01-01 RX ADMIN — VANCOMYCIN HYDROCHLORIDE 125 MG: 125 CAPSULE ORAL at 08:41

## 2024-01-01 RX ADMIN — HEPARIN SODIUM 5000 UNITS: 5000 INJECTION, SOLUTION INTRAVENOUS; SUBCUTANEOUS at 20:47

## 2024-01-01 RX ADMIN — OXYCODONE HYDROCHLORIDE 5 MG: 5 TABLET ORAL at 08:59

## 2024-01-01 RX ADMIN — PANTOPRAZOLE SODIUM 40 MG: 40 TABLET, DELAYED RELEASE ORAL at 08:40

## 2024-01-01 RX ADMIN — REMDESIVIR 100 MG: 100 INJECTION, POWDER, LYOPHILIZED, FOR SOLUTION INTRAVENOUS at 15:48

## 2024-01-01 RX ADMIN — SODIUM BICARBONATE 650 MG: 650 TABLET ORAL at 15:47

## 2024-01-01 RX ADMIN — Medication 10 MCG: at 08:23

## 2024-01-01 RX ADMIN — HEPARIN SODIUM 500 UNITS/HR: 1000 INJECTION INTRAVENOUS; SUBCUTANEOUS at 16:29

## 2024-01-01 RX ADMIN — LOSARTAN POTASSIUM 50 MG: 50 TABLET, FILM COATED ORAL at 15:06

## 2024-01-01 RX ADMIN — PREDNISONE 50 MG: 50 TABLET ORAL at 08:05

## 2024-01-01 RX ADMIN — EPOETIN ALFA-EPBX 4000 UNITS: 2000 INJECTION, SOLUTION INTRAVENOUS; SUBCUTANEOUS at 16:29

## 2024-01-01 RX ADMIN — SODIUM BICARBONATE 650 MG: 650 TABLET ORAL at 07:58

## 2024-01-01 RX ADMIN — HYDRALAZINE HYDROCHLORIDE 25 MG: 25 TABLET, FILM COATED ORAL at 17:09

## 2024-01-01 RX ADMIN — SODIUM BICARBONATE 650 MG: 650 TABLET ORAL at 08:08

## 2024-01-01 RX ADMIN — LIDOCAINE 3 PATCH: 4 PATCH TOPICAL at 09:20

## 2024-01-01 RX ADMIN — HYDROMORPHONE HYDROCHLORIDE 1 MG: 2 TABLET ORAL at 15:19

## 2024-01-01 RX ADMIN — VANCOMYCIN HYDROCHLORIDE 125 MG: 125 CAPSULE ORAL at 17:39

## 2024-01-01 RX ADMIN — Medication 100 MCG: at 11:37

## 2024-01-01 RX ADMIN — BUMETANIDE 1 MG: 0.5 TABLET ORAL at 18:36

## 2024-01-01 RX ADMIN — VANCOMYCIN HYDROCHLORIDE 1000 MG: 1 INJECTION, SOLUTION INTRAVENOUS at 22:47

## 2024-01-01 RX ADMIN — SODIUM BICARBONATE 650 MG: 650 TABLET ORAL at 20:52

## 2024-01-01 RX ADMIN — CARVEDILOL 25 MG: 25 TABLET, FILM COATED ORAL at 19:40

## 2024-01-01 RX ADMIN — SODIUM BICARBONATE 650 MG: 650 TABLET ORAL at 08:46

## 2024-01-01 RX ADMIN — Medication 100 MCG: at 07:59

## 2024-01-01 RX ADMIN — BUMETANIDE 2 MG: 0.5 TABLET ORAL at 16:07

## 2024-01-01 RX ADMIN — SODIUM CHLORIDE 300 ML: 9 INJECTION, SOLUTION INTRAVENOUS at 13:44

## 2024-01-01 RX ADMIN — PREDNISONE 60 MG: 20 TABLET ORAL at 08:46

## 2024-01-01 RX ADMIN — CARVEDILOL 25 MG: 25 TABLET, FILM COATED ORAL at 08:03

## 2024-01-01 RX ADMIN — FERROUS SULFATE TAB 325 MG (65 MG ELEMENTAL FE) 325 MG: 325 (65 FE) TAB at 09:41

## 2024-01-01 RX ADMIN — CARVEDILOL 25 MG: 25 TABLET, FILM COATED ORAL at 20:52

## 2024-01-01 RX ADMIN — NIFEDIPINE 30 MG: 30 TABLET, FILM COATED, EXTENDED RELEASE ORAL at 08:49

## 2024-01-01 RX ADMIN — HYDROMORPHONE HYDROCHLORIDE 1 MG: 2 TABLET ORAL at 20:53

## 2024-01-01 RX ADMIN — HYDRALAZINE HYDROCHLORIDE 25 MG: 25 TABLET, FILM COATED ORAL at 19:55

## 2024-01-01 RX ADMIN — NIFEDIPINE 90 MG: 90 TABLET, FILM COATED, EXTENDED RELEASE ORAL at 11:36

## 2024-01-01 RX ADMIN — FOLIC ACID 1 MG: 1 TABLET ORAL at 12:23

## 2024-01-01 RX ADMIN — VANCOMYCIN HYDROCHLORIDE 125 MG: 125 CAPSULE ORAL at 10:25

## 2024-01-01 RX ADMIN — HEPARIN SODIUM 2000 UNITS: 1000 INJECTION INTRAVENOUS; SUBCUTANEOUS at 14:41

## 2024-01-01 RX ADMIN — PREDNISONE 60 MG: 20 TABLET ORAL at 08:11

## 2024-01-01 RX ADMIN — HYDRALAZINE HYDROCHLORIDE 10 MG: 10 TABLET, FILM COATED ORAL at 20:52

## 2024-01-01 RX ADMIN — Medication 100 MCG: at 08:40

## 2024-01-01 RX ADMIN — PANTOPRAZOLE SODIUM 40 MG: 40 TABLET, DELAYED RELEASE ORAL at 09:47

## 2024-01-01 RX ADMIN — BUMETANIDE 4 MG: 0.25 INJECTION INTRAMUSCULAR; INTRAVENOUS at 16:58

## 2024-01-01 RX ADMIN — SULFAMETHOXAZOLE AND TRIMETHOPRIM 1 TABLET: 400; 80 TABLET ORAL at 22:26

## 2024-01-01 RX ADMIN — PIPERACILLIN AND TAZOBACTAM 2.25 G: 2; .25 INJECTION, POWDER, FOR SOLUTION INTRAVENOUS at 15:29

## 2024-01-01 RX ADMIN — ONDANSETRON 4 MG: 4 TABLET, ORALLY DISINTEGRATING ORAL at 09:01

## 2024-01-01 RX ADMIN — PIPERACILLIN AND TAZOBACTAM 2.25 G: 2; .25 INJECTION, POWDER, FOR SOLUTION INTRAVENOUS at 09:00

## 2024-01-01 RX ADMIN — NIFEDIPINE 30 MG: 30 TABLET, FILM COATED, EXTENDED RELEASE ORAL at 08:25

## 2024-01-01 RX ADMIN — PANTOPRAZOLE SODIUM 40 MG: 40 TABLET, DELAYED RELEASE ORAL at 12:24

## 2024-01-01 RX ADMIN — CARVEDILOL 25 MG: 25 TABLET, FILM COATED ORAL at 20:23

## 2024-01-01 RX ADMIN — HYDRALAZINE HYDROCHLORIDE 25 MG: 25 TABLET, FILM COATED ORAL at 16:24

## 2024-01-01 RX ADMIN — INSULIN ASPART 1 UNITS: 100 INJECTION, SOLUTION INTRAVENOUS; SUBCUTANEOUS at 20:46

## 2024-01-01 RX ADMIN — LABETALOL HYDROCHLORIDE 10 MG: 5 INJECTION, SOLUTION INTRAVENOUS at 20:30

## 2024-01-01 RX ADMIN — NIFEDIPINE 30 MG: 30 TABLET, FILM COATED, EXTENDED RELEASE ORAL at 10:39

## 2024-01-01 RX ADMIN — ACETAMINOPHEN 650 MG: 325 TABLET, FILM COATED ORAL at 15:46

## 2024-01-01 RX ADMIN — CALCIUM 500 MG: 500 TABLET ORAL at 09:22

## 2024-01-01 RX ADMIN — PANTOPRAZOLE SODIUM 40 MG: 40 TABLET, DELAYED RELEASE ORAL at 08:46

## 2024-01-01 RX ADMIN — SODIUM BICARBONATE 650 MG: 650 TABLET ORAL at 20:16

## 2024-01-01 RX ADMIN — FOLIC ACID 1 MG: 1 TABLET ORAL at 09:42

## 2024-01-01 RX ADMIN — NIFEDIPINE 30 MG: 30 TABLET, FILM COATED, EXTENDED RELEASE ORAL at 08:11

## 2024-01-01 RX ADMIN — CARVEDILOL 25 MG: 25 TABLET, FILM COATED ORAL at 07:54

## 2024-01-01 RX ADMIN — Medication 100 MCG: at 08:24

## 2024-01-01 RX ADMIN — CARVEDILOL 12.5 MG: 12.5 TABLET, FILM COATED ORAL at 19:08

## 2024-01-01 RX ADMIN — VANCOMYCIN HYDROCHLORIDE 125 MG: 125 CAPSULE ORAL at 21:48

## 2024-01-01 RX ADMIN — BUMETANIDE 1 MG: 1 TABLET ORAL at 16:09

## 2024-01-01 RX ADMIN — HYDRALAZINE HYDROCHLORIDE 20 MG: 20 INJECTION INTRAMUSCULAR; INTRAVENOUS at 22:34

## 2024-01-01 RX ADMIN — HYDRALAZINE HYDROCHLORIDE 25 MG: 25 TABLET, FILM COATED ORAL at 15:55

## 2024-01-01 RX ADMIN — NIFEDIPINE 60 MG: 60 TABLET, EXTENDED RELEASE ORAL at 07:54

## 2024-01-01 RX ADMIN — HYDROCORTISONE SODIUM SUCCINATE 50 MG: 100 INJECTION, POWDER, FOR SOLUTION INTRAMUSCULAR; INTRAVENOUS at 20:47

## 2024-01-01 RX ADMIN — PREDNISONE 60 MG: 20 TABLET ORAL at 15:34

## 2024-01-01 RX ADMIN — SODIUM CHLORIDE 250 ML: 9 INJECTION, SOLUTION INTRAVENOUS at 10:06

## 2024-01-01 RX ADMIN — METOCLOPRAMIDE 5 MG: 5 INJECTION, SOLUTION INTRAMUSCULAR; INTRAVENOUS at 10:11

## 2024-01-01 RX ADMIN — FERROUS SULFATE TAB 325 MG (65 MG ELEMENTAL FE) 325 MG: 325 (65 FE) TAB at 09:06

## 2024-01-01 RX ADMIN — SODIUM BICARBONATE 650 MG: 650 TABLET ORAL at 14:02

## 2024-01-01 RX ADMIN — VANCOMYCIN HYDROCHLORIDE 125 MG: 125 CAPSULE ORAL at 09:36

## 2024-01-01 RX ADMIN — CARVEDILOL 25 MG: 25 TABLET, FILM COATED ORAL at 11:36

## 2024-01-01 RX ADMIN — HYDROMORPHONE HYDROCHLORIDE 1 MG: 2 TABLET ORAL at 06:30

## 2024-01-01 RX ADMIN — SODIUM BICARBONATE 650 MG: 650 TABLET ORAL at 09:41

## 2024-01-01 RX ADMIN — PANTOPRAZOLE SODIUM 40 MG: 40 TABLET, DELAYED RELEASE ORAL at 09:15

## 2024-01-01 RX ADMIN — Medication 100 MCG: at 09:43

## 2024-01-01 RX ADMIN — FERROUS SULFATE TAB 325 MG (65 MG ELEMENTAL FE) 325 MG: 325 (65 FE) TAB at 08:56

## 2024-01-01 RX ADMIN — PANTOPRAZOLE SODIUM 40 MG: 40 TABLET, DELAYED RELEASE ORAL at 08:08

## 2024-01-01 RX ADMIN — CARVEDILOL 12.5 MG: 12.5 TABLET, FILM COATED ORAL at 07:59

## 2024-01-01 RX ADMIN — HYDRALAZINE HYDROCHLORIDE 20 MG: 20 INJECTION INTRAMUSCULAR; INTRAVENOUS at 14:16

## 2024-01-01 RX ADMIN — NIFEDIPINE 30 MG: 30 TABLET, FILM COATED, EXTENDED RELEASE ORAL at 07:59

## 2024-01-01 RX ADMIN — Medication: at 15:05

## 2024-01-01 RX ADMIN — NIFEDIPINE 30 MG: 30 TABLET, FILM COATED, EXTENDED RELEASE ORAL at 08:05

## 2024-01-01 RX ADMIN — MIDODRINE HYDROCHLORIDE 10 MG: 5 TABLET ORAL at 09:07

## 2024-01-01 RX ADMIN — NIFEDIPINE 30 MG: 30 TABLET, FILM COATED, EXTENDED RELEASE ORAL at 08:03

## 2024-01-01 RX ADMIN — SODIUM BICARBONATE 650 MG: 650 TABLET ORAL at 08:41

## 2024-01-01 RX ADMIN — HYDROCORTISONE SODIUM SUCCINATE 50 MG: 100 INJECTION, POWDER, FOR SOLUTION INTRAMUSCULAR; INTRAVENOUS at 10:24

## 2024-01-01 RX ADMIN — CALCIUM 500 MG: 500 TABLET ORAL at 13:04

## 2024-01-01 RX ADMIN — ACETAMINOPHEN 650 MG: 325 TABLET, FILM COATED ORAL at 03:25

## 2024-01-01 RX ADMIN — HYDROMORPHONE HYDROCHLORIDE 0.5 MG: 1 INJECTION, SOLUTION INTRAMUSCULAR; INTRAVENOUS; SUBCUTANEOUS at 09:48

## 2024-01-01 RX ADMIN — SODIUM CHLORIDE 50 ML: 9 INJECTION, SOLUTION INTRAVENOUS at 14:22

## 2024-01-01 RX ADMIN — PREDNISONE 60 MG: 20 TABLET ORAL at 08:08

## 2024-01-01 RX ADMIN — Medication 10 MCG: at 12:24

## 2024-01-01 RX ADMIN — PREDNISONE 40 MG: 20 TABLET ORAL at 09:22

## 2024-01-01 RX ADMIN — NIFEDIPINE 60 MG: 60 TABLET, FILM COATED, EXTENDED RELEASE ORAL at 09:47

## 2024-01-01 RX ADMIN — SODIUM BICARBONATE 650 MG: 650 TABLET ORAL at 09:42

## 2024-01-01 RX ADMIN — FOLIC ACID 1 MG: 1 TABLET ORAL at 09:36

## 2024-01-01 RX ADMIN — CALCIUM 500 MG: 500 TABLET ORAL at 12:00

## 2024-01-01 RX ADMIN — VANCOMYCIN HYDROCHLORIDE 125 MG: 125 CAPSULE ORAL at 09:47

## 2024-01-01 RX ADMIN — LIDOCAINE HYDROCHLORIDE 10 ML: 10 INJECTION, SOLUTION EPIDURAL; INFILTRATION; INTRACAUDAL; PERINEURAL at 14:35

## 2024-01-01 RX ADMIN — VANCOMYCIN HYDROCHLORIDE 1000 MG: 1 INJECTION, SOLUTION INTRAVENOUS at 01:53

## 2024-01-01 RX ADMIN — BUMETANIDE 1 MG: 0.5 TABLET ORAL at 07:54

## 2024-01-01 RX ADMIN — CARVEDILOL 25 MG: 25 TABLET, FILM COATED ORAL at 21:22

## 2024-01-01 RX ADMIN — METHOCARBAMOL 500 MG: 500 TABLET ORAL at 20:21

## 2024-01-01 RX ADMIN — CEFEPIME 1 G: 1 INJECTION, POWDER, FOR SOLUTION INTRAMUSCULAR; INTRAVENOUS at 16:56

## 2024-01-01 RX ADMIN — HYDRALAZINE HYDROCHLORIDE 20 MG: 20 INJECTION INTRAMUSCULAR; INTRAVENOUS at 15:52

## 2024-01-01 RX ADMIN — HYDRALAZINE HYDROCHLORIDE 25 MG: 25 TABLET, FILM COATED ORAL at 15:48

## 2024-01-01 RX ADMIN — FERROUS SULFATE TAB 325 MG (65 MG ELEMENTAL FE) 325 MG: 325 (65 FE) TAB at 11:36

## 2024-01-01 RX ADMIN — HYDRALAZINE HYDROCHLORIDE 25 MG: 25 TABLET, FILM COATED ORAL at 07:54

## 2024-01-01 RX ADMIN — REMDESIVIR 100 MG: 100 INJECTION, POWDER, LYOPHILIZED, FOR SOLUTION INTRAVENOUS at 14:19

## 2024-01-01 RX ADMIN — ONDANSETRON 4 MG: 4 TABLET, ORALLY DISINTEGRATING ORAL at 08:50

## 2024-01-01 RX ADMIN — CEFEPIME 1 G: 1 INJECTION, POWDER, FOR SOLUTION INTRAMUSCULAR; INTRAVENOUS at 15:29

## 2024-01-01 RX ADMIN — SODIUM CHLORIDE 300 ML: 9 INJECTION, SOLUTION INTRAVENOUS at 10:08

## 2024-01-01 RX ADMIN — VANCOMYCIN HYDROCHLORIDE 1250 MG: 10 INJECTION, POWDER, LYOPHILIZED, FOR SOLUTION INTRAVENOUS at 09:49

## 2024-01-01 RX ADMIN — FERROUS SULFATE TAB 325 MG (65 MG ELEMENTAL FE) 325 MG: 325 (65 FE) TAB at 08:43

## 2024-01-01 RX ADMIN — SULFAMETHOXAZOLE AND TRIMETHOPRIM 1 TABLET: 400; 80 TABLET ORAL at 10:46

## 2024-01-01 RX ADMIN — HYDROMORPHONE HYDROCHLORIDE 1 MG: 2 TABLET ORAL at 00:30

## 2024-01-01 RX ADMIN — SODIUM BICARBONATE 650 MG: 650 TABLET ORAL at 17:10

## 2024-01-01 RX ADMIN — PANTOPRAZOLE SODIUM 40 MG: 40 TABLET, DELAYED RELEASE ORAL at 09:43

## 2024-01-01 RX ADMIN — NIFEDIPINE 30 MG: 30 TABLET, FILM COATED, EXTENDED RELEASE ORAL at 15:01

## 2024-01-01 RX ADMIN — ACETAMINOPHEN 650 MG: 325 TABLET, FILM COATED ORAL at 15:47

## 2024-01-01 RX ADMIN — SODIUM BICARBONATE 650 MG: 650 TABLET ORAL at 09:05

## 2024-01-01 RX ADMIN — LOSARTAN POTASSIUM 100 MG: 100 TABLET, FILM COATED ORAL at 08:39

## 2024-01-01 RX ADMIN — HYDRALAZINE HYDROCHLORIDE 25 MG: 25 TABLET, FILM COATED ORAL at 19:54

## 2024-01-01 RX ADMIN — BUMETANIDE 2 MG: 0.25 INJECTION INTRAMUSCULAR; INTRAVENOUS at 16:06

## 2024-01-01 RX ADMIN — HYDROMORPHONE HYDROCHLORIDE 0.2 MG: 0.2 INJECTION, SOLUTION INTRAMUSCULAR; INTRAVENOUS; SUBCUTANEOUS at 23:57

## 2024-01-01 RX ADMIN — HYDRALAZINE HYDROCHLORIDE 25 MG: 25 TABLET, FILM COATED ORAL at 08:11

## 2024-01-01 RX ADMIN — CARVEDILOL 25 MG: 25 TABLET, FILM COATED ORAL at 08:46

## 2024-01-01 RX ADMIN — Medication 100 MCG: at 09:06

## 2024-01-01 RX ADMIN — HYDROMORPHONE HYDROCHLORIDE 0.2 MG: 0.2 INJECTION, SOLUTION INTRAMUSCULAR; INTRAVENOUS; SUBCUTANEOUS at 11:59

## 2024-01-01 RX ADMIN — HYDRALAZINE HYDROCHLORIDE 20 MG: 20 INJECTION INTRAMUSCULAR; INTRAVENOUS at 14:09

## 2024-01-01 RX ADMIN — HYDRALAZINE HYDROCHLORIDE 10 MG: 10 TABLET, FILM COATED ORAL at 08:08

## 2024-01-01 RX ADMIN — BUMETANIDE 2 MG: 1 TABLET ORAL at 09:47

## 2024-01-01 RX ADMIN — PREDNISONE 60 MG: 20 TABLET ORAL at 08:40

## 2024-01-01 RX ADMIN — ACETAMINOPHEN 650 MG: 325 TABLET, FILM COATED ORAL at 15:41

## 2024-01-01 RX ADMIN — ONDANSETRON 4 MG: 4 TABLET, ORALLY DISINTEGRATING ORAL at 20:00

## 2024-01-01 RX ADMIN — CARVEDILOL 25 MG: 25 TABLET, FILM COATED ORAL at 09:05

## 2024-01-01 RX ADMIN — CARVEDILOL 25 MG: 25 TABLET, FILM COATED ORAL at 21:17

## 2024-01-01 RX ADMIN — PROCHLORPERAZINE EDISYLATE 5 MG: 5 INJECTION INTRAMUSCULAR; INTRAVENOUS at 09:14

## 2024-01-01 RX ADMIN — MIDODRINE HYDROCHLORIDE 10 MG: 5 TABLET ORAL at 14:20

## 2024-01-01 RX ADMIN — METOLAZONE 5 MG: 5 TABLET ORAL at 11:40

## 2024-01-01 RX ADMIN — SODIUM BICARBONATE 650 MG: 650 TABLET ORAL at 19:40

## 2024-01-01 RX ADMIN — PREDNISONE 60 MG: 20 TABLET ORAL at 09:41

## 2024-01-01 RX ADMIN — PIPERACILLIN AND TAZOBACTAM 2.25 G: 2; .25 INJECTION, POWDER, FOR SOLUTION INTRAVENOUS at 22:28

## 2024-01-01 RX ADMIN — CALCIUM 500 MG: 500 TABLET ORAL at 17:42

## 2024-01-01 RX ADMIN — FOLIC ACID 1 MG: 1 TABLET ORAL at 08:46

## 2024-01-01 RX ADMIN — CARVEDILOL 25 MG: 25 TABLET, FILM COATED ORAL at 08:39

## 2024-01-01 RX ADMIN — Medication 1 CAPSULE: at 09:07

## 2024-01-01 RX ADMIN — Medication 10 MCG: at 08:06

## 2024-01-01 RX ADMIN — ACETAMINOPHEN 1000 MG: 500 TABLET, FILM COATED ORAL at 14:34

## 2024-01-01 RX ADMIN — PERFLUTREN 7 ML: 6.52 INJECTION, SUSPENSION INTRAVENOUS at 14:20

## 2024-01-01 RX ADMIN — SODIUM CHLORIDE 250 ML: 9 INJECTION, SOLUTION INTRAVENOUS at 19:04

## 2024-01-01 RX ADMIN — CARVEDILOL 25 MG: 25 TABLET, FILM COATED ORAL at 19:55

## 2024-01-01 RX ADMIN — HYDRALAZINE HYDROCHLORIDE 10 MG: 20 INJECTION INTRAMUSCULAR; INTRAVENOUS at 06:58

## 2024-01-01 RX ADMIN — VITAM B12 100 MCG: 100 TAB at 09:36

## 2024-01-01 RX ADMIN — LOSARTAN POTASSIUM 50 MG: 50 TABLET, FILM COATED ORAL at 08:11

## 2024-01-01 RX ADMIN — PIPERACILLIN AND TAZOBACTAM 2.25 G: 2; .25 INJECTION, POWDER, FOR SOLUTION INTRAVENOUS at 14:58

## 2024-01-01 RX ADMIN — PIPERACILLIN AND TAZOBACTAM 2.25 G: 2; .25 INJECTION, POWDER, FOR SOLUTION INTRAVENOUS at 03:35

## 2024-01-01 RX ADMIN — HYDRALAZINE HYDROCHLORIDE 25 MG: 25 TABLET, FILM COATED ORAL at 21:22

## 2024-01-01 RX ADMIN — LOSARTAN POTASSIUM 100 MG: 100 TABLET, FILM COATED ORAL at 08:04

## 2024-01-01 RX ADMIN — OSELTAMIVIR PHOSPHATE 30 MG: 30 CAPSULE ORAL at 20:47

## 2024-01-01 RX ADMIN — REMDESIVIR 100 MG: 100 INJECTION, POWDER, LYOPHILIZED, FOR SOLUTION INTRAVENOUS at 14:49

## 2024-01-01 RX ADMIN — FOLIC ACID 1 MG: 1 TABLET ORAL at 17:01

## 2024-01-01 RX ADMIN — SODIUM BICARBONATE 650 MG: 650 TABLET ORAL at 20:21

## 2024-01-01 RX ADMIN — VANCOMYCIN HYDROCHLORIDE 125 MG: 125 CAPSULE ORAL at 11:36

## 2024-01-01 RX ADMIN — CARVEDILOL 25 MG: 25 TABLET, FILM COATED ORAL at 17:37

## 2024-01-01 RX ADMIN — VANCOMYCIN HYDROCHLORIDE 125 MG: 125 CAPSULE ORAL at 09:21

## 2024-01-01 RX ADMIN — Medication 10 MCG: at 09:28

## 2024-01-01 RX ADMIN — PREDNISONE 60 MG: 20 TABLET ORAL at 09:44

## 2024-01-01 RX ADMIN — SODIUM CHLORIDE 300 ML: 9 INJECTION, SOLUTION INTRAVENOUS at 07:53

## 2024-01-01 RX ADMIN — CALCIUM 500 MG: 500 TABLET ORAL at 09:14

## 2024-01-01 RX ADMIN — ONDANSETRON 4 MG: 4 TABLET, ORALLY DISINTEGRATING ORAL at 02:32

## 2024-01-01 RX ADMIN — NIFEDIPINE 30 MG: 30 TABLET, FILM COATED, EXTENDED RELEASE ORAL at 09:06

## 2024-01-01 RX ADMIN — ONDANSETRON 4 MG: 2 INJECTION INTRAMUSCULAR; INTRAVENOUS at 07:56

## 2024-01-01 RX ADMIN — PIPERACILLIN AND TAZOBACTAM 2.25 G: 2; .25 INJECTION, POWDER, FOR SOLUTION INTRAVENOUS at 00:57

## 2024-01-01 RX ADMIN — VITAM B12 100 MCG: 100 TAB at 09:07

## 2024-01-01 RX ADMIN — HYDROMORPHONE HYDROCHLORIDE 0.2 MG: 0.2 INJECTION, SOLUTION INTRAMUSCULAR; INTRAVENOUS; SUBCUTANEOUS at 17:36

## 2024-01-01 RX ADMIN — CALCIUM 500 MG: 500 TABLET ORAL at 18:25

## 2024-01-01 RX ADMIN — Medication: at 10:08

## 2024-01-01 RX ADMIN — NIFEDIPINE 30 MG: 30 TABLET, FILM COATED, EXTENDED RELEASE ORAL at 12:30

## 2024-01-01 RX ADMIN — Medication 100 MCG: at 09:21

## 2024-01-01 RX ADMIN — FENTANYL CITRATE 25 MCG: 50 INJECTION, SOLUTION INTRAMUSCULAR; INTRAVENOUS at 14:31

## 2024-01-01 RX ADMIN — SULFAMETHOXAZOLE AND TRIMETHOPRIM 1 TABLET: 400; 80 TABLET ORAL at 08:41

## 2024-01-01 RX ADMIN — VANCOMYCIN HYDROCHLORIDE 125 MG: 125 CAPSULE ORAL at 15:29

## 2024-01-01 RX ADMIN — OSELTAMIVIR PHOSPHATE 30 MG: 30 CAPSULE ORAL at 14:06

## 2024-01-01 RX ADMIN — OSELTAMIVIR PHOSPHATE 30 MG: 30 CAPSULE ORAL at 18:25

## 2024-01-01 RX ADMIN — SODIUM CHLORIDE 250 ML: 9 INJECTION, SOLUTION INTRAVENOUS at 13:43

## 2024-01-01 RX ADMIN — SULFAMETHOXAZOLE AND TRIMETHOPRIM 1 TABLET: 400; 80 TABLET ORAL at 08:46

## 2024-01-01 RX ADMIN — NIFEDIPINE 60 MG: 60 TABLET, EXTENDED RELEASE ORAL at 12:57

## 2024-01-01 RX ADMIN — PREDNISONE 60 MG: 20 TABLET ORAL at 08:43

## 2024-01-01 RX ADMIN — Medication 100 MCG: at 08:56

## 2024-01-01 RX ADMIN — SULFAMETHOXAZOLE AND TRIMETHOPRIM 1 TABLET: 400; 80 TABLET ORAL at 08:40

## 2024-01-01 RX ADMIN — HYDROCORTISONE SODIUM SUCCINATE 50 MG: 100 INJECTION, POWDER, FOR SOLUTION INTRAMUSCULAR; INTRAVENOUS at 03:33

## 2024-01-01 RX ADMIN — HYDRALAZINE HYDROCHLORIDE 25 MG: 25 TABLET, FILM COATED ORAL at 20:11

## 2024-01-01 RX ADMIN — HYDROMORPHONE HYDROCHLORIDE 0.2 MG: 0.2 INJECTION, SOLUTION INTRAMUSCULAR; INTRAVENOUS; SUBCUTANEOUS at 19:36

## 2024-01-01 RX ADMIN — CARVEDILOL 25 MG: 25 TABLET, FILM COATED ORAL at 09:21

## 2024-01-01 RX ADMIN — FENTANYL CITRATE 25 MCG: 50 INJECTION, SOLUTION INTRAMUSCULAR; INTRAVENOUS at 14:37

## 2024-01-01 RX ADMIN — HYDRALAZINE HYDROCHLORIDE 25 MG: 25 TABLET, FILM COATED ORAL at 08:05

## 2024-01-01 RX ADMIN — VANCOMYCIN HYDROCHLORIDE 125 MG: 125 CAPSULE ORAL at 15:42

## 2024-01-01 RX ADMIN — FENTANYL CITRATE 50 MCG: 50 INJECTION, SOLUTION INTRAMUSCULAR; INTRAVENOUS at 13:50

## 2024-01-01 RX ADMIN — SODIUM BICARBONATE 650 MG: 650 TABLET ORAL at 08:11

## 2024-01-01 RX ADMIN — HEPARIN SODIUM 1600 UNITS: 1000 INJECTION INTRAVENOUS; SUBCUTANEOUS at 13:47

## 2024-01-01 RX ADMIN — FOLIC ACID 1 MG: 1 TABLET ORAL at 08:25

## 2024-01-01 RX ADMIN — NIFEDIPINE 30 MG: 30 TABLET, FILM COATED, EXTENDED RELEASE ORAL at 08:39

## 2024-01-01 RX ADMIN — BUMETANIDE 0.5 MG/HR: 0.25 INJECTION, SOLUTION INTRAMUSCULAR; INTRAVENOUS at 15:15

## 2024-01-01 RX ADMIN — HEPARIN SODIUM 5000 UNITS: 5000 INJECTION, SOLUTION INTRAVENOUS; SUBCUTANEOUS at 16:54

## 2024-01-01 RX ADMIN — ACETAMINOPHEN 650 MG: 325 TABLET, FILM COATED ORAL at 18:07

## 2024-01-01 RX ADMIN — SODIUM BICARBONATE 650 MG: 650 TABLET ORAL at 19:50

## 2024-01-01 RX ADMIN — SODIUM BICARBONATE 650 MG: 650 TABLET ORAL at 20:32

## 2024-01-01 RX ADMIN — NIFEDIPINE 30 MG: 30 TABLET, FILM COATED, EXTENDED RELEASE ORAL at 08:41

## 2024-01-01 RX ADMIN — HYDROMORPHONE HYDROCHLORIDE 0.2 MG: 0.2 INJECTION, SOLUTION INTRAMUSCULAR; INTRAVENOUS; SUBCUTANEOUS at 20:00

## 2024-01-01 RX ADMIN — HYDRALAZINE HYDROCHLORIDE 20 MG: 20 INJECTION INTRAMUSCULAR; INTRAVENOUS at 18:21

## 2024-01-01 RX ADMIN — NICARDIPINE HYDROCHLORIDE 2.5 MG/HR: 0.2 INJECTION, SOLUTION INTRAVENOUS at 01:29

## 2024-01-01 RX ADMIN — BUMETANIDE 1 MG: 0.5 TABLET ORAL at 11:56

## 2024-01-01 RX ADMIN — SODIUM CHLORIDE 250 ML: 9 INJECTION, SOLUTION INTRAVENOUS at 15:05

## 2024-01-01 RX ADMIN — PREDNISONE 60 MG: 20 TABLET ORAL at 09:10

## 2024-01-01 RX ADMIN — PIPERACILLIN AND TAZOBACTAM 2.25 G: 2; .25 INJECTION, POWDER, FOR SOLUTION INTRAVENOUS at 03:57

## 2024-01-01 RX ADMIN — LOSARTAN POTASSIUM 100 MG: 100 TABLET, FILM COATED ORAL at 09:21

## 2024-01-01 RX ADMIN — Medication 10 MCG: at 11:36

## 2024-01-01 RX ADMIN — CARVEDILOL 12.5 MG: 12.5 TABLET, FILM COATED ORAL at 09:42

## 2024-01-01 RX ADMIN — VANCOMYCIN HYDROCHLORIDE 125 MG: 125 CAPSULE ORAL at 20:58

## 2024-01-01 RX ADMIN — SODIUM BICARBONATE 650 MG: 650 TABLET ORAL at 14:16

## 2024-01-01 RX ADMIN — SULFAMETHOXAZOLE AND TRIMETHOPRIM 1 TABLET: 400; 80 TABLET ORAL at 08:18

## 2024-01-01 RX ADMIN — VANCOMYCIN HYDROCHLORIDE 125 MG: 125 CAPSULE ORAL at 20:07

## 2024-01-01 RX ADMIN — HUMAN ALBUMIN MICROSPHERES AND PERFLUTREN 9 ML: 10; .22 INJECTION, SOLUTION INTRAVENOUS at 11:32

## 2024-01-01 RX ADMIN — SODIUM CHLORIDE 300 ML: 9 INJECTION, SOLUTION INTRAVENOUS at 11:05

## 2024-01-01 RX ADMIN — FOLIC ACID 1 MG: 1 TABLET ORAL at 12:57

## 2024-01-01 RX ADMIN — FOLIC ACID 1 MG: 1 TABLET ORAL at 08:08

## 2024-01-01 RX ADMIN — HYDROCORTISONE SODIUM SUCCINATE 50 MG: 100 INJECTION, POWDER, FOR SOLUTION INTRAMUSCULAR; INTRAVENOUS at 09:35

## 2024-01-01 RX ADMIN — ACETAMINOPHEN 650 MG: 325 TABLET, FILM COATED ORAL at 15:23

## 2024-01-01 RX ADMIN — PANTOPRAZOLE SODIUM 40 MG: 40 TABLET, DELAYED RELEASE ORAL at 08:56

## 2024-01-01 RX ADMIN — HYDRALAZINE HYDROCHLORIDE 10 MG: 20 INJECTION INTRAMUSCULAR; INTRAVENOUS at 06:00

## 2024-01-01 RX ADMIN — PROCHLORPERAZINE MALEATE 5 MG: 5 TABLET ORAL at 14:05

## 2024-01-01 RX ADMIN — FERROUS SULFATE TAB 325 MG (65 MG ELEMENTAL FE) 325 MG: 325 (65 FE) TAB at 09:42

## 2024-01-01 RX ADMIN — VANCOMYCIN HYDROCHLORIDE 125 MG: 125 CAPSULE ORAL at 12:31

## 2024-01-01 RX ADMIN — METHOCARBAMOL 500 MG: 500 TABLET ORAL at 18:07

## 2024-01-01 RX ADMIN — HYDROMORPHONE HYDROCHLORIDE 0.2 MG: 0.2 INJECTION, SOLUTION INTRAMUSCULAR; INTRAVENOUS; SUBCUTANEOUS at 17:07

## 2024-01-01 RX ADMIN — SODIUM CHLORIDE 50 ML: 9 INJECTION, SOLUTION INTRAVENOUS at 14:53

## 2024-01-01 RX ADMIN — ACETAMINOPHEN 650 MG: 325 TABLET, FILM COATED ORAL at 02:53

## 2024-01-01 RX ADMIN — HYDRALAZINE HYDROCHLORIDE 10 MG: 20 INJECTION INTRAMUSCULAR; INTRAVENOUS at 21:35

## 2024-01-01 RX ADMIN — HYDRALAZINE HYDROCHLORIDE 10 MG: 20 INJECTION INTRAMUSCULAR; INTRAVENOUS at 18:12

## 2024-01-01 RX ADMIN — CEFEPIME HYDROCHLORIDE 2 G: 2 INJECTION, POWDER, FOR SOLUTION INTRAVENOUS at 09:14

## 2024-01-01 RX ADMIN — PREDNISONE 60 MG: 20 TABLET ORAL at 09:14

## 2024-01-01 RX ADMIN — HYDRALAZINE HYDROCHLORIDE 25 MG: 25 TABLET, FILM COATED ORAL at 13:04

## 2024-01-01 RX ADMIN — SODIUM BICARBONATE 650 MG: 650 TABLET ORAL at 13:59

## 2024-01-01 RX ADMIN — SODIUM CHLORIDE 300 ML: 9 INJECTION, SOLUTION INTRAVENOUS at 15:05

## 2024-01-01 RX ADMIN — OSELTAMIVIR PHOSPHATE 30 MG: 30 CAPSULE ORAL at 20:40

## 2024-01-01 RX ADMIN — OXYCODONE HYDROCHLORIDE 5 MG: 5 TABLET ORAL at 01:41

## 2024-01-01 RX ADMIN — OXYCODONE HYDROCHLORIDE 5 MG: 5 TABLET ORAL at 21:03

## 2024-01-01 RX ADMIN — PREDNISONE 50 MG: 50 TABLET ORAL at 08:56

## 2024-01-01 RX ADMIN — SODIUM BICARBONATE 650 MG: 650 TABLET ORAL at 19:08

## 2024-01-01 RX ADMIN — VITAM B12 100 MCG: 100 TAB at 09:16

## 2024-01-01 RX ADMIN — Medication 10 MCG: at 09:41

## 2024-01-01 RX ADMIN — Medication 10 MCG: at 08:41

## 2024-01-01 RX ADMIN — NIFEDIPINE 30 MG: 30 TABLET, FILM COATED, EXTENDED RELEASE ORAL at 08:40

## 2024-01-01 RX ADMIN — HEPARIN SODIUM 2000 UNITS: 1000 INJECTION INTRAVENOUS; SUBCUTANEOUS at 14:42

## 2024-01-01 RX ADMIN — LOSARTAN POTASSIUM 100 MG: 100 TABLET, FILM COATED ORAL at 08:05

## 2024-01-01 RX ADMIN — HYDRALAZINE HYDROCHLORIDE 25 MG: 25 TABLET, FILM COATED ORAL at 19:40

## 2024-01-01 RX ADMIN — Medication 1 TABLET: at 13:04

## 2024-01-01 RX ADMIN — PANTOPRAZOLE SODIUM 40 MG: 40 TABLET, DELAYED RELEASE ORAL at 08:11

## 2024-01-01 RX ADMIN — BUMETANIDE 0.5 MG/HR: 0.25 INJECTION, SOLUTION INTRAMUSCULAR; INTRAVENOUS at 11:47

## 2024-01-01 RX ADMIN — VANCOMYCIN HYDROCHLORIDE 125 MG: 125 CAPSULE ORAL at 09:07

## 2024-01-01 RX ADMIN — PANTOPRAZOLE SODIUM 40 MG: 40 TABLET, DELAYED RELEASE ORAL at 11:36

## 2024-01-01 RX ADMIN — ACETAMINOPHEN 650 MG: 325 TABLET, FILM COATED ORAL at 20:21

## 2024-01-01 RX ADMIN — HYDRALAZINE HYDROCHLORIDE 25 MG: 25 TABLET, FILM COATED ORAL at 21:02

## 2024-01-01 RX ADMIN — HYDRALAZINE HYDROCHLORIDE 25 MG: 25 TABLET, FILM COATED ORAL at 20:33

## 2024-01-01 RX ADMIN — Medication 1 CAPSULE: at 17:00

## 2024-01-01 RX ADMIN — HEPARIN SODIUM 5000 UNITS: 5000 INJECTION, SOLUTION INTRAVENOUS; SUBCUTANEOUS at 12:00

## 2024-01-01 RX ADMIN — SODIUM CHLORIDE 250 ML: 9 INJECTION, SOLUTION INTRAVENOUS at 11:05

## 2024-01-01 RX ADMIN — CALCIUM 500 MG: 500 TABLET ORAL at 21:17

## 2024-01-01 RX ADMIN — HYDRALAZINE HYDROCHLORIDE 10 MG: 20 INJECTION INTRAMUSCULAR; INTRAVENOUS at 09:26

## 2024-01-01 RX ADMIN — INSULIN ASPART 1 UNITS: 100 INJECTION, SOLUTION INTRAVENOUS; SUBCUTANEOUS at 18:24

## 2024-01-01 RX ADMIN — CALCIUM 500 MG: 500 TABLET ORAL at 18:39

## 2024-01-01 RX ADMIN — CEFEPIME 1 G: 1 INJECTION, POWDER, FOR SOLUTION INTRAMUSCULAR; INTRAVENOUS at 16:07

## 2024-01-01 RX ADMIN — Medication 100 MCG: at 11:54

## 2024-01-01 RX ADMIN — HYDRALAZINE HYDROCHLORIDE 25 MG: 25 TABLET, FILM COATED ORAL at 14:09

## 2024-01-01 RX ADMIN — NIFEDIPINE 30 MG: 30 TABLET, FILM COATED, EXTENDED RELEASE ORAL at 17:01

## 2024-01-01 RX ADMIN — CARVEDILOL 25 MG: 25 TABLET, FILM COATED ORAL at 08:25

## 2024-01-01 RX ADMIN — OXYCODONE HYDROCHLORIDE 5 MG: 5 TABLET ORAL at 14:58

## 2024-01-01 RX ADMIN — VANCOMYCIN HYDROCHLORIDE 125 MG: 125 CAPSULE ORAL at 08:56

## 2024-01-01 RX ADMIN — NIFEDIPINE 30 MG: 30 TABLET, FILM COATED, EXTENDED RELEASE ORAL at 08:08

## 2024-01-01 RX ADMIN — HYDRALAZINE HYDROCHLORIDE 25 MG: 25 TABLET, FILM COATED ORAL at 11:57

## 2024-01-01 RX ADMIN — BUMETANIDE 0.5 MG/HR: 0.25 INJECTION, SOLUTION INTRAMUSCULAR; INTRAVENOUS at 15:44

## 2024-01-01 RX ADMIN — Medication 1 CAPSULE: at 09:36

## 2024-01-01 RX ADMIN — SODIUM CHLORIDE 250 ML: 9 INJECTION, SOLUTION INTRAVENOUS at 08:56

## 2024-01-01 RX ADMIN — OSELTAMIVIR PHOSPHATE 30 MG: 30 CAPSULE ORAL at 21:17

## 2024-01-01 RX ADMIN — Medication 10 MCG: at 09:42

## 2024-01-01 RX ADMIN — HYDRALAZINE HYDROCHLORIDE 25 MG: 25 TABLET, FILM COATED ORAL at 20:32

## 2024-01-01 RX ADMIN — CARVEDILOL 25 MG: 25 TABLET, FILM COATED ORAL at 20:08

## 2024-01-01 RX ADMIN — CARVEDILOL 25 MG: 25 TABLET, FILM COATED ORAL at 20:15

## 2024-01-01 RX ADMIN — SODIUM CHLORIDE 250 ML: 9 INJECTION, SOLUTION INTRAVENOUS at 15:29

## 2024-01-01 RX ADMIN — HYDROMORPHONE HYDROCHLORIDE 1 MG: 2 TABLET ORAL at 03:23

## 2024-01-01 RX ADMIN — HYDRALAZINE HYDROCHLORIDE 10 MG: 10 TABLET, FILM COATED ORAL at 09:45

## 2024-01-01 RX ADMIN — Medication 10 MCG: at 11:59

## 2024-01-01 RX ADMIN — BUMETANIDE 2 MG: 0.5 TABLET ORAL at 09:21

## 2024-01-01 RX ADMIN — VANCOMYCIN HYDROCHLORIDE 125 MG: 125 CAPSULE ORAL at 20:40

## 2024-01-01 RX ADMIN — HEPARIN SODIUM 5000 UNITS: 5000 INJECTION, SOLUTION INTRAVENOUS; SUBCUTANEOUS at 03:24

## 2024-01-01 RX ADMIN — PROCHLORPERAZINE EDISYLATE 5 MG: 5 INJECTION INTRAMUSCULAR; INTRAVENOUS at 18:58

## 2024-01-01 RX ADMIN — SODIUM BICARBONATE 650 MG: 650 TABLET ORAL at 14:21

## 2024-01-01 RX ADMIN — VANCOMYCIN HYDROCHLORIDE 125 MG: 125 CAPSULE ORAL at 21:22

## 2024-01-01 RX ADMIN — ACETAMINOPHEN 650 MG: 325 TABLET, FILM COATED ORAL at 21:48

## 2024-01-01 RX ADMIN — OXYCODONE HYDROCHLORIDE 5 MG: 5 TABLET ORAL at 21:42

## 2024-01-01 RX ADMIN — HYDRALAZINE HYDROCHLORIDE 10 MG: 10 TABLET, FILM COATED ORAL at 19:51

## 2024-01-01 RX ADMIN — HYDRALAZINE HYDROCHLORIDE 25 MG: 25 TABLET, FILM COATED ORAL at 20:15

## 2024-01-01 RX ADMIN — TORSEMIDE 20 MG: 20 TABLET ORAL at 08:11

## 2024-01-01 RX ADMIN — FOLIC ACID 1 MG: 1 TABLET ORAL at 09:21

## 2024-01-01 RX ADMIN — SODIUM BICARBONATE 650 MG: 650 TABLET ORAL at 21:22

## 2024-01-01 RX ADMIN — Medication 10 MCG: at 09:05

## 2024-01-01 RX ADMIN — FENTANYL CITRATE 50 MCG: 50 INJECTION, SOLUTION INTRAMUSCULAR; INTRAVENOUS at 14:23

## 2024-01-01 RX ADMIN — CARVEDILOL 25 MG: 25 TABLET, FILM COATED ORAL at 09:48

## 2024-01-01 RX ADMIN — SULFAMETHOXAZOLE AND TRIMETHOPRIM 1 TABLET: 400; 80 TABLET ORAL at 20:33

## 2024-01-01 RX ADMIN — ONDANSETRON 4 MG: 4 TABLET, ORALLY DISINTEGRATING ORAL at 17:59

## 2024-01-01 ASSESSMENT — ACTIVITIES OF DAILY LIVING (ADL)
ADLS_ACUITY_SCORE: 22
ADLS_ACUITY_SCORE: 29
ADLS_ACUITY_SCORE: 26
ADLS_ACUITY_SCORE: 35
ADLS_ACUITY_SCORE: 25
ADLS_ACUITY_SCORE: 29
ADLS_ACUITY_SCORE: 29
ADLS_ACUITY_SCORE: 28
ADLS_ACUITY_SCORE: 25
ADLS_ACUITY_SCORE: 25
ADLS_ACUITY_SCORE: 22
ADLS_ACUITY_SCORE: 24
ADLS_ACUITY_SCORE: 27
ADLS_ACUITY_SCORE: 26
ADLS_ACUITY_SCORE: 27
ADLS_ACUITY_SCORE: 25
ADLS_ACUITY_SCORE: 27
ADLS_ACUITY_SCORE: 26
ADLS_ACUITY_SCORE: 35
ADLS_ACUITY_SCORE: 29
ADLS_ACUITY_SCORE: 28
ADLS_ACUITY_SCORE: 25
ADLS_ACUITY_SCORE: 28
ADLS_ACUITY_SCORE: 25
ADLS_ACUITY_SCORE: 27
ADLS_ACUITY_SCORE: 29
ADLS_ACUITY_SCORE: 25
ADLS_ACUITY_SCORE: 27
ADLS_ACUITY_SCORE: 29
ADLS_ACUITY_SCORE: 27
ADLS_ACUITY_SCORE: 38
ADLS_ACUITY_SCORE: 38
ADLS_ACUITY_SCORE: 29
ADLS_ACUITY_SCORE: 25
ADLS_ACUITY_SCORE: 29
ADLS_ACUITY_SCORE: 26
ADLS_ACUITY_SCORE: 26
ADLS_ACUITY_SCORE: 22
ADLS_ACUITY_SCORE: 24
ADLS_ACUITY_SCORE: 26
ADLS_ACUITY_SCORE: 28
ADLS_ACUITY_SCORE: 25
ADLS_ACUITY_SCORE: 25
ADLS_ACUITY_SCORE: 31
ADLS_ACUITY_SCORE: 27
ADLS_ACUITY_SCORE: 25
ADLS_ACUITY_SCORE: 25
ADLS_ACUITY_SCORE: 37
ADLS_ACUITY_SCORE: 24
ADLS_ACUITY_SCORE: 24
ADLS_ACUITY_SCORE: 26
ADLS_ACUITY_SCORE: 25
ADLS_ACUITY_SCORE: 29
ADLS_ACUITY_SCORE: 22
ADLS_ACUITY_SCORE: 31
ADLS_ACUITY_SCORE: 25
ADLS_ACUITY_SCORE: 28
ADLS_ACUITY_SCORE: 25
ADLS_ACUITY_SCORE: 25
ADLS_ACUITY_SCORE: 22
ADLS_ACUITY_SCORE: 38
ADLS_ACUITY_SCORE: 38
ADLS_ACUITY_SCORE: 24
ADLS_ACUITY_SCORE: 28
ADLS_ACUITY_SCORE: 31
ADLS_ACUITY_SCORE: 28
ADLS_ACUITY_SCORE: 22
ADLS_ACUITY_SCORE: 23
ADLS_ACUITY_SCORE: 25
ADLS_ACUITY_SCORE: 29
ADLS_ACUITY_SCORE: 25
ADLS_ACUITY_SCORE: 35
ADLS_ACUITY_SCORE: 25
ADLS_ACUITY_SCORE: 35
ADLS_ACUITY_SCORE: 27
ADLS_ACUITY_SCORE: 38
ADLS_ACUITY_SCORE: 28
ADLS_ACUITY_SCORE: 24
ADLS_ACUITY_SCORE: 25
ADLS_ACUITY_SCORE: 28
ADLS_ACUITY_SCORE: 24
ADLS_ACUITY_SCORE: 25
ADLS_ACUITY_SCORE: 28
ADLS_ACUITY_SCORE: 28
ADLS_ACUITY_SCORE: 31
ADLS_ACUITY_SCORE: 25
ADLS_ACUITY_SCORE: 25
ADLS_ACUITY_SCORE: 27
ADLS_ACUITY_SCORE: 29
ADLS_ACUITY_SCORE: 29
ADLS_ACUITY_SCORE: 22
ADLS_ACUITY_SCORE: 25
ADLS_ACUITY_SCORE: 29
ADLS_ACUITY_SCORE: 24
ADLS_ACUITY_SCORE: 25
ADLS_ACUITY_SCORE: 26
ADLS_ACUITY_SCORE: 27
ADLS_ACUITY_SCORE: 25
ADLS_ACUITY_SCORE: 38
ADLS_ACUITY_SCORE: 26
ADLS_ACUITY_SCORE: 29
ADLS_ACUITY_SCORE: 24
ADLS_ACUITY_SCORE: 29
ADLS_ACUITY_SCORE: 31
ADLS_ACUITY_SCORE: 29
ADLS_ACUITY_SCORE: 26
ADLS_ACUITY_SCORE: 27
DEPENDENT_IADLS:: TRANSPORTATION;MONEY MANAGEMENT
ADLS_ACUITY_SCORE: 27
ADLS_ACUITY_SCORE: 28
ADLS_ACUITY_SCORE: 26
ADLS_ACUITY_SCORE: 24
ADLS_ACUITY_SCORE: 26
ADLS_ACUITY_SCORE: 26
ADLS_ACUITY_SCORE: 22
ADLS_ACUITY_SCORE: 25
ADLS_ACUITY_SCORE: 29
ADLS_ACUITY_SCORE: 25
ADLS_ACUITY_SCORE: 33
ADLS_ACUITY_SCORE: 25
ADLS_ACUITY_SCORE: 28
ADLS_ACUITY_SCORE: 24
ADLS_ACUITY_SCORE: 26
ADLS_ACUITY_SCORE: 25
ADLS_ACUITY_SCORE: 24
ADLS_ACUITY_SCORE: 28
ADLS_ACUITY_SCORE: 25
ADLS_ACUITY_SCORE: 27
ADLS_ACUITY_SCORE: 28
ADLS_ACUITY_SCORE: 24
ADLS_ACUITY_SCORE: 25
ADLS_ACUITY_SCORE: 29
ADLS_ACUITY_SCORE: 26
ADLS_ACUITY_SCORE: 23
ADLS_ACUITY_SCORE: 25
ADLS_ACUITY_SCORE: 28
ADLS_ACUITY_SCORE: 25
ADLS_ACUITY_SCORE: 28
ADLS_ACUITY_SCORE: 38
ADLS_ACUITY_SCORE: 27
ADLS_ACUITY_SCORE: 25
ADLS_ACUITY_SCORE: 25
ADLS_ACUITY_SCORE: 24
ADLS_ACUITY_SCORE: 25
ADLS_ACUITY_SCORE: 27
ADLS_ACUITY_SCORE: 26
ADLS_ACUITY_SCORE: 33
ADLS_ACUITY_SCORE: 22
ADLS_ACUITY_SCORE: 35
ADLS_ACUITY_SCORE: 29
ADLS_ACUITY_SCORE: 27
ADLS_ACUITY_SCORE: 26
ADLS_ACUITY_SCORE: 25
ADLS_ACUITY_SCORE: 27
ADLS_ACUITY_SCORE: 29
ADLS_ACUITY_SCORE: 23
ADLS_ACUITY_SCORE: 29
ADLS_ACUITY_SCORE: 24
ADLS_ACUITY_SCORE: 25
ADLS_ACUITY_SCORE: 27
ADLS_ACUITY_SCORE: 26
ADLS_ACUITY_SCORE: 25
ADLS_ACUITY_SCORE: 25
ADLS_ACUITY_SCORE: 28
ADLS_ACUITY_SCORE: 24
ADLS_ACUITY_SCORE: 22
ADLS_ACUITY_SCORE: 25
ADLS_ACUITY_SCORE: 25
ADLS_ACUITY_SCORE: 26
ADLS_ACUITY_SCORE: 24
ADLS_ACUITY_SCORE: 25
ADLS_ACUITY_SCORE: 28
ADLS_ACUITY_SCORE: 26
ADLS_ACUITY_SCORE: 25
ADLS_ACUITY_SCORE: 25
ADLS_ACUITY_SCORE: 22
ADLS_ACUITY_SCORE: 24
ADLS_ACUITY_SCORE: 24
ADLS_ACUITY_SCORE: 25
ADLS_ACUITY_SCORE: 26
ADLS_ACUITY_SCORE: 25
ADLS_ACUITY_SCORE: 37
ADLS_ACUITY_SCORE: 27
ADLS_ACUITY_SCORE: 28
ADLS_ACUITY_SCORE: 27
ADLS_ACUITY_SCORE: 29
ADLS_ACUITY_SCORE: 22
ADLS_ACUITY_SCORE: 29
ADLS_ACUITY_SCORE: 24
ADLS_ACUITY_SCORE: 26
ADLS_ACUITY_SCORE: 25
ADLS_ACUITY_SCORE: 25
ADLS_ACUITY_SCORE: 31
ADLS_ACUITY_SCORE: 25
ADLS_ACUITY_SCORE: 24
ADLS_ACUITY_SCORE: 28
ADLS_ACUITY_SCORE: 28
DEPENDENT_IADLS:: INDEPENDENT
ADLS_ACUITY_SCORE: 25
ADLS_ACUITY_SCORE: 26
ADLS_ACUITY_SCORE: 22
ADLS_ACUITY_SCORE: 26
ADLS_ACUITY_SCORE: 31
ADLS_ACUITY_SCORE: 22
ADLS_ACUITY_SCORE: 22
ADLS_ACUITY_SCORE: 25
ADLS_ACUITY_SCORE: 35
ADLS_ACUITY_SCORE: 25
ADLS_ACUITY_SCORE: 38
ADLS_ACUITY_SCORE: 22
ADLS_ACUITY_SCORE: 24
ADLS_ACUITY_SCORE: 25
ADLS_ACUITY_SCORE: 38
ADLS_ACUITY_SCORE: 27
ADLS_ACUITY_SCORE: 25
ADLS_ACUITY_SCORE: 22
ADLS_ACUITY_SCORE: 25
ADLS_ACUITY_SCORE: 25
ADLS_ACUITY_SCORE: 22
ADLS_ACUITY_SCORE: 26
ADLS_ACUITY_SCORE: 35
ADLS_ACUITY_SCORE: 31
ADLS_ACUITY_SCORE: 28
ADLS_ACUITY_SCORE: 22
ADLS_ACUITY_SCORE: 31
ADLS_ACUITY_SCORE: 24
ADLS_ACUITY_SCORE: 27
ADLS_ACUITY_SCORE: 25
ADLS_ACUITY_SCORE: 25
ADLS_ACUITY_SCORE: 22
ADLS_ACUITY_SCORE: 25
ADLS_ACUITY_SCORE: 26
ADLS_ACUITY_SCORE: 25
ADLS_ACUITY_SCORE: 25
ADLS_ACUITY_SCORE: 27
ADLS_ACUITY_SCORE: 35
ADLS_ACUITY_SCORE: 22
ADLS_ACUITY_SCORE: 24
ADLS_ACUITY_SCORE: 22
ADLS_ACUITY_SCORE: 28
ADLS_ACUITY_SCORE: 25
ADLS_ACUITY_SCORE: 29
ADLS_ACUITY_SCORE: 25
ADLS_ACUITY_SCORE: 35
ADLS_ACUITY_SCORE: 25
ADLS_ACUITY_SCORE: 26
ADLS_ACUITY_SCORE: 25
ADLS_ACUITY_SCORE: 29
ADLS_ACUITY_SCORE: 29
ADLS_ACUITY_SCORE: 35
ADLS_ACUITY_SCORE: 25
ADLS_ACUITY_SCORE: 29
ADLS_ACUITY_SCORE: 29
ADLS_ACUITY_SCORE: 25
ADLS_ACUITY_SCORE: 25
ADLS_ACUITY_SCORE: 24
ADLS_ACUITY_SCORE: 29
ADLS_ACUITY_SCORE: 28
ADLS_ACUITY_SCORE: 29
ADLS_ACUITY_SCORE: 28
ADLS_ACUITY_SCORE: 25
ADLS_ACUITY_SCORE: 35
ADLS_ACUITY_SCORE: 22
ADLS_ACUITY_SCORE: 27
ADLS_ACUITY_SCORE: 25
ADLS_ACUITY_SCORE: 28
ADLS_ACUITY_SCORE: 24
ADLS_ACUITY_SCORE: 24
ADLS_ACUITY_SCORE: 22
ADLS_ACUITY_SCORE: 35
ADLS_ACUITY_SCORE: 24
ADLS_ACUITY_SCORE: 33
ADLS_ACUITY_SCORE: 27
ADLS_ACUITY_SCORE: 37
ADLS_ACUITY_SCORE: 35
ADLS_ACUITY_SCORE: 25
ADLS_ACUITY_SCORE: 35
ADLS_ACUITY_SCORE: 25
ADLS_ACUITY_SCORE: 38
ADLS_ACUITY_SCORE: 35
ADLS_ACUITY_SCORE: 29
ADLS_ACUITY_SCORE: 25
ADLS_ACUITY_SCORE: 25
ADLS_ACUITY_SCORE: 28
ADLS_ACUITY_SCORE: 22
ADLS_ACUITY_SCORE: 23
ADLS_ACUITY_SCORE: 25
ADLS_ACUITY_SCORE: 26
ADLS_ACUITY_SCORE: 22
ADLS_ACUITY_SCORE: 26
ADLS_ACUITY_SCORE: 28
ADLS_ACUITY_SCORE: 29
ADLS_ACUITY_SCORE: 37
ADLS_ACUITY_SCORE: 24
ADLS_ACUITY_SCORE: 22
ADLS_ACUITY_SCORE: 35
ADLS_ACUITY_SCORE: 27
ADLS_ACUITY_SCORE: 35
ADLS_ACUITY_SCORE: 23
ADLS_ACUITY_SCORE: 28
ADLS_ACUITY_SCORE: 26
DEPENDENT_IADLS:: INDEPENDENT
ADLS_ACUITY_SCORE: 24
ADLS_ACUITY_SCORE: 25
ADLS_ACUITY_SCORE: 25
ADLS_ACUITY_SCORE: 24
ADLS_ACUITY_SCORE: 26
ADLS_ACUITY_SCORE: 25
ADLS_ACUITY_SCORE: 33
ADLS_ACUITY_SCORE: 26
ADLS_ACUITY_SCORE: 26
ADLS_ACUITY_SCORE: 25
ADLS_ACUITY_SCORE: 29
ADLS_ACUITY_SCORE: 29
ADLS_ACUITY_SCORE: 28
ADLS_ACUITY_SCORE: 25
ADLS_ACUITY_SCORE: 28
ADLS_ACUITY_SCORE: 25
ADLS_ACUITY_SCORE: 26
ADLS_ACUITY_SCORE: 25
ADLS_ACUITY_SCORE: 22
ADLS_ACUITY_SCORE: 25
ADLS_ACUITY_SCORE: 24
ADLS_ACUITY_SCORE: 24
DEPENDENT_IADLS:: INDEPENDENT
ADLS_ACUITY_SCORE: 28
ADLS_ACUITY_SCORE: 25
ADLS_ACUITY_SCORE: 38
ADLS_ACUITY_SCORE: 26
ADLS_ACUITY_SCORE: 26
ADLS_ACUITY_SCORE: 23
ADLS_ACUITY_SCORE: 25
ADLS_ACUITY_SCORE: 28
ADLS_ACUITY_SCORE: 22

## 2024-01-01 ASSESSMENT — PAIN SCALES - GENERAL: PAINLEVEL: NO PAIN (0)

## 2024-01-02 PROBLEM — U07.1 COVID-19: Status: ACTIVE | Noted: 2024-01-01

## 2024-01-02 PROBLEM — I50.9 ACUTE ON CHRONIC CONGESTIVE HEART FAILURE, UNSPECIFIED HEART FAILURE TYPE (H): Status: ACTIVE | Noted: 2024-01-01

## 2024-01-02 NOTE — CONSULTS
[facility]    Cardiology Consult Note-Cards 1      Date of Admission:  1/2/2024  Consult Requested by: Dr. Hernandez  Reason for Consult:     lupus patient, recent admit to OSH for CP/SOB found to have new reduce EF       Assessment & Plan: HVSL   Milly Carroll is a 23 year old female admitted on 1/2/2024 for acute on chronic heart failure and chest pain.  She was previously admitted in December (discharged 12/25) with a lupus flare along with heart failure exacerbation that was treated initially with diuretic.  Per review of discharge summary, it appears that she was started on Cytoxan inpatient with plan to follow-up outpatient, as well as prednisone with a taper.  She had a Lexiscan during this admission that was negative.  She represented to an ED on 12/26 with shortness of breath and chest pain, found to be volume overloaded with worsening of her EF from 40% to 15-20%.  She is also noted now to be hypertensive (SBP in the 190s).  Along with continued CHRISTA (creatinine 2.48).  She has a troponin elevation to 382 (during last admission was elevated to 700 and downtrended), along with a BNP now elevated to 248,000 (previously 70,000 at [facility], and >70,000 at OSH from 12/27). Low suspicion for ACS given her age and recent lexiscan in December that was negative for inducible ischemia. She also tested COVID-positive on 12/29 at outside hospital.      The differential for this presentation would include hypertensive emergency leading to decompensated heart failure and flash pulmonary edema (SBP greater than 190).  She states that she was not taking any diuretics since her discharge.  Another possibility would include myocarditis (multifactorial from potential lupus versus COVID infection).    -Please trend troponin to peak  -Would target aggressive BP control (SBP reduction of 25% in the next 24 hours)  -Would start IV lasix for diuresis as patient is volume overloaded (Goal  net negative 1-2L), but would also involve nephrology and rheumatology in her management to see if she requires their input with regards to underlying lupus  -Strict I/O, daily standing weights, cardiac fluid restricted diet  -From GDMT perspective, can restart PTA Coreg, she is currently not a candidate for ACE/ARB/ARNI/MRA/SLGT-2 therapies in the setting of kidney disease (acute vs. Acute on chronic)  -Would obtain cardiac MRI during this admission, however would want to achieve a better volume status prior to obtaining the study.  -Monitor on telemetry  -It appears on discharge she was scheduled for a steroid taper with prednisone 60mg every day. From our perspective reinitiating this medication would be helpful in the setting of potential myocarditis, however will discuss with rheumatology on their potential plans.          The patient's care was discussed with the Attending Physician, Dr. Luz .      Guerrero Mcgarry MD  [facility]    Clinically Significant Risk Factors Present on Admission                # Hypoalbuminemia: Lowest albumin = 3 g/dL at 1/2/2024  8:00 AM, will monitor as appropriate   # Thrombocytopenia: Lowest platelets = 64 in last 2 days, will monitor for bleeding    # Acute heart failure with reduced ejection fraction: last echo with EF <40% and receiving IV diuretics         # Financial/Environmental Concerns:          ______________________________________________________________________    Chief Complaint   SOB/chest pain    History is obtained from the patient    History of Present Illness   Milly Carroll is a 23 year old female with PMH Takotsubo cardiomyopathy 1 year ago (EF as low as 20%, recovered to 40-45% on 8/2023 on TTE/CMR), lupus cerebritis and nephritis, HTN, Qtc prolongation, chronic steroid induced thrombocytopenia, past perinephric hematoma s/p emobolization in Aug-2023. She initially presented to [facility] ED[facility] 12/17/23 for chest pain with concerns for NSTEMI. Trop trend was 735->494, no BNP obtained. Nephrology, hematology, and cardiology were consulted and the consensus was she was in a lupus crisis 2.2 mixed compliance with medications. She was treated with PLEX and high dose steroids. From cardiology perspective, her initial EKG reported to show T-wave inversions, and initial rise of troponin was thought to be 2.2 CHRISTA from lupus flare. she was being diuresed with lasix gtt 10mg/hr after conversation with nephrology and cardiology. GDMT regimen is unclear from multiple different providers, including [facility] appointment on 10/13/23. It is unclear if a pharm reconcilation was completed. On transfer to [facility] this admission, she is now on Coreg 25mg and Imdur 60mg every day. During this admission she was treated for lupus flare, and was discharged on coreg and nifedipine (unable to add more GDMT as CHRISTA had not fully resolved). She then presented to an outside ED on 12/26 for SOB and chest pain. There she was found to have a reduction in her EF to 15-20%, and imaging was concerning for edema vs. Infection. She was started on abx, appears to have received lasix 40mg x1, she also tested COVID positive. Today she states that she has a constant stabbing chest pain that is worse with exertion, but doesn't go away with rest. This has been present since her discharge on 12/25.       Past Medical History    Past Medical History:   Diagnosis Date    Hepatitis     Lupus (systemic lupus erythematosus) (H)     Lupus cerebritis (H)     Pancreatitis 08/2017    Pyelonephritis 08/2017    Seizures (H)     Status epilepticus (H)        Past Surgical History   Past Surgical History:   Procedure Laterality Date    IR RENAL ANGIOGRAM LEFT  8/11/2023    IR RENAL BIOPSY LEFT  6/28/2022    NO HISTORY OF SURGERY      ORTHOPEDIC SURGERY         Medications   Current Facility-Administered Medications   Medication    ceFEPIme (MAXIPIME) 2 g vial to  attach to  mL bag for ADULTS or 50 mL bag for PEDS    vancomycin (VANCOCIN) 1,250 mg in 0.9% NaCl 250 mL intermittent infusion    vancomycin place abdullahi - receiving intermittent dosing     Current Outpatient Medications   Medication Sig    acetaminophen (TYLENOL) 325 MG tablet Take 2 tablets (650 mg) by mouth every 4 hours as needed for mild pain    carvedilol (COREG) 25 MG tablet Take 1 tablet (25 mg) by mouth 2 times daily (with meals)    cyanocobalamin (CYANOCOBALAMIN) 100 MCG tablet Take 1 tablet (100 mcg) by mouth daily    ferrous sulfate (FEROSUL) 325 (65 Fe) MG tablet Take 1 tablet (325 mg) by mouth daily (with breakfast)    folic acid (FOLVITE) 1 MG tablet Take 1 tablet (1 mg) by mouth daily    NIFEdipine ER (ADALAT CC) 60 MG 24 hr tablet Take 1 tablet (60 mg) by mouth daily    ondansetron (ZOFRAN ODT) 4 MG ODT tab Take 1 tablet (4 mg) by mouth every 8 hours as needed for nausea    pantoprazole (PROTONIX) 40 MG EC tablet Take 1 tablet (40 mg) by mouth every morning (before breakfast)    potassium chloride ER (KLOR-CON M) 20 MEQ CR tablet Take 1 tablet (20 mEq) by mouth 2 times daily    predniSONE (DELTASONE) 20 MG tablet Take 3 tablets (60 mg) by mouth daily for 14 days, THEN 2.5 tablets (50 mg) daily for 14 days, THEN 2 tablets (40 mg) daily for 14 days, THEN 1.5 tablets (30 mg) daily for 14 days, THEN 1 tablet (20 mg) daily for 14 days.    sodium bicarbonate 650 MG tablet Take 1 tablet (650 mg) by mouth 3 times daily    sulfamethoxazole-trimethoprim (BACTRIM) 400-80 MG tablet Take 1 tablet by mouth daily    Vitamin D, Cholecalciferol, 10 MCG (400 UNIT) TABS Take 10 mcg by mouth daily            Physical Exam   Vital Signs: Temp: 98.3  F (36.8  C) Temp src: Oral BP: (!) 182/138 Pulse: 99   Resp: 23 SpO2: 94 % O2 Device: None (Room air)    Weight: 0 lbs 0 oz    General Appearance: Well appearing, NAD on RA  Respiratory: bibasilar crackles  Cardiovascular: tachycardic (low 100's), regular, no MRG. Cap  refill <2 seconds. 1+ pitting edema to mid shin. (+) JVD.      Medical Decision Making             Data   .  TTE 12/27/23  Left ventricular function is decreased. The ejection fraction is 15-20% (severely reduced). Biplane LVEF is 17%. Severe diffuse hypokinesis is present.  Global right ventricular function is moderately reduced.  Moderate mitral insufficiency is present.  Moderate to severe tricuspid insufficiency is present.  Pulmonary hypertension is present. Pulmonary artery systolic pressure is 83  mmHg (68 mmHg+RA pressure 15 mmHg.).  Small circumferential pericardial effusion is present without any hemodynamic significance.  This study was compared with the study from 8/14/23: LV and RV function have  worsened. MR and TR have worsened and PA pressure is higher, likely reflecting  elevated filling pressures/hypervolemia. Pericardial effusion is unchanged.    TTE 12/17/23  The left ventricle is normal size. There is mild concentric hypertrophy.   The left ventricular systolic function is moderately reduced. Qualitative left ventricle ejection fraction is 35-40%. There is moderate global hypokinesis of   the left ventricle.   The right ventricular cavity size is qualitatively normal. Normal right ventricular systolic function.   Left atrium is mildly enlarged by volume.   Mild to moderate mitral regurgitation.   Mild tricuspid regurgitation.   Right ventricular systolic pressure estimate is 35-40 mmHg.   Small pericardial effusion, located posteriorly.   No prior echocardiogram available for comparison.      TTE 8/14/23  The left ventricle is borderline dilated. Biplane LVEF is 45%.There is mild global hypokinesia of the left ventricle.  There is moderate (2+) mitral regurgitation.  The aortic root is normal size.  Borderline/mild dilated pulmonary artery  Trivial posterior pericardial effusion     CMR 8/16/23  Clinical history: Probable stress induced cardiomyopathy, evaluate for myocarditis.   Comparison CMR:  None     1. The LV is mildly dilated with normal wall thickness. The global systolic function is moderately reduced.  The LVEF is 40%. There is moderate global hypokinesis of the left ventricle.      2. The RV is normal in cavity size. The global systolic function is low normal. The RVEF is 52%.      3. Both atria are normal in size.     4. There is mild to moderate mitral regurgitation by qualitative assessment.      5. There is no evidence of myocardial edema on T2 weighted imaging. There is no evidence of myocardial iron overload.      5. Late gadolinium enhancement imaging shows no MI, fibrosis or infiltrative disease.      6. A small circumferential pericardial effusion is noted.      CONCLUSIONS:      1. Mildly dilated left ventricle with moderately reduced systolic function.     2. No late enhancement to suggest prior infarct, inflammation or infiltration.          EKG 12/17/23      Lexiscan 12/21/23  Result Text       The nuclear stress test is negative for inducible myocardial ischemia or infarction.    LVEDv 198ml. LVESv 104ml. LV EF 47%.    There is no prior study for comparison.        Physician Attestation   I saw this patient with the resident and agree with the resident/fellow's findings and plan of care as documented in the note.    Key findings:   23 year old with acute pulmonary edema in the setting of recent lupus flare, COVID infection, hypertensive urgency. She has worsening cardiac function raising concern for myocarditis vs. decompensated HF. Trop T hs 469. Cr 2.48 (was 3.48).   Will treat HTN with nifedipine and IV diuresis today. She is receiving prednisone and cyclophosphamide for lupus nephritis from August 2023.   Tea Luz MD  Date of Service (when I saw the patient): January 2, 2024

## 2024-01-02 NOTE — PHARMACY-VANCOMYCIN DOSING SERVICE
Pharmacy Vancomycin Initial Note  Date of Service 2024  Patient's  2000  23 year old, female    Indication: Community Acquired Pneumonia    Current estimated CrCl = Estimated Creatinine Clearance: 33.6 mL/min (A) (based on SCr of 2.48 mg/dL (H)).    Creatinine for last 3 days  2024:  8:00 AM Creatinine 2.48 mg/dL    Recent Vancomycin Level(s) for last 3 days  No results found for requested labs within last 3 days.      Vancomycin IV Administrations (past 72 hours)        No vancomycin orders with administrations in past 72 hours.                    Nephrotoxins and other renal medications (From now, onward)      Start     Dose/Rate Route Frequency Ordered Stop    24 1000  vancomycin (VANCOCIN) 1,250 mg in 0.9% NaCl 250 mL intermittent infusion         1,250 mg  over 90 Minutes Intravenous ONCE 24 0902      24 1000  vancomycin place abdullahi - receiving intermittent dosing         1 each Intravenous SEE ADMIN INSTRUCTIONS 24 0902              Contrast Orders - past 72 hours (72h ago, onward)      None            Plan:  Start vancomycin intermittent dosing given CHRISTA; Vancomycin 1250 mg IV x 1 today   Vancomycin monitoring method: Trough (Method 2 = manual dose calculation)  Vancomycin therapeutic monitoring goal: 15-20 mg/L  Pharmacy will check vancomycin levels as appropriate in 1-3 Days.    Serum creatinine levels will be ordered daily for the first week of therapy and at least twice weekly for subsequent weeks.      Hien Stinson [facility]

## 2024-01-02 NOTE — CONSULTS
[facility]  Consult Note - Rheumatology  Date of Admission:  1/2/2024  Reason for Consult: pt w/ lupus, recent admit to OSH for CP/SOB/CHF and + COVID     Assessment & Plan   Milly Carroll is a 23 year old female with PMHx of lupus c/b lupus nephritis and cerebritis on mesna EuroLupus protocol who is admitted on 1/2/2024 for SOB and chest pain thought to be 2/2 decreased EF of  and resulting volume overload.    #SLE (+dsDNA, +RNP, +ribosomal P Ab and hypocomplementemia)  #Lupus flare with MAHA, renal insufficiency (multifactorial) concern for lupus nephritis  #H/o Lupus cerebritis ?, h/o seizures  #Bicytopenia  Does not appear to be in lupus flare. Current sx can likely be explained by all volume overload and reduced ejection fraction.  Given the fact that she was recently discharged on high-dose steroids and received cyclophosphamide, it would be unusual for her to have a lupus flare, especially a lupus flare focused around heart specifically.  This potentially could be a manifestation of cyclophosphamide toxicity.  Another etiology would include her ongoing COVID infection.  - unclear if how much prednisone she has been on since the ED visits on 12/27, please reinitiate prednisone 60mg daily  - consider ordering D dimer d/t PE concerns; if elevated, consider CT PE  - added on C3/C4, dsDNA, CRP, please continue to trend daily  - continue bactrim three time weekly for PJP ppx  - added on LDH and recommend smear and haptoglobin (ordered for you) to evaluate for hemolysis   - ordered UA given elevated crt  - continue to trend labs (CBC, Creatinine, AST daily)   - due for 5th dose of cyclophosphamide per EuroLupus protocol this coming Friday; recommend discussing with renal the plan for the upcoming dose    #RUE edema  Per Chart review chronic.  Ultrasound was done in August 2023 and was negative for DVT.  Low concern for acute DVT. Continue to monitor      Social  history: Her dad  during . She moved in with her aunt shortly before this admission. She completed high school but no postsecondary education. No consistent employment. Declined by multiple Home health. Per discussion during last admission aunt Shira would have been a primary caregiver.        The patient's care was discussed with the Attending Physician, Dr. Ma .    Parrish Lantigua, DO  Internal Medicine PGY-2  Securely message with [employer] (more info)  Text page via [facility] Paging/Directory   See signed in provider for up to date coverage information  ______________________________________________________________________    Chief Complaint   SOB    History is obtained from the patient and chart review    History of Present Illness     Ms. Carroll is a 23 year old female with medical history notable for  lupus complicated by lupus flares, cerebritis and nephritis. Patient also has hypertension, chronic systolic heart failure( EF 45%), QTc prolongation, past perinephric hematoma s/p emobolization in Aug-2023 and chronic thrombocytopenia     Most recently hospitalized to for chest pain and SOB at [facility] in [place] 17-Dec-2023 and transferred to [facility] on 19-Dec-2023 then to [place] on 23 for cyclophosphamide infusion.       At [facility], patient was treated medically for a possible NSTEMI and pneumonia. She was thought to have lupus flare with lupus nephritis and treated with plasma exchange (given initial MAHA), steroids and cyclophosphamide with MESNA. She was discharged on . On  she presented to an OSH ED for chest pain and SOB with associated fevers and chills. There she found to have covid and reduced EF (baseline EF 47%, OSH EF reported as 15-20%) . [facility] rheumatology was contacted and she was recommended 160mg lasix and broad spectrum abx (vanc and zosyn). She was at the ED for several days and there were no hospitals available for  "transfer and she left on 12/30. Finally, she came to the [facility] today and was admitted for further care.  Labs remarkable for crt 2.48 ( downtrending from 3.48 on 12/30 with baseline ~1), BNP >248k. Last C3 and C4 on 12/27 continuously low.      RHEUMATIC DISEASE HISTORY  History of discoid lupus diagnosed in 2005 ~ at age 5, had biopsy of facial lesions seen by dermatologist H&E \"fairly consistent with lupus\".  Possible discoid in dad and paternal grandfather per note in 2013. ? Sister.  2013 developed multisystem symptoms including polyarthritis, malar rash, vasculitic rash over hands, alopecia, daily fevers, cytopenias, hypocomplementemia.       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  1 out of 10 glomeruli with full house positivity no necrosis or crescents.  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  She required intubation.  History of lupus cerebritis, leptomeningeal enhancement on MRI, history of seizure.  Normal sleep EEG reported in 2013.  Placed on Keppra  Beta-2 GP negative in 2017 and 2019.  Lupus anticoagulant negative in 2019.  Cardiolipin is negative in 2019.  NETTA panel in 2017: Positive RNP, negative anti-Cardenas, negative SSA, negative SSB, negative SCL 70  Report of sicca symptoms  Elevated anti-F-actin antibody (smooth muscle) with elevation of LFT's and  lipase presumed related to pancreatitis  Had elevated muscle markers (aldolase)/myositis which resolved     2013: Negative beta-2 " "glycoprotein  Elevated aldolase at 16.6  Von Willebrand antigen at 407 reference range 55-1 60  Indeterminate TB QuantiFERON  Negative ANCA  Negative Tesha 1 antibody  High liver function test and lipase;      MRI right knee with osteonecrosis May 2014: \"Impression:  1. Large area of osteonecrosis involving the lateral femoral condyle  with partial collapse along the zone of provisional calcification and  irregularity of the unossified epiphyseal cartilage. No full-thickness  defect or loose body demonstrated. Orthopedic consult is recommended.  2. No substantial effusion or evidence of synovitis\"     Episodes of running out of medication due to miscommunication / pharmacy     Rheumatological treatment:  Prednisone 2012 - present (interruptions in care) (most recently 20mg daily prior to Dec hospitalized and then discharged on 60 mg daily)  Hydroxychloroquine 2013- 2023 (interuptions in care); holding d/t prolonged QTC  Cyclophosphamide IV ~5/2013- 7/2013;   CellCept 7/2013- 8/2023  Rituximab 12/2015 through 2019 ( anaphylactic reaction, admitted to PICU)  Benlysta /belimumab 12/20/2019 IV changed to subcu 2021.  She reported insurance stopped sending the subcu injections at home so she stopped taking them.  Cyclophosphamide with mesna EuroLupus protocol: First dose while hospitalized~8/24/2023, second dose 9/8/2023, third dose 9/22/2023.  Last dose 10/6/2023 held  due to Hg decreasing to 8.0. Initial plan was for 6 doses. restarted in Dec 2023 with 4th dose on 12/22 and two more doses every two weeks in the OP setting      Past Medical History    Past Medical History:   Diagnosis Date    Hepatitis     Lupus (systemic lupus erythematosus) (H)     Lupus cerebritis (H)     Pancreatitis 08/2017    Pyelonephritis 08/2017    Seizures (H)     Status epilepticus (H)        Past Surgical History   Past Surgical History:   Procedure Laterality Date    IR RENAL ANGIOGRAM LEFT  8/11/2023    IR RENAL BIOPSY LEFT  6/28/2022    NO " HISTORY OF SURGERY      ORTHOPEDIC SURGERY         Medications   I have reviewed this patient's current medications          Physical Exam   Vital Signs: Temp: 98.3  F (36.8  C) Temp src: Oral BP: (!) 193/136 Pulse: 100   Resp: 22 SpO2: 100 % O2 Device: None (Room air)    Weight: 0 lbs 0 oz    General Appearance: Fatigued, laying in bed  Respiratory:  on room air, normal work of breathing, crackles in TAMMY and RLL but patient is on her right side  Cardiovascular: regular rate and rhythm  GI: soft, nontender  Skin: warm, no new lesions or rashes, 1-2cm healing bruise on the abdomen and right foream  Other: Joints nontender and nonerhythematous, RUE    Medical Decision Making             Data     I have personally reviewed the following data over the past 24 hrs:    9.8  \   8.1 (L)   / 64 (L)     144 113 (H) 86.7 (H) /  95   4.4 19 (L) 2.48 (H) \     ALT: 70 (H) AST: 36 AP: 98 TBILI: 0.3   ALB: 3.0 (L) TOT PROTEIN: 5.3 (L) LIPASE: N/A     Trop: 382 (HH) BNP: 248,135 (H)     Procal: 0.65 (H) CRP: N/A Lactic Acid: 1.5       Ferritin:  N/A % Retic:  N/A LDH:  481 (H)       Imaging results reviewed over the past 24 hrs:   Recent Results (from the past 24 hour(s))   XR Chest Port 1 View    Narrative    Exam: XR CHEST PORT 1 VIEW, 1/2/2024 7:38 AM    Indication: CHF history increased shortness of breath    Comparison: Chest radiograph: 12/27/2023, 12/17/2023    Findings:   Portable frontal semiupright view of the chest. Trachea is midline. No  focal consolidations. Improving perihilar and bibasilar basilar  predominant mixed pulmonary opacities. No pneumothorax. No significant  pleural effusions bilaterally..  Stable enlarged cardiac silhouette.      Impression    Impression:   1. Improving hazy perihilar and bibasilar opacities consistent with  resolving pulmonary edema.    2. No acute cardiopulmonary findings. Cardiomegaly.    I have personally reviewed the examination and initial interpretation  and I agree with the  findings.    ELAINE FIORE MD         SYSTEM ID:  [id number]

## 2024-01-02 NOTE — LETTER
Recipient: Guerrero Denton MD          Sender: TYLER Su  Clinical   [facility]          Date: January 5, 2024  Patient Name:  Milly Carroll  Patient YOB: 2000  Routing Message:  This patient would benefit from close outpatient follow-up & a care coordination referral.         The documents accompanying this notice contain confidential information belonging to the sender.  This information is intended only for the use of the individual or entity named above.  The authorized recipient of this information is prohibited from disclosing this information to any other party and is required to destroy the information after its stated need has been fulfilled, unless otherwise required by state law.    If you are not the intended recipient, you are hereby notified that any disclosure, copy, distribution or action taken in reliance on the contents of these documents is strictly prohibited.  If you have received this document in error, please return it by fax to [phone] with a note on the cover sheet explaining why you are returning it (e.g. not your patient, not your provider, etc.).  If you need further assistance, please call .  Documents may also be returned by mail to [employer], , [address], [place], [place] [address].

## 2024-01-02 NOTE — MEDICATION SCRIBE - ADMISSION MEDICATION HISTORY
Medication Scribe Admission Medication History    Admission medication history is complete. The information provided in this note is only as accurate as the sources available at the time of the update.    Information Source(s): Hospital records and CareEverywhere/[facility] via N/A    Pertinent Information:   -Pt was recently in the ER on 12/30/23 and had a pharmacy medications history done on 12/27/23 (check note for detail  -Pt should have started her Prednisone tapering on 12/25/23    Changes made to PTA medication list:  Added: None  Deleted: None  Changed: None    Medication Affordability:  Not including over the counter (OTC) medications, was there a time in the past 3 months when you did not take your medications as prescribed because of cost?: Unable to Assess    Allergies reviewed with patient and updates made in EHR: no    Medication History Completed By: Conchita Luciano 1/2/2024 4:27 PM    Prior to Admission medications    Medication Sig Last Dose Taking? Auth Provider Long Term End Date   acetaminophen (TYLENOL) 325 MG tablet Take 2 tablets (650 mg) by mouth every 4 hours as needed for mild pain Unknown at unknown Yes Sofy Dawkins MD     carvedilol (COREG) 25 MG tablet Take 1 tablet (25 mg) by mouth 2 times daily (with meals) Unknown at unknown Yes Rodolfo Quevedo MD Yes    cyanocobalamin (CYANOCOBALAMIN) 100 MCG tablet Take 1 tablet (100 mcg) by mouth daily Unknown at unknown Yes Rodolfo Quevedo MD     ferrous sulfate (FEROSUL) 325 (65 Fe) MG tablet Take 1 tablet (325 mg) by mouth daily (with breakfast) Unknown at unknown Yes Rodolfo Quevedo MD     folic acid (FOLVITE) 1 MG tablet Take 1 tablet (1 mg) by mouth daily Unknown at unknown Yes Rodolfo Quevedo MD     NIFEdipine ER (ADALAT CC) 60 MG 24 hr tablet Take 1 tablet (60 mg) by mouth daily Unknown at unknown Yes Rodolfo Quevedo MD Yes    ondansetron (ZOFRAN ODT) 4 MG ODT tab Take 1 tablet (4 mg) by mouth every 8 hours as needed for  nausea Unknown at unknown Yes Rodolfo Quevedo MD     pantoprazole (PROTONIX) 40 MG EC tablet Take 1 tablet (40 mg) by mouth every morning (before breakfast) Unknown at unknown Yes Rodolfo Quevedo MD No    potassium chloride ER (KLOR-CON M) 20 MEQ CR tablet Take 1 tablet (20 mEq) by mouth 2 times daily Unknown at unknown Yes Rodolfo Quevedo MD     predniSONE (DELTASONE) 20 MG tablet Take 3 tablets (60 mg) by mouth daily for 14 days, THEN 2.5 tablets (50 mg) daily for 14 days, THEN 2 tablets (40 mg) daily for 14 days, THEN 1.5 tablets (30 mg) daily for 14 days, THEN 1 tablet (20 mg) daily for 14 days. Unknown at unknown Yes Rodolfo Quevedo MD  3/4/24   sodium bicarbonate 650 MG tablet Take 1 tablet (650 mg) by mouth 3 times daily Unknown at unknown Yes Rodolfo Quevedo MD     sulfamethoxazole-trimethoprim (BACTRIM) 400-80 MG tablet Take 1 tablet by mouth daily Unknown at unknown Yes Rodolfo Quevedo MD No    Vitamin D, Cholecalciferol, 10 MCG (400 UNIT) TABS Take 10 mcg by mouth daily Unknown at unknown Yes Rodolfo Quevedo MD No

## 2024-01-02 NOTE — CONSULTS
[facility]  Consult Note - Hospitalist Service, GOLD TEAM 7  Date of Admission:  1/2/2024  Consult Requested by: Rach Hernandez MD  Reason for Consult: Lupus nephritis, CHRISTA on CKD, severe hypertension    Assessment & Plan   Milly Carroll is a 23 year old female admitted on 1/2/2024. 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 She was admitted with chest pain and shortness of breath, found to be COVID positive, w/ severely decreased global cardiac function on echo, and requiring multiple doses of IV diuretics due to volume overload.     Recommendations:  - Agree to continue prednisone 60 mg daily, continue Bactrim PJP PPX  - Next cyclophosphamide dose is due on Saturday, will not add additional immunosuppressants at this time due to current COVID infection  - Goal SBP for today of 150, avoid overcorrection. Would not give additional diuresis unless BP is >150 for today.   - Decreased nifedipine XL to 30 mg daily, can consider increasing before tomorrow's dose if BP increases- goal BP is 150-160/100 range, avoid significant hypotension  - Consider RUE US due to asymmetric limb swelling  - Daily standing weights, strict I&Os  - Daily renal panel    #. Lupus nephritis, class IV  #. SLE  #. Chronic kidney disease, stage IV (Cystatin C/creatinine discordance)  #. Acute kidney injury  Patient's creatinine elevated to 2.48 on admission; however, this is downtrending from her creatinine in [facility], which was elevated to 3.48 on 12/30. Given improvement with diuresis at the outside hospital, this CHRISTA may likely represent poor renal perfusion in the setting of volume overload and cardiorenal syndrome. Cannot rule out flare  of lupus nephritis, and, given significantly elevated blood pressures, also may represent end-organ damage from hypertensive emergency. It is reassuring that she is making good urine, although will require close monitoring of intake and output moving forward. No weight obtained yet on this admission; standing weight will help inform degree of volume overload. At this time, given her known COVID infection, there would be significant risk with adding additional immunosuppressants at this time. Has been on hydroxychloroquine in the past, but this is currently held due to QTC prolongation. Would not favor repeat kidney biopsy at this time.     #. Hypertension  #. Acute on chronic systolic heart failure, stress cardiomyopathy  Most likely cause of her hypertension is volume overload, particularly given hypervolemic appearance on physical examination, significantly elevated BNP from baseline, signs of pulmonary edema on CXR. Notably, her blood pressures improved rapidly following diuresis and administration of her home dose of nifedipine XL 60 mg. However, there is concern for correcting her BP too quickly, as she appears to have had significantly elevated blood pressures while at [facility] and has likely has had high blood pressures for greater than 4-5 days. Physical examination does not show clear neurologic deficits to suggest CVA. Will follow haptoglobin and hemolysis labs in case there is an element of MAHA.     #. Metabolic acidosis, chronic  Agree with continuing NaHCO3 650 mg TID.     The patient's care was discussed with the Attending Physician, Dr. Monge, Chief Resident/Fellow, Patient, and Primary team.    Clinically Significant Risk Factors Present on Admission              # Hypoalbuminemia: Lowest albumin = 3 g/dL at 1/2/2024  8:00 AM, will monitor as appropriate   # Thrombocytopenia: Lowest platelets = 64 in last 2 days, will monitor for bleeding      # Acute heart failure with reduced ejection  "fraction: last echo with EF <40% and receiving IV diuretics         # Financial/Environmental Concerns:           NESTOR PEDROZA MD  Nephrology Teaching Service  Securely message with Scrip-t (more info)  Text page via [facility] Paging/Directory   See signed in provider for up to date coverage information  ______________________________________________________________________    Chief Complaint   Chest Pain, Shortness of Breath    History is obtained from the patient and electronic health record    History of Present Illness   Milly Carroll is a 23 year old female with a history of SLE, lupus nephritis, lupus cerebritis, seizures, hypertension, chronic systolic HF (BL EF of 35-40%), perinpephric hematoma s/p embolization in 08/2023, chronic thymocytopenia who presented to [facility] with chest pain and SOB.     Per Tresa Pérez, nephrology PABINA, her history lupus nephritis diagnosis and treatment history are outlined from the note on 12/20, included below:   \"The patient is a 23-year-old F with Hx of SLE with LN who diagnosed first she was 7 years old. 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 Per my note during that visit, \"Her original symptoms included fever, muscle ache and rashes. She had +DsDNA, +RNP, +ribosomal P ab . She has most recently being treated with Cellcept 1500 mg BID, Plaquenil 200 mg BID and prednisone 5 mg daily. She was on Benlysta infusion in 2019 that was later switched to Benlysta subcutaneous in 2021. She later did not receive Benlysta, unclear reason but she said that it was due to insurance and it did not deliver to her. She was treated with Rituximab since 2013 but she developed anaphylactic reaction during Rituximab infusion in 12/2019 so it was discontinued. She has had persistent hematuria and proteinuria since 2021. She has had on and off documented proteinuria which is " 0.3-0.7 g/g since 2013. She has Kidney BX in 2022 which showed class III and IV LN. She was treated with CellCept 1500 mg BID but then was non-compliant to this (moved to [place] and stopped all her meds). She came back to [place] in the summer 2023. She was then admitted for C.diff diarrhea. She has CHRISTA with Cr of 1.58. At that time, her DS DNA was 1446 international IU/ml. She was started on solumedrol 60 mg daily x 2 days with prednisone tapered. CellCept was resumed at dose 500 mg BID. She had another kidney Bx on 8/2/23 (No EM sample), result showed Class IV with 2% active crescents, 50% glomerular sclerosis, severe IFTA (70-80%)and concern for class V lupus on bx done 8/23. (class III in 6/22, < 5% IFTA). She was then seen by Dr. Purvis on 8/10/23. During that visit, she was found to have Hb of 6.1 and repeat kidney US showed that she has perinephric hematoma. She was then admitted at [facility] and then transferred to [facility] then [facility] where I saw her first time. Of note, the patient has Cystatin C of 3.2 while creatinine of 1.03 (consistent with Kidney Bx finding of severe sclerosis). After dismissal, she was followed by Dr. Purvis. She was started on Cytoxan Eurolupus 500 mg q 2 weeks but only received 3 doses. 4thd dose was held due to pancytopenia (10/20/23 white blood cell 3.3, hemoglobin 8.0, platelet 55 and ANC 2.3). Plan was to repeat CBC and resume the dose but then she moved to live with her aunt and uncle in [place] and lost to follow-up AGAIN!. After cytoxan, she has improved in kidney function with Cr down to 0.88 at [facility] (9/22/23). However, Cr was 1.04 on 10/20/23. Albumin improved to best at 3.9 on 9/8/23. UA showed improvement in hematuria. However, her DS DNA started to rise when Cytoxan was on hold. UPCR was 4.97 on 10/2073 from 13.4 g/g when she flared in July-Aug 2023. C3 improved to best at 55 in August but then lowered again. This time, she presented to local hospital ([facility]  "in [place]) due to SOB and malaise. She was found to have CHRISTA and pancytopenia with evidence of MAHA (hapto 21, LDH 370s and positive schistocytes). DS DNA was 43, C3 15 and C4 4. US showed white blood cell 47, RBC 68. UPCR 8.74 g/g. Ferritin 34K. She has CT chest abdomen and pelvis with contrast on 12/17 which showed no obstruction but enlarged heart and pericardial effusion. She was given 500 mg BID of methylprednisone x3 days and transitioned to 60 mg prednisone daily since 12/20/23. She also received PLEX via Rt internal jugular since 12/18-12/19/23. She was transferred to [facility] on 12/19/23 and nephrology was consulted on 12/20/2023.\"    On 12/26/2023, Milly presented to the ED in [facility] due to chest pain and shortness of breath. She was found to be COVID positive, and her CXR was concerning for pulmonary edema vs CAP. She was started on vanc+cefepime and recevied furosemide 40 mg IV x2. An echo revealed an EF of 15%, her BNP was >70,000, her lactate was 2.6, HGB 8.2 and troponin was 118. While awaiting bed placement at the [facility], she left the hospital AM due to the long wait [facility].     On arrival in the ED, she was found to be hypertensive to 188/142, was saturating well but appeared volume overloaded. Her symptoms had worsened since leaving the OSH. She was given 80 mg IV Lasix, then 40 mg IV Lasix, as well as her home nifedipine XL dose of 60 mg. She was also started on vancomycin.     On discussion with her today, Milly states she developed right upper extremity swelling last night, which is tense and painful, had an IV in this arm at the OSH. She continues to have constant chest pain, which is sharp and worsens with movement. She also had increased work of breathing. She vomited twice in the ED and has not had an appetite but has eaten recently. She feels that she may be more swollen than usual, has not weighed herself recently. She is still producing " urine, does not have dysuria. No diarrhea or constipation. She has not had a fever. Has abdominal discomfort, which she has had during lupus flares, but does not otherwise feel she is in a lupus flare. Her menstrual periods have been normal without increased bleeding or frequency. She has been taking all of her medications, including blood pressure medication, which are arranged by her mother. She is still on the full dose of her prednisone (60 mg) and has not started the taper.     Past Medical History    Past Medical History:   Diagnosis Date    Hepatitis     Lupus (systemic lupus erythematosus) (H)     Lupus cerebritis (H)     Pancreatitis 08/2017    Pyelonephritis 08/2017    Seizures (H)     Status epilepticus (H)        Past Surgical History   Past Surgical History:   Procedure Laterality Date    IR RENAL ANGIOGRAM LEFT  8/11/2023    IR RENAL BIOPSY LEFT  6/28/2022    NO HISTORY OF SURGERY      ORTHOPEDIC SURGERY         Medications   Current Facility-Administered Medications   Medication    acetaminophen (TYLENOL) tablet 650 mg    artificial saliva (BIOTENE MT) solution 1 spray    calcium carbonate (TUMS) chewable tablet 1,000 mg    carvedilol (COREG) tablet 25 mg    ceFEPIme (MAXIPIME) 2 g vial to attach to  mL bag for ADULTS or 50 mL bag for PEDS    cholecalciferol (D-VI-SOL, Vitamin D3) 10 mcg/mL (400 units/mL) liquid 10 mcg    cyanocobalamin (VITAMIN B-12) tablet 100 mcg    [START ON 1/3/2024] ferrous sulfate (FEROSUL) tablet 325 mg    folic acid (FOLVITE) tablet 1 mg    heparin ANTICOAGULANT injection 5,000 Units    hydrALAZINE (APRESOLINE) tablet 10 mg    Or    hydrALAZINE (APRESOLINE) injection 10 mg    HYDROmorphone (DILAUDID) injection 0.2 mg    [START ON 1/3/2024] Lidocaine (LIDOCARE) 4 % Patch 3 patch    lidocaine (LMX4) cream    lidocaine 1 % 0.1-1 mL    melatonin tablet 5 mg    [START ON 1/3/2024] NIFEdipine ER OSMOTIC (PROCARDIA XL) 24 hr tablet 30 mg    ondansetron (ZOFRAN ODT) ODT tab 4  mg    [START ON 1/3/2024] pantoprazole (PROTONIX) EC tablet 40 mg    potassium chloride ER (KLOR-CON M) CR tablet 20 mEq    predniSONE (DELTASONE) tablet 60 mg    senna-docusate (SENOKOT-S/PERICOLACE) 8.6-50 MG per tablet 1 tablet    Or    senna-docusate (SENOKOT-S/PERICOLACE) 8.6-50 MG per tablet 2 tablet    sodium bicarbonate tablet 650 mg    sodium chloride (PF) 0.9% PF flush 3 mL    sodium chloride (PF) 0.9% PF flush 3 mL    sulfamethoxazole-trimethoprim (BACTRIM) 400-80 MG per tablet 1 tablet    vancomycin (VANCOCIN) capsule 125 mg    vancomycin place abdullahi - receiving intermittent dosing     Current Outpatient Medications   Medication Sig    acetaminophen (TYLENOL) 325 MG tablet Take 2 tablets (650 mg) by mouth every 4 hours as needed for mild pain    carvedilol (COREG) 25 MG tablet Take 1 tablet (25 mg) by mouth 2 times daily (with meals)    cyanocobalamin (CYANOCOBALAMIN) 100 MCG tablet Take 1 tablet (100 mcg) by mouth daily    ferrous sulfate (FEROSUL) 325 (65 Fe) MG tablet Take 1 tablet (325 mg) by mouth daily (with breakfast)    folic acid (FOLVITE) 1 MG tablet Take 1 tablet (1 mg) by mouth daily    NIFEdipine ER (ADALAT CC) 60 MG 24 hr tablet Take 1 tablet (60 mg) by mouth daily    ondansetron (ZOFRAN ODT) 4 MG ODT tab Take 1 tablet (4 mg) by mouth every 8 hours as needed for nausea    pantoprazole (PROTONIX) 40 MG EC tablet Take 1 tablet (40 mg) by mouth every morning (before breakfast)    potassium chloride ER (KLOR-CON M) 20 MEQ CR tablet Take 1 tablet (20 mEq) by mouth 2 times daily    predniSONE (DELTASONE) 20 MG tablet Take 3 tablets (60 mg) by mouth daily for 14 days, THEN 2.5 tablets (50 mg) daily for 14 days, THEN 2 tablets (40 mg) daily for 14 days, THEN 1.5 tablets (30 mg) daily for 14 days, THEN 1 tablet (20 mg) daily for 14 days.    sodium bicarbonate 650 MG tablet Take 1 tablet (650 mg) by mouth 3 times daily    sulfamethoxazole-trimethoprim (BACTRIM) 400-80 MG tablet Take 1 tablet by  mouth daily    Vitamin D, Cholecalciferol, 10 MCG (400 UNIT) TABS Take 10 mcg by mouth daily          Review of Systems    The 10 point Review of Systems is negative other than noted in the HPI or here.     Social History   I have reviewed this patient's social history and updated it with pertinent information if needed.  Social History     Tobacco Use    Smoking status: Never     Passive exposure: Never    Smokeless tobacco: Never   Vaping Use    Vaping Use: Never used   Substance Use Topics    Alcohol use: Not Currently    Drug use: Never   Milly lives with her mother, who helps her obtain and keep track of her medications.       Allergies   Allergies   Allergen Reactions    Rituximab Anaphylaxis and Shortness Of Breath        Physical Exam   Vital Signs: Temp: 98.3  F (36.8  C) Temp src: Oral BP: 106/75 Pulse: 104   Resp: 16 SpO2: 100 % O2 Device: None (Room air)    Weight: 59.9 kg (6 days before admission)    General: Laying in bed, no acute distress. Interacts appropriately.   HENT: Normocephalic, atraumatic. Pupils are pinpoint but equal. Moist mucous membranes. EOM grossly intact.   Neck: No tracheal deviation.   Cardiovascular: Tachycardic rate. No murmurs. Bilateral DP/PT and radial pulses are intact.   Respiratory: Diffuse crackles, mildly increased respiratory rate but no signs of respiratory distress.   Abdomen: Soft, non-tender, non-distended.   Neurologic: Alert. Oriented. Moves all extremities equally.   Musculoskeletal: RUE w/ 1+ edema, no notable edema on the left. 1+ edema of the bilateral lower extremities.    Skin: No rashes on visualized skin. No signs of pallor or cyanosis. Warm and dry.   Psych: Flat affect. Makes good eye contact.     Data     I have personally reviewed the following data over the past 24 hrs:    14.9 (H)  \   9.7 (L)   / 85 (L)     144 113 (H) 86.7 (H) /  95   4.4 19 (L) 2.48 (H) \     ALT: 70 (H) AST: 36 AP: 98 TBILI: 0.3   ALB: 3.0 (L) TOT PROTEIN: 5.3 (L) LIPASE: N/A      Trop: 469 (HH) BNP: 248,135 (H)     Procal: 0.65 (H) CRP: 14.50 (H) Lactic Acid: 1.5       Ferritin:  N/A % Retic:  3.1 (H) LDH:  481 (H)       ECHO 01/02/2024:  Interpretation Summary  Left ventricular function is decreased. The ejection fraction is 15-20%  (severely reduced). Severe diffuse hypokinesis is present. Moderate to severe  left ventricular dilation is present.  The right ventricle is normal size. Global right ventricular function is  mildly to moderately reduced.  Moderate to severe mitral insufficiency is present.  Severe tricuspid insufficiency is present.  Mild to moderate aortic insufficiency is present.  The estimated PA systolic pressure is 65 mmHg.  Trivial pericardial effusion is present. Chamber compression is not present;  there is no evidence for tamponade.  IVC diameter >2.1 cm collapsing <50% with sniff suggests a high RA pressure  estimated at 15 mmHg or greater.  This study was compared with the study from 12/27/2023. No significant changes  noted.    I was present with the resident during the history and exam.  I discussed the case with the resident and agree with the findings as documented in the assessment and plan.  She is a patient with lupus nephritis class IV based on biopsy in August 2023, ordered cytoxan with 3 infusions in October/November, held for cytopenia and then lost to followup. She presented to local hospital 12/17 ([facility]) due to SOB and malaise, found to have CHRISTA and pancytopenia with evidence of MAHA (hapto 21,  and + schistocytes).  Transferred to [facility] for PLEX starting on 12/18, she was restarted on prednisone 60mg after pulse, and cytoxan was dosed 12/22 in the hospital.  She is due again in a few days (1/5) but she is + COVID may be best to delay slightly.  She is having chest pain, has severe hypertension, now down to 105 systolic this afternoon. Would hold medication , aiming for -160 /100 , avoiding hypotension / acute drops as  able.    Her creatinine on presentation now is worsened to 3.48, today improved to 2.48 with diuresis.  She is taking prednisone at home, unclear the cause of hypertensive emergency, will work on gradual BP lowering, consider cytoxan in coming days- not in setting of COVID +  Rama Monge MD  Associate Professor of Medicine  Department of Nephrology  [facility]

## 2024-01-02 NOTE — H&P
[facility]    History and Physical - Hospitalist Service, GOLD TEAM 7       Date of Admission:  1/2/2024    Assessment & Plan      Milly Carroll is a 23 year old female admitted on 1/2/2024. She has a PMHx of lupus C/B lupus flares, lupus cerebritis, lupus nephritis, HTN, chronic systolic heart failure, QT prolongation, past perinephric hematoma s/p emobolization in Aug-2023 and chronic thrombocytopenia admitted to [facility] on 12/17/23 (after presentation for SOB/NSTEMI and pneumonia concern and concern for lupus flare where she was treated w/ steroids and plasma exchange) and transfer to [facility] on 12/18/23 before being transferred to [facility] on 12/22-12/25 for cyclophosphamide infusion. Represented on 12/26 [facility] ED for SOB found to have covid and reduced EF (baseline EF 47%, OSH EF reported as 15-20%) w/ plan to transfer patient to [facility] however patient left A on 12/30 due to prolonged wait for transfer  to [facility] ED. On presentation 1/2, patient reporting continued worsening CP/SOB since her discharge on 12/30.     Acute Chest pain w/ SOB  Acute on Chronic Cardiomyopathy and HF (EF 15-20% on 12/26 OSH)  Elevated BNP w/ hypervolemia  Elevated Troponin  Uncontrolled HTN  Hx of QTc prolongation (463 on admission 1/2)  Moderate to severe mitral insufficiency   Severe tricuspid insufficiency   Echocardiogram on 17-Dec-2023 showed LVEF 35-40%, EF decreased to 15-20% on 12/26 w/ BNP at that time >70,000. Lexiscan 12/21 negative for inducible ischemia   - Gen Cardiology consulted   - telemetry  - on RA  - trend troponin to peak, check D-dimer   [ ] low threshold for CT PE study  - repeat echo given continued worsening SOB  - s/p IV lasix 40mg in ED w/ poor response reported, repeat w/ 80mg IV lasix  - restart PTA Coreg 25mg BID and nefedipine 60mg qday (decreased to 30mg per nephrology)  - prn hydralazine, goal SBP  150-180  - strict I/Os, daily weights, trend electrolytes, Fluid restriction 1.5L   - pain: tylenol prn, dilaudid q6h prn, lidocaine patch  [ ] Will need cMRI during admission once fluid status improved    SLE (+dsDNA, +RNP, +ribosomal P Ab and hypocomplementemia)   Recent Lupus Flare w/ MAHA and renal insuff s/p Cyclophosphamide dose (12/22/23)  Hx of Lupus cerebritis w/ hx of seizures  C/b Lupus Nephritis  Hx medication non-adherence  - Rheumatology consulted- low concern for active lupus flare given recent high dose steroids and cyclophosphamide   - Nephrology Consulted   - pending C3/C4, dsDNA, CRP (trend daily)  - Lupus-focused meds:   - resume prednisone 60mg qday  - PJP ppx: continue PTA bactrim  - GI ppx: continue protonix qday, tums prn  [ ] Due for 5th dose cyclophosphamide per [employer] protocol 1/5 (ongoing discussion w/ rheum/nephrology prior to administration)     COVID infxn (dx 12/29) s/p remdesivir at OSH  Concern for secondary pneumonia  Hx of C.diff infxn (6/2023)  - on RA  - s/p remdesivir tx completion 12/29-12/30, previously on Solumedrol for COVID  - Abx: continue Vanc/Cefepime (1/2-###)  - ppx: start PO vancomycin qday while on systemic abx    Lupus Nephritis w/ CHRISTA (improving)  Metabolic Acidosis  Hypomagnesemia- improved  Hypokalemia -improved  - Nephrology consulted   - BP and HF regimen as above  - sodium bicarbonate 650mg TID  - continue PTA Potassium supplement  - UA pending  - Renal diet/fluid restriction as above     Chronic R.UE edema  US 8/2023 negative  - d-dimer pending  - Duplex US RUE ordered     Chronic thrombocytopenia  Chronic Macrocytic Anemia  - trend daily CBC  - continue PTA folate/B12 replacement  - hemolysis eval: checking LDH and sven-smear and haptoglobin, UA, retic labs  - DVT ppx: heparin subcutaneous decrease dose per pharmacy in setting of chronic thrombocytopenia     Transaminitis   - trend CMP      Hx of Intermittent Headaches  History of being responsive to  APAP and dilaudid along w/ treatment of HTN and Lupus. NO NSAIDS    Hx pancreatic insufficiency 2/2 pancreatitis  Hx acute on chronic pancreatitis (2020)   Last followed with GI in 2020 for pancreatitis and multiple stones and debris in the main pancreatic duct and was recommended to be on Creon at that time. Stools described as normal.      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 No current PTA antiepileptic medications          Diet: Fluid restriction 1500 ML FLUID  Renal Diet (non-dialysis)  DVT Prophylaxis: Heparin SQ  Leung Catheter: Not present  Lines: None     Cardiac Monitoring: ACTIVE order. Indication: Acute decompensated heart failure (48 hours)  Code Status: Full Code    Clinically Significant Risk Factors Present on Admission              # Hypoalbuminemia: Lowest albumin = 3 g/dL at 1/2/2024  8:00 AM, will monitor as appropriate   # Thrombocytopenia: Lowest platelets = 64 in last 2 days, will monitor for bleeding      # Acute heart failure with reduced ejection fraction: last echo with EF <40% and receiving IV diuretics         # Financial/Environmental Concerns:           Disposition Plan      Expected Discharge Date: 01/06/2024                  Rach Hernandez MD  Hospitalist Service, [address]  Securely message with [email or web address] (more info)  Text page via [facility] Paging/Directory   See signed in provider for up to date coverage information    ______________________________________________________________________    Chief Complaint   SOB    History of Present Illness   Milly Carroll is a 23 year old female admitted on 1/2/2024. She has a PMHx of lupus C/B lupus flares, lupus cerebritis, lupus nephritis, HTN, chronic systolic heart failure, hx of QT prolongation, and chronic thrombocytopenia who presents for worsening  SOB/chest pain after recent AMA hospital stay.     She was admitted to [facility] on 12/17/23 (after presentation for SOB/NSTEMI and pneumonia concern and concern for lupus flare where she was treated w/ steroids and plasma exchange) and transfer to [facility] on 12/18/23 before being transferred to [facility] on 12/22-12/25 for cyclophosphamide infusion. Represented on 12/26 [place] ED for SOB found to have covid and reduced EF (baseline EF 47%, OSH EF reported as 15-20%) w/ plan to transfer patient to [facility] however patient left AMA on 12/30 due to prolonged wait for transfer. Discharge AMA notes report patient at that time planned to just drive straight to [facility]. Patient reports she enjoyed the New Years Holiday at home then felt her SOB/CP was continued to worsen so she promting her to present to [facility] ED which on 1/2.     In ED, patient HTN (180s/140s), tachycardic to low 100s, afebrile, Sats appropriate on RA w/ reports of worsening CP/SOB. Labs w/ Na 144, K 4.4, Bicarb 19, Cr 2.48 (downtrended from time of prior discharge), Pieter 8.2, ALT 70, BNP 266312 (increased), Trop 382, procal 0.65, WBC 9.8, Hgb 8.1, plt 64. BCX collected. CXR showing improving hazy perihilar/bibasilar opacities (pulm edema) w/o acute cardiopulm findings. Admitted for further management.     On initial eval, patient reporting continued chest pain and SOB, reporting feeling slightly fatigued and dozing off occasionally while talking w/ patient.       Past Medical History    Past Medical History:   Diagnosis Date    Hepatitis     Lupus (systemic lupus erythematosus) (H)     Lupus cerebritis (H)     Pancreatitis 08/2017    Pyelonephritis 08/2017    Seizures (H)     Status epilepticus (H)        Past Surgical History   Past Surgical History:   Procedure Laterality Date    IR RENAL ANGIOGRAM LEFT  8/11/2023    IR RENAL BIOPSY LEFT  6/28/2022    NO HISTORY OF SURGERY      ORTHOPEDIC SURGERY          Prior to Admission Medications   Prior to Admission Medications   Prescriptions Last Dose Informant Patient Reported? Taking?   NIFEdipine ER (ADALAT CC) 60 MG 24 hr tablet  Other, Self No No   Sig: Take 1 tablet (60 mg) by mouth daily   Vitamin D, Cholecalciferol, 10 MCG (400 UNIT) TABS  Other, Self No No   Sig: Take 10 mcg by mouth daily   acetaminophen (TYLENOL) 325 MG tablet  Other, Self No No   Sig: Take 2 tablets (650 mg) by mouth every 4 hours as needed for mild pain   carvedilol (COREG) 25 MG tablet  Other, Self No No   Sig: Take 1 tablet (25 mg) by mouth 2 times daily (with meals)   cyanocobalamin (CYANOCOBALAMIN) 100 MCG tablet  Other, Self No No   Sig: Take 1 tablet (100 mcg) by mouth daily   ferrous sulfate (FEROSUL) 325 (65 Fe) MG tablet  Other, Self No No   Sig: Take 1 tablet (325 mg) by mouth daily (with breakfast)   folic acid (FOLVITE) 1 MG tablet  Other, Self No No   Sig: Take 1 tablet (1 mg) by mouth daily   ondansetron (ZOFRAN ODT) 4 MG ODT tab  Other, Self No No   Sig: Take 1 tablet (4 mg) by mouth every 8 hours as needed for nausea   pantoprazole (PROTONIX) 40 MG EC tablet  Other, Self No No   Sig: Take 1 tablet (40 mg) by mouth every morning (before breakfast)   potassium chloride ER (KLOR-CON M) 20 MEQ CR tablet  Other, Self No No   Sig: Take 1 tablet (20 mEq) by mouth 2 times daily   predniSONE (DELTASONE) 20 MG tablet  Other, Self No No   Sig: Take 3 tablets (60 mg) by mouth daily for 14 days, THEN 2.5 tablets (50 mg) daily for 14 days, THEN 2 tablets (40 mg) daily for 14 days, THEN 1.5 tablets (30 mg) daily for 14 days, THEN 1 tablet (20 mg) daily for 14 days.   sodium bicarbonate 650 MG tablet  Other, Self No No   Sig: Take 1 tablet (650 mg) by mouth 3 times daily   sulfamethoxazole-trimethoprim (BACTRIM) 400-80 MG tablet  Other, Self No No   Sig: Take 1 tablet by mouth daily      Facility-Administered Medications: None           Physical Exam   Vital Signs: Temp: 98.3  F (36.8   C) Temp src: Oral BP: 114/76 Pulse: 114   Resp: 19 SpO2: 94 % O2 Device: None (Room air)    Weight: 0 lbs 0 oz    General: alert but slightly drowsy/fatigued, MMM, EOMI w/o scleral icterus,  pleasant  Resp: + bibasilar crackles, no increased WOB, on RA  Cards: slightly tachycardic but regular w/o murmurs/rubs/gallops, trace LE edema but + RUE edema (chronic per documentation)  GI: soft, nontender, nondistended, +BS  Skin: warm and well perfused  Neuro/MSK: moving all 4 extremities equally      Medical Decision Making       75 MINUTES SPENT BY ME on the date of service doing chart review, history, exam, documentation & further activities per the note.  MANAGEMENT DISCUSSED with the following over the past 24 hours: Cardiology, Rheumatology, Nephrology       Data   ------------------------- PAST 24 HR DATA REVIEWED -----------------------------------------------    I have personally reviewed the following data over the past 24 hrs:    9.8  \   8.1 (L)   / 64 (L)     144 113 (H) 86.7 (H) /  95   4.4 19 (L) 2.48 (H) \     ALT: 70 (H) AST: 36 AP: 98 TBILI: 0.3   ALB: 3.0 (L) TOT PROTEIN: 5.3 (L) LIPASE: N/A     Trop: 382 (HH) BNP: 248,135 (H)     Procal: 0.65 (H) CRP: N/A Lactic Acid: 1.5       Ferritin:  N/A % Retic:  N/A LDH:  481 (H)       Imaging results reviewed over the past 24 hrs:   Recent Results (from the past 24 hour(s))   XR Chest Port 1 View    Narrative    Exam: XR CHEST PORT 1 VIEW, 1/2/2024 7:38 AM    Indication: CHF history increased shortness of breath    Comparison: Chest radiograph: 12/27/2023, 12/17/2023    Findings:   Portable frontal semiupright view of the chest. Trachea is midline. No  focal consolidations. Improving perihilar and bibasilar basilar  predominant mixed pulmonary opacities. No pneumothorax. No significant  pleural effusions bilaterally..  Stable enlarged cardiac silhouette.      Impression    Impression:   1. Improving hazy perihilar and bibasilar opacities consistent  with  resolving pulmonary edema.    2. No acute cardiopulmonary findings. Cardiomegaly.    I have personally reviewed the examination and initial interpretation  and I agree with the findings.    ELAINE FIORE MD         SYSTEM ID:  [id number]   Echo Complete   Result Value    LVEF  15-20% (severely reduced)    [facility]    [id number]  [id number]  [id number]  [id number]^SHANTEL^LASHA     [facility]  Echocardiography Laboratory  [address] [place], [place] [address]     Name: MIKE JOSE  MRN: [id number]  : 2000  Study Date: 2024 01:14 PM  Age: 23 yrs  Gender: Female  Patient Location: [facility]  Reason For Study: Heart Failure  Ordering Physician: LASHA FUNES  Performed By: Anusha Carrillo     BSA: 1.6 m2  Height: 62 in  Weight: 132 lb  BP: 187/147 mmHg  ______________________________________________________________________________  Procedure  Complete Portable Echo Adult. Contrast Definity. Definity (NDC #27896-709-28)  given intravenously. Patient was given 7ml mixture of 1.5ml Definity and 8.5ml  saline. 3 ml wasted.  ______________________________________________________________________________  Interpretation Summary  Left ventricular function is decreased. The ejection fraction is 15-20%  (severely reduced). Severe diffuse hypokinesis is present. Moderate to severe  left ventricular dilation is present.  The right ventricle is normal size. Global right ventricular function is  mildly to moderately reduced.  Moderate to severe mitral insufficiency is present.  Severe tricuspid insufficiency is present.  Mild to moderate aortic insufficiency is present.  The estimated PA systolic pressure is 65 mmHg.  Trivial pericardial effusion is present. Chamber compression is not present;  there is no evidence for tamponade.  IVC diameter >2.1 cm collapsing <50% with sniff suggests a high RA pressure  estimated at 15 mmHg or greater.  This study was compared with the study from  2023. No significant changes  noted.  ______________________________________________________________________________  Left Ventricle  Left ventricular wall thickness is normal. Moderate to severe left ventricular  dilation is present. Left ventricular function is decreased. The ejection  fraction is 15-20% (severely reduced). Left ventricular diastolic function is  abnormal. Severe diffuse hypokinesis is present.     Right Ventricle  The right ventricle is normal size. Global right ventricular function is  mildly to moderately reduced.     Mitral Valve  Moderate to severe mitral insufficiency is present.     Aortic Valve  The aortic valve is tricuspid. Mild to moderate aortic insufficiency is  present.     Tricuspid Valve  Severe tricuspid insufficiency is present. The right ventricular systolic  pressure is approximated at 51.2 mmHg plus the right atrial pressure.     Pulmonic Valve  Mild pulmonic insufficiency is present.     Vessels  The aorta root is normal. The thoracic aorta is normal. IVC diameter >2.1 cm  collapsing <50% with sniff suggests a high RA pressure estimated at 15 mmHg or  greater.     Pericardium  Trivial pericardial effusion is present. Chamber compression is not present;  there is no evidence for tamponade.     Compared to Previous Study  This study was compared with the study from 2023 . No significant  changes noted.     ______________________________________________________________________________  MMode/2D Measurements & Calculations  IVSd: 1.0 cm  LVIDd: 6.2 cm  LVPWd: 1.2 cm     LV mass(C)d: 298.6 grams  LV mass(C)dI: 186.4 grams/m2  RWT: 0.37     Doppler Measurements & Calculations  TR max herminia: 357.9 cm/sec  TR max P.2 mmHg     ______________________________________________________________________________  Report approved by: Maureen Woo 2024 02:39 PM

## 2024-01-02 NOTE — ED TRIAGE NOTES
BIBA for chest pain started around 3am. staying at hotel in [facility] temporarily. Was at hospital two days ago for fluid build up around heart, had fluid drained.   vitals on during EMS transport to hospital  MRN290/120, 174/119      AFEBRILE 97.8 degrees

## 2024-01-02 NOTE — ED PROVIDER NOTES
ED Provider Note  [facility]      History     Chief Complaint   Patient presents with    Chest Pain     Chest pain started around 3 am     HPI  Milly Carroll is a 23 year old female who presents for chest pain and shortness of breath. She had presented to an outside hospital on 12/26 for chest pain and shortness of breath with associated fevers and chills. Prior to that she had been admitted at [facility] for a NSTEMI, pneumonia and lupus flare. She has been on steroids and cytoxan for lupus.    At the outside facility she was found to have elevated troponin, BNP and decreased EF on her echo from her prior. She also had ane elevated lactic acid and did test positive for COVID. She was admitted and transfer to multiple hospitals was attempted for rheumatology and cardiology evaluation but the patient was not accepted. Ultimately the patient left [facility] on 12/30 as she did not want to wait more.     The patient reports progressively worsening symptoms since then. The symptoms are the same in quality but worse than when she left the other hospital.     Past Medical History  Past Medical History:   Diagnosis Date    Hepatitis     Lupus (systemic lupus erythematosus) (H)     Lupus cerebritis (H)     Pancreatitis 08/2017    Pyelonephritis 08/2017    Seizures (H)     Status epilepticus (H)      Past Surgical History:   Procedure Laterality Date    IR RENAL ANGIOGRAM LEFT  8/11/2023    IR RENAL BIOPSY LEFT  6/28/2022    NO HISTORY OF SURGERY      ORTHOPEDIC SURGERY       acetaminophen (TYLENOL) 325 MG tablet  carvedilol (COREG) 25 MG tablet  cyanocobalamin (CYANOCOBALAMIN) 100 MCG tablet  ferrous sulfate (FEROSUL) 325 (65 Fe) MG tablet  folic acid (FOLVITE) 1 MG tablet  NIFEdipine ER (ADALAT CC) 60 MG 24 hr tablet  ondansetron (ZOFRAN ODT) 4 MG ODT tab  pantoprazole (PROTONIX) 40 MG EC tablet  potassium chloride ER (KLOR-CON M) 20 MEQ CR tablet  predniSONE (DELTASONE) 20 MG tablet  sodium  bicarbonate 650 MG tablet  sulfamethoxazole-trimethoprim (BACTRIM) 400-80 MG tablet  Vitamin D, Cholecalciferol, 10 MCG (400 UNIT) TABS      Allergies   Allergen Reactions    Rituximab Anaphylaxis and Shortness Of Breath     Family History  Family History   Problem Relation Age of Onset    Other - See Comments Father         Question of lupus in father and paternal grandfather    Lupus Sister         older sister    Gastrointestinal Disease No family hx of         No known history of IBD, hepatitis, pancreatitis, celiac disease, or GI cancers before age 50    Glaucoma No family hx of     Macular Degeneration No family hx of      Social History   Social History     Tobacco Use    Smoking status: Never     Passive exposure: Never    Smokeless tobacco: Never   Vaping Use    Vaping Use: Never used   Substance Use Topics    Alcohol use: Not Currently    Drug use: Never         A medically appropriate review of systems was performed with pertinent positives and negatives noted in the HPI, and all other systems negative.    Physical Exam   BP: (!) 197/141  Pulse: 86  Temp: 98.2  F (36.8  C)  Resp: 18  SpO2: 99 %  Physical Exam  Constitutional:       General: She is not in acute distress.     Appearance: She is well-developed. She is not toxic-appearing.   HENT:      Head: Normocephalic and atraumatic.      Nose: Nose normal. No congestion.      Mouth/Throat:      Mouth: Mucous membranes are moist.      Pharynx: Oropharynx is clear.   Eyes:      Extraocular Movements: Extraocular movements intact.      Pupils: Pupils are equal, round, and reactive to light.   Cardiovascular:      Rate and Rhythm: Normal rate and regular rhythm.      Heart sounds: No murmur heard.     No gallop.   Pulmonary:      Effort: Pulmonary effort is normal. No respiratory distress.      Breath sounds: No wheezing or rales.   Abdominal:      General: There is no distension.      Palpations: Abdomen is soft.      Tenderness: There is no abdominal  tenderness.   Musculoskeletal:         General: Normal range of motion.      Cervical back: Normal range of motion.      Comments: Trace lower extremity edema   Skin:     General: Skin is warm and dry.   Neurological:      General: No focal deficit present.      Mental Status: She is alert and oriented to person, place, and time.           ED Course, Procedures, & Data      Procedures                      Results for orders placed or performed during the hospital encounter of 01/02/24   XR Chest Port 1 View     Status: None    Narrative    Exam: XR CHEST PORT 1 VIEW, 1/2/2024 7:38 AM    Indication: CHF history increased shortness of breath    Comparison: Chest radiograph: 12/27/2023, 12/17/2023    Findings:   Portable frontal semiupright view of the chest. Trachea is midline. No  focal consolidations. Improving perihilar and bibasilar basilar  predominant mixed pulmonary opacities. No pneumothorax. No significant  pleural effusions bilaterally..  Stable enlarged cardiac silhouette.      Impression    Impression:   1. Improving hazy perihilar and bibasilar opacities consistent with  resolving pulmonary edema.    2. No acute cardiopulmonary findings. Cardiomegaly.    I have personally reviewed the examination and initial interpretation  and I agree with the findings.    ELAINE FIORE MD         SYSTEM ID:  [id number]   Comprehensive metabolic panel     Status: Abnormal   Result Value Ref Range    Sodium 144 135 - 145 mmol/L    Potassium 4.4 3.4 - 5.3 mmol/L    Carbon Dioxide (CO2) 19 (L) 22 - 29 mmol/L    Anion Gap 12 7 - 15 mmol/L    Urea Nitrogen 86.7 (H) 6.0 - 20.0 mg/dL    Creatinine 2.48 (H) 0.51 - 0.95 mg/dL    GFR Estimate 27 (L) >60 mL/min/1.73m2    Calcium 8.2 (L) 8.6 - 10.0 mg/dL    Chloride 113 (H) 98 - 107 mmol/L    Glucose 95 70 - 99 mg/dL    Alkaline Phosphatase 98 40 - 150 U/L    AST 36 0 - 45 U/L    ALT 70 (H) 0 - 50 U/L    Protein Total 5.3 (L) 6.4 - 8.3 g/dL    Albumin 3.0 (L) 3.5 - 5.2 g/dL     Bilirubin Total 0.3 <=1.2 mg/dL   Troponin T, High Sensitivity     Status: Abnormal   Result Value Ref Range    Troponin T, High Sensitivity 382 (HH) <=14 ng/L   HCG qualitative pregnancy (blood)     Status: Normal   Result Value Ref Range    hCG Serum Qualitative Negative Negative   Nt probnp inpatient (BNP)     Status: Abnormal   Result Value Ref Range    N terminal Pro BNP Inpatient 248,135 (H) 0 - 450 pg/mL   Lactic acid whole blood     Status: Normal   Result Value Ref Range    Lactic Acid 1.5 0.7 - 2.0 mmol/L   Procalcitonin     Status: Abnormal   Result Value Ref Range    Procalcitonin 0.65 (H) <0.50 ng/mL   CBC with platelets and differential     Status: Abnormal   Result Value Ref Range    WBC Count 9.8 4.0 - 11.0 10e3/uL    RBC Count 2.47 (L) 3.80 - 5.20 10e6/uL    Hemoglobin 8.1 (L) 11.7 - 15.7 g/dL    Hematocrit 26.6 (L) 35.0 - 47.0 %     (H) 78 - 100 fL    MCH 32.8 26.5 - 33.0 pg    MCHC 30.5 (L) 31.5 - 36.5 g/dL    RDW 19.2 (H) 10.0 - 15.0 %    Platelet Count 64 (L) 150 - 450 10e3/uL    % Neutrophils 85 %    % Lymphocytes 6 %    % Monocytes 8 %    % Eosinophils 0 %    % Basophils 0 %    % Immature Granulocytes 1 %    NRBCs per 100 WBC 0 <1 /100    Absolute Neutrophils 8.3 1.6 - 8.3 10e3/uL    Absolute Lymphocytes 0.6 (L) 0.8 - 5.3 10e3/uL    Absolute Monocytes 0.8 0.0 - 1.3 10e3/uL    Absolute Eosinophils 0.0 0.0 - 0.7 10e3/uL    Absolute Basophils 0.0 0.0 - 0.2 10e3/uL    Absolute Immature Granulocytes 0.1 <=0.4 10e3/uL    Absolute NRBCs 0.0 10e3/uL   [place] Draw     Status: None    Narrative    The following orders were created for panel order [place] Draw.  Procedure                               Abnormality         Status                     ---------                               -----------         ------                     Extra Blue Top Tube[[id number]]                              Final result                 Please view results for these tests on the individual orders.   Extra Blue  Top Tube     Status: None   Result Value Ref Range    Hold Specimen [facility]    RBC and Platelet Morphology     Status: None   Result Value Ref Range    Platelet Assessment  Automated Count Confirmed. Platelet morphology is normal.     Automated Count Confirmed. Platelet morphology is normal.    Acanthocytes      Herman Rods      Basophilic Stippling      Bite Cells      Blister Cells      Parker Cells      Elliptocytes      Hgb C Crystals      Salazar-Jolly Bodies      Hypersegmented Neutrophils      Polychromasia      RBC agglutination      RBC Fragments      Reactive Lymphocytes      Rouleaux      Sickle Cells      Smudge Cells      Spherocytes      Stomatocytes      Target Cells      Teardrop Cells      Toxic Neutrophils      RBC Morphology Confirmed RBC Indices    EKG 12-lead, tracing only     Status: None   Result Value Ref Range    Systolic Blood Pressure  mmHg    Diastolic Blood Pressure  mmHg    Ventricular Rate 84 BPM    Atrial Rate 84 BPM    OH Interval 162 ms    QRS Duration 74 ms     ms    QTc 463 ms    P Axis 61 degrees    R AXIS -31 degrees    T Axis 153 degrees    Interpretation ECG       Sinus rhythm  Possible Left atrial enlargement  Left axis deviation  Septal infarct , age undetermined  Abnormal ECG  Unconfirmed report - interpretation of this ECG is computer generated - see medical record for final interpretation  Confirmed by - EMERGENCY ROOM, PHYSICIAN ([id number]),  MARCO A RIZZO ([id number]) on 1/2/2024 8:32:34 AM     CBC with platelets differential     Status: Abnormal    Narrative    The following orders were created for panel order CBC with platelets differential.  Procedure                               Abnormality         Status                     ---------                               -----------         ------                     CBC with platelets and d...[[id number]]  Abnormal            Final result               RBC and Platelet Morphology[[id number]]                      Final result                  Please view results for these tests on the individual orders.     Medications   ceFEPIme (MAXIPIME) 2 g vial to attach to  mL bag for ADULTS or 50 mL bag for PEDS (0 g Intravenous Stopped 1/2/24 1010)   vancomycin (VANCOCIN) 1,250 mg in 0.9% NaCl 250 mL intermittent infusion (1,250 mg Intravenous $New Bag 1/2/24 0949)   vancomycin place abdullahi - receiving intermittent dosing (has no administration in time range)   morphine (PF) injection 2 mg (2 mg Intravenous $Given 1/2/24 0755)   ondansetron (ZOFRAN) injection 4 mg (4 mg Intravenous $Given 1/2/24 0756)   HYDROmorphone (PF) (DILAUDID) injection 0.5 mg (0.5 mg Intravenous $Given 1/2/24 0948)   furosemide (LASIX) injection 40 mg (40 mg Intravenous $Given 1/2/24 0949)   metoclopramide (REGLAN) injection 5 mg (5 mg Intravenous $Given 1/2/24 1011)     Labs Ordered and Resulted from Time of ED Arrival to Time of ED Departure   COMPREHENSIVE METABOLIC PANEL - Abnormal       Result Value    Sodium 144      Potassium 4.4      Carbon Dioxide (CO2) 19 (*)     Anion Gap 12      Urea Nitrogen 86.7 (*)     Creatinine 2.48 (*)     GFR Estimate 27 (*)     Calcium 8.2 (*)     Chloride 113 (*)     Glucose 95      Alkaline Phosphatase 98      AST 36      ALT 70 (*)     Protein Total 5.3 (*)     Albumin 3.0 (*)     Bilirubin Total 0.3     TROPONIN T, HIGH SENSITIVITY - Abnormal    Troponin T, High Sensitivity 382 (*)    NT PROBNP INPATIENT - Abnormal    N terminal Pro BNP Inpatient 248,135 (*)    PROCALCITONIN - Abnormal    Procalcitonin 0.65 (*)    CBC WITH PLATELETS AND DIFFERENTIAL - Abnormal    WBC Count 9.8      RBC Count 2.47 (*)     Hemoglobin 8.1 (*)     Hematocrit 26.6 (*)      (*)     MCH 32.8      MCHC 30.5 (*)     RDW 19.2 (*)     Platelet Count 64 (*)     % Neutrophils 85      % Lymphocytes 6      % Monocytes 8      % Eosinophils 0      % Basophils 0      % Immature Granulocytes 1      NRBCs per 100 WBC 0      Absolute Neutrophils 8.3       Absolute Lymphocytes 0.6 (*)     Absolute Monocytes 0.8      Absolute Eosinophils 0.0      Absolute Basophils 0.0      Absolute Immature Granulocytes 0.1      Absolute NRBCs 0.0     HCG QUALITATIVE PREGNANCY - Normal    hCG Serum Qualitative Negative     LACTIC ACID WHOLE BLOOD - Normal    Lactic Acid 1.5     RBC AND PLATELET MORPHOLOGY    Platelet Assessment        Value: Automated Count Confirmed. Platelet morphology is normal.    Acanthocytes        Herman Rods        Basophilic Stippling        Bite Cells        Blister Cells        Parker Cells        Elliptocytes        Hgb C Crystals        Salazar-Jolly Bodies        Hypersegmented Neutrophils        Polychromasia        RBC agglutination        RBC Fragments        Reactive Lymphocytes        Rouleaux        Sickle Cells        Smudge Cells        Spherocytes        Stomatocytes        Target Cells        Teardrop Cells        Toxic Neutrophils        RBC Morphology Confirmed RBC Indices       XR Chest Port 1 View   Final Result   Impression:    1. Improving hazy perihilar and bibasilar opacities consistent with   resolving pulmonary edema.      2. No acute cardiopulmonary findings. Cardiomegaly.      I have personally reviewed the examination and initial interpretation   and I agree with the findings.      ELAINE FIORE MD            SYSTEM ID:  [id number]             Critical care was not performed.     Medical Decision Making  The patient's presentation was of high complexity (an acute health issue posing potential threat to life or bodily function).    The patient's evaluation involved:  review of external note(s) from 3+ sources (outside ED note, hospitalist note, cardiology note)  review of 3+ test result(s) ordered prior to this encounter (cbc, cmp, troponin, bnp, echo, CXR)  ordering and/or review of 3+ test(s) in this encounter (see separate area of note for details)  discussion of management or test interpretation with another health professional  (cardiology, hospitalist)    The patient's management necessitated high risk (a decision regarding hospitalization).    Assessment & Plan    23-year-old female with history of lupus presenting with increased shortness of breath and chest pain.  She had been admitted to an outside facility for the symptoms and was found to have a CHF exacerbation as well as COVID and suspected pneumonia.  There was concern that the CHF exacerbation could be related to medications or lupus or her colon.  She had left after awaiting for transfer to a facility with cardiology and rheumatology.  She was hypertensive here but otherwise hemodynamically stable and on room air satting well.    Blood work was obtained and showed significantly elevated BNP as well as troponin of 382.  EKG here showed sinus rhythm without ST elevation or acute ischemic finding. Chest x-ray showed some improvement in her edema.  Creatinine was elevated at 2.48 which is similar to where it has been at the outside facility.  She is still producing urine    I discussed the patient with cardiology staff.  As it is not clear if her CHF exacerbation is secondary to other causes such as medication or lupus or COVID and her other ongoing medical issues including suspected pneumonia they recommended she be admitted to medicine and they will see her as a consult.  I discussed the patient with the hospitalist and the patient was admitted to medicine.    I have reviewed the nursing notes. I have reviewed the findings, diagnosis, plan and need for follow up with the patient.    New Prescriptions    No medications on file       Final diagnoses:   Acute on chronic congestive heart failure, unspecified heart failure type (H)   COVID-19   Lupus (H)       Guerrero Polanco  [facility] EMERGENCY DEPARTMENT  1/2/2024     Guerrero Polanco MD  01/02/24 1020       Guerrero Polanco MD  01/02/24 1021

## 2024-01-03 NOTE — PHARMACY-VANCOMYCIN DOSING SERVICE
Pharmacy Vancomycin Note  Date of Service January 3, 2024  Patient's  2000   23 year old, female    Indication: Community Acquired Pneumonia  Day of Therapy: 2  Current vancomycin regimen:  Intermittent dosing (CHRISTA)  Current vancomycin monitoring method: Trough (Method 2 = manual dose calculation)  Current vancomycin therapeutic monitoring goal: 15-20 mg/L    Current estimated CrCl = Estimated Creatinine Clearance: 30.1 mL/min (A) (based on SCr of 2.77 mg/dL (H)).    Creatinine for last 3 days  2024:  8:00 AM Creatinine 2.48 mg/dL  1/3/2024:  6:49 AM Creatinine 2.77 mg/dL    Recent Vancomycin Levels (past 3 days)  1/3/2024:  9:27 AM Vancomycin 26.1 ug/mL    Vancomycin IV Administrations (past 72 hours)                     vancomycin (VANCOCIN) 1,250 mg in 0.9% NaCl 250 mL intermittent infusion (mg) 1,250 mg New Bag 24 0949                    Nephrotoxins and other renal medications (From now, onward)      Start     Dose/Rate Route Frequency Ordered Stop    24 1300  vancomycin (VANCOCIN) capsule 125 mg         125 mg Oral DAILY 24 1209      24 1000  vancomycin place abdullahi - receiving intermittent dosing         1 each Intravenous SEE ADMIN INSTRUCTIONS 24 0902                 Contrast Orders - past 72 hours (72h ago, onward)      Start     Dose/Rate Route Frequency Stop    24 1425  perflutren diluted in saline (DEFINITY) injection 7 mL         7 mL Intravenous ONCE 24 1420            Interpretation of levels and current regimen:  Vancomycin level is reflective of supratherapeutic level    Has serum creatinine changed greater than 50% in last 72 hours: No    Urine output:  diminished urine output    Renal Function: Worsening    Plan:  Continue with Intermittent dosing based on random levels ; No dose today  Vancomycin monitoring method: Trough (Method 2 = manual dose calculation)  Vancomycin therapeutic monitoring goal: 15-20 mg/L  Pharmacy will check vancomycin  levels as appropriate in 1-3 Days.  Serum creatinine levels will be ordered daily for the first week of therapy and at least twice weekly for subsequent weeks.    Hien Stinson RPH

## 2024-01-03 NOTE — PROGRESS NOTES
Medicine cross coverage note  -Unfortunately the patient does not have accurate input and output evaluation.  I reviewed the note from Dr. Rama Monge who recommended against diuresing the patient unless the blood pressure is above 150.  At this point, systolic blood pressure is 117.  I will hold off on any further diuretics for now.

## 2024-01-03 NOTE — PLAN OF CARE
Status 9075-8537:    BP (!) 138/91 (BP Location: Left arm)   Pulse 72   Temp 97.8  F (36.6  C) (Oral)   Resp 20   Wt 60.2 kg (132 lb 12.8 oz)   LMP 12/11/2023 (Approximate)   SpO2 100%   BMI 23.90 kg/m      A&Ox4. Up SBA in room to bedside commode. VSS, afebrile. RUE +2-3 edema, swelling slowly improving. Lidocaine patches applied to R forearm and upper arm for discomfort. Remains on IV abx. 1.5L FR. Able to make needs known.

## 2024-01-03 NOTE — PROGRESS NOTES
[id number]. Can we get a prn oxycodone order for pain as pt is c/o RUE pain, please? We just trying to stay away from Iv Dilaudid since oxycodone works.  Thank you  Jb RN, ED  Paged: Dr. Hicks, katy @[phone]  Order received for PO Dilaudid instead.

## 2024-01-03 NOTE — PROGRESS NOTES
[facility]    Medicine Progress Note - Hospitalist Service, GOLD TEAM 11    Date of Admission:  1/2/2024    Assessment & Plan   Milly Carroll is a 23 year old female admitted on 1/2/2024. She has a PMHx of lupus C/B lupus flares, lupus cerebritis, lupus nephritis, HTN, chronic systolic heart failure, QT prolongation, past perinephric hematoma s/p emobolization in Aug-2023 and chronic thrombocytopenia admitted to [facility] on 12/17/23 (after presentation for SOB/NSTEMI and pneumonia concern and concern for lupus flare where she was treated w/ steroids and plasma exchange) and transfer to [facility] on 12/18/23 before being transferred to [facility] on 12/22-12/25 for cyclophosphamide infusion. Represented on 12/26 [facility] ED for SOB found to have covid and reduced EF (baseline EF 47%, OSH EF reported as 15-20%) w/ plan to transfer patient to [facility] however patient left AMA on 12/30 due to prolonged wait for transfer to [facility] ED. On presentation 1/2, patient reporting continued worsening CP/SOB since her discharge on 12/30.     Changes Today  - Cr increasing despite diuresis, will plan to discuss diuretic regimen with cardiology and nephrology. Their assistance is greatly appreciated.  - Continue strict I/Os, daily weights. Appreciate nursing.  - Discontinue vancomycin with negative MRSA nares  - Plan to complete 7 day total course of IV abx (end date ordered for 1/4) targeting possible HAP   - Continue ppx PO vanco for 5 days following completion of abx  - Stop scheduled K supplementation, BID BMP on 1/3 with concern for rising K  - VQ scan with indeterminate defect, no chest discomfort or hypoxia and contrast administration higher risk than benefit at this point  - Hematology consulted for further input on possible hemolysis per rheum recs     Acute Chest pain w/ SOB 2/2 hypertensive crisis  Acute on Chronic Cardiomyopathy  and HF (EF 15-20% on 12/26 OSH)  Elevated BNP w/ hypervolemia  Elevated Troponin   Uncontrolled HTN  Hx of QTc prolongation (463 on admission 1/2)  Moderate to severe mitral insufficiency   Severe tricuspid insufficiency   Echocardiogram on 17-Dec-2023 showed LVEF 35-40%, EF decreased to 15-20% on 12/26 w/ BNP at that time >70,000. Lexiscan 12/21 negative for inducible ischemia. Admission workup 1/2 included trop elevated at 290 with peak at 469 on 1/2. TTE 1/2 with severe diffuse hypokinesis with stably reduced EF. Cardiology consulted with recs as below.   - Gen Cardiology consulted, appreciate recs   - Resume carvedilol   - Diuresis as able from nephrology perspective (Cr up to 2.56 on 1/3)  [ ] Will need cMRI during admission once fluid status improved  - continue telemetry  - s/p IV lasix 40mg in ED w/ poor response reported, repeat w/ 80mg IV lasix  - Nephrology consulted, appreciate recs  - Start nifedipine 30mg qday (reduced from PTA 60mg per nephrology)  - Next dose cyclophosphamide due on 1/5  - No current plans for renal bx  - prn hydralazine, goal -160/100 for now, avoiding hypotension  - strict I/Os, daily weights, trend electrolytes, Fluid restriction 1.5L   - pain: tylenol prn, dilaudid q6h prn, lidocaine patch  - Follow pending VQ scan as previously ordered    SLE (+dsDNA, +RNP, +ribosomal P Ab and hypocomplementemia)   Recent Lupus Flare w/ MAHA and renal insuff s/p Cyclophosphamide dose (12/22/23)  Hx of Lupus cerebritis w/ hx of seizures  C/b Lupus Nephritis  Hx medication non-adherence  - Rheumatology consulted- low concern for active lupus flare given recent high dose steroids and cyclophosphamide   - Nephrology Consulted as above  - pending C3/C4, dsDNA, CRP (trend daily)  - Lupus-focused meds:   - resume prednisone 60mg qday  - PJP ppx: continue PTA bactrim  - GI ppx: continue protonix qday, tums prn  [ ] Due for 5th dose cyclophosphamide per [facility] protocol 1/5 (ongoing discussion  w/ rheum/nephrology prior to administration)     COVID infxn (dx 12/29) s/p remdesivir at OSH  Concern for secondary pneumonia  Hx of C.diff infxn (6/2023)  Concern for superimposed PNA per OSH and was on vanc/cefepime 12/26-12/30 prior to AMA leave. No cough or fever here. Will plan to complete 7 day course with cefepime for HAP. Stop vanco with negative MRSA nares.   - on RA  - s/p remdesivir tx completion 12/29-12/30, previously on Solumedrol for COVID  - Discontinue Vanc  - Continue Cefepime (1/2-1/4) to complete 7 day total course  - ppx: Continue PO vancomycin qday while on systemic abx (end date entered for 5 days following completion of abx per [facility] clinical guidance)     Lupus Nephritis w/ CHRISTA   Metabolic Acidosis  Hypomagnesemia- improved  Hypokalemia -improved  - Nephrology consulted   - BP and HF regimen as above  - Continue sodium bicarbonate 650mg TID-- will plan to discuss with nephrology  - Hold PTA Potassium supplement with worsening Cr, uptrending K  - Renal diet/fluid restriction as above     Chronic RUE edema  Superficial clot of R cephalic vein  US 8/2023 negative. RUE doppler negative for DVT on 1/2, significant for superficial thrombus.  - Sx mgmt with heat packs     Chronic thrombocytopenia  Chronic Macrocytic Anemia  - trend daily CBC  - continue PTA folate/B12 replacement  - hemolysis eval: checking LDH and sven-smear and haptoglobin, UA, retic labs  - DVT ppx: heparin subcutaneous decrease dose per pharmacy in setting of chronic thrombocytopenia and renal dysfunction  - Hematology consulted  - Peripheral smear pending  - Fibrinogen pending     Transaminitis   - daily CMP      Hx of Intermittent Headaches  History of being responsive to APAP and dilaudid along w/ treatment of HTN and Lupus. NO NSAIDS     Hx pancreatic insufficiency 2/2 pancreatitis  Hx acute on chronic pancreatitis (2020)   Last followed with GI in 2020 for pancreatitis and multiple stones and debris in the main pancreatic  duct and was recommended to be on Creon at that time. Stools described as normal.      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 No current PTA antiepileptic medications          Diet: Fluid restriction 1500 ML FLUID  Renal Diet (non-dialysis)    DVT Prophylaxis: Heparin SQ  Leung Catheter: Not present  Lines: None     Cardiac Monitoring: ACTIVE order. Indication: Acute decompensated heart failure (48 hours)  Code Status: Full Code      Clinically Significant Risk Factors        # Hyperkalemia: Highest K = 7.1 mmol/L in last 2 days, will monitor as appropriate       # Hypoalbuminemia: Lowest albumin = 3 g/dL at 1/2/2024  8:00 AM, will monitor as appropriate   # Thrombocytopenia: Lowest platelets = 58 in last 2 days, will monitor for bleeding      # Acute heart failure with reduced ejection fraction: last echo with EF <40% and receiving IV diuretics           # Financial/Environmental Concerns:           Disposition Plan     Expected Discharge Date: 01/06/2024                    Abril Chapman MD  Hospitalist Service, [address]  Securely message with [employer] (more info)  Text page via [facility] Paging/Directory   See signed in provider for up to date coverage information  ______________________________________________________________________    Interval History   Nursing notes reviewed. Patient denies chest pain and shortness of breath. Significant improvements with regimen as above. Pain noted to be controlled with PO opioids. No additional concerns. Endorsed urination with lasix 80 mg dose, however, unclear how much.    Physical Exam   Vital Signs: Temp: 97.7  F (36.5  C) Temp src: Oral BP: (!) 142/100 Pulse: 68   Resp: 18 SpO2: 100 % O2 Device: None (Room air)    Weight: 132 lbs 12.8 oz    General: alert, awake,  oriented and conversant,   HEENT: EOMI w/o scleral icterus,  pleasant  Resp: Lungs CTAB from the anterior, no increased WOB, on RA  Cards: regular rate and rhythm, w/o murmurs/rubs/gallops, trace LE edema but + RUE edema (chronic per documentation)  GI: soft, nontender, nondistended, +BS  Skin: warm and well perfused  Neuro/MSK: moving all 4 extremities equally    Medical Decision Making       60 MINUTES SPENT BY ME on the date of service doing chart review, history, exam, documentation & further activities per the note.      Data     I have personally reviewed the following data over the past 24 hrs:    6.1  \   8.4 (L)   / 58 (L)     142 110 (H) 96.9 (H) /  142 (H)   5.3 17 (L) 2.77 (H) \     ALT: 63 (H) AST: 26 AP: 106 TBILI: 0.4   ALB: 3.2 (L) TOT PROTEIN: 5.8 (L) LIPASE: N/A     Trop: 438 (HH) BNP: N/A     Procal: 2.15 (H) CRP: 22.50 (H) Lactic Acid: 1.5       INR:  N/A PTT:  N/A   D-dimer:  1.18 (H) Fibrinogen:  N/A     Ferritin:  N/A % Retic:  3.1 (H) LDH:  N/A       Imaging results reviewed over the past 24 hrs:   Recent Results (from the past 24 hour(s))   Echo Complete   Result Value    LVEF  15-20% (severely reduced)    Narrative    [id number]  [id number]  [id number]  [id number]^SHANTEL^LASHA     [facility]  Echocardiography Laboratory  [address] [place] [id number]     Name: MIKE JOSE  MRN: [id number]  : 2000  Study Date: 2024 01:14 PM  Age: 23 yrs  Gender: Female  Patient Location: [facility]  Reason For Study: Heart Failure  Ordering Physician: LASHA FUNES  Performed By: Anusha Carrillo     BSA: 1.6 m2  Height: 62 in  Weight: 132 lb  BP: 187/147 mmHg  ______________________________________________________________________________  Procedure  Complete Portable Echo Adult. Contrast Definity. Definity (NDC #54323-281-27)  given intravenously. Patient was given 7ml mixture of 1.5ml Definity and 8.5ml  saline. 3 ml  wasted.  ______________________________________________________________________________  Interpretation Summary  Left ventricular function is decreased. The ejection fraction is 15-20%  (severely reduced). Severe diffuse hypokinesis is present. Moderate to severe  left ventricular dilation is present.  The right ventricle is normal size. Global right ventricular function is  mildly to moderately reduced.  Moderate to severe mitral insufficiency is present.  Severe tricuspid insufficiency is present.  Mild to moderate aortic insufficiency is present.  The estimated PA systolic pressure is 65 mmHg.  Trivial pericardial effusion is present. Chamber compression is not present;  there is no evidence for tamponade.  IVC diameter >2.1 cm collapsing <50% with sniff suggests a high RA pressure  estimated at 15 mmHg or greater.  This study was compared with the study from 12/27/2023. No significant changes  noted.  ______________________________________________________________________________  Left Ventricle  Left ventricular wall thickness is normal. Moderate to severe left ventricular  dilation is present. Left ventricular function is decreased. The ejection  fraction is 15-20% (severely reduced). Left ventricular diastolic function is  abnormal. Severe diffuse hypokinesis is present.     Right Ventricle  The right ventricle is normal size. Global right ventricular function is  mildly to moderately reduced.     Mitral Valve  Moderate to severe mitral insufficiency is present.     Aortic Valve  The aortic valve is tricuspid. Mild to moderate aortic insufficiency is  present.     Tricuspid Valve  Severe tricuspid insufficiency is present. The right ventricular systolic  pressure is approximated at 51.2 mmHg plus the right atrial pressure.     Pulmonic Valve  Mild pulmonic insufficiency is present.     Vessels  The aorta root is normal. The thoracic aorta is normal. IVC diameter >2.1 cm  collapsing <50% with sniff suggests a  high RA pressure estimated at 15 mmHg or  greater.     Pericardium  Trivial pericardial effusion is present. Chamber compression is not present;  there is no evidence for tamponade.     Compared to Previous Study  This study was compared with the study from 2023 . No significant  changes noted.     ______________________________________________________________________________  MMode/2D Measurements & Calculations  IVSd: 1.0 cm  LVIDd: 6.2 cm  LVPWd: 1.2 cm     LV mass(C)d: 298.6 grams  LV mass(C)dI: 186.4 grams/m2  RWT: 0.37     Doppler Measurements & Calculations  TR max herminia: 357.9 cm/sec  TR max P.2 mmHg     ______________________________________________________________________________  Report approved by: Maureen Woo 2024 02:39 PM         US Upper Extremity Venous Duplex Right    Narrative    EXAMINATION: DOPPLER VENOUS ULTRASOUND OF THE RIGHT UPPER EXTREMITY,  2024 9:46 PM     COMPARISON: 2023    HISTORY: Right Upper extremity increased swelling    TECHNIQUE:  Gray-scale evaluation with compression, spectral flow and  color Doppler assessment of the deep venous system of the right upper  extremity.    FINDINGS:  Right: Normal blood flow and waveforms are demonstrated in the  internal jugular, innominate, subclavian, and axillary veins. There is  normal compressibility of the brachial and basilic veins. The right  cephalic vein at the level of the antecubital fossa is noncompressible  with visible echogenic thrombus. The right cephalic vein at the level  of the upper arm is partially compressible. Right cephalic vein at the  level of the forearm is fully compressible.    Superficial soft tissue edema, moderate, most pronounced at the  antecubital fossa.      Impression    IMPRESSION:  1.  No evidence of right upper extremity deep venous thrombosis.  2.  Occlusive and nonocclusive superficial venous thrombus within the  right cephalic vein from the level of the upper arm to the  antecubital  fossa.  3.  Moderate superficial soft tissue edema of the right upper  extremity.    I have personally reviewed the examination and initial interpretation  and I agree with the findings.    JUSTIN DO MD         SYSTEM ID:  [id number]

## 2024-01-03 NOTE — ED NOTES
"Vital signs:  Temp: 97.7  F (36.5  C) Temp src: Oral BP: 125/87 Pulse: 65   Resp: 22 SpO2: 95 % O2 Device: None (Room air)        Estimated body mass index is 23.76 kg/m  as calculated from the following:    Height as of 12/27/23: 1.588 m (5' 2.5\").    Weight as of 12/27/23: 59.9 kg (132 lb).    Neuro: A&Ox4. Pt had an uneventful night, slept well. Pt fired sepsis, lactate came back normal, Troponin is trending down gradually.  Cardiac: SR with an inverted T, Afebrile, VSS.   Respiratory: sating well on RA  GI/: Active BS, passing flatus, and voiding spontaneously. No BM this shift.  Diet/appetite: Tolerating renal diet, 1.5LFR. Denies nausea.  Activity: Up with assist x1   Pain: RUE pain, po Dilaudid  Skin: RUE edema. No new deficits noted.  Lines: PIV x 1, L SL'D  Continue with current POC, notify Team with any change in status as needed          "

## 2024-01-03 NOTE — PROGRESS NOTES
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 Admitted on 1/2/2024 for SOB and chest pain thought to be 2/2 decreased EF of 15-20 and resulting volume overload.        #SLE (+dsDNA, +RNP, +ribosomal P Ab and hypocomplementemia)  #Lupus flare with MAHA, renal insufficiency (multifactorial) concern for lupus nephritis  #H/o Lupus cerebritis ?, h/o seizures  #Bicytopenia    Does not appear to be in lupus flare. Current sx can likely be explained by all volume overload and reduced ejection fraction.  Given the fact that she was recently discharged on high-dose steroids and received cyclophosphamide, it would be unusual for her to have a lupus flare, especially a lupus flare focused around heart specifically.      Unclear etiology of heart failure acutely at the current time with possibilities including COVID myocarditis, stress induced, cyclophosphamide toxicity or lupus related.     Elevated LDH and decreased haptoglobin are concerning for an ongoing hemolysis.  At the same time, LDH can also be elevated in muscle injury so we will add CK to discern the nature of LDH elevation.     CRP elevation is likely related to the infection (COVID) rather than a lupus flare. Stable C3/C4 and dsDNA are reassuring.     Recommendations:  - Continue prednisone 60mg daily  - Please continue to trend C3/C4, dsDNA, CRP  - Continue bactrim three time weekly for PJP ppx    - Peripheral smear pending; if features of ongoing hemolysis, consider hematology evaluation for any modifications of her regimen given the patient is  already on cyclophosphamide and high dose steroids and that her lupus markers have been stable.    - Recommend to order CK to rule out muscle injury (ordered for you)    - Continue to trend labs (CBC, Creatinine, AST daily) and haptoglobin, LDH and smear every other day     - Due for 5th dose of cyclophosphamide per [facility] protocol this coming Saturday;  appreciate renal coordinating. Further discussion are ongoing regarding concern for cyclophosphamide induced cardiotoxicity.        Social history: Her dad  during . She moved in with her aunt shortly before this admission. She completed high school but no postsecondary education. No consistent employment. Declined by multiple Home health. Per discussion during last admission aunt Shira would have been a primary caregiver.       Patient was seen and discussed with the Attending Physician, Dr. Montanez .    Parrish Lantigua DO,  Internal Medicine PGY-2  [facility]  Securely message with [email or web address] (more info)  Text page via [facility] Paging/Directory   See signed in provider for up to date coverage information      Staff addendum    I performed the history and physical examination of the patient and discussed the management with the resident.I have reviewed the patients history as well as the available lab and imaging studies. I reviewed the resident s note and agree with the documented findings and plan of care     Kamila Montanez MD  Rheumatology attending      45 MINUTES SPENT BY ME on the date of service doing chart review, history, exam, documentation & further activities per the note.      ______________________________________________________________________    Interval History   NAEO. Denies chest pain now and states SOB has improved.    Physical Exam   Vital Signs: Temp: 98  F (36.7  C) Temp src: Oral BP: (!) 145/98 Pulse: 80   Resp: 19 SpO2: 100 % O2 Device: None (Room air)    Weight: 0 lbs 0  oz    General Appearance:  Fatigued, laying in bed  Respiratory:  on room air, normal work of breathing, crackles in TAMMY and RLL  Cardiovascular: regular rate and rhythm  GI: soft, nontender  Skin: warm, no new lesions or rashes, 1-2cm healing bruise on the abdomen and right forearm   Other:  Joints nontender and nonerhythematous, RUE swollen    Medical Decision Making             Data     I have personally reviewed the following data over the past 24 hrs:    6.1  \   8.4 (L)   / 58 (L)     142 110 (H) 96.9 (H) /  142 (H)   5.3 17 (L) 2.77 (H) \     ALT: 63 (H) AST: 26 AP: 106 TBILI: 0.4   ALB: 3.2 (L) TOT PROTEIN: 5.8 (L) LIPASE: N/A     Trop: 438 (HH) BNP: N/A     Procal: 2.15 (H) CRP: 22.50 (H) Lactic Acid: 1.5       INR:  N/A PTT:  N/A   D-dimer:  1.18 (H) Fibrinogen:  N/A     Ferritin:  N/A % Retic:  3.1 (H) LDH:  N/A       Imaging results reviewed over the past 24 hrs:   Recent Results (from the past 24 hour(s))   US Upper Extremity Venous Duplex Right    Narrative    EXAMINATION: DOPPLER VENOUS ULTRASOUND OF THE RIGHT UPPER EXTREMITY,  1/2/2024 9:46 PM     COMPARISON: 8/17/2023    HISTORY: Right Upper extremity increased swelling    TECHNIQUE:  Gray-scale evaluation with compression, spectral flow and  color Doppler assessment of the deep venous system of the right upper  extremity.    FINDINGS:  Right: Normal blood flow and waveforms are demonstrated in the  internal jugular, innominate, subclavian, and axillary veins. There is  normal compressibility of the brachial and basilic veins. The right  cephalic vein at the level of the antecubital fossa is noncompressible  with visible echogenic thrombus. The right cephalic vein at the level  of the upper arm is partially compressible. Right cephalic vein at the  level of the forearm is fully compressible.    Superficial soft tissue edema, moderate, most pronounced at the  antecubital fossa.      Impression    IMPRESSION:  1.  No evidence of right upper extremity  deep venous thrombosis.  2.  Occlusive and nonocclusive superficial venous thrombus within the  right cephalic vein from the level of the upper arm to the antecubital  fossa.  3.  Moderate superficial soft tissue edema of the right upper  extremity.    I have personally reviewed the examination and initial interpretation  and I agree with the findings.    JUSTIN DO MD         SYSTEM ID:  [id number]

## 2024-01-03 NOTE — PROGRESS NOTES
Medicine cross coverage note  -I ordered VQ scan given abnormal D-dimer based on the recommendations of my colleague  on signout.  Will avoid contrast study.  -Troponin is trending down

## 2024-01-03 NOTE — PROGRESS NOTES
RUE swollen, cool to touch, per pt arm is more numb, tingling and more pain and more swollen. MD paged, Oxy ordered x1. Doppler pulses performed- brachial and radial-present. US paged to do STAT RUE- US. MD updated.

## 2024-01-03 NOTE — PROVIDER NOTIFICATION
"Provider notified: Jeffrey Cardoza via [employer] text    \"Just wanted to make sure that you would like me to give pt's AM potassium though she was 5.3 and not currently diuresing?\"    Deferred to day team- hold AM dose per MD, scheduled potassium now d/c'd.   "

## 2024-01-03 NOTE — PROGRESS NOTES
Brief Cardiology Progress Note    Patient's volume status appears improved, but still has JVD on exam. BP much better controlled today. Appreciate nephrology and rheumatology following along. Creatinine has worsened. There are no clear I/O/weights, but patient states that she was urinating every 30 minutes with the 2nd dose of lasix. Ideally would want to diurese more given her exam, however we understand that her kidney disease may preclude us from further diuresis. If nephrology would like to hold diuresis for today, that would be ok as well as she is breathing comfortably on room air. Please continue to have a fluid restricted diet. We will continue to follow.    Guerrero Mcgarry MD  Cardiology Fellow    Physician Attestation   I saw this patient with the resident and agree with the resident/fellow's findings and plan of care as documented in the note.    Key findings:   Prominent neck veins from tricuspid regurgitation. Reduced BS R base. No edema  Hold diuretics today  Tea Luz MD  Date of Service (when I saw the patient): January 3, 2024

## 2024-01-03 NOTE — PROGRESS NOTES
Brief Hospital Medicine Note:     Paged by RN with worsening pain and swelling of right arm as well as numbness and tingling and hand felt cool. No other neurological changes. Doppler pulses present. Asked RN to have US done stat. Preliminary read of US without DVT, but noted occlusive and nonocclusive superficial venous thrombus of right cephalic from level of upper arm to antecubital fossa. Given no DVT, held off therapeutic dosing, but have ordered PRN doppler and recommended continued neurovascular assessment.     Maria Luisa Melgar PA-C  Hospitalist Service  Contact information available via [facility] Paging/Directory

## 2024-01-04 NOTE — PROGRESS NOTES
Nephrology Progress Note  01/04/2024         Assessment & Recommendations:     Milly Carroll is a 23 year old year old female  Assessment & Plan  Milly Carroll is a 23 year old female admitted on 1/2/2024. 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 She was admitted with chest pain and shortness of breath, found to be COVID positive, w/ severely decreased global cardiac function on echo, and requiring multiple doses of IV diuretics due to volume overload.     Recommendations:  - Hold nifedipine XL  - Decrease Coreg to 12.5 mg BID  - Start torsemide 20 mg PO, assess for response, aim for slow diuresis and likely will discharge w/ torsemide  - Discussed w/ rheumatology, recommend to postpone cyclophosphamide by 1 week due to COVID infection  - Continue prednisone 60 mg daily   - Daily BMP  - Daily standing weights, strict I&Os   - Consider repeat lupus anticoagulant panel (has hematuria, concerns for superficial RUE clots and possible PE, may have an procoagulant state predisposing to renal infarct but does not have flank pain)     #Lupus nephritis, class IV  #SLE  #Chronic kidney disease, stage IV (Cystatin C/creatinine discordance)  # Acute kidney injury  Patient's creatinine was 3.48 on 12/30,decreased to 2.48 on admission at [facility] 1/2/2024  which again raised to 2.77 -> 2.97 on 1/3/2023 and has increased again to 3.21 on 01/05. Does continue to appear hypervolemic on examination. CHRISTA may likely represent poor renal perfusion in the setting of volume overload ,cardiorenal syndrome and diuresis w/ subsequent rapid drop in BP. Suspect rapid change in hemodynamics is the most likely cause. Lupus flare less likely given improving complement and dsDNA levels and  current treatment with steroids and recent treatment w/ cyclophosphamide.  It is reassuring that she is making good urine, although will require close monitoring of intake and output moving forward.  At this time, given her known COVID infection, there would be significant risk with adding additional immunosuppressants at this time. Has been on hydroxychloroquine in the past, but this is currently held due to QTC prolongation.     #Hypertension  #Acute on chronic systolic heart failure, stress cardiomyopathy  Most likely cause of her hypertension is volume overload, particularly given hypervolemic appearance on physical examination, significantly elevated BNP from baseline, signs of pulmonary edema on CXR. Notably, her blood pressures improved rapidly following diuresis and administration of her home dose of nifedipine. However, there is concern for correcting her BP too quickly.  Today her fluid status appear better.Saturating well with air. No crackles.    #. Metabolic acidosis, chronic  Agree with continuing NaHCO3 650 mg TID.     Recommendations were communicated to primary team via [employer]. Discussed w/ patient.     Seen and discussed with Dr. Mariposa PEDROZA MD   Nephrology Teaching Service  [facility]  [facility]      Interval History :   Nursing and provider notes from last 24 hours reviewed.  Overnight, Milly continues to have pain in her RUE that has required pain medications. Her chest pain has resolved this morning. She does not feel that she is overall swollen. Her lupus flares are usually associated with the rash on her face, and she feels that her face is a little red due to the warm room but does not feel like she has a current lupus flare. She has not been vomiting.     Physical Exam:   I/O last 3 completed shifts:  In: 808 [P.O.:808]  Out: 600 [Urine:600]   BP (!) 142/101   Pulse 78   Temp 97.7  F (36.5  C) (Oral)   Resp 21   Wt 63.6 kg (140 lb 3.4 oz)   LMP  12/11/2023 (Approximate)   SpO2 100%   BMI 25.24 kg/m       General: Laying in bed, no acute distress. Interacts appropriately.   HENT: Normocephalic, atraumatic. Pupils reactive to light, EOM grossly intact. Moist mucous membranes.   Neck: Elevated JVP.   Cardiovascular: Regular rate and rhythm. Intact DP/PT and bilateral radial pulses.   Respiratory: Crackles on lung exam are significantly improved from prior. No increased work of breathing.   Abdomen: Soft, non-tender, non-distended.    Neurologic: Alert. Oriented. Moves all extremities equally.   Musculoskeletal: RUE w/ 2+ pitting edema and appears tense but does not have compartmental tenderness, has intact sensation and movement. 1+ bilateral lower extremity edema, slightly worse in the right leg.   Skin: No rashes on visualized skin; she has some redness of the face on the bilateral cheeks but no clear malar rash. No signs of pallor or cyanosis. Warm and dry.   Psych: Flat affect.     Labs:   All labs reviewed by me  Electrolytes/Renal -   Recent Labs   Lab Test 01/04/24  0534 01/03/24  1602 01/03/24  0649 12/28/23  0623 12/27/23  0001 12/25/23  0442 12/24/23  1605 12/24/23  0943 12/23/23  1123 12/23/23  0409 12/20/23  0415 12/19/23  2335    140 142   < > 141 144  --  144  --  146*   < > 140   POTASSIUM 4.6 4.9 5.3   < > 5.6* 3.1*  3.1*   < > 3.4   < > 3.0*  3.0*   < > 3.6   CHLORIDE 108* 107 110*   < > 109* 110*  --  111*  --  113*   < > 106   CO2 16* 14* 17*   < > 17* 20*  --  21*  --  19*   < > 18*   BUN 95.2* 92.5* 96.9*   < > 75.1* 60.5*  --  58.9*  --  63.6*   < > 52.1*   CR 3.21* 2.97* 2.77*   < > 2.98* 2.57*  --  2.42*  --  2.62*   < > 2.87*   * 235* 142*   < > 133* 138*  --  95  --  161*   < > 144*   BISI 8.4* 7.6* 8.4*   < > 8.6 8.0*  --  8.2*  --  7.7*   < > 7.4*   MAG  --   --   --   --  2.1 1.8  --   --   --   --   --  2.0   PHOS  --   --   --   --   --  3.4  --  4.6*  --  3.7   < > 6.3*    < > = values in this interval not  displayed.       CBC -   Recent Labs   Lab Test 01/04/24  0534 01/03/24  0649 01/02/24  1556   WBC 9.3 6.1 14.9*   HGB 7.9* 8.4* 9.7*   PLT 58* 58* 85*       LFTs -   Recent Labs   Lab Test 01/04/24  0534 01/03/24  0649 01/02/24  0800   ALKPHOS 102 106 98   BILITOTAL 0.3 0.4 0.3   ALT 51* 63* 70*   AST 17 26 36   PROTTOTAL 5.7* 5.8* 5.3*   ALBUMIN 3.1* 3.2* 3.0*       Iron Panel -   Recent Labs   Lab Test 12/20/23  0415 12/17/23  0429 07/10/23  1454 06/25/20  0656 12/27/17  1021   IRON  --  130 15*  --  99   IRONSAT  --  74* 9*  --  32   GABRIEL 1,267* 32,775* 1,079*   < >  --     < > = values in this interval not displayed.         Imaging:  All imaging studies reviewed by me.     Current Medications:   artificial saliva  1 spray Mouth/Throat 4x Daily    carvedilol  12.5 mg Oral BID w/meals    ceFEPIme  2 g Intravenous Q24H    cholecalciferol  10 mcg Oral Daily    vitamin B-12  100 mcg Oral Daily    ferrous sulfate  325 mg Oral Daily with breakfast    folic acid  1 mg Oral Daily    heparin ANTICOAGULANT  5,000 Units Subcutaneous Q12H    lidocaine  3 patch Transdermal Q24H    pantoprazole  40 mg Oral QAM AC    [Held by provider] potassium chloride ER  20 mEq Oral BID    predniSONE  60 mg Oral Daily    [START ON 1/5/2024] remdesivir  100 mg Intravenous Q24H    And    [START ON 1/5/2024] sodium chloride 0.9%  50 mL Intravenous Q24H    sodium bicarbonate  650 mg Oral TID    sodium chloride (PF)  3 mL Intracatheter Q8H    [START ON 1/5/2024] sulfamethoxazole-trimethoprim  1 tablet Oral Once per day on Monday Wednesday Friday    torsemide  20 mg Oral Daily    vancomycin  125 mg Oral Daily       NESTOR PEDROZA MD   PGY-1    I was present with the resident during the history and exam.  I discussed the case with the resident and agree with the findings as documented in the assessment and plan. Renal function worse in last couple days, suspect related to diuresis and lower blood pressure.  Will decreased carvedilol, hold  nifedipine to have higher BP and allow for gentle diuresis.  Discussed with cardiology    Rama Monge MD  Associate Professor of Medicine  Department of Nephrology  [facility]

## 2024-01-04 NOTE — PROGRESS NOTES
"Care Management Follow Up    Length of Stay (days): 2    Expected Discharge Date: 01/05/2024?     Concerns to be Addressed: discharge planning, medical readiness.     Patient plan of care discussed at interdisciplinary rounds: Yes    Anticipated Discharge Disposition: Home  Anticipated Discharge Services: Home care?  Anticipated Discharge DME: None    Patient/family educated on Medicare website which has current facility and service quality ratings: NA  Education Provided on the Discharge Plan:   Patient/Family in Agreement with the Plan:     Referrals Placed by CM/SW: No referrals placed at this time.   Private pay costs discussed: Not applicable    Additional Information:  Page received from Sunil Barton w/the [facility]us [facility] Nephrology Nurse Navigator regarding concerns related to discharge planning. Per discussion w/Oralia, she received a [employer] message from the patient requesting someone reach out to her aunt, Jaimee Salas regarding discharge planning. Per discussion w/Oralia, she was able to reach the patient's aunt and the concerns are related to her frequent hospitalizations and transfers to [facility] from [place], [place]. Writer attempted to reach patient via hospital phone multiple times, no answer.     Writer attempted to contact patient's aunt, Jaimee, her , Cecilio answered the phone and stated she was unavailable and that he was the one who would \"do the talking.\" Writer noted that permission hadn't been received to speak w/him, he was understanding.     With assistance from the bedside nurse, writer was able to connect with the patient. Patient confirms that she was staying w/her aunt (Jaimee) and uncle (Cecilio) and plans to return there at discharge. Patient confirmed that her mother lives in [place] and that she typically picks her up upon discharge and helps her get back to [place] w/coordination w/family. Patient gave permission for writer to talk to her uncle, Cecilio.     Writer " "called Jaimee's phone back, spoke w/her , Cecilio. Cecilio confirms that the patient has been living with them for approximately 2 months and that it has been quite difficult due to the patient's need for Rheumatology and not being able to receive the medical care she needs in [place]. Cecilio reported that the patient has been transferred to [place] or [facility] and it is taxing on them. Cecilio confirmed that the patient's mother lives in [place] but he noted she \"is and was never responsible.\" Cecilio then stated that the patient's mother is involved and supportive, but just not what the patient needs right now as she travels frequently with her  and doesn't provide the support that patient needs.     Cecilio noted that additional family members live in the [facility] but that relationships are a bit strained, that is why the patient lives w/her aunt and uncle. Cecilio notes that the patient is always welcome to return to their house, but that she needs a long term plan that is closer to her medical care.     Writer voiced understanding of the situation, appreciated his concern for the patient. Ultimately, patient will need to work w/family and friends to relocate to the [facility] if she were to want to be closer to the specialists in which her health requires. RNCC will follow up with patient to discuss home care referral, as it may be helpful to get another set of eyes on the patient and assist w/medication management and complex comorbidity in the home. Writer requested PT/OT evals to assist w/discharge planning, primary team updated on family's concerns.     Care coordination will continue to follow for discharge planning.     Concha Warren, FERNANDEZCC, BSN    [facility]    [address] [place] [id number]    [email or web address]    Office: [phone] Pager: [phone]            "

## 2024-01-04 NOTE — PLAN OF CARE
Goal Outcome Evaluation:      Plan of Care Reviewed With: patient    Overall Patient Progress: no changeOverall Patient Progress: no change    Outcome Evaluation: Patient anticipates discharge to home with home care services

## 2024-01-04 NOTE — PLAN OF CARE
Admission   transfer from ED   -----------------------------------------------------------  Reason for admission:  Primary team notified of pt arrival.  Admitted from: ED- report received from [facility]  Via: stretcher  Accompanied by:   Belongings: Placed in closet;  Admission Profile: completed  Teaching: orientation to unit and call light- call light within reach, call don't fall, use of console, meal times, when to call for the RN, and enforced importance of safety   Access: PIV  Telemetry:Placed on pt  Ht./Wt.: complete  Code Status verified on armband: yes  2 RN Skin Assessment Completed By:  Reena BOLANOS RN and Nehal EID RN. Swelling noted on RUE- abrasion noted on R upper forearm.   Med Rec completed: yes  Suction/Ambu bag/Flowmeter at bedside: yes  Is patient having diarrhea upon admission- if YES fill out testing algorithm : no    C. Diff Testing Algorithm (MUST be marked YES)   3 or more loose stools in 24 hrs. [] Yes [x] No       Additional symptoms:(At least ONE must be marked yes)   Abdominal pain/discomfort [] Yes [x] No   Fever at least 38C (100.4 F) [] Yes [x] No   Elevated WBC(>11,000) [] Yes [x] No       Exclusion Criteria:  (MUST be marked YES)   Off laxatives for at least 48 hrs. [] Yes [] No       Pt status: alert and oriented, takes pills whole, RA    Temp:  [97.6  F (36.4  C)-98.7  F (37.1  C)] 98.2  F (36.8  C)  Pulse:  [64-91] 73  Resp:  [9-25] 19  BP: (112-145)/() 141/100  SpO2:  [95 %-100 %] 100 %

## 2024-01-04 NOTE — PROGRESS NOTES
Antimicrobial Stewardship Team Note    Antimicrobial Stewardship Program - A joint venture between [facility] and  Physicians to optimize antibiotic management.  NOT a formal consult - Restricted Antimicrobial Review     Patient: Milly Carroll  MRN: [id number]  Allergies: Rituximab    Brief Summary: Milly Carroll is a 23 year old female with a history of lupus C/B lupus flares, lupus cerebritis, lupus nephritis, HTN, chronic systolic heart failure, QT prolongation, past perinephric hematoma s/p emobolization in Aug-2023 and chronic thrombocytopenia.  She originally presented to a hospital in [place] for NSTEMI and concern for a lupus flair.  She was transferred to [facility] where she was until 12/25 for management of a lupus flair with steroids and cyclophosphamide.  The patient then subsequently presented to the [facility] ED on 12/26 for SOB and was found to have a heart failure exacerbation and COVID-19.  The patient was supposed to be transferred to [place], however it took a long time and the patient ended up leaving A from [facility] on 12/30 with plans to present to the [place] ED to expedite admission at [facility].  The patient presented on 1/2 with continued worsening SOB, fever, and chills.    History of Present Illness: On presentation, the patient was afebrile, hypertensive (/141) and on physical exam was noted to have no wheezing or rales and no respiratory distress by the physician in the ED.  The patient had a WBC of 14.9, procalcitionin of 0.65, lactic acid of 1.5, LDH of 491, and a CRP of 14.5.  Troponin and BNP were also elevated.  A chest x-ray showed no acute cardiopulmonary findings with signs of improving hazy perihilar and bibasilar opacities consistent with resolving pulmonary edema.  The patient was started on cefepime and vancomycin for hospital acquired pneumonia.  The only micro data from the current admission is a MRSA nares PCR which was negative on 1/3 after which  vancomycin was discontinued.  The patient was also started on sulfamethoxazole-trimethoprim for PJP prophylaxis and daily PO vancomycin for CDI prophylaxis.  From chart review, the patient was hospitalized in June of 2023 and had a CDI at that time treated with PO vancomycin but on 12/22 had a negative C difficile test.  Today, the patient remains afebrile with a WBC of 9.3.  The patient has been on room air the entire admission and today is day 3 of cefepime.           Active Anti-infective Medications   (From admission, onward)                 Start     Stop    01/04/24 0900  ceFEPIme  2 g,   Intravenous,   EVERY 24 HOURS        Community Acquired Pneumonia       01/06/24 0859    01/04/24 0800  vancomycin  125 mg,   Oral,   DAILY        Clostridioides difficile   hx of C.diff 6/2023, ppx while on systemic abx       01/10/24 0759    01/02/24 1430  sulfamethoxazole-trimethoprim  1 tablet,   Oral,   DAILY        Prophylaxis of PCP       --                  Assessment: possible HAP  Based on the patient's clinical stability and chest imaging, it does not appear this patient has a bacterial pneumonia.  There has been no further infectious work-up that would demonstrate any bacterial infection at all.  Additionally, the procalcitonin will be unreliable in a patient with chronic kidney disease and other inflammatory markers will be difficult to interpret in the setting of a patient with Lupus and a recent flair.  The patient's presentation is likely a combination of a heart failure exacerbation with fluid overload and COVID-19 infection diagnosed 12/29.  At this time, we recommend stopping cefepime.  Additionally, the patient does not need PO vancomycin as they have a negative C difficile test from 12/22, have not had a large amount of loose stool, and will be stopping broad spectrum antibiotics.  At this time, we recommend stopping PO vancomycin.      Recommendations:  Discontinue cefepime  Discontinue PO  vancomycin    Discussed with ID Staff: Dr. Maeve MD; Jaleesa Tesfaye, PharmD, BCIDP  Jn Serrano, PharmD    Jn Serrano, PharmD

## 2024-01-04 NOTE — TELEPHONE ENCOUNTER
Connected with Aunt who shared concerns for discharge.  Plan: SW on Hospital floor to connect with them on discharge planning.  Paged to flow SW as direct dial unavailable [phone]  P [phone]  Aunt agreeable to plan.  Hao sent to patient that coordinating with Hospital Floor SW for discontinue planning.    Received call back from SW on patient's floor who will follow up with patient and Aunt whom she lives with on discharge planning.    Oralia Rodriguez RN, BSN, PHN  [facility] Nephrology Nurse Navigator  [phone]

## 2024-01-04 NOTE — PROGRESS NOTES
[facility]    Cardiology Consult Note-Cards 1      Date of Admission:  1/2/2024  Consult Requested by: Dr. Hernandez  Reason for Consult:     lupus patient, recent admit to OSH for CP/SOB found to have new reduce EF       Assessment & Plan: HVSL   Milly Carroll is a 23 year old female admitted on 1/2/2024 for acute on chronic heart failure and chest pain.  She was previously admitted in December (discharged 12/25) with a lupus flare along with heart failure exacerbation that was treated initially with diuretic.  Per review of discharge summary, it appears that she was started on Cytoxan inpatient with plan to follow-up outpatient, as well as prednisone with a taper.  She had a Lexiscan during this admission that was negative.  She represented to an ED on 12/26 with shortness of breath and chest pain, found to be volume overloaded with worsening of her EF from 40% to 15-20%.  She is also noted now to be hypertensive (SBP in the 190s).  Along with continued CHRISTA (creatinine 2.48).  She has a troponin elevation to 382 (during last admission was elevated to 700 and downtrended), along with a BNP now elevated to 248,000 (previously 70,000 at [facility], and >70,000 at OSH from 12/27). Low suspicion for ACS given her age and recent lexiscan in December that was negative for inducible ischemia. She also tested COVID-positive on 12/29 at outside hospital.     Interval  Our initial differential for this presentation included hypertensive emergency leading to decompensated heart failure flash pulmonary edema our initial differential for this presentation included hypertensive emergency leading to decompensated heart failure with flash pulmonary edema versus myocarditis (multifactorial from lupus versus COVID infection).  As her clinical course has progressed, we have noticed that her CK is normal, which would point less likely to an inflammatory myocarditis.  She did have a recent course  "of cyclophosphamide, which could cause cardiomyopathy, however this is less likely.  Given that she has had reduced ejection fraction ranging from 20 to 40% >1 year, the most likely scenario is the initial hypertensive emergency presentation.  We discussed this case at length with nephrology and rheumatology, plan below represents joint effort.    -Please try torsemide 20mg for diuresis, if this is ineffective, can give another dose of 40mg  -Decrease coreg to 12.5mg BID  -Stop nifedipine  -If patient becomes hypertensive, can start hydralazine 10mg BID-TID  -Strict I/O, daily standing weights, cardiac fluid restricted diet  -From GDMT perspective, she is currently not a candidate for ACE/ARB/ARNI/MRA/SLGT-2 therapies in the setting of kidney disease (acute vs. Acute on chronic)  -Trop t Hs 438 (was 469), CK level is normal, this makes myocarditis is less likely  -Monitor on telemetry  -Continue prednisone per rheumatology         The patient's care was discussed with the Attending Physician, Dr. Luz .      Guerrero Mcgarry MD  [facility]    Clinically Significant Risk Factors          # Hyperkalemia: Highest K = 7.1 mmol/L in last 2 days, will monitor as appropriate      # Anion Gap Metabolic Acidosis: Highest Anion Gap = 19 mmol/L in last 2 days, will monitor and treat as appropriate  # Hypoalbuminemia: Lowest albumin = 3 g/dL at 1/2/2024  8:00 AM, will monitor as appropriate   # Thrombocytopenia: Lowest platelets = 58 in last 2 days, will monitor for bleeding  # Acute Kidney Injury, unspecified: based on a >150% or 0.3 mg/dL increase in last creatinine compared to past 90 day average, will monitor renal function     # Acute heart failure with reduced ejection fraction: last echo with EF <40% and receiving IV diuretics       # Overweight: Estimated body mass index is 25.24 kg/m  as calculated from the following:    Height as of 12/27/23: 1.588 m (5' 2.5\").    " Weight as of this encounter: 63.6 kg (140 lb 3.4 oz)., PRESENT ON ADMISSION     # Financial/Environmental Concerns: none (Per pt she discussed option of applying for disability with her new PCP)        ______________________________________________________________________    Chief Complaint   SOB/chest pain    History is obtained from the patient    History of Present Illness   Milly Carroll is a 23 year old female with PMH Takotsubo cardiomyopathy 1 year ago (EF as low as 20%, recovered to 40-45% on 8/2023 on TTE/CMR), lupus cerebritis and nephritis, HTN, Qtc prolongation, chronic steroid induced thrombocytopenia, past perinephric hematoma s/p emobolization in Aug-2023. She initially presented to [facility] ED[facility] 12/17/23 for chest pain with concerns for NSTEMI. Trop trend was 735->494, no BNP obtained. Nephrology, hematology, and cardiology were consulted and the consensus was she was in a lupus crisis 2.2 mixed compliance with medications. She was treated with PLEX and high dose steroids. From cardiology perspective, her initial EKG reported to show T-wave inversions, and initial rise of troponin was thought to be 2.2 CHRISTA from lupus flare. she was being diuresed with lasix gtt 10mg/hr after conversation with nephrology and cardiology. GDMT regimen is unclear from multiple different providers, including [facility] appointment on 10/13/23. It is unclear if a pharm reconcilation was completed. On transfer to [facility] this admission, she is now on Coreg 25mg and Imdur 60mg every day. During this admission she was treated for lupus flare, and was discharged on coreg and nifedipine (unable to add more GDMT as CHRISTA had not fully resolved). She then presented to an outside ED on 12/26 for SOB and chest pain. There she was found to have a reduction in her EF to 15-20%, and imaging was concerning for edema vs. Infection. She was started on abx, appears to have received lasix 40mg x1, she also tested COVID positive.  Today she states that she has a constant stabbing chest pain that is worse with exertion, but doesn't go away with rest. This has been present since her discharge on 12/25.       Past Medical History    Past Medical History:   Diagnosis Date    Hepatitis     Lupus (systemic lupus erythematosus) (H)     Lupus cerebritis (H)     Pancreatitis 08/2017    Pyelonephritis 08/2017    Seizures (H)     Status epilepticus (H)        Past Surgical History   Past Surgical History:   Procedure Laterality Date    IR RENAL ANGIOGRAM LEFT  8/11/2023    IR RENAL BIOPSY LEFT  6/28/2022    NO HISTORY OF SURGERY      ORTHOPEDIC SURGERY         Medications   Current Facility-Administered Medications   Medication    acetaminophen (TYLENOL) tablet 650 mg    albuterol (PROVENTIL HFA/VENTOLIN HFA) inhaler    artificial saliva (BIOTENE MT) solution 1 spray    calcium carbonate (TUMS) chewable tablet 1,000 mg    carvedilol (COREG) tablet 25 mg    ceFEPIme (MAXIPIME) 2 g vial to attach to  mL bag for ADULTS or 50 mL bag for PEDS    cholecalciferol (D-VI-SOL, Vitamin D3) 10 mcg/mL (400 units/mL) liquid 10 mcg    cyanocobalamin (VITAMIN B-12) tablet 100 mcg    ferrous sulfate (FEROSUL) tablet 325 mg    folic acid (FOLVITE) tablet 1 mg    heparin ANTICOAGULANT injection 5,000 Units    hydrALAZINE (APRESOLINE) tablet 10 mg    Or    hydrALAZINE (APRESOLINE) injection 10 mg    HYDROmorphone (DILAUDID) half-tab 1 mg    HYDROmorphone (DILAUDID) injection 0.2 mg    Lidocaine (LIDOCARE) 4 % Patch 3 patch    lidocaine (LMX4) cream    lidocaine 1 % 0.1-1 mL    melatonin tablet 5 mg    NIFEdipine ER OSMOTIC (PROCARDIA XL) 24 hr tablet 30 mg    ondansetron (ZOFRAN ODT) ODT tab 4 mg    pantoprazole (PROTONIX) EC tablet 40 mg    [Held by provider] potassium chloride ER (KLOR-CON M) CR tablet 20 mEq    predniSONE (DELTASONE) tablet 60 mg    [START ON 1/5/2024] remdesivir 100 mg in sodium chloride 0.9 % 250 mL intermittent infusion    And    [START ON  1/5/2024] sodium chloride 0.9% BOLUS 50 mL    senna-docusate (SENOKOT-S/PERICOLACE) 8.6-50 MG per tablet 1 tablet    Or    senna-docusate (SENOKOT-S/PERICOLACE) 8.6-50 MG per tablet 2 tablet    sodium bicarbonate tablet 650 mg    sodium chloride (PF) 0.9% PF flush 3 mL    sodium chloride (PF) 0.9% PF flush 3 mL    [START ON 1/5/2024] sulfamethoxazole-trimethoprim (BACTRIM) 400-80 MG per tablet 1 tablet    vancomycin (VANCOCIN) capsule 125 mg            Physical Exam   Vital Signs: Temp: 97.8  F (36.6  C) Temp src: Oral BP: 121/87 Pulse: 80   Resp: 23 SpO2: 100 % O2 Device: None (Room air)    Weight: 140 lbs 3.4 oz    General Appearance: Well appearing, NAD on RA  Respiratory: bibasilar crackles  Cardiovascular: RRR, holosystolic murmur in lower sternal border. Cap refill <2 seconds. 1+ pitting edema to mid shin. (+) JVD.      Medical Decision Making             Data   .  TTE 12/27/23  Left ventricular function is decreased. The ejection fraction is 15-20% (severely reduced). Biplane LVEF is 17%. Severe diffuse hypokinesis is present.  Global right ventricular function is moderately reduced.  Moderate mitral insufficiency is present.  Moderate to severe tricuspid insufficiency is present.  Pulmonary hypertension is present. Pulmonary artery systolic pressure is 83  mmHg (68 mmHg+RA pressure 15 mmHg.).  Small circumferential pericardial effusion is present without any hemodynamic significance.  This study was compared with the study from 8/14/23: LV and RV function have  worsened. MR and TR have worsened and PA pressure is higher, likely reflecting  elevated filling pressures/hypervolemia. Pericardial effusion is unchanged.    TTE 12/17/23  The left ventricle is normal size. There is mild concentric hypertrophy.   The left ventricular systolic function is moderately reduced. Qualitative left ventricle ejection fraction is 35-40%. There is moderate global hypokinesis of   the left ventricle.   The right ventricular  cavity size is qualitatively normal. Normal right ventricular systolic function.   Left atrium is mildly enlarged by volume.   Mild to moderate mitral regurgitation.   Mild tricuspid regurgitation.   Right ventricular systolic pressure estimate is 35-40 mmHg.   Small pericardial effusion, located posteriorly.   No prior echocardiogram available for comparison.      TTE 8/14/23  The left ventricle is borderline dilated. Biplane LVEF is 45%.There is mild global hypokinesia of the left ventricle.  There is moderate (2+) mitral regurgitation.  The aortic root is normal size.  Borderline/mild dilated pulmonary artery  Trivial posterior pericardial effusion     CMR 8/16/23  Clinical history: Probable stress induced cardiomyopathy, evaluate for myocarditis.   Comparison CMR: None     1. The LV is mildly dilated with normal wall thickness. The global systolic function is moderately reduced.  The LVEF is 40%. There is moderate global hypokinesis of the left ventricle.      2. The RV is normal in cavity size. The global systolic function is low normal. The RVEF is 52%.      3. Both atria are normal in size.     4. There is mild to moderate mitral regurgitation by qualitative assessment.      5. There is no evidence of myocardial edema on T2 weighted imaging. There is no evidence of myocardial iron overload.      5. Late gadolinium enhancement imaging shows no MI, fibrosis or infiltrative disease.      6. A small circumferential pericardial effusion is noted.      CONCLUSIONS:      1. Mildly dilated left ventricle with moderately reduced systolic function.     2. No late enhancement to suggest prior infarct, inflammation or infiltration.          EKG 12/17/23      Lexiscan 12/21/23  Result Text       The nuclear stress test is negative for inducible myocardial ischemia or infarction.    LVEDv 198ml. LVESv 104ml. LV EF 47%.    There is no prior study for comparison.        Physician Attestation   I saw this patient with the  resident and agree with the resident/fellow's findings and plan of care as documented in the note.    Key findings:   Cardiorenal syndrome  Mild diuresis today since cr increased from yesterday, diuretics held yesterday. CK normal. Myocarditis less likely. Will reduce antihypertensives to allow renal perfusion.   Re: cardiomyopathy history -   LVEF 55-60% 4/2019  LVEF 40-45% 1/2023  LVEF 40% 8/2023 (MRI)  LVEF 15-20% 12/2023, 1/2024  Patient has been on Cytoxan previously going back to 2013. Cytoxan cardiomyopathy is possible but she has had normal  EF while on cytoxan. Would advise close cardiac monitoring after next cytoxan dose with trop and limited echo.   Tea Luz MD  Date of Service (when I saw the patient): January 4, 2024

## 2024-01-04 NOTE — PLAN OF CARE
Neuro: A&Ox4. Takes pills whole.  Cardiac: SR. SBP 140s- coreg given. On tele    Respiratory:saturations maintain above 95% on RA  GI/: voiding AUO in ED. No void or BM for this shift.  Diet/appetite: Tolerating renal diet. Eating well.  Activity:  SBA, steady when walking from ED stretcher to bed.  Pain: At acceptable level on current regimen. PRN IV dilaudid given for severe pain in RUE. Pt on heparing SQ  Skin: swelling w/ numbness, tenderness, and tingling in RUE and forearm. Lidocaine patches in place. Pt states sensation, numbness and tingling that are present are not worsening from when she was in the ED. Radial pulse normal upon palpation. Elevating extremity.  LDA's: PIV    Plan: Continue with POC. Notify primary team with changes.

## 2024-01-04 NOTE — CONSULTS
Hematology Fellow Consult Note   Date of Service: 01/04/2024    Patient: Milly Carroll  MRN: [id number]  Admission Date: 1/2/2024  Hospital Day: 2  Primary Outpatient Hematologist: unknown    Reason for Consult: pt w SLE c/b lupus nephritis on cyclophosphamide, HTN, chronic thrombocytopenia and recent COVID adm w CP and SOB suspected 2/2 acute HF now w indeterminate VQ scan, labs c/f hemolysis. please assist w eval and mgmt         History of Present Illness:    Milly Carroll is a 23-year-old woman with SLE c/b ?cerebritis and nephritis who is admitted for management of SOB and CP.  History is otherwise notable for hypertension, chronic systolic heart failure, QTc prolongation, perinephric hematoma status postembolization, and chronic anemia and thrombocytopenia.    She was hospitalized in mid December for an SLE flare and received a cyclophosphamide infusion and plasma exchange.  She had a second admission and received treatment for COVID-pneumonia but was also found to be in heart failure with reduced ejection fraction. Seeking to expedite transfer to [facility], she presented to our ED with concerns for chest pain and shortness of breath.    Patient has longstanding anemia and thrombocytopenia in the setting of her lupus, which do not appear to have been evaluated by a hematologist in the past per EMR or according to patient.    Chart review does not reveal history of bleeding or clotting disorder, no admission VQ scan raised the prospect of indeterminate perfusion defect.      Subjective    Review of Systems:  A comprehensive ROS was performed and found to be negative or non-contributory with the exception of that noted in the HPI above.    Past Medical History:   Diagnosis Date    Hepatitis     Lupus (systemic lupus erythematosus) (H)     Lupus cerebritis (H)     Pancreatitis 08/2017    Pyelonephritis 08/2017    Seizures (H)     Status epilepticus (H)      Past Surgical History:   Procedure Laterality Date    IR  RENAL ANGIOGRAM LEFT  8/11/2023    IR RENAL BIOPSY LEFT  6/28/2022    NO HISTORY OF SURGERY      ORTHOPEDIC SURGERY       Social History     Socioeconomic History    Marital status: Single   Tobacco Use    Smoking status: Never     Passive exposure: Never    Smokeless tobacco: Never   Vaping Use    Vaping Use: Never used   Substance and Sexual Activity    Alcohol use: Not Currently    Drug use: Never    Sexual activity: Not Currently   Social History Narrative    6/20/2013     Milly is the 2nd youngest of 7 children.  She is in the 10th grade and lives with her parents and siblings.  Her family is originally from [facility], but Milly was born in the .        06/2022: lives in [place] with older sister, brother-in-law, niece, and brother.         Social Determinants of Health     Financial Resource Strain: Low Risk  (12/26/2023)    Financial Resource Strain     Within the past 12 months, have you or your family members you live with been unable to get utilities (heat, electricity) when it was really needed?: No   Food Insecurity: Low Risk  (12/26/2023)    Food Insecurity     Within the past 12 months, did you worry that your food would run out before you got money to buy more?: No     Within the past 12 months, did the food you bought just not last and you didn t have money to get more?: No   Transportation Needs: High Risk (12/26/2023)    Transportation Needs     Within the past 12 months, has lack of transportation kept you from medical appointments, getting your medicines, non-medical meetings or appointments, work, or from getting things that you need?: Yes   Interpersonal Safety: Low Risk  (12/26/2023)    Interpersonal Safety     Do you feel physically and emotionally safe where you currently live?: Yes     Within the past 12 months, have you been hit, slapped, kicked or otherwise physically hurt by someone?: No     Within the past 12 months, have you been humiliated or emotionally abused in other ways by  your partner or ex-partner?: No   Housing Stability: Low Risk  (12/26/2023)    Housing Stability     Do you have housing? : Yes     Are you worried about losing your housing?: No      Family History   Problem Relation Age of Onset    Other - See Comments Father         Question of lupus in father and paternal grandfather    Lupus Sister         older sister    Gastrointestinal Disease No family hx of         No known history of IBD, hepatitis, pancreatitis, celiac disease, or GI cancers before age 50    Glaucoma No family hx of     Macular Degeneration No family hx of      Medications Prior to Admission   Medication Sig Dispense Refill Last Dose    acetaminophen (TYLENOL) 325 MG tablet Take 2 tablets (650 mg) by mouth every 4 hours as needed for mild pain   Unknown at unknown    carvedilol (COREG) 25 MG tablet Take 1 tablet (25 mg) by mouth 2 times daily (with meals) 60 tablet 3 Unknown at unknown    cyanocobalamin (CYANOCOBALAMIN) 100 MCG tablet Take 1 tablet (100 mcg) by mouth daily 30 tablet 3 Unknown at unknown    ferrous sulfate (FEROSUL) 325 (65 Fe) MG tablet Take 1 tablet (325 mg) by mouth daily (with breakfast) 100 tablet 3 Unknown at unknown    folic acid (FOLVITE) 1 MG tablet Take 1 tablet (1 mg) by mouth daily 30 tablet 3 Unknown at unknown    NIFEdipine ER (ADALAT CC) 60 MG 24 hr tablet Take 1 tablet (60 mg) by mouth daily 30 tablet 3 Unknown at unknown    ondansetron (ZOFRAN ODT) 4 MG ODT tab Take 1 tablet (4 mg) by mouth every 8 hours as needed for nausea 20 tablet 3 Unknown at unknown    pantoprazole (PROTONIX) 40 MG EC tablet Take 1 tablet (40 mg) by mouth every morning (before breakfast) 30 tablet 3 Unknown at unknown    potassium chloride ER (KLOR-CON M) 20 MEQ CR tablet Take 1 tablet (20 mEq) by mouth 2 times daily 30 tablet 3 Unknown at unknown    predniSONE (DELTASONE) 20 MG tablet Take 3 tablets (60 mg) by mouth daily for 14 days, THEN 2.5 tablets (50 mg) daily for 14 days, THEN 2 tablets (40  mg) daily for 14 days, THEN 1.5 tablets (30 mg) daily for 14 days, THEN 1 tablet (20 mg) daily for 14 days. 140 tablet 0 Unknown at unknown    sodium bicarbonate 650 MG tablet Take 1 tablet (650 mg) by mouth 3 times daily 90 tablet 3 Unknown at unknown    sulfamethoxazole-trimethoprim (BACTRIM) 400-80 MG tablet Take 1 tablet by mouth daily 30 tablet 3 Unknown at unknown    Vitamin D, Cholecalciferol, 10 MCG (400 UNIT) TABS Take 10 mcg by mouth daily 30 tablet 3 Unknown at unknown     No current outpatient medications on file.     Objective   Physical Exam:    Blood pressure 121/87, pulse 80, temperature 97.8  F (36.6  C), temperature source Oral, resp. rate 23, weight 63.6 kg (140 lb 3.4 oz), last menstrual period 12/11/2023, SpO2 100%, not currently breastfeeding.  Physical Exam    Adult female patient supine in bed in no acute distress    Alert and oriented    Breathing comfortably on room air    No petechial rash    No clots in mouth      Labs & Studies: I personally reviewed the following studies:  ROUTINE LABS (Last four results):  CMP  Recent Labs   Lab 01/04/24  0534 01/03/24  1602 01/03/24  0649 01/03/24  0237 01/03/24  0149 01/02/24  0800    140 142  --   --  144   POTASSIUM 4.6 4.9 5.3 5.3   < > 4.4   CHLORIDE 108* 107 110*  --   --  113*   CO2 16* 14* 17*  --   --  19*   ANIONGAP 16* 19* 15  --   --  12   * 235* 142*  --   --  95   BUN 95.2* 92.5* 96.9*  --   --  86.7*   CR 3.21* 2.97* 2.77*  --   --  2.48*   GFRESTIMATED 20* 22* 24*  --   --  27*   BISI 8.4* 7.6* 8.4*  --   --  8.2*   PROTTOTAL 5.7*  --  5.8*  --   --  5.3*   ALBUMIN 3.1*  --  3.2*  --   --  3.0*   BILITOTAL 0.3  --  0.4  --   --  0.3   ALKPHOS 102  --  106  --   --  98   AST 17  --  26  --   --  36   ALT 51*  --  63*  --   --  70*    < > = values in this interval not displayed.     CBC  Recent Labs   Lab 01/04/24  0534 01/03/24  0649 01/02/24  1556 01/02/24  0800   WBC 9.3 6.1 14.9* 9.8   RBC 2.46* 2.56* 3.04* 2.47*   HGB  7.9* 8.4* 9.7* 8.1*   HCT 25.5* 27.0* 32.0* 26.6*   * 106* 105* 108*   MCH 32.1 32.8 31.9 32.8   MCHC 31.0* 31.1* 30.3* 30.5*   RDW 17.7* 17.9* 19.0* 19.2*   PLT 58* 58* 85* 64*     INRNo lab results found in last 7 days.    Imaging:  NM Lung Scan Perfusion Particulate   Preliminary Result   Impression:   1. Small perfusion defect involving the left lower lobe superior   segment, indeterminant for small segmental pulmonary embolus.    2. Small right and trace left pleural effusions.   3. Additional findings include cardiomegaly, small pericardial   effusion, pericholecystic fluid, and anasarca.      US Upper Extremity Venous Duplex Right   Final Result   IMPRESSION:   1.  No evidence of right upper extremity deep venous thrombosis.   2.  Occlusive and nonocclusive superficial venous thrombus within the   right cephalic vein from the level of the upper arm to the antecubital   fossa.   3.  Moderate superficial soft tissue edema of the right upper   extremity.      I have personally reviewed the examination and initial interpretation   and I agree with the findings.      JUSTIN DO MD            SYSTEM ID:  [id number]      Echo Complete   Final Result      XR Chest Port 1 View   Final Result   Impression:    1. Improving hazy perihilar and bibasilar opacities consistent with   resolving pulmonary edema.      2. No acute cardiopulmonary findings. Cardiomegaly.      I have personally reviewed the examination and initial interpretation   and I agree with the findings.      ELAINE FIORE MD            SYSTEM ID:  [id number]               Assessment:   Milly Carroll is a 23-year-old woman with systemic lupus erythematosus with brain, kidney, and skin involvement, who is re-admitted for evaluation of a possible SLE flare.    Owing to concern for hemolysis, the Hematology service is consulted.    Hgb is stable from admission and at/near her 8-10 g/dL baseline over the past year.    Patient has elevated retic  count, but her reticulocyte index of less than 2 suggests a less-than-appropriate response. Her stage IV CKD likely affects EPO synthesis. Though B9 and B12 stores seem replete on recent testing, she may need more to meet her erythropoiesis needs. An iron panel and ferritin were last obtained ~ 3 weeks ago and not concerning for iron deficiency at that time. This could have changed in the interval.    Haptoglobin is an exceedingly sensitive marker of hemolysis, at which time it is expected to be low. Because haptoglobin was previously low and is now within normal limits, an argument can be made that her hemolytic anemia that has resolved following treatment for her SLE flare. Previous peripheral smear at OSH in December supported a microangiopathic hemolytic anemia process. However, because there is less than 1 schistocyte per high-powered field on 01/04/2024 peripheral smear review, there is low concern for TTP or any TMA at this time.    Given her SLE, she could have had antibodies coating her red cells, but she tested negative for anti-C3 and anti-IgG MELISSA ~ 2 weeks ago.     There has been no clinical evidence of blood loss.    Her thrombocytopenia is chronic and stable over the past month. There were rare schistocytes on smear. Furthermore, her [id number] on 12/17/23 was 53%, ruling out TTP. She has been on heparin for DVT ppx. There is no decline to generate concern for HIT for now.      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    Wilmer Santos  PGY-4  Hematology, Oncology, and Transplantation  pager     Physician Attestation   I, Ran Albarran MD, saw this patient with the resident/fellow and agree with their findings and plan of care as documented in the note.      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 [id number] normal so not TTP. The TMA has improved after a course of cyclophosphamide and prednisone, based on review of smear which shows 0-1 schistocytes per HPF, which is improved compared with 12/17/23. In addition haptoglobin is normalized. She remains anemic however, and.retic count is suppressed at 0.076 which is quite hypoproliferative. Given her kidney disease, suspect anemia of chronic kidney disease from epo deficiency.  Also likely anemia of inflammation with the high ferritin.  Recommend start MAXWELL per nephrology, continue as an outpt.   Recent normal B12, folate are wnl.   Thrombocytopenia likely also due to the lupus, it has been steadily trending down though. There may be some contribution from the Covid infection.   Recent fibrinogen normal.  Hematology will sign off. Please let us know if there are any further questions or concerns.    On the date of service, 01/05/2024, I spent 80 minutes on the patient unit personally reviewing medical records and medications, reviewing vital signs, labs, and imaging results as summarized above, discussing the patient's case on rounds with the fellow and PUNEET, obtaining a history from the patient, performing a physical exam, counseling and educating the patient on the diagnosis and treatment, communication with the primary team, evaluating a potentially life or organ threatening problem, intensively monitoring treatments with high risk of toxicity, coordinating care, and documenting in the electronic medical record.    Thank you for allowing me to participate in the care of this  patient. Please do not hesitate to contact me if there are any concerns or questions.     Ran Albarran MD   of Medicine  Classical Hematology and Blood and Marrow Transplantation  Division of Hematology, Oncology, and Transplantation  [facility]

## 2024-01-04 NOTE — PROGRESS NOTES
Assumed cares from [id number]    Neuro: A&Ox4. RUE sensation is improving per pt, numbness & tenderness still present. No tingling reported. RUE is elevated for comfort.  Cardiac: SR. VSS.   Respiratory: Sating >99% on RA.  GI/: Pt had 300 mls UOP and one BM occurrence during the shift.   Diet/appetite: Tolerating renal diet. Pt was compliant with 1.5L fluid restriction.  Activity:  SBA in the room and up to bathroom  Pain: At acceptable level on current regimen. Pt reported back pain which was rated as 8/10. Provided PRN Dilaudid x3  Skin: No new deficits noted.  LDA's: R PIV SL    Plan: Continue with POC. Notify primary team with changes.

## 2024-01-04 NOTE — PROGRESS NOTES
Nephrology Progress Note  01/03/2024         Assessment & Recommendations:     Milly Carroll is a 23 year old year old female  Assessment & Plan  Milly Carroll is a 23 year old female admitted on 1/2/2024. 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 She was admitted with chest pain and shortness of breath, found to be COVID positive, w/ severely decreased global cardiac function on echo, and requiring multiple doses of IV diuretics due to volume overload.      #Lupus nephritis, class IV  #SLE  #Chronic kidney disease, stage IV (Cystatin C/creatinine discordance)  # Acute kidney injury  Patient's creatinine was 3.48 on 12/30,decreased to 2.48 on admission at [facility] 1/2/2024  which again raised to 2.77 -> 2.97 on 1/3/2023.CHRISTA may likely represent poor renal perfusion in the setting of volume overload ,cardiorenal syndrome and diuresis subsequent rapid drop in BP.However we cannot rule out flare of lupus nephritis given low complement level and increased ds DNA. It is reassuring that she is making good urine, although will require close monitoring of intake and output moving forward.  At this time, given her known COVID infection, there would be significant risk with adding additional immunosuppressants at this time. Has been on hydroxychloroquine in the past, but this is currently held due to QTC prolongation.     - Agree to continue prednisone 60 mg daily, continue Bactrim PJP PPX  - Next cyclophosphamide dose is due on Saturday, will not add additional immunosuppressants at this time due to current COVID infection  - Trend BMP  - Daily standing weights, strict I&Os      #Hypertension  #Acute on chronic systolic heart failure, stress cardiomyopathy  Most likely  cause of her hypertension is volume overload, particularly given hypervolemic appearance on physical examination, significantly elevated BNP from baseline, signs of pulmonary edema on CXR. Notably, her blood pressures improved rapidly following diuresis and administration of her home dose of nifedipine. However, there is concern for correcting her BP too quickly.  Today her fluid status appear better.Saturating well with air. No crackles.    - Suggest to hold diuretics temporarily     #. Metabolic acidosis, chronic  Agree with continuing NaHCO3 650 mg TID.          Recommendations were communicated to primary team via Note    Seen and discussed with Dr. Mariposa Steen MD   Division of Renal Disease and Hypertension  [facility]  [email or web address]  Vocera Web Console      Interval History :   Nursing and provider notes from last 24 hours reviewed.  In the last 24 hours Milly Carroll ,less short of breath.  I/O not measured ,but she reported she was peeing frequently.BP better today SBP in 120-140.      Physical Exam:   I/O last 3 completed shifts:  In: 1670 [P.O.:1670]  Out: 1000 [Urine:1000]   BP (!) 141/100 (BP Location: Left arm)   Pulse 73   Temp 98.2  F (36.8  C) (Oral)   Resp 19   Wt 60.2 kg (132 lb 12.8 oz)   LMP 12/11/2023 (Approximate)   SpO2 100%   BMI 23.90 kg/m       GENERAL APPEARANCE: not in distress  EYES:  no scleral icterus, pupils equal  HENT: mouth without ulcers or lesions  PULM: lungs clear to auscultation bilaterally, equal air movement,  CV: regular rhythm, normal rate, no rub     -edema raised   GI: soft, non tender, not distended  INTEGUMENT: no cyanosis, no rash  NEURO:  no asterixis   Access none    Labs:   All labs reviewed by me  Electrolytes/Renal -   Recent Labs   Lab Test 01/03/24  1602 01/03/24  0649 01/03/24  0237 01/03/24  0149 01/02/24  0800 12/28/23  0623 12/27/23  0001 12/25/23  0442 12/24/23  1605 12/24/23  0943 12/23/23  1123 12/23/23  0409 12/20/23  0415 12/19/23  7725     142  --   --  144   < > 141 144  --  144  --  146*   < > 140   POTASSIUM 4.9 5.3 5.3   < > 4.4   < > 5.6* 3.1*  3.1*   < > 3.4   < > 3.0*  3.0*   < > 3.6   CHLORIDE 107 110*  --   --  113*   < > 109* 110*  --  111*  --  113*   < > 106   CO2 14* 17*  --   --  19*   < > 17* 20*  --  21*  --  19*   < > 18*   BUN 92.5* 96.9*  --   --  86.7*   < > 75.1* 60.5*  --  58.9*  --  63.6*   < > 52.1*   CR 2.97* 2.77*  --   --  2.48*   < > 2.98* 2.57*  --  2.42*  --  2.62*   < > 2.87*   * 142*  --   --  95   < > 133* 138*  --  95  --  161*   < > 144*   BISI 7.6* 8.4*  --   --  8.2*   < > 8.6 8.0*  --  8.2*  --  7.7*   < > 7.4*   MAG  --   --   --   --   --   --  2.1 1.8  --   --   --   --   --  2.0   PHOS  --   --   --   --   --   --   --  3.4  --  4.6*  --  3.7   < > 6.3*    < > = values in this interval not displayed.       CBC -   Recent Labs   Lab Test 01/03/24  0649 01/02/24  1556 01/02/24  0800   WBC 6.1 14.9* 9.8   HGB 8.4* 9.7* 8.1*   PLT 58* 85* 64*       LFTs -   Recent Labs   Lab Test 01/03/24  0649 01/02/24  0800 12/27/23  0001   ALKPHOS 106 98 93   BILITOTAL 0.4 0.3 0.4   ALT 63* 70* 17   AST 26 36 28   PROTTOTAL 5.8* 5.3* 5.7*   ALBUMIN 3.2* 3.0* 3.0*       Iron Panel -   Recent Labs   Lab Test 12/20/23  0415 12/17/23  0429 07/10/23  1454 06/25/20  0656 12/27/17  1021   IRON  --  130 15*  --  99   IRONSAT  --  74* 9*  --  32   GABRIEL 1,267* 32,775* 1,079*   < >  --     < > = values in this interval not displayed.         Imaging:  All imaging studies reviewed by me.     Current Medications:   artificial saliva  1 spray Mouth/Throat 4x Daily    carvedilol  25 mg Oral BID w/meals    [START ON 1/4/2024] ceFEPIme  2 g Intravenous Q24H    cholecalciferol  10 mcg Oral Daily    vitamin B-12  100 mcg Oral Daily    ferrous sulfate  325 mg Oral Daily with breakfast    folic acid  1 mg Oral Daily    heparin ANTICOAGULANT  5,000 Units Subcutaneous Q12H    lidocaine  3 patch Transdermal Q24H    NIFEdipine ER  30 mg  Oral Daily    pantoprazole  40 mg Oral QAM AC    [Held by provider] potassium chloride ER  20 mEq Oral BID    predniSONE  60 mg Oral Daily    sodium bicarbonate  650 mg Oral TID    sodium chloride (PF)  3 mL Intracatheter Q8H    sulfamethoxazole-trimethoprim  1 tablet Oral Daily    [START ON 1/4/2024] vancomycin  125 mg Oral Daily       Michel Steen MD     I was present with the fellow during the history and exam.  I discussed the case with the fellow and agree with the findings as documented in the assessment and plan.  Will aim for -150 , gentle diuresis but avoid further hypotension given worsened renal function which is likely hemodynamic.  Rama Monge MD   of Medicine  Department of Nephrology  [facility]

## 2024-01-04 NOTE — CONSULTS
Care Management Initial Consult    General Information  Assessment completed with: Patient, VM-chart review,    Type of CM/SW Visit: Initial Assessment    Primary Care Provider verified and updated as needed: Yes (Patient establised care with Dr. D[facility])   Readmission within the last 30 days: other (see comments)   Return Category: Exacerbation of disease  Reason for Consult:  (elevated readmission risk score)  Advance Care Planning: Advance Care Planning Reviewed:  (Provided pt with infromation  on health care directive forms)          Communication Assessment  Patient's communication style: spoken language (English or Bilingual)             Cognitive  Cognitive/Neuro/Behavioral: WDL                      Living Environment:   People in home: other (see comments) (Pt lives with her Aunt Jaimee Salas)     Current living Arrangements: house      Able to return to prior arrangements: yes       Family/Social Support:  Care provided by: self  Provides care for: no one  Marital Status: Single  Parent(s), Sibling(s) (aunt)          Description of Support System: Supportive    Support Assessment: Adequate family and caregiver support    Current Resources:   Patient receiving home care services: No     Community Resources: None  Equipment currently used at home:    Supplies currently used at home: None    Employment/Financial:  Employment Status: unemployed        Financial Concerns: none (Per pt she discussed option of applying for disability with her new PCP)           Does the patient's insurance plan have a 3 day qualifying hospital stay waiver?  No    Lifestyle & Psychosocial Needs:  Social Determinants of Health     Food Insecurity: Low Risk  (12/26/2023)    Food Insecurity     Within the past 12 months, did you worry that your food would run out before you got money to buy more?: No     Within the past 12 months, did the food you bought just not last and you didn t have money to get more?: No    Depression: Not at risk (12/26/2023)    PHQ-2     PHQ-2 Score: 2   Housing Stability: Low Risk  (12/26/2023)    Housing Stability     Do you have housing? : Yes     Are you worried about losing your housing?: No   Tobacco Use: Low Risk  (12/26/2023)    Patient History     Smoking Tobacco Use: Never     Smokeless Tobacco Use: Never     Passive Exposure: Never   Financial Resource Strain: Low Risk  (12/26/2023)    Financial Resource Strain     Within the past 12 months, have you or your family members you live with been unable to get utilities (heat, electricity) when it was really needed?: No   Alcohol Use: Not on file   Transportation Needs: High Risk (12/26/2023)    Transportation Needs     Within the past 12 months, has lack of transportation kept you from medical appointments, getting your medicines, non-medical meetings or appointments, work, or from getting things that you need?: Yes   Physical Activity: Not on file   Interpersonal Safety: Low Risk  (12/26/2023)    Interpersonal Safety     Do you feel physically and emotionally safe where you currently live?: Yes     Within the past 12 months, have you been hit, slapped, kicked or otherwise physically hurt by someone?: No     Within the past 12 months, have you been humiliated or emotionally abused in other ways by your partner or ex-partner?: No   Stress: Not on file   Social Connections: Not on file       Functional Status:  Prior to admission patient needed assistance:   Dependent ADLs:: Independent  Dependent IADLs:: Independent       Mental Health Status:  Mental Health Status: No Current Concerns       Chemical Dependency Status:  Chemical Dependency Status: No Current Concerns             Values/Beliefs:  Spiritual, Cultural Beliefs, Zoroastrian Practices, Values that affect care: no               Additional Information:  Per H & P patient with a medical history significant for lupus C/B lupus flares, lupus cerebritis, lupus nephritis, HTN, chronic  systolic heart failure, QT prolongation, past perinephric hematoma s/p emobolization in Aug-2023 and chronic thrombocytopenia admitted to [facility] on 12/17/23 (after presentation for SOB/NSTEMI and pneumonia concern and concern for lupus flare where she was treated w/ steroids and plasma exchange) and transfer to [facility] on 12/18/23 before being transferred to [facility] on 12/22-12/25 for cyclophosphamide infusion. Represented on 12/26 [facility] ED for SOB found to have covid and reduced EF (baseline EF 47%, OSH EF reported as 15-20%) w/ plan to transfer patient to [facility] however patient left AMA on 12/30 due to prolonged wait for transfer  to [facility] ED. On presentation 1/2, patient reporting continued worsening CP/SOB since her discharge on 12/30.  Rhuematology, Cardiology, Hematology consulted.   CMA for elevated readmission risk score.  Noted per chart review CM team attempted on 12/22 to arrange home care for patient however were unsuccessful  d/t patient not having a PCP and recent christmas holiday.     Met with pt.  Introduced RNCC role..Per discussion with patient she lives with her aunt in [facility].  Patients mother lives in [place] and is supportive.  Per pt her aunt has been helping her with medication management and trying to assist pt with medical follow up.  Pt is not currently receiving any [facility] services,skilled home care services.  Per pt her was independent in managing her own self care.  Pt inquired when she is discharged if she can see specialists in [place] as this area is closer to pt home.  Writer noted that when pt would be open to having home care RN support when cleared for discharge.   Pt confirmed she established care with Dr. Denton [facility] on 12/26/23.   Agreed to have primary RNCC/SW CM team follow up with patient when closer to discharge.  Pt notes no concerns or questions a this time.    Mahi Jones, RN BSN, PHN,  YORDY-RN  7A RN Care Coordinator  Phone: [phone]  Pager [phone]    To contact the weekend RNCC  [place] (0800 [phone] 1630) Saturday and Sunday    Units: 5A,5B,5C [phone]    Units: 6A, [phone]    Units 6B, 6C, 6D [phone]    Units: 7A, 7B, 7C, 7D, [phone]    [facility] ([id number]) Saturday and Sunday  Units: 5 Ortho, 5MS & WB ED Pager: [phone]  Units: 6MS, 8A & 10 ICU  Pager [phone]    1/3/2024 6:19 PM

## 2024-01-04 NOTE — PROGRESS NOTES
[facility]    Medicine Progress Note - Hospitalist Service, GOLD TEAM 9    Date of Admission:  1/2/2024    Assessment & Plan   Milly Carroll is a 23 year old female admitted on 1/2/2024. She has a PMHx of lupus C/B lupus flares, lupus cerebritis, lupus nephritis, HTN, chronic systolic heart failure, QT prolongation, past perinephric hematoma s/p emobolization in Aug-2023 and chronic thrombocytopenia admitted to [facility] on 12/17/23 (after presentation for SOB/NSTEMI and pneumonia concern and concern for lupus flare where she was treated w/ steroids and plasma exchange) and transfer to [facility] on 12/18/23 before being transferred to [facility] on 12/22-12/25 for cyclophosphamide infusion. Represented on 12/26 [facility] ED for SOB found to have covid and reduced EF (baseline EF 47%, OSH EF reported as 15-20%) w/ plan to transfer patient to [facility] however patient left AMA on 12/30 due to prolonged wait for transfer to [facility] ED. On presentation 1/2, patient reporting continued worsening CP/SOB since her discharge on 12/30.     Currently being managed for COVID-19 infection, acute on chronic heart failure, as well as CHRISTA on CKD.      Today:   -Discussed with nephrology  -Reviewed cardiology note  -Agree with torsemide 20mg, decrease coreg, hold nifedipine   -Discussed with rheumatology    -Rising CRP concerning for ongoing infection    -Only completed 2 doses of remdesivir at OSH, will give an additional 3 doses   -Discussed with hematology    -Need to discuss EPO with nephrology   -Reviewed antimicrobial sterwardship note, appreciate input. Reasonable to stop cefepime and PO vancomycin and monitor; if CPR still rising after course of remdesivir, may need to revisit indication for antibiotic course   -OK to transfer to med surg bed, stop telemetry      Acute Chest pain w/ SOB 2/2 hypertensive crisis  Acute on Chronic  Cardiomyopathy and HF (EF 15-20% on 12/26 OSH)  Elevated BNP w/ hypervolemia  Elevated Troponin   Uncontrolled HTN  Hx of QTc prolongation (463 on admission 1/2)  Moderate to severe mitral insufficiency   Severe tricuspid insufficiency   Echocardiogram on 17-Dec-2023 showed LVEF 35-40%, EF decreased to 15-20% on 12/26 w/ BNP at that time >70,000. Lexiscan 12/21 negative for inducible ischemia. Admission workup 1/2 included trop elevated at 290 with peak at 469 on 1/2. TTE 1/2 with severe diffuse hypokinesis with stably reduced EF. Cardiology consulted with recs as below.   V/Q scan indeterminate.   - Gen Cardiology consulted, appreciate recs              - Continue coreg    - Stop nifedipine              - Diuresis as able, reassess daily   - Torsemide 20 mg PO daily, monitor response   [ ] Will need cMRI during admission once fluid status improved  - Nephrology consulted, appreciate recs  - Start nifedipine 30mg qday (reduced from PTA 60mg per nephrology)  - Next dose cyclophosphamide due on 1/5  - No current plans for renal bx  - prn hydralazine, goal -160/100 for now, avoiding hypotension  - strict I/Os, daily weights, trend electrolytes, Fluid restriction 1.5L   - pain: tylenol prn, dilaudid q6h prn, lidocaine patch       SLE (+dsDNA, +RNP, +ribosomal P Ab and hypocomplementemia)   Recent Lupus Flare w/ MAHA and renal insuff s/p Cyclophosphamide dose (12/22/23)  Hx of Lupus cerebritis w/ hx of seizures  C/b Lupus Nephritis  Hx medication non-adherence  - Rheumatology consulted- low concern for active lupus flare given recent high dose steroids and cyclophosphamide   - Nephrology Consulted as above  - Lupus-focused meds:   - resume prednisone 60mg qday  - PJP ppx: continue PTA bactrim  - GI ppx: continue protonix qday, tums prn  [ ] Due for 5th dose cyclophosphamide per [facility] protocol 1/5 (ongoing discussion w/ rheum/nephrology prior to administration)     COVID infxn (dx 12/29) s/p remdesivir at  OSH  Concern for secondary pneumonia  Hx of C.diff infxn (6/2023)  Concern for superimposed PNA per OSH and was on vanc/cefepime 12/26-12/30 prior to AMA leave. Initially continued on admission, discontinued on 1/4 due to low suspicion for bacterial pneumonia.    - on RA  - Received remdesivir 12/29 and 12/30 prior to leaving AMA, plan to continue to complete 5 day course given high risk (1/4 - 1/6 )   - Discontinued antimicrobials 1/4 due to low suspicion for bacterial infection; monitor off antibiotics   - Stopped PO vanc 1/4 when antibiotics stopped      Lupus Nephritis w/ CHRISTA   Metabolic Acidosis  Hypomagnesemia- improved  Hypokalemia -improved  - Nephrology consulted   - BP and HF regimen as above  - Continue sodium bicarbonate 650mg TID-- will plan to discuss with nephrology  - Hold PTA Potassium supplement with worsening Cr, uptrending K  - Renal diet/fluid restriction as above     Chronic RUE edema  Superficial clot of R cephalic vein  US 8/2023 negative. RUE doppler negative for DVT on 1/2, significant for superficial thrombus.  - Sx mgmt with heat packs     Chronic thrombocytopenia  Chronic Macrocytic Anemia  - trend daily CBC  - continue PTA folate/B12 replacement  - DVT ppx: heparin subcutaneous decrease dose per pharmacy in setting of chronic thrombocytopenia and renal dysfunction  - Hematology consulted - recommend EPO per nephrology      Transaminitis   - daily CMP      Hx of Intermittent Headaches  History of being responsive to APAP and dilaudid along w/ treatment of HTN and Lupus. 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 Stools described as normal.      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 No current PTA antiepileptic medications          Diet: Fluid restriction 1500 ML FLUID  Renal Diet (non-dialysis)    DVT Prophylaxis: Heparin SQ  Leung Catheter: Not present  Lines: None     Cardiac Monitoring: ACTIVE order. Indication: Acute decompensated heart failure (48 hours)  Code Status: Full Code      Clinically Significant Risk Factors        # Hyperkalemia: Highest K = 7.1 mmol/L in last 2 days, will monitor as appropriate      # Anion Gap Metabolic Acidosis: Highest Anion Gap = 19 mmol/L in last 2 days, will monitor and treat as appropriate  # Hypoalbuminemia: Lowest albumin = 3 g/dL at 1/2/2024  8:00 AM, will monitor as appropriate   # Thrombocytopenia: Lowest platelets = 58 in last 2 days, will monitor for bleeding  # Acute Kidney Injury, unspecified: based on a >150% or 0.3 mg/dL increase in last creatinine compared to past 90 day average, will monitor renal function   # Acute heart failure with reduced ejection fraction: last echo with EF <40% and receiving IV diuretics       # Overweight: Estimated body mass index is 25.24 kg/m  as calculated from the following:    Height as of 12/27/23: 1.588 m (5' 2.5\").    Weight as of this encounter: 63.6 kg (140 lb 3.4 oz)., PRESENT ON ADMISSION     # Financial/Environmental Concerns: none (Per pt she discussed option of applying for disability with her new PCP)         Disposition Plan     Expected Discharge Date: 01/06/2024      Destination: home with family              Brittani Gregorio,   Hospitalist Service, GOLD TEAM 9  [facility]  Securely message with [employer] (more info)  Text page via [facility] Paging/Directory   See signed in provider for up to date coverage information  ______________________________________________________________________    Interval History   No overnight events reported. Dyspnea much improved from admission. Still coughing. Denies chest pain. " Denies abdominal pain or swelling.     Physical Exam   Vital Signs: Temp: 97.7  F (36.5  C) Temp src: Oral BP: 119/83 Pulse: 70   Resp: 19 SpO2: 100 % O2 Device: None (Room air)    Weight: 140 lbs 3.4 oz    Constitutional: no apparent distress, pleasant and cooperative   Cardiovascular: regular rate and rhythm, normal S1 and S2, no murmurs, rubs, or gallops noted, no bilateral lower extremity edema, ++ JVP    Respiratory: clear to auscultation bilaterally, no wheezing, rales, or rhonchi, normal work of breathing   GI: abdomen soft, non tender, non distended, bowel sounds present   Neuro: alert and oriented, no gross focal deficits noted      Medical Decision Making           Data     I have personally reviewed the following data over the past 24 hrs:    9.3  \   7.9 (L)   / 58 (L)     140 108 (H) 95.2 (H) /  200 (H)   4.6 16 (L) 3.21 (H) \     ALT: 51 (H) AST: 17 AP: 102 TBILI: 0.3   ALB: 3.1 (L) TOT PROTEIN: 5.7 (L) LIPASE: N/A     INR:  N/A PTT:  N/A   D-dimer:  N/A Fibrinogen:  296     Ferritin:  N/A % Retic:  3.1 (H) LDH:  425 (H)       Imaging results reviewed over the past 24 hrs:   No results found for this or any previous visit (from the past 24 hour(s)).

## 2024-01-05 NOTE — PROGRESS NOTES
Brief Cardiology Progress Note    Patient examined today, appears more volume overloaded. Patient did not respond well to PO torsemide 20mg yesterday, she did not receive an increased dose. Her creatinine continues to worsen, so there is concern that we are progressing into a worsened cardiorenal syndrome. He weight has increased 4lb (but this latest weight was a bed weight). Would advise trialing IV Bumex 2mg with goal of achieving a net negative fluid balance of 1-1.5L for today. Would otherwise continue coreg and hydralazine for BP control today.     Guerrero Mcgarry MD  Cardiology Fellow    Physician Attestation   I saw this patient and agree with the resident/fellow's findings and plan of care as documented in the note.    Key findings:   Patient has worsening renal function and is hypervolemic. Would attempt to diurese with IV Bumex today.   Tea Luz MD  Date of Service (when I saw the patient): January 5, 2024

## 2024-01-05 NOTE — PLAN OF CARE
Physical Therapy: Orders received. Chart reviewed and discussed with care team.? Physical Therapy not indicated due to patient mobilizing independently, not appropriate for 2 disciplines at this time. Time spent coordinating with occupational therapy.? Defer discharge recommendations to OT.?Will complete orders. Please reconsult if further needs arise.

## 2024-01-05 NOTE — PLAN OF CARE
Came from B around 0200 to the unit with diagnosis of lupus crisis & Covid-19. A/o x 4. . Pain manage with PRN Tylenol and Dilaudid  Transferred from: From 6B  Report received from:  Neli Han 2 RN skin assessment completed by:Stefanie ELAM RN /Juliet Lisa RN      - Findings (add LDA if needed): bilateral arm abrasion, abdominal bruises, right arm swelling  Care plan (primary problem) and education initiated if not already done: n/a  MDRO education done if applicable: n/a  Pt informed about policy regarding no IV pumps off unit: yes  Suction set up in room? yes  Flu shot ordered? (October-April only): N/a. Pt already had a flu shot  Detailed Belongings: a top, a pair of pant, cell phone and a cross bag      Goal Outcome Evaluation:      Plan of Care Reviewed With: patient    Overall Patient Progress: no changeOverall Patient Progress: no change    Outcome Evaluation: /95, pain rated 7/10 in the left shoulder and lower back,managed with repositioning. warm pack. Generalized edema with right arm non inclusive DVT.

## 2024-01-05 NOTE — PROGRESS NOTES
Nephrology Progress Note  01/05/2024         Assessment & Recommendations:     Milly Carroll is a 23 year old year old female  Assessment & Plan  Milly Carroll is a 23 year old female admitted on 1/2/2024. 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 She was admitted with chest pain and shortness of breath, found to be COVID positive, w/ severely decreased global cardiac function on echo, and requiring multiple doses of IV diuretics due to volume overload, and having worsening CHRISTA w/ creatinine from 2.48 on admission to 3.21 on 01/05.     Recommendations:  - Continue to hold nifedipine XL  - Continue Coreg 12.5 mg BID, recommend decreasing to 6.25 mg if BP is <115 systolic  - Recommend continuing to give hydralazine 10 mg BID (for afterload reduction), but can hold this for SBP <115 even with Coreg dose decreased  - Attempt Bumex 2 mg x1 today instead of torsemide and assess for responsiveness, goal net negative of 1-1.5 L  - Postponed cyclophosphamide to at least 01/12 (1 week) due to COVID infection  - Continue prednisone 60 mg daily   - Daily BMP  - Daily standing weights, strict I&Os   - Would favor epo initiation for HGB less than 9. Can consider epo initiation if recheck over the next 1-2 days shows HGB consistently less than 9.5, we will follow daily CBC to monitor this.   - Okay to discontinue renal diet but would favor 2 g Na restriction due to hypervolemia and need for diuresis, added on phosphorus level, consider restarting renal diet if elevated     #Lupus nephritis, class IV  #SLE  #Chronic kidney disease, stage IV (Cystatin C/creatinine discordance)  # Acute kidney injury  Patient's creatinine was 3.48 on 12/30,decreased to 2.48 on admission at  [facility] 1/2/2024  which again raised to 2.77 -> 2.97 on 1/3/2023 and has increased again to 3.21 on 01/05. Does continue to appear hypervolemic on examination. CHRISTA may likely represent poor renal perfusion in the setting of volume overload ,cardiorenal syndrome and diuresis w/ subsequent rapid drop in BP. Suspect rapid change in hemodynamics is the most likely cause. Lupus flare less likely given improving complement and dsDNA levels and current treatment with steroids and recent treatment w/ cyclophosphamide.  It is reassuring that she is making good urine, although will require close monitoring of intake and output moving forward.  At this time, given her known COVID infection, there would be significant risk with adding additional immunosuppressants at this time. Has been on hydroxychloroquine in the past, but this is currently held due to QTC prolongation.   Clarified with infusion coordinator that Milly has had 5 cyclophosphamide infusions thus far (08/22, 09/08, 09/22, 10/06, 12/22).     #Hypertension  #Acute on chronic systolic heart failure, stress cardiomyopathy  Most likely cause of her hypertension is volume overload, particularly given hypervolemic appearance on physical examination, significantly elevated BNP from baseline, signs of pulmonary edema on CXR. Notably, her blood pressures improved rapidly following diuresis and administration of her home dose of nifedipine. However, there is concern for correcting her BP too quickly. Given the malignant hypertension she experienced, she will require careful blood pressure to control to avoid hypotension, recommendations are above to keep SBP >115.     #. Metabolic acidosis, chronic  Agree with continuing NaHCO3 650 mg TID. She has a chronic metabolic acidosis and is tolerating this well.      #. Chronic macrocytic anemia  Would recommend holding off on starting epo for now, as her HGB has improved from prior. However, would favor starting epo if her HGB is  "<9.0 or if she remains less than 9.5 on rechecks over the next 1-2 days. Iron studies were obtained, Fe saturation of 40%.     Recommendations were communicated to primary team via [employer]. Discussed w/ patient.     Seen and discussed with Dr. Mariposa PEDROZA MD   Nephrology Teaching Service  [facility]  perrymail  [employer] Web Console      Interval History :   Nursing and provider notes from last 24 hours reviewed.  Overnight, no significant events. She feels that her swelling is the same as yesterday. Would like to be off of the renal diet. No changes in urination or BMs, no diarrhea. No additional concerns.     Physical Exam:   I/O last 3 completed shifts:  In: 930 [P.O.:930]  Out: 700 [Urine:700]   BP (!) 137/93 (BP Location: Left arm, Cuff Size: Adult Regular)   Pulse 68   Temp 98  F (36.7  C) (Oral)   Resp 18   Ht 1.575 m (5' 2\")   Wt 65.6 kg (144 lb 10 oz)   LMP 12/11/2023 (Approximate)   SpO2 100%   BMI 26.45 kg/m       General: Laying in bed, no acute distress. Interacts appropriately.   HENT: Normocephalic, atraumatic. Pupils reactive to light, EOM grossly intact. Moist mucous membranes.   Neck: Elevated JVP.   Cardiovascular: Regular rate and rhythm. Intact DP/PT and bilateral radial pulses.   Respiratory: Crackles on lung exam are significantly improved from prior. No increased work of breathing.   Abdomen: Soft, non-tender, non-distended.    Neurologic: Alert. Oriented. Moves all extremities equally.   Musculoskeletal: RUE w/ 2+ pitting edema and appears tense but does not have compartmental tenderness, has intact sensation and movement. 1+ bilateral lower extremity edema, slightly worse in the right leg.   Skin: She has some redness of the face on the bilateral cheeks but no clear malar rash. No signs of pallor or cyanosis. Warm and dry.   Psych: Flat affect.     Labs:   All labs reviewed by me  Electrolytes/Renal -   Recent Labs   Lab Test 01/05/24  0612 01/04/24  0534 01/03/24  1602 " 12/28/23  0623 12/27/23  0001 12/25/23  0442 12/24/23  1605 12/24/23  0943 12/23/23  1123 12/23/23  0409 12/20/23  0415 12/19/23  2335    140 140   < > 141 144  --  144  --  146*   < > 140   POTASSIUM 4.8 4.6 4.9   < > 5.6* 3.1*  3.1*   < > 3.4   < > 3.0*  3.0*   < > 3.6   CHLORIDE 107 108* 107   < > 109* 110*  --  111*  --  113*   < > 106   CO2 18* 16* 14*   < > 17* 20*  --  21*  --  19*   < > 18*   BUN 96.8* 95.2* 92.5*   < > 75.1* 60.5*  --  58.9*  --  63.6*   < > 52.1*   CR 3.49* 3.21* 2.97*   < > 2.98* 2.57*  --  2.42*  --  2.62*   < > 2.87*   * 200* 235*   < > 133* 138*  --  95  --  161*   < > 144*   BISI 8.4* 8.4* 7.6*   < > 8.6 8.0*  --  8.2*  --  7.7*   < > 7.4*   MAG  --   --   --   --  2.1 1.8  --   --   --   --   --  2.0   PHOS  --   --   --   --   --  3.4  --  4.6*  --  3.7   < > 6.3*    < > = values in this interval not displayed.       CBC -   Recent Labs   Lab Test 01/05/24  0612 01/04/24  0534 01/03/24  0649   WBC 11.9* 9.3 6.1   HGB 9.1* 7.9* 8.4*   PLT 57* 58* 58*       LFTs -   Recent Labs   Lab Test 01/05/24  0612 01/04/24  0534 01/03/24  0649   ALKPHOS 96 102 106   BILITOTAL 0.3 0.3 0.4   ALT 50 51* 63*   AST 20 17 26   PROTTOTAL 5.8* 5.7* 5.8*   ALBUMIN 3.2* 3.1* 3.2*       Iron Panel -   Recent Labs   Lab Test 12/20/23  0415 12/17/23  0429 07/10/23  1454 06/25/20  0656 12/27/17  1021   IRON  --  130 15*  --  99   IRONSAT  --  74* 9*  --  32   GABRIEL 1,267* 32,775* 1,079*   < >  --     < > = values in this interval not displayed.         Imaging:  All imaging studies reviewed by me.     Current Medications:   artificial saliva  1 spray Mouth/Throat 4x Daily    carvedilol  6.25 mg Oral BID    cholecalciferol  10 mcg Oral Daily    vitamin B-12  100 mcg Oral Daily    ferrous sulfate  325 mg Oral Daily with breakfast    folic acid  1 mg Oral Daily    heparin ANTICOAGULANT  5,000 Units Subcutaneous Q12H    hydrALAZINE  10 mg Oral BID    lidocaine  3 patch Transdermal Q24H    pantoprazole   40 mg Oral QAM AC    [Held by provider] potassium chloride ER  20 mEq Oral BID    predniSONE  60 mg Oral Daily    remdesivir  100 mg Intravenous Q24H    And    sodium chloride 0.9%  50 mL Intravenous Q24H    sodium bicarbonate  650 mg Oral TID    sodium chloride (PF)  3 mL Intracatheter Q8H    sulfamethoxazole-trimethoprim  1 tablet Oral Once per day on Monday Wednesday Friday    torsemide  40 mg Oral Once    [START ON 1/6/2024] torsemide  40 mg Oral Daily       NESTOR PEDROZA MD   PGY-1  I was present with the resident during the history and exam.  I discussed the case with the resident and agree with the findings as documented in the assessment and plan. Increase diuretic, ok with IV dosing, and continue monitoring.    Rama Monge MD  Associate Professor of Medicine  Department of Nephrology  [facility]

## 2024-01-05 NOTE — PROGRESS NOTES
[facility]    Medicine Progress Note - Hospitalist Service, GOLD TEAM 9    Date of Admission:  1/2/2024    Assessment & Plan   Milly Carroll is a 23 year old female admitted on 1/2/2024. She has a PMHx of lupus C/B lupus flares, lupus cerebritis, lupus nephritis, HTN, chronic systolic heart failure, QT prolongation, past perinephric hematoma s/p emobolization in Aug-2023 and chronic thrombocytopenia admitted to [facility] on 12/17/23 (after presentation for SOB/NSTEMI and pneumonia concern and concern for lupus flare where she was treated w/ steroids and plasma exchange) and transfer to [facility] on 12/18/23 before being transferred to [facility] on 12/22-12/25 for cyclophosphamide infusion. Represented on 12/26 [facility] ED for SOB found to have covid and reduced EF (baseline EF 47%, OSH EF reported as 15-20%) w/ plan to transfer patient to [facility] however patient left A on 12/30 due to prolonged wait for transfer to [facility] ED. On presentation 1/2, patient reporting continued worsening CP/SOB since her discharge on 12/30.     Currently being managed for COVID-19 infection, acute on chronic heart failure, as well as CHRISTA on CKD.      Today:   -Per discussion with nephrology and cardiology, will diurese with bumex 2 mg IV x 1 today, hold torsemide  -Decrease coreg, continue hydralazine   -Stop IV dilaudid, start PRN Robaxin for muscle pain. OK to continue oral dilaudid PRN for now, will taper over coming days.      Acute Chest pain w/ SOB 2/2 hypertensive crisis  Acute on Chronic Cardiomyopathy and HF (EF 15-20% on 12/26 OSH)  Elevated BNP w/ hypervolemia  Elevated Troponin   Uncontrolled HTN  Hx of QTc prolongation (463 on admission 1/2)  Moderate to severe mitral insufficiency   Severe tricuspid insufficiency   Echocardiogram on 17-Dec-2023 showed LVEF 35-40%, EF decreased to 15-20% on 12/26 w/ BNP at that time >70,000.  Lexiscan 12/21 negative for inducible ischemia. Admission workup 1/2 included trop elevated at 290 with peak at 469 on 1/2. TTE 1/2 with severe diffuse hypokinesis with stably reduced EF. Cardiology consulted with recs as below.   V/Q scan indeterminate.   - Gen Cardiology consulted, appreciate recs              - Continue coreg    - Stop nifedipine              - Diuresis as able, reassess daily   [ ] Will need cMRI during admission once fluid status improved  - Nephrology consulted, appreciate recs  - Next dose cyclophosphamide due on 1/5, on hold for now   - No current plans for renal bx  - Continue hydralazine   - strict I/Os, daily weights, trend electrolytes, Fluid restriction 1.5L   - pain: tylenol prn, dilaudid q6h prn, lidocaine patch       SLE (+dsDNA, +RNP, +ribosomal P Ab and hypocomplementemia)   Recent Lupus Flare w/ MAHA and renal insuff s/p Cyclophosphamide dose (12/22/23)  Hx of Lupus cerebritis w/ hx of seizures  C/b Lupus Nephritis  Hx medication non-adherence  - Rheumatology consulted- low concern for active lupus flare given recent high dose steroids and cyclophosphamide   - Nephrology Consulted as above  - Lupus-focused meds:   - resume prednisone 60mg qday  - PJP ppx: continue PTA bactrim  - GI ppx: continue protonix qday, tums prn  [ ] Due for 5th dose cyclophosphamide per [employer] protocol 1/5 (ongoing discussion w/ rheum/nephrology prior to administration), holding for now      COVID infxn (dx 12/29) s/p remdesivir at OSH  Concern for secondary pneumonia  Hx of C.diff infxn (6/2023)  Concern for superimposed PNA per OSH and was on vanc/cefepime 12/26-12/30 prior to AMA leave. Initially continued on admission, discontinued on 1/4 due to low suspicion for bacterial pneumonia.    - on RA  - Received remdesivir 12/29 and 12/30 prior to leaving AMA, plan to continue to complete 5 day course given high risk (1/4 - 1/6 )   - Discontinued antimicrobials 1/4 due to low suspicion for bacterial  infection; monitor off antibiotics   - Stopped PO vanc 1/4 when antibiotics stopped      Lupus Nephritis w/ CHRISTA   Metabolic Acidosis  Hypomagnesemia- improved  Hypokalemia -improved  - Nephrology consulted   - BP and HF regimen as above  - Continue sodium bicarbonate 650mg TID-- will plan to discuss with nephrology  - Hold PTA Potassium supplement with worsening Cr, uptrending K  - Renal diet/fluid restriction as above     Chronic RUE edema  Superficial clot of R cephalic vein  US 8/2023 negative. RUE doppler negative for DVT on 1/2, significant for superficial thrombus.  - Sx mgmt with heat packs     Chronic thrombocytopenia  Chronic Macrocytic Anemia  - trend daily CBC  - continue PTA folate/B12 replacement  - DVT ppx: heparin subcutaneous decrease dose per pharmacy in setting of chronic thrombocytopenia and renal dysfunction  - Hematology consulted - recommend EPO per nephrology      Transaminitis   - daily CMP      Hx of Intermittent Headaches  History of being responsive to APAP and dilaudid along w/ treatment of HTN and Lupus. 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 Stools described as normal.      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 No current PTA antiepileptic medications          Diet: Fluid restriction 1500 ML FLUID  Combination Diet Regular Diet; 2 gm NA Diet    DVT Prophylaxis: Heparin SQ  Leung Catheter: Not present  Lines: None     Cardiac Monitoring: None  Code Status: Full Code      Clinically Significant Risk Factors              # Hypoalbuminemia: Lowest albumin = 3 g/dL at 1/2/2024  8:00 AM, will monitor as appropriate  # Coagulation Defect: INR =  "1.18 (Ref range: 0.85 - 1.15) and/or PTT = 30 Seconds (Ref range: 22 - 38 Seconds), will monitor for bleeding  # Thrombocytopenia: Lowest platelets = 57 in last 2 days, will monitor for bleeding    # Acute Kidney Injury, unspecified: based on a >150% or 0.3 mg/dL increase in last creatinine compared to past 90 day average, will monitor renal function   # Acute heart failure with reduced ejection fraction: last echo with EF <40% and receiving IV diuretics       # Overweight: Estimated body mass index is 26.45 kg/m  as calculated from the following:    Height as of this encounter: 1.575 m (5' 2\").    Weight as of this encounter: 65.6 kg (144 lb 10 oz)., PRESENT ON ADMISSION       # Financial/Environmental Concerns: none (Per pt she discussed option of applying for disability with her new PCP)         Disposition Plan      Expected Discharge Date: 01/07/2024      Destination: home with family  Discharge Comments: Dispo VIJAY Gregorio DO  Hospitalist Service, GOLD TEAM 9  [facility]  Securely message with [employer] (more info)  Text page via [facility] Paging/Directory   See signed in provider for up to date coverage information  ______________________________________________________________________    Interval History   No overnight events reported. No dyspnea today. Having left shoulder pain feels like a pulled muscle, using IV and oral dilaudid for pain.     Physical Exam   Vital Signs: Temp: 98  F (36.7  C) Temp src: Oral BP: (!) 137/93 Pulse: 68   Resp: 18 SpO2: 100 % O2 Device: None (Room air)    Weight: 144 lbs 9.95 oz    Constitutional: no apparent distress, pleasant and cooperative   Cardiovascular: regular rate and rhythm, normal S1 and S2, no murmurs, rubs, or gallops noted, no bilateral lower extremity edema, ++ JVP    Respiratory: clear to auscultation bilaterally, no wheezing, rales, or rhonchi, normal work of breathing   GI: abdomen soft, non tender, non " distended, bowel sounds present   Neuro: alert and oriented, no gross focal deficits noted      Medical Decision Making           Data     I have personally reviewed the following data over the past 24 hrs:    11.9 (H)  \   9.1 (L)   / 57 (L)     139 107 96.8 (H) /  124 (H)   4.8 18 (L) 3.49 (H) \     ALT: 50 AST: 20 AP: 96 TBILI: 0.3   ALB: 3.2 (L) TOT PROTEIN: 5.8 (L) LIPASE: N/A     Procal: N/A CRP: 11.80 (H) Lactic Acid: N/A       INR:  N/A PTT:  N/A   D-dimer:  N/A Fibrinogen:  N/A     Ferritin:  786 (H) % Retic:  N/A LDH:  N/A       Imaging results reviewed over the past 24 hrs:   No results found for this or any previous visit (from the past 24 hour(s)).

## 2024-01-05 NOTE — PLAN OF CARE
Goal Outcome Evaluation:      Plan of Care Reviewed With: patient    Overall Patient Progress: no changeOverall Patient Progress: no change    Outcome Evaluation: [id number]: Afebrile. VS unchanged, remains stable on RA. A/Ox4, lethargic at times but arouses to voice/touch. C/O persistent left shoulder pain that pt thinks may be d/t pulled muscle. Alternating dilaudid and tylenol and using ice packs with some relief. R arm still reddened and swollen but pt reports improvement. Elevated on pillow. Declined subcutaneous heparin this afternoon. Blood clot education provided. One-time dose IV bumex given this afternoon with no response. Has only had 100mL UOP this shift. Renal diet with fair appetite. Received dose of remdesivir this afternoon. Call light within reach, makes needs known.

## 2024-01-05 NOTE — PLAN OF CARE
B/P: 145/94, T: 97.7, P: 69, R: 17    Neuro: A&Ox4.   Cardiac: SR. VSS.   Respiratory: Sating 96% on RA.  GI/: Adequate urine output.   Diet/appetite: Tolerating 2g Na diet. Within 1.5L FR.   Activity:  Assist of 1, up to bathroom. Refused chair or ambulation today.   Pain: IV and PO Dilaudid for back and L shoulder pain. Stated she thinks she pulled a muscle in her L shoulder but the pain meds help.   Skin: No new deficits noted.  LDA's: L PIV    Plan: Transfer orders placed. Continue with POC. Notify primary team with changes.

## 2024-01-05 NOTE — PROGRESS NOTES
Assumed cares from [id number]     Neuro: A&Ox4. RUE sensation is improving per pt, numbness & tenderness still present. No tingling reported. RUE is elevated for comfort.  Cardiac: No tele, elevated BP noted (143/101) but still with parameters. Otherwise  VSS.       Respiratory: Sating >99% on RA.  GI/: Pt had 100 mls UOP and one BM occurrence during the shift.   Diet/appetite: Tolerating renal diet. Pt was compliant with 1.5L fluid restriction.  Activity:  SBA in the room and up to bathroom  Pain: At acceptable level on current regimen. Pt reported L shoulder pain which was rated as 9/10. Provided PRN Dilaudid x3  Skin: No new deficits noted.  LDA's: R PIV SL   Note: Pt transferred to  at 0130 as status has improved. Gave report to Molly PRESLEY  Plan: Continue with POC. Notify primary team with changes.

## 2024-01-05 NOTE — TELEPHONE ENCOUNTER
Inquiry from Nephrology to clarify Immunosuppression history: MAR reviewed and confirmed Cytoxan 500mg doses:  8/22/23 - initial dose while in the hospital   9/8/23, 9/22/23, 10/6/23 - outpatient. (10/20 held r/t Neutropenia and last outpatient scheduled appt was a no show).  12/22/23 -inpatient dose.    Updated Dr. Monge with findings.    Oralia Rodriguez RN, BSN, PHN  [facility] Nephrology Nurse Navigator  [phone]

## 2024-01-05 NOTE — PROGRESS NOTES
Medicine cross cover note  - Started hydralazine 10 mg BID after reviewing cardiology and primary note given SBP above 140

## 2024-01-05 NOTE — PROGRESS NOTES
"RHEUMATOLOGY PROGRESS NOTE     Subjective      Milly Carroll was seen and examined at bedside today    Objective   BP (!) 137/93 (BP Location: Left arm, Cuff Size: Adult Regular)   Pulse 68   Temp 98  F (36.7  C) (Oral)   Resp 18   Ht 1.575 m (5' 2\")   Wt 65.6 kg (144 lb 10 oz)   LMP 12/11/2023 (Approximate)   SpO2 100%   BMI 26.45 kg/m        PHYSICAL EXAMINATION  Physical Exam  Constitutional:       Appearance: Normal appearance.   Pulmonary:      Effort: Pulmonary effort is normal.   Abdominal:      General: Abdomen is flat.   Musculoskeletal:      Right lower leg: Edema present.      Left lower leg: Edema present.   Neurological:      Mental Status: She is alert.         Assessment & Plan      Milly is a pleasant 23-year-old female with difficult to treat systemic lupus [discoid lupus, lupus cerebritis, lupus nephritis class IV and class V, microangiopathic hemolytic anemia, hypocomplementemia, osteonecrosis] complicated with medication nonadherence along with other comorbidities including hypertension, cardiomyopathy, chronic immunosuppression.  She has been hospitalized several times over the past few months.    # Systemic lupus erythematosus  # COVID infection  # Microangiopathic hemolytic anemia  # Lupus nephritis  # Heart failure with reduced ejection fraction    Complex situation, she is currently hospitalized for COVID infection after signing out against medical advice from an outside hospital.  She does not seem to have completed her COVID regimen and given her immunosuppressive state that she will benefit from continued remdesivir use [discussed with primary team].    Her lupus serology shows improved complement C4 and C3 as well as double-stranded DNA compared to December, this is likely favorable response from her most recent cyclophosphamide infusion as well as the increase in prednisone.    She has elevation in her CRP and this is likely driven by COVID rather than lupus activity.    She " has had microangiopathic hemolytic anemia with elevated LDH fluctuating haptoglobin and both are improving, appreciate hematology assistance and evaluation. hematology colleagues feels she had hemolysis that appears to be resolving as well with therapy.    There was concern for myocarditis however most recent normal CK would argue against this.    Given ongoing therapy for COVID, our nephrology colleagues are planning to postpone cyclophosphamide which is reasonable and we agree with this.    Recommendations:  1- Continue to trend haptoglobin, LDH to discern improvement  2- Cyclophosphamide and prednisone dosing for lupus nephritis per Nephrology, Agree with holding given COVID    Rheumatology service will continue to follow           Kamila Montanez MD  Rheumatology attending

## 2024-01-06 NOTE — PROGRESS NOTES
Medicine cross cover  - I was called that the patient has SBP of 192, no symptoms reported. The patient already received her hydalazine IV. I requested that we administer her am coreg and hydralazine and will monitor for response.medications in am anyway

## 2024-01-06 NOTE — PROGRESS NOTES
Brief cardiology follow-up:  Patient seen and examined at bedside today with cardiology attending.  Blood pressure still very high with systolic between 170 and 190.  Given Coreg 12.5 twice daily and hydralazine 10 mg twice daily.  Patient withdrawn, not reporting any symptoms.  Creatinine up today to 3.9 from 3.4 yesterday.  Urine output 600 cc over the last 24 hours, net -360 cc.  After discussion with primary team and nephrology, it was decided that we would trial for more aggressive diuresis today with 2 mg Bumex twice daily.  If kidney function continues to deteriorate, will consider the possibility of high blood pressure contributing to her CHRISTA and will restart low-dose nifedipine tomorrow.  Cardiology will continue to follow.    Physician Attestation   I saw this patient and agree with the resident/fellow's findings and plan of care as documented in the note.      Key findings:   Patient lying flat and without tachypnea. 1+ pitting edema bilaterally.   Creatinine is trending up and she has not responded to Bumex IV. Remains hypertensive.   Suspect she has intrinsic renal disease, heading to HD per renal attending. Attempting diuresis with bumex gtt today.   Will hold off on RHC. Treat BP with coreg and may need to get her back on nifedipine (low dose).   Tea Luz MD  Date of Service (when I saw the patient): January 7, 2024

## 2024-01-06 NOTE — PLAN OF CARE
"Blood pressure (!) 166/111, pulse 77, temperature 97.6  F (36.4  C), temperature source Oral, resp. rate 18, height 1.575 m (5' 2\"), weight 65.6 kg (144 lb 10 oz), last menstrual period 12/11/2023, SpO2 100%, Via RA .        Patient A & O X 4 , with flat affect , able to make needs known .  C/o pain received Dilaudid po x 1 Patient with elevated BP . MD aware , received scheduled medications . And Hydralazine. Right arm swollen elevated on pillow .refused subcutaneous heparin this shift.patient educated the importance. Patient continue declined .Remdesiver IV infusing at this time . Pt   Up independently in room , void 350 on this shift . Appetite ate breakfast 50% and drink 340 ml fluid . Patient on fluid restriction 1500. Call light within reach, use call light appropriately. Continue monitor VS , pain and I/O. Update MD with change.                 Goal Outcome Evaluation:      Plan of Care Reviewed With: patient    Overall Patient Progress: no change.            "

## 2024-01-06 NOTE — PLAN OF CARE
Pt is alert and oriented x 4, /128, Hydralazine 10mg given. OVSS. Flat affect, withdrawn but cooperative. Olyguric with only 300 ml urine in the last 12 hours. Cr increasing 3.49. Right arm swelling +4 edema requiring doppler for radial pulse. Pain is controlled with Oral Dilaudid PRN. Continue to monitor BP and control pain.     Goal Outcome Evaluation:      Plan of Care Reviewed With: patient    Overall Patient Progress: no changeOverall Patient Progress: no change    Outcome Evaluation: /128, Hydralazine 10mg given. Olyguric with only 300 ml urine in the last 12 hours. Cr increasing 3.49. Right arm swelling +4 edema requiring doppler for radial pulse. Pain is controlled with Oral Dilaudid PRN. Continue to monitor BP and control pain.

## 2024-01-06 NOTE — PROVIDER NOTIFICATION
Provider notified (name): Dr. Cardoza  Reason for notification: FYI: /128, Hydralazine 10mg given. Day shift RN to recheck at 0800  Recommendation/request given to provider: GAVIN  Response from provider: Pending response/Orders

## 2024-01-07 NOTE — PLAN OF CARE
0030  Notified that patient is still hypertensive to despite multiple boluses of labetalol and hydralazine.    Reviewed chart.     Plan  -Start nicardipine gtt with SBP goal of less than 180  -Transfer to [facility] [place] gtt    Rodolfo Mooney MD  Internal Medicine, Hospitalist Service    ------  Notified at 0330 that patients BP had dropped to 124/72. Discussed with nursing. Nicardipine gtt at 10mcg/hr.   Nicardipine downtitrated to off. Updated SBP goals to 160-180.    Rodolfo Mooney MD  Internal Medicine, Hospitalist Service

## 2024-01-07 NOTE — PROGRESS NOTES
Nephrology Progress Note  01/06/2024         Assessment & Recommendations:     Milly Carroll is a 23 year old year old female  Assessment & Plan  Milly Carroll is a 23 year old female admitted on 1/2/2024. 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 She was admitted with chest pain and shortness of breath, found to be COVID positive, w/ severely decreased global cardiac function on echo, and requiring multiple doses of IV diuretics due to volume overload, and having worsening CHRISTA w/ creatinine from 2.48 on admission to 3.21 on 01/05 ----3.97.    Recommendations:  - Agree Bumex 4 mg IV today  - If BP remained high SBP >150 ,re-start nifedipine 30 mg po daily  - Continue Coreg 12.5 mg BID  - Continue hydralazine 10 mg BID (for afterload reduction  - Postponed cyclophosphamide to at least 01/12 (1 week) due to COVID infection  - Continue prednisone 60 mg daily   - Daily BMP  - Daily standing weights, strict I&Os   - Would favor epo initiation for HGB less than 9. Can consider epo initiation if recheck over the next 1-2 days shows HGB consistently less than 9.5, we will follow daily CBC to monitor this.   - Okay to discontinue renal diet but would favor 2 g Na restriction due to hypervolemia and need for diuresis, added on phosphorus level, consider restarting renal diet if elevated     #Lupus nephritis, class IV  #SLE  #Chronic kidney disease, stage IV (Cystatin C/creatinine discordance)  # Acute kidney injury  Patient's creatinine was 3.48 on 12/30,decreased to 2.48 on admission at [facility] 1/2/2024  which again raised to 2.77 -> 2.97 on 1/3/2023 and has increased again to 3.21 on 01/05. Does continue to appear hypervolemic on examination. CHRISTA may likely represent  poor renal perfusion in the setting of volume overload ,cardiorenal syndrome and diuresis w/ subsequent rapid drop in BP. Suspect rapid change in hemodynamics is the most likely cause. Lupus flare less likely given improving complement and dsDNA levels and current treatment with steroids and recent treatment w/ cyclophosphamide.  It is reassuring that she is making good urine, although will require close monitoring of intake and output moving forward.  At this time, given her known COVID infection, there would be significant risk with adding additional immunosuppressants at this time. Has been on hydroxychloroquine in the past, but this is currently held due to QTC prolongation.   Clarified with infusion coordinator that Milly has had 5 cyclophosphamide infusions thus far (08/22, 09/08, 09/22, 10/06, 12/22).     #Hypertension  #Acute on chronic systolic heart failure, stress cardiomyopathy  Most likely cause of her hypertension is volume overload, particularly given hypervolemic appearance on physical examination, significantly elevated BNP from baseline, signs of pulmonary edema on CXR. Notably, her blood pressures improved rapidly following diuresis and administration of her home dose of nifedipine. However, there is concern for correcting her BP too quickly. Given the malignant hypertension she experienced, she will require careful blood pressure to control to avoid hypotension, recommendations are above to keep SBP >115.     #. Metabolic acidosis, chronic  Agree with continuing NaHCO3 650 mg TID. She has a chronic metabolic acidosis and is tolerating this well.      #. Chronic macrocytic anemia  Would recommend holding off on starting epo for now, as her HGB has improved from prior. However, would favor starting epo if her HGB is <9.0 or if she remains less than 9.5 on rechecks over the next 1-2 days. Iron studies were obtained, Fe saturation of 40%.     Recommendations were communicated to primary team via  "Vocera. Discussed w/ patient.     Seen and discussed with Dr. Mariposa Steen MD   Nephrology Teaching Service  [facility]  perrynarda  [employer]      Interval History :   Nursing and provider notes from last 24 hours reviewed.  Overnight, no significant events. She feels that her swelling is increasing.No changes in urination or BMs, no diarrhea.Sleepy.    Physical Exam:   I/O last 3 completed shifts:  In: -   Out: 700 [Urine:650; Emesis/NG output:50]   BP (!) 177/119 (BP Location: Left arm, Patient Position: Supine, Cuff Size: Adult Small)   Pulse 68   Temp 98  F (36.7  C) (Oral)   Resp 18   Ht 1.575 m (5' 2\")   Wt 65.6 kg (144 lb 10 oz)   LMP 12/11/2023 (Approximate)   SpO2 100%   BMI 26.45 kg/m       General: Laying in bed, no acute distress. Interacts appropriately.   HENT: Normocephalic, atraumatic. Pupils reactive to light, EOM grossly intact. Moist mucous membranes.   Neck: Elevated JVP.   Cardiovascular: Regular rate and rhythm. Intact DP/PT and bilateral radial pulses.   Respiratory: Crackles on lung exam are significantly improved from prior. No increased work of breathing.   Abdomen: Soft, non-tender, non-distended.    Neurologic: Alert. Oriented. Moves all extremities equally.   Musculoskeletal: RUE w/ 2+ pitting edema and appears tense but does not have compartmental tenderness, has intact sensation and movement. 1+ bilateral lower extremity edema, slightly worse in the right leg.   Skin: She has some redness of the face on the bilateral cheeks but no clear malar rash. No signs of pallor or cyanosis. Warm and dry.   Psych: Flat affect.     Labs:   All labs reviewed by me  Electrolytes/Renal -   Recent Labs   Lab Test 01/06/24  0638 01/05/24  0612 01/04/24  0534 12/28/23  0623 12/27/23  0001 12/25/23  0442 12/24/23  1605 12/24/23  0943 12/20/23  0415 12/19/23  2335    139 140   < > 141 144  --  144   < > 140   POTASSIUM 5.3 4.8 4.6   < > 5.6* 3.1*  3.1*   < > 3.4   < > 3.6 "   CHLORIDE 109* 107 108*   < > 109* 110*  --  111*   < > 106   CO2 17* 18* 16*   < > 17* 20*  --  21*   < > 18*   .0* 96.8* 95.2*   < > 75.1* 60.5*  --  58.9*   < > 52.1*   CR 3.97* 3.49* 3.21*   < > 2.98* 2.57*  --  2.42*   < > 2.87*   * 124* 200*   < > 133* 138*  --  95   < > 144*   BISI 8.2* 8.4* 8.4*   < > 8.6 8.0*  --  8.2*   < > 7.4*   MAG  --   --   --   --  2.1 1.8  --   --   --  2.0   PHOS  --  6.1*  --   --   --  3.4  --  4.6*   < > 6.3*    < > = values in this interval not displayed.       CBC -   Recent Labs   Lab Test 01/06/24  0638 01/05/24  0612 01/04/24  0534   WBC 5.3 11.9* 9.3   HGB 8.4* 9.1* 7.9*   PLT 58* 57* 58*       LFTs -   Recent Labs   Lab Test 01/06/24  0638 01/05/24  0612 01/04/24  0534   ALKPHOS 92 96 102   BILITOTAL 0.2 0.3 0.3   ALT 43 50 51*   AST 22 20 17   PROTTOTAL 5.4* 5.8* 5.7*   ALBUMIN 3.0* 3.2* 3.1*       Iron Panel -   Recent Labs   Lab Test 01/05/24  0612 12/20/23  0415 12/17/23  0429 07/10/23  1454   IRON 76  --  130 15*   IRONSAT 40  --  74* 9*   GABRIEL 786*   < > 32,775* 1,079*    < > = values in this interval not displayed.         Imaging:  All imaging studies reviewed by me.     Current Medications:   artificial saliva  1 spray Mouth/Throat 4x Daily    carvedilol  12.5 mg Oral BID    cholecalciferol  10 mcg Oral Daily    vitamin B-12  100 mcg Oral Daily    ferrous sulfate  325 mg Oral Daily with breakfast    folic acid  1 mg Oral Daily    heparin ANTICOAGULANT  5,000 Units Subcutaneous Q12H    hydrALAZINE  10 mg Oral BID    lidocaine  3 patch Transdermal Q24H    pantoprazole  40 mg Oral QAM AC    [Held by provider] potassium chloride ER  20 mEq Oral BID    predniSONE  60 mg Oral Daily    remdesivir  100 mg Intravenous Q24H    And    sodium chloride 0.9%  50 mL Intravenous Q24H    sodium bicarbonate  650 mg Oral TID    sodium chloride (PF)  3 mL Intracatheter Q8H    sulfamethoxazole-trimethoprim  1 tablet Oral Once per day on Monday Wednesday Friday        Michel Steen MD   I was present with the fellow during the history and exam.  I discussed the case with the fellow and agree with the findings as documented in the assessment and plan.  BP higher, and little response with bumex 2mg yesterday, thus will order 4mg, BID dosing ok.  If BP remains elevated, can increase hydralazine and /or restart nifedipine   Rama MD [place]   of Medicine  Department of Nephrology  [facility]

## 2024-01-07 NOTE — PROGRESS NOTES
"RHEUMATOLOGY PROGRESS NOTE     Subjective      Milly Carroll was seen and examined at bedside today  ROS      Objective   BP (!) 168/107   Pulse 65   Temp 97.8  F (36.6  C) (Oral)   Resp 12   Ht 1.575 m (5' 2\")   Wt 64.5 kg (142 lb 3.2 oz)   LMP 12/11/2023 (Approximate)   SpO2 100%   BMI 26.01 kg/m        PHYSICAL EXAMINATION  Physical Exam  Constitutional:       Appearance: She is ill-appearing.   HENT:      Head: Normocephalic and atraumatic.   Musculoskeletal:      Right lower leg: Edema present.      Left lower leg: Edema present.   Skin:     Findings: No rash.         Assessment & Plan      # Systemic lupus erythematosus  # COVID infection  # Microangiopathic hemolytic anemia  # Lupus nephritis  # Creatinine, Cystatin C discrepancy   # Heart failure with reduced ejection fraction    Cystatin C added her to morning labs showing GFR 8 mL/min/1.73m2 there is some discrepancy compared to creatinine. She is being following by our colleagues in Nephrology for Lupus Nephritis and Cardiology for heart failure with reduced ejection fraction.     She remains with  features of fluid overload and hypertension    Her hemolytic anemia is improving and her haptoglobin levels are now stable and LDH is down trending which is encouraging,   CRP is down trending probably secondary to Remdisivir use for COVID    She is currently receiving Hydralazine for afterload reduction, Hydralazine can cause drug induced Lupus, it is unknown if this is contributing to her worsening or this is just progression of her known disease.  We would defer to our colleagues in cardiology in further evaluation or consideration of another agent if at all possible acknowledging this might not be feasible given her hypertension and heart failure and renal failure.     Challenging situation with significant kidney damage that has been accrued throughout the years with most recent biopsy showing 70 to 80% fibrosis in August 2023. Then intercurrent " events over the past few months with multiple hospitalizations and most recently with COVID.    Management of Lupus Nephritis (steroids and Cyclophosphamide) per Nephrology and Management of heart failure per our colleagues in Cardiology.     Rheumatology will follow peripherally    Please call us for questions or concerns if they arise      Kamila Montanez MD  Rheumatology attending

## 2024-01-07 NOTE — PROGRESS NOTES
Brief Hospital Medicine Note:     Paged by RN that patient is still nauseated following zofran dose. Qtc 461 on admission. Repeat ECG still up to 460. Ordered one time compazine.       Maria Luisa Melgar PA-C  Hospitalist Service  Contact information available via [facility] Paging/Directory

## 2024-01-07 NOTE — PROGRESS NOTES
Transfer  Transferred from:   Via:bed  Reason for transfer: Pt appropriate for 6B- requiring nicardipine gtt  Belongings: Received with pt  Chart: Received with pt  Medications: Meds received from old unit with pt  Code Status verified on armband: yes  2 RN Skin Assessment Completed By: Mckenzie AMANDA RN & Neli BOSTON RN  Med rec completed: yes  Suction/Ambu bag/Flowmeter at bedside: yes    Report received from: Katelyn PRESLEY  Pt status: See flowsheet for assessment.

## 2024-01-07 NOTE — PLAN OF CARE
Neuro: A&Ox4. Sleeping in between cares   Cardiac: SR. VSS.goal 160s-180s Gave PRN hydralazine X2  Respiratory: Sating 90s on RA.  GI/: Low urine output (also low liquid intake). Purewick.  BM- on shift  Diet/appetite: Tolerating 2g NA diet. 1500ml FR Eating fair  Activity:  Not OOB all shift   Pain: At acceptable level on current regimen. Pt denies pain  Skin: No new deficits noted. RUE swollen and blister on inner R arm, BLE edema   LDA's: L PIV (bumex gtt 0.5mg/hr)    Plan: Continue with POC. Notify primary team with changes.   Continue with Remdesivir (last day?)  IV Bumex gtt continue 0.5mg/hr  Added 24hr nicardipine PO med (stopped IV gtt)

## 2024-01-07 NOTE — PROGRESS NOTES
Brief Hospital Medicine Note:     Paged by RN regarding persistencely elevated BP of 192/126 with at 71. Given patient was recently given PTA carvedilol with HR of 71, will ordered addition dose of IV hydralazine 10mg. If persistently elevated and HR remains at 71, could consider dose of IV labetalol.     Addendum: blood pressure still elevated following addition hydralazine dose. HR 69. Will order one time labetalol 10mg.       Maria Luisa Melgar PA-C  Hospitalist Service  Contact information available via [facility] Paging/Directory

## 2024-01-07 NOTE — PROVIDER NOTIFICATION
2038: Provider notified that BP remains hypertensive following both PRN Hydralazine (given 1812) and scheduled Hydralazine and Coreg (given 1908).     Recheck 192/126 at 2120, HR stable at 71. Provider ordering additional Hydralazine at this time.

## 2024-01-07 NOTE — PLAN OF CARE
Neuro: A&Ox4. Flat affect. Able to make needs known. Slept between cares.  Cardiac: SR 60s-70s. SBP 130s on arrival on nicardipine gtt. Per discussion with primary team ~4am, goal orders placed for -180, nicardipine gtt weaned off.   Respiratory: Sating >96% on RA.  GI/: Purewick in place. No BM this shift.  Diet/appetite: Tolerating 2gm sodium diet, 1500ml FR.   Activity:  Independently repositioning in bed.  Pain: At acceptable level on current regimen.   Skin: RUE edema & abrasion.   LDA's:  -L PIV: SL    Plan: Maintain SBP within parameters. Continue with POC. Notify primary team with changes.      Goal Outcome Evaluation: progressing

## 2024-01-07 NOTE — PROGRESS NOTES
Pt continiues in hypertensive crisis with most recent /125 and 192/124, inspite treatment with PRN hydralazine. Dr. Mooney placed an order to transfer pt to intermediate care unit (6B). Report given to 6B Rn and Pt transferred to 6B at 0200.

## 2024-01-07 NOTE — PROGRESS NOTES
[facility]    Medicine Progress Note - Hospitalist Service, GOLD TEAM 9    Date of Admission:  1/2/2024    Assessment & Plan   Milly Carroll is a 23 year old female admitted on 1/2/2024. She has a PMHx of lupus C/B lupus flares, lupus cerebritis, lupus nephritis, HTN, chronic systolic heart failure, QT prolongation, past perinephric hematoma s/p emobolization in Aug-2023 and chronic thrombocytopenia admitted to [facility] on 12/17/23 (after presentation for SOB/NSTEMI and pneumonia concern and concern for lupus flare where she was treated w/ steroids and plasma exchange) and transfer to [facility] on 12/18/23 before being transferred to [facility] on 12/22-12/25 for cyclophosphamide infusion. Represented on 12/26 [facility] ED for SOB found to have covid and reduced EF (baseline EF 47%, OSH EF reported as 15-20%) w/ plan to transfer patient to [facility] however patient left A on 12/30 due to prolonged wait for transfer to [facility] ED. On presentation 1/2, patient reported continued worsening CP/SOB since her discharge on 12/30. Chest pain and dyspnea has since improved during her admission.     Currently being managed for COVID-19 infection, acute on chronic heart failure, as well as CHRISTA on CKD.      Today:   -Discussed with cardiology and nephrology   -Start bumex drip at 0.5 mg/hr, monitor urine output  -Restart nifedipine  -Stop nicardipine drip    -No urgent need for dialysis today, continue to reassess daily with nephrology assistance   -Limited TTE with dilation of SVC   -Repeat RUE US, no development of DVT. Suspect weakness 2/2 swelling.   -Monitor QTC if ongoing antiemetic use      Acute on Chronic HF (EF 15-20% on 12/26 OSH)  Dyspnea and Chest Pain - improved   Uncontrolled HTN   Severe tricuspid insufficiency   Presented with chest pain and dyspnea as above. Concern that hypertensive crisis contributing to chest pain.  Echocardiogram on 17-Dec-2023 showed LVEF 35-40%, EF decreased to 15-20% on 12/26 w/ BNP at that time >70,000. Lexiscan 12/21 negative for inducible ischemia. Admission workup 1/2 included trop elevated at 290 with peak at 469 on 1/2. TTE 1/2 with severe diffuse hypokinesis with stably reduced EF. Limited TTE 1/7 with EF 35-40% and dilated IVC. Cardiology consulted with recs as below.   V/Q scan indeterminate.    Attempted diuresis with increased doses of IV bumex doses (up to bumex 4 mg IV x 1) on 1/4-1/6 with minimal urine output and concern for cardiorenal syndrome.   -Gen Cardiology consulted, appreciate recs  -Nephrology consulted, appreciate assistance  -Continue bumex drip, started 1/7   -Continue carvedilol   -Continue hydralazine BID   -Continue nifedipine (restarted on 1/7)   -Will need cMRI during admission once fluid status improved     CHRISTA, worsening   Metabolic Acidosis   Recent Lupus Nephritis   Cr up trending this admission, per discussion with cardiology, nephrology, and rheumatology current CHRISTA suspected to be more likely related to cardiorenal syndrome and ongoing volume overload.   -Nephrology consulted, appreciate assistance    -Continue diuresis and blood pressure management as above   -Continue sodium bicarb tabs   -Rheumatology following, appreciate assistance   -Due for 5th dose cyclophosphamide per [facility] protocol 1/5 (ongoing discussion w/ rheum/nephrology prior to administration), holding for now due to COVID and other ongoing management as outlined     COVID-19 Infection   Tested positive on 12/29 at OSH when presented with chest pain and dyspnea. Concern for superimposed PNA per OSH and was on vanc/cefepime 12/26-12/30 prior to AMA leave. Initially cefepime continued on admission, discontinued on 1/4 due to low suspicion for bacterial pneumonia.  Has remained on RA this admission.   -Received remdesivir 12/29 and 12/30 prior to leaving AMA, plan to continue to complete 5 day course  given high risk (1/5 - 1/7)     Hx of C.diff infxn (6/2023)  -Stopped PO vanc prophylaxis on 1/4 when antibiotics stopped     SLE (+dsDNA, +RNP, +ribosomal P Ab and hypocomplementemia)   Recent Lupus Flare w/ MAHA and renal insuff s/p Cyclophosphamide dose (12/22/23)  Hx of Lupus cerebritis w/ hx of seizures  C/b Lupus Nephritis  Hx medication non-adherence  -Lupus-focused meds:   -Continue prednisone 60mg qday  -PJP ppx: continue PTA bactrim  -GI ppx: continue protonix qday, tums prn  -Cyclophosphamide on hold as above     Hypomagnesemia  Hypokalemia  -Replete per protocol      Chronic RUE edema with weakness   Superficial clot of R cephalic vein  US 8/2023 negative. RUE doppler negative for DVT on 1/2, significant for superficial thrombus. Repeat US RUE 1/7 with no evidence of DVT. Suspect weakness 2/2 swelling, lower concern for alternate etiologies.   -Symptomatic with heat packs    Chronic thrombocytopenia  Chronic Macrocytic Anemia  -Trend CBC daily   -Continue PTA folate/B12 replacement  -DVT ppx: heparin subcutaneous decrease dose per pharmacy in setting of chronic thrombocytopenia and renal dysfunction  -Hematology consulted - hemolysis improving, check labs daily., recommend EPO per nephrology      Hx of Intermittent Headaches  History of being responsive to APAP and dilaudid along w/ treatment of HTN and Lupus. 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 Stools described as normal.      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 No current PTA antiepileptic medications    Chronic Left Shoulder Pain   Unclear etiology, likely MSK pain.   -PRN tylenol or metharbamol;  "taper PO dilaudid (no clear indication)      Type 2 MI 2/2 hypertension, COVID-19, volume overload, resolved   Admission workup 1/2 included trop elevated at 290 with peak at 469 on 1/2. EKG at admission without ischemic signs.      Diet: Fluid restriction 1500 ML FLUID  Combination Diet Regular Diet; 2 gm NA Diet    DVT Prophylaxis: Heparin SQ  Leung Catheter: Not present  Lines: None     Cardiac Monitoring: None  Code Status: Full Code      Clinically Significant Risk Factors              # Hypoalbuminemia: Lowest albumin = 3 g/dL at 1/7/2024  7:09 AM, will monitor as appropriate   # Thrombocytopenia: Lowest platelets = 57 in last 2 days, will monitor for bleeding    # Acute Kidney Injury, unspecified: based on a >150% or 0.3 mg/dL increase in last creatinine compared to past 90 day average, will monitor renal function   # Acute heart failure with reduced ejection fraction: last echo with EF <40% and receiving IV diuretics       # Overweight: Estimated body mass index is 26.01 kg/m  as calculated from the following:    Height as of this encounter: 1.575 m (5' 2\").    Weight as of this encounter: 64.5 kg (142 lb 3.2 oz).        # Financial/Environmental Concerns: none (Per pt she discussed option of applying for disability with her new PCP)         Disposition Plan     Expected Discharge Date: 01/07/2024      Destination: home with family  Discharge Comments: Dispo VIJAY Gregorio,   Hospitalist Service, GOLD TEAM 9  [facility]  Securely message with [employer] (more info)  Text page via [facility] Paging/Directory   See signed in provider for up to date coverage information  ______________________________________________________________________    Interval History   Overnight, BP elevated up to 190, unresponsive to PRN hydralazine or labetalol. Started on nicardipine drip and transferred to [facility].   Nausea overnight, given additional dose of IV compazine.   This " morning, ongoing nausea. Denies abdominal pain or dyspnea. Denies chest pain.     Physical Exam   Vital Signs: Temp: 97.8  F (36.6  C) Temp src: Oral BP: (!) 173/107 Pulse: 66   Resp: 17 SpO2: 100 % O2 Device: None (Room air)    Weight: 142 lbs 3.15 oz    Constitutional: no apparent distress, pleasant and cooperative   Cardiovascular: regular rate and rhythm, normal S1 and S2, no murmurs, rubs, or gallops noted, trace bilateral lower extremity edema, +JVP    Respiratory: clear to auscultation bilaterally, no wheezing, rales, or rhonchi, normal work of breathing   GI: abdomen soft, non tender, non distended, bowel sounds present   Neuro: alert and oriented, no gross focal deficits noted    MSK: LUE with 2+ edema below the elbow, 3/5 weakness to flexion of arm, movement of wrist, and  strength (cannot lift lower arm off pillow)     Medical Decision Making           Data     I have personally reviewed the following data over the past 24 hrs:    5.7  \   9.1 (L)   / 57 (L)     140 108 (H) 109.0 (H) /  95   5.3 16 (L) 4.15 (H) \     ALT: 36 AST: 17 AP: 95 TBILI: 0.3   ALB: 3.0 (L) TOT PROTEIN: 5.4 (L) LIPASE: N/A       Imaging results reviewed over the past 24 hrs:   No results found for this or any previous visit (from the past 24 hour(s)).

## 2024-01-07 NOTE — PROVIDER NOTIFICATION
Time of notification: 2:26 PM  Provider notified: Brittani Gregorio  Patient status: BP back up to sys 185/124 gave PRN hydralazine. Also, the remdesirvir is not able to be co-administered with any other IV med, and they could only get the one IV. Do you want me to stop the bumex for an hour or so to give the remdesirvir?   Orders received: Yes, okay to stop

## 2024-01-07 NOTE — PROGRESS NOTES
Nephrology Progress Note  01/07/2024         Assessment & Recommendations:     Milly Carroll is a 23 year old year old female  Assessment & Plan  Milly Carroll is a 23 year old female admitted on 1/2/2024. 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 She was admitted with chest pain and shortness of breath, found to be COVID positive, w/ severely decreased global cardiac function on echo, and requiring multiple doses of IV diuretics due to volume overload, and having worsening CHRISTA w/ creatinine from 2.48 on admission increased to 4.17 on 1/7/2024.    Recommendations:  - Start bumex 0.5mg /hr   - re start nifedipine 30 mg po daily  - Continue Coreg 12.5 mg BID  - Continue hydralazine 10 mg BID (for afterload reduction )  - Postponed cyclophosphamide to at least 01/12 (1 week) due to COVID infection  - Continue prednisone 60 mg daily   - Daily BMP  - Daily standing weights, strict I&Os     #Lupus nephritis, class IV  #SLE  #Chronic kidney disease, stage IV (Cystatin C/creatinine discordance)  # Acute kidney injury  Patient's creatinine was 3.48 on 12/30,decreased to 2.48 on admission at [facility] 1/2/2024  which again raised to 2.77 -> 2.97 on 1/3/2023 and has increased again to 3.21 - 3.97 - 4.17. Does continue to appear hypervolemic on examination. CHRISTA may likely represent poor renal perfusion in the setting of volume overload ,cardiorenal syndrome and diuresis w/ subsequent rapid drop in BP. Significantly elevated BP on presentation with evidence transient hemolysis and hematuria might also suggest malignant hypertension with fibrinoid necrosis a possibility. Lupus flare less likely given improving complement and dsDNA levels and current treatment with steroids  and recent treatment w/ cyclophosphamide.  At this time, given her known COVID infection, there would be significant risk with adding additional immunosuppressants at this time. Has been on hydroxychloroquine in the past, but this is currently held due to QTC prolongation.   Clarified with infusion coordinator that Milly has had 5 cyclophosphamide infusions thus far (08/22, 09/08, 09/22, 10/06, 12/22).   As inpatient despite diuretics urine out put remain unsatisfactory with progressively worsening in kidney dysfunction.has nausea and an episode of vomiting,may be early symptoms of uremia. Would try bumex drip for control of BP ,likely heading to need for RRT.Discussion held on RRT, on long term she prefers home dialysis in the form of PD.      #Hypertension  #Acute on chronic systolic heart failure, stress cardiomyopathy  Most likely cause of her hypertension is volume overload, particularly given hypervolemic appearance on physical examination, significantly elevated BNP from baseline, signs of pulmonary edema on CXR. Notably, her blood pressures improved rapidly following diuresis and administration of her home dose of nifedipine. However, there is concern for correcting her BP too quickly. Given the malignant hypertension she experienced, she will require careful blood pressure to control to avoid hypotension, recommendations are above to keep SBP >115.     #. Metabolic acidosis, chronic  Agree with continuing NaHCO3 650 mg TID. She has a chronic metabolic acidosis and is tolerating this well.      #. Chronic macrocytic anemia  Would recommend holding off on starting epo for now, as her HGB has improved from prior. However, would favor starting epo if her HGB is <9.0 or if she remains less than 9.5 on rechecks over the next 1-2 days. Iron studies were obtained, Fe saturation of 40%.     Recommendations were communicated to primary team via [facility]. Discussed w/ patient.     Seen and discussed with   "Mariposa Steen MD   Nephrology Teaching Service  [facility]  jose [employer] Web Console      Interval History :   Nursing and provider notes from last 24 hours reviewed.  Overnight, no significant events. She feels that her swelling is increasing. No satisfactory response to Bumex 4 mg IV .Has nausea and one episodes of vomiting ,may portend early symptoms of uremia.    Physical Exam:   I/O last 3 completed shifts:  In: 150 [P.O.:150]  Out: 575 [Urine:575]   BP (!) 181/113   Pulse 71   Temp 97.2  F (36.2  C) (Axillary)   Resp 15   Ht 1.575 m (5' 2\")   Wt 64.5 kg (142 lb 3.2 oz)   LMP 12/11/2023 (Approximate)   SpO2 100%   BMI 26.01 kg/m       General: Laying in bed, no acute distress. Interacts appropriately.   HENT: Normocephalic, atraumatic. Pupils reactive to light, EOM grossly intact. Moist mucous membranes.   Neck: Elevated JVP.   Cardiovascular: Regular rate and rhythm. Intact DP/PT and bilateral radial pulses.   Respiratory: Crackles on lung exam are significantly improved from prior. No increased work of breathing.   Abdomen: Soft, non-tender, non-distended.    Neurologic: Alert. Oriented. Moves all extremities equally.   Musculoskeletal: RUE w/ 2+ pitting edema and appears tense but does not have compartmental tenderness, has intact sensation and movement. 1+ bilateral lower extremity edema, slightly worse in the right leg.   Skin: She has some redness of the face on the bilateral cheeks but no clear malar rash. No signs of pallor or cyanosis. Warm and dry.   Psych: Flat affect.     Labs:   All labs reviewed by me  Electrolytes/Renal -   Recent Labs   Lab Test 01/07/24  0709 01/06/24  0638 01/05/24  0612 12/28/23  0623 12/27/23  0001 12/25/23  0442 12/24/23  1605 12/24/23  0943 12/20/23  0415 12/19/23  2335    140 139   < > 141 144  --  144   < > 140   POTASSIUM 5.3 5.3 4.8   < > 5.6* 3.1*  3.1*   < > 3.4   < > 3.6   CHLORIDE 108* 109* 107   < > 109* 110*  --  111*   < > 106   CO2 " 16* 17* 18*   < > 17* 20*  --  21*   < > 18*   .0* 107.0* 96.8*   < > 75.1* 60.5*  --  58.9*   < > 52.1*   CR 4.15* 3.97* 3.49*   < > 2.98* 2.57*  --  2.42*   < > 2.87*   GLC 95 114* 124*   < > 133* 138*  --  95   < > 144*   BISI 8.3* 8.2* 8.4*   < > 8.6 8.0*  --  8.2*   < > 7.4*   MAG  --   --   --   --  2.1 1.8  --   --   --  2.0   PHOS  --   --  6.1*  --   --  3.4  --  4.6*   < > 6.3*    < > = values in this interval not displayed.       CBC -   Recent Labs   Lab Test 01/07/24  0709 01/06/24  0638 01/05/24  0612   WBC 5.7 5.3 11.9*   HGB 9.1* 8.4* 9.1*   PLT 57* 58* 57*       LFTs -   Recent Labs   Lab Test 01/07/24  0709 01/06/24  0638 01/05/24  0612   ALKPHOS 95 92 96   BILITOTAL 0.3 0.2 0.3   ALT 36 43 50   AST 17 22 20   PROTTOTAL 5.4* 5.4* 5.8*   ALBUMIN 3.0* 3.0* 3.2*       Iron Panel -   Recent Labs   Lab Test 01/05/24  0612 12/20/23  0415 12/17/23  0429 07/10/23  1454   IRON 76  --  130 15*   IRONSAT 40  --  74* 9*   GABRIEL 786*   < > 32,775* 1,079*    < > = values in this interval not displayed.         Imaging:  All imaging studies reviewed by me.     Current Medications:   artificial saliva  1 spray Mouth/Throat 4x Daily    carvedilol  12.5 mg Oral BID    cholecalciferol  10 mcg Oral Daily    vitamin B-12  100 mcg Oral Daily    ferrous sulfate  325 mg Oral Daily with breakfast    folic acid  1 mg Oral Daily    heparin ANTICOAGULANT  5,000 Units Subcutaneous Q12H    hydrALAZINE  10 mg Oral BID    lidocaine  3 patch Transdermal Q24H    NIFEdipine ER OSMOTIC  30 mg Oral Daily    pantoprazole  40 mg Oral QAM AC    perflutren diluted 1mL to 2mL with saline  6 mL Intravenous Once    [Held by provider] potassium chloride ER  20 mEq Oral BID    predniSONE  60 mg Oral Daily    sodium bicarbonate  650 mg Oral TID    sodium chloride (PF)  3 mL Intracatheter Q8H    sulfamethoxazole-trimethoprim  1 tablet Oral Once per day on Monday Wednesday Friday      bumetanide 0.5 mg/hr (01/07/24 4795)     Michel Steen MD      I was present with the fellow during the history and exam.  I discussed the case with the fellow and agree with the findings as documented in the assessment and plan.  Azotemia continues to worsen. For hypertension, would increase diuretics and can restart nifedipine. Also can re-increase carvedilol.    Rama Monge MD   of Medicine  Department of Nephrology  [facility]

## 2024-01-07 NOTE — PLAN OF CARE
"Goal Outcome Evaluation:      Plan of Care Reviewed With: patient    Overall Patient Progress: no change    Outcome Evaluation: see details    Time: 7830-0590    Admission/Diagnosis:  Lupus (H) [M32.9]  Acute on chronic congestive heart failure, unspecified heart failure type (H) [I50.9]  COVID-19 [U07.1]    BP (!) 188/116 (BP Location: Left arm)   Pulse 69   Temp 98.1  F (36.7  C) (Oral)   Resp 16   Ht 1.575 m (5' 2\")   Wt 65.6 kg (144 lb 10 oz)   LMP 12/11/2023 (Approximate)   SpO2 100%   BMI 26.45 kg/m      Shift Updates: Pt A&Ox4, VSS on RA ex HTNsive, up SBA. Persistently HTNsive, received PRN Hydralazine, then scheduled Hydralazine and Coreg, then additional one time Hydralazine, then one time Labatelol; HR remains stable in high 60s/70s. C/o dizziness. Reports nausea and episode of dry heaving/emesis of undigested food after PRN Zofran, one time Compazine ordered with minimal relief in symptoms; environmental nausea triggers minimized. One time IV Bumex ordered. Pt was incontinent of urine x1, reports has never happened before and not due to stress/pressure. L upper arm PIV SL. RUE edema +3/4.     Plan: Continue with POC.   "

## 2024-01-08 NOTE — PLAN OF CARE
Neuro: A&Ox4. Flat affect. Able to make needs known. Slept between cares.  Cardiac: SR 60s-80s. Maintained SBP <180, prn IV hydralazine given x1.   Respiratory: Sating >96% on RA.  GI/: Purewick in place overnight, good UOP. No BM this shift.  Diet/appetite: Tolerating 2gm sodium diet, 1500ml FR.   Activity:  Independently repositioning in bed. Reminders and encouragement to move. Up in chair in AM.  Pain: At acceptable level on current regimen.   Skin: No new deficits noted.   LDA's:  -L PIV: bumex gtt     Plan: Monitor BP, encourage OOB activity. Continue with POC. Notify primary team with changes.      Goal Outcome Evaluation: progressing

## 2024-01-08 NOTE — PROGRESS NOTES
Care Management Follow Up    Length of Stay (days): 6    Expected Discharge Date: 01/09/2024     Concerns to be Addressed: discharge planning     Patient plan of care discussed at interdisciplinary rounds: Yes    Anticipated Discharge Disposition: Home     Anticipated Discharge Services: Home Care  Anticipated Discharge DME: None    Patient/family educated on Medicare website which has current facility and service quality ratings: no  Education Provided on the Discharge Plan: Yes  Patient/Family in Agreement with the Plan: yes    Referrals Placed by CM/SW:  Homecare  Private pay costs discussed: Not applicable    Additional Information:  RNCC requested PT/OT evaluations, due to patient not ambulating much while in hospital.  RNCC sent home care referral for nursing, PT, and OT.  Awaiting update on accepting agency.    1100 addendum:  Per chart review PT/OT has signed off. PT/OT not needed for home care, updated referral.     Delphine Lopez RN    Nurse Coordinator     Covering for: Concha    Phone: *[phone]    Social Work and Care Management Department    [facility] - search CARE COORDINATOR    [employer] ([phone]) Saturday & Sunday; ([phone]) [facility] Recognized Holidays    Units: 5A, 5B & 5C  Pager: [phone]    Units: 6B, 6C & 6D    Pager: [phone]    Units: 7A, 7B, 7C & 7D    Pager: [phone]    Units: 6A & ICU   Pager: [phone]    Units: 5 Ortho, 5MS & WB ED Pager: [phone]    Units: 6MS, 8A & 10 ICU  Pager [phone]

## 2024-01-08 NOTE — PROGRESS NOTES
Nephrology Progress Note  01/08/2024         Assessment & Recommendations:     Milly Carroll is a 23 year old year old female  Assessment & Plan  Milly Carroll is a 23 year old female admitted on 1/2/2024. 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 She was admitted with chest pain and shortness of breath, found to be COVID positive, w/ severely decreased global cardiac function on echo, and requiring multiple doses of IV diuretics due to volume overload, and having worsening CHRISTA w/ creatinine from 2.48 on admission increased to 4.31 on 1/8/2024. Currently planning for potential dialysis on 01/09 if renal function continues to worsen.     Recommendations:  - Increase Coreg to 25 mg  - Increase hydralazine to 10 mg TID  - If her kidney function continues to worsen, strongly consider initiation of hemodialysis tomorrow. Recommend that she be NPO at midnight for potential line placement and dialysis if needed, but we will assess her in the AM before this  - Continue Bumex 0.5 mg/hr  - Continue to postpone cyclophosphamide, at a minimum until after 01/12 but consider postponing further due to significantly impaired renal function  - Continue prednisone 60 mg daily   - Daily BMP  - Daily standing weights, strict I&Os     #Lupus nephritis, class IV  #SLE  #Chronic kidney disease, stage IV (Cystatin C/creatinine discordance)  # Acute kidney injury  Patient's creatinine was 3.48 on 12/30,decreased to 2.48 on admission at [facility] 1/2/2024  which again raised to 2.77 -> 2.97 on 1/3/2023 and has increased again to 3.21 - 3.97 - 4.17 - 4.31. Does continue to appear hypervolemic on examination. CHRISTA may likely represent poor renal perfusion  in the setting of volume overload ,cardiorenal syndrome and diuresis w/ subsequent rapid drop in BP. Significantly elevated BP on presentation with evidence transient hemolysis and hematuria might also suggest malignant hypertension with fibrinoid necrosis a possibility. Lupus flare less likely given improving complement and dsDNA levels and current treatment with steroids and recent treatment w/ cyclophosphamide.  At this time, given her known COVID infection, there would be significant risk with adding additional immunosuppressants at this time. Has been on hydroxychloroquine in the past, but this is currently held due to QTC prolongation.   Clarified with infusion coordinator that Milly has had 5 cyclophosphamide infusions thus far (08/22, 09/08, 09/22, 10/06, 12/22).   As inpatient despite diuretics urine out put remain unsatisfactory with progressively worsening in kidney dysfunction.has nausea and an episode of vomiting,may be early symptoms of uremia. Would try bumex drip for control of BP, likely heading to need for RRT.Discussion held on RRT, on long term she prefers home dialysis in the form of PD.    #Hypertension  #Acute on chronic systolic heart failure, stress cardiomyopathy  Most likely cause of her hypertension is volume overload, particularly given hypervolemic appearance on physical examination, significantly elevated BNP from baseline, signs of pulmonary edema on CXR. Notably, her blood pressures improved rapidly following diuresis and administration of her home dose of nifedipine. However, there is concern for correcting her BP too quickly. Given the malignant hypertension she experienced, she will require careful blood pressure to control to avoid hypotension. On discussion with cardiology, there is question of if the precipitant of her systolic heart failure is uncontrolled, severe hypertension. Given normal CK, it does not appear likely that there is a component of myocarditis contributing to  "her presentation. Repeat echocardiogram on 01/06 was 35-40%.     #. Metabolic acidosis, chronic  Agree with continuing NaHCO3 650 mg TID. She has a chronic metabolic acidosis and is tolerating this well.      #. Chronic macrocytic anemia  Would recommend holding off on starting epo for now, as her HGB has improved from prior. However, would favor starting epo if her HGB is <9.0 or if she remains less than 9.5 on rechecks over the next 1-2 days. Iron studies were obtained, Fe saturation of 40%.     Recommendations were communicated to primary team via [employer]. Discussed w/ patient.     Seen and discussed with Dr. Trell Dang M.D.  Med-Peds PGY-1  Nephrology Teaching Service    Interval History :   Nursing and provider notes from last 24 hours reviewed.  Overnight, no significant events. She did make 1400 mL of urine since midnight, which has incresaed from 750 mL yesterday. No difficulty breathing. Continues to have arm and leg swelling.     Physical Exam:   I/O last 3 completed shifts:  In: 431.2 [P.O.:410; I.V.:21.2]  Out: 1900 [Urine:1900]   BP (!) 146/94 (BP Location: Left arm)   Pulse 80   Temp 97.6  F (36.4  C) (Oral)   Resp 19   Ht 1.575 m (5' 2\")   Wt 63 kg (138 lb 14.2 oz)   LMP 12/11/2023 (Approximate)   SpO2 100%   BMI 25.40 kg/m       General: Laying in bed, no acute distress. Interacts appropriately.   HENT: Normocephalic, atraumatic. Pupils reactive to light, EOM grossly intact. Moist mucous membranes.   Neck: Elevated JVP.   Cardiovascular: Regular rate and rhythm. Intact DP/PT and bilateral radial pulses.   Respiratory: Crackles on lung exam are significantly improved from prior. No increased work of breathing.   Abdomen: Soft, non-tender, non-distended.    Neurologic: Alert. Oriented. Moves all extremities equally.   Musculoskeletal: RUE w/ 2+ pitting edema. 1+ bilateral lower extremity edema, slightly worse in the right leg.   Skin: She has some redness of the face on the " bilateral cheeks but no clear malar rash. No signs of pallor or cyanosis. Warm and dry.   Psych: Flat affect.     Labs:   All labs reviewed by me  Electrolytes/Renal -   Recent Labs   Lab Test 01/08/24  0556 01/07/24  0709 01/06/24  0638 01/05/24  0612 12/28/23  0623 12/27/23  0001 12/25/23  0442 12/24/23  1605 12/24/23  0943 12/20/23  0415 12/19/23  2335    140 140 139   < > 141 144  --  144   < > 140   POTASSIUM 5.2 5.3 5.3 4.8   < > 5.6* 3.1*  3.1*   < > 3.4   < > 3.6   CHLORIDE 107 108* 109* 107   < > 109* 110*  --  111*   < > 106   CO2 16* 16* 17* 18*   < > 17* 20*  --  21*   < > 18*   .0* 109.0* 107.0* 96.8*   < > 75.1* 60.5*  --  58.9*   < > 52.1*   CR 4.31* 4.15* 3.97* 3.49*   < > 2.98* 2.57*  --  2.42*   < > 2.87*   * 95 114* 124*   < > 133* 138*  --  95   < > 144*   BISI 8.7 8.3* 8.2* 8.4*   < > 8.6 8.0*  --  8.2*   < > 7.4*   MAG  --   --   --   --   --  2.1 1.8  --   --   --  2.0   PHOS  --   --   --  6.1*  --   --  3.4  --  4.6*   < > 6.3*    < > = values in this interval not displayed.       CBC -   Recent Labs   Lab Test 01/08/24 0556 01/07/24  0709 01/06/24 0638   WBC 6.5 5.7 5.3   HGB 9.3* 9.1* 8.4*   PLT 64* 57* 58*       LFTs -   Recent Labs   Lab Test 01/08/24 0556 01/07/24  0709 01/06/24  0638   ALKPHOS 106 95 92   BILITOTAL 0.3 0.3 0.2   ALT 42 36 43   AST 20 17 22   PROTTOTAL 5.7* 5.4* 5.4*   ALBUMIN 3.2* 3.0* 3.0*       Iron Panel -   Recent Labs   Lab Test 01/05/24  0612 12/20/23  0415 12/17/23  0429 07/10/23  1454   IRON 76  --  130 15*   IRONSAT 40  --  74* 9*   GABRIEL 786*   < > 32,775* 1,079*    < > = values in this interval not displayed.         Imaging:  All imaging studies reviewed by me.     Current Medications:   artificial saliva  1 spray Mouth/Throat 4x Daily    carvedilol  25 mg Oral BID    cholecalciferol  10 mcg Oral Daily    vitamin B-12  100 mcg Oral Daily    ferrous sulfate  325 mg Oral Daily with breakfast    folic acid  1 mg Oral Daily    heparin  ANTICOAGULANT  5,000 Units Subcutaneous Q12H    hydrALAZINE  10 mg Oral TID    lidocaine  3 patch Transdermal Q24H    NIFEdipine ER OSMOTIC  30 mg Oral Daily    pantoprazole  40 mg Oral QAM AC    perflutren diluted 1mL to 2mL with saline  6 mL Intravenous Once    [Held by provider] potassium chloride ER  20 mEq Oral BID    predniSONE  60 mg Oral Daily    sodium bicarbonate  650 mg Oral TID    sodium chloride (PF)  3 mL Intracatheter Q8H    sulfamethoxazole-trimethoprim  1 tablet Oral Once per day on Monday Wednesday Friday      bumetanide 0.5 mg/hr (01/08/24 0700)

## 2024-01-08 NOTE — CONSULTS
"      [facility] Consult Service Note  Interventional Radiology  01/08/24   12:38 PM    Consult Requested: \"non tunnelled line for possible HD tomorrow, please discuss with nephrology before placing on 1/9\"    Recommendations/Plan:    -Patient is approved for placement of a non-tunneled CVC and is tentatively on IR's schedule for tomorrow, pending nephrology's decision in the morning.  -Timing of procedure is TBD based on IR staffing/schedule and triage.  -Please contact the IR charge RN at [phone] for estimated time of procedure.   -Labs WNL for procedure: INR 1.18, Plts 64.  -Orders entered for procedure. No pre procedure IV antibiotics nor NPO required.  -Medications to be held include: none.  -Informed consent will be completed prior to procedure.     History of Present Illness:  Milly Carroll is a 23 year old female with past medical history of lupus nephritis, renal failure, cardiomyopathy with CHRISTA and worsening creatinine with poor urine output. IR has been consulted to possibly place a non-tunneled CVC for HD pending patient's urinary output tomorrow morning, which has slowly been improving. Nephrology team plans to round in the morning and will be in touch with IR.    Expected date of discharge: TBD    Vitals:   BP (!) 146/94 (BP Location: Left arm)   Pulse 80   Temp 97.6  F (36.4  C) (Oral)   Resp 19   Ht 1.575 m (5' 2\")   Wt 63 kg (138 lb 14.2 oz)   LMP 12/11/2023 (Approximate)   SpO2 100%   BMI 25.40 kg/m      Pertinent Labs Reviewed:  CBC:  Lab Results   Component Value Date    WBC 6.5 01/08/2024    WBC 5.7 01/07/2024    WBC 5.3 01/06/2024    WBC 4.5 02/11/2021    WBC 3.9 (L) 02/10/2021    WBC 3.8 (L) 01/13/2021     Lab Results   Component Value Date    HGB 9.3 01/08/2024    HGB 9.1 01/07/2024    HGB 8.4 01/06/2024    HGB 9.1 02/11/2021    HGB 9.9 02/10/2021    HGB 9.1 01/13/2021     Lab Results   Component Value Date    PLT 64 01/08/2024    PLT 57 01/07/2024    PLT 58 " 01/06/2024     02/11/2021     02/10/2021     01/13/2021    INR:  Lab Results   Component Value Date    INR 1.18 (H) 01/04/2024    INR 1.08 02/10/2021    PTT 30 12/19/2023    PTT 33 02/10/2021          COVID Results:  COVID-19 Antibody Results, Testing for Immunity           No data to display              COVID-19 PCR Results          6/11/2022    02:35 12/14/2022    13:01 1/23/2023    14:50 12/29/2023    07:51   COVID-19 PCR Results   SARS CoV2 PCR Negative  Negative  Positive  Positive          Maral Alejo PA-C  Interventional Radiology

## 2024-01-08 NOTE — PROGRESS NOTES
Brief Cardiology Progress Note    Evaluated patient this morning. Continues to be volume overloaded but asymptomatic. She was started on bumex drip 0.5mg/hr yesterday. She had little urine output until this AM when it appears that she has started to respond. Discussed the case with nephrology, below are the recommendations from the cardiology point of view.     -Continue current bumex drip, nephrology will be in discussion about when to consider a start for dialysis.  -Would target better BP control. Can increase Coreg back to 25mg BID, can also increase hydralazine to TID dosing. Nephrology has added back nifedipine.     Guerrero Mcgarry MD  Cardiology Fellow

## 2024-01-08 NOTE — PROGRESS NOTES
[facility]    Medicine Progress Note - Hospitalist Service, GOLD TEAM 9    Date of Admission:  1/2/2024    Assessment & Plan   Milly Carroll is a 23 year old female admitted on 1/2/2024. She has a PMHx of lupus C/B lupus flares, lupus cerebritis, lupus nephritis, HTN, chronic systolic heart failure, QT prolongation, past perinephric hematoma s/p emobolization in Aug-2023 and chronic thrombocytopenia admitted to [facility] on 12/17/23 (after presentation for SOB/NSTEMI and pneumonia concern and concern for lupus flare where she was treated w/ steroids and plasma exchange) and transfer to [facility] on 12/18/23 before being transferred to [facility] on 12/22-12/25 for cyclophosphamide infusion. Represented on 12/26 [facility] ED for SOB found to have covid and reduced EF (baseline EF 47%, OSH EF reported as 15-20%) w/ plan to transfer patient to [facility] however patient left AMA on 12/30 due to prolonged wait for transfer to [facility] ED. On presentation 1/2, patient reported continued worsening CP/SOB since her discharge on 12/30. Chest pain and dyspnea has since improved during her admission.     Currently being managed for acute on chronic heart failure and CHRISTA on CKD, concern for cardiorenal syndrome in the setting of prior lupus nephritis.      Today:   -Discussed with cardiology and nephrology at bedside  -Continue bumex drip   -Increase carvedilol to 25 mg BID, hydralazine to TID for blood pressure control  -Trending toward dialysis, but decision to dialyze tomorrow to be made by nephrology in AM pending urine output and labs   -NPO at [place]   -IR consult for non-tunneled line placement tomorrow, final decision to be made tomorrow AM      Acute on Chronic HF (EF 15-20% on 12/26 OSH)  Dyspnea and Chest Pain - improved   Uncontrolled HTN   Tricuspid insufficiency   Presented with chest pain and dyspnea as above. Concern that  hypertensive crisis contributing to chest pain. Echocardiogram on 17-Dec-2023 showed LVEF 35-40%, EF decreased to 15-20% on 12/26 w/ BNP at that time >70,000. Lexiscan 12/21 negative for inducible ischemia. Admission workup 1/2 included trop elevated at 290 with peak at 469 on 1/2. TTE 1/2 with severe diffuse hypokinesis with stably reduced EF. Limited TTE 1/7 with EF 35-40% and dilated IVC. Cardiology consulted with recs as below.   V/Q scan 1/3 indeterminate, overall lower suspicion of VTE contributing to current presentation.   Attempted diuresis with increased doses of IV bumex doses (up to bumex 4 mg IV x 1) on 1/4-1/6 with minimal urine output and concern for cardiorenal syndrome. Started on bumex drip 1/7 with some improving urine output.   -Gen Cardiology consulted, appreciate recs  -Nephrology consulted, appreciate assistance   -Possible need for dialysis in next 24 hours, pending urine output and renal function   -Continue bumex drip, started 1/7   -Continue carvedilol   -Continue hydralazine TID   -Continue nifedipine (restarted on 1/7)   -Consider cMRI during admission once fluid status improved     CHRISTA, worsening   Metabolic Acidosis   Recent Lupus Nephritis   Cr up trending this admission, per discussion with cardiology, nephrology, and rheumatology current CHRISTA suspected to be more likely related to cardiorenal syndrome and ongoing volume overload.   -Nephrology consulted, appreciate assistance    -Continue diuresis and blood pressure management as above   -Continue sodium bicarb tabs   -Rheumatology following, appreciate assistance   -Due for 5th dose cyclophosphamide per [employer] protocol 1/5 (ongoing discussion w/ rheum/nephrology prior to administration), holding for now due to COVID and other ongoing management as outlined     COVID-19 Infection   Tested positive on 12/29 at OSH when presented with chest pain and dyspnea. Concern for superimposed PNA per OSH and was on vanc/cefepime 12/26-12/30  prior to AMA leave. Initially cefepime continued on admission, discontinued on 1/4 due to low suspicion for bacterial pneumonia.  Has remained on RA this admission. Overall unclear how much COVID-19 contributed to acute presentation.   -Received remdesivir 12/29 and 12/30 prior to leaving AMA, completed 5 day course given high risk (1/5 - 1/7)     Hx of C.diff infxn (6/2023)  -Stopped PO vanc prophylaxis on 1/4 when antibiotics stopped     SLE (+dsDNA, +RNP, +ribosomal P Ab and hypocomplementemia)   Recent Lupus Flare w/ MAHA and renal insuff s/p Cyclophosphamide dose (12/22/23)  Hx of Lupus cerebritis w/ hx of seizures  C/b Lupus Nephritis  Hx medication non-adherence  -Lupus-focused meds:   -Continue prednisone 60mg qday  -PJP ppx: continue PTA bactrim  -GI ppx: continue protonix qday, tums prn  -Cyclophosphamide on hold as above     Hypomagnesemia  Hypokalemia  -Replete per protocol      Chronic RUE edema with weakness   Superficial clot of R cephalic vein  US 8/2023 negative. RUE doppler negative for DVT on 1/2, significant for superficial thrombus. Repeat US RUE 1/7 with no evidence of DVT. Suspect weakness 2/2 swelling, lower concern for alternate etiologies.   -Symptomatic with heat packs    Chronic thrombocytopenia  Chronic Macrocytic Anemia  -Trend CBC daily   -Continue PTA folate/B12 replacement  -DVT ppx: heparin subcutaneous decrease dose per pharmacy in setting of chronic thrombocytopenia and renal dysfunction  -Hematology consulted - hemolysis improving, check labs daily., recommend EPO per nephrology      Hx of Intermittent Headaches  History of being responsive to APAP and dilaudid along w/ treatment of HTN and Lupus. 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 Stools described as normal.     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 No current PTA antiepileptic medications    Chronic Left Shoulder Pain   Unclear etiology, likely MSK pain.   -PRN tylenol or metharbamol; taper PO dilaudid (no clear indication)      Type 2 MI 2/2 hypertension, COVID-19, volume overload, resolved   Admission workup 1/2 included trop elevated at 290 with peak at 469 on 1/2. EKG at admission without ischemic signs.      Diet: Fluid restriction 1500 ML FLUID  Combination Diet Regular Diet; 2 gm NA Diet  NPO per Anesthesia Guidelines for Procedure/Surgery Except for: Meds    DVT Prophylaxis: Heparin SQ  Leung Catheter: Not present  Lines: None     Cardiac Monitoring: None  Code Status: Full Code      Clinically Significant Risk Factors             # Anion Gap Metabolic Acidosis: Highest Anion Gap = 19 mmol/L in last 2 days, will monitor and treat as appropriate  # Hypoalbuminemia: Lowest albumin = 3 g/dL at 1/7/2024  7:09 AM, will monitor as appropriate   # Thrombocytopenia: Lowest platelets = 57 in last 2 days, will monitor for bleeding    # Acute Kidney Injury, unspecified: based on a >150% or 0.3 mg/dL increase in last creatinine compared to past 90 day average, will monitor renal function   # Acute heart failure with reduced ejection fraction: last echo with EF <40% and receiving IV diuretics       # Overweight: Estimated body mass index is 25.4 kg/m  as calculated from the following:    Height as of this encounter: 1.575 m (5' 2\").    Weight as of this encounter: 63 kg (138 lb 14.2 oz).        # Financial/Environmental Concerns: none (Per pt she discussed option of applying for disability with her new PCP)         Disposition Plan      Expected Discharge Date: 01/12/2024      Destination: home with family  Discharge Comments: High BPs            Brittani Gregorio DO  Hospitalist Service, [facility] " TEAM 9  [facility]  Securely message with [employer] (more info)  Text page via [employer] Paging/Directory   See signed in provider for up to date coverage information  ______________________________________________________________________    Interval History   No overnight events reported.   She is feeling better today, denies nausea. Denies dyspnea. Continue to have swelling of RUE and b/l LE.   Urine output noted to be improving on 1/8 AM (see I&Os)     Physical Exam   Vital Signs: Temp: 98  F (36.7  C) Temp src: Oral BP: 134/86 Pulse: 81   Resp: 20 SpO2: 100 % O2 Device: None (Room air)    Weight: 138 lbs 14.24 oz    Constitutional: no apparent distress, pleasant and cooperative   Cardiovascular: regular rate and rhythm, normal S1 and S2, no murmurs, rubs, or gallops noted, 1+ bilateral lower extremity edema, +JVP    Respiratory: clear to auscultation bilaterally, no wheezing, rales, or rhonchi, normal work of breathing   GI: abdomen soft, non tender, non distended, bowel sounds present   Neuro: alert and oriented, no gross focal deficits noted    MSK: RUE with 2+ edema below the elbow, radial pulse +     Medical Decision Making           Data     I have personally reviewed the following data over the past 24 hrs:    6.5  \   9.3 (L)   / 64 (L)     142 107 107.0 (H) /  120 (H)   5.2 16 (L) 4.31 (H) \     ALT: 42 AST: 20 AP: 106 TBILI: 0.3   ALB: 3.2 (L) TOT PROTEIN: 5.7 (L) LIPASE: N/A     Ferritin:  N/A % Retic:  N/A LDH:  415 (H)       Imaging results reviewed over the past 24 hrs:   No results found for this or any previous visit (from the past 24 hour(s)).

## 2024-01-09 NOTE — PROGRESS NOTES
Nephrology Progress Note  01/09/2024         Assessment & Recommendations:     Milly Carroll is a 23 year old year old female  Assessment & Plan  Milly Carroll is a 23 year old female admitted on 1/2/2024. 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 She was admitted with chest pain and shortness of breath, found to be COVID positive, w/ severely decreased global cardiac function on echo, and requiring multiple doses of IV diuretics due to volume overload, and having worsening CHRISTA w/ creatinine from 2.48 on admission increased to 4.31 on 1/8/2024. Currently planning for potential dialysis on 01/09 if renal function continues to worsen.     Recommendations:  - Increase Coreg to 25 mg  - Increase hydralazine to 25 mg TID  - Add Metolazone 5 mg QD  - HOLD off RRT today as she is stable and UOP increased. If her kidney function continues to worsen, strongly consider initiation of hemodialysis tomorrow. Recommend that she be NPO at midnight for potential line placement and dialysis if needed, but we will assess her in the AM before this  - Continue Bumex 0.5 mg/hr  - Continue to postpone cyclophosphamide, at a minimum until after 01/12 but consider postponing further due to significantly impaired renal function  - Continue prednisone 60 mg daily (taper needs to be reassessed, initial plan was 60 mg for 14 days then 97-59-85-20-10 x 14 days each)  - Daily BMP  - Daily standing weights, strict I&Os     #Lupus nephritis, class IV  #SLE  #Chronic kidney disease, stage IV (Cystatin C/creatinine discordance)  # Acute kidney injury  Patient's creatinine was 3.48 on 12/30,decreased to 2.48 on admission at [facility] 1/2/2024  which again raised  to 2.77 -> 2.97 on 1/3/2023 and has increased again to 3.21 - 3.97 - 4.17 - 4.31. Does continue to appear hypervolemic on examination. CHRISTA may likely represent poor renal perfusion in the setting of volume overload ,cardiorenal syndrome and diuresis w/ subsequent rapid drop in BP. Significantly elevated BP on presentation with evidence transient hemolysis and hematuria might also suggest malignant hypertension with fibrinoid necrosis a possibility. Lupus flare less likely given improving complement and dsDNA levels and current treatment with steroids and recent treatment w/ cyclophosphamide.  At this time, given her known COVID infection, there would be significant risk with adding additional immunosuppressants at this time. Has been on hydroxychloroquine in the past, but this is currently held due to QTC prolongation.   Clarified with infusion coordinator that Milly has had 5 cyclophosphamide infusions thus far (08/22, 09/08, 09/22, 10/06, 12/22).   As inpatient despite diuretics urine out put remain unsatisfactory with progressively worsening in kidney dysfunction.has nausea and an episode of vomiting,may be early symptoms of uremia. Would try bumex drip for control of BP, likely heading to need for RRT.Discussion held on RRT, on long term she prefers home dialysis in the form of PD.    #Hypertension  #Acute on chronic systolic heart failure, stress cardiomyopathy  Most likely cause of her hypertension is volume overload, particularly given hypervolemic appearance on physical examination, significantly elevated BNP from baseline, signs of pulmonary edema on CXR. Notably, her blood pressures improved rapidly following diuresis and administration of her home dose of nifedipine. However, there is concern for correcting her BP too quickly. Given the malignant hypertension she experienced, she will require careful blood pressure to control to avoid hypotension. On discussion with cardiology, there is question of if  "the precipitant of her systolic heart failure is uncontrolled, severe hypertension. Given normal CK, it does not appear likely that there is a component of myocarditis contributing to her presentation. Repeat echocardiogram on 01/06 was 35-40%.     #. Metabolic acidosis, chronic  Agree with continuing NaHCO3 650 mg TID. She has a chronic metabolic acidosis and is tolerating this well.      #. Chronic macrocytic anemia  Would recommend holding off on starting epo for now, as her HGB has improved from prior. However, would favor starting epo if her HGB is <9.0 or if she remains less than 9.5 on rechecks over the next 1-2 days. Iron studies were obtained, Fe saturation of 40%.     Recommendations were communicated to primary team via [facility]. Discussed w/ patient.     Seen and discussed with Dr. Trell Hartley MD  Nephrology Fellow   2905    Interval History :   Nursing and provider notes from last 24 hours reviewed.  Overnight, no significant events. She did make 2400 mL of urine since midnight. No difficulty breathing. BP better since antihypertensive med increase but still SBP 140s.     Physical Exam:   I/O last 3 completed shifts:  In: 1202 [P.O.:1170; I.V.:32]  Out: 2050 [Urine:2050]   BP (!) 154/102 (BP Location: Left arm)   Pulse 77   Temp 97.9  F (36.6  C) (Oral)   Resp 20   Ht 1.575 m (5' 2\")   Wt 63.6 kg (140 lb 3.4 oz)   LMP 12/11/2023 (Approximate)   SpO2 100%   BMI 25.65 kg/m       General: Laying in bed, no acute distress. Interacts appropriately.   HENT: Normocephalic, atraumatic. Pupils reactive to light, EOM grossly intact. Moist mucous membranes.   Neck: Elevated JVP.   Cardiovascular: Regular rate and rhythm. Intact DP/PT and bilateral radial pulses.   Respiratory: Crackles on lung exam are significantly improved from prior. No increased work of breathing.   Abdomen: Soft, non-tender, non-distended.    Neurologic: Alert. Oriented. Moves all extremities equally.   Musculoskeletal: " RUE w/ 2+ pitting edema. 1+ bilateral lower extremity edema, slightly worse in the right leg.   Skin: She has some redness of the face on the bilateral cheeks but no clear malar rash. No signs of pallor or cyanosis. Warm and dry.   Psych: Flat affect.     Labs:   All labs reviewed by me  Electrolytes/Renal -   Recent Labs   Lab Test 01/09/24  0535 01/08/24  0556 01/07/24  0709 01/06/24  0638 01/05/24  0612 12/28/23  0623 12/27/23  0001 12/25/23  0442 12/24/23  1605 12/24/23  0943 12/20/23  0415 12/19/23  2335    142 140   < > 139   < > 141 144  --  144   < > 140   POTASSIUM 5.1 5.2 5.3   < > 4.8   < > 5.6* 3.1*  3.1*   < > 3.4   < > 3.6   CHLORIDE 105 107 108*   < > 107   < > 109* 110*  --  111*   < > 106   CO2 20* 16* 16*   < > 18*   < > 17* 20*  --  21*   < > 18*   .0* 107.0* 109.0*   < > 96.8*   < > 75.1* 60.5*  --  58.9*   < > 52.1*   CR 4.33* 4.31* 4.15*   < > 3.49*   < > 2.98* 2.57*  --  2.42*   < > 2.87*   * 120* 95   < > 124*   < > 133* 138*  --  95   < > 144*   BISI 8.8 8.7 8.3*   < > 8.4*   < > 8.6 8.0*  --  8.2*   < > 7.4*   MAG  --   --   --   --   --   --  2.1 1.8  --   --   --  2.0   PHOS  --   --   --   --  6.1*  --   --  3.4  --  4.6*   < > 6.3*    < > = values in this interval not displayed.       CBC -   Recent Labs   Lab Test 01/09/24  0535 01/08/24  0556 01/07/24  0709   WBC 5.5 6.5 5.7   HGB 9.3* 9.3* 9.1*   PLT 67* 64* 57*       LFTs -   Recent Labs   Lab Test 01/09/24  0535 01/08/24  0556 01/07/24  0709   ALKPHOS 110 106 95   BILITOTAL 0.2 0.3 0.3   ALT 50 42 36   AST 25 20 17   PROTTOTAL 5.5* 5.7* 5.4*   ALBUMIN 3.1* 3.2* 3.0*       Iron Panel -   Recent Labs   Lab Test 01/05/24  0612 12/20/23  0415 12/17/23  0429 07/10/23  1454   IRON 76  --  130 15*   IRONSAT 40  --  74* 9*   GABRIEL 786*   < > 32,775* 1,079*    < > = values in this interval not displayed.         Imaging:  All imaging studies reviewed by me.     Current Medications:   artificial saliva  1 spray Mouth/Throat  4x Daily    carvedilol  25 mg Oral BID    cholecalciferol  10 mcg Oral Daily    vitamin B-12  100 mcg Oral Daily    ferrous sulfate  325 mg Oral Daily with breakfast    folic acid  1 mg Oral Daily    heparin ANTICOAGULANT  5,000 Units Subcutaneous Q12H    hydrALAZINE  25 mg Oral TID    NIFEdipine ER OSMOTIC  30 mg Oral Daily    pantoprazole  40 mg Oral QAM AC    perflutren diluted 1mL to 2mL with saline  6 mL Intravenous Once    [Held by provider] potassium chloride ER  20 mEq Oral BID    predniSONE  60 mg Oral Daily    sodium bicarbonate  650 mg Oral TID    sodium chloride (PF)  3 mL Intracatheter Q8H    sulfamethoxazole-trimethoprim  1 tablet Oral Once per day on Monday Wednesday Friday      bumetanide 0.5 mg/hr (01/09/24 0806)

## 2024-01-09 NOTE — PROGRESS NOTES
IR follow-up note.    Pt was on IR schedule for NTCVC placement 1/9. Call from Dr. Espinoza this AM that procedure is not needed today. Per nephrology no HD initiation today.    Order cancelled and IR charge RN updated. Primary team will update IR if procedure is needed in the coming days. No NPO necessary for NTCVC placement with IR.    Peyton Rizzo DNP, APRN  Interventional Radiology   IR on-call pager: [phone]

## 2024-01-09 NOTE — PROGRESS NOTES
[facility]    Cardiology Consult Note-Cards 1      Date of Admission:  1/2/2024  Consult Requested by: Dr. Hernandez  Reason for Consult:     lupus patient, recent admit to OSH for CP/SOB found to have new reduce EF       Assessment & Plan: HVSL   Milly Carroll is a 23 year old female admitted on 1/2/2024 for acute on chronic heart failure and chest pain.  She was previously admitted in December (discharged 12/25) with a lupus flare along with heart failure exacerbation that was treated initially with diuretic.  Per review of discharge summary, it appears that she was started on Cytoxan inpatient with plan to follow-up outpatient, as well as prednisone with a taper.  She had a Lexiscan during this admission that was negative.  She represented to an ED on 12/26 with shortness of breath and chest pain, found to be volume overloaded with worsening of her EF from 40% to 15-20%.  She is also noted now to be hypertensive (SBP in the 190s).  Along with continued CHRISTA (creatinine 2.48).  She has a troponin elevation to 382 (during last admission was elevated to 700 and downtrended), along with a BNP now elevated to 248,000 (previously 70,000 at [facility], and >70,000 at OSH from 12/27). Low suspicion for ACS given her age and recent lexiscan in December that was negative for inducible ischemia. She also tested COVID-positive on 12/29 at outside hospital. As the course has progressed, a normal CK points away from myocarditis. Our overall assessment would point towards difficult to control hypertension in the setting of lupus nephritis leading to acute heart failure exacerbation in the setting of hypertensive emergency. She has had worsening of her renal function, however her creatinine has leveled off and she has started making more urine on a bumex drip. She is also achieving better BP control on current regimen. Below reflects our recommendations.         -Continue bumex drip at  "0.5mg/hr  -Continue coreg 25mg QD  -Continue nifedipine 30mg QD  -Continue hydralazine 10mg TID, can titrate to 25mg TID if needed for better BP control  -Strict I/O, daily standing weights, cardiac fluid restricted diet  -From GDMT perspective, she is currently not a candidate for ACE/ARB/ARNI/MRA/SLGT-2 therapies in the setting of kidney disease (acute vs. Acute on chronic)  -Monitor on telemetry  -appreciate nephrology and rheumatology inputs       The patient's care was discussed with the Attending Physician, Dr. Carrasco .      Guerrero Mcgarry MD  [facility]    Clinically Significant Risk Factors               # Anion Gap Metabolic Acidosis: Highest Anion Gap = 19 mmol/L in last 2 days, will monitor and treat as appropriate  # Hypoalbuminemia: Lowest albumin = 3 g/dL at 1/7/2024  7:09 AM, will monitor as appropriate   # Thrombocytopenia: Lowest platelets = 64 in last 2 days, will monitor for bleeding  # Acute Kidney Injury, unspecified: based on a >150% or 0.3 mg/dL increase in last creatinine compared to past 90 day average, will monitor renal function     # Acute heart failure with reduced ejection fraction: last echo with EF <40% and receiving IV diuretics       # Overweight: Estimated body mass index is 25.65 kg/m  as calculated from the following:    Height as of this encounter: 1.575 m (5' 2\").    Weight as of this encounter: 63.6 kg (140 lb 3.4 oz).      # Financial/Environmental Concerns: none (Per pt she discussed option of applying for disability with her new PCP)        ______________________________________________________________________    Chief Complaint   SOB/chest pain    History is obtained from the patient    History of Present Illness   Milly Carroll is a 23 year old female with PMH Takotsubo cardiomyopathy 1 year ago (EF as low as 20%, recovered to 40-45% on 8/2023 on TTE/CMR), lupus cerebritis and nephritis, HTN, Qtc prolongation, chronic " steroid induced thrombocytopenia, past perinephric hematoma s/p emobolization in Aug-2023. She initially presented to [facility] ED[facility] 12/17/23 for chest pain with concerns for NSTEMI. Trop trend was 735->494, no BNP obtained. Nephrology, hematology, and cardiology were consulted and the consensus was she was in a lupus crisis 2.2 mixed compliance with medications. She was treated with PLEX and high dose steroids. From cardiology perspective, her initial EKG reported to show T-wave inversions, and initial rise of troponin was thought to be 2.2 CHRISTA from lupus flare. she was being diuresed with lasix gtt 10mg/hr after conversation with nephrology and cardiology. GDMT regimen is unclear from multiple different providers, including [facility] appointment on 10/13/23. It is unclear if a pharm reconcilation was completed. On transfer to [facility] this admission, she is now on Coreg 25mg and Imdur 60mg every day. During this admission she was treated for lupus flare, and was discharged on coreg and nifedipine (unable to add more GDMT as CHRISTA had not fully resolved). She then presented to an outside ED on 12/26 for SOB and chest pain. There she was found to have a reduction in her EF to 15-20%, and imaging was concerning for edema vs. Infection. She was started on abx, appears to have received lasix 40mg x1, she also tested COVID positive. Today she states that she has a constant stabbing chest pain that is worse with exertion, but doesn't go away with rest. This has been present since her discharge on 12/25.       Past Medical History    Past Medical History:   Diagnosis Date    Hepatitis     Lupus (systemic lupus erythematosus) (H)     Lupus cerebritis (H)     Pancreatitis 08/2017    Pyelonephritis 08/2017    Seizures (H)     Status epilepticus (H)        Past Surgical History   Past Surgical History:   Procedure Laterality Date    IR RENAL ANGIOGRAM LEFT  8/11/2023    IR RENAL BIOPSY LEFT  6/28/2022    NO HISTORY OF  SURGERY      ORTHOPEDIC SURGERY         Medications   Current Facility-Administered Medications   Medication    acetaminophen (TYLENOL) tablet 650 mg    albuterol (PROVENTIL HFA/VENTOLIN HFA) inhaler    artificial saliva (BIOTENE MT) solution 1 spray    bumetanide (BUMEX) 0.25 mg/mL infusion    calcium carbonate (TUMS) chewable tablet 1,000 mg    carvedilol (COREG) tablet 25 mg    cholecalciferol (D-VI-SOL, Vitamin D3) 10 mcg/mL (400 units/mL) liquid 10 mcg    cyanocobalamin (VITAMIN B-12) tablet 100 mcg    ferrous sulfate (FEROSUL) tablet 325 mg    folic acid (FOLVITE) tablet 1 mg    heparin ANTICOAGULANT injection 5,000 Units    hydrALAZINE (APRESOLINE) tablet 10 mg    Or    hydrALAZINE (APRESOLINE) injection 20 mg    hydrALAZINE (APRESOLINE) tablet 25 mg    HYDROmorphone (DILAUDID) half-tab 1 mg    Lidocaine (LIDOCARE) 4 % Patch 3 patch    lidocaine (LMX4) cream    lidocaine 1 % 0.1-1 mL    melatonin tablet 5 mg    methocarbamol (ROBAXIN) tablet 500 mg    metolazone (ZAROXOLYN) tablet 5 mg    naloxone (NARCAN) injection 0.2 mg    Or    naloxone (NARCAN) injection 0.4 mg    Or    naloxone (NARCAN) injection 0.2 mg    Or    naloxone (NARCAN) injection 0.4 mg    NIFEdipine ER OSMOTIC (PROCARDIA XL) 24 hr tablet 30 mg    ondansetron (ZOFRAN ODT) ODT tab 4 mg    pantoprazole (PROTONIX) EC tablet 40 mg    perflutren diluted 1mL to 2mL with saline (OPTISON) diluted injection 6 mL    [Held by provider] potassium chloride ER (KLOR-CON M) CR tablet 20 mEq    predniSONE (DELTASONE) tablet 60 mg    prochlorperazine (COMPAZINE) injection 5 mg    senna-docusate (SENOKOT-S/PERICOLACE) 8.6-50 MG per tablet 1 tablet    Or    senna-docusate (SENOKOT-S/PERICOLACE) 8.6-50 MG per tablet 2 tablet    sodium bicarbonate tablet 650 mg    sodium chloride (PF) 0.9% PF flush 3 mL    sodium chloride (PF) 0.9% PF flush 3 mL    sulfamethoxazole-trimethoprim (BACTRIM) 400-80 MG per tablet 1 tablet            Physical Exam   Vital Signs: Temp:  97.9  F (36.6  C) Temp src: Oral BP: (!) 154/102 Pulse: 86   Resp: 20 SpO2: 100 % O2 Device: None (Room air)    Weight: 140 lbs 3.4 oz    General Appearance: Well appearing, NAD on RA  Respiratory: bibasilar crackles  Cardiovascular: RRR, holosystolic murmur in lower sternal border. Cap refill <2 seconds. 1+ pitting edema to mid shin. (+) large JVD.      Medical Decision Making             Data   .  TTE 12/27/23  Left ventricular function is decreased. The ejection fraction is 15-20% (severely reduced). Biplane LVEF is 17%. Severe diffuse hypokinesis is present.  Global right ventricular function is moderately reduced.  Moderate mitral insufficiency is present.  Moderate to severe tricuspid insufficiency is present.  Pulmonary hypertension is present. Pulmonary artery systolic pressure is 83  mmHg (68 mmHg+RA pressure 15 mmHg.).  Small circumferential pericardial effusion is present without any hemodynamic significance.  This study was compared with the study from 8/14/23: LV and RV function have  worsened. MR and TR have worsened and PA pressure is higher, likely reflecting  elevated filling pressures/hypervolemia. Pericardial effusion is unchanged.    TTE 12/17/23  The left ventricle is normal size. There is mild concentric hypertrophy.   The left ventricular systolic function is moderately reduced. Qualitative left ventricle ejection fraction is 35-40%. There is moderate global hypokinesis of   the left ventricle.   The right ventricular cavity size is qualitatively normal. Normal right ventricular systolic function.   Left atrium is mildly enlarged by volume.   Mild to moderate mitral regurgitation.   Mild tricuspid regurgitation.   Right ventricular systolic pressure estimate is 35-40 mmHg.   Small pericardial effusion, located posteriorly.   No prior echocardiogram available for comparison.      TTE 8/14/23  The left ventricle is borderline dilated. Biplane LVEF is 45%.There is mild global hypokinesia of the  left ventricle.  There is moderate (2+) mitral regurgitation.  The aortic root is normal size.  Borderline/mild dilated pulmonary artery  Trivial posterior pericardial effusion     CMR 8/16/23  Clinical history: Probable stress induced cardiomyopathy, evaluate for myocarditis.   Comparison CMR: None     1. The LV is mildly dilated with normal wall thickness. The global systolic function is moderately reduced.  The LVEF is 40%. There is moderate global hypokinesis of the left ventricle.      2. The RV is normal in cavity size. The global systolic function is low normal. The RVEF is 52%.      3. Both atria are normal in size.     4. There is mild to moderate mitral regurgitation by qualitative assessment.      5. There is no evidence of myocardial edema on T2 weighted imaging. There is no evidence of myocardial iron overload.      5. Late gadolinium enhancement imaging shows no MI, fibrosis or infiltrative disease.      6. A small circumferential pericardial effusion is noted.      CONCLUSIONS:      1. Mildly dilated left ventricle with moderately reduced systolic function.     2. No late enhancement to suggest prior infarct, inflammation or infiltration.          EKG 12/17/23      Lexiscan 12/21/23  Result Text       The nuclear stress test is negative for inducible myocardial ischemia or infarction.    LVEDv 198ml. LVESv 104ml. LV EF 47%.    There is no prior study for comparison.        Physician Attestation   I saw this patient with the resident and agree with the resident/fellow's findings and plan of care as documented in the note.    Key findings:   Cardiorenal syndrome  Mild diuresis today since cr increased from yesterday, diuretics held yesterday. CK normal. Myocarditis less likely. Will reduce antihypertensives to allow renal perfusion.   Re: cardiomyopathy history -   LVEF 55-60% 4/2019  LVEF 40-45% 1/2023  LVEF 40% 8/2023 (MRI)  LVEF 15-20% 12/2023, 1/2024  Patient has been on Cytoxan previously going  back to 2013. Cytoxan cardiomyopathy is possible but she has had normal  EF while on cytoxan. Would advise close cardiac monitoring after next cytoxan dose with trop and limited echo.   Tea Luz MD  Date of Service (when I saw the patient): January 4, 2024

## 2024-01-09 NOTE — PROGRESS NOTES
CLINICAL NUTRITION SERVICES - ASSESSMENT NOTE     Nutrition Prescription    RECOMMENDATIONS FOR MDs/PROVIDERS TO ORDER:  Rec re-checking Vitamin D (25-OHD) levels given hx deficiency, on low dose supplementation; no recheck of Vit D lab since 7/10/23.     Malnutrition Status:    Does not meet malnutrition criteria at this time     Recommendations already ordered by Registered Dietitian (RD):  Begin HS snack - Peanut butter/jelly sandwich on white bread + alternate between grapes and an orange  on the side per pt preference.    Encourage pt to  continue ordering meals TID; offered to provide handouts listing Na content of menu items and/or provide education review given current diet restrictions but pt politely declined.     Future/Additional Recommendations:  Monitor nutrition-related findings and follow pt per protocol  - If starts on dialysis in future, protein needs will significantly increase. Monitor POC/nutrition adequacy.      REASON FOR ASSESSMENT  Milly Carroll is a/an 23 year old female assessed by the dietitian for LOS    CLINICAL HISTORY  Hx Lupus c/b Lupus cerebritis/seizures and Lupus nephritis, HTN, chronic systolic HF, QT prolongation, pancreatic insufficiency 2/2 pancreatitis), perinephric hematoma s/p embolization (Aug 2023), and chronic thrombocytopenia, presenting to [facility] with chest pain/SOB. Per MD note 1/8, currently being managed for acute on chronic heart failure and CHRISTA on CKD, concern for cardiorenal syndrome in the setting of prior lupus nephritis.  Nephrology closely following per possible need for HD initiation, but per MD note holding off/continue to monitor for need.    NUTRITION HISTORY  Pt reports following a regular diet PTA with intake of 3 meals daily. Is unsure if she takes any vitamin/mineral supplements PTA. Is unsure of her usual body weight, but does not have concern for weight loss or muscle loss.   Has been eating well during present admission. Meeting nutrition goals based  "on assessment of meal tray orders vs I/O (see detailed description below). Feels hungry between meals; would like HS snack started. Placed order.    CURRENT NUTRITION ORDERS  Diet: 2 g Sodium and 1500 mL Fluid Restriction  Supplements: None at present   Intake/Tolerance: % intake documented for most meals, per I/O since admission.   - Per review of room service selections, pt consistently ordering meals TID (with exception of 1 day over past 5-days) with avg 5-day intake of range of ~953-1271 Kcals (% of goal) and 23-31 gm protein (% of goal) if consuming % of meal trays as reflected per I/O <--- based on current pre-dialysis protein need estimation.     GI  0-1 unmeasured BM daily per I/O (last BM x1 on 1/7)   Per chart review in 2020 had been rec'd for Creon due to EPI; stooling trends currently stable. No concerns at this time.    Emesis x2 documented on 1/6    LABS:  BUN >100; Cr 4.33 (H); GFR 14 (L), not currently on dialysis but potential need per chart  Vit D last checked 7/10/23, was low (18) On supplement, though is only providing maintenance/low dose. Rec rechecking Vit D status.    BG trends reviewed; variable, acceptable. No recent A1C but when last checked was WNL.     MEDICATIONS  D-Vi-Sol 10 mcg liquid drops daily (400 units Vit D)  B12 100 mcg daily  Ferosul 325 mg daily   Folvite 1 mg daily   Prednisone daily  Sodium Bicarbonate 650 mg BID   Bumex drip    SKIN  No concerns presently noted; WOCN not following pt.      ANTHROPOMETRICS  Height: 157.5 cm (5' 2\")  Admit wt 60.2 kg (132 lbs) 1/3/24  Most Recent Weight: 63.6 kg (140 lb 3.4 oz)  IBW: 50 kg  120%IBW [based off of admit wt]  BMI: Normal BMI based off of admit wt     Weight History:   - Lowest/driest wt this admit is pt's admit wt; 3+ moderate edema noted per flowsheet, confounding current weight measures. Pt unsure of UBW. Continue to monitor wt trends as available.    Wt Readings from Last 15 Encounters:   01/09/24 " 63.6 kg (140 lb 3.4 oz)   12/27/23 59.9 kg (132 lb)   12/26/23 59.9 kg (132 lb)   12/24/23 56.6 kg (124 lb 12.5 oz)   12/17/23 49 kg (108 lb)   10/25/23 49 kg (108 lb)   10/20/23 50.1 kg (110 lb 8 oz)   10/13/23 50.6 kg (111 lb 9.6 oz)   10/06/23 47.5 kg (104 lb 11.2 oz)   09/22/23 46.8 kg (103 lb 1.6 oz)   08/31/23 47.6 kg (105 lb)   08/23/23 47.1 kg (103 lb 14.4 oz)   08/16/23 57 kg (125 lb 11.2 oz)   08/10/23 52.3 kg (115 lb 3.2 oz)   07/26/23 46 kg (101 lb 6.4 oz)       Dosing Weight: 50 kg (IBW, due to pt fluid status)    ASSESSED NUTRITION NEEDS  Estimated Energy Needs: 7454-9523 kcals/day (25 - 30 kcals/kg)  Justification: Maintenance  Estimated Protein Needs: 30-40 grams protein/day (0.6 - 0.8 grams of pro/kg)  Justification: Renal preservation with CHRISTA on CKD with possible need for HD in near future (Note, should dialysis be initiated, protein needs will significantly increase)  Estimated Fluid Needs: 1500 mL per fluid restriction   Justification: On a fluid restriction    PHYSICAL FINDINGS  See malnutrition section below.  No abnormal nutrition-related physical findings observed.     MALNUTRITION  % Intake: Decreased intake does not meet criteria  % Weight Loss: Unable to assess (confounded by fluid status)  Subcutaneous Fat Loss: None observed  Muscle Loss: None observed  Fluid Accumulation/Edema: Moderate  Malnutrition Diagnosis: Patient does not meet two of the established criteria necessary for diagnosing malnutrition    NUTRITION DIAGNOSIS  Predicted inadequate nutrient intake (energy/protein) related to acute hospitalization with potential for decline to appetite/intake and/or potential for menu fatigue as evidenced by reliance on PO diet to meet full nutrition goals, prolonged hospital LOS.      INTERVENTIONS  Implementation  Nutrition Education: Provided education on RD role, reason for assessment   Modify composition of meals/snacks - HS snack starting this evening  Vitamin/minerals: Rec checking  Vit D status (no recent checks since the summer, on low dose supplementation)    Goals  Patient to consume % of nutritionally adequate meal trays TID, or the equivalent with supplements/snacks.     Monitoring/Evaluation  Progress toward goals will be monitored and evaluated per protocol.  Israel Mock, MS, RDN, LD, CNSC  Available via phone, riskmethodsera chat, and pager  Phone: [phone]  [address] pager: [phone]         Weekend/Holiday RD pager [phone]

## 2024-01-09 NOTE — PROGRESS NOTES
"Brief Medicine Cross Cover Note    Contacted by bedside RN for patient w/ noted painful blister on right arm. Patient states that blister has been present since her transfer form OSH on 1/2/24. Evaluated patient with quarter-sized purulent blister of right forearm- Picture in chart. . Patient states that she has not experienced this type of blister or boil previously and no other lesions noted by patient on her body. Blister reported as initially painful, though less so now. Area expressed and noted purulent pink drainage- sent for culture. Area outlined. No fluctuance noted.   -Sent culture of purulent drainage   - Dermatology consult placed. Please discuss with in AM   - WOCN consult placed  - Pharmacy to dose Vancomycin  - Area outlined to monitor response    BP (!) 163/103   Pulse 74   Temp 98  F (36.7  C) (Oral)   Resp 19   Ht 1.575 m (5' 2\")   Wt 63.6 kg (140 lb 3.4 oz)   LMP 12/11/2023 (Approximate)   SpO2 100%   BMI 25.65 kg/m      Leah High PA-C  Internal Medicine Hospitalist Service  [facility]  Pager: [phone]      "

## 2024-01-09 NOTE — PROGRESS NOTES
Care Management Follow Up    Length of Stay (days): 7    Expected Discharge Date: TBD     Concerns to be Addressed: discharge planning, medical readiness.   Patient plan of care discussed at interdisciplinary rounds: Yes    Anticipated Discharge Disposition: Home  Anticipated Discharge Services: Home care  Anticipated Discharge DME: None    Patient/family educated on Medicare website which has current facility and service quality ratings: NA  Education Provided on the Discharge Plan: Yes  Patient/Family in Agreement with the Plan: Yes    Referrals Placed by CM/SW: Home care  Private pay costs discussed: Not applicable    Additional Information:  [facility], [facility] are able to [facility] the patient for skilled nursing visits, writer provided update on medical readiness for discharge, unknown when patient will be medically ready for discharge at this time.    Discharge resources:    [facility] (RN)  Phone: [phone]  Fax: [phone]    Care coordination will continue to follow for discharge planning.     Concha Warren, RNCC, BSN    [facility]    [address]  [address] [place] [id number]    [email or web address]  [email or web address]    Office: [phone] Pager: [phone]

## 2024-01-09 NOTE — PROGRESS NOTES
Clinic Care Coordination Contact  Program:  [place]: [place]   Renewal: [facility]   Date Applied:     JYOCE Outreach:   1/9/24: 2nd outreach attempt. Left message on voicemail indicating last outreach attempt. CTA left [facility] number for renewal follow up.  Plan: CTA will no longer make outreach   Camryn Goldberg   [facility]  Clinic Care Coordination  [phone]    12/26/23: 1st outreach attempt. Left a message on voicemail with call back information and requested return call.  Plan: CTA will call again within 2 weeks.  Camryn Goldberg   [facility]  Clinic Care Coordination  [phone]      Health Insurance:      Referral/Screening:

## 2024-01-09 NOTE — PLAN OF CARE
Neuro: Patient alert and oriented x4. Flat affect.   Cardiac: SR 70s-90s. Maintained SBP <180, no PRN's required.   Respiratory: Sating >96% on RA.  GI/: Purewick in place, good UOP. No BM this shift.  Diet/appetite: Tolerating 2gm sodium diet, 1500ml FR.   Activity: Independently repositioning in bed. Reminders and encouragement to move. Up in chair for part of the morning.   Pain: Complaining of left hand pain this afternoon, PRN meds given.   Skin: No new deficits noted.   LDA's: L PIV - Bumex gtt infusing.      Plan: NPO at midnight for possible dialysis tunneled line placement tomorrow. Will continue with POC.

## 2024-01-09 NOTE — PLAN OF CARE
Neuro: A&Ox4. Awake and Alert. Pt up to commode but refusing to get in chair/walk out of room  Cardiac: SR. VSS.goal <180. Sys 140-150s. HR 70-80s  Respiratory: Sating 90s on RA.  GI/: Adequate Urine Output.  BM+ on shift  Diet/appetite: Tolerating 2g NA diet. 1500ml FR Eating well. NPO @0000 for possible CVC line placement   Activity:  Up to commode, Independent  Pain: At acceptable level on current regimen. Pt denies pain  Skin: No new deficits noted. BLE edema, RUE swollen and blister on inner R arm (changed from clear to purulent/opaque fluid. Cultures sent)  LDA's: L PIV (bumex gtt 0.5mg/hr)    Plan of care: COVID recovered (off iso)  --Continue Bumex gtt (0.5mg/hr)  --Reassess Cr (4.33 today) 1/10 for possible HD needs/CVC line placement. Per Neph holding off today, NPO @ 0000  --Cards increased PO hydralazine 10mg to 25mg  Waiting on Primary for sign off on transfer orders    Blister on R inner elbow: cultures pending, vanco ordered, WOC and derm consulted

## 2024-01-09 NOTE — PLAN OF CARE
Goal Outcome Evaluation:    Neuro: Patient sleeping most of the shift. Alert and oriented x4 this morning. Soft spoken. Flat affect, cooperative.   Cardiac: SR 70s-90s. Maintained SBP <180, no PRN's required.   Respiratory: Sating >% on RA.  GI/: Purewick in place, replaced with new one this am, good UOP. No BM this shift.  Diet/appetite: Tolerating 2gm sodium diet, 1500ml FR. Has been NPO since midnight.  Activity:  Repositions in bed independently. Encouragement to move.  Pain: Denied pain overnight. No request for medication.  Skin: No new deficits noted.   LDA's: L PIV - Bumex gtt infusing.      Plan: NPO since midnight for possible dialysis tunneled line placement today. Will continue with POC.

## 2024-01-09 NOTE — PROGRESS NOTES
[facility]    Medicine Progress Note - Hospitalist Service, GOLD TEAM 9    Date of Admission:  1/2/2024    Assessment & Plan   Milly Carroll is a 23 year old female admitted on 1/2/2024. She has a PMHx of lupus C/B lupus flares, lupus cerebritis, lupus nephritis, HTN, chronic systolic heart failure, QT prolongation, past perinephric hematoma s/p emobolization in Aug-2023 and chronic thrombocytopenia admitted to [facility] on 12/17/23 (after presentation for SOB/NSTEMI and pneumonia concern and concern for lupus flare where she was treated w/ steroids and plasma exchange) and transfer to [facility] on 12/18/23 before being transferred to [facility] on 12/22-12/25 for cyclophosphamide infusion. Represented on 12/26 [facility] ED for SOB found to have covid and reduced EF (baseline EF 47%, OSH EF reported as 15-20%) w/ plan to transfer patient to [facility] however patient left AMA on 12/30 due to prolonged wait for transfer to [facility] ED. On presentation 1/2, patient reported continued worsening CP/SOB since her discharge on 12/30. Chest pain and dyspnea has since improved during her admission.     Currently being managed for acute on chronic heart failure and CHRISTA on CKD, concern for cardiorenal syndrome in the setting of prior lupus nephritis.      Today:   -Discussed with nephrology, will hold off on dialysis and line placement   -Notified IR and they will keep following. If needs 1/10/24 then would call IR back for placement.   -Continue bumex drip   -BP improved     Acute on Chronic HF (EF 15-20% on 12/26 OSH)  Dyspnea and Chest Pain - improved   Uncontrolled HTN   Tricuspid insufficiency   Presented with chest pain and dyspnea as above. Concern that hypertensive crisis contributing to chest pain. Echocardiogram on 17-Dec-2023 showed LVEF 35-40%, EF decreased to 15-20% on 12/26 w/ BNP at that time >70,000. Lexiscan 12/21 negative  for inducible ischemia. Admission workup 1/2 included trop elevated at 290 with peak at 469 on 1/2. TTE 1/2 with severe diffuse hypokinesis with stably reduced EF. Limited TTE 1/7 with EF 35-40% and dilated IVC. Cardiology consulted with recs as below.   V/Q scan 1/3 indeterminate, overall lower suspicion of VTE contributing to current presentation.   Attempted diuresis with increased doses of IV bumex doses (up to bumex 4 mg IV x 1) on 1/4-1/6 with minimal urine output and concern for cardiorenal syndrome. Started on bumex drip 1/7 with some improving urine output.   -Gen Cardiology consulted, appreciate recs  -Nephrology consulted, appreciate assistance   -Possible need for dialysis in next 24 hours, pending urine output and renal function   -Continue bumex drip, started 1/7   -Continue carvedilol   -Continue hydralazine TID   -Continue nifedipine (restarted on 1/7)   -Consider cMRI during admission once fluid status improved     CHRISTA, worsening   Metabolic Acidosis   Recent Lupus Nephritis   Cr up trending this admission, per discussion with cardiology, nephrology, and rheumatology current CHRISTA suspected to be more likely related to cardiorenal syndrome and ongoing volume overload.   -Nephrology consulted, appreciate assistance    -Continue diuresis and blood pressure management as above   -Continue sodium bicarb tabs   -Rheumatology following, appreciate assistance   -Due for 5th dose cyclophosphamide per [facility] protocol 1/5 (ongoing discussion w/ rheum/nephrology prior to administration), holding for now due to COVID and other ongoing management as outlined     COVID-19 Infection   Tested positive on 12/29 at OSH when presented with chest pain and dyspnea. Concern for superimposed PNA per OSH and was on vanc/cefepime 12/26-12/30 prior to AMA leave. Initially cefepime continued on admission, discontinued on 1/4 due to low suspicion for bacterial pneumonia.  Has remained on RA this admission. Overall unclear how  much COVID-19 contributed to acute presentation.   -Received remdesivir 12/29 and 12/30 prior to leaving AMA, completed 5 day course given high risk (1/5 - 1/7)     Hx of C.diff infxn (6/2023)  -Stopped PO vanc prophylaxis on 1/4 when antibiotics stopped     SLE (+dsDNA, +RNP, +ribosomal P Ab and hypocomplementemia)   Recent Lupus Flare w/ MAHA and renal insuff s/p Cyclophosphamide dose (12/22/23)  Hx of Lupus cerebritis w/ hx of seizures  C/b Lupus Nephritis  Hx medication non-adherence  -Lupus-focused meds:   -Continue prednisone 60mg qday  -PJP ppx: continue PTA bactrim  -GI ppx: continue protonix qday, tums prn  -Cyclophosphamide on hold as above     Hypomagnesemia  Hypokalemia  -Replete per protocol      Chronic RUE edema with weakness   Superficial clot of R cephalic vein  US 8/2023 negative. RUE doppler negative for DVT on 1/2, significant for superficial thrombus. Repeat US RUE 1/7 with no evidence of DVT. Suspect weakness 2/2 swelling, lower concern for alternate etiologies.   -Symptomatic with heat packs    Chronic thrombocytopenia  Chronic Macrocytic Anemia  -Trend CBC daily   -Continue PTA folate/B12 replacement  -DVT ppx: heparin subcutaneous decrease dose per pharmacy in setting of chronic thrombocytopenia and renal dysfunction  -Hematology consulted - hemolysis improving, check labs daily., recommend EPO per nephrology      Hx of Intermittent Headaches  History of being responsive to APAP and dilaudid along w/ treatment of HTN and Lupus. 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 Stools described as normal.      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 No  "current PTA antiepileptic medications    Chronic Left Shoulder Pain   Unclear etiology, likely MSK pain.   -PRN tylenol or metharbamol; taper PO dilaudid (no clear indication)           Diet: Fluid restriction 1500 ML FLUID  NPO per Anesthesia Guidelines for Procedure/Surgery Except for: Meds    DVT Prophylaxis: Heparin SQ  Leung Catheter: Not present  Lines: None     Cardiac Monitoring: None  Code Status: Full Code      Clinically Significant Risk Factors             # Anion Gap Metabolic Acidosis: Highest Anion Gap = 19 mmol/L in last 2 days, will monitor and treat as appropriate  # Hypoalbuminemia: Lowest albumin = 3 g/dL at 1/7/2024  7:09 AM, will monitor as appropriate   # Thrombocytopenia: Lowest platelets = 57 in last 2 days, will monitor for bleeding  # Acute Kidney Injury, unspecified: based on a >150% or 0.3 mg/dL increase in last creatinine compared to past 90 day average, will monitor renal function   # Acute heart failure with reduced ejection fraction: last echo with EF <40% and receiving IV diuretics       # Overweight: Estimated body mass index is 25.65 kg/m  as calculated from the following:    Height as of this encounter: 1.575 m (5' 2\").    Weight as of this encounter: 63.6 kg (140 lb 3.4 oz).      # Financial/Environmental Concerns: none (Per pt she discussed option of applying for disability with her new PCP)         Disposition Plan      Expected Discharge Date: 01/12/2024      Destination: home with family  Discharge Comments: High BPs            Justice Espinoza MD  Hospitalist Service, GOLD TEAM 9  [facility]  Securely message with [employer] (more info)  Text page via [employer] Paging/Directory   See signed in provider for up to date coverage information  ______________________________________________________________________    Interval History   She is doing okay this AM. No nausea or vomiting. No chest pain or SOB. Swelling in RUE stable. No fever or " chills.     Physical Exam   Vital Signs: Temp: 98.2  F (36.8  C) Temp src: Oral BP: (!) 152/99 Pulse: 74   Resp: 19 SpO2: 100 % O2 Device: None (Room air)    Weight: 140 lbs 3.4 oz    Constitutional: NAD, pleasant and cooperative   Cardiovascular: RRR  Respiratory: CTAB  GI: bowel sounds present   Neuro: alert and oriented, no gross focal deficits noted    MSK: RUE with 2+ edema below the elbow, radial pulse +     Medical Decision Making       MANAGEMENT DISCUSSED with the following over the past 24 hours: Nephrology       Data     I have personally reviewed the following data over the past 24 hrs:    5.5  \   9.3 (L)   / 67 (L)     140 105 109.0 (H) /  103 (H)   5.1 20 (L) 4.33 (H) \     ALT: 50 AST: 25 AP: 110 TBILI: 0.2   ALB: 3.1 (L) TOT PROTEIN: 5.5 (L) LIPASE: N/A     Ferritin:  N/A % Retic:  N/A LDH:  N/A

## 2024-01-09 NOTE — PROVIDER NOTIFICATION
"Time of notification: 8:05 AM  Provider notified: Justice Espinoza  Patient status: nephrology called to say we're delaying dialysis another day, can we switch pt back to their 2g na diet please  Orders received: changed diet order    Time of notification: 10:18 AM  Provider notified: Can we get transfer orders for the patient to general floor? Been off nicardipine gtt since sunday  Patient status:  Orders received: no response    Time of notification: 4:00 PM  Provider notified: Eugenia Ku  Patient status:FYI: pt has a blister on inner R elbow, it changed from clear fluid to opaque/pink color with unknown \"organism\" inside and redness around border. Pt denies pain. Don't know if you want to come look at it. also-pt has been refusing heparin SQ for past few days  Orders received: Can you upload a photo to media--provider came to bedside. Sent cultures. WOC consult & Derm consult in place. Pharm to dose vanco     "

## 2024-01-10 NOTE — TELEPHONE ENCOUNTER
Update from Camryn CC that she will put an [employer] application in the mail and will email the application as well as patient has been in the hospital for a week and may be in longer in addition to moving to live with another relative in [facility].  Update to patient via [employer] to watch for insurance application and complete asap to prevent delay in insurance coverage.  Camryn is also a good resource to call at [phone] to support if needed.    Also updated Attending Nephrologist of discharge planning needs for patient- needing [place] Nephrologist that can support her locally as patient would like to stay up there but needs Nephrologist to support.    Oralia Rodriguez RN, BSN, PHN  [facility] Nephrology Nurse Navigator  [phone]

## 2024-01-10 NOTE — PROGRESS NOTES
Brief Cardiology Progress Note    Patient continuing to have urine output on the bumex drip, creatinine has improved slightly. Patient down 1.5lbs from yesterday. Would continue IV bumex drip for diuresis, continue BP control, can consider increasing nifedipine, but would discuss with nephrology beforehand. May also want to hold off on more BP medication temporarily if a sedating procedure is planned for today. Appears that patient may be getting a tunneled dialysis line today per notes. If patient is going to be scheduled for dialysis, would appreciate volume removal with runs.     Guerrero Mcgarry MD  Cardiology Fellow

## 2024-01-10 NOTE — PROCEDURES
[facility]    Procedure: IR Procedure Note    Date/Time: 1/10/2024 3:01 PM    Performed by: Behzadi, Heshmatzadeh, MD  Authorized by: Power Kirby MD  IR Fellow Physician:  Radiology Resident Physician: Yashira Busby        UNIVERSAL PROTOCOL   Site Marked: Yes  Prior Images Obtained and Reviewed:  Yes  Required items: Required blood products, implants, devices and special equipment available    Patient identity confirmed:  Verbally with patient and arm band  Patient was reevaluated immediately before administering moderate or deep sedation or anesthesia  Confirmation Checklist:  Patient's identity using two indicators  Time out: Immediately prior to the procedure a time out was called    Universal Protocol: the Joint Commission Universal Protocol was followed    Preparation: Patient was prepped and draped in usual sterile fashion    ESBL (mL):  10     ANESTHESIA    Local Anesthetic: Lidocaine 1% without epinephrine  Anesthetic Total (mL):  15      SEDATION  Patient Sedated: Yes    Sedation Type:  Moderate (conscious) sedation  Vital signs: Vital signs monitored during sedation    Findings: Successful placement of right internal jugular tunneled dialysis catheter.    Specimens: none    Complications: None    Condition: Stable    Plan: Best rest for 1 hour. Okay to transfer.      PROCEDURE    Patient Tolerance:  Patient tolerated the procedure well with no immediate complications  Length of time physician/provider present for 1:1 monitoring during sedation: 45

## 2024-01-10 NOTE — CONSULTS
"      [facility] Consult Service Note  Interventional Radiology  01/10/24   9:26 AM    Consult Requested: \"Tunneled dialysis line for HD\"    Recommendations/Plan:    Patient is approved for IR tunneled central venous catheter placement on 1/10/24. .    Timing of procedure is TBD based on IR staffing/schedule and triage.  Please contact the IR charge RN at [phone] for estimated time of procedure.   Labs WNL for procedure.  Orders entered for procedure, NPO status, and pre procedure IV antibiotics (on vancomycin, supra-therapeutic levels per pharmacist, so ancef has been canceled).   Medications to be held include: N/A  Informed consent will be completed prior to procedure.     Case and imaging discussed with IR attending Dr. Power Kirby MD  Recommendations were reviewed with Dr. Espinoza.     History of Present Illness:  Milly Carroll is a 23 year old English speaking female with past medical history of lupus nephritis, hx of acute on chronic pancreatitis,  lupus cerebritis, renal failure, and cardiomyopathy who presented on 1/2/24 with CP and SOB. Patient was admitted for many weeks at OSH/ in 12/2023. She currently has an CHRISTA and worsening creatinine with poor urine output. Suspicion of CHRISTA is likely related to cardiorenal syndrome and ongoing volume overload. Request is for central line. Patient is on heparin subcutaneous. Patient is on bactrim and vanco. Initially, plan was to pursue a non-tunneled line, however, this was cancelled yesterday. Primary team contacted me today and noted that patient will likely need long term dialysis, they spoke with nephrology, and plan will be to dialyze today.     Pertinent Imaging Reviewed:   CT Chest 12/17/23     Expected date of discharge:  TBD    Vitals:   BP (!) 167/109   Pulse 79   Temp 97.9  F (36.6  C) (Oral)   Resp 15   Ht 1.575 m (5' 2\")   Wt 62.9 kg (138 lb 10.7 oz)   LMP 12/11/2023 (Approximate)   SpO2 100%   BMI 25.36 kg/m  "     Pertinent Labs Reviewed:  CBC:  Lab Results   Component Value Date    WBC 6.3 01/10/2024    WBC 5.5 01/09/2024    WBC 6.5 01/08/2024    WBC 4.5 02/11/2021    WBC 3.9 (L) 02/10/2021    WBC 3.8 (L) 01/13/2021     Lab Results   Component Value Date    HGB 10.1 01/10/2024    HGB 9.3 01/09/2024    HGB 9.3 01/08/2024    HGB 9.1 02/11/2021    HGB 9.9 02/10/2021    HGB 9.1 01/13/2021     Lab Results   Component Value Date    PLT 63 01/10/2024    PLT 67 01/09/2024    PLT 64 01/08/2024     02/11/2021     02/10/2021     01/13/2021    INR:  Lab Results   Component Value Date    INR 1.18 (H) 01/04/2024    INR 1.08 02/10/2021    PTT 30 12/19/2023    PTT 33 02/10/2021          COVID Results:  COVID-19 Antibody Results, Testing for Immunity           No data to display              COVID-19 PCR Results          6/11/2022    02:35 12/14/2022    13:01 1/23/2023    14:50 12/29/2023    07:51   COVID-19 PCR Results   SARS CoV2 PCR Negative  Negative  Positive  Positive          Kristy Tristan PA-C  Interventional Radiology  Pager: [phone]

## 2024-01-10 NOTE — PROGRESS NOTES
[facility]    Medicine Progress Note - Hospitalist Service, GOLD TEAM 9    Date of Admission:  1/2/2024    Assessment & Plan   Milly Carroll is a 23 year old female admitted on 1/2/2024. She has a PMHx of lupus C/B lupus flares, lupus cerebritis, lupus nephritis, HTN, chronic systolic heart failure, QT prolongation, past perinephric hematoma s/p emobolization in Aug-2023 and chronic thrombocytopenia admitted to [facility] on 12/17/23 (after presentation for SOB/NSTEMI and pneumonia concern and concern for lupus flare where she was treated w/ steroids and plasma exchange) and transfer to [facility] on 12/18/23 before being transferred to [facility] on 12/22-12/25 for cyclophosphamide infusion. Represented on 12/26 [facility] ED for SOB found to have covid and reduced EF (baseline EF 47%, OSH EF reported as 15-20%) w/ plan to transfer patient to [facility] however patient left A on 12/30 due to prolonged wait for transfer to [facility] ED. On presentation 1/2, patient reported continued worsening CP/SOB since her discharge on 12/30. Chest pain and dyspnea has since improved during her admission.     Currently being managed for acute on chronic heart failure and CHRISTA on CKD, concern for cardiorenal syndrome in the setting of prior lupus nephritis.      Today:   -Discussed with nephrology, would want tunneled line 1/10/2024 and dialysis   -Discussed with IR, will put on schedule     Acute on Chronic HF (EF 15-20% on 12/26 OSH)  Dyspnea and Chest Pain - improved   Uncontrolled HTN   Tricuspid insufficiency   Presented with chest pain and dyspnea as above. Concern that hypertensive crisis contributing to chest pain. Echocardiogram on 17-Dec-2023 showed LVEF 35-40%, EF decreased to 15-20% on 12/26 w/ BNP at that time >70,000. Lexiscan 12/21 negative for inducible ischemia. Admission workup 1/2 included trop elevated at 290 with peak at 469 on  1/2. TTE 1/2 with severe diffuse hypokinesis with stably reduced EF. Limited TTE 1/7 with EF 35-40% and dilated IVC. Cardiology consulted with recs as below.   V/Q scan 1/3 indeterminate, overall lower suspicion of VTE contributing to current presentation.   Attempted diuresis with increased doses of IV bumex doses (up to bumex 4 mg IV x 1) on 1/4-1/6 with minimal urine output and concern for cardiorenal syndrome. Started on bumex drip 1/7 with some improving urine output.   -Gen Cardiology consulted, appreciate recs  -Nephrology consulted, appreciate assistance   -Line and dialysis 1/10/2024   -Continue bumex drip, started 1/7   -Continue carvedilol   -Continue hydralazine TID   -Continue nifedipine (restarted on 1/7)   -Consider cMRI during admission once fluid status improved     CHRISTA, worsening   Metabolic Acidosis   Recent Lupus Nephritis   Cr up trending this admission, per discussion with cardiology, nephrology, and rheumatology current CHRISTA suspected to be more likely related to cardiorenal syndrome and ongoing volume overload.   -Nephrology consulted, appreciate assistance    -Continue diuresis and blood pressure management as above   -Continue sodium bicarb tabs   -Rheumatology following, appreciate assistance   -Due for 5th dose cyclophosphamide per [employer] protocol 1/5 (ongoing discussion w/ rheum/nephrology prior to administration), holding for now due to COVID and other ongoing management as outlined     R forearm blister  Quarter sized with purulence, sent culture overnight. WOCN to see. Started on vancomycin and will monitor. Derm consult placed.     COVID-19 Infection   Tested positive on 12/29 at OSH when presented with chest pain and dyspnea. Concern for superimposed PNA per OSH and was on vanc/cefepime 12/26-12/30 prior to AMA leave. Initially cefepime continued on admission, discontinued on 1/4 due to low suspicion for bacterial pneumonia.  Has remained on RA this admission. Overall unclear how  much COVID-19 contributed to acute presentation.   -Received remdesivir 12/29 and 12/30 prior to leaving AMA, completed 5 day course given high risk (1/5 - 1/7)     Hx of C.diff infxn (6/2023)  -Stopped PO vanc prophylaxis on 1/4 when antibiotics stopped     SLE (+dsDNA, +RNP, +ribosomal P Ab and hypocomplementemia)   Recent Lupus Flare w/ MAHA and renal insuff s/p Cyclophosphamide dose (12/22/23)  Hx of Lupus cerebritis w/ hx of seizures  C/b Lupus Nephritis  Hx medication non-adherence  -Lupus-focused meds:   -Continue prednisone 60mg qday  -PJP ppx: continue PTA bactrim  -GI ppx: continue protonix qday, tums prn  -Cyclophosphamide on hold as above     Hypomagnesemia  Hypokalemia  -Replete per protocol      Chronic RUE edema with weakness   Superficial clot of R cephalic vein  US 8/2023 negative. RUE doppler negative for DVT on 1/2, significant for superficial thrombus. Repeat US RUE 1/7 with no evidence of DVT. Suspect weakness 2/2 swelling, lower concern for alternate etiologies.   -Symptomatic with heat packs    Chronic thrombocytopenia  Chronic Macrocytic Anemia  -Trend CBC daily   -Continue PTA folate/B12 replacement  -DVT ppx: heparin subcutaneous decrease dose per pharmacy in setting of chronic thrombocytopenia and renal dysfunction  -Hematology consulted - hemolysis improving, check labs daily., recommend EPO per nephrology      Hx of Intermittent Headaches  History of being responsive to APAP and dilaudid along w/ treatment of HTN and Lupus. 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 Stools described as normal.      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 No  "current PTA antiepileptic medications    Chronic Left Shoulder Pain   Unclear etiology, likely MSK pain.   -PRN tylenol or metharbamol; taper PO dilaudid (no clear indication)           Diet: Fluid restriction 1500 ML FLUID  NPO per Anesthesia Guidelines for Procedure/Surgery Except for: Meds  Snacks/Supplements Adult: Other; 8 PM: Peanut butter and strawberry Jelly on white bread sandwich + alternate grapes/orange E/O day on side; Between Meals    DVT Prophylaxis: Heparin SQ  Leung Catheter: Not present  Lines: None     Cardiac Monitoring: None  Code Status: Full Code      Clinically Significant Risk Factors              # Hypoalbuminemia: Lowest albumin = 3 g/dL at 1/7/2024  7:09 AM, will monitor as appropriate   # Thrombocytopenia: Lowest platelets = 63 in last 2 days, will monitor for bleeding  # Acute Kidney Injury, unspecified: based on a >150% or 0.3 mg/dL increase in last creatinine compared to past 90 day average, will monitor renal function   # Acute heart failure with reduced ejection fraction: last echo with EF <40% and receiving IV diuretics       # Overweight: Estimated body mass index is 25.36 kg/m  as calculated from the following:    Height as of this encounter: 1.575 m (5' 2\").    Weight as of this encounter: 62.9 kg (138 lb 10.7 oz).      # Financial/Environmental Concerns: none (Per pt she discussed option of applying for disability with her new PCP)         Disposition Plan             Justice Espinoza MD  Hospitalist Service, GOLD TEAM 9  [facility]  Securely message with [email or web address] (more info)  Text page via [facility] Paging/Directory   See signed in provider for up to date coverage information  ______________________________________________________________________    Interval History   Patient is feeling okay this AM. No nausea or vomiting. Swelling in RUE improved and feels like blister is a little better. No pain. No fever or chills. No new rashes. No " chest pain or SOB. Discussed potential need for line placement and dialysis, patient understanding.     Physical Exam   Vital Signs: Temp: 97.9  F (36.6  C) Temp src: Oral BP: (!) 167/109 Pulse: 79   Resp: 15 SpO2: 100 % O2 Device: None (Room air)    Weight: 138 lbs 10.71 oz    Constitutional: NAD, pleasant and cooperative   Cardiovascular: RRR  Respiratory: CTAB  GI: bowel sounds present   Neuro: alert and oriented, no gross focal deficits noted    MSK: RUE with 1+ edema below the elbow, radial pulse +     Medical Decision Making       MANAGEMENT DISCUSSED with the following over the past 24 hours: nephrology       Data     I have personally reviewed the following data over the past 24 hrs:    6.3  \   10.1 (L)   / 63 (L)     139 103 112.0 (H) /  80   4.5 18 (L) 4.10 (H) \     ALT: 53 (H) AST: 27 AP: 112 TBILI: 0.3   ALB: 3.2 (L) TOT PROTEIN: 5.7 (L) LIPASE: N/A     Procal: N/A CRP: N/A Lactic Acid: N/A

## 2024-01-10 NOTE — IR NOTE
Patient Name: Milly Carroll  Medical Record Number: [id number]  Today's Date: 1/10/2024    Procedure: Tunneled CVC placement  Proceduralist: Dr. Kirby, Dr. Heshmatzaday Behzadi, Dr. Busby  Pathology present: NA    Procedure Start: 1409  Procedure end: 1454  Sedation medications administered: 200 mcg fentanyl and 4 mg versed (sedation time 6926-6133)    Report given to: Concha PRESLEY 6B  : GAVIN    Other Notes: Pt arrived to IR room 5 from . Consent reviewed. Pt denies any questions or concerns regarding procedure. Pt positioned supine and monitored per protocol. Pt tolerated procedure without any noted complications. Pt transferred back to . Line heparin locked and read to use.

## 2024-01-10 NOTE — PRE-PROCEDURE
GENERAL PRE-PROCEDURE:   Procedure:  Central venous catheter tunneled  Date/Time:  1/10/2024 1:35 PM    Risks and benefits: Risks, benefits and alternatives were discussed    Patient states understanding of procedure being performed: Yes    Patient's understanding of procedure matches consent: Yes    Procedure consent matches procedure scheduled: Yes    Appropriately NPO:  Yes  ASA Class:  2  Mallampati  :  Grade 3- soft palate visible, posterior pharyngeal wall not visible  Lungs:  Lungs clear with good breath sounds bilaterally  Heart:  Normal heart sounds and rate  History & Physical reviewed:  History and physical reviewed and no updates needed  Statement of review:  I have reviewed the lab findings, diagnostic data, medications, and the plan for sedation

## 2024-01-10 NOTE — PROGRESS NOTES
Clinic Care Coordination Contact  Program:  [facility]: [place]   Renewal: [employer]   Date Applied:     JOYCE Outreach:  1/10/24: CTA called to see if patient needed assistance with their [employer] Renewal. Patient declined needing assistance and no follow up needed   Nurse Oralia called CTA back and stated pt has been in and out the hospital for sometime now. Pt phone isn't working while in the hospital, but CTA told Oralia that she will e-mail and mail a copy of the [employer] Renewal to pt.   Camryn Rosa  Care   [facility] Care Coordination  [phone]      1/9/24: 2nd outreach attempt. Left message on voicemail indicating last outreach attempt. CTA left [facility] number for renewal follow up.  Plan: CTA will no longer make outreach   Camryn Goldberg   [facility] Coordination  [phone]    12/26/23: 1st outreach attempt. Left a message on voicemail with call back information and requested return call.  Plan: CTA will call again within 2 weeks.  Camryn Goldberg   [facility] Coordination  [phone]      Health Insurance:      Referral/Screening:

## 2024-01-10 NOTE — PHARMACY-VANCOMYCIN DOSING SERVICE
Pharmacy Vancomycin Note  Date of Service January 10, 2024  Patient's  2000   23 year old, female    Indication: Skin and Soft Tissue Infection  Day of Therapy: 1  Current vancomycin regimen:  Intermittent dosing  Current vancomycin monitoring method: Trough (Method 2 = manual dose calculation) as undetermined if patient will go on dialysis  Current vancomycin therapeutic monitoring goal: 15-20 mg/L    Current estimated CrCl = Estimated Creatinine Clearance: 18.8 mL/min (A) (based on SCr of 4.1 mg/dL (H)).    Creatinine for last 3 days  2024:  5:56 AM Creatinine 4.31 mg/dL  2024:  5:35 AM Creatinine 4.33 mg/dL  1/10/2024:  8:18 AM Creatinine 4.10 mg/dL    Recent Vancomycin Levels (past 3 days)  1/10/2024:  7:10 AM Vancomycin 41.7 ug/mL (11.5hr tr)    Vancomycin IV Administrations (past 72 hours)                     vancomycin (VANCOCIN) 1,500 mg in 0.9% NaCl 250 mL intermittent infusion (mg) 1,500 mg New Bag 24 1940                    Nephrotoxins and other renal medications (From now, onward)      Start     Dose/Rate Route Frequency Ordered Stop    24 1827  vancomycin place abdullahi - receiving intermittent dosing         1 each Intravenous SEE ADMIN INSTRUCTIONS 24 1827      24 1100  bumetanide (BUMEX) 0.25 mg/mL infusion         0.5 mg/hr  2 mL/hr  Intravenous CONTINUOUS 24 1008                 Contrast Orders - past 72 hours (72h ago, onward)      Start     Dose/Rate Route Frequency Stop    24 0900  perflutren diluted 1mL to 2mL with saline (OPTISON) diluted injection 6 mL         6 mL Intravenous ONCE              Interpretation of levels and current regimen:  Vancomycin level is reflective of supratherapeutic level    Has serum creatinine changed greater than 50% in last 72 hours: No    Urine output:  good urine output    Renal Function: Stable    Plan:  No dose needed base on level. Even if patient does start dialysis today.   Vancomycin monitoring method:  Trough (Method 2 = manual dose calculation)  Vancomycin therapeutic monitoring goal: 15-20 mg/L  Pharmacy will check vancomycin levels as appropriate tomorrow morning.   Serum creatinine levels will be ordered daily for the first week of therapy and at least twice weekly for subsequent weeks.    Addison Aquino RPH

## 2024-01-10 NOTE — PLAN OF CARE
[id number]    Neuro: A&Ox4. Flat affect. Pleasant, able to make needs known.  Cardiac: SR 70s-80s. SBP <180 without intervention.   Respiratory: Sating >95% on RA.  GI/: Adequate urine output. No BM.  Diet/appetite: Tolerating 2gm Na diet, 1500ml FR. Will be NPO at midnight for potential HD line placement tomorrow. Eating well.  Activity:  Independent, up to commode.  Pain: At acceptable level on current regimen.   Skin: Blister on RUE has small pink drainage from aspiration hole, surrounding erythema unchanged.  LDA's:  -L PIV: bumex gtt  -L lower PIV:  abx    Plan: Potential HD line placement tomorrow pending need for dialysis. Swab of blister pending. Continue with POC. Notify primary team with changes.        Goal Outcome Evaluation: progressing

## 2024-01-10 NOTE — CONSULTS
"[facility] Nurse Inpatient Assessment     Consulted for: right forearm wound    Summary: appears infectious, will defer treatment to dermatology     Patient History (according to provider note(s):      23 year old female admitted on 1/2/2024. She has a PMHx of lupus C/B lupus flares, lupus cerebritis, lupus nephritis, HTN, chronic systolic heart failure, QT prolongation, past perinephric hematoma s/p emobolization in Aug-2023 and chronic thrombocytopenia admitted to [facility] on 12/17/23 (after presentation for SOB/NSTEMI and pneumonia concern and concern for lupus flare where she was treated w/ steroids and plasma exchange) and transfer [facility] on 12/18/23 before being transferred to [facility] on 12/22-12/25 for cyclophosphamide infusion. Represented on 12/26 [facility] ED for SOB found to have covid and reduced EF (baseline EF 47%, OSH EF reported as 15-20%) w/ plan to transfer patient to [facility] however patient left AMA on 12/30 due to prolonged wait for transfer to [facility] ED. On presentation 1/2, patient reported continued worsening CP/SOB since her discharge on 12/30. Chest pain and dyspnea has since improved during her admission.      Assessment:      Areas visualized during today's visit: Focused: and right forearm    Wound location: right palmar forearm      Last photo: 1/10/24  Wound due to: suspect  infectious etiology  Wound history/plan of care: patient first noted vesicles at site \"a couple of weeks ago\"    3 intact vesicles in AC,     Draining bullae inferior to this that is approximately 2 cm in diameter.  Visible Wound base: blister roof, dried red fibrin and moist tan fibrin        Palpation of the wound bed: normal      Drainage: small     Description of drainage: purulent        Tunneling: N/A     Undermining: N/A  Periwound skin: Intact and Erythema- blanchable see drawn outline in pic above        " Temperature: normal   Odor: none  Pain: denies ,   Pain interventions prior to dressing change: N/A  Treatment goal: Drainage control and Infection control/prevention  STATUS: initial assessment  Supplies ordered: at bedside      Treatment Plan:     Right forearm wound:    If draining, may cover with xeroform gauze.  Otherwise leave open to air.   Will defer treatment to Dermatology      Orders: Reviewed    RECOMMEND PRIMARY TEAM ORDER: Dermatology consult already ordered  Education provided: plan of care  Discussed plan of care with: Patient  [facility] nurse follow-up plan: signing off  Notify WO if wound(s) deteriorate.  Nursing to notify the Provider(s) and re-consult the WOC Nurse if new skin concern.    DATA:     Current support surface: Standard  Standard gel/foam mattress (IsoFlex, Atmos air, etc)  BMI: Body mass index is 25.36 kg/m .   Active diet order: Orders Placed This Encounter      NPO per Anesthesia Guidelines for Procedure/Surgery Except for: Meds     Output: I/O last 3 completed shifts:  In: 961 [P.O.:635; I.V.:326]  Out: 2160 [Urine:2160]     Labs:   Recent Labs   Lab 01/10/24  0818 01/10/24  0710 01/05/24  0612 01/04/24  1445   ALBUMIN 3.2*  --    < >  --    HGB  --  10.1*   < >  --    INR  --   --   --  1.18*   WBC  --  6.3   < >  --     < > = values in this interval not displayed.     Wound Cx pending  Gram Stain Result No organisms seen      2+ WBC seen        Pressure injury risk assessment:   Sensory Perception: 4-->no impairment  Moisture: 3-->occasionally moist  Activity: 3-->walks occasionally  Mobility: 3-->slightly limited  Nutrition: 3-->adequate  Friction and Shear: 3-->no apparent problem  Tacho Score: 19    Pager no longer is use, please contact through [employer] group: [facility] Nurse [place]  Dept. Office Number: [phone]

## 2024-01-10 NOTE — PLAN OF CARE
Major Shift Events:      No acute changes this shift, patient denies pain, up to commode x2. Sinus rhythm, afebrile slightly more hypertensive this shift, within goal of <180 systolic. Patient poked x4 for morning labs, unsuccessful with lab draw, vascular request placed by  for US use.     Plan: Continue with POC, follow up with lab draw    For vital signs and complete assessments, please see documentation flowsheets.

## 2024-01-10 NOTE — CONSULTS
[facility] Inpatient Consult Dermatology Note    Impression/Plan:  1.  Ruptured bulla, right arm  Patient presents with a bulla of the right arm.  Patient notes that has been present since late December and, and overall has been unchanged.  However, this was noticed by the nurse overnight on 1/9/2024.  The blister was lanced and cultured (cultures show no growth to date), patient was started on antibiotics.  Patient was otherwise asymptomatic from a skin standpoint, and has no other skin lesions of concern.  An isolated bulla on the right upper extremity is most likely associated irritant, dermatitis versus allergic contact dermatitis versus extravasated IV from prior injection.  However, in the setting of general immunosuppression, we will also want to rule out HSV/VZV or other infection.  Swabs were ordered, and we will continue to follow cultures.  - Daily Vaseline and bandage over wound until healed  -HSV and VZV swab of the wound ordered  -Continue to follow cultures      Thank you for the dermatology consultation. Please do not hesitate to contact the dermatology resident/faculty on call for any additional questions or concerns. We will continue to follow.     Patient seen and evaluated with attending physician, Dr. Jeremiah Lorenzo MD  Dermatology Resident    I have seen and examined this patient and agree with the assessment and plan as documented in the resident's note.    Rodolfo Daniels MD  Dermatology Attending      Dermatology Problem List:  1.R arm bulla    Date of Admission: Jan 2, 2024   Encounter Date: 01/10/2024    Reason for Consultation:   Bulla of right arm    History of Present Illness:  Ms. Milly Carroll is a 23 year old female with PMH lupus c/b lupus flares (cerebritis, nephritis), HTN, chronic systolic heart failure, QT prolongation, past perinephric hematoma s/p embolization 8/2023, chronic thrombocytopenia. Patient was admitted to [facility]  center on 12/17/23 (after presentation for SOB/NSTEMI and pneumonia concern and concern for lupus flare where she was treated w/ steroids and plasma exchange) and transfer to [facility] on 12/18/23 before being transferred to [facility] on 12/22-12/25 for cyclophosphamide infusion. Represented on 12/26 [facility] ED for SOB found to have covid and reduced EF (baseline EF 47%, OSH EF reported as 15-20%) w/ plan to transfer patient to [facility] however patient left AMA on 12/30 due to prolonged wait for transfer to [facility] ED. On presentation 1/2, patient reported continued worsening CP/SOB since her discharge on 12/30. Chest pain and dyspnea has since improved during her admission.     Dermatology was consulted overnight by medicine cross cover team. Medicine cross coer team was contacted by bedside Rn due to painful blister on the right arm. Right forearm had a purulent bulla of the right forearm. Has not had this before. Culture was sent , and patient was started on vancomycin.     WOC nurse saw patient. Per WOC nurse, If draining, may cover with xeroform gauze.  Otherwise leave open to air. Will defer treatment to Dermatology      Past Medical History:   Patient Active Problem List   Diagnosis     Systemic lupus erythematosus (H)     Acute hepatitis     Exacerbation of systemic lupus erythematosus     Generalized weakness     Hypocomplementemia (H)     Hypoalbuminemia     Seizure (H)     Coagulopathy (H24)     Hypokalemia     Functional visual loss     Posterior subcapsular cataract, both eyes     Encounter for therapeutic drug monitoring     Knee osteonecrosis, right (H)     Pyelonephritis     Proteinuria     Other forms of systemic lupus erythematosus (H)     Abnormal echocardiogram - repeat in 2021     Immunosuppression (H24)     Anaphylaxis     Sepsis (H)     Pancreatitis, acute     Cellulitis of left lower extremity     Drug-induced systemic lupus erythematosus, unspecified organ involvement  status (H24)     Fever, unspecified fever cause     Lupus (systemic lupus erythematosus) (H)     Thrombocytopenia, unspecified (H24)     Splenomegaly     Fever, unspecified     Fever in pediatric patient     Anemia, unspecified type     Other systemic lupus erythematosus with other organ involvement (H)     Systemic lupus erythematosus, unspecified SLE type, unspecified organ involvement status (H)     Contact with and (suspected) exposure to covid-19     Tachycardia     Hx of systemic lupus erythematosus (SLE) (H)     Pain of left lower extremity     Perinephric hematoma     Hemoptysis     Lupus nephritis (H)     CKD (chronic kidney disease) stage 5, GFR less than 15 ml/min (H)     CHF (congestive heart failure) (H)     Lupus (H)     Acute on chronic congestive heart failure, unspecified heart failure type (H)     COVID-19     Past Medical History:   Diagnosis Date     Hepatitis      Lupus (systemic lupus erythematosus) (H)      Lupus cerebritis (H)      Pancreatitis 08/2017     Pyelonephritis 08/2017     Seizures (H)      Status epilepticus (H)      Past Surgical History:   Procedure Laterality Date     IR RENAL ANGIOGRAM LEFT  8/11/2023     IR RENAL BIOPSY LEFT  6/28/2022     NO HISTORY OF SURGERY       ORTHOPEDIC SURGERY           Social History:  Patient reports that she has never smoked. She has never been exposed to tobacco smoke. She has never used smokeless tobacco. She reports that she does not currently use alcohol. She reports that she does not use drugs.    Family History:  Family History   Problem Relation Age of Onset     Other - See Comments Father         Question of lupus in father and paternal grandfather     Lupus Sister         older sister     Gastrointestinal Disease No family hx of         No known history of IBD, hepatitis, pancreatitis, celiac disease, or GI cancers before age 50     Glaucoma No family hx of      Macular Degeneration No family hx of        Medications:  Current  Facility-Administered Medications   Medication     acetaminophen (TYLENOL) tablet 650 mg     albuterol (PROVENTIL HFA/VENTOLIN HFA) inhaler     artificial saliva (BIOTENE MT) solution 1 spray     bumetanide (BUMEX) 0.25 mg/mL infusion     calcium carbonate (TUMS) chewable tablet 1,000 mg     carvedilol (COREG) tablet 25 mg     cholecalciferol (D-VI-SOL, Vitamin D3) 10 mcg/mL (400 units/mL) liquid 10 mcg     cyanocobalamin (VITAMIN B-12) tablet 100 mcg     ferrous sulfate (FEROSUL) tablet 325 mg     folic acid (FOLVITE) tablet 1 mg     heparin ANTICOAGULANT injection 5,000 Units     hydrALAZINE (APRESOLINE) tablet 10 mg    Or     hydrALAZINE (APRESOLINE) injection 20 mg     hydrALAZINE (APRESOLINE) tablet 25 mg     HYDROmorphone (DILAUDID) half-tab 1 mg     Lidocaine (LIDOCARE) 4 % Patch 3 patch     lidocaine (LMX4) cream     lidocaine 1 % 0.1-1 mL     melatonin tablet 5 mg     methocarbamol (ROBAXIN) tablet 500 mg     naloxone (NARCAN) injection 0.2 mg    Or     naloxone (NARCAN) injection 0.4 mg    Or     naloxone (NARCAN) injection 0.2 mg    Or     naloxone (NARCAN) injection 0.4 mg     NIFEdipine ER OSMOTIC (PROCARDIA XL) 24 hr tablet 30 mg     ondansetron (ZOFRAN ODT) ODT tab 4 mg     pantoprazole (PROTONIX) EC tablet 40 mg     perflutren diluted 1mL to 2mL with saline (OPTISON) diluted injection 6 mL     [Held by provider] potassium chloride ER (KLOR-CON M) CR tablet 20 mEq     predniSONE (DELTASONE) tablet 60 mg     prochlorperazine (COMPAZINE) injection 5 mg     senna-docusate (SENOKOT-S/PERICOLACE) 8.6-50 MG per tablet 1 tablet    Or     senna-docusate (SENOKOT-S/PERICOLACE) 8.6-50 MG per tablet 2 tablet     sodium bicarbonate tablet 650 mg     sodium chloride (PF) 0.9% PF flush 3 mL     sodium chloride (PF) 0.9% PF flush 3 mL     sulfamethoxazole-trimethoprim (BACTRIM) 400-80 MG per tablet 1 tablet     vancomycin place abdullahi - receiving intermittent dosing          Allergies   Allergen Reactions      "Rituximab Anaphylaxis and Shortness Of Breath         Review of Systems:  -As per HPI      Physical exam:  Vitals: BP (!) 168/106 (BP Location: Left arm)   Pulse 79   Temp 98.2  F (36.8  C) (Oral)   Resp 18   Ht 1.575 m (5' 2\")   Wt 62.9 kg (138 lb 10.7 oz)   LMP 12/11/2023 (Approximate)   SpO2 100%   BMI 25.36 kg/m    Gen: Resting on bed ,tired-appearing  Skin:  - Limited exam as below  -On the right arm there is a eroded ~2 cm bulla with some mild hemorrhagic crusting.  No active purulent drainage.  Nontender to palpation.  -No other lesions of concern on areas examined.           Laboratory:  Labs were reviewed    Dr. Daniels staffed the patient.    Staff Involved:  Resident/Staff          "

## 2024-01-10 NOTE — PROGRESS NOTES
Nephrology Progress Note  01/10/2024         Assessment & Recommendations:     Milly Carroll is a 23 year old year old female  Assessment & Plan  Milly Carroll is a 23 year old female admitted on 1/2/2024. 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 She was admitted with chest pain and shortness of breath, found to be COVID positive, w/ severely decreased global cardiac function on echo, and requiring multiple doses of IV diuretics due to volume overload, and having worsening CHRISTA w/ creatinine from 2.48 on admission increased to 4.31 on 1/8/2024. Currently planning for potential dialysis on 01/09 if renal function continues to worsen.     Recommendations:  - Continue Coreg to 25 mg  - Continue hydralazine to 25 mg TID  - Continue Metolazone 5 mg QD  - RRT tomorrow   - TDC today per IR  - Continue Bumex 0.5 mg/hr  - Continue to postpone cyclophosphamide, at a minimum until after 01/12 but consider postponing further due to significantly impaired renal function  - Start prednisone 60 mg taper (taper plan was 60 mg for 14 days then 84-25-54-20-10 x 14 days each)  - Daily BMP  - Daily standing weights, strict I&Os     #Lupus nephritis, class IV  #SLE  #Chronic kidney disease, stage IV (Cystatin C/creatinine discordance)  # Acute kidney injury  Patient's creatinine was 3.48 on 12/30,decreased to 2.48 on admission at [facility] 1/2/2024  which again raised to 2.77 -> 2.97 on 1/3/2023 and has increased again to 3.21 - 3.97 - 4.17 - 4.31. Does continue to appear hypervolemic on examination. CHRISTA may likely represent poor renal perfusion in the setting of volume overload ,cardiorenal syndrome and diuresis w/ subsequent rapid drop in BP.  Significantly elevated BP on presentation with evidence transient hemolysis and hematuria might also suggest malignant hypertension with fibrinoid necrosis a possibility. Lupus flare less likely given improving complement and dsDNA levels and current treatment with steroids and recent treatment w/ cyclophosphamide.  At this time, given her known COVID infection, there would be significant risk with adding additional immunosuppressants at this time. Has been on hydroxychloroquine in the past, but this is currently held due to QTC prolongation.   Clarified with infusion coordinator that Milly has had 5 cyclophosphamide infusions thus far (08/22, 09/08, 09/22, 10/06, 12/22).     #Hypertension  #Acute on chronic systolic heart failure, stress cardiomyopathy  Most likely cause of her hypertension is volume overload, particularly given hypervolemic appearance on physical examination, significantly elevated BNP from baseline, signs of pulmonary edema on CXR. Notably, her blood pressures improved rapidly following diuresis and administration of her home dose of nifedipine. However, there is concern for correcting her BP too quickly. Given the malignant hypertension she experienced, she will require careful blood pressure to control to avoid hypotension. On discussion with cardiology, there is question of if the precipitant of her systolic heart failure is uncontrolled, severe hypertension. Given normal CK, it does not appear likely that there is a component of myocarditis contributing to her presentation. Repeat echocardiogram on 01/06 was 35-40%.     #. Metabolic acidosis, chronic  Agree with continuing NaHCO3 650 mg TID. She has a chronic metabolic acidosis and is tolerating this well.      #. Chronic macrocytic anemia  Would recommend holding off on starting epo for now, as her HGB has improved from prior. However, would favor starting epo if her HGB is <9.0 or if she remains less than 9.5 on rechecks over the next 1-2  "days. Iron studies were obtained, Fe saturation of 40%.     Recommendations were communicated to primary team via Vocera. Discussed w/ patient.     Seen and discussed with Dr. Trell Hartley MD  Nephrology Fellow   0160    Interval History :   Nursing and provider notes from last 24 hours reviewed.  Overnight, no significant events. She did make 2160 mL of urine since midnight. No difficulty breathing. BP uncontrolled despite antihypertensive med increase and addition of metolazone.     Physical Exam:   I/O last 3 completed shifts:  In: 493 [P.O.:165; I.V.:328]  Out: 3400 [Urine:3400]   BP (!) 151/98   Pulse 79   Temp (P) 97.9  F (36.6  C) (Oral)   Resp (P) 12   Ht 1.575 m (5' 2\")   Wt 62.9 kg (138 lb 10.7 oz)   LMP 12/11/2023 (Approximate)   SpO2 100%   BMI 25.36 kg/m       General: Laying in bed, no acute distress. Interacts appropriately.   HENT: Normocephalic, atraumatic. Pupils reactive to light, EOM grossly intact. Moist mucous membranes.   Neck: Elevated JVP.   Cardiovascular: Regular rate and rhythm. Intact DP/PT and bilateral radial pulses.   Respiratory: Crackles on lung exam are significantly improved from prior. No increased work of breathing.   Abdomen: Soft, non-tender, non-distended.    Neurologic: Alert. Oriented. Moves all extremities equally.   Musculoskeletal: RUE w/ 2+ pitting edema. 1+ bilateral lower extremity edema, slightly worse in the right leg.   Skin: She has some redness of the face on the bilateral cheeks but no clear malar rash. No signs of pallor or cyanosis. Warm and dry.   Psych: Flat affect.     Labs:   All labs reviewed by me  Electrolytes/Renal -   Recent Labs   Lab Test 01/10/24  0818 01/09/24  0535 01/08/24  0556 01/06/24  0638 01/05/24  0612 12/28/23  0623 12/27/23  0001 12/25/23  0442 12/24/23  1605 12/24/23  0943 12/20/23  0415 12/19/23  2335    140 142   < > 139   < > 141 144  --  144   < > 140   POTASSIUM 4.5 5.1 5.2   < > 4.8   < > 5.6* 3.1*  " 3.1*   < > 3.4   < > 3.6   CHLORIDE 103 105 107   < > 107   < > 109* 110*  --  111*   < > 106   CO2 18* 20* 16*   < > 18*   < > 17* 20*  --  21*   < > 18*   .0* 109.0* 107.0*   < > 96.8*   < > 75.1* 60.5*  --  58.9*   < > 52.1*   CR 4.10* 4.33* 4.31*   < > 3.49*   < > 2.98* 2.57*  --  2.42*   < > 2.87*   GLC 80 103* 120*   < > 124*   < > 133* 138*  --  95   < > 144*   BISI 9.1 8.8 8.7   < > 8.4*   < > 8.6 8.0*  --  8.2*   < > 7.4*   MAG  --   --   --   --   --   --  2.1 1.8  --   --   --  2.0   PHOS  --   --   --   --  6.1*  --   --  3.4  --  4.6*   < > 6.3*    < > = values in this interval not displayed.       CBC -   Recent Labs   Lab Test 01/10/24  0710 01/09/24  0535 01/08/24  0556   WBC 6.3 5.5 6.5   HGB 10.1* 9.3* 9.3*   PLT 63* 67* 64*       LFTs -   Recent Labs   Lab Test 01/10/24  0818 01/09/24  0535 01/08/24  0556   ALKPHOS 112 110 106   BILITOTAL 0.3 0.2 0.3   ALT 53* 50 42   AST 27 25 20   PROTTOTAL 5.7* 5.5* 5.7*   ALBUMIN 3.2* 3.1* 3.2*       Iron Panel -   Recent Labs   Lab Test 01/05/24  0612 12/20/23  0415 12/17/23  0429 07/10/23  1454   IRON 76  --  130 15*   IRONSAT 40  --  74* 9*   GABRIEL 786*   < > 32,775* 1,079*    < > = values in this interval not displayed.         Imaging:  All imaging studies reviewed by me.     Current Medications:   artificial saliva  1 spray Mouth/Throat 4x Daily    carvedilol  25 mg Oral BID    cholecalciferol  10 mcg Oral Daily    vitamin B-12  100 mcg Oral Daily    ferrous sulfate  325 mg Oral Daily with breakfast    folic acid  1 mg Oral Daily    heparin ANTICOAGULANT  5,000 Units Subcutaneous Q12H    hydrALAZINE  25 mg Oral TID    NIFEdipine ER OSMOTIC  30 mg Oral Daily    pantoprazole  40 mg Oral QAM AC    perflutren diluted 1mL to 2mL with saline  6 mL Intravenous Once    [Held by provider] potassium chloride ER  20 mEq Oral BID    predniSONE  60 mg Oral Daily    sodium bicarbonate  650 mg Oral TID    sodium chloride (PF)  3 mL Intracatheter Q8H     sulfamethoxazole-trimethoprim  1 tablet Oral Once per day on Monday Wednesday Friday    vancomycin place abdullahi - receiving intermittent dosing  1 each Intravenous See Admin Instructions      bumetanide 0.5 mg/hr (01/10/24 0700)

## 2024-01-10 NOTE — PHARMACY-VANCOMYCIN DOSING SERVICE
Pharmacy Vancomycin Initial Note  Date of Service 2024  Patient's  2000  23 year old, female    Indication: Skin and Soft Tissue Infection    Current estimated CrCl = Estimated Creatinine Clearance: 17.9 mL/min (A) (based on SCr of 4.33 mg/dL (H)).    Creatinine for last 3 days  2024:  7:09 AM Creatinine 4.15 mg/dL  2024:  5:56 AM Creatinine 4.31 mg/dL  2024:  5:35 AM Creatinine 4.33 mg/dL    Recent Vancomycin Level(s) for last 3 days  No results found for requested labs within last 3 days.      Vancomycin IV Administrations (past 72 hours)        No vancomycin orders with administrations in past 72 hours.                    Nephrotoxins and other renal medications (From now, onward)      Start     Dose/Rate Route Frequency Ordered Stop    24 1830  vancomycin (VANCOCIN) 1,500 mg in 0.9% NaCl 250 mL intermittent infusion         1,500 mg  over 90 Minutes Intravenous ONCE 24 1826      24 1827  vancomycin place abdullahi - receiving intermittent dosing         1 each Intravenous SEE ADMIN INSTRUCTIONS 24 1827      24 1100  bumetanide (BUMEX) 0.25 mg/mL infusion         0.5 mg/hr  2 mL/hr  Intravenous CONTINUOUS 24 1008              Contrast Orders - past 72 hours (72h ago, onward)      Start     Dose/Rate Route Frequency Stop    24 0900  perflutren diluted 1mL to 2mL with saline (OPTISON) diluted injection 6 mL         6 mL Intravenous ONCE                  Plan:  Start vancomycin 1500 mg IV once. Will assess levels for further doses.  Vancomycin monitoring method: Renal Replacement Therapy  Vancomycin therapeutic monitoring goal: 15-20 mg/L  Pharmacy will check vancomycin levels as appropriate in 1-3 Days.    Serum creatinine levels will be ordered daily for the first week of therapy and at least twice weekly for subsequent weeks.      Brenda Marie, PharmD

## 2024-01-11 NOTE — PROGRESS NOTES
Nephrology Progress Note  01/11/2024         Assessment & Recommendations:     Milly Carroll is a 23 year old year old female   Milly Carroll is a 23 year old female admitted on 1/2/2024. 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 She was admitted with chest pain and shortness of breath, found to be COVID positive, w/ severely decreased global cardiac function on echo, and requiring multiple doses of IV diuretics due to volume overload, and having worsening CHRISTA w/ creatinine from 2.48 on admission increased to 4.31 on 1/8/2024. Creatinine has remained elevated at ~4.3 since admission, undergoing HD 1/11.     Recommendations:  - Continue Coreg to 25 mg  - Continue hydralazine to 25 mg TID  - HD today, plan for HD day 2 tomorrow  - Continue Bumex 0.5 mg/hr  - Continue to postpone cyclophosphamide, at a minimum until after 01/12 but consider postponing further due to significantly impaired renal function  - Continue prednisone 60 mg taper (taper plan was 60 mg for 14 days then 02-09-47-20-10 x 14 days each)  - Daily BMP  - Daily standing weights, strict I&Os     #Lupus nephritis, class IV  #SLE  #Chronic kidney disease, stage IV (Cystatin C/creatinine discordance)  # Acute kidney injury  Patient's creatinine was 3.48 on 12/30,decreased to 2.48 on admission at [facility] 1/2/2024  which again raised to 2.77 -> 2.97 on 1/3/2023 and has increased again to 3.21 - 3.97 - 4.17 - 4.31. Does continue to appear hypervolemic on examination. CHRISTA may likely represent poor renal perfusion in the setting of volume overload ,cardiorenal syndrome and diuresis w/ subsequent rapid drop in BP. Significantly elevated BP on presentation with evidence  transient hemolysis and hematuria might also suggest malignant hypertension with fibrinoid necrosis a possibility. Lupus flare less likely given improving complement and dsDNA levels and current treatment with steroids and recent treatment w/ cyclophosphamide.  At this time, given her known COVID infection, there would be significant risk with adding additional immunosuppressants at this time. Has been on hydroxychloroquine in the past, but this is currently held due to QTC prolongation.   Clarified with infusion coordinator that Milly has had 5 cyclophosphamide infusions thus far (08/22, 09/08, 09/22, 10/06, 12/22).     #Hypertension  #Acute on chronic systolic heart failure, stress cardiomyopathy  Most likely cause of her hypertension is volume overload, particularly given hypervolemic appearance on physical examination, significantly elevated BNP from baseline, signs of pulmonary edema on CXR. Notably, her blood pressures improved rapidly following diuresis and administration of her home dose of nifedipine. However, there is concern for correcting her BP too quickly. Given the malignant hypertension she experienced, she will require careful blood pressure to control to avoid hypotension. On discussion with cardiology, there is question of if the precipitant of her systolic heart failure is uncontrolled, severe hypertension. Given normal CK, it does not appear likely that there is a component of myocarditis contributing to her presentation. Repeat echocardiogram on 01/06 was 35-40%.     #. Metabolic acidosis, chronic  Agree with continuing NaHCO3 650 mg TID. She has a chronic metabolic acidosis and is tolerating this well.      #. Chronic macrocytic anemia  Would recommend holding off on starting epo for now, as her HGB has improved from prior. However, would favor starting epo if her HGB is <9.0 or if she remains less than 9.5 on rechecks over the next 1-2 days. Iron studies were obtained, Fe saturation of 40%.  "      Seen and discussed with Dr. Trell Todd MD  [facility]  Department of Internal Medicine   Resident Physician  [id number]  Pager: [phone]     Interval History :   NAEON. Nursing notes reviewed. Patient was being dialysed when we rounded on patient in the morning. She endorsed feeling tired, otherwise denying pain anywhere.     Physical Exam:   I/O last 3 completed shifts:  In: 398 [P.O.:350; I.V.:48]  Out: 2475 [Urine:2475]   BP (!) 150/107   Pulse 90   Temp 98.1  F (36.7  C) (Oral)   Resp 20   Ht 1.575 m (5' 2\")   Wt 61.3 kg (135 lb 2.3 oz)   LMP 12/11/2023 (Approximate)   SpO2 100%   BMI 24.72 kg/m       General: Laying in bed, no acute distress. Interacts appropriately.   HENT: Normocephalic, atraumatic. Pupils reactive to light, EOM grossly intact. Moist mucous membranes.   Cardiovascular: Regular rate and rhythm. Intact DP/PT and bilateral radial pulses.   Respiratory: Crackles on lung exam are significantly improved from prior. No increased work of breathing.   Abdomen: Soft, non-tender, non-distended.    Neurologic: Alert. Oriented. Moves all extremities equally.   Musculoskeletal: RUE w/ trace pitting edema. Trace bilateral lower extremity edema, improved from prior days  Skin: She has some redness of the face on the bilateral cheeks but no clear malar rash. No signs of pallor or cyanosis. Warm and dry.   Psych: Flat affect.     Labs:   All labs reviewed by me  Electrolytes/Renal -   Recent Labs   Lab Test 01/11/24  0408 01/10/24  0818 01/09/24  0535 01/06/24  0638 01/05/24  0612 12/28/23  0623 12/27/23  0001 12/25/23  0442 12/24/23  1605 12/24/23  0943 12/20/23  0415 12/19/23  2335    139 140   < > 139   < > 141 144  --  144   < > 140   POTASSIUM 4.9 4.5 5.1   < > 4.8   < > 5.6* 3.1*  3.1*   < > 3.4   < > 3.6   CHLORIDE 103 103 105   < > 107   < > 109* 110*  --  111*   < > 106   CO2 21* 18* 20*   < > 18*   < > 17* 20*  --  21*   < > 18*   .0* 112.0* " 109.0*   < > 96.8*   < > 75.1* 60.5*  --  58.9*   < > 52.1*   CR 4.29* 4.10* 4.33*   < > 3.49*   < > 2.98* 2.57*  --  2.42*   < > 2.87*   * 80 103*   < > 124*   < > 133* 138*  --  95   < > 144*   BISI 8.5* 9.1 8.8   < > 8.4*   < > 8.6 8.0*  --  8.2*   < > 7.4*   MAG  --   --   --   --   --   --  2.1 1.8  --   --   --  2.0   PHOS  --   --   --   --  6.1*  --   --  3.4  --  4.6*   < > 6.3*    < > = values in this interval not displayed.       CBC -   Recent Labs   Lab Test 01/11/24  0408 01/10/24  0710 01/09/24  0535   WBC 9.8 6.3 5.5   HGB 9.2* 10.1* 9.3*   PLT 65* 63* 67*       LFTs -   Recent Labs   Lab Test 01/11/24  0408 01/10/24  0818 01/09/24  0535   ALKPHOS 109 112 110   BILITOTAL 0.2 0.3 0.2   ALT 48 53* 50   AST 24 27 25   PROTTOTAL 5.4* 5.7* 5.5*   ALBUMIN 3.0* 3.2* 3.1*       Iron Panel -   Recent Labs   Lab Test 01/05/24  0612 12/20/23  0415 12/17/23  0429 07/10/23  1454   IRON 76  --  130 15*   IRONSAT 40  --  74* 9*   GABRIEL 786*   < > 32,775* 1,079*    < > = values in this interval not displayed.         Imaging:  All imaging studies reviewed by me.     Current Medications:   artificial saliva  1 spray Mouth/Throat 4x Daily    carvedilol  25 mg Oral BID    cholecalciferol  10 mcg Oral Daily    vitamin B-12  100 mcg Oral Daily    ferrous sulfate  325 mg Oral Daily with breakfast    folic acid  1 mg Oral Daily    sodium chloride (PF) 0.9%  1.3-2.6 mL Intracatheter Once    Followed by    heparin  3 mL Intracatheter Once    sodium chloride (PF) 0.9%  1.3-2.6 mL Intracatheter Once    Followed by    heparin  3 mL Intracatheter Once    heparin ANTICOAGULANT  5,000 Units Subcutaneous Q12H    hydrALAZINE  25 mg Oral TID    NIFEdipine ER OSMOTIC  30 mg Oral Daily    pantoprazole  40 mg Oral QAM AC    perflutren diluted 1mL to 2mL with saline  6 mL Intravenous Once    [Held by provider] potassium chloride ER  20 mEq Oral BID    predniSONE  60 mg Oral Daily    sodium bicarbonate  650 mg Oral TID    sodium  chloride (PF)  3 mL Intracatheter Q8H    sodium chloride (PF)  9 mL Intracatheter During Dialysis/CRRT (from stock)    sodium chloride (PF)  9 mL Intracatheter During Dialysis/CRRT (from stock)    sulfamethoxazole-trimethoprim  1 tablet Oral Once per day on Monday Wednesday Friday    vancomycin place abdullahi - receiving intermittent dosing  1 each Intravenous See Admin Instructions      bumetanide 0.5 mg/hr (01/11/24 0700)

## 2024-01-11 NOTE — PROGRESS NOTES
Reached out to Attending Nephrology team to start coordinating patient discharge needs as per patient request she would like to transition back to [facility] and live with her aunt.  Family is requesting a nephrologist in [facility] that can manage her Lupus Nephritis safely so they do not need to be transporting her to [facility] for follow up.  Insurance also will not provide transportation so far away.  Message left for Care Coordinator to start working on patient need for a new Nephrologist in [facility] as well as Dialysis unit to manage her acute needs.  Left contact information for timely support.  Oralia Rodriguez RN, BSN, PHN  [facility] Nephrology Nurse Navigator  [phone]

## 2024-01-11 NOTE — PLAN OF CARE
Goal Outcome Evaluation:               Neuro: A&Ox4. Flat affect.   Cardiac: SR 70s-80s. SBP  goal<180 .           Respiratory: Sating >93% on RA.  GI/: good urine output. one medium BM.  Diet/appetite: Tolerating renal diet with 1500ml fluid restriction  Activity:  Independent, up to commode.  Pain: At acceptable level on current regimen.   Skin: Blister on RUE was seen by WOC and derm. Nurse sent a swab down for culture    Plan: dialyze tomorrow

## 2024-01-11 NOTE — PLAN OF CARE
Major Shift Events:      No acute changes this shift, patient denies pain. Sinus rhythm, afebrile, SBP within goal of <180. Team paged about Vanco level and morning labs.     Plan: Continue with POC, plans for HD today    For vital signs and complete assessments, please see documentation flowsheets.

## 2024-01-11 NOTE — PROGRESS NOTES
[facility]    Medicine Progress Note - Hospitalist Service, GOLD TEAM 9    Date of Admission:  1/2/2024    Assessment & Plan   Milly Carroll is a 23 year old female admitted on 1/2/2024. She has a PMHx of lupus C/B lupus flares, lupus cerebritis, lupus nephritis, HTN, chronic systolic heart failure, QT prolongation, past perinephric hematoma s/p emobolization in Aug-2023 and chronic thrombocytopenia admitted to [facility] on 12/17/23 (after presentation for SOB/NSTEMI and pneumonia concern and concern for lupus flare where she was treated w/ steroids and plasma exchange) and transfer to [facility] on 12/18/23 before being transferred to [facility] on 12/22-12/25 for cyclophosphamide infusion. Represented on 12/26 [facility] ED for SOB found to have covid and reduced EF (baseline EF 47%, OSH EF reported as 15-20%) w/ plan to transfer patient to [facility] however patient left A on 12/30 due to prolonged wait for transfer to [facility] ED. On presentation 1/2, patient reported continued worsening CP/SOB since her discharge on 12/30. Chest pain and dyspnea has since improved during her admission.     Currently being managed for acute on chronic heart failure and CHRISTA on CKD, concern for cardiorenal syndrome in the setting of prior lupus nephritis.      Today:   -Plan for dialysis 1/11/2024     Acute on Chronic HF (EF 15-20% on 12/26 OSH)  Dyspnea and Chest Pain - improved   Uncontrolled HTN   Tricuspid insufficiency   Presented with chest pain and dyspnea as above. Concern that hypertensive crisis contributing to chest pain. Echocardiogram on 17-Dec-2023 showed LVEF 35-40%, EF decreased to 15-20% on 12/26 w/ BNP at that time >70,000. Lexiscan 12/21 negative for inducible ischemia. Admission workup 1/2 included trop elevated at 290 with peak at 469 on 1/2. TTE 1/2 with severe diffuse hypokinesis with stably reduced EF. Limited TTE 1/7 with  EF 35-40% and dilated IVC. Cardiology consulted with recs as below.   V/Q scan 1/3 indeterminate, overall lower suspicion of VTE contributing to current presentation.   Attempted diuresis with increased doses of IV bumex doses (up to bumex 4 mg IV x 1) on 1/4-1/6 with minimal urine output and concern for cardiorenal syndrome. Started on bumex drip 1/7 with some improving urine output.   -Gen Cardiology consulted, appreciate recs  -Nephrology consulted, appreciate assistance   -Dialysis 1/10/2024   -Continue bumex drip, started 1/7   -Continue carvedilol   -Continue hydralazine TID   -Continue nifedipine (restarted on 1/7)   -Consider cMRI during admission once fluid status improved     CHRISTA, worsening   Metabolic Acidosis   Recent Lupus Nephritis   Cr up trending this admission, per discussion with cardiology, nephrology, and rheumatology current CHRISTA suspected to be more likely related to cardiorenal syndrome and ongoing volume overload.   -Nephrology consulted, appreciate assistance    -Continue diuresis and blood pressure management as above   -Continue sodium bicarb tabs   -Rheumatology following, appreciate assistance   -Due for 5th dose cyclophosphamide per [facility] protocol 1/5 (ongoing discussion w/ rheum/nephrology prior to administration), holding for now due to COVID and other ongoing management as outlined     R forearm blister  Quarter sized with purulence, sent culture overnight. WOCN to see. Started on vancomycin and will monitor.   -Derm consult placed   -Daily Vaseline and bandage over wound until healed  -HSV and VZV swab of the wound ordered  -Continue to follow cultures    COVID-19 Infection   Tested positive on 12/29 at OSH when presented with chest pain and dyspnea. Concern for superimposed PNA per OSH and was on vanc/cefepime 12/26-12/30 prior to AMA leave. Initially cefepime continued on admission, discontinued on 1/4 due to low suspicion for bacterial pneumonia.  Has remained on RA this  admission. Overall unclear how much COVID-19 contributed to acute presentation.   -Received remdesivir 12/29 and 12/30 prior to leaving AMA, completed 5 day course given high risk (1/5 - 1/7)     Hx of C.diff infxn (6/2023)  -Stopped PO vanc prophylaxis on 1/4 when antibiotics stopped     SLE (+dsDNA, +RNP, +ribosomal P Ab and hypocomplementemia)   Recent Lupus Flare w/ MAHA and renal insuff s/p Cyclophosphamide dose (12/22/23)  Hx of Lupus cerebritis w/ hx of seizures  C/b Lupus Nephritis  Hx medication non-adherence  -Lupus-focused meds:   -Continue prednisone 60mg qday  -PJP ppx: continue PTA bactrim  -GI ppx: continue protonix qday, tums prn  -Cyclophosphamide on hold as above     Hypomagnesemia  Hypokalemia  -Replete per protocol      Chronic RUE edema with weakness   Superficial clot of R cephalic vein  US 8/2023 negative. RUE doppler negative for DVT on 1/2, significant for superficial thrombus. Repeat US RUE 1/7 with no evidence of DVT. Suspect weakness 2/2 swelling, lower concern for alternate etiologies.   -Symptomatic with heat packs    Chronic thrombocytopenia  Chronic Macrocytic Anemia  -Trend CBC daily   -Continue PTA folate/B12 replacement  -DVT ppx: heparin subcutaneous decrease dose per pharmacy in setting of chronic thrombocytopenia and renal dysfunction  -Hematology consulted - hemolysis improving, check labs daily., recommend EPO per nephrology      Hx of Intermittent Headaches  History of being responsive to APAP and dilaudid along w/ treatment of HTN and Lupus. 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 Stools described as normal.      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 No current PTA antiepileptic medications    Chronic Left Shoulder Pain   Unclear etiology, likely MSK pain.   -PRN tylenol or metharbamol; taper PO dilaudid (no clear indication)           Diet: Fluid restriction 1500 ML FLUID  Snacks/Supplements Adult: Other; 8 PM: Peanut butter and strawberry Jelly on white bread sandwich + alternate grapes/orange E/O day on side; Between Meals  Renal Diet (dialysis)    DVT Prophylaxis: Heparin SQ  Leung Catheter: Not present  Lines: PRESENT      CVC Double Lumen Right Internal jugular Tunneled-Site Assessment: WDL except;Drainage      Cardiac Monitoring: None  Code Status: Full Code      Clinically Significant Risk Factors              # Hypoalbuminemia: Lowest albumin = 3 g/dL at 1/11/2024  4:08 AM, will monitor as appropriate   # Thrombocytopenia: Lowest platelets = 63 in last 2 days, will monitor for bleeding  # Acute Kidney Injury, unspecified: based on a >150% or 0.3 mg/dL increase in last creatinine compared to past 90 day average, will monitor renal function   # Acute heart failure with reduced ejection fraction: last echo with EF <40% and receiving IV diuretics           # Financial/Environmental Concerns: none (Per pt she discussed option of applying for disability with her new PCP)         Disposition Plan     Expected Discharge Date: 01/15/2024      Destination: home with family  Discharge Comments: High BPs            Justice Espinoza MD  Hospitalist Service, GOLD TEAM [address] [place] [address]  Securely message with [employer] (more info)  Text page via [facility] Paging/Directory   See signed in provider for up to date coverage information  ______________________________________________________________________    Interval History   Feeling okay this AM. No nausea or vomiting. Line doing okay. No chest pain or SOB. Arm feeling a little better and no sensation or motoric loss. No new rashes.     Physical Exam   Vital  Signs: Temp: 97.7  F (36.5  C) Temp src: Oral BP: (!) 159/107 Pulse: 82   Resp: 18 SpO2: 100 % O2 Device: None (Room air)    Weight: 135 lbs 2.27 oz    Constitutional: NAD, pleasant and cooperative   Cardiovascular: RRR  Respiratory: CTAB  GI: bowel sounds present   Neuro: alert and oriented, no gross focal deficits noted  Skin: No erythema around new line    MSK: RUE with 1+ edema below the elbow, radial pulse +     Medical Decision Making       MANAGEMENT DISCUSSED with the following over the past 24 hours: nursing       Data     I have personally reviewed the following data over the past 24 hrs:    9.8  \   9.2 (L)   / 65 (L)     139 103 120.0 (H) /  101 (H)   4.9 21 (L) 4.29 (H) \     ALT: 48 AST: 24 AP: 109 TBILI: 0.2   ALB: 3.0 (L) TOT PROTEIN: 5.4 (L) LIPASE: N/A

## 2024-01-11 NOTE — PHARMACY-VANCOMYCIN DOSING SERVICE
Pharmacy Vancomycin Note  Date of Service 2024  Patient's  2000   23 year old, female    Indication: Skin and Soft Tissue Infection  Day of Therapy: 2  Current vancomycin regimen:  Intermittent dosing  Current vancomycin monitoring method: Renal Replacement Therapy  Current vancomycin therapeutic monitoring goal: 15-20 mg/L    Current estimated CrCl = Estimated Creatinine Clearance: 17.7 mL/min (A) (based on SCr of 4.29 mg/dL (H)).    Creatinine for last 3 days  2024:  5:35 AM Creatinine 4.33 mg/dL  1/10/2024:  8:18 AM Creatinine 4.10 mg/dL  2024:  4:08 AM Creatinine 4.29 mg/dL    Recent Vancomycin Levels (past 3 days)  1/10/2024:  7:10 AM Vancomycin 41.7 ug/mL  2024:  4:08 AM Vancomycin 33.2 ug/mL    Vancomycin IV Administrations (past 72 hours)                     vancomycin (VANCOCIN) 1,500 mg in 0.9% NaCl 250 mL intermittent infusion (mg) 1,500 mg New Bag 24 1940                    Nephrotoxins and other renal medications (From now, onward)      Start     Dose/Rate Route Frequency Ordered Stop    24 1827  vancomycin place abdullahi - receiving intermittent dosing         1 each Intravenous SEE ADMIN INSTRUCTIONS 24 1827      24 1100  bumetanide (BUMEX) 0.25 mg/mL infusion         0.5 mg/hr  2 mL/hr  Intravenous CONTINUOUS 24 1008                 Contrast Orders - past 72 hours (72h ago, onward)      Start     Dose/Rate Route Frequency Stop    24 0900  perflutren diluted 1mL to 2mL with saline (OPTISON) diluted injection 6 mL         6 mL Intravenous ONCE              Interpretation of levels and current regimen:  Vancomycin level is reflective of supratherapeutic level with Pre-iHD vanco level. Expected post dialysis level assuming a 4 hour run would result in a level of ~23ug/ml which is still supratherapeutic.     Has serum creatinine changed greater than 50% in last 72 hours: No    Urine output:  good urine output    Renal Function: ARF on  Dialysis    Plan:  No need to redose vancomycin post dialysis.   Vancomycin monitoring method: Renal Replacement Therapy  Vancomycin therapeutic monitoring goal: 15-20 mg/L  Pharmacy will check vancomycin levels as appropriate tomorrow morning  Serum creatinine levels will be ordered daily for the first week of therapy and at least twice weekly for subsequent weeks.    Addison Aquino RPH

## 2024-01-11 NOTE — PROGRESS NOTES
Brief Cardiology Progress Note    Patient continuing to have urine output on the bumex drip with addition of metolazone per nephrology. Patient's creatinine is stable.  Patient received a dialysis line yesterday, may be receiving dialysis today.  From a cardiology perspective, patient is already on GDMT with regards to Coreg 25 mg twice daily, as well as hydralazine 25 mg 3 times daily.  Only additional GDMT that can be offered is starting of isosorbide dinitrate with hydralazine to assist with blood pressure control, which can be pursued if desired. Nifedipine can be helpful with regards to overall BP control, but is not part of the heart failure GDMT. Patient's volume status is now to be controlled by temporary dialysis and cares of nephrology. Please make sure patient has follow-up with cardiology as an outpatient upon discharge.      We will be signing off at this time. Please feel free to reach out with any additional questions or concerns.     Guerrero Mcgarry MD  Cardiology Fellow

## 2024-01-11 NOTE — PROGRESS NOTES
Nephrology Progress Note  01/11/2024         Assessment & Recommendations:     Milly Carroll is a 23 year old year old female  Assessment & Plan  Milly Carroll is a 23 year old female admitted on 1/2/2024. 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 She was admitted with chest pain and shortness of breath, found to be COVID positive, w/ severely decreased global cardiac function on echo, and requiring multiple doses of IV diuretics due to volume overload, and having worsening CHRISTA w/ creatinine from 2.48 on admission increased to 4.31 on 1/8/2024. Currently planning for potential dialysis on 01/09 if renal function continues to worsen.     Recommendations:  - Continue Coreg to 25 mg  - Continue hydralazine to 25 mg TID  - Continue Metolazone 5 mg QD  - RRT tomorrow (2/3)  - Continue Bumex 0.5 mg/hr  - Continue to postpone cyclophosphamide, at a minimum until after 01/12 but consider postponing further due to significantly impaired renal function  - Start prednisone 60 mg taper (taper plan was 60 mg for 14 days then 16-42-31-20-10 x 14 days each)  - Daily BMP  - Daily standing weights, strict I&Os     #Lupus nephritis, class IV  #SLE  #Chronic kidney disease, stage IV (Cystatin C/creatinine discordance)  # Acute kidney injury  Patient's creatinine was 3.48 on 12/30,decreased to 2.48 on admission at [facility] 1/2/2024  which again raised to 2.77 -> 2.97 on 1/3/2023 and has increased again to 3.21 - 3.97 - 4.17 - 4.31. Does continue to appear hypervolemic on examination. CHRISTA may likely represent poor renal perfusion in the setting of volume overload ,cardiorenal syndrome and diuresis w/ subsequent rapid drop in BP. Significantly  elevated BP on presentation with evidence transient hemolysis and hematuria might also suggest malignant hypertension with fibrinoid necrosis a possibility. Lupus flare less likely given improving complement and dsDNA levels and current treatment with steroids and recent treatment w/ cyclophosphamide.  At this time, given her known COVID infection, there would be significant risk with adding additional immunosuppressants at this time. Has been on hydroxychloroquine in the past, but this is currently held due to QTC prolongation.   Clarified with infusion coordinator that Milly has had 5 cyclophosphamide infusions thus far (08/22, 09/08, 09/22, 10/06, 12/22).     #Hypertension  #Acute on chronic systolic heart failure, stress cardiomyopathy  Most likely cause of her hypertension is volume overload, particularly given hypervolemic appearance on physical examination, significantly elevated BNP from baseline, signs of pulmonary edema on CXR. Notably, her blood pressures improved rapidly following diuresis and administration of her home dose of nifedipine. However, there is concern for correcting her BP too quickly. Given the malignant hypertension she experienced, she will require careful blood pressure to control to avoid hypotension. On discussion with cardiology, there is question of if the precipitant of her systolic heart failure is uncontrolled, severe hypertension. Given normal CK, it does not appear likely that there is a component of myocarditis contributing to her presentation. Repeat echocardiogram on 01/06 was 35-40%.     #. Metabolic acidosis, chronic  Agree with continuing NaHCO3 650 mg TID. She has a chronic metabolic acidosis and is tolerating this well.      #. Chronic macrocytic anemia  Would recommend holding off on starting epo for now, as her HGB has improved from prior. However, would favor starting epo if her HGB is <9.0 or if she remains less than 9.5 on rechecks over the next 1-2 days. Iron  "studies were obtained, Fe saturation of 40%.     Recommendations were communicated to primary team via [employer]. Discussed w/ patient.     Seen and discussed with Dr. Trell Hartley MD  Nephrology Fellow   7516    Interval History :   Nursing and provider notes from last 24 hours reviewed.  Overnight, no significant events. She did make 2825 mL of urine since midnight. No difficulty breathing. BP uncontrolled. Completed initial HD run well with net UF 1.5L    Physical Exam:   I/O last 3 completed shifts:  In: 1948 [P.O.:400; I.V.:48; Other:1500]  Out: 3575 [Urine:2075; Other:1500]   BP (!) 152/107   Pulse 71   Temp 98.1  F (36.7  C) (Oral)   Resp 17   Ht 1.575 m (5' 2\")   Wt 61.3 kg (135 lb 2.3 oz)   LMP 12/11/2023 (Approximate)   SpO2 100%   BMI 24.72 kg/m       General: Laying in bed, no acute distress. Interacts appropriately.   HENT: Normocephalic, atraumatic. Pupils reactive to light, EOM grossly intact. Moist mucous membranes.   Neck: Elevated JVP.   Cardiovascular: Regular rate and rhythm. Intact DP/PT and bilateral radial pulses.   Respiratory: Crackles on lung exam are significantly improved from prior. No increased work of breathing.   Abdomen: Soft, non-tender, non-distended.    Neurologic: Alert. Oriented. Moves all extremities equally.   Musculoskeletal: RUE w/ 2+ pitting edema. 1+ bilateral lower extremity edema, slightly worse in the right leg.   Skin: She has some redness of the face on the bilateral cheeks but no clear malar rash. No signs of pallor or cyanosis. Warm and dry.   Psych: Flat affect.     Labs:   All labs reviewed by me  Electrolytes/Renal -   Recent Labs   Lab Test 01/11/24  0408 01/10/24  0818 01/09/24  0535 01/06/24  0638 01/05/24  0612 12/28/23  0623 12/27/23  0001 12/25/23  0442 12/24/23  1605 12/24/23  0943 12/20/23  0415 12/19/23  2335    139 140   < > 139   < > 141 144  --  144   < > 140   POTASSIUM 4.9 4.5 5.1   < > 4.8   < > 5.6* 3.1*  3.1*   < > 3.4   " < > 3.6   CHLORIDE 103 103 105   < > 107   < > 109* 110*  --  111*   < > 106   CO2 21* 18* 20*   < > 18*   < > 17* 20*  --  21*   < > 18*   .0* 112.0* 109.0*   < > 96.8*   < > 75.1* 60.5*  --  58.9*   < > 52.1*   CR 4.29* 4.10* 4.33*   < > 3.49*   < > 2.98* 2.57*  --  2.42*   < > 2.87*   * 80 103*   < > 124*   < > 133* 138*  --  95   < > 144*   BISI 8.5* 9.1 8.8   < > 8.4*   < > 8.6 8.0*  --  8.2*   < > 7.4*   MAG  --   --   --   --   --   --  2.1 1.8  --   --   --  2.0   PHOS  --   --   --   --  6.1*  --   --  3.4  --  4.6*   < > 6.3*    < > = values in this interval not displayed.       CBC -   Recent Labs   Lab Test 01/11/24  0408 01/10/24  0710 01/09/24  0535   WBC 9.8 6.3 5.5   HGB 9.2* 10.1* 9.3*   PLT 65* 63* 67*       LFTs -   Recent Labs   Lab Test 01/11/24  0408 01/10/24  0818 01/09/24  0535   ALKPHOS 109 112 110   BILITOTAL 0.2 0.3 0.2   ALT 48 53* 50   AST 24 27 25   PROTTOTAL 5.4* 5.7* 5.5*   ALBUMIN 3.0* 3.2* 3.1*       Iron Panel -   Recent Labs   Lab Test 01/05/24  0612 12/20/23  0415 12/17/23  0429 07/10/23  1454   IRON 76  --  130 15*   IRONSAT 40  --  74* 9*   GABRIEL 786*   < > 32,775* 1,079*    < > = values in this interval not displayed.         Imaging:  All imaging studies reviewed by me.     Current Medications:   artificial saliva  1 spray Mouth/Throat 4x Daily    carvedilol  25 mg Oral BID    cholecalciferol  10 mcg Oral Daily    vitamin B-12  100 mcg Oral Daily    ferrous sulfate  325 mg Oral Daily with breakfast    folic acid  1 mg Oral Daily    sodium chloride (PF) 0.9%  1.3-2.6 mL Intracatheter Once    Followed by    heparin  3 mL Intracatheter Once    sodium chloride (PF) 0.9%  1.3-2.6 mL Intracatheter Once    Followed by    heparin  3 mL Intracatheter Once    heparin ANTICOAGULANT  5,000 Units Subcutaneous Q12H    hydrALAZINE  25 mg Oral TID    NIFEdipine ER OSMOTIC  30 mg Oral Daily    pantoprazole  40 mg Oral QAM AC    perflutren diluted 1mL to 2mL with saline  6 mL  Intravenous Once    [Held by provider] potassium chloride ER  20 mEq Oral BID    predniSONE  60 mg Oral Daily    sodium bicarbonate  650 mg Oral TID    sodium chloride (PF)  3 mL Intracatheter Q8H    sodium chloride (PF)  9 mL Intracatheter During Dialysis/CRRT (from stock)    sodium chloride (PF)  9 mL Intracatheter During Dialysis/CRRT (from stock)    sulfamethoxazole-trimethoprim  1 tablet Oral Once per day on Monday Wednesday Friday    vancomycin place abdullahi - receiving intermittent dosing  1 each Intravenous See Admin Instructions      bumetanide 0.5 mg/hr (01/11/24 0700)

## 2024-01-11 NOTE — PROGRESS NOTES
Date: 1/11/2024  Time: 1215     Data:  Pre Wt:   61.3 kg  Desired Wt:   To be established  Post Wt:  59.8 kg (estimated)  Weight change: - 1.5 kg  Ultrafiltration - Post Run Net Total Removed (mL):  1500 ml  Vascular Access Status: CVC patent  Dialyzer Rinse:  Light  Total Blood Volume Processed: 1.5 L   Total Dialysis (Treatment) Time:   2 Hrs  Dialysate Bath: K 3, Ca 2.25  Heparin: Heparin: None     Lab:   Yes  HbsAg - 1/11/24 (negative)  HbsAb - 1/11/24  (susceptible)      Interventions:  Dialysis done through Right chest catheter   UF set to 1800 Liters, accommodating priming and rinse back volumes  ,   CritLine showed a stable Profile B throughout the run, tolerating UF pull  Treatment has ended safely and  blood is rinsed back completely  Report given to PCN, sent back to [address] [place] [address] room in stable condition     Assessment:  A/O x 4, calm and cooperative, denies pain  Lung sounds clear anterior                Plan:    Per Renal team        MARIO MullinsN, RN  Acute Dialysis RN  [facility]

## 2024-01-12 NOTE — PROGRESS NOTES
Tara working with Concha PURVIS returned call and they have put in referral for Dialysis Unit by [place] and Nephrologist to support her Lupus Nephritis in [place] or [facility] for more timely and local support.    Cecilio and Jaimee- updated family about exploring options for local Nephrologist so patient can return to [place] vs staying locally.  Left voicemail and MyChart message for patient to follow up on discharge planning and need to make appt to get [email or web address] application completed over the phone, while in the hospital.      Oralia Rodriguez RN, BSN, PHN  [facility] Nephrology Nurse Navigator  [phone]

## 2024-01-12 NOTE — PLAN OF CARE
Assumed Care: [id number]  Reason for Admission: Lupus Flare  Procedures: 1/10: Internal Jugular line placement w/IR for Hemodialysis  IV Access/Incisions/Drains/Wounds:   Peripheral IV 01/02/24 Left Upper arm (Active)   Site Assessment WDL 01/12/24 0400   Line Status Infusing 01/12/24 0400   Dressing Transparent 01/12/24 0400   Dressing Status clean;dry;intact 01/11/24 2115   Dressing Intervention New dressing  01/02/24 0758   Line Intervention Flushed 01/08/24 1510   Phlebitis Scale 0-->no symptoms 01/12/24 0400   Infiltration? no 01/12/24 0400   Number of days: 10       Peripheral IV 01/09/24 Anterior;Left Lower forearm (Active)   Site Assessment WDL 01/12/24 0400   Line Status Saline locked 01/12/24 0400   Dressing Transparent 01/12/24 0400   Dressing Status clean;dry;intact 01/11/24 2115   Dressing Intervention New dressing  01/09/24 1905   Line Intervention Flushed 01/11/24 2115   Phlebitis Scale 0-->no symptoms 01/12/24 0400   Infiltration? no 01/11/24 1600   Number of days: 3       Hemodialysis Vascular Access Right Subclavian (Active)   Dressing Status Dried drainage 01/11/24 1600   Number of days:        CVC Double Lumen Right Internal jugular Tunneled (Active)   Site Assessment WDL except;Drainage 01/12/24 0400   Dressing Chlorhexidine disk;Transparent 01/12/24 0400   Dressing Status old drainage 01/11/24 2115   Dressing Intervention dressing reinforced 01/10/24 1600   Dressing Change Due 01/14/24 01/11/24 0400   Line Necessity Yes, meets criteria 01/12/24 0400   Blue - Status heparin locked 01/11/24 1600   Blue - Intervention Cap change 01/10/24 1432   Red - Status heparin locked 01/11/24 1600   Red - Intervention Cap change 01/10/24 1432   CVC Comment HD line 01/11/24 2115   Number of days: 2       CVC Right Subclavian (Active)   Site Assessment WDL except;Drainage 01/12/24 0300   Line Necessity Yes, meets criteria 01/12/24 0300   Number of days:        Wound Arm Other (comment) (Active)   Wound  "Bed Other (Comment) 01/11/24 2130   Drainage Amount Small 01/11/24 2130   Wound Care/Cleansing Normal saline 01/11/24 2130   Dressing Other (Comment) 01/11/24 2130   Dressing Status Dried drainage 01/11/24 2130   Number of days: 10   IVF: Bumex 0.5 mg/hr running at 2 ml/hr  VS: BP (!) 158/110   Pulse 83   Temp 98.3  F (36.8  C) (Oral)   Resp 15   Ht 1.575 m (5' 2\")   Wt 58.9 kg (129 lb 13.6 oz)   LMP 12/11/2023 (Approximate)   SpO2 100%   BMI 23.75 kg/m    Diet: Fluid restriction 1500 ML FLUID w/Renal Diet (dialysis)    Activity: SBA  Pain Management: Denies  Respiratory: RA  GI/: Voiding spontaneously, +BM, +Flatus, -Nausea  Neuro: A&Ox4  Cardiac: No Tele ordered but on Tele currently w/Sinus Rhythm (Systolic >180 and Diastolic >110)  Team: Gold 9  Pertinent Labs/Imaging: Platelets ? and Vanco Trough ?   Shift Updates: Patient was Hypertensive but below paging parameters at 0400 Vitals check. Patient calling appropriately throughout shift to use bedside commode.  Plan: Dialysis today                   "

## 2024-01-12 NOTE — PHARMACY-VANCOMYCIN DOSING SERVICE
Pharmacy Vancomycin Note  Date of Service 2024  Patient's  2000   23 year old, female    Indication: Skin and Soft Tissue Infection  Day of Therapy: 3  Current vancomycin regimen:  Intermittent dosing  Current vancomycin monitoring method: Renal Replacement Therapy  Current vancomycin therapeutic monitoring goal: 15-20 mg/L    Current estimated CrCl = Estimated Creatinine Clearance: 24.5 mL/min (A) (based on SCr of 3.35 mg/dL (H)).    Creatinine for last 3 days  1/10/2024:  8:18 AM Creatinine 4.10 mg/dL  2024:  4:08 AM Creatinine 4.29 mg/dL  2024:  6:03 AM Creatinine 3.35 mg/dL    Recent Vancomycin Levels (past 3 days)  1/10/2024:  7:10 AM Vancomycin 41.7 ug/mL  2024:  4:08 AM Vancomycin 33.2 ug/mL  2024:  6:03 AM Vancomycin 22.8 ug/mL    Vancomycin IV Administrations (past 72 hours)                     vancomycin (VANCOCIN) 1,500 mg in 0.9% NaCl 250 mL intermittent infusion (mg) 1,500 mg New Bag 24 1940                    Nephrotoxins and other renal medications (From now, onward)      Start     Dose/Rate Route Frequency Ordered Stop    24 182  vancomycin place abdullahi - receiving intermittent dosing         1 each Intravenous SEE ADMIN INSTRUCTIONS 24 1827      24 1100  bumetanide (BUMEX) 0.25 mg/mL infusion         0.5 mg/hr  2 mL/hr  Intravenous CONTINUOUS 24 1008                 Contrast Orders - past 72 hours (72h ago, onward)      Start     Dose/Rate Route Frequency Stop    24 0900  perflutren diluted 1mL to 2mL with saline (OPTISON) diluted injection 6 mL         6 mL Intravenous ONCE              Interpretation of levels and current regimen:  Vancomycin level is reflective of supratherapeutic level, post dialysis expected level to be ~16ug/ml    Has serum creatinine changed greater than 50% in last 72 hours: No    Urine output:  good urine output    Renal Function: ARF on Dialysis    Plan:  Give 500mg IV one time at  1800  Vancomycin  monitoring method: Renal Replacement Therapy  Vancomycin therapeutic monitoring goal: 15-20 mg/L  Pharmacy will check vancomycin levels as appropriate in 1-3 Days. Depending on next HD session.  Serum creatinine levels will be ordered daily for the first week of therapy and at least twice weekly for subsequent weeks.    ADDENDUM @ 0969: since cx show NGTD, infection for SSTI is lower so primary team is discontinuing. No dose will be given tonight.     Addison Aquino RPH

## 2024-01-12 NOTE — PROGRESS NOTES
Nephrology Progress Note  01/12/2024         Assessment & Recommendations:   Milly Carroll is a 23 year old female admitted on 1/2/2024. 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 She was admitted with chest pain and shortness of breath, found to be COVID positive s/p tx with Remdesivir, w/ severely decreased global cardiac function on echo, and requiring multiple doses of IV diuretics due to volume overload, and having worsening CHRISTA w/ creatinine from 2.48 on admission increased to 4.31 on 1/8/2024. Fluid status, kidney function and hypertension continued to worsen despite IV diuretics, and increase in antihypertensives. TDC was placed on 1/10 and iHD initiated on 1/11/23. Tolerating iHD.    Recommendations:  - Continue Coreg to 25 mg  - Continue hydralazine to 25 mg TID  - Stop Metolazone   - RRT tomorrow (3/3)  - Stop Bicarb tabs  - Continue Bumex 0.5 mg/hr  - Continue to postpone cyclophosphamide, at a minimum until after 01/12 but consider postponing further due to significantly impaired renal function  - Start prednisone 60 mg taper (taper plan was 60 mg for 14 days then 56-79-17-20-10 x 14 days each)  - Daily BMP  - Daily standing weights, strict I&Os   -  for outpatient dialysis referral in [facility]    #Lupus nephritis, class IV  #SLE  #Chronic kidney disease, stage IV (Cystatin C/creatinine discordance)  # Acute kidney injury  Patient's creatinine was 3.48 on 12/30, decreased to 2.48 on admission at [facility] 1/2/[facility]24  and subsequently raised to 4.2 prior to iHD initiation. CHRISTA in the setting of lupus nephritis may likely represent poor renal perfusion in the setting of volume  overload ,cardiorenal syndrome and diuresis. Lupus flare less likely given improving complement and dsDNA levels and current treatment with steroids and recent treatment w/ cyclophosphamide. At this time, we will hold off on further immunosuppressants. She has began steroid taper.   Clarified with infusion coordinator that Milly has had 5 cyclophosphamide infusions thus far (08/22, 09/08, 09/22, 10/06, 12/22).     #Hypertension  #Acute on chronic systolic heart failure, stress cardiomyopathy  Most likely cause of her hypertension is volume overload, particularly given hypervolemic appearance on physical examination, significantly elevated BNP from baseline, signs of pulmonary edema on CXR. On discussion with cardiology, there is question of if the precipitant of her systolic heart failure is uncontrolled, severe hypertension. Given normal CK, it does not appear likely that there is a component of myocarditis contributing to her presentation. Repeat echocardiogram on 01/06 was 35-40%.     #Metabolic acidosis, chronic  Agree with continuing NaHCO3 650 mg TID. She has a chronic metabolic acidosis and is tolerating this well.      #Chronic macrocytic anemia  Would recommend holding off on starting epo for now, as her HGB has improved from prior. However, would favor starting epo if her HGB is <9.0 or if she remains less than 9.5 on rechecks over the next 1-2 days. Iron studies were obtained, Fe saturation of 40%.     Recommendations were communicated to primary team via [employer]. Discussed w/ patient.     Seen and discussed with Dr. Trell Hartley MD  Nephrology Fellow   0046    Interval History :   Nursing and provider notes from last 24 hours reviewed.  Overnight, no significant events. She did make 2250 mL of urine since midnight. No difficulty breathing. BP uncontrolled. Completed initial HD run well with net UF 1.5L    Physical Exam:   I/O last 3 completed shifts:  In: 2222 [P.O.:720; I.V.:2;  "Other:1500]  Out: 4100 [Urine:2600; Other:1500]   BP (!) 163/102 (BP Location: Left arm)   Pulse 82   Temp 98.4  F (36.9  C) (Oral)   Resp 14   Ht 1.575 m (5' 2\")   Wt 58.9 kg (129 lb 13.6 oz)   LMP 12/11/2023 (Approximate)   SpO2 100%   BMI 23.75 kg/m       General: Laying in bed, no acute distress. Interacts appropriately.   HENT: Normocephalic, atraumatic. Pupils reactive to light, EOM grossly intact. Moist mucous membranes.   Neck: Elevated JVP.   Cardiovascular: Regular rate and rhythm. Intact DP/PT and bilateral radial pulses.   Respiratory: Crackles on lung exam are significantly improved from prior. No increased work of breathing.   Abdomen: Soft, non-tender, non-distended.    Neurologic: Alert. Oriented. Moves all extremities equally.   Musculoskeletal: RUE w/ 2+ pitting edema. 1+ bilateral lower extremity edema, slightly worse in the right leg.   Skin: She has some redness of the face on the bilateral cheeks but no clear malar rash. No signs of pallor or cyanosis. Warm and dry.   Psych: Flat affect.     Labs:   All labs reviewed by me  Electrolytes/Renal -   Recent Labs   Lab Test 01/12/24  0603 01/11/24  0408 01/10/24  0818 01/06/24  0638 01/05/24  0612 12/28/23  0623 12/27/23  0001 12/25/23  0442 12/24/23  1605 12/24/23  0943 12/20/23  0415 12/19/23  2335    139 139   < > 139   < > 141 144  --  144   < > 140   POTASSIUM 4.4 4.9 4.5   < > 4.8   < > 5.6* 3.1*  3.1*   < > 3.4   < > 3.6   CHLORIDE 100 103 103   < > 107   < > 109* 110*  --  111*   < > 106   CO2 22 21* 18*   < > 18*   < > 17* 20*  --  21*   < > 18*   BUN 89.3* 120.0* 112.0*   < > 96.8*   < > 75.1* 60.5*  --  58.9*   < > 52.1*   CR 3.35* 4.29* 4.10*   < > 3.49*   < > 2.98* 2.57*  --  2.42*   < > 2.87*   * 101* 80   < > 124*   < > 133* 138*  --  95   < > 144*   BISI 8.3* 8.5* 9.1   < > 8.4*   < > 8.6 8.0*  --  8.2*   < > 7.4*   MAG  --   --   --   --   --   --  2.1 1.8  --   --   --  2.0   PHOS  --   --   --   --  6.1*  --   " --  3.4  --  4.6*   < > 6.3*    < > = values in this interval not displayed.       CBC -   Recent Labs   Lab Test 01/12/24  0603 01/11/24  0408 01/10/24  0710   WBC 7.6 9.8 6.3   HGB 9.7* 9.2* 10.1*   PLT 62* 65* 63*       LFTs -   Recent Labs   Lab Test 01/12/24  0603 01/11/24  0408 01/10/24  0818   ALKPHOS 113 109 112   BILITOTAL 0.3 0.2 0.3   ALT 42 48 53*   AST 22 24 27   PROTTOTAL 5.3* 5.4* 5.7*   ALBUMIN 2.9* 3.0* 3.2*       Iron Panel -   Recent Labs   Lab Test 01/05/24  0612 12/20/23  0415 12/17/23  0429 07/10/23  1454   IRON 76  --  130 15*   IRONSAT 40  --  74* 9*   GABRIEL 786*   < > 32,775* 1,079*    < > = values in this interval not displayed.         Imaging:  All imaging studies reviewed by me.     Current Medications:   artificial saliva  1 spray Mouth/Throat 4x Daily    carvedilol  25 mg Oral BID    cholecalciferol  10 mcg Oral Daily    vitamin B-12  100 mcg Oral Daily    ferrous sulfate  325 mg Oral Daily with breakfast    folic acid  1 mg Oral Daily    sodium chloride (PF) 0.9%  1.3-2.6 mL Intracatheter Once    Followed by    heparin  3 mL Intracatheter Once    sodium chloride (PF) 0.9%  1.3-2.6 mL Intracatheter Once    Followed by    heparin  3 mL Intracatheter Once    heparin ANTICOAGULANT  5,000 Units Subcutaneous Q12H    hydrALAZINE  25 mg Oral TID    NIFEdipine ER OSMOTIC  30 mg Oral Daily    - MEDICATION INSTRUCTIONS -   Does not apply Once    pantoprazole  40 mg Oral QAM AC    perflutren diluted 1mL to 2mL with saline  6 mL Intravenous Once    [Held by provider] potassium chloride ER  20 mEq Oral BID    predniSONE  60 mg Oral Daily    sodium bicarbonate  650 mg Oral TID    sodium chloride (PF)  3 mL Intracatheter Q8H    sodium chloride (PF)  9 mL Intracatheter During Dialysis/CRRT (from stock)    sodium chloride (PF)  9 mL Intracatheter During Dialysis/CRRT (from stock)    sodium chloride 0.9%  250 mL Intravenous Once in dialysis/CRRT    sodium chloride 0.9%  300 mL Hemodialysis Machine Once     sulfamethoxazole-trimethoprim  1 tablet Oral Once per day on Monday Wednesday Friday      bumetanide 0.5 mg/hr (01/11/24 5402)

## 2024-01-12 NOTE — PROGRESS NOTES
CHW started OP dialysis referral with [facility].    RNYANIV Oro notified CHW she got a VM from vasculitis provider Oralia ([phone])- pt needs either a nephrologist that specializes in Lupus or to see an OP Lupus provider.   CHW called provider Oralia and gave her an update that OP dialysis referral was sent.      Pt was not in room so CHW did not speak to her today about her preference for OP dialysis schedule or OP Lupus clinic/provider.     Unit and chair time pending acceptance:  [facility]  [address] [place] [address]  Ph [phone]  F [phone]  CHW called unit and asked about availability- TTS is available, and they asked for how long pt runs and catheter type. CHW hand faxed nephrologist note and dialysis access note 12:36pm- sent.     [facility] Admissions: Ph [phone] F [phone]  CHW hand faxed referral. Sent all documents and labs except Hep B Core Heidi. CHW paged provider Justice Espinoza asking to order Hep B Core Heidi lab.     Dialysis units closest to pt's address:  [facility], [address], [place] [address] (8 minutes away) - by far the closest  [facility]- 38 minutes away  [facility]- 52 minutes away      Tara Doherty   6A/B/C Community Health Worker   Phone: [phone]

## 2024-01-12 NOTE — PROGRESS NOTES
Dermatology Brief Progress Note    S: Patient has no new concerns with right arm blister, which has been healing over time.     O:    HSV, VZV swab negative  Bacterial culture: no growth to date    Healing erosion along the left arm, small vesicles in proximal aspect that are stable in size and shape compared to prior exam          A&P:    # Healing bulla on the right arm  Suspect irritant contact dermatitis vs allergic contact dermatitis. Viral/bacterial workup above negative, which is reassuring. Given stability of rash overall, recommend wound care follow-up.   - Wound care per WOC team    Dermatology will sign off. Please do not hesitate to contact our service if you have any questions or concerns.    Kenji Lorenzo MD  Internal Medicine - Dermatology (PGY-3)     Staff Physician Comments:  I discussed and evaluated the patient with the resident and I agree with the assessment and plan as documented in the resident's note.    James Tee MD  Professor  Head of Dermato-Allergy Division  Department of Dermatology  [facility]

## 2024-01-12 NOTE — PROGRESS NOTES
[facility]    Medicine Progress Note - Hospitalist Service, GOLD TEAM 9    Date of Admission:  1/2/2024    Assessment & Plan   Milly Carroll is a 23 year old female admitted on 1/2/2024. She has a PMHx of lupus C/B lupus flares, lupus cerebritis, lupus nephritis, HTN, chronic systolic heart failure, QT prolongation, past perinephric hematoma s/p emobolization in Aug-2023 and chronic thrombocytopenia admitted to [facility] on 12/17/23 (after presentation for SOB/NSTEMI and pneumonia concern and concern for lupus flare where she was treated w/ steroids and plasma exchange) and transfer to [facility] on 12/18/23 before being transferred to [facility] on 12/22-12/25 for cyclophosphamide infusion. Represented on 12/26 [facility] ED for SOB found to have covid and reduced EF (baseline EF 47%, OSH EF reported as 15-20%) w/ plan to transfer patient to [facility] however patient left A on 12/30 due to prolonged wait for transfer to [facility] ED. On presentation 1/2, patient reported continued worsening CP/SOB since her discharge on 12/30. Chest pain and dyspnea has since improved during her admission.     Currently being managed for acute on chronic heart failure and CHRISTA on CKD, concern for cardiorenal syndrome in the setting of prior lupus nephritis.      Acute on Chronic HF (EF 15-20% on 12/26 OSH)  Dyspnea and Chest Pain - improved   Uncontrolled HTN   Tricuspid insufficiency   Presented with chest pain and dyspnea as above. Concern that hypertensive crisis contributing to chest pain. Echocardiogram on 17-Dec-2023 showed LVEF 35-40%, EF decreased to 15-20% on 12/26 w/ BNP at that time >70,000. Lexiscan 12/21 negative for inducible ischemia. Admission workup 1/2 included trop elevated at 290 with peak at 469 on 1/2. TTE 1/2 with severe diffuse hypokinesis with stably reduced EF. Limited TTE 1/7 with EF 35-40% and dilated IVC. Cardiology  consulted with recs as below.   V/Q scan 1/3 indeterminate, overall lower suspicion of VTE contributing to current presentation.   Attempted diuresis with increased doses of IV bumex doses (up to bumex 4 mg IV x 1) on 1/4-1/6 with minimal urine output and concern for cardiorenal syndrome.   -Gen Cardiology consulted, appreciate recs  -Nephrology consulted, appreciate assistance   -Dialysis run 1/11/24    -Continue bumex drip, started 1/7   -Continue carvedilol   -Continue hydralazine TID   -Continue nifedipine (restarted on 1/7)   -Consider cMRI during admission once fluid status improved     CHRISTA, improving   Metabolic Acidosis, resolved  Recent Lupus Nephritis   Cr up trending this admission, per discussion with cardiology, nephrology, and rheumatology current CHRISTA suspected to be more likely related to cardiorenal syndrome and ongoing volume overload.   -Nephrology consulted, appreciate assistance    -Continue diuresis and blood pressure management as above   -Stop Bicarb tabs  -Rheumatology following, appreciate assistance   -Due for 5th dose cyclophosphamide per [facility] protocol 1/5 (ongoing discussion w/ rheum/nephrology prior to administration), holding for now due to COVID and other ongoing management as outlined     R forearm blister  Quarter sized with purulence, sent culture overnight. WOCN to see.  -Derm consult placed   -Daily Vaseline and bandage over wound until healed  -HSV and VZV swab of the wound ordered  -Continue to follow cultures  -NGTD on culture, will hold off on further vancomycin at this time    COVID-19 Infection   Tested positive on 12/29 at OSH when presented with chest pain and dyspnea. Concern for superimposed PNA per OSH and was on vanc/cefepime 12/26-12/30 prior to AMA leave. Initially cefepime continued on admission, discontinued on 1/4 due to low suspicion for bacterial pneumonia.  Has remained on RA this admission. Overall unclear how much COVID-19 contributed to acute  presentation.   -Received remdesivir 12/29 and 12/30 prior to leaving [facility], completed 5 day course given high risk (1/5 - 1/7)     Hx of C.diff infxn (6/2023)  -Stopped PO vanc prophylaxis on 1/4 when antibiotics stopped     SLE (+dsDNA, +RNP, +ribosomal P Ab and hypocomplementemia)   Recent Lupus Flare w/ MAHA and renal insuff s/p Cyclophosphamide dose (12/22/23)  Hx of Lupus cerebritis w/ hx of seizures  C/b Lupus Nephritis  Hx medication non-adherence  -Lupus-focused meds:   -Continue prednisone 60mg qday  -PJP ppx: continue PTA bactrim  -GI ppx: continue protonix qday, tums prn  -Cyclophosphamide on hold as above     Hypomagnesemia  Hypokalemia  -Replete per protocol      Chronic RUE edema with weakness   Superficial clot of R cephalic vein  US 8/2023 negative. RUE doppler negative for DVT on 1/2, significant for superficial thrombus. Repeat US RUE 1/7 with no evidence of DVT. Suspect weakness 2/2 swelling, lower concern for alternate etiologies.   -Symptomatic with heat packs    Chronic thrombocytopenia  Chronic Macrocytic Anemia  -Trend CBC daily   -Continue PTA folate/B12 replacement  -DVT ppx: heparin subcutaneous decrease dose per pharmacy in setting of chronic thrombocytopenia and renal dysfunction  -Hematology consulted - hemolysis improving, check labs daily., recommend EPO per nephrology      Hx of Intermittent Headaches  History of being responsive to APAP and dilaudid along w/ treatment of HTN and Lupus. 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 Stools described as normal.      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 No current PTA antiepileptic  medications    Chronic Left Shoulder Pain, improved   Unclear etiology, likely MSK pain.   -PRN tylenol or metharbamol; taper PO dilaudid (no clear indication)           Diet: Fluid restriction 1500 ML FLUID  Snacks/Supplements Adult: Other; 8 PM: Peanut butter and strawberry Jelly on white bread sandwich + alternate grapes/orange E/O day on side; Between Meals  Renal Diet (dialysis)    DVT Prophylaxis: Heparin SQ  Leung Catheter: Not present  Lines: PRESENT      CVC Double Lumen Right Internal jugular Tunneled-Site Assessment: WDL except;Drainage  CVC Right Subclavian-Site Assessment: WDL except;Drainage (dried drainage)      Cardiac Monitoring: None  Code Status: Full Code      Clinically Significant Risk Factors              # Hypoalbuminemia: Lowest albumin = 2.9 g/dL at 1/12/2024  6:03 AM, will monitor as appropriate   # Thrombocytopenia: Lowest platelets = 62 in last 2 days, will monitor for bleeding    # Acute heart failure with reduced ejection fraction: last echo with EF <40% and receiving IV diuretics           # Financial/Environmental Concerns: none (Per pt she discussed option of applying for disability with her new PCP)         Disposition Plan      Expected Discharge Date: 01/16/2024      Destination: home with family  Discharge Comments: High BPs, new HD            Justice Espinoza MD  Hospitalist Service, GOLD TEAM 9  [facility]  Securely message with [employer] (more info)  Text page via [facility] Paging/Directory   See signed in provider for up to date coverage information  ______________________________________________________________________    Interval History   Feels about the same this AM. Line is okay. No chest pain or SOB. No nausea or vomiting. Arm improving and no new motoric or sensation loss. No fevers. No new rashes. Feels like leg swelling has gone down    Physical Exam   Vital Signs: Temp: 98.4  F (36.9  C) Temp src: Oral BP: (!) 163/102 Pulse: 82    Resp: 14 SpO2: 100 % O2 Device: None (Room air)    Weight: 129 lbs 13.62 oz    Constitutional: NAD, pleasant and cooperative   Cardiovascular: RRR  Respiratory: CTAB  GI: bowel sounds present throughout   Neuro: alert and oriented, no gross focal deficits noted  Skin: No erythema around new line    MSK: RUE with 1+ edema below the elbow, radial pulse +        Medical Decision Making       MANAGEMENT DISCUSSED with the following over the past 24 hours: pharmacy       Data     I have personally reviewed the following data over the past 24 hrs:    7.6  \   9.7 (L)   / 62 (L)     138 100 89.3 (H) /  122 (H)   4.4 22 3.35 (H) \     ALT: 42 AST: 22 AP: 113 TBILI: 0.3   ALB: 2.9 (L) TOT PROTEIN: 5.3 (L) LIPASE: N/A

## 2024-01-12 NOTE — PLAN OF CARE
Neuro: A&Ox4. Intermittent headache pain relieved with pain medication. No numbness or tingling noted. RT arm slightly more swollen than left, MD aware.   Cardiac:  Hypertensive episode during HD,  HD nurse gave PRN hydralazine per order. Patient tolerated well. BP below 180 when return to floor.   Respiratory: Sating > 95% on RA.  GI/: Adequate urine output. BM X1. Strict I/O   Diet/appetite: Tolerating Renal diet. Eating well.  Activity: Independent in room , up to chair and in halls.  Pain: At acceptable level on current regimen.   Skin: No new deficits noted.  LDA's:  RT CVC for HD  2 Left PIV     Plan: Continue with POC. Notify primary team with changes.

## 2024-01-12 NOTE — PLAN OF CARE
Goal Outcome Evaluation:         Neuro: A&Ox4. Flat affect.   Cardiac: SR 70s-80s. SBP  goal<180 .           Respiratory: Sating >93% on RA.  GI/: good urine output. one medium BM.  Diet/appetite: Tolerating renal diet with 1500ml fluid restriction  Activity:  Independent/SBA, up to commode.  Pain: At acceptable level on current regimen. Tylenol givenx1 for headache   Skin: right arm swollen- elevated with pillows        Plan: will most likely dialyze tomorrow. 1.5 L taken off today

## 2024-01-12 NOTE — PROGRESS NOTES
HEMODIALYSIS TREATMENT NOTE    Date: 1/12/2024  Time: 4:55 PM    Data:  Pre Wt: 58.5kg  Desired Wt:  55.5 kg   Post Wt:  3.0  Weight change:3.0kg  Ultrafiltration - Post Run Net Total Removed (mL): 3000 mL  Vascular Access Status: patent  Dialyzer Rinse: Streaked  Total Blood Volume Processed: 68.20 L Liters  Total Dialysis (Treatment) Time: 3.0 Hours  K2Ca3  CVC used w Good Flows   Lab:   Yes    Interventions:  K2Ca3  CVC dressing changed  Tylenol prn   PRN Anti-HTN admin post tx per BP     3Hr and 3L UF removal, pt present w uncontrolled HTN- Provider aware and mgmt w increasing UF goal.   Assessment:  A&Ox4. HR-RRR. 20rpm. Lung sounds diminished ant & post BUL/BLL, Edema present in BLE.BUE, face- mgmt w UF pull. B/t+. Pt c/o HA- PRN Tylenol Admin. Pt c/o tenderness and soreness at CVC, CVC dressing changed. CVC C,D,&I- No s/s of infection. CVC Caps changed and cvc locked w saline       Plan:    Per renal and pcn

## 2024-01-13 NOTE — PROGRESS NOTES
Nephrology Progress Note  01/13/2024         Assessment & Recommendations:   Milly Carroll is a 23 year old female admitted on 1/2/2024. 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 She was admitted with chest pain and shortness of breath, found to be COVID positive s/p tx with Remdesivir, w/ severely decreased global cardiac function on echo, and requiring multiple doses of IV diuretics due to volume overload, and having worsening CHRISTA w/ creatinine from 2.48 on admission increased to 4.31 on 1/8/2024. Fluid status, kidney function and hypertension continued to worsen despite IV diuretics, and increase in antihypertensives. TDC was placed on 1/10 and iHD initiated on 1/11/23. Tolerating iHD.    Recommendations:  - Continue Coreg to 25 mg  - Continue hydralazine to 25 mg TID  - Stop Metolazone   - RRT today (3/3)  - Next iHD for Tuesday 1/16/23  - Stop Bicarb tabs  - Stop Bumex 0.5 mg/hr  - Continue to postpone cyclophosphamide, at a minimum until after 01/12 but consider postponing further due to significantly impaired renal function  - Start prednisone 60 mg taper (taper plan was 60 mg for 14 days then [id number] x 14 days each)  - Daily BMP  - Daily standing weights, strict I&Os   - SW for outpatient dialysis referral in [facility]    #Lupus nephritis, class IV  #SLE  #Chronic kidney disease, stage IV (Cystatin C/creatinine discordance)  # Acute kidney injury  Patient's creatinine was 3.48 on 12/30, decreased to 2.48 on admission at [facility] 1/2/2024  and subsequently raised to 4.2 prior to iHD initiation. CHRISTA in the setting of lupus nephritis may likely represent poor renal perfusion  in the setting of volume overload ,cardiorenal syndrome and diuresis. Lupus flare less likely given improving complement and dsDNA levels and current treatment with steroids and recent treatment w/ cyclophosphamide. At this time, we will hold off on further immunosuppressants. She has began steroid taper.   Clarified with infusion coordinator that Milly has had 5 cyclophosphamide infusions thus far (08/22, 09/08, 09/22, 10/06, 12/22).     #Hypertension  #Acute on chronic systolic heart failure, stress cardiomyopathy  Most likely cause of her hypertension is volume overload, particularly given hypervolemic appearance on physical examination, significantly elevated BNP from baseline, signs of pulmonary edema on CXR. On discussion with cardiology, there is question of if the precipitant of her systolic heart failure is uncontrolled, severe hypertension. Given normal CK, it does not appear likely that there is a component of myocarditis contributing to her presentation. Repeat echocardiogram on 01/06 was 35-40%.     #Metabolic acidosis, chronic  Agree with continuing NaHCO3 650 mg TID. She has a chronic metabolic acidosis and is tolerating this well.      #Chronic macrocytic anemia  Would recommend holding off on starting epo for now, as her HGB has improved from prior. However, would favor starting epo if her HGB is <9.0 or if she remains less than 9.5 on rechecks over the next 1-2 days. Iron studies were obtained, Fe saturation of 40%.     Recommendations were communicated to primary team via [facility]. Discussed w/ patient.     Seen and discussed with Dr. Trell Hartley MD  Nephrology Fellow   [id number]    Interval History :   Nursing and provider notes from last 24 hours reviewed.  Overnight, no significant events. She did make 4600 mL of urine since midnight. No difficulty breathing. Better appetite. Feels less foggy. BP uncontrolled but improved. Completed HD run #2 well with net UF ~3L    Physical Exam:   I/O  "last 3 completed shifts:  In: 550 [P.O.:550]  Out: 7050 [Urine:4050; Other:3000]   BP (!) 172/107 (BP Location: Left arm)   Pulse 68   Temp 98  F (36.7  C) (Oral)   Resp 18   Ht 1.575 m (5' 2\")   Wt 55.4 kg (122 lb 2.2 oz)   LMP 12/11/2023 (Approximate)   SpO2 100%   BMI 22.34 kg/m       General: Laying in bed, no acute distress. Interacts appropriately.   HENT: Normocephalic, atraumatic.   Cardiovascular: Regular rate and rhythm.  Respiratory: Crackles on lung exam are significantly improved from prior. No increased work of breathing.   Abdomen: Soft, non-tender, non-distended.    Neurologic: Alert. Oriented. Moves all extremities equally.   Musculoskeletal: RUE w/ 2+ pitting edema. 1+ bilateral lower extremity edema  Skin: She has some redness of the face on the bilateral cheeks but no clear malar rash. No signs of pallor or cyanosis. Warm and dry.   Psych: Flat affect, slightly improved     Labs:   All labs reviewed by me  Electrolytes/Renal -   Recent Labs   Lab Test 01/13/24  0608 01/12/24  0603 01/11/24  0408 01/06/24  0638 01/05/24  0612 12/28/23  0623 12/27/23  0001 12/25/23  0442 12/24/23  1605 12/24/23  0943 12/20/23  0415 12/19/23  2335    138 139   < > 139   < > 141 144  --  144   < > 140   POTASSIUM 3.8 4.4 4.9   < > 4.8   < > 5.6* 3.1*  3.1*   < > 3.4   < > 3.6   CHLORIDE 100 100 103   < > 107   < > 109* 110*  --  111*   < > 106   CO2 23 22 21*   < > 18*   < > 17* 20*  --  21*   < > 18*   BUN 49.9* 89.3* 120.0*   < > 96.8*   < > 75.1* 60.5*  --  58.9*   < > 52.1*   CR 2.38* 3.35* 4.29*   < > 3.49*   < > 2.98* 2.57*  --  2.42*   < > 2.87*   * 122* 101*   < > 124*   < > 133* 138*  --  95   < > 144*   BISI 8.7 8.3* 8.5*   < > 8.4*   < > 8.6 8.0*  --  8.2*   < > 7.4*   MAG  --   --   --   --   --   --  2.1 1.8  --   --   --  2.0   PHOS  --   --   --   --  6.1*  --   --  3.4  --  4.6*   < > 6.3*    < > = values in this interval not displayed.       CBC -   Recent Labs   Lab Test " 01/13/24  0608 01/12/24  0603 01/11/24  0408   WBC 5.8 7.6 9.8   HGB 10.2* 9.7* 9.2*   PLT 61* 62* 65*       LFTs -   Recent Labs   Lab Test 01/13/24  0608 01/12/24  0603 01/11/24  0408   ALKPHOS 108 113 109   BILITOTAL 0.3 0.3 0.2   ALT 44 42 48   AST 25 22 24   PROTTOTAL 5.6* 5.3* 5.4*   ALBUMIN 3.2* 2.9* 3.0*       Iron Panel -   Recent Labs   Lab Test 01/05/24  0612 12/20/23  0415 12/17/23  0429 07/10/23  1454   IRON 76  --  130 15*   IRONSAT 40  --  74* 9*   GABRIEL 786*   < > 32,775* 1,079*    < > = values in this interval not displayed.         Imaging:  All imaging studies reviewed by me.     Current Medications:   artificial saliva  1 spray Mouth/Throat 4x Daily    carvedilol  25 mg Oral BID    cholecalciferol  10 mcg Oral Daily    vitamin B-12  100 mcg Oral Daily    ferrous sulfate  325 mg Oral Daily with breakfast    folic acid  1 mg Oral Daily    sodium chloride (PF) 0.9%  1.3-2.6 mL Intracatheter Once    Followed by    heparin  3 mL Intracatheter Once    sodium chloride (PF) 0.9%  1.3-2.6 mL Intracatheter Once    Followed by    heparin  3 mL Intracatheter Once    heparin ANTICOAGULANT  5,000 Units Subcutaneous Q12H    hydrALAZINE  25 mg Oral TID    NIFEdipine ER OSMOTIC  30 mg Oral Daily    - MEDICATION INSTRUCTIONS -   Does not apply Once    pantoprazole  40 mg Oral QAM AC    perflutren diluted 1mL to 2mL with saline  6 mL Intravenous Once    [Held by provider] potassium chloride ER  20 mEq Oral BID    predniSONE  60 mg Oral Daily    sodium chloride (PF)  3 mL Intracatheter Q8H    sodium chloride (PF)  9 mL Intracatheter During Dialysis/CRRT (from stock)    sodium chloride (PF)  9 mL Intracatheter During Dialysis/CRRT (from stock)    sodium chloride 0.9%  250 mL Intravenous Once in dialysis/CRRT    sodium chloride 0.9%  300 mL Hemodialysis Machine Once    sulfamethoxazole-trimethoprim  1 tablet Oral Once per day on Monday Wednesday Friday      bumetanide 0.5 mg/hr (01/12/24 7781)

## 2024-01-13 NOTE — PLAN OF CARE
Neuro: A&Ox4.  No headaches or numbness or tingling noted. Swollen in RUE noted  Cardiac: No tele order BP < 180 no prn hydralazine given.  Generalized edema noted.   Respiratory: Sating > 95% on RA. No SOB reported.   GI/: Adequate urine output. HD today removed 3L of fluid today, patient tolerated session well.  No Hypertensive episode noted.   Diet/appetite: Tolerating Renal diet. Eating well.  Activity: Independent in room, up to chair and in halls.  Pain: At acceptable level on current regimen.   Skin: No new deficits noted.  LDA's:  RT CVC for HD, 2 LT PIV SL.    Event   Discontinue bumex drip per MD   Next HD session on Monday or Tuesday.     Plan: Continue with POC. Notify primary team with changes.

## 2024-01-13 NOTE — PROGRESS NOTES
[facility]    Medicine Progress Note - Hospitalist Service, GOLD TEAM 9    Date of Admission:  1/2/2024    Assessment & Plan   Milly Carroll is a 23 year old female admitted on 1/2/2024. She has a PMHx of lupus C/B lupus flares, lupus cerebritis, lupus nephritis, HTN, chronic systolic heart failure, QT prolongation, past perinephric hematoma s/p emobolization in Aug-2023 and chronic thrombocytopenia admitted to [facility] on 12/17/23 (after presentation for SOB/NSTEMI and pneumonia concern and concern for lupus flare where she was treated w/ steroids and plasma exchange) and transfer to [facility] on 12/18/23 before being transferred to [facility] on 12/22-12/25 for cyclophosphamide infusion. Represented on 12/26 [facility] ED for SOB found to have covid and reduced EF (baseline EF 47%, OSH EF reported as 15-20%) w/ plan to transfer patient to [facility] however patient left A on 12/30 due to prolonged wait for transfer to [facility] ED. On presentation 1/2, patient reported continued worsening CP/SOB since her discharge on 12/30. Chest pain and dyspnea has since improved during her admission.     Currently being managed for acute on chronic heart failure and CHRISTA on CKD, concern for cardiorenal syndrome in the setting of prior lupus nephritis.      Acute on Chronic HF (EF 15-20% on 12/26 OSH)  Dyspnea and Chest Pain - improved   Uncontrolled HTN   Tricuspid insufficiency   Presented with chest pain and dyspnea as above. Concern that hypertensive crisis contributing to chest pain. Echocardiogram on 17-Dec-2023 showed LVEF 35-40%, EF decreased to 15-20% on 12/26 w/ BNP at that time >70,000. Lexiscan 12/21 negative for inducible ischemia. Admission workup 1/2 included trop elevated at 290 with peak at 469 on 1/2. TTE 1/2 with severe diffuse hypokinesis with stably reduced EF. Limited TTE 1/7 with EF 35-40% and dilated IVC. Cardiology  consulted with recs as below.   V/Q scan 1/3 indeterminate, overall lower suspicion of VTE contributing to current presentation.   Attempted diuresis with increased doses of IV bumex doses (up to bumex 4 mg IV x 1) on 1/4-1/6 with minimal urine output and concern for cardiorenal syndrome.   -Gen Cardiology consulted, appreciate recs  -Nephrology consulted, appreciate assistance   -Dialysis runs per nephrology  -Stopping bumex 1/13/2024 (started 1/7)  -Continue carvedilol   -Continue hydralazine TID   -Continue nifedipine (restarted on 1/7)   -Consider cMRI during admission once fluid status improved     CHRISTA, improving   Metabolic Acidosis, resolved  Recent Lupus Nephritis   Cr up trending this admission, per discussion with cardiology, nephrology, and rheumatology current CHRISTA suspected to be more likely related to cardiorenal syndrome and ongoing volume overload.   -Nephrology consulted, appreciate assistance    -Continue diuresis and blood pressure management as above   -Stopped Bicarb tabs  -Rheumatology following, appreciate assistance   -Due for 5th dose cyclophosphamide per EuroLupus protocol 1/5 (ongoing discussion w/ rheum/nephrology prior to administration), holding for now due to COVID and other ongoing management as outlined     R forearm blister  Quarter sized with purulence, sent culture overnight. WOCN to see.  -Derm consult placed   -Daily Vaseline and bandage over wound until healed  -NGTD on culture, will hold off on further vancomycin at this time    COVID-19 Infection   Tested positive on 12/29 at OSH when presented with chest pain and dyspnea. Concern for superimposed PNA per OSH and was on vanc/cefepime 12/26-12/30 prior to AMA leave. Initially cefepime continued on admission, discontinued on 1/4 due to low suspicion for bacterial pneumonia.  Has remained on RA this admission. Overall unclear how much COVID-19 contributed to acute presentation.   -Received remdesivir 12/29 and 12/30 prior to  leaving AMA, completed 5 day course given high risk (1/5 - 1/7)     Hx of C.diff infxn (6/2023)  -Stopped PO vanc prophylaxis on 1/4 when antibiotics stopped     SLE (+dsDNA, +RNP, +ribosomal P Ab and hypocomplementemia)   Recent Lupus Flare w/ MAHA and renal insuff s/p Cyclophosphamide dose (12/22/23)  Hx of Lupus cerebritis w/ hx of seizures  C/b Lupus Nephritis  Hx medication non-adherence  -Lupus-focused meds:   -Continue prednisone 60mg qday  -PJP ppx: continue PTA bactrim  -GI ppx: continue protonix qday, tums prn  -Cyclophosphamide on hold as above     Hypomagnesemia  Hypokalemia  -Replete per protocol      Chronic RUE edema with weakness   Superficial clot of R cephalic vein  US 8/2023 negative. RUE doppler negative for DVT on 1/2, significant for superficial thrombus. Repeat US RUE 1/7 with no evidence of DVT. Suspect weakness 2/2 swelling, lower concern for alternate etiologies.   -Symptomatic with heat packs    Chronic thrombocytopenia  Chronic Macrocytic Anemia  -Trend CBC daily   -Continue PTA folate/B12 replacement  -DVT ppx: heparin subcutaneous decrease dose per pharmacy in setting of chronic thrombocytopenia and renal dysfunction  -Hematology consulted - hemolysis improving, check labs daily., recommend EPO per nephrology      Hx of Intermittent Headaches  History of being responsive to APAP and dilaudid along w/ treatment of HTN and Lupus. 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 Stools described as normal.      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 No current PTA antiepileptic medications    Chronic Left Shoulder Pain, improved   Unclear etiology,  likely MSK pain.   -PRN tylenol or metharbamol; taper PO dilaudid (no clear indication)           Diet: Fluid restriction 1500 ML FLUID  Snacks/Supplements Adult: Other; 8 PM: Peanut butter and strawberry Jelly on white bread sandwich + alternate grapes/orange E/O day on side; Between Meals  Renal Diet (dialysis)    DVT Prophylaxis: Heparin SQ  Leung Catheter: Not present  Lines: PRESENT      CVC Double Lumen Right Internal jugular Tunneled-Site Assessment: WDL  CVC Right Subclavian-Site Assessment: WDL except;Drainage (old)  Hemodialysis Vascular Access Right Subclavian-Site Assessment: WDL      Cardiac Monitoring: None  Code Status: Full Code      Clinically Significant Risk Factors              # Hypoalbuminemia: Lowest albumin = 2.9 g/dL at 1/12/2024  6:03 AM, will monitor as appropriate   # Thrombocytopenia: Lowest platelets = 61 in last 2 days, will monitor for bleeding    # Acute heart failure with reduced ejection fraction: last echo with EF <40% and receiving IV diuretics           # Financial/Environmental Concerns: none (Per pt she discussed option of applying for disability with her new PCP)         Disposition Plan      Expected Discharge Date: 01/16/2024      Destination: home with family  Discharge Comments: High BPs, new HD            Justice Espinoza MD  Hospitalist Service, GOLD TEAM 9  [facility]  Securely message with [employer] (more info)  Text page via [employer] Paging/Directory   See signed in provider for up to date coverage information  ______________________________________________________________________    Interval History   Stable this AM. No chest pain or SOB. No nausea or vomiting. Arm continues to improve. No new motoric or sensation loss. No fevers. No further blisters/rashes. Leg swelling continues to improve.     Physical Exam   Vital Signs: Temp: 98  F (36.7  C) Temp src: Oral BP: (!) 172/107 Pulse: 68   Resp: 18 SpO2: 100 % O2 Device: None  (Room air)    Weight: 122 lbs 2.16 oz    Constitutional: NAD, pleasant and cooperative   Cardiovascular: RRR  Respiratory: CTAB.  GI: bowel sounds present throughout   Neuro: alert and oriented, no gross focal deficits noted  MSK: RUE with 1+ edema below the elbow, radial pulse +        Medical Decision Making       MANAGEMENT DISCUSSED with the following over the past 24 hours: nursing       Data     I have personally reviewed the following data over the past 24 hrs:    5.8  \   10.2 (L)   / 61 (L)     136 100 49.9 (H) /  131 (H)   3.8 23 2.38 (H) \     ALT: 44 AST: 25 AP: 108 TBILI: 0.3   ALB: 3.2 (L) TOT PROTEIN: 5.6 (L) LIPASE: N/A

## 2024-01-13 NOTE — PROGRESS NOTES
HEMODIALYSIS TREATMENT NOTE    Date: 1/13/2024  Time: 12:21 PM    Data:  Pre Wt: 55.4 kg (122 lb 2.2 oz)   Desired Wt: 51.9 kg   Post Wt: 52.4 kg (calculated)  Weight change: 3 kg  Ultrafiltration - Post Run Net Total Removed (mL): 3000 mL  Vascular Access Status: CVC (tunneled RIJ) / patent  Dialyzer Rinse:   Total Blood Volume Processed:    Total Dialysis (Treatment) Time: 4 hours  Dialysate Bath: K 3, Ca 2.25, Na 138, Bicarb 32  Heparin: None    Lab:   No    Interventions:  CVC lines reversed / 400 BFR achieved  Ultrafiltration goal decreased from 3.5 L to 3 L net 2/2 large volume change per Critline (> -17%) and decreasing BP  CVC saline locked post-treatment  Clear Guard CVC caps changed    Assessment:  CHRISTA patient in setting of lupus nephritis. A&Ox3. Hypertensive but otherwise vitally stable throughout. Denies pain. Ordered lunch. Tolerated treatment well with no complaints.      Plan:    Next iHD Tuesday 01/16/24 or per renal.

## 2024-01-13 NOTE — PLAN OF CARE
Neuro: A&Ox4.   Cardiac: No tele order. VSS.   Respiratory: Sating WDL on RA.  GI/: Adequate urine output. No BM this shift.   Diet/appetite: Tolerating renal diet. Ate late dinner/snack.  Activity:  Independent.  Pain: At acceptable level on current regimen.   Skin: No new deficits noted.  LDA's: L PIV x2 SL. R CVC for HD     Plan: No AM dialysis, postponed until later today.  Continue with POC. Notify primary team with changes.

## 2024-01-13 NOTE — PLAN OF CARE
Hours of Care: [id number]    Neuro: A&Ox4.   Cardiac: No tele orders. VSS.    Respiratory: Sating >95% on RA.  GI/: Adequate urine output. No BM this shift.  Diet/appetite: Tolerating renal diet with 1500 mL FR. Eating well.  Activity: Independent.  Pain: At acceptable level on current regimen.   Skin: No new deficits noted.  LDA's: L PIV x2 SL. R CVC for HD.    Plan: Dialysis tomorrow at 0720. Continue with POC. Notify primary team with changes.

## 2024-01-14 NOTE — PROGRESS NOTES
[facility]    Medicine Progress Note - Hospitalist Service, GOLD TEAM 9    Date of Admission:  1/2/2024    Assessment & Plan   Milly Carroll is a 23 year old female admitted on 1/2/2024. She has a PMHx of lupus C/B lupus flares, lupus cerebritis, lupus nephritis, HTN, chronic systolic heart failure, QT prolongation, past perinephric hematoma s/p emobolization in Aug-2023 and chronic thrombocytopenia admitted to [facility] on 12/17/23 (after presentation for SOB/NSTEMI and pneumonia concern and concern for lupus flare where she was treated w/ steroids and plasma exchange) and transfer to [facility] on 12/18/23 before being transferred to [facility] on 12/22-12/25 for cyclophosphamide infusion. Represented on 12/26 [facility] ED for SOB found to have covid and reduced EF (baseline EF 47%, OSH EF reported as 15-20%) w/ plan to transfer patient to [facility] however patient left A on 12/30 due to prolonged wait for transfer to [facility] ED. On presentation 1/2, patient reported continued worsening CP/SOB since her discharge on 12/30. Chest pain and dyspnea has since improved during her admission.     Currently being managed for acute on chronic heart failure and CHRISTA on CKD, concern for cardiorenal syndrome in the setting of prior lupus nephritis.      Acute on Chronic HF (EF 15-20% on 12/26 OSH)  Dyspnea and Chest Pain - improved   Uncontrolled HTN   Tricuspid insufficiency   Presented with chest pain and dyspnea as above. Concern that hypertensive crisis contributing to chest pain. Echocardiogram on 17-Dec-2023 showed LVEF 35-40%, EF decreased to 15-20% on 12/26 w/ BNP at that time >70,000. Lexiscan 12/21 negative for inducible ischemia. Admission workup 1/2 included trop elevated at 290 with peak at 469 on 1/2. TTE 1/2 with severe diffuse hypokinesis with stably reduced EF. Limited TTE 1/7 with EF 35-40% and dilated IVC. Cardiology  consulted with recs as below.   V/Q scan 1/3 indeterminate, overall lower suspicion of VTE contributing to current presentation.   Attempted diuresis with increased doses of IV bumex doses (up to bumex 4 mg IV x 1) on 1/4-1/6 with minimal urine output and concern for cardiorenal syndrome.   -Gen Cardiology consulted, appreciate recs  -Nephrology consulted, appreciate assistance   -Dialysis runs per nephrology  -Stopped bumex 1/13/2024 (started 1/7)  -Continue carvedilol   -Continue hydralazine TID   -Continue nifedipine (restarted on 1/7)   -Starting losartan at 50mg every day to improve HTN  -Consider cMRI during admission once fluid status improved     CHRISTA, improving   Metabolic Acidosis, resolved  Recent Lupus Nephritis   Cr up trending this admission, per discussion with cardiology, nephrology, and rheumatology current CHRISTA suspected to be more likely related to cardiorenal syndrome and ongoing volume overload.   -Nephrology consulted, appreciate assistance    -Continue diuresis and blood pressure management as above   -Stopped Bicarb tabs  -Rheumatology following, appreciate assistance   -Due for 5th dose cyclophosphamide per EuroLupus protocol 1/5 (ongoing discussion w/ rheum/nephrology prior to administration), holding for now due to COVID and other ongoing management as outlined     R forearm blister, improving  Quarter sized with purulence, sent culture overnight. WOCN to see.  -Derm consult placed   -Daily Vaseline and bandage over wound until healed  -NGTD on culture, will hold off on further vancomycin at this time    COVID-19 Infection   Tested positive on 12/29 at OSH when presented with chest pain and dyspnea. Concern for superimposed PNA per OSH and was on vanc/cefepime 12/26-12/30 prior to AMA leave. Initially cefepime continued on admission, discontinued on 1/4 due to low suspicion for bacterial pneumonia.  Has remained on RA this admission. Overall unclear how much COVID-19 contributed to acute  presentation.   -Received remdesivir 12/29 and 12/30 prior to leaving [place], completed 5 day course given high risk (1/5 - 1/7)     Hx of C.diff infxn (6/2023)  -Stopped PO vanc prophylaxis on 1/4 when antibiotics stopped     SLE (+dsDNA, +RNP, +ribosomal P Ab and hypocomplementemia)   Recent Lupus Flare w/ MAHA and renal insuff s/p Cyclophosphamide dose (12/22/23)  Hx of Lupus cerebritis w/ hx of seizures  C/b Lupus Nephritis  Hx medication non-adherence  -Lupus-focused meds:   -Continue prednisone 60mg qday  -PJP ppx: continue PTA bactrim  -GI ppx: continue protonix qday, tums prn  -Cyclophosphamide on hold as above     Hypomagnesemia  Hypokalemia, resolved  -Replete per protocol      Chronic RUE edema with weakness   Superficial clot of R cephalic vein  US 8/2023 negative. RUE doppler negative for DVT on 1/2, significant for superficial thrombus. Repeat US RUE 1/7 with no evidence of DVT. Suspect weakness 2/2 swelling, lower concern for alternate etiologies.   -Symptomatic with heat packs    Chronic thrombocytopenia  Chronic Macrocytic Anemia  -Trend CBC daily   -Continue PTA folate/B12 replacement  -DVT ppx: heparin subcutaneous decrease dose per pharmacy in setting of chronic thrombocytopenia and renal dysfunction  -Hematology consulted - hemolysis improving, check labs daily., recommend EPO per nephrology      Hx of Intermittent Headaches  History of being responsive to APAP and dilaudid along w/ treatment of HTN and Lupus. 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 Stools described as normal.      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 No current PTA antiepileptic  medications    Chronic Left Shoulder Pain, improved   Unclear etiology, likely MSK pain.   -PRN tylenol or metharbamol; taper PO dilaudid (no clear indication)           Diet: Fluid restriction 1500 ML FLUID  Snacks/Supplements Adult: Other; 8 PM: Peanut butter and strawberry Jelly on white bread sandwich + alternate grapes/orange E/O day on side; Between Meals  Renal Diet (dialysis)    DVT Prophylaxis: Heparin SQ  Leung Catheter: Not present  Lines: PRESENT      CVC Double Lumen Right Internal jugular Tunneled-Site Assessment: WDL  Hemodialysis Vascular Access Right Subclavian-Site Assessment: WDL      Cardiac Monitoring: None  Code Status: Full Code      Clinically Significant Risk Factors          # Hypocalcemia: Lowest Ca = 8.2 mg/dL in last 2 days, will monitor and replace as appropriate     # Hypoalbuminemia: Lowest albumin = 2.9 g/dL at 1/12/2024  6:03 AM, will monitor as appropriate   # Thrombocytopenia: Lowest platelets = 50 in last 2 days, will monitor for bleeding    # Acute heart failure with reduced ejection fraction: last echo with EF <40% and receiving IV diuretics           # Financial/Environmental Concerns: none (Per pt she discussed option of applying for disability with her new PCP)         Disposition Plan     Expected Discharge Date: 01/16/2024      Destination: home with family  Discharge Comments: High BPs, new HD            Justice Espinoza MD  Hospitalist Service, [address]  Securely message with [employer] (more info)  Text page via [facility] Paging/Directory   See signed in provider for up to date coverage information  ______________________________________________________________________    Interval History   Feeling okay this AM. No nausea or vomiting. No chest pain or SOB. Arm swelling decreased. No new motoric or sensation loss. No further blisters/rashes. Leg swelling has decreased.     Physical Exam   Vital Signs: Temp: 98.7  F  (37.1  C) Temp src: Oral BP: (!) 161/118 Pulse: 83   Resp: 18 SpO2: 100 % O2 Device: None (Room air)    Weight: 122 lbs 2.16 oz    Constitutional: NAD, pleasant and cooperative   Cardiovascular: RRR  Respiratory: CTAB  GI: bowel sounds present throughout   Neuro: alert and oriented, no gross focal deficits noted  MSK: RUE with 1+ edema below the elbow, radial pulse+     Medical Decision Making       MANAGEMENT DISCUSSED with the following over the past 24 hours: nursing       Data     I have personally reviewed the following data over the past 24 hrs:    6.0  \   10.1 (L)   / 50 (L)     136 100 26.7 (H) /  88   3.5 26 1.85 (H) \

## 2024-01-14 NOTE — PROGRESS NOTES
Nephrology Progress Note  01/14/2024         Assessment & Recommendations:   Milly Carroll is a 23 year old female admitted on 1/2/2024. 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 She was admitted with chest pain and shortness of breath, found to be COVID positive s/p tx with Remdesivir, w/ severely decreased global cardiac function on echo, and requiring multiple doses of IV diuretics due to volume overload, and having worsening CHRISTA w/ creatinine from 2.48 on admission increased to 4.31 on 1/8/2024. Fluid status, kidney function and hypertension continued to worsen despite IV diuretics, and increase in antihypertensives. TDC was placed on 1/10 and iHD initiated on 1/11/23. Tolerating iHD.    Recommendations:  - Continue Coreg to 25 mg  - Continue hydralazine to 25 mg TID  - Start Losartan 50 mg QD  - iHD tomorrow   - Continue to postpone cyclophosphamide, at a minimum until after 01/12 but consider postponing further due to significantly impaired renal function  - Start prednisone 60 mg taper (taper plan was 60 mg for 14 days then 44-74-80-20-10 x 14 days each)  - Daily BMP  - Daily standing weights, strict I&Os   -  for outpatient dialysis referral in [facility]    #Lupus nephritis, class IV  #SLE  #Chronic kidney disease, stage IV (Cystatin C/creatinine discordance)  # Acute kidney injury  Patient's creatinine was 3.48 on 12/30, decreased to 2.48 on admission at [facility] 1/2/2024  and subsequently raised to 4.2 prior to iHD initiation. CHRISTA in the setting of lupus nephritis may likely represent poor renal perfusion in the setting of volume overload ,cardiorenal syndrome and diuresis.  Lupus flare less likely given improving complement and dsDNA levels and current treatment with steroids and recent treatment w/ cyclophosphamide. At this time, we will hold off on further immunosuppressants. She has began steroid taper.   Clarified with infusion coordinator that Milly has had 5 cyclophosphamide infusions thus far (08/22, 09/08, 09/22, 10/06, 12/22).     #Hypertension  #Acute on chronic systolic heart failure, stress cardiomyopathy  Most likely cause of her hypertension is volume overload, particularly given hypervolemic appearance on physical examination, significantly elevated BNP from baseline, signs of pulmonary edema on CXR. On discussion with cardiology, there is question of if the precipitant of her systolic heart failure is uncontrolled, severe hypertension. Given normal CK, it does not appear likely that there is a component of myocarditis contributing to her presentation. Repeat echocardiogram on 01/06 was 35-40%.     #Metabolic acidosis, chronic  Agree with continuing NaHCO3 650 mg TID. She has a chronic metabolic acidosis and is tolerating this well.      #Chronic macrocytic anemia  Would recommend holding off on starting epo for now, as her HGB has improved from prior. However, would favor starting epo if her HGB is <9.0 or if she remains less than 9.5 on rechecks over the next 1-2 days. Iron studies were obtained, Fe saturation of 40%.     Recommendations were communicated to primary team via [employer]. Discussed w/ patient.     Seen and discussed with Dr. Trell Hartley MD  Nephrology Fellow   8079    Interval History :   Nursing and provider notes from last 24 hours reviewed.  Overnight, no significant events. She did make 250 mL of urine since midnight (?). No difficulty breathing. Appetite continues to improve. BP uncontrolled. Completed HD run #3 well with net UF 3L    Physical Exam:   I/O last 3 completed shifts:  In: 350 [P.O.:350]  Out: 3000 [Other:3000]   BP (!)  "171/116   Pulse 79   Temp 98.4  F (36.9  C) (Oral)   Resp 18   Ht 1.575 m (5' 2\")   Wt 55.4 kg (122 lb 2.2 oz)   LMP 12/11/2023 (Approximate)   SpO2 99%   BMI 22.34 kg/m       General: Laying in bed, no acute distress. Interacts appropriately.   HENT: Normocephalic, atraumatic.   Cardiovascular: Regular rate and rhythm.  Respiratory: Clear to auscultation bilaterally. No increased work of breathing.   Abdomen: Soft, non-tender, non-distended.    Neurologic: Alert. Oriented. Moves all extremities equally.   Musculoskeletal: RUE w/ 1+ pitting edema. No YASMEEN  Skin: She has some redness of the face on the bilateral cheeks but no clear malar rash. No signs of pallor or cyanosis. Warm and dry.   Psych: Flat affect, slightly improved     Labs:   All labs reviewed by me  Electrolytes/Renal -   Recent Labs   Lab Test 01/14/24  0532 01/13/24  0608 01/12/24  0603 01/06/24  0638 01/05/24  0612 12/28/23  0623 12/27/23  0001 12/25/23  0442 12/24/23  1605 12/24/23  0943 12/20/23  0415 12/19/23  2335    136 138   < > 139   < > 141 144  --  144   < > 140   POTASSIUM 3.5 3.8 4.4   < > 4.8   < > 5.6* 3.1*  3.1*   < > 3.4   < > 3.6   CHLORIDE 100 100 100   < > 107   < > 109* 110*  --  111*   < > 106   CO2 26 23 22   < > 18*   < > 17* 20*  --  21*   < > 18*   BUN 26.7* 49.9* 89.3*   < > 96.8*   < > 75.1* 60.5*  --  58.9*   < > 52.1*   CR 1.85* 2.38* 3.35*   < > 3.49*   < > 2.98* 2.57*  --  2.42*   < > 2.87*   GLC 88 131* 122*   < > 124*   < > 133* 138*  --  95   < > 144*   BISI 8.2* 8.7 8.3*   < > 8.4*   < > 8.6 8.0*  --  8.2*   < > 7.4*   MAG  --   --   --   --   --   --  2.1 1.8  --   --   --  2.0   PHOS  --   --   --   --  6.1*  --   --  3.4  --  4.6*   < > 6.3*    < > = values in this interval not displayed.       CBC -   Recent Labs   Lab Test 01/14/24  0532 01/13/24  0608 01/12/24  0603   WBC 6.0 5.8 7.6   HGB 10.1* 10.2* 9.7*   PLT 50* 61* 62*       LFTs -   Recent Labs   Lab Test 01/13/24  0608 01/12/24  0603 " 01/11/24  0408   ALKPHOS 108 113 109   BILITOTAL 0.3 0.3 0.2   ALT 44 42 48   AST 25 22 24   PROTTOTAL 5.6* 5.3* 5.4*   ALBUMIN 3.2* 2.9* 3.0*       Iron Panel -   Recent Labs   Lab Test 01/05/24  0612 12/20/23  0415 12/17/23  0429 07/10/23  1454   IRON 76  --  130 15*   IRONSAT 40  --  74* 9*   GABRIEL 786*   < > 32,775* 1,079*    < > = values in this interval not displayed.         Imaging:  All imaging studies reviewed by me.     Current Medications:   artificial saliva  1 spray Mouth/Throat 4x Daily    carvedilol  25 mg Oral BID    cholecalciferol  10 mcg Oral Daily    vitamin B-12  100 mcg Oral Daily    ferrous sulfate  325 mg Oral Daily with breakfast    folic acid  1 mg Oral Daily    sodium chloride (PF) 0.9%  1.3-2.6 mL Intracatheter Once    Followed by    heparin  3 mL Intracatheter Once    sodium chloride (PF) 0.9%  1.3-2.6 mL Intracatheter Once    Followed by    heparin  3 mL Intracatheter Once    heparin ANTICOAGULANT  5,000 Units Subcutaneous Q12H    hydrALAZINE  25 mg Oral TID    losartan  50 mg Oral Daily    NIFEdipine ER OSMOTIC  30 mg Oral Daily    pantoprazole  40 mg Oral QAM AC    perflutren diluted 1mL to 2mL with saline  6 mL Intravenous Once    [Held by provider] potassium chloride ER  20 mEq Oral BID    predniSONE  60 mg Oral Daily    sodium chloride (PF)  3 mL Intracatheter Q8H    sulfamethoxazole-trimethoprim  1 tablet Oral Once per day on Tuesday Thursday Saturday

## 2024-01-14 NOTE — PROVIDER NOTIFICATION
Time of notification: 11:28 AM  Provider notified:Provider called RN IPhone update with info  Patient status:Pt  B/P are on the following table, provider noted info.  Pt is assymtomatic at this time.     01/14/24 1110 01/14/24 1111 01/14/24 1120   Vitals   Temp  --  98.4  F (36.9  C)  --    Temp src  --  Oral  --    Pulse  --  79  --    BP (!) 187/119 (!) 177/116 (!) 171/116   BP Location  --  Left arm  --    BP - Mean 138  --  133   Resp  --  19 18       Temp:  [98  F (36.7  C)-99.5  F (37.5  C)] 98.4  F (36.9  C)  Pulse:  [66-90] 79  Resp:  [12-25] 18  BP: (141-187)/() 171/116  SpO2:  [98 %-100 %] 99 %  Orders received:  MD is starting losartan.

## 2024-01-14 NOTE — PLAN OF CARE
Neuro: A&Ox4. RUE continues to be swollen.   Cardiac: No tele order SBP <180. Provider notified for elevated diastolic. Generalized edema.    Respiratory: Sating 100 on RA.  GI/: Adequate urine output, independently used bathroom.   Diet/appetite: Tolerating renal diet. Eating well.  Activity:  Independent in room, up to chair and in halls.  Pain: At acceptable level on current regimen.   Skin: No new deficits noted.  LDA's: RT CVC for HD, 2 LT PIV SL.       Next HD session on Monday or Tuesday.     Plan: Continue with POC. Notify primary team with changes.

## 2024-01-14 NOTE — PROGRESS NOTES
Care Management Follow Up    Length of Stay (days): 12    Expected Discharge Date: 01/17/2024     Concerns to be Addressed: discharge planning     Patient plan of care discussed at interdisciplinary rounds: Yes    Anticipated Discharge Disposition: Home, Home Care     Anticipated Discharge Services: None  Anticipated Discharge DME: None    Patient/family educated on Medicare website which has current facility and service quality ratings: no  Education Provided on the Discharge Plan: Yes  Patient/Family in Agreement with the Plan: yes    Referrals Placed by CM/SW: External Care Coordination, Homecare  Private pay costs discussed: Not applicable    Additional Information:    Per weekend request, writer asked Dr Rendon to place a lab order for Hep B core to complete outpatient Dialysis.    CC will continue to follow up.  Jacqui Li RN, PHN, BSN   Float Nurse Care Coordinator  Covering for Unit 6B/C and ED  Phone [phone]

## 2024-01-15 NOTE — PROGRESS NOTES
Care Management Follow Up    Length of Stay (days): 13    Expected Discharge Date: 01/17/2024?     Concerns to be Addressed: discharge planning, medical readiness.   Patient plan of care discussed at interdisciplinary rounds: Yes    Anticipated Discharge Disposition: Home  Anticipated Discharge Services: Home care, OP HD  Anticipated Discharge DME: None noted.     Patient/family educated on Medicare website which has current facility and service quality ratings: Yes  Education Provided on the Discharge Plan: Yes  Patient/Family in Agreement with the Plan: Yes    Referrals Placed by CM/SW: External Care Coordination, home care, OP HD.   Private pay costs discussed:  TBD, will need to discuss transportation to and from HD.     Additional Information:  Per chart review, Oralia padilla/the Vasculitis and Lupus Program Nurse Navigator reached out to [address] RN[facility] on Friday, 1/12 and noted again that the hospital RNCC would need to assist w/finding the patient a local nephrologist/lupus specialist upon discharge.     Writer requested and OP nephrology referral by the attending physician. Writer contacted the scheduling info in the patient's discharge AVS to attempt to arrange any outpatient specialty follow up for the patient. Writer spoke w/a nurse at [facility] nephrology clinic who confirmed that they do not typically see patient's who are on dialysis, as they are followed by their primary HD nephrologist within the OP HD clinic, this would be the same case for this patient.     Writer will meet with patient when she returns from dialysis run. Will update patient on OP HD referral status and discuss transportation. Community Health Worker assisting w/OP HD referral, see her note for details.    Patient would benefit from a local ([place], [place]), rheumatologist to follow for her Lupus treatment in addition to following w/nephrology. Writer contacted pt's primary care clinic, confirmed that they do not have rheumatology  and typically refer patients out to [facility] in [place] or health systems in [place], [place].     Writer contacted [facility], spoke w/the [facility] and confirmed that they have a provider, Dr. Cecilio Garcia, who sees patients at the [facility]. Writer obtained fax number for referral, confirmed they would call the patient directly to schedule appointment. Discharge AVS updated w/clinic and referral information. Writer requested provider discontinue nephrology referral and place rheumatology referral, will send referral once order is placed.     Met w/patient to discuss discharge planning. Patient confirmed that she plans to return to her aunt's home in [place], [place]. Patient stated that her aunt will be providing transportation to and from , writer inquired if they had discussed this, patient confirmed that she had talked to her aunt about rides. Writer discussed OP follow up needed. Patient voiced no discharge needs or concerns at this time.    Writer sent communication to Oralia, Vasculitis and Lupus Nurse Navigator w/update on set up of OP specialists.     Discharge resources:     [facility] (RN)  Phone: [phone]  Fax: [phone]    [facility] (establish care w/Rheumatologist, Dr. Cecilio Garcia)  [address]  [place] [address]  Phone: [phone]  Fax: [phone]    Care coordination will continue to follow for discharge planning.     Concha Warren, RNCC, BSN    [facility]    [address]  [address] [place] [address]    [email or web address]  [email or web address]    Office: [phone] Pager: [phone]

## 2024-01-15 NOTE — PROGRESS NOTES
Major Shift Events: Dialysis run this AM. BP continues to run high - MD aware, medications increased. PRN IV hydralazine given x1 for SBP>180. Pt is moving in and out of bed independently.     Plan: Care coordination working on discharge follow up care. Per MD, BP must be managed before discharge.     For vital signs and complete assessments, please see documentation flowsheets.

## 2024-01-15 NOTE — PROGRESS NOTES
HEMODIALYSIS TREATMENT NOTE    Date: 1/15/2024  Time: 1:44 PM    Data:  Pre Wt: 55.4 kg (122 lb 2.2 oz)   Desired Wt:   53.4kg   Post Wt:  53.4 kg  Weight change:   2 kg (removed)  Ultrafiltration - Post Run Net Total Removed (mL): 2000 mL  Vascular Access Status: CVC  patent  Dialyzer Rinse: Light  Total Blood Volume Processed: 80.82 L   Total Dialysis (Treatment) Time: 3.5hrs  Dialysate Bath: K 3, Ca 3  Heparin: None    Lab:   Yes    Interventions:  -Length of HD treatment: 3.5hrs.  -Fluid removal: 2 kg  -Access: CVC on right  chest side with BFR of 400ml/min.  -Medication: None  Patient sent back to primary floor with stable condition.   Post dialysis report is given to primary floor RN.      Assessment:  AOx4, afebrile, Room air and able to make her needs know. Blood running high throughout run with 2kg fluid removal. Blood glucose checked during the run.      Plan:    Next HD run per renal team.

## 2024-01-15 NOTE — PROGRESS NOTES
Update from Dr. Cai wondering status of getting Dialysis Nephrologist arranged.  Updated that I spoke with Aunt/family on Friday as well as reached out to CC team on coordinating dialysis outpatient right now.    Dr. Cai requests name of Nephrologist for dialysis and Dr. Rodríguez or Sb will reach out to provider to update them and see if they will accept transfer of care r/t requiring local nephrology support that patient or insurance provide a ride can transport patient.   Patient agreeable to plan.  Replied to Concha PURVIS to update them on provider request.  Oralia Rodriguez RN, BSN, PHN  Vasculitis & Lupus Program Nephrology Nurse Navigator  [phone]

## 2024-01-15 NOTE — PROGRESS NOTES
Neuro: A&Ox4.   Cardiac: SR.  No tele order SBP <180,    Respiratory: Sating 100 on RA.  GI/: Adequate urine output, using bathroom independently. BM x1.  Diet/appetite: Tolerating renal diet diet. Eating well.  Activity:  Independent, up to chair and in halls.  Pain: At acceptable level on current regimen.   Skin: No new deficits noted.  LDA's:  RT CVC for HD, 2 LT PIV SL.     Dialysis Tuesday, possible discharge following.     Plan: Continue with POC. Notify primary team with changes.

## 2024-01-15 NOTE — PROGRESS NOTES
CHW following up on OP dialysis referral with [facility].  For information on OP Lupus provider, see RNCC note.     Unit and chair time pending acceptance:  TTS at  [facility]  [address] [place] [address]  Ph [phone]  F [phone]  CHW hand faxed Hep B Core tanvir to unit- sent.  Unit called CHW with questions: will pt drive herself?, MIKEL?, which kidney doctor will she see?.      [facility] Admissions: Ph [phone] ext [phone] Karina. F [phone]  CHW faxed Hep B Core Tanvir to admissions- sent.   CHW spoke to Marcia- Marcia gave # for Karina in admissions and asked CHW to fax allergy list and most recent treatment sheet. CHW hand faxed. CHW updated Marcia pt will be ready likely Wednesday. Marcia said she will pass this on to the unit.     CHW paged nephrologist Rama Monge that OP dialysis will likely be TTS, unsure what time yet.     CHW met with pt at bedside- family will drive her. She is ok with TTS schedule.       Tara Doherty   6A/B/C Community Health Worker   Phone: [phone]

## 2024-01-15 NOTE — PROGRESS NOTES
[facility]    Medicine Progress Note - Hospitalist Service, GOLD TEAM 9    Date of Admission:  1/2/2024    Assessment & Plan   Milly Carroll is a 23 year old female admitted on 1/2/2024. She has a PMHx of lupus C/B lupus flares, lupus cerebritis, lupus nephritis, HTN, chronic systolic heart failure, QT prolongation, past perinephric hematoma s/p emobolization in Aug-2023 and chronic thrombocytopenia admitted to [facility] on 12/17/23 (after presentation for SOB/NSTEMI and pneumonia concern and concern for lupus flare where she was treated w/ steroids and plasma exchange) and transfer to [facility] on 12/18/23 before being transferred to [facility] on 12/22-12/25 for cyclophosphamide infusion. Represented on 12/26 [facility] ED for SOB found to have covid and reduced EF (baseline EF 47%, OSH EF reported as 15-20%) w/ plan to transfer patient to [facility] however patient left A on 12/30 due to prolonged wait for transfer to [facility] ED. On presentation 1/2, patient reported continued worsening CP/SOB since her discharge on 12/30. Chest pain and dyspnea has since improved during her admission.     Currently being managed for acute on chronic heart failure and CHRISTA on CKD, concern for cardiorenal syndrome in the setting of prior lupus nephritis.      Acute on Chronic HF (EF 15-20% on 12/26 OSH)  Dyspnea and Chest Pain - improved   Uncontrolled HTN   Tricuspid insufficiency   Presented with chest pain and dyspnea as above. Concern that hypertensive crisis contributing to chest pain. Echocardiogram on 17-Dec-2023 showed LVEF 35-40%, EF decreased to 15-20% on 12/26 w/ BNP at that time >70,000. Lexiscan 12/21 negative for inducible ischemia. Admission workup 1/2 included trop elevated at 290 with peak at 469 on 1/2. TTE 1/2 with severe diffuse hypokinesis with stably reduced EF. Limited TTE 1/7 with EF 35-40% and dilated IVC. Cardiology  consulted with recs as below.   V/Q scan 1/3 indeterminate, overall lower suspicion of VTE contributing to current presentation.   Attempted diuresis with increased doses of IV bumex doses (up to bumex 4 mg IV x 1) on 1/4-1/6 with minimal urine output and concern for cardiorenal syndrome. Now on dialysis.   -Gen Cardiology consulted, appreciate recs  -Nephrology consulted, appreciate assistance   -Dialysis runs per nephrology  -Stopped bumex 1/13/2024  -Continue carvedilol   -Continue hydralazine TID   -Continue nifedipine (restarted on 1/7)   -Started losartan at 50mg every day to improve HTN 1/14/24    -Increasing to 100mg every day after discussion with nephro 1/15/2024   -Consider cMRI during admission once fluid status improved     CHRISTA  Metabolic Acidosis, resolved  Recent Lupus Nephritis   Cr up trending this admission, per discussion with cardiology, nephrology, and rheumatology current CHRISTA suspected to be more likely related to cardiorenal syndrome and ongoing volume overload.   -Nephrology consulted, appreciate assistance    -Continue diuresis and blood pressure management as above   -Stopped Bicarb tabs  -Rheumatology following, appreciate assistance   -Due for 5th dose cyclophosphamide per [employer]upus protocol 1/5 (ongoing discussion w/ rheum/nephrology prior to administration), holding for now due to COVID and other ongoing management as outlined     R forearm blister, improving  Quarter sized with purulence, sent culture overnight. WOCN to see.  -Derm consult placed   -Daily Vaseline and bandage over wound until healed  -NGTD on culture, will hold off on further vancomycin at this time    COVID-19 Infection   Tested positive on 12/29 at OSH when presented with chest pain and dyspnea. Concern for superimposed PNA per OSH and was on vanc/cefepime 12/26-12/30 prior to AMA leave. Initially cefepime continued on admission, discontinued on 1/4 due to low suspicion for bacterial pneumonia.  Has remained on RA  this admission. Overall unclear how much COVID-19 contributed to acute presentation.   -Received remdesivir 12/29 and 12/30 prior to leaving AMA, completed 5 day course given high risk (1/5 - 1/7)     Hx of C.diff infxn (6/2023)  -Stopped PO vanc prophylaxis on 1/4 when antibiotics stopped     SLE (+dsDNA, +RNP, +ribosomal P Ab and hypocomplementemia)   Recent Lupus Flare w/ MAHA and renal insuff s/p Cyclophosphamide dose (12/22/23)  Hx of Lupus cerebritis w/ hx of seizures  C/b Lupus Nephritis  Hx medication non-adherence  -Lupus-focused meds:   -Continue prednisone 50mg qday, starting 50mg 1/15/2024, plan for 14 each of 68-57-78-20-10 taper  -PJP ppx: continue PTA bactrim  -GI ppx: continue protonix qday, tums prn  -Cyclophosphamide on hold as above   -Will need OP lupus follow up (rheumatology OP consult placed)    Hypomagnesemia  Hypokalemia, resolved  -Replete per protocol      Chronic RUE edema with weakness   Superficial clot of R cephalic vein  US 8/2023 negative. RUE doppler negative for DVT on 1/2, significant for superficial thrombus. Repeat US RUE 1/7 with no evidence of DVT. Suspect weakness 2/2 swelling, lower concern for alternate etiologies.   -Symptomatic with heat packs    Chronic thrombocytopenia  Chronic Macrocytic Anemia  -Trend CBC daily   -Continue PTA folate/B12 replacement  -DVT ppx: heparin subcutaneous decrease dose per pharmacy in setting of chronic thrombocytopenia and renal dysfunction  -Hematology consulted - hemolysis improving, check labs daily., recommend EPO per nephrology      Hx of Intermittent Headaches  History of being responsive to APAP and dilaudid along w/ treatment of HTN and Lupus. NO NSAIDS.     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 Stools described as normal.       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 No current PTA antiepileptic medications    Chronic Left Shoulder Pain, improved   Unclear etiology, likely MSK pain.   -PRN tylenol or metharbamol; taper PO dilaudid (no clear indication)           Diet: Fluid restriction 1500 ML FLUID  Snacks/Supplements Adult: Other; 8 PM: Peanut butter and strawberry Jelly on white bread sandwich + alternate grapes/orange E/O day on side; Between Meals  Renal Diet (dialysis)    DVT Prophylaxis: Heparin SQ  Leung Catheter: Not present  Lines: None     Cardiac Monitoring: None  Code Status: Full Code      Clinically Significant Risk Factors              # Hypoalbuminemia: Lowest albumin = 2.9 g/dL at 1/12/2024  6:03 AM, will monitor as appropriate   # Thrombocytopenia: Lowest platelets = 45 in last 2 days, will monitor for bleeding    # Acute heart failure with reduced ejection fraction: last echo with EF <40% and receiving IV diuretics           # Financial/Environmental Concerns: none (Per pt she discussed option of applying for disability with her new PCP)         Disposition Plan     Expected Discharge Date: 01/17/2024      Destination: home with family  Discharge Comments: High BPs, new HD            Justice Espinoza MD  Hospitalist Service, GOLD TEAM 9  [facility]  Securely message with [employer] (more info)  Text page via [employer] Paging/Directory   See signed in provider for up to date coverage information  ______________________________________________________________________    Interval History   Doing okay this AM. No chest pain or SOB. Line doing okay. Leg swelling improved. No fevers. No nausea. Arm feeling better. No further rashes or blisters. No motoric or sensation loss.     Physical Exam   Vital Signs: Temp: 98.2  F (36.8  C) Temp src: Oral BP: (!)  175/112 Pulse: 83   Resp: 15 SpO2: 100 % O2 Device: None (Room air)    Weight: 122 lbs 2.16 oz    Constitutional: NAD  Cardiovascular: RRR  Respiratory: CTAB  GI: bowel sounds present throughout   Neuro: alert and oriented, no gross focal deficits noted  MSK: RUE with 1+ edema below the elbow, radial pulse+     Medical Decision Making       MANAGEMENT DISCUSSED with the following over the past 24 hours: SW, nephrology, and nursing       Data     I have personally reviewed the following data over the past 24 hrs:    6.7  \   9.9 (L)   / 45 (LL)     138 103 45.6 (H) /  121 (H)   3.5 23 2.83 (H) \

## 2024-01-15 NOTE — PLAN OF CARE
Goal Outcome Evaluation:      Neuro: A&Ox4.   Cardiac: No tele. VSS.   Respiratory: Sating 94% on RA.  GI/: Adequate urine output. BM X1  Diet/appetite: Tolerating renal diet with FR 1500 ml. Eating well.  Activity:  Independent, up to chair and in halls.  Pain: At acceptable level on current regimen.   Skin: No new deficits noted.  LDA's:2 PIV,    CVC line    Plan: Continue with POC. Notify primary team with changes.

## 2024-01-16 NOTE — PROGRESS NOTES
Care Management Follow Up    Length of Stay (days): 14    Expected Discharge Date: 01/17/2024?     Concerns to be Addressed: discharge planning, medical readiness.    Patient plan of care discussed at interdisciplinary rounds: Yes    Anticipated Discharge Disposition: Home  Anticipated Discharge Services: Home care, OP HD  Anticipated Discharge DME: None noted.     Patient/family educated on Medicare website which has current facility and service quality ratings: Yes  Education Provided on the Discharge Plan: Yes  Patient/Family in Agreement with the Plan: Yes    Referrals Placed by CM/SW: External Care Coordination, Home care, OP HD  Private pay costs discussed: Not applicable    Additional Information:   received request from [facility] Nurse Navigator to confirm OP specialty appointments prior to discharge. [facility] is attempting to confirm OP specialty appointments to the best of writer's ability, as requested, but it has been quite difficult d/t length of referral process, lack of Rheumatology providers w/availability for new patients and the patient's plan to return to [place], [place].     Community Health Worker is assisting with OP HD set up.     contacted [facility] to obtain an update on the Rheumatology clinic referral.  spoke w/Mariella, a nurse, who noted that Dr. Garcia is booked out in [place] through 2025. Mariella noted that they have two providers and their schedules are quite full. The next available appointment is with Dr. Garcia on April 8th at 11am. Patient will receive a packet w/paperwork that will need to be completed prior to the appointment. The packet will have the  Writer scheduled this appointment for now, but it is likely that the patient will need to continue to see Dr. Waddell, her Rheumatologist within [facility] until able to establish care in [place].     contacted [facility] in [place],  was transferred directly to  the Rheumatology clinic ([phone]) and was on hold waiting for a  for 45 minutes, call ended at that time, will follow up as able.    Writer contacted [facility], [facility] scheduling, as their website notes that they have a rheumatologist that sees patient's there. Scheduling line confirmed that the provider, Dr. Jakob Fletcher is a provider through the [facility] system and comes from [place] to see patients at the [facility], their scheduling would route me back to the Rheumatology Clinic in [place] for referral/scheduling process.    1403 Addendum:  Additional call out to [facility] clinic ([phone]) who confirmed that their schedule is out to the end of April for new patients. Will keep appointment w/[facility] at this time.     1430 Addendum:  Writer met w/patient to discuss difficulty arranging OP rheumatology follow up near [place]. Patient noted that she understood and requested writer contact her aunt, Jaimee. Writer contacted Jaimee and had a long conversation regarding her concerns about the patient returning to their home and not having Rheumatology available locally. Pt's aunt noted that relationships within the family are quite stressed and the patient has previously stated that the pt's mother and siblings want nothing to do with her. Pt's aunt continues to reiterate that the patient is welcome to return to their home, that they would never tell her she could not stay with them, but continued to note how stressful it is on them when the patient needs care that she cannot receive in [place]. Writer noted the difficulty in arranging care in [place], noted that the soonest/closest appointment with a Rheumatologist was in [place] on April 8th. Patient's family was appreciative of the effort to find care closer to home but reiterated that driving to [place] was difficult for their family as well.     Of note, pt's aunt noted that the  patient had not talked to them about transportation to and from HD, this will need follow up conversation.     Ultimately, patient's aunt continued to state that they would welcome the patient back to their home but that they feel she should be staying with mother or siblings who are down in the cities, allowing her to be closer to the specialists she needs for her health. Patient's aunt will attempt to contact the patient's mother (their previous conversation ended w/them hanging up on each other).     Writer met w/patient to discuss the situation further. Patient confirms that she talks w/her mother often, noting that her mother calls her after work. Writer explained her aunt and uncle's concerns, reiterated that they have said she is welcome but they continue to be very concerned about her care. Writer encouraged patient to talk w/her family (aunt, uncle and mother) regarding discharge planning. Writer reiterated that patient needs support from her family and a safe plan that allows her to receive the care she needs. Patient voiced no concerns or questions during the conversation. Patient answered questions appropriately but did not engage in problem solving throughout the conversation.     RNCC will follow up with patient and her family members tomorrow.     Discharge resources:     [facility] (RN)  Phone: [phone]  Fax: [phone]    Pending OP HD:   [facility]  [address] [place] [address]  Phone: [phone]  Fax: [phone]  *[facility] schedule, awaiting confirmation on accepting Nephrologist.      OP Rheumatologist, Dr. Garcia through [facility] in [place], [place]  Appt: 4/8/2024 at 11am    Care coordination will continue to follow for discharge planning.     Concha Warren, RNCC, BSN    [facility]    [address]  [address] [place] [address]    [email or web address]  [email or web address]    Office: [phone] Pager:  [phone]

## 2024-01-16 NOTE — PROGRESS NOTES
[facility]    Medicine Progress Note - Hospitalist Service, GOLD TEAM 9    Date of Admission:  1/2/2024    Assessment & Plan   Milly Carroll is a 23 year old female admitted on 1/2/2024. She has a PMHx of lupus C/B lupus flares, lupus cerebritis, lupus nephritis, HTN, chronic systolic heart failure, QT prolongation, past perinephric hematoma s/p emobolization in Aug-2023 and chronic thrombocytopenia admitted to [facility] on 12/17/23 (after presentation for SOB/NSTEMI and pneumonia concern and concern for lupus flare where she was treated w/ steroids and plasma exchange) and transfer to [facility] on 12/18/23 before being transferred to [facility] on 12/22-12/25 for cyclophosphamide infusion. Represented on 12/26 [facility] ED for SOB found to have covid and reduced EF (baseline EF 47%, OSH EF reported as 15-20%) w/ plan to transfer patient to [facility] however patient left A on 12/30 due to prolonged wait for transfer to [facility] ED. On presentation 1/2, patient reported continued worsening CP/SOB since her discharge on 12/30. Chest pain and dyspnea has since improved during her admission.     Currently being managed for acute on chronic heart failure and CHRISTA on CKD, concern for cardiorenal syndrome in the setting of prior lupus nephritis, and poorly controlled htn.     Acute on Chronic HF (EF 15-20% on 12/26 OSH)  Dyspnea and Chest Pain - improved   Uncontrolled HTN   Tricuspid insufficiency   Presented with chest pain and dyspnea as above. Concern that hypertensive crisis contributing to chest pain. Echocardiogram on 17-Dec-2023 showed LVEF 35-40%, EF decreased to 15-20% on 12/26 w/ BNP at that time >70,000. Lexiscan 12/21 negative for inducible ischemia. Admission workup 1/2 included trop elevated at 290 with peak at 469 on 1/2. TTE 1/2 with severe diffuse hypokinesis with stably reduced EF. Limited TTE 1/7 with EF 35-40% and  dilated IVC. Cardiology consulted with recs as below.   V/Q scan 1/3 indeterminate, overall lower suspicion of VTE contributing to current presentation.   Attempted diuresis with increased doses of IV bumex doses (up to bumex 4 mg IV x 1) on 1/4-1/6 with minimal urine output and concern for cardiorenal syndrome. Now on dialysis.   -Gen Cardiology consulted, appreciate recs  -Nephrology consulted, appreciate assistance   -Dialysis runs per nephrology  -Stopped bumex 1/13/2024  -Continue carvedilol   -Continue hydralazine TID   -Continue nifedipine (restarted on 1/7)   -Started losartan at 50mg every day to improve HTN 1/14/24    -Increasing to 100mg every day after discussion with nephro 1/15/2024   -Consider cMRI during admission once fluid status improved     CHRISTA  Metabolic Acidosis, resolved  Recent Lupus Nephritis   Cr up trending this admission, per discussion with cardiology, nephrology, and rheumatology current CHRISTA suspected to be more likely related to cardiorenal syndrome and ongoing volume overload.   -Nephrology consulted, appreciate assistance    -Continue diuresis and blood pressure management as above   -Stopped Bicarb tabs  -Rheumatology following, appreciate assistance   -Due for 5th dose cyclophosphamide per [facility]Lupus protocol 1/5 (ongoing discussion w/ rheum/nephrology prior to administration), holding for now due to COVID and other ongoing management as outlined     R forearm blister, improving  Quarter sized with purulence, sent culture overnight. WOCN to see.  -Derm consult placed   -Daily Vaseline and bandage over wound until healed  -NGTD on culture, will hold off on further vancomycin at this time    COVID-19 Infection   Tested positive on 12/29 at OSH when presented with chest pain and dyspnea. Concern for superimposed PNA per OSH and was on vanc/cefepime 12/26-12/30 prior to AMA leave. Initially cefepime continued on admission, discontinued on 1/4 due to low suspicion for bacterial  pneumonia.  Has remained on RA this admission. Overall unclear how much COVID-19 contributed to acute presentation.   -Received remdesivir 12/29 and 12/30 prior to leaving AMA, completed 5 day course given high risk (1/5 - 1/7)     Hx of C.diff infxn (6/2023)  -Stopped PO vanc prophylaxis on 1/4 when antibiotics stopped     SLE (+dsDNA, +RNP, +ribosomal P Ab and hypocomplementemia)   Recent Lupus Flare w/ MAHA and renal insuff s/p Cyclophosphamide dose (12/22/23)  Hx of Lupus cerebritis w/ hx of seizures  C/b Lupus Nephritis  Hx medication non-adherence  -Lupus-focused meds:  -Continue prednisone 50mg qday, starting 50mg 1/15/2024, plan for 14 each of 05-12-36-20-10 taper  -PJP ppx: continue PTA bactrim  -GI ppx: continue protonix qday, tums prn  -Cyclophosphamide on hold as above   -Will need OP lupus follow up (rheumatology OP consult placed)    Hypomagnesemia  Hypokalemia, resolved  -Replete per protocol      Chronic RUE edema with weakness   Superficial clot of R cephalic vein  US 8/2023 negative. RUE doppler negative for DVT on 1/2, significant for superficial thrombus. Repeat US RUE 1/7 with no evidence of DVT. Suspect weakness 2/2 swelling, lower concern for alternate etiologies.   -Symptomatic with heat packs    Chronic thrombocytopenia, improving   Chronic Macrocytic Anemia, improving  -Trend CBC daily   -Continue PTA folate/B12 replacement  -DVT ppx: heparin subcutaneous decrease dose per pharmacy in setting of chronic thrombocytopenia and renal dysfunction  -Hematology consulted - hemolysis improving, check labs daily., holding off on epo unless hgb decreases below 9.     Hx of Intermittent Headaches  History of being responsive to APAP and dilaudid along w/ treatment of HTN and Lupus. NO NSAIDS.     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 Stools described as normal.      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 No current PTA antiepileptic medications    Chronic Left Shoulder Pain, improved   Unclear etiology, likely MSK pain.   -PRN tylenol or metharbamol; taper PO dilaudid (no clear indication)           Diet: Fluid restriction 1500 ML FLUID  Renal Diet (dialysis)  Snacks/Supplements Adult: Other; 8 PM: Egg salad sandwich on white bread + alternate grapes/orange E/O day on side; Between Meals    DVT Prophylaxis: Heparin SQ  Leung Catheter: Not present  Lines: None     Cardiac Monitoring: None  Code Status: Full Code      Clinically Significant Risk Factors        # Hypokalemia: Lowest K = 3.2 mmol/L in last 2 days, will replace as needed       # Hypoalbuminemia: Lowest albumin = 2.9 g/dL at 1/12/2024  6:03 AM, will monitor as appropriate   # Thrombocytopenia: Lowest platelets = 45 in last 2 days, will monitor for bleeding      # Chronic heart failure with reduced ejection fraction: last echo with EF <40%           # Financial/Environmental Concerns: none (Per pt she discussed option of applying for disability with her new PCP)         Disposition Plan      Expected Discharge Date: 01/17/2024, 12:00 PM    Destination: home with family  Discharge Comments: referral sent to [facility] VERNON Allen MD  Hospitalist Service, GOLD TEAM 9  [facility]  Securely message with [employer] (more info)  Text page via [employer] Paging/Directory   See signed in provider for up to date coverage information  ______________________________________________________________________    Interval History   NAION. No chest pain or SOB. Line doing okay. Leg swelling improved. No fevers. No nausea. No cp, palpitations,  sob, mono, coughing, Has, lightheadedness, dizziness.    Physical Exam   Vital Signs: Temp: 98.5  F (36.9  C) Temp src: Oral BP: (!) 132/93 Pulse: 87   Resp: 18 SpO2: 100 % O2 Device: None (Room air)    Weight: 114 lbs 3.17 oz    Constitutional: NAD  Cardiovascular: RRR  Respiratory: CTAB  GI: bowel sounds present throughout   Neuro: alert and oriented, no gross focal deficits noted  MSK: RUE with 1+ edema below the elbow, radial pulse+     Medical Decision Making       MANAGEMENT DISCUSSED with the following over the past 24 hours: SW, nephrology, and nursing       Data     I have personally reviewed the following data over the past 24 hrs:    11.5 (H)  \   11.1 (L)   / 45 (LL)     139 103 32.5 (H) /  123 (H)   3.2 (L) 25 2.34 (H) \

## 2024-01-16 NOTE — PROGRESS NOTES
CHW following up on OP dialysis referral with [facility].  For information on OP Lupus provider see RNCC note.      Unit and chair time pending acceptance:  TTS 11:45am chair time depends on if pt has a 3 or 4 hour run time.  [facility]  [address] [place] [address]  Ph [phone]  F [phone]  # [phone] CHW spoke to Concha from unit- they need a provider to provider call with our nephrologist. CHW agreed to call back with our nephrologist's #. They also need a discharge summary and orders, and at least 3 recent dialysis run notes. CHW let her know we need to confirm a chair time to get discharge orders- she said they can do TTS 11:45am and the time may change but will remain TTS depending on if pt needs to run 3 or 4 hours. CHW left Concha  to call CHW back with their nephrologist's #.   CHW paged nephrologist Concha Blanco asking for her # for a provider to provider.      [facility] Admissions:  [phone] ext 1, 2 f/u, 1, ext [phone]# Karina. F [phone]  CHW spoke to Karina- CHW updated Marcia pt will likely be ready tomorrow. They can start her on Thursday. They asked for info on access- CHW hand faxed- sent. CHW left  to call CHW back.       Tara Doherty   6A/B/C Community Health Worker   Phone: [phone]

## 2024-01-16 NOTE — PROGRESS NOTES
CLINICAL NUTRITION SERVICES - REASSESSMENT NOTE     Nutrition Prescription    Malnutrition Status:    Does not meet criteria     Recommendations already ordered by Registered Dietitian (RD):  Continue to encourage PO intake, at least 1 high protein food per meal tray.    Modified HS snack per pt preference - now ending egg salad sandwich on white bread at 8 PM + alternate between side of grapes and/or orange    Verbal discussion of increased protein needs w/ new dialysis start. Additionally discussed recommendation to avoid processed/prepackaged foods and dave per hyperphosphatemia. Pt verbalized an understanding to this and declined offer for additional education.     Future/Additional Recommendations:  Monitor nutrition-related findings and follow pt per protocol     EVALUATION OF THE PROGRESS TOWARD GOALS   Diet: Renal/dialysis, 1500 mL FR   Supplement/snack: HS snack of peanut butter and jelly sandwich + alternate between grapes and oranges as a side item.      PO Intake: 100% intake documented for most meals, per I/O since last assessment. Per review of room service selections, pt ordering 3 meals per day most days, with avg intake over past 5-days of 2014 Kcals (100% goal) and 58 gm protein (97% goal).  Good appetite reported by patient. Would like to modify HS snack. Writer assisted with placing lunch order and provided verbal diet ed regarding nutrition recs w/ new dialysis.   **Note, now that HD has been initiated, protein goal is 1.2-1.5 gm/kg/day.      NEW FINDINGS   GI:  1-4 unmeasured BMs per day over past week, per I/O.     Weights:  Down 13.8% body weight since admission; however, this is very likely due to pt fluid status, given new HD initiation. Continue using IBW for est nutrition goals (IBW 50 kg, very close to current BW of 51.8 kg)    Labs:  BUN/Cr trending down nicely with HD; continue to monitor  K+ 3.2 (L), previously WNL  BG trends reviewed; overall has been within acceptable range for  acute care     Medications:  D-Vi-Sol, 10 mcg daily (400 units Vit D daily)  B12 100 mcg daily   Ferosul 325 mg daily   Folvite 1 mg daily   Prednisone daily     Skin:  No concerns presently noted; WOCN not following pt.      MALNUTRITION  % Intake: No decreased intake noted  % Weight Loss: Weight loss does not meet criteria --> Fluid-loss.   Subcutaneous Fat Loss: None observed  Muscle Loss: None observed  Fluid Accumulation/Edema: Mild  Malnutrition Diagnosis: Patient does not meet two of the established criteria necessary for diagnosing malnutrition but is at risk for malnutrition    Previous Goals   atient to consume % of nutritionally adequate meal trays TID, or the equivalent with supplements/snacks.   Evaluation: Met    Previous Nutrition Diagnosis  Predicted inadequate nutrient intake (energy/protein) related to acute hospitalization with potential for decline to appetite/intake and/or potential for menu fatigue as evidenced by reliance on PO diet to meet full nutrition goals, prolonged hospital LOS.     Evaluation: No change    CURRENT NUTRITION DIAGNOSIS  Predicted inadequate nutrient intake (energy/protein) related to acute hospitalization with potential for decline to appetite/intake and/or potential for menu fatigue as evidenced by reliance on PO diet to meet full nutrition goals, prolonged hospital LOS.       INTERVENTIONS  Implementation  Modify composition of meals/snacks - Adjust HS snack  Nutrition education for nutrition relationship to health/disease - Renal diet recs    Goals  Patient to consume % of nutritionally adequate meal trays TID, or the equivalent with supplements/snacks.    Monitoring/Evaluation  Progress toward goals will be monitored and evaluated per protocol.    Israel Mock, MS, RDN, LD, CNSC  Available via phone, [employer] chat, and pager  Phone: [phone]  6B/Obs RD pager: [phone]         Weekend/Holiday RD pager [phone]

## 2024-01-16 NOTE — PLAN OF CARE
Neuro: A&Ox4.  quiet flat.  Cardiac: BP < 180. No hydralazine given.    Respiratory: Sating >95% on RA. No SOB noted   GI/: patient voiding. No B/M, Strict I/O need to be educated about documenting  I/Os, patient verbalized understanding.   Diet/appetite: Tolerating Renal diet. Eating well.  Activity:  Independent in room, up to chair and in halls.  Pain: At acceptable level on current regimen.   Skin: No new deficits noted.  LDA's: 2 LT PIV, RT HD CVC     Plan: Continue with POC. Notify primary team with changes.

## 2024-01-16 NOTE — PLAN OF CARE
Goal Outcome Evaluation:         [id number]  Neuro: A&Ox4. Quiet  Cardiac: HR 80s. SBP goal <180 met this shift. Afebrile.   Respiratory: Sating 100% on RA.  GI/: Adequate urine output, up to bathroom independently. No BM this shift.   Diet/appetite: Tolerating renal diet and 1500 fluid restriction. Eating well.  Activity:  Independent up to bathroom.   Pain: Denies any pain.   Skin: No new deficits noted.  LDA's: R CVC for HD, @ [facility].    Plan: Continue with POC. Notify primary team with changes.

## 2024-01-16 NOTE — PROGRESS NOTES
Nephrology Progress Note  01/16/2024         Assessment & Recommendations:   Milly Carroll is a 23 year old female admitted on 1/2/2024. 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 She was admitted with chest pain and shortness of breath, found to be COVID positive s/p tx with Remdesivir, w/ severely decreased global cardiac function on echo, and requiring multiple doses of IV diuretics due to volume overload, and having worsening CHRISTA w/ creatinine from 2.48 on admission increased to 4.31 on 1/8/2024. Fluid status, kidney function and hypertension continued to worsen despite IV diuretics, and increase in antihypertensives. TDC was placed on 1/10 and iHD initiated on 1/11/23. Tolerating iHD.    Recommendations:  - Continue Coreg to 25 mg  - Continue hydralazine to 25 mg TID  - Start Losartan 50 mg QD  - Continue to postpone cyclophosphamide, at a minimum until after 01/12 but consider postponing further due to significantly impaired renal function  - Start prednisone 60 mg taper (taper plan was 60 mg for 14 days then 59-84-97-20-10 x 14 days each)  - Daily BMP  - Daily standing weights, strict I&Os   - SW for outpatient dialysis referral in [facility]    #Lupus nephritis, class IV  #SLE  #Chronic kidney disease, stage IV (Cystatin C/creatinine discordance)  # Acute kidney injury  Patient's creatinine was 3.48 on 12/30, decreased to 2.48 on admission at [facility] 1/2/2024  and subsequently raised to 4.2 prior to iHD initiation. CHRISTA in the setting of lupus nephritis may likely represent poor renal perfusion in the setting of volume overload, cardiorenal syndrome and diuresis. Lupus flare less  likely given improving complement and dsDNA levels and current treatment with steroids and recent treatment w/ cyclophosphamide. At this time, we will hold off on further immunosuppressants. She has began steroid taper.   Clarified with infusion coordinator that Milyl has had 5 cyclophosphamide infusions thus far (08/22, 09/08, 09/22, 10/06, 12/22). Jhonathan improving slowly.     #Hypertension  #Acute on chronic systolic heart failure, stress cardiomyopathy  Most likely cause of her hypertension is volume overload, particularly given hypervolemic appearance on physical examination, significantly elevated BNP from baseline, signs of pulmonary edema on CXR. On discussion with cardiology, there is question of if the precipitant of her systolic heart failure is uncontrolled, severe hypertension. Given normal CK, it does not appear likely that there is a component of myocarditis contributing to her presentation. Repeat echocardiogram on 01/06 was 35-40%.     #Metabolic acidosis, chronic  Agree with continuing NaHCO3 650 mg TID. She has a chronic metabolic acidosis and is tolerating this well.      #Chronic macrocytic anemia  Would recommend holding off on starting epo for now, as her HGB has improved from prior. However, would favor starting epo if her HGB is <9.0 or if she remains less than 9.5 on rechecks over the next 1-2 days. Iron studies were obtained, Fe saturation of 40%.     Recommendations were communicated to primary team via [employer]. Discussed w/ patient.     Seen and discussed with Dr. Anne Todd MD  [facility]  Department of Internal Medicine   Resident Physician  PGY-[id number]  Pager: [phone]     Interval History :   Nursing and provider notes from last 24 hours reviewed.  NAEON. Underwent HD yesterday, removed 2L in 3.5hr run.   Patient's recorded urine output inaccurate; patient was dumping urine without measuring. Discussed with nurse and patient to record accurately.  Patient  "endorses legs are much less swollen than before. Denies chest pain or SOB. Endorses \"a lot of urine\".     Physical Exam:   I/O last 3 completed shifts:  In: 420 [P.O.:420]  Out: 2000 [Other:2000]   BP (!) 160/103 (BP Location: Left arm)   Pulse 68   Temp 98.3  F (36.8  C) (Oral)   Resp 18   Ht 1.575 m (5' 2\")   Wt 51.8 kg (114 lb 3.2 oz)   LMP 12/11/2023 (Approximate)   SpO2 100%   BMI 20.89 kg/m       General: Laying in bed, no acute distress. Interacts appropriately.   HENT: Normocephalic, atraumatic.   Cardiovascular: Regular rate and rhythm.  Respiratory: Clear to auscultation bilaterally. No increased work of breathing.   Abdomen: Soft, non-tender, non-distended.    Neurologic: Alert. Oriented. Moves all extremities equally.   Musculoskeletal: RUE w/ no pitting edema.   Skin: She has some redness of the face on the bilateral cheeks but no clear malar rash. No signs of pallor or cyanosis. Warm and dry.   Psych: Flat affect, slightly improved     Labs:   All labs reviewed by me  Electrolytes/Renal -   Recent Labs   Lab Test 01/16/24  0540 01/15/24  1154 01/15/24  0554 01/14/24  0532 01/06/24  0638 01/05/24  0612 12/28/23  0623 12/27/23  0001 12/25/23  0442 12/24/23  1605 12/24/23  0943 12/20/23  0415 12/19/23  2335     --  138 136   < > 139   < > 141 144  --  144   < > 140   POTASSIUM 3.2*  --  3.5 3.5   < > 4.8   < > 5.6* 3.1*  3.1*   < > 3.4   < > 3.6   CHLORIDE 103  --  103 100   < > 107   < > 109* 110*  --  111*   < > 106   CO2 25  --  23 26   < > 18*   < > 17* 20*  --  21*   < > 18*   BUN 32.5*  --  45.6* 26.7*   < > 96.8*   < > 75.1* 60.5*  --  58.9*   < > 52.1*   CR 2.34*  --  2.83* 1.85*   < > 3.49*   < > 2.98* 2.57*  --  2.42*   < > 2.87*   * 214* 121* 88   < > 124*   < > 133* 138*  --  95   < > 144*   BISI 8.5*  --  8.2* 8.2*   < > 8.4*   < > 8.6 8.0*  --  8.2*   < > 7.4*   MAG  --   --   --   --   --   --   --  2.1 1.8  --   --   --  2.0   PHOS  --   --   --   --   --  6.1*  --   " --  3.4  --  4.6*   < > 6.3*    < > = values in this interval not displayed.       CBC -   Recent Labs   Lab Test 01/16/24  0540 01/15/24  0554 01/14/24  0532   WBC 11.5* 6.7 6.0   HGB 11.1* 9.9* 10.1*   PLT 45* 45* 50*       LFTs -   Recent Labs   Lab Test 01/13/24  0608 01/12/24  0603 01/11/24  0408   ALKPHOS 108 113 109   BILITOTAL 0.3 0.3 0.2   ALT 44 42 48   AST 25 22 24   PROTTOTAL 5.6* 5.3* 5.4*   ALBUMIN 3.2* 2.9* 3.0*       Iron Panel -   Recent Labs   Lab Test 01/05/24  0612 12/20/23  0415 12/17/23  0429 07/10/23  1454   IRON 76  --  130 15*   IRONSAT 40  --  74* 9*   GABRIEL 786*   < > 32,775* 1,079*    < > = values in this interval not displayed.             Surg path - L Kidney biopsy (8/2/2023)   - diffuse sclerosing and focal proliferative lupus nephritis, ISN/RPS Class IV (see comment below).  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  -  Severe interstitial fibrosis and tubular atrophy (70-80%).    COMMENT:  Active crescentic injury involves 2% of sampled glomeruli, while 15% of the glomeruli demonstrate old fibrous crescents and 51% of the glomeruli are globally sclerosed. Light microscopic features raise the possibility of a concomitant class V (membranous) lupus nephritis component, but glomeruli are not available for confirmatory electron microscopy. The NIH activity and chronicity indices are 7/24 and 10/12, respectively.          Imaging:  All imaging studies reviewed by me.     Current Medications:   artificial saliva  1 spray Mouth/Throat 4x Daily    carvedilol  25 mg Oral BID    cholecalciferol  10 mcg Oral Daily    vitamin B-12  100 mcg Oral Daily    ferrous sulfate  325 mg Oral Daily with breakfast    folic acid  1 mg Oral Daily    sodium chloride (PF) 0.9%  1.3-2.6 mL Intracatheter Once    Followed by    heparin  3 mL Intracatheter Once    sodium chloride (PF) 0.9%  1.3-2.6 mL Intracatheter Once    Followed by    heparin  3 mL Intracatheter  Once    heparin ANTICOAGULANT  5,000 Units Subcutaneous Q12H    hydrALAZINE  25 mg Oral TID    losartan  100 mg Oral Daily    NIFEdipine ER OSMOTIC  30 mg Oral Daily    pantoprazole  40 mg Oral QAM AC    perflutren diluted 1mL to 2mL with saline  6 mL Intravenous Once    [Held by provider] potassium chloride ER  20 mEq Oral BID    predniSONE  50 mg Oral Daily    sodium chloride (PF)  3 mL Intracatheter Q8H    sulfamethoxazole-trimethoprim  1 tablet Oral Once per day on Tuesday Thursday Saturday

## 2024-01-17 NOTE — PLAN OF CARE
Goal Outcome Evaluation:         [id number]  Neuro: A&Ox4. Quiet.   Cardiac: HR 80s. SBP goal <180, no hydralazine given. Afebrile.   Respiratory: Sating >95% on RA.  GI/: Adequate urine output, up to bathroom independently. BM x 1.   Diet/appetite: Tolerating renal diet and 1500 fluid restriction. Eating well.  Activity:  Independent, up to bathroom.   Pain: Denies any pain.   Skin: No new deficits noted.  LDA's: R CVC for HD, 2 L PIV SL.    Plan: Continue with POC. Notify primary team with changes.

## 2024-01-17 NOTE — PROGRESS NOTES
Call placed to [facility] provide a ride and confirmed patient insurance does have providers in [place] to support dialysis MUST have 3 day notice to get ride arranged.  For Specialty providers in [place], they can also provide this- there is a form that can be filled out by calling [facility] to request mileage acception but requires to do this application within 30days of appts before it can be submitted.  Updated Discharge coordinator of findings.    Patient will be discharging to local area so not need for additional support at discharge.    Oralia Rodriguez RN, BSN, PHN  [facility] Nephrology Nurse Navigator  [phone]

## 2024-01-17 NOTE — PROGRESS NOTES
"Care Management Follow Up    Length of Stay (days): 15    Expected Discharge Date: TBD     Concerns to be Addressed: discharge planning, medical readiness.   Patient plan of care discussed at interdisciplinary rounds: Yes    Anticipated Discharge Disposition: Home (staying locally w/her mother)  Anticipated Discharge Services: None  Anticipated Discharge DME: None    Patient/family educated on Medicare website which has current facility and service quality ratings: NA  Education Provided on the Discharge Plan: Yes  Patient/Family in Agreement with the Plan: Yes    Referrals Placed by CM/SW: OP HD, home care  Private pay costs discussed: Not applicable    Additional Information:  Update received from patient's bedside nurse regarding discharge plan/location. Writer spoke w/patient who confirmed that she now plans to discharge locally and stay w/her mom. Patient declined writer following up with her aunt or mother, noted that they are all aware of the plan. Patient is aware that OP HD will need to be arranged in [place]. Patient will be staying w/her mother at an extended stay hotel, address noted below. Community Health Worker updated, will initiate OP HD referral process near [place]. Patient would still benefit from home care services for RN visits, will initiate HC referral process in the [place] area, referral sent to the [facility] w/the anticipated discharge address.    Patient noted that her mother works at [employer] in [place], [place], notes that she goes to work at 2am and would prefer patient do OP HD in the afternoon if possible. Patient noted that her mother may or may not be able to provide rides to OP HD. Writer reminded patient of medical transportation coverage through her insurance, will provide her with the [employer] Ride contact info and assist w/arranging first OP HD rides.     Writer asked patient how she was feeling about everything, she smiled and replied \"ok.\" Patient has " been alone at the bedside since writer has been involved for the past 15 days, is often playing a game on her phone and minimally engages w/writer and other staff when attempts are made to have a conversation. Patient would likely highly benefit from a health psychology consult, will request order from primary team tomorrow morning.     Writer will leave [facility] referral and OP Rheumatology appt pending at this time, will discontinue/cancel once the discharge plan is finalized and it is absolutely confirmed that the patient will discharge locally to [place].     Discharge resources:    Discharge location:  [id number] [place] [address] (will need OP HD rides arranged prior to discharge)  Phone: [phone]    Home Care (RN needed)  Agency TBD    Care coordination will continue to follow for discharge planning.     Concha Warren, RNCC, BSN    [facility]    [address]  [address] [place] [address]    [email or web address]  [email or web address]    Office: [phone] Pager: [phone]

## 2024-01-17 NOTE — PROGRESS NOTES
[facility]    Medicine Progress Note - Hospitalist Service, GOLD TEAM 9    Date of Admission:  1/2/2024    Assessment & Plan   Milly Carroll is a 23 year old female admitted on 1/2/2024. She has a PMHx of lupus C/B lupus flares, lupus cerebritis, lupus nephritis, HTN, chronic systolic heart failure, QT prolongation, past perinephric hematoma s/p emobolization in Aug-2023 and chronic thrombocytopenia admitted to [facility] on 12/17/23 (after presentation for SOB/NSTEMI and pneumonia concern and concern for lupus flare where she was treated w/ steroids and plasma exchange) and transfer to [facility] on 12/18/23 before being transferred to [facility] on 12/22-12/25 for cyclophosphamide infusion. Represented on 12/26 [facility] ED for SOB found to have covid and reduced EF (baseline EF 47%, OSH EF reported as 15-20%) w/ plan to transfer patient to [facility] however patient left AMA on 12/30 due to prolonged wait for transfer to [facility] ED. On presentation 1/2, patient reported continued worsening CP/SOB since her discharge on 12/30. Chest pain and dyspnea has since improved during her admission.     Currently being managed for acute on chronic heart failure and CHRISTA on CKD, concern for cardiorenal syndrome in the setting of prior lupus nephritis, and poorly controlled htn. Since yesterday, htn has improved.    Acute on Chronic HF (EF 15-20% on 12/26 OSH)  Dyspnea and Chest Pain - improved   Uncontrolled HTN, improved  Tricuspid insufficiency   Presented with chest pain and dyspnea as above. Concern that hypertensive crisis contributing to chest pain. Echocardiogram on 17-Dec-2023 showed LVEF 35-40%, EF decreased to 15-20% on 12/26 w/ BNP at that time >70,000. Lexiscan 12/21 negative for inducible ischemia. Admission workup 1/2 included trop elevated at 290 with peak at 469 on 1/2. TTE 1/2 with severe diffuse hypokinesis with stably  reduced EF. Limited TTE 1/7 with EF 35-40% and dilated IVC. Cardiology consulted with recs as below.   V/Q scan 1/3 indeterminate, overall lower suspicion of VTE contributing to current presentation.   Attempted diuresis with increased doses of IV bumex doses (up to bumex 4 mg IV x 1) on 1/4-1/6 with minimal urine output and concern for cardiorenal syndrome. Now on dialysis.   -Gen Cardiology consulted, appreciate recs  -Nephrology consulted, appreciate assistance   -Dialysis runs per nephrology  -Stopped bumex 1/13/2024  -Continue carvedilol   -Continue hydralazine TID   -Continue nifedipine (restarted on 1/7)   -Started losartan at 50mg every day to improve HTN 1/14/24    -Increasing to 100mg every day after discussion with nephro 1/15/2024   -Consider cMRI during admission once fluid status improved     CHRISTA  Metabolic Acidosis, resolved  Recent Lupus Nephritis   Cr up trending this admission, per discussion with cardiology, nephrology, and rheumatology current CHRISTA suspected to be more likely related to cardiorenal syndrome and ongoing volume overload.   -Nephrology consulted, appreciate assistance    -Continue diuresis and blood pressure management as above   -Stopped Bicarb tabs  -Rheumatology following, appreciate assistance   -Due for 5th dose cyclophosphamide per [facility] protocol 1/5 (ongoing discussion w/ rheum/nephrology prior to administration), holding for now due to COVID and other ongoing management as outlined     R forearm blister, improving  Quarter sized with purulence, sent culture overnight. WOCN to see.  -Derm consult placed   -Daily Vaseline and bandage over wound until healed  -NGTD on culture, will hold off on further vancomycin at this time    COVID-19 Infection, resolved  Tested positive on 12/29 at OSH when presented with chest pain and dyspnea. Concern for superimposed PNA per OSH and was on vanc/cefepime 12/26-12/30 prior to AMA leave. Initially cefepime continued on admission,  discontinued on 1/4 due to low suspicion for bacterial pneumonia.  Has remained on RA this admission. Overall unclear how much COVID-19 contributed to acute presentation.   -Received remdesivir 12/29 and 12/30 prior to leaving AMA, completed 5 day course given high risk (1/5 - 1/7)     Hx of C.diff infxn (6/2023)  -Stopped PO vanc prophylaxis on 1/4 when antibiotics stopped     SLE (+dsDNA, +RNP, +ribosomal P Ab and hypocomplementemia)   Recent Lupus Flare w/ MAHA and renal insuff s/p Cyclophosphamide dose (12/22/23)  Hx of Lupus cerebritis w/ hx of seizures  C/b Lupus Nephritis  Hx medication non-adherence  -Lupus-focused meds:  -Continue prednisone 50mg qday, starting 50mg 1/15/2024, plan for 14 each of 52-91-95-20-10 taper  -PJP ppx: continue PTA bactrim  -GI ppx: continue protonix qday, tums prn  -Cyclophosphamide on hold as above   -Will need OP lupus follow up (rheumatology OP consult placed)    Hypomagnesemia  Hypokalemia, resolved  -Replete per protocol      Chronic RUE edema with weakness   Superficial clot of R cephalic vein  US 8/2023 negative. RUE doppler negative for DVT on 1/2, significant for superficial thrombus. Repeat US RUE 1/7 with no evidence of DVT. Suspect weakness 2/2 swelling, lower concern for alternate etiologies.   -Symptomatic with heat packs    Chronic thrombocytopenia, improving   Chronic Macrocytic Anemia, improving  -Trend CBC daily   -Continue PTA folate/B12 replacement  -DVT ppx: heparin subcutaneous decrease dose per pharmacy in setting of chronic thrombocytopenia and renal dysfunction  -Hematology consulted - hemolysis improving, check labs daily., holding off on epo unless hgb decreases below 9.     Hx of Intermittent Headaches  History of being responsive to APAP and dilaudid along w/ treatment of HTN and Lupus. NO NSAIDS.     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 Stools described as normal.      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 No current PTA antiepileptic medications    Chronic Left Shoulder Pain, improved   Unclear etiology, likely MSK pain.   -PRN tylenol or metharbamol; taper PO dilaudid (no clear indication)           Diet: Fluid restriction 1500 ML FLUID  Renal Diet (dialysis)  Snacks/Supplements Adult: Other; 8 PM: Egg salad sandwich on white bread + alternate grapes/orange E/O day on side; Between Meals    DVT Prophylaxis: Heparin SQ  Leung Catheter: Not present  Lines: None     Cardiac Monitoring: None  Code Status: Full Code      Clinically Significant Risk Factors        # Hypokalemia: Lowest K = 3.2 mmol/L in last 2 days, will replace as needed       # Hypoalbuminemia: Lowest albumin = 2.9 g/dL at 1/12/2024  6:03 AM, will monitor as appropriate   # Thrombocytopenia: Lowest platelets = 43 in last 2 days, will monitor for bleeding      # Chronic heart failure with reduced ejection fraction: last echo with EF <40%           # Financial/Environmental Concerns: none (Per pt she discussed option of applying for disability with her new PCP)         Disposition Plan     Expected Discharge Date: 01/17/2024, 12:00 PM    Destination: home with family  Discharge Comments: referral sent to [facility] VERNON Allen MD  Hospitalist Service, GOLD TEAM 9  [facility]  Securely message with [employer] (more info)  Text page via [facility] Paging/Directory   See signed in provider for up to date coverage information  ______________________________________________________________________    Interval History   NAION. No chest pain or SOB. Line doing okay. Leg swelling  improved. No fevers. No nausea. No cp, palpitations, sob, mono, coughing, Has, lightheadedness, dizziness. BP improved since yesterday    Physical Exam   Vital Signs: Temp: 98.4  F (36.9  C) Temp src: Oral BP: (!) 162/108 Pulse: 90   Resp: 18 SpO2: 100 % O2 Device: None (Room air)    Weight: 116 lbs 6.45 oz    Constitutional: NAD  Cardiovascular: RRR  Respiratory: CTAB  GI: bowel sounds present throughout   Neuro: alert and oriented, no gross focal deficits noted  MSK: RUE with 1+ edema below the elbow, radial pulse+     Medical Decision Making       MANAGEMENT DISCUSSED with the following over the past 24 hours: SW, nephrology, and nursing       Data     I have personally reviewed the following data over the past 24 hrs:    8.7  \   10.2 (L)   / 43 (LL)     139 105 59.2 (H) /  123 (H)   3.2 (L) 22 3.03 (H) \

## 2024-01-17 NOTE — PROGRESS NOTES
CHW following up on OP dialysis referral.     CARMEL updated CHW that pt is now planning to discharge to [place] instead of [place]. Pt prefers afternoon.   Discharge location:  Extended Stay [facility]   [id number] [place] [address]  [place] [address]    [facility]:  [facility] unit pending:  [facility] unit  [address]  [place] [address]  # [phone]  CHW spoke to [facility] [place]- she said go through [facility] admissions to find out about availability.    [facility] Admissions:  Ph [phone] ext [phone] referral, ext 2 check status, Nery [id number].  Submitted to portal: all documents and labs.        [facility]: (for when pt was discharging to [place], no longer following up on this referral, f/u on [facility] instead)  TTS 11:45am - chair time is confirmed and will stay TTS but time may change depending on if pt has a 3 or 4 hour run time.  [facility]  [address] [place] [address]  Ph [phone]  Concha from [facility] unit left CHW VM: unit nephrologist Dr. Han # [phone] is in today for our nephrologist to call. Concha's # is [phone].   CHW spoke to Concha- they are unable to start pts on Saturday so pt would need to start Tuesday.     Nephrologist Concha Blanco called CHW- she is not on service this week, instead contact nephrologist Bhumika Bravo. CHW paged nephrologist Bhumika Bravo asking if he can do a provider to provider today, [facility].       Tara Doherty   6A/B/C Community Health Worker   Phone: [phone]

## 2024-01-17 NOTE — PROGRESS NOTES
Nephrology Progress Note  01/17/2024         Assessment & Recommendations:   Milly Carroll is a 23 year old female admitted on 1/2/2024. 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 She was admitted with chest pain and shortness of breath, found to be COVID positive s/p tx with Remdesivir, w/ severely decreased global cardiac function on echo, and requiring multiple doses of IV diuretics due to volume overload, and having worsening CHRISTA w/ creatinine from 2.48 on admission increased to 4.31 on 1/8/2024. Fluid status, kidney function and hypertension continued to worsen despite IV diuretics, and increase in antihypertensives. TDC was placed on 1/10 and iHD initiated on 1/11/23. Tolerating iHD. Last HD was on 1/15 2L 3.5 Hr run. She will certainly need to be on dialysis long term and ideally we would be able to hold off until she can prepare by having permeant access (fistula) placed which would limit chance of infections and promote better clearance.  Unfortunately, she has lack of social support which would make it challenging for her to manage off dialysis long enough for this to take place. Eventually she will likely need a kidney transplant. No indication to dialyze today.     Recommendations:  - Recheck Mag and Phos (last checked in late December)  - Bumex 2 mg  - Coreg to 25 mg  - Hydralazine 25 mg   - Losartan 100 mg   - Nifedipine 30 mg   - Continue to postpone cyclophosphamide, at a minimum until after 01/12 but consider postponing further due to significantly impaired renal function  - Prednisone 60 mg taper (taper plan was 60 mg for 14 days then 19-59-49-20-10 x 14 days each)  - Daily BMP  -  Daily standing weights, strict I&Os   - SW for outpatient dialysis referral in [facility]    #Lupus nephritis, class IV  #SLE  #Chronic kidney disease, stage IV (Cystatin C/creatinine discordance)  # Acute kidney injury  Patient's creatinine was 3.48 on 12/30, decreased to 2.48 on admission at [facility] 1/2/2024  and subsequently raised to 4.2 prior to iHD initiation. CHRISTA in the setting of lupus nephritis may likely represent poor renal perfusion in the setting of volume overload, cardiorenal syndrome and diuresis. Lupus flare less likely given improving complement and dsDNA levels and current treatment with steroids and recent treatment w/ cyclophosphamide. At this time, we will hold off on further immunosuppressants. She has began steroid taper.   Clarified with infusion coordinator that Milly has had 5 cyclophosphamide infusions thus far (08/22, 09/08, 09/22, 10/06, 12/22). CHRISTA improving but creatinine increased again today from 2.34 to 3.03.  Will hold off on dialysis and re-evaluate tomorrow to see if she is able to tolerate.     #Hypertension  #Acute on chronic systolic heart failure, stress cardiomyopathy  Most likely cause of her hypertension is volume overload, particularly given hypervolemic appearance on physical examination, significantly elevated BNP from baseline, signs of pulmonary edema on CXR. On discussion with cardiology, there is question of if the precipitant of her systolic heart failure is uncontrolled, severe hypertension. Given normal CK, it does not appear likely that there is a component of myocarditis contributing to her presentation. Repeat echocardiogram on 01/06 was 35-40%.     #Hypokalemia  Potassium 3.2 today.    - Hold off on K+ replacement today as this is likely to build up in between dialysis treatments.     #Metabolic acidosis, chronic  Agree with continuing NaHCO3 650 mg TID. She has a chronic metabolic acidosis and is tolerating this well.      #Chronic macrocytic  "anemia  Would recommend holding off on starting epo for now, as her HGB has improved from prior. However, would favor starting epo if her HGB is <9.0 or if she remains less than 9.5 on rechecks over the next 1-2 days. Iron studies were obtained, Fe saturation of 40%.     Recommendations were communicated to primary team via note. Discussed w/ patient.     Seen and discussed with Dr. Anne Lujan History :   Nursing and provider notes from last 24 hours reviewed.  COMFORT. Underwent HD yesterday, removed 2L in 3.5hr run.     Physical Exam:   I/O last 3 completed shifts:  In: 220 [P.O.:220]  Out: 300 [Urine:300]   BP (!) 141/95 (BP Location: Left arm)   Pulse 81   Temp 98.4  F (36.9  C) (Oral)   Resp 20   Ht 1.575 m (5' 2\")   Wt 52.8 kg (116 lb 6.5 oz)   LMP 12/11/2023 (Approximate)   SpO2 100%   BMI 21.29 kg/m       General: Laying in bed, no acute distress. Interacts appropriately.   HENT: Normocephalic, atraumatic.   Cardiovascular: Regular rate and rhythm.  Respiratory: Clear to auscultation bilaterally. No increased work of breathing.   Abdomen: Soft, non-tender, non-distended.    Neurologic: Alert. Oriented. Moves all extremities equally.   Musculoskeletal: no pitting edema.   Skin: She has some redness of the face on the bilateral cheeks but no clear malar rash. No signs of pallor or cyanosis. Warm and dry.   Psych: Flat affect, slightly improved     Labs:   All labs reviewed by me  Electrolytes/Renal -   Recent Labs   Lab Test 01/17/24  0338 01/16/24  0540 01/15/24  1154 01/15/24  0554 01/06/24  0638 01/05/24  0612 12/28/23  0623 12/27/23  0001 12/25/23  0442 12/24/23  1605 12/24/23  0943 12/20/23  0415 12/19/23  2335    139  --  138   < > 139   < > 141 144  --  144   < > 140   POTASSIUM 3.2* 3.2*  --  3.5   < > 4.8   < > 5.6* 3.1*  3.1*   < > 3.4   < > 3.6   CHLORIDE 105 103  --  103   < > 107   < > 109* 110*  --  111*   < > 106   CO2 22 25  --  23   < > 18*   < > 17* 20*  --  21*   < > 18* "   BUN 59.2* 32.5*  --  45.6*   < > 96.8*   < > 75.1* 60.5*  --  58.9*   < > 52.1*   CR 3.03* 2.34*  --  2.83*   < > 3.49*   < > 2.98* 2.57*  --  2.42*   < > 2.87*   * 123* 214* 121*   < > 124*   < > 133* 138*  --  95   < > 144*   BISI 8.3* 8.5*  --  8.2*   < > 8.4*   < > 8.6 8.0*  --  8.2*   < > 7.4*   MAG  --   --   --   --   --   --   --  2.1 1.8  --   --   --  2.0   PHOS  --   --   --   --   --  6.1*  --   --  3.4  --  4.6*   < > 6.3*    < > = values in this interval not displayed.       CBC -   Recent Labs   Lab Test 01/16/24  0540 01/15/24  0554 01/14/24  0532   WBC 11.5* 6.7 6.0   HGB 11.1* 9.9* 10.1*   PLT 45* 45* 50*       LFTs -   Recent Labs   Lab Test 01/13/24  0608 01/12/24  0603 01/11/24  0408   ALKPHOS 108 113 109   BILITOTAL 0.3 0.3 0.2   ALT 44 42 48   AST 25 22 24   PROTTOTAL 5.6* 5.3* 5.4*   ALBUMIN 3.2* 2.9* 3.0*       Iron Panel -   Recent Labs   Lab Test 01/05/24  0612 12/20/23  0415 12/17/23  0429 07/10/23  1454   IRON 76  --  130 15*   IRONSAT 40  --  74* 9*   GABRIEL 786*   < > 32,775* 1,079*    < > = values in this interval not displayed.       Surg path - L Kidney biopsy (8/2/2023)   - diffuse sclerosing and focal proliferative lupus nephritis, ISN/RPS Class IV (see comment below).  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  -  Severe interstitial fibrosis and tubular atrophy (70-80%).    COMMENT:  Active crescentic injury involves 2% of sampled glomeruli, while 15% of the glomeruli demonstrate old fibrous crescents and 51% of the glomeruli are globally sclerosed. Light microscopic features raise the possibility of a concomitant class V (membranous) lupus nephritis component, but glomeruli are not available for confirmatory electron microscopy. The NIH activity and chronicity indices are 7/24 and 10/12, respectively.      Imaging:  All imaging studies reviewed by me.     Current Medications:   artificial saliva  1 spray Mouth/Throat 4x  Daily    carvedilol  25 mg Oral BID    cholecalciferol  10 mcg Oral Daily    vitamin B-12  100 mcg Oral Daily    ferrous sulfate  325 mg Oral Daily with breakfast    folic acid  1 mg Oral Daily    sodium chloride (PF) 0.9%  1.3-2.6 mL Intracatheter Once    Followed by    heparin  3 mL Intracatheter Once    sodium chloride (PF) 0.9%  1.3-2.6 mL Intracatheter Once    Followed by    heparin  3 mL Intracatheter Once    heparin ANTICOAGULANT  5,000 Units Subcutaneous Q12H    hydrALAZINE  25 mg Oral TID    losartan  100 mg Oral Daily    NIFEdipine ER OSMOTIC  30 mg Oral Daily    pantoprazole  40 mg Oral QAM AC    perflutren diluted 1mL to 2mL with saline  6 mL Intravenous Once    [Held by provider] potassium chloride ER  20 mEq Oral BID    predniSONE  50 mg Oral Daily    sodium chloride (PF)  3 mL Intracatheter Q8H    sulfamethoxazole-trimethoprim  1 tablet Oral Once per day on Tuesday Thursday Saturday       Physician Attestation   I, Yulia Rodríguez, saw and evaluated Milly Carroll with the student Ms Cho . I have reviewed and discussed with the student their history, physical and plan.     I personally reviewed the vital signs, medications, labs, and imaging.     In brief,  23 year old female admitted on 1/2/2024. She has a history of lupus nephritis class IV per renal biopsy in 08/2023 with 70-80% IFTA, SLE on prednisone taper and who has received cyclophosphamide (5th dose on 12/22), with also systolic heart failure (35-40% on 01/06),  admitted with chest pain and shortness of breath,and found to be COVID positive with worsening CHRISTA and initiation of HD on 1/11. Reports significant urine output. Although given the significant amount of IFTA in August she is unlikely to recover kidney function to come off dialysis, will however attempt to observe for recovery and no dialysis for today     Rest per the student's note.      I personally spent 35 minutes on the date of encounter for chart review, history taking, physical  exam, labs and notes reviewed, advised and coordinating care.      Yulia Rodríguez MD  Division of Nephrology and Hypertension  Pager [phone]  Date of Service (when I saw the patient): January 17, 2024

## 2024-01-18 NOTE — PROGRESS NOTES
[facility]    Medicine Progress Note - Hospitalist Service, GOLD TEAM 9    Date of Admission:  1/2/2024    Assessment & Plan   Milly Carroll is a 23 year old female admitted on 1/2/2024. She has a PMHx of lupus C/B lupus flares, lupus cerebritis, lupus nephritis, HTN, chronic systolic heart failure, QT prolongation, past perinephric hematoma s/p emobolization in Aug-2023 and chronic thrombocytopenia admitted to [facility] on 12/17/23 (after presentation for SOB/NSTEMI and pneumonia concern and concern for lupus flare where she was treated w/ steroids and plasma exchange) and transfer to [facility] on 12/18/23 before being transferred to [facility] on 12/22-12/25 for cyclophosphamide infusion. Represented on 12/26 [facility] ED for SOB found to have covid and reduced EF (baseline EF 47%, OSH EF reported as 15-20%) w/ plan to transfer patient to [facility] however patient left A on 12/30 due to prolonged wait for transfer to [facility] ED. On presentation 1/2, patient reported continued worsening CP/SOB since her discharge on 12/30. Chest pain and dyspnea has since improved during her admission.     Currently being managed for acute on chronic heart failure and CHRISTA on CKD, concern for cardiorenal syndrome in the setting of prior lupus nephritis, and poorly controlled htn. Since yesterday, htn has improved.    Today's Plan:  - continue to manage Bps (improved since yesterday)  - monitor GFR and lytes. Nephrology following.  - begin Bumex 1mg BID  - Increase Nifidipine to 60mg    Acute on Chronic HF (EF 15-20% on 12/26 OSH)  Dyspnea and Chest Pain - improved   Uncontrolled HTN, improved  Tricuspid insufficiency   Presented with chest pain and dyspnea as above. Concern that hypertensive crisis contributing to chest pain. Echocardiogram on 17-Dec-2023 showed LVEF 35-40%, EF decreased to 15-20% on 12/26 w/ BNP at that time >70,000. Lexiscan  12/21 negative for inducible ischemia. Admission workup 1/2 included trop elevated at 290 with peak at 469 on 1/2. TTE 1/2 with severe diffuse hypokinesis with stably reduced EF. Limited TTE 1/7 with EF 35-40% and dilated IVC. Cardiology consulted with recs as below.   V/Q scan 1/3 indeterminate, overall lower suspicion of VTE contributing to current presentation.   Attempted diuresis with increased doses of IV bumex doses (up to bumex 4 mg IV x 1) on 1/4-1/6 with minimal urine output and concern for cardiorenal syndrome. Now on dialysis.   -Gen Cardiology consulted, appreciate recs  -Nephrology consulted, appreciate assistance   -Dialysis runs per nephrology  -bumex 1mg BID  -Continue carvedilol   -Continue hydralazine TID   -Continue nifedipine (restarted on 1/7)   -losartan 100mg  Nifedipine 60mg daily  -Consider cMRI during admission once fluid status improved     CHRISTA  Metabolic Acidosis, resolved  Recent Lupus Nephritis   Cr up trending this admission, per discussion with cardiology, nephrology, and rheumatology current CHRISTA suspected to be more likely related to cardiorenal syndrome and ongoing volume overload.   -Nephrology consulted, appreciate assistance    -Continue diuresis and blood pressure management as above   -Stopped Bicarb tabs  -Rheumatology following, appreciate assistance   -Due for 5th dose cyclophosphamide per [employer] protocol 1/5 (ongoing discussion w/ rheum/nephrology prior to administration), holding for now due to COVID and other ongoing management as outlined     R forearm blister, improving  Quarter sized with purulence, sent culture overnight. WOCN to see.  -Derm consult placed   -Daily Vaseline and bandage over wound until healed  -NGTD on culture, will hold off on further vancomycin at this time    COVID-19 Infection, resolved  Tested positive on 12/29 at OSH when presented with chest pain and dyspnea. Concern for superimposed PNA per OSH and was on vanc/cefepime 12/26-12/30 prior to  AMA leave. Initially cefepime continued on admission, discontinued on 1/4 due to low suspicion for bacterial pneumonia.  Has remained on RA this admission. Overall unclear how much COVID-19 contributed to acute presentation.   -Received remdesivir 12/29 and 12/30 prior to leaving AMA, completed 5 day course given high risk (1/5 - 1/7)     Hx of C.diff infxn (6/2023)  -Stopped PO vanc prophylaxis on 1/4 when antibiotics stopped     SLE (+dsDNA, +RNP, +ribosomal P Ab and hypocomplementemia)   Recent Lupus Flare w/ MAHA and renal insuff s/p Cyclophosphamide dose (12/22/23)  Hx of Lupus cerebritis w/ hx of seizures  C/b Lupus Nephritis  Hx medication non-adherence  -Lupus-focused meds:  -Continue prednisone 50mg qday, starting 50mg 1/15/2024, plan for 14 each of 78-35-05-20-10 taper  -PJP ppx: continue PTA bactrim  -GI ppx: continue protonix qday, tums prn  -Cyclophosphamide on hold as above   -Will need OP lupus follow up (rheumatology OP consult placed)    Hypomagnesemia  Hypokalemia, resolved  -Replete per protocol      Chronic RUE edema with weakness   Superficial clot of R cephalic vein  US 8/2023 negative. RUE doppler negative for DVT on 1/2, significant for superficial thrombus. Repeat US RUE 1/7 with no evidence of DVT. Suspect weakness 2/2 swelling, lower concern for alternate etiologies.   -Symptomatic with heat packs    Chronic thrombocytopenia, improving   Chronic Macrocytic Anemia, improving  -Trend CBC daily   -Continue PTA folate/B12 replacement  -DVT ppx: heparin subcutaneous decrease dose per pharmacy in setting of chronic thrombocytopenia and renal dysfunction  -Hematology consulted - hemolysis improving, check labs daily., holding off on epo unless hgb decreases below 9.     Hx of Intermittent Headaches  History of being responsive to APAP and dilaudid along w/ treatment of HTN and Lupus. NO NSAIDS.     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 Stools described as normal.      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 No current PTA antiepileptic medications    Chronic Left Shoulder Pain, improved   Unclear etiology, likely MSK pain.   -PRN tylenol or metharbamol; taper PO dilaudid (no clear indication)           Diet: Fluid restriction 1500 ML FLUID  Renal Diet (dialysis)  Snacks/Supplements Adult: Other; 8 PM: Egg salad sandwich on white bread + alternate grapes/orange E/O day on side; Between Meals    DVT Prophylaxis: Heparin SQ  Leung Catheter: Not present  Lines: None     Cardiac Monitoring: None  Code Status: Full Code      Clinically Significant Risk Factors        # Hypokalemia: Lowest K = 3.2 mmol/L in last 2 days, will replace as needed       # Hypoalbuminemia: Lowest albumin = 2.9 g/dL at 1/12/2024  6:03 AM, will monitor as appropriate   # Thrombocytopenia: Lowest platelets = 39 in last 2 days, will monitor for bleeding      # Chronic heart failure with reduced ejection fraction: last echo with EF <40%           # Financial/Environmental Concerns: none (Per pt she discussed option of applying for disability with her new PCP)         Disposition Plan      Expected Discharge Date: 01/21/2024, 12:00 PM    Destination: home with family  Discharge Comments: Discharge date TBD. Nephrology hopeful pt will not need OP HD at discharge. OP HD referral now started for [facility].            SANIYA CHAMORRO MD  Hospitalist Service, [address]  Securely message with [employer] (more info)  Text page via [facility] Paging/Directory   See signed in provider for up to date coverage  information  ______________________________________________________________________    Interval History   NAION. No chest pain or SOB. Line doing okay. Leg swelling improved. No fevers. No nausea. No cp, palpitations, sob, mono, coughing, Has, lightheadedness, dizziness. BP improved since yesterday    Physical Exam   Vital Signs: Temp: 98.4  F (36.9  C) Temp src: Axillary BP: (!) 154/101 Pulse: 93   Resp: 20 SpO2: 100 % O2 Device: None (Room air)    Weight: 116 lbs 6.45 oz    Constitutional: NAD  Cardiovascular: RRR  Respiratory: CTAB  GI: bowel sounds present throughout   Neuro: alert and oriented, no gross focal deficits noted  MSK: RUE with 1+ edema below the elbow, radial pulse+     Medical Decision Making       MANAGEMENT DISCUSSED with the following over the past 24 hours: SW, nephrology, and nursing       Data     I have personally reviewed the following data over the past 24 hrs:    5.3  \   9.2 (L)   / 39 (LL)     138 111 (H) 85.0 (H) /  95   3.7 20 (L) 3.41 (H) \

## 2024-01-18 NOTE — PROGRESS NOTES
Nephrology Progress Note  01/18/2024         Assessment & Recommendations:   Milly Carroll is a 23 year old female admitted on 1/2/2024. 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 She was admitted with chest pain and shortness of breath, found to be COVID positive s/p tx with Remdesivir, w/ severely decreased global cardiac function on echo, and requiring multiple doses of IV diuretics due to volume overload, and having worsening CHRISTA w/ creatinine from 2.48 on admission increased to 4.31 on 1/8/2024. Fluid status, kidney function and hypertension continued to worsen despite IV diuretics, and increase in antihypertensives. TDC was placed on 1/10 and iHD initiated on 1/11/23. Tolerating iHD. Last HD was on 1/15 2L 3.5 Hr run. She will certainly need to be on dialysis long term and ideally we would be able to hold off until she can prepare by having permeant access (fistula) placed which would limit chance of infections and promote better clearance.  Unfortunately, she has lack of social support which would make it challenging for her to manage off dialysis long enough for this to take place. Eventually she will likely need a kidney transplant. No indication to dialyze today.     Recommendations:  - Bumex 1mg BID   - increase Nifedipine to 60mg   - continue Coreg to 25 mg  - continue Hydralazine 25 mg   - continue Losartan 100 mg   - Continue to postpone cyclophosphamide, at a minimum until after 01/12 but consider postponing further due to significantly impaired renal function  - Prednisone 60 mg taper (taper plan was 60 mg for 14 days then 79-79-11-20-10 x 14 days each)  - Daily BMP  - Daily standing  weights, strict I&Os   - SW for outpatient dialysis referral in [facility]    #Lupus nephritis, class IV  #SLE  #Chronic kidney disease, stage IV (Cystatin C/creatinine discordance)  # Acute kidney injury  Patient's creatinine was 3.48 on 12/30, decreased to 2.48 on admission at [facility] 1/2/2024  and subsequently raised to 4.2 prior to iHD initiation. CHRISTA in the setting of lupus nephritis may likely represent poor renal perfusion in the setting of volume overload, cardiorenal syndrome and diuresis. Lupus flare less likely given improving complement and dsDNA levels and current treatment with steroids and recent treatment w/ cyclophosphamide. At this time, we will hold off on further immunosuppressants. She has began steroid taper.   Clarified with infusion coordinator that Milly has had 5 cyclophosphamide infusions thus far (08/22, 09/08, 09/22, 10/06, 12/22). CHRISTA improving but creatinine increased again today from 2.34 to 3.03.  Will hold off on dialysis and re-evaluate tomorrow to see if she is able to tolerate.     #Hypertension  #Acute on chronic systolic heart failure, stress cardiomyopathy  Most likely cause of her hypertension is volume overload, particularly given hypervolemic appearance on physical examination, significantly elevated BNP from baseline, signs of pulmonary edema on CXR. On discussion with cardiology, there is question of if the precipitant of her systolic heart failure is uncontrolled, severe hypertension. Given normal CK, it does not appear likely that there is a component of myocarditis contributing to her presentation. Repeat echocardiogram on 01/06 was 35-40%.   - continue Coreg to 25 mg  - continue Hydralazine 25 mg   - continue Losartan 100 mg   - increase Nifedipine to 60mg     #Hypokalemia, resolved  Potassium 3.7 today.      #Metabolic acidosis, chronic  Agree with continuing NaHCO3 650 mg TID. She has a chronic metabolic acidosis and is tolerating this well. Bicarb 20  "today.      #Chronic macrocytic anemia  Would recommend holding off on starting epo for now, as her HGB has improved from prior. However, would favor starting epo if her HGB is <9.0 or if she remains less than 9.5 on rechecks over the next 1-2 days. Iron studies were obtained, Fe saturation of 40%.     Recommendations were communicated to primary team via note. Discussed w/ patient.     Seen and discussed with Dr. Anne Todd MD  [facility]  Department of Internal Medicine   Resident Physician  PGY-1  Pager: [phone]     Interval History :   Nursing and provider notes from last 24 hours reviewed.  NAEON.   Patient endorses feeling well. Denies SOB. Per patient, she is having a lot of urine output. No concerns or questions from her today.     1.1L uop yesterday. 1.8L in.     Physical Exam:   I/O last 3 completed shifts:  In: 1130 [P.O.:1130]  Out: 1050 [Urine:1050]   BP (!) 153/101 (BP Location: Left arm, Cuff Size: Adult Small)   Pulse 97   Temp 98.4  F (36.9  C) (Oral)   Resp 20   Ht 1.575 m (5' 2\")   Wt 52.8 kg (116 lb 6.5 oz)   LMP 12/11/2023 (Approximate)   SpO2 100%   BMI 21.29 kg/m       General: Laying in bed, no acute distress. Interacts appropriately.   HENT: Normocephalic, atraumatic.   Cardiovascular: Regular rate and rhythm.  Respiratory: Clear to auscultation bilaterally. No increased work of breathing.   Abdomen: Soft, non-tender, non-distended.    Neurologic: Alert. Oriented. Moves all extremities equally.   Musculoskeletal: no pitting edema.   Skin: She has some redness of the face on the bilateral cheeks but no clear malar rash. No signs of pallor or cyanosis. Warm and dry.   Psych: Flat affect, slightly improved     Labs:   All labs reviewed by me  Electrolytes/Renal -   Recent Labs   Lab Test 01/18/24  0558 01/17/24  0338 01/16/24  0540 01/06/24  0638 01/05/24  0612 12/28/23  0623 12/27/23  0001 12/25/23  0442    139 139   < > 139   < > 141 144 "   POTASSIUM 3.7 3.2* 3.2*   < > 4.8   < > 5.6* 3.1*  3.1*   CHLORIDE 111* 105 103   < > 107   < > 109* 110*   CO2 20* 22 25   < > 18*   < > 17* 20*   BUN 85.0* 59.2* 32.5*   < > 96.8*   < > 75.1* 60.5*   CR 3.41* 3.03* 2.34*   < > 3.49*   < > 2.98* 2.57*   GLC 95 123* 123*   < > 124*   < > 133* 138*   BISI 8.5* 8.3* 8.5*   < > 8.4*   < > 8.6 8.0*   MAG 1.7  --   --   --   --   --  2.1 1.8   PHOS 2.8  --   --   --  6.1*  --   --  3.4    < > = values in this interval not displayed.       CBC -   Recent Labs   Lab Test 01/18/24  0558 01/17/24  0537 01/16/24  0540   WBC 5.3 8.7 11.5*   HGB 9.2* 10.2* 11.1*   PLT 39* 43* 45*       LFTs -   Recent Labs   Lab Test 01/13/24  0608 01/12/24  0603 01/11/24  0408   ALKPHOS 108 113 109   BILITOTAL 0.3 0.3 0.2   ALT 44 42 48   AST 25 22 24   PROTTOTAL 5.6* 5.3* 5.4*   ALBUMIN 3.2* 2.9* 3.0*       Iron Panel -   Recent Labs   Lab Test 01/05/24  0612 12/20/23  0415 12/17/23  0429 07/10/23  1454   IRON 76  --  130 15*   IRONSAT 40  --  74* 9*   GABRIEL 786*   < > 32,775* 1,079*    < > = values in this interval not displayed.       Surg path - L Kidney biopsy (8/2/2023)   - diffuse sclerosing and focal proliferative lupus nephritis, ISN/RPS Class IV (see comment below).  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  -  Severe interstitial fibrosis and tubular atrophy (70-80%).    COMMENT:  Active crescentic injury involves 2% of sampled glomeruli, while 15% of the glomeruli demonstrate old fibrous crescents and 51% of the glomeruli are globally sclerosed. Light microscopic features raise the possibility of a concomitant class V (membranous) lupus nephritis component, but glomeruli are not available for confirmatory electron microscopy. The NIH activity and chronicity indices are 7/24 and 10/12, respectively.      Imaging:  All imaging studies reviewed by me.     Current Medications:    artificial saliva  1 spray Mouth/Throat 4x Daily      carvedilol  25 mg Oral BID     cholecalciferol  10 mcg Oral Daily     vitamin B-12  100 mcg Oral Daily     ferrous sulfate  325 mg Oral Daily with breakfast     folic acid  1 mg Oral Daily     sodium chloride (PF) 0.9%  1.3-2.6 mL Intracatheter Once    Followed by     heparin  3 mL Intracatheter Once     sodium chloride (PF) 0.9%  1.3-2.6 mL Intracatheter Once    Followed by     heparin  3 mL Intracatheter Once     heparin ANTICOAGULANT  5,000 Units Subcutaneous Q12H     hydrALAZINE  25 mg Oral TID     losartan  100 mg Oral Daily     NIFEdipine ER OSMOTIC  30 mg Oral Daily     pantoprazole  40 mg Oral QAM AC     perflutren diluted 1mL to 2mL with saline  6 mL Intravenous Once     [Held by provider] potassium chloride ER  20 mEq Oral BID     predniSONE  50 mg Oral Daily     sodium chloride (PF)  3 mL Intracatheter Q8H     sulfamethoxazole-trimethoprim  1 tablet Oral Once per day on Tuesday Thursday Saturday      Physician Attestation   I, Yulia Rodríguez, saw and evaluated Milly Carroll with the resident Dr Todd . I have reviewed and discussed with the resident their history, physical and plan.     I personally reviewed the vital signs, medications, labs, and imaging.     In brief,  23 year old female admitted on 1/2/2024. She has a history of lupus nephritis class IV per renal biopsy in 08/2023 with 70-80% IFTA, SLE on prednisone taper and who has received cyclophosphamide (5th dose on 12/22), with also systolic heart failure (35-40% on 01/06),  admitted with chest pain and shortness of breath,and found to be COVID positive with worsening CHRISTA and initiation of HD on 1/11. Reports significant urine output. Although given the significant amount of IFTA in August she is unlikely to recover kidney function to come off dialysis, will continue to observe for recovery and no dialysis for today. The rationale would be to gain some time in order to have her get a permanent dialysis access.     Rest per the student's note.       I personally spent 35 minutes on the date of encounter for chart review, history taking, physical exam, labs and notes reviewed, advised and coordinating care.      Yulia Rodríguez MD  Division of Nephrology and Hypertension  Pager [phone]  Date of Service (when I saw the patient): January 18, 2024

## 2024-01-18 NOTE — PROGRESS NOTES
Care Management Follow Up    Length of Stay (days): 16    Expected Discharge Date: TBD     Concerns to be Addressed: discharge planning     Patient plan of care discussed at interdisciplinary rounds: Yes    Anticipated Discharge Disposition: Home (staying locally w/her mother)  Anticipated Discharge Services: None  Anticipated Discharge DME: None    Patient/family educated on Medicare website which has current facility and service quality ratings: NA  Education Provided on the Discharge Plan: Yes  Patient/Family in Agreement with the Plan: Yes    Referrals Placed by CM/SW: External Care Coordination, Home care, OP HD.   Private pay costs discussed: Not applicable    Additional Information:  Update received from [facility], there are 12 declining home care agencies d/t being at capacity or unable to accept pt's insurance. Patient will not have home care upon discharge.     Per discussion w/the primary team, nephrology is hopeful that the patient will not need OP HD at discharge, would like to continue to watch her closely for a few days before making decision on need for OP HD. CHW updated, will continue to to move forward w/OP HD referral in the event the patient needs dialysis in the community.     Care management will assist w/scheduling patient w/local PCP once discharge date is known.     Discharge resources:     Discharge location:  [id number] [place] [address] (will need OP HD rides arranged prior to discharge)  Phone: [phone]    Establish care w/local PCP  -Appt to be scheduled once discharge date is known.     Care coordination will continue to follow for discharge planning.      Concha Warren, RNCC, BSN    [facility]    [facility] [address]  [address] [place] [id number]    [email or web address]    Office: [phone] Pager: [phone]

## 2024-01-18 NOTE — PROGRESS NOTES
Pt confirmed for OP dialysis by [facility].     OP dialysis:  MWF at 1:35pm starting Wed 1/24 at 12:30pm  [facility] [facility]  [address]  [place] [address]  # [phone]    [facility] Admissions: Nery confirmed this unit and chair time in portal chat. CHW asked if Wednesday 1/24 is the soonest they can start. Nery said she will look into it.     CHW will set up insurance ride to dialysis:  [facility] insurance rides # [phone]  Subscriber # [id number]     CHW spoke to pt at bedside and updated her on OP dialysis schedule.     Provider left CHW VM- requested call about pt at [phone]- no VM option CHW will call back tomorrow.        Tara Doherty   6A/B/C Community Health Worker   Phone: [phone]

## 2024-01-18 NOTE — PLAN OF CARE
"BP (!) 154/101 (BP Location: Left arm, Cuff Size: Adult Small)   Pulse 93   Temp 98.4  F (36.9  C) (Axillary)   Resp 20   Ht 1.575 m (5' 2\")   Wt 52.8 kg (116 lb 6.5 oz)   LMP 12/11/2023 (Approximate)   SpO2 100%   BMI 21.29 kg/m      Hours of Care: 7325-1184.    Neuro: AO x 4, withdrawn  Vitals: VSS   Respiratory: WDL on RA  Cardiac: No tele orders.    GI/: Voiding spontaneously.  BM today.  Skin/Wounds: R arm blister, no new deficits noted.  Lines: L PIV x 2, R subclavian dialysis CVC  Diet: Renal, 1500mL FR.  Activity: Up independently.  Pain: Denies.      Continue to monitor and follow POC    Guerrero Dockery RN on 1/18/2024 at 6:32 PM    6B-Intermediate Care           "

## 2024-01-18 NOTE — PLAN OF CARE
Goal Outcome Evaluation:         3912-3202  Neuro: A&Ox4. Quiet.   Cardiac: HR 80s. SBP goal <180, no prn hydralazine given. Afebrile. No tele orders.   Respiratory: Sating >95% on RA.  GI/: Adequate urine output, up to bathroom independently. No BM this shift.   Diet/appetite: Tolerating renal diet and 1500 FR. Eating well.  Activity:  Independent, up to bathroom.   Pain: Denies any pain.   Skin: No new deficits noted.  LDA's: R CVC for HD, 2 L PIV SL.     Plan: Continue with POC. Notify primary team with changes.      Provider notification  @0614  FYI critical result platelets at 0651

## 2024-01-19 NOTE — PLAN OF CARE
Problem: Hypertension Acute  Goal: Blood Pressure Within Desired Range  Outcome: Progressing   Goal Outcome Evaluation:  Temp max 99.1. Hypertensive and blood pressures 140's-160's/80's-90's. Received antihypertensives and pressures 100's/60's. No nausea. No pain. Ate 100 percent of meals. 1500 ml fluid restriction and had 600 ml. Has generalized facial swelling. On hemodialysis and the first run was on Saturday. No replacements needed. Had a stool. Independent in the room.

## 2024-01-19 NOTE — PROGRESS NOTES
Nephrology Progress Note  01/19/2024         Assessment & Recommendations:   Milly Carroll is a 23 year old female admitted on 1/2/2024. 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 Fluid status, kidney function and hypertension continued to worsened despite IV diuretics, and increase in antihypertensives. TDC was placed on 1/10 and iHD initiated on 1/11/23. She will certainly need to be on dialysis long term and ideally we would be able to hold off until she can prepare by having permeant access (fistula) placed to limit chance of infections and promote better clearance.  Unfortunately, she has lack of social support making it challenging for her to manage off dialysis long enough for this to take place. Eventually she will likely need a kidney transplant. She remains hypertensive on 1/19.  It should be noted that even when she was diuresed, her BP remained high.  As such, it is likely that her uncontrolled hypertension is more complicated than simply increased intravascular volume and likely related to a combination of lupus nephritis/nephritic syndrome, damaged vessels, and some component of volume overload, Her electrolytes are stable and she is making some urine.  Creatinine is rising to 3.57 today.  Recommend watching very carefully over next 24 hours prior to dialyzing.     Recommendations:  - Bumex 1mg BID   - continue Nifedipine to 60mg   - continue Coreg to 25 mg  - continue Hydralazine 25 mg   - continue Losartan 100 mg   - Continue to postpone cyclophosphamide, at a minimum until after 01/12 but consider postponing further due to significantly impaired renal function  - Prednisone 60 mg taper (taper plan was 60 mg for 14 days then 39-59-41-20-10 x 14 days each)  - Daily BMP  - Daily standing weights, strict I&Os   -  for outpatient dialysis referral in [facility]    #Lupus nephritis, class  IV  #SLE  #Chronic kidney disease, stage IV (Cystatin C/creatinine discordance)  # Acute kidney injury  Patient's creatinine was 3.48 on 12/30, decreased to 2.48 on admission at [facility] 1/2/2024  and subsequently raised to 4.2 prior to iHD initiation. CHRISTA in the setting of lupus nephritis may likely represent poor renal perfusion in the setting of volume overload, cardiorenal syndrome and diuresis. Lupus flare less likely given improving complement and dsDNA levels and current treatment with steroids and recent treatment w/ cyclophosphamide. At this time, we will hold off on further immunosuppressants. Milly has had 5 cyclophosphamide infusions thus far (08/22, 09/08, 09/22, 10/06, 12/22). She has began steroid taper.  CHRISTA was improving but creatinine increased again 2.34 <3.03<3.4<3.57.  Electrolytes stable, breathing well, UOP low and blood pressure remains elevated. Will hold off on dialysis and re-evaluate tomorrow to see if she is able to tolerate.     #Hypertension  #Acute on chronic systolic heart failure, stress cardiomyopathy  Most likely cause of her hypertension is volume overload, particularly given hypervolemic appearance on physical examination, significantly elevated BNP from baseline, signs of pulmonary edema on CXR. On discussion with cardiology, there is question of if the precipitant of her systolic heart failure is uncontrolled, severe hypertension. Given normal CK, it does not appear likely that there is a component of myocarditis contributing to her presentation. Repeat echocardiogram on 01/06 was 35-40%.   - continue Coreg to 25 mg  - continue Hydralazine 25 mg   - continue Losartan 100 mg   - increase Nifedipine to 60mg     #Hypokalemia, resolved  Potassium 3.7 today.      #Metabolic acidosis, chronic  Agree with continuing NaHCO3 650 mg TID. She has a chronic metabolic acidosis and is tolerating this well. Bicarb 17 today.      #Chronic macrocytic anemia  Would recommend holding off on  "starting epo for now, as her HGB has improved from prior. However, would favor starting epo if her HGB is <9.0 or if she remains less than 9.5 on rechecks over the next 1-2 days. Iron studies were obtained, Fe saturation of 40%.     Recommendations were communicated to primary team via note. Discussed w/ patient.     Seen and discussed with Dr. Anne Lujan History :   Nursing and provider notes from last 24 hours reviewed.  NAEON.   Patient endorses feeling well. Denies SOB. No concerns or questions from her today.     Physical Exam:   I/O last 3 completed shifts:  In: 1400 [P.O.:1400]  Out: 540 [Urine:540]   /64 (BP Location: Left leg)   Pulse 102   Temp 99.1  F (37.3  C) (Oral)   Resp 18   Ht 1.575 m (5' 2\")   Wt 53.9 kg (118 lb 14.4 oz)   LMP 12/11/2023 (Approximate)   SpO2 99%   BMI 21.75 kg/m       General: Laying in bed, no acute distress. Interacts appropriately.   HENT: Normocephalic, atraumatic.   Cardiovascular: Regular rate and rhythm.  Respiratory: Clear to auscultation bilaterally. No increased work of breathing.   Abdomen: Soft, non-tender, non-distended.    Neurologic: Alert. Oriented. Moves all extremities equally.   Musculoskeletal: no pitting edema.   Skin: She has some redness of the face on the bilateral cheeks but no clear malar rash. No signs of pallor or cyanosis. Warm and dry.   Psych: Flat affect, slightly improved     Labs:   All labs reviewed by me  Electrolytes/Renal -   Recent Labs   Lab Test 01/19/24  0702 01/18/24  0558 01/17/24  0338 01/06/24  0638 01/05/24  0612 12/28/23  0623 12/27/23  0001 12/25/23  0442    138 139   < > 139   < > 141 144   POTASSIUM 3.7 3.7 3.2*   < > 4.8   < > 5.6* 3.1*  3.1*   CHLORIDE 106 111* 105   < > 107   < > 109* 110*   CO2 17* 20* 22   < > 18*   < > 17* 20*   .0* 85.0* 59.2*   < > 96.8*   < > 75.1* 60.5*   CR 3.57* 3.41* 3.03*   < > 3.49*   < > 2.98* 2.57*   GLC 90 95 123*   < > 124*   < > 133* 138*   BISI 8.3* 8.5* 8.3*   " < > 8.4*   < > 8.6 8.0*   MAG  --  1.7  --   --   --   --  2.1 1.8   PHOS  --  2.8  --   --  6.1*  --   --  3.4    < > = values in this interval not displayed.       CBC -   Recent Labs   Lab Test 01/19/24  0702 01/18/24  0558 01/17/24  0537   WBC 3.6* 5.3 8.7   HGB 9.4* 9.2* 10.2*   PLT 45* 39* 43*       LFTs -   Recent Labs   Lab Test 01/13/24  0608 01/12/24  0603 01/11/24  0408   ALKPHOS 108 113 109   BILITOTAL 0.3 0.3 0.2   ALT 44 42 48   AST 25 22 24   PROTTOTAL 5.6* 5.3* 5.4*   ALBUMIN 3.2* 2.9* 3.0*       Iron Panel -   Recent Labs   Lab Test 01/05/24  0612 12/20/23  0415 12/17/23  0429 07/10/23  1454   IRON 76  --  130 15*   IRONSAT 40  --  74* 9*   GABRIEL 786*   < > 32,775* 1,079*    < > = values in this interval not displayed.       Surg path - L Kidney biopsy (8/2/2023)   - diffuse sclerosing and focal proliferative lupus nephritis, ISN/RPS Class IV (see comment below).  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  -  Severe interstitial fibrosis and tubular atrophy (70-80%).    COMMENT:  Active crescentic injury involves 2% of sampled glomeruli, while 15% of the glomeruli demonstrate old fibrous crescents and 51% of the glomeruli are globally sclerosed. Light microscopic features raise the possibility of a concomitant class V (membranous) lupus nephritis component, but glomeruli are not available for confirmatory electron microscopy. The NIH activity and chronicity indices are 7/24 and 10/12, respectively.      Imaging:  All imaging studies reviewed by me.     Current Medications:   artificial saliva  1 spray Mouth/Throat 4x Daily    bumetanide  1 mg Oral BID    carvedilol  25 mg Oral BID    cholecalciferol  10 mcg Oral Daily    vitamin B-12  100 mcg Oral Daily    ferrous sulfate  325 mg Oral Daily with breakfast    folic acid  1 mg Oral Daily    heparin ANTICOAGULANT  5,000 Units Subcutaneous Q12H    hydrALAZINE  25 mg Oral TID    losartan  100 mg Oral  Daily    NIFEdipine ER OSMOTIC  60 mg Oral Daily    - MEDICATION INSTRUCTIONS -   Does not apply Once    pantoprazole  40 mg Oral QAM AC    [Held by provider] potassium chloride ER  20 mEq Oral BID    predniSONE  50 mg Oral Daily    sodium chloride (PF)  3 mL Intracatheter Q8H    sodium chloride 0.9%  250 mL Intravenous Once in dialysis/CRRT    sodium chloride 0.9%  300 mL Hemodialysis Machine Once    sulfamethoxazole-trimethoprim  1 tablet Oral Once per day on Tuesday Thursday Saturday     Physician Attestation   I, Yulia Rodríguez, saw and evaluated Milly Carroll with the resident Dr Todd . I have reviewed and discussed with the resident their history, physical and plan.     I personally reviewed the vital signs, medications, labs, and imaging.     In brief,  23 year old female admitted on 1/2/2024. She has a history of lupus nephritis class IV per renal biopsy in 08/2023 with 70-80% IFTA, SLE on prednisone taper and who has received cyclophosphamide (5th dose on 12/22), with also systolic heart failure (35-40% on 01/06),  admitted with chest pain and shortness of breath,and found to be COVID positive with worsening CHRISTA and initiation of HD on 1/11. Reports significant urine output. Although given the significant amount of IFTA in August she is unlikely to recover kidney function to come off dialysis, we were observing her for recovery. The rationale was to gain some time in order to have her get a permanent dialysis access. Unfortunately, she seems to be needing dialysis starting tomorrow     Rest per the student's note.      I personally spent 35 minutes on the date of encounter for chart review, history taking, physical exam, labs and notes reviewed, advised and coordinating care.      Yulia Rodríguez MD  Division of Nephrology and Hypertension  Pager [phone]  Date of Service (when I saw the patient): January 19, 2024

## 2024-01-19 NOTE — PLAN OF CARE
Goal Outcome Evaluation:      Plan of Care Reviewed With: patient    Overall Patient Progress: no changeOverall Patient Progress: no change     Pt transferred from 6B ~0100. A&O x4, quiet. Hypertensive in 150s-170s systolic during shift. Denies shortness of breath or chest pain. CMS intact. R forearm blister dressed w/ small output. Up ad kodak voiding in bathroom. Strict I&Os. 1500 mL fluid restriction/Renal diet. R CVC WNL. Cr: 3.41 (up from 3.08). Continue POC

## 2024-01-19 NOTE — PROGRESS NOTES
[facility]    Medicine Progress Note - Hospitalist Service, GOLD TEAM 9    Date of Admission:  1/2/2024    Assessment & Plan   Milly Carroll is a 23 year old female admitted on 1/2/2024. She has a PMHx of lupus C/B lupus flares, lupus cerebritis, lupus nephritis, HTN, chronic systolic heart failure, QT prolongation, past perinephric hematoma s/p emobolization in Aug-2023 and chronic thrombocytopenia admitted to [facility] on 12/17/23 (after presentation for SOB/NSTEMI and pneumonia concern and concern for lupus flare where she was treated w/ steroids and plasma exchange) and transfer to [facility] on 12/18/23 before being transferred to [facility] on 12/22-12/25 for cyclophosphamide infusion. Represented on 12/26 [facility] ED for SOB found to have covid and reduced EF (baseline EF 47%, OSH EF reported as 15-20%) w/ plan to transfer patient to [facility] however patient left A on 12/30 due to prolonged wait for transfer to [facility] ED. On presentation 1/2, patient reported continued worsening CP/SOB since her discharge on 12/30. Chest pain and dyspnea has since improved during her admission.     Currently being managed for acute on chronic heart failure and CHRISTA on CKD, concern for cardiorenal syndrome in the setting of prior lupus nephritis, and poorly controlled htn. Since yesterday, htn has improved.    Today's Plan:  - continue to manage Bps  - monitor GFR and lytes. Nephrology following.  - continue Bumex 1mg BID  - continue Nifedipine to 60mg   - continue Coreg to 25 mg  - continue Hydralazine 25 mg   - continue Losartan 100 mg    Acute on Chronic HF (EF 15-20% on 12/26 OSH)  Dyspnea and Chest Pain - improved   Uncontrolled HTN, improved  Tricuspid insufficiency   Presented with chest pain and dyspnea as above. Concern that hypertensive crisis contributing to chest pain. Echocardiogram on 17-Dec-2023 showed LVEF 35-40%, EF  decreased to 15-20% on 12/26 w/ BNP at that time >70,000. Lexiscan 12/21 negative for inducible ischemia. Admission workup 1/2 included trop elevated at 290 with peak at 469 on 1/2. TTE 1/2 with severe diffuse hypokinesis with stably reduced EF. Limited TTE 1/7 with EF 35-40% and dilated IVC. Cardiology consulted with recs as below.   V/Q scan 1/3 indeterminate, overall lower suspicion of VTE contributing to current presentation.   Attempted diuresis with increased doses of IV bumex doses (up to bumex 4 mg IV x 1) on 1/4-1/6 with minimal urine output and concern for cardiorenal syndrome. Now on dialysis.   -Gen Cardiology consulted, appreciate recs  -Nephrology consulted, appreciate assistance   -Dialysis runs per nephrology  -bumex 1mg BID  -Continue carvedilol   -Continue hydralazine TID   -Continue nifedipine (restarted on 1/7)   -losartan 100mg  Nifedipine 60mg daily  -Consider cMRI during admission once fluid status improved     CHRISTA  Metabolic Acidosis, resolved  Recent Lupus Nephritis   Cr up trending this admission, per discussion with cardiology, nephrology, and rheumatology current CHRISTA suspected to be more likely related to cardiorenal syndrome and ongoing volume overload.   -Nephrology consulted, appreciate assistance    -Continue diuresis and blood pressure management as above   -Stopped Bicarb tabs  -Rheumatology following, appreciate assistance   -Due for 5th dose cyclophosphamide per [employer] protocol 1/5 (ongoing discussion w/ rheum/nephrology prior to administration), holding for now due to COVID and other ongoing management as outlined     R forearm blister, improving  Quarter sized with purulence, sent culture overnight. WOCN to see.  -Derm consult placed   -Daily Vaseline and bandage over wound until healed  -NGTD on culture, will hold off on further vancomycin at this time    COVID-19 Infection, resolved  Tested positive on 12/29 at OSH when presented with chest pain and dyspnea. Concern for  superimposed PNA per OSH and was on vanc/cefepime 12/26-12/30 prior to AMA leave. Initially cefepime continued on admission, discontinued on 1/4 due to low suspicion for bacterial pneumonia.  Has remained on RA this admission. Overall unclear how much COVID-19 contributed to acute presentation.   -Received remdesivir 12/29 and 12/30 prior to leaving AMA, completed 5 day course given high risk (1/5 - 1/7)     Hx of C.diff infxn (6/2023)  -Stopped PO vanc prophylaxis on 1/4 when antibiotics stopped     SLE (+dsDNA, +RNP, +ribosomal P Ab and hypocomplementemia)   Recent Lupus Flare w/ MAHA and renal insuff s/p Cyclophosphamide dose (12/22/23)  Hx of Lupus cerebritis w/ hx of seizures  C/b Lupus Nephritis  Hx medication non-adherence  -Lupus-focused meds:  -Continue prednisone 50mg qday, starting 50mg 1/15/2024, plan for 14 each of 47-10-22-20-10 taper  -PJP ppx: continue PTA bactrim  -GI ppx: continue protonix qday, tums prn  -Cyclophosphamide on hold as above   -Will need OP lupus follow up (rheumatology OP consult placed)    Hypomagnesemia  Hypokalemia, resolved  -Replete per protocol      Chronic RUE edema with weakness   Superficial clot of R cephalic vein  US 8/2023 negative. RUE doppler negative for DVT on 1/2, significant for superficial thrombus. Repeat US RUE 1/7 with no evidence of DVT. Suspect weakness 2/2 swelling, lower concern for alternate etiologies.   -Symptomatic with heat packs    Chronic thrombocytopenia, improving   Chronic Macrocytic Anemia, improving  -Trend CBC daily   -Continue PTA folate/B12 replacement  -DVT ppx: heparin subcutaneous decrease dose per pharmacy in setting of chronic thrombocytopenia and renal dysfunction  -Hematology consulted - hemolysis improving, check labs daily., holding off on epo unless hgb decreases below 9.     Hx of Intermittent Headaches  History of being responsive to APAP and dilaudid along w/ treatment of HTN and Lupus. NO NSAIDS.     Hx pancreatic insufficiency  2/2 pancreatitis  Hx acute on chronic pancreatitis (2020)   Last followed with GI in 2020 for pancreatitis and multiple stones and debris in the main pancreatic duct and was recommended to be on Creon at that time. Stools described as normal.      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 No current PTA antiepileptic medications    Chronic Left Shoulder Pain, improved   Unclear etiology, likely MSK pain.   -PRN tylenol or metharbamol; taper PO dilaudid (no clear indication)           Diet: Fluid restriction 1500 ML FLUID  Renal Diet (dialysis)  Snacks/Supplements Adult: Other; 8 PM: Egg salad sandwich on white bread + alternate grapes/orange E/O day on side; Between Meals    DVT Prophylaxis: Heparin SQ  Leung Catheter: Not present  Lines: None     Cardiac Monitoring: None  Code Status: Full Code      Clinically Significant Risk Factors              # Hypoalbuminemia: Lowest albumin = 2.9 g/dL at 1/12/2024  6:03 AM, will monitor as appropriate   # Thrombocytopenia: Lowest platelets = 39 in last 2 days, will monitor for bleeding      # Chronic heart failure with reduced ejection fraction: last echo with EF <40%           # Financial/Environmental Concerns: none (Per pt she discussed option of applying for disability with her new PCP)         Disposition Plan     Expected Discharge Date: 01/21/2024, 12:00 PM    Destination: home with family  Discharge Comments: Discharge date TBD. Nephrology hopeful pt will not need OP HD at discharge. OP HD referral now started for [facility].            SANIYA CHAMORRO MD  Hospitalist Service, GOLD TEAM 9  [facility]  Securely message with [employer] (more info)  Text page via [facility] Paging/Directory   See signed in provider for up to date coverage  information  ______________________________________________________________________    Interval History   NAION. No chest pain or SOB. Line doing okay. Leg swelling improved. No fevers. No nausea. No cp, palpitations, sob, mono, coughing, Has, lightheadedness, dizziness. BP improved since yesterday    Physical Exam   Vital Signs: Temp: 98.1  F (36.7  C) Temp src: Oral BP: (!) 158/93 Pulse: 94   Resp: 18 SpO2: 100 % O2 Device: None (Room air)    Weight: 116 lbs 6.45 oz    Constitutional: NAD  Cardiovascular: RRR  Respiratory: CTAB  GI: bowel sounds present throughout   Neuro: alert and oriented, no gross focal deficits noted  MSK: RUE with 1+ edema below the elbow, radial pulse+     Medical Decision Making       MANAGEMENT DISCUSSED with the following over the past 24 hours: SW, nephrology, and nursing       Data     I have personally reviewed the following data over the past 24 hrs:    3.6 (L)  \   9.4 (L)   / 45 (LL)     N/A N/A N/A /  N/A   N/A N/A N/A \

## 2024-01-19 NOTE — PROGRESS NOTES
Admitted/transferred from:   2 RN full   skin assessment completed by Bi Kirk RN and Therese MOLINA RN.  Skin assessment finding: issues found scattered bruising throughout. Dry flaky BLEs, blanchable redness on coccyx + abdomen. L PIV x2, R CVC for HD, Old R central line drain dressing. R forearm blister      Interventions/actions: skin interventions R forearm blister dressed w/ moist adaptic + primapore dressing      Bedside Emergency Equipment Present:  Suction Regulator: Yes  Suction Canister: Yes  Tubing between Regulator and Canister: Yes  O2 Regulator with Tree: Yes  Ambu Bag: Yes

## 2024-01-19 NOTE — PROGRESS NOTES
Transfer  Transferred to: 5A  Via:bed  Reason for transfer:Pt no longer appropriate for 6B- improved patient condition  Belongings: Packed and sent with pt  Chart: Delivered with pt to next unit  Medications: Meds sent to new unit with pt  Report given to: Bi PRESLEY    Assumed cares from 1900-0045    Neuro: A&Ox4. Quite/withdrawn  Cardiac: no tele orders, elevated BP noted (150/93) however within pts parameters.  VSS.   Respiratory: Sating 100% on RA.  GI/: Adequate urine output with no BM occurrences during shift  Diet/appetite: Tolerating renal diet, compliant with 1500 ml fluid restriction  Activity:  Independent in room and to bathroom  Pain: Pain denied   Skin: No new deficits noted.  LDA's:L PIV x2, R subclavian dialysis CVC    Plan: Continue with POC. Notify primary team with changes.

## 2024-01-20 NOTE — PHARMACY-VANCOMYCIN DOSING SERVICE
Pharmacy Vancomycin Initial Note  Date of Service 2024  Patient's  2000  23 year old, female  Actual body wt: 53.9kg    Indication: Bacteremia    Current estimated CrCl = Estimated Creatinine Clearance: 17.9 mL/min (A) (based on SCr of 4.19 mg/dL (H)).    Creatinine for last 3 days  2024:  5:58 AM Creatinine 3.41 mg/dL  2024:  7:02 AM Creatinine 3.57 mg/dL  2024:  4:32 AM Creatinine 4.19 mg/dL    Recent Vancomycin Level(s) for last 3 days  No results found for requested labs within last 3 days.      Vancomycin IV Administrations (past 72 hours)        No vancomycin orders with administrations in past 72 hours.                    Nephrotoxins and other renal medications (From now, onward)      Start     Dose/Rate Route Frequency Ordered Stop    24 1700  bumetanide (BUMEX) tablet 1 mg         1 mg Oral 2 TIMES DAILY (Diuretics and Nitrates) 24 1659              Contrast Orders - past 72 hours (72h ago, onward)      Start     Dose/Rate Route Frequency Stop    24 0900  perflutren diluted 1mL to 2mL with saline (OPTISON) diluted injection 6 mL  Status:  Discontinued         6 mL Intravenous ONCE 24 1257              Plan:  Start vancomycin  1000 mg IV ONCE. (=20mg/kg)  Vancomycin monitoring method: Trough (Method 2 = manual dose calculation)  Vancomycin therapeutic monitoring goal: 15-20 mg/L  Pharmacy will check vancomycin levels as appropriate in 1-3 Days.    Serum creatinine levels will be ordered daily for the first week of therapy and at least twice weekly for subsequent weeks.      Heather Hardin [facility]

## 2024-01-20 NOTE — PLAN OF CARE
"Time 1953-9376    /74 (BP Location: Left arm)   Pulse 94   Temp 98.2  F (36.8  C) (Oral)   Resp 18   Ht 1.575 m (5' 2\")   Wt 53.9 kg (118 lb 14.4 oz)   LMP 12/11/2023 (Approximate)   SpO2 100%   BMI 21.75 kg/m      Reason for admission: Lupus flare, CHRISTA on CKD  Activity: UAL  Pain: denies  Neuro: WNL  Cardiac: WNL  Respiratory: WNL  GI/: LBM 1/19. Voids minimally  Diet: Renal w/ 1500mL fluid restriction  Lines: right subclavian HD line. LUE PIV x2.  Wounds: blister to right AC - Primapore CDI  Labs/imaging: BMP, CBC      New changes this shift: No changes. Pt stable.     Plan: Fall prevention. Pain control. Monitor I&O. Dialysis at 0800 on 1/20/24      Continue to monitor and follow POC      Goal Outcome Evaluation:      Plan of Care Reviewed With: patient    Overall Patient Progress: no changeOverall Patient Progress: no change    Outcome Evaluation: Pt stable.      "

## 2024-01-20 NOTE — PROGRESS NOTES
[facility]    Medicine Progress Note - Hospitalist Service, GOLD TEAM 9    Date of Admission:  1/2/2024    Assessment & Plan   Milly Carroll is a 23 year old female admitted on 1/2/2024. She has a PMHx of lupus C/B lupus flares, lupus cerebritis, lupus nephritis, HTN, chronic systolic heart failure, QT prolongation, past perinephric hematoma s/p emobolization in Aug-2023 and chronic thrombocytopenia admitted to [facility] on 12/17/23 (after presentation for SOB/NSTEMI and pneumonia concern and concern for lupus flare where she was treated w/ steroids and plasma exchange) and transfer to [facility] on 12/18/23 before being transferred to [facility] on 12/22-12/25 for cyclophosphamide infusion. Represented on 12/26 [facility] [place] [facility] for SOB found to have covid and reduced EF (baseline EF 47%, OSH EF reported as 15-20%) w/ plan to transfer patient to [facility] however patient left AMA on 12/30 due to prolonged wait for transfer to [facility] ED. On presentation 1/2, patient reported continued worsening CP/SOB since her discharge on 12/30. Chest pain and dyspnea has since improved during her admission.     Currently being managed for acute on chronic heart failure and CHRISTA on CKD, concern for cardiorenal syndrome in the setting of prior lupus nephritis, and poorly controlled htn. Since yesterday, htn has improved.    Today's Plan:  - hold bumex for today with her poor po intake and risk for dehydration  - Bcx, UA/Ucx, RIDP penidng  - van/ cefepime ordered    Fever in Setting of Dialysis catheter  Concern for CLABSI  - Etiology unsure at this time. Patient complaining of diarrhea (already had 3 episodes of watery diarrhea in the last 4-6 hours). No nasal congestion, coughing, diib,. Positive for chills generalized body aching. Admits to lower back pain though no focal spinal processes involved  - Lactate negative  - Blood culture x2 ordered-- one  from periphery and one from dialysis catheter  - UA with culture reflex ordered  - Vancomycin pharmacy to dose  - cefepime IV   - RIDP ordered to r/o viral etiology  - since no exam findings and respiratory sx, will hold off on CXR at the moment    Acute on Chronic HF (EF 15-20% on 12/26 OSH)  Dyspnea and Chest Pain - improved   Uncontrolled HTN, improved  Tricuspid insufficiency   Presented with chest pain and dyspnea as above. Concern that hypertensive crisis contributing to chest pain. Echocardiogram on 17-Dec-2023 showed LVEF 35-40%, EF decreased to 15-20% on 12/26 w/ BNP at that time >70,000. Lexiscan 12/21 negative for inducible ischemia. Admission workup 1/2 included trop elevated at 290 with peak at 469 on 1/2. TTE 1/2 with severe diffuse hypokinesis with stably reduced EF. Limited TTE 1/7 with EF 35-40% and dilated IVC. Cardiology consulted with recs as below.   V/Q scan 1/3 indeterminate, overall lower suspicion of VTE contributing to current presentation.   Attempted diuresis with increased doses of IV bumex doses (up to bumex 4 mg IV x 1) on 1/4-1/6 with minimal urine output and concern for cardiorenal syndrome. Now on dialysis.   -Gen Cardiology consulted, appreciate recs  -Nephrology consulted, appreciate assistance   -Dialysis runs per nephrology  -bumex 1mg BID-- hold in setting of decreased po intake, nausea and risk for dehydration.  -Continue carvedilol   -Continue hydralazine TID   -Continue nifedipine (restarted on 1/7)   -losartan 100mg  Nifedipine 60mg daily  -Consider cMRI during admission once fluid status improved and infection improved    CHRISTA  Metabolic Acidosis, resolved  Recent Lupus Nephritis   Cr up trending this admission, per discussion with cardiology, nephrology, and rheumatology current CHRISTA suspected to be more likely related to cardiorenal syndrome and ongoing volume overload.   -Nephrology consulted, appreciate assistance    -Continue diuresis and blood pressure management as  above   -Stopped Bicarb tabs  -Rheumatology following, appreciate assistance   -Due for 5th dose cyclophosphamide per [facility] protocol 1/5 (ongoing discussion w/ rheum/nephrology prior to administration), holding for now due to COVID and other ongoing management as outlined     R forearm blister, improved  Quarter sized with purulence, sent culture overnight. WOCN to see.  -Derm consult placed   -Daily Vaseline and bandage over wound until healed  -NGTD on culture, will hold off on further vancomycin at this time    COVID-19 Infection, resolved  Tested positive on 12/29 at OSH when presented with chest pain and dyspnea. Concern for superimposed PNA per OSH and was on vanc/cefepime 12/26-12/30 prior to AMA leave. Initially cefepime continued on admission, discontinued on 1/4 due to low suspicion for bacterial pneumonia.  Has remained on RA this admission. Overall unclear how much COVID-19 contributed to acute presentation.   -Received remdesivir 12/29 and 12/30 prior to leaving AMA, completed 5 day course given high risk (1/5 - 1/7)     Hx of C.diff infxn (6/2023)  -Stopped PO vanc prophylaxis on 1/4 when antibiotics stopped     SLE (+dsDNA, +RNP, +ribosomal P Ab and hypocomplementemia)   Recent Lupus Flare w/ MAHA and renal insuff s/p Cyclophosphamide dose (12/22/23)  Hx of Lupus cerebritis w/ hx of seizures  C/b Lupus Nephritis  Hx medication non-adherence  -Lupus-focused meds:  -Continue prednisone 50mg qday, starting 50mg 1/15/2024, plan for 14 each of 33-42-12-20-10 taper  -PJP ppx: continue PTA bactrim  -GI ppx: continue protonix qday, tums prn  -Cyclophosphamide on hold as above   -Will need OP lupus follow up (rheumatology OP consult placed)    Hypomagnesemia  Hypokalemia, resolved  -Replete per protocol      Chronic RUE edema with weakness   Superficial clot of R cephalic vein  US 8/2023 negative. RUE doppler negative for DVT on 1/2, significant for superficial thrombus. Repeat US RUE 1/7 with no evidence  of DVT. Suspect weakness 2/2 swelling, lower concern for alternate etiologies.   -Symptomatic with heat packs    Chronic thrombocytopenia, improving   Chronic Macrocytic Anemia, improving  -Trend CBC daily   -Continue PTA folate/B12 replacement  -DVT ppx: heparin subcutaneous decrease dose per pharmacy in setting of chronic thrombocytopenia and renal dysfunction  -Hematology consulted - hemolysis improving, check labs daily., holding off on epo unless hgb decreases below 9.     Hx of Intermittent Headaches  History of being responsive to APAP and dilaudid along w/ treatment of HTN and Lupus. NO NSAIDS.     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 Stools described as normal.      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 No current PTA antiepileptic medications    Chronic Left Shoulder Pain, improved   Unclear etiology, likely MSK pain.   -PRN tylenol or metharbamol; taper PO dilaudid (no clear indication)           Diet: Fluid restriction 1500 ML FLUID  Renal Diet (dialysis)  Snacks/Supplements Adult: Other; 8 PM: Egg salad sandwich on white bread + alternate grapes/orange E/O day on side; Between Meals    DVT Prophylaxis: Heparin SQ  Leung Catheter: Not present  Lines: None     Cardiac Monitoring: None  Code Status: Full Code      Clinically Significant Risk Factors              # Hypoalbuminemia: Lowest albumin = 2.9 g/dL at 1/12/2024  6:03 AM, will monitor as appropriate   # Thrombocytopenia: Lowest platelets = 45 in last 2 days, will monitor for bleeding      # Chronic heart failure with reduced ejection fraction: last echo with EF <40%           # Financial/Environmental Concerns:  none (Per pt she discussed option of applying for disability with her new PCP)         Disposition Plan             SANIYA CHAMORRO MD  Hospitalist Service, GOLD TEAM 9  [facility]  Securely message with [employer] (more info)  Text page via "[employer]" Paging/Directory   See signed in provider for up to date coverage information  ______________________________________________________________________    Interval History   This morning having watery diarrhea x3 this morning. Also having generalized fatigue and body aching. No focal coughing, nasal congestion, rhinnorhea, chets pain, palpitations. Decreased appetite with nausea. Also admits to lower back pain on either side of the spine. No dysuria.    Physical Exam   Vital Signs: Temp: (!) 101.5  F (38.6  C) Temp src: Oral BP: 116/76 Pulse: 111   Resp: 18 SpO2: 98 % O2 Device: None (Room air)    Weight: 118 lbs 14.4 oz    Constitutional: NAD  Cardiovascular: RRR  Respiratory: CTAB  GI: bowel sounds present throughout. No CVA tenderness  Neuro: alert and oriented, no gross focal deficits noted  Msk: Ttp over the lower back both in center and on the sides of the spinal processes.    Medical Decision Making       MANAGEMENT DISCUSSED with the following over the past 24 hours: SW, nephrology, and nursing       Data     I have personally reviewed the following data over the past 24 hrs:    2.3 (L)  \   9.7 (L)   / 53 (L)     137 106 112.0 (H) /  94   4.1 15 (L) 4.19 (H) \     Procal: N/A CRP: N/A Lactic Acid: 1.8

## 2024-01-20 NOTE — PROGRESS NOTES
Date: 1/20/2024  Time: 1200     Data:  Pre Wt:   53.9 kg  Desired Wt:   To be established  Post Wt:  51.9 kg (estimated)  Weight change: - 2 kg  Ultrafiltration - Post Run Net Total Removed (mL):  2000 ml  Vascular Access Status: CVC patent  Dialyzer Rinse:  Light  Total Blood Volume Processed: 63 L   Total Dialysis (Treatment) Time:   3 Hrs  Dialysate Bath: K 3, Ca 3  Heparin: Heparin: None     Lab:   No  HbsAg - 1/11/24 (negative)  HbsAb - 1/11/24  (susceptible)     Interventions:  Dialysis done through right CVC  UF set to 2 Liters of fluid removal, accommodating priming and rinse back volumes  , . CVC dressing changed aseptically  CritLine showed a stable Profile B throughout the run, tolerating UF pull  Treatment has ended safely and  blood is rinsed back completely  Catheter lumens flushed with saline and locked with NS, catheter caps (ClearGuard ) changed post HD. Pt had two bowel movement during tx. Pt had high tem. 99.  Report given to PCN, sent back to 5239.1 room in stable condition     Assessment:  A/O x 4, calm & cooperative, denies pain  Lung sounds diminished anterior   CVC intact, previous dressing clean and dry                Plan:    Per Renal team      Nahomy Shipley, MARION, RN  Acute Dialysis RN  [facility]

## 2024-01-20 NOTE — PLAN OF CARE
Goal Outcome Evaluation:      Plan of Care Reviewed With: patient    Overall Patient Progress: no changeOverall Patient Progress: no change    Outcome Evaluation: [id number]:    AVSS. BP WDL this evening. Had some elevated BPs earlier today. No pain, nausea/vomiting or SOB. Up independently. Declined going for a walk this evening. Good PO intake. LBM 1/19. Voiding spontaneously with 250 mL of urine output this shift. No dialysis today, but nephrology will reassess tomorrow. No acute changes this shift. Continue with POC.

## 2024-01-20 NOTE — PROGRESS NOTES
"Rapid response called for a BP of 79/40. 250 ml bolus ordered, changed to 1000ml bolus. Lactic acid repeat ordered, and telemetry. Patient states that she feels \"normal\". Will continue to monitor  "

## 2024-01-20 NOTE — PROGRESS NOTES
Nephrology Progress Note  01/20/2024         Assessment & Recommendations:   Milly Carroll is a 23 year old female admitted on 1/2/2024. 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 Fluid status, kidney function and hypertension continued to worsened despite IV diuretics, and increase in antihypertensives. TDC was placed on 1/10 and iHD initiated on 1/11/23. She will certainly need to be on dialysis long term and ideally we would be able to hold off until she can prepare by having permeant access (fistula) placed to limit chance of infections and promote better clearance.  Unfortunately, she has lack of social support making it challenging for her to manage off dialysis long enough for this to take place. Eventually she will likely need a kidney transplant. She remains hypertensive on 1/19.  It should be noted that even when she was diuresed, her BP remained high.  As such, it is likely that her uncontrolled hypertension is more complicated than simply increased intravascular volume and likely related to a combination of lupus nephritis/nephritic syndrome, damaged vessels, and some component of volume overload, Her electrolytes are stable and she is making some urine.  Creatinine is rising to 3.57 today.  Recommend watching very carefully over next 24 hours prior to dialyzing.     Recommendations:  - Bumex 1mg BID   - continue Nifedipine to 60mg   - continue Coreg to 25 mg  - continue Hydralazine 25 mg   - continue Losartan 100 mg   - Continue to postpone cyclophosphamide, at a minimum until after 01/12 but consider postponing further due to significantly impaired renal function  - Prednisone 60 mg taper (taper plan was 60 mg for 14 days then 97-44-67-20-10 x 14 days each)  - Daily BMP  - Daily standing weights, strict I&Os   -  for outpatient dialysis referral in [facility]    #Lupus nephritis, class  CHIEF COMPLAINT:     Interval Events:    REVIEW OF SYSTEMS:  Constitutional:   Eyes:  ENT:  CV:  Resp:  GI:  :  MSK:  Integumentary:  Neurological:  Psychiatric:  Endocrine:  Hematologic/Lymphatic:  Allergic/Immunologic:  [ ] All other systems negative  [ ] Unable to assess ROS because ________    OBJECTIVE:  ICU Vital Signs Last 24 Hrs  T(C): 37.1 (12 May 2019 08:00), Max: 37.3 (11 May 2019 16:20)  T(F): 98.8 (12 May 2019 08:00), Max: 99.1 (11 May 2019 16:20)  HR: 110 (12 May 2019 11:05) (99 - 135)  BP: --  BP(mean): --  ABP: 103/53 (12 May 2019 11:00) (94/53 - 134/72)  ABP(mean): 69 (12 May 2019 11:00) (62 - 92)  RR: 21 (12 May 2019 11:00) (14 - 50)  SpO2: 100% (12 May 2019 11:05) (97% - 100%)    Mode: CPAP with PS, FiO2: 60, PEEP: 5, PS: 15, MAP: 8     @ 07: @ 07:00  --------------------------------------------------------  IN: 3706 mL / OUT: 2595 mL / NET: 1111 mL     @ 07: @ 11:25  --------------------------------------------------------  IN: 305 mL / OUT: 310 mL / NET: -5 mL      CAPILLARY BLOOD GLUCOSE  196 (12 May 2019 00:00)      POCT Blood Glucose.: 214 mg/dL (12 May 2019 05:39)      PHYSICAL EXAM:  General:   HEENT:   Lymph Nodes:  Neck:   Respiratory:   Cardiovascular:   Abdomen:   Extremities:   Skin:   Neurological:  Psychiatry:    HOSPITAL MEDICATIONS:  MEDICATIONS  (STANDING):  aspirin 325 milliGRAM(s) Oral daily  atorvastatin 40 milliGRAM(s) Oral at bedtime  buMETAnide Infusion 2 mG/Hr (10 mL/Hr) IV Continuous <Continuous>  chlorhexidine 0.12% Liquid 15 milliLiter(s) Oral Mucosa two times a day  chlorhexidine 4% Liquid 1 Application(s) Topical <User Schedule>  chlorhexidine 4% Liquid 1 Application(s) Topical <User Schedule>  diltiazem Infusion 10 mG/Hr (10 mL/Hr) IV Continuous <Continuous>  escitalopram 10 milliGRAM(s) Oral daily  heparin  Infusion 1300 Unit(s)/Hr (8.5 mL/Hr) IV Continuous <Continuous>  insulin lispro (HumaLOG) corrective regimen sliding scale   SubCutaneous every 6 hours  insulin regular Infusion 3 Unit(s)/Hr (3 mL/Hr) IV Continuous <Continuous>  methylPREDNISolone sodium succinate Injectable   IV Push   methylPREDNISolone sodium succinate Injectable 60 milliGRAM(s) IV Push every 12 hours  pantoprazole  Injectable 40 milliGRAM(s) IV Push two times a day  sodium chloride 0.9%. 1000 milliLiter(s) (10 mL/Hr) IV Continuous <Continuous>  vasopressin Infusion 0.083 Unit(s)/Min (5 mL/Hr) IV Continuous <Continuous>    MEDICATIONS  (PRN):  HYDROmorphone  Injectable 0.5 milliGRAM(s) IV Push every 8 hours PRN Severe Pain (7 - 10)  sodium chloride 0.9% lock flush 10 milliLiter(s) IV Push every 1 hour PRN Pre/post blood products, medications, blood draw, and to maintain line patency      LABS:                        9.4    15.2  )-----------( 235      ( 12 May 2019 01:31 )             27.3     05-12    148<H>  |  108  |  144<H>  ----------------------------<  214<H>  4.4   |  20<L>  |  2.98<H>    Ca    8.0<L>      12 May 2019 01:31  Phos  8.6     05-12  Mg     3.0     -12    TPro  5.6<L>  /  Alb  3.0<L>  /  TBili  1.2  /  DBili  x   /  AST  72<H>  /  ALT  90<H>  /  AlkPhos  79  05-12    PTT - ( 12 May 2019 09:25 )  PTT:68.2 sec  Urinalysis Basic - ( 11 May 2019 14:12 )    Color: Light Yellow / Appearance: Clear / S.013 / pH: x  Gluc: x / Ketone: Negative  / Bili: Negative / Urobili: Negative   Blood: x / Protein: Negative / Nitrite: Negative   Leuk Esterase: Negative / RBC: x / WBC x   Sq Epi: x / Non Sq Epi: x / Bacteria: x      Arterial Blood Gas:   @ 09:21  7.42/32/167/21/99/-2.6  ABG lactate: --  Arterial Blood Gas:   @ 22:05  7.48/28/178/21/99/-1.8  ABG lactate: --  Arterial Blood Gas:   @ 17:38  7.51/28/176/23/99/.6  ABG lactate: --  Arterial Blood Gas:   @ 15:09  7.39/32/165/19/99/-5.0  ABG lactate: --  Arterial Blood Gas:   @ 14:41  7.45/38/87/26/97/2.6  ABG lactate: --  Arterial Blood Gas:   @ 14:08  7.37/33/122/18/98/-5.6  ABG lactate: --  Arterial Blood Gas:   @ 10:08  7.37/35/159/20/99/-4.4  ABG lactate: --  Arterial Blood Gas:   @ 08:55  7.29/39/82/18/94/-7.4  ABG lactate: --  Arterial Blood Gas:   @ 00:45  7.33/35/125/18/98/-6.7  ABG lactate: --  Arterial Blood Gas:  05-10 @ 19:51  7.41/28/86/18/97/-5.5  ABG lactate: --  Arterial Blood Gas:  05-10 @ 16:51  7.42/29/87/18/96/-4.7  ABG lactate: --  Arterial Blood Gas:  05-10 @ 12:57  7.38/30/77/17/95/-6.6  ABG lactate: --    Venous Blood Gas:   @ 15:09  7.34/39/44/20/71  VBG Lactate: --  Venous Blood Gas:   @ 14:41  7.39/47/32/28/55  VBG Lactate: --      MICROBIOLOGY:               < from: Xray Chest 1 View- PORTABLE-Urgent (19 @ 10:42) >    FINDINGS:   S/P sternotomy; CABG.  Tracheostomy tube, enteric tube and bilateral central venous catheters -   all persist unchanged.  Lower right chest catheter remains in situ.  Stable cardiac silhouette.  There are worsening pulmonary vascular congestive changes.  Mild increase in left pleural effusion.  Persistent mid right lung atelectatic changes  No pneumothorax.  No acute bony finding.    IMPRESSION:   As above.      < end of copied text >  < from: Transthoracic Echocardiogram (19 @ 09:26) >  EF (Visual Estimate): 80 %  ------------------------------------------------------------------------  Observations:  Mitral Valve: Mitral annular calcification.  Aortic Valve/Aorta: Calcified aortic valve without  stenosis.  Mild aortic regurgitation.  Normal aortic root size. (Ao: 3.6 cm at the sinuses of  Valsalva).  Left Atrium: Normal left atrium.  LA volume index = 32  cc/m2.  Left Ventricle: Hyperdynamic left ventricular systolic  function. Intracavitary gradient seen.  Normal left  ventricular internal dimensions. Mild concentric left  ventricular hypertrophy. Normal diastolic function  Right Heart: Normal right atrium. Normal right ventricular  size and function. Normal tricuspid valve. Pulmonic valve  not well visualized.  Hemodynamic: No evidence of pulmonary hypertension.  Opacification of the right atrium with agitated saline was  suboptimal. Study does not rule-out the presence of a PFO.  ------------------------------------------------------------------------  Conclusions:  Hyperdynamic left ventricular systolic function.  Intracavitary gradient seen.  Opacification of the right atrium with agitated saline was  suboptimal. Study does not rule-out the presence of a PFO.  ------------------------------------------------------------------------    < end of copied text >    RADIOLOGY:  [ ] Reviewed and interpreted by me    PULMONARY FUNCTION TESTS:    EKG: IV  #SLE  #Chronic kidney disease, stage IV (Cystatin C/creatinine discordance)  # Acute kidney injury  Patient's creatinine was 3.48 on 12/30, decreased to 2.48 on admission at [facility] 1/2/2024  and subsequently raised to 4.2 prior to iHD initiation. CHRISTA in the setting of lupus nephritis may likely represent poor renal perfusion in the setting of volume overload, cardiorenal syndrome and diuresis. Lupus flare less likely given improving complement and dsDNA levels and current treatment with steroids and recent treatment w/ cyclophosphamide. At this time, we will hold off on further immunosuppressants. Milly has had 5 cyclophosphamide infusions thus far (08/22, 09/08, 09/22, 10/06, 12/22). She has began steroid taper.  CHRISTA was improving but creatinine increased again 2.34 <3.03<3.4<3.57.  Electrolytes stable, breathing well, UOP low and blood pressure remains elevated. Will hold off on dialysis and re-evaluate tomorrow to see if she is able to tolerate.     #Hypertension  #Acute on chronic systolic heart failure, stress cardiomyopathy  Most likely cause of her hypertension is volume overload, particularly given hypervolemic appearance on physical examination, significantly elevated BNP from baseline, signs of pulmonary edema on CXR. On discussion with cardiology, there is question of if the precipitant of her systolic heart failure is uncontrolled, severe hypertension. Given normal CK, it does not appear likely that there is a component of myocarditis contributing to her presentation. Repeat echocardiogram on 01/06 was 35-40%.   - continue Coreg to 25 mg  - continue Hydralazine 25 mg   - continue Losartan 100 mg   - increase Nifedipine to 60mg     #Hypokalemia, resolved  Potassium 4.1 today.      #Metabolic acidosis, chronic  HD has been held for several days. Her bicarb has been dropping.   -she is receiving HD treatment today. It will improve with RRT.        #Chronic macrocytic anemia  Would recommend holding off  "on starting epo for now, as her HGB has improved from prior. However, would favor starting epo if her HGB is <9.0 or if she remains less than 9.5 on rechecks over the next 1-2 days. Iron studies were obtained, Fe saturation of 40%.     Recommendations were communicated to primary team via note. Discussed w/ patient.     Seen and discussed with Dr. Anne Lujan History :   Nursing and provider notes from last 24 hours reviewed.  Pt was seen while receiving HD treatment and she is tolerating well. She denies any SOB, palpitation, dizziness, or lightheadedness.     Physical Exam:   I/O last 3 completed shifts:  In: 1020 [P.O.:1020]  Out: 890 [Urine:890]   /76 (BP Location: Left arm, Cuff Size: Adult Small)   Pulse 111   Temp (!) 101.5  F (38.6  C) (Oral)   Resp 18   Ht 1.575 m (5' 2\")   Wt 53.9 kg (118 lb 14.4 oz)   LMP 12/11/2023 (Approximate)   SpO2 98%   BMI 21.75 kg/m       General: Laying in bed, no acute distress. Interacts appropriately.   HENT: Normocephalic, atraumatic.   Cardiovascular: Regular rate and rhythm.  Respiratory: Clear to auscultation bilaterally. No increased work of breathing.   Abdomen: Soft, non-tender, non-distended.    Neurologic: Alert. Oriented. Moves all extremities equally.   Musculoskeletal: no pitting edema.   Skin: She has some redness of the face on the bilateral cheeks but no clear malar rash. No signs of pallor or cyanosis. Warm and dry.   Psych: Flat affect, slightly improved     Labs:   All labs reviewed by me  Electrolytes/Renal -   Recent Labs   Lab Test 01/20/24  0432 01/19/24  0702 01/18/24  0558 01/06/24  0638 01/05/24  0612 12/28/23  0623 12/27/23  0001 12/25/23  0442    138 138   < > 139   < > 141 144   POTASSIUM 4.1 3.7 3.7   < > 4.8   < > 5.6* 3.1*  3.1*   CHLORIDE 106 106 111*   < > 107   < > 109* 110*   CO2 15* 17* 20*   < > 18*   < > 17* 20*   .0* 101.0* 85.0*   < > 96.8*   < > 75.1* 60.5*   CR 4.19* 3.57* 3.41*   < > 3.49*   < > 2.98* " 2.57*   GLC 94 90 95   < > 124*   < > 133* 138*   BISI 8.2* 8.3* 8.5*   < > 8.4*   < > 8.6 8.0*   MAG  --   --  1.7  --   --   --  2.1 1.8   PHOS  --   --  2.8  --  6.1*  --   --  3.4    < > = values in this interval not displayed.       CBC -   Recent Labs   Lab Test 01/20/24  0432 01/19/24  0702 01/18/24  0558   WBC 2.3* 3.6* 5.3   HGB 9.7* 9.4* 9.2*   PLT 53* 45* 39*       LFTs -   Recent Labs   Lab Test 01/13/24  0608 01/12/24  0603 01/11/24  0408   ALKPHOS 108 113 109   BILITOTAL 0.3 0.3 0.2   ALT 44 42 48   AST 25 22 24   PROTTOTAL 5.6* 5.3* 5.4*   ALBUMIN 3.2* 2.9* 3.0*       Iron Panel -   Recent Labs   Lab Test 01/05/24  0612 12/20/23  0415 12/17/23  0429 07/10/23  1454   IRON 76  --  130 15*   IRONSAT 40  --  74* 9*   GABRIEL 786*   < > 32,775* 1,079*    < > = values in this interval not displayed.       Surg path - L Kidney biopsy (8/2/2023)   - diffuse sclerosing and focal proliferative lupus nephritis, ISN/RPS Class IV (see comment below).  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  -  Severe interstitial fibrosis and tubular atrophy (70-80%).    COMMENT:  Active crescentic injury involves 2% of sampled glomeruli, while 15% of the glomeruli demonstrate old fibrous crescents and 51% of the glomeruli are globally sclerosed. Light microscopic features raise the possibility of a concomitant class V (membranous) lupus nephritis component, but glomeruli are not available for confirmatory electron microscopy. The NIH activity and chronicity indices are 7/24 and 10/12, respectively.      Imaging:  All imaging studies reviewed by me.     Current Medications:   artificial saliva  1 spray Mouth/Throat 4x Daily    bumetanide  1 mg Oral BID    carvedilol  25 mg Oral BID    cholecalciferol  10 mcg Oral Daily    vitamin B-12  100 mcg Oral Daily    ferrous sulfate  325 mg Oral Daily with breakfast    folic acid  1 mg Oral Daily    heparin ANTICOAGULANT  5,000 Units  Subcutaneous Q12H    hydrALAZINE  25 mg Oral TID    losartan  100 mg Oral Daily    NIFEdipine ER OSMOTIC  60 mg Oral Daily    pantoprazole  40 mg Oral QAM AC    [Held by provider] potassium chloride ER  20 mEq Oral BID    predniSONE  50 mg Oral Daily    sodium chloride (PF)  3 mL Intracatheter Q8H    sulfamethoxazole-trimethoprim  1 tablet Oral Once per day on Tuesday Thursday Saturday     Physician Attestation   I, Yulia Rodríguez, saw and evaluated Milly Carroll with the resident Dr Todd . I have reviewed and discussed with the resident their history, physical and plan.     I personally reviewed the vital signs, medications, labs, and imaging.     In brief,  23 year old female admitted on 1/2/2024. She has a history of lupus nephritis class IV per renal biopsy in 08/2023 with 70-80% IFTA, SLE on prednisone taper and who has received cyclophosphamide (5th dose on 12/22), with also systolic heart failure (35-40% on 01/06),  admitted with chest pain and shortness of breath,and found to be COVID positive with worsening CHRISTA and initiation of HD on 1/11. Reports significant urine output. Although given the significant amount of IFTA in August she is unlikely to recover kidney function to come off dialysis, we were observing her for recovery. The rationale was to gain some time in order to have her get a permanent dialysis access. Unfortunately, she seems to be needing dialysis starting tomorrow     Rest per the student's note.      I personally spent 35 minutes on the date of encounter for chart review, history taking, physical exam, labs and notes reviewed, advised and coordinating care.      Yulia Rodríguez MD  Division of Nephrology and Hypertension  Pager [phone]  Date of Service (when I saw the patient): January 19, 2024          CHIEF COMPLAINT: pulm amyloid, s/p cabg, resp failure    HISTORY OF PRESENT ILLNESS: 79M lengthy medical history including "asthma", Bronchiolitis Obliterans (), ARCENIO nonadherent to therapy, CAD, and anxiety/depression who initially presented with unstable angina. He underwent a cardiac cath that showed multivessel disease. He was then referred to cardiothoracic surgery for a CABG. He received a 3 vessel CABG on . Post procedure was complicated by persistent respiratory failure and CXR with bilateral opacities. He was initially extubated  but then reintubated . He was then noted to have thick secretions and was started on antibiotics. He was unable to be liberated from the ventilator and underwent VATS with lung biopsy and tracheostomy placement on 19.     Since that time he has been weaned to trach collar and is presently on trach collar breathing comfortably and saturating well. He continues to require NGT feeds and has diarrhea to the point of needing a rectal tube. He is on multiple vasopressors (Levo and Vaso) as well as Precedex for his delirium and Lasix gtt to assist with diuresis.     He has had an echo with mild LV hypertrophy and a low voltage EKG. I am told he will be evaluated by the HF team.     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 Although he is reported as having asthma, ARCENIO, and prior Bronchiolitis obliterans - he denies any pulmonary history on my questioning (though he is on Precedex). [facility] characterization      Interval Events: remains  on the vent     OBJECTIVE:  ICU Vital Signs Last 24 Hrs  T(C): 37.1 (12 May 2019 08:00), Max: 37.3 (11 May 2019 16:20)  T(F): 98.8 (12 May 2019 08:00), Max: 99.1 (11 May 2019 16:20)  HR: 110 (12 May 2019 11:05) (99 - 135)  BP: --  BP(mean): --  ABP: 103/53 (12 May 2019 11:00) (94/53 - 134/72)  ABP(mean): 69 (12 May 2019 11:00) (62 - 92)  RR: 21 (12 May 2019 11:00) (14 - 50)  SpO2: 100% (12 May 2019 11:05) (97% - 100%)    Mode: CPAP with PS, FiO2: 60, PEEP: 5, PS: 15, MAP: 8     @ 07: @ 07:00  --------------------------------------------------------  IN: 3706 mL / OUT: 2595 mL / NET: 1111 mL     @ 07: @ 11:25  --------------------------------------------------------  IN: 305 mL / OUT: 310 mL / NET: -5 mL      CAPILLARY BLOOD GLUCOSE  196 (12 May 2019 00:00)      POCT Blood Glucose.: 214 mg/dL (12 May 2019 05:39)      General: No distress. Comfortable.  HEENT: EOM intact.  Neck: Neck supple. JVP not elevated but prominent V waves. No masses  Chest: Clear to auscultation bilaterally  CV: Tachycardic, distant heart sounds. Normal S1 and S2. No rubs or gallops. Radial pulses normal. Anasarcic.   Abdomen: Soft, non-distended, non-tender  Skin: No rashes  Neurology: Grimaces to touch.   Psych: Unable to assess.      HOSPITAL MEDICATIONS:  MEDICATIONS  (STANDING):  aspirin 325 milliGRAM(s) Oral daily  atorvastatin 40 milliGRAM(s) Oral at bedtime  buMETAnide Infusion 2 mG/Hr (10 mL/Hr) IV Continuous <Continuous>  chlorhexidine 0.12% Liquid 15 milliLiter(s) Oral Mucosa two times a day  chlorhexidine 4% Liquid 1 Application(s) Topical <User Schedule>  chlorhexidine 4% Liquid 1 Application(s) Topical <User Schedule>  diltiazem Infusion 10 mG/Hr (10 mL/Hr) IV Continuous <Continuous>  escitalopram 10 milliGRAM(s) Oral daily  heparin  Infusion 1300 Unit(s)/Hr (8.5 mL/Hr) IV Continuous <Continuous>  insulin lispro (HumaLOG) corrective regimen sliding scale   SubCutaneous every 6 hours  insulin regular Infusion 3 Unit(s)/Hr (3 mL/Hr) IV Continuous <Continuous>  methylPREDNISolone sodium succinate Injectable   IV Push   methylPREDNISolone sodium succinate Injectable 60 milliGRAM(s) IV Push every 12 hours  pantoprazole  Injectable 40 milliGRAM(s) IV Push two times a day  sodium chloride 0.9%. 1000 milliLiter(s) (10 mL/Hr) IV Continuous <Continuous>  vasopressin Infusion 0.083 Unit(s)/Min (5 mL/Hr) IV Continuous <Continuous>    MEDICATIONS  (PRN):  HYDROmorphone  Injectable 0.5 milliGRAM(s) IV Push every 8 hours PRN Severe Pain (7 - 10)  sodium chloride 0.9% lock flush 10 milliLiter(s) IV Push every 1 hour PRN Pre/post blood products, medications, blood draw, and to maintain line patency      LABS:                        9.4    15.2  )-----------( 235      ( 12 May 2019 01:31 )             27.3         148<H>  |  108  |  144<H>  ----------------------------<  214<H>  4.4   |  20<L>  |  2.98<H>    Ca    8.0<L>      12 May 2019 01:31  Phos  8.6       Mg     3.0         TPro  5.6<L>  /  Alb  3.0<L>  /  TBili  1.2  /  DBili  x   /  AST  72<H>  /  ALT  90<H>  /  AlkPhos  79  12    PTT - ( 12 May 2019 09:25 )  PTT:68.2 sec  Urinalysis Basic - ( 11 May 2019 14:12 )    Color: Light Yellow / Appearance: Clear / S.013 / pH: x  Gluc: x / Ketone: Negative  / Bili: Negative / Urobili: Negative   Blood: x / Protein: Negative / Nitrite: Negative   Leuk Esterase: Negative / RBC: x / WBC x   Sq Epi: x / Non Sq Epi: x / Bacteria: x      Arterial Blood Gas:   @ 09:21  7.42/32/167/21/99/-2.6  ABG lactate: --  Arterial Blood Gas:   @ 22:05  7.48/28/178/21/99/-1.8  ABG lactate: --  Arterial Blood Gas:   @ 17:38  7.51/28/176/23/99/.6  ABG lactate: --  Arterial Blood Gas:   @ 15:09  7.39/32/165/19/99/-5.0  ABG lactate: --  Arterial Blood Gas:   @ 14:41  7.45/38/87/26/97/2.6  ABG lactate: --  Arterial Blood Gas:   @ 14:08  7.37/33/122/18/98/-5.6  ABG lactate: --  Arterial Blood Gas:   @ 10:08  7.37/35/159/20/99/-4.4  ABG lactate: --  Arterial Blood Gas:   @ 08:55  7.29/39/82/18/94/-7.4  ABG lactate: --  Arterial Blood Gas:   @ 00:45  7.33/35/125/18/98/-6.7  ABG lactate: --  Arterial Blood Gas:  05-10 @ 19:51  7.41/28/86/18/97/-5.5  ABG lactate: --  Arterial Blood Gas:  05-10 @ 16:51  7.42/29/87/18/96/-4.7  ABG lactate: --  Arterial Blood Gas:  05-10 @ 12:57  7.38/30/77/17/95/-6.6  ABG lactate: --    Venous Blood Gas:   @ 15:09  7.34/39/44/20/71  VBG Lactate: --  Venous Blood Gas:   @ 14:41  7.39/47/32/28/55  VBG Lactate: --      MICROBIOLOGY:               < from: Xray Chest 1 View- PORTABLE-Urgent (19 @ 10:42) >    FINDINGS:   S/P sternotomy; CABG.  Tracheostomy tube, enteric tube and bilateral central venous catheters -   all persist unchanged.  Lower right chest catheter remains in situ.  Stable cardiac silhouette.  There are worsening pulmonary vascular congestive changes.  Mild increase in left pleural effusion.  Persistent mid right lung atelectatic changes  No pneumothorax.  No acute bony finding.    IMPRESSION:   As above.      < end of copied text >  < from: Transthoracic Echocardiogram (19 @ 09:26) >  EF (Visual Estimate): 80 %  ------------------------------------------------------------------------  Observations:  Mitral Valve: Mitral annular calcification.  Aortic Valve/Aorta: Calcified aortic valve without  stenosis.  Mild aortic regurgitation.  Normal aortic root size. (Ao: 3.6 cm at the sinuses of  Valsalva).  Left Atrium: Normal left atrium.  LA volume index = 32  cc/m2.  Left Ventricle: Hyperdynamic left ventricular systolic  function. Intracavitary gradient seen.  Normal left  ventricular internal dimensions. Mild concentric left  ventricular hypertrophy. Normal diastolic function  Right Heart: Normal right atrium. Normal right ventricular  size and function. Normal tricuspid valve. Pulmonic valve  not well visualized.  Hemodynamic: No evidence of pulmonary hypertension.  Opacification of the right atrium with agitated saline was  suboptimal. Study does not rule-out the presence of a PFO.  ------------------------------------------------------------------------  Conclusions:  Hyperdynamic left ventricular systolic function.  Intracavitary gradient seen.  Opacification of the right atrium with agitated saline was  suboptimal. Study does not rule-out the presence of a PFO.  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 He underwent a cardiac cath that showed multivessel disease. He was then referred to cardiothoracic surgery for a CABG. He received a 3 vessel CABG on . Post procedure was complicated by persistent respiratory failure and CXR with bilateral opacities. He was initially extubated  but then reintubated . He was then noted to have thick secretions and was started on antibiotics. He was unable to be liberated from the ventilator and underwent VATS with lung biopsy and tracheostomy placement on 19.     Since that time he has been weaned to trach collar and is presently on trach collar breathing comfortably and saturating well. He continues to require NGT feeds and has diarrhea to the point of needing a rectal tube. He is on multiple vasopressors (Levo and Vaso) as well as Precedex for his delirium and Lasix gtt to assist with diuresis.     He has had an echo with mild LV hypertrophy and a low voltage EKG. I am told he will be evaluated by the HF team.     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 Although he is reported as having asthma, ARCENIO, and prior Bronchiolitis obliterans - he denies any pulmonary history on my questioning (though he is on Precedex). Biopsy has been sentbto [facility] for further characterization      Interval Events: remains  on the vent     OBJECTIVE:  ICU Vital Signs Last 24 Hrs  T(C): 37.1 (12 May 2019 08:00), Max: 37.3 (11 May 2019 16:20)  T(F): 98.8 (12 May 2019 08:00), Max: 99.1 (11 May 2019 16:20)  HR: 110 (12 May 2019 11:05) (99 - 135)  BP: --  BP(mean): --  ABP: 103/53 (12 May 2019 11:00) (94/53 - 134/72)  ABP(mean): 69 (12 May 2019 11:00) (62 - 92)  RR: 21 (12 May 2019 11:00) (14 - 50)  SpO2: 100% (12 May 2019 11:05) (97% - 100%)    Mode: CPAP with PS, FiO2: 60, PEEP: 5, PS: 15, MAP: 8     @ 07: @ 07:00  --------------------------------------------------------  IN: 3706 mL / OUT: 2595 mL / NET: 1111 mL     @ 07: @ 11:25  --------------------------------------------------------  IN: 305 mL / OUT: 310 mL / NET: -5 mL      CAPILLARY BLOOD GLUCOSE  196 (12 May 2019 00:00)      POCT Blood Glucose.: 214 mg/dL (12 May 2019 05:39)      General: No distress. Comfortable.  HEENT: EOM intact.  Neck: Neck supple. JVP not elevated but prominent V waves. No masses  Chest: Clear to auscultation bilaterally  CV: Tachycardic, distant heart sounds. Normal S1 and S2. No rubs or gallops. Radial pulses normal. Anasarcic.   Abdomen: Soft, non-distended, non-tender  Skin: No rashes  Neurology: Grimaces to touch.   Psych: Unable to assess.      HOSPITAL MEDICATIONS:  MEDICATIONS  (STANDING):  aspirin 325 milliGRAM(s) Oral daily  atorvastatin 40 milliGRAM(s) Oral at bedtime  buMETAnide Infusion 2 mG/Hr (10 mL/Hr) IV Continuous <Continuous>  chlorhexidine 0.12% Liquid 15 milliLiter(s) Oral Mucosa two times a day  chlorhexidine 4% Liquid 1 Application(s) Topical <User Schedule>  chlorhexidine 4% Liquid 1 Application(s) Topical <User Schedule>  diltiazem Infusion 10 mG/Hr (10 mL/Hr) IV Continuous <Continuous>  escitalopram 10 milliGRAM(s) Oral daily  heparin  Infusion 1300 Unit(s)/Hr (8.5 mL/Hr) IV Continuous <Continuous>  insulin lispro (HumaLOG) corrective regimen sliding scale   SubCutaneous every 6 hours  insulin regular Infusion 3 Unit(s)/Hr (3 mL/Hr) IV Continuous <Continuous>  methylPREDNISolone sodium succinate Injectable   IV Push   methylPREDNISolone sodium succinate Injectable 60 milliGRAM(s) IV Push every 12 hours  pantoprazole  Injectable 40 milliGRAM(s) IV Push two times a day  sodium chloride 0.9%. 1000 milliLiter(s) (10 mL/Hr) IV Continuous <Continuous>  vasopressin Infusion 0.083 Unit(s)/Min (5 mL/Hr) IV Continuous <Continuous>    MEDICATIONS  (PRN):  HYDROmorphone  Injectable 0.5 milliGRAM(s) IV Push every 8 hours PRN Severe Pain (7 - 10)  sodium chloride 0.9% lock flush 10 milliLiter(s) IV Push every 1 hour PRN Pre/post blood products, medications, blood draw, and to maintain line patency      LABS:                        9.4    15.2  )-----------( 235      ( 12 May 2019 01:31 )             27.3         148<H>  |  108  |  144<H>  ----------------------------<  214<H>  4.4   |  20<L>  |  2.98<H>    Ca    8.0<L>      12 May 2019 01:31  Phos  8.6       Mg     3.0         TPro  5.6<L>  /  Alb  3.0<L>  /  TBili  1.2  /  DBili  x   /  AST  72<H>  /  ALT  90<H>  /  AlkPhos  79  12    PTT - ( 12 May 2019 09:25 )  PTT:68.2 sec  Urinalysis Basic - ( 11 May 2019 14:12 )    Color: Light Yellow / Appearance: Clear / S.013 / pH: x  Gluc: x / Ketone: Negative  / Bili: Negative / Urobili: Negative   Blood: x / Protein: Negative / Nitrite: Negative   Leuk Esterase: Negative / RBC: x / WBC x   Sq Epi: x / Non Sq Epi: x / Bacteria: x      Arterial Blood Gas:   @ 09:21  7.42/32/167/21/99/-2.6  ABG lactate: --  Arterial Blood Gas:   @ 22:05  7.48/28/178/21/99/-1.8  ABG lactate: --  Arterial Blood Gas:   @ 17:38  7.51/28/176/23/99/.6  ABG lactate: --  Arterial Blood Gas:   @ 15:09  7.39/32/165/19/99/-5.0  ABG lactate: --  Arterial Blood Gas:   @ 14:41  7.45/38/87/26/97/2.6  ABG lactate: --  Arterial Blood Gas:   @ 14:08  7.37/33/122/18/98/-5.6  ABG lactate: --  Arterial Blood Gas:   @ 10:08  7.37/35/159/20/99/-4.4  ABG lactate: --  Arterial Blood Gas:   @ 08:55  7.29/39/82/18/94/-7.4  ABG lactate: --  Arterial Blood Gas:   @ 00:45  7.33/35/125/18/98/-6.7  ABG lactate: --  Arterial Blood Gas:  05-10 @ 19:51  7.41/28/86/18/97/-5.5  ABG lactate: --  Arterial Blood Gas:  05-10 @ 16:51  7.42/29/87/18/96/-4.7  ABG lactate: --  Arterial Blood Gas:  05-10 @ 12:57  7.38/30/77/17/95/-6.6  ABG lactate: --    Venous Blood Gas:   @ 15:09  7.34/39/44/20/71  VBG Lactate: --  Venous Blood Gas:   @ 14:41  7.39/47/32/28/55  VBG Lactate: --          < from: Xray Chest 1 View- PORTABLE-Urgent (19 @ 10:42) >    FINDINGS:   S/P sternotomy; CABG.  Tracheostomy tube, enteric tube and bilateral central venous catheters -   all persist unchanged.  Lower right chest catheter remains in situ.  Stable cardiac silhouette.  There are worsening pulmonary vascular congestive changes.  Mild increase in left pleural effusion.  Persistent mid right lung atelectatic changes  No pneumothorax.  No acute bony finding.    IMPRESSION:   As above.      < end of copied text >  < from: Transthoracic Echocardiogram (19 @ 09:26) >  EF (Visual Estimate): 80 %  ------------------------------------------------------------------------  Observations:  Mitral Valve: Mitral annular calcification.  Aortic Valve/Aorta: Calcified aortic valve without  stenosis.  Mild aortic regurgitation.  Normal aortic root size. (Ao: 3.6 cm at the sinuses of  Valsalva).  Left Atrium: Normal left atrium.  LA volume index = 32  cc/m2.  Left Ventricle: Hyperdynamic left ventricular systolic  function. Intracavitary gradient seen.  Normal left  ventricular internal dimensions. Mild concentric left  ventricular hypertrophy. Normal diastolic function  Right Heart: Normal right atrium. Normal right ventricular  size and function. Normal tricuspid valve. Pulmonic valve  not well visualized.  Hemodynamic: No evidence of pulmonary hypertension.  Opacification of the right atrium with agitated saline was  suboptimal. Study does not rule-out the presence of a PFO.  ------------------------------------------------------------------------  Conclusions:  Hyperdynamic left ventricular systolic function.  Intracavitary gradient seen.  Opacification of the right atrium with agitated saline was  suboptimal. Study does not rule-out the presence of a PFO.  ------------------------------------------------------------------------    < end of copied text >    RADIOLOGY:  [ ] Reviewed and interpreted by me    PULMONARY FUNCTION TESTS:    EKG:

## 2024-01-20 NOTE — PLAN OF CARE
Goal Outcome Evaluation:           Overall Patient Progress: decliningOverall Patient Progress: declining     Rapid response called on patient for hypotension and spiked fever. Patient had a temperature in dialysis this morning of 99.5, when she arrived to the unit it went up to 101.5. Upon reassessment of vitals, patients BP was discovered to be 79/40. Rapid called. 1000cc NS bolus infusing, abx given. All BP medications held as well as heparin. Patient is on telemetry- normal sinus rhythm. BP is now 8842- will continue to monitor. UA and stool samples still need to be collected. Sputum sent to lab

## 2024-01-20 NOTE — CODE/RAPID RESPONSE
Rapid Response Team Note    Assessment   In assessment a rapid response was called on Milly Carroll due to hypotension. This presentation is likely due to presumed sepsis vs .  Related to recent HD run    Plan   -  IVF bolus 1L NS  - LA 2.2- will repeat LA at 1930  - Tele  - BCx2, UA/UC, C.diff PCR- ordered by primary  -  The Internal Medicine primary team was able to be reached and they are in agreement with the above plan.  -  Disposition: The patient will remain on the current unit. We will continue to monitor this patient closely.  -  Reassessment and plan follow-up will be performed by the primary team      Leah High PA-C  [facility] RRT [facility] Job Code Contact #[phone]  [facility] Paging/Directory    Hospital Course   Brief Summary of events leading to rapid response:   RRT called for Hypotension SBPs  following HD run. Patient also noted to be febrile today and started on broad spectrum abx; cultures obtained. Patient has no current complaints. She dose not endorse current fever, chills, HA, abdominal pain,dysuria, HA, cough, sob, CP. We discussed POC and patient agreeable.     Admission Diagnosis:   Lupus (H) [M32.9]  Acute on chronic congestive heart failure, unspecified heart failure type (H) [I50.9]  COVID-19 [U07.1]    Physical Exam   Temp: 98.5  F (36.9  C) Temp  Min: 98.2  F (36.8  C)  Max: 101.5  F (38.6  C)  Resp: 14 Resp  Min: 8  Max: 24  SpO2: 100 % SpO2  Min: 82 %  Max: 100 %  Pulse: 85 Pulse  Min: 85  Max: 112    No data recorded  BP: (!) 83/38 Systolic (24hrs), Av , Min:79 , Max:158   Diastolic (24hrs), Av, Min:37, Max:128     I/Os: I/O last 3 completed shifts:  In: 420 [P.O.:420]  Out: 552 [Urine:550; Other:2]     Exam:   General: in no acute distress  Mental Status: AAOx4.  Resp: Breathing non-labored don RA. No wheeze or rhonchi noted.   CV: RRR, no appreciable m/r/g  GI: BS+, no rebound or guarding    Significant Results and Procedures   Lactic Acid:   Recent Labs   Lab Test  01/20/24  1241 01/03/24  0149 01/02/24  0800   LACT 1.8 1.5 1.5     CBC:   Recent Labs   Lab Test 01/20/24  0432 01/19/24  0702 01/18/24  0558   WBC 2.3* 3.6* 5.3   HGB 9.7* 9.4* 9.2*   HCT 30.6* 29.4* 29.3*   PLT 53* 45* 39*        Sepsis Evaluation   The patient is not known to have an infection.  Milly Carroll meets SIRS criteria AND has a lactate >2 or other evidence of acute organ damage.  These vital signs, lab and physical exam findings constitute a diagnosis of SEVERE SEPSIS, based on: SBP <90 or MAP <65 and Lactate resulted, and the level was > 2.0          Anti-infectives (From now, onward)      Start     Dose/Rate Route Frequency Ordered Stop    01/20/24 1600  vancomycin (VANCOCIN) 1,000 mg in 200 mL dextrose intermittent infusion         1,000 mg  200 mL/hr over 1 Hours Intravenous ONCE 01/20/24 1519      01/20/24 1559  vancomycin place abdullahi - receiving intermittent dosing         1 each Intravenous SEE ADMIN INSTRUCTIONS 01/20/24 1559      01/20/24 1530  ceFEPIme (MAXIPIME) 1 g vial to attach to  mL bag for ADULTS or NS 50 mL bag for PEDS         1 g  over 30 Minutes Intravenous EVERY 24 HOURS 01/20/24 1517      01/13/24 2000  sulfamethoxazole-trimethoprim (BACTRIM) 400-80 MG per tablet 1 tablet        Note to Pharmacy: PTA Sig:Take 1 tablet by mouth daily      1 tablet Oral Once per day on Tuesday Thursday Saturday 01/13/24 1231            Current antibiotic coverage is appropriate for source of infection.    3 Hour Severe Sepsis Bundle Completion:  1. Initial Lactic Acid result shown above. Repeat lactic acid ordered by reflex for 3 hours from initial collection.  2. Blood Cultures before Antibiotics: Yes  3. Broad Spectrum Antibiotics Administered: yes  4. Is hypotension present? Yes. (Definition - 2 SBPs <90, MAP <65, or decrease > 40 from baseline due to infection w/in 3 hrs of each other during the time period of 6 hrs before and 3 hrs after time zero)   Full 30 mL/kg bolus intentionally  NOT administered to this patient due to ESRD and CHF and acute Pulmonary Edema. The target volume to infuse in this patient is 1L for now.    BMI Readings from Last 1 Encounters:   01/19/24 21.75 kg/m      30 mL/kg fluids based on weight: 1,620 mL  30 mL/kg fluids based on IBW (must be >= 60 inches tall): 1,500 mL

## 2024-01-21 NOTE — PROGRESS NOTES
[facility]    Medicine Progress Note - Hospitalist Service, GOLD TEAM 9    Date of Admission:  1/2/2024    Assessment & Plan   Milly Carroll is a 23 year old female admitted on 1/2/2024. She has a PMHx of lupus C/B lupus flares, lupus cerebritis, lupus nephritis, HTN, chronic systolic heart failure, QT prolongation, past perinephric hematoma s/p emobolization in Aug-2023 and chronic thrombocytopenia admitted to [facility] on 12/17/23 (after presentation for SOB/NSTEMI and pneumonia concern and concern for lupus flare where she was treated w/ steroids and plasma exchange) and transfer to [facility] on 12/18/23 before being transferred to [facility] on 12/22-12/25 for cyclophosphamide infusion. Represented on 12/26 [facility] ED for SOB found to have covid and reduced EF (baseline EF 47%, OSH EF reported as 15-20%) w/ plan to transfer patient to [facility] however patient left AMA on 12/30 due to prolonged wait for transfer to [facility] ED. On presentation 1/2, patient reported continued worsening CP/SOB since her discharge on 12/30. Chest pain and dyspnea has since improved during her admission.     Currently being managed for acute on chronic heart failure and CHRISTA on CKD, concern for cardiorenal syndrome in the setting of prior lupus nephritis, and poorly controlled htn. Since yesterday, htn has improved.    Today's Plan:  - hold bumex and antihypertensive given that she is developed septic shock.  - Bcx at 24 hours negative  - C diff positive-- continue po vancomycin  - van/ cefepime- continue until Bcx negative for 48 hours.    Fever in Setting of Dialysis catheter  Concern for CLABSI  C diff Colitis  - Blood culture x2 ordered-- negative for 24 hours  - UA with culture reflex -- negative for now  - Vancomycin pharmacy to dose. Continue until bcx negative  - cefepime IV continue until Bcx negative  - RIDP negative  - oral vancomycin  started on 1/21.    Acute on Chronic HF (EF 15-20% on 12/26 OSH)  Dyspnea and Chest Pain - improved   Uncontrolled HTN, improved  Tricuspid insufficiency   Presented with chest pain and dyspnea as above. Concern that hypertensive crisis contributing to chest pain. Echocardiogram on 17-Dec-2023 showed LVEF 35-40%, EF decreased to 15-20% on 12/26 w/ BNP at that time >70,000. Lexiscan 12/21 negative for inducible ischemia. Admission workup 1/2 included trop elevated at 290 with peak at 469 on 1/2. TTE 1/2 with severe diffuse hypokinesis with stably reduced EF. Limited TTE 1/7 with EF 35-40% and dilated IVC. Cardiology consulted with recs as below.   V/Q scan 1/3 indeterminate, overall lower suspicion of VTE contributing to current presentation.   Attempted diuresis with increased doses of IV bumex doses (up to bumex 4 mg IV x 1) on 1/4-1/6 with minimal urine output and concern for cardiorenal syndrome. Now on dialysis.   -Gen Cardiology consulted, appreciate recs  -Nephrology consulted, appreciate assistance   -Dialysis runs per nephrology  -bumex 1mg BID-- hold in setting of decreased po intake, nausea and risk for dehydration.  -Continue carvedilol   -Continue hydralazine TID   -Continue nifedipine (restarted on 1/7)   -losartan 100mg  Nifedipine 60mg daily  -Consider cMRI during admission once fluid status improved and infection improved    CHRISTA  Metabolic Acidosis, resolved  Recent Lupus Nephritis   Cr up trending this admission, per discussion with cardiology, nephrology, and rheumatology current CHRISTA suspected to be more likely related to cardiorenal syndrome and ongoing volume overload.   -Nephrology consulted, appreciate assistance    -Continue diuresis and blood pressure management as above   -Stopped Bicarb tabs  -Rheumatology following, appreciate assistance   -Due for 5th dose cyclophosphamide per [facility] protocol 1/5 (ongoing discussion w/ rheum/nephrology prior to administration), holding for now due to  COVID and other ongoing management as outlined     R forearm blister, improved  Quarter sized with purulence, sent culture overnight. WOCN to see.  -Derm consult placed   -Daily Vaseline and bandage over wound until healed  -NGTD on culture, will hold off on further vancomycin at this time    COVID-19 Infection, resolved  Tested positive on 12/29 at OSH when presented with chest pain and dyspnea. Concern for superimposed PNA per OSH and was on vanc/cefepime 12/26-12/30 prior to AMA leave. Initially cefepime continued on admission, discontinued on 1/4 due to low suspicion for bacterial pneumonia.  Has remained on RA this admission. Overall unclear how much COVID-19 contributed to acute presentation.   -Received remdesivir 12/29 and 12/30 prior to leaving AMA, completed 5 day course given high risk (1/5 - 1/7)     Hx of C.diff infxn (6/2023)  -Stopped PO vanc prophylaxis on 1/4 when antibiotics stopped     SLE (+dsDNA, +RNP, +ribosomal P Ab and hypocomplementemia)   Recent Lupus Flare w/ MAHA and renal insuff s/p Cyclophosphamide dose (12/22/23)  Hx of Lupus cerebritis w/ hx of seizures  C/b Lupus Nephritis  Hx medication non-adherence  -Lupus-focused meds:  -Continue prednisone 50mg qday, starting 50mg 1/15/2024, plan for 14 each of 42-68-51-20-10 taper  -PJP ppx: continue PTA bactrim  -GI ppx: continue protonix qday, tums prn  -Cyclophosphamide on hold as above   -Will need OP lupus follow up (rheumatology OP consult placed)    Hypomagnesemia  Hypokalemia, resolved  -Replete per protocol      Chronic RUE edema with weakness   Superficial clot of R cephalic vein  US 8/2023 negative. RUE doppler negative for DVT on 1/2, significant for superficial thrombus. Repeat US RUE 1/7 with no evidence of DVT. Suspect weakness 2/2 swelling, lower concern for alternate etiologies.   -Symptomatic with heat packs    Chronic thrombocytopenia, improving   Chronic Macrocytic Anemia, improving  -Trend CBC daily   -Continue PTA  folate/B12 replacement  -DVT ppx: heparin subcutaneous decrease dose per pharmacy in setting of chronic thrombocytopenia and renal dysfunction  -Hematology consulted - hemolysis improving, check labs daily., holding off on epo unless hgb decreases below 9.     Hx of Intermittent Headaches  History of being responsive to APAP and dilaudid along w/ treatment of HTN and Lupus. NO NSAIDS.     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 Stools described as normal.      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 No current PTA antiepileptic medications    Chronic Left Shoulder Pain, improved   Unclear etiology, likely MSK pain.   -PRN tylenol or metharbamol; taper PO dilaudid (no clear indication)           Diet: Fluid restriction 1500 ML FLUID  Renal Diet (dialysis)  Snacks/Supplements Adult: Other; 8 PM: Egg salad sandwich on white bread + alternate grapes/orange E/O day on side; Between Meals    DVT Prophylaxis: Heparin SQ  Leung Catheter: Not present  Lines: None     Cardiac Monitoring: ACTIVE order. Indication: Electrolyte Imbalance (24 hours)- Magnesium <1.3 mg/ml; Potassium < =2.8 or > 5.5 mg/ml  Code Status: Full Code      Clinically Significant Risk Factors              # Hypoalbuminemia: Lowest albumin = 2.9 g/dL at 1/12/2024  6:03 AM, will monitor as appropriate   # Thrombocytopenia: Lowest platelets = 38 in last 2 days, will monitor for bleeding      # Chronic heart failure with reduced ejection fraction: last echo with EF <40%           # Financial/Environmental Concerns: none (Per pt she discussed option of applying for disability with her new PCP)         Disposition Plan              SANIYA CHAMORRO MD  Hospitalist Service, GOLD TEAM 9  [facility]  Securely message with [employer] (more info)  Text page via [employer] Paging/Directory   See signed in provider for up to date coverage information  ______________________________________________________________________    Interval History   Overnight developed hypotension with sepsis d/t C diff colitis. IVF rescruscitation helped. Started on vanc, cefepime. Cdiff noted to be positive and was stated on po vancomycin. Overnight Bp improved    This morning, having no lightheadedness, dizziness, sob, cp, palpitations, abd pain, nausea, emesis. Still having watery diarrhea. No dysuria, flank pain, hematuria.    Physical Exam   Vital Signs: Temp: 98.3  F (36.8  C) Temp src: Oral BP: 106/66 Pulse: 85   Resp: 14 SpO2: 100 % O2 Device: None (Room air)    Weight: 118 lbs 14.4 oz    Constitutional: NAD  Cardiovascular: RRR  Respiratory: CTAB  GI: bowel sounds present throughout. No CVA tenderness  Neuro: alert and oriented, no gross focal deficits noted  Msk: Ttp over the lower back both in center and on the sides of the spinal processes.    Medical Decision Making       MANAGEMENT DISCUSSED with the following over the past 24 hours: SW, nephrology, and nursing       Data     I have personally reviewed the following data over the past 24 hrs:    2.4 (L)  \   9.0 (L)   / 48 (LL)     137 104 66.7 (H) /  101 (H)   4.2 19 (L) 2.86 (H) \     Procal: N/A CRP: N/A Lactic Acid: 1.1

## 2024-01-21 NOTE — PLAN OF CARE
Goal Outcome Evaluation:  Cdiff +.   A&Ox4. Flat affect. RA. Low BP's - improved throughout shift. Page if MAP below 60. Telemetry. Renal diet. 1500 ml fluid restriction. 150 ml consumed during shift. SBA. Denies pain. Denies nausea. Voiding small amounts. Passing gas. BM X 2 - diarrhea. Elevated lactic - improved. 250 ml fluid bolus given. Started on PO vanco r/t CDiff. R Dialysis CVC - reddened skin noted - awaiting blood culture results. PIV x2.     Plan of Care Reviewed With: patient    Overall Patient Progress: improvingOverall Patient Progress: improving

## 2024-01-21 NOTE — SIGNIFICANT EVENT
Called patient's mom to update her regarding patient's condition. No response. VM not personalized so did not leave message

## 2024-01-21 NOTE — PROGRESS NOTES
Nephrology Progress Note  01/21/2024         Assessment & Recommendations:   Milly Carroll is a 23 year old female admitted on 1/2/2024. 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 Fluid status, kidney function and hypertension continued to worsened despite IV diuretics, and increase in antihypertensives. TDC was placed on 1/10 and iHD initiated on 1/11/23. She will certainly need to be on dialysis long term and ideally we would be able to hold off until she can prepare by having permeant access (fistula) placed to limit chance of infections and promote better clearance.  Unfortunately, she has lack of social support making it challenging for her to manage off dialysis long enough for this to take place. Eventually she will likely need a kidney transplant. She remains hypertensive on 1/19.  It should be noted that even when she was diuresed, her BP remained high.  As such, it is likely that her uncontrolled hypertension is more complicated than simply increased intravascular volume and likely related to a combination of lupus nephritis/nephritic syndrome, damaged vessels, and some component of volume overload, Her electrolytes are stable and she is making some urine.  Creatinine is rising to 3.57 today.  Recommend watching very carefully over next 24 hours prior to dialyzing.     Recommendations:  - Bumex 1mg BID   - continue Nifedipine to 60mg   - continue Coreg to 25 mg  - continue Hydralazine 25 mg   - continue Losartan 100 mg   - Continue to postpone cyclophosphamide, at a minimum until after 01/12 but consider postponing further due to significantly impaired renal function  - Prednisone 60 mg taper (taper plan was 60 mg for 14 days then 37-85-94-20-10 x 14 days each)  - Daily BMP  - Daily standing weights, strict I&Os   -  for outpatient dialysis referral in [facility]    #Lupus nephritis, class  IV  #SLE  #Chronic kidney disease, stage IV (Cystatin C/creatinine discordance)  # Acute kidney injury  Patient's creatinine was 3.48 on 12/30, decreased to 2.48 on admission at [facility] 1/2/2024  and subsequently raised to 4.2 prior to iHD initiation. CHRISTA in the setting of lupus nephritis may likely represent poor renal perfusion in the setting of volume overload, cardiorenal syndrome and diuresis. Lupus flare less likely given improving complement and dsDNA levels and current treatment with steroids and recent treatment w/ cyclophosphamide. At this time, we will hold off on further immunosuppressants. Milly has had 5 cyclophosphamide infusions thus far (08/22, 09/08, 09/22, 10/06, 12/22). She has began steroid taper.  CHRISTA was improving but creatinine increased again 2.34 <3.03<3.4<3.57.  Electrolytes stable, breathing well, UOP low and blood pressure remains elevated. Will hold off on dialysis and re-evaluate tomorrow to see if she is able to tolerate.     #Hypertension  #Acute on chronic systolic heart failure, stress cardiomyopathy  Most likely cause of her hypertension is volume overload, particularly given hypervolemic appearance on physical examination, significantly elevated BNP from baseline, signs of pulmonary edema on CXR. On discussion with cardiology, there is question of if the precipitant of her systolic heart failure is uncontrolled, severe hypertension. Given normal CK, it does not appear likely that there is a component of myocarditis contributing to her presentation. Repeat echocardiogram on 01/06 was 35-40%.     -HOLD all anti-HTN medications; Coreg to 25 mg, Hydralazine 25 mg, Losartan 100 mg, Nifedipine to 60mg. Patient is likely septic from C diff colitis.     #Hypokalemia, resolved  Potassium 4s.     #Metabolic acidosis, chronic  Metabolic acidosis worsened while holding HD for 5 days. HD was resumed yesterday. Bicarb improved with HD treatment. She will require HD <3 times weekly. Therefore,  "she has to be assessed daily to determine whether she needs treatment or not.        #Chronic macrocytic anemia  Would recommend holding off on starting epo for now, as her HGB has improved from prior. However, would favor starting epo if her HGB is <9.0 or if she remains less than 9.5 on rechecks over the next 1-2 days. Iron studies were obtained, Fe saturation of 40%.   --no iron infusion or MAXWELL at this time.     Recommendations were communicated to primary team via note. Discussed w/ patient.     Seen and discussed with Dr. Rodríguez    Interval History :   Nursing and provider notes from last 24 hours reviewed.  Overnight, patient had severe diarrhea and hypotension with elevated lactic acidosis. BP meds are on hold. She has received fluid resuscitation. Lactic acidosis resolved. She was found to have C diff infection. She is started on oral vancomycin. She is currently hemodynamically stable. However, she appear tired and sicker. She continues to have diarrhea.     Physical Exam:   I/O last 3 completed shifts:  In: 600 [P.O.:150; I.V.:450]  Out: 622 [Urine:220; Other:2; Stool:400]   /74 (BP Location: Right arm)   Pulse 73   Temp 98.3  F (36.8  C) (Oral)   Resp 15   Ht 1.575 m (5' 2\")   Wt 53.9 kg (118 lb 14.4 oz)   LMP 12/11/2023 (Approximate)   SpO2 99%   BMI 21.75 kg/m       General: Laying in bed, no acute distress. Interacts appropriately.   HENT: Normocephalic, atraumatic.   Cardiovascular: Regular rate and rhythm.  Respiratory: Clear to auscultation bilaterally. No increased work of breathing.   Abdomen: Soft, non-tender, non-distended.    Neurologic: Alert. Oriented. Moves all extremities equally.   Musculoskeletal: no pitting edema.   Skin: She has some redness of the face on the bilateral cheeks but no clear malar rash. No signs of pallor or cyanosis. Warm and dry.   Psych: Flat affect, slightly improved     Labs:   All labs reviewed by me  Electrolytes/Renal -   Recent Labs   Lab Test " 01/21/24  0432 01/20/24  0432 01/19/24  0702 01/18/24  0558 01/06/24  0638 01/05/24  0612 12/28/23  0623 12/27/23  0001 12/25/23  0442    137 138 138   < > 139   < > 141 144   POTASSIUM 4.2 4.1 3.7 3.7   < > 4.8   < > 5.6* 3.1*  3.1*   CHLORIDE 104 106 106 111*   < > 107   < > 109* 110*   CO2 19* 15* 17* 20*   < > 18*   < > 17* 20*   BUN 66.7* 112.0* 101.0* 85.0*   < > 96.8*   < > 75.1* 60.5*   CR 2.86* 4.19* 3.57* 3.41*   < > 3.49*   < > 2.98* 2.57*   * 94 90 95   < > 124*   < > 133* 138*   BISI 7.2* 8.2* 8.3* 8.5*   < > 8.4*   < > 8.6 8.0*   MAG  --   --   --  1.7  --   --   --  2.1 1.8   PHOS  --   --   --  2.8  --  6.1*  --   --  3.4    < > = values in this interval not displayed.       CBC -   Recent Labs   Lab Test 01/21/24  0639 01/20/24  1733 01/20/24  0432   WBC 2.4* 3.0* 2.3*   HGB 9.0* 8.5* 9.7*   PLT 48* 38* 53*       LFTs -   Recent Labs   Lab Test 01/13/24  0608 01/12/24  0603 01/11/24  0408   ALKPHOS 108 113 109   BILITOTAL 0.3 0.3 0.2   ALT 44 42 48   AST 25 22 24   PROTTOTAL 5.6* 5.3* 5.4*   ALBUMIN 3.2* 2.9* 3.0*       Iron Panel -   Recent Labs   Lab Test 01/05/24  0612 12/20/23  0415 12/17/23  0429 07/10/23  1454   IRON 76  --  130 15*   IRONSAT 40  --  74* 9*   GABRIEL 786*   < > 32,775* 1,079*    < > = values in this interval not displayed.       Surg path - L Kidney biopsy (8/2/2023)   - diffuse sclerosing and focal proliferative lupus nephritis, ISN/RPS Class IV (see comment below).  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  -  Severe interstitial fibrosis and tubular atrophy (70-80%).    COMMENT:  Active crescentic injury involves 2% of sampled glomeruli, while 15% of the glomeruli demonstrate old fibrous crescents and 51% of the glomeruli are globally sclerosed. Light microscopic features raise the possibility of a concomitant class V (membranous) lupus nephritis component, but glomeruli are not available for confirmatory  electron microscopy. The NIH activity and chronicity indices are 7/24 and 10/12, respectively.      Imaging:  All imaging studies reviewed by me.     Current Medications:   artificial saliva  1 spray Mouth/Throat 4x Daily    [Held by provider] bumetanide  1 mg Oral BID    [Held by provider] carvedilol  25 mg Oral BID    ceFEPIme  1 g Intravenous Q24H    cholecalciferol  10 mcg Oral Daily    vitamin B-12  100 mcg Oral Daily    ferrous sulfate  325 mg Oral Daily with breakfast    folic acid  1 mg Oral Daily    [Held by provider] heparin ANTICOAGULANT  5,000 Units Subcutaneous Q12H    [Held by provider] hydrALAZINE  25 mg Oral TID    [Held by provider] losartan  100 mg Oral Daily    [Held by provider] NIFEdipine ER OSMOTIC  60 mg Oral Daily    pantoprazole  40 mg Oral QAM AC    [Held by provider] potassium chloride ER  20 mEq Oral BID    predniSONE  50 mg Oral Daily    sodium chloride (PF)  3 mL Intracatheter Q8H    sodium chloride (PF)  3 mL Intracatheter Q8H    sulfamethoxazole-trimethoprim  1 tablet Oral Once per day on Tuesday Thursday Saturday    vancomycin  125 mg Oral 4x Daily    vancomycin place abdullahi - receiving intermittent dosing  1 each Intravenous See Admin Instructions     Physician Attestation   I, Yulia Rodríguez, saw and evaluated Milly Carroll with the resident Dr Todd . I have reviewed and discussed with the resident their history, physical and plan.     I personally reviewed the vital signs, medications, labs, and imaging.     In brief,  23 year old female admitted on 1/2/2024. She has a history of lupus nephritis class IV per renal biopsy in 08/2023 with 70-80% IFTA, SLE on prednisone taper and who has received cyclophosphamide (5th dose on 12/22), with also systolic heart failure (35-40% on 01/06),  admitted with chest pain and shortness of breath,and found to be COVID positive with worsening CHRISTA and initiation of HD on 1/11. Reports significant urine output. Although given the significant amount  of IFTA in August she is unlikely to recover kidney function to come off dialysis, we were observing her for recovery. The rationale was to gain some time in order to have her get a permanent dialysis access. Unfortunately, she seems to be needing dialysis starting tomorrow     Rest per the student's note.      I personally spent 35 minutes on the date of encounter for chart review, history taking, physical exam, labs and notes reviewed, advised and coordinating care.      Yulia Rodríguez MD  Division of Nephrology and Hypertension  Pager [phone]  Date of Service (when I saw the patient): January 19, 2024          Complex Repair And Graft Additional Text (Will Appearing After The Standard Complex Repair Text): The complex repair was not sufficient to completely close the primary defect. The remaining additional defect was repaired with the graft mentioned below.

## 2024-01-21 NOTE — PLAN OF CARE
"Goal Outcome Evaluation:      Plan of Care Reviewed With: patient    Overall Patient Progress: improvingOverall Patient Progress: improving    0700 - 1930:   /81 (BP Location: Right arm)   Pulse 99   Temp 98.6  F (37  C) (Oral)   Resp 16   Ht 1.575 m (5' 2\")   Wt 53.9 kg (118 lb 14.4 oz)   LMP 12/11/2023 (Approximate)   SpO2 100%   BMI 21.75 kg/m      A&O x 4, flat effect, calm & pleasant, AVSS ex on Tele monitoring.   Denies pain/nausea/sob on RA.  PIV x 2 on TKO & SL.  Good appetite, ate 100%, on fluid restriction 1500 ml/day.  UAL, on  bed alarm & pt does call appropriately, on dialysis, had 300 ml urine output & had loose stool x 3 this shift.   Continue with poc...      "

## 2024-01-21 NOTE — PROGRESS NOTES
01/20/24 1600   Call Information   Date of Call 01/20/24   Time of Call 1635   Name of person requesting the team Concha   Title of person requesting team RN   RRT Arrival time 1637   Time RRT ended 1650   Reason for call   Type of RRT Adult   Primary reason for call Cardiovascular   Cardiovascular SBP less than 90   Was patient transferred from the ED, ICU, or PACU within last 24 hours prior to RRT call? No   SBAR   Situation BP 70/40   Background 23 year old female admitted on 1/2/2024. She has a PMHx of lupus C/B lupus flares, lupus cerebritis, lupus nephritis, HTN, chronic systolic heart failure, QT prolongation, past perinephric hematoma s/p emobolization in Aug-2023 and chronic thrombocytopenia admitted to [facility] on 12/17/23 (after presentation for SOB/NSTEMI and pneumonia concern and concern for lupus flare where she was treated w/ steroids and plasma exchange) and transfer to [facility] on 12/18/23 before being transferred to [facility] on 12/22-12/25 for cyclophosphamide infusion.   Notable History/Conditions Organ failure;End-Stage disease;Hypertension   Assessment A/OX4, BP soft, denies dizziness nor lightheadedness   Interventions Fluid bolus   Patient Outcome   Patient Outcome Stabilized on unit   RRT Team   Attending/Primary/Covering Physician Gold 9   Date Attending Physician notified 01/20/24   Time Attending Physician notified 1634   Physician(s) Leah High   Lead RN Karina Kern   RT Shanita   Post RRT Intervention Assessment   Post RRT Assessment Stable/Improved   Date Follow Up Done 01/20/24   Time Follow Up Done 1850

## 2024-01-21 NOTE — PROGRESS NOTES
"Hospital Medicine Cross Cover Note  2024 - 12:50 AM    Clostridium difficile (C.diff) positive.  Low BP with some loose stools.  Elevated lactic acid.    Exam:    BP 90/48 (BP Location: Right arm)   Pulse 74   Temp 97.6  F (36.4  C) (Oral)   Resp 18   Ht 1.575 m (5' 2\")   Wt 53.9 kg (118 lb 14.4 oz)   LMP 2023 (Approximate)   SpO2 100%   BMI 21.75 kg/m    Not otherwise examined    Assessment:    Elevated lactic acid with Clostridium difficile (C.diff) positive loose stools.  H/o underlying heart failure with reduced EF.  Getting IV fluid(s) bolus over 4 hours and will initiate oral vancomycin and recheck lactic acid after IV fluid(s).    Plan:    See above.  Discussed with FERNANDEZ Pacheco in person on the unit.  Repeat lactic acid 1.1    Teofilo Escobar MD  [phone]  Mobile: [phone]   "

## 2024-01-21 NOTE — PROGRESS NOTES
Tara PRESLEY [phone]  [address]   critical lactic 2.5. please call me. Thank you!  Leah High paged    Tara PRESLEY [phone]  [address]   BP 89/62 MAP 62 HR 85 Temp 97.9  retake BP 90/47 MAP 63 HR 80   A&OX4.   Denies pain and nausea.   Leah High paged    [phone]  Tara Presley [phone]  [address]   lactic draw at 2223 critical - 2.8. BP 90/44 MAP 64, HR 73, O2 100, temp 97.6. urine and stool sample collected and sent.      Leah High paged

## 2024-01-22 NOTE — PROGRESS NOTES
Nephrology Progress Note  01/22/2024         Assessment & Recommendations:   Milly Carroll is a 23 year old female admitted on 1/2/2024. 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 Fluid status, kidney function and hypertension continued to worsened despite IV diuretics, and increase in antihypertensives. TDC was placed on 1/10 and iHD initiated on 1/11/23. She will certainly need to be on dialysis long term and ideally we would be able to hold off until she can prepare by having permeant access (fistula) placed to limit chance of infections and promote better clearance.  Unfortunately, she has lack of social support making it challenging for her to manage off dialysis long enough for this to take place. Eventually she will likely need a kidney transplant. She remains hypertensive on 1/19.  It should be noted that even when she was diuresed, her BP remained high.  As such, it is likely that her uncontrolled hypertension is more complicated than simply increased intravascular volume and likely related to a combination of lupus nephritis/nephritic syndrome, damaged vessels, and some component of volume overload, Her electrolytes are stable and she is making some urine.  Creatinine is rising to 3.57 today.  Recommend watching very carefully over next 24 hours prior to dialyzing.     Recommendations:  - increase Bumex to 2mg BID   - increase Nifedipine to 90mg   -  continue Coreg to 25 mg  - discontinue Hydralazine 25 mg   - continue Losartan 100 mg   - Hold off on cyclophosphadmide.   - Prednisone 50 mg taper (taper plan was 60 mg every 2 weeks then 83-88-04-20-10 x 14 days each). Decrease duration of taper to every 1 week rather than every 2 weeks.   - Daily BMP  - Daily standing weights, strict I&Os   -  for outpatient dialysis referral in [facility]    #Lupus nephritis, class IV  #SLE  #Chronic kidney  disease, stage IV (Cystatin C/creatinine discordance)  # Acute kidney injury  Patient's creatinine was 3.48 on 12/30, decreased to 2.48 on admission at [facility] 1/2/2024  and subsequently raised to 4.2 prior to iHD initiation. CHRISTA in the setting of lupus nephritis may likely represent poor renal perfusion in the setting of volume overload, cardiorenal syndrome and diuresis. Lupus flare less likely given improving complement and dsDNA levels and current treatment with steroids and recent treatment w/ cyclophosphamide. At this time, we will hold off on further immunosuppressants. Milly has had 5 cyclophosphamide infusions thus far (08/22, 09/08, 09/22, 10/06, 12/22). She has began steroid taper.  CHRISTA was improving but creatinine increased again 2.34 <3.03<3.4<3.57.  Electrolytes stable, breathing well, UOP low and blood pressure remains elevated. Will hold off on dialysis and re-evaluate tomorrow to see if she is able to tolerate.   - Hold off on cyclophosphadmide.   - Prednisone 50 mg taper (taper plan was 60 mg every 2 weeks then 87-12-15-20-10 x 14 days each). Decrease duration of taper to every 1 week.     #Hypertension  #Acute on chronic systolic heart failure, stress cardiomyopathy  Most likely cause of her hypertension is volume overload, particularly given hypervolemic appearance on physical examination, significantly elevated BNP from baseline, signs of pulmonary edema on CXR. On discussion with cardiology, there is question of if the precipitant of her systolic heart failure is uncontrolled, severe hypertension. Given normal CK, it does not appear likely that there is a component of myocarditis contributing to her presentation. Repeat echocardiogram on 01/06 was 35-40%. Held antihypertenstives on 1/20 given septic shock; resumed thereafter. Still need better blood pressure control   - increse Bumex to 2mg BID   -  increase Nifedipine to 90mg   -  continue Coreg to 25 mg  - discontinue Hydralazine 25 mg   -  "continue Losartan 100 mg     #Hypokalemia, resolved  Potassium 4s.     #Metabolic acidosis, chronic  Metabolic acidosis worsened while holding HD for 5 days. HD was resumed yesterday. Bicarb improved with HD treatment. She will require HD <3 times weekly. Therefore, she has to be assessed daily to determine whether she needs treatment or not.        #Chronic macrocytic anemia  Would recommend holding off on starting epo for now, as her HGB has improved from prior. However, would favor starting epo if her HGB is <9.0 or if she remains less than 9.5 on rechecks over the next 1-2 days. Iron studies were obtained, Fe saturation of 40%.   --no iron infusion or MAXWELL at this time.     Recommendations were communicated to primary team via note. Discussed w/ patient.     Seen and discussed with Dr. Real    Interval History :   Nursing and provider notes from last 24 hours reviewed.  NAEON.    On fluid restriction. 730 ml in, 1.2L out (500ml urine). Worsening CHRISTA Cre 3.38 today from 2.86 yesterday. Bicarb low 17.   Patient endorsing no concerns today. Denies worsening of lower extremity swelling. Feeling good.     Physical Exam:   I/O last 3 completed shifts:  In: 630 [P.O.:530; I.V.:100]  Out: 1100 [Urine:600; Stool:500]   BP (!) 166/121 (BP Location: Right arm, Cuff Size: Adult Small)   Pulse 86   Temp 98.1  F (36.7  C) (Oral)   Resp 16   Ht 1.575 m (5' 2\")   Wt 55.3 kg (122 lb)   LMP 12/11/2023 (Approximate)   SpO2 100%   BMI 22.31 kg/m       General: Laying in bed, no acute distress. Interacts appropriately.   HENT: Normocephalic, atraumatic.   Cardiovascular: Regular rate and rhythm.  Respiratory: Clear to auscultation bilaterally. No increased work of breathing.   Abdomen: Soft, non-tender, non-distended.    Neurologic: Alert. Oriented. Moves all extremities equally.   Musculoskeletal: no pitting edema.   Skin: She has some redness of the face on the bilateral cheeks but no clear malar rash. No signs of " pallor or cyanosis. Warm and dry.   Psych: Flat affect, slightly improved     Labs:   All labs reviewed by me  Electrolytes/Renal -   Recent Labs   Lab Test 01/22/24  0656 01/21/24  0432 01/20/24  0432 01/19/24  0702 01/18/24  0558 01/06/24  0638 01/05/24  0612 12/28/23  0623 12/27/23  0001 12/25/23  0442    137 137   < > 138   < > 139   < > 141 144   POTASSIUM 3.8 4.2 4.1   < > 3.7   < > 4.8   < > 5.6* 3.1*  3.1*   CHLORIDE 105 104 106   < > 111*   < > 107   < > 109* 110*   CO2 17* 19* 15*   < > 20*   < > 18*   < > 17* 20*   BUN 87.3* 66.7* 112.0*   < > 85.0*   < > 96.8*   < > 75.1* 60.5*   CR 3.38* 2.86* 4.19*   < > 3.41*   < > 3.49*   < > 2.98* 2.57*   * 101* 94   < > 95   < > 124*   < > 133* 138*   BISI 7.4* 7.2* 8.2*   < > 8.5*   < > 8.4*   < > 8.6 8.0*   MAG  --   --   --   --  1.7  --   --   --  2.1 1.8   PHOS  --   --   --   --  2.8  --  6.1*  --   --  3.4    < > = values in this interval not displayed.       CBC -   Recent Labs   Lab Test 01/22/24  0656 01/21/24  0639 01/20/24  1733   WBC 2.1* 2.4* 3.0*   HGB 9.6* 9.0* 8.5*   PLT 44* 48* 38*       LFTs -   Recent Labs   Lab Test 01/13/24  0608 01/12/24  0603 01/11/24  0408   ALKPHOS 108 113 109   BILITOTAL 0.3 0.3 0.2   ALT 44 42 48   AST 25 22 24   PROTTOTAL 5.6* 5.3* 5.4*   ALBUMIN 3.2* 2.9* 3.0*       Iron Panel -   Recent Labs   Lab Test 01/05/24  0612 12/20/23  0415 12/17/23  0429 07/10/23  1454   IRON 76  --  130 15*   IRONSAT 40  --  74* 9*   GABRIEL 786*   < > 32,775* 1,079*    < > = values in this interval not displayed.       Surg path - L Kidney biopsy (8/2/2023)   - diffuse sclerosing and focal proliferative lupus nephritis, ISN/RPS Class IV (see comment below).  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  -  Severe interstitial fibrosis and tubular atrophy (70-80%).    COMMENT:  Active crescentic injury involves 2% of sampled glomeruli, while 15% of the glomeruli demonstrate old  fibrous crescents and 51% of the glomeruli are globally sclerosed. Light microscopic features raise the possibility of a concomitant class V (membranous) lupus nephritis component, but glomeruli are not available for confirmatory electron microscopy. The NIH activity and chronicity indices are 7/24 and 10/12, respectively.      Imaging:  All imaging studies reviewed by me.     Current Medications:   artificial saliva  1 spray Mouth/Throat 4x Daily    [Held by provider] bumetanide  1 mg Oral BID    [Held by provider] carvedilol  25 mg Oral BID    ceFEPIme  1 g Intravenous Q24H    cholecalciferol  10 mcg Oral Daily    vitamin B-12  100 mcg Oral Daily    ferrous sulfate  325 mg Oral Daily with breakfast    folic acid  1 mg Oral Daily    [Held by provider] heparin ANTICOAGULANT  5,000 Units Subcutaneous Q12H    hydrALAZINE  25 mg Oral TID    [Held by provider] losartan  100 mg Oral Daily    NIFEdipine ER OSMOTIC  60 mg Oral Daily    pantoprazole  40 mg Oral QAM AC    [Held by provider] potassium chloride ER  20 mEq Oral BID    predniSONE  50 mg Oral Daily    sodium chloride (PF)  3 mL Intracatheter Q8H    sodium chloride (PF)  3 mL Intracatheter Q8H    sulfamethoxazole-trimethoprim  1 tablet Oral Once per day on Tuesday Thursday Saturday    vancomycin  125 mg Oral 4x Daily    vancomycin place abdullahi - receiving intermittent dosing  1 each Intravenous See Admin Instructions

## 2024-01-22 NOTE — PLAN OF CARE
Goal Outcome Evaluation:  Cdiff +.   A&Ox4. Flat affect. RA. Elevated Bps, Elevated diastolic BP - not w/in notifying parameters. Telemetry. Renal diet. 1500 ml fluid restriction. SBA. Denies pain. Denies nausea. Voiding small amounts. Passing gas. BM X 1  - diarrhea. R Dialysis CVC - reddened skin around insertion site. PIV x2.     Plan of Care Reviewed With: patient    Overall Patient Progress: improvingOverall Patient Progress: improving

## 2024-01-22 NOTE — PLAN OF CARE
"Goal Outcome Evaluation:      Plan of Care Reviewed With: patient    Overall Patient Progress: improvingOverall Patient Progress: improving     0700 - 1930:   BP (!) 160/109 (BP Location: Right arm, Cuff Size: Adult Small)   Pulse 75   Temp 98.1  F (36.7  C) (Oral)   Resp 18   Ht 1.575 m (5' 2\")   Wt 55.3 kg (122 lb)   LMP 12/11/2023 (Approximate)   SpO2 95%   BMI 22.31 kg/m      A&O x 4, flat affect & quite, calm/cooperative, AVSS ex DBP in 121 - 109, team is notified & aware. Gave po scheduled hydralazine 25 mg at noon.   Denies pain/nausea/sob on RA.   On tele monitoring with SNR.   Up sba, had 100 ml output, had soft small bm this shift.   Continue with poc...        "

## 2024-01-22 NOTE — PROGRESS NOTES
[facility]    Medicine Progress Note - Hospitalist Service, GOLD TEAM 9    Date of Admission:  1/2/2024    Assessment & Plan   Milly Carroll is a 23 year old female admitted on 1/2/2024. She has a PMHx of lupus C/B lupus flares, lupus cerebritis, lupus nephritis, HTN, chronic systolic heart failure, QT prolongation, past perinephric hematoma s/p emobolization in Aug-2023 and chronic thrombocytopenia admitted to [facility] on 12/17/23 (after presentation for SOB/NSTEMI and pneumonia concern and concern for lupus flare where she was treated w/ steroids and plasma exchange) and transfer to [facility] on 12/18/23 before being transferred to [facility] on 12/22-12/25 for cyclophosphamide infusion. Represented on 12/26 [facility] ED for SOB found to have covid and reduced EF (baseline EF 47%, OSH EF reported as 15-20%) w/ plan to transfer patient to [facility] however patient left A on 12/30 due to prolonged wait for transfer to [facility] ED. On presentation 1/2, patient reported continued worsening CP/SOB since her discharge on 12/30. Chest pain and dyspnea has since improved during her admission.     Currently being managed for acute on chronic heart failure and CHRISTA on CKD, concern for cardiorenal syndrome in the setting of prior lupus nephritis, and poorly controlled htn. Since she has been hospitalized, patient was also noted to have c diff colitis leading to sepsis on 1/20.     Today's Plan:  - Switch hydralazine to tid prn  - hold Bumex 2gm BID given still having diarrhea (improving). Will restart on 1/23 if BP remained elevated following HD on 1/23.  - increase CCB  - Begin Coreg 1/23/2024 if BP remain stable. Begin ARB on 1/23 if BP remains elevated to avoid hypotension in setting of infection  - discontinue vancomycin and cefepime since Bcx and uCx remained negative at 48 hours    Fever in Setting of Dialysis catheter  Concern for  CLABSI  C diff Colitis  - Blood culture x2 ordered-- negative for 48 hours  - UA with culture reflex -- negative  - discontinue vanc and cefepime since bcx and ucx remains neg as of 1/22 at 48 hours  - RIDP negative  - oral vancomycin started on 1/21.    Acute on Chronic HF (EF 15-20% on 12/26 OSH)  Dyspnea and Chest Pain - improved   Uncontrolled HTN, improved  Tricuspid insufficiency   Presented with chest pain and dyspnea as above. Concern that hypertensive crisis contributing to chest pain. Echocardiogram on 17-Dec-2023 showed LVEF 35-40%, EF decreased to 15-20% on 12/26 w/ BNP at that time >70,000. Lexiscan 12/21 negative for inducible ischemia. Admission workup 1/2 included trop elevated at 290 with peak at 469 on 1/2. TTE 1/2 with severe diffuse hypokinesis with stably reduced EF. Limited TTE 1/7 with EF 35-40% and dilated IVC. Cardiology consulted with recs as below.   V/Q scan 1/3 indeterminate, overall lower suspicion of VTE contributing to current presentation.   Attempted diuresis with increased doses of IV bumex doses (up to bumex 4 mg IV x 1) on 1/4-1/6 with minimal urine output and concern for cardiorenal syndrome. Now on dialysis.   -Gen Cardiology consulted, appreciate recs  -Nephrology consulted, appreciate assistance   -Dialysis runs per nephrology  - Switch hydralazine to tid prn  - hold Bumex 2gm BID given still having diarrhea (improving). Will restart on 1/23 if BP remained elevated following HD on 1/23.  - increase CCB  - Begin Coreg 1/23/2024 if BP remain stable. Begin ARB on 1/23 if BP remains elevated to avoid hypotension in setting of infection    CHRISTA  Metabolic Acidosis, resolved  Recent Lupus Nephritis   Cr up trending this admission, per discussion with cardiology, nephrology, and rheumatology current CHRISTA suspected to be more likely related to cardiorenal syndrome and ongoing volume overload.   -Nephrology consulted, appreciate assistance    -Continue diuresis and blood pressure  management as above   -Stopped Bicarb tabs  -Rheumatology following, appreciate assistance   -Due for 5th dose cyclophosphamide per [facility] protocol 1/5 (ongoing discussion w/ rheum/nephrology prior to administration), holding for now due to COVID and other ongoing management as outlined     R forearm blister, improved  Quarter sized with purulence, sent culture overnight. WOCN to see.  -Derm consult placed   -Daily Vaseline and bandage over wound until healed  -NGTD on culture, will hold off on further vancomycin at this time    COVID-19 Infection, resolved  Tested positive on 12/29 at OSH when presented with chest pain and dyspnea. Concern for superimposed PNA per OSH and was on vanc/cefepime 12/26-12/30 prior to AMA leave. Initially cefepime continued on admission, discontinued on 1/4 due to low suspicion for bacterial pneumonia.  Has remained on RA this admission. Overall unclear how much COVID-19 contributed to acute presentation.   -Received remdesivir 12/29 and 12/30 prior to leaving AMA, completed 5 day course given high risk (1/5 - 1/7)     Hx of C.diff infxn (6/2023)  -Stopped PO vanc prophylaxis on 1/4 when antibiotics stopped     SLE (+dsDNA, +RNP, +ribosomal P Ab and hypocomplementemia)   Recent Lupus Flare w/ MAHA and renal insuff s/p Cyclophosphamide dose (12/22/23)  Hx of Lupus cerebritis w/ hx of seizures  C/b Lupus Nephritis  Hx medication non-adherence  -Lupus-focused meds:  -Continue prednisone 50mg qday, starting 50mg 1/15/2024, plan for 14 each of 11-43-43-20-10 taper  -PJP ppx: continue PTA bactrim  -GI ppx: continue protonix qday, tums prn  -Cyclophosphamide on hold as above   -Will need OP lupus follow up (rheumatology OP consult placed)    Hypomagnesemia  Hypokalemia, resolved  -Replete per protocol      Chronic RUE edema with weakness   Superficial clot of R cephalic vein  US 8/2023 negative. RUE doppler negative for DVT on 1/2, significant for superficial thrombus. Repeat US RUE 1/7  with no evidence of DVT. Suspect weakness 2/2 swelling, lower concern for alternate etiologies.   -Symptomatic with heat packs    Chronic thrombocytopenia, improving   Chronic Macrocytic Anemia, improving  -Trend CBC daily   -Continue PTA folate/B12 replacement  -DVT ppx: heparin subcutaneous decrease dose per pharmacy in setting of chronic thrombocytopenia and renal dysfunction  -Hematology consulted - hemolysis improving, check labs daily., holding off on epo unless hgb decreases below 9.     Hx of Intermittent Headaches  History of being responsive to APAP and dilaudid along w/ treatment of HTN and Lupus. NO NSAIDS.     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 Stools described as normal.      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 No current PTA antiepileptic medications    Chronic Left Shoulder Pain, improved   Unclear etiology, likely MSK pain.   -PRN tylenol or metharbamol; taper PO dilaudid (no clear indication)           Diet: Fluid restriction 1500 ML FLUID  Snacks/Supplements Adult: Other; 8 PM: Egg salad sandwich on white bread + alternate grapes/orange E/O day on side; Between Meals  Combination Diet Safe Tray - with utensils; Renal Diet (dialysis)    DVT Prophylaxis: Heparin SQ  Leung Catheter: Not present  Lines: None     Cardiac Monitoring: ACTIVE order. Indication: Electrolyte Imbalance (24 hours)- Magnesium <1.3 mg/ml; Potassium < =2.8 or > 5.5 mg/ml  Code Status: Full Code      Clinically Significant Risk Factors              # Hypoalbuminemia: Lowest albumin = 2.9 g/dL at 1/12/2024  6:03 AM, will monitor as appropriate   # Thrombocytopenia: Lowest platelets = 38 in  last 2 days, will monitor for bleeding      # Chronic heart failure with reduced ejection fraction: last echo with EF <40%           # Financial/Environmental Concerns: none (Per pt she discussed option of applying for disability with her new PCP)         Disposition Plan             SANIYA CHAMORRO MD  Hospitalist Service, GOLD TEAM 9  [facility]  Securely message with [email or web address] (more info)  Text page via [employer] Paging/Directory   See signed in provider for up to date coverage information  ______________________________________________________________________    Interval History   Overnight developed hypotension with sepsis d/t C diff colitis. IVF rescruscitation helped. Started on vanc, cefepime. Cdiff noted to be positive and was stated on po vancomycin. Overnight Bp improved    This morning, having no lightheadedness, dizziness, sob, cp, palpitations, abd pain, nausea, emesis. Still having watery diarrhea. No dysuria, flank pain, hematuria.    Physical Exam   Vital Signs: Temp: 98.1  F (36.7  C) Temp src: Oral BP: (!) 166/121 (MD notified) Pulse: 86   Resp: 16 SpO2: 100 % O2 Device: None (Room air)    Weight: 118 lbs 14.4 oz    Constitutional: NAD  Cardiovascular: RRR  Respiratory: CTAB  GI: bowel sounds present throughout. No CVA tenderness  Neuro: alert and oriented, no gross focal deficits noted  Msk: Ttp over the lower back both in center and on the sides of the spinal processes.    Medical Decision Making       MANAGEMENT DISCUSSED with the following over the past 24 hours: SW, nephrology, and nursing       Data     I have personally reviewed the following data over the past 24 hrs:    N/A  \   N/A   / N/A     136 105 87.3 (H) /  121 (H)   3.8 17 (L) 3.38 (H) \

## 2024-01-22 NOTE — PROVIDER NOTIFICATION
Gold 9 paged:  pt ZION Carroll rm [id number] DBP cont's to be elevated in 100's, last /109. pt got po hydralazine 25 mg at noon.   thanks alicia [phone]  Awaiting for recommendation.....

## 2024-01-22 NOTE — PROGRESS NOTES
Care Management Follow Up    Length of Stay (days): 20    Expected Discharge Date: TBD     Concerns to be Addressed: discharge planning     Patient plan of care discussed at interdisciplinary rounds: Yes    Anticipated Discharge Disposition: Home, Home Care     Anticipated Discharge Services: outpatient dialysis once weekly  Anticipated Discharge DME: None    Patient/family educated on Medicare website which has current facility and service quality ratings: no  Education Provided on the Discharge Plan: Yes  Patient/Family in Agreement with the Plan: yes    Referrals Placed by CM/SW: External Care Coordination, Homecare  Private pay costs discussed: Not applicable    Additional Information:  Writer talked with patient today regarding if she is intending to discharge to the [facility] with her Mom, or go back up to [facility] with her Aunt & Uncle.  Patient stated she will discharge to the [facility] with her Mom.      Writer received a message from medical team that patient will need out patient dialysis once per week.  Writer re-opened the previous referral for patient on [facility] portal.  Writer updated CHW, Gosia Bai, that dialysis referral was re-opened on [facility] portal.  Patient prefers afternoon chair time, as her Mom starts work at 2am.  Writer updated [facility] portal with chair time preference, and that patient is needing dialysis once per week.      Discharge location:  [address] (will need OP HD rides arranged prior to discharge)  Phone: [phone]    [facility]  Referral started in portal    Per 1/18/24 RNCC note:        Writer will leave [facility] referral and OP Rheumatology appt pending at this time, will discontinue/cancel once the discharge plan is finalized and patient is discharged locally to [place].  [facility] (Phone: [phone], Fax:  [phone]), Pending OP HD: [facility], ([address] [place] [address], Phone: [phone]), OP Rheumatologist, Dr. Garcia through [facility] in [place], [place] (Appt: 4/8/2024 at 11am).      RNCC will continue to follow for discharge planning.       Linda Tang, RN, BSN  RN Care Coordinator    5A Medical Oncology/5C non-BMT  5A beds [id number]  5C beds [id number] (non-BMT)  [address] [address]  [email or web address]  [email or web address]  Gender pronouns: she/her  Pager: [phone]

## 2024-01-23 NOTE — PROGRESS NOTES
[facility]    Medicine Progress Note - Hospitalist Service, GOLD TEAM 9    Date of Admission:  1/2/2024    Assessment & Plan   Milly Carroll is a 23 year old female admitted on 1/2/2024. She has a PMHx of lupus C/B lupus flares, lupus cerebritis, lupus nephritis, HTN, chronic systolic heart failure, QT prolongation, past perinephric hematoma s/p emobolization in Aug-2023 and chronic thrombocytopenia admitted to [facility] on 12/17/23 (after presentation for SOB/NSTEMI and pneumonia concern and concern for lupus flare where she was treated w/ steroids and plasma exchange) and transfer to [facility] on 12/18/23 before being transferred to [facility] on 12/22-12/25 for cyclophosphamide infusion. Represented on 12/26 [facility] ED for SOB found to have covid and reduced EF (baseline EF 47%, OSH EF reported as 15-20%) w/ plan to transfer patient to [facility] however patient left AMA on 12/30 due to prolonged wait for transfer to [facility] ED. On presentation 1/2, patient reported continued worsening CP/SOB since her discharge on 12/30. Chest pain and dyspnea has since improved during her admission.     Currently being managed for acute on chronic heart failure and CHRISTA on CKD, concern for cardiorenal syndrome in the setting of prior lupus nephritis, and poorly controlled htn. Since she has been hospitalized, patient was also noted to have c diff colitis leading to sepsis on 1/20.     Today's Changes:  - Resume bumex BID and carvedilol BID. Continue to hold losartan, will resume 1/24 if continues to be hypertensive after dialysis.  - ID consult for duration of oral vancomycin and need for taper.  - Prednisone taper now every 1 week instead of every 2 weeks.  - Appreciate SW assistance with setting up outpatient dialysis    C diff Colitis  Hypotension  Hx of C.diff infxn (6/2023)  On 1/21 had fever and hypotension in the setting of new onset  diarrhea. Infectious work up notable for positive C diff. Had been on prophylaxis earlier in stay due to systemic antibiotics. Blood cultures collected due to presence of HD line, but have been negative.  Blood culture x2 ordered-- negative for 48 hours.  - oral vancomycin started on 1/21.  - ID consult to assist with duration/taper recommendations    Acute on Chronic HF (EF 15-20% on 12/26 OSH)  Dyspnea and Chest Pain - improved   Uncontrolled HTN, improved  Tricuspid insufficiency   Presented with chest pain and dyspnea as above. Concern that hypertensive crisis contributing to chest pain. Echocardiogram on 17-Dec-2023 showed LVEF 35-40%, EF decreased to 15-20% on 12/26 w/ BNP at that time >70,000. Lexiscan 12/21 negative for inducible ischemia. Admission workup 1/2 included trop elevated at 290 with peak at 469 on 1/2. TTE 1/2 with severe diffuse hypokinesis with stably reduced EF. Limited TTE 1/7 with EF 35-40% and dilated IVC. Cardiology consulted with recs as below.   V/Q scan 1/3 indeterminate, overall lower suspicion of VTE contributing to current presentation. Attempted diuresis with increased doses of IV bumex doses (up to bumex 4 mg IV x 1) on 1/4-1/6 with minimal urine output and concern for cardiorenal syndrome. Now on dialysis.   -Gen Cardiology consulted, appreciate recs  -Nephrology consulted, appreciate assistance  - HTN management:  - Bumex 2mg BID  - Carvedilol 25mg BID  - Nifedipine 90mg daily  - Holding losartan 100mg daily. Will plan to resume 1/24 if still hypertensive    CHRISTA, now on iHD  Metabolic Acidosis, resolved  Recent Lupus Nephritis   Cr up trending this admission, per discussion with cardiology, nephrology, and rheumatology current CHRISTA suspected to be more likely related to cardiorenal syndrome and ongoing volume overload.   -Nephrology consulted, appreciate assistance    -Continue diuresis and blood pressure management as above   -Stopped Bicarb tabs  -Rheumatology following,  appreciate assistance   -Treatment for lupus nephritis as noted below    R forearm blister, improved  Quarter sized with purulence, sent culture overnight. WOCN to see.  -Derm consult placed   -Daily Vaseline and bandage over wound until healed  -NGTD on culture, will hold off on further vancomycin at this time    COVID-19 Infection, resolved  Tested positive on 12/29 at OSH when presented with chest pain and dyspnea. Concern for superimposed PNA per OSH and was on vanc/cefepime 12/26-12/30 prior to AMA leave. Initially cefepime continued on admission, discontinued on 1/4 due to low suspicion for bacterial pneumonia.  Has remained on RA this admission. Overall unclear how much COVID-19 contributed to acute presentation.   -Received remdesivir 12/29 and 12/30 prior to leaving AMA, completed 5 day course given high risk (1/5 - 1/7)     SLE (+dsDNA, +RNP, +ribosomal P Ab and hypocomplementemia)   Recent Lupus Flare w/ MAHA and renal insuff s/p Cyclophosphamide dose (12/22/23)  Hx of Lupus cerebritis w/ hx of seizures  C/b Lupus Nephritis  Hx medication non-adherence  -Lupus-focused meds:  - Continue prednisone 50mg qday, starting 50mg 1/15/2024, plan for 7 days each of 69-72-59-20-10 taper  - As of 1/23, no plans for more cyclophosphamide.  - PJP ppx: continue PTA bactrim  - GI ppx: continue protonix qday, tums prn   - Will need OP lupus follow up (rheumatology OP consult placed)    Hypomagnesemia  Hypokalemia, resolved  -Replete per protocol      Chronic RUE edema with weakness   Superficial clot of R cephalic vein  US 8/2023 negative. RUE doppler negative for DVT on 1/2, significant for superficial thrombus. Repeat US RUE 1/7 with no evidence of DVT. Suspect weakness 2/2 swelling, lower concern for alternate etiologies.   -Symptomatic with heat packs    Chronic thrombocytopenia, improving   Chronic Macrocytic Anemia, improving  -Trend CBC daily   -Continue PTA folate/B12 replacement  -DVT ppx: heparin subcutaneous  decrease dose per pharmacy in setting of chronic thrombocytopenia and renal dysfunction  -Hematology consulted - hemolysis improving, check labs daily., holding off on epo unless hgb decreases below 9.     Hx of Intermittent Headaches  History of being responsive to APAP and dilaudid along w/ treatment of HTN and Lupus. NO NSAIDS.     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 Stools described as normal.      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 No current PTA antiepileptic medications    Chronic Left Shoulder Pain, improved   Unclear etiology, likely MSK pain.   -PRN tylenol or metharbamol; taper PO dilaudid (no clear indication)           Diet: Fluid restriction 1500 ML FLUID  Snacks/Supplements Adult: Other; 8 PM: Egg salad sandwich on white bread + alternate grapes/orange E/O day on side; Between Meals  Combination Diet Safe Tray - with utensils; Renal Diet (dialysis)    DVT Prophylaxis: Pneumatic Compression Devices  Leung Catheter: Not present  Lines: None     Cardiac Monitoring: None  Code Status: Full Code      Clinically Significant Risk Factors              # Hypoalbuminemia: Lowest albumin = 2.9 g/dL at 1/12/2024  6:03 AM, will monitor as appropriate   # Thrombocytopenia: Lowest platelets = 40 in last 2 days, will monitor for bleeding    # Chronic heart failure with reduced ejection fraction: last echo with EF <40%           # Financial/Environmental Concerns: none (Per pt she discussed option of applying for disability with her new PCP)         Disposition Plan             Jayda Han MD  Hospitalist Service, GOLD TEAM [address] [place] [address]  [facility]  Securely message with [employer] (more info)  Text page via [employer] Paging/Directory   See signed in provider for up to date coverage information  ______________________________________________________________________    Interval History   No acute events overnight. Diarrhea improving. Tolerated dialysis without issues. No SOB.    Physical Exam   Vital Signs: Temp: 98  F (36.7  C) Temp src: Oral BP: (!) 152/115 Pulse: 83   Resp: 20 SpO2: 100 % O2 Device: None (Room air)    Weight: 122 lbs 0 oz    General Appearance: NAD. Lying comfortably in bed.  Respiratory: CTAB. No increased WOB.  Cardiovascular: RRR. No m/r/g  GI: Soft. Non-tender. Non-distended.  Skin: No rash.  Other: AOx4. Moving all extremities. Normal affect.    Medical Decision Making       55 MINUTES SPENT BY ME on the date of service doing chart review, history, exam, documentation & further activities per the note.  MANAGEMENT DISCUSSED with the following over the past 24 hours: Nephrology       Data     I have personally reviewed the following data over the past 24 hrs:    3.0 (L)  \   9.7 (L)   / 40 (LL)     135 106 97.7 (H) /  134 (H)   4.1 13 (L) 3.69 (H) \     ALT: N/A AST: N/A AP: N/A TBILI: N/A   ALB: 2.9 (L) TOT PROTEIN: N/A LIPASE: N/A     Procal: N/A CRP: N/A Lactic Acid: 1.7         Imaging results reviewed over the past 24 hrs:   No results found for this or any previous visit (from the past 24 hour(s)).

## 2024-01-23 NOTE — PLAN OF CARE
Goal Outcome Evaluation:      Plan of Care Reviewed With: patient    Overall Patient Progress: no changeOverall Patient Progress: no change    [id number]    A&O x4, quiet, cooperative. CMS intact. Hypertensive in 160s persistently throughout shift. PRN hydralazine given x2. Denies shortness of breath or chest pain. On Tele, Sinus; HRs 60s-70s. R CVC CDI. Oliguric, voiding x1. Large BM during shift. Up w/ SBA. Resting between cares. Continue POC

## 2024-01-23 NOTE — PROGRESS NOTES
Nephrology Progress Note  01/23/2024         Assessment & Recommendations:   Milly Carroll is a 23 year old female admitted on 1/2/2024. 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 Fluid status, kidney function and hypertension continued to worsened despite IV diuretics, and increase in antihypertensives. TDC was placed on 1/10 and iHD initiated on 1/11/23. She will certainly need to be on dialysis long term and ideally we would be able to hold off until she can prepare by having permeant access (fistula) placed to limit chance of infections and promote better clearance.  Unfortunately, she has lack of social support making it challenging for her to manage off dialysis long enough for this to take place. Eventually she will likely need a kidney transplant. She remains hypertensive on 1/19.  It should be noted that even when she was diuresed, her BP remained high.  As such, it is likely that her uncontrolled hypertension is more complicated than simply increased intravascular volume and likely related to a combination of lupus nephritis/nephritic syndrome, damaged vessels, and some component of volume overload, Her electrolytes are stable and she is making some urine.  Creatinine is rising to 3.57 today.  Recommend watching very carefully over next 24 hours prior to dialyzing.     Recommendations:  - HD today   - restart Bumex to 2mg BID   - restart Nifedipine to 90mg   -  continue Coreg to 25 mg  - agreed with Hydralazine 25 mg tid prn   - restart Losartan 100 mg after HD if BP still not under control   - NO plan to restart cyclophosphadmide given significant interstitial fibrosis of kidneys  - Prednisone 50 mg taper (taper plan was 60 mg every 2 weeks then 31-19-27-20-10 x 14 days each). Decrease duration of taper to every 1 week rather than every 2 weeks.  - Daily BMP  - Daily standing weights, strict I&Os   - SW for  outpatient dialysis referral in [facility] where patient will stay with mother for forseeable future.     #Lupus nephritis, class IV  #SLE  #Chronic kidney disease, stage IV (Cystatin C/creatinine discordance)  # Acute kidney injury  Patient's creatinine was 3.48 on 12/30, decreased to 2.48 on admission at [facility] 1/2/2024  and subsequently raised to 4.2 prior to iHD initiation. CHRISTA in the setting of lupus nephritis may likely represent poor renal perfusion in the setting of volume overload, cardiorenal syndrome and diuresis. Lupus flare less likely given improving complement and dsDNA levels and current treatment with steroids and recent treatment w/ cyclophosphamide. At this time, we will hold off on further immunosuppressants. Milly has had 5 cyclophosphamide infusions thus far (08/22, 09/08, 09/22, 10/06, 12/22). She has began steroid taper.  CHRISTA was improving but creatinine increased again 2.34 <3.03<3.4<3.57.  Electrolytes stable, breathing well, UOP low and blood pressure remains elevated. Will hold off on dialysis and re-evaluate tomorrow to see if she is able to tolerate.   - NO plan to restart cyclophosphadmide given significant interstitial fibrosis of kidneys  - Prednisone 50 mg taper (taper plan was 60 mg every 2 weeks then 86-82-06-20-10 x 14 days each). Decrease duration of taper to every 1 week.     #Hypertension  #Acute on chronic systolic heart failure, stress cardiomyopathy  Most likely cause of her hypertension is volume overload, particularly given hypervolemic appearance on physical examination, significantly elevated BNP from baseline, signs of pulmonary edema on CXR. On discussion with cardiology, there is question of if the precipitant of her systolic heart failure is uncontrolled, severe hypertension. Given normal CK, it does not appear likely that there is a component of myocarditis contributing to her presentation. Repeat echocardiogram on 01/06 was 35-40%. Held antihypertenstives on  "1/20 given septic shock; resumed thereafter. Still need better blood pressure control   - continue Bumex to 2mg BID   - continue Nifedipine to 90mg   - continue Coreg to 25 mg  - agreed with Hydralazine 25 mg tid prn  - restart Losartan 100 mg     #Hypokalemia, resolved  Potassium 4s.     #Metabolic acidosis, chronic  Metabolic acidosis worsened while holding HD for 5 days. HD was resumed yesterday. Bicarb improved with HD treatment. She will require HD <3 times weekly. Therefore, she has to be assessed daily to determine whether she needs treatment or not.        #Chronic macrocytic anemia  Would recommend holding off on starting epo for now, as her HGB has improved from prior. However, would favor starting epo if her HGB is <9.0 or if she remains less than 9.5 on rechecks over the next 1-2 days. Iron studies were obtained, Fe saturation of 40%.   --no iron infusion or MAXWELL at this time.     Recommendations were communicated to primary team via note. Discussed w/ patient.     Seen and discussed with Dr. Real    Interval History :   Nursing and provider notes from last 24 hours reviewed.  Overnight, continued to be hypertensive, received hydralazine x2.     Patient was in HD when assessed. She denies any SOB, chest pain, leg swelling. Denies any symptoms with hypertension. No concerns for the team today.     Physical Exam:   I/O last 3 completed shifts:  In: 850 [P.O.:850]  Out: 650 [Urine:450; Stool:200]   BP (!) 167/128   Pulse 79   Temp 98  F (36.7  C) (Oral)   Resp 22   Ht 1.575 m (5' 2\")   Wt 55.3 kg (122 lb)   LMP 12/11/2023 (Approximate)   SpO2 100%   BMI 22.31 kg/m       General: Laying in bed, no acute distress. Interacts appropriately.   HENT: Normocephalic, atraumatic.   Cardiovascular: Regular rate and rhythm.  Respiratory: Clear to auscultation bilaterally. No increased work of breathing.   Abdomen: Soft, non-tender, non-distended.    Neurologic: Alert. Oriented. Moves all extremities " equally.   Musculoskeletal: no pitting edema.   Skin: She has some redness of the face on the bilateral cheeks but no clear malar rash. No signs of pallor or cyanosis. Warm and dry.   Psych: Flat affect, slightly improved     Labs:   All labs reviewed by me  Electrolytes/Renal -   Recent Labs   Lab Test 01/23/24  0545 01/22/24  0656 01/21/24  0432 01/19/24  0702 01/18/24  0558 01/06/24  0638 01/05/24  0612 12/28/23  0623 12/27/23  0001 12/25/23  0442    136 137   < > 138   < > 139   < > 141 144   POTASSIUM 4.1 3.8 4.2   < > 3.7   < > 4.8   < > 5.6* 3.1*  3.1*   CHLORIDE 106 105 104   < > 111*   < > 107   < > 109* 110*   CO2 13* 17* 19*   < > 20*   < > 18*   < > 17* 20*   BUN 97.7* 87.3* 66.7*   < > 85.0*   < > 96.8*   < > 75.1* 60.5*   CR 3.69* 3.38* 2.86*   < > 3.41*   < > 3.49*   < > 2.98* 2.57*   * 121* 101*   < > 95   < > 124*   < > 133* 138*   BISI 8.0* 7.4* 7.2*   < > 8.5*   < > 8.4*   < > 8.6 8.0*   MAG  --   --   --   --  1.7  --   --   --  2.1 1.8   PHOS  --   --   --   --  2.8  --  6.1*  --   --  3.4    < > = values in this interval not displayed.       CBC -   Recent Labs   Lab Test 01/23/24  0545 01/22/24  0656 01/21/24  0639   WBC 3.0* 2.1* 2.4*   HGB 9.7* 9.6* 9.0*   PLT 40* 44* 48*       LFTs -   Recent Labs   Lab Test 01/13/24  0608 01/12/24  0603 01/11/24  0408   ALKPHOS 108 113 109   BILITOTAL 0.3 0.3 0.2   ALT 44 42 48   AST 25 22 24   PROTTOTAL 5.6* 5.3* 5.4*   ALBUMIN 3.2* 2.9* 3.0*       Iron Panel -   Recent Labs   Lab Test 01/05/24  0612 12/20/23  0415 12/17/23  0429 07/10/23  1454   IRON 76  --  130 15*   IRONSAT 40  --  74* 9*   GABRIEL 786*   < > 32,775* 1,079*    < > = values in this interval not displayed.       Surg path - L Kidney biopsy (8/2/2023)   - diffuse sclerosing and focal proliferative lupus nephritis, ISN/RPS Class IV (see comment below).  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  -  Severe interstitial fibrosis  and tubular atrophy (70-80%).    COMMENT:  Active crescentic injury involves 2% of sampled glomeruli, while 15% of the glomeruli demonstrate old fibrous crescents and 51% of the glomeruli are globally sclerosed. Light microscopic features raise the possibility of a concomitant class V (membranous) lupus nephritis component, but glomeruli are not available for confirmatory electron microscopy. The NIH activity and chronicity indices are 7/24 and 10/12, respectively.      Imaging:  All imaging studies reviewed by me.     Current Medications:   artificial saliva  1 spray Mouth/Throat 4x Daily    bumetanide  2 mg Oral BID    carvedilol  25 mg Oral BID    ceFEPIme  1 g Intravenous Q24H    cholecalciferol  10 mcg Oral Daily    vitamin B-12  100 mcg Oral Daily    ferrous sulfate  325 mg Oral Daily with breakfast    folic acid  1 mg Oral Daily    [Held by provider] heparin ANTICOAGULANT  5,000 Units Subcutaneous Q12H    [Held by provider] losartan  100 mg Oral Daily    NIFEdipine ER OSMOTIC  90 mg Oral Daily    pantoprazole  40 mg Oral QAM AC    [Held by provider] potassium chloride ER  20 mEq Oral BID    predniSONE  50 mg Oral Daily    sodium chloride (PF)  3 mL Intracatheter Q8H    sodium chloride (PF)  3 mL Intracatheter Q8H    sulfamethoxazole-trimethoprim  1 tablet Oral Once per day on Tuesday Thursday Saturday    vancomycin  125 mg Oral 4x Daily

## 2024-01-23 NOTE — PLAN OF CARE
Goal Outcome Evaluation: [id number]      Plan of Care Reviewed With: patient    Overall Patient Progress: no change    Pt hypertensive but within parameters and team is aware. BP meds restarted. Bumex being given. Dialysis today. Pt receiving oral abx. IV abx were discontinued. Prednisone taper. Continue with POC. Up ad kodak in room.

## 2024-01-23 NOTE — PROGRESS NOTES
Brief Rheumatology Note    Stopped by yesterday to see Milly. She is voicing no concerns overall.    - Appreciate nephrology management of LN, no changes to current plan from rheumatology perspective  - Will ensure patient has follow-up with me in clinic after she is discharged.    Rheumatology will sign off at this time.    Discussed with Dr. Zain Waddell, DO  Rheumatology Fellow  PGY4

## 2024-01-23 NOTE — PROGRESS NOTES
Date: 1/23/2024  Time: 1100     Data:  Pre Wt:   55.3 kg  Desired Wt:   To be established  Post Wt:  53.3 kg (estimated)  Weight change: - 2 kg  Ultrafiltration - Post Run Net Total Removed (mL):  2000 ml  Vascular Access Status: CVC patent  Dialyzer Rinse:  Light  Total Blood Volume Processed: 68.01 L   Total Dialysis (Treatment) Time:   3 Hrs  Dialysate Bath: K 3, Ca 3  Heparin: Heparin: None     Lab:   Yes  Lactic acid  HbsAg - non reactive (01/11/2023)  HbsAb - non reactive <7.01  (01/11/2023)     Interventions:  Dialysis done through Right CVC  UF set to 2.3 Liters of fluid removal, including prime and rinse volume  ,   CVC dressing changed per hospital policy and procedure  Vital signs monitored every 15 min and prn  CritLine used for fluid volume monitoring,   Profile A/B throughout the run, tolerated UF pull, Bp elevated on arrival with downward trend, asymptomatic. Remained hemodynamically stable throughout hemodialysis, breakfast tray offered, ate 100 %, had small loose stool while on HD, good sven-care provided  Treatment ended as expected, rinsed back successfully  Catheter lumens flushed with saline and locked with Saline, per catheter specific volume  catheter caps (ClearGuard ) applied post HD  See HD flow sheet for details, Hand off report given to primary RN.   sent back to  room in stable condition     Assessment:  A/O x 4, calm & cooperative, denies pain  Lung sounds clear anterior and lateral BUL, diminished BLL  CVC intact, previous dressing clean and dry                Plan:    Per Renal team        Chelle MARTINN, RN  Acute Dialysis RN  [facility]

## 2024-01-24 NOTE — PROGRESS NOTES
HEMODIALYSIS TREATMENT NOTE    Date: 1/24/2024  Time: 3:48 PM    Data:  Pre Wt: 55.4 kg (122 lb 2.2 oz)   Desired Wt:   kg   Post Wt:    Weight change:   kg  Ultrafiltration - Post Run Net Total Removed (mL): 1000 mL  Vascular Access Status: CVC  patent  Dialyzer Rinse: Streaked  Total Blood Volume Processed: 45.33 L   Total Dialysis (Treatment) Time: 2HRS   Dialysate Bath: K 2, Ca 2.5  Heparin: None      Lab:   No    Interventions:  Patient ran 2hrs via CVC with BFR of 400ml/min, net fluid removal 1kg. Pt is tolerated HD run well. Vital signs stable throughout the run. Epogen 2000units is given during HD run. No c/o discomfort. CVC site dressing CDI. CVC lumens locked with saline at post dialyssis. Pt sent back to the floor with stable conditions. Post dialysis run report is given to primary floor RN.    Assessment:  AOx4 afebrile, no c/o discomfort and able to make her needs known. Vital signs stable throughout the run.      Plan:    Next HD run per renal team.

## 2024-01-24 NOTE — PROGRESS NOTES
Discharge Pharmacy Test Claim    Dificid is covered with $0 copay through patient's [facility] prepaid medical assistance plan.     Test Claim Copay   dificid 0.00     Marcia Ronquillo  [facility] Pharmacy Liaison  Ph: [phone]  Fax [phone]  Available on [employer] (learn more here)

## 2024-01-24 NOTE — PROGRESS NOTES
Nephrology Progress Note  01/24/2024         Assessment & Recommendations:   Milly Carroll is a 23 year old female admitted on 1/2/2024. 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 Fluid status, kidney function and hypertension continued to worsened despite IV diuretics, and increase in antihypertensives. TDC was placed on 1/10 and iHD initiated on 1/11/23. She will certainly need to be on dialysis long term and ideally we would be able to hold off until she can prepare by having permeant access (fistula) placed to limit chance of infections and promote better clearance.  Unfortunately, she has lack of social support making it challenging for her to manage off dialysis long enough for this to take place. Eventually she will likely need a kidney transplant. She remains hypertensive on 1/19.  It should be noted that even when she was diuresed, her BP remained high.  As such, it is likely that her uncontrolled hypertension is more complicated than simply increased intravascular volume and likely related to a combination of lupus nephritis/nephritic syndrome, damaged vessels, and some component of volume overload, Her electrolytes are stable and she is making some urine.      Recommendations:  - HD again today to transition to MWF schedule  - Continue Bumex 2mg BID   - Decrease Nifedipine to 60mg   - Continue Coreg  25 mg   - Can hold Losartan 100 mg until after HD if BP still not under control   - NO plan to restart cyclophosphadmide given significant interstitial fibrosis of kidneys  - Prednisone 50 mg taper (taper plan was 60 mg every 2 weeks then 75-46-91-20-10 x 14 days each). Decrease duration of taper to every 1 week rather than every 2 weeks.  - Daily BMP  - Daily standing weights, strict I&Os   -  for outpatient dialysis referral in [facility] where patient will stay with mother for forseeable future.   - Can  HOLD PO iron on discharge  - Start dialyvite to replace water soluble vitamins  -We will start EPO 2000u today    #Lupus nephritis, class IV  #SLE  #Chronic kidney disease, stage IV (Cystatin C/creatinine discordance)  # Acute kidney injury  Patient's creatinine was 3.48 on 12/30, decreased to 2.48 on admission at [facility] 1/2/2024  and subsequently raised to 4.2 prior to iHD initiation. CHRISTA in the setting of lupus nephritis may likely represent poor renal perfusion in the setting of volume overload, cardiorenal syndrome and diuresis. Lupus flare less likely given improving complement and dsDNA levels and current treatment with steroids and recent treatment w/ cyclophosphamide. At this time, we will hold off on further immunosuppressants. Milly has had 5 cyclophosphamide infusions thus far (08/22, 09/08, 09/22, 10/06, 12/22). She has began steroid taper.  CHRISTA was improving but creatinine increased again 2.34 <3.03<3.4<3.57.  Electrolytes stable, breathing well, UOP low and blood pressure remains elevated. Will hold off on dialysis and re-evaluate tomorrow to see if she is able to tolerate.   - NO plan to restart cyclophosphadmide given significant interstitial fibrosis of kidneys  - Prednisone 50 mg taper (taper plan was 60 mg every 2 weeks then 79-73-80-20-10 x 14 days each). Decrease duration of taper to every 1 week.     #Hypertension  #Acute on chronic systolic heart failure, stress cardiomyopathy  Most likely cause of her hypertension is volume overload, particularly given hypervolemic appearance on physical examination, significantly elevated BNP from baseline, signs of pulmonary edema on CXR. On discussion with cardiology, there is question of if the precipitant of her systolic heart failure is uncontrolled, severe hypertension. Given normal CK, it does not appear likely that there is a component of myocarditis contributing to her presentation. Repeat echocardiogram on 01/06 was 35-40%. Held antihypertenstives  "on 1/20 given septic shock; resumed thereafter. Still need better blood pressure control   - continue Bumex to 2mg BID   - continue Nifedipine to 60mg   - continue Coreg to 25 mg  - Consider restart Losartan 100 mg if BP remains elevated    #Hypokalemia, resolved  Potassium 4s.        #Chronic macrocytic anemia  Iron replete, will initiate EPO 2000 units today    Recommendations were communicated to primary team via note. Discussed w/ patient.     Seen and discussed with Dr. Real    Interval History :   Nursing and provider notes from last 24 hours reviewed.  Overnight, no concerns. Hopeful for discharge today. Outpatient HD seat arranged for Friday. Will plan for HD today and then discharge.     Physical Exam:   I/O last 3 completed shifts:  In: 520 [P.O.:520]  Out: 400 [Urine:400]   /65 (BP Location: Right arm)   Pulse 75   Temp 98.2  F (36.8  C) (Oral)   Resp 16   Ht 1.575 m (5' 2\")   Wt 54.6 kg (120 lb 4.8 oz)   LMP 12/11/2023 (Approximate)   SpO2 100%   BMI 22.00 kg/m       General: Laying in bed, no acute distress. Interacts appropriately.   HENT: Normocephalic, atraumatic.   Cardiovascular: Regular rate and rhythm.  Respiratory: Clear to auscultation bilaterally. No increased work of breathing.   Abdomen: Soft, non-tender, non-distended.    Neurologic: Alert. Oriented. Moves all extremities equally.   Musculoskeletal: no pitting edema.   Skin: She has some redness of the face on the bilateral cheeks but no clear malar rash. No signs of pallor or cyanosis. Warm and dry.   Psych: Flat affect, slightly improved     Labs:   All labs reviewed by me  Electrolytes/Renal -   Recent Labs   Lab Test 01/24/24  0657 01/23/24  0545 01/22/24  0656 01/19/24  0702 01/18/24  0558 01/06/24  0638 01/05/24  0612 12/28/23  0623 12/27/23  0001 12/25/23  0442   * 135 136   < > 138   < > 139   < > 141 144   POTASSIUM 3.9 4.1 3.8   < > 3.7   < > 4.8   < > 5.6* 3.1*  3.1*   CHLORIDE 101 106 105   < > 111*   < " > 107   < > 109* 110*   CO2 19* 13* 17*   < > 20*   < > 18*   < > 17* 20*   BUN 59.2* 97.7* 87.3*   < > 85.0*   < > 96.8*   < > 75.1* 60.5*   CR 3.07* 3.69* 3.38*   < > 3.41*   < > 3.49*   < > 2.98* 2.57*   * 134* 121*   < > 95   < > 124*   < > 133* 138*   BISI 8.0* 8.0* 7.4*   < > 8.5*   < > 8.4*   < > 8.6 8.0*   MAG  --   --   --   --  1.7  --   --   --  2.1 1.8   PHOS  --  6.3*  --   --  2.8  --  6.1*  --   --  3.4    < > = values in this interval not displayed.       CBC -   Recent Labs   Lab Test 01/24/24  0657 01/23/24  0545 01/22/24  0656   WBC 3.3* 3.0* 2.1*   HGB 9.3* 9.7* 9.6*   PLT 45* 40* 44*       LFTs -   Recent Labs   Lab Test 01/23/24  0545 01/13/24  0608 01/12/24  0603 01/11/24  0408   ALKPHOS  --  108 113 109   BILITOTAL  --  0.3 0.3 0.2   ALT  --  44 42 48   AST  --  25 22 24   PROTTOTAL  --  5.6* 5.3* 5.4*   ALBUMIN 2.9* 3.2* 2.9* 3.0*       Iron Panel -   Recent Labs   Lab Test 01/05/24  0612 12/20/23  0415 12/17/23  0429 07/10/23  1454   IRON 76  --  130 15*   IRONSAT 40  --  74* 9*   GABRIEL 786*   < > 32,775* 1,079*    < > = values in this interval not displayed.       Surg path - L Kidney biopsy (8/2/2023)   - diffuse sclerosing and focal proliferative lupus nephritis, ISN/RPS Class IV (see comment below).  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  -  Severe interstitial fibrosis and tubular atrophy (70-80%).    COMMENT:  Active crescentic injury involves 2% of sampled glomeruli, while 15% of the glomeruli demonstrate old fibrous crescents and 51% of the glomeruli are globally sclerosed. Light microscopic features raise the possibility of a concomitant class V (membranous) lupus nephritis component, but glomeruli are not available for confirmatory electron microscopy. The NIH activity and chronicity indices are 7/24 and 10/12, respectively.      Imaging:  All imaging studies reviewed by me.     Current Medications:   artificial saliva  1  spray Mouth/Throat 4x Daily    bumetanide  2 mg Oral BID    calcium carbonate  500 mg Oral TID w/meals    carvedilol  25 mg Oral BID    cholecalciferol  10 mcg Oral Daily    vitamin B-12  100 mcg Oral Daily    folic acid  1 mg Oral Daily    [Held by provider] heparin ANTICOAGULANT  5,000 Units Subcutaneous Q12H    [Held by provider] losartan  100 mg Oral Daily    multivitamin RENAL  1 tablet Oral Daily    [START ON 1/25/2024] NIFEdipine ER OSMOTIC  60 mg Oral Daily    - MEDICATION INSTRUCTIONS -   Does not apply Once    pantoprazole  40 mg Oral QAM AC    [Held by provider] potassium chloride ER  20 mEq Oral BID    predniSONE  40 mg Oral Daily    Followed by    [START ON 1/31/2024] predniSONE  30 mg Oral Daily    Followed by    [START ON 2/7/2024] predniSONE  20 mg Oral Daily    Followed by    [START ON 2/14/2024] predniSONE  10 mg Oral Daily    Followed by    [START ON 2/21/2024] predniSONE  5 mg Oral Daily    sodium chloride (PF)  3 mL Intracatheter Q8H    sodium chloride (PF)  3 mL Intracatheter Q8H    sulfamethoxazole-trimethoprim  1 tablet Oral Once per day on Tuesday Thursday Saturday    vancomycin  125 mg Oral 4x Daily

## 2024-01-24 NOTE — PLAN OF CARE
Goal Outcome Evaluation:  Pt AOx4, flat affect, cooperative. VSS, on RA, denies chest pain or SOB. (R) CVC CDI. Denies pain. On 1.5L fluid restriction. On HD TTHS, oliguric, no BM during this shift. SBA. Continue POC

## 2024-01-24 NOTE — DISCHARGE SUMMARY
[facility]  Hospitalist Discharge Summary      Date of Admission:  1/2/2024  Date of Discharge:  1/24/2024  Discharging Provider: Jayda Han MD  Discharge Service: Hospitalist Service, GOLD TEAM 9    Discharge Diagnoses     Recurrent C diff colitis  CHRISTA on CKD now on iHD  Recent lupus nephritis  SLE (+dsDNA, +RNP, +ribosomal P Ab and hypocomplementemia)   Recent Lupus Flare w/ MAHA and renal insuff s/p Cyclophosphamide dose (12/22/23)  Hx of Lupus cerebritis w/ hx of seizures  Acute on chronic HFrEF (EF 35-40%)  Uncontrolled hypertension, improved  Tricuspid insufficiency  Covid-19 infection, resolved    Additional secondary diagnoses below    Clinically Significant Risk Factors          Follow-ups Needed After Discharge   Follow-up Appointments     Adult [facility] Follow-up and recommended labs and tests      Follow up with Rheumatology as previously scheduled.    Follow up with your new dialysis unit.    Follow up with your PCP as previously scheduled on 1/29.    Appointments on [place] and/or [facility] (with [facility] or [facility]   provider or service). Call [phone] if you haven't heard regarding   these appointments within 7 days of discharge.          Unresulted Labs Ordered in the Past 30 Days of this Admission       Date and Time Order Name Status Description    1/20/2024 12:10 PM Blood Culture Central Venous Line Preliminary     1/20/2024 12:10 PM Blood Culture Artery Preliminary         These results will be followed up by Hospitalist Pool    Discharge Disposition   Discharged to home  Condition at discharge: Stable    Hospital Course   Milly Carroll is a 23 year old female admitted on 1/2/2024. She has a PMHx of lupus C/B lupus flares, lupus cerebritis, lupus nephritis, HTN, chronic systolic heart failure, QT prolongation, past perinephric hematoma s/p emobolization in Aug-2023 and chronic thrombocytopenia admitted to [facility]  center on 12/17/23 (after presentation for SOB/NSTEMI and pneumonia concern and concern for lupus flare where she was treated w/ steroids and plasma exchange) and transfer to [facility] on 12/18/23 before being transferred to [facility] on 12/22-12/25 for cyclophosphamide infusion. Represented on 12/26 [facility] ED for SOB found to have covid and reduced EF (baseline EF 47%, OSH EF reported as 15-20%) w/ plan to transfer [facility] however patient left AMA on 12/30 due to prolonged wait for transfer to [facility] ED. On presentation 1/2, patient reported continued worsening CP/SOB found to have acute on chronic heart failure and CHRISTA on CKD, concern for cardiorenal syndrome in the setting of prior lupus nephritis, and poorly controlled htn. She was initiated on iHD. Hospital course complicated by recurrent C diff colitis.     Recurrent C diff Colitis  Hypotension - resolved  Hx of C.diff infxn (6/2023)  On 1/21 had fever and hypotension in the setting of new onset diarrhea. Infectious work up notable for positive C diff. Had been on prophylaxis earlier in stay due to systemic antibiotics. Blood cultures collected due to presence of HD line, but have been negative. Started on PO vancomycin. ID consulted and recommended switching to fidaximicin for recurrent infection.  Blood culture x2 ordered-- negative for 48 hours.  - Start fidaximicin 200mg BID for 5 days followed by 200mg every other day for additional 10 days.  - Should be started on prophylactic vancomycin if needs systemic antibiotics in the next several months.    Progressive CHRISTA on CKD, now on iHD  Metabolic Acidosis, resolved  Recent Lupus Nephritis   Cr up trending this admission, per discussion with cardiology, nephrology, and rheumatology current CHRISTA suspected to be more likely related to cardiorenal syndrome and ongoing volume overload. Did treat for active lupus nephritis, however overall low concern. Ultimately, decision made  to initiate iHD, which patient has tolerated.  - Start dialysis MWF at [facility]  - Start calcium carbonate 500mg BID  - Start renal multivitamin daily  - Follow up with nephrology as outpatient.  - Continue prednisone taper - 40mg x7 day followed by 30mg, 20mg and 10mg each for 7 days.  - No plans for further cyclophosphamide at this time as low concern for active lupus nephritis    Acute on Chronic HF (EF 15-20% on 12/26 OSH)  Dyspnea and Chest Pain - improved   Uncontrolled HTN, improved  Tricuspid insufficiency   Presented with chest pain and dyspnea as above. Concern that hypertensive crisis contributing to chest pain. Echocardiogram on 17-Dec-2023 showed LVEF 35-40%, EF decreased to 15-20% on 12/26 w/ BNP at that time >70,000. Lexiscan 12/21 negative for inducible ischemia. Admission workup 1/2 included trop elevated at 290 with peak at 469 on 1/2. TTE 1/2 with severe diffuse hypokinesis with stably reduced EF. Limited TTE 1/7 with EF 35-40% and dilated IVC. Cardiology consulted with recs as below. V/Q scan 1/3 indeterminate, overall lower suspicion of VTE contributing to current presentation. Ultimately started on dialysis as above. BP controlled with oral medications and volume management.  - Follow up with cardiology outpatient (referral placed)  - HTN management:  - Bumex 2mg BID  - Carvedilol 25mg BID  - Nifedipine 60mg daily  - Holding losartan 100mg daily at discharge    SLE (+dsDNA, +RNP, +ribosomal P Ab and hypocomplementemia)   Recent Lupus Flare w/ MAHA and renal insuff s/p Cyclophosphamide dose (12/22/23)  Hx of Lupus cerebritis w/ hx of seizures  C/b Lupus Nephritis  Hx medication non-adherence  -Lupus-focused meds:  - Continue prednisone taper as above  - As of 1/23, no plans for more cyclophosphamide.  - PJP ppx: continue PTA bactrim  - GI ppx: continue protonix qday, tums prn   - Will need OP lupus follow up (rheumatology OP consult placed)    R forearm blister, improved  Quarter sized with  purulence, sent culture overnight. Derm consulted. VZV and HSV negative. Improved with wound cares.    Hypopigmented tongue macules  Noted on day of discharge by ID team. Not c/w thrush or mucositis. Not painful.  - Derm consult as outpatient.    COVID-19 Infection, resolved  Tested positive on 12/29 at OSH when presented with chest pain and dyspnea. Concern for superimposed PNA per OSH and was on vanc/cefepime 12/26-12/30 prior to AMA leave. Initially cefepime continued on admission, discontinued on 1/4 due to low suspicion for bacterial pneumonia.  Has remained on RA this admission. Overall unclear how much COVID-19 contributed to acute presentation.   -Received remdesivir 12/29 and 12/30 prior to leaving AMA, completed 5 day course given high risk (1/5 - 1/7)     Hypomagnesemia  Hypokalemia, resolved  -Monitor outpatient     Chronic RUE edema with weakness   Superficial clot of R cephalic vein  US 8/2023 negative. RUE doppler negative for DVT on 1/2, significant for superficial thrombus. Repeat US RUE 1/7 with no evidence of DVT. Suspect weakness 2/2 swelling, lower concern for alternate etiologies.     Chronic thrombocytopenia, improving   Chronic Macrocytic Anemia, improving  Stable during admission. Hematology consulted.     Hx of Intermittent Headaches  History of being responsive to APAP and dilaudid along w/ treatment of HTN and Lupus. NO NSAIDS.     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 Stools described as normal.      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 No current PTA antiepileptic medications    Chronic Left Shoulder Pain, improved   Unclear etiology, likely MSK pain.      Consultations This Hospital Stay   NURSING TO CONSULT FOR VASCULAR ACCESS CARE IP CONSULT  NURSING TO CONSULT FOR VASCULAR ACCESS CARE IP CONSULT  NURSING TO CONSULT FOR VASCULAR ACCESS CARE IP CONSULT  PHARMACY TO DOSE VANCO  RHEUMATOLOGY IP CONSULT  CARDIOLOGY HEART FAILURE (HF) IP CONSULT  CARDIOLOGY GENERAL ADULT IP CONSULT  NEPHROLOGY GENERAL ADULT IP CONSULT  HEMATOLOGY ADULT IP CONSULT  CARE MANAGEMENT / SOCIAL WORK IP CONSULT  PHYSICAL THERAPY ADULT IP CONSULT  OCCUPATIONAL THERAPY ADULT IP CONSULT  NURSING TO CONSULT FOR VASCULAR ACCESS CARE IP CONSULT  INTERVENTIONAL RADIOLOGY ADULT/PEDS IP CONSULT  PHARMACY TO DOSE VANCO  DERMATOLOGY IP CONSULT  WOUND OSTOMY CONTINENCE NURSE  IP CONSULT  NURSING TO CONSULT FOR VASCULAR ACCESS CARE IP CONSULT  INTERVENTIONAL RADIOLOGY ADULT/PEDS IP CONSULT  PHARMACY TO DOSE VANCO  INFECTIOUS DISEASE GENERAL ADULT IP CONSULT    Code Status   Full Code    Time Spent on this Encounter   I, Jayda Han MD, personally saw the patient today and spent greater than 30 minutes discharging this patient.       Jayda Han MD  [facility] 5A ONCOLOGY  [address] [facility] [address]  Phone: [phone]  ______________________________________________________________________    Physical Exam   Vital Signs: Temp: 98.4  F (36.9  C) Temp src: Oral BP: 114/71 Pulse: 101   Resp: 16 SpO2: 100 % O2 Device: None (Room air)    Weight: 120 lbs 4.8 oz    General Appearance: NAD. Lying comfortably in bed.  Respiratory: CTAB. No increased WOB.  Cardiovascular: RRR. No m/r/g  GI: Soft. Non-tender. Non-distended.  Skin: No rash.  Other: AOx4. Moving all extremities. Normal affect.       Primary Care Physician   Ashlie Skelton    Discharge Orders      Home Care Referral      Adult Rheumatology  Referral      Reason for your hospital stay    You were hospitalized for worsening kidney function and were started on dialysis. The plan will be to continue with dialysis 3x  weekly (MWF). You are also being treated for recurrent C diff infection with oral antibiotics.     Activity    Your activity upon discharge: activity as tolerated     Adult [facility] Follow-up and recommended labs and tests    Follow up with Rheumatology as previously scheduled.    Follow up with your new dialysis unit.    Follow up with your PCP as previously scheduled on 1/29.    Appointments on [place] and/or [facility] (with [facility] or [facility] provider or service). Call [phone] if you haven't heard regarding these appointments within 7 days of discharge.     IV access    **Ordering Provider MUST call/page Care Coordinator/ to discuss arranging this service**    You are going home with the following vascular access device: Hemodialysis.     Diet    Follow this diet upon discharge:       Fluid restriction 1500 ML FLUID     Renal diet       Significant Results and Procedures   Most Recent 3 CBC's:  Recent Labs   Lab Test 01/24/24  0657 01/23/24  0545 01/22/24  0656   WBC 3.3* 3.0* 2.1*   HGB 9.3* 9.7* 9.6*   MCV 96 99 101*   PLT 45* 40* 44*     Most Recent 3 BMP's:  Recent Labs   Lab Test 01/24/24  0657 01/23/24  0545 01/22/24  0656   * 135 136   POTASSIUM 3.9 4.1 3.8   CHLORIDE 101 106 105   CO2 19* 13* 17*   BUN 59.2* 97.7* 87.3*   CR 3.07* 3.69* 3.38*   ANIONGAP 14 16* 14   BISI 8.0* 8.0* 7.4*   * 134* 121*     Most Recent 2 LFT's:  Recent Labs   Lab Test 01/13/24  0608 01/12/24  0603   AST 25 22   ALT 44 42   ALKPHOS 108 113   BILITOTAL 0.3 0.3   ,   Results for orders placed or performed during the hospital encounter of 01/02/24   XR Chest Port 1 View    Narrative    Exam: XR CHEST PORT 1 VIEW, 1/2/2024 7:38 AM    Indication: CHF history increased shortness of breath    Comparison: Chest radiograph: 12/27/2023, 12/17/2023    Findings:   Portable frontal semiupright view of the chest. Trachea is midline. No  focal consolidations. Improving perihilar and bibasilar  basilar  predominant mixed pulmonary opacities. No pneumothorax. No significant  pleural effusions bilaterally..  Stable enlarged cardiac silhouette.      Impression    Impression:   1. Improving hazy perihilar and bibasilar opacities consistent with  resolving pulmonary edema.    2. No acute cardiopulmonary findings. Cardiomegaly.    I have personally reviewed the examination and initial interpretation  and I agree with the findings.    ELAINE FIORE MD         SYSTEM ID:  [id number]   US Upper Extremity Venous Duplex Right    Narrative    EXAMINATION: DOPPLER VENOUS ULTRASOUND OF THE RIGHT UPPER EXTREMITY,  1/2/2024 9:46 PM     COMPARISON: 8/17/2023    HISTORY: Right Upper extremity increased swelling    TECHNIQUE:  Gray-scale evaluation with compression, spectral flow and  color Doppler assessment of the deep venous system of the right upper  extremity.    FINDINGS:  Right: Normal blood flow and waveforms are demonstrated in the  internal jugular, innominate, subclavian, and axillary veins. There is  normal compressibility of the brachial and basilic veins. The right  cephalic vein at the level of the antecubital fossa is noncompressible  with visible echogenic thrombus. The right cephalic vein at the level  of the upper arm is partially compressible. Right cephalic vein at the  level of the forearm is fully compressible.    Superficial soft tissue edema, moderate, most pronounced at the  antecubital fossa.      Impression    IMPRESSION:  1.  No evidence of right upper extremity deep venous thrombosis.  2.  Occlusive and nonocclusive superficial venous thrombus within the  right cephalic vein from the level of the upper arm to the antecubital  fossa.  3.  Moderate superficial soft tissue edema of the right upper  extremity.    I have personally reviewed the examination and initial interpretation  and I agree with the findings.    JUSTIN DO MD         SYSTEM ID:  [id number]   NM Lung Scan Perfusion  Particulate    Narrative    Examination: NM LUNG SCAN PERFUSION SCAN ONLY      Date: 1/3/2024 12:24 PM     Indication: chest pain+shortness of breath with elevated d dimer and  acute kidney injury     Comparison: Chest radiograph 1/2/2024; chest CT 12/17/2023    Technique:    The patient received 7 mCi of Tc-99m labeled MAA intravenously.  Anterior and posterior quantitative views were obtained of the lungs  using a dual headed camera camera. A standard eight view lung  perfusion scan was also obtained. SPECT-CT of the chest was obtained.  Ventilation images were not acquired due to a recent Covid positive  test.     Findings:    The perfusion images demonstrate wedge-shaped photopenia in the left  laterobasal segment. On CT there is corresponding atelectasis on SPECT  CT.     Linear area of photopenia along the left lung zone, likely along the  fissure. This could be due to small effusion along the fissure.        CT images demonstrate small right and trace left pleural effusions.  Cardiomegaly with small pericardial effusion. Anemic blood pool.  Mesenteric edema. Pericholecystic fluid. Anasarca.      Impression    Impression: Tc 99m MAA Perfusion study only shows:     Wedge-shaped defect in the left lower lung zone laterobasal segment.  Corresponding linear opacification on the SPECT-CT. This is  indeterminate for pulmonary embolus  Small right and trace left pleural effusions.  Additional findings include cardiomegaly, small pericardial effusion,  pericholecystic fluid, and anasarca.    Above findings were communicated with the team by Dr Chavez on the day  of the study.     I have personally reviewed the examination and initial interpretation  and I agree with the findings.    LUCY JUNG MD         SYSTEM ID:  [id number]   US Upper Extremity Venous Duplex Right    Narrative    EXAMINATION: DOPPLER VENOUS ULTRASOUND OF THE RIGHT UPPER EXTREMITY,  1/7/2024 11:15 AM     COMPARISON: 1/2/2024.    HISTORY: ongoing  swelling and weakness, re-evaluate for progression to  DVT    TECHNIQUE:  Gray-scale evaluation with compression, spectral flow and  color Doppler assessment of the deep venous system of the right upper  extremity.    FINDINGS:  Right: Normal blood flow and waveforms are demonstrated in the  internal jugular, innominate, subclavian, and axillary veins. There is  normal compressibility of the brachial and basilic veins.    Occlusive thrombus in the right cephalic vein from the antecubital  fossa to mid upper arm.      Impression    IMPRESSION:  1.  No evidence of right upper extremity deep venous thrombosis.  2.  Occlusive thrombus in the right cephalic vein from the antecubital  fossa to mid upper arm.    JUSTIN DO MD         SYSTEM ID:  [id number]   IR CVC Tunnel Placement > 5 Yrs of Age    Narrative    Procedures 1/10/2024:  1. Ultrasound guidance for venous access  2. Placement centrally inserted catheter (age > 5 yrs.) tunneled, no  port, no pump  3. Fluoroscopic guidance placement    History: Patient is a a 23 year old?female?with past medical history  of lupus nephritis, hx of acute on chronic pancreatitis, ?lupus  cerebritis, renal failure, presented for tunnel dialysis catheter  placement for long-term dialysis.    Comparisons: Chest radiograph 1/2/2024.    Operators: Dr. Power Kirby M.D.     Resident/follows: Ashkan Behzadi M.D., Yashira Busby M.D.    Procedure performed by Dr. Behzadi and Dr. Busby under my supervision.  I, Dr. Power Kirby, was present for the entire procedure.    Medications:   1. 100 mcg Fentanyl  2. 4 mg Versed    Moderate sedation administered by the IR nurse at the supervision of  the attending. Vital signs and oxygenation continuously monitored. The  patient remained stable throughout the procedure.    Sedation time: 45 minutes    Fluoroscopy time: 4.0 minutes    Findings/procedure:     Prior to the procedure, both verbal and written informed consent  obtained from the  patient.     Limited jugular ultrasound documented jugular vein patency. The right  neck and upper chest prepped and draped in the usual sterile fashion.  Buffered 1% Lidocaine used for local analgesia.    Under ultrasound guidance, right internal jugular venotomy made with a  micropuncture needle. Image documenting the vein within the vein  saved.    Micropuncture needle exchanged over guidewire for the micropuncture  sheath under fluoroscopic guidance. Catheter length measured with the  0.018 guidewire. Micropuncture sheath saline locked.     19 cm tip to cuff 14 Cameroonian palindrome dialysis catheter  subcutaneously tunneled from the right anterior chest to the right  internal jugular venotomy site. Micropuncture sheath exchanged over  guidewire for the peel-away sheath. Guidewire removed. Under  fluoroscopic guidance, the catheter placed through a peel-away sheath  and positioned with its tip in the upper right atrium. Both lumens  flushed and aspirated adequately. Each lumen heparin locked with 2 cc  heparin solution. 2-0 nylon catheter retaining suture and sterile  dressing applied. Right internal jugular venotomy site closed with  Dermabond. No immediate complication.      Impression    Impression:  Uncomplicated image guided placement of right tunneled central venous  catheter for hemodialysis. 14 Cameroonian tunneled catheter placed under  fluoroscopic guidance with the tip in the 19 cm. Both lumens flushed,  heparin locked and ready for immediate use.    I have personally reviewed the examination and initial interpretation  and I agree with the findings.    ADAMARIS MENDENHLAL         SYSTEM ID:  [id number]   XR Chest Port 1 View    Narrative    Examination: XR CHEST PORT 1 VIEW 1/20/2024 6:15 PM    Indication: concern for bacterial PNA    Comparison: X-ray 1/2/2024    Findings:  AP portable chest. Right dual-lumen CVC terminates over the right  atrium. No significant residual interstitial hazy opacities. Trachea  is  midline. Stable cardiomegaly. No significant pleural effusion or  appreciable pneumothorax. No new abnormal airspace opacities.  Unremarkable upper abdomen. No acute osseous abnormality.      Impression    Impression:   1. Right dual lumen CVC terminates over the right atrium.  2. Decreased/no significant residual interstitial edema.  3. No focal consolidation or air space opacities concerning for  infection.    I have personally reviewed the examination and initial interpretation  and I agree with the findings.    CHERYL BAEZ MD         SYSTEM ID:  [id number]   Echo Complete     Value    LVEF  15-20% (severely reduced)    [facility]    [id number]  [facility]  [id number]  [id number]^SHANTEL^LASHA     [facility]  Echocardiography Laboratory  [address] [place] [address]     Name: MIKE JOSE  MRN: [id number]  : 2000  Study Date: 2024 01:14 PM  Age: 23 yrs  Gender: Female  Patient Location: [facility]  Reason For Study: Heart Failure  Ordering Physician: LASHA FUNES  Performed By: Anusha Carrillo     BSA: 1.6 m2  Height: 62 in  Weight: 132 lb  BP: 187/147 mmHg  ______________________________________________________________________________  Procedure  Complete Portable Echo Adult. Contrast Definity. Definity (NDC #59083-399-98)  given intravenously. Patient was given 7ml mixture of 1.5ml Definity and 8.5ml  saline. 3 ml wasted.  ______________________________________________________________________________  Interpretation Summary  Left ventricular function is decreased. The ejection fraction is 15-20%  (severely reduced). Severe diffuse hypokinesis is present. Moderate to severe  left ventricular dilation is present.  The right ventricle is normal size. Global right ventricular function is  mildly to moderately reduced.  Moderate to severe mitral insufficiency is present.  Severe tricuspid insufficiency is present.  Mild to moderate aortic insufficiency is present.  The  estimated PA systolic pressure is 65 mmHg.  Trivial pericardial effusion is present. Chamber compression is not present;  there is no evidence for tamponade.  IVC diameter >2.1 cm collapsing <50% with sniff suggests a high RA pressure  estimated at 15 mmHg or greater.  This study was compared with the study from 12/27/2023. No significant changes  noted.  ______________________________________________________________________________  Left Ventricle  Left ventricular wall thickness is normal. Moderate to severe left ventricular  dilation is present. Left ventricular function is decreased. The ejection  fraction is 15-20% (severely reduced). Left ventricular diastolic function is  abnormal. Severe diffuse hypokinesis is present.     Right Ventricle  The right ventricle is normal size. Global right ventricular function is  mildly to moderately reduced.     Mitral Valve  Moderate to severe mitral insufficiency is present.     Aortic Valve  The aortic valve is tricuspid. Mild to moderate aortic insufficiency is  present.     Tricuspid Valve  Severe tricuspid insufficiency is present. The right ventricular systolic  pressure is approximated at 51.2 mmHg plus the right atrial pressure.     Pulmonic Valve  Mild pulmonic insufficiency is present.     Vessels  The aorta root is normal. The thoracic aorta is normal. IVC diameter >2.1 cm  collapsing <50% with sniff suggests a high RA pressure estimated at 15 mmHg or  greater.     Pericardium  Trivial pericardial effusion is present. Chamber compression is not present;  there is no evidence for tamponade.     Compared to Previous Study  This study was compared with the study from 12/27/2023 . No significant  changes noted.     ______________________________________________________________________________  MMode/2D Measurements & Calculations  IVSd: 1.0 cm  LVIDd: 6.2 cm  LVPWd: 1.2 cm     LV mass(C)d: 298.6 grams  LV mass(C)dI: 186.4 grams/m2  RWT: 0.37     Doppler Measurements  & Calculations  TR max herminia: 357.9 cm/sec  TR max P.2 mmHg     ______________________________________________________________________________  Report approved by: Maureen Woo 2024 02:39 PM         Echo Complete     Value    LVEF  35-40% (moderately reduced)    [facility]    [id number]  [id number]  [id number]  [id number]^KARISHMA^JANELL     [facility],[place]  Echocardiography Laboratory  [address] [place] [address]     Name: MIKE JOSE  MRN: [id number]  : 2000  Study Date: 2024 08:15 AM  Age: 23 yrs  Gender: Female  Patient Location: [facility]  Reason For Study: Heart Failure  Ordering Physician: JANELL SCHWARZ  Performed By: Anusha Carrillo     BSA: 1.7 m2  Height: 62 in  Weight: 144 lb  BP: 171/110 mmHg  ______________________________________________________________________________  Procedure  Complete Portable Echo Adult. Contrast Optison. Optison (NDC #6502-3782-49)  given intravenously. Patient was given 6 ml mixture of 3 ml Optison and 6 ml  saline. 3 ml wasted.  ______________________________________________________________________________  Interpretation Summary  Left ventricular function is decreased. The ejection fraction is 35-40%  (moderately reduced).  The right ventricle is normal size.  Global right ventricular function is mildly reduced.  Moderate mitral insufficiency is present (functional ).  Moderate tricuspid insufficiency is present (functional).  Pulmonary hypertension is present.  The right ventricular systolic pressure is 33mmHg above the right atrial  pressure.  Dilation of the inferior vena cava is present with abnormal respiratory  variation in diameter.  Trivial pericardial effusion is present.     This study was compared with the study from 2024 .  The left ventricular function has improved.  PAP declined, TR improved.  ______________________________________________________________________________  Left Ventricle  Left  ventricular wall thickness cannot evaluate. Mild left ventricular  dilation is present. Left ventricular function is decreased. The ejection  fraction is 35-40% (moderately reduced). Left ventricular diastolic function  is not assessable. Mild to moderate diffuse hypokinesis is present.     Right Ventricle  The right ventricle is normal size. Global right ventricular function is  mildly reduced.     Atria  Mild to moderate biatrial enlargement is present.     Mitral Valve  Moderate mitral insufficiency is present. The cause of the mitral  insufficiency appears to be altered left ventricular size and geometry.     Aortic Valve  The aortic valve is tricuspid. Trace aortic insufficiency is present.     Tricuspid Valve  Moderate tricuspid insufficiency is present. The right ventricular systolic  pressure is 33mmHg above the right atrial pressure. Pulmonary hypertension is  present.     Pulmonic Valve  Trace pulmonic insufficiency is present.     Vessels  The aorta root is normal. Dilation of the inferior vena cava is present with  abnormal respiratory variation in diameter. IVC diameter >2.1 cm collapsing  <50% with sniff suggests a high RA pressure estimated at 15 mmHg or greater.     Pericardium  Trivial pericardial effusion is present.     Compared to Previous Study  This study was compared with the study from 2024 . The left ventricular  function has improved. PAP declined, TR improved.  ______________________________________________________________________________  MMode/2D Measurements & Calculations     EF(MOD-bp): 43.9 %     Doppler Measurements & Calculations  TR max herminia: 288.8 cm/sec  TR max P.4 mmHg     ______________________________________________________________________________  Report approved by: Annia COMBS 2024 11:46 AM             Discharge Medications   Current Discharge Medication List        START taking these medications    Details   bumetanide (BUMEX) 2 MG tablet Take 1 tablet  (2 mg) by mouth 2 times daily for 90 days  Qty: 180 tablet, Refills: 0    Associated Diagnoses: Hypertension, unspecified type      calcium carbonate 500 mg, elemental, (OSCAL) 500 MG tablet Take 1 tablet (500 mg) by mouth 3 times daily (with meals) for 90 days  Qty: 270 tablet, Refills: 0    Associated Diagnoses: Lupus nephritis (H)      multivitamin RENAL (RENAVITE RX/NEPHROVITE) 1 tablet tablet Take 1 tablet by mouth daily for 90 days  Qty: 90 tablet, Refills: 0    Associated Diagnoses: Lupus nephritis (H)           CONTINUE these medications which have CHANGED    Details   carvedilol (COREG) 25 MG tablet Take 1 tablet (25 mg) by mouth 2 times daily (with meals) for 90 days  Qty: 180 tablet, Refills: 0    Associated Diagnoses: Perinephric hematoma      folic acid (FOLVITE) 1 MG tablet Take 1 tablet (1 mg) by mouth daily for 90 days  Qty: 90 tablet, Refills: 0    Associated Diagnoses: Generalized weakness      NIFEdipine ER (ADALAT CC) 60 MG 24 hr tablet Take 1 tablet (60 mg) by mouth daily for 90 days  Qty: 90 tablet, Refills: 0    Associated Diagnoses: Hypertension, unspecified type      ondansetron (ZOFRAN ODT) 4 MG ODT tab Take 1 tablet (4 mg) by mouth every 8 hours as needed for nausea  Qty: 20 tablet, Refills: 3    Associated Diagnoses: Hx of systemic lupus erythematosus (SLE) (H)      pantoprazole (PROTONIX) 40 MG EC tablet Take 1 tablet (40 mg) by mouth every morning (before breakfast) for 90 days  Qty: 90 tablet, Refills: 0    Associated Diagnoses: Perinephric hematoma      predniSONE (DELTASONE) 20 MG tablet Take 2 tablets (40 mg) by mouth daily for 7 days, THEN 1.5 tablets (30 mg) daily for 7 days, THEN 1 tablet (20 mg) daily for 7 days, THEN 0.5 tablets (10 mg) daily for 7 days.  Qty: 35 tablet, Refills: 0    Associated Diagnoses: Lupus nephritis (H)      sulfamethoxazole-trimethoprim (BACTRIM) 400-80 MG tablet Take 1 tablet by mouth three times a week (Tuesday, Thursday, Saturday)  Qty: 30 tablet,  Refills: 3    Associated Diagnoses: Perinephric hematoma      vitamin B-12 (CYANOCOBALAMIN) 100 MCG tablet Take 1 tablet (100 mcg) by mouth daily for 90 days  Qty: 90 tablet, Refills: 0    Associated Diagnoses: Generalized weakness      Vitamin D, Cholecalciferol, 10 MCG (400 UNIT) TABS Take 10 mcg by mouth daily for 90 days  Qty: 90 tablet, Refills: 3    Associated Diagnoses: Generalized weakness           CONTINUE these medications which have NOT CHANGED    Details   acetaminophen (TYLENOL) 325 MG tablet Take 2 tablets (650 mg) by mouth every 4 hours as needed for mild pain    Associated Diagnoses: Perinephric hematoma           STOP taking these medications       ferrous sulfate (FEROSUL) 325 (65 Fe) MG tablet Comments:   Reason for Stopping:         potassium chloride ER (KLOR-CON M) 20 MEQ CR tablet Comments:   Reason for Stopping:         sodium bicarbonate 650 MG tablet Comments:   Reason for Stopping:             Allergies   Allergies   Allergen Reactions    Rituximab Anaphylaxis and Shortness Of Breath

## 2024-01-24 NOTE — PLAN OF CARE
Goal Outcome Evaluation:  Milly states she is doing well.  Denies pain, nausea, cough or SOB.  Eating well and on 1500 FR.  LS slightly diminished in bases.  Soft stools x2 this am and on po Vanco for CDiff.  Lower left PIV found with NS at 10cc/hr with site swollen.  Denied pain at site and IV flushed well.  It was removed and swelling decreased in a few hours.  Left chest CVC line CDI.  Right upper forearm with primapore drsg.  Pt said she had a blister there.  Drsg saturated with dry serosang and removed.  Quarter size open area found.  Area red with white patches in middle.  On 1/20 wound was recorded as dime size.  Cleansed with wound  and mepilex applied.  MD informed of this wound.  Pt to dialysis at 1400 and then plans to discharge later tonight.

## 2024-01-24 NOTE — CONSULTS
GENERAL ID ORANGE SERVICE: NEW CONSULTATION  Patient:  Milly Carroll, Date of birth 2000, Medical record number [id number]  Date of Admission: 1/2/2024  Date of Visit:  1/24/2024  Requesting Provider: Jayda Han         Assessment and Recommendations:   Milly Carroll is a 23 year old female PMHx of SLE (+dsDNA, +RNP) c/b lupus flares, lupus cerebritis w/ seizures, lupus nephritis Class IV, HTN, chronic systolic heart failure, QT prolongation, uncontrolled HTN, recent SLE flare w/ MAHA and renal insufficiency, s/p cyclophosphamide (12/22/2023), and multiple hospitalizations (12/17-12/25 and 12/26-12/30) who was admitted on 1/2 for similar complaint of chest pain and shortness of breath and was found to have acute on chronic heart failure and CHRISTA on CKD stage 4 requiring initiation of HD. Found to be C. Diff positive. Currently on oral vancomycin 125mg QID.     Problem List:  Severe C. 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 Agree with C. Difficile infection given 4+ watery loose stools, fever, hypotension, C. Difficile Toxin B PCR positive and C. Difficile GDH Antigen positive. Though it is difficult to determine Severe C. Difficile infection vs fulminant infection in setting of pancytopenia 2/2 SLE flare and elevated Cr in setting of ESRD. Though pt did have hypotension, improvement on oral vancomycin 125mg QID, lack of ileus, or megacolon this would likely favor against fulminant C.  Diff colitis. This is important to distinguish when chosing treatment regimen. We recommend fidaxomicin as this is 1st recurrence of C. Difficile infection. Per chart review pt has had difficulty with medication adherence, which would make QID dosing of oral vancomycin prolonged taper less preferable. There is decrease in recurrence of C. Difficile infection vs oral vancomycin with 1st recurrence. Recommend pharmacy liaison consult for fidaxomicin treatment.    R arm bulla currently appears to be healing and not infected at this time. Evaluated by dermatology on 1/12/24 which was negative for HSV, VZV, and bacterial cultures. Wound care recommended covering it with gauze if draining, otherwise leave it open to air. Tongue with hypopigmented macules which are same texture as tongue. Unable to scrape off, not painful, and per history these are chronic. Findings not consistent with mucositis or thrush. Unclear if this is normal vs other etiology. Notified primary team.     Recommendations:  Fidaxomicin 200mg PO BID for 5 days -> 200mg PO every other day for 10 days   Pharmacy liaison consult (already completed)   If 2nd recurrence of C. Difficile infection, then would recommend evaluation for FMT given immunocompromised state.   Appreciate nursing assistance with uploading images of R arm wound and hypopigmented macules to EPIC.  Continue TMP-SMX for PJP prophylaxis      Recommendations discussed with Dr. Dykes.    Thank you for this consult. ID will sign off. Please feel free to call with any questions.     Aparna Church MD  Internal Medicine, PGY-2       History of Present Illness:   Milly Carroll is a 23 year old female PMHx of SLE (+dsDNA, +RNP) c/b lupus flares, lupus cerebritis w/ seizures, lupus nephritis Class IV, HTN, chronic systolic heart failure, QT prolongation, uncontrolled HTN, recent SLE flare w/ MAHA and renal insufficiency, s/p cyclophosphamide (12/22/2023), and multiple hospitalizations (12/17-12/25  and 12/26-12/30) who was admitted on 1/2 for similar complaint of chest pain and shortness of breath and was found to have acute on chronic heart failure and CHRISTA on CKD stage 4 requiring initiation of HD. Found to be C. Diff positive. Currently on oral vancomycin 125mg QID. Patient states she continues to have 4 loose stools per day. Frequency unchanged since starting oral vancomycin, but she has noted that her stools are slightly more formed (bristol 6). Patient noted to have episode of hypotension following HD run and fever T-max 101.5 on 1/20/24. She was started on broad spectrum antibiotics with cefepime and IV vancomycin. Infectious workup was completed and patient remained asymptomatic. She was fluid resuscitated and did not require pressors. Overnight she was found to be C. Diff positive and having frequent loose stools with lactate elevation. She was started on PO vancomycin. IV vanc and cefepime were continued until blood cultures were negative for 48 hours. She has remained afebrile and normotensive. This is patient's 1st recurrence of C. Difficile infection. Last infection was in 8/2023 following which she completed 10 day oral vancomycin course. Patient is currently on 40mg prednisone taper and TMP-SMX for PJP prophylaxis. Currently denies SOB, cough, chest pain, fever, chills, abdominal pain, dysuria, urinary frequency or urgency, and congestion.         Review of Systems:   Complete 12 point review of systems negative except as noted in HPI.        Past Medical History:     Past Medical History:   Diagnosis Date    Hepatitis     Lupus (systemic lupus erythematosus) (H)     Lupus cerebritis (H)     Pancreatitis 08/2017    Pyelonephritis 08/2017    Seizures (H)     Status epilepticus (H)          Allergies:      Allergies   Allergen Reactions    Rituximab Anaphylaxis and Shortness Of Breath           Recent Antimicrobials::   1/20-1/22:  IV vancomycin and cefepime   1/21-*: oral vancomycin 125mg QID   On  TMP-SMX for PJP prophylaxis      Family History:   Reviewed and noncontributory.   Family History   Problem Relation Age of Onset    Other - See Comments Father         Question of lupus in father and paternal grandfather    Lupus Sister         older sister    Gastrointestinal Disease No family hx of         No known history of IBD, hepatitis, pancreatitis, celiac disease, or GI cancers before age 50    Glaucoma No family hx of     Macular Degeneration No family hx of             Social History:     Social History     Socioeconomic History    Marital status: Single     Spouse name: Not on file    Number of children: Not on file    Years of education: Not on file    Highest education level: Not on file   Occupational History    Not on file   Tobacco Use    Smoking status: Never     Passive exposure: Never    Smokeless tobacco: Never   Vaping Use    Vaping Use: Never used   Substance and Sexual Activity    Alcohol use: Not Currently    Drug use: Never    Sexual activity: Not Currently   Other Topics Concern    Not on file   Social History Narrative    6/20/2013     Milly is the 2nd youngest of 7 children.  She is in the 10th grade and lives with her parents and siblings.  Her family is originally from [place], but Milly was born in the .        06/2022: lives in [place] with older sister, brother-in-law, niece, and brother.         Social Determinants of Health     Financial Resource Strain: Low Risk  (12/26/2023)    Financial Resource Strain     Within the past 12 months, have you or your family members you live with been unable to get utilities (heat, electricity) when it was really needed?: No   Food Insecurity: Low Risk  (12/26/2023)    Food Insecurity     Within the past 12 months, did you worry that your food would run out before you got money to buy more?: No     Within the past 12 months, did the food you bought just not last and you didn t have money to get more?: No   Transportation Needs: High Risk  "(12/26/2023)    Transportation Needs     Within the past 12 months, has lack of transportation kept you from medical appointments, getting your medicines, non-medical meetings or appointments, work, or from getting things that you need?: Yes   Physical Activity: Not on file   Stress: Not on file   Social Connections: Not on file   Interpersonal Safety: Low Risk  (12/26/2023)    Interpersonal Safety     Do you feel physically and emotionally safe where you currently live?: Yes     Within the past 12 months, have you been hit, slapped, kicked or otherwise physically hurt by someone?: No     Within the past 12 months, have you been humiliated or emotionally abused in other ways by your partner or ex-partner?: No   Housing Stability: Low Risk  (12/26/2023)    Housing Stability     Do you have housing? : Yes     Are you worried about losing your housing?: No              Physical Exam:   /68 (BP Location: Right arm)   Pulse 55   Temp 98  F (36.7  C) (Oral)   Resp 18   Ht 1.575 m (5' 2\")   Wt 54.6 kg (120 lb 4.8 oz)   LMP 12/11/2023 (Approximate)   SpO2 100%   BMI 22.00 kg/m     Exam:  GENERAL:  Well-developed, well-nourished, sitting in bed in no acute distress.   ENT:  Head is normocephalic, atraumatic. Oropharynx is moist without exudates. Hypopigmented macules are noted on dorsal surface of tongue. Texture similar to tongue. Unable to scrape off. Nontender.  EYES:  Eyes have anicteric sclerae.    NECK:  Supple.  LUNGS:  Clear to auscultation.  CARDIOVASCULAR:  Regular rate and rhythm with no murmurs, gallops or rubs.  ABDOMEN:  Normal bowel sounds, soft, nontender.  EXT: Extremities warm and without edema.  SKIN:  R arm blister present, well healing, clean, and intact. No erythema, warmth or pain. Right sided HD line present   NEUROLOGIC:  Grossly nonfocal. Slowed cognition in setting of lupus cerebritis        Pertinent Recent Microbiology Data:   No results for input(s): \"CULT\", \"SDES\" in the last 168 " hours.         Laboratory Data:     Creatinine   Date Value Ref Range Status   01/24/2024 3.07 (H) 0.51 - 0.95 mg/dL Final   01/23/2024 3.69 (H) 0.51 - 0.95 mg/dL Final   01/22/2024 3.38 (H) 0.51 - 0.95 mg/dL Final   01/21/2024 2.86 (H) 0.51 - 0.95 mg/dL Final   01/20/2024 4.19 (H) 0.51 - 0.95 mg/dL Final   02/11/2021 0.58 0.50 - 1.00 mg/dL Final   02/10/2021 0.62 0.50 - 1.00 mg/dL Final   02/10/2021 0.48 (L) 0.50 - 1.00 mg/dL Final   01/13/2021 0.54 0.50 - 1.00 mg/dL Final   12/16/2020 0.52 0.50 - 1.00 mg/dL Final     WBC   Date Value Ref Range Status   02/11/2021 4.5 4.0 - 11.0 10e9/L Final   02/10/2021 3.9 (L) 4.0 - 11.0 10e9/L Final   01/13/2021 3.8 (L) 4.0 - 11.0 10e9/L Final   12/16/2020 4.2 4.0 - 11.0 10e9/L Final   11/18/2020 5.2 4.0 - 11.0 10e9/L Final     WBC Count   Date Value Ref Range Status   01/24/2024 3.3 (L) 4.0 - 11.0 10e3/uL Final   01/23/2024 3.0 (L) 4.0 - 11.0 10e3/uL Final   01/22/2024 2.1 (L) 4.0 - 11.0 10e3/uL Final   01/21/2024 2.4 (L) 4.0 - 11.0 10e3/uL Final   01/20/2024 3.0 (L) 4.0 - 11.0 10e3/uL Final     Hemoglobin   Date Value Ref Range Status   01/24/2024 9.3 (L) 11.7 - 15.7 g/dL Final   02/11/2021 9.1 (L) 11.7 - 15.7 g/dL Final     Platelet Count   Date Value Ref Range Status   01/24/2024 45 (LL) 150 - 450 10e3/uL Final   02/11/2021 104 (L) 150 - 450 10e9/L Final     Lab Results   Component Value Date     (L) 01/24/2024    BUN 59.2 (H) 01/24/2024    CO2 19 (L) 01/24/2024     CRP Inflammation   Date Value Ref Range Status   06/15/2022 7.9 0.0 - 8.0 mg/L Final   06/13/2022 40.0 (H) 0.0 - 8.0 mg/L Final   06/12/2022 91.0 (H) 0.0 - 8.0 mg/L Final   06/11/2022 99.0 (H) 0.0 - 8.0 mg/L Final   02/10/2021 <2.9 0.0 - 8.0 mg/L Final   01/13/2021 3.5 0.0 - 8.0 mg/L Final   10/09/2020 <2.9 0.0 - 8.0 mg/L Final   06/27/2020 <2.9 0.0 - 8.0 mg/L Final   06/26/2020 <2.9 0.0 - 8.0 mg/L Final             Imaging:   No results found for this or any previous visit (from the past 48 hour(s)).

## 2024-01-24 NOTE — PROGRESS NOTES
Care Management Discharge Note    Discharge Date: 01/24/2024       Discharge Disposition: Home, Home Care (unable to secure home care, efforts exhausted)    Discharge Services: None    Discharge DME: None    Discharge Transportation: family or friend will provide    Private pay costs discussed: Not applicable    Does the patient's insurance plan have a 3 day qualifying hospital stay waiver?  No    PAS Confirmation Code:  n/a  Patient/family educated on Medicare website which has current facility and service quality ratings: no    Education Provided on the Discharge Plan: Yes  Persons Notified of Discharge Plans: [facility] via portal  Patient/Family in Agreement with the Plan: yes    Handoff Referral Completed: Yes    Additional Information:  Per team, patient can discharge home either later this afternoon or tomorrow morning.  First dialysis chair time Friday 1/26/24 at 12:30PM, subsequent chair times MWF 1:35PM.  Patient updated regarding chair times, and given letter below.  Team updated that dialysis chair times are secured.            Discharge location:  [id number] [place] [address] (will need OP HD rides arranged prior to discharge)  Phone: [phone]     [facility]  Referral secured, see above     Writer called [facility] and [facility] to cancel referrals that was placed for if patient were to discharge back to [facility] home in [facility].        Linda Tang, RN, BSN  RN Care Coordinator    5A Medical Oncology/5C non-BMT  5A beds [id number]  5C beds 5724-8824 (non-BMT)  [address] [address]  [email or web address]  Gender pronouns: she/her  Pager: [phone]

## 2024-01-24 NOTE — DISCHARGE SUMMARY
Dialysis Discharge Summary Brief    [facility]  Division of Nephrology  Nephrology Discharge Dialysis Orders  Ph: [phone]  Fax: [phone]    Milly Carroll  MRN: [id number]  YOB: 2000    [facility] Dialysis Unit: [facility]  Primary Nephrologist: LUDY CADET MD    Date of Admission: 1/2/2024  Date of Discharge: 01/24/2024  Discharge Diagnosis:    This is a 23 year old female admitted on 1/2/2024. She has a history of lupus nephritis class IV per renal biopsy in 08/2023. Treated with cyclophosphamide/prednisone, but unfortunately limited recovery of renal function (IFTA 80% on biopsy). Fluid status, kidney function and hypertension continued to worsen. TDC was placed on 1/10 and iHD initiated on 1/11/23. No further plans for cyclophosphamide and will continue with more abbreviated taper of prednisone by 10mg weekly. Volume status optimized on HD and she continues to make a reasonable amount of urine. Would be a consideration for 2x weekly dialysis once established at her [facility] facility so long as residual renal function continues.       [x] New initiation, new dialysis orders will be faxed.    New Orders (if not applicable put NA):  Estimated Dry Weight 54.5   Dialysis Duration 3 hours   Dialysis Access CVC   Antibiotics (dose per dialysis, end date) none           Labs to be drawn at dialysis Routine   Other major changes to dialysis prescription (e.g. Dialysate bath, heparin, blood flow rate, etc)   K2, Ca 2.25  Okay to start heparin  ,    Medication changes (also fax the unit a copy of the discharge summary)         - Bumex 2mg BID   - Nifedipine 60mg   - Coreg  25 mg BID  - Losartan 100 mg  - Start Dialyvite  -Stop PO iron  -Continues on prednisone taper 10mg weekly (currently on 50mg); has outpatient follow-up with rheumatology.  -Okay to start mircera protocol, received 2000u EPO inpatient on 1/24     Name of physician completing  this form: MD Marcia Coleman MD

## 2024-01-25 NOTE — PLAN OF CARE
Goal Outcome Evaluation:  Care from 4 pm to 6 pm  Pt came back from dialysis. Dialysis line with no bleeding, dressing C/D/I. Per infectious provider request picture of  pt's R lower arm open wound taken and loaded in EPIC per Dai RN.  Pt ate dinner.  Discharge instruction got revised with patient and paper work given to pt. Pt stated understanding of discharge instruction.  PIV removed from left upper arm.  Pt to pick discharge meds from discharge pharmacy.  Pt to continue to take meds and to follow up with clinic appointments per discharge instruction.  Pt left floor with her belongings escorted by transport personnel.  Mother to give pt ride home.

## 2024-01-25 NOTE — PROGRESS NOTES
Clinic Care Coordination Contact  Clinic Care Coordination Contact  OUTREACH with Post Discharge Assessment    Referral Information:  Referral Source: IP Report    Primary Diagnosis: CKD    Chief Complaint   Patient presents with    Clinic Care Coordination - Initial    Clinic Care Coordination - Post Hospital      [place] Utilization:   [facility]  Clinic Utilization:  [facility]  Difficulty keeping appointments:: No  Compliance Concerns: No  No-Show Concerns: No  No PCP office visit in Past Year: No  Utilization      No Show Count (past year)  13             ED Visits  5             Hospital Admissions  5                    Current as of: 1/25/2024  9:56 AM                Clinical Concerns:  Current Medical Concerns:  Recent discharge from hospital    Current Behavioral Concerns: None    Education Provided to patient:   RNCC called and spoke with patient/caregiver; introduced self, discussed role of Care Coordination and explained reason for call   Pain  Pain (GOAL):: No  Health Maintenance Reviewed: Due/Overdue   Health Maintenance Due   Topic Date Due    HF ACTION PLAN  Never done    MICROALBUMIN  Never done    CHLAMYDIA SCREENING  Never done    ADVANCE CARE PLANNING  Never done    MENINGITIS B IMMUNIZATION (1 of 4 - Increased Risk) Never done    MENINGITIS IMMUNIZATION (2 - Risk 2-dose series) 06/28/2014    HPV IMMUNIZATION (2 - 3-dose series) 02/24/2017    LIPID  08/19/2018    YEARLY PREVENTIVE VISIT  09/19/2018    Pneumococcal Vaccine: Pediatrics (0 to 5 Years) and At-Risk Patients (6 to 64 Years) (2 of 2 - PCV) 09/19/2018    COVID-19 Vaccine (3 - Pfizer risk series) 11/27/2021    PAP  Never done    INFLUENZA VACCINE (1) 09/01/2023    PHQ-2 (once per calendar year)  01/01/2024     Clinical Pathway: None    Admission:    Admission Date: 01/02/24   Reason for Admission: Dialysis, recurrent C.Diff, CHRISTA on CKD, recent lupus nephritis,  "Heart Failure, HTN, COVID  Discharge:   Discharge Date: 01/24/24  Discharge Diagnosis: Dialysis, recurrent C.Diff, CHRISTA on CKD, recent lupus nephritis, Heart Failure, HTN, COVID         Discharge Assessment  How are you doing now that you are home?: \"I'm better\"  How are your symptoms? (Red Flag symptoms escalate to triage hotline per guidelines): Improved  Does the patient have their discharge instructions? : Yes  Does the patient have questions regarding their discharge instructions? : No  Were you started on any new medications or were there changes to any of your previous medications? : Yes  Does the patient have all of their medications?: Yes  Do you have questions regarding any of your medications? : No  Do you have all of your needed medical supplies or equipment (DME)?  (i.e. oxygen tank, CPAP, cane, etc.): Yes  Discharge follow-up appointment scheduled within 14 calendar days? : Yes  Discharge Follow Up Appointment Date: 01/29/24  Discharge Follow Up Appointment Scheduled with?: Primary Care Provider         Post-op (Clinicians Only)  Did the patient have surgery or a procedure: No  Fever: No  Chills: No  Eating & Drinking: eating and drinking without complaints/concerns  PO Intake: regular diet  Additional Symptoms:  (Patient denies)  Bowel Function: normal  Date of last BM: 01/25/24  Urinary Status: voiding without complaint/concerns    Medication Management:  Medication review status: Medications reviewed and no changes reported per patient.        Current Outpatient Medications   Medication    acetaminophen (TYLENOL) 325 MG tablet    bumetanide (BUMEX) 2 MG tablet    calcium carbonate 500 mg, elemental, (OSCAL) 500 MG tablet    carvedilol (COREG) 25 MG tablet    fidaxomicin (DIFICID) 200 MG tablet    folic acid (FOLVITE) 1 MG tablet    multivitamin RENAL (RENAVITE RX/NEPHROVITE) 1 tablet tablet    NIFEdipine ER (ADALAT CC) 60 MG 24 hr tablet    ondansetron (ZOFRAN ODT) 4 MG ODT tab    pantoprazole " (PROTONIX) 40 MG EC tablet    predniSONE (DELTASONE) 20 MG tablet    sulfamethoxazole-trimethoprim (BACTRIM) 400-80 MG tablet    vitamin B-12 (CYANOCOBALAMIN) 100 MCG tablet    Vitamin D, Cholecalciferol, 10 MCG (400 UNIT) TABS     No current facility-administered medications for this visit.     Functional Status:  Dependent ADLs:: Independent  Dependent IADLs:: Independent  Bed or wheelchair confined:: No  Mobility Status: Independent    Living Situation:  Current living arrangement:: I live in a private home with family  Type of residence:: Private home - stairs    Lifestyle & Psychosocial Needs:    Social Determinants of Health     Food Insecurity: Low Risk  (12/26/2023)    Food Insecurity     Within the past 12 months, did you worry that your food would run out before you got money to buy more?: No     Within the past 12 months, did the food you bought just not last and you didn t have money to get more?: No   Depression: Not at risk (12/26/2023)    PHQ-2     PHQ-2 Score: 2   Housing Stability: Low Risk  (12/26/2023)    Housing Stability     Do you have housing? : Yes     Are you worried about losing your housing?: No   Tobacco Use: Low Risk  (12/26/2023)    Patient History     Smoking Tobacco Use: Never     Smokeless Tobacco Use: Never     Passive Exposure: Never   Financial Resource Strain: Low Risk  (12/26/2023)    Financial Resource Strain     Within the past 12 months, have you or your family members you live with been unable to get utilities (heat, electricity) when it was really needed?: No   Alcohol Use: Not on file   Transportation Needs: High Risk (12/26/2023)    Transportation Needs     Within the past 12 months, has lack of transportation kept you from medical appointments, getting your medicines, non-medical meetings or appointments, work, or from getting things that you need?: Yes   Physical Activity: Not on file   Interpersonal Safety: Low Risk  (12/26/2023)    Interpersonal Safety     Do you feel  physically and emotionally safe where you currently live?: Yes     Within the past 12 months, have you been hit, slapped, kicked or otherwise physically hurt by someone?: No     Within the past 12 months, have you been humiliated or emotionally abused in other ways by your partner or ex-partner?: No   Stress: Not on file   Social Connections: Not on file     Diet:: Regular  Inadequate nutrition (GOAL):: No  Tube Feeding: No  Inadequate activity/exercise (GOAL):: No  Significant changes in sleep pattern (GOAL): No  Transportation means:: Regular car, Family     Buddhism or spiritual beliefs that impact treatment:: No  Mental health DX:: No  Mental health management concern (GOAL):: No  Chemical Dependency Status: No Current Concerns  Chemical Dependency Management:  (NA)  Informal Support system:: Family      Resources and Interventions:  Current Resources:   Community Resources: None  Supplies Currently Used at Home: None  Equipment Currently Used at Home: none  Employment Status: unemployed    Advance Care Plan/Directive  Advanced Care Plans/Directives on file:: No    Referrals Placed: None     Care Plan: NA    Future Appointments                In 4 days Ashlie Skelton MD [facility]            Plan:   -Patient will contact the care team with questions, concerns, support needs   -Patient will use the clinic as a resource and understands (s)he can contact the [facility] with 24/7 after hours services available  -Care Coordinator will remain available as needed  -CC will follow up with patient in 1-2 business to assist with SSDI application/process and with family stressors     Karina Mcdonald RN, BSN, PHN  Primary Care / Care Coordinator   [facility]  E-mail [email or web address]   [phone]

## 2024-01-26 NOTE — LETTER
[facility]  Patient Centered Plan of Care  About Me:        Patient Name:  Milly Jose    YOB: 2000  Age:         23 year old   Maria Guadalupe MRN:    [id number] Telephone Information:  Home Phone [phone]   Mobile [phone]       Address:  [address] [place] [address]  [place] [address] Email address:  [email or web address]      Emergency Contact(s)    Name Relationship Lgl Grd Work Phone Home Phone Mobile Phone   1. TJ JOSE Mother    [phone]   2. MELINDA JOSE Sister   [phone]   3. MARCO DURBIN Brother-in-Law    [phone]   4. RIC COURTNEY Aunt    [phone]           Primary language:  English     needed? No   [facility]:  [phone] op. 1  Other communication barriers:Glasses    Preferred Method of Communication:  Mail  Current living arrangement: I live in a private home with family    Mobility Status/ Medical Equipment: Independent        Health Maintenance  Health Maintenance Reviewed: Due/Overdue   Health Maintenance Due   Topic Date Due    HF ACTION PLAN  Never done    MICROALBUMIN  Never done    CHLAMYDIA SCREENING  Never done    ADVANCE CARE PLANNING  Never done    MENINGITIS B IMMUNIZATION (1 of 4 - Increased Risk) Never done    MENINGITIS IMMUNIZATION (2 - Risk 2-dose series) 06/28/2014    HPV IMMUNIZATION (2 - 3-dose series) 02/24/2017    LIPID  08/19/2018    YEARLY PREVENTIVE VISIT  09/19/2018    Pneumococcal Vaccine: Pediatrics (0 to 5 Years) and At-Risk Patients (6 to 64 Years) (2 of 2 - PCV) 09/19/2018    COVID-19 Vaccine (3 - Pfizer risk series) 11/27/2021    PAP  Never done    INFLUENZA VACCINE (1) 09/01/2023    PHQ-2 (once per calendar year)  01/01/2024          My Access Plan  Medical Emergency 911   [facility]  [phone]   24 Hour Appointment Line [phone] or  [phone]) (toll-free)   24 Hour Nurse Line [phone] (toll-free)   Preferred  Urgent Care [facility], [phone]     Preferred Hospital [facility]  [phone]     Preferred Pharmacy [facility] [address] [place] [address] COURT     Behavioral Health Crisis Line The National Suicide Prevention Lifeline at 1-145.711.2698 or Text/Call 988           My Care Team Members  Patient Care Team         Relationship Specialty Notifications Start End    Ashlie Skelton MD PCP - General Family Medicine  1/22/24     Phone: [phone] Fax: [phone] [address] [place] [facility] [place] [place] [address]    Jonh Anders MD PhD MD Pediatric Rheumatology  1/14/15     Phone: [phone] Fax: [phone] [address] [address]    Estefany Bell MD MD Pediatrics  1/14/15     REFERRED TO PEDS REUMATOLOGY    Phone: [phone] Fax: [phone]         [address] [place] [address]    Domingo Thomas MD MD Ophthalmology  8/3/15     Phone: [phone] Fax: [phone]         [address] [address]    Padmini Garza MD MD Pediatric Nephrology  8/29/17     Phone: [phone] Fax: [phone]         [address] [place] [address]    Marcia Schmitt MD MD Pediatric Gastroenterology  8/29/17     Phone: [phone] Fax: [phone]         [address] [place] [address]    Ernie Swift, PhD LP  Neuropsychology  9/6/17     Phone: [phone] Fax: [phone]         [address] [place] [address]    April Purvis MD Assigned Nephrology Provider   7/16/22     Phone: [phone] Fax: [phone]         [address] [facility] [address]    Oralia Rodriguez, RN Specialty Care Coordinator Rheumatology Abnormal results only, Admissions 7/10/23     Lupus Program Nurse Navigator: [phone]    Mare Veronica, [facility] Pharmacist Pharmacist  7/31/23     Phone: [phone] Fax: [phone] [address] [place] [address]    Mare Veronica, [facility] Assigned MTM Pharmacist   8/19/23     Phone: [phone] Fax: [phone]         [address] [facility] [address] [facility] [address]    Lo Elias MD MD Cardiovascular Disease  8/28/23     Phone: [phone] Pager: [phone] Fax: [phone] [address] [place] [address]    Lo Elias MD Assigned Heart and Vascular Provider   10/21/23     Phone: [phone] Pager: [phone] Fax: [phone] [address] [place] [address]    Elham Thomas, [facility] Pharmacist   11/1/23     Nnamdi Waddell, DO Fellow Rheumatology  1/2/24     Phone: [phone] Fax: [phone]         [address] [facility] [address] [facility] [address]    Guerrero Denton MD Assigned PCP   1/13/24     Phone: [phone] Fax: [phone] [address] [facility] [address]    Jayda Han MD Hospitalist   1/25/24     Referred to dermatology    Phone: [phone] Pager: vocera Fax: [phone]        [address] [facility] [facility] [address]    Daniela Marie PA-C Physician Assistant Dermatology  1/25/24     Phone: [phone] Fax: [phone]         [address] [facility] [address]    Kaur Saldivar LSW Lead Care Coordinator Primary Care - CC Admissions 1/25/24     Phone: [phone]         Sunil Salmeron MD MD Cardiovascular Disease  1/26/24     Phone: [phone] Pager: [phone] Fax: [phone] [address] [place] [address]                My Care Plans  Self Management and Treatment Plan    Care Plan  Care Plan: Health Maintenance       Problem: Health Maintenance Due or Overdue       Goal: Maintain my Health       Start Date: 1/26/2024 Expected End Date: 3/29/2024    Priority: High    Note:     Barriers: Many appointments to keep track of .  Strengths: I have [employer] medical transportation. My mom is also able to take me to some of my  medical  appointments.  Patient expressed understanding of goal: Yes   Action steps to achieve this goal:  1. I will attend my upcoming PCP and  specialty appointments.  2. I will continue to have my Dialysis treatments.  3. I will schedule rides through [facility] medical ride as needed.                               Care Plan: Financial Wellbeing       Problem: Patient expresses financial resource strain       Goal: Create an action plan to increase financial stability       Start Date: 1/26/2024 Expected End Date: 3/29/2024    Priority: High    Note:     Barriers: Unable to work due to health concerns right now  Strengths: Family support.  Patient expressed understanding of goal: Yes  Action steps to achieve this goal:  1. I will apply for Social Security.  2. Care Coordinator emailed me information about the Social Security application process on 1/29/24.   3.  I  will let Care Coordinator know if I run I to barriers with this goal.                               Action Plans on File:                       Advance Care Plans/Directives:   [facility]   Tel: [phone]    Fax: [phone]    Email: [email or web address]    Our routine service is specialized to each of our client's specific needs, with payment due at time of service in the form of cash, credit or check.  $55 for all routine follow-up appointments.     Cash, check or credit.   Insurance does not cover routine footcare.    Toenail Trimming  Ingrown Toenails, if not well-advanced  Trimming of Corns, Calluses and Pressure Blisters  Diabetic Foot Care  Dry Skin  Circulation Issues  Relaxing Foot Soak and Massage  Fingernail care if requested within Care Plan for an additional $10    Locations: [facility], [place], [place], [place], [place], [facility], [place], [place], [place], [place], [place], [place], [place], [place], [place], [place]    Advanced Care Plan/Directives on file:   No    Discussed with patient/caregiver(s): No data recorded           My Medical and Care Information  Problem List   Patient Active Problem List   Diagnosis    Systemic lupus erythematosus (H)    Acute hepatitis    Exacerbation of systemic lupus erythematosus    Generalized weakness    Hypocomplementemia (H)    Hypoalbuminemia    Seizure (H)    Coagulopathy (H24)    Hypokalemia    Functional visual loss    Posterior subcapsular cataract, both eyes    Encounter for therapeutic drug monitoring    Knee osteonecrosis, right (H)    Pyelonephritis    Proteinuria    Other forms of systemic lupus erythematosus (H)    Abnormal echocardiogram - repeat in 2021    Immunosuppression (H24)    Anaphylaxis    Sepsis (H)    Pancreatitis, acute    Cellulitis of left lower extremity    Drug-induced systemic lupus erythematosus, unspecified organ involvement status (H24)    Fever, unspecified fever cause    Lupus (systemic lupus erythematosus) (H)    Thrombocytopenia, unspecified (H24)    Splenomegaly    Fever, unspecified    Fever in pediatric patient    Anemia, unspecified type    Other systemic lupus erythematosus with other organ involvement (H)    Systemic lupus erythematosus, unspecified SLE type, unspecified organ involvement status (H)    Contact with and (suspected) exposure to covid-19    Tachycardia    Hx of systemic lupus erythematosus (SLE) (H)    Pain of left lower extremity    Perinephric hematoma    Hemoptysis    Lupus nephritis (H)    CKD (chronic kidney disease) stage 5, GFR less than 15 ml/min (H)    CHF (congestive heart failure) (H)    Lupus (H)    Acute on chronic congestive heart failure, unspecified heart failure type (H)    COVID-19      Current Medications and Allergies:  See printed Medication Report.    Care Coordination Start Date: 1/25/2024   Frequency of Care Coordination: No data recorded   Form Last Updated: 01/29/2024

## 2024-01-26 NOTE — LETTER
[facility] CARE COORDINATION    January 29, 2024    Milly Carroll  [address] [place] [address]  [place] [address]      Dear Milly,    I am a clinic care coordinator who works with Ashlie Skelton MD with the [facility]. I wanted to thank you for spending the time to talk with me.  Below is a description of clinic care coordination and how I can further assist you.       The clinic care coordination team is made up of a registered nurse, , financial resource worker and community health worker who understand the health care system. The goal of clinic care coordination is to help you manage your health and improve access to the health care system. Our team works alongside your provider to assist you in determining your health and social needs. We can help you obtain health care and community resources, providing you with necessary information and education. We can work with you through any barriers and develop a care plan that helps coordinate and strengthen the communication between you and your care team.  Our services are voluntary and are offered without charge to you personally.    Please feel free to contact me with any questions or concerns regarding care coordination and what we can offer.      We are focused on providing you with the highest-quality healthcare experience possible.    Sincerely,     EULALIA Slaughter   Care Coordination Team  [phone]      Enclosed: I have enclosed a copy of the Patient Centered Plan of Care. This has helpful information and goals that we have talked about. Please keep this in an easy to access place to use as needed.

## 2024-01-29 NOTE — PROGRESS NOTES
Clinic Care Coordination Contact  Follow Up Progress Note      Assessment: FERNANDEZ EID completed TCM and initial outreach on 1/25/24.  Transitioned to CARMEL EID to assist with psychosocial concerns    CARMEL EID spoke by phone with Milly.  CARMEL Eid assisted Milly with scheduling Cardiology appointment..Milly has been using her [employer] medical transportation for rides. Her mom has been able to bring her to some of the appointments.    Milly is interested in applying for Social Security. CARMEL Eid explained the process and emailed the folowing information to her on 1/29/24:    It was nice talking with you ion Friday.  Below is information about applying for Social Security.    Here is a good resource for general information about disabilities.  Disability Hub: 1-104-084-9790 or https://disabilityhubmn.org/      Applying for Social Security Disability Benefits  Website: https://www.ssa.gov/applyfordisability/    Social Security offers an online disability application you can complete at your convenience. Apply from the comfort of your home or any location at a time most convenient for you. You do not need to drive to your local Social Security office or wait for an appointment with a Social .     Who can apply for adult disability benefits online?  You can use the online application to apply for disability benefits if you:  Are age 18 or older;   Are not currently receiving benefits on your own Social Security record;   Are unable to work because of a medical condition that is expected to last at least 12 months or result in death: and  Have not been denied disability benefits in the last 60 days. If your application was recently denied for medical reasons, the Internet Appeal is a starting point to request a review of the medical determination we made.     How do I apply for benefits?  Here is what you need to do to apply for benefits online:   Print and review the Adult Disability Checklist.  It will help you  gather the information you need to complete the application.  Complete the Disability Benefit Application.  Complete the Medical Release Form    What other ways can I apply?  You can also apply:   By phone - Call us at [phone] from 7 a.m. to 7 p.m. Monday through Friday. If you are deaf or hard of hearing, you can call us at TTY [phone].    In person - Visit your local Social Security office. (Call first to make an appointment.)     [facility] Social Security Office  [address], [address]  [place], [place] [address]Bqg Directions  [phone]  Office Hours:  Monday: 9:00 am - 4:00 pm   Tuesday: 9:00 am - 4:00 pm   Wednesday: 9:00 am - 4:00 pm   Thursday: 9:00 am - 4:00 pm   Friday: 9:00 am - 4:00 pm   Saturday: Closed Today   Sunday: Closed Today     You may also choose to work with a  to assist with applying. I have included a list of options in your area that may be able to assist you below:         [employer]: 1-350.126.9089 or http://www.disabilityspecialists.net/  [employer]ability Specialists strive to make the Social Security Disability application as simple and straightforward as possible.  Please call them toll-free at [phone] to request an intake interview, or just complete the questionnaire on the main website, and they will contact you.  They work in 120 different languages. When you call, state the language you prefer, then wait a moment while they get someone on the line to help. When you complete the initial interview through the claims team, you will be assigned to an individual representative. [employer] is paid only if they obtain benefits for you. If we don't get you benefits, we are paid nothing no matter how long we worked for you and no matter how much money we spent attempting to get you benefits. For some cases, there is no charge to you because we are paid by the state or [facility] government to assist you. For other  cases, the law limits our fee to a percentage of the past due benefits that we obtain for you and our fee is paid to us by the Social Security Administration. For every case, nothing is taken from your monthly benefit check and you have no upfront costs.      [facility]  Address: [address], [address], [place] [address]  Phone: [phone]  Website: [email or web address].  Address: [address], [address], [place] [address]  Phone: [phone]  Website: [email or web address]  [facility]   Address: [address] [place] [address]   Phone: [phone]   Website: [email or web address].      [employer]  Address: [address], [place] [address]  Phone: [phone] or [phone]  Website: www.[email or web address].net  [employer]  Address: [address] [place], [place] [id number]  Phone: [phone] or [phone]  Website: www.disabilityspecialists.net      [employer]: 4-150-198-3197 or https://www.qualitydisabilityKitCheck.TalkShoe/               Care Gaps:    Health Maintenance Due   Topic Date Due    HF ACTION PLAN  Never done    MICROALBUMIN  Never done    CHLAMYDIA SCREENING  Never done    ADVANCE CARE PLANNING  Never done    MENINGITIS B IMMUNIZATION (1 of 4 - Increased Risk) Never done    MENINGITIS IMMUNIZATION (2 - Risk 2-dose series) 06/28/2014    HPV IMMUNIZATION (2 - 3-dose series) 02/24/2017    LIPID  08/19/2018    YEARLY PREVENTIVE VISIT  09/19/2018    Pneumococcal Vaccine: Pediatrics (0 to 5 Years) and At-Risk Patients (6 to 64 Years) (2 of 2 - PCV) 09/19/2018    COVID-19 Vaccine (3 - Pfizer risk series) 11/27/2021    PAP  Never done    INFLUENZA VACCINE (1) 09/01/2023    PHQ-2 (once per calendar year)  01/01/2024       Scheduled 1/30/24      Care Plans  Care Plan: Health Maintenance       Problem: Health Maintenance Due or Overdue       Goal: Maintain my  Health       Start Date: 1/26/2024 Expected End Date: 3/29/2024    Priority: High    Note:     Barriers: Many appointments to keep track of .  Strengths: I have [employer] medical transportation. My mom is also able to take me to some of my  medical appointments.  Patient expressed understanding of goal: Yes   Action steps to achieve this goal:  1. I will attend my upcoming PCP and  specialty appointments.  2. I will continue to have my Dialysis treatments.  3. I will schedule rides through [facility] medical ride as needed.                               Care Plan: Financial Wellbeing       Problem: Patient expresses financial resource strain       Goal: Create an action plan to increase financial stability       Start Date: 1/26/2024 Expected End Date: 3/29/2024    Priority: High    Note:     Barriers: Unable to work due to health concerns right now  Strengths: Family support.  Patient expressed understanding of goal: Yes  Action steps to achieve this goal:  1. I will apply for Social Security.  2. Care Coordinator emailed me information about the Social Security application process on 1/29/24.   3.  I  will let Care Coordinator know if I run I to barriers with this goal.                               Intervention/Education provided during outreach:           The patient consented via Verbal consent to have contact information and resources sent via email in an unencrypted manner.    Plan:   Milly plans to attend her upcoming medical appointments. She will review the Social Security information emailed to her and will let CARMEL CC ashvinw if she has questions,     Care Coordinator will follow up in 3-4 weeks    EULALIA Slaughter Care Coordination Team  [phone]

## 2024-02-07 NOTE — PROGRESS NOTES
"  Assessment & Plan     Hospital discharge follow-up  Labs ordered to follow-up since her discharge from hospital  - CBC with platelets; Future  - Comprehensive metabolic panel; Future  - CBC with platelets  - Comprehensive metabolic panel    C. difficile colitis  Patient has ongoing diarrhea.  Recommending to repeat a C. difficile stool study  - C. difficile Toxin B PCR with reflex to C. difficile Antigen and Toxins A/B EIA; Future  - C. difficile Toxin B PCR with reflex to C. difficile Antigen and Toxins A/B EIA    Acute on chronic systolic congestive heart failure (H)  Patient has upcoming appointment with cardiology next week    Lupus nephritis (H)  Patient tells that she has a rheumatology appointment coming up    Hypertension, unspecified type      Dialysis patient (H24)    Infection due to 2019 novel coronavirus    Resolved.        MED REC REQUIRED  Post Medication Reconciliation Status: discharge medications reconciled, continue medications without change        Torsten Atkins is a 23 year old, presenting for the following health issues:  Hospital F/U        2/7/2024    10:40 AM   Additional Questions   Roomed by Enrrique OH         1/25/2024    11:58 AM   Post Discharge Outreach   Admission Date 1/2/2024   Reason for Admission Dialysis, recurrent C.Diff, CHRISTA on CKD, recent lupus nephritis, Heart Failure, HTN, COVID   Discharge Date 1/24/2024   Discharge Diagnosis Dialysis, recurrent C.Diff, CHRISTA on CKD, recent lupus nephritis, Heart Failure, HTN, COVID   How are you doing now that you are home? \"I'm better\"   How are your symptoms? (Red Flag symptoms escalate to triage hotline per guidelines) Improved   Does the patient have their discharge instructions?  Yes   Does the patient have questions regarding their discharge instructions?  No   Were you started on any new medications or were there changes to any of your previous medications?  Yes   Does the patient have all of their medications? Yes   Do " you have questions regarding any of your medications?  No   Do you have all of your needed medical supplies or equipment (DME)?  (i.e. oxygen tank, CPAP, cane, etc.) Yes   Discharge follow-up appointment scheduled within 14 calendar days?  Yes   Discharge Follow Up Appointment Date 1/29/2024   Discharge Follow Up Appointment Scheduled with? Primary Care Provider     Hospital Follow-up Visit:    Hospital/Nursing Home/IP Rehab Facility: [facility]  Date of Admission: 1/2/24  Date of Discharge: 1/24/24  Reason(s) for Admission: Lupus nephritis, congestive heart failure, current COVID infection, C. difficile infection    Was your hospitalization related to COVID-19? No   Problems taking medications regularly:  None  Medication changes since discharge: None  Problems adhering to non-medication therapy:  None    Summary of hospitalization:  [facility] discharge summary reviewed  Diagnostic Tests/Treatments reviewed.  Follow up needed: Nephrology, cardiology and rheumatology team  Other Healthcare Providers Involved in Patient s Care:         Specialist appointment - cardiology  Update since discharge: stable.         Plan of care communicated with patient           Patient reports that she continues to have diarrhea.  Appetite is better than before.  Denies any nausea.  No fever or chills.  Reports of weakness.  Denies any blood in stool.  No abdominal pain reported.  No heartburn.    Patient tells that she gets hemodialyzed regularly at the dialysis unit.  She has an upcoming cardiology appointment next week.      Review of Systems  CONSTITUTIONAL: NEGATIVE for fever, chills, change in weight  ENT/MOUTH: NEGATIVE for ear, mouth and throat problems  RESP: NEGATIVE for significant cough or SOB  CV: NEGATIVE for chest pain, palpitations or peripheral edema      Objective    BP (!) 130/94   Pulse 100   Temp 97.7  F (36.5  C) (Tympanic)   Resp 20   Wt 51.7 kg  (114 lb)   LMP 01/31/2024   SpO2 100%   BMI 20.85 kg/m    Body mass index is 20.85 kg/m .  Physical Exam   GENERAL: alert and no distress  RESP: lungs clear to auscultation - no rales, rhonchi or wheezes  CV: regular rate and rhythm, no peripheral edema  ABDOMEN: soft, nontender,  MS: no edema in bilateral lower extremities    Results for orders placed or performed in visit on 02/07/24   CBC with platelets     Status: Abnormal   Result Value Ref Range    WBC Count 6.0 4.0 - 11.0 10e3/uL    RBC Count 2.76 (L) 3.80 - 5.20 10e6/uL    Hemoglobin 8.2 (L) 11.7 - 15.7 g/dL    Hematocrit 26.5 (L) 35.0 - 47.0 %    MCV 96 78 - 100 fL    MCH 29.7 26.5 - 33.0 pg    MCHC 30.9 (L) 31.5 - 36.5 g/dL    RDW 14.9 10.0 - 15.0 %    Platelet Count 175 150 - 450 10e3/uL             Signed Electronically by: Ashlie Skelton MD

## 2024-02-13 NOTE — TELEPHONE ENCOUNTER
EP LVM 2/13 to let pt know about 3/27 follow up with Dr. Dykes and to call ID back if day and time doesn't work.     ----- Message from Carissa Delong RN sent at 2/13/2024  8:22 AM CST -----  Next avail 30m hosp f/up with Dr Dykes please!      ----- Message -----  From: Alina Dykes MD  Sent: 2/12/2024   4:54 PM CST  To: Carissa Delong RN    Yes - please schedule with me.   Unfortunately, it looks like I don't have availability any time in the very near future, but maybe she could be put on a cancellation list? Or another approach?    Thanks-  Alina    ----- Message -----  From: Carissa Delong RN  Sent: 2/12/2024   3:22 PM CST  To: Alina Dykes MD    Hello!    We just received an urgent referral for recurrent C Diff. on this patient who I see you saw inpatient. In your note, looks like next step was going to be workup for FMT. I am assuming she should be scheduled with you?    Thanks!

## 2024-02-14 PROBLEM — Z99.2 DIALYSIS PATIENT (H): Status: ACTIVE | Noted: 2024-01-01

## 2024-02-14 PROBLEM — J10.1 INFLUENZA B: Status: ACTIVE | Noted: 2024-01-01

## 2024-02-14 PROBLEM — R09.02 HYPOXIA: Status: ACTIVE | Noted: 2024-01-01

## 2024-02-14 NOTE — PROGRESS NOTES
Outpatient Visit Note    Chief Complaint   Patient presents with    Weight Check       HPI:  Klever Mccain is a 6 days male here for a  exam and to establish care.    Accompanied by: Mom and Dad    36w6d AGA male born via Vaginal, Spontaneous delivery on 2024 at 10:09 AM with a birth weight of 3160 g to Lily Wyman , a  27 y.o.  . Mom has hypothyroidism, is on levothyroxine.    Parent states he is doing well with no acute concerns today. He had 2 low glucose levels at 25 and 37 but tolerated overnight feeds well and remained hemodynamically stable.  Was voiding and stooling well.  Subsequent glucose levels were normal.  She states he continues to feed well    No illnesses or hospitalizations since birth. Immunizations are up to date with having received the Hep B vaccine during hospitalization. No history of vaccine reaction.             ---------------------             Delivery details:  at 36w6d, spontaneous vaginal delivery, blood type B+, APGARS 9/9 Maternal Testing GBS neg.              Last T.Bili level: No risk factors. TcB 2.7 at 20 HOL: Phototherapy threshold/light level: 10.6; recommended follow up from hospital: 3 days              Tiro screening results: pending. G6PD normal.              Hearing screen results: passed on 24             Tiro Congenital Heart Diseases Screen: passed on 24 with 1 hour car seat challenge.              Hep B vaccine: received on 5/15/24             Birth weight: 3160g (71%ile)             Discharge weight: 3040g, down 3.8 % on DOL 1             Weight today: 3175, up from birth weight             ---------------------             Primary Nutrition/Infant feeding plan: a little bit of breast milk, transitioning to formula. Similac infant. Feeding every 3 hours.              Any difficulty with obtaining sufficient formula or nutritious food or diapers: No             Access to hot water and electricity:  Late entry  Received a message earlier this week from Kaur Dean, requesting follow up call to support getting patient back in to see Nephrology: [phone].  Returned call late yesterday clarifying that patient is now being followed at dialysis by nephrology team there and no longer requires oversight.  Writer clarified this with previous Nephrologist prior to patient discharge from last hospital stay.  Left contact information if more is needed.  Oralia Rodriguez RN, BSN, PHN  [facility] Nephrology Nurse Navigator  [phone]     yes             Do you feel comfortable with caring for your baby: yes             Infant vitamins: no              Fluoride: well water, uses baby water with fluoride additive             Solids: no             Sleep: In crib in parents room, always placed on back with nothing else in the crib, sleeps well with no problems             Elimination: no concerns             Childcare: at home with family             Second-hand smoke exposure: None             Behavior: Normal, no concerns. No tremors.              Safety: guns in the house - they are locked up, no child-proofing yet             Any Major Changes in Family: None             Mom's plan for return to work/childcare: goal of 8 weeks off then return to work. Plan fo rin home              Family adjustment/emotional wellbeing: adjusting well but will start Zoloft for her nerves as she went through a lot with her other son.              ---------------------             MILESTONES IN THE FIRST FEW DAYS OF LIFE:             - Baby sustains periods of wakefulness for feeding: no             - Makes brief eye contact with adult when held: yes             - Cries with discomfort: yes             - Calms to adults voice: yes             - Lifts head briefly when on stomach and turn it to the side: yes             - Moves arms and legs symmetrically and reflexively when startled: yes             - Keeps hands in a fist: yes sometimes      No current outpatient medications on file prior to visit.     No current facility-administered medications on file prior to visit.        No Known Allergies    Immunization History   Administered Date(s) Administered    Hepatitis B vaccine, pediatric/adolescent (RECOMBIVAX, ENGERIX) 2024       Patient Active Problem List    Diagnosis Date Noted    Strum infant, unspecified gestational age ([facility]) 2024        Review of Systems  All pertinent positive symptoms are included in the history of present illness.  All other systems have been reviewed and are negative and noncontributory to this patient's current ailments.     Objective   Ht 50.8 cm   Wt 3.062 kg   HC 34.5 cm   BMI 11.86 kg/m²   BSA: 0.21 meters squared  Growth percentiles: 84 %ile (Z= 0.99) based on Felix (Boys, 22-50 Weeks) Length-for-age data based on Length recorded on 2024. 57 %ile (Z= 0.18) based on Felix (Boys, 22-50 Weeks) weight-for-age data using vitals from 2024.   Admission on 2024, Discharged on 2024   Component Date Value Ref Range Status    G6PD, Qual 2024 Normal  Normal Final    POCT Glucose 2024 25 (L)  45 - 90 mg/dL Final    RN/MD NOTIFIED    POCT Glucose 2024 37 (L)  45 - 90 mg/dL Final    RN/MD NOTIFIED    POCT Glucose 2024 62  45 - 90 mg/dL Final    POCT Glucose 2024 53  45 - 90 mg/dL Final    POCT Glucose 2024 46  45 - 90 mg/dL Final    NORMAL  GLUC    POCT Glucose 2024 65  45 - 90 mg/dL Final    Bilirubinometry Index 2024 (A)  0.0 - 1.2 mg/dl In process       Physical Exam  GENERAL APPEARANCE: Well developed, well nourished, well hydrated and in no acute distress.   HEENT: Normal cephalic, some caput and molding. Inspection and palpation of the fontanelles and sutures: Normal for age. Conjunctiva clear and lids normal. Pupils equal, round, reactive to light. Extraocular muscles normal. Normal red reflex bilaterally. No nasal discharge. External ears without deformities. TM's grey, normal landmarks, no fluid, non-retracted. External auditory canals without swelling, redness or tenderness. Pharyngeal mucosa normal. No erythema, exudate, or lesions. Mucous membranes moist. Good suck reflex  NECK: Full range of motion. No significant adenopathy.   HEART: Regular rate and rhythm. S1 and S2 heard with no significant murmur, evaluated supine  LUNGS: No grunting, flaring or retractions. Lungs CTAB with no rales or wheezing. Good air exchange.   ABDOMEN: Soft,  non-tender, no masses. No hepatomegaly or splenomegaly.   SKIN: No significant rash or lesions outside of erythema toxicum scattered on abdomen and extremities  NEUROLOGIC EXAM: Age-appropriate and face symmetric.   MUSCULOSKELETAL: No joint swelling, erythema, or warmth. No decrease in range of motion. No hip clicks or clunks. Skin folds symmetrical. Spine normal. Muscle strength and tone are normal.   MALE GENITOURINARY: Both testes in scrotum, no masses. Penis normal. Circumcised.   PSYCH: Normal parent/child interaction.   LYMPH NODES: No significant cervical adenopathy.     Chaperone: mother and father present during exam.    Assessment/Plan   Problem List Items Addressed This Visit    None  Visit Diagnoses         Codes    WCC (well child check),  under 8 days old    -  Primary Z00.110    Relevant Orders    Bilirubin, total (Completed)    Encounter to establish care with new doctor     Z76.89      infant of 36 completed weeks of gestation ([facility])     P07.39    Relevant Orders    Bilirubin, total (Completed)    Elevated bilirubin     R17    Relevant Orders    Bilirubin, total (Completed)            Additional Visit Plans:  - History and physical exam is reassuring, you are growing and developing well  - Immunizations are up to date  - Provided Bright Futures handout with anticipatory guidance for your review, and discussed several topics included below  - If breastfeeding, it is recommended that baby take 400 IU Vitamin D supplementation daily starting in the first few days of life. Continue until weaned to whole milk at 12 months of age  - If exclusively breastfeeding, it is also recommended that baby take 1 mg/kg/day of a liquid iron supplement starting at age 4 months until iron-containing solid foods are introduced at about six months of age  -   or low birth weight infants will need multivitamin drops and iron supplementation by 2 to 6 weeks of age (2 weeks in very  "low birth weight infants) before solid foods are introduced - planning to transition to formula - supplement is not needed.   - All caregivers should be up to date on their Tdap vaccination (received at least once every 10 years for adults)  - A rectal temperature of 100.4F/38C is considered a fever for an infant. Use of a rectal digital thermometer is preferred. Do not take the baby's temperature by mouth until he/she is 4 years old  - Spend of lots of time doing \"tummy time\" to help your little one grow strong    PLAN TO CHECK A BILIRUBIN LEVEL ON SUNDAY MORNING FOR MONITORING.   _ __ __ __ __ __ __ __ __ __  FIRST FEW WEEKS AT HOME:  - The first week home is a time of transitions. It is normal for you to feel uncertain, overwhelmed, and very tired at times. As you and your baby get to know each other, it gets much better!  - Making sure to rest and sleep when the baby sleeps is one way to help you maintain your sense of well-being. Another is to let your partner and other family members do things for you and participate in the care of the baby by holding, bathing, changing, dressing, and calming him/her  - Whenever you can, sing and talk to your baby. Begin to communicate interactively and see how your baby responds more and more each week  - Set a time each day to sit together and read. Your baby wont understand the story, but will love hearing the sound of your voice, and the physical closeness of sitting together will enhance your bonding.  - Never shake your baby to try to get him/her to calm down or stop crying. If you feel you may lose control, put the baby in a safe place, like a crib or a cradle. Your crying baby will be OK while you regain your calm  - When babies cry, its because they need us. They may miss the warmth and gentle swaying of your body and the sounds of your heartbeat and voice, may be hungry or wet, or too cold or too warm. The baby is adjusting to his/her new environment and needs your " help to become comfortable  - To protect your child from tap water scalds, the hottest temperature at the [facility] should be no higher than 120F. In many cases, you can adjust your water heater. Before bathing the baby, always testthe water temperature with your wrist to make sure it is not too hot  _ __ __ __ __ __ __ __ __ __  FEEDING:  - A baby's usual signs of hunger include putting his/her hand to his/her mouth, sucking, rooting, facial grimaces, and fussing. Crying is a late sign of hunger  - Feed your baby when he/she shows signs of hunger, usually every 1 to 3 hours in the daytime, and every 3 hours at night, with one longer 4-5 hour stretch between feedings, for a total of 8 to 12 times in 24 hours. Babies should not be overfed.  - Do not offer your baby food other than breast milk or formula until they are developmentally ready, which is at about 6 months old  - Healthy babies do NOT require extra water. Breast milk and formula (when properly prepared) are adequate to meet your baby's fluid needs. Juice is NOT recommended  - Your baby is getting enough milk if he/she has 6 to 8 wet cloth diapers (5 or 6 disposable diapers) and a variable number of stools per day (sometimes 3-4) and is gaining weight appropriately  -  newborns usually have loose, frequent stools. After several weeks, the number of bowel movements may decrease.  babies who are 4 weeks and older may have stools as infrequently as every 3 days or more  - Because newborns feed so frequently, you should avoid alcohol in the first several months of your baby's life. Alcohol easily passes into breast milk and can remain in breast milk for 2 to 3 hours  - Do not put baby to bed with bottle as this increases the risk of choking and developing early tooth decay or ear infections  - Burp your baby at natural breaks, such as midway through or after a feeding. Gently rub or pat their back while holding them against your shoulder and  chest or supporting them in a sitting position on your lap  - If you are breastfeeding, continue to take a daily prenatal vitamin or a multivitamin, in addition to eating a nutritious diet  - Avoid using any bottles or infant/lactational supplements, unless medically indicated, until breastfeeding is well established. For most infants, this occurs around 4 to 6 weeks of age  - Vitamin D supplementation (400 IU per day) is recommended for  infants beginning at hospital discharge  - If you wish to introduce a bottle to your breastfeeding baby, pick a time when he/she is not overly hungry or full. Have someone other than you offer the bottle. Allow the baby to explore the bottles nipple and take it in their mouth. [place] with different bottle nipples and flow rates. Once you find a nipple that works well for your baby, it is important to stay with that type so that he/she can get used to a consistent flow of milk. Over time, as their suck becomes stronger, they may need a nipple with a slower flow rate  - Formula feeding: Infants will take an average of 24 to 27 oz of formula daily, with a range from 20 to 31 oz per day. You will need to prepare and offer more infant formula as your babys appetite increases and she goes through growth spurts. Never heat a bottle in a microwave. If you wish to warm a bottle, a hot water bath is recommended  _ __ __ __ __ __ __ __ __ __  CAR SEAT  - A rear-facing car safety seat should always be used to transport your baby in all vehicles, including taxis and cars owned by friends or other family members  - The back seat is the safest place for children to ride. Babies are best protected in the event of a crash when they are in the back seat and in a rear-facing car safety seat  _ __ __ __ __ __ __ __ __ __  SLEEP  - At this age, newborns usually lack a day and night schedule. They may or may not sleep for a longer stretch during the day  - The room temperature should be  comfortable and the baby should be kept from getting too warm or too cold while sleeping  -  sleeping patterns may be different from one baby to another. Usually, in the first few weeks, the babys pattern may vary from day to day, but most babies will sleep 16 to 20 hours out of 24. They need to be fed on cue about every 1 to 2 hours, and have one period where they may sleep for up to 3 hours  - Always put your baby down to sleep on his/her back, not on their tummy or side as this puts them at risk of Sudden Infant Death Syndrome  - Experts recommend that your baby sleep in your room in his/her own crib and not in your bed. If you breastfeed or bottle-feed your baby in your bed, return them to their own crib or bassinet when you both are ready to go back to sleep  - Do not use loose, soft bedding, such as blankets, comforters, sheepskins, quilts, pillows, pillow-like bumper pads, or soft toys in the baby's crib because they can suffocate the baby  - Your baby should sleep in a crib or bassinet, not in a swing, bouncer, or car safety seat. Sleeping in a semi-reclined position can contribute to decreased oxygen levels in a . If your baby falls asleep in one of these places, move them to a crib or bassinet as soon as possible  _ __ __ __ __ __ __ __ __ __  SMOKING:  - It is important to keep your car, home, and other places the baby stays free of tobacco smoke and vapor from e-cigarettes. Exposure to tobacco smoke can increase your babys risk of sudden infant death, as well as asthma, ear infections, and respiratory infections. 800QUIT-NOW (837-048-8732); [employer] 427-806-9233 is a national telephone helpline that is routed to local resources. Additional resources are available at www.cdc.gov. Specific information for women is available at http://women.smokefree.gov.    Next Wellness Exam Due  At 2 weeks of age for weight check    Patient Care Team:  Juvenal Toledo DO as PCP - General (Family  Medicine)    Juvenal Toledo DO   05/21/24   1:27 PM

## 2024-02-14 NOTE — PROGRESS NOTES
Potassium   Date Value Ref Range Status   02/14/2024 4.5 3.4 - 5.3 mmol/L Final   02/11/2021 3.7 3.4 - 5.3 mmol/L Final     Potassium Whole Blood   Date Value Ref Range Status   01/03/2024 7.1 (HH) 3.4 - 5.3 mmol/L Final     Hemoglobin   Date Value Ref Range Status   02/14/2024 8.8 (L) 11.7 - 15.7 g/dL Final   02/11/2021 9.1 (L) 11.7 - 15.7 g/dL Final     Creatinine   Date Value Ref Range Status   02/14/2024 2.24 (H) 0.51 - 0.95 mg/dL Final   02/11/2021 0.58 0.50 - 1.00 mg/dL Final     Urea Nitrogen   Date Value Ref Range Status   02/14/2024 20.5 (H) 6.0 - 20.0 mg/dL Final   06/13/2022 16 7 - 30 mg/dL Final   02/11/2021 10 7 - 30 mg/dL Final     Sodium   Date Value Ref Range Status   02/14/2024 145 135 - 145 mmol/L Final     Comment:     Reference intervals for this test were updated on 09/26/2023 to more accurately reflect our healthy population. There may be differences in the flagging of prior results with similar values performed with this method. Interpretation of those prior results can be made in the context of the updated reference intervals.    02/11/2021 139 133 - 144 mmol/L Final     INR   Date Value Ref Range Status   01/04/2024 1.18 (H) 0.85 - 1.15 Final   02/10/2021 1.08 0.86 - 1.14 Final       DIALYSIS PROCEDURE NOTE  Hepatitis status of previous patient on machine log was checked and verified ok to use with this patients hepatitis status.  Patient dialyzed for 3 hrs. on a K3 bath with a net fluid removal of 2L. A BFR of 400 ml/min was obtained via a Right internal jugular CVC catheter .      The treatment plan was discussed with Dr. ANKITA Freire during the treatment.    Total heparin received during the treatment: 1500 units.     Line flushed, clamped and capped with heparin 1:1000 1.6 mL (1600 units) per lumen    Meds  given: 4,000 units epoetin    Complications: None      Person educated: patient. Knowledge base substantial. Barriers to learning: None. Educated on procedure, fluid management and  medication via oral mode. Patient verbalized understanding. Pt prefers verbal education style.     ICEBOAT? Timeout performed pre-treatment  I: Patient was identified using 2 identifiers  C:  Consent Signed Yes  E: Equipment preventative maintenance is current and dialysis delivery system OK to use  B: Hepatitis B Surface Antigen: Negative; Draw Date: 01/26/2024 per [facility] records      Hepatitis B Surface Antibody: Susceptible; Draw Date: 01/26/2024 per [facility] records  O: Dialysis orders present and complete prior to treatment  A: Vascular access verified and assessed prior to treatment  T: Treatment was performed at a clinically appropriate time  ?: Patient was allowed to ask questions and address concerns prior to treatment  See Adult Hemodialysis flowsheet in [employer] for further details and post assessment.  Machine water alarm in place and functioning. Transducer pods intact and checked every 15min.   Pt returned via cart transport.  Chlorine/Chloramine water system checked every 4 hours.  Outpatient Dialysis at [facility] on MWF    Patient repositioned every 2 hours during the treatment.  Post treatment report given to DOROTHY Dunbar RN, regarding 2L of fluid removed, last BP of 174/136, and patient pain rating of 0/10.

## 2024-02-14 NOTE — ED NOTES
Dr. Philippe and Nathan notified pt's lactic = 4.0, no new orders at this time as pt is on her way to dialysis    Xenograft Text: The defect edges were debeveled with a #15 scalpel blade.  Given the location of the defect, shape of the defect and the proximity to free margins a xenograft was deemed most appropriate.  The graft was then trimmed to fit the size of the defect.  The graft was then placed in the primary defect and oriented appropriately.

## 2024-02-14 NOTE — ED NOTES
Difficult for pulse ox to get a reading on pt, states low perfusion. Prior to ambulating pt TZ=135, U7=585. During ambulation pt was steady on feet, denied any dizziness symptoms; HD=395, O2= 88-94%, was not getting a solid wave form. Pt placed back in bed and quickly returned to 100% O2.

## 2024-02-14 NOTE — CONSULTS
RENAL CONSULTATION NOTE      REFERRING MD:  Jose Martin    REASON FOR CONSULTATION:  Acute Kidney Injury/dialysis dependent         A/P:     1.  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  She has been coming in under her target weight.    Her most recent serum creatinine dated 02/12/2 for noted at 1.85 mg/dL  This represents a predialysis creatinine on Monday.    2.  SLE   -extremely complicated course   -see [facility] consult 01/02/24 (Dr. Monge) for details  -current treatment prednisone only   3.  HTN   -poor control/multiple meds  4.  Cardiorenal syndrome   -concern for ? Pulmonary edema on CXR  5.  Influenza B +      Dialysis today  UF as tolerated    Determine current prednisone dose  BP control  Repeat ECHO ?   Document UA and PCR        HPI:     23 year old female with an extremely complex medical history.  Presented to ED through  Cardiology clinic for  tachycardic in the 120s and tachypneic in the 30s at rest.      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  She had several recent hospitalizations over the past few months, beginning with a hospitalization in 12/2023 at [facility] for NSTEMI and lupus crisis.  She was then transferred to [facility] for management of lupus nephritis but then readmitted in late December 2023 at [facility] for COVID complications, and most recently had a lengthy stay from 1/2/2024 through 1/24/2024 due to decompensated heart failure and cardiorenal syndrome ultimately resulting in initiation of dialysis.    Subsequent to discharge from the [facility] she has been dialyzing at [facility].    Current schedule is Monday Wednesday Friday.  She has been compliant with her runs.    Her predialysis creatinines have slowly been decreasing.  The most recent as I noted above.    She is several kilograms below her previously established target weight as well.    Current dialysis access is a right IJ tunneled line    Presented to cardiology clinic today with tachycardia and tachypnea.  Directed to the ED for evaluation     On presentation hypertensive at 176/137  Heart rate 123  Respiratory rate mid 20s  Chest x-ray with concern for potential pulmonary edema versus atypical infiltrate  Influenza B positive    Admitted for ongoing care    Previous care has been received primarily at the [facility].  2 previous hospitalizations most recently 01/02/24 through 01/2/24    She is awake alert appropriate  Quiet  Appears modestly uncomfortable  No chest pain  She does make a significant amount of urine by report.    She is currently living with her mother.  I believe they are living in a hotel in [place].  She has a sister who lives [place]  She is planning to relocate there with potential dialysis in [place] by report.        ROS:  A complete 10 point review of systems was performed and is negative except as noted above.    PMH:    Past  Medical History:   Diagnosis Date    Hepatitis     History of blood transfusion June 21st, 2023    i had a flare up that made the doctors do a blood transfusion because i was low on blood.    Hypertension     Lupus (systemic lupus erythematosus) (H)     Lupus cerebritis (H)     Pancreatitis 08/2017    Pyelonephritis 08/2017    Seizures (H)     Status epilepticus (H)        PSH:    Past Surgical History:   Procedure Laterality Date    BIOPSY      i dont know the exact date but i had a kidney biopsy    IR CVC TUNNEL PLACEMENT > 5 YRS OF AGE  01/10/2024    IR RENAL ANGIOGRAM LEFT  08/11/2023    IR RENAL BIOPSY LEFT  06/28/2022    NO HISTORY OF SURGERY      ORTHOPEDIC SURGERY         MEDICATIONS:    Current Outpatient Medications   Medication Sig Dispense Refill    acetaminophen (TYLENOL) 325 MG tablet Take 2 tablets (650 mg) by mouth every 4 hours as needed for mild pain      bumetanide (BUMEX) 2 MG tablet Take 1 tablet (2 mg) by mouth 2 times daily for 90 days 180 tablet 0    calcium carbonate 500 mg, elemental, (OSCAL) 500 MG tablet Take 1 tablet (500 mg) by mouth 3 times daily (with meals) for 90 days 270 tablet 0    carvedilol (COREG) 25 MG tablet Take 1 tablet (25 mg) by mouth 2 times daily (with meals) for 90 days 180 tablet 0    folic acid (FOLVITE) 1 MG tablet Take 1 tablet (1 mg) by mouth daily for 90 days 90 tablet 0    multivitamin RENAL (RENAVITE RX/NEPHROVITE) 1 tablet tablet Take 1 tablet by mouth daily for 90 days 90 tablet 0    NIFEdipine ER (ADALAT CC) 60 MG 24 hr tablet Take 1 tablet (60 mg) by mouth daily for 90 days 90 tablet 0    ondansetron (ZOFRAN ODT) 4 MG ODT tab Take 1 tablet (4 mg) by mouth every 8 hours as needed for nausea 20 tablet 3    pantoprazole (PROTONIX) 40 MG EC tablet Take 1 tablet (40 mg) by mouth every morning (before breakfast) for 90 days 90 tablet 0    predniSONE (DELTASONE) 20 MG tablet Take 2 tablets (40 mg) by mouth daily for 7 days, THEN 1.5 tablets (30 mg) daily for 7  days, THEN 1 tablet (20 mg) daily for 7 days, THEN 0.5 tablets (10 mg) daily for 7 days. (Patient taking differently: 50 mg by mouth daily) 35 tablet 0    sulfamethoxazole-trimethoprim (BACTRIM) 400-80 MG tablet Take 1 tablet by mouth three times a week (Tuesday, Thursday, Saturday) 30 tablet 3    vitamin B-12 (CYANOCOBALAMIN) 100 MCG tablet Take 1 tablet (100 mcg) by mouth daily for 90 days 90 tablet 0    Vitamin D, Cholecalciferol, 10 MCG (400 UNIT) TABS Take 10 mcg by mouth daily for 90 days 90 tablet 3       ALLERGIES:    Allergies as of 02/14/2024 - Reviewed 02/14/2024   Allergen Reaction Noted    Rituximab Anaphylaxis and Shortness Of Breath 11/20/2019       FH:    Family History   Problem Relation Age of Onset    Other - See Comments Father         Question of lupus in father and paternal grandfather    Lupus Sister         older sister    Genetic Disorder Sister         tolu also has lupus but she has no insurance to get checked out    Hypertension Other         he has cancer    Gastrointestinal Disease No family hx of         No known history of IBD, hepatitis, pancreatitis, celiac disease, or GI cancers before age 50    Glaucoma No family hx of     Macular Degeneration No family hx of        SH:    Social History     Socioeconomic History    Marital status: Single     Spouse name: Not on file    Number of children: Not on file    Years of education: Not on file    Highest education level: Not on file   Occupational History    Not on file   Tobacco Use    Smoking status: Never     Passive exposure: Never    Smokeless tobacco: Former     Quit date: 11/24/2022    Tobacco comments:     Former ecig   Vaping Use    Vaping Use: Never used   Substance and Sexual Activity    Alcohol use: Never    Drug use: Never    Sexual activity: Not Currently   Other Topics Concern    Parent/sibling w/ CABG, MI or angioplasty before 65F 55M? Yes     Comment: my dad   Social History Narrative    6/20/2013     Milly is the 2nd  youngest of 7 children.  She is in the 10th grade and lives with her parents and siblings.  Her family is originally from [place], but Milly was born in the [place].        06/2022: lives in [place] with older sister, brother-in-law, niece, and brother.         Social Determinants of Health     Financial Resource Strain: Low Risk  (12/26/2023)    Financial Resource Strain     Within the past 12 months, have you or your family members you live with been unable to get utilities (heat, electricity) when it was really needed?: No   Food Insecurity: Low Risk  (12/26/2023)    Food Insecurity     Within the past 12 months, did you worry that your food would run out before you got money to buy more?: No     Within the past 12 months, did the food you bought just not last and you didn t have money to get more?: No   Transportation Needs: High Risk (12/26/2023)    Transportation Needs     Within the past 12 months, has lack of transportation kept you from medical appointments, getting your medicines, non-medical meetings or appointments, work, or from getting things that you need?: Yes   Physical Activity: Not on file   Stress: Not on file   Social Connections: Not on file   Interpersonal Safety: Low Risk  (2/7/2024)    Interpersonal Safety     Do you feel physically and emotionally safe where you currently live?: Yes     Within the past 12 months, have you been hit, slapped, kicked or otherwise physically hurt by someone?: No     Within the past 12 months, have you been humiliated or emotionally abused in other ways by your partner or ex-partner?: No   Housing Stability: Low Risk  (12/26/2023)    Housing Stability     Do you have housing? : Yes     Are you worried about losing your housing?: No       PHYSICAL EXAM:      Vitals were reviewed  Patient Vitals for the past 8 hrs:   BP Temp Temp src Pulse Resp SpO2 Height Weight   02/14/24 1430 -- -- -- (!) 126 18 100 % -- --   02/14/24 1400 (!) 175/139 -- -- (!) 131 18 97 % --  "--   02/14/24 1330 (!) 173/132 -- -- (!) 124 19 99 % -- --   02/14/24 1300 (!) 178/138 -- -- (!) 122 24 98 % -- --   02/14/24 1230 (!) 172/140 -- -- (!) 128 16 96 % -- --   02/14/24 1215 -- -- -- 118 24 98 % -- --   02/14/24 1200 -- -- -- (!) 128 14 94 % -- --   02/14/24 1145 -- -- -- (!) 130 22 100 % -- --   02/14/24 1130 -- -- -- (!) 124 -- 100 % -- --   02/14/24 1126 (!) 176/137 99.1  F (37.3  C) Oral (!) 123 20 97 % 1.575 m (5' 2\") 49 kg (108 lb)     Wt Readings from Last 4 Encounters:   02/14/24 49 kg (108 lb)   02/14/24 49 kg (108 lb 1.6 oz)   02/07/24 51.7 kg (114 lb)   01/24/24 54.6 kg (120 lb 4.8 oz)     No intake/output data recorded.      Vitals:    02/14/24 1126   Weight: 49 kg (108 lb)         GENERAL: awake, alert, follows  HEENT: NC/AT, PERRLA, EOMI, non icteric, pharynx moist without lesion  RESP:  clear anteriorly  CV: RRR, normal S1 S2  ABDOMEN: soft, nontender, no HSM or masses and bowel sounds normal  MS: no clubbing, cyanosis   SKIN: clear without significant rashes or lesions  NEURO: speech normal and cranial nerves 2-12 intact  PSYCH: affect flat  EXT: trace edema       LABS:        Recent Labs   Lab 02/14/24  1146      POTASSIUM 4.5   CHLORIDE 106   CO2 23   ANIONGAP 16*   GLC 72   BUN 20.5*   CR 2.24*   GFRESTIMATED 31*   BISI 8.9     Recent Labs   Lab 02/14/24  1146   CR 2.24*     Recent Labs   Lab 02/14/24  1146   POTASSIUM 4.5     Recent Labs   Lab 02/14/24  1146   ALBUMIN 3.4*     Recent Labs   Lab 02/14/24  1146   PHOS 5.9*   HGB 8.8*       DIAGNOSTICS:  Reviewed      LUDY Ingram    [facility]  [phone]   "

## 2024-02-14 NOTE — H&P
[facility]    History and Physical - Hospitalist Service       Date of Admission:  2/14/2024    Assessment & Plan      Milly Carroll is a 23 year old female admitted on 2/14/2024.   Milly Carroll is a 23 year old female with complex past medical history of lupus with lupus flareups complicated with lupus cerebritis lupus nephritis, hypertension, chronic systolic heart failure, CHRISTA on CKD, now on hemodialysis since January 2024, history of perinephric hematoma s/p embolization in August 2023, history of pneumonia, C. difficile colitis who was sent from cardiology clinic for further evaluation of shortness of breath and tachycardia.    # Shortness of breath, multifactorial  # Suspected acute on chronic systolic heart failure exacerbation  *she has a history of chronic heart failure with a reduced EF ((LVEF down to 20-25% in early August 2023, recovered to around 40% in late August 2023, back down to 15-20% in December 2023, then up to 35-40% on 1/7/2024).    *Echo 1/7/2024  Left ventricular function is decreased. The ejection fraction is 35-40% (moderately reduced).  The right ventricle is normal size.  Global right ventricular function is mildly reduced.  Moderate mitral insufficiency is present (functional ).  Moderate tricuspid insufficiency is present (functional).  Pulmonary hypertension is present    -Her severe cardiomyopathy felt to be nonischemic-Takotsubo/stress-induced versus hypertensive cardiomyopathy.  -She is also on hemodialysis, recently initiated in January 2024  -Did not miss dialysis, last dialysis was on Monday  -Reports that she started feeling short of breath last night  -Still making good urine, PTA on Bumex 2 mg p.o. twice daily; reports being compliant with her medications  -She went to cardiology clinic today and she was advised to come to ER for further evaluation given her shortness of breath and tachycardia  -proBNP in the ER elevated more than 70,000  -Chest  x-ray- Stable appearance of right-sided central venous line with tip overlying the right atrium. Bilateral retrocardiac opacities are present suggestive of pulmonary edema versus atypical pneumonia.  -EKG- sinus tachycardia at 125bpm  -Lactic acid elevated at 4 initially, improved to 2.1 after hemodialysis  -Went from ER to hemodialysis  -ER attending discussed with cardiology on-call  -Admitted to inpatient to [facility]  -Monitor on telemetry  -Resume PTA Bumex 2 mg p.o. twice daily  -Resume PTA Coreg 25 mg p.o. twice daily and nifedipine 60 mg p.o. daily  -Strict input and output, daily weights  -Fluid restriction 1800 cc/day  -Needs to make her blood pressure control  -I suspect that her fluid management needs to be corrected with hemodialysis  -Echocardiogram in a.m.  -Cardiology consult    # Influenza B  -She is tested positive for influenza B, tested negative for COVID 19, influenza A and RSV (of note she had a recent COVID-19 infection)  -Chest x-ray with evidence of pulmonary edema versus atypical pneumonia  -Denies fever, no coughing; no wheezing  -Had elevated white blood cells of 12.2 on admission  -Given Tamiflu 30 mg p.o. in ER  -Continue with Tamiflu 30 mg p.o. twice daily, given end-stage renal disease  -Droplet precautions  -Supportive management    # Elevated lactic acid  # Rule out sepsis  -Lactic acid initially 4 in ER  -White blood cells 12.2  -Chest x-ray as above  -UA pending  -Given elevated lactic acid and complex underlying medical history with multiple recent hospitalizations, 1 dose of Zosyn given in ER  -No IV fluids given because of concern of CHF  -Lactic acid improved to 2.1 after hemodialysis  -Continue with Zosyn empirically for now  -check procalcitonin  -monitor fever curve and WBCs trend  -Follow-up cultures  -consider discontinue Zosyn if no clear source of infection given recent Cdiff infection in Jan 2024.     # End-stage renal disease, on hemodialysis  -Has history of lupus  nephritis, with recent CHRISTA and recent initiation of hemodialysis in January 2024  -Hemodialysis dmuckajz-Bamkct-Rwsldpjyd-Friday  -Reports being compliant with hemodialysis  -Last hemodialysis on Monday, had hemodialysis today after admission  -Nephrology consult    # SLE (+dsDNA, +RNP, +ribosomal P Ab and hypocomplementemia)   #Recent Lupus Flare w/ MAHA and renal insuff s/p Cyclophosphamide dose (12/22/23)  # Hx of Lupus cerebritis w/ hx of seizures  # Hx Lupus Nephritis  # Hx medication non-adherence  -Follows with rheumatology at [facility]  -Currently on tapering dose of prednisone; she was initially started with 60 mg p.o. daily and was supposed to taper down but she states that she started her steroid course later then prescribed and currently she is on 50 mg p.o. daily-continue for now  - continue PTA Bactrim for PJP ppx while on steroids  -Follow-up with rheumatology at [facility]    # Uncontrolled hypertension  -Has history of uncontrolled hypertension  -Currently on Coreg 25 mg p.o. twice daily, nifedipine 60 mg p.o. daily and Bumex 2 mg p.o. twice daily  -BP elevated in ER  -Resumed PTA regimen  -Added hydralazine IV as needed and labetalol IV as needed for SBP more than 160  -Needs better control of blood pressure    # Tachycardia, improved  -She was tachycardic in ER with heart rate in 120s, unclear etiology  -States she is compliant with her medications including Coreg  -Possible related to CHF exacerbation, influenza B infection  -It seems that her heart rates are improved in the evening, currently heart rate in 80s  -Monitor on telemetry    # Hx Lupus Cerebritis   # Hx remote generalized tonic-clonic seizures seizures  # Chronic Right foot drop, 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 No current PTA antiepileptic medications    # h/o recurrent C diff colitis  - h/o C diff in 6/2023, and again recently 1/2024  - was on po Vanco initially, switched to po  Fidaximicin as per ID- finished course  - states diarrhea resolved  - start po Vanco 125 mg po BID for now while she is on Zosyn, consider discontinue Zosyn if no source of bacterial infection found.        Diet:  Renal diet  DVT Prophylaxis: Pneumatic Compression Devices  Leung Catheter: Not present  Lines: None     Cardiac Monitoring: None  Code Status:  Full code    Clinically Significant Risk Factors Present on Admission              # Hypoalbuminemia: Lowest albumin = 3.4 g/dL at 2/14/2024 11:46 AM, will monitor as appropriate      # Chronic heart failure with reduced ejection fraction: last echo with EF <40%         # Financial/Environmental Concerns:           Disposition Plan      Expected Discharge Date: 02/16/2024                  Viola Evans MD  Hospitalist Service  [facility]  Securely message with [employer] (more info)  Text page via [employer] Paging/Directory     ______________________________________________________________________    Chief Complaint   Shortness of breath    History is obtained partially from the patient; discussed with ER attending Dr. Philippe and reviewed her electronic medical records.    History of Present Illness   Milly Carroll is a 23 year old female with complex past medical history of lupus with lupus flareups complicated with lupus cerebritis lupus nephritis, hypertension, chronic systolic heart failure, CHRISTA on CKD, now on hemodialysis since January 2024, history of perinephric hematoma s/p embolization in August 2023, history of pneumonia, C. difficile colitis who was sent from cardiology clinic for further evaluation of shortness of breath and tachycardia.    She has history of SLE complicated by lupus cerebritis and lupus nephritis followed by nephrology and rheumatology at [facility]. She was admitted at the [facility] in August 2023 for lupus flareup.  She was seen by nephrology and rheumatology; she received  Cytoxan.  She was admitted to [facility] in December after she presented with shortness of breath/pneumonia/NSTEMI and concern for lupus flare; she was treated with the steroids and plasma exchange and that was transferred to [facility] for cyclophosphamide infusion (12/22-12/25/2023).  She was started on prednisone 60 mg p.o. daily with plan to taper off as per nephrology.  She apparently presented to the [facility] ER again on 12/26 and found to have COVID and a reduced EF (baseline EF 47%, OSH EF 15 to 20%) with apparent plan to transfer her to [facility]; it seems that the patient left [place] on 12/30 due to prolonged waiting time to be transferred.   Then she had a prolonged hospitalization from 1/2/2024 to 1/24/2024 at [facility] for progressive CHRISTA on CKD and that she was started on hemodialysis.  She was recommended to continue with her tapering dose of prednisone (of note it seems that she started taking prednisone later on then prescribed and currently she is on 50 mg p.o. daily).  During her recent admission she also had recurrent C. difficile, started initially on p.o. Vanco and switched to Fidaximicin as per ID.    The patient has a history of chronic heart failure with a reduced EF ((LVEF down to 20-25% in early August 2023, recovered to around 40% in late August 2023, back down to 15-20% in December 2023, then up to 35-40% on 1/7/2024).  Her severe cardiomyopathy felt to be nonischemic-Takotsubo/stress-induced versus hypertensive cardiomyopathy.    She reports that she was feeling well until last evening when she started feeling short of breath.  She reports that she did not sleep well last night because of shortness of breath.  She reports orthopnea.  She denies fever, no chills, denies coughing.  She denies any chest pain, no nausea, no vomiting, no abdominal pain.  She still makes urine.  She reports that her diarrhea resolved.  She denies leg swellings.    She went to see Dr. Salmeron from cardiology  "earlier today.  She was noted to be tachycardic.  Because of shortness of breath and tachycardia she was recommended to come to ER for further evaluation.  She reports being compliant with her medications, took her medication in the morning.  She reports to being compliant with hemodialysis, last hemodialysis session was on Monday and due today.    In ER she seen by Dr. Philippe.  Vital signs:  Temp: 99.1  F (37.3  C) Temp src: Oral BP: (!) 175/134 Pulse: (!) 128   Resp: 18 SpO2: 100 % O2 Device: None (Room air)   Height: 157.5 cm (5' 2\") Weight: 49 kg (108 lb)  Estimated body mass index is 19.75 kg/m  as calculated from the following:    Height as of this encounter: 1.575 m (5' 2\").    Weight as of this encounter: 49 kg (108 lb).    CBC RESULTS:   Recent Labs   Lab Test 02/14/24  1146   WBC 12.2*   RBC 3.02*   HGB 8.8*   HCT 29.0*   MCV 96   MCH 29.1   MCHC 30.3*   RDW 16.6*         Last Comprehensive Metabolic Panel:  Lab Results   Component Value Date     02/14/2024    POTASSIUM 4.5 02/14/2024    CHLORIDE 106 02/14/2024    CO2 23 02/14/2024    ANIONGAP 16 (H) 02/14/2024    GLC 72 02/14/2024    BUN 20.5 (H) 02/14/2024    CR 2.24 (H) 02/14/2024    GFRESTIMATED 31 (L) 02/14/2024    BISI 8.9 02/14/2024      Liver Function Studies -   Recent Labs   Lab Test 02/14/24  1146   PROTTOTAL 7.2   ALBUMIN 3.4*   BILITOTAL 0.6   ALKPHOS 178*   AST 71*   ALT 12      proBNP elevated more than 70,000.  Lactic acid 4     She tested negative for COVID-19 infection and RSV.  Tested positive for influenza B.    CXR- Stable appearance of right-sided central venous line with tip overlying the right atrium. Bilateral retrocardiac opacities arepresent suggestive of pulmonary edema versus atypical pneumonia. Stable, enlarged cardiomediastinal silhouette.    EKG shows sinus tachycardia at a rate of 125 bpm with some nonspecific ST-T changes.    ER attending discussed with nephrology.  Patient is going to have hemodialysis " soon.  ER attending also discussed with the cardiology on-call-recommended admission with a repeat echocardiogram  1 dose of Zosyn given in ER given elevated lactic acid. UA is pending at this time.      Past Medical History    Past Medical History:   Diagnosis Date    Hepatitis     History of blood transfusion June 21st, 2023    i had a flare up that made the doctors do a blood transfusion because i was low on blood.    Hypertension     Lupus (systemic lupus erythematosus) (H)     Lupus cerebritis (H)     Pancreatitis 08/2017    Pyelonephritis 08/2017    Seizures (H)     Status epilepticus (H)        Past Surgical History   Past Surgical History:   Procedure Laterality Date    BIOPSY      i dont know the exact date but i had a kidney biopsy    IR CVC TUNNEL PLACEMENT > 5 YRS OF AGE  01/10/2024    IR RENAL ANGIOGRAM LEFT  08/11/2023    IR RENAL BIOPSY LEFT  06/28/2022    NO HISTORY OF SURGERY      ORTHOPEDIC SURGERY         Prior to Admission Medications   Prior to Admission Medications   Prescriptions Last Dose Informant Patient Reported? Taking?   NIFEdipine ER (ADALAT CC) 60 MG 24 hr tablet 2/14/2024  No Yes   Sig: Take 1 tablet (60 mg) by mouth daily for 90 days   Vitamin D, Cholecalciferol, 10 MCG (400 UNIT) TABS 2/14/2024  No Yes   Sig: Take 10 mcg by mouth daily for 90 days   acetaminophen (TYLENOL) 325 MG tablet  Other, Self No Yes   Sig: Take 2 tablets (650 mg) by mouth every 4 hours as needed for mild pain   bumetanide (BUMEX) 2 MG tablet 2/14/2024  No Yes   Sig: Take 1 tablet (2 mg) by mouth 2 times daily for 90 days   calcium carbonate 500 mg, elemental, (OSCAL) 500 MG tablet 2/14/2024  No Yes   Sig: Take 1 tablet (500 mg) by mouth 3 times daily (with meals) for 90 days   carvedilol (COREG) 25 MG tablet 2/14/2024  No Yes   Sig: Take 1 tablet (25 mg) by mouth 2 times daily (with meals) for 90 days   folic acid (FOLVITE) 1 MG tablet 2/14/2024  No Yes   Sig: Take 1 tablet (1 mg) by mouth daily for 90 days    multivitamin RENAL (RENAVITE RX/NEPHROVITE) 1 tablet tablet 2/14/2024  No Yes   Sig: Take 1 tablet by mouth daily for 90 days   ondansetron (ZOFRAN ODT) 4 MG ODT tab   No Yes   Sig: Take 1 tablet (4 mg) by mouth every 8 hours as needed for nausea   pantoprazole (PROTONIX) 40 MG EC tablet 2/14/2024  No Yes   Sig: Take 1 tablet (40 mg) by mouth every morning (before breakfast) for 90 days   predniSONE (DELTASONE) 20 MG tablet 2/14/2024  No Yes   Sig: Take 2 tablets (40 mg) by mouth daily for 7 days, THEN 1.5 tablets (30 mg) daily for 7 days, THEN 1 tablet (20 mg) daily for 7 days, THEN 0.5 tablets (10 mg) daily for 7 days.   Patient taking differently: 50 mg by mouth daily   sulfamethoxazole-trimethoprim (BACTRIM) 400-80 MG tablet 2/13/2024  No Yes   Sig: Take 1 tablet by mouth three times a week (Tuesday, Thursday, Saturday)   vitamin B-12 (CYANOCOBALAMIN) 100 MCG tablet 2/14/2024  No Yes   Sig: Take 1 tablet (100 mcg) by mouth daily for 90 days      Facility-Administered Medications: None        Review of Systems    The 10 point Review of Systems is negative other than noted in the HPI or here.     Social History   I have reviewed this patient's social history and updated it with pertinent information if needed.  Social History     Tobacco Use    Smoking status: Never     Passive exposure: Never    Smokeless tobacco: Former     Quit date: 11/24/2022    Tobacco comments:     Former ecig   Vaping Use    Vaping Use: Never used   Substance Use Topics    Alcohol use: Never    Drug use: Never         Family History   I have reviewed this patient's family history and updated it with pertinent information if needed.  Family History   Problem Relation Age of Onset    Other - See Comments Father         Question of lupus in father and paternal grandfather    Lupus Sister         older sister    Genetic Disorder Sister         tolu also has lupus but she has no insurance to get checked out    Hypertension Other         he has  cancer    Gastrointestinal Disease No family hx of         No known history of IBD, hepatitis, pancreatitis, celiac disease, or GI cancers before age 50    Glaucoma No family hx of     Macular Degeneration No family hx of          Allergies   Allergies   Allergen Reactions    Rituximab Anaphylaxis and Shortness Of Breath        Physical Exam   Vital Signs: Temp: 99.1  F (37.3  C) Temp src: Oral BP: (!) 178/138 Pulse: (!) 122   Resp: 24 SpO2: 98 % O2 Device: None (Room air)    Weight: 108 lbs 0 oz    General Appearance: Awake/alert, mildly tachypneic  Respiratory: Bilateral air entry, mild crackles at bases, no rales  Cardiovascular: S1-S2, tachycardia, no murmurs, trace edema b/l LE  GI: Abdomen soft, nontender, nondistended, bowel sounds are present  Skin: No rashes, no cyanosis  Neuro AAO x 3, no focal neurological deficits  Psych: Mood seems depressed    Medical Decision Making       80 MINUTES SPENT BY ME on the date of service doing chart review, history, exam, documentation & further activities per the note.  MANAGEMENT DISCUSSED with the following over the past 24 hours: ER attending, the patient   NOTE(S)/MEDICAL RECORDS REVIEWED over the past 24 hours: Prior multiple hospitalization notes, cardiology notes  Tests REVIEWED in the past 24 hours:  - BMP  - CBC  - Lactic Acid  - proBNP  Tests personally interpreted in the past 24 hours:  - EKG tracing showing as above  - CHEST XRAY showing as above       Data     I have personally reviewed the following data over the past 24 hrs:    12.2 (H)  \   8.8 (L)   / 202     145 106 20.5 (H) /  72   4.5 23 2.24 (H) \     ALT: 12 AST: 71 (H) AP: 178 (H) TBILI: 0.6   ALB: 3.4 (L) TOT PROTEIN: 7.2 LIPASE: N/A     Trop: N/A BNP: >70,000 (H)     Procal: N/A CRP: N/A Lactic Acid: 4.0 (HH)         Imaging results reviewed over the past 24 hrs:   Recent Results (from the past 24 hour(s))   Chest XR,  PA & LAT    Narrative    CHEST TWO VIEWS 2/14/2024 12:32 PM     HISTORY: short  of breath, tachycardic, orthopnea, dialysis patient    COMPARISON: 1/20/2024       Impression    IMPRESSION: Stable appearance of right-sided central venous line with  tip overlying the right atrium. Bilateral retrocardiac opacities are  present suggestive of pulmonary edema versus atypical pneumonia.  Stable, enlarged cardiomediastinal silhouette. No acute bony  abnormality.    LEANA SWAN MD         SYSTEM ID:  [id number]

## 2024-02-14 NOTE — ED PROVIDER NOTES
"  History     Chief Complaint:  Tachycardia and shortness of breath     The history is provided by the patient.      Milly Carroll is a 23 year old female with a history of Lupus, hepatitis, hypertension, pancreatitis, pyelonephritis, seizures, CKD stage 5 on dialysis, CHF, and C. Diff who presents to the emergency department for tachycardia and shortness of breath. The patient states that she was at her cardiologist clinic today and was sent here for tachycardia and shortness of breath.  She notes that her shortness of breath is exacerbated while laying flat. She notes that she receives dialysis Monday, Wednesday, and Friday. She did not get dialysis today but did go to dialysis on Monday which was a normal run. She adds that she has also been \"feeling feverish with a dry cough.\"  Did not take her temperature.  She is able to make urine and denies urinary symptoms such as dysuria or hematuria. Denies chest pain. Denies congestion or vomiting. Denies c/o for pregnancy. Denies rash or erythema anywhere on her body. She was recently diagnosed with C. Diff but adds that she is no longer experiencing diarrhea and is no longer on antibiotics. She notes that she has had pulmonary edema in the past but has never needs a thoracentesis.     Independent Historian:   None - Patient Only    Review of External Notes:   Chart reviewed, note not yet available from cardiology today.     Medications:   Bumetanide  Carvedilol  Nifedipine  Ondansetron  Pantoprazole  Prednisone  Bactrim     Past Medical History:    Hepatitis  Hx of blood transfusion  Hypertension  Lupus  Pancreatitis  Pyelonephritis  Seizures  Hypokalemia  Splenomegaly  CKD stage 5  CHF  Sepsis  C. Diff    Past Surgical History:    CVC tunnel placement     Physical Exam   Patient Vitals for the past 24 hrs:   BP Temp Temp src Pulse Resp SpO2 Height Weight   02/14/24 1230 (!) 172/140 -- -- (!) 128 16 96 % -- --   02/14/24 1215 -- -- -- 118 24 98 % -- --   02/14/24 1200 " "-- -- -- (!) 128 14 94 % -- --   02/14/24 1145 -- -- -- (!) 130 22 100 % -- --   02/14/24 1130 -- -- -- (!) 124 -- 100 % -- --   02/14/24 1126 (!) 176/137 99.1  F (37.3  C) Oral (!) 123 20 97 % 1.575 m (5' 2\") 49 kg (108 lb)      Physical Exam  Eyes:               Sclera white; Pupils are equal and round  ENT:                External ears and nares normal  CV:                  Rate as above with regular rhythm   Resp:               Breath sounds clear and equal bilaterally                          Non-labored, no retractions or accessory muscle use  GI:                   Abdomen is soft, non-tender, non-distended                          No rebound tenderness or peritoneal features  MS:                  Moves all extremities  Skin:                Warm and dry, bruising around dialysis access R subclavian  Neuro:             Speech is normal and fluent. No apparent deficit.    Emergency Department Course   ECG  ECG taken at 1124, ECG read at 1130  Sinus tachycardia  Left axis deviation   Increased heart rate as compared to prior, dated 01/06/24.  Rate 125 bpm. KS interval 136 ms. QRS duration 76 ms. QT/QTc 340/490 ms. P-R-T axes 57 -62 65.     Imaging:  Chest XR,  PA & LAT   Final Result   IMPRESSION: Stable appearance of right-sided central venous line with   tip overlying the right atrium. Bilateral retrocardiac opacities are   present suggestive of pulmonary edema versus atypical pneumonia.   Stable, enlarged cardiomediastinal silhouette. No acute bony   abnormality.      LEANA SWAN MD            SYSTEM ID:  [id number]         Read by radiologist.    Laboratory:  Labs Ordered and Resulted from Time of ED Arrival to Time of ED Departure   COMPREHENSIVE METABOLIC PANEL - Abnormal       Result Value    Sodium 145      Potassium 4.5      Carbon Dioxide (CO2) 23      Anion Gap 16 (*)     Urea Nitrogen 20.5 (*)     Creatinine 2.24 (*)     GFR Estimate 31 (*)     Calcium 8.9      Chloride 106      Glucose 72      " Alkaline Phosphatase 178 (*)     AST 71 (*)     ALT 12      Protein Total 7.2      Albumin 3.4 (*)     Bilirubin Total 0.6     PHOSPHORUS - Abnormal    Phosphorus 5.9 (*)    NT PROBNP INPATIENT - Abnormal    N terminal Pro BNP Inpatient >70,000 (*)    INFLUENZA A/B, RSV, & SARS-COV2 PCR - Abnormal    Influenza A PCR Negative      Influenza B PCR Positive (*)     RSV PCR Negative      SARS CoV2 PCR Negative     CBC WITH PLATELETS AND DIFFERENTIAL - Abnormal    WBC Count 12.2 (*)     RBC Count 3.02 (*)     Hemoglobin 8.8 (*)     Hematocrit 29.0 (*)     MCV 96      MCH 29.1      MCHC 30.3 (*)     RDW 16.6 (*)     Platelet Count 202      % Neutrophils 71      % Lymphocytes 21      % Monocytes 6      % Eosinophils 0      % Basophils 0      % Immature Granulocytes 2      NRBCs per 100 WBC 0      Absolute Neutrophils 8.7 (*)     Absolute Lymphocytes 2.6      Absolute Monocytes 0.7      Absolute Eosinophils 0.0      Absolute Basophils 0.1      Absolute Immature Granulocytes 0.2      Absolute NRBCs 0.0     LACTIC ACID WHOLE BLOOD - Abnormal    Lactic Acid 4.0 (*)    MAGNESIUM - Normal    Magnesium 1.8     HEPATITIS B SURFACE ANTIGEN   HEPATITIS B SURFACE ANTIBODY   CYSTATIN C WITH GFR   ROUTINE UA WITH MICROSCOPIC   PROTEIN RANDOM URINE   LACTIC ACID WHOLE BLOOD   BLOOD CULTURE   BLOOD CULTURE     Procedures   No    Emergency Department Course & Assessments:  ED Course as of 02/14/24 1650   Wed Feb 14, 2024   1125 Chart reviewed, note not yet available from cardiology today   1340 I spoke with Dr. Allison, nephrology.  Will try and coordinate dialysis today for her.   1359 I spoke with Dr Evans, hospitalist   1448 Discussed elevated lactic with hospitalist as patient is going to dialysis.  She is not hypotensive.  Remains tachycardic.  Concern for possible hypoperfusion with her poor EF 15-20%.  Cardiology consult pending.   1505 I spoke with Dr. Valdez, cardiology.  Agrees with dialysis right now and not fluid bolus.   Maintain coreg dosing.  Cover for infection if concerned.  Right heart cath if hypotensive; currently hypertensive.  Has had recent echos but would recommend repeating again after dialysis, EFs have been varying.  Cardiac MRI 35-40% but a prior echo 15-20%.       Interventions:  Medications   sodium chloride 0.9% BOLUS 100-150 mL (has no administration in time range)   piperacillin-tazobactam (ZOSYN) 3.375 g vial to attach to  mL bag (has no administration in time range)   heparin 10,000 units/10 mL infusion (DIALYSIS USE) (500 Units/hr Hemodialysis Machine $New Bag 2/14/24 1629)   oseltamivir (TAMIFLU) capsule 30 mg (30 mg Oral $Given 2/14/24 1406)   sodium chloride 0.9% BOLUS 250 mL (250 mLs Intravenous $New Bag 2/14/24 1505)   sodium chloride 0.9% BOLUS 300 mL (300 mLs Hemodialysis Machine $New Bag 2/14/24 1505)   No heparin via hemodialysis machine ( Does not apply $Given 2/14/24 1505)   epoetin divine-epbx (RETACRIT) injection 4,000 Units (4,000 Units Intravenous $Given 2/14/24 1629)   acetaminophen (TYLENOL) tablet 1,000 mg (1,000 mg Oral $Given 2/14/24 1434)        Assessments:  1113 I obtained history and examined the patient as noted above.     Independent Interpretation (X-rays, CTs, rhythm strip):  I independently interpreted the patient's chest X-ray and see evidence of large effusion or pneumothorax    Consultations/Discussion of Management or Tests:  As above     Social Determinants of Health affecting care:   No    Disposition:  Admit    Impression & Plan      Medical Decision Making:  EKG w/o ischemia, dysrhythmia, or pericarditis.  Labs were checked without derangements in her potassium value.  Walking pulse ox with poor pleth, possible drop to 88%.  Normal in bed at 100%.  White blood cell count is mildly elevated, prior values in the normal range.  This was initially attributed to influenza as she is positive for influenza B.  Tamiflu deferred until determining timing of dialysis.  However  there was some brief delays in talking with nephrology about this and therefore Tamiflu was ordered.  Will need to be redosed after dialysis.  Due to concern for volume overload, additional IV fluids were not given.  Cardiology was consulted given the persistent tachycardia and history of very low EF.  Agree with dialysis today which has been arranged.  Their concern is for your decompensating heart failure and recommended echo after dialysis.  Around the time that she was being taken for dialysis, her lactate came back at 4.0.  It is unclear at this point if this is infection related versus cardiogenic.  She is not hypotensive.  Echo will get more information on current cardiac function.  If hypotensive then cardiology thought she would benefit from a right heart cath.  Although chest x-ray findings could all be attributed to her influenza diagnosis, with her elevated lactic and persistent tachycardia antibiotics were ordered as well.  Administration after dialysis as they will be filtered out by dialysis itself.  Pulmonary embolism was considered but with her influenza and clinical concern for volume overload, this is not felt likely to be contributing as well.  Given clinical suspicion and risk to remaining renal function and ability to urinate, CT PE not felt appropriate or indicated at this moment.  Decision process was also discussed with hospitalist and cardiologist.  However if echo shows concern for new right heart strain, then could pursue further at that time.    Diagnosis:    ICD-10-CM    1. Hypoxia  R09.02     While walking      2. Influenza B  J10.1       3. Dialysis patient (H24)  Z99.2       4. Acute on chronic congestive heart failure, unspecified heart failure type (H)  I50.9          Scribe Disclosure:  Mukul QUEEN, am serving as a scribe at 11:28 AM on 2/14/2024 to document services personally performed by Heather Philippe MD based on my observations and the provider's statements to  me.     2/14/2024   Heather Philippe MD Gosen, Christine Leigh, MD  02/14/24 6305

## 2024-02-14 NOTE — ED NOTES
[facility]  ED Nurse Handoff Report    ED Chief complaint: Heart Failure      ED Diagnosis:   Final diagnoses:   Hypoxia - While walking   Influenza B   Dialysis patient (H24)   Acute on chronic congestive heart failure, unspecified heart failure type (H)       Code Status: Full Code    Allergies:   Allergies   Allergen Reactions    Rituximab Anaphylaxis and Shortness Of Breath       Patient Story: hx dialysis. Pt was at cardiology appt today and found to be tachy 120s and c/o SOB that started this am.   Focused Assessment:  AOX4. BP^, HR^. C/o SOB. Influenza B+. ^lactic , pt did not receive antibiotics in the ED as she was being sent to dialysis    Treatments and/or interventions provided: tamiflu, tylenol, going to dialysis from the ED prior to admit  Patient's response to treatments and/or interventions: resting in bed    To be done/followed up on inpatient unit:  --    Does this patient have any cognitive concerns?:  none    Activity level - Baseline/Home:  Independent  Activity Level - Current:   Independent    Patient's Preferred language: English   Needed?: No    Isolation: enteric, droplet   Infection: C-Diff (Clostridium Difficile) diagnosis  Influenza  Patient tested for COVID 19 prior to admission: yes  Bariatric?: No    Vital Signs:   Vitals:    02/14/24 1230 02/14/24 1300 02/14/24 1330 02/14/24 1400   BP: (!) 172/140 (!) 178/138 (!) 173/132 (!) 175/139   Pulse: (!) 128 (!) 122 (!) 124 (!) 131   Resp: 16 24 19 18   Temp:       TempSrc:       SpO2: 96% 98% 99% 97%   Weight:       Height:           Cardiac Rhythm:Cardiac Rhythm: Sinus tachycardia    Was the PSS-3 completed:   Yes  What interventions are required if any?               Family Comments: na  OBS brochure/video discussed/provided to patient/family: N/A              Name of person given brochure if not patient: na              Relationship to patient: na    For the majority of the shift this patient's behavior was  Green.   Behavioral interventions performed were none.    ED NURSE PHONE NUMBER: ED ZONIA, ask for Any PRESLEY

## 2024-02-14 NOTE — PROGRESS NOTES
CARDIOLOGY CLINIC CONSULTATION      REASON FOR CONSULT:   HFrEF, establish care    PRIMARY CARE PHYSICIAN:  Ashlie Skelton        History of Present Illness   Milly Carroll is an extremely pleasant 23 year old female with an extremely complex medical history, here to establish care.  Of note, she is tachycardic in the 120s and tachypneic in the 30s at rest, and ultimately sent her to the emergency department for further evaluation following our discussion today.    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  She had several recent hospitalizations over the past few months, beginning with a hospitalization in 12/2023 at [facility] for NSTEMI and lupus crisis.  She was then transferred to [facility] for management of lupus nephritis but then readmitted in late December 2023 at [facility] for COVID complications, and most recently had a lengthy stay from 1/2/2024 through 1/24/2024 due to decompensated heart failure and cardiorenal syndrome ultimately resulting in initiation of dialysis, as well as recurrent C. difficile colitis.    She tells me that she has been doing relatively well over the past few weeks until Sunday (3 days ago).  The day before she had finished her course of antibiotics, and starting on Sunday she began to notice intermittent fevers/chills (though no diarrhea to this point).  She still felt relatively well until last night, when she began to feel extremely short of breath and noticed her heart racing.  She was unable to sleep at night because she was more short of breath when lying down.  In clinic she is tachycardic to the 120s  and tachypneic to the 30s at rest and breathing with accessory muscles.  She has not missed any dialysis appointments or missed any medications per her report.    As part of today's visit, I reviewed the discharge summary and cardiology consult notes from her 1/2/2024 [facility] admission, her last several echocardiograms, her ECG in clinic today and her prior ECG before this, and her most recent lipid panel, chemistry panel, and her past few CBCs.      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 difficile      It was a pleasure to speak with Milly in clinic today.  I am sorry to hear that she is feeling very poorly at present.  Given her recent recurrence of subjective fevers following antibiotic discontinuation, in the setting of her active tachycardia and tachypnea at rest, I recommended that she proceed to the emergency department for further evaluation given her complex history and numerous recent hospitalizations.  She is very much in agreement with this as well.  Following stabilization, I would be more than happy to see her again in clinic to better address her chronic cardiac issues.  We will arrange transportation to the emergency department for her, and I have called ahead and given signout.      Sunil Salmeron MD  Interventional Cardiology  February 14, 2024        Medications   Current Outpatient Medications   Medication    acetaminophen (TYLENOL) 325 MG tablet    bumetanide (BUMEX) 2 MG tablet    calcium carbonate 500 mg, elemental, (OSCAL) 500 MG tablet    carvedilol (COREG) 25 MG tablet    folic acid (FOLVITE)  1 MG tablet    multivitamin RENAL (RENAVITE RX/NEPHROVITE) 1 tablet tablet    NIFEdipine ER (ADALAT CC) 60 MG 24 hr tablet    ondansetron (ZOFRAN ODT) 4 MG ODT tab    pantoprazole (PROTONIX) 40 MG EC tablet    predniSONE (DELTASONE) 20 MG tablet    sulfamethoxazole-trimethoprim (BACTRIM) 400-80 MG tablet    vitamin B-12 (CYANOCOBALAMIN) 100 MCG tablet    Vitamin D, Cholecalciferol, 10 MCG (400 UNIT) TABS     No current facility-administered medications for this visit.     Allergies   Allergies   Allergen Reactions    Rituximab Anaphylaxis and Shortness Of Breath         Physical Exam       BP: (!) 160/118 Pulse: 120     SpO2: 100 % (room air)      Vital Signs with Ranges  Pulse:  [120] 120  BP: (160)/(118) 160/118  SpO2:  [100 %] 100 %  108 lbs 1.6 oz    Constitutional: Not acutely in distress, but clearly uncomfortable  Respiratory: Increased respiratory effort, shallow breath sounds diffusely, + accessory muscle use  Cardiovascular: Tachycardic but regular, cardiac sounds difficult to appreciate, suspect systolic murmur at the apex.  JVP > 15 cm H2O.  There is trace bilateral LE edema.  Normal carotid upstrokes, no carotid bruits.

## 2024-02-14 NOTE — LETTER
2/14/2024    Ashlie Skelton MD  [address]  [place] [address]    RE: Milly Carroll       Dear Colleague,     I had the pleasure of seeing Milly Carroll in the [facility].  CARDIOLOGY CLINIC CONSULTATION      REASON FOR CONSULT:   HFrEF, establish care    PRIMARY CARE PHYSICIAN:  Ashlie Skelton        History of Present Illness  Milly Carroll is an extremely pleasant 23 year old female with an extremely complex medical history, here to establish care.  Of note, she is tachycardic in the 120s and tachypneic in the 30s at rest, and ultimately sent her to the emergency department for further evaluation following our discussion today.    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  She had several recent hospitalizations over the past few months, beginning with a hospitalization in 12/2023 at [facility] for NSTEMI and lupus crisis.  She was then transferred to [facility] for management of lupus nephritis but then readmitted in late December 2023 at [facility] for COVID complications, and most recently had a lengthy stay from 1/2/2024 through 1/24/2024 due to decompensated heart failure and cardiorenal syndrome ultimately resulting in initiation of dialysis, as well as recurrent C. difficile colitis.    She tells me that she has been doing relatively well over the past few weeks until Sunday (3 days ago).  The day before she had finished her course of antibiotics, and starting on Sunday she began to notice intermittent fevers/chills (though no diarrhea to this point).  She still felt relatively well until last night, when  she began to feel extremely short of breath and noticed her heart racing.  She was unable to sleep at night because she was more short of breath when lying down.  In clinic she is tachycardic to the 120s and tachypneic to the 30s at rest and breathing with accessory muscles.  She has not missed any dialysis appointments or missed any medications per her report.    As part of today's visit, I reviewed the discharge summary and cardiology consult notes from her 1/2/2024 [facility] admission, her last several echocardiograms, her ECG in clinic today and her prior ECG before this, and her most recent lipid panel, chemistry panel, and her past few CBCs.      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 difficile      It was a pleasure to speak with Milly in clinic today.  I am sorry to hear that she is feeling very poorly at present.  Given her recent recurrence of subjective fevers following antibiotic discontinuation, in the setting of her active tachycardia and tachypnea at rest, I recommended that she proceed to the emergency department for further evaluation given her complex history and numerous recent hospitalizations.  She is very much in agreement with this as well.  Following stabilization, I would be more than happy to see her again in clinic to better address her chronic cardiac issues.  We will arrange transportation to the emergency department for her, and I have called ahead and given signout.      Sunil Salmeron MD  Interventional Cardiology  February 14, 2024        Medications  Current Outpatient Medications    Medication    acetaminophen (TYLENOL) 325 MG tablet    bumetanide (BUMEX) 2 MG tablet    calcium carbonate 500 mg, elemental, (OSCAL) 500 MG tablet    carvedilol (COREG) 25 MG tablet    folic acid (FOLVITE) 1 MG tablet    multivitamin RENAL (RENAVITE RX/NEPHROVITE) 1 tablet tablet    NIFEdipine ER (ADALAT CC) 60 MG 24 hr tablet    ondansetron (ZOFRAN ODT) 4 MG ODT tab    pantoprazole (PROTONIX) 40 MG EC tablet    predniSONE (DELTASONE) 20 MG tablet    sulfamethoxazole-trimethoprim (BACTRIM) 400-80 MG tablet    vitamin B-12 (CYANOCOBALAMIN) 100 MCG tablet    Vitamin D, Cholecalciferol, 10 MCG (400 UNIT) TABS     No current facility-administered medications for this visit.     Allergies  Allergies   Allergen Reactions    Rituximab Anaphylaxis and Shortness Of Breath         Physical Exam      BP: (!) 160/118 Pulse: 120     SpO2: 100 % (room air)      Vital Signs with Ranges  Pulse:  [120] 120  BP: (160)/(118) 160/118  SpO2:  [100 %] 100 %  108 lbs 1.6 oz    Constitutional: Not acutely in distress, but clearly uncomfortable  Respiratory: Increased respiratory effort, shallow breath sounds diffusely, + accessory muscle use  Cardiovascular: Tachycardic but regular, cardiac sounds difficult to appreciate, suspect systolic murmur at the apex.  JVP > 15 cm H2O.  There is trace bilateral LE edema.  Normal carotid upstrokes, no carotid bruits.      Thank you for allowing me to participate in the care of your patient.      Sincerely,     Sunil Salmeron MD     [facility]  cc:   Jayda Han MD  [address] [place], [place] [id number]

## 2024-02-14 NOTE — PHARMACY-ADMISSION MEDICATION HISTORY
Pharmacist Admission Medication History    Admission medication history is complete. The information provided in this note is only as accurate as the sources available at the time of the update.    Information Source(s): Patient and CareEverywhere/SureScripts via in-person    Pertinent Information: Patient reports that she is on a higher dose of prednisone than taper started on 1/24/24. She is currently taking prednisone 50 mg daily with a plan to decrease the dose next week (patient unsure of dose decrease.)    Changes made to PTA medication list:  Added: None  Deleted: None  Changed: Prednisone changed to 50 mg daily    Allergies reviewed with patient and updates made in EHR: yes    Medication History Completed By: Cherelle Prakash [facility] 2/14/2024 11:48 AM    PTA Med List   Medication Sig Last Dose    acetaminophen (TYLENOL) 325 MG tablet Take 2 tablets (650 mg) by mouth every 4 hours as needed for mild pain     bumetanide (BUMEX) 2 MG tablet Take 1 tablet (2 mg) by mouth 2 times daily for 90 days 2/14/2024    calcium carbonate 500 mg, elemental, (OSCAL) 500 MG tablet Take 1 tablet (500 mg) by mouth 3 times daily (with meals) for 90 days 2/14/2024    carvedilol (COREG) 25 MG tablet Take 1 tablet (25 mg) by mouth 2 times daily (with meals) for 90 days 2/14/2024    folic acid (FOLVITE) 1 MG tablet Take 1 tablet (1 mg) by mouth daily for 90 days 2/14/2024    multivitamin RENAL (RENAVITE RX/NEPHROVITE) 1 tablet tablet Take 1 tablet by mouth daily for 90 days 2/14/2024    NIFEdipine ER (ADALAT CC) 60 MG 24 hr tablet Take 1 tablet (60 mg) by mouth daily for 90 days 2/14/2024    ondansetron (ZOFRAN ODT) 4 MG ODT tab Take 1 tablet (4 mg) by mouth every 8 hours as needed for nausea     pantoprazole (PROTONIX) 40 MG EC tablet Take 1 tablet (40 mg) by mouth every morning (before breakfast) for 90 days 2/14/2024    predniSONE (DELTASONE) 20 MG tablet Take 2 tablets (40 mg) by mouth daily for 7 days, THEN 1.5 tablets (30 mg)  daily for 7 days, THEN 1 tablet (20 mg) daily for 7 days, THEN 0.5 tablets (10 mg) daily for 7 days. (Patient taking differently: 50 mg by mouth daily) 2/14/2024    sulfamethoxazole-trimethoprim (BACTRIM) 400-80 MG tablet Take 1 tablet by mouth three times a week (Tuesday, Thursday, Saturday) 2/13/2024    vitamin B-12 (CYANOCOBALAMIN) 100 MCG tablet Take 1 tablet (100 mcg) by mouth daily for 90 days 2/14/2024    Vitamin D, Cholecalciferol, 10 MCG (400 UNIT) TABS Take 10 mcg by mouth daily for 90 days 2/14/2024

## 2024-02-15 NOTE — PLAN OF CARE
Goal Outcome Evaluation:       [id number]    A&O x 4, flat affect. Pt reported 9/10 L flank pain- paged PRN diluadid + tylenol given with relief. Was nauseous at start of shift, pt states this is normal for her post dialysis, PRN zofran given. Bps also elevated in the 170s- paged PRN labetalol given with Bps now  115/87. HR 80s, on RA. Up A 1 GB, is weak. Per pt does still make some urine, and will call when she has to go to the bathroom. UA still needed. Tele: SR/ST. Alarms on. Getting IV axb. Plan for cardiology consuly & ECHO AM. Continue with plan of care.

## 2024-02-15 NOTE — PROGRESS NOTES
[facility]    Medicine Progress Note - Hospitalist Service    Date of Admission:  2/14/2024    Assessment & Plan     Milly Carroll is a 23 year old female admitted on 2/14/2024.   Milly Carroll is a 23 year old female with complex past medical history of lupus with lupus flareups complicated with lupus cerebritis lupus nephritis, hypertension, chronic systolic heart failure, CHRISTA on CKD, now on hemodialysis since January 2024, history of perinephric hematoma s/p embolization in August 2023, history of pneumonia, C. difficile colitis who was sent from cardiology clinic for further evaluation of shortness of breath and tachycardia.     # Shortness of breath, multifactorial slow improvement  # Suspected acute on chronic systolic heart failure exacerbation  *she has a history of chronic heart failure with a reduced EF ((LVEF down to 20-25% in early August 2023, recovered to around 40% in late August 2023, back down to 15-20% in December 2023, then up to 35-40% on 1/7/2024).    *Echo 1/7/2024  Left ventricular function is decreased. The ejection fraction is 35-40% (moderately reduced).  The right ventricle is normal size.  Global right ventricular function is mildly reduced.  Moderate mitral insufficiency is present (functional ).  Moderate tricuspid insufficiency is present (functional).  Pulmonary hypertension is present     -Her severe cardiomyopathy felt to be nonischemic-Takotsubo/stress-induced versus hypertensive cardiomyopathy.  -She is also on hemodialysis, recently initiated in January 2024  -Did not miss dialysis, last dialysis was on Monday  -Reports that she started feeling short of breath last night  -Still making good urine, PTA on Bumex 2 mg p.o. twice daily; reports being compliant with her medications  -She went to cardiology clinic today and she was advised to come to ER for further evaluation given her shortness of breath and tachycardia  -proBNP in the ER elevated more than  70,000  -Chest x-ray- Stable appearance of right-sided central venous line with tip overlying the right atrium. Bilateral retrocardiac opacities are present suggestive of pulmonary edema versus atypical pneumonia.  -EKG- sinus tachycardia at 125bpm  -Lactic acid elevated at 4 initially, improved to 2.1 after hemodialysis  -Went from ER to hemodialysis  -ER attending discussed with cardiology on-call  -Admitted to inpatient to [facility]  -Monitor on telemetry  -Resume PTA Bumex 2 mg p.o. twice daily  -Resume PTA Coreg 25 mg p.o. twice daily and nifedipine 60 mg p.o. daily  -Strict input and output, daily weights  -Fluid restriction 1800 cc/day    -Echocardiogram in a.m. awaiting final results  -Cardiology consult     # Influenza B  -She is tested positive for influenza B, tested negative for COVID 19, influenza A and RSV (of note she had a recent COVID-19 infection)  -Chest x-ray with evidence of pulmonary edema versus atypical pneumonia  -Patient does have mild cough with expectoration  -Had elevated white blood cells of 12.2 on admission which has improved to 7.9  -Given Tamiflu 30 mg p.o. in ER  -Continue with Tamiflu 30 mg p.o. twice daily, given end-stage renal disease  -Droplet precautions  -Supportive management     # Elevated lactic acid  # Rule out sepsis  -Lactic acid initially 4 in ER, improved to 2.1  -White blood cells 12.2, improved to 7.9  -Chest x-ray with possible atypical pneumonia versus pulmonary congestion with ongoing cough with mild expectoration  -UA pending, patient does make some urine  -Given elevated lactic acid and complex underlying medical history with multiple recent hospitalizations, 1 dose of Zosyn given in ER  -No IV fluids given because of concern of CHF  -Lactic acid improved to 2.1 after hemodialysis  -Continue with Zosyn empirically for now, awaiting final blood cultures and improvement in clinical status  -WBC count down trending  -Follow-up cultures  -consider discontinue Zosyn if  no clear source of infection given recent Cdiff infection in Jan 2024.      # End-stage renal disease, on hemodialysis  -Has history of lupus nephritis, with recent CHRSITA and recent initiation of hemodialysis in January 2024  -Hemodialysis putfbrvh-Eznzhb-Fyehhargc-Friday  -Reports being compliant with hemodialysis  -Last hemodialysis on Monday, had hemodialysis today after admission  -Nephrology consult     # SLE (+dsDNA, +RNP, +ribosomal P Ab and hypocomplementemia)   #Recent Lupus Flare w/ MAHA and renal insuff s/p Cyclophosphamide dose (12/22/23)  # Hx of Lupus cerebritis w/ hx of seizures  # Hx Lupus Nephritis  # Hx medication non-adherence  -Follows with rheumatology at [facility]  -Currently on tapering dose of prednisone; she was initially started with 60 mg p.o. daily and was supposed to taper down but she states that she started her steroid course later then prescribed and currently she is on 50 mg p.o. daily-continue for now  - continue PTA Bactrim for PJP ppx while on steroids  -Follow-up with rheumatology at [facility]     # Uncontrolled hypertension currently stable.  -Has history of uncontrolled hypertension  -Currently on Coreg 25 mg p.o. twice daily, nifedipine 60 mg p.o. daily and Bumex 2 mg p.o. twice daily  -BP elevated in ER, currently stable  -Resumed PTA regimen  -Added hydralazine IV as needed and labetalol IV as needed for SBP more than 160  -Needs better control of blood pressure, as outpatient.     # Tachycardia, improved  -She was tachycardic in ER with heart rate in 120s, unclear etiology  -States she is compliant with her medications including Coreg  -Possible related to CHF exacerbation, influenza B infection  -Heart rate continued to remain stable overnight 70 to 80/min.  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  No current PTA antiepileptic medications     # h/o recurrent C diff colitis  - h/o C diff in 6/2023, and again recently 1/2024  - was on po Vanco initially, switched to po Fidaximicin as per ID- finished course  - states diarrhea resolved  - start po Vanco 125 mg po BID for now while she is on Zosyn, consider discontinue Zosyn if no source of bacterial infection found.  -WBC count has improved to 7.9, patient complaining of cough with chest x-ray concerning for possible atypical pneumonia, currently blood cultures in process  -Continue IV Zosyn for now, until final blood culture report is available.           Diet: Fluid restriction 1800 ML FLUID  Renal Diet (dialysis)    DVT Prophylaxis: Pneumatic Compression Devices  Leung Catheter: Not present  Lines: None     Cardiac Monitoring: ACTIVE order. Indication: Acute decompensated heart failure (48 hours)  Code Status: Full Code      Clinically Significant Risk Factors Present on Admission              # Hypoalbuminemia: Lowest albumin = 2.7 g/dL at 2/15/2024  6:15 AM, will monitor as appropriate        # Chronic heart failure with reduced ejection fraction: last echo with EF <40%                Disposition Plan      Expected Discharge Date: 02/16/2024      Destination: home with family              [place] [facility] Hospitalist Service  [facility]  Securely message with [employer] (more info)  Text page via [employer] Paging/Directory   ______________________________________________________________________    Interval History   Patient is resting in bed, continues to have discomfort/low back pain which has slightly improved compared to prior her breathing status has stabilized according to him.  Patient denies any chest pain dizziness or lightheadedness.  Patient does not have any family at bedside .  Patient seems to have flat affect.  No fevers or chills reported, patient complains of cough with mild sputum.  Unchanged from prior.  No significant  worsening.    Physical Exam   Vital Signs: Temp: 97.9  F (36.6  C) Temp src: Oral BP: (!) 128/97 Pulse: 81   Resp: 18 SpO2: 100 % O2 Device: None (Room air)    Weight: 106 lbs 4.19 oz    Constitutional: Awake, alert, cooperative, no apparent distress  Respiratory: Clear to auscultation bilaterally, no crackles or wheezing  Cardiovascular: Regular rate and rhythm, normal S1 and S2, and no murmur noted  GI: Normal bowel sounds, soft, non-distended, non-tender  Skin/Integumen: No rashes, no cyanosis, no edema  Other: Alert oriented x 3 no apparent new focal neurological deficits    Medical Decision Making

## 2024-02-15 NOTE — PROVIDER NOTIFICATION
MD Notification    Notified Person: MD    Notified Person Name: Viola Evans     Notification Date/Time: 2/14 1941    Notification Interaction: Vocera     Purpose of Notification:  Taking over care for patient, c/o L Flank 9/10 pain. Is still nauseous, can we get PRN IV pain meds & zofran please? thanks     Orders Received: PRN Zofran & Dilaudid/Oxy ordered     1950- FYI repeat lactic 2.1    2011- BP still high 170/130, just gave the diluadid, pt too nauseous to take coreg right now      Orders: Ordered labetalol and hydralazine prn, try labetalol first     Comments:

## 2024-02-15 NOTE — PROVIDER NOTIFICATION
MD Notification    Notified Person: MD    Notified Person Name: Nathan    Notification Date/Time: February 14, 2024 7:14 PM    Notification Interaction: vocera message    Purpose of Notification: Pt complaining of nausea. No PRNs avaiable. Also, 1400 lactic was 4 normally we call RRT? Should we do a repeat now that she's done dialysis? Lastly, BP parameters, no PRNS?     Orders Received:    Comments:

## 2024-02-15 NOTE — PROGRESS NOTES
Renal Medicine Progress Note                                Milly Carroll MRN# [id number]   Age: 23 year old YOB: 2000   Date of Admission: 2/14/2024 Hospital LOS: 1                  Assessment/Plan:     1.  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  She has been coming in under her target weight.     Her most recent serum creatinine dated 02/12/2 for noted at 1.85 mg/dL  This represents a predialysis creatinine on Monday.     2.  SLE   -extremely complicated course   -see [facility]phrology consult 01/02/24 (Dr. Monge) for details  -current treatment prednisone only   3.  HTN              -poor control/multiple meds  4.  Cardiorenal syndrome              -concern for ? Pulmonary edema on CXR  5.  Influenza B +      Restart outpatient diuretic  Assess for dialysis in am       Interval History:     Feels better  Looks more comfortable  No O2 requirement     2 liter UF yesterday       ROS:     GENERAL: NAD, No fever,chills  R: NEGATIVE for significant cough or SOB  CV: NEGATIVE for chest pain, palpitations  EXT: no change edema  ROS otherwise negative    Medications and Allergies:     Reviewed    Physical Exam:     Vitals were reviewed  Patient Vitals for the past 8 hrs:   BP  Temp Temp src Pulse Resp SpO2 Weight   02/15/24 0811 (!) 128/97 97.9  F (36.6  C) Oral 81 18 -- --   02/15/24 0651 -- -- -- -- -- -- 48.2 kg (106 lb 4.2 oz)   02/15/24 0400 (!) 118/96 -- -- 70 18 100 % --     Wt Readings from Last 4 Encounters:   02/15/24 48.2 kg (106 lb 4.2 oz)   02/14/24 49 kg (108 lb 1.6 oz)   02/07/24 51.7 kg (114 lb)   01/24/24 54.6 kg (120 lb 4.8 oz)     I/O last 3 completed shifts:  In: 150 [P.O.:150]  Out: 2000 [Other:2000]    Vitals:    02/14/24 1126 02/15/24 0651   Weight: 49 kg (108 lb) 48.2 kg (106 lb 4.2 oz)         GENERAL: awake, alert, follows  RESP:  clear anteriorly  CV: RRR, normal S1 S2  MS: no clubbing, cyanosis   SKIN: clear without significant rashes or lesions  NEURO: speech normal and cranial nerves 2-12 intact  PSYCH: affect normal/bright  EXT: warm, no edema    Data:     Recent Labs   Lab 02/15/24  0615 02/14/24  1146    145   POTASSIUM 4.4 4.5   CHLORIDE 100 106   CO2 27 23   ANIONGAP 11 16*   GLC 73 72   BUN 10.5 20.5*   CR 1.96* 2.24*   GFRESTIMATED 36* 31*   BISI 8.8 8.9         Recent Labs   Lab 02/15/24  0615 02/14/24  1146   CR 1.96* 2.24*     Recent Labs   Lab 02/15/24  0615 02/14/24  1146   POTASSIUM 4.4 4.5     Recent Labs   Lab 02/15/24  0615 02/14/24  1146   ALBUMIN 2.7* 3.4*     Recent Labs   Lab 02/15/24  0615 02/14/24  1146   PHOS  --  5.9*   HGB 8.6* 8.8*         G Pastor Ingram MD    [facility] - Nephrology  [phone]

## 2024-02-15 NOTE — CONSULTS
[facility]    Cardiology Consultation     Date of Admission:  2/14/2024    Assessment & Plan   Milly Carroll is a 23 year old female who was admitted on 2/14/2024.    1.  Systemic lupus erythematosus with lupus nephritis and end-stage renal failure    2.  Acute onset shortness of breath, multifactorial, due to acute influenza B and acute systolic heart failure predominantly.  With NT proBNP greater than 70,000.  Chest x-ray identifies bilateral infiltrates, suggestive of atypical pneumonia versus pulmonary edema.    3.  Nonischemic cardiomyopathy.  Cause unclear.  Hypertensive heart disease is likely.  Doubt stress cardiomyopathy based on the persistence of significant LV dysfunction.  Nonischemic etiology is based on nuclear perfusion stress testing which demonstrated normal perfusion with moderate left ventricular enlargement and mild left ventricular dysfunction, ejection fraction 47%.  Echocardiogram ejection fractions have varied from 15 to 20% on 1/2/2024, to 35 to 40% on 1/7/2024, and again 35 to 40% today on 2/15/2024.    4.  End-stage renal failure on hemodialysis due to lupus nephritis.  The patient has been treated with ultrafiltration since admission yesterday with improvement in volume status and breathing.    5.  Resistant hypertension, currently on a medication regimen of carvedilol 25 mg p.o. twice daily, nifedipine 60 mg p.o. daily, and Bumex 2 mg p.o. twice daily.  Blood pressure today is controlled better after volume removal.    Recommendations:    Agree with continued dialysis until euvolemic.  She does make some urine and might benefit from continued diuretic therapy as well.  I will defer volume management to nephrology.    Since she is committed to long-term dialysis due to end-stage renal failure, I will modify her medication regimen to better treat her nonischemic cardiomyopathy.  I will stop her nifedipine and start Entresto 24/26 mg p.o. twice daily.  We can  increase that dose as tolerated to help manage her resistant hypertension, although it appears that perhaps her resistant hypertension may be in part due to a volume overload situation.    SGLT2 inhibitors are not indicated in patients with end-stage renal failure.  I would hesitate to use spironolactone due to concerns about hyperkalemia primarily.  Nephrology can weigh in on this discussion as well.    If additional blood pressure control is needed, isosorbide mononitrate and hydralazine would be appropriate.    No additional cardiology recommendations at this time.  Cardiology will sign off    High complexity     CHATAGIANFRANCO DURHAM MD, MD    Primary Care Physician   Ashlie Skelton    Reason for Consult   Reason for consult: I was asked by the hospitalist service to evaluate this patient for shortness of breath.    History of Present Illness   Milly Carroll is a 23 year old female who presents with multiple recent admissions for decompensated heart failure, end-stage renal failure with lupus nephritis, and recurrent C. difficile colitis.  She was referred to see Dr. Salmeron yesterday in the cardiology clinic to establish care for treatment of nonischemic cardiomyopathy and chronic systolic heart failure.  She was noted to be tachycardic with heart rates in the 120s, tachypneic with respiratory rates in the 30s and appeared to be in some respiratory distress.  She was referred to the emergency room for further evaluation and treatment.    She tells me that she has actually been doing pretty well for the last couple of weeks until 2 days ago when her breathing became worse.  She had some fevers and chills, nonproductive cough, and worsening shortness of breath.  This included more difficulty lying down flat, especially on her side due to shortness of breath.  She had no chest pain, palpitations, lightheadedness or syncope.    On evaluation in the emergency room, she had a chest x-ray showing bilateral infiltrates  consistent with atypical pneumonia or pulmonary edema.  Her NT proBNP was greater than 70,000.  Her rapid test for influenza B was positive.  She was admitted to the hospital and seen by nephrology who referred her immediately for ultrafiltration with removal of 2 L of fluid.  Since then, she has felt more comfortable and is able to lie more flat.  Her respiratory rate has normalized.  Her heart rates are down into the 80s and her blood pressure has improved significantly.  She was quite hypertensive yesterday.  Apparently she has a long history of hypertension with poorly controlled blood pressures which may be the source of her nonischemic cardiomyopathy.  Her left ventricular ejection fraction has been variable, with moderate left ventricular dysfunction last fall with an ejection fraction of 23% in August which dropped to 15 to 20% at the beginning of January 2024 but came up quickly within about 5 days to 35 to 40% on 1/7/2024.  Today her echocardiogram appears stable with an ejection fraction again of 35 to 40%.    She is committed to long-term hemodialysis at this point.  She still makes some urine and takes Bumex 2 mg p.o. twice daily.  She is on carvedilol 25 mg p.o. twice daily for her cardiomyopathy but no other disease modifying medications.  She takes nifedipine 60 mg a day for additional blood pressure control.    Past Medical History   Past Medical History:   Diagnosis Date    Hepatitis     History of blood transfusion June 21st, 2023    i had a flare up that made the doctors do a blood transfusion because i was low on blood.    Hypertension     Lupus (systemic lupus erythematosus) (H)     Lupus cerebritis (H)     Pancreatitis 08/2017    Pyelonephritis 08/2017    Seizures (H)     Status epilepticus (H)          Past Surgical History   Past Surgical History:   Procedure Laterality Date    BIOPSY      i dont know the exact date but i had a kidney biopsy    IR CVC TUNNEL PLACEMENT > 5 YRS OF AGE   01/10/2024    IR RENAL ANGIOGRAM LEFT  08/11/2023    IR RENAL BIOPSY LEFT  06/28/2022    NO HISTORY OF SURGERY      ORTHOPEDIC SURGERY           Prior to Admission Medications   Prior to Admission Medications   Prescriptions Last Dose Informant Patient Reported? Taking?   NIFEdipine ER (ADALAT CC) 60 MG 24 hr tablet 2/14/2024  No Yes   Sig: Take 1 tablet (60 mg) by mouth daily for 90 days   Vitamin D, Cholecalciferol, 10 MCG (400 UNIT) TABS 2/14/2024  No Yes   Sig: Take 10 mcg by mouth daily for 90 days   acetaminophen (TYLENOL) 325 MG tablet  Other, Self No Yes   Sig: Take 2 tablets (650 mg) by mouth every 4 hours as needed for mild pain   bumetanide (BUMEX) 2 MG tablet 2/14/2024  No Yes   Sig: Take 1 tablet (2 mg) by mouth 2 times daily for 90 days   calcium carbonate 500 mg, elemental, (OSCAL) 500 MG tablet 2/14/2024  No Yes   Sig: Take 1 tablet (500 mg) by mouth 3 times daily (with meals) for 90 days   carvedilol (COREG) 25 MG tablet 2/14/2024  No Yes   Sig: Take 1 tablet (25 mg) by mouth 2 times daily (with meals) for 90 days   folic acid (FOLVITE) 1 MG tablet 2/14/2024  No Yes   Sig: Take 1 tablet (1 mg) by mouth daily for 90 days   multivitamin RENAL (RENAVITE RX/NEPHROVITE) 1 tablet tablet 2/14/2024  No Yes   Sig: Take 1 tablet by mouth daily for 90 days   ondansetron (ZOFRAN ODT) 4 MG ODT tab   No Yes   Sig: Take 1 tablet (4 mg) by mouth every 8 hours as needed for nausea   pantoprazole (PROTONIX) 40 MG EC tablet 2/14/2024  No Yes   Sig: Take 1 tablet (40 mg) by mouth every morning (before breakfast) for 90 days   predniSONE (DELTASONE) 20 MG tablet 2/14/2024  No Yes   Sig: Take 2 tablets (40 mg) by mouth daily for 7 days, THEN 1.5 tablets (30 mg) daily for 7 days, THEN 1 tablet (20 mg) daily for 7 days, THEN 0.5 tablets (10 mg) daily for 7 days.   Patient taking differently: 50 mg by mouth daily   sulfamethoxazole-trimethoprim (BACTRIM) 400-80 MG tablet 2/13/2024  No Yes   Sig: Take 1 tablet by mouth three  times a week (Tuesday, Thursday, Saturday)   vitamin B-12 (CYANOCOBALAMIN) 100 MCG tablet 2/14/2024  No Yes   Sig: Take 1 tablet (100 mcg) by mouth daily for 90 days      Facility-Administered Medications: None     Current Facility-Administered Medications   Medication Dose Route Frequency    - MEDICATION INSTRUCTIONS for Dialysis Patients -   Does not apply See Admin Instructions    bumetanide  1 mg Oral BID    calcium carbonate 500 mg (elemental)  500 mg Oral TID w/meals    carvedilol  25 mg Oral BID w/meals    vitamin B-12  100 mcg Oral Daily    folic acid  1 mg Oral Daily    multivitamin RENAL  1 capsule Oral Daily    NIFEdipine ER  60 mg Oral Daily    [START ON 2/16/2024] oseltamivir  30 mg Oral Once per day on Monday Wednesday Friday    pantoprazole  40 mg Oral QAM AC    piperacillin-tazobactam  2.25 g Intravenous Q6H    predniSONE  50 mg Oral Daily    sodium chloride (PF)  3 mL Intracatheter Q8H    sulfamethoxazole-trimethoprim  1 tablet Oral Once per day on Tuesday Thursday Saturday    vancomycin  125 mg Oral BID     Current Facility-Administered Medications   Medication Last Rate    heparin (porcine) 500 Units/hr (02/14/24 1629)     Allergies   Allergies   Allergen Reactions    Rituximab Anaphylaxis and Shortness Of Breath       Social History    reports that she has never smoked. She has never been exposed to tobacco smoke. She quit smokeless tobacco use about 14 months ago. She reports that she does not drink alcohol and does not use drugs.      Family History   I have reviewed this patient's family history and updated it with pertinent information if needed.  Family History   Problem Relation Age of Onset    Other - See Comments Father         Question of lupus in father and paternal grandfather    Lupus Sister         older sister    Genetic Disorder Sister         tolu also has lupus but she has no insurance to get checked out    Hypertension Other         he has cancer    Gastrointestinal Disease No  "family hx of         No known history of IBD, hepatitis, pancreatitis, celiac disease, or GI cancers before age 50    Glaucoma No family hx of     Macular Degeneration No family hx of           Review of Systems   A comprehensive review of system was performed and is negative other than that noted in the HPI or here.     Physical Exam   Vital Signs with Ranges  Temp:  [97.7  F (36.5  C)-98.1  F (36.7  C)] 97.9  F (36.6  C)  Pulse:  [] 82  Resp:  [] 18  BP: (115-180)/() 126/92  SpO2:  [98 %-100 %] 100 %  Wt Readings from Last 4 Encounters:   02/15/24 48.2 kg (106 lb 4.2 oz)   02/14/24 49 kg (108 lb 1.6 oz)   02/07/24 51.7 kg (114 lb)   01/24/24 54.6 kg (120 lb 4.8 oz)     I/O last 3 completed shifts:  In: 150 [P.O.:150]  Out: 2000 [Other:2000]      Vitals: BP (!) 126/92   Pulse 82   Temp 97.9  F (36.6  C) (Oral)   Resp 18   Ht 1.575 m (5' 2\")   Wt 48.2 kg (106 lb 4.2 oz)   LMP 01/31/2024   SpO2 100%   BMI 19.44 kg/m      Physical Exam:   General - Alert and oriented to time place and person in no acute distress  Eyes - No scleral icterus  HEENT - Neck supple, moist mucous membranes  Cardiovascular -regular rate and rhythm without cardiac murmur  Extremities - There is no lower extremity edema  Respiratory -mild scattered crackles bilateral  Skin - No pallor or cyanosis  Gastrointestinal - Non tender and non distended without rebound or guarding  Psych - Appropriate affect   Neurological - No gross motor neurological focal deficits    No lab results found in last 7 days.    Invalid input(s): \"TROPONINIES\"    Recent Labs   Lab 02/15/24  0615 02/14/24  1146   WBC 7.9 12.2*   HGB 8.6* 8.8*   MCV 98 96    202    145   POTASSIUM 4.4 4.5   CHLORIDE 100 106   CO2 27 23   BUN 10.5 20.5*   CR 1.96* 2.24*   GFRESTIMATED 36* 31*   ANIONGAP 11 16*   BISI 8.8 8.9   GLC 73 72   ALBUMIN 2.7* 3.4*   PROTTOTAL 6.2* 7.2   BILITOTAL 0.4 0.6   ALKPHOS 144 178*   ALT 16 12   AST 62* 71*     Recent " "Labs   Lab Test 23  0415 22  0146 17  0652   CHOL  --   --  102   HDL  --   --  17*   LDL  --   --  39   TRIG 171* 100 231*     Recent Labs   Lab 02/15/24  0615 24  1146   WBC 7.9 12.2*   HGB 8.6* 8.8*   HCT 28.7* 29.0*   MCV 98 96    202     No results for input(s): \"PH\", \"PHV\", \"PO2\", \"PO2V\", \"SAT\", \"PCO2\", \"PCO2V\", \"HCO3\", \"HCO3V\" in the last 168 hours.  Recent Labs   Lab 24  1146   NTBNPI >70,000*     No results for input(s): \"DD\" in the last 168 hours.  No results for input(s): \"SED\", \"CRP\" in the last 168 hours.  Recent Labs   Lab 02/15/24  0615 24  1146    202     No results for input(s): \"TSH\" in the last 168 hours.  No results for input(s): \"COLOR\", \"APPEARANCE\", \"URINEGLC\", \"URINEBILI\", \"URINEKETONE\", \"SG\", \"UBLD\", \"URINEPH\", \"PROTEIN\", \"UROBILINOGEN\", \"NITRITE\", \"LEUKEST\", \"RBCU\", \"WBCU\" in the last 168 hours.    Imaging:  Recent Results (from the past 48 hour(s))   Chest XR,  PA & LAT    Narrative    CHEST TWO VIEWS 2024 12:32 PM     HISTORY: short of breath, tachycardic, orthopnea, dialysis patient    COMPARISON: 2024       Impression    IMPRESSION: Stable appearance of right-sided central venous line with  tip overlying the right atrium. Bilateral retrocardiac opacities are  present suggestive of pulmonary edema versus atypical pneumonia.  Stable, enlarged cardiomediastinal silhouette. No acute bony  abnormality.    LEANA SWAN MD         SYSTEM ID:  [id number]   Echo Limited   Result Value    LVEF  35-40%    Narrative    [id number]  [id number]  [id number]  [id number]^ARMIDA^CHIARA^KAMRON     [facility]  Echocardiography Laboratory  [address] [place] [address]     Name: MIKE JOSE  MRN: [id number]  : 2000  Study Date: 02/15/2024 11:18 AM  Age: 23 yrs  Gender: Female  Patient Location: [facility]  Reason For Study: CHF  Ordering Physician: CHIARA HUFFMAN  Referring Physician: Ashlie Skelton  Performed By: " "Karina Schneider     BSA: 1.5 m2  Height: 62 in  Weight: 108 lb  HR: 78  BP: 128/97 mmHg  ______________________________________________________________________________  Procedure  Limited Portable Echo Adult. Optison (NDC #[id number]) given intravenously.  ______________________________________________________________________________  Interpretation Summary     There is borderline concentric left ventricular hypertrophy.  Borderline \"non compaction\" of LV noted  Diastolic function parameters were not measured on this limited echo.  ABNORMAL GLOBAL LONGITUDINAL STRAIN- negative 11.6 with APICAL SPARING- this  can be seen with amyloid, oxalosis, Fabry's and other cardiomyopathies  There is moderate global hypokinesia of the left ventricle.  Aortic regurgitation seen on echo 1-7-24 not seen today but this is a limited  echo  Small posterior pericardial effusion  There is no pericardial constriction.  The visual ejection fraction is 35-40%.  ______________________________________________________________________________  Left Ventricle  The left ventricle is normal in size. There is borderline concentric left  ventricular hypertrophy. Borderline \"non compaction\" of LV noted. The visual  ejection fraction is 35-40%. Diastolic function parameters were not measured  on this limited echo.  ABNORMAL GLOBAL LONGITUDINAL STRAIN- negative 11.6 with APICAL SPARING- this  can be seen with amyloid, oxalosis, Fabry's and other cardiomyopathies. Global  peak LV longitudinal strain is averaged at -11.6%. This suggests abnormal  strain (normal <-18%). There is moderate global hypokinesia of the left  ventricle. There is no thrombus seen in the left ventricle.     Right Ventricle  The right ventricle is normal in size and function.     Atria  Normal left atrial size. Right atrial size is normal.     Mitral Valve  There is mild (1+) mitral regurgitation.     Tricuspid Valve  There is mild (1+) tricuspid regurgitation.     Aortic Valve  The " aortic valve is trileaflet. Aortic regurgitation seen on echo 24 not  seen today but this is a limited echo.     Pulmonic Valve  The pulmonic valve is not well seen, but is grossly normal.     Vessels  The aortic root is normal size.     Pericardium  Small posterior pericardial effusion. There is no pericardial constriction.     Rhythm  Sinus rhythm was noted.  ______________________________________________________________________________  MMode/2D Measurements & Calculations  IVSd: 1.1 cm     LVIDd: 5.0 cm  LVIDs: 3.8 cm  LVPWd: 1.2 cm  FS: 25.1 %  LV mass(C)d: 211.1 grams  LV mass(C)dI: 143.5 grams/m2  asc Aorta Diam: 3.3 cm  Asc Ao diam index BSA (cm/m2): 2.2  Asc Ao diam index Ht(cm/m): 2.1  RWT: 0.47  TAPSE: 2.3 cm     Doppler Measurements & Calculations  MV E max horacio: 84.4 cm/sec  MV A max horacio: 61.3 cm/sec  MV E/A: 1.4  MV dec time: 0.18 sec  Ao V2 max: 110.0 cm/sec  Ao max P.0 mmHg  Ao V2 mean: 73.0 cm/sec  Ao mean PG: 3.0 mmHg  Ao V2 VTI: 20.7 cm  LV V1 max P.2 mmHg  LV V1 max: 74.1 cm/sec  LV V1 VTI: 12.9 cm  TR max horacio: 204.8 cm/sec  TR max P.8 mmHg  AV Horacio Ratio (DI): 0.67     RV S Horacio: 12.0 cm/sec     ______________________________________________________________________________  Report approved by: Maureen Navarro 02/15/2024 02:10 PM             Echo:  No results found for this or any previous visit (from the past 4320 hour(s)).    Clinically Significant Risk Factors              # Hypoalbuminemia: Lowest albumin = 2.7 g/dL at 2/15/2024  6:15 AM, will monitor as appropriate      # Chronic heart failure with reduced ejection fraction: last echo with EF <40%                 Cardiomyopathy  Pericardial Effusion: Pericardial effusion  Systolic acute                  CKD POA List: Stage 5 (GFR < 15)                Not present on admission

## 2024-02-15 NOTE — UTILIZATION REVIEW
Admission Status; Secondary Review Determination    Under the authority of the Utilization Management Committee, the utilization review process indicated a secondary review on the above patient. The review outcome is based on review of the medical records, discussions with staff, and applying clinical experience noted on the date of the review.    (x) Inpatient Status Appropriate - This patient's medical care is consistent with medical management for inpatient care and reasonable inpatient medical practice.    RATIONALE FOR DETERMINATION: 23-year-old female with significant past medical history of systemic lupus complicated by lupus cerebritis, lupus nephritis with acute kidney injury and hemodialysis as well as hypertension, chronic systolic heart failure, previous pneumonia, C. difficile colitis who now is admitted to the hospital with acute worsening shortness of breath and tachycardia.  Patient has abnormal imaging suggestive of heart failure versus atypical pneumonitis, positive influenza B, pleural BNP level greater than 70,000 with concerns for infection with lactic acidosis and leukocytosis.  Patient does require greater than 2 nights in the hospital with acute dialysis, empiric IV antibiotics, Tamiflu and careful management of heart failure.    At the time of admission with the information available to the attending physician more than 2 nights Hospital complex care was anticipated, based on patient risk of adverse outcome if treated as outpatient and complex care required. Inpatient admission is appropriate based on the Medicare guidelines.    This document was produced using voice recognition software    The information on this document is developed by the utilization review team in order for the business office to ensure compliance. This only denotes the appropriateness of proper admission status and does not reflect the quality of care rendered.    The definitions of Inpatient Status and Observation  Status used in making the determination above are those provided in the CMS Coverage Manual, Chapter 1 and Chapter 6, section 70.4.    Sincerely,    Diomedes Her MD  Utilization Review  Physician Advisor  [facility].

## 2024-02-15 NOTE — PROGRESS NOTES
Clinic Care Coordination Contact  Ambulatory Care Coordination to Inpatient Care Management   Hand-In Communication    Date:  February 15, 2024  Name: Milly Carroll is enrolled in Ambulatory Care Coordination program and I am the Lead Care Coordinator.  CC Contact Information: Epic InPrivacy Analyticset + phone  Payor Source: No coverage found.    Current services in place:     Please see the CC Snaphot and Care Management Flowsheets for specific  details of this Milly Carroll care plan.   Additional details/specific concerns r/t this admission:    No additional concerns at this time      I will follow this admission in Epic. Please feel free to contact me with questions or for further collaboration in discharge planning.    EULALIA Slaughter   Care Coordination Team  [phone]

## 2024-02-15 NOTE — CONSULTS
Care Management Initial Consult    General Information  Assessment completed with: Patient,    Type of CM/SW Visit: Initial Assessment    Primary Care Provider verified and updated as needed: Yes   Readmission within the last 30 days: no previous admission in last 30 days      Reason for Consult: discharge planning  Advance Care Planning: Advance Care Planning Reviewed: no concerns identified          Communication Assessment  Patient's communication style: spoken language (English or Bilingual)    Hearing Difficulty or Deaf: no   Wear Glasses or Blind: yes    Cognitive  Cognitive/Neuro/Behavioral: WDL, mood/behavior  Level of Consciousness: alert  Arousal Level: opens eyes spontaneously  Orientation: oriented x 4  Mood/Behavior: flat affect, hypoactive (quiet, withdrawn)  Best Language: 0 - No aphasia  Speech: clear, spontaneous, logical    Living Environment:   People in home: sibling(s) (lives with sister/family)     Current living Arrangements: house      Able to return to prior arrangements: yes       Family/Social Support:  Care provided by: self  Provides care for: no one  Marital Status: Single  Parent(s), Sibling(s)          Description of Support System: Supportive, Involved         Current Resources:   Patient receiving home care services: No     Community Resources: None  Equipment currently used at home: none  Supplies currently used at home: None    Employment/Financial:  Employment Status: unemployed        Financial Concerns: insurance, none   Referral to Financial Worker: Yes       Does the patient's insurance plan have a 3 day qualifying hospital stay waiver?  No    Lifestyle & Psychosocial Needs:  Social Determinants of Health     Food Insecurity: Low Risk  (12/26/2023)    Food Insecurity     Within the past 12 months, did you worry that your food would run out before you got money to buy more?: No     Within the past 12 months, did the food you bought just not last and you didn t have money to  get more?: No   Depression: Not at risk (2/7/2024)    PHQ-2     PHQ-2 Score: 0   Housing Stability: Low Risk  (12/26/2023)    Housing Stability     Do you have housing? : Yes     Are you worried about losing your housing?: No   Tobacco Use: Medium Risk (2/14/2024)    Patient History     Smoking Tobacco Use: Never     Smokeless Tobacco Use: Former     Passive Exposure: Never   Financial Resource Strain: Low Risk  (12/26/2023)    Financial Resource Strain     Within the past 12 months, have you or your family members you live with been unable to get utilities (heat, electricity) when it was really needed?: No   Alcohol Use: Not on file   Transportation Needs: High Risk (12/26/2023)    Transportation Needs     Within the past 12 months, has lack of transportation kept you from medical appointments, getting your medicines, non-medical meetings or appointments, work, or from getting things that you need?: Yes   Physical Activity: Not on file   Interpersonal Safety: Low Risk  (2/7/2024)    Interpersonal Safety     Do you feel physically and emotionally safe where you currently live?: Yes     Within the past 12 months, have you been hit, slapped, kicked or otherwise physically hurt by someone?: No     Within the past 12 months, have you been humiliated or emotionally abused in other ways by your partner or ex-partner?: No   Stress: Not on file   Social Connections: Not on file       Functional Status:  Prior to admission patient needed assistance:   Dependent ADLs:: Independent  Dependent IADLs:: Transportation, Money Management     Values/Beliefs:  Spiritual, Cultural Beliefs, Faith Practices, Values that affect care: no               Additional Information:  Writer spoke with patient via phone and introduced self and role.  Patient states that she is currently living with her sister and her family in a house.  She recently started OP dialysis at the [facility] location on MWF and her mom drives her to those  appointments.  Patient is is independent with mobility and managing her meds.  She is currently NOT insured as she states she did have [employer] but it is currently not active and her mom is looking into it.  Patient is also pursuing getting on disability.  Writer made referral to financial office regarding the above.    Care coordination team will continue to follow and assist with a safe discharge plan.  India Diaz RN Care Coordinator  [facility]  [phone]

## 2024-02-15 NOTE — PLAN OF CARE
Goal Outcome Evaluation:      Plan of Care Reviewed With: patient, parent        A&O x 4, flat affect. VSS, O2 stable on RA. Reports 8/10 pain, prn oxycodone given x2, effective per pt report. Tolerating current diet, fluid restriction in place. Pt had 100 mL urine output during this shift. Reports intermittent nausea throughout shift, prn zofran given. Pt had episode of dizziness with nausea this evening, BP 99/70. Echo obtained this AM. Plan of care ongoing.

## 2024-02-16 NOTE — PLAN OF CARE
Goal Outcome Evaluation:       1900-0100    A&O x 4, flat affect. Withdrawn at times. At start of shift pt reported dizziness, nausea. NP aware. Bps softer B/P: 97/65, T: 97.7, P: 86, R: 18. Labs drawn- paged; see previous notes. Pt continue to report L flank pain radiating to the back, CT done- resulted. PRN tylenol given. Pt reported nausea/dizziness has since resolved. Currently on RA. Up A1. IV axb, has been coughing up green sputum. Tele NSR, prolonged QT. Anuria. Alarms on. Plan for dialysis AM, continue with plan of care.     2233- Upon reassessment Pts BP was 82/54; asymptomatic. Does not report dizziness or nausea at this time. Paged. Albumin given. Se RRT note.       Pt transferring to ICU; report given to Sai Red RN. Belongings remained with patient. Transferred patient on cart with RN.

## 2024-02-16 NOTE — CONSULTS
"BRIEF NUTRITION ASSESSMENT    REASON FOR ASSESSMENT:  Milly Carroll is a 23 year old female seen by Registered Dietitian for Nutrition Admission Risk Screen Received -   Have you recently lost weight without trying ? - \"unsure\"  Have you been eating poorly due to a decreased appetite ?- \"no\"    NUTRITION HISTORY:  Pt seen in room this morning. She was sitting up with a breakfast tray. Pt denies any recent change to her appetite and typically eats quite well.   No recent changes to her weight aside from fluid fluctuations related to dialysis. Pt has no questions or concerns.     HD - MWF    CURRENT DIET AND INTAKE:  Diet: Dialysis + FR 1800 mL            Pt ate majority of her breakfast tray this morning - pancakes, cereal, fruit.   Reports good appetite.     ANTHROPOMETRICS:  Height: 5' 2\"  Weight:  108 lbs 8 oz (49.2 kg)   Body mass index is 19.84 kg/m .   Weight Status: Normal BMI  IBW:  50 kg   %IBW: 98%  Weight History: current wt is consistent with those measured in October and December 2023. Suspect that high weights recorded are skewed w/ changes in fluid status.   Wt Readings from Last 10 Encounters:   02/16/24 49.2 kg (108 lb 8 oz)   02/14/24 49 kg (108 lb 1.6 oz)   02/07/24 51.7 kg (114 lb)   01/24/24 54.6 kg (120 lb 4.8 oz)   12/27/23 59.9 kg (132 lb)   12/26/23 59.9 kg (132 lb)   12/24/23 56.6 kg (124 lb 12.5 oz)   12/17/23 49 kg (108 lb)   10/25/23 49 kg (108 lb)   10/20/23 50.1 kg (110 lb 8 oz)       LABS:  Reviewed  BUN 26.1 (H), Cr 3.54 (H) - HD  Recent Labs   Lab 02/16/24  0509 02/16/24  0131 02/15/24  1942 02/15/24  1851 02/15/24  0615 02/14/24  1146   * 150* 299* 296* 73 72     MALNUTRITION:  Visual Nutrition Focused Physical Assessment (NFPA).   Patient does not meet two of the following criteria necessary for diagnosing malnutrition.     % Weight Loss:  Weight loss does not meet criteria for malnutrition - fluid related   % Intake:  No decreased intake noted  Subcutaneous Fat Loss:  None " observed  Muscle Loss:  None observed  Fluid Retention:  None noted    NUTRITION INTERVENTION:  Nutrition Diagnosis:  No nutrition diagnosis at this time.    Implementation:  Nutrition Education: Patient denies questions or concerns at this time. RD available for consult as needed.     FOLLOW UP/MONITORING:   Will re-evaluate in 7 - 10 days, or sooner, if re-consulted.    Dotty Leigh RD, LD  Pager: [phone]  Weekend Pager: [phone]

## 2024-02-16 NOTE — PROVIDER NOTIFICATION
MD Notification    Notified Person: MD    Notified Person Name: Karlos Hopson     Notification Date/Time: 2/15 2008    Notification Interaction: Vocera     Purpose of Notification: Taking over care for patient.. FYI CBC (WBC 20.1- prev 7.9) & blood gasses resulted. BP still softer last was 95/62- pt reports dizziness & nausea is still present but improving. Does scopolamine patch affect qt? Also do you think IMC orders are appropriate?     Orders Received: No IMC ordered needed at this time. CT pelvis ordered.     2158: FYI- CT resulted.

## 2024-02-16 NOTE — PLAN OF CARE
Goal Outcome Evaluation:      Plan of Care Reviewed With: patient    Overall Patient Progress: improving    Outcome Evaluation: Goal Outcome Evaluation:      Plan of Care Reviewed With: patient    Overall Patient Progress: improvingOverall Patient Progress: improving    Outcome Evaluation: A/Ox4, flat/withdrawn, Neuros intact, SR c occ PVCs, Soft BPs, MAP >65, Midodrine trialed, Solu cortef given, on RA, dim LS at bases, oin renal diet, fluid restriction 1800cc, wound at R arm, c purulent drainage, dime size, MDS aware, Plan: HD run today      Problem: Heart Failure  Goal: Optimal Coping  2/16/2024 0656 by Sai Sanchez, RN  Outcome: Progressing  2/16/2024 0649 by Sai Sanchez RN  Outcome: Progressing  Intervention: Support Psychosocial Response  Recent Flowsheet Documentation  Taken 2/16/2024 0400 by Sai Sanchez, RN  Supportive Measures:   active listening utilized   goal-setting facilitated   verbalization of feelings encouraged  Taken 2/16/2024 0145 by Sai Sanchez RN  Supportive Measures:   active listening utilized   goal-setting facilitated   verbalization of feelings encouraged  Goal: Optimal Cardiac Output  2/16/2024 0656 by Sai Sanchez, RN  Outcome: Progressing  2/16/2024 0649 by Sai Sanchez RN  Outcome: Progressing  Intervention: Optimize Cardiac Output  Recent Flowsheet Documentation  Taken 2/16/2024 0400 by Sai Sanchez, RN  Environmental Support:   calm environment promoted   rest periods encouraged   distractions minimized  Taken 2/16/2024 0145 by Sai Sanchez, RN  Environmental Support:   calm environment promoted   rest periods encouraged   distractions minimized  Goal: Stable Heart Rate and Rhythm  2/16/2024 0656 by Sai Sanchez, RN  Outcome: Progressing  2/16/2024 0649 by Sai Sanchez, RN  Outcome: Progressing  Goal: Optimal Functional Ability  2/16/2024 0656 by Sai Sanchez, RN  Outcome: Progressing  2/16/2024 0649 by Laura  Sai BAIG, RN  Outcome: Progressing  Intervention: Optimize Functional Ability  Recent Flowsheet Documentation  Taken 2/16/2024 0400 by Sai Sanchez, RN  Activity Management:   activity adjusted per tolerance   activity encouraged   ambulated to bathroom   back to bed  Taken 2/16/2024 0145 by Sai Sanchez, RN  Activity Management:   activity adjusted per tolerance   activity encouraged   ambulated to bathroom   back to bed  Goal: Fluid and Electrolyte Balance  2/16/2024 0656 by Sai Sanchez, RN  Outcome: Progressing  2/16/2024 0649 by Sai Sanchez RN  Outcome: Progressing  Goal: Improved Oral Intake  2/16/2024 0656 by Sai Sanchez, RN  Outcome: Progressing  2/16/2024 0649 by Sai Sanchez RN  Outcome: Progressing  Goal: Effective Oxygenation and Ventilation  2/16/2024 0656 by Sai Sanchez, RN  Outcome: Progressing  2/16/2024 0649 by Sai Sanchez, RN  Outcome: Progressing  Intervention: Promote Airway Secretion Clearance  Recent Flowsheet Documentation  Taken 2/16/2024 0400 by Sai Sanchez RN  Breathing Techniques/Airway Clearance: pursed-lip breathing encouraged  Cough And Deep Breathing: done independently per patient  Activity Management:   activity adjusted per tolerance   activity encouraged   ambulated to bathroom   back to bed  Taken 2/16/2024 0145 by Sai Sanchez RN  Breathing Techniques/Airway Clearance: pursed-lip breathing encouraged  Cough And Deep Breathing: done independently per patient  Activity Management:   activity adjusted per tolerance   activity encouraged   ambulated to bathroom   back to bed  Intervention: Optimize Oxygenation and Ventilation  Recent Flowsheet Documentation  Taken 2/16/2024 0400 by Sai Sanchez, RN  Airway/Ventilation Management:   calming measures promoted   pulmonary hygiene promoted  Head of Bed (HOB) Positioning: HOB at 20-30 degrees  Taken 2/16/2024 0145 by Sai Sanchez, RN  Airway/Ventilation  Management:   calming measures promoted   pulmonary hygiene promoted  Head of Bed (HOB) Positioning: HOB at 20-30 degrees  Goal: Effective Breathing Pattern During Sleep  2/16/2024 0656 by Sai Sanchez, RN  Outcome: Progressing  2/16/2024 0649 by Sai Sanchez, RN  Outcome: Progressing  Intervention: Monitor and Manage Obstructive Sleep Apnea  Recent Flowsheet Documentation  Taken 2/16/2024 0400 by Sai Sanchez, RN  Medication Review/Management: medications reviewed  Taken 2/16/2024 0145 by Sai Sanchez, RN  Medication Review/Management: medications reviewed

## 2024-02-16 NOTE — PROVIDER NOTIFICATION
MD Notification    Notified Person: Dr. Faustin    Notification Date/Time: 2/15/24 1826    Notification Interaction: Steve     Purpose of Notification: Pt here with CHF, influenza B, lupus, ERD on dialysis. C/o nausea, zofran ineffective. Pt reports dizziness, BP 94/64. Could we try compazine?     Orders Received:    Comments:

## 2024-02-16 NOTE — PHARMACY-VANCOMYCIN DOSING SERVICE
"Pharmacy Vancomycin Initial Note  Date of Service 2024  Patient's  2000  23 year old, female    Indication: Sepsis    Current estimated CrCl = Estimated Creatinine Clearance: 21.7 mL/min (A) (based on SCr of 3.1 mg/dL (H)).    Creatinine for last 3 days  2024: 11:46 AM Creatinine 2.24 mg/dL  2/15/2024:  6:15 AM Creatinine 1.96 mg/dL;  7:42 PM Creatinine 3.10 mg/dL    Recent Vancomycin Level(s) for last 3 days  No results found for requested labs within last 3 days.      Vancomycin IV Administrations (past 72 hours)        No vancomycin orders with administrations in past 72 hours.                    Nephrotoxins and other renal medications (From now, onward)      Start     Dose/Rate Route Frequency Ordered Stop    24 0030  vancomycin (VANCOCIN) 1,000 mg in 200 mL dextrose intermittent infusion         1,000 mg  200 mL/hr over 1 Hours Intravenous ONCE 24 0028      24 0028  vancomycin place abdullahi - receiving intermittent dosing         1 each Intravenous SEE ADMIN INSTRUCTIONS 24 0028      02/15/24 1600  [Held by provider]  bumetanide (BUMEX) tablet 1 mg        (On hold since yesterday at 1916 until manually unheld; held by Karlos Hopson APRN CNPHold Reason: Change in Vitals)    1 mg Oral 2 TIMES DAILY (Diuretics and Nitrates) 02/15/24 1133      02/15/24 0900  vancomycin (VANCOCIN) capsule 125 mg         125 mg Oral 2 TIMES DAILY 02/15/24 0135      24 1900  piperacillin-tazobactam (ZOSYN) 2.25 g vial to attach to  ml bag        Note to Pharmacy: For [facility], [facility] and [facility]: For Zosyn-naive patients, use the \"Zosyn initial dose + extended infusion\" order panel.    2.25 g  over 30 Minutes Intravenous EVERY 6 HOURS 24 1835              Contrast Orders - past 72 hours (72h ago, onward)      Start     Dose/Rate Route Frequency Stop    02/15/24 1200  perflutren diluted 1mL to 2mL with saline (OPTISON) diluted injection 9 mL         9 mL Intravenous ONCE " 02/15/24 1132                 Plan:  Start vancomycin  intermittent dosing, give 1000 mg IV once.   Vancomycin monitoring method: Trough (Method 2 = manual dose calculation)  Vancomycin therapeutic monitoring goal: 15-20 mg/L  Pharmacy will check vancomycin levels as appropriate in 1-3 Days.    Serum creatinine levels will be ordered daily for the first week of therapy and at least twice weekly for subsequent weeks.      Akil Aliheyder, [facility]

## 2024-02-16 NOTE — CONSULTS
[facility]    Infectious Disease Consultation     Date of Admission:  2/14/2024  Date of Consult (When I saw the patient): 02/16/24    Assessment & Plan   Milly Carroll is a 23 year old who was admitted on 2/14/2024.     Impression:  24 yo complicated patient with complex past medical history including lupus with lupus flareups and lupus cerebritis and nephritis, hypertension, chronic systolic heart failure, CHRISTA on CKD on dialysis since January 2024, history of perinephric hematoma status postembolization in August 2023, recent C. difficile colitis on oral vancomycin   Admitted from cardiology clinic for shortness of breath and tachycardia on 2/14, then transferred to the ICU early in the morning of 2/16 for refractory hypotension in the setting of influenza B.   Started on broad spectrum antibiotics for concern for infection though no new concern already known influenza, c diff  Pending blood cultures   On chronic steroids and bactrim for prophylaxis     Recommendations:   Though currently on broad spectrum antibiotics given recent C diff if cultures negative other consider stopping antibiotics soon   Continue on oral vanco ( on prophylaxis dosing)   Continue PTA Bactrim   Continue on tamiflu ( renal dose)   Will follow       Kip Resendez MD    Reason for Consult   Reason for consult: I was asked to evaluate this patient for resp failure though now on room air, influenza .    Primary Care Physician   Ashlie Skelton    Chief Complaint   Shortness of breath     History is obtained from the patient and medical records    History of Present Illness   Milly Carroll is a 23 year old female who presents with shortness of breath     Past Medical History   I have reviewed this patient's medical history and updated it with pertinent information if needed.   Past Medical History:   Diagnosis Date    Hepatitis     History of blood transfusion June 21st, 2023    i had a flare up that made the doctors do a  blood transfusion because i was low on blood.    Hypertension     Lupus (systemic lupus erythematosus) (H)     Lupus cerebritis (H)     Pancreatitis 08/2017    Pyelonephritis 08/2017    Seizures (H)     Status epilepticus (H)        Past Surgical History   I have reviewed this patient's surgical history and updated it with pertinent information if needed.  Past Surgical History:   Procedure Laterality Date    BIOPSY      i dont know the exact date but i had a kidney biopsy    IR CVC TUNNEL PLACEMENT > 5 YRS OF AGE  01/10/2024    IR RENAL ANGIOGRAM LEFT  08/11/2023    IR RENAL BIOPSY LEFT  06/28/2022    NO HISTORY OF SURGERY      ORTHOPEDIC SURGERY         Prior to Admission Medications   Prior to Admission Medications   Prescriptions Last Dose Informant Patient Reported? Taking?   NIFEdipine ER (ADALAT CC) 60 MG 24 hr tablet 2/14/2024  No Yes   Sig: Take 1 tablet (60 mg) by mouth daily for 90 days   Vitamin D, Cholecalciferol, 10 MCG (400 UNIT) TABS 2/14/2024  No Yes   Sig: Take 10 mcg by mouth daily for 90 days   acetaminophen (TYLENOL) 325 MG tablet  Other, Self No Yes   Sig: Take 2 tablets (650 mg) by mouth every 4 hours as needed for mild pain   bumetanide (BUMEX) 2 MG tablet 2/14/2024  No Yes   Sig: Take 1 tablet (2 mg) by mouth 2 times daily for 90 days   calcium carbonate 500 mg, elemental, (OSCAL) 500 MG tablet 2/14/2024  No Yes   Sig: Take 1 tablet (500 mg) by mouth 3 times daily (with meals) for 90 days   carvedilol (COREG) 25 MG tablet 2/14/2024  No Yes   Sig: Take 1 tablet (25 mg) by mouth 2 times daily (with meals) for 90 days   folic acid (FOLVITE) 1 MG tablet 2/14/2024  No Yes   Sig: Take 1 tablet (1 mg) by mouth daily for 90 days   multivitamin RENAL (RENAVITE RX/NEPHROVITE) 1 tablet tablet 2/14/2024  No Yes   Sig: Take 1 tablet by mouth daily for 90 days   ondansetron (ZOFRAN ODT) 4 MG ODT tab   No Yes   Sig: Take 1 tablet (4 mg) by mouth every 8 hours as needed for nausea   pantoprazole (PROTONIX)  40 MG EC tablet 2/14/2024  No Yes   Sig: Take 1 tablet (40 mg) by mouth every morning (before breakfast) for 90 days   predniSONE (DELTASONE) 20 MG tablet 2/14/2024  No Yes   Sig: Take 2 tablets (40 mg) by mouth daily for 7 days, THEN 1.5 tablets (30 mg) daily for 7 days, THEN 1 tablet (20 mg) daily for 7 days, THEN 0.5 tablets (10 mg) daily for 7 days.   Patient taking differently: 50 mg by mouth daily   sulfamethoxazole-trimethoprim (BACTRIM) 400-80 MG tablet 2/13/2024  No Yes   Sig: Take 1 tablet by mouth three times a week (Tuesday, Thursday, Saturday)   vitamin B-12 (CYANOCOBALAMIN) 100 MCG tablet 2/14/2024  No Yes   Sig: Take 1 tablet (100 mcg) by mouth daily for 90 days      Facility-Administered Medications: None     Allergies   Allergies   Allergen Reactions    Rituximab Anaphylaxis and Shortness Of Breath       Immunization History   Immunization History   Administered Date(s) Administered    COVID-19 MONOVALENT 12+ (Pfizer) 10/08/2021, 10/30/2021    Comvax (HIB/HepB) 02/22/2002    DTAP (<7y) 02/22/2002, 03/12/2008    DTaP / Hep B / IPV 04/20/2007, 09/07/2007    HEPATITIS A (PEDS 12M-18Y) 04/20/2007, 11/02/2007    HPV9 01/27/2017    Influenza (IIV3) PF 10/03/2014    Influenza Vaccine >6 months,quad, PF 11/01/2013, 10/23/2015, 09/19/2017, 10/03/2017, 10/02/2019, 10/21/2020, 12/15/2021    MMR 09/07/2007    MMR/V 04/20/2007    Meningococcal ACWY (Menactra ) 05/03/2014    Meningococcal ACWY (Menveo ) 05/03/2014    Pneumococcal (PCV 7) 02/22/2002    Pneumococcal 23 valent 09/19/2017    Polio, Unspecified  02/22/2002    Poliovirus, inactivated (IPV) 02/22/2002, 11/02/2007    TDAP Vaccine (Boostrix) 05/03/2014    Varicella 11/02/2007       Social History   I have reviewed this patient's social history and updated it with pertinent information if needed. Milly Carroll  reports that she has never smoked. She has never been exposed to tobacco smoke. She quit smokeless tobacco use about 14 months ago. She reports  "that she does not drink alcohol and does not use drugs.    Family History   I have reviewed this patient's family history and updated it with pertinent information if needed.   Family History   Problem Relation Age of Onset    Other - See Comments Father         Question of lupus in father and paternal grandfather    Lupus Sister         older sister    Genetic Disorder Sister         tolu also has lupus but she has no insurance to get checked out    Hypertension Other         he has cancer    Gastrointestinal Disease No family hx of         No known history of IBD, hepatitis, pancreatitis, celiac disease, or GI cancers before age 50    Glaucoma No family hx of     Macular Degeneration No family hx of        Review of Systems   The 10 point Review of Systems is negative    Physical Exam   Temp: 97.6  F (36.4  C) Temp src: Oral BP: 97/62 Pulse: 83   Resp: 20 SpO2: 93 % O2 Device: None (Room air)    Vital Signs with Ranges  Temp:  [97.2  F (36.2  C)-98  F (36.7  C)] 97.6  F (36.4  C)  Pulse:  [60-98] 83  Resp:  [12-20] 20  BP: ()/() 97/62  SpO2:  [93 %-100 %] 93 %  108 lbs 8 oz  Body mass index is 19.84 kg/m .    GENERAL APPEARANCE:  sleeping   EYES: Eyes grossly normal to inspection  NECK: no adenopathy  RESP: lungs clear   CV: regular rates and rhythm  LYMPHATICS: normal ant/post cervical and supraclavicular nodes  ABDOMEN: soft, nontender  MS: extremities normal  SKIN: no suspicious lesions or rashes        Data   All laboratory and imaging data in the past 24 hours reviewed  No results for input(s): \"CULT\" in the last 168 hours.  Recent Labs   Lab Test 02/10/21  1405 02/10/21  1216 01/13/21  1043 10/09/20  1751 06/25/20  1835 12/10/19  1729 12/10/19  1524 09/18/19  1100 08/07/19  1342   CULT 50,000 to 100,000 colonies/mL  Escherichia coli  *  10,000 to 50,000 colonies/mL  Candida albicans  Susceptibility testing not routinely done  * No growth No growth  Moderate growth  Staphylococcus aureus  * " <10,000 colonies/mL  mixed urogenital fred   <10,000 colonies/mL  mixed urogenital fred   50,000 to 100,000 colonies/mL  Escherichia coli  *  <10,000 colonies/mL  mixed urogenital fred  Susceptibility testing not routinely done   No growth Moderate growth  Staphylococcus aureus  *  Light growth  Enterobacter cloacae complex  *  Light growth  Normal skin fred   50,000 to 100,000 colonies/mL  mixed urogenital fred  Susceptibility testing not routinely done            All cultures:  Recent Labs   Lab 02/14/24  1149 02/14/24  1146   CULTURE No growth after 1 day No growth after 1 day      Blood culture:  Results for orders placed or performed during the hospital encounter of 02/14/24   Blood Culture Hand, Left    Specimen: Hand, Left; Blood   Result Value Ref Range    Culture No growth after 1 day    Blood Culture Arm, Left    Specimen: Arm, Left; Blood   Result Value Ref Range    Culture No growth after 1 day    Results for orders placed or performed during the hospital encounter of 01/02/24   Blood Culture Central Venous Line    Specimen: Central Venous Line; Blood   Result Value Ref Range    Culture No Growth    Blood Culture Artery    Specimen: Artery; Blood   Result Value Ref Range    Culture No Growth    Results for orders placed or performed during the hospital encounter of 12/26/23   Blood Culture Line, venous    Specimen: Line, venous; Blood   Result Value Ref Range    Culture No Growth    Blood Culture Line, venous    Specimen: Line, venous; Blood   Result Value Ref Range    Culture No Growth    Results for orders placed or performed during the hospital encounter of 12/19/23   Blood Culture Wrist, Right    Specimen: Wrist, Right; Blood   Result Value Ref Range    Culture No Growth    Blood Culture Hand, Left    Specimen: Hand, Left; Blood   Result Value Ref Range    Culture No Growth    Results for orders placed or performed during the hospital encounter of 01/22/23   Blood Culture Arm, Right     Specimen: Arm, Right; Blood   Result Value Ref Range    Culture No Growth    Blood Culture Hand, Left    Specimen: Hand, Left; Blood   Result Value Ref Range    Culture No Growth    Results for orders placed or performed during the hospital encounter of 12/14/22   Blood Culture Arm, Left    Specimen: Arm, Left; Blood   Result Value Ref Range    Culture No Growth    Blood Culture Hand, Right    Specimen: Hand, Right; Blood   Result Value Ref Range    Culture No Growth    Results for orders placed or performed during the hospital encounter of 06/11/22   Blood Culture Peripheral Blood    Specimen: Peripheral Blood   Result Value Ref Range    Culture No Growth    Blood Culture Peripheral Blood    Specimen: Peripheral Blood   Result Value Ref Range    Culture No Growth    Results for orders placed or performed during the hospital encounter of 04/25/22   Blood Culture Peripheral Blood    Specimen: Peripheral Blood   Result Value Ref Range    Culture No Growth    Results for orders placed or performed during the hospital encounter of 02/10/21   Blood culture, one site    Specimen: Throat; Blood    Unspecified Site   Result Value Ref Range    Specimen Description Blood Unspecified Site     Special Requests Received in aerobic bottle only     Culture Micro No growth    Results for orders placed or performed during the hospital encounter of 01/13/21   Blood culture, one site    Specimen: Blood, Venous    Left Arm   Result Value Ref Range    Specimen Description Blood Left Arm     Special Requests Received in aerobic bottle only     Culture Micro No growth    Results for orders placed or performed during the hospital encounter of 12/10/19   Blood culture, one site    Specimen: Arm, Left; Blood    Left Arm   Result Value Ref Range    Specimen Description Blood Left Arm     Special Requests Received in aerobic bottle only     Culture Micro No growth    Results for orders placed or performed during the hospital encounter of  08/17/17   Blood culture    Specimen: Blood    Right Hand   Result Value Ref Range    Specimen Description Blood Right Hand     Culture Micro No growth    Blood culture    Specimen: Blood    Unspecified Site   Result Value Ref Range    Specimen Description Blood Unspecified Site     Culture Micro No growth    Blood culture    Specimen: Blood    Right Arm   Result Value Ref Range    Specimen Description Blood Right Arm     Culture Micro No growth    Results for orders placed or performed in visit on 08/16/17   Blood culture    Specimen: Blood    RARM   Result Value Ref Range    Specimen Description Blood RARM     Culture Micro No growth    Results for orders placed or performed during the hospital encounter of 05/11/13   Blood culture    Specimen: White Lumen; Blood   Result Value Ref Range    Specimen Description Blood PICC     Culture Micro No growth     Micro Report Status FINAL 05/22/2013    Blood culture    Specimen: Arm, Left; Blood   Result Value Ref Range    Specimen Description Blood Left Arm     Culture Micro No growth     Micro Report Status FINAL 05/17/2013       Urine culture:  Results for orders placed or performed during the hospital encounter of 12/17/23   Urine Culture    Specimen: Urine, Midstream   Result Value Ref Range    Culture 10,000-50,000 CFU/mL Escherichia coli (A)        Susceptibility    Escherichia coli - SHAY     Amoxicillin/Clavulanate <=8 Susceptible      Ampicillin <=8 Susceptible      Ampicillin/ Sulbactam <=8 Susceptible      Aztreonam <=4 Susceptible      Cefazolin <=2 Susceptible      Cefepime <=2 Susceptible      Ceftazidime <=1 Susceptible      Ceftriaxone <=1 Susceptible      Cefoxitin <=8 Susceptible      Ciprofloxacin*         * Ciprofloxacin not resistant.  Due to test limitations, lab cannot provide SHAY to determine susceptibility.     Ertapenem <=0.5 Susceptible      Gentamicin <=4 Susceptible      Imipenem <=1 Susceptible      Levofloxacin*         * Levofloxacin not  resistant.  Due to test limitations, lab cannot provide SHAY to determine susceptibility.     Nitrofurantoin <=32 Susceptible      Piperacillin/Tazobactam*         * Piperacillin/Tazobactam not resistant.  Due to test limitations, lab cannot provide SHAY to determine susceptibility.     Tetracycline <=4 Susceptible      Tobramycin <=4 Susceptible      Trimethoprim/Sulfamethoxazole >2/38 Resistant      Cefpodoxime <=2 Susceptible      Cefuroxime <=4 Susceptible      Trimethoprim >8 Resistant    Results for orders placed or performed during the hospital encounter of 12/14/22   Urine Culture    Specimen: Urine, Clean Catch   Result Value Ref Range    Culture <10,000 CFU/mL Urogenital fred    Urine Culture    Specimen: Urine, Clean Catch   Result Value Ref Range    Culture <10,000 CFU/mL Urogenital fred    Results for orders placed or performed in visit on 06/15/22   Urine Culture    Specimen: Urine, NOS   Result Value Ref Range    Culture No Growth    Results for orders placed or performed during the hospital encounter of 06/11/22   Urine Culture    Specimen: Urine, Midstream   Result Value Ref Range    Culture No Growth    Results for orders placed or performed during the hospital encounter of 04/25/22   Urine Culture    Specimen: Urine, Midstream   Result Value Ref Range    Culture 50,000-100,000 CFU/mL Mixture of urogenital fred    Results for orders placed or performed during the hospital encounter of 02/10/21   Urine Culture Aerobic Bacterial    Specimen: Unspecified Urine   Result Value Ref Range    Specimen Description Unspecified Urine     Culture Micro 50,000 to 100,000 colonies/mL  Escherichia coli   (A)     Culture Micro (A)      10,000 to 50,000 colonies/mL  Candida albicans  Susceptibility testing not routinely done         Susceptibility    Escherichia coli - SHAY     Ampicillin >=32 Resistant ug/mL     Cefoxitin <=4 Susceptible ug/mL     Ciprofloxacin <=0.25 Susceptible ug/mL     Levofloxacin <=0.12  Susceptible ug/mL     Nitrofurantoin <=16 Susceptible ug/mL     Trimethoprim/Sulfamethoxazole <=1/19 Susceptible ug/mL     Ampicillin/Sulbactam 16 Intermediate ug/mL     Piperacillin/Tazo <=4 Susceptible ug/mL     Cefazolin* <=4.0 Susceptible ug/mL      * Cefazolin SHAY breakpoints are for the treatment of uncomplicated urinary tract infections.  For the treatment of systemic infections, please contact the laboratory for additional testing.     Ceftazidime <=0.5 Susceptible ug/mL     Ceftriaxone <=1.0 Susceptible ug/mL     Gentamicin 1.0 Susceptible ug/mL     Tobramycin 8.0 Intermediate ug/mL     Cefepime <=0.25 Susceptible ug/mL   Results for orders placed or performed during the hospital encounter of 10/09/20   Urine Culture    Specimen: Urine Midstream; Unspecified Urine   Result Value Ref Range    Specimen Description Unspecified Urine     Culture Micro <10,000 colonies/mL  mixed urogenital fred      Results for orders placed or performed during the hospital encounter of 06/23/20   Urine Culture Aerobic Bacterial    Specimen: Urine clean catch; Midstream Urine   Result Value Ref Range    Specimen Description Midstream Urine     Special Requests Specimen received in preservative     Culture Micro <10,000 colonies/mL  mixed urogenital fred      Results for orders placed or performed during the hospital encounter of 12/10/19   Urine Culture    Specimen: Urine clean catch; Midstream Urine   Result Value Ref Range    Specimen Description Midstream Urine     Special Requests Specimen received in preservative     Culture Micro 50,000 to 100,000 colonies/mL  Escherichia coli   (A)     Culture Micro       <10,000 colonies/mL  mixed urogenital fred  Susceptibility testing not routinely done         Susceptibility    Escherichia coli - SHAY     Ampicillin <=2 Susceptible ug/mL     Cefazolin* <=4 Susceptible ug/mL      * Cefazolin SHAY breakpoints are for the treatment of uncomplicated urinary tract infections.  For the  treatment of systemic infections, please contact the laboratory for additional testing.     Cefoxitin <=4 Susceptible ug/mL     Ceftazidime <=1 Susceptible ug/mL     Ceftriaxone <=1 Susceptible ug/mL     Ciprofloxacin <=0.25 Susceptible ug/mL     Gentamicin <=1 Susceptible ug/mL     Levofloxacin <=0.12 Susceptible ug/mL     Nitrofurantoin <=16 Susceptible ug/mL     Tobramycin <=1 Susceptible ug/mL     Trimethoprim/Sulfamethoxazole <=1/19 Susceptible ug/mL     Ampicillin/Sulbactam <=2 Susceptible ug/mL     Piperacillin/Tazo <=4 Susceptible ug/mL     Cefepime <=1 Susceptible ug/mL   Results for orders placed or performed in visit on 08/28/19   Urine Culture Aerobic Bacterial    Specimen: Midstream Urine   Result Value Ref Range    Specimen Description Midstream Urine     Special Requests Specimen received in preservative     Culture Micro       50,000 to 100,000 colonies/mL  mixed urogenital fred  Susceptibility testing not routinely done     Results for orders placed or performed during the hospital encounter of 02/08/18   Urine Culture Aerobic Bacterial    Specimen: Midstream Urine   Result Value Ref Range    Specimen Description Midstream Urine     Culture Micro 10,000 to 50,000 colonies/mL  Escherichia coli   (A)     Culture Micro       <10,000 colonies/mL  urogenital fred  Susceptibility testing not routinely done         Susceptibility    Escherichia coli - SHAY     Ampicillin <=2 Susceptible ug/mL     Cefazolin* <=4 Susceptible ug/mL      * Cefazolin SHAY breakpoints are for the treatment of uncomplicated urinary tract infections.  For the treatment of systemic infections, please contact the laboratory for additional testing.     Cefoxitin <=4 Susceptible ug/mL     Ceftazidime <=1 Susceptible ug/mL     Ceftriaxone <=1 Susceptible ug/mL     Ciprofloxacin <=0.25 Susceptible ug/mL     Gentamicin <=1 Susceptible ug/mL     Levofloxacin <=0.12 Susceptible ug/mL     Nitrofurantoin <=16 Susceptible ug/mL     Tobramycin  <=1 Susceptible ug/mL     Trimethoprim/Sulfamethoxazole <=1/19 Susceptible ug/mL     Ampicillin/Sulbactam <=2 Susceptible ug/mL     Piperacillin/Tazo <=4 Susceptible ug/mL     Cefepime <=1 Susceptible ug/mL   Results for orders placed or performed in visit on 08/16/17   Urine Culture Aerobic Bacterial    Specimen: Unspecified Urine   Result Value Ref Range    Specimen Description Unspecified Urine     Special Requests Specimen received in preservative     Culture Micro >100,000 colonies/mL  Escherichia coli   (A)        Susceptibility    Escherichia coli - SHAY     Ampicillin <=2 Susceptible ug/mL     Cefazolin* <=4 Susceptible ug/mL      * Cefazolin SHAY breakpoints are for the treatment of uncomplicated urinary tract infections.  For the treatment of systemic infections, please contact the laboratory for additional testing.     Cefoxitin <=4 Susceptible ug/mL     Ceftazidime <=1 Susceptible ug/mL     Ceftriaxone <=1 Susceptible ug/mL     Ciprofloxacin <=0.25 Susceptible ug/mL     Gentamicin <=1 Susceptible ug/mL     Levofloxacin <=0.12 Susceptible ug/mL     Nitrofurantoin <=16 Susceptible ug/mL     Tobramycin <=1 Susceptible ug/mL     Trimethoprim/Sulfamethoxazole <=1/19 Susceptible ug/mL     Ampicillin/Sulbactam <=2 Susceptible ug/mL     Piperacillin/Tazo <=4 Susceptible ug/mL     Cefepime <=1 Susceptible ug/mL   Results for orders placed or performed during the hospital encounter of 05/11/13   Urine culture (Bacterial)    Specimen: Urine clean catch   Result Value Ref Range    Specimen Description Midstream Urine     Special Requests Specimen received in preservative     Culture Micro No growth     Micro Report Status FINAL 05/29/2013    Urine culture (Bacterial)    Specimen: Urine catheter   Result Value Ref Range    Specimen Description Catheterized Urine     Culture Micro No growth     Micro Report Status FINAL 05/17/2013

## 2024-02-16 NOTE — PHARMACY-VANCOMYCIN DOSING SERVICE
"Pharmacy Vancomycin Note  Date of Service 2024  Patient's  2000   23 year old, female    Indication: Sepsis  Day of Therapy: 1  Current vancomycin regimen:  Per levels  Current vancomycin monitoring method: Renal Replacement Therapy  Current vancomycin therapeutic monitoring goal: 15-20 mg/L    Current estimated CrCl = Estimated Creatinine Clearance: 19.4 mL/min (A) (based on SCr of 3.54 mg/dL (H)).    Creatinine for last 3 days  2024: 11:46 AM Creatinine 2.24 mg/dL  2/15/2024:  6:15 AM Creatinine 1.96 mg/dL;  7:42 PM Creatinine 3.10 mg/dL  2024:  5:09 AM Creatinine 3.54 mg/dL    Recent Vancomycin Levels (past 3 days)  2024:  5:09 AM Vancomycin 33.9 ug/mL    Vancomycin IV Administrations (past 72 hours)                     vancomycin (VANCOCIN) 1,000 mg in 200 mL dextrose intermittent infusion (mg) 1,000 mg New Bag 24 0153                    Nephrotoxins and other renal medications (From now, onward)      Start     Dose/Rate Route Frequency Ordered Stop    24 0028  vancomycin place abdullahi - receiving intermittent dosing         1 each Intravenous SEE ADMIN INSTRUCTIONS 24 0028      02/15/24 1600  [Held by provider]  bumetanide (BUMEX) tablet 1 mg        (On hold since yesterday at 1916 until manually unheld; held by Karlos Hopson APRN CNPHold Reason: Change in Vitals)    1 mg Oral 2 TIMES DAILY (Diuretics and Nitrates) 02/15/24 1133      02/15/24 0900  vancomycin (VANCOCIN) capsule 125 mg         125 mg Oral 2 TIMES DAILY 02/15/24 0135      24 1900  piperacillin-tazobactam (ZOSYN) 2.25 g vial to attach to  ml bag        Note to Pharmacy: For [facility], [facility] and [facility]: For Zosyn-naive patients, use the \"Zosyn initial dose + extended infusion\" order panel.    2.25 g  over 30 Minutes Intravenous EVERY 6 HOURS 24 1835                 Contrast Orders - past 72 hours (72h ago, onward)      Start     Dose/Rate Route Frequency Stop    02/15/24 1200  perflutren " diluted 1mL to 2mL with saline (OPTISON) diluted injection 9 mL         9 mL Intravenous ONCE 02/15/24 1132            Interpretation of levels and current regimen:  Vancomycin level is reflective of supratherapeutic level - pt just got her dose 3.25 hours prior    Has serum creatinine changed greater than 50% in last 72 hours: No    Urine output:  anuric    Renal Function: ESRD on Dialysis    Plan:  Re-Check level in AM  Vancomycin monitoring method: Renal Replacement Therapy  Vancomycin therapeutic monitoring goal: 15-20 mg/L  Pharmacy will check vancomycin levels as appropriate in  in AM .  Serum creatinine levels will be ordered a minimum of twice weekly. - pt in ESRD on HD, expect scr to be elevated.    Mary Draper, [facility], PharmD

## 2024-02-16 NOTE — PROGRESS NOTES
[facility]    Medicine Progress Note - Hospitalist Service    Date of Admission:  2/14/2024    Assessment & Plan   Milly Carroll is a 23 year old female admitted on 2/14/2024.   Milly Carroll is a 23 year old female with complex past medical history of lupus with lupus flareups complicated with lupus cerebritis lupus nephritis, hypertension, chronic systolic heart failure, CHRISTA on CKD, now on hemodialysis since January 2024, history of perinephric hematoma s/p embolization in August 2023, history of pneumonia, C. difficile colitis who was sent from cardiology clinic for further evaluation of shortness of breath and tachycardia.     # Shortness of breath, multifactorial slow improvement  # Suspected acute on chronic systolic heart failure exacerbation  *she has a history of chronic heart failure with a reduced EF ((LVEF down to 20-25% in early August 2023, recovered to around 40% in late August 2023, back down to 15-20% in December 2023, then up to 35-40% on 1/7/2024).    *Echo 1/7/2024  Left ventricular function is decreased. The ejection fraction is 35-40% (moderately reduced).  The right ventricle is normal size.  Global right ventricular function is mildly reduced.  Moderate mitral insufficiency is present (functional ).  Moderate tricuspid insufficiency is present (functional).  Pulmonary hypertension is present     -Her severe cardiomyopathy felt to be nonischemic-Takotsubo/stress-induced versus hypertensive cardiomyopathy.  -She is also on hemodialysis, recently initiated in January 2024  -Did not miss dialysis, last dialysis was on Monday  -Reports that she started feeling short of breath last night  -Still making good urine, PTA on Bumex 2 mg p.o. twice daily; reports being compliant with her medications  -She went to cardiology clinic today and she was advised to come to ER for further evaluation given her shortness of breath and tachycardia  -proBNP in the ER elevated more than  "70,000  -Chest x-ray- Stable appearance of right-sided central venous line with tip overlying the right atrium. Bilateral retrocardiac opacities are present suggestive of pulmonary edema versus atypical pneumonia.  -EKG- sinus tachycardia at 125bpm  -Lactic acid elevated at 4 initially, improved to 2.1 after hemodialysis  -Went from ER to hemodialysis  -ER attending discussed with cardiology on-call  -Admitted to inpatient   -Monitor on telemetry  -Hold PTA Bumex 2 mg p.o. twice daily  -Hold PTA Coreg 25 mg p.o. twice daily and nifedipine 60 mg p.o. daily  -Strict input and output, daily weights  -Fluid restriction 1800 cc/day    -Echo completed as below   there is borderline concentric left ventricular hypertrophy.  Borderline \"non compaction\" of LV noted  Diastolic function parameters were not measured on this limited echo.  ABNORMAL GLOBAL LONGITUDINAL STRAIN- negative 11.6 with APICAL SPARING- this  can be seen with amyloid, oxalosis, Fabry's and other cardiomyopathies  There is moderate global hypokinesia of the left ventricle.  Aortic regurgitation seen on echo 1-7-24 not seen today but this is a limited  echo  Small posterior pericardial effusion  There is no pericardial constriction.  The visual ejection fraction is 35-40%.  -Cardiology consult, awaiting for the recommendations s/p episode of hypotension on 2/15     # Influenza B  -She is tested positive for influenza B, tested negative for COVID 19, influenza A and RSV (of note she had a recent COVID-19 infection)  -Chest x-ray with evidence of pulmonary edema versus atypical pneumonia  -Patient does have mild cough with expectoration  -Had elevated white blood cells of 12.2 on admission   -Given Tamiflu 30 mg p.o. in ER  -Continue with Tamiflu 30 mg p.o. twice daily, given end-stage renal disease  -Droplet precautions  -Supportive management     # Elevated lactic acid  # Rule out sepsis  -Lactic acid initially 4 in ER, improved to 2.1  -White blood cells " 12.2, improved to 7.9  -Chest x-ray with possible atypical pneumonia versus pulmonary congestion with ongoing cough with mild expectoration  -UA negative  -Given elevated lactic acid and complex underlying medical history with multiple recent hospitalizations, 1 dose of Zosyn given in ER  -No IV fluids given because of concern of CHF on admission  -Lactic acid improved to 2.1 after hemodialysis  -Persistent elevation in WBC count  -Consult to ID  -Continue IV vancomycin, IV Zosyn awaiting cultures  -Manage antibiotic regimen per ID     # End-stage renal disease, on hemodialysis  -Has history of lupus nephritis, with recent CHRISTA and recent initiation of hemodialysis in January 2024  -Hemodialysis kaudqvzv-Nulrqq-Cfamlceko-Friday  -Reports being compliant with hemodialysis  -Last hemodialysis on Monday, had hemodialysis today after admission  -Nephrology consult     # SLE (+dsDNA, +RNP, +ribosomal P Ab and hypocomplementemia)   #Recent Lupus Flare w/ MAHA and renal insuff s/p Cyclophosphamide dose (12/22/23)  # Hx of Lupus cerebritis w/ hx of seizures  # Hx Lupus Nephritis  # Hx medication non-adherence  -Follows with rheumatology at [facility]  -Currently on tapering dose of prednisone; she was initially started with 60 mg p.o. daily and was supposed to taper down but she states that she started her steroid course later then prescribed and currently she is on 50 mg p.o. daily-which was held on 2/15, transition to IV hydrocortisone due to concern for hypotension  - continue PTA Bactrim for PJP ppx while on steroids  -Follow-up with rheumatology at [facility]     # Uncontrolled hypertension currently stable.  -Has history of uncontrolled hypertension  -Currently on Coreg 25 mg p.o. twice daily, nifedipine 60 mg p.o. daily and Bumex 2 mg p.o. twice daily  -BP elevated in ER,  -On 2/15, patient had persistent hypotension which was not fluid responsive, with elevation in WBC count following which patient's  antibiotics were broadened to include IV vancomycin and Zosyn and all antihypertensive medications were held patient was transferred to ICU, started on p.o. midodrine 10 mg 3 times daily along with hydrocortisone IV  -On 2/16 patient's blood pressure continued to remain stable, continue to hold all antihypertensives at this time     # Tachycardia, improved  -She was tachycardic in ER with heart rate in 120s, unclear etiology  -States she is compliant with her medications including Coreg  -Possible related to CHF exacerbation, influenza B infection  -2/15 patient had an episode of hypotension, leading to holding all antihypertensive medications including Coreg     # Hx Lupus Cerebritis   # Hx remote generalized tonic-clonic seizures seizures  # Chronic Right foot drop, 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 No current PTA antiepileptic medications     # h/o recurrent C diff colitis  - h/o C diff in 6/2023, and again recently 1/2024  - was on po Vanco initially, switched to po Fidaximicin as per ID- finished course  -Patient initially started on IV Zosyn due to concern for elevated WBC, during which she was also started on p.o. vancomycin due to concern for recent C. difficile colitis  -On 2/15 patient had worsening hypotension with elevated WBC count hence patient was started on IV vancomycin consult to ID has been placed  -On 2/16 ID has been consulted, and awaiting blood cultures patient continued to remain on IV vancomycin IV Zosyn, oral vancomycin.             Diet: Fluid restriction 1800 ML FLUID  Renal Diet (dialysis)    DVT Prophylaxis: Pneumatic Compression Devices  Leung Catheter: Not present  Lines: None     Cardiac Monitoring: ACTIVE order. Indication: Acute decompensated heart failure (48 hours)  Code Status: Full Code      Clinically Significant Risk Factors              # Hypoalbuminemia: Lowest albumin = 2.7 g/dL at 2/15/2024  6:15 AM, will monitor as appropriate       # Chronic heart failure with reduced ejection fraction: last echo with EF <40%                  Disposition Plan    patient is not stable for discharge         Clarke Goodrich  Hospitalist Service  [facility]  Securely message with [employer] (more info)  Text page via [employer] Paging/Directory   ______________________________________________________________________    Interval History   Upon chart review overnight patient had an episode of symptomatic hypotension, with persistently low blood pressure, along with dizziness following which patient received IVF NS bolus and discussed with intensivist due to negative fluid response hence patient transferred to the ICU for close monitoring and intensivist was consulted.  Further workup was done which showed worsening leukocytosis than during the day IV vancomycin in addition to IV Zosyn, ID consult has been placed.  In the ICU patient was monitored closely started on p.o. midodrine, patient received 12.5 g of 5% albumin, Bumex and carvedilol have been on hold since last evening, patient started on hydrocortisone 50 mg IV every 6 hourly and midodrine 10 mg 3 times daily.  Patient was seen this morning is resting in bed, denies any dizziness, blood pressure continues to remain stable, no fevers reported overnight no significant chills nausea vomiting chest pain.  Patient did tolerate oral intake this morning.    Physical Exam   Vital Signs: Temp: 97.9  F (36.6  C) Temp src: Oral BP: 97/57 Pulse: 60   Resp: 20 SpO2: 99 % O2 Device: None (Room air)    Weight: 108 lbs 8 oz    Constitutional: Appears apathetic awake, alert, cooperative, no apparent distress  Respiratory: Clear to auscultation bilaterally, no crackles or wheezing  Cardiovascular: Regular rate and rhythm, normal S1 and S2, and no murmur noted  GI: Normal bowel sounds, soft, non-distended, non-tender  Skin/Integumen: No rashes, no cyanosis, no edema  Other: Alert oriented x 3    Medical  Decision Making

## 2024-02-16 NOTE — PROGRESS NOTES
MICU Progress Note    Milly Carroll MRN# [id number]   Age: 23 year old YOB: 2000     Date of Admission: 2/14/2024  Date of Service: 02/16/2024   ==================================================  24 HOUR EVENTS:  New onset hypotension throughout the day in a patient with previous refractory hypertension.      ==================================================    ASSESSMENT AND PLAN:  23-year-old woman with complex past medical history including lupus with lupus flareups and lupus cerebritis and nephritis, hypertension, chronic systolic heart failure, CHRISTA on CKD on dialysis since January 2024, history of perinephric hematoma status postembolization in August 2023, recent C. difficile colitis on oral vancomycin admitted from cardiology clinic for shortness of breath and tachycardia on 2/14, then transferred to the ICU early in the morning of 2/16 for refractory hypotension in the setting of influenza B.        Neuro/psych:  ## History of lupus cerebritis  ## Remote history of tonic-clonic seizures  -No evidence of seizures at this time, will continue to monitor closely      Pulmonary:   ## Dyspnea  Likely secondary to volume overload and/or influenza.  BNP in the ER elevated at 70,000, however she has been saturating well on room air.  CT chest negative for pulmonary abnormality  -Monitor and address other conditions      Cardiac:  ## Hypertension at baseline  ## Acute on chronic systolic heart failure  ## Shock  EF 35 to 40% thought to be secondary to Takotsubo versus hypertensive cardiomyopathy.  Was initially hypertensive during her hospitalization.  Her PTA Coreg was continued, then she was seen by cardiology with the addition of Entresto, however prior to starting this she developed hypotension.  She was treated with a 250 cc fluid bolus as well as 250 cc of albumin without improvement.  She did have 2 L removed in dialysis prior to developing hypotension, however NICOM did not indicate fluid  responsiveness and she did not respond to straight leg raise or 500 cc of fluid.  Relative adrenal insufficiency in the setting of infection is also a consideration.  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  Started on dialysis in January 2024, though does still produce urine and is also treated with Bumex.  Had 2 L removed in dialysis the day she developed hypotension, so this is a possible etiology of her hypotension.  -Continue to monitor renal function  -Nephrology following, appreciate their assistance      Infectious Disease/immune disease:   ## Influenza B  ## Possible bacterial infection  ## Immune compromised state  She is immune compromised on 50 mg of prednisone daily, hypocomplementemia, and cyclophosphamide.  Is on Bactrim prophylaxis.  Nasal swab positive for influenza B, negative for influenza A, RSV, and COVID on admission.  Initially with mild leukocytosis that increased to 20 on HOD 2.  Initially had negative procalcitonin.  Repeat on HOD 2 slightly elevated at 0.95 (this is after dialysis) suggesting possible bacterial infection.  Bacteremia is a consideration in the setting of a dialysis catheter.  -Empiric Zosyn and Bactrim  -Continue prophylactic Bactrim    ## History of recurrent C. difficile  C. difficile in June 2023 and again in January 2024 treated with oral vancomycin which she has remained on.  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  Received cyclophosphamide in late December.  GI/:   -Nutrition: Regular diet      Endocrine:   ## Possible adrenal insufficiency  Patient has been on fairly high-dose of prednisone for quite some time.  The exact details are unclear from the chart as she has been taking her prednisone differently than  prescribed.  Per her description she has been on 50 mg a day, possibly since early January.  -Hydrocortisone 50 mg every 8 hours      Heme:   ## Anemia, chronic, normocytic, normochromic  Likely multifactorial from chronic illness and CKD.  No evidence of bleeding.  She is at her chronic baseline.  -Continue to trend, transfuse if less than 7      Prophylaxis:    -GI: Continue PTA pantoprazole   -DVT: SCDs      CODE: Full        Attestation:      CCT 50 min excluding procedures        This document was generated with the assistance of voice recognition software. Unintentional transcription errors may occur. Please contact the author for any clarification.    Nnamdi Martinez M.D.  Pulmonary & Critical Care  Pager: Click Here to page    ==================================================    PHYSICAL EXAM  Temp:  [97.7  F (36.5  C)-98  F (36.7  C)] 97.7  F (36.5  C)  Pulse:  [70-98] 75  Resp:  [12-20] 20  BP: ()/(51-97) 89/53  SpO2:  [96 %-100 %] 100 %    Resp: 20      General: Alert, very flat affect  HEENT: AT/NC, sclera anicteric, PERRL, EOMI, OP clear with MMM  Resp: CTAB, no crackles or wheezes  Cardiac: RRR, normal S1, accentuated S2.  No murmurs rubs or gallops.  Abdomen: Soft, nontender, nondistended. +BS.  Extremities: No LE edema or obvious joint abnormalities        =====================================================  LABORATORY DATA    Labs reviewed by me personally, significant abnormalities detailed in assessment and plan.      ROUTINE ICU LABS (Last four results)  CMP  Recent Labs   Lab 02/15/24  1942 02/15/24  1851 02/15/24  0615 02/14/24  1146     --  138 145   POTASSIUM 4.7  --  4.4 4.5   CHLORIDE 98  --  100 106   CO2 27  --  27 23   ANIONGAP 10  --  11 16*   * 296* 73 72   BUN 19.5  --  10.5 20.5*   CR 3.10*  --  1.96* 2.24*   GFRESTIMATED 21*  --  36* 31*   BISI 8.5*  --  8.8 8.9   MAG 1.9  --   --  1.8   PHOS  --   --   --  5.9*   PROTTOTAL  --   --  6.2* 7.2   ALBUMIN  --    "--  2.7* 3.4*   BILITOTAL  --   --  0.4 0.6   ALKPHOS  --   --  144 178*   AST  --   --  62* 71*   ALT  --   --  16 12     CBC  Recent Labs   Lab 02/15/24  1942 02/15/24  0615 24  1146   WBC 20.1* 7.9 12.2*   RBC 3.12* 2.92* 3.02*   HGB 9.1* 8.6* 8.8*   HCT 30.2* 28.7* 29.0*   MCV 97 98 96   MCH 29.2 29.5 29.1   MCHC 30.1* 30.0* 30.3*   RDW 16.9* 17.1* 16.6*    173 202     INRNo lab results found in last 7 days.  Arterial Blood Gas  Recent Labs   Lab 02/15/24  1942   O2PER 0     Venous Blood Gas   Recent Labs   Lab 02/15/24  1942   PHV 7.42   PCO2V 44   PO2V 49*   HCO3V 29*   PARISA 4.0*   O2PER 0         No results for input(s): \"CULT\" in the last 168 hours.      Recent Results (from the past 48 hour(s))   Chest XR,  PA & LAT    Narrative    CHEST TWO VIEWS 2024 12:32 PM     HISTORY: short of breath, tachycardic, orthopnea, dialysis patient    COMPARISON: 2024       Impression    IMPRESSION: Stable appearance of right-sided central venous line with  tip overlying the right atrium. Bilateral retrocardiac opacities are  present suggestive of pulmonary edema versus atypical pneumonia.  Stable, enlarged cardiomediastinal silhouette. No acute bony  abnormality.    LEANA SWAN MD         SYSTEM ID:  [id number]   Echo Limited   Result Value    LVEF  35-40%    Narrative    [id number]  [id number]  [id number]  [id number]^ARMIDA^CHIARA^KAMRON     [facility]  Echocardiography Laboratory  [address] [place] [address]     Name: MIKE JOSE  MRN: [id number]  : 2000  Study Date: 02/15/2024 11:18 AM  Age: 23 yrs  Gender: Female  Patient Location: [facility]  Reason For Study: CHF  Ordering Physician: CHIARA HUFFMAN  Referring Physician: Ashlie Skelton  Performed By: Karina Schneider     BSA: 1.5 m2  Height: 62 in  Weight: 108 lb  HR: 78  BP: 128/97 mmHg  ______________________________________________________________________________  Procedure  Limited Portable Echo Adult. Optison " "(NDC #[id number]) given intravenously.  ______________________________________________________________________________  Interpretation Summary     There is borderline concentric left ventricular hypertrophy.  Borderline \"non compaction\" of LV noted  Diastolic function parameters were not measured on this limited echo.  ABNORMAL GLOBAL LONGITUDINAL STRAIN- negative 11.6 with APICAL SPARING- this  can be seen with amyloid, oxalosis, Fabry's and other cardiomyopathies  There is moderate global hypokinesia of the left ventricle.  Aortic regurgitation seen on echo 1-7-24 not seen today but this is a limited  echo  Small posterior pericardial effusion  There is no pericardial constriction.  The visual ejection fraction is 35-40%.  ______________________________________________________________________________  Left Ventricle  The left ventricle is normal in size. There is borderline concentric left  ventricular hypertrophy. Borderline \"non compaction\" of LV noted. The visual  ejection fraction is 35-40%. Diastolic function parameters were not measured  on this limited echo.  ABNORMAL GLOBAL LONGITUDINAL STRAIN- negative 11.6 with APICAL SPARING- this  can be seen with amyloid, oxalosis, Fabry's and other cardiomyopathies. Global  peak LV longitudinal strain is averaged at -11.6%. This suggests abnormal  strain (normal <-18%). There is moderate global hypokinesia of the left  ventricle. There is no thrombus seen in the left ventricle.     Right Ventricle  The right ventricle is normal in size and function.     Atria  Normal left atrial size. Right atrial size is normal.     Mitral Valve  There is mild (1+) mitral regurgitation.     Tricuspid Valve  There is mild (1+) tricuspid regurgitation.     Aortic Valve  The aortic valve is trileaflet. Aortic regurgitation seen on echo 1-7-24 not  seen today but this is a limited echo.     Pulmonic Valve  The pulmonic valve is not well seen, but is grossly normal.     Vessels  The " aortic root is normal size.     Pericardium  Small posterior pericardial effusion. There is no pericardial constriction.     Rhythm  Sinus rhythm was noted.  ______________________________________________________________________________  MMode/2D Measurements & Calculations  IVSd: 1.1 cm     LVIDd: 5.0 cm  LVIDs: 3.8 cm  LVPWd: 1.2 cm  FS: 25.1 %  LV mass(C)d: 211.1 grams  LV mass(C)dI: 143.5 grams/m2  asc Aorta Diam: 3.3 cm  Asc Ao diam index BSA (cm/m2): 2.2  Asc Ao diam index Ht(cm/m): 2.1  RWT: 0.47  TAPSE: 2.3 cm     Doppler Measurements & Calculations  MV E max horacio: 84.4 cm/sec  MV A max horacio: 61.3 cm/sec  MV E/A: 1.4  MV dec time: 0.18 sec  Ao V2 max: 110.0 cm/sec  Ao max P.0 mmHg  Ao V2 mean: 73.0 cm/sec  Ao mean PG: 3.0 mmHg  Ao V2 VTI: 20.7 cm  LV V1 max P.2 mmHg  LV V1 max: 74.1 cm/sec  LV V1 VTI: 12.9 cm  TR max horacio: 204.8 cm/sec  TR max P.8 mmHg  AV Horacio Ratio (DI): 0.67     RV S Horacio: 12.0 cm/sec     ______________________________________________________________________________  Report approved by: Maureen Navarro 02/15/2024 02:10 PM         CT Chest Abdomen Pelvis w/o Contrast    Narrative    EXAM: CT CHEST ABDOMEN PELVIS W/O CONTRAST  LOCATION: [facility]  DATE: 2/15/2024    INDICATION: Here with influenza CHF, hx recurrent C diff, now acute leukocytosis with L flank pain, nausea, dizziness; PMH lupus, ESRD  COMPARISON: 08/10/2023  TECHNIQUE: CT scan of the chest, abdomen, and pelvis was performed without IV contrast. Multiplanar reformats were obtained. Dose reduction techniques were used.   CONTRAST: None.    FINDINGS:     LUNGS AND PLEURA: Central airways are patent. Left lower lobe atelectasis. No actionable nodules. No pleural effusion or pneumothorax.    MEDIASTINUM/AXILLAE: Dialysis catheter terminates in the right atrium. No pathologically enlarged thoracic lymph nodes. Unremarkable thyroid. Normal caliber thoracic aorta. Cardiomegaly and small  pericardial effusion. High attenuation focus is seen   within the left ventricle (series 2 image 32).    CORONARY ARTERY CALCIFICATION: None.    Limited evaluation of solid organs given lack of intravenous contrast.     HEPATOBILIARY: Normal liver contours. No biliary dilation. Gallbladder is decompressed with wall thickening and trace pericholecystic fluid.     PANCREAS: Coarse parenchymal calcifications consistent with prior and/or chronic pancreatitis.     SPLEEN: Normal contours.    ADRENAL GLANDS: Normal contours.     KIDNEYS/BLADDER: Normal unenhanced contours. Embolization coils are seen within the left kidney. Nonobstructing left nephrolithiasis. Underdistended bladder, limiting evaluation.      BOWEL: Patulous, fluid-filled esophagus. No obstruction. Normal appendix. No bowel wall thickening or pneumatosis. Small volume pelvic free fluid. No pneumoperitoneum.     LYMPH NODES: No pathologically enlarged lymph nodes.    VASCULATURE: Nonaneurysmal aorta.     PELVIC ORGANS: No pelvic masses.     MUSCULOSKELETAL: No acute osseous abnormalities. Mild anasarca.       Impression    IMPRESSION:  1.  Small volume pelvic free fluid, likely physiologic or related to volume status. Otherwise, no acute abnormalities in the abdomen or pelvis.  2.  Cardiomegaly and small pericardial effusion.  3.  High attenuation focus within the left ventricle, possibly dystrophic calcification of a papillary muscle.  4.  Chronic and ancillary findings as described.     CT chest abdomen pelvis 2/15  1.  Small volume pelvic free fluid, likely physiologic or related to volume status. Otherwise, no acute abnormalities in the abdomen or pelvis.  2.  Cardiomegaly and small pericardial effusion.  3.  High attenuation focus within the left ventricle, possibly dystrophic calcification of a papillary muscle.  4.  Chronic and ancillary findings as described.      ==================================================

## 2024-02-16 NOTE — PROGRESS NOTES
RRT called at 1839 for symptomatic hypotension, BP 87/59. Pt reports dizziness, lightheaded and nausea. Zofran given without relief, head of bed flat. BP remains soft, pt continues to report symptoms. , EKG obtained. Labs ordered per house NP. Report given to night RN.

## 2024-02-16 NOTE — PROVIDER NOTIFICATION
MD Notification    Notified Person: MD    Notified Person Name: Karlos Hopson     Notification Date/Time: 2/15 2235    Notification Interaction: Vocera     Purpose of Notification: BP lower, last 82/54 Map 66. Pt asymptomatic states dizziness/nausea is gone. Continues to have L flank pain; improving with PRN tylenol     Orders Received: orderd 5% albumin

## 2024-02-16 NOTE — PROGRESS NOTES
Renal Medicine Progress Note                                Milly Carroll MRN# [id number]   Age: 23 year old YOB: 2000   Date of Admission: 2/14/2024 Hospital LOS: 2                  Assessment/Plan:     1.  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  She has been coming in under her target weight.     Her most recent serum creatinine dated 02/12/2 for noted at 1.85 mg/dL  This represents a predialysis creatinine on Monday.     2.  SLE   -extremely complicated course   -see [facility] consult 01/02/24 (Dr. Monge) for details  -current treatment prednisone only   3.  HTN              -poor control/multiple meds  4.  Cardiorenal syndrome              -concern for ? Pulmonary edema on CXR  5.  Influenza B +      No dialysis today  Continue oral diuretic   Assess daily      Interval History:     Events of 02/15/24 noted and reviewed  BP stable  No pressor requirement    Creatinine up  No O2 requirement     UA looks unchanged  PCR 1.70 g/g (peak value > 10)  Doubt nephritis flare    ? Component renal recovery  Pre dialysis creatinine 02/01/24 1.85 mg/dl    Up today  Hypotension related  Typical BP is high       ROS:     GENERAL: NAD, No fever,chills  R: NEGATIVE for  significant cough or SOB  CV: NEGATIVE for chest pain, palpitations  EXT: no change edema  ROS otherwise negative    Medications and Allergies:     Reviewed    Physical Exam:     Vitals were reviewed  Patient Vitals for the past 8 hrs:   BP Temp Temp src Pulse Resp SpO2   02/16/24 1000 97/62 -- -- 83 -- 93 %   02/16/24 0900 91/58 -- -- 60 -- 100 %   02/16/24 0800 91/58 97.6  F (36.4  C) Oral 60 20 100 %   02/16/24 0700 91/59 -- -- 68 -- 100 %   02/16/24 0645 92/62 -- -- 71 18 100 %   02/16/24 0630 93/62 -- -- 74 16 100 %   02/16/24 0615 96/62 -- -- 74 15 100 %   02/16/24 0600 93/58 -- -- 68 14 99 %   02/16/24 0545 92/58 -- -- 66 15 100 %   02/16/24 0530 91/59 -- -- 66 14 100 %   02/16/24 0515 94/64 -- -- 77 15 99 %   02/16/24 0500 (!) 89/57 -- -- 68 18 99 %   02/16/24 0445 (!) 89/59 -- -- 67 20 98 %   02/16/24 0430 (!) 86/59 -- -- 64 18 100 %   02/16/24 0415 (!) 89/56 -- -- 67 19 99 %   02/16/24 0400 91/56 97.2  F (36.2  C) Oral 70 18 99 %   02/16/24 0345 91/57 -- -- 73 14 100 %   02/16/24 0330 (!) 89/56 -- -- 70 15 100 %     Wt Readings from Last 4 Encounters:   02/16/24 49.2 kg (108 lb 8 oz)   02/14/24 49 kg (108 lb 1.6 oz)   02/07/24 51.7 kg (114 lb)   01/24/24 54.6 kg (120 lb 4.8 oz)     I/O last 3 completed shifts:  In: 950 [P.O.:850; I.V.:100]  Out: 100 [Urine:100]    Vitals:    02/14/24 1126 02/15/24 0651 02/16/24 0145   Weight: 49 kg (108 lb) 48.2 kg (106 lb 4.2 oz) 49.2 kg (108 lb 8 oz)         GENERAL: awake, alert, follows  RESP:  clear anteriorly  CV: RRR, normal S1 S2  MS: no clubbing, cyanosis   SKIN: clear without significant rashes or lesions  NEURO: speech normal and cranial nerves 2-12 intact  PSYCH: affect normal/bright  EXT: warm, no edema    Data:     Recent Labs   Lab 02/16/24  0509 02/16/24  0131 02/15/24  1942 02/15/24  1851 02/15/24  0615 02/14/24  1146     --  135  --  138 145   POTASSIUM 4.3  --  4.7  --  4.4 4.5   CHLORIDE 100  --  98  --  100 106   CO2 23  --  27  --  27 23    ANIONGAP 13  --  10  --  11 16*   * 150* 299* 296* 73 72   BUN 26.1*  --  19.5  --  10.5 20.5*   CR 3.54*  --  3.10*  --  1.96* 2.24*   GFRESTIMATED 18*  --  21*  --  36* 31*   BISI 8.1*  --  8.5*  --  8.8 8.9         Recent Labs   Lab 02/16/24  0509 02/15/24  1942 02/15/24  0615 02/14/24  1146   CR 3.54* 3.10* 1.96* 2.24*     Recent Labs   Lab 02/16/24  0509 02/15/24  1942   POTASSIUM 4.3 4.7     Recent Labs   Lab 02/15/24  0615 02/14/24  1146   ALBUMIN 2.7* 3.4*     Recent Labs   Lab 02/16/24  0509 02/15/24  1942 02/15/24  0615 02/14/24  1146   PHOS  --   --   --  5.9*   HGB 7.8* 9.1* 8.6* 8.8*         G Pastor Ingram MD    [facility] - Nephrology  [phone]

## 2024-02-16 NOTE — CODE/RAPID RESPONSE
"[facility]    House P[facility] RRT Note  2/15/2024   Time Called: 1838    RRT called for: Hypotension    Assessment & Plan     Symptomatic hypotension possibly 2/2 shock (septic vs cardiogenic) vs intravascular depletion (recent HD 2L removal, decreased PO intake in setting of influenza) vs medication effect (bumex, carvedilol, oxycodone, nifedipine) in setting of PMH chronic immunocompromised state.  New onset L flank/low back pain.  Prolonged QTc.  Leukocytosis.  New onset nausea with noted fluid filled esophagus.  Ongoing R arm bulla with chronic RUE edema.  Hx moderate/severe mitral insufficiency, severe tricuspid insufficiency.  Mild headache.  - Upon arrival, chronically ill appearing pt lying in bed, HOB at 25 degrees, awake alert, in no overt distress, flat affect.  Nursing notes pt's SBP found to be 80s-90s with pt reporting dizziness/lightheadedness, nausea prompting RRT.  At time of arrival, pt's SBP 90s, MAPs > 65, HR 70s, RR 10s, O2 sats > 92% on RA, afebrile.  Pt reports no chest pain, SOB, diaphoresis; not cool, clammy or obvious mottling noted.  Pt reports no recent fevers, chills.  Pt notes just started HD in January via R TDC.  Pt notes has been having \"blister\" on R forearm since December, nonhealing.  No recent diarrhea.  Pt reports makes some urine.        INTERVENTIONS:  - Stat EKG   - BG   - Stat BMP, Mg, CBC, VBG, lactic acid, lipase  - Will place bumex and coreg on hold  - Pt continues on zosyn, PO vancomycin and prophylactic bactrim  - After noting new leukocytosis and ongoing hypotension, will order stat CT CAP WO  - 250 ml NS bolus over 1 hour  - Discussed pt with Dr. Meredith, intensivist, at ~ 2300 in setting of ongoing hypotension with noted leukocytosis; no obvious source of infection noted on imaging.  Dr. Meredith recommending trialing small IVF boluses to see if would help improve pt's BP.  Appreciate review of pt's clinical status  - 12.5 g IV albumin 5%  - NICOM " assessment completed indicating pt NOT FLUID RESPONSIVE with SVI 4.2%  - Will consult ID, appreciate recommendations  - Reviewed pt with cross covering hospitalist, recommends discussion with intensivist in setting of pt's overall clinical picture concerning for shock  - Will initiate pt on IV vancomycin, pharmacy to dose  - Discussed pt with Dr. Martinez, intensivist at ~ 0030; greatly appreciate intensivist's expertise and review of pt's overall clinical status.  In setting of pt with recent history of refractory hypertension and now with ongoing hypotension, new leukocytosis, lethargy in setting of overall immunocompromised state, will transfer pt to ICU.  Will formally consult intensivist.    - Of note, pt expressed a few different times not to call and update family/friends    At the end of the RRT pt transferred to ICU    Discussed with and defer further cares to nursing, Tanna Meredith and Juan, intensivists and Dr. Valdez, hospitalist    Interval History     Milly Carroll is a 23 year old female who was admitted on 2/14/2024 for SOB and tachycardia.    Medical history significant for:   Past Medical History:   Diagnosis Date    Hepatitis     History of blood transfusion June 21st, 2023    i had a flare up that made the doctors do a blood transfusion because i was low on blood.    Hypertension     Lupus (systemic lupus erythematosus) (H)     Lupus cerebritis (H)     Pancreatitis 08/2017    Pyelonephritis 08/2017    Seizures (H)     Status epilepticus (H)      Code Status: Full Code    Allergies   Allergies   Allergen Reactions    Rituximab Anaphylaxis and Shortness Of Breath       Physical Exam   Vital Signs with Ranges:  Temp:  [97.7  F (36.5  C)-98  F (36.7  C)] 98  F (36.7  C)  Pulse:  [] 82  Resp:  [18-22] 18  BP: ()/() 89/62  SpO2:  [96 %-100 %] 99 %  I/O last 3 completed shifts:  In: 550 [P.O.:550]  Out: 2000 [Other:2000]    Constitutional: Chronically ill appearing pt lying in  bed, HOB at 25 degrees, awake alert, in no overt distress, flat affect  Neck: No upper airway wheezes or stridor noted  Pulmonary: In no apparent respiratory distress, clear to auscultation bilaterally, no crackles or wheezes noted  Cardiovascular: Regular rate and rhythm, normal S1S2, no murmur, rub or gallop noted  GI: Round, soft, nondistended, nontender to palpation, no guarding or rebound tenderness noted, active bowel sounds  Skin/Integumen: Warm, dry, ashen appearing  Neuro: Awake, alert, able to answer questions, minimal additional conversation expressed, no obvious focal neuro deficit noted  Psych:  Calm, flat affect  Extremities: No peripheral edema noted    Data     EKG:  Interpreted by FREDY Peterson CNP  Time reviewed:   Symptoms at time of EKG: Dizziness, lightheadedness, nausea   Rhythm: normal sinus   Rate: Normal  Axis: Left Axis Deviation  Ectopy: none  Conduction: normal  ST Segments/ T Waves: T wave inversion I and aVL  Q Waves: none  Comparison to prior: Unchanged from 24 1124    Clinical Impression: no acute changes    IMAGING: (X-ray/CT/MRI)   Recent Results (from the past 24 hour(s))   Echo Limited   Result Value    LVEF  35-40%    Narrative    [id number]  [id number]  [id number]  [id number]^[email or web address]^CHIARA^KAMRON     [facility]  Echocardiography Laboratory  [address] [place] [address]     Name: MIKE JOSE  MRN: [id number]  : 2000  Study Date: 02/15/2024 11:18 AM  Age: 23 yrs  Gender: Female  Patient Location: [facility]  Reason For Study: CHF  Ordering Physician: CHIARA HUFFMAN  Referring Physician: Ashlie Skelton  Performed By: Karina Schneider     BSA: 1.5 m2  Height: 62 in  Weight: 108 lb  HR: 78  BP: 128/97 mmHg  ______________________________________________________________________________  Procedure  Limited Portable Echo Adult. Optison (NDC #[id number]) given  "intravenously.  ______________________________________________________________________________  Interpretation Summary     There is borderline concentric left ventricular hypertrophy.  Borderline \"non compaction\" of LV noted  Diastolic function parameters were not measured on this limited echo.  ABNORMAL GLOBAL LONGITUDINAL STRAIN- negative 11.6 with APICAL SPARING- this  can be seen with amyloid, oxalosis, Fabry's and other cardiomyopathies  There is moderate global hypokinesia of the left ventricle.  Aortic regurgitation seen on echo 1-7-24 not seen today but this is a limited  echo  Small posterior pericardial effusion  There is no pericardial constriction.  The visual ejection fraction is 35-40%.  ______________________________________________________________________________  Left Ventricle  The left ventricle is normal in size. There is borderline concentric left  ventricular hypertrophy. Borderline \"non compaction\" of LV noted. The visual  ejection fraction is 35-40%. Diastolic function parameters were not measured  on this limited echo.  ABNORMAL GLOBAL LONGITUDINAL STRAIN- negative 11.6 with APICAL SPARING- this  can be seen with amyloid, oxalosis, Fabry's and other cardiomyopathies. Global  peak LV longitudinal strain is averaged at -11.6%. This suggests abnormal  strain (normal <-18%). There is moderate global hypokinesia of the left  ventricle. There is no thrombus seen in the left ventricle.     Right Ventricle  The right ventricle is normal in size and function.     Atria  Normal left atrial size. Right atrial size is normal.     Mitral Valve  There is mild (1+) mitral regurgitation.     Tricuspid Valve  There is mild (1+) tricuspid regurgitation.     Aortic Valve  The aortic valve is trileaflet. Aortic regurgitation seen on echo 1-7-24 not  seen today but this is a limited echo.     Pulmonic Valve  The pulmonic valve is not well seen, but is grossly normal.     Vessels  The aortic root is normal " size.     Pericardium  Small posterior pericardial effusion. There is no pericardial constriction.     Rhythm  Sinus rhythm was noted.  ______________________________________________________________________________  MMode/2D Measurements & Calculations  IVSd: 1.1 cm     LVIDd: 5.0 cm  LVIDs: 3.8 cm  LVPWd: 1.2 cm  FS: 25.1 %  LV mass(C)d: 211.1 grams  LV mass(C)dI: 143.5 grams/m2  asc Aorta Diam: 3.3 cm  Asc Ao diam index BSA (cm/m2): 2.2  Asc Ao diam index Ht(cm/m): 2.1  RWT: 0.47  TAPSE: 2.3 cm     Doppler Measurements & Calculations  MV E max horacio: 84.4 cm/sec  MV A max horacio: 61.3 cm/sec  MV E/A: 1.4  MV dec time: 0.18 sec  Ao V2 max: 110.0 cm/sec  Ao max P.0 mmHg  Ao V2 mean: 73.0 cm/sec  Ao mean PG: 3.0 mmHg  Ao V2 VTI: 20.7 cm  LV V1 max P.2 mmHg  LV V1 max: 74.1 cm/sec  LV V1 VTI: 12.9 cm  TR max horacio: 204.8 cm/sec  TR max P.8 mmHg  AV Horacio Ratio (DI): 0.67     RV S Horacio: 12.0 cm/sec     ______________________________________________________________________________  Report approved by: Maureen Navarro 02/15/2024 02:10 PM         CT Chest Abdomen Pelvis w/o Contrast    Narrative    EXAM: CT CHEST ABDOMEN PELVIS W/O CONTRAST  LOCATION: [facility]  DATE: 2/15/2024    INDICATION: Here with influenza CHF, hx recurrent C diff, now acute leukocytosis with L flank pain, nausea, dizziness; PMH lupus, ESRD  COMPARISON: 08/10/2023  TECHNIQUE: CT scan of the chest, abdomen, and pelvis was performed without IV contrast. Multiplanar reformats were obtained. Dose reduction techniques were used.   CONTRAST: None.    FINDINGS:     LUNGS AND PLEURA: Central airways are patent. Left lower lobe atelectasis. No actionable nodules. No pleural effusion or pneumothorax.    MEDIASTINUM/AXILLAE: Dialysis catheter terminates in the right atrium. No pathologically enlarged thoracic lymph nodes. Unremarkable thyroid. Normal caliber thoracic aorta. Cardiomegaly and small pericardial effusion. High  attenuation focus is seen   within the left ventricle (series 2 image 32).    CORONARY ARTERY CALCIFICATION: None.    Limited evaluation of solid organs given lack of intravenous contrast.     HEPATOBILIARY: Normal liver contours. No biliary dilation. Gallbladder is decompressed with wall thickening and trace pericholecystic fluid.     PANCREAS: Coarse parenchymal calcifications consistent with prior and/or chronic pancreatitis.     SPLEEN: Normal contours.    ADRENAL GLANDS: Normal contours.     KIDNEYS/BLADDER: Normal unenhanced contours. Embolization coils are seen within the left kidney. Nonobstructing left nephrolithiasis. Underdistended bladder, limiting evaluation.      BOWEL: Patulous, fluid-filled esophagus. No obstruction. Normal appendix. No bowel wall thickening or pneumatosis. Small volume pelvic free fluid. No pneumoperitoneum.     LYMPH NODES: No pathologically enlarged lymph nodes.    VASCULATURE: Nonaneurysmal aorta.     PELVIC ORGANS: No pelvic masses.     MUSCULOSKELETAL: No acute osseous abnormalities. Mild anasarca.       Impression    IMPRESSION:  1.  Small volume pelvic free fluid, likely physiologic or related to volume status. Otherwise, no acute abnormalities in the abdomen or pelvis.  2.  Cardiomegaly and small pericardial effusion.  3.  High attenuation focus within the left ventricle, possibly dystrophic calcification of a papillary muscle.  4.  Chronic and ancillary findings as described.     VBG:  Recent Labs   Lab 02/15/24  1942   PHV 7.42   PO2V 49*   PCO2V 44   HCO3V 29*     Lactic acid:  Recent Labs   Lab 02/15/24  1942 02/14/24  1939 02/14/24  1413   LACT 2.1* 2.1* 4.0*     Lipase:  Recent Labs   Lab 02/15/24  1942   LIPASE 68*     CBC with Diff:  Recent Labs   Lab Test 02/15/24  1942 01/05/24  0612 01/04/24  1445   WBC 20.1*   < >  --    HGB 9.1*   < >  --    MCV 97   < >  --       < >  --    INR  --   --  1.18*    < > = values in this interval not displayed.      Comprehensive Metabolic Panel:  Recent Labs   Lab 02/16/24  0131 02/15/24  1942 02/15/24  1851 02/15/24  0615 02/14/24  1146   NA  --  135  --  138 145   POTASSIUM  --  4.7  --  4.4 4.5   CHLORIDE  --  98  --  100 106   CO2  --  27  --  27 23   ANIONGAP  --  10  --  11 16*   * 299*   < > 73 72   BUN  --  19.5  --  10.5 20.5*   CR  --  3.10*  --  1.96* 2.24*   GFRESTIMATED  --  21*  --  36* 31*   BISI  --  8.5*  --  8.8 8.9   MAG  --  1.9  --   --  1.8   PHOS  --   --   --   --  5.9*   PROTTOTAL  --   --   --  6.2* 7.2   ALBUMIN  --   --   --  2.7* 3.4*   BILITOTAL  --   --   --  0.4 0.6   ALKPHOS  --   --   --  144 178*   AST  --   --   --  62* 71*   ALT  --   --   --  16 12    < > = values in this interval not displayed.     UA:  Recent Labs   Lab 02/15/24  1508   COLOR Yellow   APPEARANCE Slightly Cloudy*   URINEGLC Negative   URINEBILI Negative   URINEKETONE Trace*   SG 1.021   UBLD Trace*   URINEPH 7.5*   PROTEIN 100*   NITRITE Negative   LEUKEST Trace*   RBCU 7*   WBCU 8*     Time Spent on this Encounter   I spent 100 minutes of critical care time on the unit/floor managing the care of Milly Carroll. Upon evaluation, this patient had a high probability of imminent or life-threatening deterioration due to shock, which required my direct attention, intervention, and personal management. 100% of my time was spent at the bedside counseling the patient and/or coordinating care regarding services listed in this note.    FREDY Peterson [facility]  Hospitalist-House PUNE[facility]  Hospitalist Service  Securely message with [employer] (more info)  Text page via [facility] Paging/Directory      Strong peripheral pulses

## 2024-02-17 NOTE — PROVIDER NOTIFICATION
Pt BG increased from 140 to 300 this afternoon. Pt asymptomatic. Hospitalist notified, waiting for orders for sliding scale  1815 paiged for sliding scale orders, continue waiting for hospitalist response

## 2024-02-17 NOTE — PLAN OF CARE
Problem: Adult Inpatient Plan of Care  Goal: Plan of Care Review  Description: The Plan of Care Review/Shift note should be completed every shift.  The Outcome Evaluation is a brief statement about your assessment that the patient is improving, declining, or no change.  This information will be displayed automatically on your shift  note.  Outcome: Progressing  Flowsheets (Taken 2/16/2024 1808)  Outcome Evaluation: Pt transitioned to hospitalist service this afternoon. A/O x 4 denies pain, uses the call light as needed. OOB to the bathroom with 1 person assist. SBP 90s-110s, MAP 65-75, on midodrine TID. Denies SOB, LS diminished. Sats 97% on room air. No dyalisis today, UOP 50 ml of concentrated urine, BM x 1 loose, tolerates sabrina diet. Good apetitte, continue on fluid restriction of 1800. BG increased from 140 in am to 300 in the sfternoon, started on medium SS.  Plan of Care Reviewed With: patient  Overall Patient Progress: improving  Goal: Absence of Hospital-Acquired Illness or Injury  Intervention: Identify and Manage Fall Risk  Recent Flowsheet Documentation  Taken 2/16/2024 1600 by Rosalind Vickers, RN  Safety Promotion/Fall Prevention:   activity supervised   room organization consistent   room door open   nonskid shoes/slippers when out of bed   mobility aid in reach   lighting adjusted   safety round/check completed  Taken 2/16/2024 1200 by Rosalind Vickers, RN  Safety Promotion/Fall Prevention:   activity supervised   room organization consistent   room door open   nonskid shoes/slippers when out of bed   mobility aid in reach   lighting adjusted   safety round/check completed  Taken 2/16/2024 0800 by Rosalind Vickers, RN  Safety Promotion/Fall Prevention:   activity supervised   room organization consistent   room door open   nonskid shoes/slippers when out of bed   mobility aid in reach   lighting adjusted   safety round/check completed  Intervention: Prevent Skin Injury  Recent Flowsheet  Documentation  Taken 2/16/2024 1800 by Rosalind Vickers RN  Body Position:   turned   side-lying   weight shifting  Taken 2/16/2024 1600 by Rosalind Vickers RN  Body Position:   turned   side-lying   weight shifting  Skin Protection:   adhesive use limited   incontinence pads utilized   silicone foam dressing in place  Device Skin Pressure Protection:   absorbent pad utilized/changed   adhesive use limited   positioning supports utilized   pressure points protected   tubing/devices free from skin contact  Taken 2/16/2024 1400 by Rosalind Vickers RN  Body Position:   turned   side-lying   weight shifting  Taken 2/16/2024 1200 by Rosalind Vickers RN  Body Position:   turned   side-lying   weight shifting  Skin Protection:   adhesive use limited   incontinence pads utilized   silicone foam dressing in place  Device Skin Pressure Protection:   absorbent pad utilized/changed   adhesive use limited   positioning supports utilized   pressure points protected   tubing/devices free from skin contact  Taken 2/16/2024 1000 by Rosalind Vickers RN  Body Position:   turned   side-lying   weight shifting  Taken 2/16/2024 0800 by Rosalind Vickers RN  Body Position:   turned   side-lying   weight shifting  Skin Protection:   adhesive use limited   incontinence pads utilized   silicone foam dressing in place  Device Skin Pressure Protection:   absorbent pad utilized/changed   adhesive use limited   positioning supports utilized   pressure points protected   tubing/devices free from skin contact  Intervention: Prevent and Manage VTE (Venous Thromboembolism) Risk  Recent Flowsheet Documentation  Taken 2/16/2024 1600 by Rosalind Vickers RN  VTE Prevention/Management:   compression stockings off   SCDs (sequential compression devices) off  Taken 2/16/2024 1200 by Rosalind Vickers RN  VTE Prevention/Management:   compression stockings off   SCDs (sequential compression devices) off  Taken 2/16/2024 0800 by Rancho  Rosalind GOMEZ RN  VTE Prevention/Management:   compression stockings off   SCDs (sequential compression devices) off  Intervention: Prevent Infection  Recent Flowsheet Documentation  Taken 2/16/2024 1600 by Rosalind Vickers RN  Infection Prevention: single patient room provided  Taken 2/16/2024 1200 by Rosalind Vickers RN  Infection Prevention: single patient room provided  Taken 2/16/2024 0800 by Rosalind Vickers RN  Infection Prevention: single patient room provided  Goal: Optimal Comfort and Wellbeing  Intervention: Provide Person-Centered Care  Recent Flowsheet Documentation  Taken 2/16/2024 1600 by Rosalind Vickers RN  Trust Relationship/Rapport:   care explained   choices provided   emotional support provided   empathic listening provided   questions answered   questions encouraged   reassurance provided   thoughts/feelings acknowledged  Taken 2/16/2024 1200 by Rosalind Vickers RN  Trust Relationship/Rapport:   care explained   choices provided   emotional support provided   empathic listening provided   questions answered   questions encouraged   reassurance provided   thoughts/feelings acknowledged  Taken 2/16/2024 0800 by Rosalind Vickers RN  Trust Relationship/Rapport:   care explained   choices provided   emotional support provided   empathic listening provided   questions answered   questions encouraged   reassurance provided   thoughts/feelings acknowledged   Goal Outcome Evaluation:      Plan of Care Reviewed With: patient    Overall Patient Progress: improvingOverall Patient Progress: improving    Outcome Evaluation: Pt transitioned to hospitalist service this afternoon. A/O x 4 denies pain, uses the call light as needed. OOB to the bathroom with 1 person assist. SBP 90s-110s, MAP 65-75, on midodrine TID. Denies SOB, LS diminished. Sats 97% on room air. No dyalisis today, UOP 50 ml of concentrated urine, BM x 1 loose, tolerates sabrina diet. Good apetitte, continue on fluid restriction of  1800. BG increased from 140 in am to 300 in the sfternoon, started on medium SS.

## 2024-02-17 NOTE — PLAN OF CARE
Goal Outcome Evaluation:    Plan of Care Reviewed With: patient    Overall Patient Progress: improvingOverall Patient Progress: improving    Pt here with shortness of breath and hypotension. A&O x4. Neuros include generalized weakness. VSS. Tele SR. Renal diet, thin liquids. Takes pills whole. Up with A1 + GB. Denies pain. Pt scoring green on the Aggression Stop Light Tool. Plan to transfer to general medical floor. Discharge plan pending medical readiness.

## 2024-02-17 NOTE — PROGRESS NOTES
"Nephrology Progress Note  02/17/2024         Assessment & Recommendations:   1.  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  3 hours.  1 L ultrafiltration.        2.  SLE   -extremely complicated course   -see [facility] Nephrology consult 01/02/24 (Dr. Monge) for details  -current treatment prednisone only   3.  HTN              -poor control/multiple meds.    -Significant hypotension yesterday.  Blood pressure rising today.  Monitor blood pressure postdialysis today and r consider resuming blood pressure medication if it remains high.    4  Influenza B +    5 anemia in CKD-hemoglobin down to 7.8 today.  Monitor.    Dialysis today.  3 hours.  1 L ultrafiltration.  Resume Monday Wednesday Friday dialysis next week.       Cheryl Ham MD  [facility] - Nephrology   [phone]      Interval History :   Seen / examined.   Overnight events noted.  Transferred to ICU for persistent hypotension.  Empirically started on antibiotics which has since been stopped.  Prednisone changed to IV hydrocortisone.  Blood pressure much better today.  Denies any complaints.  Looks fatigued.  Laying flat.  Afebrile.    Physical Exam:   I/O last 3 completed shifts:  In: 950 [P.O.:750; I.V.:200]  Out: 50 [Urine:50]    BP (!) 146/109   Pulse 53   Temp 97.7  F (36.5  C) (Oral)   Resp 20   Ht 1.575 m (5' 2\")   Wt 50.4 kg (111 lb 1.6 oz)   LMP 01/31/2024   SpO2 100%   BMI 20.32 kg/m      GENERAL APPEARANCE: alert and no distress.  Le facies  EYES:  no scleral icterus, pupils equal  PULM: lungs clear to auscultation, equal air movement, no cyanosis or clubbing  CV: regular rhythm, normal rate, no rub     -JVP -     -edema -   GI: soft, non tender,   NEURO: mentation intact and speech normal, no asterixis   Access --IJ tunneled dialysis " catheter    Labs:   All labs reviewed by me  Electrolytes/Renal -   Recent Labs   Lab Test 02/17/24  0757 02/17/24  0527 02/16/24  2238 02/16/24  1207 02/16/24  0509 02/16/24  0131 02/15/24  1942 02/15/24  0615 02/14/24  1146 01/24/24  0657 01/23/24  0545 01/19/24  0702 01/18/24  0558   NA  --  131*  --   --  136  --  135   < > 145   < > 135   < > 138   POTASSIUM  --  4.6  --   --  4.3  --  4.7   < > 4.5   < > 4.1   < > 3.7   CHLORIDE  --  98  --   --  100  --  98   < > 106   < > 106   < > 111*   CO2  --  21*  --   --  23  --  27   < > 23   < > 13*   < > 20*   BUN  --  37.3*  --   --  26.1*  --  19.5   < > 20.5*   < > 97.7*   < > 85.0*   CR  --  4.39*  --   --  3.54*  --  3.10*   < > 2.24*   < > 3.69*   < > 3.41*   * 128* 147*   < > 163*   < > 299*   < > 72   < > 134*   < > 95   BISI  --  7.9*  --   --  8.1*  --  8.5*   < > 8.9   < > 8.0*   < > 8.5*   MAG  --   --   --   --   --   --  1.9  --  1.8  --   --   --  1.7   PHOS  --   --   --   --   --   --   --   --  5.9*  --  6.3*  --  2.8    < > = values in this interval not displayed.       CBC -   Recent Labs   Lab Test 02/16/24  0509 02/15/24  1942 02/15/24  0615   WBC 17.4* 20.1* 7.9   HGB 7.8* 9.1* 8.6*    226 173       LFTs -   Recent Labs   Lab Test 02/15/24  0615 02/14/24  1146 02/07/24  1124   ALKPHOS 144 178* 116   BILITOTAL 0.4 0.6 0.3   ALT 16 12 15   AST 62* 71* 35   PROTTOTAL 6.2* 7.2 5.5*   ALBUMIN 2.7* 3.4* 2.7*       Iron Panel -   Recent Labs   Lab Test 01/05/24  0612 12/20/23  0415 12/17/23  0429 07/10/23  1454   IRON 76  --  130 15*   IRONSAT 40  --  74* 9*   GABRIEL 786*   < > 32,775* 1,079*    < > = values in this interval not displayed.       Current Medications:   - MEDICATION INSTRUCTIONS for Dialysis Patients -   Does not apply See Admin Instructions    [Held by provider] bumetanide  1 mg Oral BID    calcium carbonate 500 mg (elemental)  500 mg Oral TID w/meals    [Held by provider] carvedilol  25 mg Oral BID w/meals    vitamin B-12   100 mcg Oral Daily    folic acid  1 mg Oral Daily    sodium chloride (PF) 0.9%  10 mL Intracatheter Once in dialysis/CRRT    Followed by    heparin  1.3-2.6 mL Intracatheter Once in dialysis/CRRT    sodium chloride (PF) 0.9%  10 mL Intracatheter Once in dialysis/CRRT    Followed by    heparin  1.3-2.6 mL Intracatheter Once in dialysis/CRRT    heparin ANTICOAGULANT  5,000 Units Subcutaneous BID    hydrocortisone sodium succinate PF  50 mg Intravenous Q6H    insulin aspart  1-7 Units Subcutaneous TID AC    insulin aspart  1-5 Units Subcutaneous At Bedtime    midodrine  10 mg Oral TID w/meals    multivitamin RENAL  1 capsule Oral Daily    - MEDICATION INSTRUCTIONS -   Does not apply Once    oseltamivir  30 mg Oral Once per day on Monday Wednesday Friday    pantoprazole  40 mg Oral QAM AC    [Held by provider] predniSONE  50 mg Oral Daily    [Held by provider] sacubitril-valsartan  1 tablet Oral BID    sodium chloride (PF)  3 mL Intracatheter Q8H    sodium chloride (PF)  9 mL Intracatheter During Dialysis/CRRT (from stock)    sodium chloride (PF)  9 mL Intracatheter During Dialysis/CRRT (from stock)    sodium chloride 0.9%  250 mL Intravenous Once in dialysis/CRRT    sodium chloride 0.9%  300 mL Hemodialysis Machine Once    sulfamethoxazole-trimethoprim  1 tablet Oral Once per day on Tuesday Thursday Saturday    vancomycin  125 mg Oral BID      heparin (porcine) 500 Units/hr (02/14/24 1841)     Cheryl Ham MD

## 2024-02-17 NOTE — PLAN OF CARE
Goal Outcome Evaluation:      Plan of Care Reviewed With: patient    Overall Patient Progress: no changeOverall Patient Progress: no change    Outcome Evaluation: No acute events overnight. Denies pain, uses call light appropriately. Up to BR x 1 w/SBA overnight, small amount of urine and BM. BP elevated end of shift, gave prn  hydralazine x 1. On fluid restriction and tolerating. plan for dialysis today.      Problem: Heart Failure  Goal: Optimal Coping  Outcome: Progressing  Intervention: Support Psychosocial Response  Recent Flowsheet Documentation  Taken 2/17/2024 0400 by Colleen Baldwin RN  Supportive Measures:   active listening utilized   self-care encouraged  Taken 2/17/2024 0000 by Colleen Baldwin RN  Supportive Measures:   active listening utilized   self-care encouraged  Taken 2/16/2024 2000 by Colleen Baldwin RN  Supportive Measures:   active listening utilized   self-care encouraged  Goal: Optimal Cardiac Output  Outcome: Progressing  Intervention: Optimize Cardiac Output  Recent Flowsheet Documentation  Taken 2/17/2024 0400 by Colleen Baldwin RN  Environmental Support:   calm environment promoted   distractions minimized   rest periods encouraged  Taken 2/17/2024 0000 by Colleen Baldwin RN  Environmental Support:   calm environment promoted   distractions minimized   rest periods encouraged  Taken 2/16/2024 2000 by Colleen Baldwin RN  Environmental Support:   calm environment promoted   distractions minimized   rest periods encouraged  Goal: Stable Heart Rate and Rhythm  Outcome: Progressing  Goal: Optimal Functional Ability  Outcome: Progressing  Intervention: Optimize Functional Ability  Recent Flowsheet Documentation  Taken 2/17/2024 0400 by Colleen Baldwin RN  Activity Management: activity adjusted per tolerance  Taken 2/17/2024 0000 by Colleen Baldwin RN  Activity Management: activity adjusted per tolerance  Taken 2/16/2024 2000 by Colleen Baldwin RN  Activity  Management: activity adjusted per tolerance  Goal: Fluid and Electrolyte Balance  Outcome: Progressing  Intervention: Monitor and Manage Fluid and Electrolyte Balance  Recent Flowsheet Documentation  Taken 2/17/2024 0400 by Colleen Baldwin RN  Fluid/Electrolyte Management: fluids restricted  Taken 2/17/2024 0000 by Colleen Baldwin RN  Fluid/Electrolyte Management: fluids restricted  Taken 2/16/2024 2000 by Colleen Baldwin RN  Fluid/Electrolyte Management: fluids restricted  Goal: Improved Oral Intake  Outcome: Progressing  Goal: Effective Oxygenation and Ventilation  Outcome: Progressing  Intervention: Promote Airway Secretion Clearance  Recent Flowsheet Documentation  Taken 2/17/2024 0400 by Colleen Baldwin RN  Breathing Techniques/Airway Clearance: pursed-lip breathing encouraged  Cough And Deep Breathing: done independently per patient  Activity Management: activity adjusted per tolerance  Taken 2/17/2024 0000 by Colleen Baldwin RN  Breathing Techniques/Airway Clearance: pursed-lip breathing encouraged  Cough And Deep Breathing: done independently per patient  Activity Management: activity adjusted per tolerance  Taken 2/16/2024 2000 by Colleen Baldwin RN  Breathing Techniques/Airway Clearance: pursed-lip breathing encouraged  Cough And Deep Breathing: done independently per patient  Activity Management: activity adjusted per tolerance  Intervention: Optimize Oxygenation and Ventilation  Recent Flowsheet Documentation  Taken 2/17/2024 0600 by Colleen Baldwin RN  Head of Bed (HOB) Positioning: HOB at 20-30 degrees  Taken 2/17/2024 0400 by Colleen Baldwin RN  Airway/Ventilation Management:   calming measures promoted   pulmonary hygiene promoted  Head of Bed (HOB) Positioning: HOB at 20-30 degrees  Taken 2/17/2024 0000 by Colleen Baldwin RN  Airway/Ventilation Management:   calming measures promoted   pulmonary hygiene promoted  Head of Bed (HOB) Positioning: HOB at 20-30  degrees  Taken 2/16/2024 2200 by Colleen Baldwin RN  Head of Bed (HOB) Positioning: HOB at 20-30 degrees  Taken 2/16/2024 2000 by Colleen Baldwin RN  Airway/Ventilation Management:   calming measures promoted   pulmonary hygiene promoted  Head of Bed (HOB) Positioning: HOB at 20-30 degrees  Goal: Effective Breathing Pattern During Sleep  Outcome: Progressing  Intervention: Monitor and Manage Obstructive Sleep Apnea  Recent Flowsheet Documentation  Taken 2/17/2024 0400 by Colleen Baldwin RN  Medication Review/Management: medications reviewed  Taken 2/17/2024 0000 by Colleen Baldwin RN  Medication Review/Management: medications reviewed  Taken 2/16/2024 2000 by Colleen Baldwin RN  Medication Review/Management: medications reviewed     Problem: Heart Failure  Goal: Effective Oxygenation and Ventilation  Intervention: Optimize Oxygenation and Ventilation  Recent Flowsheet Documentation  Taken 2/17/2024 0600 by Colleen Baldwin RN  Head of Bed (HOB) Positioning: HOB at 20-30 degrees  Taken 2/17/2024 0400 by Colleen Baldwin RN  Airway/Ventilation Management:   calming measures promoted   pulmonary hygiene promoted  Head of Bed (HOB) Positioning: HOB at 20-30 degrees  Taken 2/17/2024 0000 by Colleen Baldwin RN  Airway/Ventilation Management:   calming measures promoted   pulmonary hygiene promoted  Head of Bed (HOB) Positioning: HOB at 20-30 degrees  Taken 2/16/2024 2200 by Colleen Baldwin RN  Head of Bed (HOB) Positioning: HOB at 20-30 degrees  Taken 2/16/2024 2000 by Colleen Baldwin RN  Airway/Ventilation Management:   calming measures promoted   pulmonary hygiene promoted  Head of Bed (HOB) Positioning: HOB at 20-30 degrees

## 2024-02-17 NOTE — PROGRESS NOTES
Assumed care for patient at 10:00 am. Patient is in bed and watching tv. Patient reports no pain and no further needs. Hospitalist was in room meeting with patient. Hospitalist mentioned that Nephrology is to meet with patient before we can determine dialysis need for the day. Patient's blood pressure is increasing.

## 2024-02-17 NOTE — PROGRESS NOTES
MD Notification    Notified Person: MD    Notified Person Name: Kp Mir    Notification Date/Time: 02/17/2024 @1700    Notification Interaction: Steve FISHER    Purpose of Notification: The patient has midodrine scheduled but was given midodrine in dialysis for High HR. should I hold the scheduled midodrine BP is 126/93     Orders Received: hold Midodrine    Comments:

## 2024-02-17 NOTE — PROGRESS NOTES
"MD Notification    Notified Person: MD    Notified Person Name: Kp Mir     Notification Date/Time: 2/17/2024 @ 4880    Notification Interaction: Steve NATALIA    Purpose of Notification: 628- patient is complaining about nausea, anti-nausea medication was discontinued. can she get anti-nausea medication reordered. Thank you.    Orders Received: 1 dose of zofran 4mg \"please take a verbal\"     Comments: we need to be cautious due to previous hypotensive incident, but should be ok now that she had dialysis and on the floor.  "

## 2024-02-17 NOTE — PROGRESS NOTES
[facility]    Infectious Disease Progress Note    Date of Service : 02/17/2024        Assessment:  24 yo complicated patient with complex past medical history including lupus with lupus flareups and lupus cerebritis and nephritis, hypertension, chronic systolic heart failure, CHRISTA on CKD on dialysis since January 2024, history of perinephric hematoma status postembolization in August 2023, recent C. difficile colitis on oral vancomycin   Admitted from cardiology clinic for shortness of breath and tachycardia on 2/14, then transferred to the ICU early in the morning of 2/16 for refractory hypotension in the setting of influenza B.   Started on broad spectrum antibiotics for concern for infection though no new concern already known influenza, c diff  Pending blood cultures   On chronic steroids and bactrim for prophylaxis     Recommendations:   Discontinue IV Vancomycin  Oseltamivir for a total of 10 days (renal adjusted dose) due to immunocompromise   Discontinue arely Urena MD    Interval History   Feels better, resting, ongoing cough and some shortness of breath      Physical Exam   Temp: 97.7  F (36.5  C) Temp src: Oral BP: (!) 143/105 Pulse: 52   Resp: 20 SpO2: 100 % O2 Device: None (Room air)    Vitals:    02/15/24 0651 02/16/24 0145 02/17/24 0600   Weight: 48.2 kg (106 lb 4.2 oz) 49.2 kg (108 lb 8 oz) 50.4 kg (111 lb 1.6 oz)     Vital Signs with Ranges  Temp:  [97.6  F (36.4  C)-98.7  F (37.1  C)] 97.7  F (36.5  C)  Pulse:  [47-94] 52  Resp:  [20] 20  BP: ()/() 143/105  SpO2:  [99 %-100 %] 100 %    Constitutional: Awake, alert, cooperative, no apparent distress  Lungs: Clear to auscultation bilaterally, no crackles or wheezing  Cardiovascular: Regular rate and rhythm, normal S1 and S2, and no murmur noted  Abdomen: Normal bowel sounds, soft, non-distended, non-tender  Skin: No rashes, no cyanosis, no edema  Other:    Medications    heparin (porcine) 500 Units/hr  (02/14/24 1629)      - MEDICATION INSTRUCTIONS for Dialysis Patients -   Does not apply See Admin Instructions    [Held by provider] bumetanide  1 mg Oral BID    calcium carbonate 500 mg (elemental)  500 mg Oral TID w/meals    [Held by provider] carvedilol  25 mg Oral BID w/meals    vitamin B-12  100 mcg Oral Daily    folic acid  1 mg Oral Daily    heparin ANTICOAGULANT  5,000 Units Subcutaneous BID    hydrocortisone sodium succinate PF  50 mg Intravenous Q6H    insulin aspart  1-7 Units Subcutaneous TID AC    insulin aspart  1-5 Units Subcutaneous At Bedtime    midodrine  10 mg Oral TID w/meals    multivitamin RENAL  1 capsule Oral Daily    oseltamivir  30 mg Oral Once per day on Monday Wednesday Friday    pantoprazole  40 mg Oral QAM AC    piperacillin-tazobactam  2.25 g Intravenous Q6H    [Held by provider] predniSONE  50 mg Oral Daily    [Held by provider] sacubitril-valsartan  1 tablet Oral BID    sodium chloride (PF)  3 mL Intracatheter Q8H    sulfamethoxazole-trimethoprim  1 tablet Oral Once per day on Tuesday Thursday Saturday    vancomycin  125 mg Oral BID    vancomycin place abdullahi - receiving intermittent dosing  1 each Intravenous See Admin Instructions       Data   All microbiology laboratory data reviewed.  Recent Labs   Lab Test 02/16/24  0509 02/15/24  1942 02/15/24  0615   WBC 17.4* 20.1* 7.9   HGB 7.8* 9.1* 8.6*   HCT 26.1* 30.2* 28.7*   MCV 97 97 98    226 173     Recent Labs   Lab Test 02/17/24  0527 02/16/24  0509 02/15/24  1942   CR 4.39* 3.54* 3.10*     Recent Labs   Lab Test 12/27/23  0001   SED 1

## 2024-02-17 NOTE — PROGRESS NOTES
[facility]    Medicine Progress Note - Hospitalist Service    Date of Admission:  2/14/2024    Assessment & Plan   Milly Carroll is a 23 year old female admitted on 2/14/2024.   Milly Carroll is a 23 year old female with complex past medical history of lupus with lupus flareups complicated with lupus cerebritis lupus nephritis, hypertension, chronic systolic heart failure, CHRISTA on CKD, now on hemodialysis since January 2024, history of perinephric hematoma s/p embolization in August 2023, history of pneumonia, C. difficile colitis who was sent from cardiology clinic for further evaluation of shortness of breath and tachycardia.     # Shortness of breath, multifactorial slow improvement  # Suspected acute on chronic systolic heart failure exacerbation  *she has a history of chronic heart failure with a reduced EF ((LVEF down to 20-25% in early August 2023, recovered to around 40% in late August 2023, back down to 15-20% in December 2023, then up to 35-40% on 1/7/2024).    *Echo 1/7/2024  Left ventricular function is decreased. The ejection fraction is 35-40% (moderately reduced).  The right ventricle is normal size.  Global right ventricular function is mildly reduced.  Moderate mitral insufficiency is present (functional ).  Moderate tricuspid insufficiency is present (functional).  Pulmonary hypertension is present     -Her severe cardiomyopathy felt to be nonischemic-Takotsubo/stress-induced versus hypertensive cardiomyopathy.  -She is also on hemodialysis, recently initiated in January 2024  -Did not miss dialysis, last dialysis was on Monday  -Reports that she started feeling short of breath last night  -Still making good urine, PTA on Bumex 2 mg p.o. twice daily; reports being compliant with her medications  -She went to cardiology clinic today and she was advised to come to ER for further evaluation given her shortness of breath and tachycardia  -proBNP in the ER elevated more than  "70,000  -Chest x-ray- Stable appearance of right-sided central venous line with tip overlying the right atrium. Bilateral retrocardiac opacities are present suggestive of pulmonary edema versus atypical pneumonia.  -EKG- sinus tachycardia at 125bpm  -Lactic acid elevated at 4 initially, improved to 2.1 after hemodialysis  -Went from ER to hemodialysis  -ER attending discussed with cardiology on-call  -Admitted to inpatient   -Monitor on telemetry  -Hold PTA Bumex 2 mg p.o. twice daily  -Hold PTA Coreg 25 mg p.o. twice daily and nifedipine 60 mg p.o. daily  -Strict input and output, daily weights  -Fluid restriction 1800 cc/day    -Echo completed as below   there is borderline concentric left ventricular hypertrophy.  Borderline \"non compaction\" of LV noted  Diastolic function parameters were not measured on this limited echo.  ABNORMAL GLOBAL LONGITUDINAL STRAIN- negative 11.6 with APICAL SPARING- this  can be seen with amyloid, oxalosis, Fabry's and other cardiomyopathies  There is moderate global hypokinesia of the left ventricle.  Aortic regurgitation seen on echo 1-7-24 not seen today but this is a limited  echo  Small posterior pericardial effusion  There is no pericardial constriction.  The visual ejection fraction is 35-40%.    -Consulted nephrology  -2/17: Attempt for hemodialysis today, overnight blood pressures continue to remain high, all antihypertensive medication continue to remain on hold, pending hemodialysis will await further recommendations per nephrology to restart any or few of antihypertensives for conducive blood pressure management.     # Influenza B  -She is tested positive for influenza B, tested negative for COVID 19, influenza A and RSV (of note she had a recent COVID-19 infection)  -Chest x-ray with evidence of pulmonary edema versus atypical pneumonia  -Patient does have mild cough with expectoration  -Had elevated white blood cells of 12.2 on admission   -Given Tamiflu 30 mg p.o. in " ER  -Continue with Tamiflu 30 mg p.o. twice daily, given end-stage renal disease  -Droplet precautions  -Supportive management     # Elevated lactic acid  # Rule out sepsis  -Lactic acid initially 4 in ER, improved to 2.1  -White blood cells 12.2, improved to 7.9  -Chest x-ray with possible atypical pneumonia versus pulmonary congestion with ongoing cough with mild expectoration  -UA negative  -Given elevated lactic acid and complex underlying medical history with multiple recent hospitalizations, 1 dose of Zosyn given in ER  -No IV fluids given because of concern of CHF on admission  -Lactic acid improved to 2.1 after hemodialysis  -2/15, patient had episode of hypotension with elevated WBC count, requiring broadening of IV antibiotic from IV Zosyn to IV Zosyn plus vancomycin  -Consulted by infectious disease  -On 2/17, IV antibiotics have been discontinued given no further symptoms/source/etiology of underlying infection noted.     # End-stage renal disease, on hemodialysis  -Has history of lupus nephritis, with recent CHRISTA and recent initiation of hemodialysis in January 2024  -Hemodialysis lzlmtzrp-Jyrvuj-Sgzjsulva-Friday  -Reports being compliant with hemodialysis  -Last hemodialysis on Monday, had hemodialysis today after admission  -Nephrology consult, hemodialysis and blood pressure management per nephrology recommendations as above     # SLE (+dsDNA, +RNP, +ribosomal P Ab and hypocomplementemia)   #Recent Lupus Flare w/ MAHA and renal insuff s/p Cyclophosphamide dose (12/22/23)  # Hx of Lupus cerebritis w/ hx of seizures  # Hx Lupus Nephritis  # Hx medication non-adherence  -Follows with rheumatology at [facility]  -Currently on tapering dose of prednisone; she was initially started with 60 mg p.o. daily and was supposed to taper down but she states that she started her steroid course later then prescribed and currently she is on 50 mg p.o. daily-which was held on 2/15, transition to IV  hydrocortisone due to concern for hypotension  -2/17, with no significant hypotension, no clear signs of sepsis, while discontinuing IV antibiotics will be down titrated IV hydrocortisone to 50 mg twice daily  - continue PTA Bactrim for PJP ppx while on steroids  -Follow-up with rheumatology at [facility]     # Uncontrolled hypertension currently stable.  -Has history of uncontrolled hypertension  -Currently on Coreg 25 mg p.o. twice daily, nifedipine 60 mg p.o. daily and Bumex 2 mg p.o. twice daily  -BP elevated in ER,  -On 2/15, patient had persistent hypotension which was not fluid responsive, with elevation in WBC count following which patient's antibiotics were broadened to include IV vancomycin and Zosyn and all antihypertensive medications were held patient was transferred to ICU, started on p.o. midodrine 10 mg 3 times daily along with hydrocortisone IV  -On 2/16 patient's blood pressure continued to remain stable, continue to hold all antihypertensives at this time  -2/17, awaiting further hemodialysis restarting antihypertensive regimen based on blood pressure post hemodialysis per nephrology recommendations.     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 No current PTA antiepileptic medications     # h/o recurrent C diff colitis  - h/o C diff in 6/2023, and again recently 1/2024  - was on po Vanco initially, switched to po Fidaximicin as per ID- finished course  -Patient initially started on IV Zosyn due to concern for elevated WBC, during  which she was also started on p.o. vancomycin due to concern for recent C. difficile colitis  -On 2/15 patient had worsening hypotension with elevated WBC count hence patient was started on IV vancomycin consult to ID has been placed  -On 2/16 ID has been consulted, and awaiting blood cultures patient continued to remain on IV vancomycin IV Zosyn, oral vancomycin.  -2/17, discontinue IV vancomycin and Zosyn per ID recommendations.  Continue Tamiflu for total of 10 days.             Diet: Fluid restriction 1800 ML FLUID  Renal Diet (dialysis)    DVT Prophylaxis: Pneumatic Compression Devices  Leung Catheter: Not present  Lines: None     Cardiac Monitoring: ACTIVE order. Indication: Acute decompensated heart failure (48 hours)  Code Status: Full Code      Clinically Significant Risk Factors              # Hypoalbuminemia: Lowest albumin = 2.7 g/dL at 2/15/2024  6:15 AM, will monitor as appropriate      # Chronic heart failure with reduced ejection fraction: last echo with EF <40%                  Disposition Plan    not stable for discharge         Clarke Goodrich  Hospitalist Service  [facility]  Securely message with Sofe[employer] (more info)  Text page via enStage Paging/Directory   ______________________________________________________________________    Interval History   Patient is resting in bed, offers no complaints of chest pain dizziness lightheadedness nausea vomiting or headache.  Overnight patient continued to remain in ICU blood pressures continue to remain stable no clear signs of fever or chills present except for mild cough with mild expectoration which has been constant.  No family at bedside.  Patient continues to remain with flat affect.    Physical Exam   Vital Signs: Temp: 97.7  F (36.5  C) Temp src: Oral BP: (!) 146/109 Pulse: 53     SpO2: 100 % O2 Device: None (Room air)    Weight: 111 lbs 1.6 oz    Constitutional: Awake, alert, cooperative, no apparent distress, appears  apathetic  Respiratory: Bilateral upper airway rhonchi present.  No wheezing present.  Cardiovascular: Regular rate and rhythm, normal S1 and S2, and no murmur noted  GI: Normal bowel sounds, soft, non-distended, non-tender  Skin/Integumen: No rashes, no cyanosis, no edema  Other: Alert oriented x 3, no apparent focal neurological deficits.    Medical Decision Making

## 2024-02-17 NOTE — PROGRESS NOTES
Potassium   Date Value Ref Range Status   02/17/2024 4.6 3.4 - 5.3 mmol/L Final   02/11/2021 3.7 3.4 - 5.3 mmol/L Final     Potassium Whole Blood   Date Value Ref Range Status   01/03/2024 7.1 (HH) 3.4 - 5.3 mmol/L Final     Hemoglobin   Date Value Ref Range Status   02/16/2024 7.8 (L) 11.7 - 15.7 g/dL Final   02/11/2021 9.1 (L) 11.7 - 15.7 g/dL Final     Creatinine   Date Value Ref Range Status   02/17/2024 4.39 (H) 0.51 - 0.95 mg/dL Final   02/11/2021 0.58 0.50 - 1.00 mg/dL Final     Urea Nitrogen   Date Value Ref Range Status   02/17/2024 37.3 (H) 6.0 - 20.0 mg/dL Final   06/13/2022 16 7 - 30 mg/dL Final   02/11/2021 10 7 - 30 mg/dL Final     Sodium   Date Value Ref Range Status   02/17/2024 131 (L) 135 - 145 mmol/L Final     Comment:     Reference intervals for this test were updated on 09/26/2023 to more accurately reflect our healthy population. There may be differences in the flagging of prior results with similar values performed with this method. Interpretation of those prior results can be made in the context of the updated reference intervals.    02/11/2021 139 133 - 144 mmol/L Final     INR   Date Value Ref Range Status   01/04/2024 1.18 (H) 0.85 - 1.15 Final   02/10/2021 1.08 0.86 - 1.14 Final       DIALYSIS PROCEDURE NOTE  Hepatitis status of previous patient on machine log was checked and verified ok to use with this patients hepatitis status.  Patient dialyzed for 3 hrs. on a K2  Potassium   Date Value Ref Range Status   02/17/2024 4.6 3.4 - 5.3 mmol/L Final   02/11/2021 3.7 3.4 - 5.3 mmol/L Final     Potassium Whole Blood   Date Value Ref Range Status   01/03/2024 7.1 (HH) 3.4 - 5.3 mmol/L Final     Hemoglobin   Date Value Ref Range Status   02/16/2024 7.8 (L) 11.7 - 15.7 g/dL Final   02/11/2021 9.1 (L) 11.7 - 15.7 g/dL Final     Creatinine   Date Value Ref Range Status   02/17/2024 4.39 (H) 0.51 - 0.95 mg/dL Final   02/11/2021 0.58 0.50 - 1.00 mg/dL Final     Urea Nitrogen   Date Value Ref Range  Status   02/17/2024 37.3 (H) 6.0 - 20.0 mg/dL Final   06/13/2022 16 7 - 30 mg/dL Final   02/11/2021 10 7 - 30 mg/dL Final     Sodium   Date Value Ref Range Status   02/17/2024 131 (L) 135 - 145 mmol/L Final     Comment:     Reference intervals for this test were updated on 09/26/2023 to more accurately reflect our healthy population. There may be differences in the flagging of prior results with similar values performed with this method. Interpretation of those prior results can be made in the context of the updated reference intervals.    02/11/2021 139 133 - 144 mmol/L Final     INR   Date Value Ref Range Status   01/04/2024 1.18 (H) 0.85 - 1.15 Final   02/10/2021 1.08 0.86 - 1.14 Final       DIALYSIS PROCEDURE NOTE  Hepatitis status of previous patient on machine log was checked and verified ok to use with this patients hepatitis status.  Patient dialyzed for 3 hrs. on a K2 bath with a net fluid removal of  1L.  A BFR of 400 ml/min was obtained via a right tunneled CVC.     The treatment plan was discussed with Dr. Ham during the treatment.    Total heparin received during the treatment:  units.      Line flushed, clamped and capped with heparin 1:1000 1600 mL (1.6 units) per lumen    Meds  given: 10 mg midodrine PO with BP downtrending and elevated heart rate during meal. 1500 units heparin   Complications: Complaint of nausea and abdominal discomfort after meal. Ginger ale provided for comfort and vitals stable after midodrine dose.       Person educated: . Knowledge base basic. Barriers to learning: none. Educated on procedure via verbal mode. The patient verbalized understanding. Pt prefers verbal education style.     ICEBOAT? Timeout performed pre-treatment  I: Patient was identified using 2 identifiers  C:  Consent Signed Yes  E: Equipment preventative maintenance is current and dialysis delivery system OK to use  B: Hepatitis B Surface Antigen: Negative ; Draw Date: 02/15/24      Hepatitis B Surface  Antibody: Susceptible; Draw Date: 02/15/24  O: Dialysis orders present and complete prior to treatment  A: Vascular access verified and assessed prior to treatment  T: Treatment was performed at a clinically appropriate time  ?: Patient was allowed to ask questions and address concerns prior to treatment  See Adult Hemodialysis flowsheet in EPIC for further details and post assessment.  Machine water alarm in place and functioning. Transducer pods intact and checked every 15min.   Pt returned via bed.  Chlorine/Chloramine water system checked every 4 hours.  Outpatient Dialysis at [facility] on MWF    Patient repositioned every 2 hours during the treatment.  Post treatment report given to PORSHA Lazar RN regarding 1L of fluid removed, last BP of 144/109, and patient pain rating of 8/10.

## 2024-02-18 NOTE — PROVIDER NOTIFICATION
MD Notification    Notified Person: MD    Notified Person Name: Dr. Clarke Goodrich    Notification Date/Time: 2/18 @ 0939    Notification Interaction: tracie    Purpose of Notification: FYI- Pt's /122, 10mg IV Hydralazine given, recheck at 0945. Thanks, Sherrell PRESLEY    Orders Received:    Comments:

## 2024-02-18 NOTE — PLAN OF CARE
Goal Outcome Evaluation:  SUMMARY: SOB/ Acute chronic HF exacerbation   DATE & TIME: 2/17/24-2/18-24 [id number]    Cognitive Concerns/ Orientation : A/Ox4 disinterested flat affect  BEHAVIOR & AGGRESSION TOOL COLOR: green   ABNL VS/O2:  Blood pressure elevated   MOBILITY: SBA  PAIN MANAGMENT: using hot pack and gave 5 mg oxycodone x 1 for abd pain, effective per pt.  DIET: Renal, 1800 mL Fluid restriction  BOWEL/BLADDER: continent - still producing urine - on HD  ABNL LAB/BG: Na 131 /133  DRAIN/DEVICES: R-chest port HD access - PIV SL  TELEMETRY RHYTHM: NSR  SKIN: mepilex on rt forearm, dry, face flushed  TESTS/PROCEDURES: completed dialysis yesterday before tranferring from ICU   D/C DATE: pending

## 2024-02-18 NOTE — PROGRESS NOTES
"Nephrology Progress Note  02/18/2024         Assessment & Recommendations:   1.  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  3 hours.  1-2 L ultrafiltration.        2.  SLE   -extremely complicated course   -see [facility] Nephrology consult 01/02/24 (Dr. Monge) for details  -current treatment prednisone only   3.  HTN              -poor control/multiple meds.    -Blood pressure high now.  Blood medications held after recent transfer to ICU with hypotension.  -Continue to hold carvedilol given relative bradycardia.  -Restart nifedipine ER at a lower dose of 30 mg daily.  PTA 60 mg daily    4  Influenza B +    5 anemia in CKD-hemoglobin down to 7.8 today.  Monitor.    Dialysis tomorrow   3 hours.  1 -2L ultrafiltration.       Cheryl Ham MD  [facility] - Nephrology   [phone]      Interval History :   Seen / examined.   Patient out of ICU.  Dialysis yesterday with 1 L fluid removal.  No issues.  Blood pressure running high.  Blood pressure medication has been held over the last couple days with hypotension.  Reports mild nausea.  Otherwise no other complaints.    Physical Exam:   I/O last 3 completed shifts:  In: 700 [P.O.:590; I.V.:110]  Out: 1000 [Other:1000]    BP (!) 171/114 (BP Location: Left arm)   Pulse 60   Temp 97.7  F (36.5  C) (Oral)   Resp 16   Ht 1.575 m (5' 2\")   Wt 51.1 kg (112 lb 10.5 oz)   LMP 01/31/2024   SpO2 100%   BMI 20.60 kg/m      GENERAL APPEARANCE: alert and no distress.  Le facies  EYES:  no scleral icterus, pupils equal  PULM: lungs clear to auscultation, equal air movement, no cyanosis or clubbing  CV: regular rhythm, normal rate, no rub     -JVP -     -edema -   GI: soft, non tender,   NEURO: mentation intact and speech normal, no asterixis   Access --IJ tunneled dialysis " catheter    Labs:   All labs reviewed by me  Electrolytes/Renal -   Recent Labs   Lab Test 02/18/24  0849 02/18/24  0209 02/17/24 2123 02/17/24  0757 02/17/24  0527 02/16/24  1207 02/16/24  0509 02/16/24  0131 02/15/24  1942 02/15/24  0615 02/14/24  1146 01/24/24  0657 01/23/24  0545 01/19/24  0702 01/18/24  0558   NA  --   --   --   --  131*  --  136  --  135   < > 145   < > 135   < > 138   POTASSIUM  --   --   --   --  4.6  --  4.3  --  4.7   < > 4.5   < > 4.1   < > 3.7   CHLORIDE  --   --   --   --  98  --  100  --  98   < > 106   < > 106   < > 111*   CO2  --   --   --   --  21*  --  23  --  27   < > 23   < > 13*   < > 20*   BUN  --   --   --   --  37.3*  --  26.1*  --  19.5   < > 20.5*   < > 97.7*   < > 85.0*   CR  --   --   --   --  4.39*  --  3.54*  --  3.10*   < > 2.24*   < > 3.69*   < > 3.41*   * 122* 133*   < > 128*   < > 163*   < > 299*   < > 72   < > 134*   < > 95   BISI  --   --   --   --  7.9*  --  8.1*  --  8.5*   < > 8.9   < > 8.0*   < > 8.5*   MAG  --   --   --   --   --   --   --   --  1.9  --  1.8  --   --   --  1.7   PHOS  --   --   --   --   --   --   --   --   --   --  5.9*  --  6.3*  --  2.8    < > = values in this interval not displayed.       CBC -   Recent Labs   Lab Test 02/16/24  0509 02/15/24  1942 02/15/24  0615   WBC 17.4* 20.1* 7.9   HGB 7.8* 9.1* 8.6*    226 173       LFTs -   Recent Labs   Lab Test 02/15/24  0615 02/14/24  1146 02/07/24  1124   ALKPHOS 144 178* 116   BILITOTAL 0.4 0.6 0.3   ALT 16 12 15   AST 62* 71* 35   PROTTOTAL 6.2* 7.2 5.5*   ALBUMIN 2.7* 3.4* 2.7*       Iron Panel -   Recent Labs   Lab Test 01/05/24  0612 12/20/23  0415 12/17/23  0429 07/10/23  1454   IRON 76  --  130 15*   IRONSAT 40  --  74* 9*   GABRIEL 786*   < > 32,775* 1,079*    < > = values in this interval not displayed.       Current Medications:   - MEDICATION INSTRUCTIONS for Dialysis Patients -   Does not apply See Admin Instructions    [Held by provider] bumetanide  1 mg Oral BID     calcium carbonate 500 mg (elemental)  500 mg Oral TID w/meals    [Held by provider] carvedilol  25 mg Oral BID w/meals    vitamin B-12  100 mcg Oral Daily    folic acid  1 mg Oral Daily    heparin ANTICOAGULANT  5,000 Units Subcutaneous BID    [START ON 2/19/2024] hydrocortisone sodium succinate PF  50 mg Intravenous Daily    insulin aspart  1-7 Units Subcutaneous TID AC    insulin aspart  1-5 Units Subcutaneous At Bedtime    [Held by provider] midodrine  5 mg Oral TID w/meals    multivitamin RENAL  1 capsule Oral Daily    oseltamivir  30 mg Oral Once per day on Monday Wednesday Friday    pantoprazole  40 mg Oral QAM AC    [START ON 2/20/2024] predniSONE  50 mg Oral Daily    [Held by provider] sacubitril-valsartan  1 tablet Oral BID    sodium chloride (PF)  3 mL Intracatheter Q8H    sulfamethoxazole-trimethoprim  1 tablet Oral Once per day on Tuesday Thursday Saturday    vancomycin  125 mg Oral BID      heparin (porcine) 500 Units/hr (02/14/24 1629)     Cheryl Ham MD

## 2024-02-18 NOTE — PROGRESS NOTES
[facility]    Medicine Progress Note - Hospitalist Service    Date of Admission:  2/14/2024    Assessment & Plan   Milly Carroll is a 23 year old female admitted on 2/14/2024.   Milly Carroll is a 23 year old female with complex past medical history of lupus with lupus flareups complicated with lupus cerebritis lupus nephritis, hypertension, chronic systolic heart failure, CHRISTA on CKD, now on hemodialysis since January 2024, history of perinephric hematoma s/p embolization in August 2023, history of pneumonia, C. difficile colitis who was sent from cardiology clinic for further evaluation of shortness of breath and tachycardia.     # Shortness of breath, multifactorial slow improvement  # Suspected acute on chronic systolic heart failure exacerbation  *she has a history of chronic heart failure with a reduced EF ((LVEF down to 20-25% in early August 2023, recovered to around 40% in late August 2023, back down to 15-20% in December 2023, then up to 35-40% on 1/7/2024).    *Echo 1/7/2024  Left ventricular function is decreased. The ejection fraction is 35-40% (moderately reduced).  The right ventricle is normal size.  Global right ventricular function is mildly reduced.  Moderate mitral insufficiency is present (functional ).  Moderate tricuspid insufficiency is present (functional).  Pulmonary hypertension is present     -Her severe cardiomyopathy felt to be nonischemic-Takotsubo/stress-induced versus hypertensive cardiomyopathy.  -She is also on hemodialysis, recently initiated in January 2024  -Did not miss dialysis, last dialysis was on Monday  -Reports that she started feeling short of breath last night  -Still making good urine, PTA on Bumex 2 mg p.o. twice daily; reports being compliant with her medications  -She went to cardiology clinic today and she was advised to come to ER for further evaluation given her shortness of breath and tachycardia  -proBNP in the ER elevated more than  "70,000  -Chest x-ray- Stable appearance of right-sided central venous line with tip overlying the right atrium. Bilateral retrocardiac opacities are present suggestive of pulmonary edema versus atypical pneumonia.  -EKG- sinus tachycardia at 125bpm  -Lactic acid elevated at 4 initially, improved to 2.1 after hemodialysis  -Went from ER to hemodialysis  -ER attending discussed with cardiology on-call  -Admitted to inpatient   -Monitor on telemetry  -Hold PTA Bumex 2 mg p.o. twice daily  -Hold PTA Coreg 25 mg p.o. twice daily   -Strict input and output, daily weights  -Fluid restriction 1800 cc/day    -Echo completed as below   there is borderline concentric left ventricular hypertrophy.  Borderline \"non compaction\" of LV noted  Diastolic function parameters were not measured on this limited echo.  ABNORMAL GLOBAL LONGITUDINAL STRAIN- negative 11.6 with APICAL SPARING- this  can be seen with amyloid, oxalosis, Fabry's and other cardiomyopathies  There is moderate global hypokinesia of the left ventricle.  Aortic regurgitation seen on echo 1-7-24 not seen today but this is a limited  echo  Small posterior pericardial effusion  There is no pericardial constriction.  The visual ejection fraction is 35-40%.    -Consulted nephrology  -2/17: Attempt for hemodialysis today, overnight blood pressures continue to remain high, all antihypertensive medication continue to remain on hold, pending hemodialysis will await further recommendations per nephrology to restart any or few of antihypertensives for conducive blood pressure management.  -2/18, due to persistent elevation in blood pressure multiple midodrine doses have been on hold will discontinue midodrine.  -Restart nifedipine at 30 mg daily per nephrology recommendations  -Will transition patient off of IV Solu-Medrol to oral prednisone in the next couple of days with slowly cutting down on dose  -As needed Compazine has been ordered for nausea control  -Patient has " prolonged QTc, monitor closely with several medication changes to avoid further prolongation.     # Influenza B  -She is tested positive for influenza B, tested negative for COVID 19, influenza A and RSV (of note she had a recent COVID-19 infection)  -Chest x-ray with evidence of pulmonary edema versus atypical pneumonia  -Patient does have mild cough with expectoration  -Had elevated white blood cells of 12.2 on admission   -Given Tamiflu 30 mg p.o. in ER  -Continue with Tamiflu 30 mg p.o. twice daily, given end-stage renal disease  -Droplet precautions  -Supportive management     # Elevated lactic acid  # Rule out sepsis  -Lactic acid initially 4 in ER, improved to 2.1  -White blood cells 12.2, improved to 7.9  -Chest x-ray with possible atypical pneumonia versus pulmonary congestion with ongoing cough with mild expectoration  -UA negative  -Given elevated lactic acid and complex underlying medical history with multiple recent hospitalizations, 1 dose of Zosyn given in ER  -No IV fluids given because of concern of CHF on admission  -Lactic acid improved to 2.1 after hemodialysis  -2/15, patient had episode of hypotension with elevated WBC count, requiring broadening of IV antibiotic from IV Zosyn to IV Zosyn plus vancomycin  -Consulted by infectious disease  -On 2/17, IV antibiotics have been discontinued given no further symptoms/source/etiology of underlying infection noted.     # End-stage renal disease, on hemodialysis  -Has history of lupus nephritis, with recent CHRISTA and recent initiation of hemodialysis in January 2024  -Hemodialysis dwcifzan-Zohtyd-Wninwgbqg-Friday  -Reports being compliant with hemodialysis  -Last hemodialysis on Monday, had hemodialysis today after admission  -Nephrology consult, hemodialysis and blood pressure management per nephrology recommendations as above     # SLE (+dsDNA, +RNP, +ribosomal P Ab and hypocomplementemia)   #Recent Lupus Flare w/ MAHA and renal insuff s/p  Cyclophosphamide dose (12/22/23)  # Hx of Lupus cerebritis w/ hx of seizures  # Hx Lupus Nephritis  # Hx medication non-adherence  -Follows with rheumatology at [facility]  -Currently on tapering dose of prednisone; she was initially started with 60 mg p.o. daily and was supposed to taper down but she states that she started her steroid course later then prescribed and currently she is on 50 mg p.o. daily-which was held on 2/15, transition to IV hydrocortisone due to concern for hypotension  -2/17, with no significant hypotension, no clear signs of sepsis, while discontinuing IV antibiotics will be down titrated IV hydrocortisone to 50 mg twice daily  -2/18, change IV hydrocortisone to daily followed by transition to oral prednisone home regimen.  - continue PTA Bactrim for PJP ppx while on steroids  -Follow[facility]up with [facility]ology at [facility]     # Uncontrolled hypertension currently stable.  -Has history of uncontrolled hypertension  -Currently on Coreg 25 mg p.o. twice daily, nifedipine 60 mg p.o. daily and Bumex 2 mg p.o. twice daily  -BP elevated in ER,  -On 2/15, patient had persistent hypotension which was not fluid responsive, with elevation in WBC count following which patient's antibiotics were broadened to include IV vancomycin and Zosyn and all antihypertensive medications were held patient was transferred to ICU, started on p.o. midodrine 10 mg 3 times daily along with hydrocortisone IV  -On 2/16 patient's blood pressure continued to remain stable, continue to hold all antihypertensives at this time  -2/17, awaiting further hemodialysis restarting antihypertensive regimen based on blood pressure post hemodialysis per nephrology recommendations.  -2/18 discontinue midodrine, patient started on nifedipine 30 mg daily per nephrology recommendations     # Tachycardia, improved  -She was tachycardic in ER with heart rate in 120s, unclear etiology  -States she is compliant with her medications including  Coreg  -Possible related to CHF exacerbation, influenza B infection  -2/15 patient had an episode of hypotension, leading to holding all antihypertensive medications including Coreg, patient continues to remain slightly bradycardic     # Hx Lupus Cerebritis   # Hx remote generalized tonic-clonic seizures seizures  # Chronic Right foot drop, 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 No current PTA antiepileptic medications     # h/o recurrent C diff colitis  - h/o C diff in 6/2023, and again recently 1/2024  - was on po Vanco initially, switched to po Fidaximicin as per ID- finished course  -Patient initially started on IV Zosyn due to concern for elevated WBC, during which she was also started on p.o. vancomycin due to concern for recent C. difficile colitis  -On 2/15 patient had worsening hypotension with elevated WBC count hence patient was started on IV vancomycin consult to ID has been placed  -On 2/16 ID has been consulted, and awaiting blood cultures patient continued to remain on IV vancomycin IV Zosyn, oral vancomycin.  -2/17, discontinue IV vancomycin and Zosyn per ID recommendations.  Continue Tamiflu for total of 10 days.             Diet: Fluid restriction 1800 ML FLUID  Renal Diet (dialysis)    DVT Prophylaxis: Pneumatic Compression Devices  Leung Catheter: Not present  Lines: None     Cardiac Monitoring: ACTIVE order. Indication: Acute decompensated heart failure (48 hours)  Code Status: Full Code      Clinically Significant Risk Factors        # Hypokalemia: Lowest K = 2.7 mmol/L in last 2 days, will replace as needed       # Hypoalbuminemia: Lowest albumin = 2.7 g/dL at 2/15/2024  6:15 AM, will monitor as appropriate        # Chronic heart failure with reduced ejection fraction: last echo with EF <40%                  Disposition Plan      Expected Discharge Date: 02/20/2024      Destination: home with family              Manjosecarrienorah Kp  Hospitalist Service  JESSE  [facility]  Securely message with Steve (more info)  Text page via [employer] Paging/Directory   ______________________________________________________________________    Interval History   Patient continued to have significant nausea yesterday in the evening which continued into early hours of today.  Attempted to use Zofran which had mild improvement, patient had been started on Compazine which did show some improvement but at the time of my visit, patient continued to have significant nausea and was struggling with trying to eat her food.  Patient had 1 episode of vomiting this morning.  No fever or chills noted.  Blood pressure continued to remain high overnight.  Midodrine dose has been held.  Patient denies any headache vision changes or any other new complaints associated with the nausea.    Physical Exam   Vital Signs: Temp: 97.7  F (36.5  C) Temp src: Oral BP: (!) 171/114 Pulse: 60   Resp: 16 SpO2: 100 % O2 Device: None (Room air)    Weight: 112 lbs 10.48 oz    Constitutional: Awake, alert, cooperative, in moderate apparent distress due to nausea, flat affect  Respiratory: Clear to auscultation bilaterally, no crackles or wheezing  Cardiovascular: Regular rate and rhythm, normal S1 and S2, and no murmur noted  GI: Normal bowel sounds, soft, non-distended, non-tender  Skin/Integumen: No rashes, no cyanosis, no edema  Other: Alert oriented x 3, no apparent focal neurological deficits noted.    Medical Decision Making

## 2024-02-18 NOTE — PLAN OF CARE
SUMMARY: SOB/ Acute chronic HF exacerbation   DATE & TIME: 2/17/24 Evening    Cognitive Concerns/ Orientation : A/Ox4 disinterested flat affect  BEHAVIOR & AGGRESSION TOOL COLOR: green   ABNL VS/O2:  Blood pressure elevated held midodrine and gave hydralazine for SBP over 160  MOBILITY: SBA  PAIN MANAGMENT: using hot pack and gave 5 mg oxycodone x 2  for back/abd pain with little effect, nausea gave IV 1x dose of zoloft and tums with meal.  DIET: Renal, ate 50% of meal, 1800 mL Fluid restriction  BOWEL/BLADDER: continent - still producing urine - on HD  ABNL LAB/BG: Na 131 /133  DRAIN/DEVICES: R-chest port HD access - PIV SL  TELEMETRY RHYTHM: NSR  SKIN: mepilex on rt forearm, dry, face flushed  TESTS/PROCEDURES: completed dialysis today before tranferring from ICU   D/C DATE: pending

## 2024-02-19 NOTE — PROGRESS NOTES
Potassium   Date Value Ref Range Status   02/19/2024 2.7 (L) 3.4 - 5.3 mmol/L Final   02/11/2021 3.7 3.4 - 5.3 mmol/L Final     Potassium Whole Blood   Date Value Ref Range Status   01/03/2024 7.1 (HH) 3.4 - 5.3 mmol/L Final     Hemoglobin   Date Value Ref Range Status   02/16/2024 7.8 (L) 11.7 - 15.7 g/dL Final   02/11/2021 9.1 (L) 11.7 - 15.7 g/dL Final     Creatinine   Date Value Ref Range Status   02/19/2024 3.71 (H) 0.51 - 0.95 mg/dL Final   02/11/2021 0.58 0.50 - 1.00 mg/dL Final     Urea Nitrogen   Date Value Ref Range Status   02/19/2024 36.9 (H) 6.0 - 20.0 mg/dL Final   06/13/2022 16 7 - 30 mg/dL Final   02/11/2021 10 7 - 30 mg/dL Final     Sodium   Date Value Ref Range Status   02/19/2024 136 135 - 145 mmol/L Final     Comment:     Reference intervals for this test were updated on 09/26/2023 to more accurately reflect our healthy population. There may be differences in the flagging of prior results with similar values performed with this method. Interpretation of those prior results can be made in the context of the updated reference intervals.    02/11/2021 139 133 - 144 mmol/L Final     INR   Date Value Ref Range Status   01/04/2024 1.18 (H) 0.85 - 1.15 Final   02/10/2021 1.08 0.86 - 1.14 Final       DIALYSIS PROCEDURE NOTE  Hepatitis status of previous patient on machine log was checked and verified ok to use with this patients hepatitis status.  Patient dialyzed for 3 hrs. on a K4 bath with a net fluid removal of  2L.  A BFR of 400-500 ml/min was obtained via a right tunneled CVC.      The treatment plan was discussed with Dr. Albright during the treatment.    Total heparin received during the treatment: 1500 units.      Line flushed, clamped and capped with heparin 1:1000 1.6 mL (1600 units) per lumen    Meds : tylenol 650 mg tablet PO for back pain  Complications: None      Person educated: Patient. Knowledge base minimal. Barriers to learning: none. Educated on procedure via verbal mode. The patient  verbalized understanding. Pt prefers verbal education style.     ICEBOAT? Timeout performed pre-treatment  I: Patient was identified using 2 identifiers  C:  Consent Signed Yes  E: Equipment preventative maintenance is current and dialysis delivery system OK to use  B: Hepatitis B Surface Antigen: Negative; Draw Date: 2/15/24      Hepatitis B Surface Antibody: Susceptible; Draw Date: 2/15/24  O: Dialysis orders present and complete prior to treatment  A: Vascular access verified and assessed prior to treatment  T: Treatment was performed at a clinically appropriate time  ?: Patient was allowed to ask questions and address concerns prior to treatment  See Adult Hemodialysis flowsheet in [employer] for further details and post assessment.  Machine water alarm in place and functioning. Transducer pods intact and checked every 15min.   Pt returned via bed.  Chlorine/Chloramine water system checked every 4 hours.  Outpatient Dialysis at [facility] on MWF.    Patient repositioned every 2 hours during the treatment.  Post treatment report given to JALEEL Ku RN regarding 2L of fluid removed, last BP of 159/120, and patient pain rating of 0/10.

## 2024-02-19 NOTE — PLAN OF CARE
"Goal Outcome Evaluation:  SUMMARY: SOB/ Acute chronic HF exacerbation   DATE & TIME: 2/18/24 8589-0069   Cognitive Concerns/ Orientation : A/Ox4 very flat affect  BEHAVIOR & AGGRESSION TOOL COLOR: Green  ABNL VS/O2: Hypertensive, Hydralazine given once during the shift.  MOBILITY: SBA  PAIN MANAGMENT: Denies  DIET: Renal, 1800 mL Fluid restriction  BOWEL/BLADDER: Continent -on HD  ABNL LAB/BG: BG: K+ 2.7, 108, 122  DRAIN/DEVICES: R-chest port HD access - PIV SL  TELEMETRY RHYTHM: NSR  SKIN: Dry, face flushed. Swollen head and face.  TESTS/PROCEDURES: None  D/C DATE: TBD    BP (!) 149/100 (BP Location: Left arm)   Pulse 79   Temp 98  F (36.7  C) (Oral)   Resp 16   Ht 1.575 m (5' 2\")   Wt 51.1 kg (112 lb 10.5 oz)   LMP 01/31/2024   SpO2 100%   BMI 20.60 kg/m                                      "

## 2024-02-19 NOTE — PLAN OF CARE
Goal Outcome Evaluation:  UMMARY: SOB/ Acute chronic HF exacerbation   DATE & TIME: 2/18/24-2/19/24 0667-7322    Cognitive Concerns/ Orientation : A/Ox4 disinterested flat affect  BEHAVIOR & AGGRESSION TOOL COLOR: green   ABNL VS/O2:  VSS on RA   MOBILITY: SBA  PAIN MANAGMENT: Denies   DIET: Renal, 1800 mL Fluid restriction  BOWEL/BLADDER: continent - still producing urine - on HD  ABNL LAB/BG:   DRAIN/DEVICES: R-chest port HD access - PIV SL  TELEMETRY RHYTHM: NSR  SKIN: mepilex on rt forearm, dry, face flushed  TESTS/PROCEDURES:  Hemodialysis MWF  D/C DATE: pending

## 2024-02-19 NOTE — PROGRESS NOTES
[facility]    Nephrology Progress Note    Assessment & Plan     <CHRISTA, DIALYSIS DEPENDENT    ~Tolerating treatment well. HD today.     ~[facility] MWF, ERNESTO CVC    - Continue MWF schedule      <INFLUENZA B    ~Mild symptoms. Treated with Tamiflu.       <CHF, SHORTNESS OF AIR    ~Improved    <HYPOKALEMIA    ~Related to stool losses, likely.     ~Ran on 4K dialysate today, will correct K.     -I called dialysis unit to check weekly K for a month to monitor.       <HYPERTENSION    ~Agree with resumption of carvedilol, nifedipine.     -Losartan and bumetanide can be addressed as outpatient.     <SLE    ~Dr. Monge's patient. Currently on prednisone (see 1/2/24 note)    -Continue prednisone.       Mario Duenas MD  Nephrology  [facility]  Cell:[phone]  On [employer]   Pager:[phone]          .........    Interval History     Seen on dialysis treatment.     No new concerns. Wants to go home.     K was low this morning just prior to dialysis.       Temp: 97.9  F (36.6  C) Temp src: Oral BP: (!) 144/110 Pulse: 114   Resp: 17 SpO2: 99 % O2 Device: None (Room air)      Vitals:    02/17/24 0600 02/18/24 0649 02/19/24 0712   Weight: 50.4 kg (111 lb 1.6 oz) 51.1 kg (112 lb 10.5 oz) 50.3 kg (110 lb 14.4 oz)       Vital Signs with Ranges    Temp:  [97.6  F (36.4  C)-98.3  F (36.8  C)] 97.9  F (36.6  C)  Pulse:  [] 114  Resp:  [14-28] 17  BP: (132-165)/() 144/110  SpO2:  [99 %-100 %] 99 %    I/O last 3 completed shifts:  In: 736 [P.O.:700; I.V.:36]  Out: -     Physical Exam    BP Readings from Last 5 Encounters:   02/19/24 (!) 144/110   02/14/24 (!) 160/118   02/07/24 (!) 130/94   01/24/24 111/65   12/30/23 (!) 154/109        Wt Readings from Last 10 Encounters:   02/19/24 50.3 kg (110 lb 14.4 oz)   02/14/24 49 kg (108 lb 1.6 oz)   02/07/24 51.7 kg (114 lb)   01/24/24 54.6 kg (120 lb 4.8 oz)   12/27/23 59.9 kg (132 lb)   12/26/23 59.9 kg (132 lb)    12/24/23 56.6 kg (124 lb 12.5 oz)   12/17/23 49 kg (108 lb)   10/25/23 49 kg (108 lb)   10/20/23 50.1 kg (110 lb 8 oz)     GENERAL: Alert, in no apparent distress.   HEENT:  Normocephalic. No gross abnormalities.  The mouth moist.    CV: RRR  RESP: Breathing unlabored   SKIN: no new lesions or rashes, dry to touch.  NEURO:  No overt deficit.  PSYCH: affect neutral        Medications      heparin (porcine) 500 Units/hr (02/14/24 1629)        - MEDICATION INSTRUCTIONS for Dialysis Patients -   Does not apply See Admin Instructions    [Held by provider] bumetanide  1 mg Oral BID    calcium carbonate 500 mg (elemental)  500 mg Oral TID w/meals    carvedilol  25 mg Oral BID w/meals    vitamin B-12  100 mcg Oral Daily    folic acid  1 mg Oral Daily    heparin ANTICOAGULANT  5,000 Units Subcutaneous BID    insulin aspart  1-7 Units Subcutaneous TID AC    insulin aspart  1-5 Units Subcutaneous At Bedtime    multivitamin RENAL  1 capsule Oral Daily    NIFEdipine ER OSMOTIC  30 mg Oral Once    [START ON 2/20/2024] NIFEdipine ER OSMOTIC  60 mg Oral Daily    - MEDICATION INSTRUCTIONS -   Does not apply Once    oseltamivir  30 mg Oral Once per day on Monday Wednesday Friday    pantoprazole  40 mg Oral QAM AC    [START ON 2/20/2024] predniSONE  50 mg Oral Daily    [Held by provider] sacubitril-valsartan  1 tablet Oral BID    sodium chloride (PF)  3 mL Intracatheter Q8H    sodium chloride 0.9%  250 mL Intravenous Once in dialysis/CRRT    sodium chloride 0.9%  300 mL Hemodialysis Machine Once    sulfamethoxazole-trimethoprim  1 tablet Oral Once per day on Tuesday Thursday Saturday    vancomycin  125 mg Oral BID       Data     UA RESULTS:  Recent Labs   Lab Test 02/15/24  1508   COLOR Yellow   APPEARANCE Slightly Cloudy*   URINEGLC Negative   URINEBILI Negative   URINEKETONE Trace*   SG 1.021   UBLD Trace*   URINEPH 7.5*   PROTEIN 100*   NITRITE Negative   LEUKEST Trace*   RBCU 7*   WBCU 8*      BMP  Recent Labs   Lab  "02/19/24  1357 02/19/24  1228 02/19/24  1200 02/19/24  0814 02/18/24  1413 02/18/24  1321 02/17/24  0757 02/17/24  0527 02/16/24  1207 02/16/24  0509   NA  --  136  --   --   --  135  --  131*  --  136   POTASSIUM  --  2.7*  --   --   --  2.7*  --  4.6  --  4.3   CHLORIDE  --  100  --   --   --  98  --  98  --  100   BISI  --  7.5*  --   --   --  7.8*  --  7.9*  --  8.1*   CO2  --  24  --   --   --  24  --  21*  --  23   BUN  --  36.9*  --   --   --  28.2*  --  37.3*  --  26.1*   CR  --  3.71*  --   --   --  3.39*  --  4.39*  --  3.54*   * 118* 110* 136*   < > 158*   < > 128*   < > 163*    < > = values in this interval not displayed.     [email or web address](phos:4)  CBC)  Recent Labs   Lab 02/16/24  0509 02/15/24  1942 02/15/24  0615 02/14/24  1146   WBC 17.4* 20.1* 7.9 12.2*   HGB 7.8* 9.1* 8.6* 8.8*   HCT 26.1* 30.2* 28.7* 29.0*   MCV 97 97 98 96    226 173 202     Lab Results   Component Value Date    ALBUMIN 2.7 02/15/2024    ALBUMIN 3.4 02/14/2024    ALBUMIN 2.7 02/07/2024    ALBUMIN 2.2 06/15/2022    ALBUMIN 1.8 06/13/2022    ALBUMIN 1.8 06/12/2022    ALBUMIN 2.6 02/11/2021    ALBUMIN 3.4 02/10/2021    ALBUMIN 3.3 02/10/2021        Recent Labs   Lab 02/16/24  0509   HGB 7.8*   HCT 26.1*   MCV 97       Recent Labs   Lab 02/15/24  0615   AST 62*   ALT 16   ALKPHOS 144   BILITOTAL 0.4     No lab results found in last 7 days.  25 OH Vit D2   Date Value Ref Range Status   05/28/2013 <5 ug/L Final     25 OH Vit D3   Date Value Ref Range Status   05/28/2013 12 ug/L Final     25 OH Vit D total   Date Value Ref Range Status   05/28/2013  ug/L Final    <17  Season, race, dietary intake, and treatment affect the concentration of   25-hydroxy-Vitamin D. Values may decrease during winter months and increase   during summer months. Values less than 30 ug/L may indicate Vitamin D   deficiency.     No results for input(s): \"PTHI\" in the last 168 hours.    Attestation:   I have reviewed today's relevant vital signs, " notes, medications, labs and imaging.

## 2024-02-19 NOTE — DISCHARGE SUMMARY
[facility]  Hospitalist Discharge Summary      Date of Admission:  2/14/2024  Date of Discharge:  2/19/2024  Discharging Provider: Clarke Goodrich  Discharge Service: Hospitalist Service    Discharge Diagnoses   Acute on chronic CHF exacerbation.    Clinically Significant Risk Factors          Follow-ups Needed After Discharge   Follow-up Appointments     Follow-up and recommended labs and tests       Follow up with primary care provider, Ashlie Skelton, within 7 days for   hospital follow- up.  The following labs/tests are recommended: CBC,CMP.          Unresulted Labs Ordered in the Past 30 Days of this Admission       No orders found from 1/15/2024 to 2/15/2024.        These results will be followed up by PCP    Discharge Disposition   Discharged to home  Condition at discharge: Stable    Hospital Course     Milly Carroll is a 23 year old female with complex past medical history of lupus with lupus flareups complicated with lupus cerebritis lupus nephritis, hypertension, chronic systolic heart failure, CHRISTA on CKD, now on hemodialysis since January 2024, history of perinephric hematoma s/p embolization in August 2023, history of pneumonia, C. difficile colitis who was sent from cardiology clinic for further evaluation of shortness of breath and tachycardia.     # Shortness of breath, multifactorial slow improvement  # Suspected acute on chronic systolic heart failure exacerbation  *she has a history of chronic heart failure with a reduced EF ((LVEF down to 20-25% in early August 2023, recovered to around 40% in late August 2023, back down to 15-20% in December 2023, then up to 35-40% on 1/7/2024).    *Echo 1/7/2024  Left ventricular function is decreased. The ejection fraction is 35-40% (moderately reduced).  The right ventricle is normal size.  Global right ventricular function is mildly reduced.  Moderate mitral insufficiency is present (functional ).  Moderate tricuspid insufficiency is  "present (functional).  Pulmonary hypertension is present     -Her severe cardiomyopathy felt to be nonischemic-Takotsubo/stress-induced versus hypertensive cardiomyopathy.  -She is also on hemodialysis, recently initiated in January 2024  -Did not miss dialysis, last dialysis was on Monday  -Reports that she started feeling short of breath last night  -Still making good urine, PTA on Bumex 2 mg p.o. twice daily; reports being compliant with her medications  -She went to cardiology clinic and she was advised to come to ER for further evaluation given her shortness of breath and tachycardia  -proBNP in the ER elevated more than 70,000  -Chest x-ray- Stable appearance of right-sided central venous line with tip overlying the right atrium. Bilateral retrocardiac opacities are present suggestive of pulmonary edema versus atypical pneumonia.     -Echo completed as below   there is borderline concentric left ventricular hypertrophy.  Borderline \"non compaction\" of LV noted  Diastolic function parameters were not measured on this limited echo.  ABNORMAL GLOBAL LONGITUDINAL STRAIN- negative 11.6 with APICAL SPARING- this  can be seen with amyloid, oxalosis, Fabry's and other cardiomyopathies  There is moderate global hypokinesia of the left ventricle.  Aortic regurgitation seen on echo 1-7-24 not seen today but this is a limited  echo  Small posterior pericardial effusion  There is no pericardial constriction.  The visual ejection fraction is 35-40%.     -Consulted nephrology, patient underwent hemodialysis during hospital stay  Patient did have episodes of hypotension following which-she was monitored closely in ICU with intensivist consult patient was started on midodrine, along with IV hydrocortisone with progressively stabilize blood pressure.  -2/17: Attempt for hemodialysis today, overnight blood pressures continue to remain high, all antihypertensive medication continue to remain on hold, pending hemodialysis will " await further recommendations per nephrology to restart any or few of antihypertensives for conducive blood pressure management.  -2/18, due to persistent elevation in blood pressure multiple midodrine doses have been on hold will discontinue midodrine.  -By the time of discharge midodrine has been discontinued, patient has been started on nifedipine to be continued at discharge  -By the time of discharge, patient appears stable with follow-up in nephrology clinic and continue hemodialysis at discharge.     # Influenza B  -She is tested positive for influenza B, tested negative for COVID 19, influenza A and RSV (of note she had a recent COVID-19 infection)  -Chest x-ray with evidence of pulmonary edema versus atypical pneumonia  -Patient does have mild cough with expectoration  -Had elevated white blood cells of 12.2 on admission   -Continued high flow at the time of discharge     # Elevated lactic acid  # Rule out sepsis  -Lactic acid initially 4 in ER, improved to 2.1  -White blood cells 12.2, improved to 7.9  -Chest x-ray with possible atypical pneumonia versus pulmonary congestion with ongoing cough with mild expectoration  -UA negative  -Given elevated lactic acid and complex underlying medical history with multiple recent hospitalizations, 1 dose of Zosyn given in ER  -No IV fluids given because of concern of CHF on admission  -Lactic acid improved to 2.1 after hemodialysis  -2/15, patient had episode of hypotension with elevated WBC count, requiring broadening of IV antibiotic from IV Zosyn to IV Zosyn plus vancomycin  -Consulted by infectious disease  -On 2/17, IV antibiotics have been discontinued given no further symptoms/source/etiology of underlying infection noted.     # End-stage renal disease, on hemodialysis  -Has history of lupus nephritis, with recent CHRISTA and recent initiation of hemodialysis in January 2024  -Hemodialysis oeznfctt-Eebcpl-Eohdqstnd-Friday  -Nephrology consult, hemodialysis and  blood pressure management per nephrology recommendations as above     # SLE (+dsDNA, +RNP, +ribosomal P Ab and hypocomplementemia)   #Recent Lupus Flare w/ MAHA and renal insuff s/p Cyclophosphamide dose (12/22/23)  # Hx of Lupus cerebritis w/ hx of seizures  # Hx Lupus Nephritis  # Hx medication non-adherence  -Follows with rheumatology at [facility]  -Currently on tapering dose of prednisone; she was initially started with 60 mg p.o. daily and was supposed to taper down but she states that she started her steroid course later then prescribed and currently she is on 50 mg p.o. daily-which was held on 2/15, transition to IV hydrocortisone due to concern for hypotension  -2/17, with no significant hypotension, no clear signs of sepsis, while discontinuing IV antibiotics will be down titrated IV hydrocortisone to 50 mg twice daily  -2/18, change IV hydrocortisone to daily followed by transition to oral prednisone home regimen to be continued at the time of discharge until follow-up in clinic with rheumatology.  - continue PTA Bactrim for PJP ppx while on steroids  -Follow-up with rheumatology at [facility]     # Uncontrolled hypertension currently stable.  -Has history of uncontrolled hypertension  -Currently on Coreg 25 mg p.o. twice daily, nifedipine 60 mg p.o. daily and Bumex 2 mg p.o. twice daily  -BP elevated in ER,  -On 2/15, patient had persistent hypotension which was not fluid responsive, with elevation in WBC count following which patient's antibiotics were broadened to include IV vancomycin and Zosyn and all antihypertensive medications were held patient was transferred to ICU, started on p.o. midodrine 10 mg 3 times daily along with hydrocortisone IV  -On 2/16 patient's blood pressure continued to remain stable, continue to hold all antihypertensives at this time  -2/17, awaiting further hemodialysis restarting antihypertensive regimen based on blood pressure post hemodialysis per nephrology  recommendations.  -2/18 discontinue midodrine, patient started on nifedipine 30 mg daily per nephrology recommendations  -Blood pressure continued to remain stable, continue nifedipine 60 mg daily, Coreg 25 mg twice daily, discussed with nephrology, patient will follow-up in nephrology clinic and reassess restarting both Bumex and ACE inhibitor at that time hence will continue to hold for now.     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 No current PTA antiepileptic medications     # h/o recurrent C diff colitis  - h/o C diff in 6/2023, and again recently 1/2024  - was on po Vanco initially, switched to po Fidaximicin as per ID- finished course  -Patient initially started on IV Zosyn due to concern for elevated WBC, during which she was also started on p.o. vancomycin due to concern for recent C. difficile colitis  -On 2/15 patient had worsening hypotension with elevated WBC count hence patient was started on IV vancomycin consult to ID has been placed  -On 2/16 ID has been consulted, and awaiting blood cultures patient continued to remain on IV vancomycin IV Zosyn, oral vancomycin.  -2/17, discontinue IV vancomycin and Zosyn per ID recommendations.  Continue Tamiflu for total of 10 days. Continued at discharge      Consultations This Hospital Stay   CARDIOLOGY IP CONSULT  NEPHROLOGY IP CONSULT  CARE MANAGEMENT / SOCIAL WORK IP CONSULT  INFECTIOUS DISEASES IP CONSULT  PHARMACY TO DOSE VANCO  INTENSIVIST IP CONSULT  HOSPITALIST IP CONSULT  WOUND OSTOMY CONTINENCE NURSE  IP CONSULT    Code Status   Prior    Time Spent  on this Encounter   I, Clarke Goodrich, personally saw the patient today and spent greater than 30 minutes discharging this patient.       Manmarilyn Goodrich  [facility] [place] [address] [facility]  [address] [place] [address]  Phone: [phone]  ______________________________________________________________________    Physical Exam   Vital Signs:                    Weight: 110 lbs 14.4 oz  Constitutional: Awake, alert, cooperative, no apparent distress,appears apathetic  Respiratory: Clear to auscultation bilaterally, no crackles or wheezing  Cardiovascular: Regular rate and rhythm, normal S1 and S2, and no murmur noted  GI: Normal bowel sounds, soft, non-distended, non-tender  Skin/Integumen: No rashes, no cyanosis, no edema  Other: AxO x3,no apparent focal deficits       Primary Care Physician   Ashlie Skelton    Discharge Orders      Medication Therapy Management Referral      Reason for your hospital stay    Acute on chronic CHF exacerbation     Follow-up and recommended labs and tests     Follow up with primary care provider, Ashlie Skelton, within 7 days for hospital follow- up.  The following labs/tests are recommended: CBC,CMP.     Activity    Your activity upon discharge: activity as tolerated     Diet    Follow this diet upon discharge: Orders Placed This Encounter      Fluid restriction 1800 ML FLUID      Renal Diet (dialysis)       Significant Results and Procedures   Results for orders placed or performed during the hospital encounter of 02/14/24   Chest XR,  PA & LAT    Narrative    CHEST TWO VIEWS 2/14/2024 12:32 PM     HISTORY: short of breath, tachycardic, orthopnea, dialysis patient    COMPARISON: 1/20/2024       Impression    IMPRESSION: Stable appearance of right-sided central venous line with  tip overlying the right atrium. Bilateral retrocardiac opacities are  present suggestive of pulmonary edema versus atypical pneumonia.  Stable, enlarged cardiomediastinal silhouette. No  acute bony  abnormality.    LEANA SWAN MD         SYSTEM ID:  [id number]   CT Chest Abdomen Pelvis w/o Contrast    Narrative    EXAM: CT CHEST ABDOMEN PELVIS W/O CONTRAST  LOCATION: [facility]  DATE: 2/15/2024    INDICATION: Here with influenza CHF, hx recurrent C diff, now acute leukocytosis with L flank pain, nausea, dizziness; PMH lupus, ESRD  COMPARISON: 08/10/2023  TECHNIQUE: CT scan of the chest, abdomen, and pelvis was performed without IV contrast. Multiplanar reformats were obtained. Dose reduction techniques were used.   CONTRAST: None.    FINDINGS:     LUNGS AND PLEURA: Central airways are patent. Left lower lobe atelectasis. No actionable nodules. No pleural effusion or pneumothorax.    MEDIASTINUM/AXILLAE: Dialysis catheter terminates in the right atrium. No pathologically enlarged thoracic lymph nodes. Unremarkable thyroid. Normal caliber thoracic aorta. Cardiomegaly and small pericardial effusion. High attenuation focus is seen   within the left ventricle (series 2 image 32).    CORONARY ARTERY CALCIFICATION: None.    Limited evaluation of solid organs given lack of intravenous contrast.     HEPATOBILIARY: Normal liver contours. No biliary dilation. Gallbladder is decompressed with wall thickening and trace pericholecystic fluid.     PANCREAS: Coarse parenchymal calcifications consistent with prior and/or chronic pancreatitis.     SPLEEN: Normal contours.    ADRENAL GLANDS: Normal contours.     KIDNEYS/BLADDER: Normal unenhanced contours. Embolization coils are seen within the left kidney. Nonobstructing left nephrolithiasis. Underdistended bladder, limiting evaluation.      BOWEL: Patulous, fluid-filled esophagus. No obstruction. Normal appendix. No bowel wall thickening or pneumatosis. Small volume pelvic free fluid. No pneumoperitoneum.     LYMPH NODES: No pathologically enlarged lymph nodes.    VASCULATURE: Nonaneurysmal aorta.     PELVIC ORGANS: No pelvic masses.    "  MUSCULOSKELETAL: No acute osseous abnormalities. Mild anasarca.       Impression    IMPRESSION:  1.  Small volume pelvic free fluid, likely physiologic or related to volume status. Otherwise, no acute abnormalities in the abdomen or pelvis.  2.  Cardiomegaly and small pericardial effusion.  3.  High attenuation focus within the left ventricle, possibly dystrophic calcification of a papillary muscle.  4.  Chronic and ancillary findings as described.   Echo Limited     Value    LVEF  35-40%    [facility]    [id number]  [id number]  [id number]  [id number]^ARMIDA^CHIARA^KAMRON     [facility]  Echocardiography Laboratory  [address] [place] [address]     Name: MIKE JOSE  MRN: [id number]  : 2000  Study Date: 02/15/2024 11:18 AM  Age: 23 yrs  Gender: Female  Patient Location: [facility]  Reason For Study: CHF  Ordering Physician: CHIARA HUFFMAN  Referring Physician: Ashlie Skelton  Performed By: Karina cShneider     BSA: 1.5 m2  Height: 62 in  Weight: 108 lb  HR: 78  BP: 128/97 mmHg  ______________________________________________________________________________  Procedure  Limited Portable Echo Adult. Optison (NDC #5159-4536) given intravenously.  ______________________________________________________________________________  Interpretation Summary     There is borderline concentric left ventricular hypertrophy.  Borderline \"non compaction\" of LV noted  Diastolic function parameters were not measured on this limited echo.  ABNORMAL GLOBAL LONGITUDINAL STRAIN- negative 11.6 with APICAL SPARING- this  can be seen with amyloid, oxalosis, Fabry's and other cardiomyopathies  There is moderate global hypokinesia of the left ventricle.  Aortic regurgitation seen on echo 24 not seen today but this is a limited  echo  Small posterior pericardial effusion  There is no pericardial constriction.  The visual ejection fraction is " "35-40%.  ______________________________________________________________________________  Left Ventricle  The left ventricle is normal in size. There is borderline concentric left  ventricular hypertrophy. Borderline \"non compaction\" of LV noted. The visual  ejection fraction is 35-40%. Diastolic function parameters were not measured  on this limited echo.  ABNORMAL GLOBAL LONGITUDINAL STRAIN- negative 11.6 with APICAL SPARING- this  can be seen with amyloid, oxalosis, Fabry's and other cardiomyopathies. Global  peak LV longitudinal strain is averaged at -11.6%. This suggests abnormal  strain (normal <-18%). There is moderate global hypokinesia of the left  ventricle. There is no thrombus seen in the left ventricle.     Right Ventricle  The right ventricle is normal in size and function.     Atria  Normal left atrial size. Right atrial size is normal.     Mitral Valve  There is mild (1+) mitral regurgitation.     Tricuspid Valve  There is mild (1+) tricuspid regurgitation.     Aortic Valve  The aortic valve is trileaflet. Aortic regurgitation seen on echo 24 not  seen today but this is a limited echo.     Pulmonic Valve  The pulmonic valve is not well seen, but is grossly normal.     Vessels  The aortic root is normal size.     Pericardium  Small posterior pericardial effusion. There is no pericardial constriction.     Rhythm  Sinus rhythm was noted.  ______________________________________________________________________________  MMode/2D Measurements & Calculations  IVSd: 1.1 cm     LVIDd: 5.0 cm  LVIDs: 3.8 cm  LVPWd: 1.2 cm  FS: 25.1 %  LV mass(C)d: 211.1 grams  LV mass(C)dI: 143.5 grams/m2  asc Aorta Diam: 3.3 cm  Asc Ao diam index BSA (cm/m2): 2.2  Asc Ao diam index Ht(cm/m): 2.1  RWT: 0.47  TAPSE: 2.3 cm     Doppler Measurements & Calculations  MV E max herminia: 84.4 cm/sec  MV A max herminia: 61.3 cm/sec  MV E/A: 1.4  MV dec time: 0.18 sec  Ao V2 max: 110.0 cm/sec  Ao max P.0 mmHg  Ao V2 mean: 73.0 cm/sec  Ao " mean PG: 3.0 mmHg  Ao V2 VTI: 20.7 cm  LV V1 max P.2 mmHg  LV V1 max: 74.1 cm/sec  LV V1 VTI: 12.9 cm  TR max horacio: 204.8 cm/sec  TR max P.8 mmHg  AV Horacio Ratio (DI): 0.67     RV S Horacio: 12.0 cm/sec     ______________________________________________________________________________  Report approved by: Maureen Navarro 02/15/2024 02:10 PM             Discharge Medications   Discharge Medication List as of 2024  8:47 PM        START taking these medications    Details   oseltamivir (TAMIFLU) 30 MG capsule Take 1 capsule (30 mg) by mouth three times a week for 9 days, Disp-4 capsule, R-0, E-Prescribe           CONTINUE these medications which have CHANGED    Details   bumetanide (BUMEX) 2 MG tablet Hold until follow up with nephrology, No Print Out           CONTINUE these medications which have NOT CHANGED    Details   acetaminophen (TYLENOL) 325 MG tablet Take 2 tablets (650 mg) by mouth every 4 hours as needed for mild pain, Transitional      calcium carbonate 500 mg, elemental, (OSCAL) 500 MG tablet Take 1 tablet (500 mg) by mouth 3 times daily (with meals) for 90 days, Disp-270 tablet, R-0, E-Prescribe      carvedilol (COREG) 25 MG tablet Take 1 tablet (25 mg) by mouth 2 times daily (with meals) for 90 days, Disp-180 tablet, R-0, E-Prescribe      folic acid (FOLVITE) 1 MG tablet Take 1 tablet (1 mg) by mouth daily for 90 days, Disp-90 tablet, R-0, E-Prescribe      multivitamin RENAL (RENAVITE RX/NEPHROVITE) 1 tablet tablet Take 1 tablet by mouth daily for 90 days, Disp-90 tablet, R-0, E-Prescribe      NIFEdipine ER (ADALAT CC) 60 MG 24 hr tablet Take 1 tablet (60 mg) by mouth daily for 90 days, Disp-90 tablet, R-0, E-Prescribe      pantoprazole (PROTONIX) 40 MG EC tablet Take 1 tablet (40 mg) by mouth every morning (before breakfast) for 90 days, Disp-90 tablet, R-0, E-Prescribe      predniSONE (DELTASONE) 20 MG tablet Take 2 tablets (40 mg) by mouth daily for 7 days, THEN 1.5 tablets (30 mg) daily  for 7 days, THEN 1 tablet (20 mg) daily for 7 days, THEN 0.5 tablets (10 mg) daily for 7 days., Disp-35 tablet, R-0, E-Prescribe      sulfamethoxazole-trimethoprim (BACTRIM) 400-80 MG tablet Take 1 tablet by mouth three times a week (Tuesday, Thursday, Saturday), Disp-30 tablet, R-3, E-Prescribe      vitamin B-12 (CYANOCOBALAMIN) 100 MCG tablet Take 1 tablet (100 mcg) by mouth daily for 90 days, Disp-90 tablet, R-0, E-Prescribe      Vitamin D, Cholecalciferol, 10 MCG (400 UNIT) TABS Take 10 mcg by mouth daily for 90 days, Disp-90 tablet, R-3, E-Prescribe           STOP taking these medications       ondansetron (ZOFRAN ODT) 4 MG ODT tab Comments:   Reason for Stopping:             Allergies   Allergies   Allergen Reactions    Rituximab Anaphylaxis and Shortness Of Breath

## 2024-02-19 NOTE — PLAN OF CARE
UMMARY: SOB/ Acute chronic HF exacerbation   DATE & TIME: 2-19-24 AM shift            Cognitive Concerns/ Orientation : A/Ox4, flat effect. Does not engage in conversation.   BEHAVIOR & AGGRESSION TOOL COLOR: green              ABNL VS/O2:  VSS on RA. Denies chest pain or SOB. Infrequent dry cough.   MOBILITY: SBA  PAIN MANAGMENT: c/o back pain, oxy x 1 given  DIET: Renal, 1800 mL Fluid restriction-tolerating  BOWEL/BLADDER: continent; reports BMs still loose. No abd pain or nausea.   ABNL LAB/BG: -110; Cr 3.71. K 2.7-verifying with neph/dialysis if it will be replaced there  DRAIN/DEVICES: R-chest port HD access - PIV SL  TELEMETRY RHYTHM: SR with prolonged QT  SKIN: R arm wound, dressing changed- per pt it was a blister that opened up awhile back.-WOC consulted  TESTS/PROCEDURES:  Hemodialysis MWF  D/C DATE: pending neph recommendations and plan of care. Continues with droplet and enteric iso.

## 2024-02-20 NOTE — PROGRESS NOTES
.Discharge    Patient discharged to home via personal ride with family   Care plan note completed     Listed belongings gathered and given to patient (including from security/pharmacy). Yes  Care Plan and Patient education resolved: Yes  Prescriptions if needed, hard copies sent with patient  NA  Medication Bin checked and emptied on discharge Yes  SW/care coordinator/charge RN aware of discharge: Yes    AVS reviewed with pt.   Will  medication at local pharmacy.   No questions at this time.   All possessions with pt at discharge.   Brought down to door via WC with staff member.      Jaskaran

## 2024-02-21 NOTE — PROGRESS NOTES
Clinic Care Coordination Contact  [facility]/Voicemail    Clinical Data: Care Coordinator Outreach    Outreach Documentation Number of Outreach Attempt   2/21/2024   3:53 PM 1       Left message on patient's voicemail with call back information and requested return call.    Plan: Care Coordinator  via . Care Coordinator will try to reach patient again in 1-2 business days.    EULALIA Slaughter   Care Coordination Team  [phone]

## 2024-02-22 NOTE — TELEPHONE ENCOUNTER
[facility] referral from: Transitions of Care (recent hospital discharge or ED visit)    [facility] referral outreach attempt #2 on February 22, 2024 at 2:04 PM      Outcome: Patient not reachable after several attempts, will route to [place] Pharmacist/Provider as an FYI.  [facility] scheduling number is .  Thank you for the referral.    Use joshua for the carrier/Plan on the flowsheet      Bizen Message Sent    Nury Lord CPhT  [facility]

## 2024-02-23 NOTE — PROGRESS NOTES
Clinic Care Coordination Contact  [facility]/Voicemail    Clinical Data: Care Coordinator Outreach    Outreach Documentation Number of Outreach Attempt   2/21/2024   3:53 PM 1   2/23/2024   9:40 AM 2       Left message on patient's voicemail with call back information and requested return call.    Plan: Care Coordinator  via . Care Coordinator will try to reach patient again in 7-10 business days.    EULALIA Slaughter   Care Coordination Team  [phone]

## 2024-03-22 LAB — INR PPP: 1.16 (ref 0.85–1.15)

## 2024-03-22 NOTE — RESULT ENCOUNTER NOTE
Result(s) reviewed by SOC triage MD. INR result from 12/19/2023 updated to 1.16 - corrected from previously reported result of 1.12 due to technical error. Given small difference between results, likely not clinically significant. No further action needed at this time.

## 2024-04-08 NOTE — PLAN OF CARE
A&O x 4, pleasant and quiet. Hypertensive, otherwise VSS on 4L NC. Tele: ST. Assist x1. Advanced to regular diet, tolerating well poor intake. L wrist PIV infusing nitroglycerin @ 80mcg/hr. R PIV infusing NS 45% w/ sodium bicarb @ 100mL/hr. L hand PIV CDI/SL. L flank Pain controlled w/ IV dilaudid x1. R groin access site CDI/WDL, soft. CMS intact. Pt c/o nausea, provided PRN zofran x1 and PRN compazine x1. Adequate UOP via purewick. No BM during shift. Continue plan of care.        show

## 2024-09-03 NOTE — PROGRESS NOTES
David Marie  : 1962  Primary:  (Worker's Comp)  Secondary:  [facility]  [address]  [address]  [facility] [address]  Phone: [phone]  Fax: [phone] Plan Frequency: 1-2x/wk for 6 weeks    Plan of Care/Certification Expiration Date: 24        Plan of Care/Certification Expiration Date:  Plan of Care/Certification Expiration Date: 24    Frequency/Duration:   Plan Frequency: 1-2x/wk for 6 weeks      Time In/Out:   Time In: 730  Time Out: 815      PT Visit Info:    Total # of Visits to Date: 9      Visit Count:  9                OUTPATIENT PHYSICAL THERAPY:             Progress Report 9/3/2024               Episode (R shoulder resurfacing)         Treatment Diagnosis:     Stiffness of right shoulder, not elsewhere classified  Right shoulder pain, unspecified chronicity  Medical/Referring Diagnosis:    Pain in right shoulder [M25.511]  Other chronic pain [G89.29]      Referring Physician:  Josephine Perdomo APRN - NP  MD Orders:  PT Eval and Treat, 1-2x/wk for 4-6 weeks  Return MD Appt:  24  Date of Onset:  Onset Date: 24  Most recent surgery, also has past surgery on 10/27/23 which did not recovery properly and therefore caused him to have this surgery  Allergies:  Bee venom  Restrictions/Precautions:    Post Surgical Precautions: shoulder resurfacing      Medications Last Reviewed:  9/3/2024     SUBJECTIVE   History of Injury/Illness (Reason for Referral):  Pt had an injury at work that resulted in R shoulder surgery on 10/27/23. He went to [facility] and did not recover well. MD decided to do a shoulder resurfacing to help. That surgery was performed on 24. It is of note that between the shoulder surgeries he also had knee surgery for meniscal issues.   Patient Stated Goal(s):  \"get back to work, improve strength and ROM\"  Initial Pain Level:      0/10   Post Session Pain Level:     0/10  Past Medical History/Comorbidities:   Mr. Marie  has a past  [facility]    Medicine Progress Note - Hospitalist Service, GOLD TEAM 9    Date of Admission:  1/2/2024    Assessment & Plan   Milly Carroll is a 23 year old female admitted on 1/2/2024. She has a PMHx of lupus C/B lupus flares, lupus cerebritis, lupus nephritis, HTN, chronic systolic heart failure, QT prolongation, past perinephric hematoma s/p emobolization in Aug-2023 and chronic thrombocytopenia admitted to [facility] on 12/17/23 (after presentation for SOB/NSTEMI and pneumonia concern and concern for lupus flare where she was treated w/ steroids and plasma exchange) and transfer to [facility] on 12/18/23 before being transferred to [facility] on 12/22-12/25 for cyclophosphamide infusion. Represented on 12/26 [facility] ED for SOB found to have covid and reduced EF (baseline EF 47%, OSH EF reported as 15-20%) w/ plan to transfer patient to [facility] however patient left A on 12/30 due to prolonged wait for transfer to [facility] ED. On presentation 1/2, patient reported continued worsening CP/SOB since her discharge on 12/30. Chest pain and dyspnea has since improved during her admission.     Currently being managed for COVID-19 infection, acute on chronic heart failure, as well as CHRISTA on CKD.      Today:   -Diuresis with bumex 4mg IV x 1 today   -Favor increasing dose due to lack of response to 2mg dose on 1/5   -Continue coreg and hydralazine as ordered. If ongoing hypertension, could consider addition of nifedipine (discontinued earlier this admission) vs increasing hydralazine   -Limited TTE for further evaluation of volume statis      Acute on Chronic HF (EF 15-20% on 12/26 OSH)  Dyspnea and Chest Pain - improved   Uncontrolled HTN   Severe tricuspid insufficiency   Presented with chest pain and dyspnea as above. Concern that hypertensive crisis contributing to chest pain. Echocardiogram on 17-Dec-2023 showed LVEF 35-40%, EF  decreased to 15-20% on 12/26 w/ BNP at that time >70,000. Lexiscan 12/21 negative for inducible ischemia. Admission workup 1/2 included trop elevated at 290 with peak at 469 on 1/2. TTE 1/2 with severe diffuse hypokinesis with stably reduced EF. Cardiology consulted with recs as below.   V/Q scan indeterminate.   -Gen Cardiology consulted, appreciate recs  -Continue diuresis as above due to concern for cardiorenal syndrome, see below   -Continue carvedilol   -Continue hydralazine BID   -Will need cMRI during admission once fluid status improved  -If ongoing hypertension, could consider addition of nifedipine (discontinued earlier this admission) vs increasing hydralazine     CHRISTA   Metabolic Acidosis   Recent Lupus Nephritis   Cr up trending this admission, per discussion with cardiology, nephrology, and rheumatology current CHRISTA suspected to be more likely related to cardiorenal syndrome and ongoing volume overload.   -Nephrology consulted, appreciate assistance    -Continue diuresis as above   -Continue sodium bicarb tabs   -Rheumatology following, appreciate assistance   -Due for 5th dose cyclophosphamide per [employer] protocol 1/5 (ongoing discussion w/ rheum/nephrology prior to administration), holding for now due to COVID and other ongoing management as outlined     COVID-19 Infection   Tested positive on 12/29 at OSH when presented with chest pain and dyspnea. Concern for superimposed PNA per OSH and was on vanc/cefepime 12/26-12/30 prior to AMA leave. Initially continued on admission, discontinued on 1/4 due to low suspicion for bacterial pneumonia.  Has remained on RA this admission.   -Received remdesivir 12/29 and 12/30 prior to leaving AMA, plan to continue to complete 5 day course given high risk (1/4 - 1/6 )     Hx of C.diff infxn (6/2023)  -Stopped PO vanc prophylaxis on 1/4 when antibiotics stopped     SLE (+dsDNA, +RNP, +ribosomal P Ab and hypocomplementemia)   Recent Lupus Flare w/ MAHA and renal  insuff s/p Cyclophosphamide dose (12/22/23)  Hx of Lupus cerebritis w/ hx of seizures  C/b Lupus Nephritis  Hx medication non-adherence  -Lupus-focused meds:   -Continue prednisone 60mg qday  -PJP ppx: continue PTA bactrim  -GI ppx: continue protonix qday, tums prn  -Cyclophosphamide on hold as above     Hypomagnesemia  Hypokalemia  -Replete per protocol      Chronic RUE edema  Superficial clot of R cephalic vein  US 8/2023 negative. RUE doppler negative for DVT on 1/2, significant for superficial thrombus.  -Symptomatic with heat packs     Chronic thrombocytopenia  Chronic Macrocytic Anemia  -Trend CBC daily   -Continue PTA folate/B12 replacement  -DVT ppx: heparin subcutaneous decrease dose per pharmacy in setting of chronic thrombocytopenia and renal dysfunction  -Hematology consulted - hemolysis improving, check labs daily., recommend EPO per nephrology      Hx of Intermittent Headaches  History of being responsive to APAP and dilaudid along w/ treatment of HTN and Lupus. 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 Stools described as normal.      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 No current PTA antiepileptic medications    Chronic Left Shoulder Pain   Unclear etiology, likely MSK pain.   -PRN tylenol or metharbamol; taper PO dilaudid (no clear indication)       Diet: Fluid restriction 1500 ML FLUID  Combination Diet Regular Diet; 2 gm NA Diet    DVT Prophylaxis: Heparin SQ  Leung Catheter: Not present  Lines: None     Cardiac Monitoring: None  Code Status: Full Code      Clinically Significant Risk Factors              # Hypoalbuminemia: Lowest albumin = 3 g/dL at  "1/6/2024  6:38 AM, will monitor as appropriate  # Coagulation Defect: INR = 1.18 (Ref range: 0.85 - 1.15) and/or PTT = 30 Seconds (Ref range: 22 - 38 Seconds), will monitor for bleeding  # Thrombocytopenia: Lowest platelets = 57 in last 2 days, will monitor for bleeding    # Acute Kidney Injury, unspecified: based on a >150% or 0.3 mg/dL increase in last creatinine compared to past 90 day average, will monitor renal function   # Acute heart failure with reduced ejection fraction: last echo with EF <40% and receiving IV diuretics       # Overweight: Estimated body mass index is 26.45 kg/m  as calculated from the following:    Height as of this encounter: 1.575 m (5' 2\").    Weight as of this encounter: 65.6 kg (144 lb 10 oz).        # Financial/Environmental Concerns: none (Per pt she discussed option of applying for disability with her new PCP)         Disposition Plan     Expected Discharge Date: 01/07/2024      Destination: home with family  Discharge Comments: Dispo VIJAY Gregorio DO  Hospitalist Service, GOLD TEAM 9  [facility]  Securely message with [facility]. (more info)  Text page via [facility] Paging/Directory   See signed in provider for up to date coverage information  ______________________________________________________________________    Interval History   No overnight events reported.   Continue to denies any dyspnea. Denies chest pain. Lying nearly flat. Denies swelling. Left shoulder pain still present. Remains on room air.     Physical Exam   Vital Signs: Temp: 97.6  F (36.4  C) Temp src: Oral BP: (!) 166/111 Pulse: 77   Resp: 18 SpO2: 100 % O2 Device: None (Room air)    Weight: 144 lbs 9.95 oz    Constitutional: no apparent distress, pleasant and cooperative   Cardiovascular: regular rate and rhythm, normal S1 and S2, no murmurs, rubs, or gallops noted, no bilateral lower extremity edema, +JVP    Respiratory: clear to auscultation bilaterally, no " wheezing, rales, or rhonchi, normal work of breathing   GI: abdomen soft, non tender, non distended, bowel sounds present   Neuro: alert and oriented, no gross focal deficits noted      Medical Decision Making           Data     I have personally reviewed the following data over the past 24 hrs:    5.3  \   8.4 (L)   / 58 (L)     140 109 (H) 107.0 (H) /  114 (H)   5.3 17 (L) 3.97 (H) \     ALT: 43 AST: 22 AP: 92 TBILI: 0.2   ALB: 3.0 (L) TOT PROTEIN: 5.4 (L) LIPASE: N/A     Procal: N/A CRP: 8.18 (H) Lactic Acid: N/A       Ferritin:  N/A % Retic:  5.0 (H) LDH:  354 (H)       Imaging results reviewed over the past 24 hrs:   No results found for this or any previous visit (from the past 24 hour(s)).

## 2024-10-01 PROBLEM — M32.9 SYSTEMIC LUPUS ERYTHEMATOSUS, UNSPECIFIED (H): Status: ACTIVE | Noted: 2024-01-01

## (undated) RX ORDER — MORPHINE SULFATE 2 MG/ML
INJECTION, SOLUTION INTRAMUSCULAR; INTRAVENOUS
Status: DISPENSED
Start: 2023-01-01

## (undated) RX ORDER — ONDANSETRON 2 MG/ML
INJECTION INTRAMUSCULAR; INTRAVENOUS
Status: DISPENSED
Start: 2023-01-01

## (undated) RX ORDER — HYDROMORPHONE HYDROCHLORIDE 1 MG/ML
INJECTION, SOLUTION INTRAMUSCULAR; INTRAVENOUS; SUBCUTANEOUS
Status: DISPENSED
Start: 2023-01-01

## (undated) RX ORDER — NITROGLYCERIN 20 MG/100ML
INJECTION INTRAVENOUS
Status: DISPENSED
Start: 2023-01-01

## (undated) RX ORDER — ASPIRIN 81 MG/1
TABLET, CHEWABLE ORAL
Status: DISPENSED
Start: 2023-01-01

## (undated) RX ORDER — IBUPROFEN 200 MG
TABLET ORAL
Status: DISPENSED
Start: 2023-01-01

## (undated) RX ORDER — CEFEPIME HYDROCHLORIDE 2 G/1
INJECTION, POWDER, FOR SOLUTION INTRAVENOUS
Status: DISPENSED
Start: 2023-01-01

## (undated) RX ORDER — LIDOCAINE HYDROCHLORIDE 10 MG/ML
INJECTION, SOLUTION EPIDURAL; INFILTRATION; INTRACAUDAL; PERINEURAL
Status: DISPENSED
Start: 2024-01-01

## (undated) RX ORDER — HEPARIN SODIUM 1000 [USP'U]/ML
INJECTION, SOLUTION INTRAVENOUS; SUBCUTANEOUS
Status: DISPENSED
Start: 2024-01-01

## (undated) RX ORDER — METOCLOPRAMIDE HYDROCHLORIDE 5 MG/ML
INJECTION INTRAMUSCULAR; INTRAVENOUS
Status: DISPENSED
Start: 2023-01-01

## (undated) RX ORDER — FENTANYL CITRATE 50 UG/ML
INJECTION, SOLUTION INTRAMUSCULAR; INTRAVENOUS
Status: DISPENSED
Start: 2023-01-01

## (undated) RX ORDER — METHYLPREDNISOLONE SODIUM SUCCINATE 125 MG/2ML
INJECTION, POWDER, LYOPHILIZED, FOR SOLUTION INTRAMUSCULAR; INTRAVENOUS
Status: DISPENSED
Start: 2023-01-01

## (undated) RX ORDER — SODIUM CHLORIDE 9 MG/ML
INJECTION, SOLUTION INTRAVENOUS
Status: DISPENSED
Start: 2022-06-28

## (undated) RX ORDER — FUROSEMIDE 10 MG/ML
INJECTION INTRAMUSCULAR; INTRAVENOUS
Status: DISPENSED
Start: 2023-01-01

## (undated) RX ORDER — LIDOCAINE HYDROCHLORIDE 10 MG/ML
INJECTION, SOLUTION EPIDURAL; INFILTRATION; INTRACAUDAL; PERINEURAL
Status: DISPENSED
Start: 2022-06-28

## (undated) RX ORDER — ACETAMINOPHEN 500 MG
TABLET ORAL
Status: DISPENSED
Start: 2023-01-01

## (undated) RX ORDER — ACETAMINOPHEN 325 MG/1
TABLET ORAL
Status: DISPENSED
Start: 2023-01-01

## (undated) RX ORDER — ASPIRIN 325 MG
TABLET ORAL
Status: DISPENSED
Start: 2023-01-01

## (undated) RX ORDER — FENTANYL CITRATE 50 UG/ML
INJECTION, SOLUTION INTRAMUSCULAR; INTRAVENOUS
Status: DISPENSED
Start: 2024-01-01

## (undated) RX ORDER — FENTANYL CITRATE 50 UG/ML
INJECTION, SOLUTION INTRAMUSCULAR; INTRAVENOUS
Status: DISPENSED
Start: 2022-06-28

## (undated) RX ORDER — NITROGLYCERIN 0.4 MG/1
TABLET SUBLINGUAL
Status: DISPENSED
Start: 2023-01-01

## (undated) RX ORDER — CALCIUM GLUCONATE 94 MG/ML
INJECTION, SOLUTION INTRAVENOUS
Status: DISPENSED
Start: 2023-01-01

## (undated) RX ORDER — IPRATROPIUM BROMIDE AND ALBUTEROL SULFATE 2.5; .5 MG/3ML; MG/3ML
SOLUTION RESPIRATORY (INHALATION)
Status: DISPENSED
Start: 2023-01-01

## (undated) RX ORDER — HEPARIN SODIUM 200 [USP'U]/100ML
INJECTION, SOLUTION INTRAVENOUS
Status: DISPENSED
Start: 2023-01-01

## (undated) RX ORDER — HEPARIN SODIUM 10000 [USP'U]/100ML
INJECTION, SOLUTION INTRAVENOUS
Status: DISPENSED
Start: 2023-01-01

## (undated) RX ORDER — LIDOCAINE HYDROCHLORIDE 10 MG/ML
INJECTION, SOLUTION INFILTRATION; PERINEURAL
Status: DISPENSED
Start: 2023-01-01